# Patient Record
Sex: FEMALE | Race: WHITE | Employment: OTHER | URBAN - METROPOLITAN AREA
[De-identification: names, ages, dates, MRNs, and addresses within clinical notes are randomized per-mention and may not be internally consistent; named-entity substitution may affect disease eponyms.]

---

## 2017-05-08 ENCOUNTER — HOSPITAL ENCOUNTER (OUTPATIENT)
Dept: CT IMAGING | Age: 77
Discharge: HOME | End: 2017-05-08
Attending: INTERNAL MEDICINE
Payer: MEDICARE

## 2017-05-08 DIAGNOSIS — C18.2 MALIGNANT NEOPLASM OF ASCENDING COLON (HCC): ICD-10-CM

## 2017-05-08 LAB — CREAT UR-MCNC: 1.1 MG/DL (ref 0.6–1.3)

## 2017-05-08 PROCEDURE — 74177 CT ABD & PELVIS W/CONTRAST: CPT

## 2017-05-08 PROCEDURE — 82565 ASSAY OF CREATININE: CPT

## 2017-05-08 PROCEDURE — 74011636320 HC RX REV CODE- 636/320: Performed by: INTERNAL MEDICINE

## 2017-05-08 RX ADMIN — IOPAMIDOL 70 ML: 612 INJECTION, SOLUTION INTRAVENOUS at 14:49

## 2017-05-12 RX ORDER — AMLODIPINE BESYLATE 5 MG/1
TABLET ORAL
Qty: 30 TAB | Refills: 0 | Status: SHIPPED | OUTPATIENT
Start: 2017-05-12 | End: 2019-03-24

## 2017-06-21 ENCOUNTER — APPOINTMENT (OUTPATIENT)
Dept: GENERAL RADIOLOGY | Age: 77
DRG: 291 | End: 2017-06-21
Attending: EMERGENCY MEDICINE
Payer: MEDICARE

## 2017-06-21 ENCOUNTER — HOSPITAL ENCOUNTER (INPATIENT)
Age: 77
LOS: 8 days | Discharge: NURSING, REHAB OR LONG-TERM CARE | DRG: 291 | End: 2017-06-29
Attending: EMERGENCY MEDICINE | Admitting: INTERNAL MEDICINE
Payer: MEDICARE

## 2017-06-21 DIAGNOSIS — J18.9 PNEUMONIA OF LEFT LOWER LOBE DUE TO INFECTIOUS ORGANISM: ICD-10-CM

## 2017-06-21 DIAGNOSIS — R09.02 HYPOXIA: ICD-10-CM

## 2017-06-21 DIAGNOSIS — A41.9 SEPSIS, DUE TO UNSPECIFIED ORGANISM: Primary | ICD-10-CM

## 2017-06-21 DIAGNOSIS — N39.0 URINARY TRACT INFECTION WITHOUT HEMATURIA, SITE UNSPECIFIED: ICD-10-CM

## 2017-06-21 LAB
ALBUMIN SERPL BCP-MCNC: 3.6 G/DL (ref 3.4–5)
ALBUMIN/GLOB SERPL: 0.9 {RATIO} (ref 0.8–1.7)
ALP SERPL-CCNC: 112 U/L (ref 45–117)
ALT SERPL-CCNC: 23 U/L (ref 13–56)
ANION GAP BLD CALC-SCNC: 11 MMOL/L (ref 3–18)
APPEARANCE UR: CLEAR
AST SERPL W P-5'-P-CCNC: 22 U/L (ref 15–37)
BACTERIA URNS QL MICRO: ABNORMAL /HPF
BASOPHILS # BLD AUTO: 0 K/UL (ref 0–0.1)
BASOPHILS # BLD: 0 % (ref 0–2)
BILIRUB DIRECT SERPL-MCNC: 0.7 MG/DL (ref 0–0.2)
BILIRUB SERPL-MCNC: 2.4 MG/DL (ref 0.2–1)
BILIRUB UR QL: NEGATIVE
BNP SERPL-MCNC: 1939 PG/ML (ref 0–1800)
BUN SERPL-MCNC: 29 MG/DL (ref 7–18)
BUN/CREAT SERPL: 27 (ref 12–20)
CALCIUM SERPL-MCNC: 9.4 MG/DL (ref 8.5–10.1)
CHLORIDE SERPL-SCNC: 108 MMOL/L (ref 100–108)
CK MB CFR SERPL CALC: ABNORMAL % (ref 0–4)
CK MB SERPL-MCNC: <1 NG/ML (ref 5–25)
CK SERPL-CCNC: 38 U/L (ref 26–192)
CO2 SERPL-SCNC: 22 MMOL/L (ref 21–32)
COLOR UR: YELLOW
CREAT SERPL-MCNC: 1.07 MG/DL (ref 0.6–1.3)
DIFFERENTIAL METHOD BLD: ABNORMAL
EOSINOPHIL # BLD: 0 K/UL (ref 0–0.4)
EOSINOPHIL NFR BLD: 0 % (ref 0–5)
EPITH CASTS URNS QL MICRO: ABNORMAL /LPF (ref 0–5)
ERYTHROCYTE [DISTWIDTH] IN BLOOD BY AUTOMATED COUNT: 14.6 % (ref 11.6–14.5)
FLUAV AG NPH QL IA: NEGATIVE
FLUBV AG NOSE QL IA: NEGATIVE
GLOBULIN SER CALC-MCNC: 3.8 G/DL (ref 2–4)
GLUCOSE BLD STRIP.AUTO-MCNC: 181 MG/DL (ref 70–110)
GLUCOSE SERPL-MCNC: 238 MG/DL (ref 74–99)
GLUCOSE UR STRIP.AUTO-MCNC: NEGATIVE MG/DL
HCT VFR BLD AUTO: 29.6 % (ref 35–45)
HGB BLD-MCNC: 9.9 G/DL (ref 12–16)
HGB UR QL STRIP: NEGATIVE
KETONES UR QL STRIP.AUTO: NEGATIVE MG/DL
LACTATE BLD-SCNC: 1.3 MMOL/L (ref 0.4–2)
LEUKOCYTE ESTERASE UR QL STRIP.AUTO: ABNORMAL
LIPASE SERPL-CCNC: 144 U/L (ref 73–393)
LYMPHOCYTES # BLD AUTO: 5 % (ref 21–52)
LYMPHOCYTES # BLD: 0.4 K/UL (ref 0.9–3.6)
MCH RBC QN AUTO: 28.3 PG (ref 24–34)
MCHC RBC AUTO-ENTMCNC: 33.4 G/DL (ref 31–37)
MCV RBC AUTO: 84.6 FL (ref 74–97)
MONOCYTES # BLD: 0.4 K/UL (ref 0.05–1.2)
MONOCYTES NFR BLD AUTO: 5 % (ref 3–10)
NEUTS SEG # BLD: 8.4 K/UL (ref 1.8–8)
NEUTS SEG NFR BLD AUTO: 90 % (ref 40–73)
NITRITE UR QL STRIP.AUTO: NEGATIVE
PH UR STRIP: 5 [PH] (ref 5–8)
PLATELET # BLD AUTO: 95 K/UL (ref 135–420)
PMV BLD AUTO: 10.2 FL (ref 9.2–11.8)
POTASSIUM SERPL-SCNC: 4.7 MMOL/L (ref 3.5–5.5)
PROT SERPL-MCNC: 7.4 G/DL (ref 6.4–8.2)
PROT UR STRIP-MCNC: 30 MG/DL
RBC # BLD AUTO: 3.5 M/UL (ref 4.2–5.3)
RBC #/AREA URNS HPF: NEGATIVE /HPF (ref 0–5)
SODIUM SERPL-SCNC: 141 MMOL/L (ref 136–145)
SP GR UR REFRACTOMETRY: 1.01 (ref 1–1.03)
TROPONIN I SERPL-MCNC: 0.05 NG/ML (ref 0–0.04)
UROBILINOGEN UR QL STRIP.AUTO: 0.2 EU/DL (ref 0.2–1)
WBC # BLD AUTO: 9.3 K/UL (ref 4.6–13.2)
WBC URNS QL MICRO: ABNORMAL /HPF (ref 0–4)

## 2017-06-21 PROCEDURE — 82962 GLUCOSE BLOOD TEST: CPT

## 2017-06-21 PROCEDURE — 85025 COMPLETE CBC W/AUTO DIFF WBC: CPT | Performed by: EMERGENCY MEDICINE

## 2017-06-21 PROCEDURE — 82550 ASSAY OF CK (CPK): CPT | Performed by: EMERGENCY MEDICINE

## 2017-06-21 PROCEDURE — 81001 URINALYSIS AUTO W/SCOPE: CPT | Performed by: EMERGENCY MEDICINE

## 2017-06-21 PROCEDURE — 87077 CULTURE AEROBIC IDENTIFY: CPT | Performed by: INTERNAL MEDICINE

## 2017-06-21 PROCEDURE — 96374 THER/PROPH/DIAG INJ IV PUSH: CPT

## 2017-06-21 PROCEDURE — 80048 BASIC METABOLIC PNL TOTAL CA: CPT | Performed by: EMERGENCY MEDICINE

## 2017-06-21 PROCEDURE — 80076 HEPATIC FUNCTION PANEL: CPT | Performed by: EMERGENCY MEDICINE

## 2017-06-21 PROCEDURE — 71010 XR CHEST PORT: CPT

## 2017-06-21 PROCEDURE — 83605 ASSAY OF LACTIC ACID: CPT

## 2017-06-21 PROCEDURE — 99285 EMERGENCY DEPT VISIT HI MDM: CPT

## 2017-06-21 PROCEDURE — 74011250636 HC RX REV CODE- 250/636: Performed by: EMERGENCY MEDICINE

## 2017-06-21 PROCEDURE — 83880 ASSAY OF NATRIURETIC PEPTIDE: CPT | Performed by: EMERGENCY MEDICINE

## 2017-06-21 PROCEDURE — 74011250636 HC RX REV CODE- 250/636: Performed by: INTERNAL MEDICINE

## 2017-06-21 PROCEDURE — 65270000029 HC RM PRIVATE

## 2017-06-21 PROCEDURE — 74011250637 HC RX REV CODE- 250/637: Performed by: EMERGENCY MEDICINE

## 2017-06-21 PROCEDURE — 87804 INFLUENZA ASSAY W/OPTIC: CPT | Performed by: EMERGENCY MEDICINE

## 2017-06-21 PROCEDURE — 83690 ASSAY OF LIPASE: CPT | Performed by: EMERGENCY MEDICINE

## 2017-06-21 PROCEDURE — 87086 URINE CULTURE/COLONY COUNT: CPT | Performed by: INTERNAL MEDICINE

## 2017-06-21 PROCEDURE — 74011636637 HC RX REV CODE- 636/637: Performed by: INTERNAL MEDICINE

## 2017-06-21 PROCEDURE — 87186 SC STD MICRODIL/AGAR DIL: CPT | Performed by: INTERNAL MEDICINE

## 2017-06-21 PROCEDURE — 93005 ELECTROCARDIOGRAM TRACING: CPT

## 2017-06-21 PROCEDURE — 87040 BLOOD CULTURE FOR BACTERIA: CPT | Performed by: EMERGENCY MEDICINE

## 2017-06-21 RX ORDER — LEVOFLOXACIN 5 MG/ML
750 INJECTION, SOLUTION INTRAVENOUS
Status: COMPLETED | OUTPATIENT
Start: 2017-06-21 | End: 2017-06-21

## 2017-06-21 RX ORDER — INSULIN LISPRO 100 [IU]/ML
INJECTION, SOLUTION INTRAVENOUS; SUBCUTANEOUS
Status: DISCONTINUED | OUTPATIENT
Start: 2017-06-21 | End: 2017-06-29 | Stop reason: HOSPADM

## 2017-06-21 RX ORDER — DEXTROSE 50 % IN WATER (D50W) INTRAVENOUS SYRINGE
25-50 AS NEEDED
Status: DISCONTINUED | OUTPATIENT
Start: 2017-06-21 | End: 2017-06-29 | Stop reason: HOSPADM

## 2017-06-21 RX ORDER — ACETAMINOPHEN 500 MG
1000 TABLET ORAL ONCE
Status: COMPLETED | OUTPATIENT
Start: 2017-06-21 | End: 2017-06-21

## 2017-06-21 RX ORDER — ONDANSETRON 2 MG/ML
4 INJECTION INTRAMUSCULAR; INTRAVENOUS
Status: COMPLETED | OUTPATIENT
Start: 2017-06-21 | End: 2017-06-21

## 2017-06-21 RX ORDER — SODIUM CHLORIDE 9 MG/ML
75 INJECTION, SOLUTION INTRAVENOUS CONTINUOUS
Status: DISPENSED | OUTPATIENT
Start: 2017-06-22 | End: 2017-06-22

## 2017-06-21 RX ORDER — LEVOFLOXACIN 5 MG/ML
750 INJECTION, SOLUTION INTRAVENOUS EVERY 24 HOURS
Status: DISCONTINUED | OUTPATIENT
Start: 2017-06-23 | End: 2017-06-22

## 2017-06-21 RX ORDER — MAGNESIUM SULFATE 100 %
16 CRYSTALS MISCELLANEOUS AS NEEDED
Status: DISCONTINUED | OUTPATIENT
Start: 2017-06-21 | End: 2017-06-29 | Stop reason: HOSPADM

## 2017-06-21 RX ADMIN — SODIUM CHLORIDE 1000 ML: 900 INJECTION, SOLUTION INTRAVENOUS at 16:59

## 2017-06-21 RX ADMIN — ONDANSETRON 4 MG: 2 INJECTION INTRAMUSCULAR; INTRAVENOUS at 13:13

## 2017-06-21 RX ADMIN — SODIUM CHLORIDE 75 ML/HR: 900 INJECTION, SOLUTION INTRAVENOUS at 23:46

## 2017-06-21 RX ADMIN — INSULIN LISPRO 2 UNITS: 100 INJECTION, SOLUTION INTRAVENOUS; SUBCUTANEOUS at 22:58

## 2017-06-21 RX ADMIN — LEVOFLOXACIN 750 MG: 5 INJECTION, SOLUTION INTRAVENOUS at 19:31

## 2017-06-21 RX ADMIN — ACETAMINOPHEN 1000 MG: 500 TABLET ORAL at 13:08

## 2017-06-21 RX ADMIN — SODIUM CHLORIDE 500 MG: 900 INJECTION, SOLUTION INTRAVENOUS at 16:58

## 2017-06-21 NOTE — ED TRIAGE NOTES
Patient report this morning she awaken not feeling well, C/O nausea, vomiting, fatigue, and diarrhea symptoms and feeling cold. Patient oral temp 102.6, denies abdominal pain.

## 2017-06-21 NOTE — ED TRIAGE NOTES
Provider in triage: 67 y o female has nausea vomiting and diarrhea and fever for 5 days.  with similar symptoms.    Ordered: labs, fluid, zofran, tylenol   DDx: gastroenteritis, dehydration  Sent to (MAIN treatment area, FAST track): fast

## 2017-06-21 NOTE — ED PROVIDER NOTES
HPI Comments: Pt with history of HTN, DM, CAD and thrombocytopenia, presents to ED with complaint of cough, ear pain and drainage and \"ear popping\". She notes that she started feeling badly yesterday afternoon. Her  is currently living in a NH/rehab facility and \"you know how many infections are over there\". She states she can \"feel the fluid rolling around in my head when I turn over in bed\" and then she developed nausea and vomiting today. Cough is productive of \"phlegm\". No diarrhea, no chest pain, no abdominal pain, no dysuria, no hematuria, no melena or rectal bleeding, no headache, no neck or back pain. She called her PMD but Loan Sadler was out of town and couldn't see me today\". She denies any recent abx use. No other acute symptoms or complaints were noted. Past Medical History:   Diagnosis Date    Anemia     CAD (coronary artery disease)     Coronary Stenting needed, hematology workup needed prior to stent placement, treating medically at this time    Cancer [facility])     colon    Cancer ([facility]     breast - left    Cardiac catheterization 08/17/2016    LM patent. mLAD 99.9% (2.25 x 28-mm Xience BETHEL, resid 0%). oD1 60%. oD2 95%. pCX 90% (2.5 x 13-mm Xience BETHEL, resid 0%). OMB patent.  Cardiac echocardiogram 07/01/2016    EF 50%. Basal inferior, basal-mid inferolateral hypk. Gr 1 DDfx. RVSP 50-60 mmHg. Mild LAE.   Mod, eccentric MR.      Diabetes ([facility] [address]     Hypercholesteremia     Hypertension     Thrombocytopenia ([facility] [address]        Past Surgical History:   Procedure Laterality Date    CARDIAC CATHETERIZATION  7/16/2015     no stents at this time r/t anemia & thrombocytopenia (hematology work-up needed prior to stent placement)    CARDIAC CATHETERIZATION  7/11/2016         CARDIAC CATHETERIZATION  8/17/2016         CORONARY ARTERY ANGIOGRAM  7/16/2015         CORONARY ARTERY ANGIOGRAM  7/11/2016         CORONARY ARTERY ANGIOGRAM  8/17/2016         CORONARY STENT EA ADDL VESSEL 8/17/2016         CORONARY STENT SINGLE W/PTCA  8/17/2016         HX GI      colon surg secondary to cancer - removed lesion    HX HEENT  2/2013    cataract bilaterally    HX MASTECTOMY  march 2011    left    HX VASCULAR ACCESS      mediport - now removed    LV ANGIOGRAPHY  7/16/2015         LV ANGIOGRAPHY  7/11/2016              No family history on file. Social History     Social History    Marital status:      Spouse name: N/A    Number of children: N/A    Years of education: N/A     Occupational History    Not on file. Social History Main Topics    Smoking status: Never Smoker    Smokeless tobacco: Never Used    Alcohol use No    Drug use: No    Sexual activity: Not on file     Other Topics Concern    Not on file     Social History Narrative         ALLERGIES: Latex    Review of Systems   Constitutional: Positive for activity change and fatigue. Negative for chills, diaphoresis and fever. HENT: Positive for congestion, ear discharge, ear pain and sore throat. Negative for facial swelling, hearing loss, trouble swallowing and voice change. Eyes: Negative for visual disturbance. Respiratory: Positive for cough. Negative for shortness of breath and wheezing. Cardiovascular: Negative for chest pain, palpitations and leg swelling. Gastrointestinal: Positive for nausea and vomiting. Negative for abdominal pain, constipation and diarrhea. Endocrine: Negative. Genitourinary: Negative for dysuria and hematuria. Musculoskeletal: Negative. Negative for back pain, neck pain and neck stiffness. Skin: Negative for pallor and rash. Allergic/Immunologic: Negative. Neurological: Negative for syncope, weakness, numbness and headaches. Hematological: Does not bruise/bleed easily.        Vitals:    06/21/17 1240   BP: 139/74   Pulse: (!) 109   Resp: 16   Temp: (!) 102.6 °F (39.2 °C)   SpO2: (!) 86%   Weight: 61.2 kg (135 lb)   Height: 5' (1.524 m)            Physical Exam Constitutional: She is oriented to person, place, and time. She appears well-developed and well-nourished. No distress. Resting comfortably on stretcher   HENT:   Head: Normocephalic and atraumatic. Mouth/Throat: Oropharynx is clear and moist.   MM moist. Erythema noted to L TM, normal appearing R TM   Eyes: Conjunctivae and EOM are normal. No scleral icterus. Sclera clear bilaterally   Neck: Normal range of motion. Neck supple. No JVD present. Non-tender to palpation   Cardiovascular: Normal rate, regular rhythm and normal heart sounds. Exam reveals no gallop and no friction rub. No murmur heard. Pulmonary/Chest: Effort normal and breath sounds normal. No stridor. No respiratory distress. She has no wheezes. She has no rales. She exhibits no tenderness. No crepitance with palpation   Abdominal: Soft. Bowel sounds are normal. She exhibits no distension and no mass. There is no tenderness. There is no rebound and no guarding. Genitourinary:   Genitourinary Comments: No CVA tenderness   Musculoskeletal: She exhibits no edema or tenderness. Normal inspection of upper extremities. No edema noted to bilateral lower extremities   Lymphadenopathy:     She has no cervical adenopathy. Neurological: She is alert and oriented to person, place, and time. No cranial nerve deficit. She exhibits normal muscle tone. Normal motor and sensation bilaterally to upper and lower extremities   Skin: Skin is warm and dry. No rash noted. She is not diaphoretic. Psychiatric:   Normal mood and affect. Vitals reviewed. MDM  Number of Diagnoses or Management Options  Hypoxia:   Pneumonia of left lower lobe due to infectious organism:   Sepsis, due to unspecified organism [facility]):   Urinary tract infection without hematuria, site unspecified:   Diagnosis management comments: Pt with ear pain, cough, sore throat and nausea/vomiting this morning with fever and hypoxia noted on VS.  No acute distress noted. Benign abdominal exam.  Will check labs, UA, XR and start on NC O2.  Reassess but anticipate admission. Discussed with Dr. Annalee Lehman, will admit. Amount and/or Complexity of Data Reviewed  Clinical lab tests: ordered and reviewed  Tests in the radiology section of CPT®: ordered and reviewed  Tests in the medicine section of CPT®: ordered and reviewed  Decide to obtain previous medical records or to obtain history from someone other than the patient: yes  Obtain history from someone other than the patient: yes  Independent visualization of images, tracings, or specimens: yes    Risk of Complications, Morbidity, and/or Mortality  Presenting problems: high  Management options: high    Patient Progress  Patient progress: stable    ED Course       Procedures    EKG:  NSR, rate 108, normal axis, inferior and septal Q waves, no significant ST-T abnormalities.     CXR:  LLL infiltrate

## 2017-06-21 NOTE — H&P
Hospitalist Admission History and Physical    NAME:  Db Hinojosa   :   1940   MRN:   [id number]     PCP:  Andrew Lorenzo MD  Date/Time:  2017 5:29 PM  Subjective:   CHIEF COMPLAINT:  Cough     HISTORY OF PRESENT ILLNESS:     Prashanth Tobias is a 67 y.o. With medical hx as listed comes to the hospital with complaints of cough. Patient states she has been doing well but last night she thought she felt little faitgued but ended up going to bed. This morning when she woke up she felt her ears were \"full\" and had persistent non productive coughing. She also reports of feeling chills and possible hot at home as well today. Her  was recently diagnosed of pna and think she may have caught it from him. She is otherwise healthy, no recent hospitalizations. She takes her meds as given. She is always active. No other complaints inlcuding dysuria. In the ED she was noted to be febrile with signs of infiltrate on CXR, dirty UA and hypoxic She was placed on NC with improvement in her O2 saturation. When  I saw the patient she was resting comfortably without any complaints. When taken of supplemental O2, her O2 saturation dropped to mid-high 80s. Normally she doesn't use one at home. FULL CODE      Past Medical History:   Diagnosis Date    Anemia     CAD (coronary artery disease)     Coronary Stenting needed, hematology workup needed prior to stent placement, treating medically at this time    Cancer [facility])     colon    Cancer ([facility]     breast - left    Cardiac catheterization 2016    LM patent. mLAD 99.9% (2.25 x 28-mm Xience BETHEL, resid 0%). oD1 60%. oD2 95%. pCX 90% (2.5 x 13-mm Xience BETHEL, resid 0%). OMB patent.  Cardiac echocardiogram 2016    EF 50%. Basal inferior, basal-mid inferolateral hypk. Gr 1 DDfx. RVSP 50-60 mmHg. Mild LAE.   Mod, eccentric MR.      Diabetes ([facility] [address]     Hypercholesteremia     Hypertension     [facility]         Past Surgical History: Procedure Laterality Date    CARDIAC CATHETERIZATION  7/16/2015     no stents at this time r/t anemia & thrombocytopenia (hematology work-up needed prior to stent placement)    CARDIAC CATHETERIZATION  7/11/2016         CARDIAC CATHETERIZATION  8/17/2016         CORONARY ARTERY ANGIOGRAM  7/16/2015         CORONARY ARTERY ANGIOGRAM  7/11/2016         CORONARY ARTERY ANGIOGRAM  8/17/2016         CORONARY STENT EA ADDL VESSEL  8/17/2016         CORONARY STENT SINGLE W/PTCA  8/17/2016         HX GI      colon surg secondary to cancer - removed lesion    HX HEENT  2/2013    cataract bilaterally    HX MASTECTOMY  march 2011    left    HX VASCULAR ACCESS      mediport - now removed    LV ANGIOGRAPHY  7/16/2015         LV ANGIOGRAPHY  7/11/2016            Social History   Substance Use Topics    Smoking status: Never Smoker    Smokeless tobacco: Never Used    Alcohol use No        No family history on file. Allergies   Allergen Reactions    Latex Itching        Prior to Admission Medications   Prescriptions Last Dose Informant Patient Reported? Taking? amLODIPine (NORVASC) 5 mg tablet   No No   Sig: TAKE ONE TABLET BY MOUTH ONCE DAILY   ascorbic acid, vitamin C, (VITAMIN C) 250 mg tablet   No No   Sig: Take 1 Tab by mouth daily. aspirin 81 mg tablet   Yes No   Sig: Take 81 mg by mouth daily. benazepril (LOTENSIN) 20 mg tablet   No No   Sig: Take 1 Tab by mouth daily. In the morning   benazepril (LOTENSIN) 5 mg tablet   Yes No   Sig: Take 5 mg by mouth every evening. calcium polycarbophil (FIBER LAXATIVE) 625 mg tablet   Yes No   Sig: Take 1,250 mg by mouth daily. cholecalciferol (VITAMIN D3) 1,000 unit tablet   No No   Sig: Take 2 Tabs by mouth daily. cyanocobalamin (VITAMIN B12) 100 mcg tablet   Yes No   Sig: Take 100 mcg by mouth daily. ferrous sulfate 325 mg (65 mg iron) tablet   No No   Sig: Take 1 Tab by mouth two (2) times daily (with meals).    furosemide (LASIX) 20 mg tablet No No   Sig: Take 0.5 Tabs by mouth daily. hydrALAZINE (APRESOLINE) 10 mg tablet   No No   Sig: Take 1 Tab by mouth three (3) times daily. metFORMIN (GLUCOPHAGE) 500 mg tablet   Yes No   Sig: Take 500 mg by mouth two (2) times daily (with meals). metoprolol tartrate (LOPRESSOR) 50 mg tablet   No No   Sig: Take 1 Tab by mouth two (2) times a day. multivitamin (ONE A DAY) tablet   Yes No   Sig: Take 1 Tab by mouth daily. nitroglycerin (NITROLINGUAL) 400 mcg/spray spray   No No   Si Spray by SubLINGual route every five (5) minutes as needed. omega 3-dha-epa-fish oil (FISH OIL) 100-160-1,000 mg cap   Yes No   Sig: Take 1,000 mg by mouth two (2) times a day. simvastatin (ZOCOR) 20 mg tablet   Yes No   Sig: Take 20 mg by mouth daily. ticagrelor (BRILINTA) 90 mg tablet   No No   Sig: Take 1 Tab by mouth every twelve (12) hours every twelve (12) hours.       Facility-Administered Medications: None       REVIEW OF SYSTEMS:     [] Unable to obtain  ROS due to  []mental status change  []sedated   []intubated   [x]Total of 12 systems reviewed as follows:  Constitutional:  negative fever, negative chills, negative weight loss  Eyes:   negative visual changes  ENT:   negative sore throat, tongue or lip swelling  Respiratory:   negative dyspnea  Cards:   negative for chest pain, palpitations, lower extremity edema  GI:   negative for nausea, vomiting, diarrhea, and abdominal pain  Genitourinary: negative for frequency, dysuria  Integument:  negative for rash and pruritus  Hematologic:  negative for easy bruising and gum/nose bleeding  Musculoskel: negative for myalgias,  back pain and muscle weakness  Neurological:  negative for headaches, dizziness, vertigo  Behavl/Psych:  negative for feelings of anxiety, depression     Pertinent Positives include :per hpi     Objective:   VITALS:    Visit Vitals    /47    Pulse 89    Temp 99.7 °F (37.6 °C)    Resp 27    Ht 5' (1.524 m)    Wt 61.2 kg (135 lb)  SpO2 95%    BMI 26.37 kg/m2     Temp (24hrs), Av.2 °F (38.4 °C), Min:99.7 °F (37.6 °C), Max:102.6 °F (39.2 °C)      PHYSICAL EXAM:   General:    Alert, cooperative, no distress, appears stated age. Head:   Normocephalic, without obvious abnormality, atraumatic. Eyes:   Conjunctivae clear, anicteric sclerae. Pupils are equal  Nose:  Nares normal. No drainage or sinus tenderness. Throat:    Lips, mucosa, and tongue normal.  No Thrush  Neck:  Supple, symmetrical,  no adenopathy, thyroid: non tender    no carotid bruit and no JVD. Back:    Symmetric,  No CVA tenderness. Lungs:   Bibasilar crackles   Chest wall:  No tenderness or deformity. No Accessory muscle use. Heart:   Regular rate and rhythm,  no murmur, rub or gallop. Abdomen:   Soft, non-tender. Not distended. Bowel sounds normal. No masses  Extremities: Extremities normal, atraumatic, No cyanosis. No edema. No clubbing  Skin:     Texture, turgor normal. No rashes or lesions. Not Jaundiced  Lymph nodes: Cervical, supraclavicular normal.  Psych:  Good insight. Not depressed. Not anxious or agitated. Neurologic: EOMs intact. No facial asymmetry. No aphasia or slurred speech. Normal   strength, Alert and oriented X 3.        LAB DATA REVIEWED:    Lab Results   Component Value Date/Time    Glucose (POC) 337 2016 11:44 AM    Glucose (POC) 223 2016 07:22 AM    Glucose (POC) 257 2016 09:44 PM    Glucose (POC) 264 2016 09:42 PM    Glucose (POC) 321 2016 01:54 PM    Glucose (POC) 140 2015 09:33 AM     Recent Labs      17   1301   ALB  3.6   WBC  9.3   HGB  9.9*   HCT  29.6*   PLT  95*         IMAGING RESULTS:   []  I have personally reviewed the actual   []CXR  []CT scan    CXR: possible LLL infiltrate   CT :    Assessment/Plan:      Principal Problem:    PNA (pneumonia) (2017)    Active Problems:    CAD (coronary artery disease) (2015)      Thrombocytopenia ([address] (2015)      UTI (urinary tract infection) (6/21/2017)      Sepsis ([address] (6/21/2017)      Hypoxia (6/21/2017)       ___________________________________________________  PLAN:    Risk of deterioration:  []Low    []Moderate  [x]High    1. Community Acquired Pneumonia vs Bronchitis   2. Hypoxia   3. UTI  4. HTN  5. CAD  6. DM2  FULL CODE     -admit for further care   -supplemental o2  -sent urine culture and sputum culture  -cont levaquin and azithromycin for now, broaden coverage if needed   -wean off O2 as needed   -holding metformin, accuchecks ISS  -cont other home medications at this time   -Do no think she needs CTA at this time as unlikely this is PE, obtain one if necessary.            Prophylaxis:  []Lovenox  []Coumadin  [x]Hep SQ  []SCDs  []H2B/PPI    Disposition:  [x]Home w/ Family   []HH PT,OT,RN   []SNF/LTC   []SAH/Rehab    Discussed Code Status:    [x]Full Code      []DNR     ___________________________________________________    Care Plan discussed with:    [x]Patient   []Family    []ED Care Manager  []ED Doc   []Specialist :    Total Time Coordinating Admission:   55   minutes    []Total Critical Care Time:     ___________________________________________________  Admitting Physician: Dominick Figueroa MD

## 2017-06-21 NOTE — IP AVS SNAPSHOT
Current Discharge Medication List  
  
START taking these medications Dose & Instructions Dispensing Information Comments Morning Noon Evening Bedtime  
 albuterol-ipratropium 2.5 mg-0.5 mg/3 ml Nebu Commonly known as:  Arjun Yañez Your last dose was: Your next dose is:    
   
   
 Dose:  3 mL  
3 mL by Nebulization route three (3) times daily for 5 days. Quantity:  45 mL Refills:  0  
     
   
   
   
  
 docusate sodium 100 mg capsule Commonly known as:  Nahum Adams Your last dose was: Your next dose is:    
   
   
 Dose:  100 mg Take 1 Cap by mouth two (2) times a day for 15 days. Quantity:  30 Cap Refills:  0  
     
   
   
   
  
 insulin glargine 100 unit/mL injection Commonly known as:  LANTUS Your last dose was: Your next dose is:    
   
   
 10 units subcutaneously daily Quantity:  1 Vial  
Refills:  0  
     
   
   
   
  
 insulin lispro 100 unit/mL injection Commonly known as:  HUMALOG Your last dose was: Your next dose is: For Blood Sugar (mg/dL) of:             Less than 150 =   0 units 150 -199 =   2 units 200 -249 =   4 units 250 -299 =   6 units 300 -349 =   8 units 350 and above =   10 units Quantity:  1 Vial  
Refills:  0  
     
   
   
   
  
 levoFLOXacin 250 mg tablet Commonly known as:  Tonia Oswald Your last dose was: Your next dose is:    
   
   
 Dose:  250 mg Take 1 Tab by mouth every twenty-four (24) hours for 5 days. Quantity:  5 Tab Refills:  0  
     
   
   
   
  
 neomycin-polymyxin-hydrocortisone otic solution Commonly known as:  CORTISPORIN Your last dose was: Your next dose is:    
   
   
 Dose:  4 Drop Administer 4 Drops into each ear four (4) times daily for 10 days. Quantity:  10 mL Refills:  0  
     
   
   
   
  
 pantoprazole 40 mg tablet Commonly known as:  PROTONIX Your last dose was: Your next dose is:    
   
   
 Dose:  40 mg Take 1 Tab by mouth two (2) times a day. Quantity:  60 Tab Refills:  0 CONTINUE these medications which have CHANGED Dose & Instructions Dispensing Information Comments Morning Noon Evening Bedtime * ferrous sulfate 325 mg (65 mg iron) tablet What changed:  Another medication with the same name was added. Make sure you understand how and when to take each. Your last dose was: Your next dose is:    
   
   
 Dose:  325 mg Take 1 Tab by mouth two (2) times daily (with meals). Quantity:  60 Tab Refills:  1  
     
   
   
   
  
 * ferrous sulfate 325 mg (65 mg iron) EC tablet Commonly known as:  IRON What changed: You were already taking a medication with the same name, and this prescription was added. Make sure you understand how and when to take each. Your last dose was: Your next dose is:    
   
   
 Dose:  325 mg Take 1 Tab by mouth two (2) times a day for 15 days. Quantity:  30 Tab Refills:  0  
     
   
   
   
  
 furosemide 20 mg tablet Commonly known as:  LASIX What changed:   
- how much to take 
- how to take this - when to take this 
- additional instructions Your last dose was: Your next dose is:    
   
   
 20 mg po daily Quantity:  30 Tab Refills:  0  
     
   
   
   
  
 * Notice: This list has 2 medication(s) that are the same as other medications prescribed for you. Read the directions carefully, and ask your doctor or other care provider to review them with you. CONTINUE these medications which have NOT CHANGED Dose & Instructions Dispensing Information Comments Morning Noon Evening Bedtime  
 amLODIPine 5 mg tablet Commonly known as:  Trang Fast Your last dose was: Your next dose is: TAKE ONE TABLET BY MOUTH ONCE DAILY Quantity:  30 Tab Refills:  0 ascorbic acid (vitamin C) 250 mg tablet Commonly known as:  VITAMIN C Your last dose was: Your next dose is:    
   
   
 Dose:  250 mg Take 1 Tab by mouth daily. Quantity:  60 Tab Refills:  1  
     
   
   
   
  
 aspirin 81 mg tablet Your last dose was: Your next dose is:    
   
   
 Dose:  81 mg Take 81 mg by mouth daily. Refills:  0  
     
   
   
   
  
 cholecalciferol 1,000 unit tablet Commonly known as:  VITAMIN D3 Your last dose was: Your next dose is:    
   
   
 Dose:  2000 Units Take 2 Tabs by mouth daily. Quantity:  60 Tab Refills:  1  
     
   
   
   
  
 cyanocobalamin 100 mcg tablet Commonly known as:  VITAMIN B12 Your last dose was: Your next dose is:    
   
   
 Dose:  100 mcg Take 100 mcg by mouth daily. Refills:  0 FIBER LAXATIVE (CA POLYCARBO) 625 mg tablet Generic drug:  calcium polycarbophil Your last dose was: Your next dose is:    
   
   
 Dose:  1250 mg Take 1,250 mg by mouth daily. Refills:  0  
     
   
   
   
  
 FISH -160-1,000 mg Cap Generic drug:  omega 3-dha-epa-fish oil Your last dose was: Your next dose is:    
   
   
 Dose:  1000 mg Take 1,000 mg by mouth two (2) times a day. Refills:  0  
     
   
   
   
  
 hydrALAZINE 10 mg tablet Commonly known as:  APRESOLINE Your last dose was: Your next dose is:    
   
   
 Dose:  10 mg Take 1 Tab by mouth three (3) times daily. Quantity:  90 Tab Refills:  6  
     
   
   
   
  
 metFORMIN 500 mg tablet Commonly known as:  GLUCOPHAGE Your last dose was: Your next dose is:    
   
   
 Dose:  500 mg Take 500 mg by mouth two (2) times daily (with meals). Refills:  0  
     
   
   
   
  
 metoprolol tartrate 50 mg tablet Commonly known as:  LOPRESSOR Your last dose was: Your next dose is: Dose:  50 mg Take 1 Tab by mouth two (2) times a day. Quantity:  60 Tab Refills:  6  
     
   
   
   
  
 multivitamin tablet Commonly known as:  ONE A DAY Your last dose was: Your next dose is:    
   
   
 Dose:  1 Tab Take 1 Tab by mouth daily. Refills:  0  
     
   
   
   
  
 nitroglycerin 400 mcg/spray spray Commonly known as:  Comfort Luna Your last dose was: Your next dose is:    
   
   
 Dose:  1 Spray 1 Spray by SubLINGual route every five (5) minutes as needed. Quantity:  1 Bottle Refills:  2  
     
   
   
   
  
 simvastatin 20 mg tablet Commonly known as:  ZOCOR Your last dose was: Your next dose is:    
   
   
 Dose:  20 mg Take 20 mg by mouth daily. Refills:  0 STOP taking these medications   
 benazepril 20 mg tablet Commonly known as:  LOTENSIN  
   
  
 benazepril 5 mg tablet Commonly known as:  LOTENSIN  
   
  
 ticagrelor 90 mg tablet Commonly known as:  [employer] Where to Get Your Medications Information on where to get these meds will be given to you by the nurse or doctor. ! Ask your nurse or doctor about these medications  
  albuterol-ipratropium 2.5 mg-0.5 mg/3 ml Nebu  
 docusate sodium 100 mg capsule  
 ferrous sulfate 325 mg (65 mg iron) EC tablet  
 furosemide 20 mg tablet  
 insulin glargine 100 unit/mL injection  
 insulin lispro 100 unit/mL injection  
 levoFLOXacin 250 mg tablet  
 neomycin-polymyxin-hydrocortisone otic solution  
 pantoprazole 40 mg tablet

## 2017-06-21 NOTE — IP AVS SNAPSHOT
[address] [address] [facility] 110  [facility] Patient: Barbie Butler MRN: [id number] :1940 You are allergic to the following Allergen Reactions Latex Itching Recent Documentation Height Weight BMI OB Status Smoking Status 1.524 m 63 kg 27.13 kg/m2 Postmenopausal Never Smoker Emergency Contacts Name Discharge Info Relation Home Work Mobile GaetanoSher DISCHARGE CAREGIVER [3] Spouse [3]   [phone] [address] [facility] CAREGIVER [3] Sister [23] [phone] Sher Salter  Other Relative [6] [phone] About your hospitalization You were admitted on:  2017 You last received care in the:  [facility] [address] You were discharged on:  2017 Unit phone number:  [phone] Why you were hospitalized Your primary diagnosis was:  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) Your diagnoses also included:  Uti (Urinary Tract Infection), Sepsis (Hcc), Pna (Pneumonia), Cad (Coronary Artery Disease), Thrombocytopenia (Hcc), Hypoxia, Esophageal Varices (Hcc) Providers Seen During Your Hospitalizations Provider Role Specialty Primary office phone Giuseppe Lee MD Attending Provider Emergency Medicine [phone] Alexei Johns MD Attending Provider Emergency Medicine [phone] Madiha Delcid MD Attending Provider Internal Medicine [phone] Your Primary Care Physician (PCP) Primary Care Physician Office Phone Office Fax Ramesh Yañez [phone] Follow-up Information Follow up With Details Comments Contact Info [facility]  Patient will be discharging to [address] [facility] [address] [facility] [address] [phone] Misty Blood MD In 2 weeks  [address] [facility] [address] [facility] [address] [phone] Marlys Elder MD In 2 weeks  [address] [facility] [address] 
[phone] Lencho Eckert MD In 10 days  [address] [facility] 200 [facility] [address] 
[phone] Current Discharge Medication List  
  
START taking these medications Dose & Instructions Dispensing Information Comments Morning Noon Evening Bedtime  
 albuterol-ipratropium 2.5 mg-0.5 mg/3 ml Nebu Commonly known as:  Trisha Florez Your last dose was: Your next dose is:    
   
   
 Dose:  3 mL  
3 mL by Nebulization route three (3) times daily for 5 days. Quantity:  45 mL Refills:  0  
     
   
   
   
  
 docusate sodium 100 mg capsule Commonly known as:  Stevie Ambrocio Your last dose was: Your next dose is:    
   
   
 Dose:  100 mg Take 1 Cap by mouth two (2) times a day for 15 days. Quantity:  30 Cap Refills:  0  
     
   
   
   
  
 insulin glargine 100 unit/mL injection Commonly known as:  LANTUS Your last dose was: Your next dose is:    
   
   
 10 units subcutaneously daily Quantity:  1 Vial  
Refills:  0  
     
   
   
   
  
 insulin lispro 100 unit/mL injection Commonly known as:  HUMALOG Your last dose was: Your next dose is: For Blood Sugar (mg/dL) of:             Less than 150 =   0 units 150 -199 =   2 units 200 -249 =   4 units 250 -299 =   6 units 300 -349 =   8 units 350 and above =   10 units Quantity:  1 Vial  
Refills:  0  
     
   
   
   
  
 levoFLOXacin 250 mg tablet Commonly known as:  Thomasena Fus Your last dose was: Your next dose is:    
   
   
 Dose:  250 mg Take 1 Tab by mouth every twenty-four (24) hours for 5 days. Quantity:  5 Tab Refills:  0  
     
   
   
   
  
 neomycin-polymyxin-hydrocortisone otic solution Commonly known as:  CORTISPORIN Your last dose was: Your next dose is: Dose:  4 Drop Administer 4 Drops into each ear four (4) times daily for 10 days. Quantity:  10 mL Refills:  0  
     
   
   
   
  
 pantoprazole 40 mg tablet Commonly known as:  PROTONIX Your last dose was: Your next dose is:    
   
   
 Dose:  40 mg Take 1 Tab by mouth two (2) times a day. Quantity:  60 Tab Refills:  0 CONTINUE these medications which have CHANGED Dose & Instructions Dispensing Information Comments Morning Noon Evening Bedtime * ferrous sulfate 325 mg (65 mg iron) tablet What changed:  Another medication with the same name was added. Make sure you understand how and when to take each. Your last dose was: Your next dose is:    
   
   
 Dose:  325 mg Take 1 Tab by mouth two (2) times daily (with meals). Quantity:  60 Tab Refills:  1  
     
   
   
   
  
 * ferrous sulfate 325 mg (65 mg iron) EC tablet Commonly known as:  IRON What changed: You were already taking a medication with the same name, and this prescription was added. Make sure you understand how and when to take each. Your last dose was: Your next dose is:    
   
   
 Dose:  325 mg Take 1 Tab by mouth two (2) times a day for 15 days. Quantity:  30 Tab Refills:  0  
     
   
   
   
  
 furosemide 20 mg tablet Commonly known as:  LASIX What changed:   
- how much to take 
- how to take this - when to take this 
- additional instructions Your last dose was: Your next dose is:    
   
   
 20 mg po daily Quantity:  30 Tab Refills:  0  
     
   
   
   
  
 * Notice: This list has 2 medication(s) that are the same as other medications prescribed for you. Read the directions carefully, and ask your doctor or other care provider to review them with you. CONTINUE these medications which have NOT CHANGED Dose & Instructions Dispensing Information Comments Morning Noon Evening Bedtime  
 amLODIPine 5 mg tablet Commonly known as:  Delphinicole Peat Your last dose was: Your next dose is: TAKE ONE TABLET BY MOUTH ONCE DAILY Quantity:  30 Tab Refills:  0  
     
   
   
   
  
 ascorbic acid (vitamin C) 250 mg tablet Commonly known as:  VITAMIN C Your last dose was: Your next dose is:    
   
   
 Dose:  250 mg Take 1 Tab by mouth daily. Quantity:  60 Tab Refills:  1  
     
   
   
   
  
 aspirin 81 mg tablet Your last dose was: Your next dose is:    
   
   
 Dose:  81 mg Take 81 mg by mouth daily. Refills:  0  
     
   
   
   
  
 cholecalciferol 1,000 unit tablet Commonly known as:  VITAMIN D3 Your last dose was: Your next dose is:    
   
   
 Dose:  2000 Units Take 2 Tabs by mouth daily. Quantity:  60 Tab Refills:  1  
     
   
   
   
  
 cyanocobalamin 100 mcg tablet Commonly known as:  VITAMIN B12 Your last dose was: Your next dose is:    
   
   
 Dose:  100 mcg Take 100 mcg by mouth daily. Refills:  0 FIBER LAXATIVE (CA POLYCARBO) 625 mg tablet Generic drug:  calcium polycarbophil Your last dose was: Your next dose is:    
   
   
 Dose:  1250 mg Take 1,250 mg by mouth daily. Refills:  0  
     
   
   
   
  
 FISH -160-1,000 mg Cap Generic drug:  omega 3-dha-epa-fish oil Your last dose was: Your next dose is:    
   
   
 Dose:  1000 mg Take 1,000 mg by mouth two (2) times a day. Refills:  0  
     
   
   
   
  
 hydrALAZINE 10 mg tablet Commonly known as:  APRESOLINE Your last dose was: Your next dose is:    
   
   
 Dose:  10 mg Take 1 Tab by mouth three (3) times daily. Quantity:  90 Tab Refills:  6  
     
   
   
   
  
 metFORMIN 500 mg tablet Commonly known as:  GLUCOPHAGE Your last dose was: Your next dose is: Dose:  500 mg Take 500 mg by mouth two (2) times daily (with meals). Refills:  0  
     
   
   
   
  
 metoprolol tartrate 50 mg tablet Commonly known as:  LOPRESSOR Your last dose was: Your next dose is:    
   
   
 Dose:  50 mg Take 1 Tab by mouth two (2) times a day. Quantity:  60 Tab Refills:  6  
     
   
   
   
  
 multivitamin tablet Commonly known as:  ONE A DAY Your last dose was: Your next dose is:    
   
   
 Dose:  1 Tab Take 1 Tab by mouth daily. Refills:  0  
     
   
   
   
  
 nitroglycerin 400 mcg/spray spray Commonly known as:  Tez Flores Your last dose was: Your next dose is:    
   
   
 Dose:  1 Spray 1 Spray by SubLINGual route every five (5) minutes as needed. Quantity:  1 Bottle Refills:  2  
     
   
   
   
  
 simvastatin 20 mg tablet Commonly known as:  ZOCOR Your last dose was: Your next dose is:    
   
   
 Dose:  20 mg Take 20 mg by mouth daily. Refills:  0 STOP taking these medications   
 benazepril 20 mg tablet Commonly known as:  LOTENSIN  
   
  
 benazepril 5 mg tablet Commonly known as:  LOTENSIN  
   
  
 ticagrelor 90 mg tablet Commonly known as:  [employer] Where to Get Your Medications Information on where to get these meds will be given to you by the nurse or doctor. ! Ask your nurse or doctor about these medications  
  albuterol-ipratropium 2.5 mg-0.5 mg/3 ml Nebu  
 docusate sodium 100 mg capsule  
 ferrous sulfate 325 mg (65 mg iron) EC tablet  
 furosemide 20 mg tablet  
 insulin glargine 100 unit/mL injection  
 insulin lispro 100 unit/mL injection  
 levoFLOXacin 250 mg tablet  
 neomycin-polymyxin-hydrocortisone otic solution  
 pantoprazole 40 mg tablet Discharge Instructions None Discharge Orders Procedure Order Date Status Priority Quantity Spec Type Associated Dx DIET DIABETIC CONSISTENT CARB Mechanical Soft; FR 1200ML; AHA-LOW-CHOL FAT 06/29/17 1434 Normal Routine 1 Questions: Texture:  Mechanical Soft Fluid restriction:  FR 1200ML Cardiac:  AHA-LOW-CHOL FAT  
        
 CBC WITH AUTOMATED DIFF 06/29/17 1434 Future Routine 1 Blood Comments:  On 7/5/17. Call report to nursing home Reason: anemia [employer] Announcement We are excited to announce that we are making your provider's discharge notes available to you in My[employer]. You will see these notes when they are completed and signed by the physician that discharged you from your recent hospital stay. If you have any questions or concerns about any information you see in MyApp[employer], please call the Health Information Department where you were seen or reach out to your Primary Care Provider for more information about your plan of care. Introducing [facility]! Lamont Muhammad introduces MyAppConverter patient portal. Now you can access parts of your medical record, email your doctor's office, and request medication refills online. 1. In your internet browser, go to [email or web address] 2. Click on the First Time User? Click Here link in the Sign In box. You will see the New Member Sign Up page. 3. Enter your [employer] Access Code exactly as it appears below. You will not need to use this code after youve completed the sign-up process. If you do not sign up before the expiration date, you must request a new code. · My[employer] Access Code: [email or web address] Expires: 7/24/2017  2:12 PM 
 
4. Enter the last four digits of your Social Security Number (xxxx) and Date of Birth (mm/dd/yyyy) as indicated and click Submit. You will be taken to the next sign-up page. 5. Create a MyApp[employer] ID. This will be your My[employer] login ID and cannot be changed, so think of one that is secure and easy to remember. 6. Create a [employer] password. You can change your password at any time. 7. Enter your Password Reset Question and Answer. This can be used at a later time if you forget your password. 8. Enter your e-mail address. You will receive e-mail notification when new information is available in [address] 9. Click Sign Up. You can now view and download portions of your medical record. 10. Click the Download Summary menu link to download a portable copy of your medical information. If you have questions, please visit the Frequently Asked Questions section of the [employer] website. Remember, [employer] is NOT to be used for urgent needs. For medical emergencies, dial 911. Now available from your iPhone and Android! General Information Please provide this summary of care documentation to your next provider. Patient Signature:  ____________________________________________________________ Date:  ____________________________________________________________  
  
Yo Dain Provider Signature:  ____________________________________________________________ Date:  ____________________________________________________________

## 2017-06-21 NOTE — Clinical Note
Status[de-identified] Inpatient [101] Type of Bed: Telemetry [19] Inpatient Hospitalization Certified Necessary for the Following Reasons: 3. Patient receiving treatment that can only be provided in an inpatient setting (further clarification in H&P documentation) Admitting Diagnosis: Sepsis ([address] [0965726] Admitting Diagnosis: PNA (pneumonia) [1570273] Admitting Diagnosis: UTI (urinary tract infection) [106695] Admitting Physician: Issa Dickerson [[id number]] Attending Physician: Issa Dickerson [[id number]] Estimated Length of Stay: > or = to 2 Midnights Discharge Plan[de-identified] Home with Office Follow-up

## 2017-06-21 NOTE — IP AVS SNAPSHOT
Summary of Care Report The Summary of Care report has been created to help improve care coordination. Users with access to [employer] or [employer] [address] (Web-based application) may access additional patient information including the Discharge Summary. If you are not currently a [address] user and need more information, please call the number listed below in the [place] [address] section and ask to be connected with Medical Records. Facility Information Name Address Phone [address] [place] [address]  [address] [facility] [address] [phone] Patient Information Patient Name Sex  Clifford Arreguin ([id number]) Female 1940 Discharge Information Admitting Provider Service Area Unit Neymar Rmaos MD /  & [facility] 71 / [phone] Discharge Provider Discharge Date/Time Discharge Disposition Destination (none) 2017 (Pending) SNF (none) Patient Language Language ENGLISH [13] Hospital Problems as of 2017  Reviewed: 2017  1:29 PM by Afsaneh Lai MD  
  
  
  
 Class Noted - Resolved Last Modified POA Active Problems CAD (coronary artery disease)  2015 - Present 2017 by Neymar Ramos MD Yes Entered by [address] Wade MD  
  [facility]ocyt[facility]  2015 - Present 2017 by Neymar Ramos MD Yes Entered by [address], MD  
  UTI (urinary tract infection)  2017 - Present 2017 by Alma Delia Curry MD Unknown Entered by Alma Delia Curry MD  
  Sepsis ([facility]  2017 - Present 2017 by Alma Delia Curry MD Unknown Entered by Alma Delia Curry MD  
  PNA (pneumonia)  2017 - Present 2017 by Afsaneh Lai MD Unknown Entered by Alma Delia Curry MD  
  Hypoxia  2017 - Present 2017 by Neymar Ramos MD Unknown   Entered by Neymar Ramos MD  
 * (Principal)Cirrhosis of liver not due to alcohol ([facility]  6/29/2017 - Present 6/29/2017 by Ladonna Graves MD Yes Entered by Ladonna Graves MD  
  Esophageal varices ([facility]  6/29/2017 - Present 6/29/2017 by aLdonna Graves MD Yes Entered by Ladonna Graves MD  
  
Non-Hospital Problems as of 6/29/2017  Reviewed: 6/29/2017  1:29 PM by Ladonna Graves MD  
  
  
  
 Class Noted - Resolved Last Modified Active Problems Anemia  7/7/2015 - Present 7/7/2015 by Sherly Portillo MD  
  Entered by Sherly Portillo MD  
  NSTEMI (non-ST elevated myocardial infarction) [facility])  6/30/2016 - Present 6/30/2016 by Nisha Shah MD  
  Entered by Nisha Shah MD  
  
You are allergic to the following Allergen Reactions Latex Itching Current Discharge Medication List  
  
START taking these medications Dose & Instructions Dispensing Information Comments  
 albuterol-ipratropium 2.5 mg-0.5 mg/3 ml Nebu Commonly known as:  Mayra Yanez Dose:  3 mL  
3 mL by Nebulization route three (3) times daily for 5 days. Quantity:  45 mL Refills:  0  
   
 docusate sodium 100 mg capsule Commonly known as:  Lydia Quinonez Dose:  100 mg Take 1 Cap by mouth two (2) times a day for 15 days. Quantity:  30 Cap Refills:  0  
   
 insulin glargine 100 unit/mL injection Commonly known as:  LANTUS  
 10 units subcutaneously daily Quantity:  1 Vial  
Refills:  0  
   
 insulin lispro 100 unit/mL injection Commonly known as:  HUMALOG For Blood Sugar (mg/dL) of:             Less than 150 =   0 units 150 -199 =   2 units 200 -249 =   4 units 250 -299 =   6 units 300 -349 =   8 units 350 and above =   10 units Quantity:  1 Vial  
Refills:  0  
   
 levoFLOXacin 250 mg tablet Commonly known as:  Jojo Staples Dose:  250 mg Take 1 Tab by mouth every twenty-four (24) hours for 5 days. Quantity:  5 Tab Refills:  0  
   
 neomycin-polymyxin-hydrocortisone otic solution Commonly known as:  CORTISPORIN  
 Dose:  4 Drop Administer 4 Drops into each ear four (4) times daily for 10 days. Quantity:  10 mL Refills:  0  
   
 pantoprazole 40 mg tablet Commonly known as:  PROTONIX Dose:  40 mg Take 1 Tab by mouth two (2) times a day. Quantity:  60 Tab Refills:  0 CONTINUE these medications which have CHANGED Dose & Instructions Dispensing Information Comments * ferrous sulfate 325 mg (65 mg iron) tablet What changed:  Another medication with the same name was added. Make sure you understand how and when to take each. Dose:  325 mg Take 1 Tab by mouth two (2) times daily (with meals). Quantity:  60 Tab Refills:  1  
   
 * ferrous sulfate 325 mg (65 mg iron) EC tablet Commonly known as:  IRON What changed: You were already taking a medication with the same name, and this prescription was added. Make sure you understand how and when to take each. Dose:  325 mg Take 1 Tab by mouth two (2) times a day for 15 days. Quantity:  30 Tab Refills:  0  
   
 furosemide 20 mg tablet Commonly known as:  LASIX What changed:   
- how much to take 
- how to take this - when to take this 
- additional instructions 20 mg po daily Quantity:  30 Tab Refills:  0  
   
 * Notice: This list has 2 medication(s) that are the same as other medications prescribed for you. Read the directions carefully, and ask your doctor or other care provider to review them with you. CONTINUE these medications which have NOT CHANGED Dose & Instructions Dispensing Information Comments  
 amLODIPine 5 mg tablet Commonly known as:  Bam Shelton TAKE ONE TABLET BY MOUTH ONCE DAILY Quantity:  30 Tab Refills:  0  
   
 ascorbic acid (vitamin C) 250 mg tablet Commonly known as:  VITAMIN C Dose:  250 mg Take 1 Tab by mouth daily. Quantity:  60 Tab Refills:  1  
   
 aspirin 81 mg tablet Dose:  81 mg Take 81 mg by mouth daily. Refills:  0 cholecalciferol 1,000 unit tablet Commonly known as:  VITAMIN D3 Dose:  2000 Units Take 2 Tabs by mouth daily. Quantity:  60 Tab Refills:  1  
   
 cyanocobalamin 100 mcg tablet Commonly known as:  VITAMIN B12 Dose:  100 mcg Take 100 mcg by mouth daily. Refills:  0 FIBER LAXATIVE (CA POLYCARBO) 625 mg tablet Generic drug:  calcium polycarbophil Dose:  1250 mg Take 1,250 mg by mouth daily. Refills:  0  
   
 FISH -160-1,000 mg Cap Generic drug:  omega 3-dha-epa-fish oil Dose:  1000 mg Take 1,000 mg by mouth two (2) times a day. Refills:  0  
   
 hydrALAZINE 10 mg tablet Commonly known as:  APRESOLINE Dose:  10 mg Take 1 Tab by mouth three (3) times daily. Quantity:  90 Tab Refills:  6  
   
 metFORMIN 500 mg tablet Commonly known as:  GLUCOPHAGE Dose:  500 mg Take 500 mg by mouth two (2) times daily (with meals). Refills:  0  
   
 metoprolol tartrate 50 mg tablet Commonly known as:  LOPRESSOR Dose:  50 mg Take 1 Tab by mouth two (2) times a day. Quantity:  60 Tab Refills:  6  
   
 multivitamin tablet Commonly known as:  ONE A DAY Dose:  1 Tab Take 1 Tab by mouth daily. Refills:  0  
   
 nitroglycerin 400 mcg/spray spray Commonly known as:  Patience Fabry Dose:  1 Spray 1 Spray by SubLINGual route every five (5) minutes as needed. Quantity:  1 Bottle Refills:  2  
   
 simvastatin 20 mg tablet Commonly known as:  ZOCOR Dose:  20 mg Take 20 mg by mouth daily. Refills:  0 STOP taking these medications Comments  
 benazepril 20 mg tablet Commonly known as:  LOTENSIN  
   
   
 benazepril 5 mg tablet Commonly known as:  LOTENSIN  
   
   
 ticagrelor 90 mg tablet Commonly known as:  [employer] [facility] Information ID Date/Time Status Primary Surgeon All Procedures Location  [id number] 6/29/2017 1243 Unposted Christian Valenzuela MD ENDOSCOPY WITH BANDING [facility] ENDOSCOPY Follow-up Information Follow up With Details Comments Contact Info [facility])  Patient will be discharging to [address] [facility] [address] [facility] [address] [phone] Rene Moffett MD In 2 weeks  [address] [facility] [address] 
[phone] Silvia Boudreaux MD In 2 weeks  [address] [facility] [address] 
[phone] Fito Mayes MD In 10 days  [address] [facility] [address] 200 [facility] [address] 
[phone] Discharge Instructions None Chart Review Routing History Recipient Method Report Sent By Sadie Monson MD  
Phone: [phone] In [employer] Routed Belinda Cordon [[id number]] 7/8/2015 10:53 PM 07/08/2015 Harmony Callahan MD  
Fax: [phone] Phone: [phone] Fax IP Auto Routed Bony Meyers MD [[id number]] 6/30/2016  8:40 PM 06/30/2016 Iris Callejas MD  
Phone: [phone] In [employer] IP Auto Routed Bony Meyers MD [[id number]] 6/30/2016  8:40 PM 06/30/2016 Patricio Naqvi MD  
Phone: [phone] In [employer] IP Auto Routed Yola Flores MD [[id number]] 7/4/2016  5:02 PM 07/04/2016 Harmony Callahan MD  
Fax: [phone] Phone: [phone] Fax IP Auto Routed Yola Flores MD [[id number]] 7/4/2016  5:02 PM 07/04/2016 Juancarlos Kowalski MD  
Phone: [phone] In [employer] IP Auto Routed Yola Flores MD [[id number]] 7/4/2016  5:02 PM 07/04/2016 Iris Callejas MD  
Phone: [phone] In [employer] IP Auto Routed Yola Florse MD [[id number]] 7/4/2016  5:02 PM 07/04/2016 Juancarlos Kowalski MD  
Phone: [phone] In [employer] IP Auto Routed Yola Flores MD [[id number]] 7/6/2016  5:11 PM 07/06/2016 Iris Callejas MD  
Phone: [phone] In [employer] IP Auto Routed Yola Flores MD [[id number]] 7/6/2016  5:11 PM 07/06/2016

## 2017-06-22 LAB
ALBUMIN SERPL BCP-MCNC: 2.6 G/DL (ref 3.4–5)
ALBUMIN/GLOB SERPL: 0.8 {RATIO} (ref 0.8–1.7)
ALP SERPL-CCNC: 77 U/L (ref 45–117)
ALT SERPL-CCNC: 18 U/L (ref 13–56)
ANION GAP BLD CALC-SCNC: 10 MMOL/L (ref 3–18)
APTT PPP: 43.5 SEC (ref 23–36.4)
AST SERPL W P-5'-P-CCNC: 17 U/L (ref 15–37)
ATRIAL RATE: 108 BPM
BASOPHILS # BLD AUTO: 0 K/UL (ref 0–0.1)
BASOPHILS # BLD: 0 % (ref 0–2)
BILIRUB SERPL-MCNC: 2.6 MG/DL (ref 0.2–1)
BUN SERPL-MCNC: 32 MG/DL (ref 7–18)
BUN/CREAT SERPL: 27 (ref 12–20)
CALCIUM SERPL-MCNC: 8.1 MG/DL (ref 8.5–10.1)
CALCULATED P AXIS, ECG09: 36 DEGREES
CALCULATED R AXIS, ECG10: -24 DEGREES
CALCULATED T AXIS, ECG11: 72 DEGREES
CHLORIDE SERPL-SCNC: 109 MMOL/L (ref 100–108)
CK MB CFR SERPL CALC: ABNORMAL % (ref 0–4)
CK MB SERPL-MCNC: <1 NG/ML (ref 5–25)
CK SERPL-CCNC: 35 U/L (ref 26–192)
CO2 SERPL-SCNC: 20 MMOL/L (ref 21–32)
CREAT SERPL-MCNC: 1.19 MG/DL (ref 0.6–1.3)
DIAGNOSIS, 93000: NORMAL
DIFFERENTIAL METHOD BLD: ABNORMAL
EOSINOPHIL # BLD: 0 K/UL (ref 0–0.4)
EOSINOPHIL NFR BLD: 0 % (ref 0–5)
ERYTHROCYTE [DISTWIDTH] IN BLOOD BY AUTOMATED COUNT: 14.5 % (ref 11.6–14.5)
EST. AVERAGE GLUCOSE BLD GHB EST-MCNC: 103 MG/DL
GLOBULIN SER CALC-MCNC: 3.1 G/DL (ref 2–4)
GLUCOSE BLD STRIP.AUTO-MCNC: 145 MG/DL (ref 70–110)
GLUCOSE BLD STRIP.AUTO-MCNC: 152 MG/DL (ref 70–110)
GLUCOSE BLD STRIP.AUTO-MCNC: 155 MG/DL (ref 70–110)
GLUCOSE BLD STRIP.AUTO-MCNC: 252 MG/DL (ref 70–110)
GLUCOSE SERPL-MCNC: 190 MG/DL (ref 74–99)
HBA1C MFR BLD: 5.2 % (ref 4.2–5.6)
HCT VFR BLD AUTO: 22.2 % (ref 35–45)
HGB BLD-MCNC: 7.4 G/DL (ref 12–16)
INR PPP: 1.5 (ref 0.8–1.2)
LYMPHOCYTES # BLD AUTO: 9 % (ref 21–52)
LYMPHOCYTES # BLD: 0.6 K/UL (ref 0.9–3.6)
MCH RBC QN AUTO: 28.2 PG (ref 24–34)
MCHC RBC AUTO-ENTMCNC: 33.3 G/DL (ref 31–37)
MCV RBC AUTO: 84.7 FL (ref 74–97)
MONOCYTES # BLD: 0.4 K/UL (ref 0.05–1.2)
MONOCYTES NFR BLD AUTO: 5 % (ref 3–10)
NEUTS SEG # BLD: 5.9 K/UL (ref 1.8–8)
NEUTS SEG NFR BLD AUTO: 86 % (ref 40–73)
P-R INTERVAL, ECG05: 138 MS
PLATELET # BLD AUTO: 69 K/UL (ref 135–420)
PMV BLD AUTO: 10.4 FL (ref 9.2–11.8)
POTASSIUM SERPL-SCNC: 4.3 MMOL/L (ref 3.5–5.5)
PROT SERPL-MCNC: 5.7 G/DL (ref 6.4–8.2)
PROTHROMBIN TIME: 17.3 SEC (ref 11.5–15.2)
Q-T INTERVAL, ECG07: 336 MS
QRS DURATION, ECG06: 106 MS
QTC CALCULATION (BEZET), ECG08: 450 MS
RBC # BLD AUTO: 2.62 M/UL (ref 4.2–5.3)
SODIUM SERPL-SCNC: 139 MMOL/L (ref 136–145)
TROPONIN I SERPL-MCNC: 0.27 NG/ML (ref 0–0.04)
VENTRICULAR RATE, ECG03: 108 BPM
WBC # BLD AUTO: 6.9 K/UL (ref 4.6–13.2)

## 2017-06-22 PROCEDURE — 74011250637 HC RX REV CODE- 250/637: Performed by: INTERNAL MEDICINE

## 2017-06-22 PROCEDURE — 83036 HEMOGLOBIN GLYCOSYLATED A1C: CPT | Performed by: FAMILY MEDICINE

## 2017-06-22 PROCEDURE — 85730 THROMBOPLASTIN TIME PARTIAL: CPT | Performed by: INTERNAL MEDICINE

## 2017-06-22 PROCEDURE — 65270000029 HC RM PRIVATE

## 2017-06-22 PROCEDURE — 74011250636 HC RX REV CODE- 250/636: Performed by: INTERNAL MEDICINE

## 2017-06-22 PROCEDURE — 82962 GLUCOSE BLOOD TEST: CPT

## 2017-06-22 PROCEDURE — 36415 COLL VENOUS BLD VENIPUNCTURE: CPT | Performed by: INTERNAL MEDICINE

## 2017-06-22 PROCEDURE — 93306 TTE W/DOPPLER COMPLETE: CPT

## 2017-06-22 PROCEDURE — 74011636637 HC RX REV CODE- 636/637: Performed by: INTERNAL MEDICINE

## 2017-06-22 PROCEDURE — 85025 COMPLETE CBC W/AUTO DIFF WBC: CPT | Performed by: INTERNAL MEDICINE

## 2017-06-22 PROCEDURE — 74011250636 HC RX REV CODE- 250/636: Performed by: FAMILY MEDICINE

## 2017-06-22 PROCEDURE — 82550 ASSAY OF CK (CPK): CPT | Performed by: INTERNAL MEDICINE

## 2017-06-22 PROCEDURE — 85610 PROTHROMBIN TIME: CPT | Performed by: INTERNAL MEDICINE

## 2017-06-22 PROCEDURE — 77010033678 HC OXYGEN DAILY

## 2017-06-22 PROCEDURE — 80053 COMPREHEN METABOLIC PANEL: CPT | Performed by: INTERNAL MEDICINE

## 2017-06-22 RX ORDER — THERA TABS 400 MCG
1 TAB ORAL DAILY
Status: DISCONTINUED | OUTPATIENT
Start: 2017-06-22 | End: 2017-06-29 | Stop reason: HOSPADM

## 2017-06-22 RX ORDER — UREA 10 %
100 LOTION (ML) TOPICAL DAILY
Status: DISCONTINUED | OUTPATIENT
Start: 2017-06-22 | End: 2017-06-29 | Stop reason: HOSPADM

## 2017-06-22 RX ORDER — AMLODIPINE BESYLATE 5 MG/1
5 TABLET ORAL DAILY
Status: DISCONTINUED | OUTPATIENT
Start: 2017-06-22 | End: 2017-06-29 | Stop reason: HOSPADM

## 2017-06-22 RX ORDER — METOPROLOL TARTRATE 50 MG/1
50 TABLET ORAL 2 TIMES DAILY
Status: DISCONTINUED | OUTPATIENT
Start: 2017-06-22 | End: 2017-06-29 | Stop reason: HOSPADM

## 2017-06-22 RX ORDER — ASCORBIC ACID 250 MG
250 TABLET ORAL DAILY
Status: DISCONTINUED | OUTPATIENT
Start: 2017-06-22 | End: 2017-06-29 | Stop reason: HOSPADM

## 2017-06-22 RX ORDER — LEVOFLOXACIN 5 MG/ML
750 INJECTION, SOLUTION INTRAVENOUS
Status: DISCONTINUED | OUTPATIENT
Start: 2017-06-23 | End: 2017-06-23

## 2017-06-22 RX ORDER — ASPIRIN 81 MG/1
81 TABLET ORAL DAILY
Status: DISCONTINUED | OUTPATIENT
Start: 2017-06-22 | End: 2017-06-29 | Stop reason: HOSPADM

## 2017-06-22 RX ORDER — CALCIUM POLYCARBOPHIL 625 MG
1 TABLET ORAL DAILY
Status: DISCONTINUED | OUTPATIENT
Start: 2017-06-22 | End: 2017-06-29 | Stop reason: HOSPADM

## 2017-06-22 RX ORDER — BENAZEPRIL HYDROCHLORIDE 10 MG/1
5 TABLET ORAL EVERY EVENING
Status: DISCONTINUED | OUTPATIENT
Start: 2017-06-22 | End: 2017-06-27

## 2017-06-22 RX ORDER — SIMVASTATIN 20 MG/1
20 TABLET, FILM COATED ORAL DAILY
Status: DISCONTINUED | OUTPATIENT
Start: 2017-06-22 | End: 2017-06-29 | Stop reason: HOSPADM

## 2017-06-22 RX ORDER — MELATONIN
2000 DAILY
Status: DISCONTINUED | OUTPATIENT
Start: 2017-06-22 | End: 2017-06-29 | Stop reason: HOSPADM

## 2017-06-22 RX ORDER — ACETAMINOPHEN 325 MG/1
650 TABLET ORAL
Status: DISCONTINUED | OUTPATIENT
Start: 2017-06-22 | End: 2017-06-29 | Stop reason: HOSPADM

## 2017-06-22 RX ORDER — ONDANSETRON 2 MG/ML
4 INJECTION INTRAMUSCULAR; INTRAVENOUS
Status: DISCONTINUED | OUTPATIENT
Start: 2017-06-22 | End: 2017-06-29 | Stop reason: HOSPADM

## 2017-06-22 RX ORDER — LANOLIN ALCOHOL/MO/W.PET/CERES
325 CREAM (GRAM) TOPICAL 2 TIMES DAILY WITH MEALS
Status: DISCONTINUED | OUTPATIENT
Start: 2017-06-22 | End: 2017-06-29 | Stop reason: HOSPADM

## 2017-06-22 RX ORDER — HYDRALAZINE HYDROCHLORIDE 10 MG/1
10 TABLET, FILM COATED ORAL 3 TIMES DAILY
Status: DISCONTINUED | OUTPATIENT
Start: 2017-06-22 | End: 2017-06-27

## 2017-06-22 RX ADMIN — INSULIN LISPRO 2 UNITS: 100 INJECTION, SOLUTION INTRAVENOUS; SUBCUTANEOUS at 17:01

## 2017-06-22 RX ADMIN — HYDRALAZINE HYDROCHLORIDE 10 MG: 10 TABLET, FILM COATED ORAL at 22:54

## 2017-06-22 RX ADMIN — SODIUM CHLORIDE 75 ML/HR: 900 INJECTION, SOLUTION INTRAVENOUS at 15:41

## 2017-06-22 RX ADMIN — AMLODIPINE BESYLATE 5 MG: 5 TABLET ORAL at 08:51

## 2017-06-22 RX ADMIN — FERROUS SULFATE TAB 325 MG (65 MG ELEMENTAL FE) 325 MG: 325 (65 FE) TAB at 08:51

## 2017-06-22 RX ADMIN — VITAM B12 100 MCG: 100 TAB at 10:45

## 2017-06-22 RX ADMIN — BENAZEPRIL HYDROCHLORIDE 5 MG: 10 TABLET, FILM COATED ORAL at 17:08

## 2017-06-22 RX ADMIN — THERA TABS 1 TABLET: TAB at 08:52

## 2017-06-22 RX ADMIN — METOPROLOL TARTRATE 50 MG: 50 TABLET ORAL at 17:08

## 2017-06-22 RX ADMIN — HYDRALAZINE HYDROCHLORIDE 10 MG: 10 TABLET, FILM COATED ORAL at 17:01

## 2017-06-22 RX ADMIN — HYDRALAZINE HYDROCHLORIDE 10 MG: 10 TABLET, FILM COATED ORAL at 08:51

## 2017-06-22 RX ADMIN — ONDANSETRON 4 MG: 2 INJECTION INTRAMUSCULAR; INTRAVENOUS at 18:38

## 2017-06-22 RX ADMIN — METOPROLOL TARTRATE 50 MG: 50 TABLET ORAL at 08:51

## 2017-06-22 RX ADMIN — TICAGRELOR 90 MG: 90 TABLET ORAL at 18:42

## 2017-06-22 RX ADMIN — SIMVASTATIN 20 MG: 20 TABLET, FILM COATED ORAL at 08:51

## 2017-06-22 RX ADMIN — INSULIN LISPRO 2 UNITS: 100 INJECTION, SOLUTION INTRAVENOUS; SUBCUTANEOUS at 08:54

## 2017-06-22 RX ADMIN — ACETAMINOPHEN 650 MG: 325 TABLET ORAL at 04:59

## 2017-06-22 RX ADMIN — INSULIN LISPRO 6 UNITS: 100 INJECTION, SOLUTION INTRAVENOUS; SUBCUTANEOUS at 12:26

## 2017-06-22 RX ADMIN — ASPIRIN 81 MG: 81 TABLET, COATED ORAL at 08:51

## 2017-06-22 RX ADMIN — TICAGRELOR 90 MG: 90 TABLET ORAL at 08:51

## 2017-06-22 RX ADMIN — Medication 250 MG: at 08:51

## 2017-06-22 RX ADMIN — CHOLECALCIFEROL (VITAMIN D3) 10 MCG (400 UNIT) TABLET 2000 UNITS: at 08:51

## 2017-06-22 RX ADMIN — FERROUS SULFATE TAB 325 MG (65 MG ELEMENTAL FE) 325 MG: 325 (65 FE) TAB at 17:07

## 2017-06-22 NOTE — CDMP QUERY
Please clarify if this patient is being treated/managed for:    => Sepsis associated with PNA  => Possible Gram-Negative PNA   => Sepsis associated with Gram-Neg PNA    =>Other Explanation of clinical findings  =>Unable to Determine (no explanation of clinical findings)    The medical record reflects the following:    Risk:76yof in with pna vs bronchitis     Clinical Indicators:tachycardia ,fever,recent  pna dx    Treatment: IV abx therapy     Please clarify and document your clinical opinion in the progress notes and discharge summary including the definitive and/or presumptive diagnosis, (suspected or probable), related to the above clinical findings. Please include clinical findings supporting your diagnosis. If you DECLINE this query or would like to communicate with [facility], please utilize the \"[facility] [place] [facility] message box\" at the TOP of the Progress Note on the right.       Thank you, [employer]

## 2017-06-22 NOTE — ED NOTES
TRANSFER - OUT REPORT:    Verbal report given to Diane Yañez RN(name) on Cherelle Hazard  being transferred to 4N(unit) for routine progression of care       Report consisted of patients Situation, Background, Assessment and   Recommendations(SBAR). Information from the following report(s) SBAR, ED Summary and Recent Results was reviewed with the receiving nurse. Lines:   Peripheral IV 08/17/16 Right Antecubital (Active)       Peripheral IV 08/17/16 Right Forearm (Active)       Peripheral IV 06/21/17 Right Antecubital (Active)   Site Assessment Clean, dry, & intact 6/21/2017  1:10 PM   Phlebitis Assessment 0 6/21/2017  1:10 PM   Infiltration Assessment 0 6/21/2017  1:10 PM   Dressing Status Clean, dry, & intact 6/21/2017  1:10 PM   Dressing Type Tape;Transparent 6/21/2017  1:10 PM   Hub Color/Line Status Pink;Flushed;Patent 6/21/2017  1:10 PM   Action Taken Blood drawn 6/21/2017  1:10 PM        Opportunity for questions and clarification was provided.       Patient transported with:   Mikro Odeme | [employer]

## 2017-06-22 NOTE — DIABETES MGMT
Glycemic Control Plan of Care    Recommendation(s):  1.) Continue monitoring and correctional lispro insulin order. 2.) Modif diet by adding consistent carb. Assessment:  Patient is 67year old with past medical history including type 2 diabetes mellitus, CAD, colon and left breast cancer, hypercholesterolemia, hypertension, and thrombocytopenia - was admitted on 06/21/2017 with c/o nausea, vomiting, diarrhea, fatigue, and fever x5 days. Noted:  Community acquired pneumonia vs bronchitis. UTI. Type 2 diabetes mellitus. Last A1C report on 9.4% (08/17/2016). Requested A1C on 06/22/2017 and pending result. Discussed inpatient glycemic monitoring and use of correctional insulin and patient verbalized understanding. Most recent blood glucose values:    Results for Carlota Sandoval (MRN [id number]) as of 6/22/2017 11:13   Ref. Range 6/21/2017 22:32   GLUCOSE,FAST - POC Latest Ref Range: 70 - 110 mg/dL 181 (H)     Results for Carlota Sandoval (MRN [id number]) as of 6/22/2017 11:13   Ref. Range 6/22/2017 08:29   GLUCOSE,FAST - POC Latest Ref Range: 70 - 110 mg/dL 152 (H)     Current A1C: Last A1C report on 9.4% (08/17/2016). Requested A1C on 06/22/2017 and pending result. Current hospital diabetes medications:  Correctional lispro insulin ACHS. Normal sensitivity dose. Total daily dose insulin requirement previous day: 06/21/2017  Lispro: 2 units    Home diabetes medications: As stated by patient on 06/22/2017:  Metformin 500 mg twice daily with meals. Diet: Cardiac regular; consistent carb 1500-1600kcal    Goals:  Blood glucose will be within target range of  mg/dL by 06/25/2017.      Education:  _X__  Refer to Diabetes Education Record: 06/22/2017             ___  Education not indicated at this time    Edgard Stout RN

## 2017-06-22 NOTE — ED NOTES
BMP ordered for patient, results received and called to Dr. Randi Barrientos. Orders received for patient to remain in her previously ordered admission bed. Pt ready to go to floor. Transport request ordered.

## 2017-06-22 NOTE — PROGRESS NOTES
Completed Echocardiogram. Report to follow. Patient to be transported back to room.     Chantel Lynch, KAYCEE, RDCS

## 2017-06-22 NOTE — ROUTINE PROCESS
Bedside and Verbal shift change report given to LATRICIA Miranda (oncoming nurse) by Екатерина Garcia RN (offgoing nurse). Report included the following information SBAR, Kardex, MAR and Recent Results. SITUATION:  Code Status: Full Code  Reason for Admission: Sepsis ([facility]  PNA (pneumonia)  UTI (urinary tract infection)  PNA (pneumonia)  UTI (urinary tract infection)  Hypoxia  Hospital day: 1  Problem List:       Hospital Problems  Date Reviewed: 6/21/2017          Codes Class Noted POA    UTI (urinary tract infection) ICD-10-CM: N39.0  ICD-9-CM: 599.0  6/21/2017 Unknown        Sepsis ([facility] ICD-10-CM: A41.9  ICD-9-CM: 038.9, 995.91  6/21/2017 Unknown        * (Principal)PNA (pneumonia) ICD-10-CM: J18.9  ICD-9-CM: 486  6/21/2017 Unknown        Hypoxia ICD-10-CM: R09.02  ICD-9-CM: 799.02  6/21/2017 Unknown        CAD (coronary artery disease) ICD-10-CM: I25.10  ICD-9-CM: 414.00  7/7/2015 Yes        Thrombocytopenia ([facility] ICD-10-CM: D69.6  ICD-9-CM: 287.5  7/7/2015 Yes              BACKGROUND:   Past Medical History:   Past Medical History:   Diagnosis Date    Anemia     CAD (coronary artery disease)     Coronary Stenting needed, hematology workup needed prior to stent placement, treating medically at this time    Cancer [facility])     colon    Cancer ([facility]     breast - left    Cardiac catheterization 08/17/2016    LM patent. mLAD 99.9% (2.25 x 28-mm Xience BETHEL, resid 0%). oD1 60%. oD2 95%. pCX 90% (2.5 x 13-mm Xience BETHEL, resid 0%). OMB patent.  Cardiac echocardiogram 07/01/2016    EF 50%. Basal inferior, basal-mid inferolateral hypk. Gr 1 DDfx. RVSP 50-60 mmHg. Mild LAE.   Mod, eccentric MR.      Diabetes ([facility]     Hypercholesteremia     Hypertension     Thrombocytopenia ([facility]       Patient taking anticoagulants yes    Patient has a defibrillator: no    History of shots YES for example, flu, pneumonia, tetanus   Isolation History NO for example, MRSA, CDiff    ASSESSMENT:  Changes in Assessment Throughout Shift: None  Significant Changes in 24 hours (for example, RR/code, fall)  Patient has Central Line: no Reasons if yes: N/A  Patient has Jean Cath: no Reasons if yes: N/A   Mobility Issues  PT  IV Patency  OR Checklist  Pending Tests    Last Vitals:  Vitals w/ MEWS Score (last day)     Date/Time MEWS Score Pulse Resp Temp BP Level of Consciousness SpO2    06/22/17 0757 2 100 22 97.6 °F (36.4 °C) 123/59 Alert 94 %    06/22/17 0429 2 93 25 100.1 °F (37.8 °C) 114/56 Alert 95 %    06/21/17 2231 3 (!)  104 28 100.2 °F (37.9 °C) 116/59 Alert 93 %    06/21/17 2045 -- (!)  105 26 -- 133/55 -- 91 %    06/21/17 2015 -- (!)  103 (!)  39 -- 129/55 -- 92 %    06/21/17 2000 -- (!)  105 (!)  38 -- 106/77 -- 93 %    06/21/17 1930 -- (!)  102 (!)  34 -- 130/53 -- 92 %    06/21/17 1900 -- (!)  101 (!)  35 -- 123/44 -- 90 %    06/21/17 1830 -- (!)  101 (!)  35 -- 127/55 -- 90 %    06/21/17 1800 -- 94 (!)  34 -- 108/55 -- 95 %    06/21/17 1730 -- 92 (!)  31 -- 115/47 -- 94 %    06/21/17 1717 -- -- -- 99.7 °F (37.6 °C) -- Alert --    06/21/17 1600 -- 89 27 -- 106/47 -- 95 %    06/21/17 1515 -- 85 28 -- 106/48 -- 90 %    06/21/17 1445 -- 88 28 -- 99/40 -- 91 %    06/21/17 1430 -- 89 23 -- 107/55 -- 92 %    06/21/17 1415 -- 90 26 -- 107/46 -- 92 %    06/21/17 1411 -- 93 22 -- -- -- 96 %    06/21/17 1409 -- 93 21 -- 112/44 -- --    06/21/17 1240 4 (!)  109 16 (!)  102.6 °F (39.2 °C) 139/74 Alert (!)  86 %            PAIN    Pain Assessment    Pain Intensity 1: 4 (06/21/17 2231)    Pain Location 1: Leg    Pain Intervention(s) 1: Ice    Patient Stated Pain Goal: 0  Intervention effective: yes  Time of last intervention: 0459 Reassessment Completed: yes   Other actions taken for pain: N/A    Last 3 Weights:  Last 3 Recorded Weights in this Encounter    06/21/17 1240   Weight: 61.2 kg (135 lb)   Weight change:     INTAKE/OUPUT    Current Shift: 06/22 0701 - 06/22 1900  In: 120 [P.O.:120]  Out: 300 [Urine:300]    Last three shifts: RECOMMENDATIONS AND DISCHARGE PLANNING  Patient needs and requests: None    Pending tests/procedures: None    Discharge plan for patient: Home    Discharge planning Needs or Barriers:  in SNF for rehab currently    Estimated Discharge Date: TBD Posted on Whiteboard in Patients Room: no       \"HEALS\" SAFETY CHECK  A safety check occurred in the patient's room between off going nurse and oncoming nurse listed above. The safety check included the below items:    H  High Alert Medications Verify all high alert medication drips (heparin, PCA, etc.)  E  Equipment Suction is set up for ALL patients (with eden)  Red plugs utilized for all equipment (IV pumps, etc.)  WOWs wiped down at end of shift. Room stocked with oxygen, suction, and other unit-specific supplies  A  Alarms Bed alarm is set for fall risk patients  Ensure chair alarm is in place and activated if patient is up in a chair  L  Lines Check IV for any infiltration  Jean bag is empty if patient has a Jean   Tubing and IV bags are labeled  S  Safety  Room is clean, patient is clean, and equipment is clean. Hallways are clear from equipment besides carts. Fall bracelet on for fall risk patients  Ensure room is clear and free of clutter  Suction is set up for ALL patients (with eden)  Hallways are clear from equipment besides carts.    Isolation precautions followed, supplies available outside room, sign posted    Mikala Dougherty RN

## 2017-06-22 NOTE — PROGRESS NOTES
Pt with temp that are slightly elevated but no order for any meds for fever. Paged Dr. Ivan Citrin and Tylenol ordered and given.

## 2017-06-22 NOTE — DIABETES MGMT
Diabetes Patient/Family Education Record    Factors That  May Influence Patients Ability  to Learn or  Comply With  Recommendations:    []   Language barrier    []   Cultural needs   []   Motivation    []   Cognitive limitation    []   Physical   [x]   Education    []   Physiological factors   []   Hearing/vision/speaking impairment   []   Congregational beliefs    []   Financial factors   []  Other:   []  No factors identified at this time. Person Instructed:   [x]   Patient   []   Family   []  Other     Preference for Learning:   [x]   Verbal   []   Written   []  Demonstration     Level of Comprehension & Competence:    [x]  Good                                      [] Fair                                     []  Poor                             []  Needs Reinforcement   [x]  Teachback completed    Education Component:   [x]  Medication management, including how to administer insulin (if appropriate) and potential medication interactions: Patient stated that she is on Metformin 500 mg twice daily at home prior to admission. [x]  Nutritional management including the role of carbohydrate intake: Patient verbalized knowledge about importance of serving size/portion control of carbs (starches, fruits, dairy), and limiting intake of concentrated sweets. []  Exercise   [x]  Signs, symptoms, and treatment of hyperglycemia and hypoglycemia   [x] Treatment of hyperglycemia and hypoglycemia   [x]  Importance of blood glucose monitoring and how to obtain a blood glucose meter: Patient stated that she has BG meter and testing supplies at home. She checks her blood sugar at least once daily. []  Instruction on use of blood glucose meter   [x]  Discuss the importance of HbA1C monitoring and provide patient with results: Last available lab report of 9.4% (08/17/2017). Patient stated that it was rechecked but cannot remember when and result. Requested A1C on 06/22/2017 and pending result.   Patient knows risks/coomplications of elevated A1C.    []  Sick day guidelines   [x]  Proper use and disposal of lancets, needles, syringes or insulin pens (if appropriate)   [x]  Potential long-term complications (retinopathy, kidney disease, neuropathy, heart disease, stroke, vascular disease, foot care)   [x] Provide emergency contact number and contact number for more information    [x]  Goal:  Patient/family will demonstrate understanding of Diabetes Self Management Skills by: 06/29/2017  Plan for post-discharge education or self-management support:    [x] Outpatient class schedule provided            [] Patient Declined    [] Scheduled for outpatient classes (date) _______         Sandi Almeida RN

## 2017-06-22 NOTE — ROUTINE PROCESS
Bedside and Verbal shift change report given to LATRICIA Spivey  (oncoming nurse) by Colin Horan RN (offgoing nurse). Report included the following information SBAR, Kardex, MAR and Recent Results. SITUATION:  Code Status: Full Code  Reason for Admission: Sepsis ([facility]  PNA (pneumonia)  UTI (urinary tract infection)  PNA (pneumonia)  UTI (urinary tract infection)  Hypoxia  Hospital day: 1  Problem List:       Hospital Problems  Date Reviewed: 6/21/2017          Codes Class Noted POA    UTI (urinary tract infection) ICD-10-CM: N39.0  ICD-9-CM: 599.0  6/21/2017 Unknown        Sepsis ([facility] ICD-10-CM: A41.9  ICD-9-CM: 038.9, 995.91  6/21/2017 Unknown        * (Principal)PNA (pneumonia) ICD-10-CM: J18.9  ICD-9-CM: 486  6/21/2017 Unknown        Hypoxia ICD-10-CM: R09.02  ICD-9-CM: 799.02  6/21/2017 Unknown        CAD (coronary artery disease) ICD-10-CM: I25.10  ICD-9-CM: 414.00  7/7/2015 Yes        Thrombocytopenia ([facility] ICD-10-CM: D69.6  ICD-9-CM: 287.5  7/7/2015 Yes              BACKGROUND:   Past Medical History:   Past Medical History:   Diagnosis Date    Anemia     CAD (coronary artery disease)     Coronary Stenting needed, hematology workup needed prior to stent placement, treating medically at this time    Cancer [facility])     colon    Cancer ([facility]     breast - left    Cardiac catheterization 08/17/2016    LM patent. mLAD 99.9% (2.25 x 28-mm Xience BETHEL, resid 0%). oD1 60%. oD2 95%. pCX 90% (2.5 x 13-mm Xience BETHEL, resid 0%). OMB patent.  Cardiac echocardiogram 07/01/2016    EF 50%. Basal inferior, basal-mid inferolateral hypk. Gr 1 DDfx. RVSP 50-60 mmHg. Mild LAE.   Mod, eccentric MR.      Diabetes ([facility]     Hypercholesteremia     Hypertension     Thrombocytopenia ([facility]       Patient taking anticoagulants yes    Patient has a defibrillator: no    History of shots YES for example, flu, pneumonia, tetanus   Isolation History NO for example, MRSA, CDiff    ASSESSMENT:  Changes in Assessment Throughout Shift: None  Significant Changes in 24 hours (for example, RR/code, fall)  Patient has Central Line: no Reasons if yes: N/A  Patient has Jean Cath: no Reasons if yes: N/A   Mobility Issues  PT  IV Patency  OR Checklist  Pending Tests    Last Vitals:  Vitals w/ MEWS Score (last day)     Date/Time MEWS Score Pulse Resp Temp BP Level of Consciousness SpO2    06/22/17 1124 2 74 22 97.8 °F (36.6 °C) 113/67 Alert 96 %    06/22/17 0757 2 100 22 97.6 °F (36.4 °C) 123/59 Alert 94 %    06/22/17 0429 2 93 25 100.1 °F (37.8 °C) 114/56 Alert 95 %    06/21/17 2231 3 (!)  104 28 100.2 °F (37.9 °C) 116/59 Alert 93 %    06/21/17 2045 -- (!)  105 26 -- 133/55 -- 91 %    06/21/17 2015 -- (!)  103 (!)  39 -- 129/55 -- 92 %    06/21/17 2000 -- (!)  105 (!)  38 -- 106/77 -- 93 %    06/21/17 1930 -- (!)  102 (!)  34 -- 130/53 -- 92 %    06/21/17 1900 -- (!)  101 (!)  35 -- 123/44 -- 90 %    06/21/17 1830 -- (!)  101 (!)  35 -- 127/55 -- 90 %    06/21/17 1800 -- 94 (!)  34 -- 108/55 -- 95 %    06/21/17 1730 -- 92 (!)  31 -- 115/47 -- 94 %    06/21/17 1717 -- -- -- 99.7 °F (37.6 °C) -- Alert --    06/21/17 1600 -- 89 27 -- 106/47 -- 95 %    06/21/17 1515 -- 85 28 -- 106/48 -- 90 %    06/21/17 1445 -- 88 28 -- 99/40 -- 91 %    06/21/17 1430 -- 89 23 -- 107/55 -- 92 %    06/21/17 1415 -- 90 26 -- 107/46 -- 92 %    06/21/17 1411 -- 93 22 -- -- -- 96 %    06/21/17 1409 -- 93 21 -- 112/44 -- --    06/21/17 1240 4 (!)  109 16 (!)  102.6 °F (39.2 °C) 139/74 Alert (!)  86 %            PAIN    Pain Assessment    Pain Intensity 1: 0 (06/22/17 0800)    Pain Location 1: Leg    Pain Intervention(s) 1: Ice    Patient Stated Pain Goal: 0  Intervention effective: yes  Time of last intervention: 0459 Reassessment Completed: yes   Other actions taken for pain: N/A    Last 3 Weights:  Last 3 Recorded Weights in this Encounter    06/21/17 1240   Weight: 61.2 kg (135 lb)   Weight change:     INTAKE/OUPUT    Current Shift:      Last three shifts: 06/21 0701 - 06/22 1900  In: 840 [P.O.:840]  Out: 850 [Urine:850]    RECOMMENDATIONS AND DISCHARGE PLANNING  Patient needs and requests: None    Pending tests/procedures: None    Discharge plan for patient: Home    Discharge planning Needs or Barriers:  in SNF for rehab currently    Estimated Discharge Date: TBD Posted on Whiteboard in Patients Room: no       \"HEALS\" SAFETY CHECK  A safety check occurred in the patient's room between off going nurse and oncoming nurse listed above. The safety check included the below items:    H  High Alert Medications Verify all high alert medication drips (heparin, PCA, etc.)  E  Equipment Suction is set up for ALL patients (with eden)  Red plugs utilized for all equipment (IV pumps, etc.)  WOWs wiped down at end of shift. Room stocked with oxygen, suction, and other unit-specific supplies  A  Alarms Bed alarm is set for fall risk patients  Ensure chair alarm is in place and activated if patient is up in a chair  L  Lines Check IV for any infiltration  Jean bag is empty if patient has a Jean   Tubing and IV bags are labeled  S  Safety  Room is clean, patient is clean, and equipment is clean. Hallways are clear from equipment besides carts. Fall bracelet on for fall risk patients  Ensure room is clear and free of clutter  Suction is set up for ALL patients (with eden)  Hallways are clear from equipment besides carts.    Isolation precautions followed, supplies available outside room, sign posted    Genesis Neal RN

## 2017-06-22 NOTE — PROGRESS NOTES
NUTRITION     BPA/MST Referral       RECOMMENDATIONS / PLAN:     - No nutrition intervention indicated at this time. Will re-screen as appropriate. NUTRITION INTERVENTIONS & DIAGNOSIS:     Nutrition Diagnosis: No nutrition diagnosis at this time    ASSESSMENT:     Patient reported good appetite and meal intake PTA and since admission. Noted fair po intake per chart. Elevated blood glucose levels; diabetic diet restrictions added. Will continue with current calorie restriction given pt's current po intake of meals. Average po intake adequate to meet patients estimated nutritional needs:   [] Yes     [] No   [x] Unable to determine at this time    Diet: DIET CARDIAC Regular; Consistent Carb 1500-1600kcal      Food Allergies:  None known (latex)  Current Appetite:   [x] Good     [] Fair     [] Poor     [] Other:  Appetite/meal intake prior to admission:   [x] Good     [] Fair     [] Poor     [] Other:  Feeding Limitations:  [] Swallowing difficulty    [] Chewing difficulty    [] Other:  Current Meal Intake: Patient Vitals for the past 100 hrs:   % Diet Eaten   06/22/17 1342 50 %   06/22/17 0946 20 %       BM:  6/22  Skin Integrity: no pressure ulcer or wound  Edema:  None   Pertinent Medications: Reviewed    Recent Labs      06/22/17   0218  06/21/17   1301   NA  139  141   K  4.3  4.7   CL  109*  108   CO2  20*  22   GLU  190*  238*   BUN  32*  29*   CREA  1.19  1.07   CA  8.1*  9.4   ALB  2.6*  3.6   SGOT  17  22   ALT  18  23       Intake/Output Summary (Last 24 hours) at 06/22/17 1522  Last data filed at 06/22/17 1342   Gross per 24 hour   Intake              840 ml   Output              850 ml   Net              -10 ml       Anthropometrics:  Ht Readings from Last 1 Encounters:   06/21/17 5' (1.524 m)     Last 3 Recorded Weights in this Encounter    06/21/17 1240   Weight: 61.2 kg (135 lb)     Body mass index is 26.37 kg/(m^2).           Weight History:  Patient reported experiencing weight loss after diet modifications (following diabetic diet)    Weight Metrics 6/21/2017 9/19/2016 8/18/2016 7/11/2016 7/2/2016 3/4/2016 12/3/2015   Weight 135 lb 162 lb 6.4 oz 163 lb 168 lb 169 lb 9.6 oz - 173 lb   BMI 26.37 kg/m2 31.72 kg/m2 31.83 kg/m2 32.81 kg/m2 32.06 kg/m2 - 33.79 kg/m2        Admitting Diagnosis: Sepsis (HCC)  PNA (pneumonia)  UTI (urinary tract infection)  PNA (pneumonia)  UTI (urinary tract infection)  Hypoxia  Past Medical History:   Diagnosis Date    Anemia     CAD (coronary artery disease)     Coronary Stenting needed, hematology workup needed prior to stent placement, treating medically at this time    Cancer [facility])     colon    Cancer ([facility]     breast - left    Cardiac catheterization 08/17/2016    LM patent. mLAD 99.9% (2.25 x 28-mm Xience BETHEL, resid 0%). oD1 60%. oD2 95%. pCX 90% (2.5 x 13-mm Xience BETHEL, resid 0%). OMB patent.  Cardiac echocardiogram 07/01/2016    EF 50%. Basal inferior, basal-mid inferolateral hypk. Gr 1 DDfx. RVSP 50-60 mmHg. Mild LAE. Mod, eccentric MR.      Diabetes ([facility]     Hypercholesteremia     Hypertension     Thrombocytopenia ([facility]        Education Needs:        [x] None identified  [] Identified - Not appropriate at this time  []  Identified and addressed - refer to education log  Learning Limitations:   [x] None identified  [] Identified    Cultural, Pentecostal & ethnic food preferences:  [x] None identified    [] Identified and addressed     ESTIMATED NUTRITION NEEDS:     Calories: 3562-3644 kcal (MSJx1.2-1.4) based on  [x] Actual BW: 61 kg      [] IBW   Protein: 49-61 gm (0.8-1 gm/kg) based on  [x] Actual BW      [] IBW   Fluid: 1 mL/kcal     MONITORING & EVALUATION:     Nutrition Goal(s):   1. Po intake of meals will meet >75% of patient estimated nutritional needs within the next 7 days.   Outcome:  [] Met/Ongoing    []  Not Met    [x] New/Initial Goal     Monitoring:   [x] Diet tolerance   [x] Meal intake   [] Supplement intake   [] GI symptoms/ability to tolerate po diet   [] Respiratory status   [] Plan of care      Previous Recommendations (for follow-up assessments only):     []   Implemented       []   Not Implemented (RD to address)     [] No Recommendation Made     Discharge Planning:  Diabetic, cardiac diet   [x] Participated in care planning, discharge planning, & interdisciplinary rounds as appropriate      Farzad Pittman, [address] [place] [address]   Pager: [phone]

## 2017-06-22 NOTE — PROGRESS NOTES
conducted an initial consultation and Spiritual Assessment for Manav Vital, who is a 67 y.o.,female. Patients Primary Language is: [place]. According to the patients EMR [employer] Affiliation is: [facility].     The reason the Patient came to the hospital is:   Patient Active Problem List    Diagnosis Date Noted    UTI (urinary tract infection) 06/21/2017    Sepsis ([facility] 06/21/2017    PNA (pneumonia) 06/21/2017    Hypoxia 06/21/2017    NSTEMI (non-ST elevated myocardial infarction) ([facility] 06/30/2016    Anemia 07/07/2015    CAD (coronary artery disease) 07/07/2015    Thrombocytopenia ([facility] 07/07/2015        The  provided the following Interventions:  Initiated a relationship of care and support. Explored issues of devante, belief, spirituality and Christianity/ritual needs while hospitalized. Listened empathically. Provided chaplaincy education. Provided information about Spiritual Care Services. Offered prayer and assurance of continued prayers on patient's behalf. Chart reviewed. The following outcomes where achieved:  Patient shared limited information about both their medical narrative and spiritual journey/beliefs.  provided daily devotional for patient. Patient processed feeling about current hospitalization. Patient expressed gratitude for 's visit. Assessment:  Patient does not have any Christianity/cultural needs that will affect patients preferences in health care. There are no spiritual or Christianity issues which require intervention at this time. Plan:  Chaplains will continue to follow and will provide pastoral care on an as needed/requested basis.  recommends bedside caregivers page  on duty if patient shows signs of acute spiritual or emotional distress.     [address] [place] [address]   [phone]

## 2017-06-22 NOTE — PROGRESS NOTES
[facility] Hospitalist Group  Progress Note    Patient: Alpa Bhatti Age: 67 y.o. : 1940 MR#: [id number] SSN: [id number]  Date/Time: 2017 10:15 AM    Subjective/24-hour events:     Feeling a little better, but still has some SOB. Assessment:   Hypoxia - concern for possible CHF  Suspected UTI  Fever  HTN  DM 2  CAD    Plan:  2D echo given presenting symptoms, BNP elevation and evidence for venous congestion on CXR. Review of study from 1 year ago shows minimally depressed EF and diastolic dysfunction. Discontinue azithromycin, and continue levaquin for now. Follow sputum and urine cultures. Will ask cardiology to see if any changes in echo are noted. Monitor sugars. SSI as ordered. Mobilize - PT/OT evals. Case discussed with:  [x]Patient  []Family  []Nursing  []Case Management  DVT Prophylaxis:  []Lovenox  []Hep SQ  []SCDs  []Coumadin   []On Brilinta    Objective:   VS:   Visit Vitals    /59 (BP 1 Location: Left arm, BP Patient Position: At rest)    Pulse 100    Temp 97.6 °F (36.4 °C)    Resp 22    Ht 5' (1.524 m)    Wt 61.2 kg (135 lb)    SpO2 94%    BMI 26.37 kg/m2      Tmax/24hrs: Temp (24hrs), Av °F (37.8 °C), Min:97.6 °F (36.4 °C), Max:102.6 °F (39.2 °C)    Intake/Output Summary (Last 24 hours) at 17 1015  Last data filed at 17 0946   Gross per 24 hour   Intake              440 ml   Output              550 ml   Net             -110 ml       General:  In NAD. Nontoxic-appearing. Cardiovascular:  RRR. Pulmonary:  No wheezes, effort nonlabored. GI:  Abdomen soft, NTTP. Extremities:  Warm, no ischemia. Additional:  Awake and alert.   Motor grossly nonfocal.    Labs:    Recent Results (from the past 24 hour(s))   INFLUENZA A & B AG (RAPID TEST)    Collection Time: 17 12:45 PM   Result Value Ref Range    Influenza A Antigen NEGATIVE  NEG      Influenza B Antigen NEGATIVE  NEG     EKG, 12 LEAD, INITIAL    Collection Time: 06/21/17 12:52 PM   Result Value Ref Range    Ventricular Rate 108 BPM    Atrial Rate 108 BPM    P-R Interval 138 ms    QRS Duration 106 ms    Q-T Interval 336 ms    QTC Calculation (Bezet) 450 ms    Calculated P Axis 36 degrees    Calculated R Axis -24 degrees    Calculated T Axis 72 degrees    Diagnosis       Sinus tachycardia  Otherwise normal ECG  When compared with ECG of 18-AUG-2016 05:34,  Lead reversal is no longer present  Confirmed by Kate Michelle MD, ursula [facility] ([id number]) on 6/22/2017 10:11:37 AM     CBC WITH AUTOMATED DIFF    Collection Time: 06/21/17  1:01 PM   Result Value Ref Range    WBC 9.3 4.6 - 13.2 K/uL    RBC 3.50 (L) 4.20 - 5.30 M/uL    HGB 9.9 (L) 12.0 - 16.0 g/dL    HCT 29.6 (L) 35.0 - 45.0 %    MCV 84.6 74.0 - 97.0 FL    MCH 28.3 24.0 - 34.0 PG    MCHC 33.4 31.0 - 37.0 g/dL    RDW 14.6 (H) 11.6 - 14.5 %    PLATELET 95 (L) 380 - 420 K/uL    MPV 10.2 9.2 - 11.8 FL    NEUTROPHILS 90 (H) 40 - 73 %    LYMPHOCYTES 5 (L) 21 - 52 %    MONOCYTES 5 3 - 10 %    EOSINOPHILS 0 0 - 5 %    BASOPHILS 0 0 - 2 %    ABS. NEUTROPHILS 8.4 (H) 1.8 - 8.0 K/UL    ABS. LYMPHOCYTES 0.4 (L) 0.9 - 3.6 K/UL    ABS. MONOCYTES 0.4 0.05 - 1.2 K/UL    ABS. EOSINOPHILS 0.0 0.0 - 0.4 K/UL    ABS. BASOPHILS 0.0 0.0 - 0.1 K/UL    DF AUTOMATED     HEPATIC FUNCTION PANEL    Collection Time: 06/21/17  1:01 PM   Result Value Ref Range    Protein, total 7.4 6.4 - 8.2 g/dL    Albumin 3.6 3.4 - 5.0 g/dL    Globulin 3.8 2.0 - 4.0 g/dL    A-G Ratio 0.9 0.8 - 1.7      Bilirubin, total 2.4 (H) 0.2 - 1.0 MG/DL    Bilirubin, direct 0.7 (H) 0.0 - 0.2 MG/DL    Alk.  phosphatase 112 45 - 117 U/L    AST (SGOT) 22 15 - 37 U/L    ALT (SGPT) 23 13 - 56 U/L   LIPASE    Collection Time: 06/21/17  1:01 PM   Result Value Ref Range    Lipase 144 73 - 393 U/L   CULTURE, BLOOD    Collection Time: 06/21/17  1:01 PM   Result Value Ref Range    Special Requests: NO SPECIAL REQUESTS      Culture result: NO GROWTH AFTER 18 HOURS     CARDIAC PANEL,(CK, CKMB & TROPONIN) Collection Time: 06/21/17  1:01 PM   Result Value Ref Range    CK 38 26 - 192 U/L    CK - MB <1.0 <3.6 ng/ml    CK-MB Index Cannot be calulated 0.0 - 4.0 %    Troponin-I, Qt. 0.05 (H) 0.0 - 0.045 NG/ML   PRO-BNP    Collection Time: 06/21/17  1:01 PM   Result Value Ref Range    NT pro-BNP 1940 (H) 0 - 8897 PG/ML   METABOLIC PANEL, BASIC    Collection Time: 06/21/17  1:01 PM   Result Value Ref Range    Sodium 141 136 - 145 mmol/L    Potassium 4.7 3.5 - 5.5 mmol/L    Chloride 108 100 - 108 mmol/L    CO2 22 21 - 32 mmol/L    Anion gap 11 3.0 - 18 mmol/L    Glucose 238 (H) 74 - 99 mg/dL    BUN 29 (H) 7.0 - 18 MG/DL    Creatinine 1.07 0.6 - 1.3 MG/DL    BUN/Creatinine ratio 27 (H) 12 - 20      GFR est AA >60 >60 ml/min/1.73m2    GFR est non-AA 50 (L) >60 ml/min/1.73m2    Calcium 9.4 8.5 - 10.1 MG/DL   POC LACTIC ACID    Collection Time: 06/21/17  1:09 PM   Result Value Ref Range    Lactic Acid (POC) 1.3 0.4 - 2.0 mmol/L   URINALYSIS W/ RFLX MICROSCOPIC    Collection Time: 06/21/17  2:57 PM   Result Value Ref Range    Color YELLOW      Appearance CLEAR      Specific gravity 1.015 1.005 - 1.030      pH (UA) 5.0 5.0 - 8.0      Protein 30 (A) NEG mg/dL    Glucose NEGATIVE  NEG mg/dL    Ketone NEGATIVE  NEG mg/dL    Bilirubin NEGATIVE  NEG      Blood NEGATIVE  NEG      Urobilinogen 0.2 0.2 - 1.0 EU/dL    Nitrites NEGATIVE  NEG      Leukocyte Esterase SMALL (A) NEG     URINE MICROSCOPIC ONLY    Collection Time: 06/21/17  2:57 PM   Result Value Ref Range    WBC 20 to 40 0 - 4 /hpf    RBC NEGATIVE  0 - 5 /hpf    Epithelial cells FEW 0 - 5 /lpf    Bacteria 2+ (A) NEG /hpf   CULTURE, BLOOD    Collection Time: 06/21/17  3:12 PM   Result Value Ref Range    Special Requests: NO SPECIAL REQUESTS      Culture result: NO GROWTH AFTER 16 HOURS     GLUCOSE, POC    Collection Time: 06/21/17 10:32 PM   Result Value Ref Range    Glucose (POC) 181 (H) 70 - 974 mg/dL   METABOLIC PANEL, COMPREHENSIVE    Collection Time: 06/22/17  2:18 AM Result Value Ref Range    Sodium 139 136 - 145 mmol/L    Potassium 4.3 3.5 - 5.5 mmol/L    Chloride 109 (H) 100 - 108 mmol/L    CO2 20 (L) 21 - 32 mmol/L    Anion gap 10 3.0 - 18 mmol/L    Glucose 190 (H) 74 - 99 mg/dL    BUN 32 (H) 7.0 - 18 MG/DL    Creatinine 1.19 0.6 - 1.3 MG/DL    BUN/Creatinine ratio 27 (H) 12 - 20      GFR est AA 53 (L) >60 ml/min/1.73m2    GFR est non-AA 44 (L) >60 ml/min/1.73m2    Calcium 8.1 (L) 8.5 - 10.1 MG/DL    Bilirubin, total 2.6 (H) 0.2 - 1.0 MG/DL    ALT (SGPT) 18 13 - 56 U/L    AST (SGOT) 17 15 - 37 U/L    Alk. phosphatase 77 45 - 117 U/L    Protein, total 5.7 (L) 6.4 - 8.2 g/dL    Albumin 2.6 (L) 3.4 - 5.0 g/dL    Globulin 3.1 2.0 - 4.0 g/dL    A-G Ratio 0.8 0.8 - 1.7     CBC WITH AUTOMATED DIFF    Collection Time: 06/22/17  2:18 AM   Result Value Ref Range    WBC 6.9 4.6 - 13.2 K/uL    RBC 2.62 (L) 4.20 - 5.30 M/uL    HGB 7.4 (L) 12.0 - 16.0 g/dL    HCT 22.2 (L) 35.0 - 45.0 %    MCV 84.7 74.0 - 97.0 FL    MCH 28.2 24.0 - 34.0 PG    MCHC 33.3 31.0 - 37.0 g/dL    RDW 14.5 11.6 - 14.5 %    PLATELET 69 (L) 892 - 420 K/uL    MPV 10.4 9.2 - 11.8 FL    NEUTROPHILS 86 (H) 40 - 73 %    LYMPHOCYTES 9 (L) 21 - 52 %    MONOCYTES 5 3 - 10 %    EOSINOPHILS 0 0 - 5 %    BASOPHILS 0 0 - 2 %    ABS. NEUTROPHILS 5.9 1.8 - 8.0 K/UL    ABS. LYMPHOCYTES 0.6 (L) 0.9 - 3.6 K/UL    ABS. MONOCYTES 0.4 0.05 - 1.2 K/UL    ABS. EOSINOPHILS 0.0 0.0 - 0.4 K/UL    ABS.  BASOPHILS 0.0 0.0 - 0.1 K/UL    DF AUTOMATED     PROTHROMBIN TIME + INR    Collection Time: 06/22/17  2:18 AM   Result Value Ref Range    Prothrombin time 17.3 (H) 11.5 - 15.2 sec    INR 1.5 (H) 0.8 - 1.2     PTT    Collection Time: 06/22/17  2:18 AM   Result Value Ref Range    aPTT 43.5 (H) 23.0 - 36.4 SEC   CARDIAC PANEL,(CK, CKMB & TROPONIN)    Collection Time: 06/22/17  2:18 AM   Result Value Ref Range    CK 35 26 - 192 U/L    CK - MB <1.0 <3.6 ng/ml    CK-MB Index Cannot be calulated 0.0 - 4.0 %    Troponin-I, Qt. 0.27 (H) 0.0 - 0.045 NG/ML   GLUCOSE, POC    Collection Time: 06/22/17  8:29 AM   Result Value Ref Range    Glucose (POC) 152 (H) 70 - 110 mg/dL       Signed By: Anthony Schofield MD     June 22, 2017 10:15 AM

## 2017-06-23 LAB
ANION GAP BLD CALC-SCNC: 11 MMOL/L (ref 3–18)
BUN SERPL-MCNC: 34 MG/DL (ref 7–18)
BUN/CREAT SERPL: 27 (ref 12–20)
CALCIUM SERPL-MCNC: 8.5 MG/DL (ref 8.5–10.1)
CHLORIDE SERPL-SCNC: 107 MMOL/L (ref 100–108)
CO2 SERPL-SCNC: 20 MMOL/L (ref 21–32)
CREAT SERPL-MCNC: 1.26 MG/DL (ref 0.6–1.3)
GLUCOSE BLD STRIP.AUTO-MCNC: 137 MG/DL (ref 70–110)
GLUCOSE BLD STRIP.AUTO-MCNC: 197 MG/DL (ref 70–110)
GLUCOSE BLD STRIP.AUTO-MCNC: 256 MG/DL (ref 70–110)
GLUCOSE BLD STRIP.AUTO-MCNC: 277 MG/DL (ref 70–110)
GLUCOSE SERPL-MCNC: 245 MG/DL (ref 74–99)
IRON SATN MFR SERPL: 9 %
IRON SERPL-MCNC: 14 UG/DL (ref 50–175)
POTASSIUM SERPL-SCNC: 4.1 MMOL/L (ref 3.5–5.5)
SODIUM SERPL-SCNC: 138 MMOL/L (ref 136–145)
TIBC SERPL-MCNC: 159 UG/DL (ref 250–450)

## 2017-06-23 PROCEDURE — 74011250637 HC RX REV CODE- 250/637: Performed by: INTERNAL MEDICINE

## 2017-06-23 PROCEDURE — 74011250636 HC RX REV CODE- 250/636: Performed by: FAMILY MEDICINE

## 2017-06-23 PROCEDURE — 83540 ASSAY OF IRON: CPT | Performed by: FAMILY MEDICINE

## 2017-06-23 PROCEDURE — 30233N1 TRANSFUSION OF NONAUTOLOGOUS RED BLOOD CELLS INTO PERIPHERAL VEIN, PERCUTANEOUS APPROACH: ICD-10-PCS | Performed by: INTERNAL MEDICINE

## 2017-06-23 PROCEDURE — 74011636637 HC RX REV CODE- 636/637: Performed by: INTERNAL MEDICINE

## 2017-06-23 PROCEDURE — 80048 BASIC METABOLIC PNL TOTAL CA: CPT | Performed by: FAMILY MEDICINE

## 2017-06-23 PROCEDURE — 36415 COLL VENOUS BLD VENIPUNCTURE: CPT | Performed by: FAMILY MEDICINE

## 2017-06-23 PROCEDURE — 74011636637 HC RX REV CODE- 636/637: Performed by: FAMILY MEDICINE

## 2017-06-23 PROCEDURE — 74011250636 HC RX REV CODE- 250/636: Performed by: PHYSICIAN ASSISTANT

## 2017-06-23 PROCEDURE — 82962 GLUCOSE BLOOD TEST: CPT

## 2017-06-23 PROCEDURE — 65270000029 HC RM PRIVATE

## 2017-06-23 RX ORDER — FUROSEMIDE 20 MG/1
20 TABLET ORAL DAILY
Status: DISCONTINUED | OUTPATIENT
Start: 2017-06-24 | End: 2017-06-24

## 2017-06-23 RX ORDER — LORAZEPAM 2 MG/ML
0.5 INJECTION INTRAMUSCULAR ONCE
Status: COMPLETED | OUTPATIENT
Start: 2017-06-23 | End: 2017-06-23

## 2017-06-23 RX ORDER — FUROSEMIDE 10 MG/ML
40 INJECTION INTRAMUSCULAR; INTRAVENOUS ONCE
Status: COMPLETED | OUTPATIENT
Start: 2017-06-23 | End: 2017-06-23

## 2017-06-23 RX ADMIN — ACETAMINOPHEN 650 MG: 325 TABLET ORAL at 04:17

## 2017-06-23 RX ADMIN — CHOLECALCIFEROL (VITAMIN D3) 10 MCG (400 UNIT) TABLET 2000 UNITS: at 08:33

## 2017-06-23 RX ADMIN — INSULIN LISPRO 3 UNITS: 100 INJECTION, SOLUTION INTRAVENOUS; SUBCUTANEOUS at 16:59

## 2017-06-23 RX ADMIN — AMLODIPINE BESYLATE 5 MG: 5 TABLET ORAL at 08:33

## 2017-06-23 RX ADMIN — HYDRALAZINE HYDROCHLORIDE 10 MG: 10 TABLET, FILM COATED ORAL at 21:49

## 2017-06-23 RX ADMIN — CALCIUM POLYCARBOPHIL 625 MG TABLET 625 MG: at 08:36

## 2017-06-23 RX ADMIN — HYDRALAZINE HYDROCHLORIDE 10 MG: 10 TABLET, FILM COATED ORAL at 16:55

## 2017-06-23 RX ADMIN — THERA TABS 1 TABLET: TAB at 08:34

## 2017-06-23 RX ADMIN — VITAM B12 100 MCG: 100 TAB at 08:35

## 2017-06-23 RX ADMIN — Medication 250 MG: at 08:35

## 2017-06-23 RX ADMIN — METOPROLOL TARTRATE 50 MG: 50 TABLET ORAL at 16:56

## 2017-06-23 RX ADMIN — HYDRALAZINE HYDROCHLORIDE 10 MG: 10 TABLET, FILM COATED ORAL at 08:35

## 2017-06-23 RX ADMIN — FERROUS SULFATE TAB 325 MG (65 MG ELEMENTAL FE) 325 MG: 325 (65 FE) TAB at 08:35

## 2017-06-23 RX ADMIN — METOPROLOL TARTRATE 50 MG: 50 TABLET ORAL at 08:35

## 2017-06-23 RX ADMIN — TICAGRELOR 90 MG: 90 TABLET ORAL at 08:34

## 2017-06-23 RX ADMIN — TICAGRELOR 90 MG: 90 TABLET ORAL at 20:19

## 2017-06-23 RX ADMIN — BENAZEPRIL HYDROCHLORIDE 5 MG: 10 TABLET, FILM COATED ORAL at 16:57

## 2017-06-23 RX ADMIN — LORAZEPAM 0.5 MG: 2 INJECTION INTRAMUSCULAR; INTRAVENOUS at 19:44

## 2017-06-23 RX ADMIN — FERROUS SULFATE TAB 325 MG (65 MG ELEMENTAL FE) 325 MG: 325 (65 FE) TAB at 16:55

## 2017-06-23 RX ADMIN — SIMVASTATIN 20 MG: 20 TABLET, FILM COATED ORAL at 08:35

## 2017-06-23 RX ADMIN — INSULIN LISPRO 6 UNITS: 100 INJECTION, SOLUTION INTRAVENOUS; SUBCUTANEOUS at 11:42

## 2017-06-23 RX ADMIN — INSULIN LISPRO 9 UNITS: 100 INJECTION, SOLUTION INTRAVENOUS; SUBCUTANEOUS at 22:56

## 2017-06-23 RX ADMIN — ONDANSETRON 4 MG: 2 INJECTION INTRAMUSCULAR; INTRAVENOUS at 18:15

## 2017-06-23 RX ADMIN — ASPIRIN 81 MG: 81 TABLET, COATED ORAL at 08:35

## 2017-06-23 RX ADMIN — FUROSEMIDE 40 MG: 10 INJECTION, SOLUTION INTRAVENOUS at 12:00

## 2017-06-23 NOTE — CONSULTS
[facility] - Consult Note    Consultation request by Jo Mo MD for advice/opinion related to evaluating CHF and aortic stenosis. Date of  Admission: 6/21/2017  1:36 PM   Primary Care Physician:  Vanessa Schreiber MD     Assessment:     Patient Active Problem List   Diagnosis Code    Anemia D64.9    CAD (coronary artery disease) I25.10    Thrombocytopenia (HCC) D69.6    NSTEMI (non-ST elevated myocardial infarction) ([facility] I21.4    UTI (urinary tract infection) N39.0    Sepsis ([facility] A41.9    PNA (pneumonia) J18.9    Hypoxia R09.02     -CHF with diastolic dysfunction with some volume overload 2/2 IVFs. -Mild aortic stenosis by echo this admission, valve mean gradient 16mmHg with aortic valve area of 1.32cm2. -CAD hx, s/p PCI to Cx and mLAD with BETHEL in Aug 2016, on DAPT with Brilinta and ASA. -ECHO 7/2016 with normal EF and G1DD, repeat echo yesterday with normal EF and G2DD, mild aortic stenosis. -HTN  -HLD  -DM type II  -Suspected UTI      -Primary cardiologist Dr. Arenas Setting:     -Will have attending review and compare echocardiograms to evaluate aortic stenosis. Although, as noted in this admission's echo, stenosis is mild. Watchful waiting recommended.   -Continue good BP control. Continue BB, norvasc, acei, and hydral.   -Cont asa and statin.  -Will give one dose of IV lasix and monitor response.   -Will resume home PO lasix (20mg daily). History of Present Illness: This is a 67 y.o. female admitted for CHF and aortic stenosis. Patient is a 67yo female admitted for PNA on 6/21. Pt had repeat echocardiogram completed yesterday and was found to have mild aortic stenosis and grade 2 diastolic dysfunction worse from prior echo one year ago with grade 1 diastolic dysfunction. BP has been well controlled on home regimen. Pt does have a hx of CAD with PCI x 2 in 2016 (Cx and mLAD). Denies any recent anginal complaints.  Pt is due for follow up appt with primary cardiologist. Pt is complaining of shortness of breath, orthopnea, and LE swelling. No chest pains. Cardiac risk factors:   Known CAD  HTN  HLD  CHF      Review of Symptoms:  Except as stated above include:  Constitutional:  Negative for fevers/chills  Respiratory:  Positive for sob and mild cough  Cardiovascular:  Negative for chest pain, positive for LE swelling  Gastrointestinal: negative for abd pain, nausea and vomiting  Genitourinary:  Negative for hematuria or dysuria  Musculoskeletal:  Negative  Neurological:  Negative for dizziness or syncope  Dermatological:  Negative  Endocrinological: Negative  Psychological:  Negative for anxiety     Past Medical History:     Past Medical History:   Diagnosis Date    Anemia     CAD (coronary artery disease)     Coronary Stenting needed, hematology workup needed prior to stent placement, treating medically at this time    Cancer [facility])     colon    Cancer ([facility] [address]     breast - left    Cardiac catheterization 08/17/2016    LM patent. mLAD 99.9% (2.25 x 28-mm Xience BETHEL, resid 0%). oD1 60%. oD2 95%. pCX 90% (2.5 x 13-mm Xience BETHEL, resid 0%). OMB patent.  Cardiac echocardiogram 07/01/2016    EF 50%. Basal inferior, basal-mid inferolateral hypk. Gr 1 DDfx. RVSP 50-60 mmHg. Mild LAE. Mod, eccentric MR.      Diabetes ([facility] [address] [facility]     Hypercholesteremia     Hypertension     [facility] ([facility]          Social History:     Social History     Social History    Marital status:      Spouse name: N/A    Number of children: N/A    Years of education: N/A     Social History Main Topics    Smoking status: Never Smoker    Smokeless tobacco: Never Used    Alcohol use No    Drug use: No    Sexual activity: Not on file     Other Topics Concern    Not on file     Social History Narrative        Family History:   No family history on file. Medications:      Allergies   Allergen Reactions    Latex Itching        Current Facility-Administered Medications Medication Dose Route Frequency    simvastatin (ZOCOR) tablet 20 mg  20 mg Oral DAILY    cyanocobalamin (VITAMIN B12) tablet 100 mcg  100 mcg Oral DAILY    aspirin delayed-release tablet 81 mg  81 mg Oral DAILY    calcium polycarbophil (FIBERCON) tablet 625 mg  1 Tab Oral DAILY    cholecalciferol (VITAMIN D3) tablet 2,000 Units  2,000 Units Oral DAILY    therapeutic multivitamin (THERAGRAN) tablet 1 Tab  1 Tab Oral DAILY    ascorbic acid (vitamin C) (VITAMIN C) tablet 250 mg  250 mg Oral DAILY    ferrous sulfate tablet 325 mg  325 mg Oral BID WITH MEALS    hydrALAZINE (APRESOLINE) tablet 10 mg  10 mg Oral TID    metoprolol tartrate (LOPRESSOR) tablet 50 mg  50 mg Oral BID    ticagrelor (BRILINTA) tablet 90 mg  90 mg Oral Q12H    benazepril (LOTENSIN) tablet 5 mg  5 mg Oral QPM    amLODIPine (NORVASC) tablet 5 mg  5 mg Oral DAILY    acetaminophen (TYLENOL) tablet 650 mg  650 mg Oral Q6H PRN    ondansetron (ZOFRAN) injection 4 mg  4 mg IntraVENous Q4H PRN    insulin lispro (HUMALOG) injection   SubCUTAneous AC&HS    glucose chewable tablet 16 g  16 g Oral PRN    glucagon (GLUCAGEN) injection 1 mg  1 mg IntraMUSCular PRN    dextrose (D50W) injection syrg 12.5-25 g  25-50 mL IntraVENous PRN         Physical Exam:     Visit Vitals    /67 (BP 1 Location: Left arm, BP Patient Position: At rest)    Pulse 85    Temp 97 °F (36.1 °C)    Resp 12    Ht 5' (1.524 m)    Wt 61.2 kg (135 lb)    SpO2 95%    BMI 26.37 kg/m2     BP Readings from Last 3 Encounters:   06/23/17 124/67   09/19/16 132/80   08/26/16 140/69     Pulse Readings from Last 3 Encounters:   06/23/17 85   09/19/16 91   08/26/16 76     Wt Readings from Last 3 Encounters:   06/21/17 61.2 kg (135 lb)   09/19/16 73.7 kg (162 lb 6.4 oz)   08/18/16 73.9 kg (163 lb)       General:  Awake, alert, oriented x3  Neck:  Supple, no jvd  Lungs:  Scattered crackles at bilat bases  Heart: reg rate and rhythm  Abdomen: soft, non-tender  Extremities: dmild ankle edema  Skin: no rashes or lesions  Neuro: no focal deficits  Psych: normal mood and affect     Data Review:     Recent Labs      06/22/17   0218  06/21/17   1301   WBC  6.9  9.3   HGB  7.4*  9.9*   HCT  22.2*  29.6*   PLT  69*  95*     Recent Labs      06/22/17   0218  06/21/17   1301   NA  139  141   K  4.3  4.7   CL  109*  108   CO2  20*  22   GLU  190*  238*   BUN  32*  29*   CREA  1.19  1.07   CA  8.1*  9.4   ALB  2.6*  3.6   SGOT  17  22   ALT  18  23   INR  1.5*   --        Results for orders placed or performed during the hospital encounter of 06/21/17   EKG, 12 LEAD, INITIAL   Result Value Ref Range    Ventricular Rate 108 BPM    Atrial Rate 108 BPM    P-R Interval 138 ms    QRS Duration 106 ms    Q-T Interval 336 ms    QTC Calculation (Bezet) 450 ms    Calculated P Axis 36 degrees    Calculated R Axis -24 degrees    Calculated T Axis 72 degrees    Diagnosis       Sinus tachycardia  Otherwise normal ECG  When compared with ECG of 18-AUG-2016 05:34,  Lead reversal is no longer present  Confirmed by Clyde Quinonez MD, Alvina Kraft ([id number]) on 6/22/2017 10:11:37 AM     Results for orders placed or performed in visit on 09/19/16   AMB POC EKG ROUTINE W/ 12 LEADS, INTER & REP    Impression    Normal sinus rhythm, rate 91. Old inferior wall MI suggested w/Q waves in III and aVF.        All Cardiac Markers in the last 24 hours:  No results found for: CPK, CKMMB, CKMB, RCK3, CKMBT, CKNDX, CKND1, CLAUDIO, TROPT, TROIQ, ANNAMARIA, TROPT, TNIPOC, BNP, BNPP    Last Lipid:    Lab Results   Component Value Date/Time    Cholesterol, total 188 07/01/2016 11:53 PM    HDL Cholesterol 46 07/01/2016 11:53 PM    LDL, calculated 94.2 07/01/2016 11:53 PM    Triglyceride 239 07/01/2016 11:53 PM    CHOL/HDL Ratio 4.1 07/01/2016 11:53 PM       Signed By: ELOISA Campos     June 23, 2017

## 2017-06-23 NOTE — DIABETES MGMT
Glycemic Control Plan of Care    BG above target range. POC BG range on 06/22/2017: 145-252 mg/dL. POC BG report on 06/23/2017 at time of review: 137, 256 mg/dL    Recommendation(s):  1.) Modified correctional lispro insulin to very resistant dose. 2.) Add 5 units of basal lantus insulin daily if patient cont to req > 10 units of correctional insulin within 24 hour period. Assessment:  Patient is 67year old with past medical history including type 2 diabetes mellitus, CAD, colon and left breast cancer, hypercholesterolemia, hypertension, and thrombocytopenia - was admitted on 06/21/2017 with c/o nausea, vomiting, diarrhea, fatigue, and fever x5 days. Noted:  Community acquired pneumonia vs bronchitis. UTI. Type 2 diabetes mellitus. 5.2% (06/22/2017)    Discussed continued monitoring and use of correctional insulin with patient. Most recent blood glucose values:    Results for Mary Rinaldi (MRN [id number]) as of 6/23/2017 12:47   Ref. Range 6/22/2017 08:29 6/22/2017 11:42 6/22/2017 16:31 6/22/2017 21:45   GLUCOSE,FAST - POC Latest Ref Range: 70 - 110 mg/dL 152 (H) 252 (H) 155 (H) 145 (H)     Results for Mary Rinaldi (MRN [id number]) as of 6/23/2017 12:47   Ref. Range 6/23/2017 07:47 6/23/2017 11:35   GLUCOSE,FAST - POC Latest Ref Range: 70 - 110 mg/dL 137 (H) 256 (H)     Current A1C: 5.2% (06/22/2017) is equivalent to average blood glucose of 103 mg/dL during the past 2-3 months. Current hospital diabetes medications:  Correctional lispro insulin ACHS. Very resistant dose. Total daily dose insulin requirement previous day: 06/22/2017  Lispro: 10 units    Home diabetes medications: As stated by patient on 06/22/2017:  Metformin 500 mg twice daily with meals. Diet: Cardiac regular; consistent carb 1500-1600kcal    Goals:  Blood glucose will be within target range of  mg/dL by 06/26/2017.      Education:  _X__  Refer to Diabetes Education Record: 06/22/2017             ___  Education not indicated at this time    Mauri Serra RN

## 2017-06-23 NOTE — ROUTINE PROCESS
Pt c/o nausea and sob. Pt family at bedside. MD notified and aware. Gave nausea medicine and called RT to check pt. Pt lungs sound was clear and 91-95% on 3L o2.

## 2017-06-23 NOTE — ROUTINE PROCESS
Bedside and Verbal shift change report given to Ching Morales, LATRICIA (oncoming nurse) by Atilio Leal RN (offgoing nurse). Report included the following information SBAR, Kardex, MAR and Recent Results. SITUATION:    Code Status: Full Code   [facility] for [facility]: [facility]   PNA (pneumonia)   UTI (urinary tract infection)   PNA (pneumonia)   UTI (urinary tract infection)  [address] day: 2   Problem List:       Hospital Problems  Date Reviewed: 6/21/2017          Codes Class Noted POA    UTI (urinary tract infection) ICD-10-CM: N39.0  ICD-9-CM: 599.0  6/21/2017 Unknown        Sepsis ([facility] ICD-10-CM: A41.9  ICD-9-CM: 038.9, 995.91  6/21/2017 Unknown        * (Principal)PNA (pneumonia) ICD-10-CM: J18.9  ICD-9-CM: 486  6/21/2017 Unknown        Hypoxia ICD-10-CM: R09.02  ICD-9-CM: 799.02  6/21/2017 Unknown        CAD (coronary artery disease) ICD-10-CM: I25.10  ICD-9-CM: 414.00  7/7/2015 Yes        Thrombocytopenia ([facility] ICD-10-CM: D69.6  ICD-9-CM: 287.5  7/7/2015 Yes              BACKGROUND:    Past Medical History:   Past Medical History:   Diagnosis Date    Anemia     CAD (coronary artery disease)     Coronary Stenting needed, hematology workup needed prior to stent placement, treating medically at this time    Cancer [facility])     colon    Cancer [facility]     breast - left    Cardiac catheterization 08/17/2016    LM patent. mLAD 99.9% (2.25 x 28-mm Xience BETHEL, resid 0%). oD1 60%. oD2 95%. pCX 90% (2.5 x 13-mm Xience BETHEL, resid 0%). OMB patent.  Cardiac echocardiogram 07/01/2016    EF 50%. Basal inferior, basal-mid inferolateral hypk. Gr 1 DDfx. RVSP 50-60 mmHg. Mild LAE.   Mod, eccentric MR.      Diabetes [facility]     Hypercholesteremia     Hypertension     Thrombocytopenia ([address]          Patient taking anticoagulants no     ASSESSMENT:    Changes in Assessment Throughout Shift: low grade temp     Patient has Central Line: no Reasons if yes: n/a   Patient has Jean Cath: no Reasons if yes: n/a      Last Vitals:     Vitals:    06/22/17 1937 06/22/17 2022 06/22/17 2324 06/23/17 0345   BP: 134/62  134/68 124/67   Pulse: 90  97 95   Resp: 24  25 23   Temp: 99.5 °F (37.5 °C)  100 °F (37.8 °C) 100.2 °F (37.9 °C)   SpO2: (!) 87% 93% 90% 90%   Weight:       Height:            IV and DRAINS (will only show if present)   Peripheral IV 06/21/17 Right Antecubital-Site Assessment: Clean, dry, & intact     WOUND (if present)   Wound Type:  none   Dressing present Dressing Present : No   Wound Concerns/Notes:  none     PAIN    Pain Assessment    Pain Intensity 1: 0 (06/23/17 0434)    Pain Location 1: Leg    Pain Intervention(s) 1: Ice    Patient Stated Pain Goal: 0  o Interventions for Pain:  None given  o Intervention effective: no c/o pain  o Time of last intervention: n/a   o Reassessment Completed: yes      Last 3 Weights:  Last 3 Recorded Weights in this Encounter    06/21/17 1240   Weight: 61.2 kg (135 lb)     Weight change:      INTAKE/OUPUT    Current Shift:      Last three shifts: 06/21 0701 - 06/22 1900  In: 840 [P.O.:840]  Out: 850 [Urine:850]     LAB RESULTS     Recent Labs      06/22/17   0218  06/21/17   1301   WBC  6.9  9.3   HGB  7.4*  9.9*   HCT  22.2*  29.6*   PLT  69*  95*        Recent Labs      06/22/17 0218  06/21/17   1301   NA  139  141   K  4.3  4.7   GLU  190*  238*   BUN  32*  29*   CREA  1.19  1.07   CA  8.1*  9.4   INR  1.5*   --        RECOMMENDATIONS AND DISCHARGE PLANNING     1. Pending tests/procedures/ Plan of Care or Other Needs: PT/OT    2. Discharge plan for patient and Needs/Barriers: home    3. Estimated Discharge Date: tbd Posted on Whiteboard in [facility]: yes      4. The patient's care plan was reviewed with the oncoming nurse.        \"HEALS\" SAFETY CHECK      Fall Risk    Total Score: 2    Safety Measures: Safety Measures: Bed/Chair-Wheels locked, Bed in low position, Call light within reach    A safety check occurred in the patient's room between off going nurse and oncoming nurse listed above. The safety check included the below items  Area Items   H  High Alert Medications - Verify all high alert medication drips (heparin, PCA, etc.)   E  Equipment - Suction is set up for ALL patients (with eden)  - Red plugs utilized for all equipment (IV pumps, etc.)  - WOWs wiped down at end of shift.  - Room stocked with oxygen, suction, and other unit-specific supplies   A  Alarms - Bed alarm is set for fall risk patients  - Ensure chair alarm is in place and activated if patient is up in a chair   L  Lines - Check IV for any infiltration  - Jean bag is empty if patient has a Jean   - Tubing and IV bags are labeled   S  Safety   - Room is clean, patient is clean, and equipment is clean. - Hallways are clear from equipment besides carts. - Fall bracelet on for fall risk patients  - Ensure room is clear and free of clutter  - Suction is set up for ALL patients (with eden)  - Hallways are clear from equipment besides carts.    - Isolation precautions followed, supplies available outside room, sign posted     Maci Fabian RN

## 2017-06-24 LAB
ANION GAP BLD CALC-SCNC: 11 MMOL/L (ref 3–18)
BASOPHILS # BLD AUTO: 0 K/UL (ref 0–0.06)
BASOPHILS # BLD: 0 % (ref 0–2)
BUN SERPL-MCNC: 41 MG/DL (ref 7–18)
BUN/CREAT SERPL: 34 (ref 12–20)
CALCIUM SERPL-MCNC: 8.5 MG/DL (ref 8.5–10.1)
CHLORIDE SERPL-SCNC: 106 MMOL/L (ref 100–108)
CO2 SERPL-SCNC: 19 MMOL/L (ref 21–32)
CREAT SERPL-MCNC: 1.22 MG/DL (ref 0.6–1.3)
DIFFERENTIAL METHOD BLD: ABNORMAL
EOSINOPHIL # BLD: 0 K/UL (ref 0–0.4)
EOSINOPHIL NFR BLD: 0 % (ref 0–5)
ERYTHROCYTE [DISTWIDTH] IN BLOOD BY AUTOMATED COUNT: 14.1 % (ref 11.6–14.5)
GLUCOSE BLD STRIP.AUTO-MCNC: 192 MG/DL (ref 70–110)
GLUCOSE BLD STRIP.AUTO-MCNC: 196 MG/DL (ref 70–110)
GLUCOSE BLD STRIP.AUTO-MCNC: 290 MG/DL (ref 70–110)
GLUCOSE SERPL-MCNC: 139 MG/DL (ref 74–99)
HCT VFR BLD AUTO: 20.8 % (ref 35–45)
HGB BLD-MCNC: 7.2 G/DL (ref 12–16)
LYMPHOCYTES # BLD AUTO: 7 % (ref 21–52)
LYMPHOCYTES # BLD: 0.5 K/UL (ref 0.9–3.6)
MCH RBC QN AUTO: 28.6 PG (ref 24–34)
MCHC RBC AUTO-ENTMCNC: 34.6 G/DL (ref 31–37)
MCV RBC AUTO: 82.5 FL (ref 74–97)
MONOCYTES # BLD: 0.5 K/UL (ref 0.05–1.2)
MONOCYTES NFR BLD AUTO: 6 % (ref 3–10)
NEUTS SEG # BLD: 6.3 K/UL (ref 1.8–8)
NEUTS SEG NFR BLD AUTO: 87 % (ref 40–73)
PLATELET # BLD AUTO: 83 K/UL (ref 135–420)
PMV BLD AUTO: 10 FL (ref 9.2–11.8)
POTASSIUM SERPL-SCNC: 3.7 MMOL/L (ref 3.5–5.5)
RBC # BLD AUTO: 2.52 M/UL (ref 4.2–5.3)
SODIUM SERPL-SCNC: 136 MMOL/L (ref 136–145)
WBC # BLD AUTO: 7.4 K/UL (ref 4.6–13.2)

## 2017-06-24 PROCEDURE — 74011250637 HC RX REV CODE- 250/637: Performed by: INTERNAL MEDICINE

## 2017-06-24 PROCEDURE — 36415 COLL VENOUS BLD VENIPUNCTURE: CPT | Performed by: FAMILY MEDICINE

## 2017-06-24 PROCEDURE — 65270000029 HC RM PRIVATE

## 2017-06-24 PROCEDURE — 36430 TRANSFUSION BLD/BLD COMPNT: CPT

## 2017-06-24 PROCEDURE — 74011636637 HC RX REV CODE- 636/637: Performed by: FAMILY MEDICINE

## 2017-06-24 PROCEDURE — 85025 COMPLETE CBC W/AUTO DIFF WBC: CPT | Performed by: FAMILY MEDICINE

## 2017-06-24 PROCEDURE — 86920 COMPATIBILITY TEST SPIN: CPT | Performed by: INTERNAL MEDICINE

## 2017-06-24 PROCEDURE — 74011250636 HC RX REV CODE- 250/636: Performed by: INTERNAL MEDICINE

## 2017-06-24 PROCEDURE — 82962 GLUCOSE BLOOD TEST: CPT

## 2017-06-24 PROCEDURE — 86900 BLOOD TYPING SEROLOGIC ABO: CPT | Performed by: INTERNAL MEDICINE

## 2017-06-24 PROCEDURE — 80048 BASIC METABOLIC PNL TOTAL CA: CPT | Performed by: FAMILY MEDICINE

## 2017-06-24 PROCEDURE — P9016 RBC LEUKOCYTES REDUCED: HCPCS | Performed by: INTERNAL MEDICINE

## 2017-06-24 PROCEDURE — 74011250637 HC RX REV CODE- 250/637: Performed by: PHYSICIAN ASSISTANT

## 2017-06-24 RX ORDER — LEVOFLOXACIN 500 MG/1
500 TABLET, FILM COATED ORAL EVERY 24 HOURS
Status: DISCONTINUED | OUTPATIENT
Start: 2017-06-24 | End: 2017-06-24 | Stop reason: DRUGHIGH

## 2017-06-24 RX ORDER — FUROSEMIDE 10 MG/ML
40 INJECTION INTRAMUSCULAR; INTRAVENOUS EVERY 12 HOURS
Status: DISCONTINUED | OUTPATIENT
Start: 2017-06-24 | End: 2017-06-27

## 2017-06-24 RX ORDER — LEVOFLOXACIN 500 MG/1
500 TABLET, FILM COATED ORAL ONCE
Status: COMPLETED | OUTPATIENT
Start: 2017-06-24 | End: 2017-06-24

## 2017-06-24 RX ORDER — SODIUM CHLORIDE 9 MG/ML
250 INJECTION, SOLUTION INTRAVENOUS AS NEEDED
Status: DISCONTINUED | OUTPATIENT
Start: 2017-06-24 | End: 2017-06-29 | Stop reason: HOSPADM

## 2017-06-24 RX ORDER — LEVOFLOXACIN 250 MG/1
250 TABLET ORAL EVERY 24 HOURS
Status: DISCONTINUED | OUTPATIENT
Start: 2017-06-25 | End: 2017-06-29 | Stop reason: HOSPADM

## 2017-06-24 RX ADMIN — CHOLECALCIFEROL (VITAMIN D3) 10 MCG (400 UNIT) TABLET 2000 UNITS: at 08:30

## 2017-06-24 RX ADMIN — ASPIRIN 81 MG: 81 TABLET, COATED ORAL at 08:28

## 2017-06-24 RX ADMIN — ACETAMINOPHEN 650 MG: 325 TABLET ORAL at 21:20

## 2017-06-24 RX ADMIN — FERROUS SULFATE TAB 325 MG (65 MG ELEMENTAL FE) 325 MG: 325 (65 FE) TAB at 17:09

## 2017-06-24 RX ADMIN — FERROUS SULFATE TAB 325 MG (65 MG ELEMENTAL FE) 325 MG: 325 (65 FE) TAB at 08:29

## 2017-06-24 RX ADMIN — METOPROLOL TARTRATE 50 MG: 50 TABLET ORAL at 08:29

## 2017-06-24 RX ADMIN — BENAZEPRIL HYDROCHLORIDE 5 MG: 10 TABLET, FILM COATED ORAL at 17:10

## 2017-06-24 RX ADMIN — THERA TABS 1 TABLET: TAB at 08:28

## 2017-06-24 RX ADMIN — INSULIN LISPRO 9 UNITS: 100 INJECTION, SOLUTION INTRAVENOUS; SUBCUTANEOUS at 12:45

## 2017-06-24 RX ADMIN — HYDRALAZINE HYDROCHLORIDE 10 MG: 10 TABLET, FILM COATED ORAL at 21:20

## 2017-06-24 RX ADMIN — Medication 250 MG: at 08:28

## 2017-06-24 RX ADMIN — METOPROLOL TARTRATE 50 MG: 50 TABLET ORAL at 17:09

## 2017-06-24 RX ADMIN — FUROSEMIDE 40 MG: 10 INJECTION, SOLUTION INTRAVENOUS at 12:17

## 2017-06-24 RX ADMIN — LEVOFLOXACIN 500 MG: 500 TABLET, FILM COATED ORAL at 12:16

## 2017-06-24 RX ADMIN — CALCIUM POLYCARBOPHIL 625 MG TABLET 625 MG: at 08:28

## 2017-06-24 RX ADMIN — HYDRALAZINE HYDROCHLORIDE 10 MG: 10 TABLET, FILM COATED ORAL at 08:28

## 2017-06-24 RX ADMIN — TICAGRELOR 90 MG: 90 TABLET ORAL at 08:27

## 2017-06-24 RX ADMIN — INSULIN LISPRO 3 UNITS: 100 INJECTION, SOLUTION INTRAVENOUS; SUBCUTANEOUS at 08:30

## 2017-06-24 RX ADMIN — AMLODIPINE BESYLATE 5 MG: 5 TABLET ORAL at 08:29

## 2017-06-24 RX ADMIN — HYDRALAZINE HYDROCHLORIDE 10 MG: 10 TABLET, FILM COATED ORAL at 17:06

## 2017-06-24 RX ADMIN — TICAGRELOR 90 MG: 90 TABLET ORAL at 21:20

## 2017-06-24 RX ADMIN — FUROSEMIDE 40 MG: 10 INJECTION, SOLUTION INTRAVENOUS at 21:20

## 2017-06-24 RX ADMIN — INSULIN LISPRO 3 UNITS: 100 INJECTION, SOLUTION INTRAVENOUS; SUBCUTANEOUS at 17:02

## 2017-06-24 RX ADMIN — FUROSEMIDE 20 MG: 20 TABLET ORAL at 09:23

## 2017-06-24 RX ADMIN — VITAM B12 100 MCG: 100 TAB at 08:28

## 2017-06-24 RX ADMIN — SIMVASTATIN 20 MG: 20 TABLET, FILM COATED ORAL at 08:28

## 2017-06-24 NOTE — ROUTINE PROCESS
Pt still c/o sob and RR=36 at rest. MD notified and aware. New orders will be place. Will continue to monitor pt.

## 2017-06-24 NOTE — PROGRESS NOTES
Bedside and Verbal shift change report given to LATRICIA Miranda  (oncoming nurse) by Rachel Combs LPN (offgoing nurse). Report included the following information SBAR, Kardex, Intake/Output and MAR.

## 2017-06-24 NOTE — PROGRESS NOTES
[facility] Hospitalist Group  Progress Note    Patient: Brenda Yeboah Age: 67 y.o. : 1940 MR#: [id number] SSN: [id number]  Date/Time: 2017 8:51 AM    Subjective/24-hour events:   I am ok   But nursing reported patient having increased respi rate   Patient appears comfortable   No CP . No Palpitation     Assessment:   Acute on chronic diastolic CHF - Change to IV Lasix - still fluid overload , clinically in SOB and will get blood transfusion   Aortic stenosis - Per cardiology   CAD - No CP   HTN - Continue current meds   PNA - CAP - Suspect - but rpt chest xray more like CHF   Anemia - will transfuse one unit of PRBC - CAD + SOB - try to keep H/H above > 8   UTI - POA - Culture growing - KLEBSIELLA PNEUMONIAE - on Levaquin     Case discussed with:  [x]Patient  []Family  [x]Nursing  []Case Management  DVT Prophylaxis:  []Lovenox  []Hep SQ  [x]SCDs  []Coumadin   []On Brilinta    Objective:   VS:   Visit Vitals    /67 (BP 1 Location: Right arm, BP Patient Position: At rest)    Pulse 93    Temp 100 °F (37.8 °C)    Resp 24    Ht 5' (1.524 m)    Wt 61.2 kg (135 lb)    SpO2 98%    BMI 26.37 kg/m2      Tmax/24hrs: Temp (24hrs), Av °F (37.2 °C), Min:98 °F (36.7 °C), Max:100.2 °F (37.9 °C)      Intake/Output Summary (Last 24 hours) at 17 1126  Last data filed at 17 0915   Gross per 24 hour   Intake              606 ml   Output              750 ml   Net             -144 ml       General:  In NAD. Nontoxic-appearing. Cardiovascular:  RRR. Pulmonary:  No wheezes, Mild SOB   GI:  Abdomen soft, NTTP. Extremities:  Warm, no ischemia. Additional:  Awake and alert. Motor grossly nonfocal.    Labs/Ancillary:  2D echo:  SUMMARY:  Left ventricle: Systolic function was normal by visual assessment. Ejection fraction was estimated to be 55 %.  There was possible mild  hypokinesis of the mid inferoseptal, basal inferior, mid inferolateral,  and apical lateral wall(s). Wall thickness was mildly increased. Features  were consistent with a pseudonormal left ventricular filling pattern, with  concomitant abnormal relaxation and increased filling pressure (grade 2  diastolic dysfunction). Right ventricle: Systolic pressure was mildly increased. Estimated peak  pressure was 43 mmHg. Aortic valve: The valve was probably trileaflet. Leaflets exhibited mildly  increased thickness, calcification, reduced mobility, and sclerosis. There  was mild stenosis. Valve peak gradient was 35 mmHg. Valve mean gradient  was 16 mmHg. Estimated aortic valve area was 1.32 cm-sq. COMPARISONS:  Comparison was made with the previous study of 01-Jul-2016. Aortic  stenosis has worsened from mean gradient of 8 mmHg to 16 mmHg. Pulmonary  artery pressure has decreased.         Recent Results (from the past 24 hour(s))   GLUCOSE, POC    Collection Time: 06/23/17 11:35 AM   Result Value Ref Range    Glucose (POC) 256 (H) 70 - 110 mg/dL   GLUCOSE, POC    Collection Time: 06/23/17  3:57 PM   Result Value Ref Range    Glucose (POC) 197 (H) 70 - 110 mg/dL   GLUCOSE, POC    Collection Time: 06/23/17 10:25 PM   Result Value Ref Range    Glucose (POC) 277 (H) 70 - 671 mg/dL   METABOLIC PANEL, BASIC    Collection Time: 06/24/17  2:08 AM   Result Value Ref Range    Sodium 136 136 - 145 mmol/L    Potassium 3.7 3.5 - 5.5 mmol/L    Chloride 106 100 - 108 mmol/L    CO2 19 (L) 21 - 32 mmol/L    Anion gap 11 3.0 - 18 mmol/L    Glucose 139 (H) 74 - 99 mg/dL    BUN 41 (H) 7.0 - 18 MG/DL    Creatinine 1.22 0.6 - 1.3 MG/DL    BUN/Creatinine ratio 34 (H) 12 - 20      GFR est AA 52 (L) >60 ml/min/1.73m2    GFR est non-AA 43 (L) >60 ml/min/1.73m2    Calcium 8.5 8.5 - 10.1 MG/DL   CBC WITH AUTOMATED DIFF    Collection Time: 06/24/17  2:08 AM   Result Value Ref Range    WBC 7.4 4.6 - 13.2 K/uL    RBC 2.52 (L) 4.20 - 5.30 M/uL    HGB 7.2 (L) 12.0 - 16.0 g/dL    HCT 20.8 (L) 35.0 - 45.0 %    MCV 82.5 74.0 - 97.0 FL    MCH 28.6 24.0 - 34.0 PG    MCHC 34.6 31.0 - 37.0 g/dL    RDW 14.1 11.6 - 14.5 %    PLATELET 83 (L) 718 - 420 K/uL    MPV 10.0 9.2 - 11.8 FL    NEUTROPHILS 87 (H) 40 - 73 %    LYMPHOCYTES 7 (L) 21 - 52 %    MONOCYTES 6 3 - 10 %    EOSINOPHILS 0 0 - 5 %    BASOPHILS 0 0 - 2 %    ABS. NEUTROPHILS 6.3 1.8 - 8.0 K/UL    ABS. LYMPHOCYTES 0.5 (L) 0.9 - 3.6 K/UL    ABS. MONOCYTES 0.5 0.05 - 1.2 K/UL    ABS. EOSINOPHILS 0.0 0.0 - 0.4 K/UL    ABS.  BASOPHILS 0.0 0.0 - 0.06 K/UL    DF AUTOMATED     GLUCOSE, POC    Collection Time: 06/24/17  7:42 AM   Result Value Ref Range    Glucose (POC) 196 (H) 70 - 110 mg/dL       Signed By: Soraida Robins MD     June 24, 2017 8:51 AM

## 2017-06-24 NOTE — PROGRESS NOTES
06/24/17 1136   Vital Signs   Temp 98.1 °F (36.7 °C)   Temp Source Oral   Pulse (Heart Rate) 82   Heart Rate Source Brachial   Resp Rate (!) 36   O2 Sat (%) 91 %   Level of Consciousness Alert   /63   MAP (Calculated) 81   BP 1 Method Automatic   BP 1 Location Right arm   BP Patient Position At rest   MEWS Score 3   Box Number 65   Electrodes Replaced No   Pain 1   Pain Scale 1 Numeric (0 - 10)   Pain Intensity 1 0   Patient Stated Pain Goal 0   Patient Observation   Special Appliances/Equipment Bedside Commode   Hourly Rounds Yes   Activity In bed   Notified RN Wing Leyden.   RN stated, she will notified MD.

## 2017-06-24 NOTE — PROGRESS NOTES
[facility] - Progress Note  Admit Date: 6/21/2017    Assessment:     Hospital Problems  Date Reviewed: 6/21/2017          Codes Class Noted POA    UTI (urinary tract infection) ICD-10-CM: N39.0  ICD-9-CM: 599.0  6/21/2017 Unknown        Sepsis ([facility] [address] ICD-10-CM: A41.9  ICD-9-CM: 038.9, 995.91  6/21/2017 Unknown        * (Principal)PNA (pneumonia) ICD-10-CM: J18.9  ICD-9-CM: 486  6/21/2017 Unknown        Hypoxia ICD-10-CM: R09.02  ICD-9-CM: 799.02  6/21/2017 Unknown        CAD (coronary artery disease) ICD-10-CM: I25.10  ICD-9-CM: 414.00  7/7/2015 Yes        Thrombocytopenia ([facility] [address] ICD-10-CM: D69.6  ICD-9-CM: 287.5  7/7/2015 Yes              -CHF with diastolic dysfunction with some volume overload 2/2 IVFs. -Mild aortic stenosis by echo this admission, valve mean gradient 16mmHg with aortic valve area of 1.32cm2. -CAD hx, s/p PCI to Cx and mLAD with BETHEL in Aug 2016, on DAPT with Brilinta and ASA. -Chronic Brilinta  -ECHO 7/2016 with normal EF and G1DD, repeat echo 6/22/17 with normal EF and G2DD, mild aortic stenosis, wall motion abnormalities noted. -UTI on antx, Klebsiella bacteremia  -HTN  -HLD  -DM type II      Primary cardiologist Dr. Sin Prophet:     Given declining Hgb to 7.2, consider transfusion. Continue antbx. Subjective:     No new complaints. Still with dyspnea at times.     Objective:      Patient Vitals for the past 8 hrs:   Temp Pulse Resp BP SpO2   06/24/17 1136 98.1 °F (36.7 °C) 82 (!) 36 117/63 91 %   06/24/17 0826 100 °F (37.8 °C) 93 24 130/67 98 %   06/24/17 0444 98.1 °F (36.7 °C) 92 18 139/71 92 %         Patient Vitals for the past 96 hrs:   Weight   06/21/17 1240 61.2 kg (135 lb)                    Intake/Output Summary (Last 24 hours) at 06/24/17 1216  Last data filed at 06/24/17 1150   Gross per 24 hour   Intake              846 ml   Output             1100 ml   Net             -254 ml       Physical Exam:  General:  alert, cooperative, no distress, appears stated age  Neck:  nontender  Lungs:  clear to auscultation bilaterally  Heart:  regular rate and rhythm, S1, S2 normal, no murmur, click, rub or gallop  Abdomen:  abdomen is soft without significant tenderness, masses, organomegaly or guarding  Extremities:  extremities normal, atraumatic, no cyanosis or edema    Data Review:     Labs: Results:       Chemistry Recent Labs      06/24/17   0208  06/23/17   0950  06/22/17   0218  06/21/17   1301   GLU  139*  245*  190*  238*   NA  136  138  139  141   K  3.7  4.1  4.3  4.7   CL  106  107  109*  108   CO2  19*  20*  20*  22   BUN  41*  34*  32*  29*   CREA  1.22  1.26  1.19  1.07   CA  8.5  8.5  8.1*  9.4   AGAP  11  11  10  11   BUCR  34*  27*  27*  27*   AP   --    --   77  112   TP   --    --   5.7*  7.4   ALB   --    --   2.6*  3.6   GLOB   --    --   3.1  3.8   AGRAT   --    --   0.8  0.9      CBC w/Diff Recent Labs      06/24/17   0208  06/22/17   0218  06/21/17   1301   WBC  7.4  6.9  9.3   RBC  2.52*  2.62*  3.50*   HGB  7.2*  7.4*  9.9*   HCT  20.8*  22.2*  29.6*   PLT  83*  69*  95*   GRANS  87*  86*  90*   LYMPH  7*  9*  5*   EOS  0  0  0      Cardiac Enzymes No results found for: CPK, CKMMB, CKMB, RCK3, CKMBT, CKNDX, CKND1, CLAUDIO, TROPT, TROIQ, ANNAMARIA, TROPT, TNIPOC, BNP, BNPP   Coagulation Recent Labs      06/22/17 0218   PTP  17.3*   INR  1.5*   APTT  43.5*       Lipid Panel Lab Results   Component Value Date/Time    Cholesterol, total 188 07/01/2016 11:53 PM    HDL Cholesterol 46 07/01/2016 11:53 PM    LDL, calculated 94.2 07/01/2016 11:53 PM    VLDL, calculated 47.8 07/01/2016 11:53 PM    Triglyceride 239 07/01/2016 11:53 PM    CHOL/HDL Ratio 4.1 07/01/2016 11:53 PM      BNP No results found for: BNP, BNPP, XBNPT   Liver Enzymes Recent Labs      06/22/17   0218   TP  5.7*   ALB  2.6*   AP  77   SGOT  17      Digoxin    Thyroid Studies Lab Results   Component Value Date/Time    TSH 0.56 06/15/2015 04:19 AM          Signed By: Jeet Flores MD     June 24, 2017

## 2017-06-24 NOTE — ROUTINE PROCESS
Bedside and Verbal shift change report given to [address] (oncoming nurse) by Milka Akhtar RN (offgoing nurse). Report included the following information SBAR, Kardex, MAR and Recent Results. SITUATION:    Code Status: Full Code  Reason for Admission: Sepsis ([facility] [address]  PNA (pneumonia)  UTI (urinary tract infection)  PNA (pneumonia)  UTI (urinary tract infection)  [address] day: 2   Problem List:       Hospital Problems  Date Reviewed: 6/21/2017          Codes Class Noted POA    UTI (urinary tract infection) ICD-10-CM: N39.0  ICD-9-CM: 599.0  6/21/2017 Unknown        [facility] ICD-10-CM: A41.9  ICD-9-CM: 038.9, 995.91  6/21/2017 Unknown        * (Principal)PNA (pneumonia) ICD-10-CM: J18.9  ICD-9-CM: 486  6/21/2017 Unknown        Hypoxia ICD-10-CM: R09.02  ICD-9-CM: 799.02  6/21/2017 Unknown        CAD (coronary artery disease) ICD-10-CM: I25.10  ICD-9-CM: 414.00  7/7/2015 Yes        Thrombocytopenia ([facility] ICD-10-CM: D69.6  ICD-9-CM: 287.5  7/7/2015 Yes              BACKGROUND:    Past Medical History:   Past Medical History:   Diagnosis Date    Anemia     CAD (coronary artery disease)     Coronary Stenting needed, hematology workup needed prior to stent placement, treating medically at this time    Cancer [facility])     colon    Cancer ([facility]     breast - left    Cardiac catheterization 08/17/2016    LM patent. mLAD 99.9% (2.25 x 28-mm Xience BETHEL, resid 0%). oD1 60%. oD2 95%. pCX 90% (2.5 x 13-mm Xience BETHEL, resid 0%). OMB patent.  Cardiac echocardiogram 07/01/2016    EF 50%. Basal inferior, basal-mid inferolateral hypk. Gr 1 DDfx. RVSP 50-60 mmHg. Mild LAE.   Mod, eccentric MR.      Diabetes ([facility]     Hypercholesteremia     Hypertension     Thrombocytopenia ([facility]          Patient taking anticoagulants no     ASSESSMENT:    Changes in Assessment Throughout Shift: low grade temp     Patient has Central Line: no Reasons if yes: n/a   Patient has Jean Cath: no Reasons if yes: n/a      Last Vitals:     Vitals:    06/23/17 0820 06/23/17 1230 06/23/17 1651 06/23/17 1837   BP: 124/67 116/66 126/71 154/89   Pulse: 85 85 98 100   Resp: 12 17 18 22   Temp: 97 °F (36.1 °C) 98 °F (36.7 °C) 98.5 °F (36.9 °C)    SpO2: 95% 95% 91% 96%   Weight:       Height:            IV and DRAINS (will only show if present)   Peripheral IV 06/21/17 Right Antecubital-Site Assessment: Clean, dry, & intact     WOUND (if present)   Wound Type:  none   Dressing present Dressing Present : No   Wound Concerns/Notes:  none     PAIN    Pain Assessment    Pain Intensity 1: 0 (06/23/17 0800)    Pain Location 1: Leg    Pain Intervention(s) 1: Ice    Patient Stated Pain Goal: 0  o Interventions for Pain:  None given  o Intervention effective: no c/o pain  o Time of last intervention: n/a   o Reassessment Completed: yes      Last 3 Weights:  Last 3 Recorded Weights in this Encounter    06/21/17 1240   Weight: 61.2 kg (135 lb)     Weight change:      INTAKE/OUPUT    Current Shift:      Last three shifts: 06/22 0701 - 06/23 1900  In: 840 [P.O.:840]  Out: 850 [Urine:850]     LAB RESULTS     Recent Labs      06/22/17   0218  06/21/17   1301   WBC  6.9  9.3   HGB  7.4*  9.9*   HCT  22.2*  29.6*   PLT  69*  95*        Recent Labs      06/23/17   0950  06/22/17   0218  06/21/17   1301   NA  138  139  141   K  4.1  4.3  4.7   GLU  245*  190*  238*   BUN  34*  32*  29*   CREA  1.26  1.19  1.07   CA  8.5  8.1*  9.4   INR   --   1.5*   --        RECOMMENDATIONS AND DISCHARGE PLANNING     1. Pending tests/procedures/ Plan of Care or Other Needs: PT/OT    2. Discharge plan for patient and Needs/Barriers: home    3. Estimated Discharge Date: tbd Posted on Whiteboard in [facility]: yes      4. The patient's care plan was reviewed with the oncoming nurse.        \"HEALS\" SAFETY CHECK      Fall Risk    Total Score: 2    Safety Measures: Safety Measures: Bed/Chair-Wheels locked, Bed in low position, Call light within reach    A safety check occurred in the patient's room between off going nurse and oncoming nurse listed above. The safety check included the below items  Area Items   H  High Alert Medications - Verify all high alert medication drips (heparin, PCA, etc.)   E  Equipment - Suction is set up for ALL patients (with eden)  - Red plugs utilized for all equipment (IV pumps, etc.)  - WOWs wiped down at end of shift.  - Room stocked with oxygen, suction, and other unit-specific supplies   A  Alarms - Bed alarm is set for fall risk patients  - Ensure chair alarm is in place and activated if patient is up in a chair   L  Lines - Check IV for any infiltration  - Jean bag is empty if patient has a Jean   - Tubing and IV bags are labeled   S  Safety   - Room is clean, patient is clean, and equipment is clean. - Hallways are clear from equipment besides carts. - Fall bracelet on for fall risk patients  - Ensure room is clear and free of clutter  - Suction is set up for ALL patients (with eden)  - Hallways are clear from equipment besides carts.    - Isolation precautions followed, supplies available outside room, sign posted     Davidson Bacon RN

## 2017-06-25 LAB
ANION GAP BLD CALC-SCNC: 12 MMOL/L (ref 3–18)
BACTERIA SPEC CULT: ABNORMAL
BACTERIA SPEC CULT: ABNORMAL
BASOPHILS # BLD AUTO: 0 K/UL (ref 0–0.1)
BASOPHILS # BLD: 0 % (ref 0–2)
BUN SERPL-MCNC: 49 MG/DL (ref 7–18)
BUN/CREAT SERPL: 37 (ref 12–20)
CALCIUM SERPL-MCNC: 8.6 MG/DL (ref 8.5–10.1)
CHLORIDE SERPL-SCNC: 106 MMOL/L (ref 100–108)
CO2 SERPL-SCNC: 19 MMOL/L (ref 21–32)
CREAT SERPL-MCNC: 1.34 MG/DL (ref 0.6–1.3)
DIFFERENTIAL METHOD BLD: ABNORMAL
EOSINOPHIL # BLD: 0.1 K/UL (ref 0–0.4)
EOSINOPHIL NFR BLD: 1 % (ref 0–5)
ERYTHROCYTE [DISTWIDTH] IN BLOOD BY AUTOMATED COUNT: 15.8 % (ref 11.6–14.5)
GLUCOSE BLD STRIP.AUTO-MCNC: 197 MG/DL (ref 70–110)
GLUCOSE BLD STRIP.AUTO-MCNC: 201 MG/DL (ref 70–110)
GLUCOSE BLD STRIP.AUTO-MCNC: 204 MG/DL (ref 70–110)
GLUCOSE BLD STRIP.AUTO-MCNC: 209 MG/DL (ref 70–110)
GLUCOSE BLD STRIP.AUTO-MCNC: 302 MG/DL (ref 70–110)
GLUCOSE SERPL-MCNC: 120 MG/DL (ref 74–99)
HCT VFR BLD AUTO: 21.9 % (ref 35–45)
HGB BLD-MCNC: 7.7 G/DL (ref 12–16)
LYMPHOCYTES # BLD AUTO: 9 % (ref 21–52)
LYMPHOCYTES # BLD: 0.6 K/UL (ref 0.9–3.6)
MAGNESIUM SERPL-MCNC: 1.7 MG/DL (ref 1.6–2.6)
MCH RBC QN AUTO: 27.6 PG (ref 24–34)
MCHC RBC AUTO-ENTMCNC: 35.2 G/DL (ref 31–37)
MCV RBC AUTO: 78.5 FL (ref 74–97)
MONOCYTES # BLD: 0.6 K/UL (ref 0.05–1.2)
MONOCYTES NFR BLD AUTO: 10 % (ref 3–10)
NEUTS SEG # BLD: 5.3 K/UL (ref 1.8–8)
NEUTS SEG NFR BLD AUTO: 80 % (ref 40–73)
PLATELET # BLD AUTO: 98 K/UL (ref 135–420)
PMV BLD AUTO: 10.4 FL (ref 9.2–11.8)
POTASSIUM SERPL-SCNC: 3.5 MMOL/L (ref 3.5–5.5)
RBC # BLD AUTO: 2.79 M/UL (ref 4.2–5.3)
SERVICE CMNT-IMP: ABNORMAL
SODIUM SERPL-SCNC: 137 MMOL/L (ref 136–145)
WBC # BLD AUTO: 6.7 K/UL (ref 4.6–13.2)

## 2017-06-25 PROCEDURE — 74011250637 HC RX REV CODE- 250/637: Performed by: INTERNAL MEDICINE

## 2017-06-25 PROCEDURE — 85025 COMPLETE CBC W/AUTO DIFF WBC: CPT | Performed by: FAMILY MEDICINE

## 2017-06-25 PROCEDURE — 65270000029 HC RM PRIVATE

## 2017-06-25 PROCEDURE — 74011636637 HC RX REV CODE- 636/637: Performed by: FAMILY MEDICINE

## 2017-06-25 PROCEDURE — 83735 ASSAY OF MAGNESIUM: CPT | Performed by: FAMILY MEDICINE

## 2017-06-25 PROCEDURE — 80048 BASIC METABOLIC PNL TOTAL CA: CPT | Performed by: FAMILY MEDICINE

## 2017-06-25 PROCEDURE — 74011250636 HC RX REV CODE- 250/636: Performed by: INTERNAL MEDICINE

## 2017-06-25 PROCEDURE — 77010033711 HC HIGH FLOW OXYGEN

## 2017-06-25 PROCEDURE — 36415 COLL VENOUS BLD VENIPUNCTURE: CPT | Performed by: FAMILY MEDICINE

## 2017-06-25 PROCEDURE — 82962 GLUCOSE BLOOD TEST: CPT

## 2017-06-25 RX ORDER — FAMOTIDINE 20 MG/1
20 TABLET, FILM COATED ORAL 2 TIMES DAILY
Status: DISCONTINUED | OUTPATIENT
Start: 2017-06-25 | End: 2017-06-25 | Stop reason: DRUGHIGH

## 2017-06-25 RX ORDER — FAMOTIDINE 20 MG/1
20 TABLET, FILM COATED ORAL DAILY
Status: DISCONTINUED | OUTPATIENT
Start: 2017-06-25 | End: 2017-06-29

## 2017-06-25 RX ADMIN — LEVOFLOXACIN 250 MG: 250 TABLET, FILM COATED ORAL at 12:47

## 2017-06-25 RX ADMIN — Medication 250 MG: at 09:39

## 2017-06-25 RX ADMIN — VITAM B12 100 MCG: 100 TAB at 09:39

## 2017-06-25 RX ADMIN — TICAGRELOR 90 MG: 90 TABLET ORAL at 22:54

## 2017-06-25 RX ADMIN — FERROUS SULFATE TAB 325 MG (65 MG ELEMENTAL FE) 325 MG: 325 (65 FE) TAB at 16:32

## 2017-06-25 RX ADMIN — METOPROLOL TARTRATE 50 MG: 50 TABLET ORAL at 09:39

## 2017-06-25 RX ADMIN — INSULIN LISPRO 6 UNITS: 100 INJECTION, SOLUTION INTRAVENOUS; SUBCUTANEOUS at 16:28

## 2017-06-25 RX ADMIN — TICAGRELOR 90 MG: 90 TABLET ORAL at 06:20

## 2017-06-25 RX ADMIN — THERA TABS 1 TABLET: TAB at 09:39

## 2017-06-25 RX ADMIN — AMLODIPINE BESYLATE 5 MG: 5 TABLET ORAL at 09:39

## 2017-06-25 RX ADMIN — FERROUS SULFATE TAB 325 MG (65 MG ELEMENTAL FE) 325 MG: 325 (65 FE) TAB at 09:39

## 2017-06-25 RX ADMIN — FUROSEMIDE 40 MG: 10 INJECTION, SOLUTION INTRAVENOUS at 09:40

## 2017-06-25 RX ADMIN — INSULIN LISPRO 6 UNITS: 100 INJECTION, SOLUTION INTRAVENOUS; SUBCUTANEOUS at 00:15

## 2017-06-25 RX ADMIN — METOPROLOL TARTRATE 50 MG: 50 TABLET ORAL at 17:21

## 2017-06-25 RX ADMIN — INSULIN LISPRO 12 UNITS: 100 INJECTION, SOLUTION INTRAVENOUS; SUBCUTANEOUS at 12:47

## 2017-06-25 RX ADMIN — FAMOTIDINE 20 MG: 20 TABLET ORAL at 16:31

## 2017-06-25 RX ADMIN — SIMVASTATIN 20 MG: 20 TABLET, FILM COATED ORAL at 09:40

## 2017-06-25 RX ADMIN — ASPIRIN 81 MG: 81 TABLET, COATED ORAL at 09:39

## 2017-06-25 RX ADMIN — ACETAMINOPHEN 650 MG: 325 TABLET ORAL at 09:58

## 2017-06-25 RX ADMIN — FUROSEMIDE 40 MG: 10 INJECTION, SOLUTION INTRAVENOUS at 22:54

## 2017-06-25 RX ADMIN — BENAZEPRIL HYDROCHLORIDE 5 MG: 10 TABLET, FILM COATED ORAL at 17:21

## 2017-06-25 RX ADMIN — INSULIN LISPRO 6 UNITS: 100 INJECTION, SOLUTION INTRAVENOUS; SUBCUTANEOUS at 22:57

## 2017-06-25 RX ADMIN — HYDRALAZINE HYDROCHLORIDE 10 MG: 10 TABLET, FILM COATED ORAL at 22:54

## 2017-06-25 RX ADMIN — HYDRALAZINE HYDROCHLORIDE 10 MG: 10 TABLET, FILM COATED ORAL at 09:39

## 2017-06-25 RX ADMIN — CHOLECALCIFEROL (VITAMIN D3) 10 MCG (400 UNIT) TABLET 2000 UNITS: at 09:39

## 2017-06-25 RX ADMIN — INSULIN LISPRO 3 UNITS: 100 INJECTION, SOLUTION INTRAVENOUS; SUBCUTANEOUS at 08:54

## 2017-06-25 RX ADMIN — CALCIUM POLYCARBOPHIL 625 MG TABLET 625 MG: at 09:39

## 2017-06-25 NOTE — ROUTINE PROCESS
Bedside and Verbal shift change report given to [address] [place] [address] (oncoming nurse) by Radha Ellsworth RN (offgoing nurse). Report included the following information SBAR, Kardex, MAR and Recent Results. SITUATION:    Code Status: Full Code  Reason for Admission: Sepsis ([facility] [address]  PNA (pneumonia)  UTI (urinary tract infection)  PNA (pneumonia)  UTI (urinary tract infection)  [address] day: 3   Problem List:       Hospital Problems  Date Reviewed: 6/21/2017          Codes Class Noted POA    UTI (urinary tract infection) ICD-10-CM: N39.0  ICD-9-CM: 599.0  6/21/2017 Unknown        [facility] ICD-10-CM: A41.9  ICD-9-CM: 038.9, 995.91  6/21/2017 Unknown        * (Principal)PNA (pneumonia) ICD-10-CM: J18.9  ICD-9-CM: 486  6/21/2017 Unknown        Hypoxia ICD-10-CM: R09.02  ICD-9-CM: 799.02  6/21/2017 Unknown        CAD (coronary artery disease) ICD-10-CM: I25.10  ICD-9-CM: 414.00  7/7/2015 Yes        Thrombocytopenia ([facility] ICD-10-CM: D69.6  ICD-9-CM: 287.5  7/7/2015 Yes              BACKGROUND:    Past Medical History:   Past Medical History:   Diagnosis Date    Anemia     CAD (coronary artery disease)     Coronary Stenting needed, hematology workup needed prior to stent placement, treating medically at this time    Cancer [facility])     colon    Cancer ([facility] [address]     breast - left    Cardiac catheterization 08/17/2016    LM patent. mLAD 99.9% (2.25 x 28-mm Xience BETHEL, resid 0%). oD1 60%. oD2 95%. pCX 90% (2.5 x 13-mm Xience BETHEL, resid 0%). OMB patent.  Cardiac echocardiogram 07/01/2016    EF 50%. Basal inferior, basal-mid inferolateral hypk. Gr 1 DDfx. RVSP 50-60 mmHg. Mild LAE.   Mod, eccentric MR.      Diabetes ([facility] [address]     Hypercholesteremia     Hypertension     Thrombocytopenia ([facility] [address]          Patient taking anticoagulants no     ASSESSMENT:    Changes in Assessment Throughout Shift: low grade temp     Patient has Central Line: no Reasons if yes: n/a   Patient has Jean Cath: no Reasons if yes: n/a      Last Vitals:     Vitals:    06/24/17 1645 06/24/17 1715 06/24/17 1746 06/24/17 1845   BP: 127/61 128/67 139/71 130/69   Pulse: 88 89 78 82   Resp: 24 24 26 25   Temp: 98.5 °F (36.9 °C) 98.1 °F (36.7 °C) 98.3 °F (36.8 °C) 98.3 °F (36.8 °C)   SpO2: 97% 91% 92% 91%   Weight:       Height:            IV and DRAINS (will only show if present)   [REMOVED] Peripheral IV 06/21/17 Right Antecubital-Site Assessment: Clean, dry, & intact  Peripheral IV 06/24/17 Left Forearm-Site Assessment: Clean, dry, & intact     WOUND (if present)   Wound Type:  none   Dressing present Dressing Present : No   Wound Concerns/Notes:  none     PAIN    Pain Assessment    Pain Intensity 1: 0 (06/24/17 1503)    Pain Location 1: Leg    Pain Intervention(s) 1: Ice    Patient Stated Pain Goal: 0  o Interventions for Pain:  None given  o Intervention effective: no c/o pain  o Time of last intervention: n/a   o Reassessment Completed: yes      Last 3 Weights:  Last 3 Recorded Weights in this Encounter    06/21/17 1240   Weight: 61.2 kg (135 lb)     Weight change:      INTAKE/OUPUT    Current Shift: 06/24 1901 - 06/25 0700  In: 520 [P.O.:520]  Out: 450 [Urine:450]    Last three shifts: 06/23 0701 - 06/24 1900  In: 1506 [P.O.:1506]  Out: 1100 [Urine:1100]     LAB RESULTS     Recent Labs      06/24/17   0208  06/22/17   0218   WBC  7.4  6.9   HGB  7.2*  7.4*   HCT  20.8*  22.2*   PLT  83*  69*        Recent Labs      06/24/17   0208  06/23/17   0950  06/22/17   0218   NA  136  138  139   K  3.7  4.1  4.3   GLU  139*  245*  190*   BUN  41*  34*  32*   CREA  1.22  1.26  1.19   CA  8.5  8.5  8.1*   INR   --    --   1.5*       RECOMMENDATIONS AND DISCHARGE PLANNING     1. Pending tests/procedures/ Plan of Care or Other Needs: PT/OT    2. Discharge plan for patient and Needs/Barriers: home    3. Estimated Discharge Date: tbd Posted on Whiteboard in [facility]: yes      4. The patient's care plan was reviewed with the oncoming nurse. \"HEALS\" SAFETY CHECK      Fall Risk    Total Score: 2    Safety Measures: Safety Measures: Bed/Chair-Wheels locked, Bed in low position, Call light within reach    A safety check occurred in the patient's room between off going nurse and oncoming nurse listed above. The safety check included the below items  Area Items   H  High Alert Medications - Verify all high alert medication drips (heparin, PCA, etc.)   E  Equipment - Suction is set up for ALL patients (with eden)  - Red plugs utilized for all equipment (IV pumps, etc.)  - WOWs wiped down at end of shift.  - Room stocked with oxygen, suction, and other unit-specific supplies   A  Alarms - Bed alarm is set for fall risk patients  - Ensure chair alarm is in place and activated if patient is up in a chair   L  Lines - Check IV for any infiltration  - Jean bag is empty if patient has a Jean   - Tubing and IV bags are labeled   S  Safety   - Room is clean, patient is clean, and equipment is clean. - Hallways are clear from equipment besides carts. - Fall bracelet on for fall risk patients  - Ensure room is clear and free of clutter  - Suction is set up for ALL patients (with eden)  - Hallways are clear from equipment besides carts.    - Isolation precautions followed, supplies available outside room, sign posted     Keyonna Phillips RN

## 2017-06-25 NOTE — ROUTINE PROCESS
Bedside and Verbal shift change report given to LATRICIA Miranda (oncoming nurse) by Darin Deleon RN (offgoing nurse). Report included the following information SBAR, Kardex, MAR and Recent Results. SITUATION:  Code Status: Full Code  Reason for Admission: Sepsis ([facility]  PNA (pneumonia)  UTI (urinary tract infection)  PNA (pneumonia)  UTI (urinary tract infection)  Hypoxia  Hospital day: 4  Problem List:       Hospital Problems  Date Reviewed: 6/21/2017          Codes Class Noted POA    UTI (urinary tract infection) ICD-10-CM: N39.0  ICD-9-CM: 599.0  6/21/2017 Unknown        Sepsis ([facility] ICD-10-CM: A41.9  ICD-9-CM: 038.9, 995.91  6/21/2017 Unknown        * (Principal)PNA (pneumonia) ICD-10-CM: J18.9  ICD-9-CM: 486  6/21/2017 Unknown        Hypoxia ICD-10-CM: R09.02  ICD-9-CM: 799.02  6/21/2017 Unknown        CAD (coronary artery disease) ICD-10-CM: I25.10  ICD-9-CM: 414.00  7/7/2015 Yes        Thrombocytopenia ([facility] ICD-10-CM: D69.6  ICD-9-CM: 287.5  7/7/2015 Yes              BACKGROUND:   Past Medical History:   Past Medical History:   Diagnosis Date    Anemia     CAD (coronary artery disease)     Coronary Stenting needed, hematology workup needed prior to stent placement, treating medically at this time    Cancer [facility])     colon    Cancer ([facility]     breast - left    Cardiac catheterization 08/17/2016    LM patent. mLAD 99.9% (2.25 x 28-mm Xience BETHEL, resid 0%). oD1 60%. oD2 95%. pCX 90% (2.5 x 13-mm Xience BETHEL, resid 0%). OMB patent.  Cardiac echocardiogram 07/01/2016    EF 50%. Basal inferior, basal-mid inferolateral hypk. Gr 1 DDfx. RVSP 50-60 mmHg. Mild LAE.   Mod, eccentric MR.      Diabetes ([facility]     Hypercholesteremia     Hypertension     Thrombocytopenia ([facility]       Patient taking anticoagulants yes    Patient has a defibrillator: no    History of shots YES for example, flu, pneumonia, tetanus   Isolation History NO for example, MRSA, CDiff    ASSESSMENT:  Changes in Assessment Throughout Shift: None  Significant Changes in 24 hours (for example, RR/code, fall)  Patient has Central Line: no Reasons if yes: N/A  Patient has Jean Cath: no Reasons if yes: N/A   Mobility Issues  PT  IV Patency  OR Checklist  Pending Tests    Last Vitals:  Vitals w/ MEWS Score (last day)     Date/Time MEWS Score Pulse Resp Temp BP Level of Consciousness SpO2    06/25/17 0808 1 98 18 99 °F (37.2 °C) 149/67 Alert 94 %    06/25/17 0507 1 83 20 98.1 °F (36.7 °C) 120/65 Alert 92 %    06/25/17 0020 2 85 22 97.7 °F (36.5 °C) 124/63 Alert 94 %    06/24/17 2058 4 85 24 (!)  101.1 °F (38.4 °C) 131/61 Alert 95 %    06/24/17 1845 2 82 25 98.3 °F (36.8 °C) 130/69 Alert 91 %    06/24/17 1746 2 78 26 98.3 °F (36.8 °C) 139/71 Alert 92 %    06/24/17 1715 2 89 24 98.1 °F (36.7 °C) 128/67 Alert 91 %    06/24/17 1645 2 88 24 98.5 °F (36.9 °C) 127/61 Alert 97 %    06/24/17 1628 2 86 24 99.2 °F (37.3 °C) 139/74 Alert 98 %    06/24/17 1503 3 85 30 98.8 °F (37.1 °C) 121/52 Alert 99 %    06/24/17 1136 3 82 (!)  36 98.1 °F (36.7 °C) 117/63 Alert 91 %    06/24/17 0826 2 93 24 100 °F (37.8 °C) 130/67 Alert 98 %    06/24/17 0444 1 92 18 98.1 °F (36.7 °C) 139/71 Alert 92 %    06/24/17 0120 -- -- -- 99.1 °F (37.3 °C) -- -- --    06/24/17 0053 1 87 18 100.2 °F (37.9 °C) 122/63 Alert 92 %            PAIN    Pain Assessment    Pain Intensity 1: 0 (06/24/17 1503)    Pain Location 1: Leg    Pain Intervention(s) 1: Ice    Patient Stated Pain Goal: 0  Intervention effective: N/A  Time of last intervention: N/A Reassessment Completed: N/A  Other actions taken for pain: N/A    Last 3 Weights:  Last 3 Recorded Weights in this Encounter    06/21/17 1240   Weight: 61.2 kg (135 lb)   Weight change:     INTAKE/OUPUT    Current Shift: 06/25 0701 - 06/25 1900  In: 120 [P.O.:120]  Out: 500 [Urine:500]    Last three shifts: 06/23 1901 - 06/25 0700  In: 2026 [P.O.:2026]  Out: 1550 [NRCTS:3906]    RECOMMENDATIONS AND DISCHARGE PLANNING  Patient needs and requests: TBD    Pending tests/procedures: TBD     Discharge plan for patient: SNF    Discharge planning Needs or Barriers: TBD    Estimated Discharge Date: TBD Posted on Whiteboard in Patients Room: no       \"HEALS\" SAFETY CHECK  A safety check occurred in the patient's room between off going nurse and oncoming nurse listed above. The safety check included the below items:    H  High Alert Medications Verify all high alert medication drips (heparin, PCA, etc.)  E  Equipment Suction is set up for ALL patients (with eden)  Red plugs utilized for all equipment (IV pumps, etc.)  WOWs wiped down at end of shift. Room stocked with oxygen, suction, and other unit-specific supplies  A  Alarms Bed alarm is set for fall risk patients  Ensure chair alarm is in place and activated if patient is up in a chair  L  Lines Check IV for any infiltration  Jean bag is empty if patient has a Jean   Tubing and IV bags are labeled  S  Safety  Room is clean, patient is clean, and equipment is clean. Hallways are clear from equipment besides carts. Fall bracelet on for fall risk patients  Ensure room is clear and free of clutter  Suction is set up for ALL patients (with eden)  Hallways are clear from equipment besides carts.    Isolation precautions followed, supplies available outside room, sign posted    Luis Alfredo Patel RN

## 2017-06-25 NOTE — PROGRESS NOTES
[facility] - Progress Note  Admit Date: 6/21/2017    Assessment:     -CHF with diastolic dysfunction with some volume overload 2/2 IVFs. -Mild aortic stenosis by echo this admission, valve mean gradient 16mmHg with aortic valve area of 1.32cm2. -CAD hx, s/p PCI to Cx and mLAD with BETHEL in Aug 2016, on DAPT with Brilinta and ASA. -Chronic Brilinta  -ECHO 7/2016 with normal EF and G1DD, repeat echo 6/22/17 with normal EF and G2DD, mild aortic stenosis, wall motion abnormalities noted. -UTI on antx, Klebsiella bacteremia  -HTN  -HLD  -DM type II       Primary cardiologist Dr. Neo Campos    Plan:     Continue lasix IV today, likely switch to oral tomorrow. Ongoing treatment for fevers/infection. Subjective:     Fever last night, breathing improving.     Objective:      Patient Vitals for the past 8 hrs:   Temp Pulse Resp BP SpO2   06/25/17 1118 98.1 °F (36.7 °C) 70 18 107/64 95 %   06/25/17 0808 99 °F (37.2 °C) 98 18 149/67 94 %   06/25/17 0507 98.1 °F (36.7 °C) 83 20 120/65 92 %         Patient Vitals for the past 96 hrs:   Weight   06/21/17 1240 61.2 kg (135 lb)                    Intake/Output Summary (Last 24 hours) at 06/25/17 1213  Last data filed at 06/25/17 1011   Gross per 24 hour   Intake             1540 ml   Output             1250 ml   Net              290 ml       Physical Exam:  General:  alert, cooperative, no distress, appears stated age  Neck:  nontender  Lungs:  clear to auscultation bilaterally  Heart:  regular rate and rhythm, S1, S2 normal, no murmur, click, rub or gallop  Abdomen:  abdomen is soft without significant tenderness, masses, organomegaly or guarding  Extremities:  extremities normal, atraumatic, no cyanosis or edema    Data Review:     Labs: Results:       Chemistry Recent Labs      06/25/17   0322  06/24/17   0208  06/23/17   0950   GLU  120*  139*  245*   NA  137  136  138   K  3.5  3.7  4.1   CL  106  106  107   CO2  19*  19*  20*   BUN  49*  41*  34*   CREA  1.34* 1. 22  1.26   CA  8.6  8.5  8.5   MG  1.7   --    --    AGAP  12  11  11   BUCR  37*  34*  27*      CBC w/Diff Recent Labs      06/25/17   0322  06/24/17   0208   WBC  6.7  7.4   RBC  2.79*  2.52*   HGB  7.7*  7.2*   HCT  21.9*  20.8*   PLT  98*  83*   GRANS  80*  87*   LYMPH  9*  7*   EOS  1  0      Cardiac Enzymes No results found for: CPK, CKMMB, CKMB, RCK3, CKMBT, CKNDX, CKND1, CLAUDIO, TROPT, TROIQ, ANNAMARIA, TROPT, TNIPOC, BNP, BNPP   Coagulation No results for input(s): PTP, INR, APTT in the last 72 hours. No lab exists for component: INREXT    Lipid Panel Lab Results   Component Value Date/Time    Cholesterol, total 188 07/01/2016 11:53 PM    HDL Cholesterol 46 07/01/2016 11:53 PM    LDL, calculated 94.2 07/01/2016 11:53 PM    VLDL, calculated 47.8 07/01/2016 11:53 PM    Triglyceride 239 07/01/2016 11:53 PM    CHOL/HDL Ratio 4.1 07/01/2016 11:53 PM      BNP No results found for: BNP, BNPP, XBNPT   Liver Enzymes No results for input(s): TP, ALB, TBIL, AP, SGOT, GPT in the last 72 hours.     No lab exists for component: DBIL   Digoxin    Thyroid Studies Lab Results   Component Value Date/Time    TSH 0.56 06/15/2015 04:19 AM          Signed By: Patricio Naqvi MD     June 25, 2017

## 2017-06-25 NOTE — PROGRESS NOTES
[facility] Hospitalist Group  Progress Note    Patient: Alpa Bhatti Age: 67 y.o. : 1940 MR#: [id number] SSN: [id number]  Date/Time: 2017 8:51 AM    Subjective/24-hour events:   No CP   No NVD  No Cough   No Respiratory distress     Assessment:   Acute on chronic diastolic CHF - Change to IV Lasix - Clinical improvement   Aortic stenosis - Per cardiology   CAD - No CP   HTN - Continue current treatments   PNA - CAP - Suspect - but rpt chest xray more like CHF   Anemia - will transfuse one unit of PRBC - CAD + SOB - try to keep H/H above > 8 , H/H just below * , continue to monitor   Stool for occult blood ordered   Mild Thrombocytopenia - monitor PLT count , now some improvement ,   UTI - POA - Culture growing - KLEBSIELLA PNEUMONIAE - on Levaquin     Case discussed with:  [x]Patient  []Family  [x]Nursing  []Case Management  DVT Prophylaxis:  []Lovenox  []Hep SQ  [x]SCDs  []Coumadin   []On Brilinta    Objective:   VS:   Visit Vitals    /64 (BP 1 Location: Right arm, BP Patient Position: At rest)    Pulse 70    Temp 98.1 °F (36.7 °C)    Resp 18    Ht 5' (1.524 m)    Wt 61.2 kg (135 lb)    SpO2 95%    BMI 26.37 kg/m2      Tmax/24hrs: Temp (24hrs), Av.6 °F (37 °C), Min:97.7 °F (36.5 °C), Max:101.1 °F (38.4 °C)      Intake/Output Summary (Last 24 hours) at 17 1505  Last data filed at 17 1435   Gross per 24 hour   Intake             1200 ml   Output             1700 ml   Net             -500 ml       General:  In NAD. Nontoxic-appearing. Cardiovascular:  RRR. Pulmonary:  No wheezes, Mild SOB   GI:  Abdomen soft, NTTP. Extremities:  Warm, no ischemia. Additional:  Awake and alert. Motor grossly nonfocal.    Labs/Ancillary:  2D echo:  SUMMARY:  Left ventricle: Systolic function was normal by visual assessment. Ejection fraction was estimated to be 55 %.  There was possible mild  hypokinesis of the mid inferoseptal, basal inferior, mid inferolateral,  and apical lateral wall(s). Wall thickness was mildly increased. Features  were consistent with a pseudonormal left ventricular filling pattern, with  concomitant abnormal relaxation and increased filling pressure (grade 2  diastolic dysfunction). Right ventricle: Systolic pressure was mildly increased. Estimated peak  pressure was 43 mmHg. Aortic valve: The valve was probably trileaflet. Leaflets exhibited mildly  increased thickness, calcification, reduced mobility, and sclerosis. There  was mild stenosis. Valve peak gradient was 35 mmHg. Valve mean gradient  was 16 mmHg. Estimated aortic valve area was 1.32 cm-sq. COMPARISONS:  Comparison was made with the previous study of 01-Jul-2016. Aortic  stenosis has worsened from mean gradient of 8 mmHg to 16 mmHg. Pulmonary  artery pressure has decreased.         Recent Results (from the past 24 hour(s))   GLUCOSE, POC    Collection Time: 06/24/17  4:42 PM   Result Value Ref Range    Glucose (POC) 192 (H) 70 - 110 mg/dL   GLUCOSE, POC    Collection Time: 06/25/17 12:14 AM   Result Value Ref Range    Glucose (POC) 204 (H) 70 - 236 mg/dL   METABOLIC PANEL, BASIC    Collection Time: 06/25/17  3:22 AM   Result Value Ref Range    Sodium 137 136 - 145 mmol/L    Potassium 3.5 3.5 - 5.5 mmol/L    Chloride 106 100 - 108 mmol/L    CO2 19 (L) 21 - 32 mmol/L    Anion gap 12 3.0 - 18 mmol/L    Glucose 120 (H) 74 - 99 mg/dL    BUN 49 (H) 7.0 - 18 MG/DL    Creatinine 1.34 (H) 0.6 - 1.3 MG/DL    BUN/Creatinine ratio 37 (H) 12 - 20      GFR est AA 46 (L) >60 ml/min/1.73m2    GFR est non-AA 38 (L) >60 ml/min/1.73m2    Calcium 8.6 8.5 - 10.1 MG/DL   MAGNESIUM    Collection Time: 06/25/17  3:22 AM   Result Value Ref Range    Magnesium 1.7 1.6 - 2.6 mg/dL   CBC WITH AUTOMATED DIFF    Collection Time: 06/25/17  3:22 AM   Result Value Ref Range    WBC 6.7 4.6 - 13.2 K/uL    RBC 2.79 (L) 4.20 - 5.30 M/uL    HGB 7.7 (L) 12.0 - 16.0 g/dL    HCT 21.9 (L) 35.0 - 45.0 % MCV 78.5 74.0 - 97.0 FL    MCH 27.6 24.0 - 34.0 PG    MCHC 35.2 31.0 - 37.0 g/dL    RDW 15.8 (H) 11.6 - 14.5 %    PLATELET 98 (L) 325 - 420 K/uL    MPV 10.4 9.2 - 11.8 FL    NEUTROPHILS 80 (H) 40 - 73 %    LYMPHOCYTES 9 (L) 21 - 52 %    MONOCYTES 10 3 - 10 %    EOSINOPHILS 1 0 - 5 %    BASOPHILS 0 0 - 2 %    ABS. NEUTROPHILS 5.3 1.8 - 8.0 K/UL    ABS. LYMPHOCYTES 0.6 (L) 0.9 - 3.6 K/UL    ABS. MONOCYTES 0.6 0.05 - 1.2 K/UL    ABS. EOSINOPHILS 0.1 0.0 - 0.4 K/UL    ABS.  BASOPHILS 0.0 0.0 - 0.1 K/UL    DF AUTOMATED     GLUCOSE, POC    Collection Time: 06/25/17  8:13 AM   Result Value Ref Range    Glucose (POC) 197 (H) 70 - 110 mg/dL   GLUCOSE, POC    Collection Time: 06/25/17 11:48 AM   Result Value Ref Range    Glucose (POC) 302 (H) 70 - 110 mg/dL       Signed By: Taina Barnett MD     June 25, 2017 8:51 AM

## 2017-06-25 NOTE — ROUTINE PROCESS
Bedside and Verbal shift change report given to [address] [place] [address] (oncoming nurse) by Daniel Khalil RN (offgoing nurse). Report included the following information SBAR, Kardex, MAR and Recent Results. SITUATION:    Code Status: Full Code  Reason for Admission: Sepsis ([facility]  PNA (pneumonia)  UTI (urinary tract infection)  PNA (pneumonia)  UTI (urinary tract infection)  [address] day: 4   Problem List:       Hospital Problems  Date Reviewed: 6/21/2017          Codes Class Noted POA    UTI (urinary tract infection) ICD-10-CM: N39.0  ICD-9-CM: 599.0  6/21/2017 Unknown        Sepsis ([facility] ICD-10-CM: A41.9  ICD-9-CM: 038.9, 995.91  6/21/2017 Unknown        * (Principal)PNA (pneumonia) ICD-10-CM: J18.9  ICD-9-CM: 486  6/21/2017 Unknown        Hypoxia ICD-10-CM: R09.02  ICD-9-CM: 799.02  6/21/2017 Unknown        CAD (coronary artery disease) ICD-10-CM: I25.10  ICD-9-CM: 414.00  7/7/2015 Yes        Thrombocytopenia ([facility] ICD-10-CM: D69.6  ICD-9-CM: 287.5  7/7/2015 Yes              BACKGROUND:    Past Medical History:   Past Medical History:   Diagnosis Date    Anemia     CAD (coronary artery disease)     Coronary Stenting needed, hematology workup needed prior to stent placement, treating medically at this time    Cancer [facility])     colon    Cancer ([facility]     breast - left    Cardiac catheterization 08/17/2016    LM patent. mLAD 99.9% (2.25 x 28-mm Xience BETHEL, resid 0%). oD1 60%. oD2 95%. pCX 90% (2.5 x 13-mm Xience BETHEL, resid 0%). OMB patent.  Cardiac echocardiogram 07/01/2016    EF 50%. Basal inferior, basal-mid inferolateral hypk. Gr 1 DDfx. RVSP 50-60 mmHg. Mild LAE.   Mod, eccentric MR.      Diabetes ([facility]     Hypercholesteremia     Hypertension     Thrombocytopenia ([facility]          Patient taking anticoagulants no     ASSESSMENT:    Changes in Assessment Throughout Shift: low grade temp     Patient has Central Line: no Reasons if yes: n/a   Patient has Jean Cath: no Reasons if yes: n/a      Last Vitals:     Vitals:    06/25/17 0507 06/25/17 0808 06/25/17 1118 06/25/17 1521   BP: 120/65 149/67 107/64 108/64   Pulse: 83 98 70 75   Resp: 20 18 18 20   Temp: 98.1 °F (36.7 °C) 99 °F (37.2 °C) 98.1 °F (36.7 °C) 97.8 °F (36.6 °C)   SpO2: 92% 94% 95% 100%   Weight:       Height:            IV and DRAINS (will only show if present)   [REMOVED] Peripheral IV 06/21/17 Right Antecubital-Site Assessment: Clean, dry, & intact  Peripheral IV 06/24/17 Left Forearm-Site Assessment: Clean, dry, & intact     WOUND (if present)   Wound Type:  none   Dressing present Dressing Present : No   Wound Concerns/Notes:  none     PAIN    Pain Assessment    Pain Intensity 1: 0 (06/25/17 0800)    Pain Location 1: Leg    Pain Intervention(s) 1: Ice    Patient Stated Pain Goal: 0  o Interventions for Pain:  None given  o Intervention effective: no c/o pain  o Time of last intervention: n/a   o Reassessment Completed: yes      Last 3 Weights:  Last 3 Recorded Weights in this Encounter    06/21/17 1240   Weight: 61.2 kg (135 lb)     Weight change:      INTAKE/OUPUT    Current Shift:      Last three shifts: 06/24 0701 - 06/25 1900  In: 6895 [P.O.:2706]  Out: 2350 [Urine:2350]     LAB RESULTS     Recent Labs      06/25/17   0322  06/24/17   0208   WBC  6.7  7.4   HGB  7.7*  7.2*   HCT  21.9*  20.8*   PLT  98*  83*        Recent Labs      06/25/17   0322  06/24/17   0208  06/23/17   0950   NA  137  136  138   K  3.5  3.7  4.1   GLU  120*  139*  245*   BUN  49*  41*  34*   CREA  1.34*  1.22  1.26   CA  8.6  8.5  8.5   MG  1.7   --    --        RECOMMENDATIONS AND DISCHARGE PLANNING     1. Pending tests/procedures/ Plan of Care or Other Needs: PT/OT    2. Discharge plan for patient and Needs/Barriers: home    3. Estimated Discharge Date: tbd Posted on Whiteboard in [facility]: yes      4. The patient's care plan was reviewed with the oncoming nurse.        \"HEALS\" SAFETY CHECK      Fall Risk    Total Score: 2    Safety Measures: Safety Measures: Bed/Chair-Wheels locked, Bed in low position, Call light within reach    A safety check occurred in the patient's room between off going nurse and oncoming nurse listed above. The safety check included the below items  Area Items   H  High Alert Medications - Verify all high alert medication drips (heparin, PCA, etc.)   E  Equipment - Suction is set up for ALL patients (with eden)  - Red plugs utilized for all equipment (IV pumps, etc.)  - WOWs wiped down at end of shift.  - Room stocked with oxygen, suction, and other unit-specific supplies   A  Alarms - Bed alarm is set for fall risk patients  - Ensure chair alarm is in place and activated if patient is up in a chair   L  Lines - Check IV for any infiltration  - Jean bag is empty if patient has a Jean   - Tubing and IV bags are labeled   S  Safety   - Room is clean, patient is clean, and equipment is clean. - Hallways are clear from equipment besides carts. - Fall bracelet on for fall risk patients  - Ensure room is clear and free of clutter  - Suction is set up for ALL patients (with eden)  - Hallways are clear from equipment besides carts.    - Isolation precautions followed, supplies available outside room, sign posted     Servando Vasquez RN

## 2017-06-26 LAB
ANION GAP BLD CALC-SCNC: 10 MMOL/L (ref 3–18)
BASOPHILS # BLD AUTO: 0 K/UL (ref 0–0.06)
BASOPHILS # BLD: 0 % (ref 0–2)
BUN SERPL-MCNC: 54 MG/DL (ref 7–18)
BUN/CREAT SERPL: 39 (ref 12–20)
CALCIUM SERPL-MCNC: 8.6 MG/DL (ref 8.5–10.1)
CHLORIDE SERPL-SCNC: 105 MMOL/L (ref 100–108)
CO2 SERPL-SCNC: 20 MMOL/L (ref 21–32)
CREAT SERPL-MCNC: 1.37 MG/DL (ref 0.6–1.3)
DIFFERENTIAL METHOD BLD: ABNORMAL
EOSINOPHIL # BLD: 0.1 K/UL (ref 0–0.4)
EOSINOPHIL NFR BLD: 2 % (ref 0–5)
ERYTHROCYTE [DISTWIDTH] IN BLOOD BY AUTOMATED COUNT: 15.6 % (ref 11.6–14.5)
GLUCOSE BLD STRIP.AUTO-MCNC: 114 MG/DL (ref 70–110)
GLUCOSE BLD STRIP.AUTO-MCNC: 183 MG/DL (ref 70–110)
GLUCOSE BLD STRIP.AUTO-MCNC: 196 MG/DL (ref 70–110)
GLUCOSE BLD STRIP.AUTO-MCNC: 353 MG/DL (ref 70–110)
GLUCOSE SERPL-MCNC: 136 MG/DL (ref 74–99)
HCT VFR BLD AUTO: 21.7 % (ref 35–45)
HEMOCCULT STL QL: POSITIVE
HGB BLD-MCNC: 7.7 G/DL (ref 12–16)
LYMPHOCYTES # BLD AUTO: 8 % (ref 21–52)
LYMPHOCYTES # BLD: 0.5 K/UL (ref 0.9–3.6)
MCH RBC QN AUTO: 27.8 PG (ref 24–34)
MCHC RBC AUTO-ENTMCNC: 35.5 G/DL (ref 31–37)
MCV RBC AUTO: 78.3 FL (ref 74–97)
MONOCYTES # BLD: 0.7 K/UL (ref 0.05–1.2)
MONOCYTES NFR BLD AUTO: 10 % (ref 3–10)
NEUTS SEG # BLD: 5.4 K/UL (ref 1.8–8)
NEUTS SEG NFR BLD AUTO: 80 % (ref 40–73)
PLATELET # BLD AUTO: 95 K/UL (ref 135–420)
PMV BLD AUTO: 10.1 FL (ref 9.2–11.8)
POTASSIUM SERPL-SCNC: 3.6 MMOL/L (ref 3.5–5.5)
RBC # BLD AUTO: 2.77 M/UL (ref 4.2–5.3)
SODIUM SERPL-SCNC: 135 MMOL/L (ref 136–145)
WBC # BLD AUTO: 6.8 K/UL (ref 4.6–13.2)

## 2017-06-26 PROCEDURE — 74011636637 HC RX REV CODE- 636/637: Performed by: FAMILY MEDICINE

## 2017-06-26 PROCEDURE — 65270000029 HC RM PRIVATE

## 2017-06-26 PROCEDURE — 74011250637 HC RX REV CODE- 250/637: Performed by: INTERNAL MEDICINE

## 2017-06-26 PROCEDURE — 36415 COLL VENOUS BLD VENIPUNCTURE: CPT | Performed by: INTERNAL MEDICINE

## 2017-06-26 PROCEDURE — 74011250636 HC RX REV CODE- 250/636: Performed by: INTERNAL MEDICINE

## 2017-06-26 PROCEDURE — 80048 BASIC METABOLIC PNL TOTAL CA: CPT | Performed by: INTERNAL MEDICINE

## 2017-06-26 PROCEDURE — 82272 OCCULT BLD FECES 1-3 TESTS: CPT | Performed by: INTERNAL MEDICINE

## 2017-06-26 PROCEDURE — 82962 GLUCOSE BLOOD TEST: CPT

## 2017-06-26 PROCEDURE — 85025 COMPLETE CBC W/AUTO DIFF WBC: CPT | Performed by: INTERNAL MEDICINE

## 2017-06-26 RX ORDER — INSULIN GLARGINE 100 [IU]/ML
10 INJECTION, SOLUTION SUBCUTANEOUS DAILY
Status: DISCONTINUED | OUTPATIENT
Start: 2017-06-26 | End: 2017-06-27

## 2017-06-26 RX ADMIN — INSULIN GLARGINE 10 UNITS: 100 INJECTION, SOLUTION SUBCUTANEOUS at 13:24

## 2017-06-26 RX ADMIN — FERROUS SULFATE TAB 325 MG (65 MG ELEMENTAL FE) 325 MG: 325 (65 FE) TAB at 17:21

## 2017-06-26 RX ADMIN — FUROSEMIDE 40 MG: 10 INJECTION, SOLUTION INTRAVENOUS at 09:34

## 2017-06-26 RX ADMIN — ASPIRIN 81 MG: 81 TABLET, COATED ORAL at 09:32

## 2017-06-26 RX ADMIN — VITAM B12 100 MCG: 100 TAB at 09:33

## 2017-06-26 RX ADMIN — HYDRALAZINE HYDROCHLORIDE 10 MG: 10 TABLET, FILM COATED ORAL at 09:32

## 2017-06-26 RX ADMIN — INSULIN LISPRO 3 UNITS: 100 INJECTION, SOLUTION INTRAVENOUS; SUBCUTANEOUS at 09:33

## 2017-06-26 RX ADMIN — THERA TABS 1 TABLET: TAB at 09:53

## 2017-06-26 RX ADMIN — FAMOTIDINE 20 MG: 20 TABLET ORAL at 09:32

## 2017-06-26 RX ADMIN — INSULIN LISPRO 15 UNITS: 100 INJECTION, SOLUTION INTRAVENOUS; SUBCUTANEOUS at 12:45

## 2017-06-26 RX ADMIN — SIMVASTATIN 20 MG: 20 TABLET, FILM COATED ORAL at 09:32

## 2017-06-26 RX ADMIN — CHOLECALCIFEROL (VITAMIN D3) 10 MCG (400 UNIT) TABLET 2000 UNITS: at 09:32

## 2017-06-26 RX ADMIN — FERROUS SULFATE TAB 325 MG (65 MG ELEMENTAL FE) 325 MG: 325 (65 FE) TAB at 09:32

## 2017-06-26 RX ADMIN — METOPROLOL TARTRATE 50 MG: 50 TABLET ORAL at 09:32

## 2017-06-26 RX ADMIN — TICAGRELOR 90 MG: 90 TABLET ORAL at 09:32

## 2017-06-26 RX ADMIN — INSULIN LISPRO 3 UNITS: 100 INJECTION, SOLUTION INTRAVENOUS; SUBCUTANEOUS at 22:00

## 2017-06-26 RX ADMIN — METOPROLOL TARTRATE 50 MG: 50 TABLET ORAL at 17:27

## 2017-06-26 RX ADMIN — CALCIUM POLYCARBOPHIL 625 MG TABLET 625 MG: at 09:32

## 2017-06-26 RX ADMIN — LEVOFLOXACIN 250 MG: 250 TABLET, FILM COATED ORAL at 12:46

## 2017-06-26 RX ADMIN — TICAGRELOR 90 MG: 90 TABLET ORAL at 19:28

## 2017-06-26 RX ADMIN — HYDRALAZINE HYDROCHLORIDE 10 MG: 10 TABLET, FILM COATED ORAL at 21:23

## 2017-06-26 RX ADMIN — AMLODIPINE BESYLATE 5 MG: 5 TABLET ORAL at 09:33

## 2017-06-26 RX ADMIN — HYDRALAZINE HYDROCHLORIDE 10 MG: 10 TABLET, FILM COATED ORAL at 17:21

## 2017-06-26 RX ADMIN — Medication 250 MG: at 09:32

## 2017-06-26 RX ADMIN — FUROSEMIDE 40 MG: 10 INJECTION, SOLUTION INTRAVENOUS at 21:25

## 2017-06-26 RX ADMIN — BENAZEPRIL HYDROCHLORIDE 5 MG: 10 TABLET, FILM COATED ORAL at 17:27

## 2017-06-26 NOTE — PROGRESS NOTES
[facility]  -  Progress Note      Patient: Walt Puri MRN: [id number]  SSN: [id number]    YOB: 1940  Age: 67 y.o. Sex: female      Admit Date: 6/21/2017    Hospital Problem List:     Hospital Problems  Date Reviewed: 6/21/2017          Codes Class Noted POA    UTI (urinary tract infection) ICD-10-CM: N39.0  ICD-9-CM: 599.0  6/21/2017 Unknown        Sepsis ([facility] ICD-10-CM: A41.9  ICD-9-CM: 038.9, 995.91  6/21/2017 Unknown        * (Principal)PNA (pneumonia) ICD-10-CM: J18.9  ICD-9-CM: 486  6/21/2017 Unknown        Hypoxia ICD-10-CM: R09.02  ICD-9-CM: 799.02  6/21/2017 Unknown        CAD (coronary artery disease) ICD-10-CM: I25.10  ICD-9-CM: 414.00  7/7/2015 Yes        Thrombocytopenia ([facility] ICD-10-CM: D69.6  ICD-9-CM: 287.5  7/7/2015 Yes            -CHF with diastolic dysfunction with some volume overload 2/2 IVFs. -Mild aortic stenosis by echo this admission, valve mean gradient 16mmHg with aortic valve area of 1.32cm2. -CAD hx, s/p PCI to Cx and mLAD with BETHEL in Aug 2016, on DAPT with Brilinta and ASA. -Chronic Brilinta  -ECHO 7/2016 with normal EF and G1DD, repeat echo 6/22/17 with normal EF and G2DD, mild aortic stenosis, wall motion abnormalities noted on both studies. -UTI on antx, Klebsiella bacteremia  -HTN  -HLD  -DM type II       Primary cardiologist Dr. Kirk Hair:     -H/H dropping. Hemoccult sent by nurse on staff as black tarry stools. Continue Brilinta until results available. -Hemodynamics stable. Continue on lopressor and norvasc.   -New onset [place]. Pt states started when she woke up Wednesday she could not hear well and had some clear drainage. Discussed with nurse on staff who is going to inform Hospitalist.     Subjective:     No new complaints.      Objective:      Patient Vitals for the past 8 hrs:   Temp Pulse Resp BP SpO2   06/26/17 0905 98.8 °F (37.1 °C) 91 18 120/62 100 %   06/26/17 0415 98.4 °F (36.9 °C) 85 18 108/63 100 % No data found. Intake/Output Summary (Last 24 hours) at 06/26/17 1122  Last data filed at 06/26/17 0421   Gross per 24 hour   Intake              320 ml   Output             1100 ml   Net             -780 ml       Physical Exam:  General:  Awake, alert, very [place]  Neck:  Supple, no jvd  Lungs:  Clear to auscultation bilaterally, on room air, normal effort  Heart:  Reg rate and rhythm, 1/6 systolic murmur at base  Abdomen: soft, non-tender  Extremities:  No LE edema, atraumatic, no cyanosis    Data Review:     Labs: Results:       Chemistry Recent Labs      06/26/17   0300 06/25/17 0322  06/24/17   0208   GLU  136*  120*  139*   NA  135*  137  136   K  3.6  3.5  3.7   CL  105  106  106   CO2  20*  19*  19*   BUN  54*  49*  41*   CREA  1.37*  1.34*  1.22   CA  8.6  8.6  8.5   MG   --   1.7   --    AGAP  10  12  11   BUCR  39*  37*  34*      CBC w/Diff Recent Labs      06/26/17   0300 06/25/17 0322 06/24/17   0208   WBC  6.8  6.7  7.4   RBC  2.77*  2.79*  2.52*   HGB  7.7*  7.7*  7.2*   HCT  21.7*  21.9*  20.8*   PLT  95*  98*  83*   GRANS  80*  80*  87*   LYMPH  8*  9*  7*   EOS  2  1  0      Cardiac Enzymes No results found for: CPK, CKMMB, CKMB, RCK3, CKMBT, CKNDX, CKND1, CLAUDIO, TROPT, TROIQ, ANNAMARIA, TROPT, TNIPOC, BNP, BNPP   Coagulation No results for input(s): PTP, INR, APTT in the last 72 hours. No lab exists for component: INREXT    Lipid Panel Lab Results   Component Value Date/Time    Cholesterol, total 188 07/01/2016 11:53 PM    HDL Cholesterol 46 07/01/2016 11:53 PM    LDL, calculated 94.2 07/01/2016 11:53 PM    VLDL, calculated 47.8 07/01/2016 11:53 PM    Triglyceride 239 07/01/2016 11:53 PM    CHOL/HDL Ratio 4.1 07/01/2016 11:53 PM      BNP No results found for: BNP, BNPP, XBNPT   Liver Enzymes No results for input(s): TP, ALB, TBIL, AP, SGOT, GPT in the last 72 hours.     No lab exists for component: DBIL   Digoxin    Thyroid Studies Lab Results   Component Value Date/Time    TSH 0.56 06/15/2015 04:19 AM            Signed By: ELOISA Barnes     June 26, 2017

## 2017-06-26 NOTE — PROGRESS NOTES
[facility] Hospitalist Group  Progress Note    Patient: Jayson Dillard Age: 67 y.o. : 1940 MR#: [id number] SSN: [id number]  Date/Time: 2017 4:57 PM    Subjective/24-hour events:     Seen earlier this afternoon. Breathing has continued to improve, but patient reports acute worsening in auditory acuity since admission. Assessment:   Hypoxia - concern for possible CHF  Aortic stenosis  Suspected UTI  Fever  HTN  DM 2  CAD with hx of NSTEMI and stent placement  Anemia - suspect chronic disease, Fe deficiency  CKD 3  Decreased auditory acuity    Plan:  Continue cardiac management as ordered - remains on IV lasix. Remains on levaquin - d/c and monitor off. ENT eval - call out to Dr. Con Lombard now. SSI, monitor sugars. Mobilize as tolerated. Case discussed with:  [x]Patient  []Family  [x]Nursing  []Case Management  DVT Prophylaxis:  []Lovenox  []Hep SQ  []SCDs  []Coumadin   []On Brilinta    Objective:   VS:   Visit Vitals    /65 (BP 1 Location: Left arm, BP Patient Position: At rest)    Pulse 82    Temp 98.4 °F (36.9 °C)    Resp 18    Ht 5' (1.524 m)    Wt 61.2 kg (135 lb)    SpO2 100%    BMI 26.37 kg/m2      Tmax/24hrs: Temp (24hrs), Av.3 °F (36.8 °C), Min:97 °F (36.1 °C), Max:98.8 °F (37.1 °C)      Intake/Output Summary (Last 24 hours) at 17 1657  Last data filed at 17 1244   Gross per 24 hour   Intake              240 ml   Output             1250 ml   Net            -1010 ml       General:  In NAD. Nontoxic-appearing. Cardiovascular:  RRR. Pulmonary:  No wheezes, effort nonlabored. GI:  Abdomen soft, NTTP. Extremities:  Warm, no ischemia. Additional:  Awake and alert.   Motor grossly nonfocal.    Labs:  Recent Results (from the past 24 hour(s))   GLUCOSE, POC    Collection Time: 17 10:53 PM   Result Value Ref Range    Glucose (POC) 209 (H) 70 - 284 mg/dL   METABOLIC PANEL, BASIC    Collection Time: 17  3:00 AM   Result Value Ref Range    Sodium 135 (L) 136 - 145 mmol/L    Potassium 3.6 3.5 - 5.5 mmol/L    Chloride 105 100 - 108 mmol/L    CO2 20 (L) 21 - 32 mmol/L    Anion gap 10 3.0 - 18 mmol/L    Glucose 136 (H) 74 - 99 mg/dL    BUN 54 (H) 7.0 - 18 MG/DL    Creatinine 1.37 (H) 0.6 - 1.3 MG/DL    BUN/Creatinine ratio 39 (H) 12 - 20      GFR est AA 45 (L) >60 ml/min/1.73m2    GFR est non-AA 37 (L) >60 ml/min/1.73m2    Calcium 8.6 8.5 - 10.1 MG/DL   CBC WITH AUTOMATED DIFF    Collection Time: 06/26/17  3:00 AM   Result Value Ref Range    WBC 6.8 4.6 - 13.2 K/uL    RBC 2.77 (L) 4.20 - 5.30 M/uL    HGB 7.7 (L) 12.0 - 16.0 g/dL    HCT 21.7 (L) 35.0 - 45.0 %    MCV 78.3 74.0 - 97.0 FL    MCH 27.8 24.0 - 34.0 PG    MCHC 35.5 31.0 - 37.0 g/dL    RDW 15.6 (H) 11.6 - 14.5 %    PLATELET 95 (L) 373 - 420 K/uL    MPV 10.1 9.2 - 11.8 FL    NEUTROPHILS 80 (H) 40 - 73 %    LYMPHOCYTES 8 (L) 21 - 52 %    MONOCYTES 10 3 - 10 %    EOSINOPHILS 2 0 - 5 %    BASOPHILS 0 0 - 2 %    ABS. NEUTROPHILS 5.4 1.8 - 8.0 K/UL    ABS. LYMPHOCYTES 0.5 (L) 0.9 - 3.6 K/UL    ABS. MONOCYTES 0.7 0.05 - 1.2 K/UL    ABS. EOSINOPHILS 0.1 0.0 - 0.4 K/UL    ABS.  BASOPHILS 0.0 0.0 - 0.06 K/UL    DF AUTOMATED     GLUCOSE, POC    Collection Time: 06/26/17  8:19 AM   Result Value Ref Range    Glucose (POC) 196 (H) 70 - 110 mg/dL   OCCULT BLOOD, STOOL    Collection Time: 06/26/17  9:45 AM   Result Value Ref Range    Occult blood, stool POSITIVE (A) NEG     GLUCOSE, POC    Collection Time: 06/26/17 11:05 AM   Result Value Ref Range    Glucose (POC) 353 (H) 70 - 110 mg/dL   GLUCOSE, POC    Collection Time: 06/26/17  3:50 PM   Result Value Ref Range    Glucose (POC) 114 (H) 70 - 110 mg/dL       Signed By: Carlo Dave MD     June 26, 2017 4:57 PM

## 2017-06-26 NOTE — PROGRESS NOTES
Pt with Hemoglobin of 7.7 down from admission levels of 9.6. Pt is on birlinta. Pt asymptomatic. New Onset E[place]ia[place]ayp both ears per patient and family. Dorena Bending \"Give Birlinta per SRIDHAR Harrington And Dr. Gabriel Downs". Stool sample sent. Will follow closely.

## 2017-06-26 NOTE — PROGRESS NOTES
Pt still is having trouble hearing. Sister at bedside reports that it is new just prior to this admission. Pt stated at admission that right ear had a lot of drainage before admission and still continues with hearing loss especially on the right. Advised pt to ask doctor about an ENT consult tomorrow.

## 2017-06-26 NOTE — ROUTINE PROCESS
Bedside and Verbal shift change report given to Francia Dominguez RN (oncoming nurse) by Gwendolyn Noriega RN  (offgoing nurse). Report included the following information SBAR, Kardex, ED Summary, Intake/Output, MAR and Recent Results.

## 2017-06-26 NOTE — PROGRESS NOTES
Visited with Mrs Leonardo who was sitting on side of bed. Introduced self and navigator role, HF folder provider. Reviewed folder, education started, opportunity given for questions. Mrs Leonardo has NOS hearing loss R ear but able to repeat teaching and answer appropriately when discussion done to left side. She stated MD aware and treating. Will follow.

## 2017-06-26 NOTE — DIABETES MGMT
Glycemic Control Plan of Care    BG above target range. POC BG range on 06/25/2017: 197-302 mg/dL. POC BG report on 06/26/2017 at time of review: 196, 353 mg/dL  Patient stated that she is only eating hospital food. Recommendation(s):  1.) Called Dr. Lyndsey Casillas and obtained order to add 10 units of basal lantus insulin daily with first dose 06/26/2017.  2.) Continue monitoring and increase basal insulin dose if BG still above target range. Assessment:  Patient is 67year old with past medical history including type 2 diabetes mellitus, CAD, colon and left breast cancer, hypercholesterolemia, hypertension, and thrombocytopenia - was admitted on 06/21/2017 with c/o nausea, vomiting, diarrhea, fatigue, and fever x5 days. Noted:  Community acquired pneumonia vs bronchitis. UTI. Type 2 diabetes mellitus. 5.2% (06/22/2017)    Most recent blood glucose values:    Results for Christopher Talbert (MRN [id number]) as of 6/26/2017 12:46   Ref. Range 6/25/2017 00:14 6/25/2017 08:13 6/25/2017 11:48 6/25/2017 15:59 6/25/2017 22:53   GLUCOSE,FAST - POC Latest Ref Range: 70 - 110 mg/dL 204 (H) 197 (H) 302 (H) 201 (H) 209 (H)     Results for Christopher Talbert (MRN [id number]) as of 6/26/2017 12:46   Ref. Range 6/26/2017 08:19 6/26/2017 11:05   GLUCOSE,FAST - POC Latest Ref Range: 70 - 110 mg/dL 196 (H) 353 (H)     Current A1C: 5.2% (06/22/2017) is equivalent to average blood glucose of 103 mg/dL during the past 2-3 months. Current hospital diabetes medications:  Basal lantus insulin 10 units daily, first dose ordered 06/26/2017. Correctional lispro insulin ACHS. Very resistant dose. Total daily dose insulin requirement previous day: 06/25/2017  Lispro: 33 units    Home diabetes medications: As stated by patient on 06/22/2017:  Metformin 500 mg twice daily with meals. Diet: Cardiac regular; consistent carb 1500-1600kcal    Goals:  Blood glucose will be within target range of  mg/dL by 06/29/2017.      Education: _X__  Refer to Diabetes Education Record: 06/22/2017             ___  Education not indicated at this time    Caitlin Payne RN

## 2017-06-26 NOTE — PROGRESS NOTES
Care Management Interventions  PCP Verified by CM: Yes (DR. Lalo Edgar)  Palliative Care Consult (Criteria: CHF and RRAT>21): No  Reason for No Palliative Care Consult: Other (see comment) (NO ORDER)  Mode of Transport at Discharge: Other (see comment) (FAMILY WILL TRANSPORT)  Transition of Care Consult (CM Consult): Discharge Planning  Current Support Network: Lives with Spouse, Own Home, Family Lives Nearby  Confirm Follow Up Transport: Family  Plan discussed with Pt/Family/Caregiver: Yes (PLAN IS  [address])  Discharge Location  Discharge Placement: Skilled nursing facility  Pt was accepted by [facility] when ready for discharge.

## 2017-06-26 NOTE — ROUTINE PROCESS
Bedside and Verbal shift change report given to Delaney Hitchcock, LATRICIA (oncoming nurse) by Joe Mondragon RN (offgoing nurse). Report included the following information SBAR, Kardex, MAR and Recent Results. SITUATION:  Code Status: Full Code  Reason for Admission: Sepsis ([facility]  PNA (pneumonia)  UTI (urinary tract infection)  PNA (pneumonia)  UTI (urinary tract infection)  Hypoxia  Hospital day: 5  Problem List:       Hospital Problems  Date Reviewed: 6/21/2017          Codes Class Noted POA    UTI (urinary tract infection) ICD-10-CM: N39.0  ICD-9-CM: 599.0  6/21/2017 Unknown        Sepsis ([facility] ICD-10-CM: A41.9  ICD-9-CM: 038.9, 995.91  6/21/2017 Unknown        * (Principal)PNA (pneumonia) ICD-10-CM: J18.9  ICD-9-CM: 486  6/21/2017 Unknown        Hypoxia ICD-10-CM: R09.02  ICD-9-CM: 799.02  6/21/2017 Unknown        CAD (coronary artery disease) ICD-10-CM: I25.10  ICD-9-CM: 414.00  7/7/2015 Yes        Thrombocytopenia ([facility] ICD-10-CM: D69.6  ICD-9-CM: 287.5  7/7/2015 Yes              BACKGROUND:   Past Medical History:   Past Medical History:   Diagnosis Date    Anemia     CAD (coronary artery disease)     Coronary Stenting needed, hematology workup needed prior to stent placement, treating medically at this time    Cancer [facility])     colon    Cancer ([facility]     breast - left    Cardiac catheterization 08/17/2016    LM patent. mLAD 99.9% (2.25 x 28-mm Xience BETHEL, resid 0%). oD1 60%. oD2 95%. pCX 90% (2.5 x 13-mm Xience BETHEL, resid 0%). OMB patent.  Cardiac echocardiogram 07/01/2016    EF 50%. Basal inferior, basal-mid inferolateral hypk. Gr 1 DDfx. RVSP 50-60 mmHg. Mild LAE.   Mod, eccentric MR.      Diabetes ([facility]     Hypercholesteremia     Hypertension     Thrombocytopenia ([facility]       Patient taking anticoagulants yes    Patient has a defibrillator: no    History of shots YES for example, flu, pneumonia, tetanus   Isolation History NO for example, MRSA, CDiff    ASSESSMENT:  Changes in Assessment Throughout Shift: None  Significant Changes in 24 hours (for example, RR/code, fall)  Patient has Central Line: no Reasons if yes: N/A  Patient has Jean Cath: no Reasons if yes: N/A   Mobility Issues  PT  IV Patency  OR Checklist  Pending Tests    Last Vitals:  Vitals w/ MEWS Score (last day)     Date/Time MEWS Score Pulse Resp Temp BP Level of Consciousness SpO2    06/26/17 0415 1 85 18 98.4 °F (36.9 °C) 108/63 Alert 100 %    06/25/17 2234 1 79 19 98.8 °F (37.1 °C) 123/71 Alert 99 %    06/25/17 1521 1 75 20 97.8 °F (36.6 °C) 108/64 Alert 100 %    06/25/17 1118 1 70 18 98.1 °F (36.7 °C) 107/64 Alert 95 %    06/25/17 0808 1 98 18 99 °F (37.2 °C) 149/67 Alert 94 %    06/25/17 0507 1 83 20 98.1 °F (36.7 °C) 120/65 Alert 92 %    06/25/17 0020 2 85 22 97.7 °F (36.5 °C) 124/63 Alert 94 %            PAIN    Pain Assessment    Pain Intensity 1: 0 (06/26/17 0415)    Pain Location 1: Leg    Pain Intervention(s) 1: Ice    Patient Stated Pain Goal: 0  Intervention effective: N/A  Time of last intervention: N/A Reassessment Completed: N/A  Other actions taken for pain: N/A    Last 3 Weights:  Last 3 Recorded Weights in this Encounter    06/21/17 1240   Weight: 61.2 kg (135 lb)   Weight change:     INTAKE/OUPUT    Current Shift:      Last three shifts: 06/24 1901 - 06/26 0700  In: 1200 [P.O.:1200]  Out: 2350 [Urine:2350]    RECOMMENDATIONS AND DISCHARGE PLANNING  Patient needs and requests: ENT consult    Pending tests/procedures: Stool occult blood     Discharge plan for patient: TBD    Discharge planning Needs or Barriers: TBD    Estimated Discharge Date: TBD Posted on Whiteboard in Patients Room: no       \"HEALS\" SAFETY CHECK  A safety check occurred in the patient's room between off going nurse and oncoming nurse listed above.     The safety check included the below items:    H  High Alert Medications Verify all high alert medication drips (heparin, PCA, etc.)  E  Equipment Suction is set up for ALL patients (with eden)  Red plugs utilized for all equipment (IV pumps, etc.)  WOWs wiped down at end of shift. Room stocked with oxygen, suction, and other unit-specific supplies  A  Alarms Bed alarm is set for fall risk patients  Ensure chair alarm is in place and activated if patient is up in a chair  L  Lines Check IV for any infiltration  Jean bag is empty if patient has a Jean   Tubing and IV bags are labeled  S  Safety  Room is clean, patient is clean, and equipment is clean. Hallways are clear from equipment besides carts. Fall bracelet on for fall risk patients  Ensure room is clear and free of clutter  Suction is set up for ALL patients (with eden)  Hallways are clear from equipment besides carts.    Isolation precautions followed, supplies available outside room, sign posted    Joe Mondragon RN

## 2017-06-27 LAB
ANION GAP BLD CALC-SCNC: 11 MMOL/L (ref 3–18)
BACTERIA SPEC CULT: NORMAL
BACTERIA SPEC CULT: NORMAL
BASOPHILS # BLD AUTO: 0 K/UL (ref 0–0.06)
BASOPHILS # BLD: 1 % (ref 0–2)
BUN SERPL-MCNC: 61 MG/DL (ref 7–18)
BUN/CREAT SERPL: 39 (ref 12–20)
CALCIUM SERPL-MCNC: 8.4 MG/DL (ref 8.5–10.1)
CHLORIDE SERPL-SCNC: 104 MMOL/L (ref 100–108)
CO2 SERPL-SCNC: 21 MMOL/L (ref 21–32)
CREAT SERPL-MCNC: 1.55 MG/DL (ref 0.6–1.3)
DIFFERENTIAL METHOD BLD: ABNORMAL
EOSINOPHIL # BLD: 0.1 K/UL (ref 0–0.4)
EOSINOPHIL NFR BLD: 3 % (ref 0–5)
ERYTHROCYTE [DISTWIDTH] IN BLOOD BY AUTOMATED COUNT: 15.4 % (ref 11.6–14.5)
EST. AVERAGE GLUCOSE BLD GHB EST-MCNC: 126 MG/DL
GLUCOSE BLD STRIP.AUTO-MCNC: 160 MG/DL (ref 70–110)
GLUCOSE BLD STRIP.AUTO-MCNC: 170 MG/DL (ref 70–110)
GLUCOSE BLD STRIP.AUTO-MCNC: 195 MG/DL (ref 70–110)
GLUCOSE BLD STRIP.AUTO-MCNC: 224 MG/DL (ref 70–110)
GLUCOSE SERPL-MCNC: 153 MG/DL (ref 74–99)
HBA1C MFR BLD: 6 % (ref 4.2–5.6)
HCT VFR BLD AUTO: 20.1 % (ref 35–45)
HCT VFR BLD AUTO: 20.2 % (ref 35–45)
HCT VFR BLD AUTO: 28 % (ref 35–45)
HGB BLD-MCNC: 7 G/DL (ref 12–16)
HGB BLD-MCNC: 7.2 G/DL (ref 12–16)
HGB BLD-MCNC: 9.8 G/DL (ref 12–16)
LYMPHOCYTES # BLD AUTO: 9 % (ref 21–52)
LYMPHOCYTES # BLD: 0.5 K/UL (ref 0.9–3.6)
MCH RBC QN AUTO: 27.6 PG (ref 24–34)
MCHC RBC AUTO-ENTMCNC: 34.8 G/DL (ref 31–37)
MCV RBC AUTO: 79.1 FL (ref 74–97)
MONOCYTES # BLD: 0.6 K/UL (ref 0.05–1.2)
MONOCYTES NFR BLD AUTO: 12 % (ref 3–10)
NEUTS SEG # BLD: 3.8 K/UL (ref 1.8–8)
NEUTS SEG NFR BLD AUTO: 75 % (ref 40–73)
PLATELET # BLD AUTO: 98 K/UL (ref 135–420)
PMV BLD AUTO: 9.9 FL (ref 9.2–11.8)
POTASSIUM SERPL-SCNC: 3.4 MMOL/L (ref 3.5–5.5)
RBC # BLD AUTO: 2.54 M/UL (ref 4.2–5.3)
SERVICE CMNT-IMP: NORMAL
SERVICE CMNT-IMP: NORMAL
SODIUM SERPL-SCNC: 136 MMOL/L (ref 136–145)
WBC # BLD AUTO: 5.1 K/UL (ref 4.6–13.2)

## 2017-06-27 PROCEDURE — 74011250637 HC RX REV CODE- 250/637: Performed by: INTERNAL MEDICINE

## 2017-06-27 PROCEDURE — 80048 BASIC METABOLIC PNL TOTAL CA: CPT | Performed by: INTERNAL MEDICINE

## 2017-06-27 PROCEDURE — 74011636637 HC RX REV CODE- 636/637: Performed by: FAMILY MEDICINE

## 2017-06-27 PROCEDURE — 74011250636 HC RX REV CODE- 250/636: Performed by: INTERNAL MEDICINE

## 2017-06-27 PROCEDURE — 85018 HEMOGLOBIN: CPT | Performed by: INTERNAL MEDICINE

## 2017-06-27 PROCEDURE — 74011000250 HC RX REV CODE- 250: Performed by: FAMILY MEDICINE

## 2017-06-27 PROCEDURE — P9016 RBC LEUKOCYTES REDUCED: HCPCS | Performed by: INTERNAL MEDICINE

## 2017-06-27 PROCEDURE — 36415 COLL VENOUS BLD VENIPUNCTURE: CPT | Performed by: INTERNAL MEDICINE

## 2017-06-27 PROCEDURE — 74011250637 HC RX REV CODE- 250/637: Performed by: FAMILY MEDICINE

## 2017-06-27 PROCEDURE — 85025 COMPLETE CBC W/AUTO DIFF WBC: CPT | Performed by: INTERNAL MEDICINE

## 2017-06-27 PROCEDURE — 65270000029 HC RM PRIVATE

## 2017-06-27 PROCEDURE — 82962 GLUCOSE BLOOD TEST: CPT

## 2017-06-27 PROCEDURE — 83036 HEMOGLOBIN GLYCOSYLATED A1C: CPT | Performed by: FAMILY MEDICINE

## 2017-06-27 PROCEDURE — 36430 TRANSFUSION BLD/BLD COMPNT: CPT

## 2017-06-27 RX ORDER — NEOMYCIN/POLYMYXIN B/HYDROCORT 3.5-10K-1
1 SUSPENSION, DROPS(FINAL DOSAGE FORM)(ML) OPHTHALMIC (EYE) 4 TIMES DAILY
Status: DISCONTINUED | OUTPATIENT
Start: 2017-06-27 | End: 2017-06-27

## 2017-06-27 RX ORDER — INSULIN GLARGINE 100 [IU]/ML
4 INJECTION, SOLUTION SUBCUTANEOUS ONCE
Status: COMPLETED | OUTPATIENT
Start: 2017-06-27 | End: 2017-06-27

## 2017-06-27 RX ORDER — NEOMYCIN SULFATE, POLYMYXIN B SULFATE, HYDROCORTISONE 3.5; 10000; 1 MG/ML; [USP'U]/ML; MG/ML
4 SOLUTION/ DROPS AURICULAR (OTIC) 4 TIMES DAILY
Status: DISCONTINUED | OUTPATIENT
Start: 2017-06-27 | End: 2017-06-29 | Stop reason: HOSPADM

## 2017-06-27 RX ORDER — SODIUM CHLORIDE 9 MG/ML
250 INJECTION, SOLUTION INTRAVENOUS AS NEEDED
Status: DISCONTINUED | OUTPATIENT
Start: 2017-06-27 | End: 2017-06-29 | Stop reason: HOSPADM

## 2017-06-27 RX ORDER — ACETAMINOPHEN 325 MG/1
650 TABLET ORAL ONCE
Status: COMPLETED | OUTPATIENT
Start: 2017-06-27 | End: 2017-06-27

## 2017-06-27 RX ORDER — DIPHENHYDRAMINE HCL 25 MG
25 CAPSULE ORAL ONCE
Status: COMPLETED | OUTPATIENT
Start: 2017-06-27 | End: 2017-06-27

## 2017-06-27 RX ORDER — FUROSEMIDE 20 MG/1
20 TABLET ORAL DAILY
Status: DISCONTINUED | OUTPATIENT
Start: 2017-06-28 | End: 2017-06-29 | Stop reason: HOSPADM

## 2017-06-27 RX ORDER — INSULIN GLARGINE 100 [IU]/ML
14 INJECTION, SOLUTION SUBCUTANEOUS DAILY
Status: DISCONTINUED | OUTPATIENT
Start: 2017-06-28 | End: 2017-06-28

## 2017-06-27 RX ADMIN — Medication 250 MG: at 08:39

## 2017-06-27 RX ADMIN — INSULIN LISPRO 3 UNITS: 100 INJECTION, SOLUTION INTRAVENOUS; SUBCUTANEOUS at 08:41

## 2017-06-27 RX ADMIN — CHOLECALCIFEROL (VITAMIN D3) 10 MCG (400 UNIT) TABLET 2000 UNITS: at 08:39

## 2017-06-27 RX ADMIN — DIPHENHYDRAMINE HYDROCHLORIDE 25 MG: 25 CAPSULE ORAL at 08:39

## 2017-06-27 RX ADMIN — AMLODIPINE BESYLATE 5 MG: 5 TABLET ORAL at 08:39

## 2017-06-27 RX ADMIN — INSULIN LISPRO 3 UNITS: 100 INJECTION, SOLUTION INTRAVENOUS; SUBCUTANEOUS at 23:06

## 2017-06-27 RX ADMIN — HYDRALAZINE HYDROCHLORIDE 10 MG: 10 TABLET, FILM COATED ORAL at 08:39

## 2017-06-27 RX ADMIN — TICAGRELOR 90 MG: 90 TABLET ORAL at 07:00

## 2017-06-27 RX ADMIN — INSULIN GLARGINE 10 UNITS: 100 INJECTION, SOLUTION SUBCUTANEOUS at 08:40

## 2017-06-27 RX ADMIN — ACETAMINOPHEN 650 MG: 325 TABLET ORAL at 08:39

## 2017-06-27 RX ADMIN — SIMVASTATIN 20 MG: 20 TABLET, FILM COATED ORAL at 08:39

## 2017-06-27 RX ADMIN — CALCIUM POLYCARBOPHIL 625 MG TABLET 625 MG: at 08:39

## 2017-06-27 RX ADMIN — VITAM B12 100 MCG: 100 TAB at 08:39

## 2017-06-27 RX ADMIN — LEVOFLOXACIN 250 MG: 250 TABLET, FILM COATED ORAL at 12:55

## 2017-06-27 RX ADMIN — METOPROLOL TARTRATE 50 MG: 50 TABLET ORAL at 17:30

## 2017-06-27 RX ADMIN — INSULIN GLARGINE 4 UNITS: 100 INJECTION, SOLUTION SUBCUTANEOUS at 17:30

## 2017-06-27 RX ADMIN — NEOMYCIN SULFATE, POLYMYXIN B SULFATE, HYDROCORTISONE 4 DROP: 3.5; 10000; 1 SOLUTION/ DROPS AURICULAR (OTIC) at 23:03

## 2017-06-27 RX ADMIN — FERROUS SULFATE TAB 325 MG (65 MG ELEMENTAL FE) 325 MG: 325 (65 FE) TAB at 17:30

## 2017-06-27 RX ADMIN — FAMOTIDINE 20 MG: 20 TABLET ORAL at 08:39

## 2017-06-27 RX ADMIN — FUROSEMIDE 40 MG: 10 INJECTION, SOLUTION INTRAVENOUS at 08:40

## 2017-06-27 RX ADMIN — ASPIRIN 81 MG: 81 TABLET, COATED ORAL at 08:39

## 2017-06-27 RX ADMIN — INSULIN LISPRO 3 UNITS: 100 INJECTION, SOLUTION INTRAVENOUS; SUBCUTANEOUS at 17:31

## 2017-06-27 RX ADMIN — FERROUS SULFATE TAB 325 MG (65 MG ELEMENTAL FE) 325 MG: 325 (65 FE) TAB at 08:39

## 2017-06-27 RX ADMIN — METOPROLOL TARTRATE 50 MG: 50 TABLET ORAL at 09:00

## 2017-06-27 RX ADMIN — NEOMYCIN SULFATE, POLYMYXIN B SULFATE, HYDROCORTISONE 4 DROP: 3.5; 10000; 1 SOLUTION/ DROPS AURICULAR (OTIC) at 18:11

## 2017-06-27 RX ADMIN — INSULIN LISPRO 6 UNITS: 100 INJECTION, SOLUTION INTRAVENOUS; SUBCUTANEOUS at 12:55

## 2017-06-27 RX ADMIN — THERA TABS 1 TABLET: TAB at 08:39

## 2017-06-27 NOTE — PROGRESS NOTES
[facility]  -  Progress Note      Patient: Liang Siegel MRN: [id number]  SSN: [id number]    YOB: 1940  Age: 67 y.o. Sex: female      Admit Date: 6/21/2017    Hospital Problem List:     Hospital Problems  Date Reviewed: 6/21/2017          Codes Class Noted POA    UTI (urinary tract infection) ICD-10-CM: N39.0  ICD-9-CM: 599.0  6/21/2017 Unknown        Sepsis ([facility] ICD-10-CM: A41.9  ICD-9-CM: 038.9, 995.91  6/21/2017 Unknown        * (Principal)PNA (pneumonia) ICD-10-CM: J18.9  ICD-9-CM: 486  6/21/2017 Unknown        Hypoxia ICD-10-CM: R09.02  ICD-9-CM: 799.02  6/21/2017 Unknown        CAD (coronary artery disease) ICD-10-CM: I25.10  ICD-9-CM: 414.00  7/7/2015 Yes        Thrombocytopenia ([facility] ICD-10-CM: D69.6  ICD-9-CM: 287.5  7/7/2015 Yes            -CHF with diastolic dysfunction with some volume overload 2/2 IVFs. Diuresed with IV lasix. -Mild aortic stenosis by echo this admission, valve mean gradient 16mmHg with aortic valve area of 1.32cm2. -CAD hx, s/p PCI to Cx and mLAD with BETHEL in Aug 2016, on DAPT with Brilinta and ASA. -Chronic Brilinta  -ECHO 7/2016 with normal EF and G1DD, repeat echo 6/22/17 with normal EF and G2DD, mild aortic stenosis, wall motion abnormalities noted on both studies. -UTI on antx, Klebsiella bacteremia  -HTN  -HLD  -DM type II  [facility] cardiologist Dr. Felisa Angulo:     -Hemoccult positive. Repeat H/H this morning with Hgb remaining in low 7 range. Would recommend transfusion.   -In regards to DAPT, would continue aspirin only in light of GI bleed.    -Hemodynamics stable. Would continue current regimen.  -Diuresed well. Symptomatically improved. Switched IV lasix to PO. Subjective:     No new complaints.      Objective:      Patient Vitals for the past 8 hrs:   Temp Pulse Resp BP SpO2   06/27/17 1017 97.6 °F (36.4 °C) (!) 129 18 115/68 100 %   06/27/17 0948 97.8 °F (36.6 °C) 69 18 122/71 100 %   06/27/17 0915 98.4 °F (36.9 °C) 73 18 132/64 100 %   06/27/17 0901 98.4 °F (36.9 °C) 81 19 129/72 100 %   06/27/17 0805 98.2 °F (36.8 °C) 89 18 122/69 100 %   06/27/17 0356 97.1 °F (36.2 °C) 82 18 125/67 -         No data found. Intake/Output Summary (Last 24 hours) at 06/27/17 1033  Last data filed at 06/26/17 1730   Gross per 24 hour   Intake              480 ml   Output              400 ml   Net               80 ml       Physical Exam:  General: awake, alert, oriented x 3, [place]  Neck: supple, no jvd  Lungs:  Clear to auscultation bilaterally   Heart:  Reg rate and rhythm, 1/6 systolic murmur at base  Abdomen:  Soft, non-tender  Extremities: no edema, atraumatic     Data Review:     Labs: Results:       Chemistry Recent Labs      06/27/17   0311  06/26/17   0300  06/25/17   0322   GLU  153*  136*  120*   NA  136  135*  137   K  3.4*  3.6  3.5   CL  104  105  106   CO2  21  20*  19*   BUN  61*  54*  49*   CREA  1.55*  1.37*  1.34*   CA  8.4*  8.6  8.6   MG   --    --   1.7   AGAP  11  10  12   BUCR  39*  39*  37*      CBC w/Diff Recent Labs      06/27/17   0740  06/27/17   0311  06/26/17   0300  06/25/17   0322   WBC   --   5.1  6.8  6.7   RBC   --   2.54*  2.77*  2.79*   HGB  7.2*  7.0*  7.7*  7.7*   HCT  20.2*  20.1*  21.7*  21.9*   PLT   --   98*  95*  98*   GRANS   --   75*  80*  80*   LYMPH   --   9*  8*  9*   EOS   --   3  2  1      Cardiac Enzymes No results found for: CPK, CKMMB, CKMB, RCK3, CKMBT, CKNDX, CKND1, CLAUDIO, TROPT, TROIQ, ANNAMARIA, TROPT, TNIPOC, BNP, BNPP   Coagulation No results for input(s): PTP, INR, APTT in the last 72 hours.     No lab exists for component: INREXT    Lipid Panel Lab Results   Component Value Date/Time    Cholesterol, total 188 07/01/2016 11:53 PM    HDL Cholesterol 46 07/01/2016 11:53 PM    LDL, calculated 94.2 07/01/2016 11:53 PM    VLDL, calculated 47.8 07/01/2016 11:53 PM    Triglyceride 239 07/01/2016 11:53 PM    CHOL/HDL Ratio 4.1 07/01/2016 11:53 PM      BNP No results found for: BNP, BNPP, XBNPT Liver Enzymes No results for input(s): TP, ALB, TBIL, AP, SGOT, GPT in the last 72 hours.     No lab exists for component: DBIL   Digoxin    Thyroid Studies Lab Results   Component Value Date/Time    TSH 0.56 06/15/2015 04:19 AM            Signed By: ELOISA Velez     June 27, 2017

## 2017-06-27 NOTE — DIABETES MGMT
Glycemic Control Plan of Care    BG still above target range after adding basal lantus insulin 10 units daily. POC BG range on 06/26/2017: 114-353 mg/dL. POC BG report on 06/27/2017 at time of review: 195, 224 mg/dL  Patient stated that she is only eating hospital food (diabetic). No evidence of food from brought from outside. Recommendation(s):  1.) Called Dr. Nestor Freeman and obtained order to increase basal lantus insulin dose to 14 units daily. Assessment:  Patient is 67year old with past medical history including type 2 diabetes mellitus, CAD, colon and left breast cancer, hypercholesterolemia, hypertension, and thrombocytopenia - was admitted on 06/21/2017 with c/o nausea, vomiting, diarrhea, fatigue, and fever x5 days. Noted:  Community acquired pneumonia vs bronchitis. UTI. Type 2 diabetes mellitus. 5.2% (06/22/2017)    Most recent blood glucose values:    Results for Fernando Song (MRN [id number]) as of 6/27/2017 14:23   Ref. Range 6/26/2017 08:19 6/26/2017 11:05 6/26/2017 15:50 6/26/2017 21:21   GLUCOSE,FAST - POC Latest Ref Range: 70 - 110 mg/dL 196 (H) 353 (H) 114 (H) 183 (H)     Results for Fernando Song (MRN [id number]) as of 6/27/2017 14:23   Ref. Range 6/27/2017 07:34 6/27/2017 11:53   GLUCOSE,FAST - POC Latest Ref Range: 70 - 110 mg/dL 195 (H) 224 (H)     Current A1C: 5.2% (06/22/2017) is equivalent to average blood glucose of 103 mg/dL during the past 2-3 months. Current hospital diabetes medications:  Basal lantus insulin 14 units daily. Correctional lispro insulin ACHS. Very resistant dose. Total daily dose insulin requirement previous day: 06/26/2017  Lantus: 10 units  Lispro: 21 units  TDD: 31 units of insulin    Home diabetes medications: As stated by patient on 06/22/2017:  Metformin 500 mg twice daily with meals. Diet: Cardiac regular; consistent carb 1500-1600kcal    Goals:  Blood glucose will be within target range of  mg/dL by 06/30/2017.      Education:  _X__ Refer to Diabetes Education Record: 06/22/2017             ___  Education not indicated at this time    Brandie Bruce RN

## 2017-06-27 NOTE — CDMP QUERY
Please clarify if this patient is being treated/managed for:    => Acute Diastolic CHF  =>Other Explanation of clinical findings  =>Unable to Determine (no explanation of clinical findings)    The medical record reflects the following:    Risk:76 yof dx pna with IV abx and IVF's     Clinical Indicators:hypoxia,IVF's overload per cardiologist    Treatment: IV Lasix    Please clarify and document your clinical opinion in the progress notes and discharge summary including the definitive and/or presumptive diagnosis, (suspected or probable), related to the above clinical findings. Please include clinical findings supporting your diagnosis. If you DECLINE this query or would like to communicate with [facility], please utilize the \"[facility] message box\" at the TOP of the Progress Note on the right.       Thank you,[facility]

## 2017-06-27 NOTE — PROGRESS NOTES
[facility] Hospitalist Group  Progress Note    Patient: Mae Liu Age: 67 y.o. : 1940 MR#: [id number] SSN: [id number]  Date/Time: 2017 7:37 AM    Subjective/24-hour events:     Still complains of decreased hearing on R - L seems to be improved. PRBCs ordered yesterday yet to be given. Assessment:   Hypoxia - concern for possible CHF  Aortic stenosis  Suspected UTI, treated - pneumonia ruled out  Fever  HTN  DM 2  CAD with hx of NSTEMI and stent placement  Anemia - suspect chronic disease, Fe deficiency  CKD 3  Decreased auditory acuity    Plan:  Transfuse, monitor H/H.  IV lasix continued. D/W ENT. Cortisporin drops to both ears. Will need f/u with Dr. Siddharth Guthrie at discharge for full evaluation and hearing testing. SSI, monitor sugars. Mobilize as tolerated. Disposition TBD. Case discussed with:  [x]Patient  []Family  [x]Nursing  []Case Management  DVT Prophylaxis:  []Lovenox  []Hep SQ  []SCDs  []Coumadin   []On Brilinta    Objective:   VS:   Visit Vitals    /67    Pulse 82    Temp 97.1 °F (36.2 °C)    Resp 18    Ht 5' (1.524 m)    Wt 61.2 kg (135 lb)    SpO2 100%    BMI 26.37 kg/m2      Tmax/24hrs: Temp (24hrs), Av.9 °F (36.6 °C), Min:97 °F (36.1 °C), Max:98.8 °F (37.1 °C)      Intake/Output Summary (Last 24 hours) at 17 0737  Last data filed at 17 1730   Gross per 24 hour   Intake              720 ml   Output              800 ml   Net              -80 ml       General:  In NAD. Nontoxic-appearing. Cardiovascular:  RRR. Pulmonary:  No wheezes, effort nonlabored. GI:  Abdomen soft, NTTP. Extremities:  Warm, no ischemia. Additional:  Awake and alert.   Motor grossly nonfocal.    Labs:  Recent Results (from the past 24 hour(s))   GLUCOSE, POC    Collection Time: 17  8:19 AM   Result Value Ref Range    Glucose (POC) 196 (H) 70 - 110 mg/dL   OCCULT BLOOD, STOOL    Collection Time: 17  9:45 AM   Result Value Ref Range    Occult blood, stool POSITIVE (A) NEG     GLUCOSE, POC    Collection Time: 06/26/17 11:05 AM   Result Value Ref Range    Glucose (POC) 353 (H) 70 - 110 mg/dL   GLUCOSE, POC    Collection Time: 06/26/17  3:50 PM   Result Value Ref Range    Glucose (POC) 114 (H) 70 - 110 mg/dL   GLUCOSE, POC    Collection Time: 06/26/17  9:21 PM   Result Value Ref Range    Glucose (POC) 183 (H) 70 - 262 mg/dL   METABOLIC PANEL, BASIC    Collection Time: 06/27/17  3:11 AM   Result Value Ref Range    Sodium 136 136 - 145 mmol/L    Potassium 3.4 (L) 3.5 - 5.5 mmol/L    Chloride 104 100 - 108 mmol/L    CO2 21 21 - 32 mmol/L    Anion gap 11 3.0 - 18 mmol/L    Glucose 153 (H) 74 - 99 mg/dL    BUN 61 (H) 7.0 - 18 MG/DL    Creatinine 1.55 (H) 0.6 - 1.3 MG/DL    BUN/Creatinine ratio 39 (H) 12 - 20      GFR est AA 39 (L) >60 ml/min/1.73m2    GFR est non-AA 32 (L) >60 ml/min/1.73m2    Calcium 8.4 (L) 8.5 - 10.1 MG/DL   CBC WITH AUTOMATED DIFF    Collection Time: 06/27/17  3:11 AM   Result Value Ref Range    WBC 5.1 4.6 - 13.2 K/uL    RBC 2.54 (L) 4.20 - 5.30 M/uL    HGB 7.0 (L) 12.0 - 16.0 g/dL    HCT 20.1 (L) 35.0 - 45.0 %    MCV 79.1 74.0 - 97.0 FL    MCH 27.6 24.0 - 34.0 PG    MCHC 34.8 31.0 - 37.0 g/dL    RDW 15.4 (H) 11.6 - 14.5 %    PLATELET 98 (L) 340 - 420 K/uL    MPV 9.9 9.2 - 11.8 FL    NEUTROPHILS 75 (H) 40 - 73 %    LYMPHOCYTES 9 (L) 21 - 52 %    MONOCYTES 12 (H) 3 - 10 %    EOSINOPHILS 3 0 - 5 %    BASOPHILS 1 0 - 2 %    ABS. NEUTROPHILS 3.8 1.8 - 8.0 K/UL    ABS. LYMPHOCYTES 0.5 (L) 0.9 - 3.6 K/UL    ABS. MONOCYTES 0.6 0.05 - 1.2 K/UL    ABS. EOSINOPHILS 0.1 0.0 - 0.4 K/UL    ABS.  BASOPHILS 0.0 0.0 - 0.06 K/UL    DF AUTOMATED     GLUCOSE, POC    Collection Time: 06/27/17  7:34 AM   Result Value Ref Range    Glucose (POC) 195 (H) 70 - 110 mg/dL       Signed By: Kyle Madden MD     June 27, 2017 7:37 AM

## 2017-06-27 NOTE — PROGRESS NOTES
Called MD Lynn to report H/H this am of 7.0&20.1 orders to repeat the test taken. Patient assymtomatic at this time. Will continue to monitor.

## 2017-06-27 NOTE — PROGRESS NOTES
06/27/17 0901   Vital Signs   Temp 98.4 °F (36.9 °C)   Temp Source Oral   Pulse (Heart Rate) 81   Cardiac Rhythm NSR   Resp Rate 19   O2 Sat (%) 100 %   Level of Consciousness Alert   /72   MAP (Calculated) 91   BP 1 Method Automatic   BP 1 Location Right arm   BP Patient Position Sitting   Blood transfusion started. Premedication administered with tylenol 650 mg and benadryl 25 mg. Vital signs stable, patient alert and oriented X 4. Will continue to monitor for any signs and symptoms of reaction.

## 2017-06-28 ENCOUNTER — APPOINTMENT (OUTPATIENT)
Dept: CT IMAGING | Age: 77
DRG: 291 | End: 2017-06-28
Attending: INTERNAL MEDICINE
Payer: MEDICARE

## 2017-06-28 ENCOUNTER — ANESTHESIA EVENT (OUTPATIENT)
Dept: ENDOSCOPY | Age: 77
DRG: 291 | End: 2017-06-28
Payer: MEDICARE

## 2017-06-28 LAB
ABO + RH BLD: NORMAL
BLD PROD TYP BPU: NORMAL
BLOOD GROUP ANTIBODIES SERPL: NORMAL
BPU ID: NORMAL
CALLED TO:,BCALL1: NORMAL
CALLED TO:,BCALL2: NORMAL
CROSSMATCH RESULT,%XM: NORMAL
GLUCOSE BLD STRIP.AUTO-MCNC: 162 MG/DL (ref 70–110)
GLUCOSE BLD STRIP.AUTO-MCNC: 177 MG/DL (ref 70–110)
GLUCOSE BLD STRIP.AUTO-MCNC: 208 MG/DL (ref 70–110)
GLUCOSE BLD STRIP.AUTO-MCNC: 272 MG/DL (ref 70–110)
HCT VFR BLD AUTO: 29.5 % (ref 35–45)
HGB BLD-MCNC: 10.1 G/DL (ref 12–16)
SPECIMEN EXP DATE BLD: NORMAL
STATUS OF UNIT,%ST: NORMAL
UNIT DIVISION, %UDIV: 0

## 2017-06-28 PROCEDURE — 74011250637 HC RX REV CODE- 250/637: Performed by: INTERNAL MEDICINE

## 2017-06-28 PROCEDURE — 70450 CT HEAD/BRAIN W/O DYE: CPT

## 2017-06-28 PROCEDURE — 82962 GLUCOSE BLOOD TEST: CPT

## 2017-06-28 PROCEDURE — 85018 HEMOGLOBIN: CPT | Performed by: FAMILY MEDICINE

## 2017-06-28 PROCEDURE — 74011000250 HC RX REV CODE- 250: Performed by: INTERNAL MEDICINE

## 2017-06-28 PROCEDURE — 71250 CT THORAX DX C-: CPT

## 2017-06-28 PROCEDURE — 36415 COLL VENOUS BLD VENIPUNCTURE: CPT | Performed by: FAMILY MEDICINE

## 2017-06-28 PROCEDURE — 65270000029 HC RM PRIVATE

## 2017-06-28 PROCEDURE — 94640 AIRWAY INHALATION TREATMENT: CPT

## 2017-06-28 PROCEDURE — 74011636637 HC RX REV CODE- 636/637: Performed by: FAMILY MEDICINE

## 2017-06-28 PROCEDURE — 74011250637 HC RX REV CODE- 250/637: Performed by: PHYSICIAN ASSISTANT

## 2017-06-28 RX ORDER — IPRATROPIUM BROMIDE AND ALBUTEROL SULFATE 2.5; .5 MG/3ML; MG/3ML
3 SOLUTION RESPIRATORY (INHALATION)
Status: DISCONTINUED | OUTPATIENT
Start: 2017-06-28 | End: 2017-06-29

## 2017-06-28 RX ORDER — INSULIN GLARGINE 100 [IU]/ML
17 INJECTION, SOLUTION SUBCUTANEOUS DAILY
Status: DISCONTINUED | OUTPATIENT
Start: 2017-06-29 | End: 2017-06-29 | Stop reason: HOSPADM

## 2017-06-28 RX ADMIN — Medication 250 MG: at 10:58

## 2017-06-28 RX ADMIN — METOPROLOL TARTRATE 50 MG: 50 TABLET ORAL at 10:59

## 2017-06-28 RX ADMIN — LEVOFLOXACIN 250 MG: 250 TABLET, FILM COATED ORAL at 12:53

## 2017-06-28 RX ADMIN — FUROSEMIDE 20 MG: 20 TABLET ORAL at 10:58

## 2017-06-28 RX ADMIN — SIMVASTATIN 20 MG: 20 TABLET, FILM COATED ORAL at 10:59

## 2017-06-28 RX ADMIN — FERROUS SULFATE TAB 325 MG (65 MG ELEMENTAL FE) 325 MG: 325 (65 FE) TAB at 10:58

## 2017-06-28 RX ADMIN — NEOMYCIN SULFATE, POLYMYXIN B SULFATE, HYDROCORTISONE 4 DROP: 3.5; 10000; 1 SOLUTION/ DROPS AURICULAR (OTIC) at 22:12

## 2017-06-28 RX ADMIN — NEOMYCIN SULFATE, POLYMYXIN B SULFATE, HYDROCORTISONE 4 DROP: 3.5; 10000; 1 SOLUTION/ DROPS AURICULAR (OTIC) at 09:00

## 2017-06-28 RX ADMIN — ACETAMINOPHEN 650 MG: 325 TABLET ORAL at 01:03

## 2017-06-28 RX ADMIN — AMLODIPINE BESYLATE 5 MG: 5 TABLET ORAL at 10:57

## 2017-06-28 RX ADMIN — NEOMYCIN SULFATE, POLYMYXIN B SULFATE, HYDROCORTISONE 4 DROP: 3.5; 10000; 1 SOLUTION/ DROPS AURICULAR (OTIC) at 12:50

## 2017-06-28 RX ADMIN — IPRATROPIUM BROMIDE AND ALBUTEROL SULFATE 3 ML: .5; 3 SOLUTION RESPIRATORY (INHALATION) at 16:33

## 2017-06-28 RX ADMIN — INSULIN GLARGINE 14 UNITS: 100 INJECTION, SOLUTION SUBCUTANEOUS at 11:01

## 2017-06-28 RX ADMIN — INSULIN LISPRO 9 UNITS: 100 INJECTION, SOLUTION INTRAVENOUS; SUBCUTANEOUS at 11:30

## 2017-06-28 RX ADMIN — INSULIN LISPRO 6 UNITS: 100 INJECTION, SOLUTION INTRAVENOUS; SUBCUTANEOUS at 16:30

## 2017-06-28 RX ADMIN — INSULIN LISPRO 3 UNITS: 100 INJECTION, SOLUTION INTRAVENOUS; SUBCUTANEOUS at 22:11

## 2017-06-28 RX ADMIN — CALCIUM POLYCARBOPHIL 625 MG TABLET 625 MG: at 10:58

## 2017-06-28 RX ADMIN — FERROUS SULFATE TAB 325 MG (65 MG ELEMENTAL FE) 325 MG: 325 (65 FE) TAB at 18:05

## 2017-06-28 RX ADMIN — FAMOTIDINE 20 MG: 20 TABLET ORAL at 10:58

## 2017-06-28 RX ADMIN — NEOMYCIN SULFATE, POLYMYXIN B SULFATE, HYDROCORTISONE 4 DROP: 3.5; 10000; 1 SOLUTION/ DROPS AURICULAR (OTIC) at 18:13

## 2017-06-28 RX ADMIN — CHOLECALCIFEROL (VITAMIN D3) 10 MCG (400 UNIT) TABLET 2000 UNITS: at 10:58

## 2017-06-28 RX ADMIN — THERA TABS 1 TABLET: TAB at 10:58

## 2017-06-28 RX ADMIN — IPRATROPIUM BROMIDE AND ALBUTEROL SULFATE 3 ML: .5; 3 SOLUTION RESPIRATORY (INHALATION) at 21:46

## 2017-06-28 RX ADMIN — INSULIN LISPRO 3 UNITS: 100 INJECTION, SOLUTION INTRAVENOUS; SUBCUTANEOUS at 07:30

## 2017-06-28 RX ADMIN — METOPROLOL TARTRATE 50 MG: 50 TABLET ORAL at 18:05

## 2017-06-28 RX ADMIN — VITAM B12 100 MCG: 100 TAB at 10:58

## 2017-06-28 RX ADMIN — ASPIRIN 81 MG: 81 TABLET, COATED ORAL at 10:58

## 2017-06-28 NOTE — PROGRESS NOTES
[facility]  -  Progress Note      Patient: Chas Zambrano MRN: [id number]  SSN: [id number]    YOB: 1940  Age: 67 y.o. Sex: female      Admit Date: 6/21/2017    Hospital Problem List:     Hospital Problems  Date Reviewed: 6/21/2017          Codes Class Noted POA    UTI (urinary tract infection) ICD-10-CM: N39.0  ICD-9-CM: 599.0  6/21/2017 Unknown        Sepsis ([facility] ICD-10-CM: A41.9  ICD-9-CM: 038.9, 995.91  6/21/2017 Unknown        * (Principal)PNA (pneumonia) ICD-10-CM: J18.9  ICD-9-CM: 486  6/21/2017 Unknown        Hypoxia ICD-10-CM: R09.02  ICD-9-CM: 799.02  6/21/2017 Unknown        CAD (coronary artery disease) ICD-10-CM: I25.10  ICD-9-CM: 414.00  7/7/2015 Yes        Thrombocytopenia ([facility] ICD-10-CM: D69.6  ICD-9-CM: 287.5  7/7/2015 Yes               -CHF with diastolic dysfunction with some volume overload 2/2 IVFs. Diuresed. Now compensated and back on PTA PO lasix.   -Lower GI bleed, s/p 2 untis PRBCs 6/27/17  -Mild aortic stenosis by echo this admission, valve mean gradient 16mmHg with aortic valve area of 1.32cm2. -CAD hx, s/p PCI to Cx and mLAD with BETHEL in Aug 2016, on DAPT with Brilinta and ASA. -Chronic Brilinta  -ECHO 7/2016 with normal EF and G1DD, repeat echo 6/22/17 with normal EF and G2DD, mild aortic stenosis, wall motion abnormalities noted on both studies. -UTI on antx, Klebsiella bacteremia  -HTN  -HLD  -DM type II  -New onset difficulty hearing       Primary cardiologist Dr. Mckeon Severe:     -s/p PRBC transfusion. Hgb improved to 9.8. Monitor stool for evidence of continued bleeding. GI consult ?   -Continue to hold Brilinta. Continue asa only. -BP stable. Continue on BB and norvasc. Off ACEi and hydral d/t low-normal BP with anemia. May be able to resume prior to discharge. Subjective:     No new complaints.      Objective:      Patient Vitals for the past 8 hrs:   Temp Pulse Resp BP SpO2   06/28/17 0800 97.2 °F (36.2 °C) 82 18 132/62 100 % 06/28/17 0354 98.7 °F (37.1 °C) 68 17 136/69 100 %         Patient Vitals for the past 96 hrs:   Weight   06/28/17 0354 60.8 kg (134 lb)         Intake/Output Summary (Last 24 hours) at 06/28/17 1032  Last data filed at 06/28/17 0958   Gross per 24 hour   Intake           1073.4 ml   Output             1100 ml   Net            -26.6 ml       Physical Exam:  General: awake, alert, oriented x 3, [place]  Neck:  Supple, no jvd  Lungs: clear to auscultation bilaterally   Heart:  Reg rate and rhythm  Abdomen:  Soft, without tenderness, no organomegaly  Extremities:  No LE edema    Data Review:     Labs: Results:       Chemistry Recent Labs      06/27/17 0311 06/26/17   0300   GLU  153*  136*   NA  136  135*   K  3.4*  3.6   CL  104  105   CO2  21  20*   BUN  61*  54*   CREA  1.55*  1.37*   CA  8.4*  8.6   AGAP  11  10   BUCR  39*  39*      CBC w/Diff Recent Labs      06/27/17 2030 06/27/17   0740  06/27/17 0311 06/26/17   0300   WBC   --    --   5.1  6.8   RBC   --    --   2.54*  2.77*   HGB  9.8*  7.2*  7.0*  7.7*   HCT  28.0*  20.2*  20.1*  21.7*   PLT   --    --   98*  95*   GRANS   --    --   75*  80*   LYMPH   --    --   9*  8*   EOS   --    --   3  2      Cardiac Enzymes No results found for: CPK, CKMMB, CKMB, RCK3, CKMBT, CKNDX, CKND1, CLAUDIO, TROPT, TROIQ, ANNAMARIA, TROPT, TNIPOC, BNP, BNPP   Coagulation No results for input(s): PTP, INR, APTT in the last 72 hours. No lab exists for component: INREXT    Lipid Panel Lab Results   Component Value Date/Time    Cholesterol, total 188 07/01/2016 11:53 PM    HDL Cholesterol 46 07/01/2016 11:53 PM    LDL, calculated 94.2 07/01/2016 11:53 PM    VLDL, calculated 47.8 07/01/2016 11:53 PM    Triglyceride 239 07/01/2016 11:53 PM    CHOL/HDL Ratio 4.1 07/01/2016 11:53 PM      BNP No results found for: BNP, BNPP, XBNPT   Liver Enzymes No results for input(s): TP, ALB, TBIL, AP, SGOT, GPT in the last 72 hours.     No lab exists for component: DBIL   Digoxin    Thyroid Studies Lab Results   Component Value Date/Time    TSH 0.56 06/15/2015 04:19 AM            Signed By: ELOISA Whitlock     June 28, 2017

## 2017-06-28 NOTE — PROGRESS NOTES
SUBJECTIVE:    Patient was sleeping but woke up. Hearing is still impaired but better. States her sister helps her to make medical decision. No chest pain or abdominal pain. Cough present. SOB better. No N/V/D. OBJECTIVE:    Visit Vitals    /66 (BP 1 Location: Left arm, BP Patient Position: At rest)    Pulse 74    Temp 97 °F (36.1 °C)    Resp 18    Ht 5' (1.524 m)    Wt 60.8 kg (134 lb)    SpO2 100%    BMI 26.17 kg/m2     NECK: NO JVD  CVS: RRR  RS: Diminished in bases, no wheezes  GI: NT, BS +  Extremities: trace pedal edema  General: [place], answers questions appropriately    ASSESSMENT:    1. Acute on chronic diastolic CHF, Now compensated. 2. Acute on chronic anemia stable after 2 untis PRBCs 6/27/17  3. Mild aortic stenosis   4. CAD hx, s/p PCI to Cx and mLAD with BETHEL. 5. UTI  6. HTN  7. Dyslipidemia  8. DM type II  9. Hearing loss and discharge during this admission, unclear day of onset  10. Acute hypoxic respiratory failure could be atelectasis and sec # 2    PLAN:    CT head/chest/abd ordered as patient unable to provide detailed history  Cardiology to follow  Waiting for GI and ENT opinion [talked to dr. Susanne Jiménez and left a message for dr. Cyndy Johnson antibiotic  PT/OT ordered  Taper patient off of oxygen. Nebs added.      CMP: No results found for: NA, K, CL, CO2, AGAP, GLU, BUN, CREA, GFRAA, GFRNA, CA, MG, PHOS, ALB, TBIL, TP, ALB, GLOB, AGRAT, SGOT, ALT, GPT     CBC:   Lab Results   Component Value Date/Time    HGB 10.1 (L) 06/28/2017 09:59 AM    HCT 29.5 (L) 06/28/2017 09:59 AM

## 2017-06-28 NOTE — ROUTINE PROCESS
Bedside and Verbal shift change report given to LATRICIA Martines (oncoming nurse) by Kory Drew RN (offgoing nurse). Report included the following information SBAR, Kardex, MAR and Recent Results. SITUATION:  Code Status: Full Code  Reason for Admission: Sepsis ([facility]  PNA (pneumonia)  UTI (urinary tract infection)  PNA (pneumonia)  UTI (urinary tract infection)  Hypoxia  Hospital day: 7  Problem List:       Hospital Problems  Date Reviewed: 6/21/2017          Codes Class Noted POA    UTI (urinary tract infection) ICD-10-CM: N39.0  ICD-9-CM: 599.0  6/21/2017 Unknown        Sepsis ([facility] ICD-10-CM: A41.9  ICD-9-CM: 038.9, 995.91  6/21/2017 Unknown        * (Principal)PNA (pneumonia) ICD-10-CM: J18.9  ICD-9-CM: 486  6/21/2017 Unknown        Hypoxia ICD-10-CM: R09.02  ICD-9-CM: 799.02  6/21/2017 Unknown        CAD (coronary artery disease) ICD-10-CM: I25.10  ICD-9-CM: 414.00  7/7/2015 Yes        Thrombocytopenia ([facility] ICD-10-CM: D69.6  ICD-9-CM: 287.5  7/7/2015 Yes              BACKGROUND:   Past Medical History:   Past Medical History:   Diagnosis Date    Anemia     CAD (coronary artery disease)     Coronary Stenting needed, hematology workup needed prior to stent placement, treating medically at this time    Cancer [facility])     colon    Cancer ([facility]     breast - left    Cardiac catheterization 08/17/2016    LM patent. mLAD 99.9% (2.25 x 28-mm Xience BETHEL, resid 0%). oD1 60%. oD2 95%. pCX 90% (2.5 x 13-mm Xience BETHEL, resid 0%). OMB patent.  Cardiac echocardiogram 07/01/2016    EF 50%. Basal inferior, basal-mid inferolateral hypk. Gr 1 DDfx. RVSP 50-60 mmHg. Mild LAE.   Mod, eccentric MR.      Diabetes ([facility]     Hypercholesteremia     Hypertension     Thrombocytopenia ([facility]       Patient taking anticoagulants yes    Patient has a defibrillator: no    History of shots YES for example, flu, pneumonia, tetanus   Isolation History NO for example, MRSA, CDiff    ASSESSMENT:  Changes in Assessment Throughout Shift: None  Significant Changes in 24 hours (for example, RR/code, fall)  Patient has Central Line: no Reasons if yes: N/A  Patient has Jean Cath: no Reasons if yes: N/A   Mobility Issues  PT  IV Patency  OR Checklist  Pending Tests    Last Vitals:  Vitals w/ MEWS Score (last day)     Date/Time MEWS Score Pulse Resp Temp BP Level of Consciousness SpO2    06/28/17 0354 1 68 17 98.7 °F (37.1 °C) 136/69 Alert 100 %    06/28/17 0000 1 70 17 98.4 °F (36.9 °C) 124/77 Alert 100 %    06/27/17 2000 1 68 16 98.2 °F (36.8 °C) 124/88 Alert 100 %    06/27/17 1630 1 70 18 97.1 °F (36.2 °C) 128/63 Alert 100 %    06/27/17 1335 1 76 18 98.4 °F (36.9 °C) 122/61 Alert 100 %    06/27/17 1300 1 69 18 97.1 °F (36.2 °C) 110/63 Alert 100 %    06/27/17 1245 -- -- -- 97.6 °F (36.4 °C) -- Alert 100 %    06/27/17 1224 -- 67 18 98.2 °F (36.8 °C) 126/61 -- 100 %    06/27/17 1210 1 68 19 97.8 °F (36.6 °C) 108/67 Alert 100 %    06/27/17 1123 1 70 18 98.1 °F (36.7 °C) 109/55 Alert 100 %    06/27/17 1017 3 (!)  129 18 97.6 °F (36.4 °C) 115/68 Alert 100 %    06/27/17 0948 1 69 18 97.8 °F (36.6 °C) 122/71 Alert 100 %    06/27/17 0915 1 73 18 98.4 °F (36.9 °C) 132/64 Alert 100 %    06/27/17 0901 1 81 19 98.4 °F (36.9 °C) 129/72 Alert 100 %    06/27/17 0805 1 89 18 98.2 °F (36.8 °C) 122/69 Alert 100 %    06/27/17 0356 -- 82 18 97.1 °F (36.2 °C) 125/67 -- --            PAIN    Pain Assessment    Pain Intensity 1: 3 (06/28/17 0000)    Pain Location 1: Leg    Pain Intervention(s) 1: Ice    Patient Stated Pain Goal: 0  Intervention effective: N/A  Time of last intervention: N/A Reassessment Completed: N/A  Other actions taken for pain: N/A    Last 3 Weights:  Last 3 Recorded Weights in this Encounter    06/21/17 1240 06/28/17 0354   Weight: 61.2 kg (135 lb) 60.8 kg (134 lb)   Weight change:     INTAKE/OUPUT    Current Shift:      Last three shifts: 06/26 0701 - 06/27 1900  In: 1313.4 [P.O.:720]  Out: 1900 [Urine:1900]    RECOMMENDATIONS AND DISCHARGE PLANNING  Patient needs and requests: ENT consult    Pending tests/procedures: Stool occult blood     Discharge plan for patient: SNF for rehab    Discharge planning Needs or Barriers: none    Estimated Discharge Date: TBD Posted on Whiteboard in Patients Room: yes       \"HEALS\" SAFETY CHECK  A safety check occurred in the patient's room between off going nurse and oncoming nurse listed above. The safety check included the below items:    H  High Alert Medications Verify all high alert medication drips (heparin, PCA, etc.)  E  Equipment Suction is set up for ALL patients (with eden)  Red plugs utilized for all equipment (IV pumps, etc.)  WOWs wiped down at end of shift. Room stocked with oxygen, suction, and other unit-specific supplies  A  Alarms Bed alarm is set for fall risk patients  Ensure chair alarm is in place and activated if patient is up in a chair  L  Lines Check IV for any infiltration  Jean bag is empty if patient has a Jean   Tubing and IV bags are labeled  S  Safety  Room is clean, patient is clean, and equipment is clean. Hallways are clear from equipment besides carts. Fall bracelet on for fall risk patients  Ensure room is clear and free of clutter  Suction is set up for ALL patients (with eden)  Hallways are clear from equipment besides carts.    Isolation precautions followed, supplies available outside room, sign posted    Leni Oppenheim, RN

## 2017-06-28 NOTE — DIABETES MGMT
Glycemic Control Plan of Care      POC BG range on 06/27/2017: 114-353 mg/dL. Increased basal lantus dose to 14 units daily. POC BG report on 06/28/2017 at time of review: 162, 272 mg/dL. Increased basal lantus insulin dose to 17 units daily. Discussed with patient continued glycemic intervention. Recommendation(s):  1.) Basal lantus insulin 17 units as ordered. 2.) Continue correctional lispro insulin ACHS. Very resistant dose. 3.) Evaluate need for prandial lispro insulin if BG elevated. Assessment:  Patient is 67year old with past medical history including type 2 diabetes mellitus, CAD, colon and left breast cancer, hypercholesterolemia, hypertension, and thrombocytopenia - was admitted on 06/21/2017 with c/o nausea, vomiting, diarrhea, fatigue, and fever x5 days. Noted:  Community acquired pneumonia vs bronchitis. UTI. Type 2 diabetes mellitus. 5.2% (06/22/2017)    Most recent blood glucose values:    Results for Thierry Sevilla (MRN [id number]) as of 6/28/2017 15:17   Ref. Range 6/27/2017 07:34 6/27/2017 11:53 6/27/2017 15:32 6/27/2017 23:02   GLUCOSE,FAST - POC Latest Ref Range: 70 - 110 mg/dL 195 (H) 224 (H) 160 (H) 170 (H)     Results for Thierry Sevilla (MRN [id number]) as of 6/28/2017 15:17   Ref. Range 6/28/2017 08:12 6/28/2017 11:58   GLUCOSE,FAST - POC Latest Ref Range: 70 - 110 mg/dL 162 (H) 272 (H)     Current A1C: 5.2% (06/22/2017) is equivalent to average blood glucose of 103 mg/dL during the past 2-3 months. Current hospital diabetes medications:  Basal lantus insulin 17 units daily. Correctional lispro insulin ACHS. Very resistant dose. Total daily dose insulin requirement previous day: 06/27/2017  Lantus: 14 units  Lispro: 15 units  TDD: 29 units of insulin    Home diabetes medications: As stated by patient on 06/22/2017:  Metformin 500 mg twice daily with meals.     Diet: Cardiac regular; consistent carb 1500-1600kcal    Goals:  Blood glucose will be within target range of  mg/dL by 07/01/2017.      Education:  _X__  Refer to Diabetes Education Record: 06/22/2017             ___  Education not indicated at this time    Edgard Stout RN

## 2017-06-29 ENCOUNTER — ANESTHESIA (OUTPATIENT)
Dept: ENDOSCOPY | Age: 77
DRG: 291 | End: 2017-06-29
Payer: MEDICARE

## 2017-06-29 VITALS
RESPIRATION RATE: 20 BRPM | DIASTOLIC BLOOD PRESSURE: 64 MMHG | TEMPERATURE: 97.1 F | HEART RATE: 86 BPM | OXYGEN SATURATION: 97 % | BODY MASS INDEX: 27.27 KG/M2 | WEIGHT: 138.9 LBS | SYSTOLIC BLOOD PRESSURE: 129 MMHG | HEIGHT: 60 IN

## 2017-06-29 PROBLEM — K74.60 CIRRHOSIS OF LIVER NOT DUE TO ALCOHOL (HCC): Status: ACTIVE | Noted: 2017-06-29

## 2017-06-29 PROBLEM — I85.00 ESOPHAGEAL VARICES (HCC): Status: ACTIVE | Noted: 2017-06-29

## 2017-06-29 LAB
ALBUMIN SERPL BCP-MCNC: 2.7 G/DL (ref 3.4–5)
ALBUMIN/GLOB SERPL: 0.8 {RATIO} (ref 0.8–1.7)
ALP SERPL-CCNC: 153 U/L (ref 45–117)
ALT SERPL-CCNC: 16 U/L (ref 13–56)
ANION GAP BLD CALC-SCNC: 10 MMOL/L (ref 3–18)
AST SERPL W P-5'-P-CCNC: 19 U/L (ref 15–37)
BASOPHILS # BLD AUTO: 0 K/UL (ref 0–0.06)
BASOPHILS # BLD: 0 % (ref 0–3)
BILIRUB DIRECT SERPL-MCNC: 0.2 MG/DL (ref 0–0.2)
BILIRUB SERPL-MCNC: 0.5 MG/DL (ref 0.2–1)
BUN SERPL-MCNC: 53 MG/DL (ref 7–18)
BUN/CREAT SERPL: 54 (ref 12–20)
CALCIUM SERPL-MCNC: 8.6 MG/DL (ref 8.5–10.1)
CHLORIDE SERPL-SCNC: 108 MMOL/L (ref 100–108)
CO2 SERPL-SCNC: 22 MMOL/L (ref 21–32)
CREAT SERPL-MCNC: 0.99 MG/DL (ref 0.6–1.3)
DIFFERENTIAL METHOD BLD: ABNORMAL
EOSINOPHIL # BLD: 0.2 K/UL (ref 0–0.4)
EOSINOPHIL NFR BLD: 3 % (ref 0–5)
ERYTHROCYTE [DISTWIDTH] IN BLOOD BY AUTOMATED COUNT: 16.1 % (ref 11.6–14.5)
GLOBULIN SER CALC-MCNC: 3.2 G/DL (ref 2–4)
GLUCOSE BLD STRIP.AUTO-MCNC: 144 MG/DL (ref 70–110)
GLUCOSE BLD STRIP.AUTO-MCNC: 156 MG/DL (ref 70–110)
GLUCOSE BLD STRIP.AUTO-MCNC: 158 MG/DL (ref 70–110)
GLUCOSE BLD STRIP.AUTO-MCNC: 163 MG/DL (ref 70–110)
GLUCOSE SERPL-MCNC: 146 MG/DL (ref 74–99)
HCT VFR BLD AUTO: 25.2 % (ref 35–45)
HCT VFR BLD AUTO: 25.6 % (ref 35–45)
HGB BLD-MCNC: 8.7 G/DL (ref 12–16)
HGB BLD-MCNC: 8.8 G/DL (ref 12–16)
LYMPHOCYTES # BLD AUTO: 11 % (ref 20–51)
LYMPHOCYTES # BLD: 0.7 K/UL (ref 0.8–3.5)
MAGNESIUM SERPL-MCNC: 2.2 MG/DL (ref 1.6–2.6)
MCH RBC QN AUTO: 28.2 PG (ref 24–34)
MCHC RBC AUTO-ENTMCNC: 34.5 G/DL (ref 31–37)
MCV RBC AUTO: 81.6 FL (ref 74–97)
METAMYELOCYTES NFR BLD MANUAL: 2 %
MONOCYTES # BLD: 0.4 K/UL (ref 0–1)
MONOCYTES NFR BLD AUTO: 7 % (ref 2–9)
NEUTS BAND NFR BLD MANUAL: 1 % (ref 0–5)
NEUTS SEG # BLD: 4.9 K/UL (ref 1.8–8)
NEUTS SEG NFR BLD AUTO: 76 % (ref 42–75)
PLATELET # BLD AUTO: 135 K/UL (ref 135–420)
PLATELET COMMENTS,PCOM: ABNORMAL
PMV BLD AUTO: 10.2 FL (ref 9.2–11.8)
POTASSIUM SERPL-SCNC: 3.9 MMOL/L (ref 3.5–5.5)
PROT SERPL-MCNC: 5.9 G/DL (ref 6.4–8.2)
RBC # BLD AUTO: 3.09 M/UL (ref 4.2–5.3)
RBC MORPH BLD: ABNORMAL
SODIUM SERPL-SCNC: 140 MMOL/L (ref 136–145)
T4 FREE SERPL-MCNC: 1.3 NG/DL (ref 0.7–1.5)
TSH SERPL DL<=0.05 MIU/L-ACNC: 0.77 UIU/ML (ref 0.36–3.74)
WBC # BLD AUTO: 6.3 K/UL (ref 4.6–13.2)

## 2017-06-29 PROCEDURE — 74011250637 HC RX REV CODE- 250/637: Performed by: INTERNAL MEDICINE

## 2017-06-29 PROCEDURE — 80076 HEPATIC FUNCTION PANEL: CPT | Performed by: INTERNAL MEDICINE

## 2017-06-29 PROCEDURE — 77030018846 HC SOL IRR STRL H20 ICUM -A: Performed by: INTERNAL MEDICINE

## 2017-06-29 PROCEDURE — 94640 AIRWAY INHALATION TREATMENT: CPT

## 2017-06-29 PROCEDURE — 77030019988 HC FCPS ENDOSC DISP BSC -B: Performed by: INTERNAL MEDICINE

## 2017-06-29 PROCEDURE — 74011000250 HC RX REV CODE- 250: Performed by: INTERNAL MEDICINE

## 2017-06-29 PROCEDURE — 0DJ08ZZ INSPECTION OF UPPER INTESTINAL TRACT, VIA NATURAL OR ARTIFICIAL OPENING ENDOSCOPIC: ICD-10-PCS | Performed by: INTERNAL MEDICINE

## 2017-06-29 PROCEDURE — 83735 ASSAY OF MAGNESIUM: CPT | Performed by: INTERNAL MEDICINE

## 2017-06-29 PROCEDURE — 36415 COLL VENOUS BLD VENIPUNCTURE: CPT | Performed by: INTERNAL MEDICINE

## 2017-06-29 PROCEDURE — 74011250637 HC RX REV CODE- 250/637: Performed by: PHYSICIAN ASSISTANT

## 2017-06-29 PROCEDURE — 74011000250 HC RX REV CODE- 250: Performed by: NURSE ANESTHETIST, CERTIFIED REGISTERED

## 2017-06-29 PROCEDURE — 85018 HEMOGLOBIN: CPT | Performed by: INTERNAL MEDICINE

## 2017-06-29 PROCEDURE — 74011250636 HC RX REV CODE- 250/636

## 2017-06-29 PROCEDURE — 77030008565 HC TBNG SUC IRR ERBE -B: Performed by: INTERNAL MEDICINE

## 2017-06-29 PROCEDURE — 84443 ASSAY THYROID STIM HORMONE: CPT | Performed by: INTERNAL MEDICINE

## 2017-06-29 PROCEDURE — 76040000019: Performed by: INTERNAL MEDICINE

## 2017-06-29 PROCEDURE — 74011000250 HC RX REV CODE- 250

## 2017-06-29 PROCEDURE — 97161 PT EVAL LOW COMPLEX 20 MIN: CPT

## 2017-06-29 PROCEDURE — 74011636637 HC RX REV CODE- 636/637: Performed by: FAMILY MEDICINE

## 2017-06-29 PROCEDURE — 74011250636 HC RX REV CODE- 250/636: Performed by: NURSE ANESTHETIST, CERTIFIED REGISTERED

## 2017-06-29 PROCEDURE — 85025 COMPLETE CBC W/AUTO DIFF WBC: CPT | Performed by: INTERNAL MEDICINE

## 2017-06-29 PROCEDURE — 84439 ASSAY OF FREE THYROXINE: CPT | Performed by: INTERNAL MEDICINE

## 2017-06-29 PROCEDURE — 74011636637 HC RX REV CODE- 636/637: Performed by: INTERNAL MEDICINE

## 2017-06-29 PROCEDURE — 80048 BASIC METABOLIC PNL TOTAL CA: CPT | Performed by: INTERNAL MEDICINE

## 2017-06-29 PROCEDURE — 82962 GLUCOSE BLOOD TEST: CPT

## 2017-06-29 PROCEDURE — 76060000031 HC ANESTHESIA FIRST 0.5 HR: Performed by: INTERNAL MEDICINE

## 2017-06-29 RX ORDER — FERROUS SULFATE 325(65) MG
325 TABLET, DELAYED RELEASE (ENTERIC COATED) ORAL 2 TIMES DAILY
Qty: 30 TAB | Refills: 0 | Status: SHIPPED
Start: 2017-06-29 | End: 2017-07-14

## 2017-06-29 RX ORDER — LIDOCAINE HYDROCHLORIDE 20 MG/ML
INJECTION, SOLUTION EPIDURAL; INFILTRATION; INTRACAUDAL; PERINEURAL AS NEEDED
Status: DISCONTINUED | OUTPATIENT
Start: 2017-06-29 | End: 2017-06-29 | Stop reason: HOSPADM

## 2017-06-29 RX ORDER — DEXTROSE 50 % IN WATER (D50W) INTRAVENOUS SYRINGE
25-50 AS NEEDED
Status: DISCONTINUED | OUTPATIENT
Start: 2017-06-29 | End: 2017-06-29 | Stop reason: HOSPADM

## 2017-06-29 RX ORDER — LIDOCAINE HYDROCHLORIDE 10 MG/ML
0.1 INJECTION, SOLUTION EPIDURAL; INFILTRATION; INTRACAUDAL; PERINEURAL AS NEEDED
Status: DISCONTINUED | OUTPATIENT
Start: 2017-06-29 | End: 2017-06-29 | Stop reason: HOSPADM

## 2017-06-29 RX ORDER — INSULIN LISPRO 100 [IU]/ML
INJECTION, SOLUTION INTRAVENOUS; SUBCUTANEOUS ONCE
Status: DISCONTINUED | OUTPATIENT
Start: 2017-06-29 | End: 2017-06-29 | Stop reason: HOSPADM

## 2017-06-29 RX ORDER — LEVOFLOXACIN 250 MG/1
250 TABLET ORAL EVERY 24 HOURS
Qty: 5 TAB | Refills: 0 | Status: SHIPPED
Start: 2017-06-29 | End: 2017-07-04

## 2017-06-29 RX ORDER — IPRATROPIUM BROMIDE AND ALBUTEROL SULFATE 2.5; .5 MG/3ML; MG/3ML
3 SOLUTION RESPIRATORY (INHALATION)
Status: DISCONTINUED | OUTPATIENT
Start: 2017-06-29 | End: 2017-06-29 | Stop reason: HOSPADM

## 2017-06-29 RX ORDER — INSULIN GLARGINE 100 [IU]/ML
INJECTION, SOLUTION SUBCUTANEOUS
Qty: 1 VIAL | Refills: 0 | Status: SHIPPED
Start: 2017-06-29 | End: 2017-10-23

## 2017-06-29 RX ORDER — NEOMYCIN SULFATE, POLYMYXIN B SULFATE, HYDROCORTISONE 3.5; 10000; 1 MG/ML; [USP'U]/ML; MG/ML
4 SOLUTION/ DROPS AURICULAR (OTIC) 4 TIMES DAILY
Qty: 10 ML | Refills: 0 | Status: SHIPPED
Start: 2017-06-29 | End: 2017-07-09

## 2017-06-29 RX ORDER — FUROSEMIDE 20 MG/1
TABLET ORAL
Qty: 30 TAB | Refills: 0 | Status: ON HOLD
Start: 2017-06-29 | End: 2019-03-26 | Stop reason: CLARIF

## 2017-06-29 RX ORDER — IPRATROPIUM BROMIDE AND ALBUTEROL SULFATE 2.5; .5 MG/3ML; MG/3ML
3 SOLUTION RESPIRATORY (INHALATION) 3 TIMES DAILY
Qty: 45 ML | Refills: 0 | Status: SHIPPED
Start: 2017-06-29 | End: 2017-07-04

## 2017-06-29 RX ORDER — ONDANSETRON 2 MG/ML
4 INJECTION INTRAMUSCULAR; INTRAVENOUS ONCE
Status: DISCONTINUED | OUTPATIENT
Start: 2017-06-29 | End: 2017-06-29 | Stop reason: HOSPADM

## 2017-06-29 RX ORDER — INSULIN LISPRO 100 [IU]/ML
INJECTION, SOLUTION INTRAVENOUS; SUBCUTANEOUS
Qty: 1 VIAL | Refills: 0 | Status: SHIPPED
Start: 2017-06-29 | End: 2017-10-23

## 2017-06-29 RX ORDER — PANTOPRAZOLE SODIUM 40 MG/1
40 TABLET, DELAYED RELEASE ORAL 2 TIMES DAILY
Qty: 60 TAB | Refills: 0 | Status: ON HOLD
Start: 2017-06-29 | End: 2019-03-26

## 2017-06-29 RX ORDER — PANTOPRAZOLE SODIUM 40 MG/1
40 TABLET, DELAYED RELEASE ORAL
Status: DISCONTINUED | OUTPATIENT
Start: 2017-06-29 | End: 2017-06-29 | Stop reason: HOSPADM

## 2017-06-29 RX ORDER — PROPOFOL 10 MG/ML
INJECTION, EMULSION INTRAVENOUS AS NEEDED
Status: DISCONTINUED | OUTPATIENT
Start: 2017-06-29 | End: 2017-06-29 | Stop reason: HOSPADM

## 2017-06-29 RX ORDER — SODIUM CHLORIDE, SODIUM LACTATE, POTASSIUM CHLORIDE, CALCIUM CHLORIDE 600; 310; 30; 20 MG/100ML; MG/100ML; MG/100ML; MG/100ML
50 INJECTION, SOLUTION INTRAVENOUS CONTINUOUS
Status: DISCONTINUED | OUTPATIENT
Start: 2017-06-29 | End: 2017-06-29 | Stop reason: HOSPADM

## 2017-06-29 RX ORDER — MAGNESIUM SULFATE 100 %
4 CRYSTALS MISCELLANEOUS AS NEEDED
Status: DISCONTINUED | OUTPATIENT
Start: 2017-06-29 | End: 2017-06-29 | Stop reason: HOSPADM

## 2017-06-29 RX ORDER — DOCUSATE SODIUM 100 MG/1
100 CAPSULE, LIQUID FILLED ORAL 2 TIMES DAILY
Qty: 30 CAP | Refills: 0 | Status: SHIPPED
Start: 2017-06-29 | End: 2017-07-14

## 2017-06-29 RX ORDER — SODIUM CHLORIDE 0.9 % (FLUSH) 0.9 %
5-10 SYRINGE (ML) INJECTION AS NEEDED
Status: DISCONTINUED | OUTPATIENT
Start: 2017-06-29 | End: 2017-06-29 | Stop reason: HOSPADM

## 2017-06-29 RX ADMIN — FUROSEMIDE 20 MG: 20 TABLET ORAL at 15:05

## 2017-06-29 RX ADMIN — IPRATROPIUM BROMIDE AND ALBUTEROL SULFATE 3 ML: .5; 3 SOLUTION RESPIRATORY (INHALATION) at 00:17

## 2017-06-29 RX ADMIN — CALCIUM POLYCARBOPHIL 625 MG TABLET 625 MG: at 15:06

## 2017-06-29 RX ADMIN — ASPIRIN 81 MG: 81 TABLET, COATED ORAL at 15:05

## 2017-06-29 RX ADMIN — FAMOTIDINE 20 MG: 10 INJECTION INTRAVENOUS at 09:08

## 2017-06-29 RX ADMIN — VITAM B12 100 MCG: 100 TAB at 15:05

## 2017-06-29 RX ADMIN — AMLODIPINE BESYLATE 5 MG: 5 TABLET ORAL at 15:04

## 2017-06-29 RX ADMIN — PANTOPRAZOLE SODIUM 40 MG: 40 TABLET, DELAYED RELEASE ORAL at 15:04

## 2017-06-29 RX ADMIN — SIMVASTATIN 20 MG: 20 TABLET, FILM COATED ORAL at 15:05

## 2017-06-29 RX ADMIN — SODIUM CHLORIDE, SODIUM LACTATE, POTASSIUM CHLORIDE, AND CALCIUM CHLORIDE 50 ML/HR: 600; 310; 30; 20 INJECTION, SOLUTION INTRAVENOUS at 09:11

## 2017-06-29 RX ADMIN — PROPOFOL 20 MG: 10 INJECTION, EMULSION INTRAVENOUS at 09:35

## 2017-06-29 RX ADMIN — LEVOFLOXACIN 250 MG: 250 TABLET, FILM COATED ORAL at 12:00

## 2017-06-29 RX ADMIN — INSULIN LISPRO 3 UNITS: 100 INJECTION, SOLUTION INTRAVENOUS; SUBCUTANEOUS at 09:04

## 2017-06-29 RX ADMIN — PROPOFOL 20 MG: 10 INJECTION, EMULSION INTRAVENOUS at 09:39

## 2017-06-29 RX ADMIN — THERA TABS 1 TABLET: TAB at 15:05

## 2017-06-29 RX ADMIN — Medication 250 MG: at 15:06

## 2017-06-29 RX ADMIN — NEOMYCIN SULFATE, POLYMYXIN B SULFATE, HYDROCORTISONE 4 DROP: 3.5; 10000; 1 SOLUTION/ DROPS AURICULAR (OTIC) at 13:00

## 2017-06-29 RX ADMIN — IPRATROPIUM BROMIDE AND ALBUTEROL SULFATE 3 ML: .5; 3 SOLUTION RESPIRATORY (INHALATION) at 14:32

## 2017-06-29 RX ADMIN — PROPOFOL 50 MG: 10 INJECTION, EMULSION INTRAVENOUS at 09:30

## 2017-06-29 RX ADMIN — LIDOCAINE HYDROCHLORIDE 40 MG: 20 INJECTION, SOLUTION EPIDURAL; INFILTRATION; INTRACAUDAL; PERINEURAL at 09:30

## 2017-06-29 RX ADMIN — CHOLECALCIFEROL (VITAMIN D3) 10 MCG (400 UNIT) TABLET 2000 UNITS: at 15:06

## 2017-06-29 NOTE — DISCHARGE SUMMARY
PATIENT DISCHARGE INSTRUCTIONS      PATIENT DISCHARGE INSTRUCTIONS    Queen Marcos / [id number] : 1940    Admitted 2017 Discharged: 2017     Dictated # [id number]    · It is important that you take the medication exactly as they are prescribed. · Keep your medication in the bottles provided by the pharmacist and keep a list of the medication names, dosages, and times to be taken in your wallet. · Do not take other medications without consulting your doctor. What to do at Home    Recommended Diet: Cardiac Diet, Diabetic Diet and Renal Diet    Recommended Activity: Activity as tolerated and PT/OT Eval and Treat    Current Discharge Medication List      START taking these medications    Details   albuterol-ipratropium (DUO-NEB) 2.5 mg-0.5 mg/3 ml nebu 3 mL by Nebulization route three (3) times daily for 5 days. Qty: 45 mL, Refills: 0      pantoprazole (PROTONIX) 40 mg tablet Take 1 Tab by mouth two (2) times a day. Qty: 60 Tab, Refills: 0      neomycin-polymyxin-hydrocortisone (CORTISPORIN) otic solution Administer 4 Drops into each ear four (4) times daily for 10 days. Qty: 10 mL, Refills: 0      levoFLOXacin (LEVAQUIN) 250 mg tablet Take 1 Tab by mouth every twenty-four (24) hours for 5 days. Qty: 5 Tab, Refills: 0      insulin lispro (HUMALOG) 100 unit/mL injection For Blood Sugar (mg/dL) of:              Less than 150 =   0 units  150 -199 =   2 units  200 -249 =   4 units  250 -299 =   6 units  300 -349 =   8 units  350 and above =   10 units  Qty: 1 Vial, Refills: 0      insulin glargine (LANTUS) 100 unit/mL injection 10 units subcutaneously daily  Qty: 1 Vial, Refills: 0      ferrous sulfate (IRON) 325 mg (65 mg iron) EC tablet Take 1 Tab by mouth two (2) times a day for 15 days. Qty: 30 Tab, Refills: 0      docusate sodium (COLACE) 100 mg capsule Take 1 Cap by mouth two (2) times a day for 15 days.   Qty: 30 Cap, Refills: 0         CONTINUE these medications which have CHANGED Details   furosemide (LASIX) 20 mg tablet 20 mg po daily  Qty: 30 Tab, Refills: 0         CONTINUE these medications which have NOT CHANGED    Details   amLODIPine (NORVASC) 5 mg tablet TAKE ONE TABLET BY MOUTH ONCE DAILY  Qty: 30 Tab, Refills: 0      nitroglycerin (NITROLINGUAL) 400 mcg/spray spray 1 Spray by SubLINGual route every five (5) minutes as needed. Qty: 1 Bottle, Refills: 2      hydrALAZINE (APRESOLINE) 10 mg tablet Take 1 Tab by mouth three (3) times daily. Qty: 90 Tab, Refills: 6      metoprolol tartrate (LOPRESSOR) 50 mg tablet Take 1 Tab by mouth two (2) times a day. Qty: 60 Tab, Refills: 6      ascorbic acid, vitamin C, (VITAMIN C) 250 mg tablet Take 1 Tab by mouth daily. Qty: 60 Tab, Refills: 1      ferrous sulfate 325 mg (65 mg iron) tablet Take 1 Tab by mouth two (2) times daily (with meals). Qty: 60 Tab, Refills: 1      metFORMIN (GLUCOPHAGE) 500 mg tablet Take 500 mg by mouth two (2) times daily (with meals). multivitamin (ONE A DAY) tablet Take 1 Tab by mouth daily. cholecalciferol (VITAMIN D3) 1,000 unit tablet Take 2 Tabs by mouth daily. Qty: 60 Tab, Refills: 1      simvastatin (ZOCOR) 20 mg tablet Take 20 mg by mouth daily. omega 3-dha-epa-fish oil (FISH OIL) 100-160-1,000 mg cap Take 1,000 mg by mouth two (2) times a day. cyanocobalamin (VITAMIN B12) 100 mcg tablet Take 100 mcg by mouth daily. aspirin 81 mg tablet Take 81 mg by mouth daily. calcium polycarbophil (FIBER LAXATIVE) 625 mg tablet Take 1,250 mg by mouth daily.          STOP taking these medications       benazepril (LOTENSIN) 20 mg tablet Comments:   Reason for Stopping:         benazepril (LOTENSIN) 5 mg tablet Comments:   Reason for Stopping:         ticagrelor (BRILINTA) 90 mg tablet Comments:   Reason for Stopping:                 Signed By: Geoff Ayers MD     June 29, 2017

## 2017-06-29 NOTE — CONSULTS
[address]    Name:  Tono Grier  MR#:  [id number]  :  1940  Account #:  [de-identified]  Date of Adm:  2017  Date of Consultation:  2017      REASON FOR CONSULTATION: Please advise opinion regarding  melena. HISTORY OF PRESENT ILLNESS: The patient is a 70-year-old female  who has presented with issue of shortness of breath, weakness. She  had dark colored stool, was found to be heme positive. She does take  iron, and says that turned her stool dark generally. Known to have  cirrhosis of liver, nonalcoholic. Has had EGD and banding x2 in the  past. The last one was done in . She has had colon cancer in the  past, resected a few years ago. She had a negative surveillance  colonoscopy in . Next colonoscopy will be due in . Denies  pain. Was on aspirin and Brilinta. She denies any hematemesis, no  weight loss, appetite is good. She is hard of hearing. Has been  adequately transfused. PAST MEDICAL HISTORY: Remarkable for hypertension, dyslipidemia,  diabetes. Portal hypertension with pancytopenia due to splenomegaly. Cirrhosis of liver. ALLERGIES: LATEX. FAMILY HISTORY/SOCIAL HISTORY: No GI malignancy in the  family. Does not smoke or drink. No illicit drug use. MEDICATIONS: Have been listed in the chart, reviewed by me. REVIEW OF SYSTEMS: No visual disturbances. Does have difficulty  in hearing. No neck pain, chest pain, PND, orthopnea, polydipsia,  polyuria, odynophagia, tingling, numbness, abdominal pain, diarrhea. PHYSICAL EXAMINATION  GENERAL: The patient is a fairly built female. She is in no distress. VITAL SIGNS: Afebrile, stable vital signs. HEENT: Head normocephalic, atraumatic. Eyes: Pallor is noted. NECK: Supple. LUNGS: Clear to auscultation. HEART: Both sounds normal. Rhythm regular. ABDOMEN: Soft, no guarding, rigidity or rebound. EXTREMITIES: Unremarkable. NEUROLOGICAL: Communicates well.     LABORATORY DATA: Hemoglobin and hematocrit is 10 and 29,  normal WBC and platelet count of 03,895. Coagulation profile shows  prothrombin time of 17. AST/ALT are 17 and 77. Bilirubin is 2.6. ASSESSMENT AND PLAN  1. Heme positive stool in a patient who has portal hypertension,  esophageal varices due to cirrhosis of liver, and nonalcoholic  steatohepatitis. More than likely bleeding from the stomach lining with  aspirin and Brilinta on board. Dark stool related to iron. Will require  upper endoscopy with banding of varices, which I will perform  tomorrow. Will look for peptic ulcer disease. 2. She does not need colonoscopy at this time. Next colonoscopy in  2020. 3. Coronary artery disease. 4. Dyslipidemia. 5. Hypertension. 6. Anemia, multifactorial, factors such as splenomegaly playing a role  here. 7. Thrombocytopenia related to splenomegaly.         MD Vinny Velez / Camilo Pillai  D:  06/28/2017   15:28  T:  06/28/2017   20:44  Job #:  [id number]

## 2017-06-29 NOTE — DIABETES MGMT
Glycemic Control Plan of Care      POC BG range on 06/28/2017: 162-272 mg/dL. POC BG report on 06/29/2017 at time of review: 156, 163, 158, 144 mg/dL. Patient was happy to hear improved blood sugar. Recommendation(s):  1.) Basal lantus insulin 17 units as ordered. 2.) Continue correctional lispro insulin ACHS. Very resistant dose. Assessment:  Patient is 67year old with past medical history including type 2 diabetes mellitus, CAD, colon and left breast cancer, hypercholesterolemia, hypertension, and thrombocytopenia - was admitted on 06/21/2017 with c/o nausea, vomiting, diarrhea, fatigue, and fever x5 days. Noted:  Community acquired pneumonia vs bronchitis. UTI. Status post upper endoscopy 06/29/2017: esophageal varices without bleed and hiatal hernia. Type 2 diabetes mellitus. 5.2% (06/22/2017)    Most recent blood glucose values:    Results for Desmond Arias (MRN [id number]) as of 6/29/2017 13:37   Ref. Range 6/28/2017 08:12 6/28/2017 11:58 6/28/2017 16:49 6/28/2017 22:11   GLUCOSE,FAST - POC Latest Ref Range: 70 - 110 mg/dL 162 (H) 272 (H) 208 (H) 177 (H)     Results for Desmond Arias (MRN [id number]) as of 6/29/2017 13:37   Ref. Range 6/29/2017 07:52 6/29/2017 08:22 6/29/2017 09:53 6/29/2017 11:21   GLUCOSE,FAST - POC Latest Ref Range: 70 - 110 mg/dL 156 (H) 163 (H) 158 (H) 144 (H)     Current A1C: 5.2% (06/22/2017) is equivalent to average blood glucose of 103 mg/dL during the past 2-3 months. Current hospital diabetes medications:  Basal lantus insulin 17 units daily. Correctional lispro insulin ACHS. Very resistant dose. Total daily dose insulin requirement previous day: 06/28/2017  Lantus: 14 units  Lispro: 21 units  TDD: 35 units of insulin    Home diabetes medications: As stated by patient on 06/22/2017:  Metformin 500 mg twice daily with meals.     Diet: Cardiac regular; consistent carb 1500-1600kcal    Goals:  Blood glucose will be within target range of  mg/dL by 07/02/2017.      Education:  _X__  Refer to Diabetes Education Record: 06/22/2017             ___  Education not indicated at this time    Viola Fairchild RN

## 2017-06-29 NOTE — DISCHARGE SUMMARY
[facility]  TRANSFER SUMMARY    Name:  Werner Hidalgo  MR#:  [id number]  :  1940  Account #:  [de-identified]  Date of Adm:  2017  Date of Transfer:      DISPOSITION: Discharged to skilled nursing facility. DISCHARGE CONDITION: Stable. DISCHARGE DIAGNOSES  1. Acute on chronic diastolic heart failure, now compensated. 2. Acute on chronic anemia due to #3. 3. Hypertensive portal gastropathy with very friable fundal mucosa. 4. Mild aortic stenosis. 5. Acute hypoxic respiratory failure due to atelectasis and congestive  heart failure, now resolved. 6. Bibasilar atelectasis with bronchitis. 7. Hearing loss due to otitis media and effusion bilaterally. 8. Type 2 diabetes mellitus with A1c of 6.  9. Dyslipidemia. 10. Hypertension. 11. Urinary tract infection status post treatment. 12. Coronary artery disease status post percutaneous coronary  intervention in the past.    DISCHARGE MEDICATIONS  1. Duonebs 3 times a day for 5 days, then every 4 hours as needed for  shortness of breath. 2. Protonix 40 mg twice a day for 1 months, then 40 mg daily. 3. Cortisporin 4 drops each ear 4 times a day at least for 10 days. 4. Levaquin 250 mg daily for 5 days. 5. Insulin Humalog sliding scale. 6. Insulin Lantus 10 units daily. 7. Metformin 500 mg b.i.d.  8. Colace 100 mg b.i.d. for 15 days, hold it for diarrhea. 9. Ferrous sulfate 325 mg b.i.d. for 15 days. 10. Lasix 20 mg daily. 11. Norvasc 5 mg daily. 12. Nitroglycerin sublingual q.5 minutes p.r.n. for pain. 13. Hydralazine 10 mg 3 times a day. 14. Metoprolol 50 mg twice a day. 15. Vitamin C 250 mg daily. 16. Multivitamin 1 tablet daily. 17. Vitamin D 3000 units daily. 18. Zocor 20 mg at bedtime. 19. Fish oil 1 capsule 2 times a day. 20. Vitamin B12 100 mcg daily. 21. Aspirin 81 mg daily. 22. Fiber laxative 1250 mg p.o. daily. IMAGING AND PROCEDURES  1. Chest x-ray was done at the time of admission.   2. CT scan of the head was done, negative for any acute finding, other  than old lacunar infarct. 3. CT scan of the chest, abdomen and pelvis without contrast done  reported the patient having bilateral infiltrate, extensive area of  atelectasis, small bilateral pleural effusion, cardiomegaly,  hepatosplenomegaly, cholelithiasis. 4. Endoscopy was done on the day of discharge, showed hiatal hernia  as well as hypertensive portal gastropathy. 5. Urine culture x1 on June 21st showed Klebsiella sensitive to  Levaquin. 6. Blood culture x2 done, negative. 7. Echocardiogram was done, showed ejection fraction 55% with grade  2 diastolic dysfunction. CONSULTANTS  1. Gastroenterology, Dr. Melly Contreras. 2. ENT with Dr. Goran Mcleod. 3. Cardiology with Dr. Maurice Sears and . HOSPITAL COURSE: The patient was initially admitted to the hospital  with a complaint of cough on June 21, 2017. Please refer to hospital  admission H and P done by Dr. Verena Jackson for further details. She was  admitted here with a diagnosis of hypoxia and atelectasis. She was  started on antibiotic. Blood culture remains negative. Urine culture was  positive. She will be continued on Levaquin for 5 more days. She did  not have any more fever or leukocytosis. During hospital course, it was  also found out that the patient had anemia and required a blood  transfusion, 2 units. She was seen by GI and endoscopy was done,  showed hiatal hernia and portal gastropathy with possibility of bleed;  however, she was not bleeding at the time of endoscopy. I talked to Dr. Melly Contreras on the day of discharge. He told me to continue PPI b.i.d. for 30  days and then daily as well as no Brilinta. Brilinta was stopped by  cardiologist. Dr. Maurice Sears and  were following this patient. The  patient initially required IV Lasix, then changed to p.o. Lasix.  She was  continued on Norvasc and beta blocker and we will continue  hydralazine for hypertension and coronary artery  disease management. She did not have any chest pain. On the day of  discharge, she walked without oxygen and saturating 95% on room  air. She will be continued on nebs for bilateral atelectasis for 5 days  and then every 4 hours as needed nebulizer. The patient's hemoglobin  remained stable at 8.8 on the day of discharge. She was tolerating a  diet without any problem. She initially had some renal insufficiency due  to diuresis which got back to normal. She had an A1c of 6. We will  continue metformin, sliding scale and Lantus. Lantus can be stopped if  she has some hypoglycemia over there; however, she had  hyperglycemia throughout the hospital stay. The patient was seen by  PT/OT and recommended skilled care placement. During the hospital course, the patient had some worsening hearing  loss. She was seen by Dr. Joselyn Gomez from ENT department and was  diagnosed with otitis media and effusion. He will follow her as an  outpatient. She was started on ear drops. These will be continued for  the next 10 days. DISCHARGE INSTRUCTION  1. Diet: ADA cardiac diet. Fluid restrictions up to 1200 mL a day. 2. Activity as tolerated. 3. Follow with PCP in 5 to 7 days, otherwise nursing home doctor to  follow this patient. 4. Follow with Dr. Moises Lai in 10 days. 5. Follow up with Dr. Keturah Cannon in 2-3 weeks. 6. Follow with Dr. Deny Tony in 3-4 weeks. 7. CBC after 5 days.         MD Gloria Cameron / [employer] [facility]  D:  06/29/2017   14:54  T:  06/29/2017   15:40  Job #:  [id number]

## 2017-06-29 NOTE — PROCEDURES
[facility]  [place], [address]     Endoscopic Gastroduodenoscopy Procedure Note    Sonia Mcconnell  1940  [id number]    Indication:  Gastrointestinal hemorrhage, unspecified     : Davidson Hernandez MD    Referring Provider:  Nohelia Jenkins MD    Anesthesia/Sedation:  MAC anesthesia      Procedure Details     After infomed consent was obtained for the procedure, with all risks and benefits of procedure explained the patient was taken to the endoscopy suite and placed in the left lateral decubitus position. Following sequential administration of sedation as per above, the endoscope was inserted into the mouth and advanced under direct vision to fourth portion of the duodenum. A careful inspection was made as the gastroscope was withdrawn, including a retroflexed view of the proximal stomach; findings and interventions are described below. Findings:   Esophagus:hiatal hernia small in size, very small varices without bleeding. Stomach: hypertensive portal gastropathy with very friable fundal mucosa with occasional blood oozing. Duodenum/jejunum: normal    Therapies:  none    Specimens: None           Complications:   None; patient tolerated the procedure well. EBL:  None. Impression:   Hypertensive portal gastropathy with friable gastric mucosa, small esophageal varacies, gastritis. Small hiatal hernia    Recommendations:  -Continue acid suppression. , -Acid suppression with a proton pump inhibitor. , -Regular diet. , Continue PPI if aspirin is to be continued. Anemia is multifactorial. Blood draws to be limited to as needed basis.     Davidson Hernandez MD  6/29/2017  9:45 AM

## 2017-06-29 NOTE — ANESTHESIA POSTPROCEDURE EVALUATION
Post-Anesthesia Evaluation and Assessment    Patient: Chas Zambrano MRN: [id number]  SSN: [id number]    YOB: 1940  Age: 67 y.o. Sex: female       Cardiovascular Function/Vital Signs  Visit Vitals    /50    Pulse 85    Temp 36.9 °C (98.4 °F)    Resp 20    Ht 5' (1.524 m)    Wt 60.8 kg (134 lb)    SpO2 99%    BMI 26.17 kg/m2       Patient is status post MAC anesthesia for Procedure(s):  ENDOSCOPY WITH BANDING. Nausea/Vomiting: None    Postoperative hydration reviewed and adequate. Pain:  Pain Scale 1: Numeric (0 - 10) (06/29/17 1002)  Pain Intensity 1: 0 (06/29/17 1002)   Managed    Neurological Status: At baseline    Mental Status and Level of Consciousness: Arousable    Pulmonary Status:   O2 Device: Nasal cannula (06/29/17 0959)   Adequate oxygenation and airway patent    Complications related to anesthesia: None    Post-anesthesia assessment completed.  No concerns    Signed By: Raimundo Pérez MD     June 29, 2017

## 2017-06-29 NOTE — PROGRESS NOTES
The patient is being discharged ***. The patient has been given discharge instructions. The patient education was given. The patient medication list was reviewed and education was given. The patient **** was removed. The patient will be transported by ***. Pt allowed time to ask questions or voice concerns.

## 2017-06-29 NOTE — PROGRESS NOTES
Feels well. No nausea or vomiting. Dark stool. PE:   Visit Vitals    /77    Pulse 80    Temp 98.1 °F (36.7 °C)    Resp 16    Ht 5' (1.524 m)    Wt 60.8 kg (134 lb)    SpO2 100%    BMI 26.17 kg/m2     Pallor. Heart normal sounds  Lungs CTA  Abdomen benign  Recent Results (from the past 12 hour(s))   GLUCOSE, POC    Collection Time: 06/28/17 10:11 PM   Result Value Ref Range    Glucose (POC) 177 (H) 70 - 110 mg/dL   CBC WITH AUTOMATED DIFF    Collection Time: 06/29/17  4:26 AM   Result Value Ref Range    WBC 6.3 4.6 - 13.2 K/uL    RBC 3.09 (L) 4.20 - 5.30 M/uL    HGB 8.7 (L) 12.0 - 16.0 g/dL    HCT 25.2 (L) 35.0 - 45.0 %    MCV 81.6 74.0 - 97.0 FL    MCH 28.2 24.0 - 34.0 PG    MCHC 34.5 31.0 - 37.0 g/dL    RDW 16.1 (H) 11.6 - 14.5 %    PLATELET 639 310 - 039 K/uL    MPV 10.2 9.2 - 11.8 FL    NEUTROPHILS 76 (H) 42 - 75 %    BAND NEUTROPHILS 1 0 - 5 %    LYMPHOCYTES 11 (L) 20 - 51 %    MONOCYTES 7 2 - 9 %    EOSINOPHILS 3 0 - 5 %    BASOPHILS 0 0 - 3 %    METAMYELOCYTES 2 (H) 0 %    ABS. NEUTROPHILS 4.9 1.8 - 8.0 K/UL    ABS. LYMPHOCYTES 0.7 (L) 0.8 - 3.5 K/UL    ABS. MONOCYTES 0.4 0 - 1.0 K/UL    ABS. EOSINOPHILS 0.2 0.0 - 0.4 K/UL    ABS.  BASOPHILS 0.0 0.0 - 0.06 K/UL    DF MANUAL      PLATELET COMMENTS DECREASED PLATELETS      RBC COMMENTS ANISOCYTOSIS  1+       METABOLIC PANEL, BASIC    Collection Time: 06/29/17  4:26 AM   Result Value Ref Range    Sodium 140 136 - 145 mmol/L    Potassium 3.9 3.5 - 5.5 mmol/L    Chloride 108 100 - 108 mmol/L    CO2 22 21 - 32 mmol/L    Anion gap 10 3.0 - 18 mmol/L    Glucose 146 (H) 74 - 99 mg/dL    BUN 53 (H) 7.0 - 18 MG/DL    Creatinine 0.99 0.6 - 1.3 MG/DL    BUN/Creatinine ratio 54 (H) 12 - 20      GFR est AA >60 >60 ml/min/1.73m2    GFR est non-AA 54 (L) >60 ml/min/1.73m2    Calcium 8.6 8.5 - 10.1 MG/DL   MAGNESIUM    Collection Time: 06/29/17  4:26 AM   Result Value Ref Range    Magnesium 2.2 1.6 - 2.6 mg/dL   HEPATIC FUNCTION PANEL    Collection Time: 06/29/17 4:26 AM   Result Value Ref Range    Protein, total 5.9 (L) 6.4 - 8.2 g/dL    Albumin 2.7 (L) 3.4 - 5.0 g/dL    Globulin 3.2 2.0 - 4.0 g/dL    A-G Ratio 0.8 0.8 - 1.7      Bilirubin, total 0.5 0.2 - 1.0 MG/DL    Bilirubin, direct 0.2 0.0 - 0.2 MG/DL    Alk. 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 GI bleed related to portal gastropathy? PUD? Related to aspirin. For EGD today to further eval that.  Anemia multifactorial.    ASMITA Gage MD

## 2017-06-29 NOTE — ROUTINE PROCESS
Bedside and Verbal shift change report given to Bobbi RN (oncoming nurse) by Ching Gonzalez RN (offgoing nurse). Report included the following information SBAR, Kardex, MAR and Recent Results. SITUATION:    Code Status: Full Code   Reason for Admission: Sepsis ([facility]   PNA (pneumonia)   UTI (urinary tract infection)   PNA (pneumonia)   UTI (urinary tract infection)   Hypoxia   dx    [facility] day: 8   Problem List:       Hospital Problems  Date Reviewed: 6/21/2017          Codes Class Noted POA    UTI (urinary tract infection) ICD-10-CM: N39.0  ICD-9-CM: 599.0  6/21/2017 Unknown        [facility] ICD-10-CM: A41.9  ICD-9-CM: 038.9, 995.91  6/21/2017 Unknown        * (Principal)PNA (pneumonia) ICD-10-CM: J18.9  ICD-9-CM: 486  6/21/2017 Unknown        Hypoxia ICD-10-CM: R09.02  ICD-9-CM: 799.02  6/21/2017 Unknown        CAD (coronary artery disease) ICD-10-CM: I25.10  ICD-9-CM: 414.00  7/7/2015 Yes        Thrombocytopenia ([facility] ICD-10-CM: D69.6  ICD-9-CM: 287.5  7/7/2015 Yes              BACKGROUND:    Past Medical History:   Past Medical History:   Diagnosis Date    Anemia     CAD (coronary artery disease)     Coronary Stenting needed, hematology workup needed prior to stent placement, treating medically at this time    Cancer [facility])     colon    Cancer ([facility] [facility]     breast - left    Cardiac catheterization 08/17/2016    LM patent. mLAD 99.9% (2.25 x 28-mm Xience BETHEL, resid 0%). oD1 60%. oD2 95%. pCX 90% (2.5 x 13-mm Xience BETHEL, resid 0%). OMB patent.  Cardiac echocardiogram 07/01/2016    EF 50%. Basal inferior, basal-mid inferolateral hypk. Gr 1 DDfx. RVSP 50-60 mmHg. Mild LAE.   Mod, eccentric MR.      Diabetes ([facility]     Hypercholesteremia     Hypertension     Thrombocytopenia ([facility]          Patient taking anticoagulants no     ASSESSMENT:    Changes in Assessment Throughout Shift: none     Patient has Central Line: no Reasons if yes:    Patient has Jean Cath: no Reasons if yes:  Last Vitals:     Vitals:    06/28/17 2146 06/29/17 0017 06/29/17 0424 06/29/17 0753   BP:  130/80 130/84 145/81   Pulse:  79 79 92   Resp:  18 18 18   Temp:  98 °F (36.7 °C) 97.5 °F (36.4 °C) 97.1 °F (36.2 °C)   SpO2: 96% 97% 100% 100%   Weight:       Height:            IV and DRAINS (will only show if present)   [REMOVED] Peripheral IV 06/21/17 Right Antecubital-Site Assessment: Clean, dry, & intact  [REMOVED] Peripheral IV 06/24/17 Left Forearm-Site Assessment: Clean, dry, & intact  [REMOVED] Peripheral IV 06/24/17 Right Forearm-Site Assessment: Clean, dry, & intact  Peripheral IV 06/28/17 Right Wrist-Site Assessment: Clean, dry, & intact     WOUND (if present)   Wound Type:  none   Dressing present Dressing Present : No   Wound Concerns/Notes:  none     PAIN    Pain Assessment    Pain Intensity 1: 0 (06/28/17 2333)    Pain Location 1: Leg    Pain Intervention(s) 1: Ice    Patient Stated Pain Goal: 0  o Interventions for Pain:  none,   o Intervention effective: none givne  o Time of last intervention: none   o Reassessment Completed: no      Last 3 Weights:  Last 3 Recorded Weights in this Encounter    06/21/17 1240 06/28/17 0354   Weight: 61.2 kg (135 lb) 60.8 kg (134 lb)     Weight change:      INTAKE/OUPUT    Current Shift:      Last three shifts: 06/27 1901 - 06/29 0700  In: 720 [P.O.:720]  Out: -      LAB RESULTS     Recent Labs      06/29/17   0426  06/28/17   0959  06/27/17   2030   06/27/17 0311   WBC  6.3   --    --    --   5.1   HGB  8.7*  10.1*  9.8*   < >  7.0*   HCT  25.2*  29.5*  28.0*   < >  20.1*   PLT  135   --    --    --   98*    < > = values in this interval not displayed. Recent Labs      06/29/17   0426  06/27/17 0311   NA  140  136   K  3.9  3.4*   GLU  146*  153*   BUN  53*  61*   CREA  0.99  1.55*   CA  8.6  8.4*   MG  2.2   --        RECOMMENDATIONS AND DISCHARGE PLANNING     1.  Pending tests/procedures/ Plan of Care or Other Needs: upper endoscopy this am 2. Discharge plan for patient and Needs/Barriers: home, possible after endo    3. Estimated Discharge Date: 06/28/17 Posted on Whiteboard in [facility] Room: yes      4. The patient's care plan was reviewed with the oncoming nurse. \"HEALS\" SAFETY CHECK      Fall Risk    Total Score: 2    Safety Measures: Safety Measures: Bed/Chair-Wheels locked, Bed in low position, Call light within reach    A safety check occurred in the patient's room between off going nurse and oncoming nurse listed above. The safety check included the below items  Area Items   H  High Alert Medications - Verify all high alert medication drips (heparin, PCA, etc.)   E  Equipment - Suction is set up for ALL patients (with eden)  - Red plugs utilized for all equipment (IV pumps, etc.)  - WOWs wiped down at end of shift.  - Room stocked with oxygen, suction, and other unit-specific supplies   A  Alarms - Bed alarm is set for fall risk patients  - Ensure chair alarm is in place and activated if patient is up in a chair   L  Lines - Check IV for any infiltration  - Jean bag is empty if patient has a Jean   - Tubing and IV bags are labeled   S  Safety   - Room is clean, patient is clean, and equipment is clean. - Hallways are clear from equipment besides carts. - Fall bracelet on for fall risk patients  - Ensure room is clear and free of clutter  - Suction is set up for ALL patients (with eden)  - Hallways are clear from equipment besides carts.    - Isolation precautions followed, supplies available outside room, sign posted     Gwen Degroot RN

## 2017-06-29 NOTE — PROGRESS NOTES
[facility]  -  Progress Note      Patient: Mavis Betancourt MRN: [id number]  SSN: [id number]    YOB: 1940  Age: 67 y.o. Sex: female      Admit Date: 6/21/2017    Hospital Problem List:     Hospital Problems  Date Reviewed: 6/21/2017          Codes Class Noted POA    UTI (urinary tract infection) ICD-10-CM: N39.0  ICD-9-CM: 599.0  6/21/2017 Unknown        Sepsis ([facility] [facility] ICD-10-CM: A41.9  ICD-9-CM: 038.9, 995.91  6/21/2017 Unknown        * (Principal)PNA (pneumonia) ICD-10-CM: J18.9  ICD-9-CM: 486  6/21/2017 Unknown        Hypoxia ICD-10-CM: R09.02  ICD-9-CM: 799.02  6/21/2017 Unknown        CAD (coronary artery disease) ICD-10-CM: I25.10  ICD-9-CM: 414.00  7/7/2015 Yes        Thrombocytopenia ([facility] ICD-10-CM: D69.6  ICD-9-CM: 287.5  7/7/2015 Yes            -CHF with diastolic dysfunction with some volume overload 2/2 IVFs. Diuresed. Now compensated and back on PTA PO lasix. -GI bleed, s/p 2 units PRBCs 6/27/17, s/p upper endoscopy noted to have esophageal varices without bleed. Medical management with PPI recommended. -Mild aortic stenosis by echo this admission, valve mean gradient 16mmHg with aortic valve area of 1.32cm2. -CAD hx, s/p PCI to Cx and mLAD with BETHEL in Aug 2016, on DAPT with Brilinta and ASA. -Chronic Brilinta  -ECHO 7/2016 with normal EF and G1DD, repeat echo 6/22/17 with normal EF and G2DD, mild aortic stenosis, wall motion abnormalities noted on both studies. -UTI on antx, Klebsiella bacteremia  -HTN  -HLD  -DM type II  -bilat middle ear effusions with retractive otitis secondary to recent PNA, effecting hearing ability, evaluated by ENT, watchful waiting   [facility] cardiologist Dr. Liss Keith:     -Jesse Boast not resume Brilinta at this time.   -Continue on low dose asa as tolerated. -Monitor for dark stools/melena.   -Continue on BB and norvasc. BP has still been labile.  Would hold ACEi and hydral. We can resume in outpatient setting when appropriate. Subjective:     No new complaints. Objective:      Patient Vitals for the past 8 hrs:   Temp Pulse Resp BP SpO2   06/29/17 1052 97.1 °F (36.2 °C) 86 20 129/64 97 %   06/29/17 1001 - 85 20 111/50 99 %   06/29/17 0942 98.4 °F (36.9 °C) 78 20 96/45 97 %   06/29/17 0914 98.1 °F (36.7 °C) 80 16 138/77 100 %   06/29/17 0753 97.1 °F (36.2 °C) 92 18 145/81 100 %   06/29/17 0424 97.5 °F (36.4 °C) 79 18 130/84 100 %         Patient Vitals for the past 96 hrs:   Weight   06/28/17 0354 60.8 kg (134 lb)         Intake/Output Summary (Last 24 hours) at 06/29/17 1136  Last data filed at 06/29/17 0941   Gross per 24 hour   Intake              490 ml   Output                0 ml   Net              490 ml       Physical Exam:  General:  Awake, alert, oriented, sister at bedside, [employer]  Neck:  Supple, no jvd  Lungs:  Clear to auscultation bilat  Heart: reg rate and rhythm  Abdomen:  Soft, non-tender, bs +  Extremities:  No edema, atraumatic    Data Review:     Labs: Results:       Chemistry Recent Labs      06/29/17 0426 06/27/17 0311   GLU  146*  153*   NA  140  136   K  3.9  3.4*   CL  108  104   CO2  22  21   BUN  53*  61*   CREA  0.99  1.55*   CA  8.6  8.4*   MG  2.2   --    AGAP  10  11   BUCR  54*  39*   AP  153*   --    TP  5.9*   --    ALB  2.7*   --    GLOB  3.2   --    AGRAT  0.8   --       CBC w/Diff Recent Labs      06/29/17 0426 06/28/17   0959  06/27/17 2030 06/27/17 0311   WBC  6.3   --    --    --   5.1   RBC  3.09*   --    --    --   2.54*   HGB  8.7*  10.1*  9.8*   < >  7.0*   HCT  25.2*  29.5*  28.0*   < >  20.1*   PLT  135   --    --    --   98*   GRANS  76*   --    --    --   75*   LYMPH  11*   --    --    --   9*   EOS  3   --    --    --   3    < > = values in this interval not displayed.       Cardiac Enzymes No results found for: CPK, CKMMB, CKMB, RCK3, CKMBT, CKNDX, CKND1, CLAUDIO, TROPT, TROIQ, ANNAMARIA, TROPT, TNIPOC, BNP, BNPP   Coagulation No results for input(s): PTP, INR, APTT in the last 72 hours.     No lab exists for component: INREXT    Lipid Panel Lab Results   Component Value Date/Time    Cholesterol, total 188 07/01/2016 11:53 PM    HDL Cholesterol 46 07/01/2016 11:53 PM    LDL, calculated 94.2 07/01/2016 11:53 PM    VLDL, calculated 47.8 07/01/2016 11:53 PM    Triglyceride 239 07/01/2016 11:53 PM    CHOL/HDL Ratio 4.1 07/01/2016 11:53 PM      BNP No results found for: BNP, BNPP, XBNPT   Liver Enzymes Recent Labs      06/29/17   0426   TP  5.9*   ALB  2.7*   AP  153*   SGOT  19      Digoxin    Thyroid Studies Lab Results   Component Value Date/Time    TSH 0.77 06/29/2017 04:26 AM            Signed By: ELOISA Bond     June 29, 2017

## 2017-06-29 NOTE — PROGRESS NOTES
SUBJECTIVE:    Patient is [place]. Walked in hallway and spo2 is 95% on RA. No chest or abdominal pain. SOB and cough present. No headaches and dizziness. OBJECTIVE:    Visit Vitals    /64 (BP 1 Location: Right arm, BP Patient Position: At rest)    Pulse 86    Temp 97.1 °F (36.2 °C)    Resp 20    Ht 5' (1.524 m)    Wt 63 kg (138 lb 14.4 oz)    SpO2 97%    BMI 27.13 kg/m2     NECK: NO JVD  CVS: RRR  RS: Diminished in bases, no wheezes  GI: NT, BS +  Extremities: trace pedal edema  General: [place], answers questions appropriately    ASSESSMENT:    1. Acute on chronic diastolic CHF, Now compensated. 2. Acute on chronic anemia stable after 2 untis PRBCs 6/27/17  3. Mild aortic stenosis   4. CAD hx, s/p PCI to Cx and mLAD with BETHEL. 5. UTI  6. HTN  7. Dyslipidemia  8. DM type II  9. Hearing loss due to Otitis media with effusion bilaterally. 10. Acute hypoxic respiratory failure could be atelectasis and sec # 2, resolved   11. hiatal hernia small in size, very small varices without bleeding. Hypertensive portal gastropathy with very friable fundal mucosa with occasional blood oozing.      PLAN:    CT head/chest/abd ordered report reviewed  Talked to Dr. Lindsey Eldridge - PPI BID and no brilinta  Discharge patient to SNF today  Cont current management    CMP:   Lab Results   Component Value Date/Time     06/29/2017 04:26 AM    K 3.9 06/29/2017 04:26 AM     06/29/2017 04:26 AM    CO2 22 06/29/2017 04:26 AM    AGAP 10 06/29/2017 04:26 AM     (H) 06/29/2017 04:26 AM    BUN 53 (H) 06/29/2017 04:26 AM    CREA 0.99 06/29/2017 04:26 AM    GFRAA >60 06/29/2017 04:26 AM    GFRNA 54 (L) 06/29/2017 04:26 AM    CA 8.6 06/29/2017 04:26 AM    MG 2.2 06/29/2017 04:26 AM    ALB 2.7 (L) 06/29/2017 04:26 AM    TP 5.9 (L) 06/29/2017 04:26 AM    GLOB 3.2 06/29/2017 04:26 AM    AGRAT 0.8 06/29/2017 04:26 AM    SGOT 19 06/29/2017 04:26 AM    ALT 16 06/29/2017 04:26 AM        CBC:   Lab Results   Component Value Date/Time    WBC 6.3 06/29/2017 04:26 AM    HGB 8.8 (L) 06/29/2017 11:48 AM    HCT 25.6 (L) 06/29/2017 11:48 AM     06/29/2017 04:26 AM       Current Discharge Medication List      START taking these medications    Details   albuterol-ipratropium (DUO-NEB) 2.5 mg-0.5 mg/3 ml nebu 3 mL by Nebulization route three (3) times daily for 5 days. Qty: 45 mL, Refills: 0      pantoprazole (PROTONIX) 40 mg tablet Take 1 Tab by mouth two (2) times a day. Qty: 60 Tab, Refills: 0      neomycin-polymyxin-hydrocortisone (CORTISPORIN) otic solution Administer 4 Drops into each ear four (4) times daily for 10 days. Qty: 10 mL, Refills: 0      levoFLOXacin (LEVAQUIN) 250 mg tablet Take 1 Tab by mouth every twenty-four (24) hours for 5 days. Qty: 5 Tab, Refills: 0      insulin lispro (HUMALOG) 100 unit/mL injection For Blood Sugar (mg/dL) of:              Less than 150 =   0 units  150 -199 =   2 units  200 -249 =   4 units  250 -299 =   6 units  300 -349 =   8 units  350 and above =   10 units  Qty: 1 Vial, Refills: 0      insulin glargine (LANTUS) 100 unit/mL injection 10 units subcutaneously daily  Qty: 1 Vial, Refills: 0      ferrous sulfate (IRON) 325 mg (65 mg iron) EC tablet Take 1 Tab by mouth two (2) times a day for 15 days. Qty: 30 Tab, Refills: 0      docusate sodium (COLACE) 100 mg capsule Take 1 Cap by mouth two (2) times a day for 15 days. Qty: 30 Cap, Refills: 0         CONTINUE these medications which have CHANGED    Details   furosemide (LASIX) 20 mg tablet 20 mg po daily  Qty: 30 Tab, Refills: 0         CONTINUE these medications which have NOT CHANGED    Details   amLODIPine (NORVASC) 5 mg tablet TAKE ONE TABLET BY MOUTH ONCE DAILY  Qty: 30 Tab, Refills: 0      nitroglycerin (NITROLINGUAL) 400 mcg/spray spray 1 Spray by SubLINGual route every five (5) minutes as needed. Qty: 1 Bottle, Refills: 2      hydrALAZINE (APRESOLINE) 10 mg tablet Take 1 Tab by mouth three (3) times daily.   Qty: 90 Tab, Refills: 6      metoprolol tartrate (LOPRESSOR) 50 mg tablet Take 1 Tab by mouth two (2) times a day. Qty: 60 Tab, Refills: 6      ascorbic acid, vitamin C, (VITAMIN C) 250 mg tablet Take 1 Tab by mouth daily. Qty: 60 Tab, Refills: 1      ferrous sulfate 325 mg (65 mg iron) tablet Take 1 Tab by mouth two (2) times daily (with meals). Qty: 60 Tab, Refills: 1      metFORMIN (GLUCOPHAGE) 500 mg tablet Take 500 mg by mouth two (2) times daily (with meals). multivitamin (ONE A DAY) tablet Take 1 Tab by mouth daily. cholecalciferol (VITAMIN D3) 1,000 unit tablet Take 2 Tabs by mouth daily. Qty: 60 Tab, Refills: 1      simvastatin (ZOCOR) 20 mg tablet Take 20 mg by mouth daily. omega 3-dha-epa-fish oil (FISH OIL) 100-160-1,000 mg cap Take 1,000 mg by mouth two (2) times a day. cyanocobalamin (VITAMIN B12) 100 mcg tablet Take 100 mcg by mouth daily. aspirin 81 mg tablet Take 81 mg by mouth daily. calcium polycarbophil (FIBER LAXATIVE) 625 mg tablet Take 1,250 mg by mouth daily.          STOP taking these medications       benazepril (LOTENSIN) 20 mg tablet Comments:   Reason for Stopping:         benazepril (LOTENSIN) 5 mg tablet Comments:   Reason for Stopping:         ticagrelor (BRILINTA) 90 mg tablet Comments:   Reason for Stopping:

## 2017-06-29 NOTE — PERIOP NOTES
TRANSFER - OUT REPORT:    Verbal report given to Bobbi RN(name) on Renato Thomas  being transferred to [address] [place] [address](unit) for routine post - op       Report consisted of patients Situation, Background, Assessment and   Recommendations(SBAR). Information from the following report(s) SBAR, Kardex, OR Summary, Procedure Summary, Intake/Output, MAR, Recent Results and Med Rec Status was reviewed with the receiving nurse. Lines:   Peripheral IV 08/17/16 Right Antecubital (Active)       Peripheral IV 08/17/16 Right Forearm (Active)       Peripheral IV 06/28/17 Right Wrist (Active)   Site Assessment Clean, dry, & intact 6/29/2017 12:26 AM   Phlebitis Assessment 0 6/29/2017 12:26 AM   Infiltration Assessment 0 6/29/2017 12:26 AM   Dressing Status Clean, dry, & intact 6/29/2017 12:26 AM   Dressing Type Transparent 6/29/2017 12:26 AM   Hub Color/Line Status Pink;Flushed 6/29/2017 12:26 AM        Opportunity for questions and clarification was provided.       Patient transported with:   O2 @ 3 liters  Tech

## 2017-06-29 NOTE — DISCHARGE INSTRUCTIONS
Cirrhosis: Care Instructions  Your Care Instructions    Cirrhosis occurs when healthy tissue in your liver gets scarred. This keeps the liver from working well. It usually happens after a liver has been inflamed for years. Cirrhosis is most often caused by alcohol abuse or hepatitis infection. But there are other causes too. These include medicines and too much fat in the liver. Conditions passed down in families and other disorders can also cause it. In some cases, no cause can be found. Treatment can't completely fix liver damage. But you may be able to slow or prevent more damage if you don't drink alcohol or use drugs that harm your liver. Follow-up care is a key part of your treatment and safety. Be sure to make and go to all appointments, and call your doctor if you are having problems. It's also a good idea to know your test results and keep a list of the medicines you take. How can you care for yourself at home? · Do not drink any alcohol. It can harm your liver. Talk to your doctor if you need help to stop drinking. · Be safe with medicines. Take your medicines exactly as prescribed. Call your doctor if you think you are having a problem with your medicine. · Talk to your doctor before you take any other medicines. These include over-the-counter medicines and herbal products. · Be careful taking acetaminophen (Tylenol), ibuprofen (Advil, Motrin), or naproxen (Aleve). These can sometimes cause more liver damage. Talk with your doctor if you're not sure which medicines are safe. · If your cirrhosis causes extra fluid to build up in your body, try not to eat a lot of salt. Use less salt when you cook and at the table. Don't eat fast foods or snack foods with a lot of salt. Extra fluid in your belly, legs, and chest can cause serious problems. · Work with your doctor or a dietitian to be sure you eat the right amount of carbohydrate, protein, fat, and sodium (salt).  It's very important to choose the best foods for the health of your liver. · If your doctor recommends it, limit how much fluid you drink. · If your doctor recommends it, get more exercise. Walking is a good choice. Bit by bit, increase the amount you walk every day. Try for at least 30 minutes on most days of the week. You also may want to swim, bike, or do other activities. When should you call for help? Call 911 anytime you think you may need emergency care. For example, call if:  · You passed out (lost consciousness). Call your doctor now or seek immediate medical care if:  · You feel very sleepy or confused. · You have new belly pain, or your pain gets worse. · You have a fever. · There is a new or increasing yellow tint to your skin or the whites of your eyes. · You have any abnormal bleeding, such as:  ¨ Nosebleeds. ¨ Vaginal bleeding that is different (heavier, more frequent, at a different time of the month) than what you are used to. ¨ Bloody or black stools, or rectal bleeding. ¨ Bloody or pink urine. Watch closely for changes in your health, and be sure to contact your doctor if you have any problems. Where can you learn more? Go to http://perlita-linda.info/. Enter M412 in the search box to learn more about \"Cirrhosis: Care Instructions. \"  Current as of: August 9, 2016  Content Version: 11.3  © 6861-3648 [employer]. Care instructions adapted under license by [employer] (which disclaims liability or warranty for this information). If you have questions about a medical condition or this instruction, always ask your healthcare professional. [address] any warranty or liability for your use of this information. Patient armband removed and shredded  [employer] Activation    Thank you for requesting access to [employer]. Please follow the instructions below to securely access and download your online medical record.  [employer] allows you to send messages to your doctor, view your test results, renew your prescriptions, schedule appointments, and more. How Do I Sign Up? 1. In your internet browser, go to [email or web address]  2. Click on the First Time User? Click Here link in the Sign In box. You will be redirect to the New Member Sign Up page. 3. Enter your [employer] Access Code exactly as it appears below. You will not need to use this code after youve completed the sign-up process. If you do not sign up before the expiration date, you must request a new code. [employer] Access Code: [email or web address]  Expires: 2017  2:12 PM (This is the date your [employer] access code will )    4. Enter the last four digits of your Social Security Number (xxxx) and Date of Birth (mm/dd/yyyy) as indicated and click Submit. You will be taken to the next sign-up page. 5. Create a [employer] ID. This will be your [employer] login ID and cannot be changed, so think of one that is secure and easy to remember. 6. Create a [employer] password. You can change your password at any time. 7. Enter your Password Reset Question and Answer. This can be used at a later time if you forget your password. 8. Enter your e-mail address. You will receive e-mail notification when new information is available in [address] 9. Click Sign Up. You can now view and download portions of your medical record. 10. Click the Download Summary menu link to download a portable copy of your medical information. Additional Information    If you have questions, please visit the Frequently Asked Questions section of the [employer] website at [email or web address] Remember, [employer] is NOT to be used for urgent needs. For medical emergencies, dial 911. DISCHARGE SUMMARY from Nurse    The following personal items are in your possession at time of discharge:    Dental Appliances: None  Visual Aid: None     Home Medications: None  Jewelry: Bracelet, Ring, Sent home  Clothing:  At bedside, With patient, Undergarments, Footwear, Pants, Shirt  Other Valuables: Purse, Cell Phone             PATIENT INSTRUCTIONS:    After general anesthesia or intravenous sedation, for 24 hours or while taking prescription Narcotics:  · Limit your activities  · Do not drive and operate hazardous machinery  · Do not make important personal or business decisions  · Do  not drink alcoholic beverages  · If you have not urinated within 8 hours after discharge, please contact your surgeon on call. Report the following to your surgeon:  · Excessive pain, swelling, redness or odor of or around the surgical area  · Temperature over 100.5  · Nausea and vomiting lasting longer than 4 hours or if unable to take medications  · Any signs of decreased circulation or nerve impairment to extremity: change in color, persistent  numbness, tingling, coldness or increase pain  · Any questions        What to do at Home:  Recommended activity: Activity as tolerated    If you experience any of the following symptoms chest pains, abdominal pains, coughing up blood, short of breath, fever greater than 100.5, nausea, vomiting, diarrhea please follow up with PCP. *  Please give a list of your current medications to your Primary Care Provider. *  Please update this list whenever your medications are discontinued, doses are      changed, or new medications (including over-the-counter products) are added. *  Please carry medication information at all times in case of emergency situations. These are general instructions for a healthy lifestyle:    No smoking/ No tobacco products/ Avoid exposure to second hand smoke    Surgeon General's Warning:  Quitting smoking now greatly reduces serious risk to your health.     Obesity, smoking, and sedentary lifestyle greatly increases your risk for illness    A healthy diet, regular physical exercise & weight monitoring are important for maintaining a healthy lifestyle    You may be retaining fluid if you have a history of heart failure or if you experience any of the following symptoms:  Weight gain of 3 pounds or more overnight or 5 pounds in a week, increased swelling in our hands or feet or shortness of breath while lying flat in bed. Please call your doctor as soon as you notice any of these symptoms; do not wait until your next office visit. Recognize signs and symptoms of STROKE:    F-face looks uneven    A-arms unable to move or move unevenly    S-speech slurred or non-existent    T-time-call 911 as soon as signs and symptoms begin-DO NOT go       Back to bed or wait to see if you get better-TIME IS BRAIN. Warning Signs of HEART ATTACK     Call 911 if you have these symptoms:   Chest discomfort. Most heart attacks involve discomfort in the center of the chest that lasts more than a few minutes, or that goes away and comes back. It can feel like uncomfortable pressure, squeezing, fullness, or pain.  Discomfort in other areas of the upper body. Symptoms can include pain or discomfort in one or both arms, the back, neck, jaw, or stomach.  Shortness of breath with or without chest discomfort.  Other signs may include breaking out in a cold sweat, nausea, or lightheadedness. Don't wait more than five minutes to call 911 - MINUTES MATTER! Fast action can save your life. Calling 911 is almost always the fastest way to get lifesaving treatment. Emergency Medical Services staff can begin treatment when they arrive -- up to an hour sooner than if someone gets to the hospital by car. The discharge information has been reviewed with the patient. The patient verbalized understanding. Discharge medications reviewed with the patient and appropriate educational materials and side effects teaching were provided.

## 2017-06-29 NOTE — CONSULTS
[address]    Name:  Tanesha Warren  MR#:  [id number]  :  1940  Account #:  [de-identified]  Date of Adm:  2017  Date of Consultation:  2017      REASON FOR CONSULTATION: Hearing loss. HISTORY OF PRESENT ILLNESS: The patient is a 65-year-old female  who was admitted for difficulties with chronic diastolic congestive heart  failure, anemia, as well as a history of pneumonia. The patient has done very well with this. She has been placed  on Cortisporin otic suspension after she was noted to have some  difficulties with otorrhea which lasted several days. The patient,  however, has been noted to have some diminishment of hearing acuity  over the last week or so. This is becoming quite irritating to the patient,  caregivers and family. ENT consultation now requested for evaluation  regarding above. PAST MEDICAL HISTORY: The patient has had a past medical history  significant for numerous difficulties with otitis media when younger. She  denies any recent hearing loss, denies any other complaints. No other  issues are noted. Past medical history is significant for anemia,  coronary artery disease, history of colon cancer and breast cancer. The  patient has also had a history of cardiac catheterization performed  approximately . The patient, furthermore, has had a history of  non-insulin-dependent diabetes mellitus, hypertension, and  thrombocytopenia. SOCIAL HISTORY: The patient denies any tobacco or alcohol use. ALLERGIES: INCLUDE LASIX. MEDICATIONS: At the time of admission includes  1. Norvasc. 2. Vitamin C.  3. Aspirin. 4. Lotensin. 5. Vitamin B3 and B12.  6. Ferrous sulfate. 7. Hydralazine. 8. Lasix. 9. Glucophage. 10. Lopressor. 11. Sublingual nitroglycerin. 12. Fish oil. 13. Zocor. 14. Brilinta. The patient's current medications include these medications as well as  1. Cortisporin otic suspension.   2. The patient is also utilizing aspirin. 3. Pepcid. 4. Levofloxacin. PHYSICAL EXAMINATION  GENERAL: Reveals a well-developed, well-nourished, very pleasant  58-year-old female accompanied by her sister. HEENT: Ear exam reveals a significant retraction noted in the patient's  left ear. This does appear to be secondary to a middle ear effusion. The  tympanic membrane does appear to be monomeric in nature, thus only  increasing the retraction. The external auditory canal is otherwise  unremarkable. The right ear reveals a mucoid middle ear effusion  present. I see no evidence of any clear cut infection at this point. The  intranasal exam reveals no evidence of any mucopurulent discharge. The neck is supple, nontender, no masses felt. CHEST: Bilaterally clear. HEART: S1, S2, no murmur audible. IMPRESSION: Otitis media with effusion bilaterally. PLAN: I have discussed the situation with the patient and her sister. Consideration may be given for either watchful waiting or PE tube  placement. Clinically, I would favor the former since PE tube insertion  may potentially result in permanent tympanic membrane perforation,  especially in light of her significant retractive otitis on the left side. My  suspicion is that if the patient waits several months, the effusion will  resolve. This is usually the case in most instances. I suspect the  effusion is probably secondary to recent pneumonia. This was discussed with the patient and sister. Both agree and  understand and are agreeable with the above. They will follow up with  me as an outpatient. The patient apparently will be discharged to a  rehab facility. However, I would be happy to see her in 1 month's time. We will re-evaluate at that time. If the patient's situation worsens or if  she changes her mind, she will contact me and we will make  arrangements for PE tube placement at that point.         Sheldon Padron MD    PM / Matt Kapoor  D:  06/28/2017   04:74  T:  06/29/2017 09:39  Job #:  [id number]

## 2017-06-29 NOTE — PROGRESS NOTES
Problem: Mobility Impaired (Adult and Pediatric)  Goal: *Acute Goals and Plan of Care (Insert Text)  Physical Therapy Goals  Initiated 6/29/2017 and to be accomplished within 7 day(s)  1. Patient will ambulate with independence for 300 feet with the least restrictive device. 2. Patient will ascend/descend 10 stairs with 1 handrail(s) with supervision/set-up. PHYSICAL THERAPY EVALUATION     Patient: Brenda Yeboah (46 y.o. female)  Date: 6/29/2017  Primary Diagnosis: Sepsis ([address]  PNA (pneumonia)  UTI (urinary tract infection)  PNA (pneumonia)  UTI (urinary tract infection)  Hypoxia  dx  Procedure(s) (LRB):  ENDOSCOPY WITH BANDING (N/A) Day of Surgery   Precautions: fall         ASSESSMENT :  Based on the objective data described below, the patient presents at independent level with transfers and supervision level with ambulation without an assistive device. Patient had one minor loss of balance during ambulation when she was distracted talking to people at the nurses station otherwise no loss of balance noted. O2 sat monitored throughout evaluation and patients O2 sat remained above 93% on room air. Patient will benefit from skilled intervention to address the above impairments.   Patients rehabilitation potential is considered to be Good  Factors which may influence rehabilitation potential include:   [ ]         None noted  [ ]         Mental ability/status  [X]         Medical condition  [ ]         Home/family situation and support systems  [ ]         Safety awareness  [ ]         Pain tolerance/management  [ ]         Other:        PLAN :  Recommendations and Planned Interventions:  [X]           Bed Mobility Training             [ ]    Neuromuscular Re-Education  [X]           Transfer Training                   [ ]    Orthotic/Prosthetic Training  [X]           Gait Training                          [ ]    Modalities  [X]           Therapeutic Exercises          [ ]    Edema Management/Control  [X] Therapeutic Activities            [X]    Patient and Family Training/Education  [ ]           Other (comment):     Frequency/Duration: Patient will be followed by physical therapy 3-5 times a week to address goals. Discharge Recommendations: Home Health  Further Equipment Recommendations for Discharge: N/A- patient reports having a walker at home       G-CODES       Mobility  Current  CI= 1-19%   Goal  CH= 0%. The severity rating is based on the Level of Assistance required for Functional Mobility and ADLs. Eval Complexity: History: MEDIUM  Complexity : 1-2 comorbidities / personal factors will impact the outcome/ POC Exam:LOW Complexity : 1-2 Standardized tests and measures addressing body structure, function, activity limitation and / or participation in recreation  Presentation: LOW Complexity : Stable, uncomplicated  Clinical Decision Making:Low Complexity , Overall Complexity:LOW       SUBJECTIVE:   Patient stated I just feel a little weak.       OBJECTIVE DATA SUMMARY:       Past Medical History:   Diagnosis Date    Anemia      CAD (coronary artery disease)       Coronary Stenting needed, hematology workup needed prior to stent placement, treating medically at this time    Cancer [facility])       colon    Cancer ([facility]       breast - left    Cardiac catheterization 08/17/2016     LM patent. mLAD 99.9% (2.25 x 28-mm Xience BETHEL, resid 0%). oD1 60%. oD2 95%. pCX 90% (2.5 x 13-mm Xience BETHEL, resid 0%). OMB patent.  Cardiac echocardiogram 07/01/2016     EF 50%. Basal inferior, basal-mid inferolateral hypk. Gr 1 DDfx. RVSP 50-60 mmHg. Mild LAE.   Mod, eccentric MR.      Diabetes ([facility]      Hypercholesteremia      Hypertension      Thrombocytopenia ([facility]       Past Surgical History:   Procedure Laterality Date    CARDIAC CATHETERIZATION   7/16/2015      no stents at this time r/t anemia & thrombocytopenia (hematology work-up needed prior to stent placement)   [facility] CARDIAC CATHETERIZATION   7/11/2016          CARDIAC CATHETERIZATION   8/17/2016          CORONARY ARTERY ANGIOGRAM   7/16/2015          CORONARY ARTERY ANGIOGRAM   7/11/2016          CORONARY ARTERY ANGIOGRAM   8/17/2016          CORONARY STENT EA ADDL VESSEL   8/17/2016          CORONARY STENT SINGLE W/PTCA   8/17/2016          HX GI         colon surg secondary to cancer - removed lesion    HX HEENT   2/2013     cataract bilaterally    HX MASTECTOMY   march 2011     left    HX VASCULAR ACCESS         mediport - now removed    LV ANGIOGRAPHY   7/16/2015          LV ANGIOGRAPHY   7/11/2016           Prior Level of Function/Home Situation: pt reports being independence without assistive device prior to admission  Home Situation  Home Environment: Private residence  # Steps to Enter: 13  One/Two Story Residence: Two story  Living Alone: No  Support Systems: Family member(s)  Patient Expects to be Discharged to[de-identified] Private residence  Current DME Used/Available at Home: [address] [facility], [address], straight, Krishan Fennel, rollator, Com[employer], bedside  Critical Behavior:   calm and cooperative      Strength:    Strength: Generally decreased, functional (B LEs)   Tone & Sensation:   Tone: Normal (B LEs)   Range Of Motion:  AROM: Within functional limits (B LEs)      Functional Mobility:  Bed Mobility:  Supine to Sit: Independent  Sit to Supine: Independent     Transfers:  Sit to Stand: Independent  Stand to Sit: Independent  Bed to Chair: Independent     Balance:   Sitting: Intact  Standing: Without support  Standing - Static: Good  Standing - Dynamic : Fair  Ambulation/Gait Training:  Distance (ft): 300 Feet (ft)  Assistive Device:  (none)  Ambulation - Level of Assistance: Supervision  Base of Support: Widened  Pain:  Pt reports 0/10 pain or discomfort prior to treatment. Pt reports 0/10 pain or discomfort post treatment. Activity Tolerance:   fair  Please refer to the flowsheet for vital signs taken during this treatment.   After treatment:   [ ]         Patient left in no apparent distress sitting up in chair  [X]         Patient left in no apparent distress in bed  [X]         Call bell left within reach  [X]         Nursing notified  [ ]         Caregiver present  [ ]         Bed alarm activated      COMMUNICATION/EDUCATION:   [X]         Fall prevention education was provided and the patient/caregiver indicated understanding. [X]         Patient/family have participated as able in goal setting and plan of care. [X]         Patient/family agree to work toward stated goals and plan of care. [ ]         Patient understands intent and goals of therapy, but is neutral about his/her participation. [ ]         Patient is unable to participate in goal setting and plan of care.   Educated patient on activity pacing      Thank you for this referral.  Francis Mackey, PT   Time Calculation: 18 mins

## 2017-06-29 NOTE — ANESTHESIA PREPROCEDURE EVALUATION
Anesthetic History   No history of anesthetic complications            Review of Systems / Medical History  Patient summary reviewed and pertinent labs reviewed    Pulmonary  Within defined limits                 Neuro/Psych   Within defined limits           Cardiovascular    Hypertension      CHF    CAD    Exercise tolerance: <4 METS  Comments: Extremely [place]. Most history obtained from chart. GI/Hepatic/Renal           Liver disease (BLOOM,Liver Cirhossis and Thrombocytopenia)     Endo/Other    Diabetes: type 2    Anemia (Recieved 2 units PRBC)     Other Findings   Comments:   Risk Factors for Postoperative nausea/vomiting:       History of postoperative nausea/vomiting? NO       Female? YES       Motion sickness? NO       Intended opioid administration for postoperative analgesia? NO      Smoking Abstinence  Current Smoker? NO  Elective Surgery? YES  Seen preoperatively by anesthesiologist or proxy prior to day of surgery? YES  Pt abstained from smoking 24 hours prior to anesthesia?  N/A           Physical Exam    Airway  Mallampati: II  TM Distance: 4 - 6 cm  Neck ROM: normal range of motion   Mouth opening: Normal     Cardiovascular  Regular rate and rhythm,  S1 and S2 normal,  no murmur, click, rub, or gallop             Dental    Dentition: Edentulous     Pulmonary  Breath sounds clear to auscultation               Abdominal  GI exam deferred       Other Findings            Anesthetic Plan    ASA: 3  Anesthesia type: MAC          Induction: Intravenous  Anesthetic plan and risks discussed with: Patient

## 2017-06-29 NOTE — ROUTINE PROCESS
ADULT PROTOCOL: JET AEROSOL ASSESSMENT    Patient  Nora Sandhu     67 y.o.   female     6/29/2017  12:25 AM    Breath Sounds Pre Procedure:  Breath Sounds Bilateral: Clear                                            Breath Sounds Post Procedure: Breath Sounds Bilateral: Clear                                               Breathing pattern: Pre procedure  Breathing Pattern: Regular          Post procedure  Breathing Pattern: Regular    Cough: Pre procedure  Cough: Non-productive               Post procedure Cough: Non-productive    Heart Rate: Pre procedure Pulse: 75           Post procedure Pulse: 76    Resp Rate: Pre procedure  Respirations: 16           Post procedure          Nebulizer Therapy: Current medications Aerosolized Medications: DuoNeb       Problem List:   Patient Active Problem List   Diagnosis Code    Anemia D64.9    CAD (coronary artery disease) I25.10    Thrombocytopenia (HCC) D69.6    NSTEMI (non-ST elevated myocardial infarction) ([facility] I21.4    UTI (urinary tract infection) N39.0    Sepsis ([facility] A41.9    PNA (pneumonia) J18.9    Hypoxia R09.02       Patient alert and cooperative to use MDI: Yes    Home Respiratory Therapy Regimen/Frequency:  NO  Medication   Device  Frequency     SEVERITY INDEX:    ITEM 0 1 2 3 4 Score   Respiratory Pattern and or Rate Regular  10-19 Regular  20-24   24-30    30-34 Severe SOB or   Greater than 35 0   Breath Sounds Clear Occasional Wheeze Mild Wheezing Moderate Wheezing  wheezing/Absent breath sounds 0   Shortness of Breath None Dyspnea on Exertion Dyspnea at Rest Moderate Shortness of Breath at Rest Severe Shortness of Breath - Limited Speech 1       Total Score:  1    * Scoring Guidelines  0-4 pts:  PRN-BID   5-7 pts:  BID, TID, QID  8-9 pts:  TID, QID, Q6  10-12 pts:  Q4-Q6  * - Guidelines used with clinical judgement. PRN Treatments can be ordered to supplement scheduled treatments.   Regardless of score, frequency should not be less than normal home regimen.     Recommended Order/Frequency:  [facility] TID    Comments:          Respiratory Therapist: Rosanna Jackson RT

## 2017-06-29 NOTE — PERIOP NOTES
Dr. Karen Stone notified of patient's blood sugar of 158. MD advised that patient is able to be transferred to floor unit without any further coverage.

## 2017-07-05 ENCOUNTER — PATIENT OUTREACH (OUTPATIENT)
Dept: CASE MANAGEMENT | Age: 77
End: 2017-07-05

## 2017-07-05 NOTE — PROGRESS NOTES
Community Care Team Documentation for Patient in [facility]     [facility]2017 to [facility], [facility], on 6/29/2017. Hospital Discharge diagnosis:  CHF, anemia. SNF Attending Provider: Sallie Bella    Anticipated discharge date from SNF:  7/19/2017      PCP : Jenna Mccullough MD    Nurse Navigator: kalia Alegria. Participating in pt/ot.  at facility also.

## 2017-07-19 ENCOUNTER — PATIENT OUTREACH (OUTPATIENT)
Dept: CASE MANAGEMENT | Age: 77
End: 2017-07-19

## 2017-07-19 ENCOUNTER — HOME HEALTH ADMISSION (OUTPATIENT)
Dept: HOME HEALTH SERVICES | Facility: HOME HEALTH | Age: 77
End: 2017-07-19

## 2017-07-19 NOTE — PROGRESS NOTES
Community Care Team Documentation for Patient in [facility]     [facility], [facility], on 6/29/2017. Hospital Discharge diagnosis:  CHF, anemia. SNF Attending Provider: Marck Garcia    Anticipated discharge date from SNF:  7/19/2017      PCP : Becky Villatoro MD    Nurse Navigator: HOLLY    CCT rounds conducted, updates provided by facility. Patient and spouse to be discharged today to home with [facility]. Request for community SW made to assist spouse with resources. Weight stable.

## 2017-07-22 ENCOUNTER — HOME CARE VISIT (OUTPATIENT)
Dept: SCHEDULING | Facility: HOME HEALTH | Age: 77
End: 2017-07-22

## 2017-10-18 ENCOUNTER — APPOINTMENT (OUTPATIENT)
Dept: GENERAL RADIOLOGY | Age: 77
DRG: 291 | End: 2017-10-18
Attending: EMERGENCY MEDICINE
Payer: MEDICARE

## 2017-10-18 ENCOUNTER — HOSPITAL ENCOUNTER (INPATIENT)
Age: 77
LOS: 5 days | Discharge: NURSING, REHAB OR LONG-TERM CARE | DRG: 291 | End: 2017-10-23
Attending: EMERGENCY MEDICINE | Admitting: INTERNAL MEDICINE
Payer: MEDICARE

## 2017-10-18 DIAGNOSIS — R06.00 DYSPNEA, UNSPECIFIED TYPE: ICD-10-CM

## 2017-10-18 DIAGNOSIS — I50.21 ACUTE SYSTOLIC CONGESTIVE HEART FAILURE (HCC): Primary | ICD-10-CM

## 2017-10-18 PROBLEM — I50.9 CHF (CONGESTIVE HEART FAILURE) (HCC): Status: ACTIVE | Noted: 2017-10-18

## 2017-10-18 LAB
ALBUMIN SERPL-MCNC: 3.4 G/DL (ref 3.4–5)
ALBUMIN/GLOB SERPL: 1 {RATIO} (ref 0.8–1.7)
ALP SERPL-CCNC: 100 U/L (ref 45–117)
ALT SERPL-CCNC: 27 U/L (ref 13–56)
ANION GAP SERPL CALC-SCNC: 7 MMOL/L (ref 3–18)
APPEARANCE UR: CLEAR
APTT PPP: 32.9 SEC (ref 23–36.4)
ARTERIAL PATENCY WRIST A: YES
ARTERIAL PATENCY WRIST A: YES
AST SERPL-CCNC: 33 U/L (ref 15–37)
ATRIAL RATE: 107 BPM
BACTERIA URNS QL MICRO: ABNORMAL /HPF
BASE DEFICIT BLD-SCNC: 5 MMOL/L
BASE DEFICIT BLDV-SCNC: 4 MMOL/L
BASOPHILS # BLD: 0 K/UL (ref 0–0.06)
BASOPHILS NFR BLD: 0 % (ref 0–2)
BDY SITE: ABNORMAL
BDY SITE: ABNORMAL
BILIRUB SERPL-MCNC: 1.3 MG/DL (ref 0.2–1)
BILIRUB UR QL: NEGATIVE
BNP SERPL-MCNC: 1609 PG/ML (ref 0–1800)
BODY TEMPERATURE: 98
BODY TEMPERATURE: 98
BUN SERPL-MCNC: 28 MG/DL (ref 7–18)
BUN/CREAT SERPL: 25 (ref 12–20)
CALCIUM SERPL-MCNC: 8.7 MG/DL (ref 8.5–10.1)
CALCULATED P AXIS, ECG09: 44 DEGREES
CALCULATED R AXIS, ECG10: -14 DEGREES
CALCULATED T AXIS, ECG11: 89 DEGREES
CHLORIDE SERPL-SCNC: 110 MMOL/L (ref 100–108)
CK MB CFR SERPL CALC: 2.7 % (ref 0–4)
CK MB SERPL-MCNC: 2 NG/ML (ref 5–25)
CK SERPL-CCNC: 74 U/L (ref 26–192)
CO2 SERPL-SCNC: 23 MMOL/L (ref 21–32)
COLOR UR: YELLOW
CREAT SERPL-MCNC: 1.1 MG/DL (ref 0.6–1.3)
DIAGNOSIS, 93000: NORMAL
DIFFERENTIAL METHOD BLD: ABNORMAL
EOSINOPHIL # BLD: 0 K/UL (ref 0–0.4)
EOSINOPHIL NFR BLD: 1 % (ref 0–5)
EPITH CASTS URNS QL MICRO: ABNORMAL /LPF (ref 0–5)
ERYTHROCYTE [DISTWIDTH] IN BLOOD BY AUTOMATED COUNT: 16.4 % (ref 11.6–14.5)
GAS FLOW.O2 O2 DELIVERY SYS: ABNORMAL L/MIN
GAS FLOW.O2 O2 DELIVERY SYS: ABNORMAL L/MIN
GLOBULIN SER CALC-MCNC: 3.5 G/DL (ref 2–4)
GLUCOSE BLD STRIP.AUTO-MCNC: 138 MG/DL (ref 70–110)
GLUCOSE SERPL-MCNC: 214 MG/DL (ref 74–99)
GLUCOSE UR STRIP.AUTO-MCNC: NEGATIVE MG/DL
HCO3 BLD-SCNC: 19.4 MMOL/L (ref 22–26)
HCO3 BLDV-SCNC: 19.8 MMOL/L (ref 23–28)
HCT VFR BLD AUTO: 26.6 % (ref 35–45)
HGB BLD-MCNC: 9.2 G/DL (ref 12–16)
HGB UR QL STRIP: NEGATIVE
KETONES UR QL STRIP.AUTO: NEGATIVE MG/DL
LEUKOCYTE ESTERASE UR QL STRIP.AUTO: NEGATIVE
LYMPHOCYTES # BLD: 0.6 K/UL (ref 0.9–3.6)
LYMPHOCYTES NFR BLD: 13 % (ref 21–52)
MCH RBC QN AUTO: 30.3 PG (ref 24–34)
MCHC RBC AUTO-ENTMCNC: 34.6 G/DL (ref 31–37)
MCV RBC AUTO: 87.5 FL (ref 74–97)
MONOCYTES # BLD: 0.2 K/UL (ref 0.05–1.2)
MONOCYTES NFR BLD: 4 % (ref 3–10)
NEUTS SEG # BLD: 3.6 K/UL (ref 1.8–8)
NEUTS SEG NFR BLD: 82 % (ref 40–73)
NITRITE UR QL STRIP.AUTO: NEGATIVE
O2/TOTAL GAS SETTING VFR VENT: 50 %
O2/TOTAL GAS SETTING VFR VENT: 50 %
P-R INTERVAL, ECG05: 154 MS
PCO2 BLD: 31.2 MMHG (ref 35–45)
PCO2 BLDV: 30.6 MMHG (ref 41–51)
PEEP RESPIRATORY: 5 CMH2O
PEEP RESPIRATORY: 5 CMH2O
PH BLD: 7.4 [PH] (ref 7.35–7.45)
PH BLDV: 7.42 [PH] (ref 7.32–7.42)
PH UR STRIP: 6 [PH] (ref 5–8)
PIP ISTAT,IPIP: 15
PIP ISTAT,IPIP: 15
PLATELET # BLD AUTO: 72 K/UL (ref 135–420)
PLATELET COMMENTS,PCOM: ABNORMAL
PMV BLD AUTO: 10 FL (ref 9.2–11.8)
PO2 BLD: 99 MMHG (ref 80–100)
PO2 BLDV: 30 MMHG (ref 25–40)
POTASSIUM SERPL-SCNC: 4.9 MMOL/L (ref 3.5–5.5)
PRESSURE SUPPORT SETTING VENT: 10 CMH2O
PRESSURE SUPPORT SETTING VENT: 10 CMH2O
PROT SERPL-MCNC: 6.9 G/DL (ref 6.4–8.2)
PROT UR STRIP-MCNC: ABNORMAL MG/DL
Q-T INTERVAL, ECG07: 360 MS
QRS DURATION, ECG06: 108 MS
QTC CALCULATION (BEZET), ECG08: 480 MS
RBC # BLD AUTO: 3.04 M/UL (ref 4.2–5.3)
RBC #/AREA URNS HPF: NEGATIVE /HPF (ref 0–5)
RBC MORPH BLD: ABNORMAL
RBC MORPH BLD: ABNORMAL
SAO2 % BLD: 98 % (ref 92–97)
SAO2 % BLDV: 61 % (ref 65–88)
SERVICE CMNT-IMP: ABNORMAL
SERVICE CMNT-IMP: ABNORMAL
SODIUM SERPL-SCNC: 140 MMOL/L (ref 136–145)
SP GR UR REFRACTOMETRY: 1.01 (ref 1–1.03)
SPECIMEN TYPE: ABNORMAL
SPECIMEN TYPE: ABNORMAL
SPONTANEOUS TIMED, IST: YES
SPONTANEOUS TIMED, IST: YES
TOTAL RESP. RATE, ITRR: 31
TOTAL RESP. RATE, ITRR: 32
TROPONIN I BLD-MCNC: 0.39 NG/ML (ref 0–0.08)
TROPONIN I SERPL-MCNC: 0.11 NG/ML (ref 0–0.04)
TROPONIN I SERPL-MCNC: 1.85 NG/ML (ref 0–0.04)
UROBILINOGEN UR QL STRIP.AUTO: 0.2 EU/DL (ref 0.2–1)
VENTRICULAR RATE, ECG03: 107 BPM
WBC # BLD AUTO: 4.4 K/UL (ref 4.6–13.2)
WBC URNS QL MICRO: NEGATIVE /HPF (ref 0–4)

## 2017-10-18 PROCEDURE — 99285 EMERGENCY DEPT VISIT HI MDM: CPT

## 2017-10-18 PROCEDURE — 82550 ASSAY OF CK (CPK): CPT | Performed by: EMERGENCY MEDICINE

## 2017-10-18 PROCEDURE — 82962 GLUCOSE BLOOD TEST: CPT

## 2017-10-18 PROCEDURE — 74011250636 HC RX REV CODE- 250/636: Performed by: EMERGENCY MEDICINE

## 2017-10-18 PROCEDURE — 74011250636 HC RX REV CODE- 250/636: Performed by: PHYSICIAN ASSISTANT

## 2017-10-18 PROCEDURE — 81001 URINALYSIS AUTO W/SCOPE: CPT | Performed by: EMERGENCY MEDICINE

## 2017-10-18 PROCEDURE — 85025 COMPLETE CBC W/AUTO DIFF WBC: CPT | Performed by: EMERGENCY MEDICINE

## 2017-10-18 PROCEDURE — 80053 COMPREHEN METABOLIC PANEL: CPT | Performed by: EMERGENCY MEDICINE

## 2017-10-18 PROCEDURE — 85730 THROMBOPLASTIN TIME PARTIAL: CPT | Performed by: PHYSICIAN ASSISTANT

## 2017-10-18 PROCEDURE — 82803 BLOOD GASES ANY COMBINATION: CPT

## 2017-10-18 PROCEDURE — 84484 ASSAY OF TROPONIN QUANT: CPT | Performed by: EMERGENCY MEDICINE

## 2017-10-18 PROCEDURE — 77030013033 HC MSK BPAP/CPAP MMKA -B

## 2017-10-18 PROCEDURE — 71010 XR CHEST PORT: CPT

## 2017-10-18 PROCEDURE — 36600 WITHDRAWAL OF ARTERIAL BLOOD: CPT

## 2017-10-18 PROCEDURE — 94660 CPAP INITIATION&MGMT: CPT

## 2017-10-18 PROCEDURE — 83880 ASSAY OF NATRIURETIC PEPTIDE: CPT | Performed by: EMERGENCY MEDICINE

## 2017-10-18 PROCEDURE — 94762 N-INVAS EAR/PLS OXIMTRY CONT: CPT

## 2017-10-18 PROCEDURE — 96374 THER/PROPH/DIAG INJ IV PUSH: CPT

## 2017-10-18 PROCEDURE — 93005 ELECTROCARDIOGRAM TRACING: CPT

## 2017-10-18 PROCEDURE — 74011250637 HC RX REV CODE- 250/637: Performed by: INTERNAL MEDICINE

## 2017-10-18 PROCEDURE — 65660000000 HC RM CCU STEPDOWN

## 2017-10-18 RX ORDER — UREA 10 %
100 LOTION (ML) TOPICAL DAILY
Status: DISCONTINUED | OUTPATIENT
Start: 2017-10-19 | End: 2017-10-23 | Stop reason: HOSPADM

## 2017-10-18 RX ORDER — INSULIN GLARGINE 100 [IU]/ML
10 INJECTION, SOLUTION SUBCUTANEOUS
Status: DISCONTINUED | OUTPATIENT
Start: 2017-10-18 | End: 2017-10-23 | Stop reason: HOSPADM

## 2017-10-18 RX ORDER — HYDRALAZINE HYDROCHLORIDE 10 MG/1
10 TABLET, FILM COATED ORAL 3 TIMES DAILY
Status: DISCONTINUED | OUTPATIENT
Start: 2017-10-18 | End: 2017-10-23 | Stop reason: HOSPADM

## 2017-10-18 RX ORDER — MAGNESIUM SULFATE 100 %
16 CRYSTALS MISCELLANEOUS AS NEEDED
Status: DISCONTINUED | OUTPATIENT
Start: 2017-10-18 | End: 2017-10-23 | Stop reason: HOSPADM

## 2017-10-18 RX ORDER — GUAIFENESIN 100 MG/5ML
81 LIQUID (ML) ORAL DAILY
Status: DISCONTINUED | OUTPATIENT
Start: 2017-10-19 | End: 2017-10-23 | Stop reason: HOSPADM

## 2017-10-18 RX ORDER — CALCIUM POLYCARBOPHIL 625 MG
1 TABLET ORAL DAILY
Status: DISCONTINUED | OUTPATIENT
Start: 2017-10-19 | End: 2017-10-23 | Stop reason: HOSPADM

## 2017-10-18 RX ORDER — DEXTROSE 50 % IN WATER (D50W) INTRAVENOUS SYRINGE
25-50 AS NEEDED
Status: DISCONTINUED | OUTPATIENT
Start: 2017-10-18 | End: 2017-10-23 | Stop reason: HOSPADM

## 2017-10-18 RX ORDER — FUROSEMIDE 20 MG/1
20 TABLET ORAL DAILY
Status: DISCONTINUED | OUTPATIENT
Start: 2017-10-19 | End: 2017-10-23 | Stop reason: HOSPADM

## 2017-10-18 RX ORDER — LANOLIN ALCOHOL/MO/W.PET/CERES
325 CREAM (GRAM) TOPICAL 2 TIMES DAILY WITH MEALS
Status: DISCONTINUED | OUTPATIENT
Start: 2017-10-19 | End: 2017-10-23 | Stop reason: HOSPADM

## 2017-10-18 RX ORDER — PANTOPRAZOLE SODIUM 40 MG/1
40 TABLET, DELAYED RELEASE ORAL 2 TIMES DAILY
Status: DISCONTINUED | OUTPATIENT
Start: 2017-10-18 | End: 2017-10-23 | Stop reason: HOSPADM

## 2017-10-18 RX ORDER — AMLODIPINE BESYLATE 5 MG/1
5 TABLET ORAL DAILY
Status: DISCONTINUED | OUTPATIENT
Start: 2017-10-19 | End: 2017-10-19

## 2017-10-18 RX ORDER — HEPARIN SODIUM 1000 [USP'U]/ML
60 INJECTION, SOLUTION INTRAVENOUS; SUBCUTANEOUS ONCE
Status: DISCONTINUED | OUTPATIENT
Start: 2017-10-18 | End: 2017-10-18

## 2017-10-18 RX ORDER — THERA TABS 400 MCG
1 TAB ORAL DAILY
Status: DISCONTINUED | OUTPATIENT
Start: 2017-10-19 | End: 2017-10-23 | Stop reason: HOSPADM

## 2017-10-18 RX ORDER — HEPARIN SODIUM 1000 [USP'U]/ML
4000 INJECTION, SOLUTION INTRAVENOUS; SUBCUTANEOUS ONCE
Status: COMPLETED | OUTPATIENT
Start: 2017-10-18 | End: 2017-10-18

## 2017-10-18 RX ORDER — MELATONIN
2000 DAILY
Status: DISCONTINUED | OUTPATIENT
Start: 2017-10-19 | End: 2017-10-23 | Stop reason: HOSPADM

## 2017-10-18 RX ORDER — ASCORBIC ACID 250 MG
250 TABLET ORAL DAILY
Status: DISCONTINUED | OUTPATIENT
Start: 2017-10-19 | End: 2017-10-23 | Stop reason: HOSPADM

## 2017-10-18 RX ORDER — INSULIN LISPRO 100 [IU]/ML
INJECTION, SOLUTION INTRAVENOUS; SUBCUTANEOUS
Status: DISCONTINUED | OUTPATIENT
Start: 2017-10-19 | End: 2017-10-19

## 2017-10-18 RX ORDER — FUROSEMIDE 10 MG/ML
60 INJECTION INTRAMUSCULAR; INTRAVENOUS
Status: COMPLETED | OUTPATIENT
Start: 2017-10-18 | End: 2017-10-18

## 2017-10-18 RX ORDER — HEPARIN SODIUM 10000 [USP'U]/100ML
12-25 INJECTION, SOLUTION INTRAVENOUS
Status: DISCONTINUED | OUTPATIENT
Start: 2017-10-18 | End: 2017-10-20

## 2017-10-18 RX ORDER — NITROGLYCERIN 400 UG/1
1 SPRAY ORAL AS NEEDED
Status: DISCONTINUED | OUTPATIENT
Start: 2017-10-18 | End: 2017-10-23 | Stop reason: HOSPADM

## 2017-10-18 RX ORDER — SIMVASTATIN 20 MG/1
20 TABLET, FILM COATED ORAL
Status: DISCONTINUED | OUTPATIENT
Start: 2017-10-18 | End: 2017-10-23 | Stop reason: HOSPADM

## 2017-10-18 RX ORDER — METOPROLOL TARTRATE 50 MG/1
50 TABLET ORAL 2 TIMES DAILY
Status: DISCONTINUED | OUTPATIENT
Start: 2017-10-18 | End: 2017-10-23 | Stop reason: HOSPADM

## 2017-10-18 RX ADMIN — PANTOPRAZOLE SODIUM 40 MG: 40 TABLET, DELAYED RELEASE ORAL at 21:55

## 2017-10-18 RX ADMIN — METOPROLOL TARTRATE 50 MG: 50 TABLET ORAL at 21:54

## 2017-10-18 RX ADMIN — HEPARIN SODIUM 4000 UNITS: 1000 INJECTION, SOLUTION INTRAVENOUS; SUBCUTANEOUS at 21:56

## 2017-10-18 RX ADMIN — FUROSEMIDE 60 MG: 10 INJECTION, SOLUTION INTRAMUSCULAR; INTRAVENOUS at 09:36

## 2017-10-18 RX ADMIN — HEPARIN SODIUM AND DEXTROSE 12 UNITS/KG/HR: 10000; 5 INJECTION INTRAVENOUS at 22:16

## 2017-10-18 RX ADMIN — SIMVASTATIN 20 MG: 20 TABLET, FILM COATED ORAL at 21:55

## 2017-10-18 RX ADMIN — HYDRALAZINE HYDROCHLORIDE 10 MG: 10 TABLET, FILM COATED ORAL at 21:55

## 2017-10-18 NOTE — ED PROVIDER NOTES
HPI Comments: Priya Bruce is a 67 y.o. Female with PMHx of HTN, DM, hypercholesteremia and CAD who presents to the ED with c/o respiratory distress that came on suddenly this morning. Per EMS, patient walked to her driveway with difficulty breathing. Reports clear lung sounds on auscultation. Admits he had difficulty controlling her breathing, \"she was breathing 60 times per minute,\" for which he administered 2 Ativan, 1 SL NTG and CPAP. Notes initial BP of 260/180 which went down to 160/90 after medication. Patient reports she has \"calmed down. \" Reports pain in posterior L shoulder. Denies CP. Admits she was experiencing nausea earlier, denies currently. Denies hx of CHF, admits she takes Lasix as needed. Denies hx of COPD or any lung problems. Denies smoking or ETOH use. She is followed by Dr. William Wolfe, Cardiologist. No other symptoms or concerns were expressed. The history is provided by the patient. Past Medical History:   Diagnosis Date    Anemia     CAD (coronary artery disease)     Coronary Stenting needed, hematology workup needed prior to stent placement, treating medically at this time    Cancer [facility])     colon    Cancer ([facility] [address]     breast - left    Cardiac catheterization 08/17/2016    LM patent. mLAD 99.9% (2.25 x 28-mm Xience BETHEL, resid 0%). oD1 60%. oD2 95%. pCX 90% (2.5 x 13-mm Xience BETHEL, resid 0%). OMB patent.  Cardiac echocardiogram 07/01/2016    EF 50%. Basal inferior, basal-mid inferolateral hypk. Gr 1 DDfx. RVSP 50-60 mmHg. Mild LAE.   Mod, eccentric MR.      Diabetes ([facility]     Hypercholesteremia     Hypertension     Thrombocytopenia ([facility]        Past Surgical History:   Procedure Laterality Date    CARDIAC CATHETERIZATION  7/16/2015     no stents at this time r/t anemia & thrombocytopenia (hematology work-up needed prior to stent placement)    CARDIAC CATHETERIZATION  7/11/2016         CARDIAC CATHETERIZATION  8/17/2016         CORONARY ARTERY ANGIOGRAM 7/16/2015         CORONARY ARTERY ANGIOGRAM  7/11/2016         CORONARY ARTERY ANGIOGRAM  8/17/2016         CORONARY STENT EA ADDL VESSEL  8/17/2016         CORONARY STENT SINGLE W/PTCA  8/17/2016         HX GI      colon surg secondary to cancer - removed lesion    HX HEENT  2/2013    cataract bilaterally    HX MASTECTOMY  march 2011    left    HX VASCULAR ACCESS      mediport - now removed    LV ANGIOGRAPHY  7/16/2015         LV ANGIOGRAPHY  7/11/2016              No family history on file. Social History     Social History    Marital status:      Spouse name: N/A    Number of children: N/A    Years of education: N/A     Occupational History    Not on file. Social History Main Topics    Smoking status: Never Smoker    Smokeless tobacco: Never Used    Alcohol use No    Drug use: No    Sexual activity: Not on file     Other Topics Concern    Not on file     Social History Narrative         ALLERGIES: Latex    Review of Systems   Constitutional: Negative. Negative for chills, diaphoresis and fever. HENT: Negative. Negative for congestion, rhinorrhea and sore throat. Eyes: Negative. Negative for pain, discharge and redness. Respiratory: Positive for shortness of breath. Negative for cough, chest tightness and wheezing. Cardiovascular: Negative. Negative for chest pain. Gastrointestinal: Negative. Negative for abdominal pain, constipation, diarrhea, nausea and vomiting. Genitourinary: Negative. Negative for dysuria, flank pain, frequency, hematuria and urgency. Musculoskeletal: Positive for arthralgias and myalgias. Negative for back pain and neck pain. L shoulder pain   Skin: Negative. Negative for rash. Neurological: Negative. Negative for syncope, weakness, numbness and headaches. Psychiatric/Behavioral: Negative. All other systems reviewed and are negative.       Vitals:    10/18/17 0845   BP: 146/69   Pulse: (!) 110   Resp: 24   SpO2: 100% Physical Exam   Constitutional: She is oriented to person, place, and time. She appears well-developed and well-nourished. Non-toxic appearance. She does not have a sickly appearance. She does not appear ill. She appears distressed (mild ). Face mask in place. Patient is on Cpap   HENT:   Head: Normocephalic and atraumatic. Mouth/Throat: Oropharynx is clear and moist. No oropharyngeal exudate. Eyes: Conjunctivae and EOM are normal. Pupils are equal, round, and reactive to light. No scleral icterus. Neck: Trachea normal and normal range of motion. Neck supple. No hepatojugular reflux and no JVD present. No tracheal deviation present. No thyromegaly present. Cardiovascular: Regular rhythm, S1 normal, S2 normal, normal heart sounds, intact distal pulses and normal pulses. Tachycardia present. Exam reveals no gallop, no S3 and no S4. No murmur heard. Pulses:       Radial pulses are 2+ on the right side, and 2+ on the left side. Dorsalis pedis pulses are 2+ on the right side, and 2+ on the left side. Pulmonary/Chest: Accessory muscle usage present. Tachypnea noted. She is in respiratory distress. She has no decreased breath sounds. She has no wheezes. She has rhonchi in the right upper field, the right middle field, the left upper field and the left middle field. She has rales. Abdominal: Soft. Normal appearance and bowel sounds are normal. She exhibits no distension, no fluid wave, no ascites and no mass. There is no hepatosplenomegaly. There is no tenderness. There is no rigidity, no rebound, no guarding, no CVA tenderness, no tenderness at McBurney's point and negative Cee's sign. Musculoskeletal: Normal range of motion. She exhibits edema (2+ bilateral legs ) and tenderness. Strength 3/5 throughout    Lymphadenopathy:        Head (right side): No submental, no submandibular, no preauricular and no occipital adenopathy present.         Head (left side): No submental, no submandibular, no preauricular and no occipital adenopathy present. She has no cervical adenopathy. Right: No supraclavicular adenopathy present. Left: No supraclavicular adenopathy present. Neurological: She is alert and oriented to person, place, and time. She has normal strength and normal reflexes. She is not disoriented. No cranial nerve deficit or sensory deficit. Coordination and gait normal. GCS eye subscore is 4. GCS verbal subscore is 5. GCS motor subscore is 6. Grossly intact    Skin: Skin is warm, dry and intact. No rash noted. She is not diaphoretic. Psychiatric: She has a normal mood and affect. Her speech is normal and behavior is normal. Judgment and thought content normal. Cognition and memory are normal.   Nursing note and vitals reviewed. MDM  Number of Diagnoses or Management Options  Acute systolic congestive heart failure ([facility] [address]):   Dyspnea, unspecified type:   Diagnosis management comments: Shortness of breath etiologies include chronic obstructive pulmonary disease (COPD), acute asthma exacerbation, congestive heart failure, pneumonia, acute bronchitis, pulmonary embolism, upper respiratory infection, cardiac event to include acute coronary syndrome, acute myocardial infarction or a combination of the above (ex URI on top of COPD thus causing respiratory distress). ED Course       Procedures    Labs essentially normal with the exception of Hemoglobin 9.2 this is chronic, Troponin 0.11 and BNP 1609. UA negative. Chest X-Ray showed worsening vascular congestion. EKG showed ST with rate of 107 bpm. With no ST elevations or depression and non specific T wave changes. 9:56 AM 10/18/2017    10:39 AM: Reassessed patient. She states she is feeling much better, SOB has decreased.  Will discuss with hospitalist.    Consult:  Discussed care with Dr. Dontrell Joseph, hospitalist. Standard discussion; including history of patients chief complaint, available diagnostic results, and treatment course. Agrees to admit to Telemetry. 11:20 AM, 10/18/2017       Scribe Attestation      Shanda acting as a scribe for and in the presence of Sally Garcia DO      October 18, 2017 at 9:07 AM       Provider Attestation:      I personally performed the services described in the documentation, reviewed the documentation, as recorded by the scribe in my presence, and it accurately and completely records my words and actions.  October 18, 2017 at 9:07 AM - [address], DO

## 2017-10-18 NOTE — IP AVS SNAPSHOT
[address] Patient: Derrell Villalobos MRN: [id number] :1940 Current Discharge Medication List  
  
CONTINUE these medications which have CHANGED Dose & Instructions Dispensing Information Comments Morning Noon Evening Bedtime  
 metFORMIN 500 mg tablet Commonly known as:  GLUCOPHAGE What changed:   
- when to take this 
- additional instructions Your last dose was: Your next dose is:    
   
   
 Dose:  500 mg Take 1 Tab by mouth daily (with breakfast). One half tab twice daily Quantity:  30 Tab Refills:  0 CONTINUE these medications which have NOT CHANGED Dose & Instructions Dispensing Information Comments Morning Noon Evening Bedtime  
 amLODIPine 5 mg tablet Commonly known as:  Hayde Castro Your last dose was: Your next dose is: TAKE ONE TABLET BY MOUTH ONCE DAILY Quantity:  30 Tab Refills:  0  
     
   
   
   
  
 ascorbic acid (vitamin C) 250 mg tablet Commonly known as:  VITAMIN C Your last dose was: Your next dose is:    
   
   
 Dose:  250 mg Take 1 Tab by mouth daily. Quantity:  60 Tab Refills:  1  
     
   
   
   
  
 aspirin 81 mg tablet Your last dose was: Your next dose is:    
   
   
 Dose:  81 mg Take 81 mg by mouth daily. Refills:  0  
     
   
   
   
  
 cholecalciferol 1,000 unit tablet Commonly known as:  VITAMIN D3 Your last dose was: Your next dose is:    
   
   
 Dose:  2000 Units Take 2 Tabs by mouth daily. Quantity:  60 Tab Refills:  1  
     
   
   
   
  
 cyanocobalamin 100 mcg tablet Commonly known as:  VITAMIN B12 Your last dose was: Your next dose is:    
   
   
 Dose:  100 mcg Take 100 mcg by mouth daily. Refills:  0  
     
   
   
   
  
 ferrous sulfate 325 mg (65 mg iron) tablet Your last dose was: Your next dose is:    
   
   
 Dose:  325 mg Take 1 Tab by mouth two (2) times daily (with meals). Quantity:  60 Tab Refills:  1 FIBER LAXATIVE (CA POLYCARBO) 625 mg tablet Generic drug:  calcium polycarbophil Your last dose was: Your next dose is:    
   
   
 Dose:  1250 mg Take 1,250 mg by mouth daily. Refills:  0  
     
   
   
   
  
 FISH -160-1,000 mg Cap Generic drug:  omega 3-dha-epa-fish oil Your last dose was: Your next dose is:    
   
   
 Dose:  1000 mg Take 1,000 mg by mouth two (2) times a day. Refills:  0  
     
   
   
   
  
 furosemide 20 mg tablet Commonly known as:  LASIX Your last dose was: Your next dose is:    
   
   
 20 mg po daily Quantity:  30 Tab Refills:  0  
     
   
   
   
  
 hydrALAZINE 10 mg tablet Commonly known as:  APRESOLINE Your last dose was: Your next dose is:    
   
   
 Dose:  10 mg Take 1 Tab by mouth three (3) times daily. Quantity:  90 Tab Refills:  6  
     
   
   
   
  
 metoprolol tartrate 50 mg tablet Commonly known as:  LOPRESSOR Your last dose was: Your next dose is:    
   
   
 Dose:  50 mg Take 1 Tab by mouth two (2) times a day. Quantity:  60 Tab Refills:  6  
     
   
   
   
  
 multivitamin tablet Commonly known as:  ONE A DAY Your last dose was: Your next dose is:    
   
   
 Dose:  1 Tab Take 1 Tab by mouth daily. Refills:  0  
     
   
   
   
  
 nitroglycerin 400 mcg/spray spray Commonly known as:  [place] Bach Your last dose was: Your next dose is:    
   
   
 Dose:  1 Spray 1 Spray by SubLINGual route every five (5) minutes as needed. Quantity:  1 Bottle Refills:  2  
     
   
   
   
  
 pantoprazole 40 mg tablet Commonly known as:  PROTONIX Your last dose was: Your next dose is: Dose:  40 mg Take 1 Tab by mouth two (2) times a day. Quantity:  60 Tab Refills:  0  
     
   
   
   
  
 simvastatin 20 mg tablet Commonly known as:  ZOCOR Your last dose was: Your next dose is:    
   
   
 Dose:  20 mg Take 20 mg by mouth daily. Refills:  0 STOP taking these medications   
 insulin glargine 100 unit/mL injection Commonly known as:  LANTUS  
   
  
 insulin lispro 100 unit/mL injection Commonly known as:  HUMALOG Where to Get Your Medications These medications were sent to [id number], [address]  [address] [place] [id number] Phone:  [phone]  
  metFORMIN 500 mg tablet

## 2017-10-18 NOTE — ED TRIAGE NOTES
Pt brought in by EMS for SOB, pt was placed on cpap by EMS, RT robyn in room with pt, pt switched to bipap pt tolerating well all lungs wet, denies CP, pt was given 1 SL nitro in route and 2 mg ativan IV for anxiety,

## 2017-10-18 NOTE — IP AVS SNAPSHOT
[id number] [place] [address] [facility] Patient: Alisha Avina MRN: [id number] :1940 You are allergic to the following Allergen Reactions Latex Itching Recent Documentation Height Weight Breastfeeding? BMI OB Status Smoking Status 1.524 m 68.5 kg No 29.49 kg/m2 Postmenopausal Never Smoker Unresulted Labs Order Current Status CEA In process Emergency Contacts Name Discharge Info Relation Home Work Mobile Sher Salter DISCHARGE CAREGIVER [3] Spouse [3] [phone] [facility] CAREGIVER [3] Sister [23] [phone] Sher Salter  Other Relative [6] [phone] About your hospitalization You were admitted on:  2017 You last received care in the:  [facility] [address] You were discharged on:  2017 Unit phone number:  [phone] Why you were hospitalized Your primary diagnosis was:  Not on File Your diagnoses also included:  Chf (Congestive Heart Failure) (Hcc), Dyspnea Providers Seen During Your Hospitalizations Provider Role Specialty Primary office phone Tavares Kumar DO Attending Provider Emergency Medicine [phone] Sushil Taylor MD Attending Provider Internal Medicine [phone] Your Primary Care Physician (PCP) Primary Care Physician Office Phone Office Fax Adelia Brisenohuong [phone] Follow-up Information Follow up With Details Comments Contact Info Clearance MD Gerardo   [address] [address] [phone] [address]   3200 [facility] [address] [phone] Hollie Murphy MD In 2 weeks  [address] [facility] 
[phone] Gwendolynn Phalen, MD In 2 weeks  [address] [facility] [address] [phone] Current Discharge Medication List  
  
CONTINUE these medications which have CHANGED Dose & Instructions Dispensing Information Comments Morning Noon Evening Bedtime  
 metFORMIN 500 mg tablet Commonly known as:  GLUCOPHAGE What changed:   
- when to take this 
- additional instructions Your last dose was: Your next dose is:    
   
   
 Dose:  500 mg Take 1 Tab by mouth daily (with breakfast). One half tab twice daily Quantity:  30 Tab Refills:  0 CONTINUE these medications which have NOT CHANGED Dose & Instructions Dispensing Information Comments Morning Noon Evening Bedtime  
 amLODIPine 5 mg tablet Commonly known as:  Daisy Pace Your last dose was: Your next dose is: TAKE ONE TABLET BY MOUTH ONCE DAILY Quantity:  30 Tab Refills:  0  
     
   
   
   
  
 ascorbic acid (vitamin C) 250 mg tablet Commonly known as:  VITAMIN C Your last dose was: Your next dose is:    
   
   
 Dose:  250 mg Take 1 Tab by mouth daily. Quantity:  60 Tab Refills:  1  
     
   
   
   
  
 aspirin 81 mg tablet Your last dose was: Your next dose is:    
   
   
 Dose:  81 mg Take 81 mg by mouth daily. Refills:  0  
     
   
   
   
  
 cholecalciferol 1,000 unit tablet Commonly known as:  VITAMIN D3 Your last dose was: Your next dose is:    
   
   
 Dose:  2000 Units Take 2 Tabs by mouth daily. Quantity:  60 Tab Refills:  1  
     
   
   
   
  
 cyanocobalamin 100 mcg tablet Commonly known as:  VITAMIN B12 Your last dose was: Your next dose is:    
   
   
 Dose:  100 mcg Take 100 mcg by mouth daily. Refills:  0  
     
   
   
   
  
 ferrous sulfate 325 mg (65 mg iron) tablet Your last dose was: Your next dose is:    
   
   
 Dose:  325 mg Take 1 Tab by mouth two (2) times daily (with meals). Quantity:  60 Tab Refills:  1 FIBER LAXATIVE (CA POLYCARBO) 625 mg tablet Generic drug:  calcium polycarbophil Your last dose was: Your next dose is:    
   
   
 Dose:  1250 mg Take 1,250 mg by mouth daily. Refills:  0  
     
   
   
   
  
 FISH -160-1,000 mg Cap Generic drug:  omega 3-dha-epa-fish oil Your last dose was: Your next dose is:    
   
   
 Dose:  1000 mg Take 1,000 mg by mouth two (2) times a day. Refills:  0  
     
   
   
   
  
 furosemide 20 mg tablet Commonly known as:  LASIX Your last dose was: Your next dose is:    
   
   
 20 mg po daily Quantity:  30 Tab Refills:  0  
     
   
   
   
  
 hydrALAZINE 10 mg tablet Commonly known as:  APRESOLINE Your last dose was: Your next dose is:    
   
   
 Dose:  10 mg Take 1 Tab by mouth three (3) times daily. Quantity:  90 Tab Refills:  6  
     
   
   
   
  
 metoprolol tartrate 50 mg tablet Commonly known as:  LOPRESSOR Your last dose was: Your next dose is:    
   
   
 Dose:  50 mg Take 1 Tab by mouth two (2) times a day. Quantity:  60 Tab Refills:  6  
     
   
   
   
  
 multivitamin tablet Commonly known as:  ONE A DAY Your last dose was: Your next dose is:    
   
   
 Dose:  1 Tab Take 1 Tab by mouth daily. Refills:  0  
     
   
   
   
  
 nitroglycerin 400 mcg/spray spray Commonly known as:  Forestine Child Your last dose was: Your next dose is:    
   
   
 Dose:  1 Spray 1 Spray by SubLINGual route every five (5) minutes as needed. Quantity:  1 Bottle Refills:  2  
     
   
   
   
  
 pantoprazole 40 mg tablet Commonly known as:  PROTONIX Your last dose was: Your next dose is:    
   
   
 Dose:  40 mg Take 1 Tab by mouth two (2) times a day. Quantity:  60 Tab Refills:  0  
     
   
   
   
  
 simvastatin 20 mg tablet Commonly known as:  ZOCOR Your last dose was: Your next dose is:    
   
   
 Dose:  20 mg Take 20 mg by mouth daily. Refills:  0 STOP taking these medications   
 insulin glargine 100 unit/mL injection Commonly known as:  LANTUS  
   
  
 insulin lispro 100 unit/mL injection Commonly known as:  HUMALOG Where to Get Your Medications These medications were sent to [facility] [address] Phone:  [phone]  
  metFORMIN 500 mg tablet Discharge Instructions Heart Failure: Care Instructions Your Care Instructions Heart failure occurs when your heart does not pump as much blood as the body needs. Failure does not mean that the heart has stopped pumping but rather that it is not pumping as well as it should. Over time, this causes fluid buildup in your lungs and other parts of your body. Fluid buildup can cause shortness of breath, fatigue, swollen ankles, and other problems. By taking medicines regularly, reducing sodium (salt) in your diet, checking your weight every day, and making lifestyle changes, you can feel better and live longer. Follow-up care is a key part of your treatment and safety. Be sure to make and go to all appointments, and call your doctor if you are having problems. It's also a good idea to know your test results and keep a list of the medicines you take. How can you care for yourself at home? Medicines · Be safe with medicines. Take your medicines exactly as prescribed. Call your doctor if you think you are having a problem with your medicine. · Do not take any vitamins, over-the-counter medicine, or herbal products without talking to your doctor first. Rai Rivas not take ibuprofen (Advil or Motrin) and naproxen (Aleve) without talking to your doctor first. They could make your heart failure worse. · You may be taking some of the following medicine. ¨ Beta-blockers can slow heart rate, decrease blood pressure, and improve your condition. Taking a beta-blocker may lower your chance of needing to be hospitalized. ¨ Angiotensin-converting enzyme inhibitors (ACEIs) reduce the heart's workload, lower blood pressure, and reduce swelling. Taking an ACEI may lower your chance of needing to be hospitalized again. ¨ Angiotensin II receptor blockers (ARBs) work like ACEIs. Your doctor may prescribe them instead of ACEIs. ¨ Diuretics, also called water pills, reduce swelling. ¨ Potassium supplements replace this important mineral, which is sometimes lost with diuretics. ¨ Aspirin and other blood thinners prevent blood clots, which can cause a stroke or heart attack. You will get more details on the specific medicines your doctor prescribes. Diet · Your doctor may suggest that you limit sodium to 2,000 milligrams (mg) a day or less. That is less than 1 teaspoon of salt a day, including all the salt you eat in cooking or in packaged foods. People get most of their sodium from processed foods. Fast food and restaurant meals also tend to be very high in sodium. · Ask your doctor how much liquid you can drink each day. You may have to limit liquids. Weight · Weigh yourself without clothing at the same time each day. Record your weight. Call your doctor if you have a sudden weight gain, such as more than 2 to 3 pounds in a day or 5 pounds in a week. (Your doctor may suggest a different range of weight gain.) A sudden weight gain may mean that your heart failure is getting worse. Activity level · Start light exercise (if your doctor says it is okay). Even if you can only do a small amount, exercise will help you get stronger, have more energy, and manage your weight and your stress. Walking is an easy way to get exercise. Start out by walking a little more than you did before.  Bit by bit, increase the amount you walk. · When you exercise, watch for signs that your heart is working too hard. You are pushing yourself too hard if you cannot talk while you are exercising. If you become short of breath or dizzy or have chest pain, stop, sit down, and rest. 
· If you feel \"wiped out\" the day after you exercise, walk slower or for a shorter distance until you can work up to a better pace. · Get enough rest at night. Sleeping with 1 or 2 pillows under your upper body and head may help you breathe easier. Lifestyle changes · Do not smoke. Smoking can make a heart condition worse. If you need help quitting, talk to your doctor about stop-smoking programs and medicines. These can increase your chances of quitting for good. Quitting smoking may be the most important step you can take to protect your heart. · Limit alcohol to 2 drinks a day for men and 1 drink a day for women. Too much alcohol can cause health problems. · Avoid getting sick from colds and the flu. Get a pneumococcal vaccine shot. If you have had one before, ask your doctor whether you need another dose. Get a flu shot each year. If you must be around people with colds or the flu, wash your hands often. When should you call for help? Call 911 if you have symptoms of sudden heart failure such as: 
· You have severe trouble breathing. · You cough up pink, foamy mucus. · You have a new irregular or rapid heartbeat. Call your doctor now or seek immediate medical care if: 
· You have new or increased shortness of breath. · You are dizzy or lightheaded, or you feel like you may faint. · You have sudden weight gain, such as more than 2 to 3 pounds in a day or 5 pounds in a week. (Your doctor may suggest a different range of weight gain.) · You have increased swelling in your legs, ankles, or feet. · You are suddenly so tired or weak that you cannot do your usual activities. Watch closely for changes in your health, and be sure to contact your doctor if: 
· You develop new symptoms. Where can you learn more? Go to http://perlita-lidna.info/. Enter L757 in the search box to learn more about \"Heart Failure: Care Instructions. \" Current as of: April 3, 2017 Content Version: 11.3 © 0063-3263 [employer]. Care instructions adapted under license by [employer] (which disclaims liability or warranty for this information). If you have questions about a medical condition or this instruction, always ask your healthcare professional. [place] [address] any warranty or liability for your use of this information. Shortness of Breath: Care Instructions Your Care Instructions Shortness of breath has many causes. Sometimes conditions such as anxiety can lead to shortness of breath. Some people get mild shortness of breath when they exercise. Trouble breathing also can be a symptom of a serious problem, such as asthma, lung disease, emphysema, heart problems, and pneumonia. If your shortness of breath continues, you may need tests and treatment. Watch for any changes in your breathing and other symptoms. Follow-up care is a key part of your treatment and safety. Be sure to make and go to all appointments, and call your doctor if you are having problems. Its also a good idea to know your test results and keep a list of the medicines you take. How can you care for yourself at home? · Do not smoke or allow others to smoke around you. If you need help quitting, talk to your doctor about stop-smoking programs and medicines. These can increase your chances of quitting for good. · Get plenty of rest and sleep. · Take your medicines exactly as prescribed. Call your doctor if you think you are having a problem with your medicine. · Find healthy ways to deal with stress. ¨ Exercise daily. ¨ Get plenty of sleep. ¨ Eat regularly and well. When should you call for help? Call 911 anytime you think you may need emergency care. For example, call if: 
· You have severe shortness of breath. · You have symptoms of a heart attack. These may include: ¨ Chest pain or pressure, or a strange feeling in the chest. 
¨ Sweating. ¨ Shortness of breath. ¨ Nausea or vomiting. ¨ Pain, pressure, or a strange feeling in the back, neck, jaw, or upper belly or in one or both shoulders or arms. ¨ Lightheadedness or sudden weakness. ¨ A fast or irregular heartbeat. After you call 911, the  may tell you to chew 1 adult-strength or 2 to 4 low-dose aspirin. Wait for an ambulance. Do not try to drive yourself. Call your doctor now or seek immediate medical care if: 
· Your shortness of breath gets worse or you start to wheeze. Wheezing is a high-pitched sound when you breathe. · You wake up at night out of breath or have to prop your head up on several pillows to breathe. · You are short of breath after only light activity or while at rest. 
Watch closely for changes in your health, and be sure to contact your doctor if: 
· You do not get better over the next 1 to 2 days. Where can you learn more? Go to http://perlita-linda.info/. Enter S780 in the search box to learn more about \"Shortness of Breath: Care Instructions. \" Current as of: March 25, 2017 Content Version: 11.3 © 0057-5422 [employer]. [employer]. Care instructions adapted under license by [employer] (which disclaims liability or warranty for this information). If you have questions about a medical condition or this instruction, always ask your healthcare professional. [address] any warranty or liability for your use of this information. Patient armband removed and shredded MyChart Activation Thank you for requesting access to [employer].  Please follow the instructions below to securely access and download your online medical record. misterbnb allows you to send messages to your doctor, view your test results, renew your prescriptions, schedule appointments, and more. How Do I Sign Up? 1. In your internet browser, go to [email or web address] 
2. Click on the First Time User? Click Here link in the Sign In box. You will be redirect to the New Member Sign Up page. 3. Enter your misterbnb Access Code exactly as it appears below. You will not need to use this code after youve completed the sign-up process. If you do not sign up before the expiration date, you must request a new code. misterbnb Access Code: [email or web address] Expires: 2018 11:00 AM (This is the date your misterbnb access code will ) 4. Enter the last four digits of your Social Security Number (xxxx) and Date of Birth (mm/dd/yyyy) as indicated and click Submit. You will be taken to the next sign-up page. 5. Create a misterbnb ID. This will be your misterbnb login ID and cannot be changed, so think of one that is secure and easy to remember. 6. Create a misterbnb password. You can change your password at any time. 7. Enter your Password Reset Question and Answer. This can be used at a later time if you forget your password. 8. Enter your e-mail address. You will receive e-mail notification when new information is available in [address] 9. Click Sign Up. You can now view and download portions of your medical record. 10. Click the Download Summary menu link to download a portable copy of your medical information. Additional Information If you have questions, please visit the Frequently Asked Questions section of the misterbnb website at [email or web address] Remember, misterbnb is NOT to be used for urgent needs. For medical emergencies, dial 911. DISCHARGE SUMMARY from Nurse The following personal items are in your possession at time of discharge: 
 
Dental Appliances: None Visual Aid: At bedside, Glasses, Magnifying glass Home Medications: None Jewelry: Ring Clothing: None Other Valuables: Cell Phone Personal Items Sent to Safe: n/a PATIENT INSTRUCTIONS: 
 
 
F-face looks uneven A-arms unable to move or move unevenly S-speech slurred or non-existent T-time-call 911 as soon as signs and symptoms begin-DO NOT go Back to bed or wait to see if you get better-TIME IS BRAIN. Warning Signs of HEART ATTACK Call 911 if you have these symptoms: 
? Chest discomfort. Most heart attacks involve discomfort in the center of the chest that lasts more than a few minutes, or that goes away and comes back. It can feel like uncomfortable pressure, squeezing, fullness, or pain. ? Discomfort in other areas of the upper body. Symptoms can include pain or discomfort in one or both arms, the back, neck, jaw, or stomach. ? Shortness of breath with or without chest discomfort. ? Other signs may include breaking out in a cold sweat, nausea, or lightheadedness. Don't wait more than five minutes to call [address]! Fast action can save your life. Calling 911 is almost always the fastest way to get lifesaving treatment. Emergency Medical Services staff can begin treatment when they arrive  up to an hour sooner than if someone gets to the hospital by car. The discharge information has been reviewed with the patient and caregiver. The patient and caregiver verbalized understanding. Discharge medications reviewed with the patient and caregiver and appropriate educational materials and side effects teaching were provided. Discharge Orders None [employer] Announcement We are excited to announce that we are making your provider's discharge notes available to you in [employer]. You will see these notes when they are completed and signed by the physician that discharged you from your recent hospital stay. If you have any questions or concerns about any information you see in [employer], please call the Health Information Department where you were seen or reach out to your Primary Care Provider for more information about your plan of care. Introducing [facility] [place] [facility]! Bella Mchugh introduces [employer] patient portal. Now you can access parts of your medical record, email your doctor's office, and request medication refills online. 1. In your internet browser, go to [email or web address] 2. Click on the First Time User? Click Here link in the Sign In box. You will see the New Member Sign Up page. 3. Enter your [employer] Access Code exactly as it appears below. You will not need to use this code after youve completed the sign-up process. If you do not sign up before the expiration date, you must request a new code. · [employer] Access Code: [email or web address] Expires: 1/21/2018 11:00 AM 
 
4. Enter the last four digits of your Social Security Number (xxxx) and Date of Birth (mm/dd/yyyy) as indicated and click Submit. You will be taken to the next sign-up page. 5. Create a [employer] ID. This will be your [employer] login ID and cannot be changed, so think of one that is secure and easy to remember. 6. Create a [employer] password. You can change your password at any time. 7. Enter your Password Reset Question and Answer. This can be used at a later time if you forget your password. 8. Enter your e-mail address. You will receive e-mail notification when new information is available in [address] 9. Click Sign Up. You can now view and download portions of your medical record. 10. Click the Download Summary menu link to download a portable copy of your medical information. If you have questions, please visit the Frequently Asked Questions section of the CareLin[employer] website. Remember, The [employer]ou[employer] is NOT to be used for urgent needs. For medical emergencies, dial 911. Now available from your iPhone and Android! General Information Please provide this summary of care documentation to your next provider. Patient Signature:  ____________________________________________________________ Date:  ____________________________________________________________  
  
[facility] Provider Signature:  ____________________________________________________________ Date:  ____________________________________________________________

## 2017-10-18 NOTE — CONSULTS
[facility] - Consult Note    Consultation request by Lori Rosas MD for advice/opinion related to evaluating CHF (congestive heart failure) ([facility] [address]  Dyspnea    Date of  Admission: 10/18/2017  8:38 AM   Primary Care Physician:  Ryder Cano MD     Assessment:     Patient Active Problem List   Diagnosis Code    Anemia D64.9    CAD (coronary artery disease) I25.10    Thrombocytopenia ([facility] [address] D69.6    [facility]STEMI (non-ST elevated myocardial infarction) [facility] I21.4    UTI (urinary tract infection) N39.0    Sepsis ([facility] A41.9    PNA (pneumonia) J18.9    Hypoxia R09.02    Cirrhosis of liver not due to alcohol ([facility] K74.60    Esophageal varices ([facility] I85.00    CHF (congestive heart failure) (HCC) I50.9    Dyspnea R06.00     -Acute on chronic diastolic CHF  -Mild aortic stenosis with mean gradient 16mmHg and valve area 1.32cm2 on echo June 2017  -CAD hx, 3 vessel disease, s/p PCI to pCx and mLAD with BETHEL 8/2016  -HTN  -Dyslipidemia  -DM type II  -Chronic anemia  -H/o hiatal hernia small in size, very small varices without bleeding.   -Hypertensive portal gastropathy with very friable fundal mucosa with occasional blood oozing  -H/o colon cancer  --History of breast CA and colon CA 2011, remission, Hx of mastectomy    Primary cardiologist is Dr Quynh Garcia:     -Will check a limited echo to assess aortic valve and LV function as well as wall motion.   -Suspect mild troponin elevation is from physiologic stress with high BP initially. Would trend enzymes and recheck EKG this evening and in the morning.   -Would start IV lasix 40mg BID.   -OK to eat. Cardiac diet.   -Ok to resume BP meds including lopressor, hydralazine, and norvasc.  -Resume aspirin and statin. History of Present Illness: This is a 67 y.o. female admitted for CHF (congestive heart failure) (HCC)  Dyspnea. Patient complains of:  Acute onset of sob    Pt has a PMHx as listed above.  Reports becoming acutely short of breath this morning while ambulating in her driveway. Pt states she could not get air in. EMS was called and pt was found to be profoundly hypertensive. She was placed on CPAP and given nitroglycerin. Her symptoms improved upon arrival and she was placed on BiPAP and give 60mg IV lasix. She has started to diurese well already. She is off Bipap and on NC O2. She denies any recent chest pains or CHAVEZ. Denies any cough, congestion, or flu like symptoms. States she has been feeling fatigued and chilled over past couple of days. Her  is in the nursing home and she visits him daily. She is compliant with meds. No tobacco or etoh use. Cardiac risk factors:  Known CAD  HTN  HLD  CHF    Echocardiogram 6/22/2017:  SUMMARY:  Left ventricle: Systolic function was normal by visual assessment. Ejection fraction was estimated to be 55 %. There was possible mild hypokinesis of the mid inferoseptal, basal inferior, mid inferolateral,  and apical lateral wall(s). Wall thickness was mildly increased. Features were consistent with a pseudonormal left ventricular filling pattern, with  concomitant abnormal relaxation and increased filling pressure (grade 2 diastolic dysfunction). Right ventricle: Systolic pressure was mildly increased. Estimated peak pressure was 43 mmHg. Aortic valve: The valve was probably trileaflet. Leaflets exhibited mildly increased thickness, calcification, reduced mobility, and sclerosis. There was mild stenosis. Valve peak gradient was 35 mmHg. Valve mean gradient was 16 mmHg. Estimated aortic valve area was 1.32 cm-sq. COMPARISONS:  Comparison was made with the previous study of 01-Jul-2016. Aortic stenosis has worsened from mean gradient of 8 mmHg to 16 mmHg. Pulmonary artery pressure has decreased.     Review of Symptoms:  Except as stated above include:  Constitutional:  Positive for chills and fatigue  Respiratory:  Positive for sob, negative for cough or congestion or sputum production  Cardiovascular:  Negative for chest pain, positive for LE swelling  Gastrointestinal: positive for nausea, negative for vomiting  Genitourinary:  Negative for hematuria or dysuria or blood in stool  Musculoskeletal:  Negative for joint aches or weakness  Neurological:  Negative for dizziness or syncope  Dermatological:  Negative for rashes or lesions  Endocrinological: Negative for heat or cold intolerance  Psychological:  Negative for anxiety or depression     Past Medical History:     Past Medical History:   Diagnosis Date    Anemia     CAD (coronary artery disease)     Coronary Stenting needed, hematology workup needed prior to stent placement, treating medically at this time    Cancer [facility])     colon    Cancer ([facility] [address]     breast - left    Cardiac catheterization 08/17/2016    LM patent. mLAD 99.9% (2.25 x 28-mm Xience BETHEL, resid 0%). oD1 60%. oD2 95%. pCX 90% (2.5 x 13-mm Xience BETHEL, resid 0%). OMB patent.  Cardiac echocardiogram 07/01/2016    EF 50%. Basal inferior, basal-mid inferolateral hypk. Gr 1 DDfx. RVSP 50-60 mmHg. Mild LAE. Mod, eccentric MR.      Diabetes ([facility] [facility]     Hypercholesteremia     Hypertension     [facility]ocyt[facility] ([facility]          Social History:     Social History     Social History    Marital status:      Spouse name: N/A    Number of children: N/A    Years of education: N/A     Social History Main Topics    Smoking status: Never Smoker    Smokeless tobacco: Never Used    Alcohol use No    Drug use: No    Sexual activity: Not Asked     Other Topics Concern    None     Social History Narrative        Family History:   History reviewed. No pertinent family history. Medications:      Allergies   Allergen Reactions    Latex Itching        Current Facility-Administered Medications   Medication Dose Route Frequency    glucose chewable tablet 16 g  16 g Oral PRN    glucagon (GLUCAGEN) injection 1 mg  1 mg IntraMUSCular PRN    dextrose (D50W) injection syrg 12.5-25 g  25-50 mL IntraVENous PRN     Current Outpatient Prescriptions   Medication Sig    furosemide (LASIX) 20 mg tablet 20 mg po daily    pantoprazole (PROTONIX) 40 mg tablet Take 1 Tab by mouth two (2) times a day.  insulin lispro (HUMALOG) 100 unit/mL injection For Blood Sugar (mg/dL) of:              Less than 150 =   0 units  150 -199 =   2 units  200 -249 =   4 units  250 -299 =   6 units  300 -349 =   8 units  350 and above =   10 units    insulin glargine (LANTUS) 100 unit/mL injection 10 units subcutaneously daily    amLODIPine (NORVASC) 5 mg tablet TAKE ONE TABLET BY MOUTH ONCE DAILY    nitroglycerin (NITROLINGUAL) 400 mcg/spray spray 1 Spray by SubLINGual route every five (5) minutes as needed.  hydrALAZINE (APRESOLINE) 10 mg tablet Take 1 Tab by mouth three (3) times daily.  metoprolol tartrate (LOPRESSOR) 50 mg tablet Take 1 Tab by mouth two (2) times a day.  ascorbic acid, vitamin C, (VITAMIN C) 250 mg tablet Take 1 Tab by mouth daily.  ferrous sulfate 325 mg (65 mg iron) tablet Take 1 Tab by mouth two (2) times daily (with meals).  metFORMIN (GLUCOPHAGE) 500 mg tablet Take 500 mg by mouth two (2) times daily (with meals).  multivitamin (ONE A DAY) tablet Take 1 Tab by mouth daily.  cholecalciferol (VITAMIN D3) 1,000 unit tablet Take 2 Tabs by mouth daily.  simvastatin (ZOCOR) 20 mg tablet Take 20 mg by mouth daily.  omega 3-dha-epa-fish oil (FISH OIL) 100-160-1,000 mg cap Take 1,000 mg by mouth two (2) times a day.  cyanocobalamin (VITAMIN B12) 100 mcg tablet Take 100 mcg by mouth daily.  aspirin 81 mg tablet Take 81 mg by mouth daily.  calcium polycarbophil (FIBER LAXATIVE) 625 mg tablet Take 1,250 mg by mouth daily.          Physical Exam:     Visit Vitals    /69    Pulse (!) 101    Temp 98 °F (36.7 °C)    Resp 25    Ht 5' (1.524 m)    Wt 69.5 kg (153 lb 4.8 oz)    SpO2 95%    BMI 29.94 kg/m2     BP Readings from Last 3 Encounters:   10/18/17 138/69   06/29/17 129/64   09/19/16 132/80     Pulse Readings from Last 3 Encounters:   10/18/17 (!) 101   06/29/17 86   09/19/16 91     Wt Readings from Last 3 Encounters:   10/18/17 69.5 kg (153 lb 4.8 oz)   06/29/17 63 kg (138 lb 14.4 oz)   09/19/16 73.7 kg (162 lb 6.4 oz)       General: awake, alert, oriented x 3, sister at bedside, pleasant and talkative  Neck:  Supple, no appreciation of jvd  Lungs:  Bibasilar crackles, on NC O2, no conversational dyspnea, apices clear  Heart:  Reg rate and rhythm, SEJ at RUSB 2/6  Abdomen:  Soft, non-tender  Extremities:  1+ bilat ankle and pretibial edema, no cyanosis, atraumatic  Skin: warm and dry  Neuro: no focal deficits, moves all extremities without difficulty, PERRL, EOMs intact, speech is clear  Psych: normal mood and affect     Data Review:     Recent Labs      10/18/17   0910   WBC  4.4*   HGB  9.2*   HCT  26.6*   PLT  72*     Recent Labs      10/18/17   0910   NA  140   K  4.9   CL  110*   CO2  23   GLU  214*   BUN  28*   CREA  1.10   CA  8.7   ALB  3.4   SGOT  33   ALT  27       Results for orders placed or performed during the hospital encounter of 10/18/17   EKG, 12 LEAD, INITIAL   Result Value Ref Range    Ventricular Rate 107 BPM    Atrial Rate 107 BPM    P-R Interval 154 ms    QRS Duration 108 ms    Q-T Interval 360 ms    QTC Calculation (Bezet) 480 ms    Calculated P Axis 44 degrees    Calculated R Axis -14 degrees    Calculated T Axis 89 degrees    Diagnosis       Sinus tachycardia  Possible Left atrial enlargement  Septal infarct , age undetermined  Abnormal ECG  When compared with ECG of 21-JUN-2017 12:52,  Septal infarct is now present  Confirmed by Marge Campbell ([id number]) on 10/18/2017 1:27:32 PM     Results for orders placed or performed in visit on 09/19/16   AMB POC EKG ROUTINE W/ 12 LEADS, INTER & REP    Impression    Normal sinus rhythm, rate 91. Old inferior wall MI suggested w/Q waves in III and aVF.        All Cardiac Markers in the last 24 hours:    Lab Results   Component Value Date/Time    CPK 74 10/18/2017 09:10 AM    CKMB 2.0 10/18/2017 09:10 AM    CKND1 2.7 10/18/2017 09:10 AM    TROIQ 0.11 (H) 10/18/2017 09:10 AM    TNIPOC 0.39 (H) 10/18/2017 01:58 PM       Last Lipid:    Lab Results   Component Value Date/Time    Cholesterol, total 188 07/01/2016 11:53 PM    HDL Cholesterol 46 07/01/2016 11:53 PM    LDL, calculated 94.2 07/01/2016 11:53 PM    Triglyceride 239 07/01/2016 11:53 PM    CHOL/HDL Ratio 4.1 07/01/2016 11:53 PM       Signed By: ELOISA Wood     October 18, 2017

## 2017-10-18 NOTE — PROGRESS NOTES
Patient put on BiPAP due to respiratory distress due to fluid compromise. Patient tolerating well, and ABG done=no changes made. Per ER physician, \"patient will not require BiPAP use too long. \"     10/18/17 0845   Oxygen Therapy   O2 Sat (%) 100 %   O2 Device BIPAP   Respiratory   Respiratory (WDL) X   CPAP/BIPAP   CPAP/BIPAP Start/Stop On   Device Mode S/T   $$ Bipap Daily Yes   Mask Type and Size Medium   Skin Condition good   PIP Observed 15 cm H20   IPAP (cm H2O) 15 cm H2O   EPAP (cm H2O) 5 cm H2O   Inspiratory Time (sec) 0.9 seconds   Vt Spont (ml) 483 ml   Ve Observed (l/min) 9 l/min   Backup Rate 8   Total RR (Spontaneous) 32 breaths per minute   Insp Rise Time (sec) 3   Leak (Estimated) 45 L/min   Pt's Home Machine No   Biomedical Check Performed Yes   Settings Verified Yes   Alarm Settings   High Pressure 20   Low Pressure 10   Apnea 20   Low Ve 2   High Rate 45   Low Rate 6   Pulmonary Toilet   Pulmonary Toilet H. O.B elevated

## 2017-10-18 NOTE — H&P
Hospitalist Admission History and Physical    NAME:  Delfina Davis   :   1940   MRN:   [id number]     PCP:  Tom Walker MD  Date/Time:  10/18/2017 11:57 AM  Subjective:   CHIEF COMPLAINT:      HISTORY OF PRESENT ILLNESS:     Patient is a 67yo female with PMH CAD (2 stents in the past Cx and mLAD in 8670), Diastolic heart failure, Aortic stenosis, HTN, HLD, chronic anemia. Pt has been c/o SOB and fatigue, the fatigue has been there for the past month, SOB worsened over the last 24 hrs. She c/o occassional chest tightness that has been off/on for the past month, worse on exertion. She denies any recent illness, productive cough, fever/chills. She has been in and out of hospitals and nursing homes lately, taking care of her . She denies any urinary symptoms. Is c/o mild diahrrea, no nausea/vomiting. Pts most recent echo showed mild aortic stenosis and grade 2 diastolic dysfunction worse from prior echo one year ago with grade 1 diastolic dysfunction. BP has been well controlled on home regimen. She has not missed any of her home meds. Was advised to take lasix as needed.     Cardiac risk factors:   Known CAD  HTN  HLD  CHF     Past Medical History:   Diagnosis Date    Anemia     CAD (coronary artery disease)     Coronary Stenting needed, hematology workup needed prior to stent placement, treating medically at this time    Cancer [facility])     colon    Cancer ([facility]     breast - left    Cardiac catheterization 2016    LM patent. mLAD 99.9% (2.25 x 28-mm Xience BETHEL, resid 0%). oD1 60%. oD2 95%. pCX 90% (2.5 x 13-mm Xience BETHEL, resid 0%). OMB patent.  Cardiac echocardiogram 2016    EF 50%. Basal inferior, basal-mid inferolateral hypk. Gr 1 DDfx. RVSP 50-60 mmHg. Mild LAE.   Mod, eccentric MR.      [facility]     Hypercholesteremia     Hypertension     [facility]rombocyt[facility]         Past Surgical History:   Procedure Laterality Date    CARDIAC CATHETERIZATION  2015 no stents at this time r/t anemia & thrombocytopenia (hematology work-up needed prior to stent placement)    CARDIAC CATHETERIZATION  7/11/2016         CARDIAC CATHETERIZATION  8/17/2016         CORONARY ARTERY ANGIOGRAM  7/16/2015         CORONARY ARTERY ANGIOGRAM  7/11/2016         CORONARY ARTERY ANGIOGRAM  8/17/2016         CORONARY STENT EA ADDL VESSEL  8/17/2016         CORONARY STENT SINGLE W/PTCA  8/17/2016         HX GI      colon surg secondary to cancer - removed lesion    HX HEENT  2/2013    cataract bilaterally    HX MASTECTOMY  march 2011    left    HX VASCULAR ACCESS      mediport - now removed    LV ANGIOGRAPHY  7/16/2015         LV ANGIOGRAPHY  7/11/2016            Social History   Substance Use Topics    Smoking status: Never Smoker    Smokeless tobacco: Never Used    Alcohol use No        History reviewed. No pertinent family history. Allergies   Allergen Reactions    Latex Itching        Prior to Admission Medications   Prescriptions Last Dose Informant Patient Reported? Taking? amLODIPine (NORVASC) 5 mg tablet 10/17/2017 at Unknown time  No Yes   Sig: TAKE ONE TABLET BY MOUTH ONCE DAILY   ascorbic acid, vitamin C, (VITAMIN C) 250 mg tablet 10/17/2017 at Unknown time  No Yes   Sig: Take 1 Tab by mouth daily. aspirin 81 mg tablet 10/17/2017 at Unknown time  Yes Yes   Sig: Take 81 mg by mouth daily. calcium polycarbophil (FIBER LAXATIVE) 625 mg tablet 10/17/2017 at Unknown time  Yes Yes   Sig: Take 1,250 mg by mouth daily. cholecalciferol (VITAMIN D3) 1,000 unit tablet 10/17/2017 at Unknown time  No Yes   Sig: Take 2 Tabs by mouth daily. cyanocobalamin (VITAMIN B12) 100 mcg tablet 10/17/2017 at Unknown time  Yes Yes   Sig: Take 100 mcg by mouth daily. ferrous sulfate 325 mg (65 mg iron) tablet 10/17/2017 at Unknown time  No Yes   Sig: Take 1 Tab by mouth two (2) times daily (with meals).    furosemide (LASIX) 20 mg tablet 10/17/2017 at Unknown time  No Yes Si mg po daily   hydrALAZINE (APRESOLINE) 10 mg tablet 10/17/2017 at Unknown time  No Yes   Sig: Take 1 Tab by mouth three (3) times daily. insulin glargine (LANTUS) 100 unit/mL injection 10/17/2017 at Unknown time  No Yes   Sig: 10 units subcutaneously daily   insulin lispro (HUMALOG) 100 unit/mL injection 10/17/2017 at Unknown time  No Yes   Sig: For Blood Sugar (mg/dL) of:              Less than 150 =   0 units  150 -199 =   2 units  200 -249 =   4 units  250 -299 =   6 units  300 -349 =   8 units  350 and above =   10 units   metFORMIN (GLUCOPHAGE) 500 mg tablet 10/17/2017 at Unknown time  Yes Yes   Sig: Take 500 mg by mouth two (2) times daily (with meals). metoprolol tartrate (LOPRESSOR) 50 mg tablet 10/17/2017 at Unknown time  No Yes   Sig: Take 1 Tab by mouth two (2) times a day. multivitamin (ONE A DAY) tablet 10/17/2017 at Unknown time  Yes Yes   Sig: Take 1 Tab by mouth daily. nitroglycerin (NITROLINGUAL) 400 mcg/spray spray 10/17/2017 at Unknown time  No Yes   Si Spray by SubLINGual route every five (5) minutes as needed. omega 3-dha-epa-fish oil (FISH OIL) 100-160-1,000 mg cap 10/17/2017 at Unknown time  Yes Yes   Sig: Take 1,000 mg by mouth two (2) times a day. pantoprazole (PROTONIX) 40 mg tablet 10/17/2017 at Unknown time  No Yes   Sig: Take 1 Tab by mouth two (2) times a day. simvastatin (ZOCOR) 20 mg tablet 10/17/2017 at Unknown time  Yes Yes   Sig: Take 20 mg by mouth daily.         Facility-Administered Medications: None       REVIEW OF SYSTEMS:     [] Unable to obtain  ROS due to  []mental status change  []sedated   []intubated   [x]Total of 12 systems reviewed as follows:  Constitutional:  negative fever, negative chills, negative weight loss  Eyes:   negative visual changes  ENT:   negative sore throat, tongue or lip swelling  Respiratory:  negative cough, negative dyspnea  Cards:   negative for palpitations   GI:   negative for nausea, vomiting, diarrhea, and abdominal pain  Genitourinary: negative for frequency, dysuria  Integument:  negative for rash and pruritus  Hematologic:  negative for easy bruising and gum/nose bleeding  Musculoskel: negative for myalgias,  back pain and muscle weakness  Neurological:  negative for headaches, dizziness, vertigo  Behavl/Psych:  negative for feelings of anxiety, depression     Pertinent Positives include :sob, chest tightness, mild LE edema, fatigue      Objective:   VITALS:    Visit Vitals    /69    Pulse (!) 101    Temp 98 °F (36.7 °C)    Resp 25    Ht 5' (1.524 m)    Wt 69.5 kg (153 lb 4.8 oz)    SpO2 95%    BMI 29.94 kg/m2     Temp (24hrs), Av °F (36.7 °C), Min:98 °F (36.7 °C), Max:98 °F (36.7 °C)      PHYSICAL EXAM:   General:    Alert, cooperative, no distress, appears stated age. Head:   Normocephalic, without obvious abnormality, atraumatic. Eyes:   Conjunctivae clear, anicteric sclerae. Pupils are equal  Nose:  Nares normal. No drainage or sinus tenderness. Throat:    Lips, mucosa, and tongue normal.  No Thrush  Neck:  Supple, symmetrical,  no adenopathy, thyroid: non tender    no carotid bruit and no JVD. Back:    Symmetric,  No CVA tenderness. Lungs:   Clear to auscultation bilaterally. No Wheezing or Rhonchi. No rales. Chest wall:  No tenderness or deformity. No Accessory muscle use. Heart:   Tachycardic, grade III systolic ejection murmur loudest at apex and right 2nd intercostal space with radiation to carotids   Abdomen:   Soft, non-tender. Not distended. Bowel sounds normal. No masses  Extremities: Extremities normal, atraumatic, No cyanosis. No edema. No clubbing  Skin:     Texture, turgor normal. No rashes or lesions. Not Jaundiced  Lymph nodes: Cervical, supraclavicular normal.  Psych:  Good insight. Not depressed. Not anxious or agitated. Neurologic: EOMs intact. No facial asymmetry. No aphasia or slurred speech. Normal  strength, Alert and oriented X 3.        LAB DATA REVIEWED:    No components found for: Paz Hawkins      10/18/17   0910   NA  140   K  4.9   CL  110*   CO2  23   BUN  28*   CREA  1.10   GLU  214*   CA  8.7   ALB  3.4   WBC  4.4*   HGB  9.2*   HCT  26.6*   PLT  72*         IMAGING RESULTS:    Xr Chest Port    Result Date: 10/18/2017  Portable chest x-ray, upright AP view, time 0901 hours on 10/18/2017: INDICATION: Shortness of breath. History of diabetes, hypertension, CAD and breast cancer. COMPARISON STUDY: Chest x-ray on 6/21/2017, 6/30/2016. FINDINGS: Lungs are moderately hypoventilated. There is moderate cardiomegaly. Pulmonary vascularity is minimally to mildly engorged. There is no overt pulmonary edema. At bases of both lungs there are opacities, indicating mild to moderate atelectatic changes, and central infiltrates. Bibasilar pleural effusions are not excluded. IMPRESSIONS: Mild to moderate atelectatic changes, and/or infiltrates at the bases of both lungs with possible bibasilar pleural effusion. Minimal to mild pulmonary vascular congestion, without overt pulmonary edema. ECHO 6/22/17  Left ventricle: Systolic function was normal by visual assessment. Ejection fraction was estimated to be 55 %. -possible mild hypokinesis of the mid inferoseptal, basal inferior, mid inferolateral,  and apical lateral wall(s). -grade 2 diastolic dysfunction    Right ventricle: Systolic pressure was mildly increased. Estimated peak  pressure was 43 mmHg. Aortic valve: The valve was probably trileaflet. Leaflets exhibited mildly  increased thickness, calcification, reduced mobility, and sclerosis. There  was mild stenosis. Valve peak gradient was 35 mmHg. Valve mean gradient  was 16 mmHg. Estimated aortic valve area was 1.32 cm-sq. COMPARISONS:  Comparison was made with the previous study of 01-Jul-2016. Aortic  stenosis has worsened from mean gradient of 8 mmHg to 16 mmHg. Pulmonary  artery pressure has decreased.     Assessment/Plan:      Active Problems:    CHF (congestive heart failure) ([facility] (10/18/2017)      Dyspnea (10/18/2017)    -CAD:   She has known diagonal ostial 85% stenosis as well as mid circumflex 80-85% stenosis. -CHF with diastolic dysfunction with some volume overload 2/2 IVFs. -Mild aortic stenosis by echo this admission, valve mean gradient 16mmHg with aortic valve area of 1.32cm2. -CAD hx, s/p PCI to Cx and mLAD with BETHEL in Aug 2016, on DAPT with Brilinta and ASA. -ECHO 7/2016 with normal EF and G1DD, repeat echo yesterday with normal EF and G2DD, mild aortic stenosis. -HTN  -HLD  -DM type II  -Anemia   -thromobcytopenia, no signs of bleeding   ___________________________________________________  PLAN:    Risk of deterioration:  [x]Low    [x]Moderate  []High    -Will consult cardiology and have them review echocardiogram to evaluate aortic stenosis. Appreciate recs. -Continue good BP control.  Continue BB, norvasc, acei, and hydral.   -Cont asa and statin.  -Was given one dose of IV lasix in ER, will monitor response.   -Will resume home PO lasix 20mg daily as needed  -hold metformin during hospitalization  -cont POC glucose checks, lantus and ISS  -Bipap as needed, repeat ABGs   -trend trops   -follow Hb/Hct and platelets, transfuse if Hb<7 and Plat <50 with signs of bleeding            Prophylaxis:  []Lovenox  []Coumadin  [x]Hep   []SCDs  []H2B/PPI    Disposition:  [x]Home w/ Family   []HH PT,OT,RN   []SNF/LTC   []SAH/Rehab    Discussed Code Status:    [x]Full Code      []DNR     ___________________________________________________    Care Plan discussed with:    [x]Patient   [x]Family    []ED Care Manager  []ED Doc   []Specialist :    Total Time Coordinating Admission:   55   minutes    []Total Critical Care Time:     ___________________________________________________  Admitting Physician: Pauline Jackson MD

## 2017-10-19 LAB
ANION GAP SERPL CALC-SCNC: 9 MMOL/L (ref 3–18)
APTT PPP: 49.5 SEC (ref 23–36.4)
APTT PPP: 66.3 SEC (ref 23–36.4)
APTT PPP: 71.2 SEC (ref 23–36.4)
BASOPHILS # BLD: 0 K/UL (ref 0–0.1)
BASOPHILS NFR BLD: 0 % (ref 0–2)
BUN SERPL-MCNC: 30 MG/DL (ref 7–18)
BUN/CREAT SERPL: 27 (ref 12–20)
CALCIUM SERPL-MCNC: 9.1 MG/DL (ref 8.5–10.1)
CHLORIDE SERPL-SCNC: 108 MMOL/L (ref 100–108)
CO2 SERPL-SCNC: 24 MMOL/L (ref 21–32)
CREAT SERPL-MCNC: 1.1 MG/DL (ref 0.6–1.3)
DIFFERENTIAL METHOD BLD: ABNORMAL
EOSINOPHIL # BLD: 0 K/UL (ref 0–0.4)
EOSINOPHIL NFR BLD: 1 % (ref 0–5)
ERYTHROCYTE [DISTWIDTH] IN BLOOD BY AUTOMATED COUNT: 16 % (ref 11.6–14.5)
EST. AVERAGE GLUCOSE BLD GHB EST-MCNC: 128 MG/DL
GLUCOSE BLD STRIP.AUTO-MCNC: 172 MG/DL (ref 70–110)
GLUCOSE BLD STRIP.AUTO-MCNC: 174 MG/DL (ref 70–110)
GLUCOSE BLD STRIP.AUTO-MCNC: 200 MG/DL (ref 70–110)
GLUCOSE SERPL-MCNC: 119 MG/DL (ref 74–99)
HBA1C MFR BLD: 6.1 % (ref 4.2–5.6)
HCT VFR BLD AUTO: 24.4 % (ref 35–45)
HGB BLD-MCNC: 8.4 G/DL (ref 12–16)
INR PPP: 1.1 (ref 0.8–1.2)
LYMPHOCYTES # BLD: 0.7 K/UL (ref 0.9–3.6)
LYMPHOCYTES NFR BLD: 18 % (ref 21–52)
MCH RBC QN AUTO: 29.9 PG (ref 24–34)
MCHC RBC AUTO-ENTMCNC: 34.4 G/DL (ref 31–37)
MCV RBC AUTO: 86.8 FL (ref 74–97)
MONOCYTES # BLD: 0.2 K/UL (ref 0.05–1.2)
MONOCYTES NFR BLD: 5 % (ref 3–10)
NEUTS SEG # BLD: 3 K/UL (ref 1.8–8)
NEUTS SEG NFR BLD: 76 % (ref 40–73)
PLATELET # BLD AUTO: 84 K/UL (ref 135–420)
PMV BLD AUTO: 11.1 FL (ref 9.2–11.8)
POTASSIUM SERPL-SCNC: 4 MMOL/L (ref 3.5–5.5)
PROTHROMBIN TIME: 14.1 SEC (ref 11.5–15.2)
RBC # BLD AUTO: 2.81 M/UL (ref 4.2–5.3)
SODIUM SERPL-SCNC: 141 MMOL/L (ref 136–145)
TROPONIN I SERPL-MCNC: 0.88 NG/ML (ref 0–0.04)
TROPONIN I SERPL-MCNC: 1.35 NG/ML (ref 0–0.04)
WBC # BLD AUTO: 3.9 K/UL (ref 4.6–13.2)

## 2017-10-19 PROCEDURE — 36415 COLL VENOUS BLD VENIPUNCTURE: CPT

## 2017-10-19 PROCEDURE — 83036 HEMOGLOBIN GLYCOSYLATED A1C: CPT | Performed by: HOSPITALIST

## 2017-10-19 PROCEDURE — 85610 PROTHROMBIN TIME: CPT

## 2017-10-19 PROCEDURE — 74011636637 HC RX REV CODE- 636/637: Performed by: INTERNAL MEDICINE

## 2017-10-19 PROCEDURE — 84484 ASSAY OF TROPONIN QUANT: CPT

## 2017-10-19 PROCEDURE — 74011250637 HC RX REV CODE- 250/637: Performed by: INTERNAL MEDICINE

## 2017-10-19 PROCEDURE — 74011250636 HC RX REV CODE- 250/636: Performed by: INTERNAL MEDICINE

## 2017-10-19 PROCEDURE — 65660000000 HC RM CCU STEPDOWN

## 2017-10-19 PROCEDURE — 74011250636 HC RX REV CODE- 250/636: Performed by: PHYSICIAN ASSISTANT

## 2017-10-19 PROCEDURE — 93308 TTE F-UP OR LMTD: CPT

## 2017-10-19 PROCEDURE — 80048 BASIC METABOLIC PNL TOTAL CA: CPT

## 2017-10-19 PROCEDURE — 85025 COMPLETE CBC W/AUTO DIFF WBC: CPT

## 2017-10-19 PROCEDURE — 74011636637 HC RX REV CODE- 636/637: Performed by: HOSPITALIST

## 2017-10-19 PROCEDURE — 82962 GLUCOSE BLOOD TEST: CPT

## 2017-10-19 PROCEDURE — 85730 THROMBOPLASTIN TIME PARTIAL: CPT

## 2017-10-19 RX ORDER — HEPARIN SODIUM 1000 [USP'U]/ML
3000 INJECTION, SOLUTION INTRAVENOUS; SUBCUTANEOUS ONCE
Status: COMPLETED | OUTPATIENT
Start: 2017-10-19 | End: 2017-10-19

## 2017-10-19 RX ORDER — AMLODIPINE BESYLATE 10 MG/1
10 TABLET ORAL DAILY
Status: DISCONTINUED | OUTPATIENT
Start: 2017-10-20 | End: 2017-10-23 | Stop reason: HOSPADM

## 2017-10-19 RX ORDER — HEPARIN SODIUM 1000 [USP'U]/ML
3000 INJECTION, SOLUTION INTRAVENOUS; SUBCUTANEOUS ONCE
Status: DISCONTINUED | OUTPATIENT
Start: 2017-10-19 | End: 2017-10-19 | Stop reason: CLARIF

## 2017-10-19 RX ORDER — INSULIN LISPRO 100 [IU]/ML
INJECTION, SOLUTION INTRAVENOUS; SUBCUTANEOUS
Status: DISCONTINUED | OUTPATIENT
Start: 2017-10-19 | End: 2017-10-19

## 2017-10-19 RX ORDER — INSULIN LISPRO 100 [IU]/ML
INJECTION, SOLUTION INTRAVENOUS; SUBCUTANEOUS
Status: DISCONTINUED | OUTPATIENT
Start: 2017-10-19 | End: 2017-10-23 | Stop reason: HOSPADM

## 2017-10-19 RX ADMIN — PANTOPRAZOLE SODIUM 40 MG: 40 TABLET, DELAYED RELEASE ORAL at 08:57

## 2017-10-19 RX ADMIN — CALCIUM POLYCARBOPHIL 625 MG TABLET 625 MG: at 08:56

## 2017-10-19 RX ADMIN — FERROUS SULFATE TAB 325 MG (65 MG ELEMENTAL FE) 325 MG: 325 (65 FE) TAB at 16:28

## 2017-10-19 RX ADMIN — VITAM B12 100 MCG: 100 TAB at 08:57

## 2017-10-19 RX ADMIN — ASPIRIN 81 MG CHEWABLE TABLET 81 MG: 81 TABLET CHEWABLE at 08:57

## 2017-10-19 RX ADMIN — METOPROLOL TARTRATE 50 MG: 50 TABLET ORAL at 17:26

## 2017-10-19 RX ADMIN — HEPARIN SODIUM 3000 UNITS: 1000 INJECTION, SOLUTION INTRAVENOUS; SUBCUTANEOUS at 07:16

## 2017-10-19 RX ADMIN — FERROUS SULFATE TAB 325 MG (65 MG ELEMENTAL FE) 325 MG: 325 (65 FE) TAB at 08:57

## 2017-10-19 RX ADMIN — HYDRALAZINE HYDROCHLORIDE 10 MG: 10 TABLET, FILM COATED ORAL at 16:28

## 2017-10-19 RX ADMIN — HYDRALAZINE HYDROCHLORIDE 10 MG: 10 TABLET, FILM COATED ORAL at 08:57

## 2017-10-19 RX ADMIN — METOPROLOL TARTRATE 50 MG: 50 TABLET ORAL at 08:57

## 2017-10-19 RX ADMIN — INSULIN LISPRO 2 UNITS: 100 INJECTION, SOLUTION INTRAVENOUS; SUBCUTANEOUS at 16:27

## 2017-10-19 RX ADMIN — Medication 250 MG: at 08:57

## 2017-10-19 RX ADMIN — VITAMIN D, TAB 1000IU (100/BT) 2000 UNITS: 25 TAB at 08:56

## 2017-10-19 RX ADMIN — INSULIN GLARGINE 10 UNITS: 100 INJECTION, SOLUTION SUBCUTANEOUS at 21:35

## 2017-10-19 RX ADMIN — Medication 1 CAPSULE: at 08:57

## 2017-10-19 RX ADMIN — FUROSEMIDE 20 MG: 20 TABLET ORAL at 08:57

## 2017-10-19 RX ADMIN — HYDRALAZINE HYDROCHLORIDE 10 MG: 10 TABLET, FILM COATED ORAL at 21:34

## 2017-10-19 RX ADMIN — THERA TABS 1 TABLET: TAB at 08:57

## 2017-10-19 RX ADMIN — INSULIN GLARGINE 10 UNITS: 100 INJECTION, SOLUTION SUBCUTANEOUS at 01:01

## 2017-10-19 RX ADMIN — INSULIN LISPRO 2 UNITS: 100 INJECTION, SOLUTION INTRAVENOUS; SUBCUTANEOUS at 21:41

## 2017-10-19 RX ADMIN — PANTOPRAZOLE SODIUM 40 MG: 40 TABLET, DELAYED RELEASE ORAL at 17:26

## 2017-10-19 RX ADMIN — SIMVASTATIN 20 MG: 20 TABLET, FILM COATED ORAL at 21:34

## 2017-10-19 RX ADMIN — AMLODIPINE BESYLATE 5 MG: 5 TABLET ORAL at 08:56

## 2017-10-19 NOTE — PROGRESS NOTES
Shift Progress Note:  Assumed care of patient from emergency placed on telemetry, oriented to unit. No c/o pain or discomfort. PTT and troponin still pending. VSS no change in status. Remains on heparin drip.   Patient Vitals for the past 12 hrs:   Temp Pulse Resp BP SpO2   10/19/17 0500 97 °F (36.1 °C) 81 22 140/72 96 %   10/19/17 0139 98.2 °F (36.8 °C) 81 - 147/75 96 %   10/19/17 0055 - 84 - 150/64 98 %   10/18/17 2155 - 96 - - -   10/18/17 2154 - 95 - 142/65 -   10/18/17 2145 - 94 - 142/65 96 %   10/18/17 2130 - 94 - 144/59 97 %   10/18/17 2045 - 95 - 152/62 95 %   10/18/17 2030 - 88 - 144/62 92 %   10/18/17 2015 - 89 - 156/59 93 %   10/18/17 2000 - 91 - 164/83 96 %   10/18/17 1945 - 91 - 163/73 94 %   10/18/17 1930 - (!) 102 - (!) 162/92 97 %   10/18/17 1915 - 93 - 161/60 98 %

## 2017-10-19 NOTE — ED NOTES
Bedside shift change report given to marielena bernabe  (oncoming nurse) by Candida Alonzo  (offgoing nurse). Report included the following information SBAR, ED Summary, Intake/Output, MAR, Recent Results and Cardiac Rhythm sr.

## 2017-10-19 NOTE — PROGRESS NOTES
[facility]  Progress Note    Patient: Katheren Kocher Age: 67 y.o. : 1940 MR#: [id number] SSN: [id number]  Date: 10/19/2017     Subjective:   Pt states that she feels better. She denies any SOB, chest pain, abd pain. Assessment/Plan:   1. Acute on chronic diastolic CHF: cardiology following. Was given one dose of IV lasix in ER, continue PO lasix 20mg daily. Plan to watch one more day per cards. 2. Mild aortic stenosis with mean gradient 16mmHg and valve area 1.32cm2 on echo 2017: new echo pending. ?contributing to dyspnea. 3. CAD hx, 3 vessel disease, s/p PCI to pCx and mLAD with BETEHL 2016: Cont asa and statin. 4. Mild aortic stenosis with mean gradient 16mmHg and valve area 1.32cm2 on echo 2017  5. HTN - Continue BB, norvasc, and hydralazine  6. HLD - continue Statin  7. DM type II : -cont POC glucose checks, lantus and ISS   8. Elevated troponin: Cardiology following. Started on heparin gtt last night as Troponin was up to 1.85. Down to 1.35 this am. No complaints of chest pain. Will trend troponin. Continue heparin gtt for now per cards. Echo pending. 9. H/o hiatal hernia small in size, very small varices without bleeding.   10. Hypertensive portal gastropathy with very friable fundal mucosa with occasional blood oozing  11. H/o colon cancer s/p tumor resection  12. History of L breast CA , remission, s/p mastectomy  13. Thrombocytopenia: -follow Hb/Hct and platelets, may transfuse if Hb<7 and Plat <50 with signs of bleeding.      Case discussed with:  [x]Patient  []Family  []Nursing  []Case Management  DVT Prophylaxis:  []Lovenox  []Hep SQ  []SCDs  []Coumadin   [x]On Heparin gtt    Objective:   VS:   Visit Vitals    /60 (BP 1 Location: Right arm, BP Patient Position: At rest)    Pulse 72    Temp 98.6 °F (37 °C)    Resp 18    Ht 5' (1.524 m)    Wt 65.4 kg (144 lb 1.6 oz)    SpO2 96%    Breastfeeding No    BMI 28.14 kg/m2 Tmax/24hrs: Temp (24hrs), Av.5 °F (36.9 °C), Min:97 °F (36.1 °C), Max:100 °F (37.8 °C)    Intake/Output Summary (Last 24 hours) at 10/19/17 1318  Last data filed at 10/19/17 0820   Gross per 24 hour   Intake              100 ml   Output              400 ml   Net             -300 ml       General:  NAD  Cardiovascular:  RRR  Pulmonary:  CTA  GI:  Normal bowel sounds, non-TTP  Extremities:  Trace edema in bilat legs. Additional:      Labs:    Recent Results (from the past 24 hour(s))   POC TROPONIN-I    Collection Time: 10/18/17  1:58 PM   Result Value Ref Range    Troponin-I (POC) 0.39 (H) 0.00 - 0.08 ng/mL   TROPONIN I    Collection Time: 10/18/17  6:50 PM   Result Value Ref Range    Troponin-I, Qt. 1.85 (HH) 0.0 - 0.045 NG/ML   PTT    Collection Time: 10/18/17  8:49 PM   Result Value Ref Range    aPTT 32.9 23.0 - 36.4 SEC   GLUCOSE, POC    Collection Time: 10/18/17 10:00 PM   Result Value Ref Range    Glucose (POC) 138 (H) 70 - 110 mg/dL   CBC WITH AUTOMATED DIFF    Collection Time: 10/19/17  5:08 AM   Result Value Ref Range    WBC 3.9 (L) 4.6 - 13.2 K/uL    RBC 2.81 (L) 4.20 - 5.30 M/uL    HGB 8.4 (L) 12.0 - 16.0 g/dL    HCT 24.4 (L) 35.0 - 45.0 %    MCV 86.8 74.0 - 97.0 FL    MCH 29.9 24.0 - 34.0 PG    MCHC 34.4 31.0 - 37.0 g/dL    RDW 16.0 (H) 11.6 - 14.5 %    PLATELET 84 (L) 426 - 420 K/uL    MPV 11.1 9.2 - 11.8 FL    NEUTROPHILS 76 (H) 40 - 73 %    LYMPHOCYTES 18 (L) 21 - 52 %    MONOCYTES 5 3 - 10 %    EOSINOPHILS 1 0 - 5 %    BASOPHILS 0 0 - 2 %    ABS. NEUTROPHILS 3.0 1.8 - 8.0 K/UL    ABS. LYMPHOCYTES 0.7 (L) 0.9 - 3.6 K/UL    ABS. MONOCYTES 0.2 0.05 - 1.2 K/UL    ABS. EOSINOPHILS 0.0 0.0 - 0.4 K/UL    ABS.  BASOPHILS 0.0 0.0 - 0.1 K/UL    DF AUTOMATED     PTT    Collection Time: 10/19/17  5:08 AM   Result Value Ref Range    aPTT 49.5 (H) 23.0 - 36.4 SEC   TROPONIN I    Collection Time: 10/19/17  5:08 AM   Result Value Ref Range    Troponin-I, Qt. 1.35 (H) 0.0 - 5.222 NG/ML   METABOLIC PANEL, BASIC Collection Time: 10/19/17  5:08 AM   Result Value Ref Range    Sodium 141 136 - 145 mmol/L    Potassium 4.0 3.5 - 5.5 mmol/L    Chloride 108 100 - 108 mmol/L    CO2 24 21 - 32 mmol/L    Anion gap 9 3.0 - 18 mmol/L    Glucose 119 (H) 74 - 99 mg/dL    BUN 30 (H) 7.0 - 18 MG/DL    Creatinine 1.10 0.6 - 1.3 MG/DL    BUN/Creatinine ratio 27 (H) 12 - 20      GFR est AA 58 (L) >60 ml/min/1.73m2    GFR est non-AA 48 (L) >60 ml/min/1.73m2    Calcium 9.1 8.5 - 10.1 MG/DL   GLUCOSE, POC    Collection Time: 10/19/17 11:54 AM   Result Value Ref Range    Glucose (POC) 200 (H) 70 - 110 mg/dL       Signed By: Alok Benites PA-C     October 19, 2017 1:18 PM

## 2017-10-19 NOTE — ROUTINE PROCESS
Bedside and Verbal shift change report given to [facility] ([facility] nurse) by Delmy Vick RN   (offgoing nurse). Report included the following information SBAR, Kardex, MAR and Recent Results.

## 2017-10-19 NOTE — PROGRESS NOTES
Care Management Interventions  PCP Verified by CM: Yes (DR. Theodora Rosa)  Palliative Care Criteria Met (RRAT>21 & CHF Dx)?: No  Mode of Transport at Discharge:  Other (see comment) (FAMILY WILL TRANSPORT)  Transition of Care Consult (CM Consult): Discharge Planning  Physical Therapy Consult: Yes  Occupational Therapy Consult: Yes  Current Support Network: Lives with Spouse, Own Home, Family Lives Nearby  Confirm Follow Up Transport: Family  Plan discussed with Pt/Family/Caregiver: Yes [facility]  Discharge Location  Discharge Placement: Home with home health

## 2017-10-19 NOTE — CDMP QUERY
Please clarify if this patient is being treated/managed for:    =>  acute  hypoxic  resp failure  in setting of CHF requiring BIPAP    =>Other Explanation of clinical findings  =>Unable to Determine (no explanation of clinical findings)    The medical record reflects the following:    Risk:  CHF    Clinical Indicators:  RR  32 on admit    sats  in 80's  on RA  92, 94%  RA documented in ER    Accessory muscle usage present. Tachypnea noted.  She is in respiratory distress    Treatment:   BIPAP ;  IV lasix          Clinical indicators for Acute Respiratory Failure include:      Respirations >28, air hunger, use of accessory muscles of respiration, inability to speak in full sentences, cyanosis, pulse ox <90% RA or <95% on O2, - pH <7.35 or >7.45 ,  pO2 < 60 mm Hg (or 10mm below COPD patient's baseline) and a pCO2 >50mm Hg (or 10mm above COPD patient's baseline)     Thank you,    Alex Salgado RN   CCDS   x [phone]

## 2017-10-19 NOTE — PROGRESS NOTES
[facility]  -  Progress Note      Patient: Mehran Hoang MRN: [id number]  SSN: [id number]    YOB: 1940  Age: 67 y.o. Sex: female      Admit Date: 10/18/2017    Hospital Problem List:     Hospital Problems  Date Reviewed: 6/29/2017          Codes Class Noted POA    CHF (congestive heart failure) ([facility] [address] ICD-10-CM: I50.9  ICD-9-CM: 428.0  10/18/2017 Unknown        Dyspnea ICD-10-CM: R06.00  ICD-9-CM: 786.09  10/18/2017 Unknown            -Acute on chronic diastolic CHF  -Mild aortic stenosis with mean gradient 16mmHg and valve area 1.32cm2 on echo June 2017  -CAD hx, 3 vessel disease, s/p PCI to pCx and mLAD with BETHEL 8/2016  -HTN  -Dyslipidemia  -DM type II  -Chronic anemia  -H/o hiatal hernia small in size, very small varices without bleeding.   -Hypertensive portal gastropathy with very friable fundal mucosa with occasional blood oozing  -H/o colon cancer  --History of breast CA and colon CA 2011, remission, Hx of mastectomy     Primary cardiologist is Dr Patrick Feliz:     -Started on heparin gtt last night as Troponin was up to 1.85. Down to 1.35 this morning. No complaints of chest pain. Will recheck a troponin now to ensure continued downtrend.   -Will also review echocardiogram once completed today.   -Continue heparin gtt for now. -Symptomatically feeling better and has diuresed well. Swelling in lower extremities is gone. Continue lasix at 20mg daily. -BP suboptimal. Will increase norvasc to 10mg daily. Subjective:     Feeling better. Shortness of breath has resolved. No chest pain.     Objective:      Patient Vitals for the past 8 hrs:   Temp Pulse Resp BP SpO2   10/19/17 0820 100 °F (37.8 °C) 86 19 145/72 95 %   10/19/17 0500 97 °F (36.1 °C) 81 22 140/72 96 %         Patient Vitals for the past 96 hrs:   Weight   10/19/17 0139 65.4 kg (144 lb 1.6 oz)   10/18/17 0845 69.5 kg (153 lb 4.8 oz)         Intake/Output Summary (Last 24 hours) at 10/19/17 1131  Last data filed at 10/19/17 0820   Gross per 24 hour   Intake              100 ml   Output              400 ml   Net             -300 ml       Physical Exam:  General:  Awake, alert, oriented x 3  Neck:  Supple, no jvd  Lungs: clear to auscultation bilat  Heart: SEJ RUSB 2/6, reg rate and rhythm  Abdomen:  Soft, non-tender  Extremities: no LE edema, no cyanosis    Data Review:     Labs: Results:       Chemistry Recent Labs      10/19/17   0508  10/18/17   0910   GLU  119*  214*   NA  141  140   K  4.0  4.9   CL  108  110*   CO2  24  23   BUN  30*  28*   CREA  1.10  1.10   CA  9.1  8.7   AGAP  9  7   BUCR  27*  25*   AP   --   100   TP   --   6.9   ALB   --   3.4   GLOB   --   3.5   AGRAT   --   1.0      CBC w/Diff Recent Labs      10/19/17   0508  10/18/17   0910   WBC  3.9*  4.4*   RBC  2.81*  3.04*   HGB  8.4*  9.2*   HCT  24.4*  26.6*   PLT  84*  72*   GRANS  76*  82*   LYMPH  18*  13*   EOS  1  1      Cardiac Enzymes Lab Results   Component Value Date/Time    TROIQ 1.35 (H) 10/19/2017 05:08 AM    TROIQ 1.85 (HH) 10/18/2017 06:50 PM    TNIPOC 0.39 (H) 10/18/2017 01:58 PM      Coagulation Recent Labs      10/19/17   0508  10/18/17   2049   APTT  49.5*  32.9       Lipid Panel Lab Results   Component Value Date/Time    Cholesterol, total 188 07/01/2016 11:53 PM    HDL Cholesterol 46 07/01/2016 11:53 PM    LDL, calculated 94.2 07/01/2016 11:53 PM    VLDL, calculated 47.8 07/01/2016 11:53 PM    Triglyceride 239 07/01/2016 11:53 PM    CHOL/HDL Ratio 4.1 07/01/2016 11:53 PM      BNP No results found for: BNP, BNPP, XBNPT   Liver Enzymes Recent Labs      10/18/17   0910   TP  6.9   ALB  3.4   AP  100   SGOT  33      Digoxin    Thyroid Studies Lab Results   Component Value Date/Time    TSH 0.77 06/29/2017 04:26 AM            Signed By: ELOSIA Barron     October 19, 2017

## 2017-10-19 NOTE — PROGRESS NOTES
Met with patient at bedside  Demographics and PCP verified  Lives in a two story [facility]. No steps to enter home. No bathroom on first floor but has a BSC on the first floor. Has walker and shower chair at home.  is currently in [facility] for rehab. Per wife plan is for  to return home. Explored with patient possible need for assistance for both herself and  when both are discharged. Patient verbalizes understanding. Patient is open to both [facility] and SNF depending on recommendations. FOC obtained for [facility] in event that [facility] will be needed. Could benefit from PT/OT evals to determine post acute placement needs. Case management will continue to follow for needs.

## 2017-10-19 NOTE — PROGRESS NOTES
Limited 2D echocardiogram study was performed on patient. Report to follow. Patient went back to room with armband still in place.

## 2017-10-19 NOTE — ED NOTES
Dr Irene Adams notified of increased troponin, pt asymptomatic stable on monitor.  TORB by kathy AMIN

## 2017-10-19 NOTE — DIABETES MGMT
Glycemic Control Plan of Care    POC BG report on 10/18/2017: 138 mg/dL. No POC BG report at time of review on 10/19/2017. Patient stated that she ate her breakfast but blood sugar was not checked before that. Discussed BG monitoring and she verbalized understanding to wait/call nurse to have blood sugar checked before eating meals. Recommendation(s):  1.) Enter POC BG checks ACHS. Done. 2.) Change correctional lispro insulin order to HR order set. Done. Assessment:  Patient is 67year old with past medical history including type 2 diabetes mellitus, hypertension, hyperlipidemia, CAD with PCI/BETHEL, and CHF - was admitted on 10/18/2017 with c/o shortness of breath and fatigue. Noted:  CHF. Type 2 diabetes mellitus. Most recent blood glucose values:    Results for Aleyda Sorto (MRN [id number]) as of 10/19/2017 10:51   Ref. Range 10/18/2017 22:00   GLUCOSE,FAST - POC Latest Ref Range: 70 - 110 mg/dL 138 (H)     10/19/2017: No POC BG report at time of review. Entered order for POC BG cheks ACHS. Current A1C: 6.0% (06/27/2017). Noted pending A1c result ordered on 10/19/2017. Current hospital diabetes medications:  Lantus insulin 10 units daily at bedtime. Correctional lispro insulin ACHS. Normal sensitivity dose. Total daily dose insulin requirement previous day: 10/18/2017  Lantus: 10 units (HS dose for 10/18 was given late after midnight on 10/19)    Home diabetes medications: As stated by patient on 10/19/2017:  Metformin 500 mg twice daily with food. Humalog sliding scale insulin 3x daily before meals. Lantus insulin 10 units daily. Diet: Diabetic consistent carb regular. Goals:  Blood glucose will be within target range of  mg/dL by 10/22/2017.      Education:  ___  Refer to Diabetes Education Record             _X__  Education not indicated at this time    Varinder Moore RN

## 2017-10-19 NOTE — ROUTINE PROCESS
TRANSFER - IN REPORT:    Verbal report received from Keeley rn(name) on Arlys Call  being received from emergency room(unit) for routine progression of care      Report consisted of patients Situation, Background, Assessment and   Recommendations(SBAR). Information from the following report(s) SBAR, ED Summary, [facility] and Recent Results was reviewed with the receiving nurse. Opportunity for questions and clarification was provided. Assessment will be completed upon patients arrival to unit and care assumed.

## 2017-10-19 NOTE — PROGRESS NOTES
Problem: Falls - Risk of  Goal: *Absence of Falls  Document Candy Fall Risk and appropriate interventions in the flowsheet.    Outcome: Progressing Towards Goal  Fall Risk Interventions:  Mobility Interventions: Patient to call before getting OOB         Medication Interventions: Evaluate medications/consider consulting pharmacy    Elimination Interventions: Call light in reach, Bed/chair exit alarm

## 2017-10-20 LAB
ANION GAP SERPL CALC-SCNC: 8 MMOL/L (ref 3–18)
APTT PPP: 90.2 SEC (ref 23–36.4)
BASOPHILS # BLD: 0 K/UL (ref 0–0.1)
BASOPHILS NFR BLD: 0 % (ref 0–2)
BUN SERPL-MCNC: 32 MG/DL (ref 7–18)
BUN/CREAT SERPL: 25 (ref 12–20)
CALCIUM SERPL-MCNC: 8.5 MG/DL (ref 8.5–10.1)
CHLORIDE SERPL-SCNC: 109 MMOL/L (ref 100–108)
CO2 SERPL-SCNC: 23 MMOL/L (ref 21–32)
CREAT SERPL-MCNC: 1.29 MG/DL (ref 0.6–1.3)
DIFFERENTIAL METHOD BLD: ABNORMAL
EOSINOPHIL # BLD: 0.1 K/UL (ref 0–0.4)
EOSINOPHIL NFR BLD: 2 % (ref 0–5)
ERYTHROCYTE [DISTWIDTH] IN BLOOD BY AUTOMATED COUNT: 15.8 % (ref 11.6–14.5)
FERRITIN SERPL-MCNC: 225 NG/ML (ref 8–388)
FOLATE SERPL-MCNC: >20 NG/ML (ref 3.1–17.5)
GLUCOSE BLD STRIP.AUTO-MCNC: 111 MG/DL (ref 70–110)
GLUCOSE BLD STRIP.AUTO-MCNC: 148 MG/DL (ref 70–110)
GLUCOSE BLD STRIP.AUTO-MCNC: 256 MG/DL (ref 70–110)
GLUCOSE BLD STRIP.AUTO-MCNC: 320 MG/DL (ref 70–110)
GLUCOSE SERPL-MCNC: 131 MG/DL (ref 74–99)
HCT VFR BLD AUTO: 22.8 % (ref 35–45)
HCT VFR BLD AUTO: 23.6 % (ref 35–45)
HGB BLD-MCNC: 7.8 G/DL (ref 12–16)
HGB BLD-MCNC: 8.1 G/DL (ref 12–16)
IRON SATN MFR SERPL: 10 %
IRON SERPL-MCNC: 21 UG/DL (ref 50–175)
LYMPHOCYTES # BLD: 0.4 K/UL (ref 0.9–3.6)
LYMPHOCYTES NFR BLD: 15 % (ref 21–52)
MCH RBC QN AUTO: 29.8 PG (ref 24–34)
MCHC RBC AUTO-ENTMCNC: 34.2 G/DL (ref 31–37)
MCV RBC AUTO: 87 FL (ref 74–97)
MONOCYTES # BLD: 0.2 K/UL (ref 0.05–1.2)
MONOCYTES NFR BLD: 8 % (ref 3–10)
NEUTS SEG # BLD: 2.2 K/UL (ref 1.8–8)
NEUTS SEG NFR BLD: 75 % (ref 40–73)
PLATELET # BLD AUTO: 70 K/UL (ref 135–420)
PMV BLD AUTO: 10 FL (ref 9.2–11.8)
POTASSIUM SERPL-SCNC: 3.6 MMOL/L (ref 3.5–5.5)
RBC # BLD AUTO: 2.62 M/UL (ref 4.2–5.3)
SODIUM SERPL-SCNC: 140 MMOL/L (ref 136–145)
TIBC SERPL-MCNC: 221 UG/DL (ref 250–450)
TROPONIN I SERPL-MCNC: 0.36 NG/ML (ref 0–0.04)
VIT B12 SERPL-MCNC: 641 PG/ML (ref 211–911)
WBC # BLD AUTO: 3 K/UL (ref 4.6–13.2)

## 2017-10-20 PROCEDURE — 74011636637 HC RX REV CODE- 636/637: Performed by: HOSPITALIST

## 2017-10-20 PROCEDURE — 85025 COMPLETE CBC W/AUTO DIFF WBC: CPT | Performed by: PHYSICIAN ASSISTANT

## 2017-10-20 PROCEDURE — 97161 PT EVAL LOW COMPLEX 20 MIN: CPT

## 2017-10-20 PROCEDURE — 84484 ASSAY OF TROPONIN QUANT: CPT | Performed by: PHYSICIAN ASSISTANT

## 2017-10-20 PROCEDURE — 85018 HEMOGLOBIN: CPT | Performed by: HOSPITALIST

## 2017-10-20 PROCEDURE — 82728 ASSAY OF FERRITIN: CPT | Performed by: HOSPITALIST

## 2017-10-20 PROCEDURE — 85730 THROMBOPLASTIN TIME PARTIAL: CPT | Performed by: PHYSICIAN ASSISTANT

## 2017-10-20 PROCEDURE — 82607 VITAMIN B-12: CPT | Performed by: HOSPITALIST

## 2017-10-20 PROCEDURE — 74011250637 HC RX REV CODE- 250/637: Performed by: INTERNAL MEDICINE

## 2017-10-20 PROCEDURE — 36415 COLL VENOUS BLD VENIPUNCTURE: CPT | Performed by: PHYSICIAN ASSISTANT

## 2017-10-20 PROCEDURE — 65660000000 HC RM CCU STEPDOWN

## 2017-10-20 PROCEDURE — 80048 BASIC METABOLIC PNL TOTAL CA: CPT | Performed by: PHYSICIAN ASSISTANT

## 2017-10-20 PROCEDURE — 82962 GLUCOSE BLOOD TEST: CPT

## 2017-10-20 PROCEDURE — 74011636637 HC RX REV CODE- 636/637: Performed by: INTERNAL MEDICINE

## 2017-10-20 PROCEDURE — 74011250637 HC RX REV CODE- 250/637: Performed by: PHYSICIAN ASSISTANT

## 2017-10-20 PROCEDURE — 83540 ASSAY OF IRON: CPT | Performed by: HOSPITALIST

## 2017-10-20 RX ADMIN — PANTOPRAZOLE SODIUM 40 MG: 40 TABLET, DELAYED RELEASE ORAL at 08:13

## 2017-10-20 RX ADMIN — INSULIN LISPRO 6 UNITS: 100 INJECTION, SOLUTION INTRAVENOUS; SUBCUTANEOUS at 12:54

## 2017-10-20 RX ADMIN — Medication 1 CAPSULE: at 08:12

## 2017-10-20 RX ADMIN — FERROUS SULFATE TAB 325 MG (65 MG ELEMENTAL FE) 325 MG: 325 (65 FE) TAB at 08:11

## 2017-10-20 RX ADMIN — AMLODIPINE BESYLATE 10 MG: 10 TABLET ORAL at 08:12

## 2017-10-20 RX ADMIN — CALCIUM POLYCARBOPHIL 625 MG TABLET 625 MG: at 08:11

## 2017-10-20 RX ADMIN — THERA TABS 1 TABLET: TAB at 08:12

## 2017-10-20 RX ADMIN — METOPROLOL TARTRATE 50 MG: 50 TABLET ORAL at 08:11

## 2017-10-20 RX ADMIN — FERROUS SULFATE TAB 325 MG (65 MG ELEMENTAL FE) 325 MG: 325 (65 FE) TAB at 18:38

## 2017-10-20 RX ADMIN — HYDRALAZINE HYDROCHLORIDE 10 MG: 10 TABLET, FILM COATED ORAL at 21:14

## 2017-10-20 RX ADMIN — HYDRALAZINE HYDROCHLORIDE 10 MG: 10 TABLET, FILM COATED ORAL at 08:10

## 2017-10-20 RX ADMIN — FUROSEMIDE 20 MG: 20 TABLET ORAL at 08:13

## 2017-10-20 RX ADMIN — Medication 250 MG: at 08:13

## 2017-10-20 RX ADMIN — PANTOPRAZOLE SODIUM 40 MG: 40 TABLET, DELAYED RELEASE ORAL at 18:38

## 2017-10-20 RX ADMIN — METOPROLOL TARTRATE 50 MG: 50 TABLET ORAL at 18:38

## 2017-10-20 RX ADMIN — INSULIN GLARGINE 10 UNITS: 100 INJECTION, SOLUTION SUBCUTANEOUS at 21:15

## 2017-10-20 RX ADMIN — INSULIN LISPRO 12 UNITS: 100 INJECTION, SOLUTION INTRAVENOUS; SUBCUTANEOUS at 21:15

## 2017-10-20 RX ADMIN — SIMVASTATIN 20 MG: 20 TABLET, FILM COATED ORAL at 21:16

## 2017-10-20 RX ADMIN — HYDRALAZINE HYDROCHLORIDE 10 MG: 10 TABLET, FILM COATED ORAL at 16:17

## 2017-10-20 RX ADMIN — VITAM B12 100 MCG: 100 TAB at 08:11

## 2017-10-20 RX ADMIN — ASPIRIN 81 MG CHEWABLE TABLET 81 MG: 81 TABLET CHEWABLE at 08:11

## 2017-10-20 RX ADMIN — VITAMIN D, TAB 1000IU (100/BT) 2000 UNITS: 25 TAB at 08:12

## 2017-10-20 NOTE — PROGRESS NOTES
Problem: Mobility Impaired (Adult and Pediatric)  Goal: *Acute Goals and Plan of Care (Insert Text)  Physical Therapy Goals  Initiated 10/20/2017 and to be accomplished within 7 day(s)  1. Patient will move from supine to sit and sit to supine , scoot up and down and roll side to side in bed with Candy. 2.  Patient will transfer from bed to chair and chair to bed with Candy using the least restrictive device. 3.  Patient will perform sit to stand with Candy. 4.  Patient will ambulate with supervision/set-up for >200 feet with the least restrictive device. 5.  Patient will ascend/descend 3 stairs with R handrail(s) with supervision/set-up. Outcome: Progressing Towards Goal  physical Therapy EVALUATION    Patient: Ag Genao (65 y.o. female)  Date: 10/20/2017  Primary Diagnosis: CHF (congestive heart failure) ([facility])  Dyspnea  Precautions: Fall    ASSESSMENT :  Based on the objective data described below, the patient presents with impaired functional mobility including bed mobility, transfers, ambulation, and general activity tolerance following admission for CHF and shortness of breath. Patient presents today sitting up in bed, alert and agreeable to PT evaluation. Patient able to transfer to sitting EOB with SBA, performed sit to stand using RW and SBA. She ambulated ~50 ft in hallway with SBA, reported shakiness/unsteadiness and requested to return to room. Patient left seated in recliner with LE elevated, call bell in reach, and nurse notified. Patient will benefit from skilled intervention to address the above impairments.   Patients rehabilitation potential is considered to be Good  Factors which may influence rehabilitation potential include:   []         None noted  []         Mental ability/status  [x]         Medical condition  [x]         Home/family situation and support systems  []         Safety awareness  []         Pain tolerance/management  []         Other:      PLAN :  Recommendations and Planned Interventions:  [x]           Bed Mobility Training             [x]    Neuromuscular Re-Education  [x]           Transfer Training                   []    Orthotic/Prosthetic Training  [x]           Gait Training                          []    Modalities  [x]           Therapeutic Exercises          []    Edema Management/Control  [x]           Therapeutic Activities            [x]    Patient and Family Training/Education  []           Other (comment):    Frequency/Duration: Patient will be followed by physical therapy 1-2 times per day to address goals. Discharge Recommendations: Home Health  Further Equipment Recommendations for Discharge: rolling walker     SUBJECTIVE:   Patient stated Well thank you so much, dear, at completion of PT evaluation. OBJECTIVE DATA SUMMARY:     Past Medical History:   Diagnosis Date    Anemia     CAD (coronary artery disease)     Coronary Stenting needed, hematology workup needed prior to stent placement, treating medically at this time    Cancer [facility])     colon    Cancer ([facility] [address]     breast - left    Cardiac catheterization 08/17/2016    LM patent. mLAD 99.9% (2.25 x 28-mm Xience BETHEL, resid 0%). oD1 60%. oD2 95%. pCX 90% (2.5 x 13-mm Xience BETHEL, resid 0%). OMB patent.  Cardiac echocardiogram 07/01/2016    EF 50%. Basal inferior, basal-mid inferolateral hypk. Gr 1 DDfx. RVSP 50-60 mmHg. Mild LAE.   Mod, eccentric MR.      Diabetes ([facility] [address]     Hypercholesteremia     Hypertension     Thrombocytopenia ([facility] [address]      Past Surgical History:   Procedure Laterality Date    CARDIAC CATHETERIZATION  7/16/2015     no stents at this time r/t anemia & thrombocytopenia (hematology work-up needed prior to stent placement)    CARDIAC CATHETERIZATION  7/11/2016         CARDIAC CATHETERIZATION  8/17/2016         CORONARY ARTERY ANGIOGRAM  7/16/2015         CORONARY ARTERY ANGIOGRAM  7/11/2016         CORONARY ARTERY ANGIOGRAM  8/17/2016         CORONARY STENT EA ADDL VESSEL  8/17/2016         CORONARY STENT SINGLE W/PTCA  8/17/2016         HX GI      colon surg secondary to cancer - removed lesion    HX HEENT  2/2013    cataract bilaterally    HX MASTECTOMY  march 2011    left    HX VASCULAR ACCESS      mediport - now removed    LV ANGIOGRAPHY  7/16/2015         LV ANGIOGRAPHY  7/11/2016          Barriers to Learning/Limitations: None  Compensate with: N/A  Prior Level of Function/Home Situation: Patient reports being independent with all functional mobility PTA. She was staying with her sister PTA, however she is now having health issues herself and patient is unsure if she will be returning there at D/C. Home Situation  Home Environment: Private residence  # Steps to Enter: 1  One/Two Story Residence: Two story  # of Interior Steps: 13  Height of Each Step (in): 6 inches  Interior Rails: Right  Lift Chair Available: No  Living Alone: No  Support Systems: Spouse/Significant Other/Partner, Family member(s)  Patient Expects to be Discharged to[de-identified] Private residence  Current DME Used/Available at Home: Commode, bedside, Walker, rolling, Shower chair  Critical Behavior:  Neurologic State: Alert  Psychosocial  Patient Behaviors: Calm; Cooperative  Purposeful Interaction: Yes  Pt Identified Daily Priority: Clinical issues (comment)  Caritas Process: Nurture loving kindness;Establish trust;Teaching/learning; Attend basic human needs;Create healing environment  Caring Interventions: Reassure; Therapeutic modalities  Reassure: Therapeutic listening; Informing; Acceptance;Caring rounds  Therapeutic Modalities: Humor; Intentional therapeutic touch  Strength:    Strength: Generally decreased, functional (B LE)  Tone & Sensation:   Tone: Normal  Sensation: Intact (B LE)   Range Of Motion:  AROM: Within functional limits (B LE)  Functional Mobility:  Bed Mobility:  Supine to Sit: Stand-by asssistance  Scooting: Stand-by asssistance  Transfers:  Sit to Stand: Stand-by asssistance  Stand to Sit: Stand-by asssistance  Balance:   Sitting: Intact; Without support  Standing: Impaired; With support (RW)  Standing - Static: Good  Standing - Dynamic : Fair  Ambulation/Gait Training:  Distance (ft): 50 Feet (ft)  Assistive Device: Walker, rolling  Ambulation - Level of Assistance: Stand-by asssistance  Base of Support: Widened  Speed/Adrianna: Pace decreased (<100 feet/min)  Pain:  Pain Scale 1: Numeric (0 - 10)  Pain Intensity 1: 0  Activity Tolerance:   Fair/good  Please refer to the flowsheet for vital signs taken during this treatment. After treatment:   [x] Patient left in no apparent distress sitting up in chair  [] Patient left sitting on EOB  [] Patient left in no apparent distress in bed  [] Patient declined to be OOB at this time due to  [x] Call bell left within reach  [x] Nursing notified(Yolanda)  [] Caregiver present  [] Bed alarm activated    COMMUNICATION/EDUCATION:   [x]         Fall prevention education was provided and the patient/caregiver indicated understanding. [x]         Patient/family have participated as able in goal setting and plan of care. [x]         Patient/family agree to work toward stated goals and plan of care. []         Patient understands intent and goals of therapy, but is neutral about his/her participation. []         Patient is unable to participate in goal setting and plan of care. Thank you for this referral.  Chaz Brewer   Time Calculation: 18 mins      Mobility  Current  CI= 1-19%   Goal  CI= 1-19%. The severity rating is based on the Level of Assistance required for Functional Mobility and ADLs.     Eval Complexity: History: MEDIUM  Complexity : 1-2 comorbidities / personal factors will impact the outcome/ POC Exam:MEDIUM Complexity : 3 Standardized tests and measures addressing body structure, function, activity limitation and / or participation in recreation  Presentation: MEDIUM Complexity : Evolving with changing characteristics Overall Complexity:MEDIUM

## 2017-10-20 NOTE — INTERDISCIPLINARY ROUNDS
IDR/SLIDR Summary          Patient: Alejandro Rao MRN: [id number]    Age: 67 y.o. YOB: 1940 Room[facility]Bed: [facility]   Admit Diagnosis: CHF (congestive heart failure) (HCC)  Dyspnea  Principal Diagnosis: <principal problem not specified>   Goals: Safety  Readmission: NO  Quality Measure: CHF  VTE Prophylaxis: Chemical  Influenza Vaccine screening completed? YES  Pneumococcal Vaccine screening completed? YES  Mobility needs: No   Nutrition plan:Yes  Consults: P. T and Case Management    Financial concerns:No  Escalated to CM? NO  RRAT Score:    Interventions:Home Health  Testing due for pt today?  NO  LOS: 2 days Expected length of stay 3 days  Discharge plan: Home   PCP: Skip Smith MD  Transportation needs: Yes    Days before discharge:two or more days before discharge   Discharge disposition: Home    Signed:     Elizabeth Chung RN  10/20/2017  6:33 AM

## 2017-10-20 NOTE — PROGRESS NOTES
[facility]  [facility]    Patient: Derrell Villalobos Age: 67 y.o. : 1940 MR#: [id number] SSN: [id number]  Date: 10/20/2017     Subjective:   Pt denies any fever, chills, rigors, bleeding. Assessment/Plan:   1. Acute on chronic diastolic CHF: cardiology following. Was given one dose of IV lasix in ER, continue PO lasix 20mg daily. 2. Mild aortic stenosis with mean gradient 16mmHg and valve area 1.32cm2 on echo  2017: Echo 10/18/17: shows: moderate aortic stenosis, EF 55-60% wnwa. ?contributing to dyspnea. 3. CAD hx, 3 vessel disease, s/p PCI to pCx and mLAD with BETHEL 2016: Cont asa and statin. 4. HTN - Continue BB, norvasc, and hydralazine  5. HLD - continue Statin  6. DM type II : -cont POC glucose checks, lantus and ISS   7. Elevated troponin: Cardiology followed. Started on heparin gtt 2 nights ago. Heparin gtt is discontinued today per cards. No complaints of chest pain. 8. H/o hiatal hernia small in size, very small varices without bleeding.   9. Hypertensive portal gastropathy with very friable fundal mucosa with occasional blood oozing  10. H/o colon cancer in : adenocarcinoma s/p tumor resection  11. History of L breast CA in , remission, s/p mastectomy: followed by Oncologist: Dr. Adelita Ladd, last visit in May, could not make August appt. 12. Pancytopenia: Will discuss with Dr. Adelita Ladd. 13. Chronic anemia/ Iron Deficiency: continue Ferrous sulfate, monitor H/H.    Additional Notes:      Case discussed with:  [x]Patient  []Family  [x]Nursing  []Case Management  DVT Prophylaxis:  []Lovenox  []Hep SQ  [x]SCDs  []Coumadin   []On Heparin gtt    Objective:   VS:   Visit Vitals    /67 (BP 1 Location: Left arm, BP Patient Position: At rest)    Pulse 72    Temp 98.3 °F (36.8 °C)    Resp 18    Ht 5' (1.524 m)    Wt 63.9 kg (140 lb 12.8 oz)    SpO2 97%    Breastfeeding No    BMI 27.5 kg/m2      Tmax/24hrs: Temp (24hrs), Av.4 °F (36.9 °C), Min:97.7 °F (36.5 °C), Max:99.9 °F (37.7 °C)    Intake/Output Summary (Last 24 hours) at 10/20/17 1714  Last data filed at 10/20/17 0930   Gross per 24 hour   Intake              485 ml   Output                0 ml   Net              485 ml       General:  NAD  Cardiovascular:  RRR  Pulmonary:  CTA  GI:  Normal bowel sounds, non-TTP  Extremities:  No edema or cyanosis  Additional:      Labs:    Recent Results (from the past 24 hour(s))   PROTHROMBIN TIME + INR    Collection Time: 10/19/17  6:46 PM   Result Value Ref Range    Prothrombin time 14.1 11.5 - 15.2 sec    INR 1.1 0.8 - 1.2     PTT    Collection Time: 10/19/17  6:46 PM   Result Value Ref Range    aPTT 71.2 (H) 23.0 - 36.4 SEC   GLUCOSE, POC    Collection Time: 10/19/17  9:31 PM   Result Value Ref Range    Glucose (POC) 174 (H) 70 - 110 mg/dL   CBC WITH AUTOMATED DIFF    Collection Time: 10/20/17  5:14 AM   Result Value Ref Range    WBC 3.0 (L) 4.6 - 13.2 K/uL    RBC 2.62 (L) 4.20 - 5.30 M/uL    HGB 7.8 (L) 12.0 - 16.0 g/dL    HCT 22.8 (L) 35.0 - 45.0 %    MCV 87.0 74.0 - 97.0 FL    MCH 29.8 24.0 - 34.0 PG    MCHC 34.2 31.0 - 37.0 g/dL    RDW 15.8 (H) 11.6 - 14.5 %    PLATELET 70 (L) 657 - 420 K/uL    MPV 10.0 9.2 - 11.8 FL    NEUTROPHILS 75 (H) 40 - 73 %    LYMPHOCYTES 15 (L) 21 - 52 %    MONOCYTES 8 3 - 10 %    EOSINOPHILS 2 0 - 5 %    BASOPHILS 0 0 - 2 %    ABS. NEUTROPHILS 2.2 1.8 - 8.0 K/UL    ABS. LYMPHOCYTES 0.4 (L) 0.9 - 3.6 K/UL    ABS. MONOCYTES 0.2 0.05 - 1.2 K/UL    ABS. EOSINOPHILS 0.1 0.0 - 0.4 K/UL    ABS.  BASOPHILS 0.0 0.0 - 0.1 K/UL    DF AUTOMATED     METABOLIC PANEL, BASIC    Collection Time: 10/20/17  5:14 AM   Result Value Ref Range    Sodium 140 136 - 145 mmol/L    Potassium 3.6 3.5 - 5.5 mmol/L    Chloride 109 (H) 100 - 108 mmol/L    CO2 23 21 - 32 mmol/L    Anion gap 8 3.0 - 18 mmol/L    Glucose 131 (H) 74 - 99 mg/dL    BUN 32 (H) 7.0 - 18 MG/DL    Creatinine 1.29 0.6 - 1.3 MG/DL    BUN/Creatinine ratio 25 (H) 12 - 20 GFR est AA 49 (L) >60 ml/min/1.73m2    GFR est non-AA 40 (L) >60 ml/min/1.73m2    Calcium 8.5 8.5 - 10.1 MG/DL   PTT    Collection Time: 10/20/17  5:14 AM   Result Value Ref Range    aPTT 90.2 (H) 23.0 - 36.4 SEC   GLUCOSE, POC    Collection Time: 10/20/17  7:51 AM   Result Value Ref Range    Glucose (POC) 148 (H) 70 - 110 mg/dL   GLUCOSE, POC    Collection Time: 10/20/17 11:30 AM   Result Value Ref Range    Glucose (POC) 258 (H) 70 - 110 mg/dL   GLUCOSE, POC    Collection Time: 10/20/17 11:31 AM   Result Value Ref Range    Glucose (POC) 256 (H) 70 - 110 mg/dL   TROPONIN I    Collection Time: 10/20/17 11:40 AM   Result Value Ref Range    Troponin-I, Qt. 0.36 (H) 0.0 - 0.045 NG/ML   HGB & HCT    Collection Time: 10/20/17  1:48 PM   Result Value Ref Range    HGB 8.1 (L) 12.0 - 16.0 g/dL    HCT 23.6 (L) 35.0 - 45.0 %   IRON PROFILE    Collection Time: 10/20/17  1:48 PM   Result Value Ref Range    Iron 21 (L) 50 - 175 ug/dL    TIBC 221 (L) 250 - 450 ug/dL    Iron % saturation 10 %   FERRITIN    Collection Time: 10/20/17  1:48 PM   Result Value Ref Range    Ferritin 225 8 - 388 NG/ML   VITAMIN B12 & FOLATE    Collection Time: 10/20/17  1:48 PM   Result Value Ref Range    Vitamin B12 641 211 - 911 pg/mL    Folate >20.0 (H) 3.10 - 17.50 ng/mL   GLUCOSE, POC    Collection Time: 10/20/17  3:47 PM   Result Value Ref Range    Glucose (POC) 111 (H) 70 - 110 mg/dL       Signed By: Tiffanie Pfeiffer PA-C     October 20, 2017 5:14 PM

## 2017-10-20 NOTE — ROUTINE PROCESS
Bedside shift change report given to Noelle Victor RN (oncoming nurse) by Beatriz Hall RN (offgoing nurse). Report included the following information SBAR, ED Summary, MAR and Recent Results.

## 2017-10-20 NOTE — DIABETES MGMT
Glycemic Control Plan of Care    POC BG range on 10/19/2017: 148-200 mg/dL. POC BG report on 10/20/2017 at time of review: 258, 256 mg/dL. Pattient reported eating at least half of meals. Current Meal Intake:  Patient Vitals for the past 100 hrs:   % Diet Eaten   10/20/17 0930 50 %   10/19/17 1759 75 %   10/19/17 1320 50 %     Recommendation(s):  1.) Modify correctional lispro insulin to very resistant dose. Done. 2.) Continue POC BG monitoring and evaluate need to increase basal lantus insulin dose to 12 units daily. Assessment:  Patient is 67year old with past medical history including type 2 diabetes mellitus, hypertension, hyperlipidemia, CAD with PCI/BETHEL, and CHF - was admitted on 10/18/2017 with c/o shortness of breath and fatigue. Noted:  CHF. Type 2 diabetes mellitus with current A1c of 6.1% (10/19/2017). Most recent blood glucose values:    Results for Amari Odom (MRN [id number]) as of 10/20/2017 14:06   Ref. Range 10/19/2017 11:54 10/19/2017 15:55 10/19/2017 21:31   GLUCOSE,FAST - POC Latest Ref Range: 70 - 110 mg/dL 200 (H) 172 (H) 174 (H)     Results for Amari Odom (MRN [id number]) as of 10/20/2017 14:06   Ref. Range 10/20/2017 07:51 10/20/2017 11:30 10/20/2017 11:31   GLUCOSE,FAST - POC Latest Ref Range: 70 - 110 mg/dL 148 (H) 258 (H) 256 (H)     Current A1C: 6.1% (10/19/2017) is equivalent to average blood glucose of 128 mg/dL during the past 2-3 months. Current hospital diabetes medications:  Lantus insulin 10 units daily at bedtime. Correctional lispro insulin ACHS. Very resistant dose. Total daily dose insulin requirement previous day: 10/19/2017  Lantus: 20 units (including HS dose for 10/18 given late after midnight on 10/19)  Lispro: 4 units    Home diabetes medications: As stated by patient on 10/19/2017:  Metformin 500 mg twice daily with food. Humalog sliding scale insulin 3x daily before meals. Lantus insulin 10 units daily.     Diet: Diabetic consistent carb 1800kcal; regular; no concentrated sweets. Goals:  Blood glucose will be within target range of  mg/dL by 10/23/2017.      Education:  ___  Refer to Diabetes Education Record             _X__  Education not indicated at this time    Varinder Moore RN

## 2017-10-20 NOTE — PROGRESS NOTES
Received on rounds sitting up in recliner. A/OX4. Denies pain. Appears to be in no resp. distress. Room air. Fall precautions. Call bell phone within reach.

## 2017-10-21 LAB
ANION GAP SERPL CALC-SCNC: 11 MMOL/L (ref 3–18)
BASOPHILS # BLD: 0 K/UL (ref 0–0.06)
BASOPHILS NFR BLD: 0 % (ref 0–2)
BUN SERPL-MCNC: 35 MG/DL (ref 7–18)
BUN/CREAT SERPL: 24 (ref 12–20)
CALCIUM SERPL-MCNC: 8.4 MG/DL (ref 8.5–10.1)
CHLORIDE SERPL-SCNC: 106 MMOL/L (ref 100–108)
CO2 SERPL-SCNC: 22 MMOL/L (ref 21–32)
CREAT SERPL-MCNC: 1.44 MG/DL (ref 0.6–1.3)
DIFFERENTIAL METHOD BLD: ABNORMAL
EOSINOPHIL # BLD: 0.1 K/UL (ref 0–0.4)
EOSINOPHIL NFR BLD: 2 % (ref 0–5)
ERYTHROCYTE [DISTWIDTH] IN BLOOD BY AUTOMATED COUNT: 15.9 % (ref 11.6–14.5)
GLUCOSE BLD STRIP.AUTO-MCNC: 107 MG/DL (ref 70–110)
GLUCOSE BLD STRIP.AUTO-MCNC: 137 MG/DL (ref 70–110)
GLUCOSE BLD STRIP.AUTO-MCNC: 227 MG/DL (ref 70–110)
GLUCOSE BLD STRIP.AUTO-MCNC: 234 MG/DL (ref 70–110)
GLUCOSE SERPL-MCNC: 247 MG/DL (ref 74–99)
HCT VFR BLD AUTO: 22.5 % (ref 35–45)
HGB BLD-MCNC: 7.8 G/DL (ref 12–16)
LYMPHOCYTES # BLD: 0.4 K/UL (ref 0.9–3.6)
LYMPHOCYTES NFR BLD: 14 % (ref 21–52)
MCH RBC QN AUTO: 30.5 PG (ref 24–34)
MCHC RBC AUTO-ENTMCNC: 34.7 G/DL (ref 31–37)
MCV RBC AUTO: 87.9 FL (ref 74–97)
MONOCYTES # BLD: 0.2 K/UL (ref 0.05–1.2)
MONOCYTES NFR BLD: 6 % (ref 3–10)
NEUTS SEG # BLD: 2.4 K/UL (ref 1.8–8)
NEUTS SEG NFR BLD: 78 % (ref 40–73)
PLATELET # BLD AUTO: 72 K/UL (ref 135–420)
PMV BLD AUTO: 9.5 FL (ref 9.2–11.8)
POTASSIUM SERPL-SCNC: 4.1 MMOL/L (ref 3.5–5.5)
RBC # BLD AUTO: 2.56 M/UL (ref 4.2–5.3)
SODIUM SERPL-SCNC: 139 MMOL/L (ref 136–145)
WBC # BLD AUTO: 3.1 K/UL (ref 4.6–13.2)

## 2017-10-21 PROCEDURE — 82962 GLUCOSE BLOOD TEST: CPT

## 2017-10-21 PROCEDURE — 74011250637 HC RX REV CODE- 250/637: Performed by: INTERNAL MEDICINE

## 2017-10-21 PROCEDURE — 74011636637 HC RX REV CODE- 636/637: Performed by: HOSPITALIST

## 2017-10-21 PROCEDURE — 85025 COMPLETE CBC W/AUTO DIFF WBC: CPT | Performed by: PHYSICIAN ASSISTANT

## 2017-10-21 PROCEDURE — 74011250636 HC RX REV CODE- 250/636: Performed by: INTERNAL MEDICINE

## 2017-10-21 PROCEDURE — 74011000258 HC RX REV CODE- 258: Performed by: INTERNAL MEDICINE

## 2017-10-21 PROCEDURE — 80048 BASIC METABOLIC PNL TOTAL CA: CPT | Performed by: PHYSICIAN ASSISTANT

## 2017-10-21 PROCEDURE — 74011636637 HC RX REV CODE- 636/637: Performed by: INTERNAL MEDICINE

## 2017-10-21 PROCEDURE — 36415 COLL VENOUS BLD VENIPUNCTURE: CPT | Performed by: PHYSICIAN ASSISTANT

## 2017-10-21 PROCEDURE — 65660000000 HC RM CCU STEPDOWN

## 2017-10-21 PROCEDURE — 74011250637 HC RX REV CODE- 250/637: Performed by: PHYSICIAN ASSISTANT

## 2017-10-21 RX ADMIN — THERA TABS 1 TABLET: TAB at 08:29

## 2017-10-21 RX ADMIN — FERROUS SULFATE TAB 325 MG (65 MG ELEMENTAL FE) 325 MG: 325 (65 FE) TAB at 17:17

## 2017-10-21 RX ADMIN — VITAMIN D, TAB 1000IU (100/BT) 2000 UNITS: 25 TAB at 08:29

## 2017-10-21 RX ADMIN — INSULIN GLARGINE 10 UNITS: 100 INJECTION, SOLUTION SUBCUTANEOUS at 22:13

## 2017-10-21 RX ADMIN — HYDRALAZINE HYDROCHLORIDE 10 MG: 10 TABLET, FILM COATED ORAL at 08:29

## 2017-10-21 RX ADMIN — AMLODIPINE BESYLATE 10 MG: 10 TABLET ORAL at 08:29

## 2017-10-21 RX ADMIN — CALCIUM POLYCARBOPHIL 625 MG TABLET 625 MG: at 08:29

## 2017-10-21 RX ADMIN — METOPROLOL TARTRATE 50 MG: 50 TABLET ORAL at 17:17

## 2017-10-21 RX ADMIN — INSULIN LISPRO 6 UNITS: 100 INJECTION, SOLUTION INTRAVENOUS; SUBCUTANEOUS at 22:13

## 2017-10-21 RX ADMIN — HYDRALAZINE HYDROCHLORIDE 10 MG: 10 TABLET, FILM COATED ORAL at 17:17

## 2017-10-21 RX ADMIN — INSULIN LISPRO 6 UNITS: 100 INJECTION, SOLUTION INTRAVENOUS; SUBCUTANEOUS at 12:12

## 2017-10-21 RX ADMIN — VITAM B12 100 MCG: 100 TAB at 08:29

## 2017-10-21 RX ADMIN — METOPROLOL TARTRATE 50 MG: 50 TABLET ORAL at 09:00

## 2017-10-21 RX ADMIN — SIMVASTATIN 20 MG: 20 TABLET, FILM COATED ORAL at 22:13

## 2017-10-21 RX ADMIN — PANTOPRAZOLE SODIUM 40 MG: 40 TABLET, DELAYED RELEASE ORAL at 08:29

## 2017-10-21 RX ADMIN — PANTOPRAZOLE SODIUM 40 MG: 40 TABLET, DELAYED RELEASE ORAL at 17:36

## 2017-10-21 RX ADMIN — FUROSEMIDE 20 MG: 20 TABLET ORAL at 08:29

## 2017-10-21 RX ADMIN — ASPIRIN 81 MG CHEWABLE TABLET 81 MG: 81 TABLET CHEWABLE at 08:29

## 2017-10-21 RX ADMIN — Medication 1 CAPSULE: at 08:29

## 2017-10-21 RX ADMIN — FERROUS SULFATE TAB 325 MG (65 MG ELEMENTAL FE) 325 MG: 325 (65 FE) TAB at 08:29

## 2017-10-21 RX ADMIN — IRON SUCROSE 200 MG: 20 INJECTION, SOLUTION INTRAVENOUS at 12:12

## 2017-10-21 RX ADMIN — Medication 250 MG: at 08:29

## 2017-10-21 RX ADMIN — HYDRALAZINE HYDROCHLORIDE 10 MG: 10 TABLET, FILM COATED ORAL at 22:13

## 2017-10-21 NOTE — PROGRESS NOTES
[facility]  Progress Note    Patient: Miguelina Adam Age: 67 y.o. : 1940 MR#: [id number] SSN: [id number]  Date: 10/21/2017     Subjective:   Pt denies any fever, chills, rigors, bleeding, SOB, or chest pain. Assessment/Plan:   1. Acute on chronic diastolic CHF: cardiology following. Was given one dose of IV lasix in ER, continue PO lasix 20mg daily. 2. Mild aortic stenosis with mean gradient 16mmHg and valve area 1.32cm2 on echo  2017: Echo 10/18/17: shows: moderate aortic stenosis, EF 55-60% wnwa. ?contributing to dyspnea. 3. CAD hx, 3 vessel disease, s/p PCI to pCx and mLAD with BETHEL 2016: Cont asa and statin. 4. HTN - Continue BB, norvasc, and hydralazine  5. HLD - continue Statin  6. DM type II : -cont POC glucose checks, lantus and ISS   7. Elevated troponin: Cardiology followed. Off heparin gtt per cards. No complaints of chest pain. 8. H/o hiatal hernia small in size, very small varices without bleeding.   9. Hypertensive portal gastropathy with very friable fundal mucosa with occasional blood oozing  10. H/o colon cancer in : adenocarcinoma s/p tumor resection, CEA ordered  11. History of L breast CA in , remission, s/p mastectomy: followed by Oncologist: Dr. Dale Castro, last visit in May, could not make August appt. 12. Pancytopenia: Followed by Hem/Onc, asymptomatic.    13. Chronic anemia/ Iron Deficiency: Supplement Venofer by Dr. Dale Castro     Additional Notes:      Case discussed with:  [x]Patient  []Family  [x]Nursing  []Case Management  DVT Prophylaxis:  []Lovenox  []Hep SQ  [x]SCDs  []Coumadin   []On Heparin gtt    Objective:   VS:   Visit Vitals    /70 (BP 1 Location: Right leg, BP Patient Position: At rest)    Pulse 72    Temp 98.2 °F (36.8 °C)    Resp 18    Ht 5' (1.524 m)    Wt 63.9 kg (140 lb 12.8 oz)    SpO2 99%    Breastfeeding No    BMI 27.5 kg/m2      Tmax/24hrs: Temp (24hrs), Av.5 °F (36.9 °C), Min:97.6 °F (36.4 °C), Max:99.1 °F (37.3 °C)    Intake/Output Summary (Last 24 hours) at 10/21/17 1741  Last data filed at 10/21/17 1428   Gross per 24 hour   Intake                0 ml   Output             1725 ml   Net            -1725 ml       General:  NAD  Cardiovascular:  RRR  Pulmonary:  CTA  GI:  Normal bowel sounds, non-TTP  Extremities:  No edema or cyanosis    Additional:      Labs:    Recent Results (from the past 24 hour(s))   GLUCOSE, POC    Collection Time: 10/20/17  8:34 PM   Result Value Ref Range    Glucose (POC) 320 (H) 70 - 110 mg/dL   GLUCOSE, POC    Collection Time: 10/21/17  8:23 AM   Result Value Ref Range    Glucose (POC) 107 70 - 110 mg/dL   CBC WITH AUTOMATED DIFF    Collection Time: 10/21/17 11:10 AM   Result Value Ref Range    WBC 3.1 (L) 4.6 - 13.2 K/uL    RBC 2.56 (L) 4.20 - 5.30 M/uL    HGB 7.8 (L) 12.0 - 16.0 g/dL    HCT 22.5 (L) 35.0 - 45.0 %    MCV 87.9 74.0 - 97.0 FL    MCH 30.5 24.0 - 34.0 PG    MCHC 34.7 31.0 - 37.0 g/dL    RDW 15.9 (H) 11.6 - 14.5 %    PLATELET 72 (L) 375 - 420 K/uL    MPV 9.5 9.2 - 11.8 FL    NEUTROPHILS 78 (H) 40 - 73 %    LYMPHOCYTES 14 (L) 21 - 52 %    MONOCYTES 6 3 - 10 %    EOSINOPHILS 2 0 - 5 %    BASOPHILS 0 0 - 2 %    ABS. NEUTROPHILS 2.4 1.8 - 8.0 K/UL    ABS. LYMPHOCYTES 0.4 (L) 0.9 - 3.6 K/UL    ABS. MONOCYTES 0.2 0.05 - 1.2 K/UL    ABS. EOSINOPHILS 0.1 0.0 - 0.4 K/UL    ABS.  BASOPHILS 0.0 0.0 - 0.06 K/UL    DF AUTOMATED     METABOLIC PANEL, BASIC    Collection Time: 10/21/17 11:10 AM   Result Value Ref Range    Sodium 139 136 - 145 mmol/L    Potassium 4.1 3.5 - 5.5 mmol/L    Chloride 106 100 - 108 mmol/L    CO2 22 21 - 32 mmol/L    Anion gap 11 3.0 - 18 mmol/L    Glucose 247 (H) 74 - 99 mg/dL    BUN 35 (H) 7.0 - 18 MG/DL    Creatinine 1.44 (H) 0.6 - 1.3 MG/DL    BUN/Creatinine ratio 24 (H) 12 - 20      GFR est AA 43 (L) >60 ml/min/1.73m2    GFR est non-AA 35 (L) >60 ml/min/1.73m2    Calcium 8.4 (L) 8.5 - 10.1 MG/DL   GLUCOSE, POC    Collection Time: 10/21/17 12:02 PM   Result Value Ref Range    Glucose (POC) 227 (H) 70 - 110 mg/dL   GLUCOSE, POC    Collection Time: 10/21/17  4:16 PM   Result Value Ref Range    Glucose (POC) 137 (H) 70 - 110 mg/dL       Signed By: Marilu Alonso PA-C     October 21, 2017 5:41 PM

## 2017-10-21 NOTE — PROGRESS NOTES
Assumed care of patient. Received patient resting in bed with eyes open on phone. Patient has no apparent distress. Patient voices no complaints.

## 2017-10-21 NOTE — PROGRESS NOTES
Phone: [phone]     Hematology / Oncology Progress Note  [facility]      Patient: Britta Marie   MRN: [id number]         CSN: [id number]    YOB: 1940      Admit Date: 10/18/2017    Assessment:     Active Problems:    CHF (congestive heart failure) ([facility] [address] (10/18/2017)      Dyspnea (10/18/2017)    left breast ca, T2N0, triple negative,   Stage III   Rt Colon cancer, , post adjuvant chemo  Iron def anemia  egd and colonoscopy   Portal gastropathy    Plan:     Missed office appt 2017  Supplement venofer, less volume stress.   Check CEA  Cardiac meds being adjusted    Rex Cranker MD  [facility] [phone]      Subjective:     Tired, SOB    Objective:     Visit Vitals    /62 (BP 1 Location: Right leg, BP Patient Position: At rest)    Pulse 71    Temp 98.6 °F (37 °C)    Resp 18    Ht 5' (1.524 m)    Wt 63.9 kg (140 lb 12.8 oz)    SpO2 97%    Breastfeeding No    BMI 27.5 kg/m2             Temp (24hrs), Av.4 °F (36.9 °C), Min:97.6 °F (36.4 °C), Max:99.1 °F (37.3 °C)        Intake/Output Summary (Last 24 hours) at 10/21/17 1003  Last data filed at 10/21/17 2486   Gross per 24 hour   Intake                0 ml   Output             1175 ml   Net            -1175 ml     Review of Systems:   Constitutional: negative for fevers, chills, sweats and positive for fatigue  Eyes: negative for visual disturbance, redness and icterus  Ears, Nose, Mouth, Throat, and Face: negative for tinnitus, epistaxis, sore mouth and hoarseness  Respiratory: negative for cough, sputum, hemoptysis, pleurisy/chest pain or wheezing  Cardiovascular: negative for chest pain, chest pressure/discomfort, palpitations, irregular heart beats, syncope, positive for paroxysmal nocturnal dyspnea  Gastrointestinal: negative for reflux symptoms, nausea, vomiting, change in bowel habits, melena, diarrhea, constipation and abdominal pain  Genitourinary:negative for dysuria, nocturia, urinary incontinence, hesitancy and hematuria  Integument: negative for rash, skin lesion(s) and pruritus  Hematologic/Lymphatic: negative for easy bruising, bleeding and lymphadenopathy  Musculoskeletal:negative for myalgias, arthralgias and bone pain  Neurological: negative for headaches, dizziness, seizures, paresthesia and weakness    Physical Exam:  Constitutional: Alert, oriented, not in distress  Eyes: PERRLA, anicteric,pallor  Ears, nose, mouth, throat, and face: no palpable Lymph nodes, no mucositis, no thrush. Respiratory: symmetrical expansion, no rales, no rhonchi, no wheezing. Cardiovascular: D0Q3, systolic murmur  Left breast mastectomy  Gastrointestinal: soft, benign, non tender, no HSM, normal bowel sounds, no mass. Integument: no rash, no petechiae, no ecchymosis. Musculoskeletal: no edema, no cyanosis, no clubbing. Neurological: intact, cranial nerves, no focal motor or sensory deficits.       Labs:  Recent Results (from the past 24 hour(s))   GLUCOSE, POC    Collection Time: 10/20/17 11:30 AM   Result Value Ref Range    Glucose (POC) 258 (H) 70 - 110 mg/dL   GLUCOSE, POC    Collection Time: 10/20/17 11:31 AM   Result Value Ref Range    Glucose (POC) 256 (H) 70 - 110 mg/dL   TROPONIN I    Collection Time: 10/20/17 11:40 AM   Result Value Ref Range    Troponin-I, Qt. 0.36 (H) 0.0 - 0.045 NG/ML   HGB & HCT    Collection Time: 10/20/17  1:48 PM   Result Value Ref Range    HGB 8.1 (L) 12.0 - 16.0 g/dL    HCT 23.6 (L) 35.0 - 45.0 %   IRON PROFILE    Collection Time: 10/20/17  1:48 PM   Result Value Ref Range    Iron 21 (L) 50 - 175 ug/dL    TIBC 221 (L) 250 - 450 ug/dL    Iron % saturation 10 %   FERRITIN    Collection Time: 10/20/17  1:48 PM   Result Value Ref Range    Ferritin 225 8 - 388 NG/ML   VITAMIN B12 & FOLATE    Collection Time: 10/20/17  1:48 PM   Result Value Ref Range    Vitamin B12 641 211 - 911 pg/mL    Folate >20.0 (H) 3.10 - 17.50 ng/mL   GLUCOSE, POC Collection Time: 10/20/17  3:47 PM   Result Value Ref Range    Glucose (POC) 111 (H) 70 - 110 mg/dL   GLUCOSE, POC    Collection Time: 10/20/17  8:34 PM   Result Value Ref Range    Glucose (POC) 320 (H) 70 - 110 mg/dL   GLUCOSE, POC    Collection Time: 10/21/17  8:23 AM   Result Value Ref Range    Glucose (POC) 107 70 - 110 mg/dL

## 2017-10-22 LAB
ANION GAP SERPL CALC-SCNC: 11 MMOL/L (ref 3–18)
BASOPHILS # BLD: 0 K/UL (ref 0–0.1)
BASOPHILS NFR BLD: 0 % (ref 0–2)
BUN SERPL-MCNC: 37 MG/DL (ref 7–18)
BUN/CREAT SERPL: 28 (ref 12–20)
CALCIUM SERPL-MCNC: 8.1 MG/DL (ref 8.5–10.1)
CHLORIDE SERPL-SCNC: 112 MMOL/L (ref 100–108)
CO2 SERPL-SCNC: 22 MMOL/L (ref 21–32)
CREAT SERPL-MCNC: 1.34 MG/DL (ref 0.6–1.3)
DIFFERENTIAL METHOD BLD: ABNORMAL
EOSINOPHIL # BLD: 0.1 K/UL (ref 0–0.4)
EOSINOPHIL NFR BLD: 2 % (ref 0–5)
ERYTHROCYTE [DISTWIDTH] IN BLOOD BY AUTOMATED COUNT: 16.1 % (ref 11.6–14.5)
GLUCOSE BLD STRIP.AUTO-MCNC: 115 MG/DL (ref 70–110)
GLUCOSE BLD STRIP.AUTO-MCNC: 120 MG/DL (ref 70–110)
GLUCOSE BLD STRIP.AUTO-MCNC: 185 MG/DL (ref 70–110)
GLUCOSE BLD STRIP.AUTO-MCNC: 228 MG/DL (ref 70–110)
GLUCOSE BLD STRIP.AUTO-MCNC: 258 MG/DL (ref 70–110)
GLUCOSE SERPL-MCNC: 105 MG/DL (ref 74–99)
HCT VFR BLD AUTO: 21.8 % (ref 35–45)
HEMOCCULT STL QL: NEGATIVE
HGB BLD-MCNC: 7.4 G/DL (ref 12–16)
LYMPHOCYTES # BLD: 0.6 K/UL (ref 0.9–3.6)
LYMPHOCYTES NFR BLD: 19 % (ref 21–52)
MCH RBC QN AUTO: 29.6 PG (ref 24–34)
MCHC RBC AUTO-ENTMCNC: 33.9 G/DL (ref 31–37)
MCV RBC AUTO: 87.2 FL (ref 74–97)
MONOCYTES # BLD: 0.4 K/UL (ref 0.05–1.2)
MONOCYTES NFR BLD: 14 % (ref 3–10)
NEUTS SEG # BLD: 1.9 K/UL (ref 1.8–8)
NEUTS SEG NFR BLD: 65 % (ref 40–73)
PLATELET # BLD AUTO: 85 K/UL (ref 135–420)
PMV BLD AUTO: 11.4 FL (ref 9.2–11.8)
POTASSIUM SERPL-SCNC: 3.9 MMOL/L (ref 3.5–5.5)
RBC # BLD AUTO: 2.5 M/UL (ref 4.2–5.3)
SODIUM SERPL-SCNC: 145 MMOL/L (ref 136–145)
WBC # BLD AUTO: 2.9 K/UL (ref 4.6–13.2)

## 2017-10-22 PROCEDURE — 82378 CARCINOEMBRYONIC ANTIGEN: CPT | Performed by: HOSPITALIST

## 2017-10-22 PROCEDURE — 82962 GLUCOSE BLOOD TEST: CPT

## 2017-10-22 PROCEDURE — 65660000000 HC RM CCU STEPDOWN

## 2017-10-22 PROCEDURE — 74011250637 HC RX REV CODE- 250/637: Performed by: PHYSICIAN ASSISTANT

## 2017-10-22 PROCEDURE — 36415 COLL VENOUS BLD VENIPUNCTURE: CPT | Performed by: HOSPITALIST

## 2017-10-22 PROCEDURE — 85025 COMPLETE CBC W/AUTO DIFF WBC: CPT | Performed by: HOSPITALIST

## 2017-10-22 PROCEDURE — 74011636637 HC RX REV CODE- 636/637: Performed by: HOSPITALIST

## 2017-10-22 PROCEDURE — 74011636637 HC RX REV CODE- 636/637: Performed by: INTERNAL MEDICINE

## 2017-10-22 PROCEDURE — 82272 OCCULT BLD FECES 1-3 TESTS: CPT | Performed by: HOSPITALIST

## 2017-10-22 PROCEDURE — 74011250637 HC RX REV CODE- 250/637: Performed by: INTERNAL MEDICINE

## 2017-10-22 PROCEDURE — 74011250636 HC RX REV CODE- 250/636: Performed by: INTERNAL MEDICINE

## 2017-10-22 PROCEDURE — 80048 BASIC METABOLIC PNL TOTAL CA: CPT | Performed by: HOSPITALIST

## 2017-10-22 PROCEDURE — 74011000258 HC RX REV CODE- 258: Performed by: INTERNAL MEDICINE

## 2017-10-22 RX ADMIN — METOPROLOL TARTRATE 50 MG: 50 TABLET ORAL at 09:04

## 2017-10-22 RX ADMIN — SIMVASTATIN 20 MG: 20 TABLET, FILM COATED ORAL at 22:07

## 2017-10-22 RX ADMIN — PANTOPRAZOLE SODIUM 40 MG: 40 TABLET, DELAYED RELEASE ORAL at 09:05

## 2017-10-22 RX ADMIN — FERROUS SULFATE TAB 325 MG (65 MG ELEMENTAL FE) 325 MG: 325 (65 FE) TAB at 09:05

## 2017-10-22 RX ADMIN — INSULIN LISPRO 3 UNITS: 100 INJECTION, SOLUTION INTRAVENOUS; SUBCUTANEOUS at 17:48

## 2017-10-22 RX ADMIN — VITAMIN D, TAB 1000IU (100/BT) 2000 UNITS: 25 TAB at 09:04

## 2017-10-22 RX ADMIN — IRON SUCROSE 200 MG: 20 INJECTION, SOLUTION INTRAVENOUS at 13:42

## 2017-10-22 RX ADMIN — HYDRALAZINE HYDROCHLORIDE 10 MG: 10 TABLET, FILM COATED ORAL at 22:07

## 2017-10-22 RX ADMIN — FERROUS SULFATE TAB 325 MG (65 MG ELEMENTAL FE) 325 MG: 325 (65 FE) TAB at 17:49

## 2017-10-22 RX ADMIN — THERA TABS 1 TABLET: TAB at 09:04

## 2017-10-22 RX ADMIN — Medication 250 MG: at 09:05

## 2017-10-22 RX ADMIN — METOPROLOL TARTRATE 50 MG: 50 TABLET ORAL at 17:50

## 2017-10-22 RX ADMIN — Medication 1 CAPSULE: at 09:04

## 2017-10-22 RX ADMIN — HYDRALAZINE HYDROCHLORIDE 10 MG: 10 TABLET, FILM COATED ORAL at 09:04

## 2017-10-22 RX ADMIN — INSULIN LISPRO 6 UNITS: 100 INJECTION, SOLUTION INTRAVENOUS; SUBCUTANEOUS at 13:30

## 2017-10-22 RX ADMIN — HYDRALAZINE HYDROCHLORIDE 10 MG: 10 TABLET, FILM COATED ORAL at 17:50

## 2017-10-22 RX ADMIN — FUROSEMIDE 20 MG: 20 TABLET ORAL at 09:05

## 2017-10-22 RX ADMIN — CALCIUM POLYCARBOPHIL 625 MG TABLET 625 MG: at 09:04

## 2017-10-22 RX ADMIN — PANTOPRAZOLE SODIUM 40 MG: 40 TABLET, DELAYED RELEASE ORAL at 17:49

## 2017-10-22 RX ADMIN — VITAM B12 100 MCG: 100 TAB at 09:04

## 2017-10-22 RX ADMIN — ASPIRIN 81 MG CHEWABLE TABLET 81 MG: 81 TABLET CHEWABLE at 09:05

## 2017-10-22 RX ADMIN — AMLODIPINE BESYLATE 10 MG: 10 TABLET ORAL at 09:05

## 2017-10-22 RX ADMIN — INSULIN GLARGINE 10 UNITS: 100 INJECTION, SOLUTION SUBCUTANEOUS at 22:00

## 2017-10-22 NOTE — PROGRESS NOTES
Pt requesting to transfer to SNF when D/C'd. Signed FOC on chart for [facility]. Referral sent to [facility] via cc- link.

## 2017-10-22 NOTE — PROGRESS NOTES
[facility] Hospitalist Group  Progress Note    Patient: Isabel Sky Age: 67 y.o. : 1940 MR#: [id number] SSN: [id number]  Date: 10/22/2017     Subjective:   Pt denies any fever, chills, rigors, bleeding, SOB, hematochezia, melena, N/V/D/C or chest pain. She reports chronic dark stools with iron pills. Pt states she has provided stool for occult blood sample. Pt states she does not feel strong enough to go home with home health. Would like SNF. Case management will see. Assessment/Plan:   1. Acute on chronic diastolic CHF: cardiology followed and signed off. Was given one dose of IV lasix in ER, continue PO lasix 20mg daily. No evidence of fluid overload. 2. Mild aortic stenosis with mean gradient 16mmHg and valve area 1.32cm2 on echo  2017: Echo 10/18/17: shows: moderate aortic stenosis, EF 55-60% wnwa. ?contributing to dyspnea. 3. CAD hx, 3 vessel disease, s/p PCI to pCx and mLAD with BETHEL 2016: Cont asa and statin. 4. HTN - Continue BB, norvasc, and hydralazine  5. HLD - continue Statin  6. DM type II : -cont POC glucose checks, lantus and ISS   7. Elevated troponin: Cardiology followed. Off heparin gtt per cards. No complaints of chest pain. 8. H/o hiatal hernia small in size, very small varices without bleeding.   9. Hypertensive portal gastropathy with very friable fundal mucosa with occasional blood oozing  10. H/o colon cancer in : adenocarcinoma s/p tumor resection, CEA results pending. Stool for occult blood pending. 11. History of L breast CA in , remission, s/p mastectomy: followed by Oncologist: Dr. Reyes Levans, last visit in May, could not make August appt. 12. Pancytopenia: Followed by Hem/Onc, asymptomatic.    13. Chronic anemia/ Iron Deficiency: Supplement Venofer by Dr. Reyes Levans     Additional Notes:      Case discussed with:  [x]Patient  []Family  [x]Nursing  [x]Case Management  DVT Prophylaxis:  []Lovenox  []Hep SQ  [x]SCDs  []Coumadin []On Heparin gtt    Objective:   VS:   Visit Vitals    /59 (BP 1 Location: Right arm, BP Patient Position: At rest)    Pulse 82    Temp 98 °F (36.7 °C)    Resp 18    Ht 5' (1.524 m)    Wt 68.5 kg (151 lb)    SpO2 98%    Breastfeeding No    BMI 29.49 kg/m2      Tmax/24hrs: Temp (24hrs), Av.4 °F (36.9 °C), Min:98 °F (36.7 °C), Max:99.1 °F (37.3 °C)    Intake/Output Summary (Last 24 hours) at 10/22/17 1208  Last data filed at 10/22/17 0442   Gross per 24 hour   Intake                0 ml   Output             1300 ml   Net            -1300 ml       General:  NAD  Cardiovascular:  RRR  Pulmonary:  CTA  GI:  Normal bowel sounds, non-TTP  Extremities:  No edema or cyanosis   Additional:      Labs:    Recent Results (from the past 24 hour(s))   GLUCOSE, POC    Collection Time: 10/21/17  4:16 PM   Result Value Ref Range    Glucose (POC) 137 (H) 70 - 110 mg/dL   GLUCOSE, POC    Collection Time: 10/21/17 10:12 PM   Result Value Ref Range    Glucose (POC) 234 (H) 70 - 110 mg/dL   CBC WITH AUTOMATED DIFF    Collection Time: 10/22/17  3:44 AM   Result Value Ref Range    WBC 2.9 (L) 4.6 - 13.2 K/uL    RBC 2.50 (L) 4.20 - 5.30 M/uL    HGB 7.4 (L) 12.0 - 16.0 g/dL    HCT 21.8 (L) 35.0 - 45.0 %    MCV 87.2 74.0 - 97.0 FL    MCH 29.6 24.0 - 34.0 PG    MCHC 33.9 31.0 - 37.0 g/dL    RDW 16.1 (H) 11.6 - 14.5 %    PLATELET 85 (L) 132 - 420 K/uL    MPV 11.4 9.2 - 11.8 FL    NEUTROPHILS 65 40 - 73 %    LYMPHOCYTES 19 (L) 21 - 52 %    MONOCYTES 14 (H) 3 - 10 %    EOSINOPHILS 2 0 - 5 %    BASOPHILS 0 0 - 2 %    ABS. NEUTROPHILS 1.9 1.8 - 8.0 K/UL    ABS. LYMPHOCYTES 0.6 (L) 0.9 - 3.6 K/UL    ABS. MONOCYTES 0.4 0.05 - 1.2 K/UL    ABS. EOSINOPHILS 0.1 0.0 - 0.4 K/UL    ABS.  BASOPHILS 0.0 0.0 - 0.1 K/UL    DF AUTOMATED     METABOLIC PANEL, BASIC    Collection Time: 10/22/17  3:44 AM   Result Value Ref Range    Sodium 145 136 - 145 mmol/L    Potassium 3.9 3.5 - 5.5 mmol/L    Chloride 112 (H) 100 - 108 mmol/L    CO2 22 21 - 32 mmol/L    Anion gap 11 3.0 - 18 mmol/L    Glucose 105 (H) 74 - 99 mg/dL    BUN 37 (H) 7.0 - 18 MG/DL    Creatinine 1.34 (H) 0.6 - 1.3 MG/DL    BUN/Creatinine ratio 28 (H) 12 - 20      GFR est AA 46 (L) >60 ml/min/1.73m2    GFR est non-AA 38 (L) >60 ml/min/1.73m2    Calcium 8.1 (L) 8.5 - 10.1 MG/DL   GLUCOSE, POC    Collection Time: 10/22/17  7:27 AM   Result Value Ref Range    Glucose (POC) 120 (H) 70 - 110 mg/dL       Signed By: Sherine Arce PA-C     October 22, 2017 12:08 PM

## 2017-10-23 ENCOUNTER — TELEPHONE (OUTPATIENT)
Dept: CARDIOLOGY CLINIC | Age: 77
End: 2017-10-23

## 2017-10-23 VITALS
BODY MASS INDEX: 29.64 KG/M2 | DIASTOLIC BLOOD PRESSURE: 63 MMHG | SYSTOLIC BLOOD PRESSURE: 127 MMHG | WEIGHT: 151 LBS | OXYGEN SATURATION: 100 % | HEART RATE: 79 BPM | HEIGHT: 60 IN | TEMPERATURE: 97.2 F | RESPIRATION RATE: 17 BRPM

## 2017-10-23 LAB
ANION GAP SERPL CALC-SCNC: 6 MMOL/L (ref 3–18)
BASOPHILS # BLD: 0 K/UL (ref 0–0.1)
BASOPHILS NFR BLD: 0 % (ref 0–2)
BUN SERPL-MCNC: 36 MG/DL (ref 7–18)
BUN/CREAT SERPL: 27 (ref 12–20)
CALCIUM SERPL-MCNC: 8.6 MG/DL (ref 8.5–10.1)
CEA SERPL-MCNC: 2.3 NG/ML
CHLORIDE SERPL-SCNC: 111 MMOL/L (ref 100–108)
CO2 SERPL-SCNC: 23 MMOL/L (ref 21–32)
CREAT SERPL-MCNC: 1.31 MG/DL (ref 0.6–1.3)
DIFFERENTIAL METHOD BLD: ABNORMAL
EOSINOPHIL # BLD: 0.1 K/UL (ref 0–0.4)
EOSINOPHIL NFR BLD: 3 % (ref 0–5)
ERYTHROCYTE [DISTWIDTH] IN BLOOD BY AUTOMATED COUNT: 16.5 % (ref 11.6–14.5)
GLUCOSE BLD STRIP.AUTO-MCNC: 118 MG/DL (ref 70–110)
GLUCOSE BLD STRIP.AUTO-MCNC: 239 MG/DL (ref 70–110)
GLUCOSE SERPL-MCNC: 111 MG/DL (ref 74–99)
HCT VFR BLD AUTO: 23.2 % (ref 35–45)
HGB BLD-MCNC: 8 G/DL (ref 12–16)
LYMPHOCYTES # BLD: 0.7 K/UL (ref 0.9–3.6)
LYMPHOCYTES NFR BLD: 24 % (ref 21–52)
MCH RBC QN AUTO: 30.4 PG (ref 24–34)
MCHC RBC AUTO-ENTMCNC: 34.5 G/DL (ref 31–37)
MCV RBC AUTO: 88.2 FL (ref 74–97)
MONOCYTES # BLD: 0.2 K/UL (ref 0.05–1.2)
MONOCYTES NFR BLD: 5 % (ref 3–10)
NEUTS SEG # BLD: 2 K/UL (ref 1.8–8)
NEUTS SEG NFR BLD: 68 % (ref 40–73)
PLATELET # BLD AUTO: 89 K/UL (ref 135–420)
PMV BLD AUTO: 10 FL (ref 9.2–11.8)
POTASSIUM SERPL-SCNC: 4 MMOL/L (ref 3.5–5.5)
RBC # BLD AUTO: 2.63 M/UL (ref 4.2–5.3)
SODIUM SERPL-SCNC: 140 MMOL/L (ref 136–145)
WBC # BLD AUTO: 3 K/UL (ref 4.6–13.2)

## 2017-10-23 PROCEDURE — 82962 GLUCOSE BLOOD TEST: CPT

## 2017-10-23 PROCEDURE — 97116 GAIT TRAINING THERAPY: CPT

## 2017-10-23 PROCEDURE — 80048 BASIC METABOLIC PNL TOTAL CA: CPT | Performed by: PHYSICIAN ASSISTANT

## 2017-10-23 PROCEDURE — 74011250637 HC RX REV CODE- 250/637: Performed by: INTERNAL MEDICINE

## 2017-10-23 PROCEDURE — 85025 COMPLETE CBC W/AUTO DIFF WBC: CPT | Performed by: PHYSICIAN ASSISTANT

## 2017-10-23 PROCEDURE — 74011250637 HC RX REV CODE- 250/637: Performed by: PHYSICIAN ASSISTANT

## 2017-10-23 PROCEDURE — 36415 COLL VENOUS BLD VENIPUNCTURE: CPT | Performed by: PHYSICIAN ASSISTANT

## 2017-10-23 RX ORDER — METFORMIN HYDROCHLORIDE 500 MG/1
500 TABLET ORAL
Qty: 30 TAB | Refills: 0 | Status: SHIPPED | OUTPATIENT
Start: 2017-10-23 | End: 2018-01-29

## 2017-10-23 RX ADMIN — Medication 250 MG: at 09:16

## 2017-10-23 RX ADMIN — ASPIRIN 81 MG CHEWABLE TABLET 81 MG: 81 TABLET CHEWABLE at 09:10

## 2017-10-23 RX ADMIN — FERROUS SULFATE TAB 325 MG (65 MG ELEMENTAL FE) 325 MG: 325 (65 FE) TAB at 09:16

## 2017-10-23 RX ADMIN — HYDRALAZINE HYDROCHLORIDE 10 MG: 10 TABLET, FILM COATED ORAL at 09:11

## 2017-10-23 RX ADMIN — Medication 1 CAPSULE: at 09:10

## 2017-10-23 RX ADMIN — PANTOPRAZOLE SODIUM 40 MG: 40 TABLET, DELAYED RELEASE ORAL at 09:16

## 2017-10-23 RX ADMIN — FUROSEMIDE 20 MG: 20 TABLET ORAL at 09:11

## 2017-10-23 RX ADMIN — THERA TABS 1 TABLET: TAB at 09:10

## 2017-10-23 RX ADMIN — CALCIUM POLYCARBOPHIL 625 MG TABLET 625 MG: at 09:10

## 2017-10-23 RX ADMIN — VITAMIN D, TAB 1000IU (100/BT) 2000 UNITS: 25 TAB at 09:09

## 2017-10-23 RX ADMIN — VITAM B12 100 MCG: 100 TAB at 09:11

## 2017-10-23 RX ADMIN — AMLODIPINE BESYLATE 10 MG: 10 TABLET ORAL at 09:16

## 2017-10-23 NOTE — DISCHARGE INSTRUCTIONS
Heart Failure: Care Instructions  Your Care Instructions    Heart failure occurs when your heart does not pump as much blood as the body needs. Failure does not mean that the heart has stopped pumping but rather that it is not pumping as well as it should. Over time, this causes fluid buildup in your lungs and other parts of your body. Fluid buildup can cause shortness of breath, fatigue, swollen ankles, and other problems. By taking medicines regularly, reducing sodium (salt) in your diet, checking your weight every day, and making lifestyle changes, you can feel better and live longer. Follow-up care is a key part of your treatment and safety. Be sure to make and go to all appointments, and call your doctor if you are having problems. It's also a good idea to know your test results and keep a list of the medicines you take. How can you care for yourself at home? Medicines  · Be safe with medicines. Take your medicines exactly as prescribed. Call your doctor if you think you are having a problem with your medicine. · Do not take any vitamins, over-the-counter medicine, or herbal products without talking to your doctor first. Renae Diazer not take ibuprofen (Advil or Motrin) and naproxen (Aleve) without talking to your doctor first. They could make your heart failure worse. · You may be taking some of the following medicine. ¨ Beta-blockers can slow heart rate, decrease blood pressure, and improve your condition. Taking a beta-blocker may lower your chance of needing to be hospitalized. ¨ Angiotensin-converting enzyme inhibitors (ACEIs) reduce the heart's workload, lower blood pressure, and reduce swelling. Taking an ACEI may lower your chance of needing to be hospitalized again. ¨ Angiotensin II receptor blockers (ARBs) work like ACEIs. Your doctor may prescribe them instead of ACEIs. ¨ Diuretics, also called water pills, reduce swelling.   ¨ Potassium supplements replace this important mineral, which is sometimes lost with diuretics. ¨ Aspirin and other blood thinners prevent blood clots, which can cause a stroke or heart attack. You will get more details on the specific medicines your doctor prescribes. Diet  · Your doctor may suggest that you limit sodium to 2,000 milligrams (mg) a day or less. That is less than 1 teaspoon of salt a day, including all the salt you eat in cooking or in packaged foods. People get most of their sodium from processed foods. Fast food and restaurant meals also tend to be very high in sodium. · Ask your doctor how much liquid you can drink each day. You may have to limit liquids. Weight  · Weigh yourself without clothing at the same time each day. Record your weight. Call your doctor if you have a sudden weight gain, such as more than 2 to 3 pounds in a day or 5 pounds in a week. (Your doctor may suggest a different range of weight gain.) A sudden weight gain may mean that your heart failure is getting worse. Activity level  · Start light exercise (if your doctor says it is okay). Even if you can only do a small amount, exercise will help you get stronger, have more energy, and manage your weight and your stress. Walking is an easy way to get exercise. Start out by walking a little more than you did before. Bit by bit, increase the amount you walk. · When you exercise, watch for signs that your heart is working too hard. You are pushing yourself too hard if you cannot talk while you are exercising. If you become short of breath or dizzy or have chest pain, stop, sit down, and rest.  · If you feel \"wiped out\" the day after you exercise, walk slower or for a shorter distance until you can work up to a better pace. · Get enough rest at night. Sleeping with 1 or 2 pillows under your upper body and head may help you breathe easier. Lifestyle changes  · Do not smoke. Smoking can make a heart condition worse.  If you need help quitting, talk to your doctor about stop-smoking programs and medicines. These can increase your chances of quitting for good. Quitting smoking may be the most important step you can take to protect your heart. · Limit alcohol to 2 drinks a day for men and 1 drink a day for women. Too much alcohol can cause health problems. · Avoid getting sick from colds and the flu. Get a pneumococcal vaccine shot. If you have had one before, ask your doctor whether you need another dose. Get a flu shot each year. If you must be around people with colds or the flu, wash your hands often. When should you call for help? Call 911 if you have symptoms of sudden heart failure such as:  · You have severe trouble breathing. · You cough up pink, foamy mucus. · You have a new irregular or rapid heartbeat. Call your doctor now or seek immediate medical care if:  · You have new or increased shortness of breath. · You are dizzy or lightheaded, or you feel like you may faint. · You have sudden weight gain, such as more than 2 to 3 pounds in a day or 5 pounds in a week. (Your doctor may suggest a different range of weight gain.)  · You have increased swelling in your legs, ankles, or feet. · You are suddenly so tired or weak that you cannot do your usual activities. Watch closely for changes in your health, and be sure to contact your doctor if:  · You develop new symptoms. Where can you learn more? Go to http://perlita-linda.info/. Enter Y989 in the search box to learn more about \"Heart Failure: Care Instructions. \"  Current as of: April 3, 2017  Content Version: 11.3  © 5374-8084 [employer]. Care instructions adapted under license by [employer] (which disclaims liability or warranty for this information). If you have questions about a medical condition or this instruction, always ask your healthcare professional. [address] any warranty or liability for your use of this information.          Shortness of Breath: Care Instructions  Your Care Instructions  Shortness of breath has many causes. Sometimes conditions such as anxiety can lead to shortness of breath. Some people get mild shortness of breath when they exercise. Trouble breathing also can be a symptom of a serious problem, such as asthma, lung disease, emphysema, heart problems, and pneumonia. If your shortness of breath continues, you may need tests and treatment. Watch for any changes in your breathing and other symptoms. Follow-up care is a key part of your treatment and safety. Be sure to make and go to all appointments, and call your doctor if you are having problems. Its also a good idea to know your test results and keep a list of the medicines you take. How can you care for yourself at home? · Do not smoke or allow others to smoke around you. If you need help quitting, talk to your doctor about stop-smoking programs and medicines. These can increase your chances of quitting for good. · Get plenty of rest and sleep. · Take your medicines exactly as prescribed. Call your doctor if you think you are having a problem with your medicine. · Find healthy ways to deal with stress. ¨ Exercise daily. ¨ Get plenty of sleep. ¨ Eat regularly and well. When should you call for help? Call 911 anytime you think you may need emergency care. For example, call if:  · You have severe shortness of breath. · You have symptoms of a heart attack. These may include:  ¨ Chest pain or pressure, or a strange feeling in the chest.  ¨ Sweating. ¨ Shortness of breath. ¨ Nausea or vomiting. ¨ Pain, pressure, or a strange feeling in the back, neck, jaw, or upper belly or in one or both shoulders or arms. ¨ Lightheadedness or sudden weakness. ¨ A fast or irregular heartbeat. After you call 911, the  may tell you to chew 1 adult-strength or 2 to 4 low-dose aspirin. Wait for an ambulance. Do not try to drive yourself.   Call your doctor now or seek immediate medical care if:  · Your shortness of breath gets worse or you start to wheeze. Wheezing is a high-pitched sound when you breathe. · You wake up at night out of breath or have to prop your head up on several pillows to breathe. · You are short of breath after only light activity or while at rest.  Watch closely for changes in your health, and be sure to contact your doctor if:  · You do not get better over the next 1 to 2 days. Where can you learn more? Go to http://perlita-linda.info/. Enter S780 in the search box to learn more about \"Shortness of Breath: Care Instructions. \"  Current as of: 2017  Content Version: 11.3  © 3976-9586 [employer]. Care instructions adapted under license by [employer] (which disclaims liability or warranty for this information). If you have questions about a medical condition or this instruction, always ask your healthcare professional. [place] [address] any warranty or liability for your use of this information. Patient armband removed and shredded    MyChart Activation    Thank you for requesting access to [facility]. Please follow the instructions below to securely access and download your online medical record. [facility] allows you to send messages to your doctor, view your test results, renew your prescriptions, schedule appointments, and more. How Do I Sign Up? 1. In your internet browser, go to [email or web address]  2. Click on the First Time User? Click Here link in the Sign In box. You will be redirect to the New Member Sign Up page. 3. Enter your Are You [facility] Human Access Code exactly as it appears below. You will not need to use this code after youve completed the sign-up process. If you do not sign up before the expiration date, you must request a new code. [facility] You [facility] Access Code: [email or web address]  Expires: 2018 11:00 AM (This is the date your Are You [facility] Human access code will )    4.  Enter the last four digits of your Social Security Number (xxxx) and Date of Birth (mm/dd/yyyy) as indicated and click Submit. You will be taken to the next sign-up page. 5. Create a Altavian ID. This will be your Altavian login ID and cannot be changed, so think of one that is secure and easy to remember. 6. Create a Altavian password. You can change your password at any time. 7. Enter your Password Reset Question and Answer. This can be used at a later time if you forget your password. 8. Enter your e-mail address. You will receive e-mail notification when new information is available in [address] 9. Click Sign Up. You can now view and download portions of your medical record. 10. Click the Download Summary menu link to download a portable copy of your medical information. Additional Information    If you have questions, please visit the Frequently Asked Questions section of the Altavian website at [email or web address] Remember, Altavian is NOT to be used for urgent needs. For medical emergencies, dial 911. DISCHARGE SUMMARY from Nurse    The following personal items are in your possession at time of discharge:    Dental Appliances: None  Visual Aid: At bedside, Glasses, Magnifying glass     Home Medications: None  Jewelry: Ring  Clothing: None  Other Valuables: Cell Phone  Personal Items Sent to Safe: n/a          PATIENT INSTRUCTIONS:    After general anesthesia or intravenous sedation, for 24 hours or while taking prescription Narcotics:  · Limit your activities  · Do not drive and operate hazardous machinery  · Do not make important personal or business decisions  · Do  not drink alcoholic beverages  · If you have not urinated within 8 hours after discharge, please contact your surgeon on call.     Report the following to your surgeon:  · Excessive pain, swelling, redness or odor of or around the surgical area  · Temperature over 100.5  · Nausea and vomiting lasting longer than 4 hours or if unable to take medications  · Any signs of decreased circulation or nerve impairment to extremity: change in color, persistent  numbness, tingling, coldness or increase pain  · Any questions        What to do at Home:  Recommended activity: Activity as tolerated    If you experience any of the following symptoms Nausea, vomiting, diarrhea, fever greater than 100.5, dizziness, severe headache, shortness of breath, chest pain, increased pain, please follow up with PCP. *  Please give a list of your current medications to your Primary Care Provider. *  Please update this list whenever your medications are discontinued, doses are      changed, or new medications (including over-the-counter products) are added. *  Please carry medication information at all times in case of emergency situations. These are general instructions for a healthy lifestyle:    No smoking/ No tobacco products/ Avoid exposure to second hand smoke    Surgeon General's Warning:  Quitting smoking now greatly reduces serious risk to your health. Obesity, smoking, and sedentary lifestyle greatly increases your risk for illness    A healthy diet, regular physical exercise & weight monitoring are important for maintaining a healthy lifestyle    You may be retaining fluid if you have a history of heart failure or if you experience any of the following symptoms:  Weight gain of 3 pounds or more overnight or 5 pounds in a week, increased swelling in our hands or feet or shortness of breath while lying flat in bed. Please call your doctor as soon as you notice any of these symptoms; do not wait until your next office visit. Recognize signs and symptoms of STROKE:    F-face looks uneven    A-arms unable to move or move unevenly    S-speech slurred or non-existent    T-time-call 911 as soon as signs and symptoms begin-DO NOT go       Back to bed or wait to see if you get better-TIME IS BRAIN.     Warning Signs of HEART ATTACK     Call 911 if you have these symptoms:   Chest discomfort. Most heart attacks involve discomfort in the center of the chest that lasts more than a few minutes, or that goes away and comes back. It can feel like uncomfortable pressure, squeezing, fullness, or pain.  Discomfort in other areas of the upper body. Symptoms can include pain or discomfort in one or both arms, the back, neck, jaw, or stomach.  Shortness of breath with or without chest discomfort.  Other signs may include breaking out in a cold sweat, nausea, or lightheadedness. Don't wait more than five minutes to call 911 - MINUTES MATTER! Fast action can save your life. Calling 911 is almost always the fastest way to get lifesaving treatment. Emergency Medical Services staff can begin treatment when they arrive -- up to an hour sooner than if someone gets to the hospital by car. The discharge information has been reviewed with the patient and caregiver. The patient and caregiver verbalized understanding. Discharge medications reviewed with the patient and caregiver and appropriate educational materials and side effects teaching were provided.

## 2017-10-23 NOTE — TELEPHONE ENCOUNTER
LMOM for patient to contact our office . .. Needs a 2 week PH with Betina Do. [address], [place]  Melisa Manzo                     Pt will be discharged today. Admitted for CHF exacerbation. Please schedule for post hospital follow up by calling patient at home. Thank you.      [address]

## 2017-10-23 NOTE — DISCHARGE SUMMARY
[facility]  Discharge Summary       Patient: Priya Bruce Age: 67 y.o. : 1940 MR#: [id number] SSN: [id number]  PCP on record: Manfred Holley MD  Admit date: 10/18/2017  Discharge date: 10/23/2017    Disposition:    []Home   []Home with Home Health   [x]SNF/NH   []Rehab   []Home with family   []Alternate Facility:____________________    Discharge Diagnoses:                             1. Acute on chronic diastolic CHF  2. Moderate aortic stenosis   3. CAD hx, 3 vessel disease, s/p PCI to pCx and mLAD with BETHEL 2016:   4. HTN  5. HLD  6. DM type II   7. Elevated troponin  8. H/o hiatal hernia   9. Hypertensive portal gastropathy with very friable fundal mucosa with occasional blood oozing  10. H/o colon cancer  11. History of L breast CA in , remission, s/p mastectomy  12. Pancytopenia  13. Chronic anemia/ Iron Deficiency     Discharge Medications:     Current Discharge Medication List      CONTINUE these medications which have NOT CHANGED    Details   furosemide (LASIX) 20 mg tablet 20 mg po daily  Qty: 30 Tab, Refills: 0      pantoprazole (PROTONIX) 40 mg tablet Take 1 Tab by mouth two (2) times a day. Qty: 60 Tab, Refills: 0                    amLODIPine (NORVASC) 5 mg tablet TAKE ONE TABLET BY MOUTH ONCE DAILY  Qty: 30 Tab, Refills: 0      nitroglycerin (NITROLINGUAL) 400 mcg/spray spray 1 Spray by SubLINGual route every five (5) minutes as needed. Qty: 1 Bottle, Refills: 2      hydrALAZINE (APRESOLINE) 10 mg tablet Take 1 Tab by mouth three (3) times daily. Qty: 90 Tab, Refills: 6      metoprolol tartrate (LOPRESSOR) 50 mg tablet Take 1 Tab by mouth two (2) times a day. Qty: 60 Tab, Refills: 6      ascorbic acid, vitamin C, (VITAMIN C) 250 mg tablet Take 1 Tab by mouth daily. Qty: 60 Tab, Refills: 1      ferrous sulfate 325 mg (65 mg iron) tablet Take 1 Tab by mouth two (2) times daily (with meals).   Qty: 60 Tab, Refills: 1      metFORMIN (GLUCOPHAGE) 500 mg tablet Take 500 mg by mouth 1/2 tab twice daily (with meals). multivitamin (ONE A DAY) tablet Take 1 Tab by mouth daily. cholecalciferol (VITAMIN D3) 1,000 unit tablet Take 2 Tabs by mouth daily. Qty: 60 Tab, Refills: 1      simvastatin (ZOCOR) 20 mg tablet Take 20 mg by mouth daily. omega 3-dha-epa-fish oil (FISH OIL) 100-160-1,000 mg cap Take 1,000 mg by mouth two (2) times a day. cyanocobalamin (VITAMIN B12) 100 mcg tablet Take 100 mcg by mouth daily. aspirin 81 mg tablet Take 81 mg by mouth daily. calcium polycarbophil (FIBER LAXATIVE) 625 mg tablet Take 1,250 mg by mouth daily. Consults:  Hem/Oncologist, Cardiology  -   Procedures: Iron infusions x2  -     Significant Diagnostic Studies: stool for occult blood. [facility] Course by Problem   1. Acute on chronic diastolic CHF: Admitted with SOB. Pt was managed with one dose of IV lasix in ER, and continued PO lasix 20mg daily. cardiology followed and signed off. No evidence of fluid overload. 2. Moderate aortic stenosis: Echo 10/18/17: shows: moderate aortic stenosis, EF 55-60% wnwa. ?contributing to dyspnea. Prior echo in 6/2017 showed mild aorteic stenosis with mean gradient 16mmHg and valve area 1.32cm2. Cardiology followed while inpatient. 3. CAD hx, 3 vessel disease, s/p PCI to pCx and mLAD with [facility]HEL 8/2016: Continued on asa and statin while inpatient. 4. HTN - Continued on BB, norvasc, and hydralazine while inpatient  5. HLD - continued on Statin while inpatient  6. DM type II : Improved Hgb A1C at 6.1 from last year at 9.2. Managed with POC glucose checks, lantus and insulin sliding scale. Diabetic management was consulted, rec home Metformin medication 500 one half twice daily. 7. Elevated troponin: Cardiology followed. Was placed on heparin gtt for 2 days then off per cards. No complaints of chest pain while inpatient. Suspicion quite low for major ACS.   8. H/o hiatal hernia small in size, very small varices without bleeding. No acute issues while inpatient. 9. Hypertensive portal gastropathy with very friable fundal mucosa with occasional blood oozing: No acute issues while inpatient. 10. H/o colon cancer in 2011: adenocarcinoma s/p tumor resection, CEA results pending. Stool for occult blood was negative  11. History of L breast CA in , remission, s/p mastectomy: followed by Oncologist: Dr. Heidi Caballero, last visit in May, could not make August appt. Consult with Dr. Heidi Caballero while inpatient, will see outpatient. 12. Pancytopenia: Followed by Hem/Onc, asymptomatic. 13. Chronic anemia/ Iron Deficiency: Supplement Venofer x2 doses/ days- iron infusion by Dr. Corrina Finley  14. acute  hypoxic respiratory failure at [facility] 85 setting of CHF requiring BIPAP - resolved. Risk: CHF. Clinical Indicators: RR 32 on admit, sats 80's on RA documented in ER. Accessory muscle use present. Tachypnea noted. She was in respiratory distress  Treatment:   BIPAP ;  IV lasix       Today's examination of the patient revealed:     Subjective:   Pt states she feels well, she denies any chest pain, SOB, bleeding, fever, chills, N/V/D/C, melena, or hematochezia. Objective:   VS:   Visit Vitals    /67    Pulse 85    Temp 97.6 °F (36.4 °C)    Resp 20    Ht 5' (1.524 m)    Wt 68.5 kg (151 lb)    SpO2 97%    Breastfeeding No    BMI 29.49 kg/m2      Tmax/24hrs: Temp (24hrs), Av °F (36.7 °C), Min:97.5 °F (36.4 °C), Max:98.7 °F (37.1 °C)     Input/Output:   Intake/Output Summary (Last 24 hours) at 10/23/17 1009  Last data filed at 10/23/17 0932   Gross per 24 hour   Intake              120 ml   Output              600 ml   Net             -480 ml       General:  Alert, NAD  Cardiovascular:  RRR  Pulmonary:  LSC throughout; respiratory effort WNL  GI:  +BS in all four quadrants, soft, non-tender  Extremities:  No edema; or cyanosis.    Additional: no rash    Labs:    Recent Results (from the past 24 hour(s))   GLUCOSE, POC    Collection Time: 10/22/17 12:20 PM   Result Value Ref Range    Glucose (POC) 228 (H) 70 - 110 mg/dL   OCCULT BLOOD, STOOL    Collection Time: 10/22/17  1:57 PM   Result Value Ref Range    Occult blood, stool NEGATIVE  NEG     GLUCOSE, POC    Collection Time: 10/22/17  3:34 PM   Result Value Ref Range    Glucose (POC) 185 (H) 70 - 110 mg/dL   GLUCOSE, POC    Collection Time: 10/22/17 10:11 PM   Result Value Ref Range    Glucose (POC) 115 (H) 70 - 110 mg/dL   CBC WITH AUTOMATED DIFF    Collection Time: 10/23/17  5:24 AM   Result Value Ref Range    WBC 3.0 (L) 4.6 - 13.2 K/uL    RBC 2.63 (L) 4.20 - 5.30 M/uL    HGB 8.0 (L) 12.0 - 16.0 g/dL    HCT 23.2 (L) 35.0 - 45.0 %    MCV 88.2 74.0 - 97.0 FL    MCH 30.4 24.0 - 34.0 PG    MCHC 34.5 31.0 - 37.0 g/dL    RDW 16.5 (H) 11.6 - 14.5 %    PLATELET 89 (L) 236 - 420 K/uL    MPV 10.0 9.2 - 11.8 FL    NEUTROPHILS 68 40 - 73 %    LYMPHOCYTES 24 21 - 52 %    MONOCYTES 5 3 - 10 %    EOSINOPHILS 3 0 - 5 %    BASOPHILS 0 0 - 2 %    ABS. NEUTROPHILS 2.0 1.8 - 8.0 K/UL    ABS. LYMPHOCYTES 0.7 (L) 0.9 - 3.6 K/UL    ABS. MONOCYTES 0.2 0.05 - 1.2 K/UL    ABS. EOSINOPHILS 0.1 0.0 - 0.4 K/UL    ABS. BASOPHILS 0.0 0.0 - 0.1 K/UL    DF AUTOMATED     METABOLIC PANEL, BASIC    Collection Time: 10/23/17  5:24 AM   Result Value Ref Range    Sodium 140 136 - 145 mmol/L    Potassium 4.0 3.5 - 5.5 mmol/L    Chloride 111 (H) 100 - 108 mmol/L    CO2 23 21 - 32 mmol/L    Anion gap 6 3.0 - 18 mmol/L    Glucose 111 (H) 74 - 99 mg/dL    BUN 36 (H) 7.0 - 18 MG/DL    Creatinine 1.31 (H) 0.6 - 1.3 MG/DL    BUN/Creatinine ratio 27 (H) 12 - 20      GFR est AA 48 (L) >60 ml/min/1.73m2    GFR est non-AA 39 (L) >60 ml/min/1.73m2    Calcium 8.6 8.5 - 10.1 MG/DL   GLUCOSE, POC    Collection Time: 10/23/17  8:09 AM   Result Value Ref Range    Glucose (POC) 118 (H) 70 - 110 mg/dL     Additional Data Reviewed:     Condition:   Follow-up Appointments:   1.  Your PCP: Alan Mei MD, within 7-10days  2. Your Cardiologist: Dr. Sandra Moore in 2 weeks  3. Your Heme/Oncologist Dr. Nina Norwood in 2    Please follow-up on tests/labs that are still pendin. CEA: will follow-up with Dr. Nina Norwood as outpatient.      >30 minutes spent coordinating this discharge (review instructions/follow-up, prescriptions, preparing report for sign off)    Signed:  Ofelia Austin PA-C  10/23/2017  10:09 AM

## 2017-10-23 NOTE — DIABETES MGMT
Diabetes Patient/Family Education Record    Factors That  May Influence Patients Ability  to Learn or  Comply with Recommendations   []   Language barrier    []   Cultural needs   []   Motivation    []   Cognitive limitation    []   Physical   [x]   Education    []   Physiological factors   []   Hearing/vision/speaking impairment   []   Yarsanism beliefs    []   Financial factors   []  Other:   []  No factors identified at this time. Person Instructed:   [x]   Patient   []   Family   []  Other     Preference for Learning:   [x]   Verbal   []   Written   []  Demonstration     Level of Comprehension & Competence:    [x]  Good                                      [] Fair                                     []  Poor                             []  Needs Reinforcement   [x]  Teachback completed    Education Component:   [x]  Medication management, including how to administer insulin (if appropriate) and potential medication interactions: Patient stated that she is not taking insulin. She is only on Metformin 500 mg 1/2 tablet twice daily as clarified by the patient. [x]  Nutritional management: Encouraged patient to eat 3 meals daily and bedtime snack. Discussed portion control of carbs (starches, fruits, dairy). Discussed in particular serving size of dairy such as milk and yogurt, and serving size of fruits. [x]  Exercise: Patient stated that she's able to tolerate household distance walking exercise when not feeling sick. [x]  Signs, symptoms, and treatment of hyperglycemia and hypoglycemia   [x] Prevention, recognition and treatment of hyperglycemia and hypoglycemia   [x]  Importance of blood glucose monitoring and how to obtain a blood glucose meter: Patient stated that she has BG meter and testing supplies at home, and checks her blood sugar 2x daily. Educated patient about the following:  Fasting blood sugar range:  mg/dL.   Two hours post meal blood sugar (random): < 180 mg/dL. Encouraged patient to continue to keep log her BG and share it with her medical provider each office visit. And include food diary. Explained to patient how this can help her doctor evaluate her diabetes management and treatment modification as needed. She verbalized understanding.   []  Instruction on use of the blood glucose meter   [x]  Discuss the importance of HbA1C monitoring: Discussed her current A1C of 6.1% (10/19/2017) which is equivalent to average blood glucose of  128 mg/dL during the past 2-3 months. Encouraged patient to continue to follow her diabetes treatment plan to help maintain A1C <7%. Patient verbalized understanding.    []  Sick day guidelines   []  Proper use and disposal of lancets, needles, syringes or insulin pens (if appropriate)   []  Potential long-term complications (retinopathy, kidney disease, neuropathy, foot care)   [x] Information about whom to contact in case of emergency or for more information    [x]  Goal:  Patient/family will demonstrate understanding of Diabetes Self Management Skills by: 10/30/2017  Plan for post-discharge education or self-management support:    [x] Outpatient class schedule provided            [] Patient Declined    [] Scheduled for outpatient classes (date) _______         Jose J Torres RN

## 2017-10-23 NOTE — PROGRESS NOTES
Problem: Mobility Impaired (Adult and Pediatric)  Goal: *Acute Goals and Plan of Care (Insert Text)  Physical Therapy Goals  Initiated 10/20/2017 and to be accomplished within 7 day(s)  1. Patient will move from supine to sit and sit to supine , scoot up and down and roll side to side in bed with Candy. 2.  Patient will transfer from bed to chair and chair to bed with Candy using the least restrictive device. 3.  Patient will perform sit to stand with Candy. 4.  Patient will ambulate with supervision/set-up for >200 feet with the least restrictive device. 5.  Patient will ascend/descend 3 stairs with R handrail(s) with supervision/set-up. Outcome: Progressing Towards Goal  physical Therapy TREATMENT    Patient: Samina Luke (52 y.o. female)  Date: 10/23/2017  Diagnosis: CHF (congestive heart failure) ([facility])  Dyspnea <principal problem not specified>       Precautions: Fall  Chart, physical therapy assessment, plan of care and goals were reviewed. ASSESSMENT:  Pt demonstrated increased independence with transfers and ambulation and was able to increase ambulation balance. Pt required supervision for standing transfers and ambulation with RW. Progression toward goals:  [x]      Improving appropriately and progressing toward goals  []      Improving slowly and progressing toward goals  []      Not making progress toward goals and plan of care will be adjusted     PLAN:  Patient continues to benefit from skilled intervention to address the above impairments. Continue treatment per established plan of care. Discharge Recommendations:  Home Health  Further Equipment Recommendations for Discharge:  rolling walker     G-CODES:     Mobility  Current  CJ= 20-39%   Goal  CI= 1-19%. The severity rating is based on the Level of Assistance required for Functional Mobility and ADLs. SUBJECTIVE:   Patient stated My legs just feel tight.     OBJECTIVE DATA SUMMARY:   Critical Behavior:  Neurologic State: Alert  Orientation Level: Oriented X4  Cognition: Appropriate for age attention/concentration  Safety/Judgement: Awareness of environment, Fall prevention, Insight into deficits  Functional Mobility Training:  Bed Mobility:  Rolling: Independent  Supine to Sit: Independent      Transfers:  Sit to Stand: Supervision  Stand to Sit: Supervision  Bed to Chair: Supervision  Balance:  Sitting: Intact  Standing: With support  Standing - Static: Good  Standing - Dynamic : Fair  Ambulation/Gait Training:  Distance (ft): 100 Feet (ft)  Assistive Device: Walker, rolling  Ambulation - Level of Assistance: Supervision    Therapeutic Exercises: Ankle pumps, SLR  Pain:  Pt reports 0/10 pain or discomfort prior to treatment.    Pt reports 0/10 pain or discomfort post treatment. Activity Tolerance:   Fair-  Please refer to the flowsheet for vital signs taken during this treatment.   After treatment:   [x] Patient left in no apparent distress sitting up in chair  [] Patient left in no apparent distress in bed  [x] Call bell left within reach  [x] Nursing notified  [] Caregiver present  [] Bed alarm activated    Educated patient on increasing activity throughout the day  Diana Broussard PT   Time Calculation: 10 mins

## 2017-10-23 NOTE — PROGRESS NOTES
Assumed care of patient. Received report from [facility]. Patient. Sitting up on side of bed talking on the telephone. Patient has new 24G IV in right A/C. Patient has no apparent distress, discomfort, or pain.

## 2017-10-23 NOTE — PROGRESS NOTES
Attempted to see patient for PT treatment however diabetic educator is preparing to go into patients room and per MD discharge is pending his consult. Will re-attempt PT treatment later in the day.   Angélica Handy, PT

## 2017-10-23 NOTE — PROGRESS NOTES
I have assumed care of Ms. Leonardo. The patient was assessed during bedside report. She is currently resting comfortably.

## 2017-10-23 NOTE — DIABETES MGMT
Glycemic Control Plan of Care    POC BG range on 10/22/2017: 115-228 mg/dL. POC BG report on 10/23/2017 at time of review: 118 mg/dL. Received diab consult today. Completed assessment of home diabetes management and education with patient. See separate notes, 10/23/2017. Noted plan for transition of care to [facility] today, 10/23/2017. Recommendation(s):  1.) A1c of 6.1% (10/19/2017). Continue on pre admission dose (home) of Metformin 1/2 tab of 500 mg twice daily. Assessment:  Patient is 67year old with past medical history including type 2 diabetes mellitus, hypertension, hyperlipidemia, CAD with PCI/BETHEL, and CHF - was admitted on 10/18/2017 with c/o shortness of breath and fatigue. Noted:  CHF. Type 2 diabetes mellitus with current A1c of 6.1% (10/19/2017). Most recent blood glucose values:    Results for Jeffery Chi (MRN [id number]) as of 10/23/2017 10:40   Ref. Range 10/22/2017 07:27 10/22/2017 12:20 10/22/2017 15:34 10/22/2017 22:11   GLUCOSE,FAST - POC Latest Ref Range: 70 - 110 mg/dL 120 (H) 228 (H) 185 (H) 115 (H)     Results for Jeffery Chi (MRN [id number]) as of 10/23/2017 10:40   Ref. Range 10/23/2017 08:09   GLUCOSE,FAST - POC Latest Ref Range: 70 - 110 mg/dL 118 (H)     Current A1C: 6.1% (10/19/2017) is equivalent to average blood glucose of 128 mg/dL during the past 2-3 months. Current hospital diabetes medications:  Lantus insulin 10 units daily at bedtime. Correctional lispro insulin ACHS. Very resistant dose. Total daily dose insulin requirement previous day: 10/22/2017  Lantus: 10 units   Lispro: 9 units  TDD: 19 units of insulin    Home diabetes medications: As stated by patient on 10/23/2017 for clarification. Patient stated that she was only taking the following diabetes med prior to his hospital admission:  1/2 tablet Metformin 500 mg twice daily. Diet: Diabetic consistent carb 1800kcal; regular; no concentrated sweets.     Goals:  Blood glucose will be within target range of  mg/dL by 10/23/2017.      Education:  _X__  Refer to Diabetes Education Record             ___  Education not indicated at this time    Khoa Eugene RN

## 2017-10-23 NOTE — PROGRESS NOTES
Patient has intermittent confusion. Patient stated that she didn't have her IV iron. Patient informed it was given. Patient was upset and stated that she was frustrated. Patient reassured.

## 2017-10-25 ENCOUNTER — PATIENT OUTREACH (OUTPATIENT)
Dept: CASE MANAGEMENT | Age: 77
End: 2017-10-25

## 2017-11-01 ENCOUNTER — PATIENT OUTREACH (OUTPATIENT)
Dept: CASE MANAGEMENT | Age: 77
End: 2017-11-01

## 2017-11-01 NOTE — PROGRESS NOTES
Community Care Team Documentation for Patient in [facility]     Patient discharged from [facility] 10/18/2017 - 10/23/2017 to [facility], [facility], on 10/23/2017. Hospital Discharge diagnosis:  CHF. SNF Attending Provider:  Alex Sinha    Anticipated discharge date from SNF:  TBD      PCP : Lorren Galeazzi, MD    Nurse Navigator: HOLLY      FC, PT/OT. Participating. Plan to dc to home. 146lbs. High Risk            27       Total Score        3 Has Seen PCP in Last 6 Months (Yes=3, No=0)    2 . Living with Significant Other. Assisted Living. LTAC. SNF. or   Rehab    4 IP Visits Last 12 Months (1-3=4, 4=9, >4=11)    18 Charlson Comorbidity Score (Age + Comorbid Conditions)        Criteria that do not apply:    Patient Length of Stay (>5 days = 3)    Pt.  Coverage (Medicare=5 , Medicaid, or Self-Pay=4)

## 2017-11-08 ENCOUNTER — PATIENT OUTREACH (OUTPATIENT)
Dept: CASE MANAGEMENT | Age: 77
End: 2017-11-08

## 2017-11-15 ENCOUNTER — PATIENT OUTREACH (OUTPATIENT)
Dept: CASE MANAGEMENT | Age: 77
End: 2017-11-15

## 2017-11-16 ENCOUNTER — HOME HEALTH ADMISSION (OUTPATIENT)
Dept: HOME HEALTH SERVICES | Facility: HOME HEALTH | Age: 77
End: 2017-11-16

## 2017-11-18 ENCOUNTER — HOME CARE VISIT (OUTPATIENT)
Dept: HOME HEALTH SERVICES | Facility: HOME HEALTH | Age: 77
End: 2017-11-18

## 2017-11-21 ENCOUNTER — HOME CARE VISIT (OUTPATIENT)
Dept: HOME HEALTH SERVICES | Facility: HOME HEALTH | Age: 77
End: 2017-11-21

## 2017-11-22 NOTE — PROGRESS NOTES
Community Care Team Documentation for Patient in [facility]     Patient [facility] Hospital Discharge diagnosis:  CHF. SNF Attending Provider:  Malou Odom    Anticipated discharge date from SNF:  TBD      PCP : Julianne Morel MD    Nurse Navigator: [email or web address]      Full Code   146lbs, stable. Dc to home on Friday 11/17 with [facility]. High Risk            27       Total Score        3 Has Seen PCP in Last 6 Months (Yes=3, No=0)    2 . Living with Significant Other. Assisted Living. LTAC. SNF. or   Rehab    4 IP Visits Last 12 Months (1-3=4, 4=9, >4=11)    18 Charlson Comorbidity Score (Age + Comorbid Conditions)        Criteria that do not apply:    Patient Length of Stay (>5 days = 3)    Pt.  Coverage (Medicare=5 , Medicaid, or Self-Pay=4)

## 2017-11-22 NOTE — PROGRESS NOTES
Community Care Team Documentation for Patient in [facility]     Patient discharged from [facility] 10/18/2017 - 10/23/2017 to [facility], on 10/23/2017. Hospital Discharge diagnosis:  CHF. SNF Attending Provider:  Lex Santillan    Anticipated discharge date from SNF:  TBD      PCP : Tien Tripathi MD    Nurse Navigator: TBD      Full Code   Progressing well. Stable    High Risk            27       Total Score        3 Has Seen PCP in Last 6 Months (Yes=3, No=0)    2 . Living with Significant Other. Assisted Living. LTAC. SNF. or   Rehab    4 IP Visits Last 12 Months (1-3=4, 4=9, >4=11)    18 Charlson Comorbidity Score (Age + Comorbid Conditions)        Criteria that do not apply:    Patient Length of Stay (>5 days = 3)    Pt.  Coverage (Medicare=5 , Medicaid, or Self-Pay=4)

## 2017-11-22 NOTE — PROGRESS NOTES
Community Care Team Documentation for Patient in [facility]     Patient discharged from [facility] 10/18/2017 - 10/23/2017 to [facility], on 10/23/2017. Hospital Discharge diagnosis:  CHF. SNF Attending Provider:  Jean Claude Iniguez    Anticipated discharge date from SNF:  TBD      PCP : Tom Walker MD    Nurse Navigator: HOLLY      Full Code   PT/OT. weight steady 146lbs, no changes    High Risk            27       Total Score        3 Has Seen PCP in Last 6 Months (Yes=3, No=0)    2 . Living with Significant Other. Assisted Living. LTAC. SNF. or   Rehab    4 IP Visits Last 12 Months (1-3=4, 4=9, >4=11)    18 Charlson Comorbidity Score (Age + Comorbid Conditions)        Criteria that do not apply:    Patient Length of Stay (>5 days = 3)    Pt.  Coverage (Medicare=5 , Medicaid, or Self-Pay=4)

## 2017-11-30 ENCOUNTER — HOSPITAL ENCOUNTER (OUTPATIENT)
Dept: MAMMOGRAPHY | Age: 77
Discharge: HOME | End: 2017-11-30
Attending: INTERNAL MEDICINE
Payer: MEDICARE

## 2017-11-30 DIAGNOSIS — Z12.31 VISIT FOR SCREENING MAMMOGRAM: ICD-10-CM

## 2017-11-30 PROCEDURE — 77063 BREAST TOMOSYNTHESIS BI: CPT

## 2018-01-25 ENCOUNTER — APPOINTMENT (OUTPATIENT)
Dept: GENERAL RADIOLOGY | Age: 78
DRG: 194 | End: 2018-01-25
Attending: EMERGENCY MEDICINE
Payer: MEDICARE

## 2018-01-25 ENCOUNTER — HOSPITAL ENCOUNTER (INPATIENT)
Age: 78
LOS: 4 days | Discharge: NURSING, REHAB OR LONG-TERM CARE | DRG: 194 | End: 2018-01-29
Attending: EMERGENCY MEDICINE | Admitting: INTERNAL MEDICINE
Payer: MEDICARE

## 2018-01-25 ENCOUNTER — APPOINTMENT (OUTPATIENT)
Dept: GENERAL RADIOLOGY | Age: 78
DRG: 194 | End: 2018-01-25
Attending: INTERNAL MEDICINE
Payer: MEDICARE

## 2018-01-25 DIAGNOSIS — R73.9 HYPERGLYCEMIA: ICD-10-CM

## 2018-01-25 DIAGNOSIS — J18.9 PNEUMONIA OF LEFT LOWER LOBE DUE TO INFECTIOUS ORGANISM: Primary | ICD-10-CM

## 2018-01-25 LAB
ADMINISTERED INITIALS, ADMINIT: NORMAL
ALBUMIN SERPL-MCNC: 2.6 G/DL (ref 3.4–5)
ALBUMIN/GLOB SERPL: 0.6 {RATIO} (ref 0.8–1.7)
ALP SERPL-CCNC: 91 U/L (ref 45–117)
ALT SERPL-CCNC: 17 U/L (ref 13–56)
AMORPH CRY URNS QL MICRO: ABNORMAL
ANION GAP SERPL CALC-SCNC: 16 MMOL/L (ref 3–18)
APPEARANCE UR: ABNORMAL
AST SERPL-CCNC: 16 U/L (ref 15–37)
ATRIAL RATE: 96 BPM
BACTERIA URNS QL MICRO: ABNORMAL /HPF
BASOPHILS # BLD: 0 K/UL (ref 0–0.06)
BASOPHILS NFR BLD: 0 % (ref 0–3)
BILIRUB SERPL-MCNC: 1.2 MG/DL (ref 0.2–1)
BILIRUB UR QL: NEGATIVE
BUN SERPL-MCNC: 50 MG/DL (ref 7–18)
BUN/CREAT SERPL: 24 (ref 12–20)
CALCIUM SERPL-MCNC: 8.4 MG/DL (ref 8.5–10.1)
CALCULATED P AXIS, ECG09: 35 DEGREES
CALCULATED R AXIS, ECG10: -35 DEGREES
CALCULATED T AXIS, ECG11: 85 DEGREES
CHLORIDE SERPL-SCNC: 88 MMOL/L (ref 100–108)
CK MB CFR SERPL CALC: 2.4 % (ref 0–4)
CK MB SERPL-MCNC: 1.1 NG/ML (ref 5–25)
CK SERPL-CCNC: 45 U/L (ref 26–192)
CO2 SERPL-SCNC: 18 MMOL/L (ref 21–32)
COLOR UR: YELLOW
CREAT SERPL-MCNC: 2.07 MG/DL (ref 0.6–1.3)
D50 ADMINISTERED, D50ADM: 0 ML
D50 ORDER, D50ORD: 0 ML
DIAGNOSIS, 93000: NORMAL
DIFFERENTIAL METHOD BLD: ABNORMAL
EOSINOPHIL # BLD: 0 K/UL (ref 0–0.4)
EOSINOPHIL NFR BLD: 0 % (ref 0–5)
EPITH CASTS URNS QL MICRO: ABNORMAL /LPF (ref 0–5)
ERYTHROCYTE [DISTWIDTH] IN BLOOD BY AUTOMATED COUNT: 13.3 % (ref 11.6–14.5)
FLUAV AG NPH QL IA: NEGATIVE
FLUBV AG NOSE QL IA: NEGATIVE
GLOBULIN SER CALC-MCNC: 4.2 G/DL (ref 2–4)
GLUCOSE BLD STRIP.AUTO-MCNC: 108 MG/DL (ref 70–110)
GLUCOSE BLD STRIP.AUTO-MCNC: 139 MG/DL (ref 70–110)
GLUCOSE BLD STRIP.AUTO-MCNC: 193 MG/DL (ref 70–110)
GLUCOSE BLD STRIP.AUTO-MCNC: 283 MG/DL (ref 70–110)
GLUCOSE BLD STRIP.AUTO-MCNC: 341 MG/DL (ref 70–110)
GLUCOSE BLD STRIP.AUTO-MCNC: 416 MG/DL (ref 70–110)
GLUCOSE BLD STRIP.AUTO-MCNC: 452 MG/DL (ref 70–110)
GLUCOSE BLD STRIP.AUTO-MCNC: 556 MG/DL (ref 70–110)
GLUCOSE BLD STRIP.AUTO-MCNC: >600 MG/DL (ref 70–110)
GLUCOSE BLD STRIP.AUTO-MCNC: >600 MG/DL (ref 70–110)
GLUCOSE SERPL-MCNC: 767 MG/DL (ref 74–99)
GLUCOSE UR STRIP.AUTO-MCNC: >1000 MG/DL
GLUCOSE, GLC: 108 MG/DL
GLUCOSE, GLC: 139 MG/DL
GLUCOSE, GLC: 193 MG/DL
GLUCOSE, GLC: 283 MG/DL
GLUCOSE, GLC: 341 MG/DL
GLUCOSE, GLC: 416 MG/DL
GLUCOSE, GLC: 452 MG/DL
GLUCOSE, GLC: 556 MG/DL
GLUCOSE, GLC: 584 MG/DL
GLUCOSE, GLC: 600 MG/DL
HCT VFR BLD AUTO: 25.8 % (ref 35–45)
HGB BLD-MCNC: 9 G/DL (ref 12–16)
HGB UR QL STRIP: NEGATIVE
HIGH TARGET, HITG: 180 MG/DL
INSULIN ADMINSTERED, INSADM: 10.6 UNITS/HOUR
INSULIN ADMINSTERED, INSADM: 10.8 UNITS/HOUR
INSULIN ADMINSTERED, INSADM: 15.6 UNITS/HOUR
INSULIN ADMINSTERED, INSADM: 15.7 UNITS/HOUR
INSULIN ADMINSTERED, INSADM: 15.7 UNITS/HOUR
INSULIN ADMINSTERED, INSADM: 16.9 UNITS/HOUR
INSULIN ADMINSTERED, INSADM: 17.8 UNITS/HOUR
INSULIN ADMINSTERED, INSADM: 19.8 UNITS/HOUR
INSULIN ADMINSTERED, INSADM: 2.4 UNITS/HOUR
INSULIN ADMINSTERED, INSADM: 5.1 UNITS/HOUR
INSULIN ORDER, INSORD: 10.6 UNITS/HOUR
INSULIN ORDER, INSORD: 10.8 UNITS/HOUR
INSULIN ORDER, INSORD: 15.6 UNITS/HOUR
INSULIN ORDER, INSORD: 15.7 UNITS/HOUR
INSULIN ORDER, INSORD: 15.7 UNITS/HOUR
INSULIN ORDER, INSORD: 16.9 UNITS/HOUR
INSULIN ORDER, INSORD: 17.8 UNITS/HOUR
INSULIN ORDER, INSORD: 19.8 UNITS/HOUR
INSULIN ORDER, INSORD: 2.4 UNITS/HOUR
INSULIN ORDER, INSORD: 5.1 UNITS/HOUR
KETONES UR QL STRIP.AUTO: 15 MG/DL
LACTATE BLD-SCNC: 1.8 MMOL/L (ref 0.4–2)
LEUKOCYTE ESTERASE UR QL STRIP.AUTO: NEGATIVE
LIPASE SERPL-CCNC: 92 U/L (ref 73–393)
LOW TARGET, LOT: 140 MG/DL
LYMPHOCYTES # BLD: 0.4 K/UL (ref 0.8–3.5)
LYMPHOCYTES NFR BLD: 4 % (ref 20–51)
MCH RBC QN AUTO: 28.5 PG (ref 24–34)
MCHC RBC AUTO-ENTMCNC: 34.9 G/DL (ref 31–37)
MCV RBC AUTO: 81.6 FL (ref 74–97)
MINUTES UNTIL NEXT BG, NBG: 60 MIN
MONOCYTES # BLD: 0.5 K/UL (ref 0–1)
MONOCYTES NFR BLD: 6 % (ref 2–9)
MULTIPLIER, MUL: 0.02
MULTIPLIER, MUL: 0.03
MULTIPLIER, MUL: 0.04
MULTIPLIER, MUL: 0.04
MULTIPLIER, MUL: 0.05
MULTIPLIER, MUL: 0.05
MULTIPLIER, MUL: 0.06
MULTIPLIER, MUL: 0.06
MULTIPLIER, MUL: 0.07
MULTIPLIER, MUL: 0.08
NEUTS BAND NFR BLD MANUAL: 17 % (ref 0–5)
NEUTS SEG # BLD: 8.1 K/UL (ref 1.8–8)
NEUTS SEG NFR BLD: 73 % (ref 42–75)
NITRITE UR QL STRIP.AUTO: NEGATIVE
ORDER INITIALS, ORDINIT: NORMAL
P-R INTERVAL, ECG05: 144 MS
PH UR STRIP: 5 [PH] (ref 5–8)
PLATELET # BLD AUTO: 73 K/UL (ref 135–420)
PLATELET COMMENTS,PCOM: ABNORMAL
PMV BLD AUTO: 11 FL (ref 9.2–11.8)
POTASSIUM SERPL-SCNC: 5.5 MMOL/L (ref 3.5–5.5)
PROT SERPL-MCNC: 6.8 G/DL (ref 6.4–8.2)
PROT UR STRIP-MCNC: 30 MG/DL
Q-T INTERVAL, ECG07: 366 MS
QRS DURATION, ECG06: 120 MS
QTC CALCULATION (BEZET), ECG08: 462 MS
RBC # BLD AUTO: 3.16 M/UL (ref 4.2–5.3)
RBC #/AREA URNS HPF: ABNORMAL /HPF (ref 0–5)
RBC MORPH BLD: ABNORMAL
RBC MORPH BLD: ABNORMAL
SODIUM SERPL-SCNC: 122 MMOL/L (ref 136–145)
SP GR UR REFRACTOMETRY: 1.03 (ref 1–1.03)
TROPONIN I SERPL-MCNC: 0.16 NG/ML (ref 0–0.04)
UROBILINOGEN UR QL STRIP.AUTO: 0.2 EU/DL (ref 0.2–1)
VENTRICULAR RATE, ECG03: 96 BPM
WBC # BLD AUTO: 9 K/UL (ref 4.6–13.2)
WBC URNS QL MICRO: ABNORMAL /HPF (ref 0–4)

## 2018-01-25 PROCEDURE — 93005 ELECTROCARDIOGRAM TRACING: CPT

## 2018-01-25 PROCEDURE — 65660000004 HC RM CVT STEPDOWN

## 2018-01-25 PROCEDURE — 74011000258 HC RX REV CODE- 258: Performed by: EMERGENCY MEDICINE

## 2018-01-25 PROCEDURE — 94640 AIRWAY INHALATION TREATMENT: CPT

## 2018-01-25 PROCEDURE — 87040 BLOOD CULTURE FOR BACTERIA: CPT

## 2018-01-25 PROCEDURE — 85025 COMPLETE CBC W/AUTO DIFF WBC: CPT | Performed by: EMERGENCY MEDICINE

## 2018-01-25 PROCEDURE — 82962 GLUCOSE BLOOD TEST: CPT

## 2018-01-25 PROCEDURE — 71046 X-RAY EXAM CHEST 2 VIEWS: CPT

## 2018-01-25 PROCEDURE — 74011250636 HC RX REV CODE- 250/636: Performed by: INTERNAL MEDICINE

## 2018-01-25 PROCEDURE — 99285 EMERGENCY DEPT VISIT HI MDM: CPT

## 2018-01-25 PROCEDURE — 71045 X-RAY EXAM CHEST 1 VIEW: CPT

## 2018-01-25 PROCEDURE — 82550 ASSAY OF CK (CPK): CPT | Performed by: EMERGENCY MEDICINE

## 2018-01-25 PROCEDURE — 36415 COLL VENOUS BLD VENIPUNCTURE: CPT | Performed by: INTERNAL MEDICINE

## 2018-01-25 PROCEDURE — 87450 LEGIONELLA PNEUMOPHILA AG, URINE: CPT | Performed by: INTERNAL MEDICINE

## 2018-01-25 PROCEDURE — 81001 URINALYSIS AUTO W/SCOPE: CPT | Performed by: EMERGENCY MEDICINE

## 2018-01-25 PROCEDURE — 87076 CULTURE ANAEROBE IDENT EACH: CPT

## 2018-01-25 PROCEDURE — 86738 MYCOPLASMA ANTIBODY: CPT | Performed by: INTERNAL MEDICINE

## 2018-01-25 PROCEDURE — 87449 NOS EACH ORGANISM AG IA: CPT | Performed by: INTERNAL MEDICINE

## 2018-01-25 PROCEDURE — 80053 COMPREHEN METABOLIC PANEL: CPT | Performed by: EMERGENCY MEDICINE

## 2018-01-25 PROCEDURE — 74011000250 HC RX REV CODE- 250: Performed by: EMERGENCY MEDICINE

## 2018-01-25 PROCEDURE — 87804 INFLUENZA ASSAY W/OPTIC: CPT | Performed by: EMERGENCY MEDICINE

## 2018-01-25 PROCEDURE — 74011250637 HC RX REV CODE- 250/637: Performed by: INTERNAL MEDICINE

## 2018-01-25 PROCEDURE — 74011636637 HC RX REV CODE- 636/637: Performed by: EMERGENCY MEDICINE

## 2018-01-25 PROCEDURE — 73502 X-RAY EXAM HIP UNI 2-3 VIEWS: CPT

## 2018-01-25 PROCEDURE — 96360 HYDRATION IV INFUSION INIT: CPT

## 2018-01-25 PROCEDURE — 83690 ASSAY OF LIPASE: CPT | Performed by: EMERGENCY MEDICINE

## 2018-01-25 PROCEDURE — 74011250636 HC RX REV CODE- 250/636: Performed by: EMERGENCY MEDICINE

## 2018-01-25 PROCEDURE — 83605 ASSAY OF LACTIC ACID: CPT

## 2018-01-25 RX ORDER — ASPIRIN 325 MG
325 TABLET ORAL
Status: ACTIVE | OUTPATIENT
Start: 2018-01-25 | End: 2018-01-26

## 2018-01-25 RX ORDER — LANOLIN ALCOHOL/MO/W.PET/CERES
325 CREAM (GRAM) TOPICAL 2 TIMES DAILY WITH MEALS
Status: DISCONTINUED | OUTPATIENT
Start: 2018-01-26 | End: 2018-01-29 | Stop reason: HOSPADM

## 2018-01-25 RX ORDER — OXYCODONE AND ACETAMINOPHEN 5; 325 MG/1; MG/1
1 TABLET ORAL
Status: DISCONTINUED | OUTPATIENT
Start: 2018-01-25 | End: 2018-01-27

## 2018-01-25 RX ORDER — IPRATROPIUM BROMIDE AND ALBUTEROL SULFATE 2.5; .5 MG/3ML; MG/3ML
3 SOLUTION RESPIRATORY (INHALATION)
Status: DISCONTINUED | OUTPATIENT
Start: 2018-01-25 | End: 2018-01-29 | Stop reason: HOSPADM

## 2018-01-25 RX ORDER — MAGNESIUM SULFATE 100 %
4 CRYSTALS MISCELLANEOUS AS NEEDED
Status: DISCONTINUED | OUTPATIENT
Start: 2018-01-25 | End: 2018-01-29 | Stop reason: HOSPADM

## 2018-01-25 RX ORDER — PANTOPRAZOLE SODIUM 40 MG/1
40 TABLET, DELAYED RELEASE ORAL 2 TIMES DAILY
Status: DISCONTINUED | OUTPATIENT
Start: 2018-01-25 | End: 2018-01-29 | Stop reason: HOSPADM

## 2018-01-25 RX ORDER — HEPARIN SODIUM 5000 [USP'U]/ML
5000 INJECTION, SOLUTION INTRAVENOUS; SUBCUTANEOUS EVERY 8 HOURS
Status: DISCONTINUED | OUTPATIENT
Start: 2018-01-25 | End: 2018-01-29 | Stop reason: HOSPADM

## 2018-01-25 RX ORDER — UREA 10 %
100 LOTION (ML) TOPICAL DAILY
Status: DISCONTINUED | OUTPATIENT
Start: 2018-01-26 | End: 2018-01-29 | Stop reason: HOSPADM

## 2018-01-25 RX ORDER — SODIUM CHLORIDE 9 MG/ML
125 INJECTION, SOLUTION INTRAVENOUS CONTINUOUS
Status: DISCONTINUED | OUTPATIENT
Start: 2018-01-25 | End: 2018-01-26

## 2018-01-25 RX ORDER — METOPROLOL TARTRATE 50 MG/1
50 TABLET ORAL 2 TIMES DAILY
Status: DISCONTINUED | OUTPATIENT
Start: 2018-01-25 | End: 2018-01-29 | Stop reason: HOSPADM

## 2018-01-25 RX ORDER — ALBUTEROL SULFATE 0.83 MG/ML
2.5 SOLUTION RESPIRATORY (INHALATION)
Status: COMPLETED | OUTPATIENT
Start: 2018-01-25 | End: 2018-01-25

## 2018-01-25 RX ORDER — ONDANSETRON 2 MG/ML
4 INJECTION INTRAMUSCULAR; INTRAVENOUS
Status: COMPLETED | OUTPATIENT
Start: 2018-01-25 | End: 2018-01-25

## 2018-01-25 RX ORDER — ASPIRIN 81 MG/1
81 TABLET ORAL DAILY
Status: DISCONTINUED | OUTPATIENT
Start: 2018-01-26 | End: 2018-01-29 | Stop reason: HOSPADM

## 2018-01-25 RX ORDER — SODIUM CHLORIDE 0.9 % (FLUSH) 0.9 %
5-10 SYRINGE (ML) INJECTION EVERY 8 HOURS
Status: DISCONTINUED | OUTPATIENT
Start: 2018-01-25 | End: 2018-01-29 | Stop reason: HOSPADM

## 2018-01-25 RX ORDER — DEXTROSE 50 % IN WATER (D50W) INTRAVENOUS SYRINGE
25-50 AS NEEDED
Status: DISCONTINUED | OUTPATIENT
Start: 2018-01-25 | End: 2018-01-29 | Stop reason: HOSPADM

## 2018-01-25 RX ORDER — SIMVASTATIN 20 MG/1
20 TABLET, FILM COATED ORAL DAILY
Status: DISCONTINUED | OUTPATIENT
Start: 2018-01-26 | End: 2018-01-29 | Stop reason: HOSPADM

## 2018-01-25 RX ORDER — ACETAMINOPHEN 325 MG/1
650 TABLET ORAL
Status: DISCONTINUED | OUTPATIENT
Start: 2018-01-25 | End: 2018-01-29 | Stop reason: HOSPADM

## 2018-01-25 RX ORDER — BISACODYL 5 MG
5 TABLET, DELAYED RELEASE (ENTERIC COATED) ORAL DAILY PRN
Status: DISCONTINUED | OUTPATIENT
Start: 2018-01-25 | End: 2018-01-29 | Stop reason: HOSPADM

## 2018-01-25 RX ORDER — SODIUM CHLORIDE 0.9 % (FLUSH) 0.9 %
5-10 SYRINGE (ML) INJECTION AS NEEDED
Status: DISCONTINUED | OUTPATIENT
Start: 2018-01-25 | End: 2018-01-29 | Stop reason: HOSPADM

## 2018-01-25 RX ORDER — MELATONIN
2000 DAILY
Status: DISCONTINUED | OUTPATIENT
Start: 2018-01-26 | End: 2018-01-29 | Stop reason: HOSPADM

## 2018-01-25 RX ORDER — HYDRALAZINE HYDROCHLORIDE 10 MG/1
10 TABLET, FILM COATED ORAL 3 TIMES DAILY
Status: DISCONTINUED | OUTPATIENT
Start: 2018-01-25 | End: 2018-01-29 | Stop reason: HOSPADM

## 2018-01-25 RX ORDER — AMLODIPINE BESYLATE 5 MG/1
5 TABLET ORAL DAILY
Status: DISCONTINUED | OUTPATIENT
Start: 2018-01-26 | End: 2018-01-29 | Stop reason: HOSPADM

## 2018-01-25 RX ADMIN — SODIUM CHLORIDE 125 ML/HR: 900 INJECTION, SOLUTION INTRAVENOUS at 20:40

## 2018-01-25 RX ADMIN — SODIUM CHLORIDE 16.9 UNITS/HR: 900 INJECTION, SOLUTION INTRAVENOUS at 19:21

## 2018-01-25 RX ADMIN — SODIUM CHLORIDE 10.8 UNITS/HR: 900 INJECTION, SOLUTION INTRAVENOUS at 12:29

## 2018-01-25 RX ADMIN — SODIUM CHLORIDE 500 MG: 900 INJECTION, SOLUTION INTRAVENOUS at 12:14

## 2018-01-25 RX ADMIN — ALBUTEROL SULFATE 2.5 MG: 2.5 SOLUTION RESPIRATORY (INHALATION) at 10:25

## 2018-01-25 RX ADMIN — OXYCODONE HYDROCHLORIDE AND ACETAMINOPHEN 1 TABLET: 5; 325 TABLET ORAL at 20:31

## 2018-01-25 RX ADMIN — WATER 2 G: 1 INJECTION INTRAMUSCULAR; INTRAVENOUS; SUBCUTANEOUS at 11:50

## 2018-01-25 RX ADMIN — SODIUM CHLORIDE 125 ML/HR: 900 INJECTION, SOLUTION INTRAVENOUS at 10:57

## 2018-01-25 RX ADMIN — HEPARIN SODIUM 5000 UNITS: 5000 INJECTION, SOLUTION INTRAVENOUS; SUBCUTANEOUS at 20:31

## 2018-01-25 RX ADMIN — PANTOPRAZOLE SODIUM 40 MG: 40 TABLET, DELAYED RELEASE ORAL at 20:31

## 2018-01-25 RX ADMIN — SODIUM CHLORIDE 2.4 UNITS/HR: 900 INJECTION, SOLUTION INTRAVENOUS at 23:44

## 2018-01-25 RX ADMIN — ONDANSETRON 4 MG: 2 SOLUTION INTRAMUSCULAR; INTRAVENOUS at 12:50

## 2018-01-25 RX ADMIN — SODIUM CHLORIDE 1000 ML: 900 INJECTION, SOLUTION INTRAVENOUS at 11:05

## 2018-01-25 NOTE — H&P
Hospitalist Admission History and Physical    NAME:  Hortensia Alva   :   1940   MRN:   [id number]     PCP:  Antonio Ribera MD  Date/Time:  2018 6:42 PM  Subjective:   CHIEF COMPLAINT:      HISTORY OF PRESENT ILLNESS:     Ms. Leonardo is a pleasant 66 yo WF with Hx of CAD s/p stenting, DM type 2, thrombocytopenia, breast and colon cancers, and HTN who is presenting with worsening cough and SOB. She notes that for the past week she has had an ongoing wet cough with scant sputum production. About 2 days ago, she started to lose her appetite and then developed some low grade temps and chills. She notes that she also became somewhat more weak and was sleeping. She was seen by her PCP on Tuesday and had been given a script for right hip pain, but then started vomiting after taking the pain medication. She was brought into the ED by her friend after she became increasingly weak and short of breath. Past Medical History:   Diagnosis Date    Anemia     CAD (coronary artery disease)     Coronary Stenting needed, hematology workup needed prior to stent placement, treating medically at this time    Cancer [facility])     colon    Cancer ([facility] [address]     breast - left    Cardiac catheterization 2016    LM patent. mLAD 99.9% (2.25 x 28-mm Xience BETHEL, resid 0%). oD1 60%. oD2 95%. pCX 90% (2.5 x 13-mm Xience BETHEL, resid 0%). OMB patent.  Cardiac echocardiogram 2016    EF 50%. Basal inferior, basal-mid inferolateral hypk. Gr 1 DDfx. RVSP 50-60 mmHg. Mild LAE.   Mod, eccentric MR.      Diabetes ([facility] [address]     Hypercholesteremia     Hypertension     Thrombocytopenia ([facility] [facility]         Past Surgical History:   Procedure Laterality Date    CARDIAC CATHETERIZATION  2015     no stents at this time r/t anemia & thrombocytopenia (hematology work-up needed prior to stent placement)    CARDIAC CATHETERIZATION  2016         CARDIAC CATHETERIZATION  2016         CORONARY ARTERY ANGIOGRAM 2015         CORONARY ARTERY ANGIOGRAM  2016         CORONARY ARTERY ANGIOGRAM  2016         CORONARY STENT EA ADDL VESSEL  2016         CORONARY STENT SINGLE W/PTCA  2016         HX GI      colon surg secondary to cancer - removed lesion    HX HEENT  2013    cataract bilaterally    HX MASTECTOMY  2011    left    HX VASCULAR ACCESS      mediport - now removed    LV ANGIOGRAPHY  2015         LV ANGIOGRAPHY  2016            Social History   Substance Use Topics    Smoking status: Never Smoker    Smokeless tobacco: Never Used    Alcohol use No        No family history on file. Allergies   Allergen Reactions    Latex Itching        Prior to Admission Medications   Prescriptions Last Dose Informant Patient Reported? Taking? amLODIPine (NORVASC) 5 mg tablet 2018 at 0900  No No   Sig: TAKE ONE TABLET BY MOUTH ONCE DAILY   ascorbic acid, vitamin C, (VITAMIN C) 250 mg tablet 2018 at 0900  No No   Sig: Take 1 Tab by mouth daily. aspirin 81 mg tablet 2018 at 0900  Yes No   Sig: Take 81 mg by mouth daily. calcium polycarbophil (FIBER LAXATIVE) 625 mg tablet 2018 at 0900  Yes No   Sig: Take 1,250 mg by mouth daily. cholecalciferol (VITAMIN D3) 1,000 unit tablet 2018 at 0900  No No   Sig: Take 2 Tabs by mouth daily. cyanocobalamin (VITAMIN B12) 100 mcg tablet 2018 at 0900  Yes No   Sig: Take 100 mcg by mouth daily. ferrous sulfate 325 mg (65 mg iron) tablet 2018 at 0900  No No   Sig: Take 1 Tab by mouth two (2) times daily (with meals). furosemide (LASIX) 20 mg tablet 2018 at 0900  No No   Si mg po daily   hydrALAZINE (APRESOLINE) 10 mg tablet 2018 at 1700  No No   Sig: Take 1 Tab by mouth three (3) times daily. metFORMIN (GLUCOPHAGE) 500 mg tablet 2018 at 1700  No No   Sig: Take 1 Tab by mouth daily (with breakfast).  One half tab twice daily   metoprolol tartrate (LOPRESSOR) 50 mg tablet 2018 at 1700  No No   Sig: Take 1 Tab by mouth two (2) times a day. multivitamin (ONE A DAY) tablet 2018 at 0900  Yes No   Sig: Take 1 Tab by mouth daily. nitroglycerin (NITROLINGUAL) 400 mcg/spray spray Not Taking at Unknown time  No No   Si Spray by SubLINGual route every five (5) minutes as needed. omega 3-dha-epa-fish oil (FISH OIL) 100-160-1,000 mg cap 2018 at 0900  Yes No   Sig: Take 1,000 mg by mouth two (2) times a day. pantoprazole (PROTONIX) 40 mg tablet Not Taking at Unknown time  No No   Sig: Take 1 Tab by mouth two (2) times a day. simvastatin (ZOCOR) 20 mg tablet 2018 at 0900  Yes No   Sig: Take 20 mg by mouth daily.         Facility-Administered Medications: None       REVIEW OF SYSTEMS:     [] Unable to obtain  ROS due to  []mental status change  []sedated   []intubated   [x]Total of 12 systems reviewed as follows:  Constitutional:  + fever, negative chills, malaise;  negative weight loss  Eyes:   negative visual changes  ENT:   negative sore throat, tongue or lip swelling  Respiratory:  + cough, dyspnea  Cards:   negative for chest pain, palpitations, lower extremity edema  GI:   negative for nausea, + vomiting x 1, no diarrhea, and abdominal pain  Genitourinary: negative for frequency, dysuria  Integument:  negative for rash and pruritus  Hematologic:  negative for easy bruising and gum/nose bleeding  Musculoskel: negative for myalgias,  + right hip and back pain; no muscle weakness  Neurological:  negative for headaches, dizziness, vertigo  Behavl/Psych:  negative for feelings of anxiety, depression       Objective:   VITALS:    Visit Vitals    BP 98/60 (BP 1 Location: Right arm, BP Patient Position: At rest)    Pulse (!) 101    Temp 100.1 °F (37.8 °C)    Resp 24    Wt 61.2 kg (135 lb)    SpO2 94%    BMI 26.37 kg/m2     Temp (24hrs), Av.7 °F (37.6 °C), Min:98.7 °F (37.1 °C), Max:100.2 °F (37.9 °C)      PHYSICAL EXAM:   General:    Alert, cooperative, no distress, appears stated age. Head:   Normocephalic, without obvious abnormality, atraumatic. Eyes:   Conjunctivae clear, anicteric sclerae. Pupils are equal  Nose:  Nares normal. No drainage or sinus tenderness. Throat:    Lips, mucosa, and tongue normal.  No Thrush  Neck:  Supple, symmetrical,  no adenopathy, thyroid: non tender    no carotid bruit and no JVD. Back:    Symmetric,  No CVA tenderness. Lungs:   Coarse breath sounds b/l with ronchi and occasional wheeze  Chest wall:  No tenderness or deformity. No Accessory muscle use. Heart:   Tachycardic, occasional ectopy,  no murmur, rub or gallop. Abdomen:   Soft, non-tender. Not distended. Bowel sounds normal. No masses  Extremities: Extremities normal, atraumatic, No cyanosis. No edema. No clubbing; reproducible pain with palpation of medal buttock at piriformis; strength equal b/l  Skin:     Texture, turgor normal. No rashes or lesions. Not Jaundiced  Lymph nodes: Cervical, supraclavicular normal.  Psych:  Good insight. Not depressed. Not anxious or agitated. Neurologic: EOMs intact. No facial asymmetry. No aphasia or slurred speech. Normal   strength, Alert and oriented X 3. LAB DATA REVIEWED:    No components found for: [place]  Recent Labs      01/25/18   0946   NA  122*   K  5.5   CL  88*   CO2  18*   BUN  50*   CREA  2.07*   GLU  767*   CA  8.4*   ALB  2.6*   WBC  9.0   HGB  9.0*   HCT  25.8*   PLT  73*     Results for Batsheva Vasquez (MRN [id number]) as of 1/25/2018 18:26   Ref.  Range 1/25/2018 10:20   Influenza A Antigen Latest Ref Range: NEG   NEGATIVE   Influenza B Antigen Latest Ref Range: NEG   NEGATIVE     All Micro Results     Procedure Component Value Units Date/Time    CULTURE, RESPIRATORY/SPUTUM/BRONCH Melbourne Peto [[id number]]     Order Status:  Sent Specimen:  Sputum from Sputum     STREP Constantine Asp, URINE [[id number]]     Order Status:  Sent Specimen:  Urine     LEGIONELLA PNEUMOPHILA AG, URINE [[id number]]     Order Status: Sent Specimen:  Urine from Urine     MYCOPLASMA AB, IGG/IGM [[id number]]     Order Status:  Sent Specimen:  Serum     CULTURE, BLOOD [[id number]] Collected:  01/25/18 1135    Order Status:  Completed Updated:  01/25/18 1513    CULTURE, BLOOD [[id number]] Collected:  01/25/18 1105    Order Status:  Completed Updated:  01/25/18 1513    INFLUENZA A & B AG (RAPID TEST) [[id number]] Collected:  01/25/18 1020    Order Status:  Completed Specimen:  Nasopharyngeal from Nasal washing Updated:  01/25/18 1102     Influenza A Antigen NEGATIVE          A negative result does not exclude influenza virus infection, seasonal or H1N1 due to suboptimal sensitivity. If influenza is circulating in your community, a diagnosis of influenza should be considered based on a patients clinical presentation and empiric antiviral treatment should be considered, if indicated. Influenza B Antigen NEGATIVE            Results for Chapito Nicole (MRN [id number]) as of 1/25/2018 18:26   Ref. Range 1/25/2018 09:46   Lipase Latest Ref Range: 73 - 393 U/L 92   CK Latest Ref Range: 26 - 192 U/L 45   CK-MB Index Latest Ref Range: 0.0 - 4.0 % 2.4   CK - MB Latest Ref Range: <3.6 ng/ml 1.1   Troponin-I, Qt. Latest Ref Range: 0.0 - 0.045 NG/ML 0.16 (H)   Results for Chapito Nicole (MRN [id number]) as of 1/25/2018 18:26   Ref.  Range 1/25/2018 11:25   Color Latest Units:   YELLOW   Appearance Latest Units:   CLOUDY   Specific gravity Latest Ref Range: 1.005 - 1.030   1.028   pH (UA) Latest Ref Range: 5.0 - 8.0   5.0   Protein Latest Ref Range: NEG mg/dL 30 (A)   Glucose Latest Ref Range: NEG mg/dL >1000 (A)   Ketone Latest Ref Range: NEG mg/dL 15 (A)   Blood Latest Ref Range: NEG   NEGATIVE   Bilirubin Latest Ref Range: NEG   NEGATIVE   Urobilinogen Latest Ref Range: 0.2 - 1.0 EU/dL 0.2   Nitrites Latest Ref Range: NEG   NEGATIVE   Leukocyte Esterase Latest Ref Range: NEG   NEGATIVE   Epithelial cells Latest Ref Range: 0 - 5 /lpf FEW   WBC Latest Ref Range: 0 - 4 /hpf 4 to 6   RBC Latest Ref Range: 0 - 5 /hpf NONE   Bacteria Latest Ref Range: NEG /hpf 3+ (A)   Amorphous Crystals Latest Ref Range: NEG   FEW (A)     IMAGING RESULTS:   1/25 CXR: Left lower lobe pneumonia.       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  Acute renal failure: m/l due to CAP, dehydration    CAD s/p stenting and cardiac cath  HTN: controlled at home  Hxof Thrombocytoenia: currently at baseline ___________________________________________________  PLAN:    Risk of deterioration:  []Low    [x]Moderate  []High    Continue Ceftriaxone and Azithromycin for CAP  Continue supplemental O2  CBC, BMP, CXR in am  Continue insulin drip, hourly glucose checks  NPO until off insulin drip  X-ray right hip  Will try percocet for the right hip pain, concerned about nausea  Zofran for nausea  continue home BP/cardiac medications  Sputum cultures, Urine strep pneumo Ag, legionella  F/u blood cx from 1/25    Prophylaxis:  []Lovenox  []Coumadin  [x]Hep SQ  []SCDs  []H2B/PPI    Disposition:  []Home w/ Family   [x] PT,OT,RN   []SNF/LTC   []SAH/Rehab    Discussed Code Status:    [x]Full Code      []DNR     ___________________________________________________    Care Plan discussed with:    [x]Patient   [x]Family    []ED Care Manager  [x]ED Doc   []Specialist :    Total Time Coordinating Admission: 90     minutes    []Total Critical Care Time:     ___________________________________________________  Admitting Physician: Salvatore Callahan MD

## 2018-01-25 NOTE — IP AVS SNAPSHOT
[facility] 
 
 
 [address] [facility] [address] [place] [address] Patient: Naty Jerez MRN: [id number] :1940 About your hospitalization You were admitted on:  2018 [facility] [address] [facility] You were discharged on:  2018 Why you were hospitalized Your primary diagnosis was:  Not on File Your diagnoses also included:  Pneumonia, Htn (Hypertension), Benign Follow-up Information Follow up With Details Comments Contact Info [facility] CARE OF Batsheva MORA [facility]   [address] [facility] [address] [phone] Starr Ricks MD   [address] [id number] [phone] Discharge Orders None A check penny indicates which time of day the medication should be taken. My Medications START taking these medications Instructions Each Dose to Equal  
 Morning Noon Evening Bedtime  
 insulin glargine 100 unit/mL (3 mL) Inpn Commonly known as:  Carmella Goldberg Your last dose was: Your next dose is:    
   
   
 25 units subcutaneously every evening at 9 pm  
     
   
   
   
  
 insulin lispro 100 unit/mL injection Commonly known as:  HUMALOG Your last dose was: Your next dose is: As directed  Indications: type 2 diabetes mellitus CONTINUE taking these medications Instructions Each Dose to Equal  
 Morning Noon Evening Bedtime  
 amLODIPine 5 mg tablet Commonly known as:  Selena Curtis Your last dose was: Your next dose is: TAKE ONE TABLET BY MOUTH ONCE DAILY  
     
   
   
   
  
 ascorbic acid (vitamin C) 250 mg tablet Commonly known as:  VITAMIN C Your last dose was: Your next dose is: Take 1 Tab by mouth daily. 250 mg  
    
   
   
   
  
 aspirin 81 mg tablet Your last dose was: Your next dose is: Take 81 mg by mouth daily. 81 mg  
    
   
   
   
  
 cholecalciferol 1,000 unit tablet Commonly known as:  VITAMIN D3 Your last dose was: Your next dose is: Take 2 Tabs by mouth daily. 2000 Units  
    
   
   
   
  
 cyanocobalamin 100 mcg tablet Commonly known as:  VITAMIN B12 Your last dose was: Your next dose is: Take 100 mcg by mouth daily. 100 mcg  
    
   
   
   
  
 ferrous sulfate 325 mg (65 mg iron) tablet Your last dose was: Your next dose is: Take 1 Tab by mouth two (2) times daily (with meals). 325 mg  
    
   
   
   
  
 FIBER LAXATIVE (CA POLYCARBO) 625 mg tablet Generic drug:  calcium polycarbophil Your last dose was: Your next dose is: Take 1,250 mg by mouth daily. 1250 mg FISH -160-1,000 mg Cap Generic drug:  omega 3-dha-epa-fish oil Your last dose was: Your next dose is: Take 1,000 mg by mouth two (2) times a day. 1000 mg  
    
   
   
   
  
 furosemide 20 mg tablet Commonly known as:  LASIX Your last dose was: Your next dose is:    
   
   
 20 mg po daily  
     
   
   
   
  
 hydrALAZINE 10 mg tablet Commonly known as:  APRESOLINE Your last dose was: Your next dose is: Take 1 Tab by mouth three (3) times daily. 10 mg  
    
   
   
   
  
 metoprolol tartrate 50 mg tablet Commonly known as:  LOPRESSOR Your last dose was: Your next dose is: Take 1 Tab by mouth two (2) times a day. 50 mg  
    
   
   
   
  
 multivitamin tablet Commonly known as:  ONE A DAY Your last dose was: Your next dose is: Take 1 Tab by mouth daily. 1 Tab  
    
   
   
   
  
 nitroglycerin 400 mcg/spray spray Commonly known as:  Lavona Presume Your last dose was: Your next dose is:    
   
   
 1 Spray by SubLINGual route every five (5) minutes as needed. 1 Spray  
    
   
   
   
  
 pantoprazole 40 mg tablet Commonly known as:  PROTONIX Your last dose was: Your next dose is: Take 1 Tab by mouth two (2) times a day. 40 mg  
    
   
   
   
  
 simvastatin 20 mg tablet Commonly known as:  ZOCOR Your last dose was: Your next dose is: Take 20 mg by mouth daily. 20 mg  
    
   
   
   
  
  
STOP taking these medications   
 metFORMIN 500 mg tablet Commonly known as:  GLUCOPHAGE Where to Get Your Medications These medications were sent to [facility], [address] [place] [address] [facility] [address] Phone:  [phone]  
  insulin glargine 100 unit/mL (3 mL) Inpn Information on where to get these meds will be given to you by the nurse or doctor. ! Ask your nurse or doctor about these medications  
  insulin lispro 100 unit/mL injection Discharge Instructions None [facility] Announcement We are excited to announce that we are making your provider's discharge notes available to you in Cardiocore. You will see these notes when they are completed and signed by the physician that discharged you from your recent hospital stay. If you have any questions or concerns about any information you see in Cardiocore, please call the Health Information Department where you were seen or reach out to your Primary Care Provider for more information about your plan of care. Introducing [facility]! Dony Hinojosa introduces Cardiocore patient portal. Now you can access parts of your medical record, email your doctor's office, and request medication refills online. 1. In your internet browser, go to [email or web address] 2. Click on the First Time User? Click Here link in the Sign In box.  You will see the New Member Sign Up page. 3. Enter your Sinnet Access Code exactly as it appears below. You will not need to use this code after youve completed the sign-up process. If you do not sign up before the expiration date, you must request a new code. · Sinnet Access Code: [email or web address] Expires: 4/29/2018  4:35 PM 
 
4. Enter the last four digits of your Social Security Number (xxxx) and Date of Birth (mm/dd/yyyy) as indicated and click Submit. You will be taken to the next sign-up page. 5. Create a Sinnet ID. This will be your Sinnet login ID and cannot be changed, so think of one that is secure and easy to remember. 6. Create a Sinnet password. You can change your password at any time. 7. Enter your Password Reset Question and Answer. This can be used at a later time if you forget your password. 8. Enter your e-mail address. You will receive e-mail notification when new information is available in [address] 9. Click Sign Up. You can now view and download portions of your medical record. 10. Click the Download Summary menu link to download a portable copy of your medical information. If you have questions, please visit the Frequently Asked Questions section of the Sinnet website. Remember, Sinnet is NOT to be used for urgent needs. For medical emergencies, dial 911. Now available from your iPhone and Android! Unresulted Labs-Please follow up with your PCP about these lab tests Order Current Status MYCOPLASMA AB, IGG/IGM In process CULTURE, BLOOD Preliminary result Providers Seen During Your Hospitalization Provider Specialty Primary office phone Matt Rabago MD Emergency Medicine [phone] Aniyah Diaz MD Infectious Diseases [phone] Keven Ballesteros MD Family Practice [phone] Your Primary Care Physician (PCP) Primary Care Physician Office Phone Office Fax Srinivasa Meyers [phone] You are allergic to the following Allergen Reactions Latex Itching Recent Documentation Height Weight BMI OB Status Smoking Status 1.52 m 65.5 kg 28.35 kg/m2 Postmenopausal Never Smoker Emergency Contacts Name Discharge Info Relation Home Work Mobile Sher Salter DISCHARGE CAREGIVER [3] Spouse [3] [phone] CAREGIVER [3] Sister [23] [phone] Sher Salter  Other Relative [6] [phone] Patient Belongings The following personal items are in your possession at time of discharge: 
  Dental Appliances: None         Home Medications: None   Jewelry: Ring, Necklace, Other (comment) (2 rings)  Clothing: At bedside    Other Valuables: None Please provide this summary of care documentation to your next provider. Signatures-by signing, you are acknowledging that this After Visit Summary has been reviewed with you and you have received a copy. Patient Signature:  ____________________________________________________________ Date:  ____________________________________________________________  
  
Serge Officer Provider Signature:  ____________________________________________________________ Date:  ____________________________________________________________

## 2018-01-25 NOTE — IP AVS SNAPSHOT
[address] Patient: Kim Freitas MRN: [id number] :1940 A check penny indicates which time of day the medication should be taken. My Medications START taking these medications Instructions Each Dose to Equal  
 Morning Noon Evening Bedtime  
 insulin glargine 100 unit/mL (3 mL) Inpn Commonly known as:  Shannon Wade Your last dose was: Your next dose is:    
   
   
 25 units subcutaneously every evening at 9 pm  
     
   
   
   
  
 insulin lispro 100 unit/mL injection Commonly known as:  HUMALOG Your last dose was: Your next dose is: As directed  Indications: type 2 diabetes mellitus CONTINUE taking these medications Instructions Each Dose to Equal  
 Morning Noon Evening Bedtime  
 amLODIPine 5 mg tablet Commonly known as:  Alberto Rhodes Your last dose was: Your next dose is: TAKE ONE TABLET BY MOUTH ONCE DAILY  
     
   
   
   
  
 ascorbic acid (vitamin C) 250 mg tablet Commonly known as:  VITAMIN C Your last dose was: Your next dose is: Take 1 Tab by mouth daily. 250 mg  
    
   
   
   
  
 aspirin 81 mg tablet Your last dose was: Your next dose is: Take 81 mg by mouth daily. 81 mg  
    
   
   
   
  
 cholecalciferol 1,000 unit tablet Commonly known as:  VITAMIN D3 Your last dose was: Your next dose is: Take 2 Tabs by mouth daily. 2000 Units  
    
   
   
   
  
 cyanocobalamin 100 mcg tablet Commonly known as:  VITAMIN B12 Your last dose was: Your next dose is: Take 100 mcg by mouth daily. 100 mcg  
    
   
   
   
  
 ferrous sulfate 325 mg (65 mg iron) tablet Your last dose was: Your next dose is: Take 1 Tab by mouth two (2) times daily (with meals). 325 mg  
    
   
   
   
  
 FIBER LAXATIVE (CA POLYCARBO) 625 mg tablet Generic drug:  calcium polycarbophil Your last dose was: Your next dose is: Take 1,250 mg by mouth daily. 1250 mg FISH -160-1,000 mg Cap Generic drug:  omega 3-dha-epa-fish oil Your last dose was: Your next dose is: Take 1,000 mg by mouth two (2) times a day. 1000 mg  
    
   
   
   
  
 furosemide 20 mg tablet Commonly known as:  LASIX Your last dose was: Your next dose is:    
   
   
 20 mg po daily  
     
   
   
   
  
 hydrALAZINE 10 mg tablet Commonly known as:  APRESOLINE Your last dose was: Your next dose is: Take 1 Tab by mouth three (3) times daily. 10 mg  
    
   
   
   
  
 metoprolol tartrate 50 mg tablet Commonly known as:  LOPRESSOR Your last dose was: Your next dose is: Take 1 Tab by mouth two (2) times a day. 50 mg  
    
   
   
   
  
 multivitamin tablet Commonly known as:  ONE A DAY Your last dose was: Your next dose is: Take 1 Tab by mouth daily. 1 Tab  
    
   
   
   
  
 nitroglycerin 400 mcg/spray spray Commonly known as:  Lolly Pollack Your last dose was: Your next dose is:    
   
   
 1 Spray by SubLINGual route every five (5) minutes as needed. 1 Spray  
    
   
   
   
  
 pantoprazole 40 mg tablet Commonly known as:  PROTONIX Your last dose was: Your next dose is: Take 1 Tab by mouth two (2) times a day. 40 mg  
    
   
   
   
  
 simvastatin 20 mg tablet Commonly known as:  ZOCOR Your last dose was: Your next dose is: Take 20 mg by mouth daily. 20 mg  
    
   
   
   
  
  
STOP taking these medications   
 metFORMIN 500 mg tablet Commonly known as:  GLUCOPHAGE Where to Get Your Medications These medications were sent to [address], [address]  [address], [address] [place] [address] Phone:  [phone]  
  insulin glargine 100 unit/mL (3 mL) Inpn Information on where to get these meds will be given to you by the nurse or doctor. ! Ask your nurse or doctor about these medications  
  insulin lispro 100 unit/mL injection

## 2018-01-25 NOTE — ED NOTES
Short ofbretah. Iv fluids stopped at this time./ oxygen placed 4L. Dr Jenkins Medicine aware withpt.

## 2018-01-25 NOTE — ED NOTES
TRANSFER - OUT REPORT:    Verbal report given to antwan bernabe on Duncan Stage  being transferred to stepdown for routine progression of care       Report consisted of patients Situation, Background, Assessment and   Recommendations(SBAR). Information from the following report(s) SBAR, ED Summary, [facility] and Recent Results was reviewed with the receiving nurse. Lines:   Peripheral IV 01/25/18 Right Antecubital (Active)   Site Assessment Clean, dry, & intact 1/25/2018  9:50 AM   Phlebitis Assessment 0 1/25/2018  9:50 AM   Infiltration Assessment 0 1/25/2018  9:50 AM   Dressing Status Clean, dry, & intact 1/25/2018  9:50 AM   Dressing Type Transparent 1/25/2018  9:50 AM   Hub Color/Line Status Patent;Pink 1/25/2018  9:50 AM        Opportunity for questions and clarification was provided.       Patient transported with:

## 2018-01-25 NOTE — ED TRIAGE NOTES
By EMS from home . Left lower back and flank pain. Seen couple days ago for right  hip pain/bone spur on hip/  . Was given oxycodone, now having vomiting while taking painmeds.

## 2018-01-25 NOTE — ED PROVIDER NOTES
EMERGENCY DEPARTMENT HISTORY AND PHYSICAL EXAM    9:59 AM      Date: 1/25/2018  Patient Name: Nga Gunn    History of Presenting Illness     Chief Complaint   Patient presents with    Vomiting    Back Pain         History Provided By: Patient    Chief Complaint: Cough  Duration: 3 Days  Timing:  Intermittent and Worsening  Location: N/a  Quality: N/a  Severity: N/A  Modifying Factors: vomiting started after taking oxycodone for hip pain  Associated Symptoms: nausea, vomiting, and right hip pain      Additional History (Context): Nga Gunn is a 65 y.o. female with HTN, diabetes, anemic, and cardiac catheterization who presents with intermittent worsening cough that started 3 days ago. Cough is dry and nonproductive. Pt went to her doctor 2 days ago for her bursitis of her right hip and was given oxycodone. Pt states that she has been nauseous/vomiting since starting the medication and her cough has been worsening. She denies SOB and fever. No other concerns, modifying factors, or symptoms were expressed by the pt at this time.       PCP: Carlota Navarro MD    Current Facility-Administered Medications   Medication Dose Route Frequency Provider Last Rate Last Dose    sodium chloride 0.9 % bolus infusion 1,000 mL  1,000 mL IntraVENous ONCE Kayla Dasilva MD        insulin regular (NOVOLIN R, HUMULIN R) 100 Units in 0.9% sodium chloride 100 mL infusion  0-50 Units/hr IntraVENous TITRATE Kayla Dasilva MD        glucose chewable tablet 16 g  4 Tab Oral PRN Kayla Dasilva MD        glucagon [facility]) injection 1 mg  1 mg IntraMUSCular PRN Kayla Dasilva MD        dextrose (D50W) injection syrg 12.5-25 g  25-50 mL IntraVENous PRN Kayla Dasilva MD        0.9% sodium chloride infusion  125 mL/hr IntraVENous CONTINUOUS Kayla Dasilva  mL/hr at 01/25/18 1057 125 mL/hr at 01/25/18 1057    cefTRIAXone (ROCEPHIN) 2 g in sterile water (preservative free) 20 mL IV syringe  2 g IntraVENous Q24H Cassie Halsted, MD   2 g at 01/25/18 1150    azithromycin (ZITHROMAX) 500 mg in 0.9% sodium chloride (MBP/ADV) 250 mL adv  500 mg IntraVENous Q24H Cassie Halsted, MD         Current Outpatient Prescriptions   Medication Sig Dispense Refill    metFORMIN (GLUCOPHAGE) 500 mg tablet Take 1 Tab by mouth daily (with breakfast). One half tab twice daily 30 Tab 0    furosemide (LASIX) 20 mg tablet 20 mg po daily 30 Tab 0    pantoprazole (PROTONIX) 40 mg tablet Take 1 Tab by mouth two (2) times a day. 60 Tab 0    amLODIPine (NORVASC) 5 mg tablet TAKE ONE TABLET BY MOUTH ONCE DAILY 30 Tab 0    nitroglycerin (NITROLINGUAL) 400 mcg/spray spray 1 Spray by SubLINGual route every five (5) minutes as needed. 1 Bottle 2    hydrALAZINE (APRESOLINE) 10 mg tablet Take 1 Tab by mouth three (3) times daily. 90 Tab 6    metoprolol tartrate (LOPRESSOR) 50 mg tablet Take 1 Tab by mouth two (2) times a day. 60 Tab 6    ascorbic acid, vitamin C, (VITAMIN C) 250 mg tablet Take 1 Tab by mouth daily. 60 Tab 1    ferrous sulfate 325 mg (65 mg iron) tablet Take 1 Tab by mouth two (2) times daily (with meals). 60 Tab 1    multivitamin (ONE A DAY) tablet Take 1 Tab by mouth daily.  cholecalciferol (VITAMIN D3) 1,000 unit tablet Take 2 Tabs by mouth daily. 60 Tab 1    simvastatin (ZOCOR) 20 mg tablet Take 20 mg by mouth daily.  omega 3-dha-epa-fish oil (FISH OIL) 100-160-1,000 mg cap Take 1,000 mg by mouth two (2) times a day.  cyanocobalamin (VITAMIN B12) 100 mcg tablet Take 100 mcg by mouth daily.  aspirin 81 mg tablet Take 81 mg by mouth daily.  calcium polycarbophil (FIBER LAXATIVE) 625 mg tablet Take 1,250 mg by mouth daily.          Past History     Past Medical History:  Past Medical History:   Diagnosis Date    Anemia     CAD (coronary artery disease)     Coronary Stenting needed, hematology workup needed prior to stent placement, treating medically at this time    Cancer [facility])     colon    Cancer ([facility] [address]     breast - left    Cardiac catheterization 08/17/2016    LM patent. mLAD 99.9% (2.25 x 28-mm Xience BETHEL, resid 0%). oD1 60%. oD2 95%. pCX 90% (2.5 x 13-mm Xience BTEHEL, resid 0%). OMB patent.  Cardiac echocardiogram 07/01/2016    EF 50%. Basal inferior, basal-mid inferolateral hypk. Gr 1 DDfx. RVSP 50-60 mmHg. Mild LAE. Mod, eccentric MR.      Diabetes ([facility] [address]     Hypercholesteremia     Hypertension     Thrombocytopenia ([facility] [address]        Past Surgical History:  Past Surgical History:   Procedure Laterality Date    CARDIAC CATHETERIZATION  7/16/2015     no stents at this time r/t anemia & thrombocytopenia (hematology work-up needed prior to stent placement)    CARDIAC CATHETERIZATION  7/11/2016         CARDIAC CATHETERIZATION  8/17/2016         CORONARY ARTERY ANGIOGRAM  7/16/2015         CORONARY ARTERY ANGIOGRAM  7/11/2016         CORONARY ARTERY ANGIOGRAM  8/17/2016         CORONARY STENT EA ADDL VESSEL  8/17/2016         CORONARY STENT SINGLE W/PTCA  8/17/2016         HX GI      colon surg secondary to cancer - removed lesion    HX HEENT  2/2013    cataract bilaterally    HX MASTECTOMY  march 2011    left    HX VASCULAR ACCESS      mediport - now removed    LV ANGIOGRAPHY  7/16/2015         LV ANGIOGRAPHY  7/11/2016            Family History:  No family history on file. Social History:  Social History   Substance Use Topics    Smoking status: Never Smoker    Smokeless tobacco: Never Used    Alcohol use No       Allergies: Allergies   Allergen Reactions    Latex Itching         Review of Systems     Review of Systems   Constitutional: Negative for chills and fever. Respiratory: Positive for cough. Negative for shortness of breath. Cardiovascular: Negative for chest pain. Gastrointestinal: Positive for vomiting. Negative for diarrhea and nausea. All other systems reviewed and are negative.         Physical Exam     Visit Vitals    BP 127/56    Pulse (!) 105    Temp 98.7 °F (37.1 °C)    Resp (!) 32    Wt 61.2 kg (135 lb)    SpO2 96%    BMI 26.37 kg/m2     Physical Exam   Constitutional: She is oriented to person, place, and time. She appears well-developed and well-nourished. No distress. HENT:   Head: Normocephalic and atraumatic. Eyes: Conjunctivae and EOM are normal. Right eye exhibits no discharge. Left eye exhibits no discharge. No scleral icterus. Neck: Normal range of motion. Neck supple. No tracheal deviation present. Cardiovascular: Normal rate, regular rhythm and normal heart sounds. No murmur heard. Pulmonary/Chest: Effort normal. No respiratory distress. She has no wheezes. She has rhonchi (scattered throughout lung fields). She has no rales. Abdominal: Soft. She exhibits no distension. There is no tenderness. There is no rebound and no guarding. Musculoskeletal: Normal range of motion. She exhibits no edema or deformity. Neurological: She is alert and oriented to person, place, and time. No cranial nerve deficit. Skin: Skin is warm and dry. She is not diaphoretic. Psychiatric: She has a normal mood and affect.  Her behavior is normal. Judgment and thought content normal.         Diagnostic Study Results     Labs -  Recent Results (from the past 12 hour(s))   EKG, 12 LEAD, INITIAL    Collection Time: 01/25/18  9:38 AM   Result Value Ref Range    Ventricular Rate 96 BPM    Atrial Rate 96 BPM    P-R Interval 144 ms    QRS Duration 120 ms    Q-T Interval 366 ms    QTC Calculation (Bezet) 462 ms    Calculated P Axis 35 degrees    Calculated R Axis -35 degrees    Calculated T Axis 85 degrees    Diagnosis       Normal sinus rhythm  Left axis deviation  Nonspecific intraventricular conduction delay  Nonspecific ST abnormality  Abnormal ECG  When compared with ECG of 18-OCT-2017 08:47,  Criteria for Septal infarct are no longer present     CBC WITH AUTOMATED DIFF    Collection Time: 01/25/18  9:46 AM   Result Value Ref Range    WBC 9.0 4.6 - 13.2 K/uL    RBC 3.16 (L) 4.20 - 5.30 M/uL    HGB 9.0 (L) 12.0 - 16.0 g/dL    HCT 25.8 (L) 35.0 - 45.0 %    MCV 81.6 74.0 - 97.0 FL    MCH 28.5 24.0 - 34.0 PG    MCHC 34.9 31.0 - 37.0 g/dL    RDW 13.3 11.6 - 14.5 %    PLATELET 73 (L) 038 - 420 K/uL    MPV 11.0 9.2 - 11.8 FL    NEUTROPHILS 73 42 - 75 %    BAND NEUTROPHILS 17 (H) 0 - 5 %    LYMPHOCYTES 4 (L) 20 - 51 %    MONOCYTES 6 2 - 9 %    EOSINOPHILS 0 0 - 5 %    BASOPHILS 0 0 - 3 %    ABS. NEUTROPHILS 8.1 (H) 1.8 - 8.0 K/UL    ABS. LYMPHOCYTES 0.4 (L) 0.8 - 3.5 K/UL    ABS. MONOCYTES 0.5 0 - 1.0 K/UL    ABS. EOSINOPHILS 0.0 0.0 - 0.4 K/UL    ABS. BASOPHILS 0.0 0.0 - 0.06 K/UL    DF MANUAL      PLATELET COMMENTS DECREASED PLATELETS      RBC COMMENTS OVALOCYTES  1+        RBC COMMENTS POLYCHROMASIA  1+       METABOLIC PANEL, COMPREHENSIVE    Collection Time: 01/25/18  9:46 AM   Result Value Ref Range    Sodium 122 (L) 136 - 145 mmol/L    Potassium 5.5 3.5 - 5.5 mmol/L    Chloride 88 (L) 100 - 108 mmol/L    CO2 18 (L) 21 - 32 mmol/L    Anion gap 16 3.0 - 18 mmol/L    Glucose 767 (HH) 74 - 99 mg/dL    BUN 50 (H) 7.0 - 18 MG/DL    Creatinine 2.07 (H) 0.6 - 1.3 MG/DL    BUN/Creatinine ratio 24 (H) 12 - 20      GFR est AA 28 (L) >60 ml/min/1.73m2    GFR est non-AA 23 (L) >60 ml/min/1.73m2    Calcium 8.4 (L) 8.5 - 10.1 MG/DL    Bilirubin, total 1.2 (H) 0.2 - 1.0 MG/DL    ALT (SGPT) 17 13 - 56 U/L    AST (SGOT) 16 15 - 37 U/L    Alk.  phosphatase 91 45 - 117 U/L    Protein, total 6.8 6.4 - 8.2 g/dL    Albumin 2.6 (L) 3.4 - 5.0 g/dL    Globulin 4.2 (H) 2.0 - 4.0 g/dL    A-G Ratio 0.6 (L) 0.8 - 1.7     LIPASE    Collection Time: 01/25/18  9:46 AM   Result Value Ref Range    Lipase 92 73 - 393 U/L   INFLUENZA A & B AG (RAPID TEST)    Collection Time: 01/25/18 10:20 AM   Result Value Ref Range    Influenza A Antigen NEGATIVE  NEG      Influenza B Antigen NEGATIVE  NEG     URINALYSIS W/ RFLX MICROSCOPIC    Collection Time: 01/25/18 11:25 AM   Result Value Ref Range    Color YELLOW      Appearance CLOUDY      Specific gravity 1.028 1.005 - 1.030      pH (UA) 5.0 5.0 - 8.0      Protein 30 (A) NEG mg/dL    Glucose >1000 (A) NEG mg/dL    Ketone 15 (A) NEG mg/dL    Bilirubin NEGATIVE  NEG      Blood NEGATIVE  NEG      Urobilinogen 0.2 0.2 - 1.0 EU/dL    Nitrites NEGATIVE  NEG      Leukocyte Esterase NEGATIVE  NEG     POC LACTIC ACID    Collection Time: 01/25/18 11:40 AM   Result Value Ref Range    Lactic Acid (POC) 1.8 0.4 - 2.0 mmol/L       Radiologic Studies -   XR CHEST PA LAT    (Results Pending)         Medical Decision Making   I am the first provider for this patient. I reviewed the vital signs, available nursing notes, past medical history, past surgical history, family history and social history. Vital Signs-Reviewed the patient's vital signs. EKG: Interpreted by the EP. Time Interpreted: 9:40 AM   Rate: 96   Rhythm: NSR   Interpretation: No STEMI, ST depression of lead V2   Comparison: New from previous    Records Reviewed: Nursing Notes, Old Medical Records and Previous electrocardiograms (Time of Review: 9:59 AM)    ED Course: Progress Notes, Reevaluation, and Consults:   10:55 AM Reevaluation: Labs resulted with glucose of 757 with small anion gap acidosis. Glucose stabalizer initiated. CXR shows left lower lobe PNA. Will initiate IV antibiotics. 11:45 AM Consult: I discussed care with Dr. Radha Feldman, Hospitalist. It was a standard discussion including patient history, chief complaint, available diagnostic results, and predicted treatment course. Dr. Radha Feldman agrees to admit pt. Provider Notes (Medical Decision Making): Pt with PNA and mild DKA. Admitted to stepdown unit on abx and insulin ggt. For Hospitalized Patients:    1. Hospitalization Decision Time:  The decision to hospitalize the patient was made by Dr. Radha Feldman at 11:45 AM on 1/25/2018    2.  Aspirin: Aspirin was not given because the patient did not present with a stroke at the time of their Emergency Department evaluation    Diagnosis     Clinical Impression:   1. Pneumonia of left lower lobe due to infectious organism ([address]    2. Hyperglycemia        Disposition: Admit     Follow-up Information     None           Patient's Medications   Start Taking    No medications on file   Continue Taking    AMLODIPINE (NORVASC) 5 MG TABLET    TAKE ONE TABLET BY MOUTH ONCE DAILY    ASCORBIC ACID, VITAMIN C, (VITAMIN C) 250 MG TABLET    Take 1 Tab by mouth daily. ASPIRIN 81 MG TABLET    Take 81 mg by mouth daily. CALCIUM POLYCARBOPHIL (FIBER LAXATIVE) 625 MG TABLET    Take 1,250 mg by mouth daily. CHOLECALCIFEROL (VITAMIN D3) 1,000 UNIT TABLET    Take 2 Tabs by mouth daily. CYANOCOBALAMIN (VITAMIN B12) 100 MCG TABLET    Take 100 mcg by mouth daily. FERROUS SULFATE 325 MG (65 MG IRON) TABLET    Take 1 Tab by mouth two (2) times daily (with meals). FUROSEMIDE (LASIX) 20 MG TABLET    20 mg po daily    HYDRALAZINE (APRESOLINE) 10 MG TABLET    Take 1 Tab by mouth three (3) times daily. METFORMIN (GLUCOPHAGE) 500 MG TABLET    Take 1 Tab by mouth daily (with breakfast). One half tab twice daily    METOPROLOL TARTRATE (LOPRESSOR) 50 MG TABLET    Take 1 Tab by mouth two (2) times a day. MULTIVITAMIN (ONE A DAY) TABLET    Take 1 Tab by mouth daily. NITROGLYCERIN (NITROLINGUAL) 400 MCG/SPRAY SPRAY    1 Spray by SubLINGual route every five (5) minutes as needed. OMEGA 3-DHA-EPA-FISH OIL (FISH OIL) 100-160-1,000 MG CAP    Take 1,000 mg by mouth two (2) times a day. PANTOPRAZOLE (PROTONIX) 40 MG TABLET    Take 1 Tab by mouth two (2) times a day. SIMVASTATIN (ZOCOR) 20 MG TABLET    Take 20 mg by mouth daily.      These Medications have changed    No medications on file   Stop Taking    No medications on file     _______________________________    Attestations:  Scribe Attestation     Genaro Montelongo acting as a scribe for and in the presence of Zahra Hargrove MD      January 25, 2018 at 9:59 AM       Provider Attestation:      I personally performed the services described in the documentation, reviewed the documentation, as recorded by the scribe in my presence, and it accurately and completely records my words and actions.  January 25, 2018 at 9:59 AM - Avery Golden MD    _______________________________

## 2018-01-26 ENCOUNTER — APPOINTMENT (OUTPATIENT)
Dept: GENERAL RADIOLOGY | Age: 78
DRG: 194 | End: 2018-01-26
Attending: INTERNAL MEDICINE
Payer: MEDICARE

## 2018-01-26 LAB
ADMINISTERED INITIALS, ADMINIT: NORMAL
ALBUMIN SERPL-MCNC: 2.1 G/DL (ref 3.4–5)
ALBUMIN/GLOB SERPL: 0.6 {RATIO} (ref 0.8–1.7)
ALP SERPL-CCNC: 81 U/L (ref 45–117)
ALT SERPL-CCNC: 14 U/L (ref 13–56)
ANION GAP SERPL CALC-SCNC: 11 MMOL/L (ref 3–18)
AST SERPL-CCNC: 21 U/L (ref 15–37)
BILIRUB SERPL-MCNC: 0.6 MG/DL (ref 0.2–1)
BUN SERPL-MCNC: 54 MG/DL (ref 7–18)
BUN/CREAT SERPL: 25 (ref 12–20)
CALCIUM SERPL-MCNC: 8.2 MG/DL (ref 8.5–10.1)
CHLORIDE SERPL-SCNC: 102 MMOL/L (ref 100–108)
CO2 SERPL-SCNC: 22 MMOL/L (ref 21–32)
CREAT SERPL-MCNC: 2.17 MG/DL (ref 0.6–1.3)
D50 ADMINISTERED, D50ADM: 0 ML
D50 ORDER, D50ORD: 0 ML
ERYTHROCYTE [DISTWIDTH] IN BLOOD BY AUTOMATED COUNT: 13.1 % (ref 11.6–14.5)
EST. AVERAGE GLUCOSE BLD GHB EST-MCNC: 306 MG/DL
GLOBULIN SER CALC-MCNC: 3.7 G/DL (ref 2–4)
GLUCOSE BLD STRIP.AUTO-MCNC: 119 MG/DL (ref 70–110)
GLUCOSE BLD STRIP.AUTO-MCNC: 145 MG/DL (ref 70–110)
GLUCOSE BLD STRIP.AUTO-MCNC: 145 MG/DL (ref 70–110)
GLUCOSE BLD STRIP.AUTO-MCNC: 147 MG/DL (ref 70–110)
GLUCOSE BLD STRIP.AUTO-MCNC: 152 MG/DL (ref 70–110)
GLUCOSE BLD STRIP.AUTO-MCNC: 159 MG/DL (ref 70–110)
GLUCOSE BLD STRIP.AUTO-MCNC: 160 MG/DL (ref 70–110)
GLUCOSE BLD STRIP.AUTO-MCNC: 162 MG/DL (ref 70–110)
GLUCOSE BLD STRIP.AUTO-MCNC: 165 MG/DL (ref 70–110)
GLUCOSE BLD STRIP.AUTO-MCNC: 170 MG/DL (ref 70–110)
GLUCOSE BLD STRIP.AUTO-MCNC: 185 MG/DL (ref 70–110)
GLUCOSE BLD STRIP.AUTO-MCNC: 186 MG/DL (ref 70–110)
GLUCOSE BLD STRIP.AUTO-MCNC: 199 MG/DL (ref 70–110)
GLUCOSE BLD STRIP.AUTO-MCNC: 213 MG/DL (ref 70–110)
GLUCOSE SERPL-MCNC: 149 MG/DL (ref 74–99)
GLUCOSE, GLC: 119 MG/DL
GLUCOSE, GLC: 145 MG/DL
GLUCOSE, GLC: 145 MG/DL
GLUCOSE, GLC: 147 MG/DL
GLUCOSE, GLC: 152 MG/DL
GLUCOSE, GLC: 159 MG/DL
GLUCOSE, GLC: 160 MG/DL
GLUCOSE, GLC: 162 MG/DL
GLUCOSE, GLC: 165 MG/DL
GLUCOSE, GLC: 170 MG/DL
GLUCOSE, GLC: 199 MG/DL
HBA1C MFR BLD: 12.3 % (ref 4.2–5.6)
HCT VFR BLD AUTO: 23.3 % (ref 35–45)
HGB BLD-MCNC: 8.3 G/DL (ref 12–16)
HIGH TARGET, HITG: 180 MG/DL
INSULIN ADMINSTERED, INSADM: 2.4 UNITS/HOUR
INSULIN ADMINSTERED, INSADM: 3.5 UNITS/HOUR
INSULIN ADMINSTERED, INSADM: 3.6 UNITS/HOUR
INSULIN ADMINSTERED, INSADM: 3.8 UNITS/HOUR
INSULIN ADMINSTERED, INSADM: 4.1 UNITS/HOUR
INSULIN ADMINSTERED, INSADM: 4.1 UNITS/HOUR
INSULIN ADMINSTERED, INSADM: 4.2 UNITS/HOUR
INSULIN ADMINSTERED, INSADM: 4.3 UNITS/HOUR
INSULIN ADMINSTERED, INSADM: 4.3 UNITS/HOUR
INSULIN ADMINSTERED, INSADM: 4.5 UNITS/HOUR
INSULIN ADMINSTERED, INSADM: 5.7 UNITS/HOUR
INSULIN ORDER, INSORD: 2.4 UNITS/HOUR
INSULIN ORDER, INSORD: 3.5 UNITS/HOUR
INSULIN ORDER, INSORD: 3.6 UNITS/HOUR
INSULIN ORDER, INSORD: 3.8 UNITS/HOUR
INSULIN ORDER, INSORD: 4.1 UNITS/HOUR
INSULIN ORDER, INSORD: 4.1 UNITS/HOUR
INSULIN ORDER, INSORD: 4.2 UNITS/HOUR
INSULIN ORDER, INSORD: 4.3 UNITS/HOUR
INSULIN ORDER, INSORD: 4.3 UNITS/HOUR
INSULIN ORDER, INSORD: 4.5 UNITS/HOUR
INSULIN ORDER, INSORD: 5.7 UNITS/HOUR
L PNEUMO AG UR QL IA: NEGATIVE
LOW TARGET, LOT: 140 MG/DL
MCH RBC QN AUTO: 28.5 PG (ref 24–34)
MCHC RBC AUTO-ENTMCNC: 35.6 G/DL (ref 31–37)
MCV RBC AUTO: 80.1 FL (ref 74–97)
MINUTES UNTIL NEXT BG, NBG: 120 MIN
MINUTES UNTIL NEXT BG, NBG: 60 MIN
MULTIPLIER, MUL: 0.04
MULTIPLIER, MUL: 0.05
ORDER INITIALS, ORDINIT: NORMAL
PLATELET # BLD AUTO: 79 K/UL (ref 135–420)
PMV BLD AUTO: 10.9 FL (ref 9.2–11.8)
POTASSIUM SERPL-SCNC: 4 MMOL/L (ref 3.5–5.5)
PROT SERPL-MCNC: 5.8 G/DL (ref 6.4–8.2)
RBC # BLD AUTO: 2.91 M/UL (ref 4.2–5.3)
S PNEUM AG UR QL: NEGATIVE
SODIUM SERPL-SCNC: 135 MMOL/L (ref 136–145)
WBC # BLD AUTO: 6.8 K/UL (ref 4.6–13.2)

## 2018-01-26 PROCEDURE — 74011250637 HC RX REV CODE- 250/637: Performed by: INTERNAL MEDICINE

## 2018-01-26 PROCEDURE — 74011636637 HC RX REV CODE- 636/637: Performed by: INTERNAL MEDICINE

## 2018-01-26 PROCEDURE — 65660000004 HC RM CVT STEPDOWN

## 2018-01-26 PROCEDURE — 80053 COMPREHEN METABOLIC PANEL: CPT | Performed by: INTERNAL MEDICINE

## 2018-01-26 PROCEDURE — 74011250636 HC RX REV CODE- 250/636: Performed by: INTERNAL MEDICINE

## 2018-01-26 PROCEDURE — 74011000258 HC RX REV CODE- 258: Performed by: INTERNAL MEDICINE

## 2018-01-26 PROCEDURE — 71046 X-RAY EXAM CHEST 2 VIEWS: CPT

## 2018-01-26 PROCEDURE — 51798 US URINE CAPACITY MEASURE: CPT

## 2018-01-26 PROCEDURE — 83036 HEMOGLOBIN GLYCOSYLATED A1C: CPT | Performed by: INTERNAL MEDICINE

## 2018-01-26 PROCEDURE — 82962 GLUCOSE BLOOD TEST: CPT

## 2018-01-26 PROCEDURE — 36415 COLL VENOUS BLD VENIPUNCTURE: CPT | Performed by: INTERNAL MEDICINE

## 2018-01-26 PROCEDURE — 74011250636 HC RX REV CODE- 250/636: Performed by: EMERGENCY MEDICINE

## 2018-01-26 PROCEDURE — 85027 COMPLETE CBC AUTOMATED: CPT | Performed by: INTERNAL MEDICINE

## 2018-01-26 PROCEDURE — 74011000250 HC RX REV CODE- 250: Performed by: EMERGENCY MEDICINE

## 2018-01-26 RX ORDER — DEXTROSE 50 % IN WATER (D50W) INTRAVENOUS SYRINGE
25-50 AS NEEDED
Status: DISCONTINUED | OUTPATIENT
Start: 2018-01-26 | End: 2018-01-29 | Stop reason: HOSPADM

## 2018-01-26 RX ORDER — INSULIN GLARGINE 100 [IU]/ML
20 INJECTION, SOLUTION SUBCUTANEOUS ONCE
Status: COMPLETED | OUTPATIENT
Start: 2018-01-26 | End: 2018-01-26

## 2018-01-26 RX ORDER — SODIUM CHLORIDE 9 MG/ML
125 INJECTION, SOLUTION INTRAVENOUS CONTINUOUS
Status: DISCONTINUED | OUTPATIENT
Start: 2018-01-26 | End: 2018-01-27

## 2018-01-26 RX ORDER — DEXTROSE MONOHYDRATE AND SODIUM CHLORIDE 5; .45 G/100ML; G/100ML
125 INJECTION, SOLUTION INTRAVENOUS CONTINUOUS
Status: DISCONTINUED | OUTPATIENT
Start: 2018-01-26 | End: 2018-01-26

## 2018-01-26 RX ORDER — INSULIN LISPRO 100 [IU]/ML
INJECTION, SOLUTION INTRAVENOUS; SUBCUTANEOUS
Status: DISCONTINUED | OUTPATIENT
Start: 2018-01-26 | End: 2018-01-29 | Stop reason: HOSPADM

## 2018-01-26 RX ADMIN — HEPARIN SODIUM 5000 UNITS: 5000 INJECTION, SOLUTION INTRAVENOUS; SUBCUTANEOUS at 05:11

## 2018-01-26 RX ADMIN — ASPIRIN 81 MG: 81 TABLET, COATED ORAL at 09:13

## 2018-01-26 RX ADMIN — FERROUS SULFATE TAB 325 MG (65 MG ELEMENTAL FE) 325 MG: 325 (65 FE) TAB at 17:00

## 2018-01-26 RX ADMIN — INSULIN LISPRO 2 UNITS: 100 INJECTION, SOLUTION INTRAVENOUS; SUBCUTANEOUS at 21:02

## 2018-01-26 RX ADMIN — VITAM B12 100 MCG: 100 TAB at 09:13

## 2018-01-26 RX ADMIN — Medication 10 ML: at 21:04

## 2018-01-26 RX ADMIN — WATER 2 G: 1 INJECTION INTRAMUSCULAR; INTRAVENOUS; SUBCUTANEOUS at 10:52

## 2018-01-26 RX ADMIN — SODIUM CHLORIDE 125 ML/HR: 900 INJECTION, SOLUTION INTRAVENOUS at 18:50

## 2018-01-26 RX ADMIN — HYDRALAZINE HYDROCHLORIDE 10 MG: 10 TABLET, FILM COATED ORAL at 21:02

## 2018-01-26 RX ADMIN — AMLODIPINE BESYLATE 5 MG: 5 TABLET ORAL at 09:13

## 2018-01-26 RX ADMIN — FERROUS SULFATE TAB 325 MG (65 MG ELEMENTAL FE) 325 MG: 325 (65 FE) TAB at 09:13

## 2018-01-26 RX ADMIN — DEXTROSE MONOHYDRATE AND SODIUM CHLORIDE 125 ML/HR: 5; .45 INJECTION, SOLUTION INTRAVENOUS at 02:00

## 2018-01-26 RX ADMIN — VITAMIN D, TAB 1000IU (100/BT) 2000 UNITS: 25 TAB at 09:13

## 2018-01-26 RX ADMIN — SIMVASTATIN 20 MG: 20 TABLET, FILM COATED ORAL at 09:13

## 2018-01-26 RX ADMIN — INSULIN GLARGINE 20 UNITS: 100 INJECTION, SOLUTION SUBCUTANEOUS at 17:15

## 2018-01-26 RX ADMIN — PANTOPRAZOLE SODIUM 40 MG: 40 TABLET, DELAYED RELEASE ORAL at 09:13

## 2018-01-26 RX ADMIN — METOPROLOL TARTRATE 50 MG: 50 TABLET ORAL at 18:00

## 2018-01-26 RX ADMIN — HYDRALAZINE HYDROCHLORIDE 10 MG: 10 TABLET, FILM COATED ORAL at 09:13

## 2018-01-26 RX ADMIN — HEPARIN SODIUM 5000 UNITS: 5000 INJECTION, SOLUTION INTRAVENOUS; SUBCUTANEOUS at 12:16

## 2018-01-26 RX ADMIN — METOPROLOL TARTRATE 50 MG: 50 TABLET ORAL at 09:13

## 2018-01-26 RX ADMIN — PANTOPRAZOLE SODIUM 40 MG: 40 TABLET, DELAYED RELEASE ORAL at 18:00

## 2018-01-26 RX ADMIN — SODIUM CHLORIDE 500 MG: 900 INJECTION, SOLUTION INTRAVENOUS at 09:17

## 2018-01-26 RX ADMIN — HYDRALAZINE HYDROCHLORIDE 10 MG: 10 TABLET, FILM COATED ORAL at 16:00

## 2018-01-26 NOTE — CDMP QUERY
Please clarify if this patient is being treated/managed for:    =>Demand ischemia in the setting of CAP/Sepsis  =>Other Explanation of clinical findings  =>Unable to Determine (no explanation of clinical findings)    The medical record reflects the following:    Risk:  CAP, sepsis    Clinical Indicators: Serum troponin 0.16    Treatment: O2; IV abx      If you DECLINE this query or would like to communicate with [employer], please utilize the \"[employer] message box\" at the TOP of the Progress Note on the right.       Thank you,  Chelsy Perry RN/CCDS  [phone]

## 2018-01-26 NOTE — ROUTINE PROCESS
Bedside shift change report given to LATRICIA Singh (oncoming nurse) by Nicole Deleon (offgoing nurse). Report included the following information SBAR, Kardex, MAR and Recent Results.

## 2018-01-26 NOTE — PROGRESS NOTES
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  Acute renal failure: m/l due to CAP, dehydration     CAD s/p stenting and cardiac cath  HTN: controlled at home  Hxof Thrombocytoenia: currently at baseline     Plan:   Continue Ceftriaxone and Azithromycin for CAP  Continue supplemental O2  CBC, BMP  Continue insulin drip, hourly glucose checks   - order placed to give Lantus 20 mg NOW, then may d/c drip in 2 hours  NPO until off insulin drip  Will try percocet for the right hip pain, concerned about nausea  Zofran for nausea  continue home BP/cardiac medications  F/u Sputum cultures, Urine strep pneumo Ag, legionella  F/u blood cx from 1/25  PT tomorrow    Current Antibtiocs:   ceftriaxone and azithromycin  Lines:   Peripheral    I spent 45 minutes with the patient in face-to-face consultation, of which greater than 50% was spent in counseling and coordination of care as described above. Case discussed with:  [x]Patient  []Family  []Nursing  []Case Management  DVT Prophylaxis:  []Lovenox  [x]Hep SQ  []SCDs  []Coumadin   []On Heparin gtt  I have independently examined the patient and reviewed all lab studies and imgaing as well as review of nursing notes and physican notes from the past 24 hours. The plan of care has been discussed with the patient and all questions are answered. Mary Turpin D.O. Pager [phone]      Allergies   Allergen Reactions    Latex Itching           Subjective:      Pt seen and examined. Jada feel a little better today but very sleepy. She tells me she feels wiped out. Pain in her hip is currently controlled. Still has an occasional cough. No fevers or chills.     Objective:        Visit Vitals    /67    Pulse 98    Temp 97.6 °F (36.4 °C)    Resp 18    Wt 62 kg (136 lb 9.6 oz)    SpO2 97%    BMI 26.68 kg/m2     Temp (24hrs), Av.6 °F (37 °C), Min:97.6 °F (36.4 °C), Max:99.6 °F (37.6 °C)        General:   awake alert and oriented, non-toxic   Skin:   dry, no rashes or skin lesions noted on limited exam, dry and warm   HEENT:  No scleral icterus or pallor; oral mucosa moist, lips moist   Lymph Nodes:   not assessed today   Lungs:   non, labored; bilateral rhonchi   Heart:  RRR, s1 and s2; no murmurs rubs or gallops; no edema, + pedal pulses   Abdomen:  soft, non-distended, active bowel sounds, non-tender   Genitourinary:  deferred   Extremities:   average muscle tone; no contractures, no joint effusions   Neurologic:  No gross focal motor or sensory abnormalities; CN 2-12 intact; Follows commands. Psychiatric:   appropriate and interactive.        Labs: Results:   Chemistry Recent Labs      18   0543  18   0946   GLU  149*  767*   NA  135*  122*   K  4.0  5.5   CL  102  88*   CO2  22  18*   BUN  54*  50*   CREA  2.17*  2.07*   CA  8.2*  8.4*   AGAP  11  16   BUCR  25*  24*   AP  81  91   TP  5.8*  6.8   ALB  2.1*  2.6*   GLOB  3.7  4.2*   AGRAT  0.6*  0.6*      CBC w/Diff Recent Labs      18   0543  18   0946   WBC  6.8  9.0   RBC  2.91*  3.16*   HGB  8.3*  9.0*   HCT  23.3*  25.8*   PLT  79*  73*   GRANS   --   73   LYMPH   --   4*   EOS   --   0        No results found for: SDES Lab Results   Component Value Date/Time    Culture result: NO GROWTH AFTER 15 HOURS 2018 11:35 AM    Culture result: NO GROWTH AFTER 15 HOURS 2018 11:05 AM    Culture result: NO GROWTH 6 DAYS 2017 03:12 PM    Culture result: >100,000  COLONIES/mL  KLEBSIELLA PNEUMONIAE   2017 02:57 PM    Culture result: [id number]  COLONIES/mL  ALCALIGENES SPECIES   2017 02:57 PM          Imagin/25 Right hip x-ray: No acute finding   CXR: Pneumonic infiltrates, and central atelectatic changes in left lower lobe.  In the left infrahilar and perihilar area there are infiltrates are mildly  decreased but at the left lung base the infiltrates or atelectasis or epidural  mildly increased

## 2018-01-26 NOTE — DIABETES MGMT
Glycemic Control Plan of Care    POC BG range on 01/25/2018: 193->600 mg/dL. Partient placed on regular insulin drip via GlucoStabilizer for hyperglycemia. Non gap, no acidosis. POC BG range on 01/26/2018 at time of review: 119-170 mg/dL. Results for Maikol Tatum (MRN [id number]) as of 1/26/2018 10:34   Ref. Range 1/26/2018 05:43   CO2 Latest Ref Range: 21 - 32 mmol/L 22   Anion gap Latest Ref Range: 3.0 - 18 mmol/L 11     Patient described stomach still feeling unsettled this morning but no vomiting. She is currently NPO.    IVF: D5 1/2NS infusing at 125 ml/hr, continuous infusion. Recommendation(s):  1.) Lantus insulin 20 units at least two hours before d/c GlucoStabilizer. 2.) Diabetic consistent carb diet when patient ready for solids. 3.) Discontinue dextrose in IVF if medically appropriate when patient preeti po. Addendum at 5:31 PM: Noted order for lantus insulin 20 units x1 dose ordered. Discussed with [place], RN, and will discontinue insulin drip two hours after lantus insulin d/c'd. I called Dr. Neha Chun and obtained order for correctional lispro insulin after insulin drip d/c'd. Discussed also with [place] to start correctional lispro at [facility] when patient is no longer on insulin drip. Assessment:  Patient is 65year old with past medical history including type 2 diabetes mellitus, CAD with stent, colon cancer, left breast cancer, hypercholesterolemia, hypertension, and thrombocytopenia - was admitted on 01/25/2018 with c/o worsening cough, shortness of breath, poor appetite, chills, and low grade temp. Noted:  Community acquired pneumonia. Sepsis. Hyperglycemia. Non gap, no acidosis. Acute renal failure. Type 2 diabetes mellitus. Pending A1c result ordered 01/26/2018. Most recent blood glucose values:    Results for Maikol Tatum (MRN [id number]) as of 1/26/2018 10:34   Ref.  Range 1/25/2018 12:13 1/25/2018 15:09 1/25/2018 15:57 1/25/2018 17:22 1/25/2018 18:03 1/25/2018 19:08 1/25/2018 20:23 1/25/2018 21:32 1/25/2018 22:35 1/25/2018 23:43   GLUCOSE,FAST - POC Latest Ref Range: 70 - 110 mg/dL >600 (HH) >600 (HH) 556 (HH) 452 (HH) 416 (HH) 341 (H) 283 (H) 193 (H) 139 (H) 108     Results for Wandy Barnes (MRN [id number]) as of 1/26/2018 10:34   Ref. Range 1/26/2018 00:46 1/26/2018 01:53 1/26/2018 02:58 1/26/2018 04:02 1/26/2018 05:07 1/26/2018 06:11 1/26/2018 07:19 1/26/2018 08:35 1/26/2018 10:17   GLUCOSE,FAST - POC Latest Ref Range: 70 - 110 mg/dL 145 (H) 119 (H) 159 (H) 160 (H) 170 (H) 152 (H) 145 (H) 162 (H) 147 (H)     Current A1C: 6.1% (10/19/2017). Ordered A1c 01/26/2018 and pending result. Current hospital diabetes medications:   Regular insulin drip via GlucoStabilizer. Drip rate 4.2 units/hr at 8:39 AM.    Total daily dose insulin requirement previous day: 01/25/2018  Regular insulin drip via GlucoStabilizer: 147.6 units. Home diabetes medications: As reported by patient on 01/26/2018:  Metformin 500 mg twice daily with food. Diet: Currently NPO. Goals: Blood glucose will be within target range of  mg/dL by 01/29/2018.     Education:  ___  Refer to Diabetes Education Record             __X_  Education not indicated at this time    Maria Isabel Crum RN CCM

## 2018-01-26 NOTE — CDMP QUERY
Please clarify if ARF is also associated with sepsis    =>Sepsis/CAP/dehydration with associated Acute renal failure  =>No association between sepsis and ARF  =>Other Explanation of clinical findings  =>Unable to Determine (no explanation of clinical findings)    The medical record reflects the following:    Risk:  Sepsis; pneumonia    Clinical Indicators: Sepsis due to CAP: blood cx pending; lactic acid 1.8  Acute renal failure: m/l due to CAP, dehydration     Treatment:  IV abx; IVF      If you DECLINE this query or would like to communicate with [facility], please utilize the \"[facility] message box\" at the TOP of the Progress Note on the right.       Thank you,  Dunia Pina RN/CCDS  [phone]

## 2018-01-27 LAB
GLUCOSE BLD STRIP.AUTO-MCNC: 256 MG/DL (ref 70–110)
GLUCOSE BLD STRIP.AUTO-MCNC: 287 MG/DL (ref 70–110)
GLUCOSE BLD STRIP.AUTO-MCNC: 291 MG/DL (ref 70–110)
GLUCOSE BLD STRIP.AUTO-MCNC: 355 MG/DL (ref 70–110)

## 2018-01-27 PROCEDURE — 74011000250 HC RX REV CODE- 250: Performed by: EMERGENCY MEDICINE

## 2018-01-27 PROCEDURE — 77030033263 HC DRSG MEPILEX 16-48IN BORD MOLN -B

## 2018-01-27 PROCEDURE — 74011250637 HC RX REV CODE- 250/637: Performed by: HOSPITALIST

## 2018-01-27 PROCEDURE — 74011636637 HC RX REV CODE- 636/637: Performed by: INTERNAL MEDICINE

## 2018-01-27 PROCEDURE — 97116 GAIT TRAINING THERAPY: CPT

## 2018-01-27 PROCEDURE — 74011000258 HC RX REV CODE- 258: Performed by: HOSPITALIST

## 2018-01-27 PROCEDURE — 74011250637 HC RX REV CODE- 250/637: Performed by: INTERNAL MEDICINE

## 2018-01-27 PROCEDURE — 82962 GLUCOSE BLOOD TEST: CPT

## 2018-01-27 PROCEDURE — 77030011256 HC DRSG MEPILEX <16IN NO BORD MOLN -A

## 2018-01-27 PROCEDURE — 74011250636 HC RX REV CODE- 250/636: Performed by: EMERGENCY MEDICINE

## 2018-01-27 PROCEDURE — 97161 PT EVAL LOW COMPLEX 20 MIN: CPT

## 2018-01-27 PROCEDURE — 74011000250 HC RX REV CODE- 250: Performed by: HOSPITALIST

## 2018-01-27 PROCEDURE — 65270000029 HC RM PRIVATE

## 2018-01-27 RX ORDER — TRAMADOL HYDROCHLORIDE 50 MG/1
50 TABLET ORAL
Status: DISCONTINUED | OUTPATIENT
Start: 2018-01-27 | End: 2018-01-29 | Stop reason: HOSPADM

## 2018-01-27 RX ADMIN — DOXYCYCLINE 100 MG: 100 INJECTION, POWDER, LYOPHILIZED, FOR SOLUTION INTRAVENOUS at 11:58

## 2018-01-27 RX ADMIN — HYDRALAZINE HYDROCHLORIDE 10 MG: 10 TABLET, FILM COATED ORAL at 09:21

## 2018-01-27 RX ADMIN — SIMVASTATIN 20 MG: 20 TABLET, FILM COATED ORAL at 09:21

## 2018-01-27 RX ADMIN — Medication 10 ML: at 04:43

## 2018-01-27 RX ADMIN — FERROUS SULFATE TAB 325 MG (65 MG ELEMENTAL FE) 325 MG: 325 (65 FE) TAB at 18:40

## 2018-01-27 RX ADMIN — WATER 2 G: 1 INJECTION INTRAMUSCULAR; INTRAVENOUS; SUBCUTANEOUS at 11:58

## 2018-01-27 RX ADMIN — Medication 10 ML: at 23:36

## 2018-01-27 RX ADMIN — METOPROLOL TARTRATE 50 MG: 50 TABLET ORAL at 09:21

## 2018-01-27 RX ADMIN — INSULIN LISPRO 6 UNITS: 100 INJECTION, SOLUTION INTRAVENOUS; SUBCUTANEOUS at 18:39

## 2018-01-27 RX ADMIN — VITAMIN D, TAB 1000IU (100/BT) 2000 UNITS: 25 TAB at 09:20

## 2018-01-27 RX ADMIN — METOPROLOL TARTRATE 50 MG: 50 TABLET ORAL at 18:40

## 2018-01-27 RX ADMIN — FERROUS SULFATE TAB 325 MG (65 MG ELEMENTAL FE) 325 MG: 325 (65 FE) TAB at 09:21

## 2018-01-27 RX ADMIN — Medication 10 ML: at 14:00

## 2018-01-27 RX ADMIN — PANTOPRAZOLE SODIUM 40 MG: 40 TABLET, DELAYED RELEASE ORAL at 18:41

## 2018-01-27 RX ADMIN — HYDRALAZINE HYDROCHLORIDE 10 MG: 10 TABLET, FILM COATED ORAL at 18:40

## 2018-01-27 RX ADMIN — VITAM B12 100 MCG: 100 TAB at 09:21

## 2018-01-27 RX ADMIN — TRAMADOL HYDROCHLORIDE 50 MG: 50 TABLET, FILM COATED ORAL at 18:41

## 2018-01-27 RX ADMIN — INSULIN LISPRO 10 UNITS: 100 INJECTION, SOLUTION INTRAVENOUS; SUBCUTANEOUS at 21:58

## 2018-01-27 RX ADMIN — ASPIRIN 81 MG: 81 TABLET, COATED ORAL at 09:21

## 2018-01-27 RX ADMIN — INSULIN LISPRO 6 UNITS: 100 INJECTION, SOLUTION INTRAVENOUS; SUBCUTANEOUS at 09:22

## 2018-01-27 RX ADMIN — HYDRALAZINE HYDROCHLORIDE 10 MG: 10 TABLET, FILM COATED ORAL at 21:36

## 2018-01-27 RX ADMIN — PANTOPRAZOLE SODIUM 40 MG: 40 TABLET, DELAYED RELEASE ORAL at 09:20

## 2018-01-27 RX ADMIN — AMLODIPINE BESYLATE 5 MG: 5 TABLET ORAL at 09:21

## 2018-01-27 RX ADMIN — INSULIN LISPRO 6 UNITS: 100 INJECTION, SOLUTION INTRAVENOUS; SUBCUTANEOUS at 12:11

## 2018-01-27 NOTE — PROGRESS NOTES
Bedside and Verbal shift change report given to [address] [place] [address] (oncoming nurse) by Lyssa Kramer (offgoing nurse). Report included the following information SBAR and Recent Results.

## 2018-01-27 NOTE — PROGRESS NOTES
Admit Date: 1/25/2018  Date of Service: 1/27/2018    Reason for follow-up: pneumonia      Assessment:         CAP: Legionella, strep pneumo, influenza negative. Lungs are clear today. No supplemental O2 needs. CAP without evidence of sepsis, SIRS present on admission, now resolved: blood cx 1/2 bottles growing gram positive cocci that I strongly suspect is a contaminant. lactic acid 1.8, normal WBC, no left shift   Hyperglycemia with DM type2  Hyponatremia  Acute renal failure  HTN: controlled at home  Hx of Thrombocytoenia: currently at baseline     Plan:   1. Continue Ceftriaxone, DC Azithromycin, and add Doxycycline, continue to follow cultures, current result is likely contaminant. +  DC supplemental as there is no docummentation of hypoxemia currently  Diabetic diet, Off insulin drip  DC percocet due to risk of delerium in this elderly patient, will have PT see her today, ambulate, OOB for all meals. Zofran for nausea  continue home BP/cardiac medications      Current Antibtiocs:   ceftriaxone and Doxycycline      I spent 25 minutes with the patient in face-to-face consultation, of which greater than 50% was spent in counseling and coordination of care as described above. Case discussed with:  [x]Patient  []Family  []Nursing  []Case Management  DVT Prophylaxis:  []Lovenox  [x]Hep SQ  []SCDs  []Coumadin   []On Heparin gtt  I have independently examined the patient and reviewed all lab studies and imgaing as well as review of nursing notes and physican notes from the past 24 hours. The plan of care has been discussed with the patient and all questions are answered. Allergies   Allergen Reactions    Latex Itching           Subjective:      Pt seen in rounds, She is resting in bed comfortably, no SOB, she has supplemental O2 on in spite of docummentation indicating room air and no hypoxemia. She states that she was not seen by PT yesterday and continues to have right hip and leg pain.  Indicates that she is very weak and needs to go to rehab to get help ambulating. Objective:        Visit Vitals    /71    Pulse 94    Temp 98.5 °F (36.9 °C)    Resp 18    Wt 64 kg (141 lb 3.2 oz)    SpO2 97%    BMI 27.58 kg/m2     Temp (24hrs), Av °F (36.7 °C), Min:97.6 °F (36.4 °C), Max:98.5 °F (36.9 °C)        General:   awake alert and oriented, non-toxic   Skin:   dry, no rashes or skin lesions noted on limited exam, dry and warm   HEENT:  No scleral icterus or pallor; oral mucosa moist, lips moist   Lymph Nodes:   not assessed today   Lungs:   Clear bilaterally,    Heart:  RRR, s1 and s2; 2/6 systolic murmur, no rubs or gallops; no edema, + pedal pulses   Abdomen:  soft, non-distended, active bowel sounds, non-tender   Genitourinary:  deferred   Extremities:   average muscle tone; no contractures, no joint effusions   Neurologic:  No gross focal motor or sensory abnormalities; CN 2-12 intact; Follows commands. Psychiatric:   appropriate and interactive.        Labs: Results:   Chemistry Recent Labs      18   0543  18   0946   GLU  149*  767*   NA  135*  122*   K  4.0  5.5   CL  102  88*   CO2  22  18*   BUN  54*  50*   CREA  2.17*  2.07*   CA  8.2*  8.4*   AGAP  11  16   BUCR  25*  24*   AP  81  91   TP  5.8*  6.8   ALB  2.1*  2.6*   GLOB  3.7  4.2*   AGRAT  0.6*  0.6*      CBC w/Diff Recent Labs      18   0543  18   0946   WBC  6.8  9.0   RBC  2.91*  3.16*   HGB  8.3*  9.0*   HCT  23.3*  25.8*   PLT  79*  73*   GRANS   --   73   LYMPH   --   4*   EOS   --   0        No results found for: SDES Lab Results   Component Value Date/Time    Culture result: CULTURE IN PROGRESS,FURTHER UPDATES TO FOLLOW 2018 11:35 AM    Culture result: NO GROWTH 2 DAYS 2018 11:05 AM    Culture result: NO GROWTH 6 DAYS 2017 03:12 PM    Culture result: >100,000  COLONIES/mL  KLEBSIELLA PNEUMONIAE   2017 02:57 PM    Culture result: [id number]  COLONIES/mL  ALCALIGENES SPECIES 2017 02:57 PM          Imagin/25 Right hip x-ray: No acute finding   CXR: Pneumonic infiltrates, and central atelectatic changes in left lower lobe.  In the left infrahilar and perihilar area there are infiltrates are mildly  decreased but at the left lung base the infiltrates or atelectasis or epidural  mildly increased

## 2018-01-27 NOTE — ROUTINE PROCESS
TRANSFER - IN REPORT:    Verbal report received from [id number] Lisandra Jamison RN(name) on Jemma Millard  being received from CVT SD(unit) for routine progression of care      Report consisted of patients Situation, Background, Assessment and   Recommendations(SBAR). Information from the following report(s) SBAR, Kardex, [facility] and Recent Results was reviewed with the receiving nurse. Opportunity for questions and clarification was provided. Assessment completed upon patients arrival to unit and care assumed.

## 2018-01-27 NOTE — PROGRESS NOTES
Problem: Mobility Impaired (Adult and Pediatric)  Goal: *Acute Goals and Plan of Care (Insert Text)  Physical Therapy Goals  Initiated 1/27/2018 and to be accomplished within 7 day(s)  1. Patient will move from supine to sit and sit to supine , scoot up and down and roll side to side in bed with supervision/set-up. 2.  Patient will transfer from bed to chair and chair to bed with supervision/set-up using the least restrictive device. 3.  Patient will perform sit to stand with supervision/set-up. 4.  Patient will ambulate with supervision/set-up for >50 feet with the least restrictive device. 5.  Patient will ascend/descend 2 stairs with 1 handrail(s) with supervision/set-up. physical Therapy EVALUATION    Patient: Hugo Valdovinos (10 y.o. female)  Date: 1/27/2018  Primary Diagnosis: Pneumonia    Precautions: Fall       ASSESSMENT :  Patient 67yo F admitted to hospital for pneumonia and presents today alert and agreeable to therapy and was supine in bed upon arrival. Patient was on bedpan and was assisted in rolling in bed. Patient transferred to sitting EOB for objective assessment and then transferred to standing. Patient denied dizziness and ambulated 25ft in room and transferred back to sitting EOB. Patient educated on role of therapy and was left resting with call bell by her side and instructions to call for assistance if she needed to get up for any reason. Encouraged patient not to use bedpan any longer and to transfer with assistance to [facility]. Patient will benefit from skilled intervention to address the above impairments.   Patients rehabilitation potential is considered to be Good  Factors which may influence rehabilitation potential include:   []         None noted  [x]         Mental ability/status  [x]         Medical condition  [x]         Home/family situation and support systems  [x]         Safety awareness  [x]         Pain tolerance/management  []         Other:      PLAN :  Recommendations and Planned Interventions:  [x]           Bed Mobility Training             [x]    Neuromuscular Re-Education  [x]           Transfer Training                   []    Orthotic/Prosthetic Training  [x]           Gait Training                          []    Modalities  [x]           Therapeutic Exercises          []    Edema Management/Control  [x]           Therapeutic Activities            [x]    Patient and Family Training/Education  []           Other (comment):    Frequency/Duration: Patient will be followed by physical therapy 1-2 times per day/4-7 days per week to address goals. Discharge Recommendations: Home Health with supervision  Further Equipment Recommendations for Discharge: N/A     G-CODES     Mobility  Current  CJ= 20-39%   Goal  CJ= 20-39%. The severity rating is based on the Level of Assistance required for Functional Mobility and ADLs.        G-CODES     Eval Complexity: History: MEDIUM  Complexity : 1-2 comorbidities / personal factors will impact the outcome/ POC Exam:LOW Complexity : 1-2 Standardized tests and measures addressing body structure, function, activity limitation and / or participation in recreation  Presentation: LOW Complexity : Stable, uncomplicated  Clinical Decision Making:Low Complexity   Overall Complexity:LOW     SUBJECTIVE:   Patient stated I feel better sitting up.     OBJECTIVE DATA SUMMARY:     Past Medical History:   Diagnosis Date    Anemia     CAD (coronary artery disease)     Coronary Stenting needed, hematology workup needed prior to stent placement, treating medically at this time    Cancer [facility])     colon    Cancer ([facility] [address]     breast - left    Cardiac catheterization 08/17/2016    LM patent. mLAD 99.9% (2.25 x 28-mm Xience BETHEL, resid 0%). oD1 60%. oD2 95%. pCX 90% (2.5 x 13-mm Xience BETHEL, resid 0%). OMB patent.  Cardiac echocardiogram 07/01/2016    EF 50%. Basal inferior, basal-mid inferolateral hypk. Gr 1 DDfx.   RVSP 50-60 mmHg. Mild LAE. Mod, eccentric MR.      Diabetes ([facility] [address]     Hypercholesteremia     Hypertension     Thrombocytopenia ([facility] [address]      Past Surgical History:   Procedure Laterality Date    CARDIAC CATHETERIZATION  7/16/2015     no stents at this time r/t anemia & thrombocytopenia (hematology work-up needed prior to stent placement)    CARDIAC CATHETERIZATION  7/11/2016         CARDIAC CATHETERIZATION  8/17/2016         CORONARY ARTERY ANGIOGRAM  7/16/2015         CORONARY ARTERY ANGIOGRAM  7/11/2016         CORONARY ARTERY ANGIOGRAM  8/17/2016         CORONARY STENT EA ADDL VESSEL  8/17/2016         CORONARY STENT SINGLE W/PTCA  8/17/2016         HX GI      colon surg secondary to cancer - removed lesion    HX HEENT  2/2013    cataract bilaterally    HX MASTECTOMY  march 2011    left    HX VASCULAR ACCESS      mediport - now removed    LV ANGIOGRAPHY  7/16/2015         LV ANGIOGRAPHY  7/11/2016          Barriers to Learning/Limitations: None  Compensate with: N/A  Prior Level of Function/Home Situation: Patient was living with sister in 1 story home with 2STE and was independent with mobility and I/ADL's and has no AD/DME at home.    Home Situation  Home Environment: Private residence  # Steps to Enter: 0  One/Two Story Residence: Two story  # of Interior Steps: 10  Interior Rails: Both  Lift Chair Available: No  Living Alone: No  Support Systems: Family member(s)  Patient Expects to be Discharged to[de-identified] Private residence  Current DME Used/Available at Home: None  Critical Behavior:    A&Ox4  Strength:    Strength: Generally decreased, functional (BLE)   Tone & Sensation:   Tone: Normal (BLE)   Sensation: Intact (BLE to LT)   Range Of Motion:  AROM: Within functional limits (BLE)   Functional Mobility:  Bed Mobility:  Rolling: Contact guard assistance  Supine to Sit: Minimum assistance  Sit to Supine: Contact guard assistance;Minimum assistance  Scooting: Contact guard assistance  Transfers:  Sit to Stand: Contact guard assistance  Stand to Sit: Contact guard assistance    Balance:   Sitting: Intact  Standing: Impaired; With support  Standing - Static: Fair  Standing - Dynamic : Fair  Ambulation/Gait Training:  Distance (ft): 25 Feet (ft)  Assistive Device: Walker, rolling  Ambulation - Level of Assistance: Contact guard assistance   Speed/Adrianna: Slow  Interventions: Verbal cues; Visual/Demos         Pain:  Pt reports 0/10 pain or discomfort prior to treatment.    Pt reports 0/10 pain or discomfort post treatment. Activity Tolerance:   Patient tolerated activity well and was left resting with call bell by her side. Please refer to the flowsheet for vital signs taken during this treatment. After treatment:   []         Patient left in no apparent distress sitting up in chair  [x]         Patient left in no apparent distress in bed  [x]         Call bell left within reach  []         Nursing notified   []         Caregiver present  []         Bed alarm activated  []         SCDs in place to B LE     COMMUNICATION/EDUCATION:   [x]         Fall prevention education was provided and the patient/caregiver indicated understanding. [x]         Patient/family have participated as able in goal setting and plan of care. [x]         Patient/family agree to work toward stated goals and plan of care. []         Patient understands intent and goals of therapy, but is neutral about his/her participation. []         Patient is unable to participate in goal setting and plan of care.     Thank you for this referral.  Leslie Lozoya, PT   Time Calculation: 23 mins

## 2018-01-28 PROBLEM — I10 HTN (HYPERTENSION), BENIGN: Status: ACTIVE | Noted: 2018-01-28

## 2018-01-28 LAB
GLUCOSE BLD STRIP.AUTO-MCNC: 272 MG/DL (ref 70–110)
GLUCOSE BLD STRIP.AUTO-MCNC: 304 MG/DL (ref 70–110)
GLUCOSE BLD STRIP.AUTO-MCNC: 318 MG/DL (ref 70–110)
GLUCOSE BLD STRIP.AUTO-MCNC: 371 MG/DL (ref 70–110)

## 2018-01-28 PROCEDURE — 97116 GAIT TRAINING THERAPY: CPT

## 2018-01-28 PROCEDURE — 74011636637 HC RX REV CODE- 636/637: Performed by: INTERNAL MEDICINE

## 2018-01-28 PROCEDURE — 74011000258 HC RX REV CODE- 258: Performed by: HOSPITALIST

## 2018-01-28 PROCEDURE — 74011000250 HC RX REV CODE- 250: Performed by: HOSPITALIST

## 2018-01-28 PROCEDURE — 74011000250 HC RX REV CODE- 250: Performed by: EMERGENCY MEDICINE

## 2018-01-28 PROCEDURE — 74011250636 HC RX REV CODE- 250/636: Performed by: EMERGENCY MEDICINE

## 2018-01-28 PROCEDURE — 74011250637 HC RX REV CODE- 250/637: Performed by: HOSPITALIST

## 2018-01-28 PROCEDURE — 82962 GLUCOSE BLOOD TEST: CPT

## 2018-01-28 PROCEDURE — 74011636637 HC RX REV CODE- 636/637: Performed by: HOSPITALIST

## 2018-01-28 PROCEDURE — 74011250637 HC RX REV CODE- 250/637: Performed by: INTERNAL MEDICINE

## 2018-01-28 PROCEDURE — 65270000029 HC RM PRIVATE

## 2018-01-28 PROCEDURE — 74011250636 HC RX REV CODE- 250/636: Performed by: HOSPITALIST

## 2018-01-28 RX ORDER — AMOXICILLIN AND CLAVULANATE POTASSIUM 500; 125 MG/1; MG/1
1 TABLET, FILM COATED ORAL EVERY 12 HOURS
Status: DISCONTINUED | OUTPATIENT
Start: 2018-01-28 | End: 2018-01-29 | Stop reason: HOSPADM

## 2018-01-28 RX ORDER — AMOXICILLIN AND CLAVULANATE POTASSIUM 875; 125 MG/1; MG/1
1 TABLET, FILM COATED ORAL EVERY 12 HOURS
Status: DISCONTINUED | OUTPATIENT
Start: 2018-01-28 | End: 2018-01-28

## 2018-01-28 RX ORDER — ONDANSETRON 2 MG/ML
4 INJECTION INTRAMUSCULAR; INTRAVENOUS
Status: DISCONTINUED | OUTPATIENT
Start: 2018-01-28 | End: 2018-01-29 | Stop reason: HOSPADM

## 2018-01-28 RX ADMIN — HYDRALAZINE HYDROCHLORIDE 10 MG: 10 TABLET, FILM COATED ORAL at 21:14

## 2018-01-28 RX ADMIN — Medication 10 ML: at 21:22

## 2018-01-28 RX ADMIN — AMLODIPINE BESYLATE 5 MG: 5 TABLET ORAL at 08:32

## 2018-01-28 RX ADMIN — INSULIN LISPRO 10 UNITS: 100 INJECTION, SOLUTION INTRAVENOUS; SUBCUTANEOUS at 12:10

## 2018-01-28 RX ADMIN — DOXYCYCLINE 100 MG: 100 INJECTION, POWDER, LYOPHILIZED, FOR SOLUTION INTRAVENOUS at 11:14

## 2018-01-28 RX ADMIN — INSULIN LISPRO 6 UNITS: 100 INJECTION, SOLUTION INTRAVENOUS; SUBCUTANEOUS at 08:33

## 2018-01-28 RX ADMIN — FERROUS SULFATE TAB 325 MG (65 MG ELEMENTAL FE) 325 MG: 325 (65 FE) TAB at 17:18

## 2018-01-28 RX ADMIN — INSULIN LISPRO 8 UNITS: 100 INJECTION, SOLUTION INTRAVENOUS; SUBCUTANEOUS at 17:19

## 2018-01-28 RX ADMIN — Medication 10 ML: at 13:28

## 2018-01-28 RX ADMIN — PANTOPRAZOLE SODIUM 40 MG: 40 TABLET, DELAYED RELEASE ORAL at 08:32

## 2018-01-28 RX ADMIN — PANTOPRAZOLE SODIUM 40 MG: 40 TABLET, DELAYED RELEASE ORAL at 17:18

## 2018-01-28 RX ADMIN — HYDRALAZINE HYDROCHLORIDE 10 MG: 10 TABLET, FILM COATED ORAL at 17:18

## 2018-01-28 RX ADMIN — TRAMADOL HYDROCHLORIDE 50 MG: 50 TABLET, FILM COATED ORAL at 08:48

## 2018-01-28 RX ADMIN — VITAM B12 100 MCG: 100 TAB at 08:32

## 2018-01-28 RX ADMIN — WATER 2 G: 1 INJECTION INTRAMUSCULAR; INTRAVENOUS; SUBCUTANEOUS at 11:18

## 2018-01-28 RX ADMIN — ASPIRIN 81 MG: 81 TABLET, COATED ORAL at 08:32

## 2018-01-28 RX ADMIN — FERROUS SULFATE TAB 325 MG (65 MG ELEMENTAL FE) 325 MG: 325 (65 FE) TAB at 08:32

## 2018-01-28 RX ADMIN — ONDANSETRON 4 MG: 2 INJECTION INTRAMUSCULAR; INTRAVENOUS at 17:26

## 2018-01-28 RX ADMIN — HYDRALAZINE HYDROCHLORIDE 10 MG: 10 TABLET, FILM COATED ORAL at 08:48

## 2018-01-28 RX ADMIN — METOPROLOL TARTRATE 50 MG: 50 TABLET ORAL at 08:32

## 2018-01-28 RX ADMIN — METOPROLOL TARTRATE 50 MG: 50 TABLET ORAL at 17:18

## 2018-01-28 RX ADMIN — INSULIN LISPRO 12 UNITS: 100 INJECTION, SOLUTION INTRAVENOUS; SUBCUTANEOUS at 21:21

## 2018-01-28 RX ADMIN — SIMVASTATIN 20 MG: 20 TABLET, FILM COATED ORAL at 08:32

## 2018-01-28 RX ADMIN — AMOXICILLIN AND CLAVULANATE POTASSIUM 1 TABLET: 500; 125 TABLET, FILM COATED ORAL at 17:26

## 2018-01-28 RX ADMIN — Medication 10 ML: at 07:43

## 2018-01-28 RX ADMIN — VITAMIN D, TAB 1000IU (100/BT) 2000 UNITS: 25 TAB at 08:32

## 2018-01-28 RX ADMIN — DOXYCYCLINE 100 MG: 100 INJECTION, POWDER, LYOPHILIZED, FOR SOLUTION INTRAVENOUS at 00:33

## 2018-01-28 NOTE — PROGRESS NOTES
Problem: Falls - Risk of  Goal: *Absence of Falls  Document Candy Fall Risk and appropriate interventions in the flowsheet.    Outcome: Progressing Towards Goal  Fall Risk Interventions:            Medication Interventions: Patient to call before getting OOB

## 2018-01-28 NOTE — ROUTINE PROCESS
Bedside and Verbal shift change report given to LATRICIA Spivey (oncoming nurse) by Jesenia Norwood RN (offgoing nurse). Report included the following information SBAR, Kardex, ED Summary, Intake/Output, MAR and Recent Results.

## 2018-01-28 NOTE — ROUTINE PROCESS
Bedside and Verbal shift change report given to Brianna Radford RN (oncoming nurse) by Norman Tao RN (offgoing nurse). Report included the following information SBAR, Kardex, MAR and Recent Results. SITUATION:    Code Status: Full Code   Reason for Admission: Pneumonia    [facility] day: 3   Problem List:       Hospital Problems  Date Reviewed: 6/29/2017          Codes Class Noted POA    Pneumonia ICD-10-CM: J18.9  ICD-9-CM: 114  1/25/2018 Unknown              BACKGROUND:    Past Medical History:   Past Medical History:   Diagnosis Date    Anemia     CAD (coronary artery disease)     Coronary Stenting needed, hematology workup needed prior to stent placement, treating medically at this time    Cancer [facility])     colon    Cancer ([facility] [address]     breast - left    Cardiac catheterization 08/17/2016    LM patent. mLAD 99.9% (2.25 x 28-mm Xience BETHEL, resid 0%). oD1 60%. oD2 95%. pCX 90% (2.5 x 13-mm Xience BETHEL, resid 0%). OMB patent.  Cardiac echocardiogram 07/01/2016    EF 50%. Basal inferior, basal-mid inferolateral hypk. Gr 1 DDfx. RVSP 50-60 mmHg. Mild LAE.   Mod, eccentric MR.      Diabetes ([facility] [address]     Hypercholesteremia     Hypertension     Thrombocytopenia ([facility] [address]          Patient taking anticoagulants yes     ASSESSMENT:    Changes in Assessment Throughout Shift: none     Patient has Central Line: no Reasons if yes: n/a   Patient has Jean Cath: no Reasons if yes: n/a      Last Vitals:     Vitals:    01/27/18 1819 01/27/18 1949 01/28/18 0035 01/28/18 0454   BP: 128/73 113/68 122/74 130/74   Pulse: 89 78 79 91   Resp: 18 18 18 19   Temp: 97 °F (36.1 °C) 98 °F (36.7 °C) 97.7 °F (36.5 °C) 97.4 °F (36.3 °C)   SpO2: 99% 99% 97% 96%   Weight:            IV and DRAINS (will only show if present)   Peripheral IV 01/25/18 Left Antecubital-Site Assessment: Clean, dry, & intact  Peripheral IV 01/25/18 Right Antecubital-Site Assessment: Clean, dry, & intact     WOUND (if present)   Wound Type: Stage 2 sacral   Dressing present Dressing Present : Yes   Wound Concerns/Notes:  none     PAIN    Pain Assessment    Pain Intensity 1: 0 (01/28/18 0742)    Pain Location 1: Leg         Patient Stated Pain Goal: 0  o Interventions for Pain:  none  o Intervention effective: yes  o Time of last intervention: n/a   o Reassessment Completed: yes      Last 3 Weights:  Last 3 Recorded Weights in this Encounter    01/25/18 1148 01/26/18 0431 01/27/18 0400   Weight: 61.2 kg (135 lb) 62 kg (136 lb 9.6 oz) 64 kg (141 lb 3.2 oz)     Weight change:      INTAKE/OUPUT    Current Shift:      Last three shifts: 01/26 1901 - 01/28 0700  In: 240 [P.O.:240]  Out: 450 [Urine:450]     LAB RESULTS     Recent Labs      01/26/18   0543  01/25/18   0946   WBC  6.8  9.0   HGB  8.3*  9.0*   HCT  23.3*  25.8*   PLT  79*  73*        Recent Labs      01/26/18   0543  01/25/18   0946   NA  135*  122*   K  4.0  5.5   GLU  149*  767*   BUN  54*  50*   CREA  2.17*  2.07*   CA  8.2*  8.4*       RECOMMENDATIONS AND DISCHARGE PLANNING     1. Pending tests/procedures/ Plan of Care or Other Needs: none     2. Discharge plan for patient and Needs/Barriers: home    3. Estimated Discharge Date: tbd Posted on Whiteboard in [facility]: yes      4. The patient's care plan was reviewed with the oncoming nurse. \"HEALS\" SAFETY CHECK      Fall Risk    Total Score: 1    Safety Measures: Safety Measures: Bed/Chair-Wheels locked, Bed in low position, Call light within reach    A safety check occurred in the patient's room between off going nurse and oncoming nurse listed above.     The safety check included the below items  Area Items   H  High Alert Medications - Verify all high alert medication drips (heparin, PCA, etc.)   E  Equipment - Suction is set up for ALL patients (with yanker)  - Red plugs utilized for all equipment (IV pumps, etc.)  - WOWs wiped down at end of shift.  - Room stocked with oxygen, suction, and other unit-specific supplies A  Alarms - Bed alarm is set for fall risk patients  - Ensure chair alarm is in place and activated if patient is up in a chair   L  Lines - Check IV for any infiltration  - Jean bag is empty if patient has a Jean   - Tubing and IV bags are labeled   S  Safety   - Room is clean, patient is clean, and equipment is clean. - Hallways are clear from equipment besides carts. - Fall bracelet on for fall risk patients  - Ensure room is clear and free of clutter  - Suction is set up for ALL patients (with yanker)  - Hallways are clear from equipment besides carts.    - Isolation precautions followed, supplies available outside room, sign posted     Teresa Yusuf RN

## 2018-01-28 NOTE — PROGRESS NOTES
Problem: Mobility Impaired (Adult and Pediatric)  Goal: *Acute Goals and Plan of Care (Insert Text)  Physical Therapy Goals  Initiated 1/27/2018 and to be accomplished within 7 day(s)  1. Patient will move from supine to sit and sit to supine , scoot up and down and roll side to side in bed with supervision/set-up. 2.  Patient will transfer from bed to chair and chair to bed with supervision/set-up using the least restrictive device. 3.  Patient will perform sit to stand with supervision/set-up. 4.  Patient will ambulate with supervision/set-up for >50 feet with the least restrictive device. 5.  Patient will ascend/descend 2 stairs with 1 handrail(s) with supervision/set-up. physical Therapy TREATMENT    Patient: Cristela Bound (49 y.o. female)  Date: 1/28/2018  Diagnosis: Pneumonia <principal problem not specified>       Precautions:     Chart, physical therapy assessment, plan of care and goals were reviewed. ASSESSMENT:  Pt found supine in bed with HOB elevated. Pt pleasant and agreed to participate in therapy. Pt performed supine to sit with Supervision to seated EOB. Pt then performed sit to stand to RW with SBA. Pt then amb in hallway with RW and SBA for ~150 ft, unable to continued to do increased fatigue and pain in R LE. Pt did shows very slight path deviations during amb towards right in hallway. Pt returned to bed into supine with Supervision. Pt left in bed with call bell in reach and all needs met. Progression toward goals:  [x]      Improving appropriately and progressing toward goals  []      Improving slowly and progressing toward goals  []      Not making progress toward goals and plan of care will be adjusted     PLAN:  Patient continues to benefit from skilled intervention to address the above impairments. Continue treatment per established plan of care.   Discharge Recommendations:  Home Health with supervision  Further Equipment Recommendations for Discharge:  rolling walker and N/A     SUBJECTIVE:   Patient stated Oh your back. Where we going this time.     OBJECTIVE DATA SUMMARY:   Critical Behavior:  Neurologic State: Alert  Orientation Level: Oriented X4   Functional Mobility Training:  Bed Mobility:  Rolling: Supervision  Supine to Sit: Supervision  Sit to Supine: Supervision  Scooting: Supervision  Transfers:  Sit to Stand: Supervision;Stand-by asssistance  Stand to Sit: Supervision;Stand-by asssistance  Balance:  Sitting: Intact  Standing: Intact; With support  Standing - Static: Fair  Standing - Dynamic : Fair  Ambulation/Gait Training:  Distance (ft): 150 Feet (ft)  Assistive Device: Walker, rolling  Ambulation - Level of Assistance: Stand-by asssistance;Contact guard assistance  Gait Description (WDL): Exceptions to WDL  Gait Abnormalities: Decreased step clearance  Base of Support: Narrowed  Speed/Adrianna: Slow  Step Length: Left shortened;Right shortened  Pain:  Pain Scale 1: Numeric (0 - 10)  Pain Intensity 1: 0  Pain Location 1: Leg  Pain Orientation 1: Right  Pain Description 1: Burning  Pain Intervention(s) 1: Medication (see MAR)  Activity Tolerance:   Fair+  Please refer to the flowsheet for vital signs taken during this treatment. After treatment:   [] Patient left in no apparent distress sitting up in chair  [x] Patient left in no apparent distress in bed  [x] Call bell left within reach  [x] Nursing notified  [] Caregiver present  [] Bed alarm activated      Arvid Session   Time Calculation: 18 mins    Mobility  Current  CI= 1-19%   Goal  CI= 1-19%  D/C  CI= 1-19%. The severity rating is based on the Level of Assistance required for Functional Mobility and ADLs.

## 2018-01-28 NOTE — PROGRESS NOTES
Problem: Mobility Impaired (Adult and Pediatric)  Goal: *Acute Goals and Plan of Care (Insert Text)  Physical Therapy Goals  Initiated 1/27/2018 and to be accomplished within 7 day(s)  1. Patient will move from supine to sit and sit to supine , scoot up and down and roll side to side in bed with supervision/set-up. 2.  Patient will transfer from bed to chair and chair to bed with supervision/set-up using the least restrictive device. 3.  Patient will perform sit to stand with supervision/set-up. 4.  Patient will ambulate with supervision/set-up for >50 feet with the least restrictive device. 5.  Patient will ascend/descend 2 stairs with 1 handrail(s) with supervision/set-up. physical Therapy TREATMENT    Patient: Rosendo Weaver (8 y.o. female)  Date: 1/28/2018  Diagnosis: Pneumonia <principal problem not specified>       Precautions:     Chart, physical therapy assessment, plan of care and goals were reviewed. ASSESSMENT:  Pt found supine in bed with HOB elevated. Pt pleasant and agreed to participate in therapy. Pt performed supine to sit with Supervision and sit to stand to RW with Supervision. Upon standing PTA had placed tele monitor in gown pocket but during standing PTA failed to see it was unsecured and monitor dropped to floor and pulled lead off. Nursing notified of incident and continued on with amb. Pt amb to bathroom with RW and SBA. Pt transfer to commode with SBA. Pt left on commode to perform toileting. Upon opening the door pt reported that IV was bleeding. Pt amb back to bed and given gauze for bleed. PTA retrieved nursing to handle situation. Will continue to f/u with pt as they become available.   Progression toward goals:  [x]      Improving appropriately and progressing toward goals  []      Improving slowly and progressing toward goals  []      Not making progress toward goals and plan of care will be adjusted     PLAN:  Patient continues to benefit from skilled intervention to address the above impairments. Continue treatment per established plan of care. Discharge Recommendations:  Rehab vs Home Health with Supervision  Further Equipment Recommendations for Discharge:  N/A     SUBJECTIVE:   Patient stated I'm doing fair today. Little tired.     OBJECTIVE DATA SUMMARY:   Critical Behavior:  Neurologic State: Alert  Orientation Level: Oriented X4   Functional Mobility Training:  Bed Mobility:  Rolling: Supervision  Supine to Sit: Supervision  Sit to Supine: Supervision  Scooting: Supervision  Transfers:  Sit to Stand: Supervision;Stand-by asssistance  Stand to Sit: Supervision;Stand-by asssistance  Balance:  Sitting: Intact  Standing: Intact; With support  Standing - Static: Fair  Standing - Dynamic : Fair  Ambulation/Gait Training:  Distance (ft): 15 Feet (ft)  Assistive Device: Walker, rolling  Ambulation - Level of Assistance: Stand-by asssistance;Contact guard assistance  Gait Description (WDL): Exceptions to WDL  Gait Abnormalities: Decreased step clearance  Base of Support: Narrowed  Speed/Adrianna: Slow  Step Length: Left shortened;Right shortened  Pain:  Pain Scale 1: Visual  Pain Intensity 1: 0  Pain Location 1: Leg  Pain Orientation 1: Right  Pain Description 1: Burning  Pain Intervention(s) 1: Medication (see MAR)  Activity Tolerance:   Fair+  Please refer to the flowsheet for vital signs taken during this treatment. After treatment:   [] Patient left in no apparent distress sitting up in chair  [] Patient left in no apparent distress in bed  [] Call bell left within reach  [] Nursing notified  [] Caregiver present  [] Bed alarm activated      Nyasia Willard   Time Calculation: 13 mins    Mobility  Current  CI= 1-19%   Goal  CI= 1-19%  D/C  CI= 1-19%. The severity rating is based on the Level of Assistance required for Functional Mobility and ADLs.

## 2018-01-28 NOTE — INTERDISCIPLINARY ROUNDS
Interdisciplinary Round Note   Patient Information:   Brigette Scanlon   [birth year]   Reason for Admission: Pneumonia   Attending Provider:   Socrates Lincoln MD  Primary Care Physician:       Arnie Atkins MD       [phone]   Estimated discharge date:  tbd   Hospital day: 3  [unfilled]  4d 21h  RRAT Score: High Risk            34       Total Score        3 Has Seen PCP in Last 6 Months (Yes=3, No=0)    4 IP Visits Last 12 Months (1-3=4, 4=9, >4=11)    9 Pt. Coverage (Medicare=5 , Medicaid, or Self-Pay=4)    18 Charlson Comorbidity Score (Age + Comorbid Conditions)        Criteria that do not apply:    . Living with Significant Other. Assisted Living. LTAC. SNF. or   Rehab    Patient Length of Stay (>5 days = 3)       normal sinus rhythm     No         Chemical      Lines, Drains, & Airways  None       IV Antibiotics:    Current Antimicrobial Therapy (168h ago through future)    Ordered     Start Stop    01/27/18 1033  doxycycline (VIBRAMYCIN) 100 mg in 0.9% sodium chloride (MBP/ADV) 100 mL MBP  100 mg,   IntraVENous,   EVERY 12 HOURS      01/27/18 1100 --    01/25/18 1058  cefTRIAXone (ROCEPHIN) 2 g in sterile water (preservative free) 20 mL IV syringe  2 g,   IntraVENous,   EVERY 24 HOURS     Comments:  First dose Stat    01/25/18 1059 --        GI Prophylaxis: GI Prophylaxis: no   Type: protonix Recent Glucose Results:   Lab Results   Component Value Date/Time    GLUCPOC 355 (H) 01/27/2018 09:40 PM    GLUCPOC 287 (H) 01/27/2018 04:10 PM    GLUCPOC 291 (H) 01/27/2018 11:36 AM      Activity Level: Activity Level:  Up with Assistance    Needs assistance with ADLs: no       Goals for Today: antibiotics   Recommendations:   Discharge Disposition: TBD, pending progress  CM      Needs for Discharge: education IDR Team: MD, RN, CM  Recommendations from IDR team:     Other Notes:

## 2018-01-29 VITALS
HEIGHT: 60 IN | BODY MASS INDEX: 28.35 KG/M2 | WEIGHT: 144.4 LBS | SYSTOLIC BLOOD PRESSURE: 106 MMHG | OXYGEN SATURATION: 94 % | RESPIRATION RATE: 18 BRPM | TEMPERATURE: 98 F | HEART RATE: 76 BPM | DIASTOLIC BLOOD PRESSURE: 56 MMHG

## 2018-01-29 LAB
GLUCOSE BLD STRIP.AUTO-MCNC: 283 MG/DL (ref 70–110)
GLUCOSE BLD STRIP.AUTO-MCNC: 311 MG/DL (ref 70–110)
GLUCOSE BLD STRIP.AUTO-MCNC: 319 MG/DL (ref 70–110)

## 2018-01-29 PROCEDURE — 97116 GAIT TRAINING THERAPY: CPT

## 2018-01-29 PROCEDURE — 77030011256 HC DRSG MEPILEX <16IN NO BORD MOLN -A

## 2018-01-29 PROCEDURE — 74011250636 HC RX REV CODE- 250/636: Performed by: INTERNAL MEDICINE

## 2018-01-29 PROCEDURE — 82962 GLUCOSE BLOOD TEST: CPT

## 2018-01-29 PROCEDURE — 74011250637 HC RX REV CODE- 250/637: Performed by: HOSPITALIST

## 2018-01-29 PROCEDURE — 74011250637 HC RX REV CODE- 250/637: Performed by: INTERNAL MEDICINE

## 2018-01-29 PROCEDURE — 74011636637 HC RX REV CODE- 636/637: Performed by: HOSPITALIST

## 2018-01-29 RX ORDER — INSULIN LISPRO 100 [IU]/ML
INJECTION, SOLUTION INTRAVENOUS; SUBCUTANEOUS
Qty: 1 VIAL | Refills: 1 | Status: SHIPPED
Start: 2018-01-29 | End: 2019-03-28

## 2018-01-29 RX ORDER — INSULIN GLARGINE 100 [IU]/ML
INJECTION, SOLUTION SUBCUTANEOUS
Qty: 1 ADJUSTABLE DOSE PRE-FILLED PEN SYRINGE | Refills: 3 | Status: SHIPPED | OUTPATIENT
Start: 2018-01-29 | End: 2019-03-28

## 2018-01-29 RX ADMIN — FERROUS SULFATE TAB 325 MG (65 MG ELEMENTAL FE) 325 MG: 325 (65 FE) TAB at 09:51

## 2018-01-29 RX ADMIN — HEPARIN SODIUM 5000 UNITS: 5000 INJECTION, SOLUTION INTRAVENOUS; SUBCUTANEOUS at 12:10

## 2018-01-29 RX ADMIN — INSULIN LISPRO 9 UNITS: 100 INJECTION, SOLUTION INTRAVENOUS; SUBCUTANEOUS at 09:57

## 2018-01-29 RX ADMIN — Medication 10 ML: at 15:48

## 2018-01-29 RX ADMIN — INSULIN LISPRO 8 UNITS: 100 INJECTION, SOLUTION INTRAVENOUS; SUBCUTANEOUS at 12:10

## 2018-01-29 RX ADMIN — HYDRALAZINE HYDROCHLORIDE 10 MG: 10 TABLET, FILM COATED ORAL at 09:51

## 2018-01-29 RX ADMIN — VITAMIN D, TAB 1000IU (100/BT) 2000 UNITS: 25 TAB at 09:51

## 2018-01-29 RX ADMIN — HYDRALAZINE HYDROCHLORIDE 10 MG: 10 TABLET, FILM COATED ORAL at 15:48

## 2018-01-29 RX ADMIN — Medication 10 ML: at 07:07

## 2018-01-29 RX ADMIN — PANTOPRAZOLE SODIUM 40 MG: 40 TABLET, DELAYED RELEASE ORAL at 09:51

## 2018-01-29 RX ADMIN — ASPIRIN 81 MG: 81 TABLET, COATED ORAL at 09:51

## 2018-01-29 RX ADMIN — METOPROLOL TARTRATE 50 MG: 50 TABLET ORAL at 12:43

## 2018-01-29 RX ADMIN — AMOXICILLIN AND CLAVULANATE POTASSIUM 1 TABLET: 500; 125 TABLET, FILM COATED ORAL at 06:32

## 2018-01-29 RX ADMIN — SIMVASTATIN 20 MG: 20 TABLET, FILM COATED ORAL at 09:51

## 2018-01-29 RX ADMIN — AMLODIPINE BESYLATE 5 MG: 5 TABLET ORAL at 09:51

## 2018-01-29 RX ADMIN — TRAMADOL HYDROCHLORIDE 50 MG: 50 TABLET, FILM COATED ORAL at 10:04

## 2018-01-29 RX ADMIN — VITAM B12 100 MCG: 100 TAB at 09:51

## 2018-01-29 NOTE — DISCHARGE SUMMARY
Physician Discharge Summary       Patient: Brigette Scanlon MRN: [id number]  SSN: [id number]    YOB: 1940  Age: 65 y.o. Sex: female    PCP: Arnie Atkins MD    Allergies: Latex    Admit date: 1/25/2018  Admitting Provider: He Avila MD    Discharge date: 1/29/2018  Discharging Provider: Socrates Lincoln MD    * Admission Diagnoses: Pneumonia    * Discharge Diagnoses:    Hospital Problems as of 1/29/2018  Date Reviewed: 6/29/2017          Codes Class Noted - Resolved POA    HTN (hypertension), benign ICD-10-CM: I10  ICD-9-CM: 401.1  1/28/2018 - Present Unknown        Pneumonia ICD-10-CM: J18.9  ICD-9-CM: 486  1/25/2018 - Present Yes              * Hospital Course: The patient is a pleasant 64 yo female presenting to [facility] on 1/25 with worsening cough and SOB. She notes that for the past week PTA she had an ongoing wet cough with scant sputum production and about 2 days PTA she started to lose her appetite and then developed some low grade temps and chills. She notes that she also became somewhat more weak and was sleeping. She was seen by her PCP and had been given a script for right hip pain, but then started vomiting after taking the pain medication. She was brought into the ED by her friend after she became increasingly weak and short of breath. In the ED she was diagnosed with pneumonia without hypoxemia and was admitted to the stepdown unit. She was started in IV antibiotics and seen by PT for her painful right hip and leg. Her pneumonia resolved and she was able to subsequently ambulate with assistance. A hemoglobin A1c was done and was noted to be greater than 11. She had Stage 3 chronic renal failure on admission and was taking metformin at home. I have discontinued her metformin and started her on Lantus and insulin sliding scale.  This will need to be adjusted over the next couple weeks while she is at rehab.            * Procedures: None  * No surgery found *      Consults: None    Significant Diagnostic Studies: None    Discharge Exam:  General appearance: alert, cooperative, no distress, appears stated age  Neck: supple, symmetrical, trachea midline, no adenopathy, thyroid: not enlarged, symmetric, no tenderness/mass/nodules, no carotid bruit and no JVD  Lungs: clear to auscultation bilaterally  Heart: regular rate and rhythm, S1, S2 normal, no murmur, click, rub or gallop  Abdomen: soft, non-tender. Bowel sounds normal. No masses,  no organomegaly  Extremities: extremities normal, atraumatic, no cyanosis or edema  Neurologic: Grossly normal    * Discharge Condition: good  * Disposition: Rehab    Discharge Medications:  Current Discharge Medication List      START taking these medications    Details   insulin lispro (HUMALOG) 100 unit/mL injection As directed  Indications: type 2 diabetes mellitus  Qty: 1 Vial, Refills: 1      insulin glargine (LANTUS,BASAGLAR) 100 unit/mL (3 mL) inpn 25 units subcutaneously every evening at 9 pm  Qty: 1 Adjustable Dose Pre-filled Pen Syringe, Refills: 3         CONTINUE these medications which have NOT CHANGED    Details   furosemide (LASIX) 20 mg tablet 20 mg po daily  Qty: 30 Tab, Refills: 0      pantoprazole (PROTONIX) 40 mg tablet Take 1 Tab by mouth two (2) times a day. Qty: 60 Tab, Refills: 0      amLODIPine (NORVASC) 5 mg tablet TAKE ONE TABLET BY MOUTH ONCE DAILY  Qty: 30 Tab, Refills: 0      nitroglycerin (NITROLINGUAL) 400 mcg/spray spray 1 Spray by SubLINGual route every five (5) minutes as needed. Qty: 1 Bottle, Refills: 2      hydrALAZINE (APRESOLINE) 10 mg tablet Take 1 Tab by mouth three (3) times daily. Qty: 90 Tab, Refills: 6      metoprolol tartrate (LOPRESSOR) 50 mg tablet Take 1 Tab by mouth two (2) times a day. Qty: 60 Tab, Refills: 6      ascorbic acid, vitamin C, (VITAMIN C) 250 mg tablet Take 1 Tab by mouth daily.   Qty: 60 Tab, Refills: 1      ferrous sulfate 325 mg (65 mg iron) tablet Take 1 Tab by mouth two (2) times daily (with meals). Qty: 60 Tab, Refills: 1      multivitamin (ONE A DAY) tablet Take 1 Tab by mouth daily. cholecalciferol (VITAMIN D3) 1,000 unit tablet Take 2 Tabs by mouth daily. Qty: 60 Tab, Refills: 1      simvastatin (ZOCOR) 20 mg tablet Take 20 mg by mouth daily. omega 3-dha-epa-fish oil (FISH OIL) 100-160-1,000 mg cap Take 1,000 mg by mouth two (2) times a day. cyanocobalamin (VITAMIN B12) 100 mcg tablet Take 100 mcg by mouth daily. aspirin 81 mg tablet Take 81 mg by mouth daily. calcium polycarbophil (FIBER LAXATIVE) 625 mg tablet Take 1,250 mg by mouth daily. STOP taking these medications       metFORMIN (GLUCOPHAGE) 500 mg tablet Comments:   Reason for Stopping:               * Follow-up Care/Patient Instructions:   Activity: Activity as tolerated  Diet: Cardiac Diet, Diabetic diet  Wound Care: None needed    Follow-up Information     Follow up With Details Comments Contact Info    [address] [address]  [phone]      [facility], [address] [phone]            Signed:  Jackie Martínez MD  1/29/2018  2:16 PM

## 2018-01-29 NOTE — ROUTINE PROCESS
Bedside and Verbal shift change report given to Otto Mcdowell RN (oncoming nurse) by Madelyn Cunha RN (offgoing nurse). Report included the following information SBAR, Kardex, MAR and Recent Results. SITUATION:    Code Status: Full Code   Reason for Admission: Pneumonia    [facility] day: 4   Problem List:       Hospital Problems  Date Reviewed: 6/29/2017          Codes Class Noted POA    HTN (hypertension), benign ICD-10-CM: I10  ICD-9-CM: 401.1  1/28/2018 Unknown        Pneumonia ICD-10-CM: J18.9  ICD-9-CM: 486  1/25/2018 Yes              BACKGROUND:    Past Medical History:   Past Medical History:   Diagnosis Date    Anemia     CAD (coronary artery disease)     Coronary Stenting needed, hematology workup needed prior to stent placement, treating medically at this time    Cancer [facility])     colon    Cancer ([facility] [address]     breast - left    Cardiac catheterization 08/17/2016    LM patent. mLAD 99.9% (2.25 x 28-mm Xience BETHEL, resid 0%). oD1 60%. oD2 95%. pCX 90% (2.5 x 13-mm Xience BETHEL, resid 0%). OMB patent.  Cardiac echocardiogram 07/01/2016    EF 50%. Basal inferior, basal-mid inferolateral hypk. Gr 1 DDfx. RVSP 50-60 mmHg. Mild LAE.   Mod, eccentric MR.      Diabetes ([facility] [address]     Hypercholesteremia     Hypertension     Thrombocytopenia ([facility] [address]          Patient taking anticoagulants yes     ASSESSMENT:    Changes in Assessment Throughout Shift: none     Patient has Central Line: no Reasons if yes: n/a   Patient has Jean Cath: no Reasons if yes: n/a      Last Vitals:     Vitals:    01/28/18 1448 01/28/18 1605 01/28/18 2058 01/29/18 0029   BP:  121/60 123/70 134/77   Pulse:  86 76 83   Resp:  18 21 18   Temp:  98.6 °F (37 °C) 98.2 °F (36.8 °C) 98.3 °F (36.8 °C)   SpO2:  96% 95% 96%   Weight:       Height: 4' 11.84\" (1.52 m)           IV and DRAINS (will only show if present)   Peripheral IV 01/25/18 Left Antecubital-Site Assessment: Clean, dry, & intact  [REMOVED] Peripheral IV 01/25/18 Right Antecubital-Site Assessment: Clean, dry, & intact     WOUND (if present)   Wound Type:  Stage 2 sacral   Dressing present Dressing Present : Yes, Intact, not due to be changed   Wound Concerns/Notes:  none     PAIN    Pain Assessment    Pain Intensity 1: 0 (01/28/18 1535)    Pain Location 1: Leg    Pain Intervention(s) 1: Medication (see MAR)    Patient Stated Pain Goal: 0  o Interventions for Pain:  none  o Intervention effective: yes  o Time of last intervention: n/a   o Reassessment Completed: yes      Last 3 Weights:  Last 3 Recorded Weights in this Encounter    01/26/18 0431 01/27/18 0400 01/28/18 0822   Weight: 62 kg (136 lb 9.6 oz) 64 kg (141 lb 3.2 oz) 65.5 kg (144 lb 6.4 oz)     Weight change:      INTAKE/OUPUT    Current Shift:      Last three shifts: 01/27 1901 - 01/29 0700  In: 120 [I.V.:120]  Out: -      LAB RESULTS     No results for input(s): WBC, HGB, HCT, PLT, HGBEXT, HCTEXT, PLTEXT, HGBEXT, HCTEXT, PLTEXT in the last 72 hours. No results for input(s): NA, K, GLU, BUN, CREA, CA, MG, INR in the last 72 hours. No lab exists for component: PT, PTT, INREXT, INREXT    RECOMMENDATIONS AND DISCHARGE PLANNING     1. Pending tests/procedures/ Plan of Care or Other Needs: PT/OT    2. Discharge plan for patient and Needs/Barriers: SNF    3. Estimated Discharge Date: tbd Posted on Whiteboard in \[facility]\"": yes      4. The patient's care plan was reviewed with the oncoming nurse. \"HEALS\" SAFETY CHECK      Fall Risk    Total Score: 2    Safety Measures: Safety Measures: Bed/Chair-Wheels locked, Bed in low position, Call light within reach    A safety check occurred in the patient's room between off going nurse and oncoming nurse listed above.     The safety check included the below items  Area Items   H  High Alert Medications - Verify all high alert medication drips (heparin, PCA, etc.)   E  Equipment - Suction is set up for ALL patients (with sarahker)  - Red plugs utilized for all equipment (IV pumps, etc.)  - WOWs wiped down at end of shift.  - Room stocked with oxygen, suction, and other unit-specific supplies   A  Alarms - Bed alarm is set for fall risk patients  - Ensure chair alarm is in place and activated if patient is up in a chair   L  Lines - Check IV for any infiltration  - Jean bag is empty if patient has a Jean   - Tubing and IV bags are labeled   S  Safety   - Room is clean, patient is clean, and equipment is clean. - Hallways are clear from equipment besides carts. - Fall bracelet on for fall risk patients  - Ensure room is clear and free of clutter  - Suction is set up for ALL patients (with yanker)  - Hallways are clear from equipment besides carts.    - Isolation precautions followed, supplies available outside room, sign posted     Jeison Hoyt, RN

## 2018-01-29 NOTE — PROGRESS NOTES
Problem: Falls - Risk of  Goal: *Absence of Falls  Document Candy Fall Risk and appropriate interventions in the flowsheet.    Outcome: Progressing Towards Goal  Fall Risk Interventions:  Mobility Interventions: OT consult for ADLs, PT Consult for mobility concerns         Medication Interventions: Patient to call before getting OOB    Elimination Interventions: Call light in reach

## 2018-01-29 NOTE — PROGRESS NOTES
Report called to Francois at [facility]. Pt left unit in no distress with stable vital signs via [facility]. Family also notified of discharge plan.

## 2018-01-29 NOTE — DIABETES MGMT
Glycemic Control Plan of Care    POC BG range on 01/28/2018: 272-371 mg/dL. POC BG range on 01/29/2018 at time of review: 283, 311 mg/dL. Seen patient this morning and noted elevated BG readings. She received a total of 36 units of correctional lispro insulin on 01/28/2018. Noted regular insulin drip via Cy[facility] Kettle was d/c'd at 6:30 PM on 1/26/2018 - but order still active. F/U this afternoon and noted patient discharged to home. Recommendation(s):  1.) Called Dr. Nishant Mace this morning and recommended to add basal lantus insulin. No order received. MD to evaluate patient. 2.) Modify diabetic diet from 2200kcal to 1800kcal and no concentrated sweets. Done. 3.) Discontinue GlucoStabilizer order. Done. Obtained order from Dr. Leobardo Sanchez. Assessment:  Patient is 65year old with past medical history including type 2 diabetes mellitus, CAD with stent, colon cancer, left breast cancer, hypercholesterolemia, hypertension, and thrombocytopenia - was admitted on 01/25/2018 with c/o worsening cough, shortness of breath, poor appetite, chills, and low grade temp. Noted:  Community acquired pneumonia. Sepsis. Hyperglycemia. Non gap, no acidosis. Acute renal failure. Type 2 diabetes mellitus. Pending A1c result ordered 01/26/2018. Most recent blood glucose values:    Results for Dean Au (MRN [id number]) as of 1/29/2018 15:26   Ref. Range 1/28/2018 08:27 1/28/2018 11:34 1/28/2018 16:10 1/28/2018 21:17   GLUCOSE,FAST - POC Latest Ref Range: 70 - 110 mg/dL 272 (H) 371 (H) 304 (H) 318 (H)     Results for Dean Au (MRN [id number]) as of 1/29/2018 15:26   Ref. Range 1/29/2018 07:57 1/29/2018 11:36   GLUCOSE,FAST - POC Latest Ref Range: 70 - 110 mg/dL 283 (H) 311 (H)     Current A1C: 12.3% (01/26/2018) which is equivalent to average blood glucose of 306 mg/dL during the past 2-3 months. Current hospital diabetes medications:   Correctional lispro insulin ACHS.  Very resistant dose.    Total daily dose insulin requirement previous day: 01/28/2018  Lispro: 36 units    Home diabetes medications: As reported by patient on 01/26/2018:  Metformin 500 mg twice daily with food. Diet: Diabetic consistent carb 1800kcal; no concentrated sweets. Goals: Blood glucose will be within target range of  mg/dL by 01/29/2018.     Education:  ___  Refer to Diabetes Education Record             __X_  Education not indicated at this time    Yvette Matamoros RN CCM

## 2018-01-29 NOTE — ROUTINE PROCESS
Bedside and Verbal shift change report given to LATRICIA Spivey (oncoming nurse) by Nahid Marina RN (offgoing nurse). Report included the following information SBAR, Kardex, MAR and Recent Results. SITUATION:    Code Status: Full Code   Reason for Admission: Pneumonia    [facility] day: 3   Problem List:       Hospital Problems  Date Reviewed: 6/29/2017          Codes Class Noted POA    HTN (hypertension), benign ICD-10-CM: I10  ICD-9-CM: 401.1  1/28/2018 Unknown        Pneumonia ICD-10-CM: J18.9  ICD-9-CM: 907  1/25/2018 Yes              BACKGROUND:    Past Medical History:   Past Medical History:   Diagnosis Date    Anemia     CAD (coronary artery disease)     Coronary Stenting needed, hematology workup needed prior to stent placement, treating medically at this time    Cancer [facility])     colon    Cancer ([facility] [address]     breast - left    Cardiac catheterization 08/17/2016    LM patent. mLAD 99.9% (2.25 x 28-mm Xience BETHEL, resid 0%). oD1 60%. oD2 95%. pCX 90% (2.5 x 13-mm Xience BETHEL, resid 0%). OMB patent.  Cardiac echocardiogram 07/01/2016    EF 50%. Basal inferior, basal-mid inferolateral hypk. Gr 1 DDfx. RVSP 50-60 mmHg. Mild LAE.   Mod, eccentric MR.      Diabetes ([facility] [address]     Hypercholesteremia     Hypertension     Thrombocytopenia ([facility] [address]          Patient taking anticoagulants yes     ASSESSMENT:    Changes in Assessment Throughout Shift: none     Patient has Central Line: no Reasons if yes: n/a   Patient has Jean Cath: no Reasons if yes: n/a      Last Vitals:     Vitals:    01/28/18 0833 01/28/18 1128 01/28/18 1448 01/28/18 1605   BP: 142/71 117/63  121/60   Pulse: 98 79  86   Resp: 22 22 18   Temp: 97.6 °F (36.4 °C) 97.7 °F (36.5 °C)  98.6 °F (37 °C)   SpO2: 97% 95%  96%   Weight:       Height:   4' 11.84\" (1.52 m)         IV and DRAINS (will only show if present)   Peripheral IV 01/25/18 Left Antecubital-Site Assessment: Clean, dry, & intact  [REMOVED] Peripheral IV 01/25/18 Right Antecubital-Site Assessment: Clean, dry, & intact     WOUND (if present)   Wound Type:  Stage 2 sacral   Dressing present Dressing Present : Yes   Wound Concerns/Notes:  none     PAIN    Pain Assessment    Pain Intensity 1: 0 (01/28/18 1535)    Pain Location 1: Leg    Pain Intervention(s) 1: Medication (see MAR)    Patient Stated Pain Goal: 0  o Interventions for Pain:  tramadol  o Intervention effective: yes  o Time of last intervention: 0848  o Reassessment Completed: yes      Last 3 Weights:  Last 3 Recorded Weights in this Encounter    01/26/18 0431 01/27/18 0400 01/28/18 0822   Weight: 62 kg (136 lb 9.6 oz) 64 kg (141 lb 3.2 oz) 65.5 kg (144 lb 6.4 oz)     Weight change:      INTAKE/OUPUT    Current Shift:      Last three shifts: 01/27 0701 - 01/28 1900  In: 360 [P.O.:240; I.V.:120]  Out: -      LAB RESULTS     Recent Labs      01/26/18   0543   WBC  6.8   HGB  8.3*   HCT  23.3*   PLT  79*        Recent Labs      01/26/18   0543   NA  135*   K  4.0   GLU  149*   BUN  54*   CREA  2.17*   CA  8.2*       RECOMMENDATIONS AND DISCHARGE PLANNING     1. Pending tests/procedures/ Plan of Care or Other Needs: oral antibiotics. PT/OT    2. Discharge plan for patient and Needs/Barriers: home vs snf    3. Estimated Discharge Date: tbd Posted on Whiteboard in Patients Room: no    4. The patient's care plan was reviewed with the oncoming nurse. \"HEALS\" SAFETY CHECK      Fall Risk    Total Score: 1    Safety Measures: Safety Measures: Bed/Chair-Wheels locked, Bed in low position, Call light within reach    A safety check occurred in the patient's room between off going nurse and oncoming nurse listed above.     The safety check included the below items  Area Items   H  High Alert Medications - Verify all high alert medication drips (heparin, PCA, etc.)   E  Equipment - Suction is set up for ALL patients (with yanker)  - Red plugs utilized for all equipment (IV pumps, etc.)  - WOWs wiped down at end of shift.  - Room stocked with oxygen, suction, and other unit-specific supplies   A  Alarms - Bed alarm is set for fall risk patients  - Ensure chair alarm is in place and activated if patient is up in a chair   L  Lines - Check IV for any infiltration  - Jean bag is empty if patient has a Jean   - Tubing and IV bags are labeled   S  Safety   - Room is clean, patient is clean, and equipment is clean. - Hallways are clear from equipment besides carts. - Fall bracelet on for fall risk patients  - Ensure room is clear and free of clutter  - Suction is set up for ALL patients (with sarahker)  - Hallways are clear from equipment besides carts.    - Isolation precautions followed, supplies available outside room, sign posted     Truman Beltran RN

## 2018-01-29 NOTE — PROGRESS NOTES
Problem: Mobility Impaired (Adult and Pediatric)  Goal: *Acute Goals and Plan of Care (Insert Text)  Physical Therapy Goals  Initiated 1/27/2018 and to be accomplished within 7 day(s)  1. Patient will move from supine to sit and sit to supine , scoot up and down and roll side to side in bed with supervision/set-up. 2.  Patient will transfer from bed to chair and chair to bed with supervision/set-up using the least restrictive device. 3.  Patient will perform sit to stand with supervision/set-up. 4.  Patient will ambulate with supervision/set-up for >50 feet with the least restrictive device. 5.  Patient will ascend/descend 2 stairs with 1 handrail(s) with supervision/set-up. 1100 - Pt found supine in bed, resting. Pt reports feeling fatigued and decreased energy. Pt reports nursing found blood sugar levels off. Pt requests PTA return after lunch do to possible improvement in status. Will f/u with pt as they become available.     ERIC Godwin

## 2018-01-29 NOTE — PROGRESS NOTES
Problem: Mobility Impaired (Adult and Pediatric)  Goal: *Acute Goals and Plan of Care (Insert Text)  Physical Therapy Goals  Initiated 1/27/2018 and to be accomplished within 7 day(s)  1. Patient will move from supine to sit and sit to supine , scoot up and down and roll side to side in bed with supervision/set-up. 2.  Patient will transfer from bed to chair and chair to bed with supervision/set-up using the least restrictive device. 3.  Patient will perform sit to stand with supervision/set-up. 4.  Patient will ambulate with supervision/set-up for >50 feet with the least restrictive device. 5.  Patient will ascend/descend 2 stairs with 1 handrail(s) with supervision/set-up. physical Therapy TREATMENT    Patient: Gauri Meraz (36 y.o. female)  Date: 1/29/2018  Diagnosis: Pneumonia <principal problem not specified>       Precautions:     Chart, physical therapy assessment, plan of care and goals were reviewed. ASSESSMENT:  Pt found supine in bed, on the phone. Pt pleasant and agreed to participate in therapy. Pt performed bed mobility to seated EOB with Supervision. Pt then performed sit to stand to RW with SBA due to pt current subjective status. Pt then amb in hallway with RW and SBA for ~100 ft at slow pace. Pt then returned to bed supine with Supervision. Pt left with all needs met and call bell left in reach. Progression toward goals:  [x]      Improving appropriately and progressing toward goals  []      Improving slowly and progressing toward goals  []      Not making progress toward goals and plan of care will be adjusted     PLAN:  Patient continues to benefit from skilled intervention to address the above impairments. Continue treatment per established plan of care. Discharge Recommendations:  Home Health with supervision  Further Equipment Recommendations for Discharge:  rolling walker     SUBJECTIVE:   Patient stated I still feel kind of cruddy but I will try.     OBJECTIVE DATA SUMMARY:   Critical Behavior:  Neurologic State: Alert  Orientation Level: Oriented X4   Functional Mobility Training:  Bed Mobility:  Rolling: Supervision  Supine to Sit: Supervision  Sit to Supine: Supervision  Scooting: Supervision  Transfers:  Sit to Stand: Supervision;Stand-by asssistance  Stand to Sit: Supervision;Stand-by asssistance   Balance:  Sitting: Intact  Standing: Intact; With support  Standing - Static: Fair  Standing - Dynamic : Fair  Ambulation/Gait Training:  Distance (ft): 100 Feet (ft)  Assistive Device: Walker, rolling  Ambulation - Level of Assistance: Stand-by asssistance  Gait Description (WDL): Exceptions to WDL  Gait Abnormalities: Decreased step clearance  Base of Support: Center of gravity altered;Narrowed  Speed/Adrianna: Slow  Step Length: Left shortened;Right shortened  Interventions: Verbal cues  Pain:  Pain Scale 1: Numeric (0 - 10)  Pain Intensity 1: 0  Pain Location 1: Coccyx  Pain Orientation 1: Mid;Posterior  Pain Description 1: Aching  Pain Intervention(s) 1: Medication (see MAR)  Activity Tolerance:   Fair+  Please refer to the flowsheet for vital signs taken during this treatment. After treatment:   [] Patient left in no apparent distress sitting up in chair  [x] Patient left in no apparent distress in bed  [x] Call bell left within reach  [x] Nursing notified  [] Caregiver present  [] Bed alarm activated      Sheila Neal   Time Calculation: 14 mins    Mobility  Current  CI= 1-19%   Goal  CI= 1-19%  D/C  CI= 1-19%. The severity rating is based on the Level of Assistance required for Functional Mobility and ADLs.

## 2018-01-29 NOTE — PROGRESS NOTES
Care Management Interventions  PCP Verified by CM: Yes (DR. Krysta Luke)  Palliative Care Criteria Met (RRAT>21 & CHF Dx)?: No  Mode of Transport at Discharge: BLS  Transition of Care Consult (CM Consult): Discharge Planning, SNF  Current Support Network: Own Home, Other (PT [address] [facility])  Confirm Follow Up Transport: Other (see comment)  Plan discussed with Pt/Family/Caregiver: Yes (PT PLAN IS TO BE PLACED AT [address] [place] [address] TO CARE FOR HERSELF SAFELY)  Freedom of Choice Offered: Yes   Resource Information Provided?: No  Discharge Location  Discharge Placement: Skilled nursing facility  Pt provided [facility] for this pt who request to return to [facility] for admission and return home  When physically able to do so. [facility] requested has been placed in [facility] for acceptance for this placement when ready. Pt accepted by [facility]. Pt will be transported by [facility] 4:30 p.m. This pt after speaking with her sister, felt that she was not well enough to be discharged to a SNF today.

## 2018-01-29 NOTE — ROUTINE PROCESS
Bedside shift change report given to Cheryl Rivera RN (oncoming nurse) by Brad Moran RN (offgoing nurse). Report included the following information SBAR, Kardex and Recent Results.

## 2018-01-30 LAB
BACTERIA SPEC CULT: ABNORMAL
GRAM STN SPEC: ABNORMAL
GRAM STN SPEC: ABNORMAL
M PNEUMO IGG SER IA-ACNC: 105 U/ML (ref 0–99)
M PNEUMO IGM SER IA-ACNC: <770 U/ML (ref 0–769)
SERVICE CMNT-IMP: ABNORMAL

## 2018-01-31 LAB
BACTERIA SPEC CULT: NORMAL
SERVICE CMNT-IMP: NORMAL

## 2018-02-16 ENCOUNTER — HOME HEALTH ADMISSION (OUTPATIENT)
Dept: HOME HEALTH SERVICES | Facility: HOME HEALTH | Age: 78
End: 2018-02-16

## 2018-02-21 ENCOUNTER — HOME CARE VISIT (OUTPATIENT)
Dept: HOME HEALTH SERVICES | Facility: HOME HEALTH | Age: 78
End: 2018-02-21

## 2018-02-22 ENCOUNTER — HOME CARE VISIT (OUTPATIENT)
Dept: HOME HEALTH SERVICES | Facility: HOME HEALTH | Age: 78
End: 2018-02-22

## 2018-03-08 ENCOUNTER — OFFICE VISIT (OUTPATIENT)
Dept: ORTHOPEDIC SURGERY | Age: 78
End: 2018-03-08

## 2018-03-08 VITALS
HEIGHT: 59 IN | TEMPERATURE: 97.5 F | WEIGHT: 130.6 LBS | OXYGEN SATURATION: 100 % | HEART RATE: 77 BPM | DIASTOLIC BLOOD PRESSURE: 66 MMHG | SYSTOLIC BLOOD PRESSURE: 144 MMHG | BODY MASS INDEX: 26.33 KG/M2

## 2018-03-08 DIAGNOSIS — M25.552 PAIN OF BOTH HIP JOINTS: ICD-10-CM

## 2018-03-08 DIAGNOSIS — M54.31 SCIATICA OF RIGHT SIDE: ICD-10-CM

## 2018-03-08 DIAGNOSIS — M54.5 BILATERAL LOW BACK PAIN, UNSPECIFIED CHRONICITY, WITH SCIATICA PRESENCE UNSPECIFIED: Primary | ICD-10-CM

## 2018-03-08 DIAGNOSIS — M51.16 LUMBAR DISC DISEASE WITH RADICULOPATHY: ICD-10-CM

## 2018-03-08 DIAGNOSIS — M47.816 ARTHRITIS, LUMBAR SPINE: ICD-10-CM

## 2018-03-08 DIAGNOSIS — M25.551 PAIN OF BOTH HIP JOINTS: ICD-10-CM

## 2018-03-08 RX ORDER — TRAMADOL HYDROCHLORIDE 50 MG/1
50 TABLET ORAL DAILY
Qty: 30 TAB | Refills: 0 | Status: ON HOLD | OUTPATIENT
Start: 2018-03-08 | End: 2019-05-01 | Stop reason: SDUPTHER

## 2018-03-08 NOTE — MR AVS SNAPSHOT
[address] [place] [address], [address] [facility] 
[phone] Patient: Dakota Stage MRN: [id number] Visit Information Date & Time Provider Department Dept. Phone Encounter #  
 3/8/2018  2:00 PM Thelma Newby MD 4 [facility], [address] and [facility] [phone] [id number] Upcoming Health Maintenance Date Due DTaP/Tdap/Td series (1 - Tdap) 10/28/1960 ZOSTER VACCINE AGE 60> 1999 GLAUCOMA SCREENING Q2Y 10/28/2004 Bone Densitometry (Dexa) Screening 10/28/2004 Pneumococcal 65+ High/Highest Risk (1 of 2 - PCV13) 10/28/2004 Allergies as of 3/8/2018  Review Complete On: 3/8/2018 By: Thelma Newby MD  
  
 Severity Noted Reaction Type Reactions Latex  2016    Itching Current Immunizations  Reviewed on 10/19/2017 Name Date Influenza Vaccine 2017 Not reviewed this visit You Were Diagnosed With   
  
 Codes Comments Bilateral low back pain, unspecified chronicity, with sciatica presence unspecified    -  Primary ICD-10-CM: M54.5 ICD-9-CM: 724.2 Pain of both hip joints     ICD-10-CM: M25.551, M25.552 ICD-9-CM: 719.45 Lumbar disc disease with radiculopathy     ICD-10-CM: M51.16 
ICD-9-CM: 722.10 Arthritis, lumbar spine ([facility]     ICD-10-CM: M46.96 
ICD-9-CM: 721.3 Sciatica of right side     ICD-10-CM: M54.31 
ICD-9-CM: 724.3 Vitals BP Pulse Temp Height(growth percentile) Weight(growth percentile) SpO2  
 144/66 77 97.5 °F (36.4 °C) 4' 11\" (1.499 m) 130 lb 9.6 oz (59.2 kg) 100% BMI OB Status Smoking Status 26.38 kg/m2 Postmenopausal Never Smoker Vitals History BMI and BSA Data Body Mass Index Body Surface Area  
 26.38 kg/m 2 1.57 m 2 Preferred Pharmacy Pharmacy Name Phone [address], [address] [place] [address] Avenue [phone] Your Updated Medication List  
  
   
 This list is accurate as of 3/8/18  2:59 PM.  Always use your most recent med list. amLODIPine 5 mg tablet Commonly known as:  Gabriela Ortizl TAKE ONE TABLET BY MOUTH ONCE DAILY  
  
 ascorbic acid (vitamin C) 250 mg tablet Commonly known as:  VITAMIN C Take 1 Tab by mouth daily. aspirin 81 mg tablet Take 81 mg by mouth daily. cholecalciferol 1,000 unit tablet Commonly known as:  VITAMIN D3 Take 2 Tabs by mouth daily. cyanocobalamin 100 mcg tablet Commonly known as:  VITAMIN B12 Take 100 mcg by mouth daily. ferrous sulfate 325 mg (65 mg iron) tablet Take 1 Tab by mouth two (2) times daily (with meals). FIBER LAXATIVE (CA POLYCARBO) 625 mg tablet Generic drug:  calcium polycarbophil Take 1,250 mg by mouth daily. FISH -160-1,000 mg Cap Generic drug:  omega 3-dha-epa-fish oil Take 1,000 mg by mouth two (2) times a day. furosemide 20 mg tablet Commonly known as:  LASIX  
20 mg po daily  
  
 hydrALAZINE 10 mg tablet Commonly known as:  APRESOLINE Take 1 Tab by mouth three (3) times daily. insulin glargine 100 unit/mL (3 mL) Inpn Commonly known as:  LANTUS,BASAGLAR  
25 units subcutaneously every evening at 9 pm  
  
 insulin lispro 100 unit/mL injection Commonly known as:  HUMALOG As directed  Indications: type 2 diabetes mellitus JANUMET  mg per tablet Generic drug:  SITagliptin-metFORMIN Take 1 Tab by mouth two (2) times daily (with meals). metoprolol tartrate 50 mg tablet Commonly known as:  LOPRESSOR Take 1 Tab by mouth two (2) times a day. multivitamin tablet Commonly known as:  ONE A DAY Take 1 Tab by mouth daily. nitroglycerin 400 mcg/spray spray Commonly known as:  NITROLINGUAL  
1 [place] by SubLINGual route every five (5) minutes as needed. pantoprazole 40 mg tablet Commonly known as:  PROTONIX Take 1 Tab by mouth two (2) times a day. simvastatin 20 mg tablet Commonly known as:  ZOCOR Take 20 mg by mouth daily. We Performed the Following AMB POC X-RAY RADEX HIP UNI WITH PELVIS 2-3 VIEWS [82917 CPT(R)] AMB POC XRAY, SPINE, LUMBOSACRAL; 2 O [52553 CPT(R)] Introducing [facility]! Normabakari Gore introduces [employer] patient portal. Now you can access parts of your medical record, email your doctor's office, and request medication refills online. 1. In your internet browser, go to [email or web address] 2. Click on the First Time User? Click Here link in the Sign In box. You will see the New Member Sign Up page. 3. Enter your [employer] Access Code exactly as it appears below. You will not need to use this code after youve completed the sign-up process. If you do not sign up before the expiration date, you must request a new code. · [employer] Access Code: [email or web address] Expires: 4/29/2018  4:35 PM 
 
4. Enter the last four digits of your Social Security Number (xxxx) and Date of Birth (mm/dd/yyyy) as indicated and click Submit. You will be taken to the next sign-up page. 5. Create a [employer] ID. This will be your [employer] login ID and cannot be changed, so think of one that is secure and easy to remember. 6. Create a [employer] password. You can change your password at any time. 7. Enter your Password Reset Question and Answer. This can be used at a later time if you forget your password. 8. Enter your e-mail address. You will receive e-mail notification when new information is available in [address] 9. Click Sign Up. You can now view and download portions of your medical record. 10. Click the Download Summary menu link to download a portable copy of your medical information. If you have questions, please visit the Frequently Asked Questions section of the [employer] website. Remember, [employer] is NOT to be used for urgent needs. For medical emergencies, dial 911. Now available from your iPhone and Android! Please provide this summary of care documentation to your next provider. Your primary care clinician is listed as Gama Mireles. If you have any questions after today's visit, please call [phone].

## 2018-03-08 NOTE — PROGRESS NOTES
HISTORY OF PRESENT ILLNESS: Ms. Sarah Lopez is here for consultation regarding right hip pain. On further questioning her pain is in the very posterior aspect of the right hip and buttock, more consistent with sciatica. The pain does radiate slightly down the right leg. She did have a Cortisone injection by her family physician a few weeks ago but this did not help her pain. She does not complain of pain in the right groin. She does not notice a leg length discrepancy. She is currently on a walker for ambulation assistance. She states her worse pain occurs at night. They did put her on some Norco medication, but she does not like this medication. She is not taking any anti-inflammatory medications currently. She has not had physical therapy. She denies bladder or bowel dysfunction. She denies numbness or tingling in the lower extremities. PHYSICAL EXAMINATION:  Reveals an elderly, 25-year-old female on a walker. She is alert and oriented x 3 and answers questions appropriately. With reference to her lumbar spine she has moderate paraspinal muscle spasm. With reference to her hips, she has a very good active and passive range of motion of both hips, without significant discomfort. Right and left hip roll tests are negative. She has clinically significant osteoarthritis of the right knee with a flexion contracture and significant varus deformity which is not correctable in neutral position passively. She states her right knee does not bother her at all. Straight leg raise test on the left side is negative. Kendalls test on the left side results in slight left buttock pain. Kendalls test on the right side also reproduces some pain in the right buttock and is positive on the right side. Neurovascular testing is intact to the lower extremities, including motor strength and sensation, proximally and distally.     RADIOGRAPHS:  X-rays of her pelvis today reveal just some osteophyte formation bilaterally on both hips above the hip joints on the superolateral aspect of the ilium consistent with more traction spurs. Her joint space is well maintained in both hips. X-rays of the lumbosacral spine reveal severe degenerative disc disease and arthritis of the lumbar spine from L2 through S1. She has significant degenerative disc disease at L2-3, L3-4, and L4-5. She has a slight retrolisthesis and definite spur formation posteriorly resulting in probable nerve impingement. IMPRESSION:   1. Arthritis, lumbar spine. 2. Degenerative disc disease lumbar spine, with right leg radiculopathy. 3. Sciatica, right leg. 4. Right hip strain. RECOMMENDATIONS:  I discussed with the patient that her pain is not originating from her hip. Her symptoms are coming from her lumbar spine in a neurogenic origin. We will first pursue some conservative treatment, including some physical therapy to see if we can help her sciatic type pain. In addition I have started her on a little Tramadol and she is not to take the Norco medication. I will check her back again in about two to three months. If her symptoms become worse in the meantime she is to notify me. If this does not help her, further consideration will be made for a MRI scan of her lumbosacral spine and possible epidural steroid injections. All of her questions were answered today.                  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    Esophageal varices (HCC) I85.00    CHF (congestive heart failure) (HCC) I50.9    Dyspnea R06.00    Pneumonia J18.9    HTN (hypertension), benign I10     Patient Active Problem List    Diagnosis Date Noted    HTN (hypertension), benign 01/28/2018    Pneumonia 01/25/2018    CHF (congestive heart failure) ([facility] 10/18/2017    Dyspnea 10/18/2017    Cirrhosis of liver not due to alcohol ([facility] 06/29/2017    Esophageal varices ([facility] 06/29/2017    UTI (urinary tract infection) 06/21/2017    Sepsis ([facility] 06/21/2017    PNA (pneumonia) 06/21/2017    Hypoxia 06/21/2017    NSTEMI (non-ST elevated myocardial infarction) ([facility] 06/30/2016    Anemia 07/07/2015    CAD (coronary artery disease) 07/07/2015    Thrombocytopenia (HCC) 07/07/2015     Current Outpatient Prescriptions   Medication Sig Dispense Refill    SITagliptin-metFORMIN (JANUMET)  mg per tablet Take 1 Tab by mouth two (2) times daily (with meals).  furosemide (LASIX) 20 mg tablet 20 mg po daily 30 Tab 0    pantoprazole (PROTONIX) 40 mg tablet Take 1 Tab by mouth two (2) times a day. 60 Tab 0    amLODIPine (NORVASC) 5 mg tablet TAKE ONE TABLET BY MOUTH ONCE DAILY 30 Tab 0    nitroglycerin (NITROLINGUAL) 400 mcg/spray spray 1 Spray by SubLINGual route every five (5) minutes as needed. 1 Bottle 2    hydrALAZINE (APRESOLINE) 10 mg tablet Take 1 Tab by mouth three (3) times daily. 90 Tab 6    metoprolol tartrate (LOPRESSOR) 50 mg tablet Take 1 Tab by mouth two (2) times a day. 60 Tab 6    ascorbic acid, vitamin C, (VITAMIN C) 250 mg tablet Take 1 Tab by mouth daily. 60 Tab 1    ferrous sulfate 325 mg (65 mg iron) tablet Take 1 Tab by mouth two (2) times daily (with meals). 60 Tab 1    multivitamin (ONE A DAY) tablet Take 1 Tab by mouth daily.  cholecalciferol (VITAMIN D3) 1,000 unit tablet Take 2 Tabs by mouth daily. 60 Tab 1    simvastatin (ZOCOR) 20 mg tablet Take 20 mg by mouth daily.         omega 3-dha-epa-fish oil (FISH OIL) 100-160-1,000 mg cap Take 1,000 mg by mouth two (2) times a day.  cyanocobalamin (VITAMIN B12) 100 mcg tablet Take 100 mcg by mouth daily.  aspirin 81 mg tablet Take 81 mg by mouth daily.  calcium polycarbophil (FIBER LAXATIVE) 625 mg tablet Take 1,250 mg by mouth daily.  insulin lispro (HUMALOG) 100 unit/mL injection As directed  Indications: type 2 diabetes mellitus 1 Vial 1    insulin glargine (LANTUS,BASAGLAR) 100 unit/mL (3 mL) inpn 25 units subcutaneously every evening at 9 pm 1 Adjustable Dose Pre-filled Pen Syringe 3     Allergies   Allergen Reactions    Latex Itching     Past Medical History:   Diagnosis Date    Anemia     CAD (coronary artery disease)     Coronary Stenting needed, hematology workup needed prior to stent placement, treating medically at this time    Cancer [facility])     colon    Cancer ([facility]     breast - left    Cardiac catheterization 08/17/2016    LM patent. mLAD 99.9% (2.25 x 28-mm Xience BETHEL, resid 0%). oD1 60%. oD2 95%. pCX 90% (2.5 x 13-mm Xience BETHEL, resid 0%). OMB patent.  Cardiac echocardiogram 07/01/2016    EF 50%. Basal inferior, basal-mid inferolateral hypk. Gr 1 DDfx. RVSP 50-60 mmHg. Mild LAE.   Mod, eccentric MR.      Diabetes ([facility]     Hypercholesteremia     Hypertension     Thrombocytopenia ([facility]      Past Surgical History:   Procedure Laterality Date    CARDIAC CATHETERIZATION  7/16/2015     no stents at this time r/t anemia & thrombocytopenia (hematology work-up needed prior to stent placement)    CARDIAC CATHETERIZATION  7/11/2016         CARDIAC CATHETERIZATION  8/17/2016         CORONARY ARTERY ANGIOGRAM  7/16/2015         CORONARY ARTERY ANGIOGRAM  7/11/2016         CORONARY ARTERY ANGIOGRAM  8/17/2016         CORONARY STENT EA ADDL VESSEL  8/17/2016         CORONARY STENT SINGLE W/PTCA  8/17/2016         HX GI      colon surg secondary to cancer - removed lesion    HX HEENT  2/2013    cataract bilaterally    HX MASTECTOMY  march 2011    left    HX VASCULAR ACCESS      mediport - now removed    LV ANGIOGRAPHY  7/16/2015         LV ANGIOGRAPHY  7/11/2016          History reviewed. No pertinent family history.   Social History   Substance Use Topics    Smoking status: Never Smoker    Smokeless tobacco: Never Used    Alcohol use No

## 2018-06-29 ENCOUNTER — OFFICE VISIT (OUTPATIENT)
Dept: ORTHOPEDIC SURGERY | Facility: CLINIC | Age: 78
End: 2018-06-29

## 2018-06-29 VITALS
DIASTOLIC BLOOD PRESSURE: 82 MMHG | TEMPERATURE: 97.4 F | OXYGEN SATURATION: 97 % | HEIGHT: 59 IN | RESPIRATION RATE: 16 BRPM | BODY MASS INDEX: 28.06 KG/M2 | HEART RATE: 75 BPM | SYSTOLIC BLOOD PRESSURE: 181 MMHG | WEIGHT: 139.2 LBS

## 2018-06-29 DIAGNOSIS — M25.551 RIGHT HIP PAIN: ICD-10-CM

## 2018-06-29 DIAGNOSIS — M16.11 PRIMARY OSTEOARTHRITIS OF RIGHT HIP: Primary | ICD-10-CM

## 2018-06-29 RX ORDER — BETAMETHASONE SODIUM PHOSPHATE AND BETAMETHASONE ACETATE 3; 3 MG/ML; MG/ML
6 INJECTION, SUSPENSION INTRA-ARTICULAR; INTRALESIONAL; INTRAMUSCULAR; SOFT TISSUE ONCE
Qty: 0.5 ML | Refills: 0
Start: 2018-06-29 | End: 2018-06-29

## 2018-06-29 RX ORDER — BUPIVACAINE HYDROCHLORIDE 2.5 MG/ML
4 INJECTION, SOLUTION EPIDURAL; INFILTRATION; INTRACAUDAL ONCE
Qty: 4 ML | Refills: 0
Start: 2018-06-29 | End: 2018-06-29

## 2018-06-29 NOTE — PROGRESS NOTES
Patient: Rain Marquez                MRN: [id number]       SSN: [id number]  YOB: 1940        AGE: 65 y.o. SEX: female    PCP: Patricia Betancourt MD  06/29/18    Chief Complaint   Patient presents with    Hip Pain     R HIP PAIN      HISTORY:  Rain Marquez is a 65 y.o. female who is seen for posterior lateral right hip pain pain. She has been experiencing right hip pain for the past few months. She reports no h/o injury. She uses a walker to ambulate. She feels unsteady due to her hip pains. She states that walking warms her hip up and decreases her pain. She notes pain with pressure applied to her right hip. She was previously seen by Dr. Jumana Paniagua for hip and back pain. Pain Assessment  6/29/2018   Location of Pain Hip   Location Modifiers Right   Severity of Pain 5   Quality of Pain Dull   Duration of Pain A few minutes   Frequency of Pain Intermittent   Aggravating Factors Other (Comment); Walking   Aggravating Factors Comment WHEN GETTING UP IN THE MORNING C/O STIFFNESS   Limiting Behavior Yes   Relieving Factors Exercises; Rest;Other (Comment); Heat   Relieving Factors Comment TYLENOL   Result of Injury No     Occupation, etc:  Ms. [employer] She is retired. She previously helped her  for [employer]. Her  is Ofe Celis's nephew. She lives in [place] with her  who is currently in [employer], [facility]. for pneumonia. She has no children but does have a nephew who is an ICU cardiac tech. Current weight is 139 pounds. She is 4'11\" tall.       Lab Results   Component Value Date/Time    Hemoglobin A1c 12.3 (H) 01/26/2018 05:43 AM     Weight Metrics 6/29/2018 3/8/2018 1/28/2018 10/21/2017 6/29/2017 9/19/2016 8/18/2016   Weight 139 lb 3.2 oz 130 lb 9.6 oz 144 lb 6.4 oz 151 lb 138 lb 14.4 oz 162 lb 6.4 oz 163 lb   BMI 28.11 kg/m2 26.38 kg/m2 28.35 kg/m2 29.49 kg/m2 27.13 kg/m2 31.72 kg/m2 31.83 kg/m2       Patient Active Problem List   Diagnosis Code    Anemia D64.9  CAD (coronary artery disease) I25.10    Thrombocytopenia (HCC) D69.6    NSTEMI (non-ST elevated myocardial infarction) (HCC) I21.4    UTI (urinary tract infection) N39.0    Sepsis (HCC) A41.9    PNA (pneumonia) J18.9    Hypoxia R09.02    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 Cardiovascular: negative for chest pain, claudication, leg swelling, SOB, CHAVEZ   Gastrointestinal: Negative for pain, N/V/C/D, Blood in stool or urine, dysuria,  hematuria, incontinence, pelvic pain. Musculoskeletal: See HPI   Neurological: Negative for dizziness and weakness. Negative for headaches, Visual changes, confusion, seizures   Phychiatric/Behavioral: Negative for depression, memory loss, substance  abuse. Extremities: Negative for hair changes, rash, or skin lesion changes. Hematologic: Negative for bleeding problems, bruising, pallor or swollen lymph  nodes   Peripheral Vascular: No calf pain, no circulation deficits. Social History     Social History    Marital status:      Spouse name: N/A    Number of children: N/A    Years of education: N/A     Occupational History    Not on file. Social History Main Topics    Smoking status: Never Smoker    Smokeless tobacco: Never Used    Alcohol use No    Drug use: No    Sexual activity: Not on file     Other Topics Concern    Not on file     Social History Narrative      Allergies   Allergen Reactions    Latex Itching      Current Outpatient Prescriptions   Medication Sig    SITagliptin-metFORMIN (JANUMET)  mg per tablet Take 1 Tab by mouth two (2) times daily (with meals).     insulin lispro (HUMALOG) 100 unit/mL injection As directed  Indications: type 2 diabetes mellitus    insulin glargine (LANTUS,BASAGLAR) 100 unit/mL (3 mL) inpn 25 units subcutaneously every evening at 9 pm    furosemide (LASIX) 20 mg tablet 20 mg po daily    pantoprazole (PROTONIX) 40 mg tablet Take 1 Tab by mouth two (2) times a day.  amLODIPine (NORVASC) 5 mg tablet TAKE ONE TABLET BY MOUTH ONCE DAILY    nitroglycerin (NITROLINGUAL) 400 mcg/spray spray 1 Spray by SubLINGual route every five (5) minutes as needed.  hydrALAZINE (APRESOLINE) 10 mg tablet Take 1 Tab by mouth three (3) times daily.  metoprolol tartrate (LOPRESSOR) 50 mg tablet Take 1 Tab by mouth two (2) times a day.  ascorbic acid, vitamin C, (VITAMIN C) 250 mg tablet Take 1 Tab by mouth daily.  ferrous sulfate 325 mg (65 mg iron) tablet Take 1 Tab by mouth two (2) times daily (with meals).  multivitamin (ONE A DAY) tablet Take 1 Tab by mouth daily.  cholecalciferol (VITAMIN D3) 1,000 unit tablet Take 2 Tabs by mouth daily.  simvastatin (ZOCOR) 20 mg tablet Take 20 mg by mouth daily.  omega 3-dha-epa-fish oil (FISH OIL) 100-160-1,000 mg cap Take 1,000 mg by mouth two (2) times a day.  cyanocobalamin (VITAMIN B12) 100 mcg tablet Take 100 mcg by mouth daily.  aspirin 81 mg tablet Take 81 mg by mouth daily.  calcium polycarbophil (FIBER LAXATIVE) 625 mg tablet Take 1,250 mg by mouth daily.  traMADol (ULTRAM) 50 mg tablet Take 1 Tab by mouth daily. Max Daily Amount: 50 mg. No current facility-administered medications for this visit.        PHYSICAL EXAMINATION:  Visit Vitals    /82    Pulse 75    Temp 97.4 °F (36.3 °C) (Oral)    Resp 16    Ht 4' 11\" (1.499 m)    Wt 139 lb 3.2 oz (63.1 kg)    SpO2 97%    BMI 28.11 kg/m2      ORTHO EXAMINATION:  Examination Right hip Left hip   Skin Intact Intact   External Rotation ROM 20 20   Internal Rotation ROM 10 10   Trochanteric tenderness ++ -   Hip flexion contracture - -   Antalgic gait - -   Trendelenberg sign - - Lumbar tenderness - -   Straight leg raise - -   Calf tenderness - -   Neurovascular Intact Intact     PROCEDURE:  After discussing treatment options, patient's right hip was injected with 4 cc Marcaine and 1/2 cc Celestone. Chart reviewed for the following:   Deborah Huerta MD, have reviewed the History, Physical and updated the Allergic reactions for [address] performed immediately prior to start of procedure:  Deborah Huerta MD, have performed the following reviews on [facility] prior to the start of the procedure:            * Patient was identified by name and date of birth   * Agreement on procedure being performed was verified  * Risks and Benefits explained to the patient  * Procedure site verified and marked as necessary  * Patient was positioned for comfort  * Consent was obtained     Time: 11:03 AM     Date of procedure: 6/29/2018    Procedure performed by:  Mary Hatch MD    Ms. Salter tolerated the procedure well with no complications. RADIOGRAPHS:  XR LUMB SPINE 3/8/2018 [facility]ER  IMPRESSION:  Two views - no fractures, mild disc space narrowing, no osteophytes present, no spondylolisthesis. XR KITTY HIP 3/8/2018 [facility]ER  IMPRESSION:  AP pelvis and two views - No fractures, moderate joint space narrowing, medial femoral osteophytes present. Tonnis grade 2    IMPRESSION:      ICD-10-CM ICD-9-CM    1. Primary osteoarthritis of right hip M16.11 715.15 betamethasone (CELESTONE SOLUSPAN) 6 mg/mL injection      BETAMETHASONE ACETATE & SODIUM PHOSPHATE INJECTION 3 MG EA.      DRAIN/INJECT LARGE JOINT/BURSA      bupivacaine, PF, (MARCAINE, PF,) 0.25 % (2.5 mg/mL) injection   2.  Right hip pain M25.551 719.45 betamethasone (CELESTONE SOLUSPAN) 6 mg/mL injection      BETAMETHASONE ACETATE & SODIUM PHOSPHATE INJECTION 3 MG EA.      DRAIN/INJECT LARGE JOINT/BURSA      bupivacaine, PF, (MARCAINE, PF,) 0.25 % (2.5 mg/mL) injection     PLAN:  After discussing treatment options, patient's right lateral hip was injected with 4 cc Marcaine and 1/2 cc Celestone. There is no need for surgery at this time. She will follow up as needed.       Scribed by Erik Diallo [address]) as dictated by Puja Oliva MD

## 2018-07-02 NOTE — PROGRESS NOTES
[facility]  -  Progress Note      Patient: Patti Villalobos MRN: [id number]  SSN: [id number]    YOB: 1940  Age: 67 y.o. Sex: female      Admit Date: 10/18/2017    Hospital Problem List:     Hospital Problems  Date Reviewed: 6/29/2017          Codes Class Noted POA    CHF (congestive heart failure) ([facility] [address] ICD-10-CM: I50.9  ICD-9-CM: 428.0  10/18/2017 Unknown        Dyspnea ICD-10-CM: R06.00  ICD-9-CM: 786.09  10/18/2017 Unknown            -Acute on chronic diastolic CHF  -Mild aortic stenosis with mean gradient 16mmHg and valve area 1.32cm2 on echo June 2017, this has slightly worsened on echo this admission with valve area of 1cm2 and mean gradient of 25mmHg. -CAD hx, 3 vessel disease, s/p PCI to pCx and mLAD with BETHEL 8/2016  -HTN  -Dyslipidemia  -DM type II  -Chronic anemia  -H/o hiatal hernia small in size, very small varices without bleeding.   -Hypertensive portal gastropathy with very friable fundal mucosa with occasional blood oozing  -H/o colon cancer  --History of breast CA and colon CA 2011, remission, Hx of mastectomy      Primary cardiologist is Dr Demi Byers:     -Output not accurate in chart. Pt symptomatically much improved. Lungs clear on exam. On room air. Continue lasix 20mg daily.  -Discontinuing heparin gtt.  -Continue lopressor, norvasc at increased dose of 10mg, hydralazine, and aspirin and statin.   -Hgb down to 7.8. Discussed with hospitalist team. Pt has chronic anemia. It will be followed up in the outpatient setting.   -No further cardiac work up needed. Stable for discharge today from our standpoint. Message sent to our office to call patient and schedule follow up appt. Subjective:     No chest pain or shortness of breath. Objective:      SUMMARY:  Left ventricle: Systolic function was normal. Ejection fraction was estimated   in the range of 55 % to 60 %.  No obvious  wall motion abnormalities identified in the views obtained. Wall thickness   was mildly increased. Mitral valve: There was moderate annular calcification. Aortic valve: The valve was trileaflet. Leaflets exhibited moderately   increased thickness, calcification, and reduced  mobility. There was moderate stenosis. Valve peak gradient was 45 mmHg. Valve   mean gradient was 25 mmHg. Aortic valve  area was 1 cm-sq by the continuity equation.     Patient Vitals for the past 8 hrs:   Temp Pulse Resp BP SpO2   10/20/17 1131 98 °F (36.7 °C) 72 20 127/65 96 %   10/20/17 0801 98.3 °F (36.8 °C) 91 27 151/77 95 %   10/20/17 0519 97.7 °F (36.5 °C) 84 18 135/63 95 %         Patient Vitals for the past 96 hrs:   Weight   10/20/17 0624 63.9 kg (140 lb 12.8 oz)   10/19/17 0139 65.4 kg (144 lb 1.6 oz)   10/18/17 0845 69.5 kg (153 lb 4.8 oz)         Intake/Output Summary (Last 24 hours) at 10/20/17 1154  Last data filed at 10/20/17 0930   Gross per 24 hour   Intake              945 ml   Output                0 ml   Net              945 ml       Physical Exam:  General:  Awake, alert, and oriented, pleasant and talkative  Neck:  Supple, no jvd  Lungs:  Clear to auscultation bilaterally on room air with normal effort  Heart:  Reg rate and rhythm, 2/6 MAEVE at RUSB  Abdomen:  Soft and non-tender  Extremities:  trace ankle edema, no cyanosis, atraumatic    Data Review:     Labs: Results:       Chemistry Recent Labs      10/20/17   0514  10/19/17   0508  10/18/17   0910   GLU  131*  119*  214*   NA  140  141  140   K  3.6  4.0  4.9   CL  109*  108  110*   CO2  23  24  23   BUN  32*  30*  28*   CREA  1.29  1.10  1.10   CA  8.5  9.1  8.7   AGAP  8  9  7   BUCR  25*  27*  25*   AP   --    --   100   TP   --    --   6.9   ALB   --    --   3.4   GLOB   --    --   3.5   AGRAT   --    --   1.0      CBC w/Diff Recent Labs      10/20/17   0514  10/19/17   0508  10/18/17   0910   WBC  3.0*  3.9*  4.4*   RBC  2.62*  2.81*  3.04*   HGB  7.8*  8.4*  9.2*   HCT  22.8*  24.4*  26.6*   PLT  70*  84* 72*   GRANS  75*  76*  82*   LYMPH  15*  18*  13*   EOS  2  1  1      Cardiac Enzymes Lab Results   Component Value Date/Time    TROIQ 0.88 (H) 10/19/2017 01:05 PM      Coagulation Recent Labs      10/20/17   0514  10/19/17   1846   PTP   --   14.1   INR   --   1.1   APTT  90.2*  71.2*       Lipid Panel Lab Results   Component Value Date/Time    Cholesterol, total 188 07/01/2016 11:53 PM    HDL Cholesterol 46 07/01/2016 11:53 PM    LDL, calculated 94.2 07/01/2016 11:53 PM    VLDL, calculated 47.8 07/01/2016 11:53 PM    Triglyceride 239 07/01/2016 11:53 PM    CHOL/HDL Ratio 4.1 07/01/2016 11:53 PM      BNP No results found for: BNP, BNPP, XBNPT   Liver Enzymes Recent Labs      10/18/17   0910   TP  6.9   ALB  3.4   AP  100   SGOT  33      Digoxin    Thyroid Studies Lab Results   Component Value Date/Time    TSH 0.77 06/29/2017 04:26 AM            Signed By: ELOISA Chun     October 20, 2017 CONSTITUTIONAL: No weakness, fevers or chills  EYES/ENT: No visual changes, no throat pain   RESPIRATORY: +cough, SOB  CARDIOVASCULAR: No chest pain or palpitations  GASTROINTESTINAL: No abdominal pain, nausea, vomiting, or hematemesis; No diarrhea or constipation. No melena or hematochezia.  GENITOURINARY: No dysuria, frequency or hematuria  NEUROLOGICAL: No dizziness, numbness, or weakness  SKIN: No itching, burning, rashes, or lesions   All other review of systems is negative unless indicated above. CONSTITUTIONAL: No weakness, fevers or chills  EYES/ENT: No visual changes, no throat pain   RESPIRATORY: +cough, SOB  CARDIOVASCULAR: No chest pain or palpitations  GASTROINTESTINAL: No abdominal pain, nausea, vomiting, or hematemesis; No diarrhea or constipation. No melena or hematochezia.  GENITOURINARY: No dysuria, frequency or hematuria  NEUROLOGICAL: No dizziness, numbness, or weakness  SKIN: No itching, burning, rashes, or lesions   HEME: No bleeding  MSK: No joint pain, no joint swelling   All other review of systems is negative unless indicated above.

## 2018-12-12 ENCOUNTER — HOSPITAL ENCOUNTER (EMERGENCY)
Age: 78
Discharge: HOME | End: 2018-12-13
Attending: EMERGENCY MEDICINE
Payer: MEDICARE

## 2018-12-12 ENCOUNTER — APPOINTMENT (OUTPATIENT)
Dept: CT IMAGING | Age: 78
End: 2018-12-12
Attending: EMERGENCY MEDICINE
Payer: MEDICARE

## 2018-12-12 DIAGNOSIS — R73.9 HYPERGLYCEMIA: ICD-10-CM

## 2018-12-12 DIAGNOSIS — N39.0 URINARY TRACT INFECTION WITHOUT HEMATURIA, SITE UNSPECIFIED: Primary | ICD-10-CM

## 2018-12-12 LAB
ALBUMIN SERPL-MCNC: 3.1 G/DL (ref 3.4–5)
ALBUMIN/GLOB SERPL: 1.1 {RATIO} (ref 0.8–1.7)
ALP SERPL-CCNC: 80 U/L (ref 45–117)
ALT SERPL-CCNC: 20 U/L (ref 13–56)
ANION GAP SERPL CALC-SCNC: 10 MMOL/L (ref 3–18)
AST SERPL-CCNC: 14 U/L (ref 15–37)
BASOPHILS # BLD: 0 K/UL (ref 0–0.1)
BASOPHILS NFR BLD: 0 % (ref 0–2)
BILIRUB SERPL-MCNC: 0.4 MG/DL (ref 0.2–1)
BUN SERPL-MCNC: 33 MG/DL (ref 7–18)
BUN/CREAT SERPL: 22 (ref 12–20)
CALCIUM SERPL-MCNC: 8.3 MG/DL (ref 8.5–10.1)
CHLORIDE SERPL-SCNC: 106 MMOL/L (ref 100–108)
CO2 SERPL-SCNC: 19 MMOL/L (ref 21–32)
CREAT SERPL-MCNC: 1.48 MG/DL (ref 0.6–1.3)
DIFFERENTIAL METHOD BLD: ABNORMAL
EOSINOPHIL # BLD: 0.1 K/UL (ref 0–0.4)
EOSINOPHIL NFR BLD: 3 % (ref 0–5)
ERYTHROCYTE [DISTWIDTH] IN BLOOD BY AUTOMATED COUNT: 14 % (ref 11.6–14.5)
GLOBULIN SER CALC-MCNC: 2.7 G/DL (ref 2–4)
GLUCOSE SERPL-MCNC: 484 MG/DL (ref 74–99)
HCT VFR BLD AUTO: 26 % (ref 35–45)
HGB BLD-MCNC: 9.1 G/DL (ref 12–16)
LIPASE SERPL-CCNC: 205 U/L (ref 73–393)
LYMPHOCYTES # BLD: 0.7 K/UL (ref 0.9–3.6)
LYMPHOCYTES NFR BLD: 22 % (ref 21–52)
MCH RBC QN AUTO: 28.8 PG (ref 24–34)
MCHC RBC AUTO-ENTMCNC: 35 G/DL (ref 31–37)
MCV RBC AUTO: 82.3 FL (ref 74–97)
MONOCYTES # BLD: 0.1 K/UL (ref 0.05–1.2)
MONOCYTES NFR BLD: 3 % (ref 3–10)
NEUTS SEG # BLD: 2.2 K/UL (ref 1.8–8)
NEUTS SEG NFR BLD: 72 % (ref 40–73)
PLATELET # BLD AUTO: 62 K/UL (ref 135–420)
PMV BLD AUTO: 10.3 FL (ref 9.2–11.8)
POTASSIUM SERPL-SCNC: 4.7 MMOL/L (ref 3.5–5.5)
PROT SERPL-MCNC: 5.8 G/DL (ref 6.4–8.2)
RBC # BLD AUTO: 3.16 M/UL (ref 4.2–5.3)
SODIUM SERPL-SCNC: 135 MMOL/L (ref 136–145)
TROPONIN I SERPL-MCNC: <0.02 NG/ML (ref 0–0.04)
WBC # BLD AUTO: 3 K/UL (ref 4.6–13.2)

## 2018-12-12 PROCEDURE — 93005 ELECTROCARDIOGRAM TRACING: CPT

## 2018-12-12 PROCEDURE — 85025 COMPLETE CBC W/AUTO DIFF WBC: CPT

## 2018-12-12 PROCEDURE — 99285 EMERGENCY DEPT VISIT HI MDM: CPT

## 2018-12-12 PROCEDURE — 80053 COMPREHEN METABOLIC PANEL: CPT

## 2018-12-12 PROCEDURE — 84484 ASSAY OF TROPONIN QUANT: CPT

## 2018-12-12 PROCEDURE — 74176 CT ABD & PELVIS W/O CONTRAST: CPT

## 2018-12-12 PROCEDURE — 83690 ASSAY OF LIPASE: CPT

## 2018-12-13 VITALS
HEART RATE: 82 BPM | BODY MASS INDEX: 29.29 KG/M2 | SYSTOLIC BLOOD PRESSURE: 146 MMHG | DIASTOLIC BLOOD PRESSURE: 62 MMHG | WEIGHT: 145 LBS | OXYGEN SATURATION: 97 % | RESPIRATION RATE: 21 BRPM | TEMPERATURE: 98.8 F

## 2018-12-13 LAB
APPEARANCE UR: ABNORMAL
ATRIAL RATE: 81 BPM
BACTERIA URNS QL MICRO: ABNORMAL /HPF
BILIRUB UR QL: NEGATIVE
CALCULATED P AXIS, ECG09: 31 DEGREES
CALCULATED R AXIS, ECG10: -12 DEGREES
CALCULATED T AXIS, ECG11: 55 DEGREES
COLOR UR: YELLOW
DIAGNOSIS, 93000: NORMAL
EPITH CASTS URNS QL MICRO: ABNORMAL /LPF (ref 0–5)
GLUCOSE BLD STRIP.AUTO-MCNC: 281 MG/DL (ref 70–110)
GLUCOSE BLD STRIP.AUTO-MCNC: 396 MG/DL (ref 70–110)
GLUCOSE UR STRIP.AUTO-MCNC: >1000 MG/DL
HGB UR QL STRIP: NEGATIVE
KETONES UR QL STRIP.AUTO: NEGATIVE MG/DL
LEUKOCYTE ESTERASE UR QL STRIP.AUTO: ABNORMAL
NITRITE UR QL STRIP.AUTO: NEGATIVE
P-R INTERVAL, ECG05: 152 MS
PH UR STRIP: 5 [PH] (ref 5–8)
PROT UR STRIP-MCNC: 100 MG/DL
Q-T INTERVAL, ECG07: 380 MS
QRS DURATION, ECG06: 110 MS
QTC CALCULATION (BEZET), ECG08: 441 MS
RBC #/AREA URNS HPF: ABNORMAL /HPF (ref 0–5)
SP GR UR REFRACTOMETRY: 1.02 (ref 1–1.03)
UROBILINOGEN UR QL STRIP.AUTO: 0.2 EU/DL (ref 0.2–1)
VENTRICULAR RATE, ECG03: 81 BPM
WBC URNS QL MICRO: ABNORMAL /HPF (ref 0–4)
YEAST URNS QL MICRO: ABNORMAL

## 2018-12-13 PROCEDURE — 81001 URINALYSIS AUTO W/SCOPE: CPT

## 2018-12-13 PROCEDURE — 82962 GLUCOSE BLOOD TEST: CPT

## 2018-12-13 PROCEDURE — 74011636637 HC RX REV CODE- 636/637: Performed by: EMERGENCY MEDICINE

## 2018-12-13 RX ORDER — SULFAMETHOXAZOLE AND TRIMETHOPRIM 800; 160 MG/1; MG/1
1 TABLET ORAL 2 TIMES DAILY
Qty: 6 TAB | Refills: 0 | Status: SHIPPED | OUTPATIENT
Start: 2018-12-13 | End: 2018-12-16

## 2018-12-13 RX ORDER — SULFAMETHOXAZOLE AND TRIMETHOPRIM 800; 160 MG/1; MG/1
1 TABLET ORAL 2 TIMES DAILY
Qty: 6 TAB | Refills: 0 | Status: SHIPPED | OUTPATIENT
Start: 2018-12-13 | End: 2018-12-13

## 2018-12-13 RX ADMIN — INSULIN HUMAN 10 UNITS: 100 INJECTION, SOLUTION PARENTERAL at 02:59

## 2018-12-13 NOTE — ED PROVIDER NOTES
EMERGENCY DEPARTMENT HISTORY AND PHYSICAL EXAM    9:16 PM      Date: 12/12/2018  Patient Name: Micah Lopez    History of Presenting Illness     Chief Complaint   Patient presents with    Abdominal Pain    Nausea         History Provided By: Patient    Chief Complaint: Abdominal Pain  Duration:  Today  Timing:  Constant  Location: GI  Severity: Severe  Modifying Factors: None. Associated Symptoms: nausea and diarrhea (however it is chronic and unchanged from baseline)      Additional History (Context): Micah Lopez is a 77 y.o. female with a PMHx of HTN, DM, ca (colon and left breast), hypercholesterolemia, thrombocytopenia and CAD who presents to the ED with c/o severe, constant abdominal pain onset today. Pt has an abdominal hernia and notes that she is supposed to have a CT scan Tuesday. She took a nap today and says that when she sat up she was in excruciating pain. She stood up to walk and became very nauseas. She has a hx of a colon surgery but no other previous abdominal hx. Associated sx include nausea and diarrhea but she states that her diarrhea is chronic secondary to medications she takes and that it is unchanged from baseline. Denies any fever, chills, CP, SOB, vomiting and urinary sx. No further concerns or complaints at this time. PCP: Aleena Adhikari MD    Current Outpatient Medications   Medication Sig Dispense Refill    SITagliptin-metFORMIN (JANUMET)  mg per tablet Take 1 Tab by mouth two (2) times daily (with meals).  traMADol (ULTRAM) 50 mg tablet Take 1 Tab by mouth daily.  Max Daily Amount: 50 mg. 30 Tab 0    insulin lispro (HUMALOG) 100 unit/mL injection As directed  Indications: type 2 diabetes mellitus 1 Vial 1    insulin glargine (LANTUS,BASAGLAR) 100 unit/mL (3 mL) inpn 25 units subcutaneously every evening at 9 pm 1 Adjustable Dose Pre-filled Pen Syringe 3    furosemide (LASIX) 20 mg tablet 20 mg po daily 30 Tab 0    pantoprazole (PROTONIX) 40 mg tablet Take 1 Tab by mouth two (2) times a day. 60 Tab 0    amLODIPine (NORVASC) 5 mg tablet TAKE ONE TABLET BY MOUTH ONCE DAILY 30 Tab 0    nitroglycerin (NITROLINGUAL) 400 mcg/spray spray 1 Spray by SubLINGual route every five (5) minutes as needed. 1 Bottle 2    hydrALAZINE (APRESOLINE) 10 mg tablet Take 1 Tab by mouth three (3) times daily. 90 Tab 6    metoprolol tartrate (LOPRESSOR) 50 mg tablet Take 1 Tab by mouth two (2) times a day. 60 Tab 6    ascorbic acid, vitamin C, (VITAMIN C) 250 mg tablet Take 1 Tab by mouth daily. 60 Tab 1    ferrous sulfate 325 mg (65 mg iron) tablet Take 1 Tab by mouth two (2) times daily (with meals). 60 Tab 1    multivitamin (ONE A DAY) tablet Take 1 Tab by mouth daily.  cholecalciferol (VITAMIN D3) 1,000 unit tablet Take 2 Tabs by mouth daily. 60 Tab 1    simvastatin (ZOCOR) 20 mg tablet Take 20 mg by mouth daily.  omega 3-dha-epa-fish oil (FISH OIL) 100-160-1,000 mg cap Take 1,000 mg by mouth two (2) times a day.  cyanocobalamin (VITAMIN B12) 100 mcg tablet Take 100 mcg by mouth daily.  aspirin 81 mg tablet Take 81 mg by mouth daily.  calcium polycarbophil (FIBER LAXATIVE) 625 mg tablet Take 1,250 mg by mouth daily. Past History     Past Medical History:  Past Medical History:   Diagnosis Date    Anemia     CAD (coronary artery disease)     Coronary Stenting needed, hematology workup needed prior to stent placement, treating medically at this time    Cancer [facility])     colon    Cancer ([facility]     breast - left    Cardiac catheterization 08/17/2016    LM patent. mLAD 99.9% (2.25 x 28-mm Xience BETHEL, resid 0%). oD1 60%. oD2 95%. pCX 90% (2.5 x 13-mm Xience BETHEL, resid 0%). OMB patent.  Cardiac echocardiogram 07/01/2016    EF 50%. Basal inferior, basal-mid inferolateral hypk. Gr 1 DDfx. RVSP 50-60 mmHg. Mild LAE.   Mod, eccentric MR.      Diabetes ([facility]     Hypercholesteremia     Hypertension     Thrombocytopenia ([facility]        Past Surgical History:  Past Surgical History:   Procedure Laterality Date    CARDIAC CATHETERIZATION  7/16/2015     no stents at this time r/t anemia & thrombocytopenia (hematology work-up needed prior to stent placement)    CARDIAC CATHETERIZATION  7/11/2016         CARDIAC CATHETERIZATION  8/17/2016         CORONARY ARTERY ANGIOGRAM  7/16/2015         CORONARY ARTERY ANGIOGRAM  7/11/2016         CORONARY ARTERY ANGIOGRAM  8/17/2016         CORONARY STENT EA ADDL VESSEL  8/17/2016         CORONARY STENT SINGLE W/PTCA  8/17/2016         HX GI      colon surg secondary to cancer - removed lesion    HX HEENT  2/2013    cataract bilaterally    HX MASTECTOMY  march 2011    left    HX VASCULAR ACCESS      mediport - now removed    LV ANGIOGRAPHY  7/16/2015         LV ANGIOGRAPHY  7/11/2016            Family History:  No family history on file. Social History:  Social History     Tobacco Use    Smoking status: Never Smoker    Smokeless tobacco: Never Used   Substance Use Topics    Alcohol use: No    Drug use: No       Allergies: Allergies   Allergen Reactions    Latex Itching         Review of Systems       Review of Systems   Constitutional: Negative for activity change, fatigue and fever. HENT: Negative for congestion and rhinorrhea. Eyes: Negative for visual disturbance. Respiratory: Negative for shortness of breath. Cardiovascular: Negative for chest pain and palpitations. Gastrointestinal: Positive for abdominal pain, diarrhea (chronic (unchanged from baseline)) and nausea. Negative for vomiting. Genitourinary: Negative for dysuria and hematuria. Musculoskeletal: Negative for back pain. Skin: Negative for rash. Neurological: Negative for dizziness, weakness and light-headedness. All other systems reviewed and are negative.         Physical Exam     Visit Vitals  BP (!) 205/81 (BP 1 Location: Right arm, BP Patient Position: Sitting)   Pulse 84   Temp 98 °F (36.7 °C)   Resp 20 Wt 65.8 kg (145 lb)   SpO2 99%   BMI 29.29 kg/m²         Physical Exam    Patient Vitals for the past 12 hrs:   Temp Pulse Resp BP SpO2   12/12/18 2056 98 °F (36.7 °C) 84 20 (!) 205/81 99 %     Gen: Well developed, well nourished 77 y.o. female  HEENT: Normocephalic, atraumatic, no scleral icterus  Respiratory: No accessory muscle use No wheeze, No rales, No rhonchi. Normal chest wall excursion. No subcutaneous air, no rib crepitus  Cardiovascular: Regular rhythm and rate, Normal pulses, Normal perfusion. No edema  Gastrointestinal: Non distended, No ascites. No organomegaly. No evidence of trauma. Broad base ventral hernia, mild tenderness. Midline scar well healed, consistent with prior surgical history. Musculoskeletal: Full range of motion at all other tested joints. No joint effusions. No spinal tenderness. Neuro: Normal strength, Normal sensation. Normal speech. No ataxia. Cranial Nerves II-XII normal as tested. Skin: No rash, No lesions, petechia or purpura. Warm and dry  Psyche: No suicidal ideation, No homicidal ideation. No hallucinations. Organized thoughts. Heme: Normal  : Deferred      Diagnostic Study Results     Labs -  No results found for this or any previous visit (from the past 12 hour(s)). Radiologic Studies -   No orders to display         Medical Decision Making   I am the first provider for this patient. I reviewed the vital signs, available nursing notes, past medical history, past surgical history, family history and social history. Vital Signs-Reviewed the patient's vital signs. Pulse Oximetry Analysis -  99% on room air (Normal)    Cardiac Monitor:  Rate: 84  Rhythm:  Normal Sinus Rhythm     EKG:   Time Interpreted: 22:03   Rate: 68 bpm   Rhythm: Normal Sinus Rhythm    Interpretation:  ms, No Stemi. Records Reviewed: Nursing Notes (Time of Review: 9:16 PM)    ED Course: Progress Notes, Reevaluation, and Consults:      Diagnosis     Diagnosis:   1.  Urinary tract infection without hematuria, site unspecified    2. 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 Discharge Medications:   Current Discharge Medication List      START taking these medications    Details   trimethoprim-sulfamethoxazole (BACTRIM DS) 160-800 mg per tablet Take 1 Tab by mouth two (2) times a day for 3 days. Qty: 6 Tab, Refills: 0                    Medication List      ASK your doctor about these medications    amLODIPine 5 mg tablet  Commonly known as:  NORVASC  TAKE ONE TABLET BY MOUTH ONCE DAILY     ascorbic acid (vitamin C) 250 mg tablet  Commonly known as:  VITAMIN C  Take 1 Tab by mouth daily. aspirin 81 mg tablet     cholecalciferol 1,000 unit tablet  Commonly known as:  VITAMIN D3  Take 2 Tabs by mouth daily. cyanocobalamin 100 mcg tablet  Commonly known as:  VITAMIN B12     ferrous sulfate 325 mg (65 mg iron) tablet  Take 1 Tab by mouth two (2) times daily (with meals). FIBER LAXATIVE (CA POLYCARBO) 625 mg tablet  Generic drug:  calcium polycarbophil     FISH -160-1,000 mg Cap  Generic drug:  omega 3-dha-epa-fish oil     furosemide 20 mg tablet  Commonly known as:  LASIX  20 mg po daily     hydrALAZINE 10 mg tablet  Commonly known as:  APRESOLINE  Take 1 Tab by mouth three (3) times daily.      insulin glargine 100 unit/mL (3 mL) Inpn  Commonly known as:  LANABDELRAHMANBASTIERRA  25 units subcutaneously every evening at 9 pm insulin lispro 100 unit/mL injection  Commonly known as:  HUMALOG  As directed  Indications: type 2 diabetes mellitus     JANUMET  mg per tablet  Generic drug:  SITagliptin-metFORMIN     metoprolol tartrate 50 mg tablet  Commonly known as:  LOPRESSOR  Take 1 Tab by mouth two (2) times a day. multivitamin tablet  Commonly known as:  ONE A DAY     nitroglycerin 400 mcg/spray spray  Commonly known as:  NITROLINGUAL  1 [place] by SubLINGual route every five (5) minutes as needed. pantoprazole 40 mg tablet  Commonly known as:  PROTONIX  Take 1 Tab by mouth two (2) times a day. simvastatin 20 mg tablet  Commonly known as:  ZOCOR     traMADol 50 mg tablet  Commonly known as:  ULTRAM  Take 1 Tab by mouth daily. Max Daily Amount: 50 mg.          _______________________________       Scribe Arthur Hammer acting as a scribe for and in the presence of Blayne Lindsey MD      December 12, 2018 at 9:16 PM       Provider Attestation:      I personally performed the services described in the documentation, reviewed the documentation, as recorded by the scribe in my presence, and it accurately and completely records my words and actions.  December 12, 2018 at 9:16 PM - Blayne Lindsey MD        _______________________________

## 2018-12-13 NOTE — ED NOTES
Discharge instructions reviewed with pt.  Pt voiced understanding,  Pt taken out via wheelchair by family

## 2018-12-13 NOTE — DISCHARGE INSTRUCTIONS
Urinary Tract Infection in Women: Care Instructions  Your Care Instructions    A urinary tract infection, or UTI, is a general term for an infection anywhere between the kidneys and the urethra (where urine comes out). Most UTIs are bladder infections. They often cause pain or burning when you urinate. UTIs are caused by bacteria and can be cured with antibiotics. Be sure to complete your treatment so that the infection goes away. Follow-up care is a key part of your treatment and safety. Be sure to make and go to all appointments, and call your doctor if you are having problems. It's also a good idea to know your test results and keep a list of the medicines you take. How can you care for yourself at home? · Take your antibiotics as directed. Do not stop taking them just because you feel better. You need to take the full course of antibiotics. · Drink extra water and other fluids for the next day or two. This may help wash out the bacteria that are causing the infection. (If you have kidney, heart, or liver disease and have to limit fluids, talk with your doctor before you increase your fluid intake.)  · Avoid drinks that are carbonated or have caffeine. They can irritate the bladder. · Urinate often. Try to empty your bladder each time. · To relieve pain, take a hot bath or lay a heating pad set on low over your lower belly or genital area. Never go to sleep with a heating pad in place. To prevent UTIs  · Drink plenty of water each day. This helps you urinate often, which clears bacteria from your system. (If you have kidney, heart, or liver disease and have to limit fluids, talk with your doctor before you increase your fluid intake.)  · Urinate when you need to. · Urinate right after you have sex. · Change sanitary pads often. · Avoid douches, bubble baths, feminine hygiene sprays, and other feminine hygiene products that have deodorants.   · After going to the bathroom, wipe from front to back.  When should you call for help? Call your doctor now or seek immediate medical care if:    · Symptoms such as fever, chills, nausea, or vomiting get worse or appear for the first time.     · You have new pain in your back just below your rib cage. This is called flank pain.     · There is new blood or pus in your urine.     · You have any problems with your antibiotic medicine.    Watch closely for changes in your health, and be sure to contact your doctor if:    · You are not getting better after taking an antibiotic for 2 days.     · Your symptoms go away but then come back. Where can you learn more? Go to http://perlita-linda.info/. Enter J768 in the search box to learn more about \"Urinary Tract Infection in Women: Care Instructions. \"  Current as of: March 21, 2018  Content Version: 11.8  © 7200-9950 Healthwise, Incorporated. Care instructions adapted under license by [employer] (which disclaims liability or warranty for this information). If you have questions about a medical condition or this instruction, always ask your healthcare professional. [address] any warranty or liability for your use of this information.

## 2019-01-14 ENCOUNTER — HOSPITAL ENCOUNTER (OUTPATIENT)
Dept: MRI IMAGING | Age: 79
Discharge: HOME | End: 2019-01-14
Attending: NURSE PRACTITIONER
Payer: MEDICARE

## 2019-01-14 DIAGNOSIS — R93.89 ABNORMAL COMPUTED TOMOGRAPHY SCAN: ICD-10-CM

## 2019-01-14 DIAGNOSIS — K74.60 NON-ALCOHOLIC CIRRHOSIS (HCC): ICD-10-CM

## 2019-01-14 DIAGNOSIS — E66.9 OBESITY: ICD-10-CM

## 2019-01-14 PROCEDURE — 74181 MRI ABDOMEN W/O CONTRAST: CPT

## 2019-01-15 ENCOUNTER — HOSPITAL ENCOUNTER (OUTPATIENT)
Dept: MAMMOGRAPHY | Age: 79
Discharge: HOME | End: 2019-01-15
Attending: INTERNAL MEDICINE
Payer: MEDICARE

## 2019-01-15 DIAGNOSIS — C50.919 MALIGNANT NEOPLASM OF BREAST (FEMALE) (HCC): ICD-10-CM

## 2019-01-15 DIAGNOSIS — Z12.31 VISIT FOR SCREENING MAMMOGRAM: ICD-10-CM

## 2019-01-15 DIAGNOSIS — C18.2 MALIGNANT NEOPLASM OF ASCENDING COLON (HCC): ICD-10-CM

## 2019-01-15 PROCEDURE — 77063 BREAST TOMOSYNTHESIS BI: CPT

## 2019-02-17 ENCOUNTER — HOSPITAL ENCOUNTER (EMERGENCY)
Age: 79
Discharge: HOME | End: 2019-02-17
Attending: EMERGENCY MEDICINE
Payer: MEDICARE

## 2019-02-17 ENCOUNTER — APPOINTMENT (OUTPATIENT)
Dept: CT IMAGING | Age: 79
End: 2019-02-17
Attending: EMERGENCY MEDICINE
Payer: MEDICARE

## 2019-02-17 VITALS
DIASTOLIC BLOOD PRESSURE: 64 MMHG | SYSTOLIC BLOOD PRESSURE: 143 MMHG | OXYGEN SATURATION: 99 % | HEART RATE: 67 BPM | RESPIRATION RATE: 15 BRPM | TEMPERATURE: 98.3 F

## 2019-02-17 DIAGNOSIS — K52.9 ENTERITIS: Primary | ICD-10-CM

## 2019-02-17 LAB
ALBUMIN SERPL-MCNC: 3 G/DL (ref 3.4–5)
ALBUMIN/GLOB SERPL: 0.9 {RATIO} (ref 0.8–1.7)
ALP SERPL-CCNC: 64 U/L (ref 45–117)
ALT SERPL-CCNC: 21 U/L (ref 13–56)
ANION GAP SERPL CALC-SCNC: 10 MMOL/L (ref 3–18)
AST SERPL-CCNC: 20 U/L (ref 15–37)
BASOPHILS # BLD: 0 K/UL (ref 0–0.1)
BASOPHILS NFR BLD: 0 % (ref 0–2)
BILIRUB SERPL-MCNC: 0.5 MG/DL (ref 0.2–1)
BUN SERPL-MCNC: 47 MG/DL (ref 7–18)
BUN/CREAT SERPL: 34 (ref 12–20)
CALCIUM SERPL-MCNC: 7.9 MG/DL (ref 8.5–10.1)
CHLORIDE SERPL-SCNC: 110 MMOL/L (ref 100–108)
CO2 SERPL-SCNC: 18 MMOL/L (ref 21–32)
CREAT SERPL-MCNC: 1.37 MG/DL (ref 0.6–1.3)
DIFFERENTIAL METHOD BLD: ABNORMAL
EOSINOPHIL # BLD: 0.1 K/UL (ref 0–0.4)
EOSINOPHIL NFR BLD: 2 % (ref 0–5)
ERYTHROCYTE [DISTWIDTH] IN BLOOD BY AUTOMATED COUNT: 15 % (ref 11.6–14.5)
GLOBULIN SER CALC-MCNC: 3.3 G/DL (ref 2–4)
GLUCOSE SERPL-MCNC: 148 MG/DL (ref 74–99)
HCT VFR BLD AUTO: 27.6 % (ref 35–45)
HGB BLD-MCNC: 9.4 G/DL (ref 12–16)
LIPASE SERPL-CCNC: 201 U/L (ref 73–393)
LYMPHOCYTES # BLD: 0.6 K/UL (ref 0.9–3.6)
LYMPHOCYTES NFR BLD: 15 % (ref 21–52)
MCH RBC QN AUTO: 28.7 PG (ref 24–34)
MCHC RBC AUTO-ENTMCNC: 34.1 G/DL (ref 31–37)
MCV RBC AUTO: 84.4 FL (ref 74–97)
MONOCYTES # BLD: 0.4 K/UL (ref 0.05–1.2)
MONOCYTES NFR BLD: 9 % (ref 3–10)
NEUTS SEG # BLD: 3.1 K/UL (ref 1.8–8)
NEUTS SEG NFR BLD: 74 % (ref 40–73)
PLATELET # BLD AUTO: 73 K/UL (ref 135–420)
PMV BLD AUTO: 10 FL (ref 9.2–11.8)
POTASSIUM SERPL-SCNC: 4.2 MMOL/L (ref 3.5–5.5)
PROT SERPL-MCNC: 6.3 G/DL (ref 6.4–8.2)
RBC # BLD AUTO: 3.27 M/UL (ref 4.2–5.3)
SODIUM SERPL-SCNC: 138 MMOL/L (ref 136–145)
WBC # BLD AUTO: 4.1 K/UL (ref 4.6–13.2)

## 2019-02-17 PROCEDURE — 85025 COMPLETE CBC W/AUTO DIFF WBC: CPT

## 2019-02-17 PROCEDURE — 96374 THER/PROPH/DIAG INJ IV PUSH: CPT

## 2019-02-17 PROCEDURE — 74011636320 HC RX REV CODE- 636/320: Performed by: EMERGENCY MEDICINE

## 2019-02-17 PROCEDURE — 99283 EMERGENCY DEPT VISIT LOW MDM: CPT

## 2019-02-17 PROCEDURE — 80053 COMPREHEN METABOLIC PANEL: CPT

## 2019-02-17 PROCEDURE — 74011250636 HC RX REV CODE- 250/636: Performed by: EMERGENCY MEDICINE

## 2019-02-17 PROCEDURE — 74177 CT ABD & PELVIS W/CONTRAST: CPT

## 2019-02-17 PROCEDURE — 83690 ASSAY OF LIPASE: CPT

## 2019-02-17 RX ORDER — ONDANSETRON 2 MG/ML
4 INJECTION INTRAMUSCULAR; INTRAVENOUS
Status: COMPLETED | OUTPATIENT
Start: 2019-02-17 | End: 2019-02-17

## 2019-02-17 RX ADMIN — ONDANSETRON 4 MG: 2 INJECTION INTRAMUSCULAR; INTRAVENOUS at 12:00

## 2019-02-17 RX ADMIN — IOPAMIDOL 60 ML: 612 INJECTION, SOLUTION INTRAVENOUS at 13:04

## 2019-02-17 NOTE — DISCHARGE INSTRUCTIONS
Your evaluation today showed diarrhea likely due to inflammation of your colon around where you had your colon surgery. Please follow up with a doctor as discussed. The evaluation and treatment done today requires that you follow up with a physician for re-evaluation. Not following up could result in chronic pain/disability/injury. Medical problems can change over time and symptoms can get worse or new symptoms can develop over time, therefore, it is important that you follow up as we discussed or return immedediately to the ER. Diseases don't read textbooks and there are limitations to our evaluation and testing, so please immediately return to the ER if you have any concerns. Call the ER if you have any questions about what we discussed. Please visit [employer] for discounts on any prescriptions you may have. At the [facility] Emergency Department we are genuinely concerned about your health and comfort. You may be selected to participate in a patient satisfaction survey mailed to your home. We are excited about the opportunity to learn from your experience so we may continue to improve. In striving for the very best we believe good is not good enough, but if you rate us as EXCELLENT in all boxes, we have succeeded. We strive to provide EXCELLENT care to you and your family. We appreciate the opportunity to take care of you, and hope you do well. Diarrhea: Care Instructions  Your Care Instructions    Diarrhea is loose, watery stools (bowel movements). The exact cause is often hard to find. Sometimes diarrhea is your body's way of getting rid of what caused an upset stomach. Viruses, food poisoning, and many medicines can cause diarrhea. Some people get diarrhea in response to emotional stress, anxiety, or certain foods. Almost everyone has diarrhea now and then. It usually isn't serious, and your stools will return to normal soon.  The important thing to do is replace the fluids you have lost, so you can prevent dehydration. The doctor has checked you carefully, but problems can develop later. If you notice any problems or new symptoms, get medical treatment right away. Follow-up care is a key part of your treatment and safety. Be sure to make and go to all appointments, and call your doctor if you are having problems. It's also a good idea to know your test results and keep a list of the medicines you take. How can you care for yourself at home? · Watch for signs of dehydration, which means your body has lost too much water. Dehydration is a serious condition and should be treated right away. Signs of dehydration are:  ? Increasing thirst and dry eyes and mouth. ? Feeling faint or lightheaded. ? Darker urine, and a smaller amount of urine than normal.  · To prevent dehydration, drink plenty of fluids, enough so that your urine is light yellow or clear like water. Choose water and other caffeine-free clear liquids until you feel better. If you have kidney, heart, or liver disease and have to limit fluids, talk with your doctor before you increase the amount of fluids you drink. · Begin eating small amounts of mild foods the next day, if you feel like it. ? Try yogurt that has live cultures of Lactobacillus. (Check the label.)  ? Avoid spicy foods, fruits, alcohol, and caffeine until 48 hours after all symptoms are gone. ? Avoid chewing gum that contains sorbitol. ? Avoid dairy products (except for yogurt with Lactobacillus) while you have diarrhea and for 3 days after symptoms are gone. · The doctor may recommend that you take over-the-counter medicine, such as loperamide (Imodium), if you still have diarrhea after 6 hours. Read and follow all instructions on the label. Do not use this medicine if you have bloody diarrhea, a high fever, or other signs of serious illness. Call your doctor if you think you are having a problem with your medicine. When should you call for help?   Call [phone] anytime you think you may need emergency care. For example, call if:    · You passed out (lost consciousness).     · Your stools are maroon or very bloody.    Call your doctor now or seek immediate medical care if:    · You are dizzy or lightheaded, or you feel like you may faint.     · Your stools are black and look like tar, or they have streaks of blood.     · You have new or worse belly pain.     · You have symptoms of dehydration, such as:  ? Dry eyes and a dry mouth. ? Passing only a little dark urine. ? Feeling thirstier than usual.     · You have a new or higher fever.    Watch closely for changes in your health, and be sure to contact your doctor if:    · Your diarrhea is getting worse.     · You see pus in the diarrhea.     · You are not getting better after 2 days (48 hours). Where can you learn more? Go to http://perlita-linda.info/. Enter E298 in the search box to learn more about \"Diarrhea: Care Instructions. \"  Current as of: September 23, 2018  Content Version: 11.9  © 9813-8436 Healthwise, Incorporated. Care instructions adapted under license by [employer] (which disclaims liability or warranty for this information). If you have questions about a medical condition or this instruction, always ask your healthcare professional. [address] any warranty or liability for your use of this information.

## 2019-02-17 NOTE — ED PROVIDER NOTES
EMERGENCY DEPARTMENT HISTORY AND PHYSICAL EXAM 
 
11:08 AM 
Date: 2/17/2019 Patient Name: Eloisa Quinonez History of Presenting Illness Chief Complaint:  
Chief Complaint Patient presents with  Diarrhea  Ear Pain History Provided By: Patient Additional History (Context): Eloisa Quinonez is a 77 y.o. female with  who presents with constant diarrhea onset x 2 days ago. Associated Sx include periumbilical abd pain, nausea, and vomiting. The pt reports she is currently having diarrhea about every 15-20 minutes. The pt says this is worse than a case of her usual diarrhea. She had one episode of vomiting yesterday. The pt initially had generalized abd pain, but has since localized to the periumbilical region. During transport pt was made aware of a hernia she did not know of. Pt also complains of \"fluid behind my ears\" making it difficult to hear. Pt has not recently been on Abx. Denies fever. No further concerns or complaints at this time. PCP: Rachele Contreras MD 
 
Duration: 2 Days Timing:  Constant Location: GI 
Quality: Not described Severity: Moderate Modifying Factors: N/A Associated Symptoms: abd pain, nausea, and vomiting Past History Past Medical History: 
Past Medical History:  
Diagnosis Date  Anemia  CAD (coronary artery disease) Coronary Stenting needed, hematology workup needed prior to stent placement, treating medically at this time  Cancer [facility])   
 colon  Cancer ([facility] [address] breast - left  Cardiac catheterization 08/17/2016 LM patent. mLAD 99.9% (2.25 x 28-mm Xience BETHEL, resid 0%). oD1 60%. oD2 95%. pCX 90% (2.5 x 13-mm Xience BETHEL, resid 0%). OMB patent.  Cardiac echocardiogram 07/01/2016 EF 50%. Basal inferior, basal-mid inferolateral hypk. Gr 1 DDfx. RVSP 50-60 mmHg. Mild LAE. Mod, eccentric MR.    
 Diabetes ([facility] [address]  Hypercholesteremia  Hypertension  Thrombocytopenia ([facility] [address] Past Surgical History: Past Surgical History:  
Procedure Laterality Date  CARDIAC CATHETERIZATION  7/16/2015  
  no stents at this time r/t anemia & thrombocytopenia (hematology work-up needed prior to stent placement)  CARDIAC CATHETERIZATION  7/11/2016  CARDIAC CATHETERIZATION  8/17/2016  CORONARY ARTERY ANGIOGRAM  7/16/2015  CORONARY ARTERY ANGIOGRAM  7/11/2016  CORONARY ARTERY ANGIOGRAM  8/17/2016  CORONARY STENT EA ADDL VESSEL  8/17/2016  CORONARY STENT SINGLE W/PTCA  8/17/2016  HX GI    
 colon surg secondary to cancer - removed lesion  HX HEENT  2/2013  
 cataract bilaterally  HX MASTECTOMY  march 2011  
 left  HX VASCULAR ACCESS    
 mediport - now removed  LV ANGIOGRAPHY  7/16/2015  LV ANGIOGRAPHY  7/11/2016 Family History: 
Family History Problem Relation Age of Onset  Cancer Maternal Aunt Social History: 
Social History Tobacco Use  Smoking status: Never Smoker  Smokeless tobacco: Never Used Substance Use Topics  Alcohol use: No  
 Drug use: No  
 
 
Allergies: Allergies Allergen Reactions  Latex Itching Review of Systems Review of Systems Constitutional: Negative for fever. HENT: Positive for ear pain and hearing loss. Gastrointestinal: Positive for diarrhea, nausea and vomiting. All other systems reviewed and are negative. Physical Exam  
 
Patient Vitals for the past 12 hrs: 
 Temp Pulse Resp BP SpO2  
02/17/19 1051 98.3 °F (36.8 °C) 67 15 143/64 99 % Physical Exam  
Constitutional: She is oriented to person, place, and time. She appears well-developed. HENT:  
Head: Normocephalic and atraumatic. Right Ear: Tympanic membrane is not erythematous. Decreased hearing is noted. Left Ear: Tympanic membrane is not erythematous. Decreased hearing is noted. There is old blood behind the bilateral TMs. There is no active bleeding. Eyes: Conjunctivae and EOM are normal.  
Neck: Normal range of motion. Cardiovascular: Normal heart sounds. Exam reveals no gallop and no friction rub. No murmur heard. Pulmonary/Chest: Effort normal and breath sounds normal. No stridor. Abdominal: Soft. There is no tenderness. A hernia (that is reducible) is present. Hernia confirmed positive in the ventral area. Musculoskeletal: Normal range of motion. She exhibits no tenderness. Neurological: She is alert and oriented to person, place, and time. Skin: Skin is warm and dry. She is not diaphoretic. Psychiatric: She has a normal mood and affect. Her behavior is normal.  
Nursing note and vitals reviewed. Diagnostic Study Results Labs - Recent Results (from the past 12 hour(s)) CBC WITH AUTOMATED DIFF Collection Time: 02/17/19 11:00 AM  
Result Value Ref Range WBC 4.1 (L) 4.6 - 13.2 K/uL  
 RBC 3.27 (L) 4.20 - 5.30 M/uL HGB 9.4 (L) 12.0 - 16.0 g/dL HCT 27.6 (L) 35.0 - 45.0 % MCV 84.4 74.0 - 97.0 FL  
 MCH 28.7 24.0 - 34.0 PG  
 MCHC 34.1 31.0 - 37.0 g/dL  
 RDW 15.0 (H) 11.6 - 14.5 % PLATELET 73 (L) 086 - 420 K/uL MPV 10.0 9.2 - 11.8 FL  
 NEUTROPHILS 74 (H) 40 - 73 % LYMPHOCYTES 15 (L) 21 - 52 % MONOCYTES 9 3 - 10 % EOSINOPHILS 2 0 - 5 % BASOPHILS 0 0 - 2 %  
 ABS. NEUTROPHILS 3.1 1.8 - 8.0 K/UL  
 ABS. LYMPHOCYTES 0.6 (L) 0.9 - 3.6 K/UL  
 ABS. MONOCYTES 0.4 0.05 - 1.2 K/UL  
 ABS. EOSINOPHILS 0.1 0.0 - 0.4 K/UL  
 ABS. BASOPHILS 0.0 0.0 - 0.1 K/UL  
 DF AUTOMATED METABOLIC PANEL, COMPREHENSIVE Collection Time: 02/17/19 11:00 AM  
Result Value Ref Range Sodium 138 136 - 145 mmol/L Potassium 4.2 3.5 - 5.5 mmol/L Chloride 110 (H) 100 - 108 mmol/L  
 CO2 18 (L) 21 - 32 mmol/L Anion gap 10 3.0 - 18 mmol/L Glucose 148 (H) 74 - 99 mg/dL BUN 47 (H) 7.0 - 18 MG/DL Creatinine 1.37 (H) 0.6 - 1.3 MG/DL  
 BUN/Creatinine ratio 34 (H) 12 - 20 GFR est AA 45 (L) >60 ml/min/1.73m2 GFR est non-AA 37 (L) >60 ml/min/1.73m2 Calcium 7.9 (L) 8.5 - 10.1 MG/DL Bilirubin, total 0.5 0.2 - 1.0 MG/DL  
 ALT (SGPT) 21 13 - 56 U/L  
 AST (SGOT) 20 15 - 37 U/L Alk. phosphatase 64 45 - 117 U/L Protein, total 6.3 (L) 6.4 - 8.2 g/dL Albumin 3.0 (L) 3.4 - 5.0 g/dL Globulin 3.3 2.0 - 4.0 g/dL A-G Ratio 0.9 0.8 - 1.7 LIPASE Collection Time: 02/17/19 11:00 AM  
Result Value Ref Range Lipase 201 73 - 393 U/L Radiologic Studies -  
CT ABD PELV W CONT Final Result Impression:   
  
Loops of distal small bowel proximal to ileocolic anastomosis demonstrate  
evidence for mild mural thickening, somewhat edematous appearing, concerning for  
an enteritis, not specific in appearance regarding etiology. Clinical  
correlation might be helpful regarding suspicion for infectious, inflammatory,  
or ischemic etiologies as discussed above. Ventral midline abdominal wall hernia sac containing small bowel and ascites  
again seen, without evidence of mechanical bowel obstruction or strangulation at  
this time. Small volume of ascites best seen around the liver similar to prior. Small right and very small left pleural effusions similar to prior. Adjacent  
atelectasis, right greater than left, which could be with or without underlying  
airspace disease as described. Dilatation of the extra hepatic main portal vein suspicious for portal  
hypertension. Marked splenomegaly similar to prior, possibly sequela of portal  
hypertension. Cholelithiasis. Gallbladder wall thickening and/or pericholecystic fluid,  
nonspecific with ascites. Pancreatic head cystic appearing mass again seen, better assessed on recent MRCP. Small gas again noted along the anterior nondependent urinary bladder, most  
commonly seen related to recent instrumentation. Otherwise as above. I have telephoned a wet reading directly to   Dr. Marcial Troy  at 14:07 on  
2/17/2019. Medical Decision Making ED Course: Progress Notes, Reevaluation, and Consults: 
2:42 PM 
Updated pt on CT abd results. She states her surgeon is Dr. Francine Bell at [facility]. Will page him now. 
 
2:51 PM, 2/17/2019 Consult:  Discussed care with Dr. Francine Bell, Specialty:   Standard discussion; including history of patients chief complaint, available diagnostic results, and treatment course. Agrees with plan and wants to see the pt in his clinic as soon as possible. Provider Notes (Medical Decision Making): Pt presents with diarrhea and a reducible ventral hernia. On serial exam the abdomen is soft without peritoneal signs and ED evaluation was non-diagnostic. CT is showing enteritis near her prior anastamosis for colon ca. She is overall very well and now completely asx without any ab pain or diarrhea. Lower concern for c diff but shes has no diarrhea or need to go to the restroom here. I got in contact with her surgeon and reviewed the CT with him. He is agreeable with the plan for her to follow up in the clinic and recommends no active abx for now. Hydration is now satisfactory. Pt is advised to have a follow-up with the primary care physician tomorrow for a recheck including evaluation of hydration status and a repeat abdominal exam. I advised the patient return to the emergency department for significant pain, fevers, not tolerating oral food or fluid, blood in the diarrhea, or new complaints Current Outpatient Medications Medication Sig Dispense Refill  SITagliptin-metFORMIN (JANUMET)  mg per tablet Take 1 Tab by mouth two (2) times daily (with meals).  traMADol (ULTRAM) 50 mg tablet Take 1 Tab by mouth daily.  Max Daily Amount: 50 mg. 30 Tab 0  
 insulin lispro (HUMALOG) 100 unit/mL injection As directed  Indications: type 2 diabetes mellitus 1 Vial 1  
 insulin glargine (LANTUS,BASAGLAR) 100 unit/mL (3 mL) inpn 25 units subcutaneously every evening at 9 pm 1 Adjustable Dose Pre-filled Pen Syringe 3  furosemide (LASIX) 20 mg tablet 20 mg po daily 30 Tab 0  
 pantoprazole (PROTONIX) 40 mg tablet Take 1 Tab by mouth two (2) times a day. 60 Tab 0  
 amLODIPine (NORVASC) 5 mg tablet TAKE ONE TABLET BY MOUTH ONCE DAILY 30 Tab 0  
 nitroglycerin (NITROLINGUAL) 400 mcg/spray spray 1 Spray by SubLINGual route every five (5) minutes as needed. 1 Bottle 2  
 hydrALAZINE (APRESOLINE) 10 mg tablet Take 1 Tab by mouth three (3) times daily. 90 Tab 6  
 metoprolol tartrate (LOPRESSOR) 50 mg tablet Take 1 Tab by mouth two (2) times a day. 60 Tab 6  
 ascorbic acid, vitamin C, (VITAMIN C) 250 mg tablet Take 1 Tab by mouth daily. 60 Tab 1  
 ferrous sulfate 325 mg (65 mg iron) tablet Take 1 Tab by mouth two (2) times daily (with meals). 60 Tab 1  
 multivitamin (ONE A DAY) tablet Take 1 Tab by mouth daily.  cholecalciferol (VITAMIN D3) 1,000 unit tablet Take 2 Tabs by mouth daily. 60 Tab 1  
 simvastatin (ZOCOR) 20 mg tablet Take 20 mg by mouth daily.  omega 3-dha-epa-fish oil (FISH OIL) 100-160-1,000 mg cap Take 1,000 mg by mouth two (2) times a day.  cyanocobalamin (VITAMIN B12) 100 mcg tablet Take 100 mcg by mouth daily.  aspirin 81 mg tablet Take 81 mg by mouth daily.  calcium polycarbophil (FIBER LAXATIVE) 625 mg tablet Take 1,250 mg by mouth daily. Vital Signs-Reviewed the patient's vital signs. Pulse Oximetry Analysis -  99% on room air, normal. 
 
Records Reviewed: Nursing Notes (Time of Review: 11:08 AM) 
-I am the first provider for this patient. 
-I reviewed the vital signs, available nursing notes, past medical history, past surgical history, family history and social history. Diagnosis Clinical Impression: No diagnosis found. Disposition: Discharged Follow-up Information Follow up With Specialties Details Why Contact Info [facility] EMERGENCY DEPT Emergency Medicine  As needed, If symptoms worsen [address] [id number] 
[phone] Lizbeth Nazario MD Internal Medicine Go to as soon as possible for follow up appointment [address] [address] 
[phone] Jean-Claude Tyson MD Hematology and Oncology, Hematology, Oncology Go to as soon as possible for follow up appointment [address] [facility] [address] [address] 
[phone] Jm Jessica MD Surgery, Gastroenterology Go to as soon as possible for follow up appointment [address] [facility] [address] [address] 
[phone] Medication List  
  
ASK your doctor about these medications   
amLODIPine 5 mg tablet Commonly known as:  Skip Newcomer TAKE ONE TABLET BY MOUTH ONCE DAILY 
  
ascorbic acid (vitamin C) 250 mg tablet Commonly known as:  VITAMIN C Take 1 Tab by mouth daily. aspirin 81 mg tablet 
  
cholecalciferol 1,000 unit tablet Commonly known as:  VITAMIN D3 Take 2 Tabs by mouth daily. cyanocobalamin 100 mcg tablet Commonly known as:  VITAMIN B12 
  
ferrous sulfate 325 mg (65 mg iron) tablet Take 1 Tab by mouth two (2) times daily (with meals). FIBER LAXATIVE (CA POLYCARBO) 625 mg tablet Generic drug:  calcium polycarbophil FISH -160-1,000 mg Cap Generic drug:  omega 3-dha-epa-fish oil 
  
furosemide 20 mg tablet Commonly known as:  LASIX 
20 mg po daily 
  
hydrALAZINE 10 mg tablet Commonly known as:  APRESOLINE Take 1 Tab by mouth three (3) times daily. insulin glargine 100 unit/mL (3 mL) Inpn Commonly known as:  LANTUS,BASAGLAR 
25 units subcutaneously every evening at 9 pm 
  
insulin lispro 100 unit/mL injection Commonly known as:  HUMALOG As directed  Indications: type 2 diabetes mellitus JANUMET  mg per tablet Generic drug:  SITagliptin-metFORMIN 
  
metoprolol tartrate 50 mg tablet Commonly known as:  LOPRESSOR 
 Take 1 Tab by mouth two (2) times a day. multivitamin tablet Commonly known as:  ONE A DAY 
  
nitroglycerin 400 mcg/spray spray Commonly known as:  NITROLINGUAL 
1 Wilson by SubLINGual route every five (5) minutes as needed. pantoprazole 40 mg tablet Commonly known as:  PROTONIX Take 1 Tab by mouth two (2) times a day. simvastatin 20 mg tablet Commonly known as:  ZOCOR 
  
traMADol 50 mg tablet Commonly known as:  ULTRAM 
Take 1 Tab by mouth daily. Max Daily Amount: 50 mg. 
  
  
 
_______________________________ Attestations: 
Scribe Attestation Charmaine Marie acting as a scribe for and in the presence of Chas Irwin MD     
February 17, 2019 at 11:08 AM 
    
Provider Attestation:     
I personally performed the services described in the documentation, reviewed the documentation, as recorded by the scribe in my presence, and it accurately and completely records my words and actions. February 17, 2019 at 11:08 AM - Chas Irwin MD   
_______________________________

## 2019-02-17 NOTE — ED TRIAGE NOTES
Pt arrived c/o diarrhea since Friday, states she hasn't been able to get it to stop, also c/o fluid in ears making it hard to hear, has lower abdominal hernia which she is unsure how long it has been there

## 2019-03-24 ENCOUNTER — HOSPITAL ENCOUNTER (INPATIENT)
Age: 79
LOS: 4 days | Discharge: NURSING, REHAB OR LONG-TERM CARE | DRG: 291 | End: 2019-03-28
Attending: EMERGENCY MEDICINE | Admitting: INTERNAL MEDICINE
Payer: MEDICARE

## 2019-03-24 ENCOUNTER — APPOINTMENT (OUTPATIENT)
Dept: VASCULAR SURGERY | Age: 79
DRG: 291 | End: 2019-03-24
Attending: PHYSICIAN ASSISTANT
Payer: MEDICARE

## 2019-03-24 ENCOUNTER — APPOINTMENT (OUTPATIENT)
Dept: CT IMAGING | Age: 79
DRG: 291 | End: 2019-03-24
Attending: EMERGENCY MEDICINE
Payer: MEDICARE

## 2019-03-24 ENCOUNTER — APPOINTMENT (OUTPATIENT)
Dept: GENERAL RADIOLOGY | Age: 79
DRG: 291 | End: 2019-03-24
Attending: PHYSICIAN ASSISTANT
Payer: MEDICARE

## 2019-03-24 DIAGNOSIS — I50.9 ACUTE ON CHRONIC CONGESTIVE HEART FAILURE, UNSPECIFIED HEART FAILURE TYPE (HCC): Primary | ICD-10-CM

## 2019-03-24 DIAGNOSIS — R06.00 DYSPNEA, UNSPECIFIED TYPE: ICD-10-CM

## 2019-03-24 DIAGNOSIS — J18.9 COMMUNITY ACQUIRED PNEUMONIA, UNSPECIFIED LATERALITY: ICD-10-CM

## 2019-03-24 DIAGNOSIS — I82.402 LEG DVT (DEEP VENOUS THROMBOEMBOLISM), ACUTE, LEFT (HCC): ICD-10-CM

## 2019-03-24 PROBLEM — I50.33 ACUTE ON CHRONIC DIASTOLIC CHF (CONGESTIVE HEART FAILURE) (HCC): Status: ACTIVE | Noted: 2019-03-24

## 2019-03-24 LAB
ALBUMIN SERPL-MCNC: 3.2 G/DL (ref 3.4–5)
ALBUMIN/GLOB SERPL: 1 {RATIO} (ref 0.8–1.7)
ALP SERPL-CCNC: 78 U/L (ref 45–117)
ALT SERPL-CCNC: 23 U/L (ref 13–56)
ANION GAP SERPL CALC-SCNC: 7 MMOL/L (ref 3–18)
APTT PPP: 31.4 SEC (ref 23–36.4)
AST SERPL-CCNC: 13 U/L (ref 15–37)
BASOPHILS # BLD: 0 K/UL (ref 0–0.1)
BASOPHILS NFR BLD: 1 % (ref 0–2)
BILIRUB SERPL-MCNC: 0.9 MG/DL (ref 0.2–1)
BNP SERPL-MCNC: 2983 PG/ML (ref 0–1800)
BUN SERPL-MCNC: 42 MG/DL (ref 7–18)
BUN/CREAT SERPL: 34 (ref 12–20)
CALCIUM SERPL-MCNC: 8.7 MG/DL (ref 8.5–10.1)
CHLORIDE SERPL-SCNC: 116 MMOL/L (ref 100–108)
CK MB CFR SERPL CALC: 3.5 % (ref 0–4)
CK MB SERPL-MCNC: 1.8 NG/ML (ref 5–25)
CK SERPL-CCNC: 51 U/L (ref 26–192)
CO2 SERPL-SCNC: 19 MMOL/L (ref 21–32)
CREAT SERPL-MCNC: 1.25 MG/DL (ref 0.6–1.3)
DIFFERENTIAL METHOD BLD: ABNORMAL
EOSINOPHIL # BLD: 0.1 K/UL (ref 0–0.4)
EOSINOPHIL NFR BLD: 2 % (ref 0–5)
ERYTHROCYTE [DISTWIDTH] IN BLOOD BY AUTOMATED COUNT: 16 % (ref 11.6–14.5)
GLOBULIN SER CALC-MCNC: 3.2 G/DL (ref 2–4)
GLUCOSE SERPL-MCNC: 260 MG/DL (ref 74–99)
HCT VFR BLD AUTO: 24 % (ref 35–45)
HGB BLD-MCNC: 8.4 G/DL (ref 12–16)
INR PPP: 1.1 (ref 0.8–1.2)
LYMPHOCYTES # BLD: 0.8 K/UL (ref 0.9–3.6)
LYMPHOCYTES NFR BLD: 16 % (ref 21–52)
MCH RBC QN AUTO: 29.3 PG (ref 24–34)
MCHC RBC AUTO-ENTMCNC: 35 G/DL (ref 31–37)
MCV RBC AUTO: 83.6 FL (ref 74–97)
MONOCYTES # BLD: 0.3 K/UL (ref 0.05–1.2)
MONOCYTES NFR BLD: 6 % (ref 3–10)
NEUTS SEG # BLD: 3.5 K/UL (ref 1.8–8)
NEUTS SEG NFR BLD: 75 % (ref 40–73)
PLATELET # BLD AUTO: 90 K/UL (ref 135–420)
PLATELET COMMENTS,PCOM: ABNORMAL
PMV BLD AUTO: 10.1 FL (ref 9.2–11.8)
POTASSIUM SERPL-SCNC: 4.6 MMOL/L (ref 3.5–5.5)
PROT SERPL-MCNC: 6.4 G/DL (ref 6.4–8.2)
PROTHROMBIN TIME: 14.2 SEC (ref 11.5–15.2)
RBC # BLD AUTO: 2.87 M/UL (ref 4.2–5.3)
RBC MORPH BLD: ABNORMAL
SODIUM SERPL-SCNC: 142 MMOL/L (ref 136–145)
TROPONIN I SERPL-MCNC: <0.02 NG/ML (ref 0–0.04)
WBC # BLD AUTO: 4.7 K/UL (ref 4.6–13.2)

## 2019-03-24 PROCEDURE — 80053 COMPREHEN METABOLIC PANEL: CPT

## 2019-03-24 PROCEDURE — 96374 THER/PROPH/DIAG INJ IV PUSH: CPT

## 2019-03-24 PROCEDURE — 71046 X-RAY EXAM CHEST 2 VIEWS: CPT

## 2019-03-24 PROCEDURE — 71275 CT ANGIOGRAPHY CHEST: CPT

## 2019-03-24 PROCEDURE — 74011250636 HC RX REV CODE- 250/636: Performed by: EMERGENCY MEDICINE

## 2019-03-24 PROCEDURE — 74011000250 HC RX REV CODE- 250: Performed by: EMERGENCY MEDICINE

## 2019-03-24 PROCEDURE — 93005 ELECTROCARDIOGRAM TRACING: CPT

## 2019-03-24 PROCEDURE — 93970 EXTREMITY STUDY: CPT

## 2019-03-24 PROCEDURE — 74011636320 HC RX REV CODE- 636/320: Performed by: EMERGENCY MEDICINE

## 2019-03-24 PROCEDURE — 93971 EXTREMITY STUDY: CPT

## 2019-03-24 PROCEDURE — 96375 TX/PRO/DX INJ NEW DRUG ADDON: CPT

## 2019-03-24 PROCEDURE — 82550 ASSAY OF CK (CPK): CPT

## 2019-03-24 PROCEDURE — 83880 ASSAY OF NATRIURETIC PEPTIDE: CPT

## 2019-03-24 PROCEDURE — 85730 THROMBOPLASTIN TIME PARTIAL: CPT

## 2019-03-24 PROCEDURE — 87040 BLOOD CULTURE FOR BACTERIA: CPT

## 2019-03-24 PROCEDURE — 99284 EMERGENCY DEPT VISIT MOD MDM: CPT

## 2019-03-24 PROCEDURE — 96372 THER/PROPH/DIAG INJ SC/IM: CPT

## 2019-03-24 PROCEDURE — 65270000029 HC RM PRIVATE

## 2019-03-24 PROCEDURE — 85025 COMPLETE CBC W/AUTO DIFF WBC: CPT

## 2019-03-24 PROCEDURE — 85610 PROTHROMBIN TIME: CPT

## 2019-03-24 PROCEDURE — 65660000000 HC RM CCU STEPDOWN

## 2019-03-24 RX ORDER — ENOXAPARIN SODIUM 100 MG/ML
1 INJECTION SUBCUTANEOUS
Status: COMPLETED | OUTPATIENT
Start: 2019-03-24 | End: 2019-03-24

## 2019-03-24 RX ORDER — AMLODIPINE BESYLATE 10 MG/1
10 TABLET ORAL DAILY
COMMUNITY
End: 2020-01-01

## 2019-03-24 RX ORDER — FUROSEMIDE 10 MG/ML
40 INJECTION INTRAMUSCULAR; INTRAVENOUS
Status: COMPLETED | OUTPATIENT
Start: 2019-03-24 | End: 2019-03-24

## 2019-03-24 RX ORDER — CARVEDILOL 12.5 MG/1
12.5 TABLET ORAL 2 TIMES DAILY WITH MEALS
COMMUNITY
End: 2020-01-01

## 2019-03-24 RX ADMIN — IOPAMIDOL 72 ML: 755 INJECTION, SOLUTION INTRAVENOUS at 17:56

## 2019-03-24 RX ADMIN — AZITHROMYCIN MONOHYDRATE 500 MG: 500 INJECTION, POWDER, LYOPHILIZED, FOR SOLUTION INTRAVENOUS at 20:07

## 2019-03-24 RX ADMIN — ENOXAPARIN SODIUM 70 MG: 80 INJECTION SUBCUTANEOUS at 20:05

## 2019-03-24 RX ADMIN — CEFTRIAXONE SODIUM 1 G: 1 INJECTION, POWDER, FOR SOLUTION INTRAMUSCULAR; INTRAVENOUS at 20:01

## 2019-03-24 RX ADMIN — FUROSEMIDE 40 MG: 10 INJECTION, SOLUTION INTRAMUSCULAR; INTRAVENOUS at 19:59

## 2019-03-24 NOTE — ED PROVIDER NOTES
EMERGENCY DEPARTMENT HISTORY AND PHYSICAL EXAM 
 
4:38 PM 
 
 
Date: 3/24/2019 Patient Name: Rosalba Salazar History of Presenting Illness Chief Complaint Patient presents with  Shortness of Breath  
  this week History Provided By: Patient Additional History (Context): Rosalba Salazar is a 77 y.o. female with past medical history diabetes, hypertension and CAD, colon cancer who presents with complaint of persistent worsening shortness of breath along with coughing. He states that her PCP has tried on 3 different antibiotics in the past 3 months for sinus infection as well as bronchitis with no improvement. States now she has left leg swelling and moderate pain for the past few days. States he gets even worsening shortness of breath on exertion. He also states that she had some chest pain a few days ago which resolved with nitroglycerin. She denies any current chest pain no dizziness, nausea, abdominal pain or vomiting. No other complaints PCP: Tenny Riedel, MD 
 
 
 
Past History Past Medical History: 
Past Medical History:  
Diagnosis Date  Anemia  CAD (coronary artery disease) Coronary Stenting needed, hematology workup needed prior to stent placement, treating medically at this time  Cancer [facility])   
 colon  Cancer ([facility] [address] breast - left  Cardiac catheterization 08/17/2016 LM patent. mLAD 99.9% (2.25 x 28-mm Xience BETHEL, resid 0%). oD1 60%. oD2 95%. pCX 90% (2.5 x 13-mm Xience BETHEL, resid 0%). OMB patent.  Cardiac echocardiogram 07/01/2016 EF 50%. Basal inferior, basal-mid inferolateral hypk. Gr 1 DDfx. RVSP 50-60 mmHg. Mild LAE. Mod, eccentric MR.    
 Diabetes ([facility] [address]  Hypercholesteremia  Hypertension  Thrombocytopenia ([facility] [address] Past Surgical History: 
Past Surgical History:  
Procedure Laterality Date  CARDIAC CATHETERIZATION  7/16/2015  
  no stents at this time r/t anemia & thrombocytopenia (hematology work-up needed prior to stent placement)  CARDIAC CATHETERIZATION  7/11/2016  CARDIAC CATHETERIZATION  8/17/2016  CORONARY ARTERY ANGIOGRAM  7/16/2015  CORONARY ARTERY ANGIOGRAM  7/11/2016  CORONARY ARTERY ANGIOGRAM  8/17/2016  CORONARY STENT EA ADDL VESSEL  8/17/2016  CORONARY STENT SINGLE W/PTCA  8/17/2016  HX GI    
 colon surg secondary to cancer - removed lesion  HX HEENT  2/2013  
 cataract bilaterally  HX MASTECTOMY  march 2011  
 left  HX VASCULAR ACCESS    
 mediport - now removed  LV ANGIOGRAPHY  7/16/2015  LV ANGIOGRAPHY  7/11/2016 Family History: 
Family History Problem Relation Age of Onset  Cancer Maternal Aunt Social History: 
Social History Tobacco Use  Smoking status: Never Smoker  Smokeless tobacco: Never Used Substance Use Topics  Alcohol use: No  
 Drug use: No  
 
 
Allergies: Allergies Allergen Reactions  Latex Itching Review of Systems Review of Systems Constitutional: Negative for chills and fever. HENT: Negative for congestion, rhinorrhea, sore throat and trouble swallowing. Eyes: Negative for visual disturbance. Respiratory: Positive for cough and shortness of breath. Negative for wheezing. Cardiovascular: Positive for chest pain (.  None today) and leg swelling. Gastrointestinal: Negative for abdominal pain, nausea and vomiting. Endocrine: Negative for polyuria. Genitourinary: Negative for dysuria. Musculoskeletal: Negative for arthralgias, neck pain and neck stiffness. Left calf pain Skin: Negative for pallor and rash. Neurological: Negative for dizziness, weakness, numbness and headaches. Hematological: Does not bruise/bleed easily. Psychiatric/Behavioral: Negative for confusion and dysphoric mood. All other systems reviewed and are negative. Physical Exam  
 
Visit Vitals /62 Pulse 83 Temp 97.8 °F (36.6 °C) Resp 20 Ht 4' 11\" (1.499 m) Wt 67.1 kg (148 lb) SpO2 98% BMI 29.89 kg/m² Physical Exam  
Constitutional: She is oriented to person, place, and time. She appears well-developed and well-nourished. No distress. HENT:  
Head: Normocephalic and atraumatic. Mouth/Throat: Oropharynx is clear and moist.  
Eyes: Pupils are equal, round, and reactive to light. Conjunctivae are normal. No scleral icterus. Neck: Normal range of motion. Neck supple. Cardiovascular: Normal rate and intact distal pulses. Capillary refill < 3 seconds Pulmonary/Chest: Effort normal and breath sounds normal. No respiratory distress. She has no wheezes. Coughing at the bedside Some mild rales at the bases Abdominal: Soft. Bowel sounds are normal. She exhibits no distension. There is no tenderness. Musculoskeletal: Normal range of motion. She exhibits no edema. Left calf tenderness Lymphadenopathy:  
  She has no cervical adenopathy. Neurological: She is alert and oriented to person, place, and time. No cranial nerve deficit. She exhibits normal muscle tone. Coordination normal.  
Skin: Skin is warm and dry. She is not diaphoretic. Psychiatric: She has a normal mood and affect. Her behavior is normal.  
Nursing note and vitals reviewed. Diagnostic Study Results Labs - Recent Results (from the past 12 hour(s)) EKG, 12 LEAD, INITIAL Collection Time: 03/24/19  2:55 PM  
Result Value Ref Range Ventricular Rate 79 BPM  
 Atrial Rate 79 BPM  
 P-R Interval 140 ms QRS Duration 106 ms  
 Q-T Interval 400 ms QTC Calculation (Bezet) 458 ms Calculated P Axis 32 degrees Calculated R Axis 0 degrees Calculated T Axis 74 degrees Diagnosis Normal sinus rhythm Possible Left atrial enlargement Borderline ECG When compared with ECG of 12-DEC-2018 22:03, No significant change was found CBC WITH AUTOMATED DIFF  Collection Time: 03/24/19  4:38 PM  
 Result Value Ref Range WBC 4.7 4.6 - 13.2 K/uL  
 RBC 2.87 (L) 4.20 - 5.30 M/uL HGB 8.4 (L) 12.0 - 16.0 g/dL HCT 24.0 (L) 35.0 - 45.0 % MCV 83.6 74.0 - 97.0 FL  
 MCH 29.3 24.0 - 34.0 PG  
 MCHC 35.0 31.0 - 37.0 g/dL  
 RDW 16.0 (H) 11.6 - 14.5 % PLATELET 90 (L) 802 - 420 K/uL MPV 10.1 9.2 - 11.8 FL  
 NEUTROPHILS 75 (H) 40 - 73 % LYMPHOCYTES 16 (L) 21 - 52 % MONOCYTES 6 3 - 10 % EOSINOPHILS 2 0 - 5 % BASOPHILS 1 0 - 2 %  
 ABS. NEUTROPHILS 3.5 1.8 - 8.0 K/UL  
 ABS. LYMPHOCYTES 0.8 (L) 0.9 - 3.6 K/UL  
 ABS. MONOCYTES 0.3 0.05 - 1.2 K/UL  
 ABS. EOSINOPHILS 0.1 0.0 - 0.4 K/UL  
 ABS. BASOPHILS 0.0 0.0 - 0.1 K/UL  
 DF AUTOMATED PLATELET COMMENTS DECREASED PLATELETS    
 RBC COMMENTS ANISOCYTOSIS 1+ 
    
 RBC COMMENTS MICROCYTOSIS 1+ 
    
 RBC COMMENTS HYPOCHROMIA 1+ 
    
 RBC COMMENTS OVALOCYTES 1+ METABOLIC PANEL, COMPREHENSIVE Collection Time: 03/24/19  4:38 PM  
Result Value Ref Range Sodium 142 136 - 145 mmol/L Potassium 4.6 3.5 - 5.5 mmol/L Chloride 116 (H) 100 - 108 mmol/L  
 CO2 19 (L) 21 - 32 mmol/L Anion gap 7 3.0 - 18 mmol/L Glucose 260 (H) 74 - 99 mg/dL BUN 42 (H) 7.0 - 18 MG/DL Creatinine 1.25 0.6 - 1.3 MG/DL  
 BUN/Creatinine ratio 34 (H) 12 - 20 GFR est AA 50 (L) >60 ml/min/1.73m2 GFR est non-AA 41 (L) >60 ml/min/1.73m2 Calcium 8.7 8.5 - 10.1 MG/DL Bilirubin, total 0.9 0.2 - 1.0 MG/DL  
 ALT (SGPT) 23 13 - 56 U/L  
 AST (SGOT) 13 (L) 15 - 37 U/L Alk. phosphatase 78 45 - 117 U/L Protein, total 6.4 6.4 - 8.2 g/dL Albumin 3.2 (L) 3.4 - 5.0 g/dL Globulin 3.2 2.0 - 4.0 g/dL A-G Ratio 1.0 0.8 - 1.7 CARDIAC PANEL,(CK, CKMB & TROPONIN) Collection Time: 03/24/19  4:38 PM  
Result Value Ref Range CK 51 26 - 192 U/L  
 CK - MB 1.8 <3.6 ng/ml CK-MB Index 3.5 0.0 - 4.0 % Troponin-I, QT <0.02 0.0 - 0.045 NG/ML  
PTT Collection Time: 03/24/19  4:38 PM  
Result Value Ref Range aPTT 31.4 23.0 - 36.4 SEC PROTHROMBIN TIME + INR Collection Time: 03/24/19  4:38 PM  
Result Value Ref Range Prothrombin time 14.2 11.5 - 15.2 sec INR 1.1 0.8 - 1.2 NT-PRO BNP Collection Time: 03/24/19  4:38 PM  
Result Value Ref Range NT pro-BNP 2,983 (H) 0 - 1,800 PG/ML Radiologic Studies -  
CTA CHEST W OR W WO CONT Final Result IMPRESSION:  
1. Findings compatible with portal hypertension. Splenomegaly, perigastric and  
paraesophageal varices. Thickening of the gastric wall likely reflects portal  
gastropathy. 2.  No pulmonary artery filling defect to suggest pulmonary embolism. 3.  Anasarca. Small to moderate right and small left pleural effusions with  
bibasilar compressive atelectasis. 4.  Tree-in-bud opacities in the posterior left upper lobe are compatible with  
infectious or inflammatory bronchiolitis. Recommend follow-up chest CT in 3  
months to document resolution. 5.  Additional findings as above. XR CHEST PA LAT Final Result IMPRESSION: Persistent left basilar infiltrate/atelectasis and left pleural  
effusion with developing small right pleural effusion and basilar disease as  
well. Mild vascular congestion. Medical Decision Making I am the first provider for this patient. I reviewed the vital signs, available nursing notes, past medical history, past surgical history, family history and social history. Vital Signs-Reviewed the patient's vital signs. Pulse Oximetry Analysis -  96 on room air (Interpretation) normal 
 
Cardiac Monitor: 
Rate: 88  
rhythm:  Normal Sinus Rhythm Records Reviewed: Nursing Notes and Old Medical Records (Time of Review: 4:38 PM) Provider Notes (Medical Decision Making): DDX: PE, infectious, DVT, metabolic, CHF, other cardiac Labs, chest x-ray, EKG, duplex study Patient with progressing shortness of breath and coughing despite multiple antibiotics by her PCP over the past 3 months. MDM Medications  
azithromycin (ZITHROMAX) 500 mg in  mL (500 mg IntraVENous Given 3/24/19 2007) iopamidol (ISOVUE-370) 76 % injection  mL (72 mL IntraVENous Given 3/24/19 1756)  
enoxaparin (LOVENOX) injection 70 mg (70 mg SubCUTAneous Given 3/24/19 2005) furosemide (LASIX) injection 40 mg (40 mg IntraVENous Given 3/24/19 1959) cefTRIAXone (ROCEPHIN) 1 g in sterile water (preservative free) 10 mL IV syringe (1 g IntraVENous Given 3/24/19 2001) ED Course: Progress Notes, Reevaluation, and Consults: 
Positive for acute DVT in left lower extremity. Patient with shortness of breath will get CTA chest to evaluate for PE. I reassessed patient and she is resting. O2 98% on room air. WBC within normal limits. Creatinine within normal limits I reassessed patient she states she is short of breath has some coughing. Still awaiting CTA I am concerned for PE will give dose of Lovenox ProBNP is almost 3,000, give dose of Lasix Consult:  Discussed care with Dr. Dontrell Ortiz, Specialty: Hospitalist standard discussion; including history of patients chief complaint, available diagnostic results, and treatment course. Accepts admission. He agrees with Rocephin and azithromycin for likely community-acquired pneumonia. Reassessed and patient is diuresing Discussed diagnosis, results and plan for admission with patient and sister who are both in agreement Critical care time:  
I have spent 49 minutes of critical care time involved in lab review, consultations with specialist, family decision making, and documentation. During this entire length of time I was immediately available to the patient. Critical care:  The reason for providing this level of medical care for this critically ill patient was due to a critical illness that impaired one or more vital organ systems such that there was a high probability of imminent or life threatening deterioration in the patients condition. This care involved high complexity decision making to assess, manipulate and support vital system functions, to treat this degree vital organ system failure and to prevent further life threatening deterioration of the patient's condition. Diagnosis Clinical Impression: 1. Acute on chronic congestive heart failure, unspecified heart failure type ([facility] [address] 2. Leg DVT (deep venous thromboembolism), acute, left ([facility] [address] 3. Dyspnea, unspecified type 4. Community acquired pneumonia, unspecified laterality Disposition: Admitted Follow-up Information None Patient's Medications Start Taking No medications on file Continue Taking AMLODIPINE (NORVASC) 5 MG TABLET    TAKE ONE TABLET BY MOUTH ONCE DAILY ASCORBIC ACID, VITAMIN C, (VITAMIN C) 250 MG TABLET    Take 1 Tab by mouth daily. ASPIRIN 81 MG TABLET    Take 81 mg by mouth daily. CALCIUM POLYCARBOPHIL (FIBER LAXATIVE) 625 MG TABLET    Take 1,250 mg by mouth daily. CHOLECALCIFEROL (VITAMIN D3) 1,000 UNIT TABLET    Take 2 Tabs by mouth daily. CYANOCOBALAMIN (VITAMIN B12) 100 MCG TABLET    Take 100 mcg by mouth daily. FERROUS SULFATE 325 MG (65 MG IRON) TABLET    Take 1 Tab by mouth two (2) times daily (with meals). FUROSEMIDE (LASIX) 20 MG TABLET    20 mg po daily HYDRALAZINE (APRESOLINE) 10 MG TABLET    Take 1 Tab by mouth three (3) times daily. INSULIN GLARGINE (LANTUS,BASAGLAR) 100 UNIT/ML (3 ML) INPN    25 units subcutaneously every evening at 9 pm  
 INSULIN LISPRO (HUMALOG) 100 UNIT/ML INJECTION    As directed  Indications: type 2 diabetes mellitus METOPROLOL TARTRATE (LOPRESSOR) 50 MG TABLET    Take 1 Tab by mouth two (2) times a day. MULTIVITAMIN (ONE A DAY) TABLET    Take 1 Tab by mouth daily. NITROGLYCERIN (NITROLINGUAL) 400 MCG/SPRAY SPRAY    1 Spray by SubLINGual route every five (5) minutes as needed. OMEGA 3-DHA-EPA-FISH OIL (FISH OIL) 100-160-1,000 MG CAP    Take 1,000 mg by mouth two (2) times a day. PANTOPRAZOLE (PROTONIX) 40 MG TABLET    Take 1 Tab by mouth two (2) times a day. SIMVASTATIN (ZOCOR) 20 MG TABLET    Take 20 mg by mouth daily. SITAGLIPTIN-METFORMIN (JANUMET)  MG PER TABLET    Take 1 Tab by mouth two (2) times daily (with meals). TRAMADOL (ULTRAM) 50 MG TABLET    Take 1 Tab by mouth daily. Max Daily Amount: 50 mg. These Medications have changed No medications on file Stop Taking No medications on file April Brown DO 
 
Dragon medical dictation software was used for portions of this report. Unintended transcription errors may occur. My signature above authenticates this document and my orders, the final   
diagnosis (es), discharge prescription (s), and instructions in the Epic   
record. If you have any questions please contact [phone].

## 2019-03-24 NOTE — ED TRIAGE NOTES
Pt here with worsening sob. States she has been treated for sinus infection, bronchitis since Dec with no improvement. States she feels more sob with exertion. Also reports left leg swelling/pain. No h/o PE/DVT. I performed a brief evaluation, including history and physical, of the patient here in triage and I have determined that pt will need further treatment and evaluation from the main side ER physician. I have placed initial orders to help in expediting patients care. March 24, 2019 at 2:53 PM - Glynn Harding PA-C Visit Vitals /68 (BP 1 Location: Right arm, BP Patient Position: At rest;Sitting) Pulse 78 Temp 97.8 °F (36.6 °C) Resp 18 Wt 67.1 kg (148 lb) SpO2 98% BMI 29.89 kg/m² Glynn Harding PA-C

## 2019-03-25 ENCOUNTER — APPOINTMENT (OUTPATIENT)
Dept: NON INVASIVE DIAGNOSTICS | Age: 79
DRG: 291 | End: 2019-03-25
Attending: INTERNAL MEDICINE
Payer: MEDICARE

## 2019-03-25 LAB
ANION GAP SERPL CALC-SCNC: 8 MMOL/L (ref 3–18)
ATRIAL RATE: 79 BPM
BUN SERPL-MCNC: 39 MG/DL (ref 7–18)
BUN/CREAT SERPL: 31 (ref 12–20)
CALCIUM SERPL-MCNC: 8.6 MG/DL (ref 8.5–10.1)
CALCULATED P AXIS, ECG09: 32 DEGREES
CALCULATED R AXIS, ECG10: 0 DEGREES
CALCULATED T AXIS, ECG11: 74 DEGREES
CHLORIDE SERPL-SCNC: 116 MMOL/L (ref 100–108)
CO2 SERPL-SCNC: 20 MMOL/L (ref 21–32)
CREAT SERPL-MCNC: 1.24 MG/DL (ref 0.6–1.3)
DIAGNOSIS, 93000: NORMAL
ECHO AO ROOT DIAM: 3.02 CM
ECHO LA VOL BP: 76.43 ML (ref 22–52)
ECHO LA VOL/BSA BIPLANE: 46.14 ML/M2 (ref 16–28)
ECHO PULMONARY ARTERY SYSTOLIC PRESSURE (PASP): 29 MMHG
ERYTHROCYTE [DISTWIDTH] IN BLOOD BY AUTOMATED COUNT: 16.4 % (ref 11.6–14.5)
EST. AVERAGE GLUCOSE BLD GHB EST-MCNC: 103 MG/DL
GLUCOSE BLD STRIP.AUTO-MCNC: 122 MG/DL (ref 70–110)
GLUCOSE BLD STRIP.AUTO-MCNC: 155 MG/DL (ref 70–110)
GLUCOSE BLD STRIP.AUTO-MCNC: 164 MG/DL (ref 70–110)
GLUCOSE BLD STRIP.AUTO-MCNC: 221 MG/DL (ref 70–110)
GLUCOSE BLD STRIP.AUTO-MCNC: 241 MG/DL (ref 70–110)
GLUCOSE SERPL-MCNC: 243 MG/DL (ref 74–99)
HBA1C MFR BLD: 5.2 % (ref 4.2–5.6)
HCT VFR BLD AUTO: 22.9 % (ref 35–45)
HGB BLD-MCNC: 7.9 G/DL (ref 12–16)
MCH RBC QN AUTO: 29.3 PG (ref 24–34)
MCHC RBC AUTO-ENTMCNC: 34.5 G/DL (ref 31–37)
MCV RBC AUTO: 84.8 FL (ref 74–97)
P-R INTERVAL, ECG05: 140 MS
PLATELET # BLD AUTO: 95 K/UL (ref 135–420)
PMV BLD AUTO: 10.4 FL (ref 9.2–11.8)
POTASSIUM SERPL-SCNC: 4.1 MMOL/L (ref 3.5–5.5)
Q-T INTERVAL, ECG07: 400 MS
QRS DURATION, ECG06: 106 MS
QTC CALCULATION (BEZET), ECG08: 458 MS
RBC # BLD AUTO: 2.7 M/UL (ref 4.2–5.3)
SODIUM SERPL-SCNC: 144 MMOL/L (ref 136–145)
T4 FREE SERPL-MCNC: 1.1 NG/DL (ref 0.7–1.5)
TSH SERPL DL<=0.05 MIU/L-ACNC: 1.12 UIU/ML (ref 0.36–3.74)
VENTRICULAR RATE, ECG03: 79 BPM
WBC # BLD AUTO: 5.1 K/UL (ref 4.6–13.2)

## 2019-03-25 PROCEDURE — 74011250636 HC RX REV CODE- 250/636: Performed by: NURSE PRACTITIONER

## 2019-03-25 PROCEDURE — 84443 ASSAY THYROID STIM HORMONE: CPT

## 2019-03-25 PROCEDURE — 74011636637 HC RX REV CODE- 636/637: Performed by: INTERNAL MEDICINE

## 2019-03-25 PROCEDURE — 84439 ASSAY OF FREE THYROXINE: CPT

## 2019-03-25 PROCEDURE — 74011250637 HC RX REV CODE- 250/637: Performed by: INTERNAL MEDICINE

## 2019-03-25 PROCEDURE — 74011250636 HC RX REV CODE- 250/636: Performed by: INTERNAL MEDICINE

## 2019-03-25 PROCEDURE — 82962 GLUCOSE BLOOD TEST: CPT

## 2019-03-25 PROCEDURE — 93306 TTE W/DOPPLER COMPLETE: CPT

## 2019-03-25 PROCEDURE — 77030038269 HC DRN EXT URIN PURWCK BARD -A

## 2019-03-25 PROCEDURE — 83036 HEMOGLOBIN GLYCOSYLATED A1C: CPT

## 2019-03-25 PROCEDURE — 74011636637 HC RX REV CODE- 636/637: Performed by: NURSE PRACTITIONER

## 2019-03-25 PROCEDURE — 85027 COMPLETE CBC AUTOMATED: CPT

## 2019-03-25 PROCEDURE — 80048 BASIC METABOLIC PNL TOTAL CA: CPT

## 2019-03-25 PROCEDURE — 74011000250 HC RX REV CODE- 250: Performed by: INTERNAL MEDICINE

## 2019-03-25 PROCEDURE — 77030027138 HC INCENT SPIROMETER -A

## 2019-03-25 PROCEDURE — 65270000029 HC RM PRIVATE

## 2019-03-25 PROCEDURE — 36415 COLL VENOUS BLD VENIPUNCTURE: CPT

## 2019-03-25 RX ORDER — INSULIN GLARGINE 100 [IU]/ML
5 INJECTION, SOLUTION SUBCUTANEOUS DAILY
Status: DISCONTINUED | OUTPATIENT
Start: 2019-03-25 | End: 2019-03-28 | Stop reason: HOSPADM

## 2019-03-25 RX ORDER — AMLODIPINE BESYLATE 10 MG/1
10 TABLET ORAL DAILY
Status: DISCONTINUED | OUTPATIENT
Start: 2019-03-25 | End: 2019-03-28 | Stop reason: HOSPADM

## 2019-03-25 RX ORDER — DEXTROSE 50 % IN WATER (D50W) INTRAVENOUS SYRINGE
25-50 AS NEEDED
Status: DISCONTINUED | OUTPATIENT
Start: 2019-03-25 | End: 2019-03-28 | Stop reason: HOSPADM

## 2019-03-25 RX ORDER — INSULIN GLARGINE 100 [IU]/ML
25 INJECTION, SOLUTION SUBCUTANEOUS
Status: DISCONTINUED | OUTPATIENT
Start: 2019-03-25 | End: 2019-03-25

## 2019-03-25 RX ORDER — HYDRALAZINE HYDROCHLORIDE 10 MG/1
10 TABLET, FILM COATED ORAL 3 TIMES DAILY
Status: DISCONTINUED | OUTPATIENT
Start: 2019-03-25 | End: 2019-03-26

## 2019-03-25 RX ORDER — FUROSEMIDE 10 MG/ML
20 INJECTION INTRAMUSCULAR; INTRAVENOUS EVERY 12 HOURS
Status: DISCONTINUED | OUTPATIENT
Start: 2019-03-25 | End: 2019-03-26

## 2019-03-25 RX ORDER — SODIUM CHLORIDE 0.9 % (FLUSH) 0.9 %
5-40 SYRINGE (ML) INJECTION EVERY 8 HOURS
Status: DISCONTINUED | OUTPATIENT
Start: 2019-03-25 | End: 2019-03-28 | Stop reason: HOSPADM

## 2019-03-25 RX ORDER — ENOXAPARIN SODIUM 100 MG/ML
70 INJECTION SUBCUTANEOUS EVERY 12 HOURS
Status: DISCONTINUED | OUTPATIENT
Start: 2019-03-25 | End: 2019-03-26

## 2019-03-25 RX ORDER — MAGNESIUM SULFATE 100 %
4 CRYSTALS MISCELLANEOUS AS NEEDED
Status: DISCONTINUED | OUTPATIENT
Start: 2019-03-25 | End: 2019-03-28 | Stop reason: HOSPADM

## 2019-03-25 RX ORDER — CARVEDILOL 12.5 MG/1
12.5 TABLET ORAL 2 TIMES DAILY WITH MEALS
Status: DISCONTINUED | OUTPATIENT
Start: 2019-03-25 | End: 2019-03-28 | Stop reason: HOSPADM

## 2019-03-25 RX ORDER — INSULIN LISPRO 100 [IU]/ML
INJECTION, SOLUTION INTRAVENOUS; SUBCUTANEOUS
Status: DISCONTINUED | OUTPATIENT
Start: 2019-03-25 | End: 2019-03-28 | Stop reason: HOSPADM

## 2019-03-25 RX ORDER — SIMVASTATIN 20 MG/1
20 TABLET, FILM COATED ORAL DAILY
Status: DISCONTINUED | OUTPATIENT
Start: 2019-03-25 | End: 2019-03-28 | Stop reason: HOSPADM

## 2019-03-25 RX ORDER — ASPIRIN 81 MG/1
81 TABLET ORAL DAILY
Status: DISCONTINUED | OUTPATIENT
Start: 2019-03-25 | End: 2019-03-28 | Stop reason: HOSPADM

## 2019-03-25 RX ORDER — SODIUM CHLORIDE 0.9 % (FLUSH) 0.9 %
5-40 SYRINGE (ML) INJECTION AS NEEDED
Status: DISCONTINUED | OUTPATIENT
Start: 2019-03-25 | End: 2019-03-28 | Stop reason: HOSPADM

## 2019-03-25 RX ADMIN — ASPIRIN 81 MG: 81 TABLET, COATED ORAL at 08:38

## 2019-03-25 RX ADMIN — SIMVASTATIN 20 MG: 20 TABLET, FILM COATED ORAL at 08:38

## 2019-03-25 RX ADMIN — ENOXAPARIN SODIUM 70 MG: 80 INJECTION SUBCUTANEOUS at 08:38

## 2019-03-25 RX ADMIN — CARVEDILOL 12.5 MG: 12.5 TABLET, FILM COATED ORAL at 17:51

## 2019-03-25 RX ADMIN — INSULIN LISPRO 3 UNITS: 100 INJECTION, SOLUTION INTRAVENOUS; SUBCUTANEOUS at 17:52

## 2019-03-25 RX ADMIN — AMLODIPINE BESYLATE 10 MG: 10 TABLET ORAL at 08:38

## 2019-03-25 RX ADMIN — AZITHROMYCIN MONOHYDRATE 500 MG: 500 INJECTION, POWDER, LYOPHILIZED, FOR SOLUTION INTRAVENOUS at 21:03

## 2019-03-25 RX ADMIN — ENOXAPARIN SODIUM 70 MG: 80 INJECTION SUBCUTANEOUS at 21:07

## 2019-03-25 RX ADMIN — Medication 10 ML: at 01:00

## 2019-03-25 RX ADMIN — CARVEDILOL 12.5 MG: 12.5 TABLET, FILM COATED ORAL at 08:37

## 2019-03-25 RX ADMIN — INSULIN LISPRO 6 UNITS: 100 INJECTION, SOLUTION INTRAVENOUS; SUBCUTANEOUS at 08:39

## 2019-03-25 RX ADMIN — HYDRALAZINE HYDROCHLORIDE 10 MG: 10 TABLET, FILM COATED ORAL at 17:51

## 2019-03-25 RX ADMIN — INSULIN LISPRO 6 UNITS: 100 INJECTION, SOLUTION INTRAVENOUS; SUBCUTANEOUS at 12:54

## 2019-03-25 RX ADMIN — Medication 1 CAPSULE: at 08:38

## 2019-03-25 RX ADMIN — Medication 10 ML: at 06:00

## 2019-03-25 RX ADMIN — HYDRALAZINE HYDROCHLORIDE 10 MG: 10 TABLET, FILM COATED ORAL at 21:04

## 2019-03-25 RX ADMIN — CEFTRIAXONE SODIUM 1 G: 1 INJECTION, POWDER, FOR SOLUTION INTRAMUSCULAR; INTRAVENOUS at 21:04

## 2019-03-25 RX ADMIN — FUROSEMIDE 20 MG: 10 INJECTION, SOLUTION INTRAMUSCULAR; INTRAVENOUS at 21:04

## 2019-03-25 RX ADMIN — HYDRALAZINE HYDROCHLORIDE 10 MG: 10 TABLET, FILM COATED ORAL at 08:37

## 2019-03-25 RX ADMIN — INSULIN GLARGINE 5 UNITS: 100 INJECTION, SOLUTION SUBCUTANEOUS at 17:52

## 2019-03-25 RX ADMIN — FUROSEMIDE 20 MG: 10 INJECTION, SOLUTION INTRAMUSCULAR; INTRAVENOUS at 08:39

## 2019-03-25 RX ADMIN — Medication 10 ML: at 17:53

## 2019-03-25 RX ADMIN — Medication 10 ML: at 21:08

## 2019-03-25 NOTE — PROGRESS NOTES
DISCHARGE PLANNING: 
Reason for Admission:   CHF 
            
RRAT Score:  26 Resources/supports as identified by patient/family:   CLOSE NETWORK OF FAMILY AND FRIENDS Top Challenges facing patient (as identified by patient/family and CM): Finances/Medication cost?  NO CONCERNS Transportation? DRIVE SELF AND FRIENDS AND FAMILY WILL ASSIST Support system or lack thereof? [facility] Living arrangements? LIVES IN HER OWN HOME WITH SPOUSE Self-care/ADLs/Cognition? ALERT AND ORIENTED X 3 ABLE TO PROVIDE SELF CARE Current Advanced Directive/Advance Care Plan:  NOT ON FILE Plan for utilizing home health:    PENDING ORDER Likelihood of readmission: MEDIUM Transition of Care Plan:   [address] [place] Care Management Interventions PCP Verified by CM: Anthony Ramirez) Palliative Care Criteria Met (RRAT>21 & CHF Dx)?: No 
Mode of Transport at Discharge: Other (see comment)(FAMILY WILL TRANSPORT THIS PT) Transition of Care Consult (CM Consult): Discharge Planning Current Support Network: Lives with Spouse, Own Home, Family Lives Nearby(PT'S [address] ) Confirm Follow Up Transport: Self Plan discussed with Pt/Family/Caregiver: Yes( [address] ) Discharge Location Discharge Placement: Home with family assistance [address] 
[phone]

## 2019-03-25 NOTE — ED NOTES
TRANSFER - OUT REPORT: 
 
Verbal report given to Kayla Gunn RN(name) on Kale Aguillon  being transferred to N(unit) for routine progression of care Report consisted of patients Situation, Background, Assessment and  
Recommendations(SBAR). Information from the following report(s) SBAR, Kardex, [facility] and Recent Results was reviewed with the receiving nurse. Lines:  
Peripheral IV 03/24/19 Right Antecubital (Active) Site Assessment Clean, dry, & intact 3/24/2019  4:38 PM  
Phlebitis Assessment 0 3/24/2019  4:38 PM  
Infiltration Assessment 0 3/24/2019  4:38 PM  
Dressing Status Clean, dry, & intact 3/24/2019  4:38 PM  
Dressing Type Tape;Transparent 3/24/2019  4:38 PM  
Hub Color/Line Status Pink;Flushed;Patent 3/24/2019  4:38 PM  
Action Taken Blood drawn 3/24/2019  4:38 PM  
Alcohol Cap Used No 3/24/2019  4:38 PM  
  
 
Opportunity for questions and clarification was provided. Patient transported with: 
 Monitor Registered Nurse

## 2019-03-25 NOTE — PROGRESS NOTES
0700-Received report assumed care. Patient in bed resting quietly with eyes closed. 1000-Security called to secure personal belongings. 1730-Assisted her from bedpan. 1930-Shift change report given Aníbal Phillip RN.

## 2019-03-25 NOTE — DIABETES MGMT
Diabetes Patient/Family Education RecordFactors That  May Influence Patients Ability  to Learn or  Comply with Recommendations []   Language barrier    []   Cultural needs   []   Motivation  
 []   Cognitive limitation    []   Physical   [x]   Education  
 []   Physiological factors   []   Hearing/vision/speaking impairment   []   Judaism beliefs []   Financial factors   []  Other:   []  No factors identified at this time. Person Instructed: [x]   Patient   []   Family   []  Other Preference for Learning: 
 [x]   Verbal   []   Written   []  Demonstration Level of Comprehension & Competence:   
[x]  Good                                      [] Fair                                     []  Poor                             []  Needs Reinforcement  
[x]  Teachback completed Education Component:  
[x]  Medication management, including how to administer insulin (if appropriate) and potential medication interactions: Yes. Patient reported that she was taking lantus insulin 20 units daily at bedtime but her doctor recently decreased the dose and told her to take between 8-10 units at bedtime. She was not given a parameter when to take 10 units. Educated patient. She will ask her doctor from now on to give her a blood sugar reading parameter in order to determine when to take a different dose. Patient also reported that she's on humalog sliding scale insulin but she didn't need to take this insulin because it never got to the point that she needed coverage. Patient denied experiencing low blood sugar below 70. Patient also reported that she was recently placed on Januvia 50 mg daily every evening. [x]  Nutritional management -obtain usual meal pattern: Yes. Discussed and encouraged patient to continue to eat 3 meals daily. Also discussed that she will likely have to take blood thinning medication due to admission on 3/24/2019 for blood clot.  Educated patient about effect of green leafy vegetable and explained the impt of eating the same amount each day. []  Exercise [x]  Signs, symptoms, and treatment of hyperglycemia and hypoglycemia: Yes. Patient denied experiencing symptoms of low blood sugar and glucose dropping below 70. She already know the symptoms and how to treat. [x] Prevention, recognition and treatment of hyperglycemia and hypoglycemia: Yes. [x]  Importance of blood glucose monitoring and how to obtain a blood glucose meter: Yes.   
[]  Instruction on use of the blood glucose meter [x]  Discuss the importance of HbA1C monitoring: Yes. Patient reported previous A1c of 12% but her recent A1c dropped to 5.3% that's why her doctor told her to decrease lantus insulin dose 8-10 units daily at bedtime. Patient verbalized understanding that A1c <7% is acceptable if she is not experiencing low blood sugar 2-3x/day. Current A1c level of 5.2% (3/25/2019) is equivalent to estimated average blood glucose of 103 mg/dL during the past 2-3 months.  
[]  Sick day guidelines  
[x]  Proper use and disposal of lancets, needles, syringes or insulin pens (if appropriate): Yes. [x]  Potential long-term complications (retinopathy, kidney disease, neuropathy, foot care): Yes. [x] Information about whom to contact in case of emergency or for more information: Yes. [x]  Goal:  Patient/family will demonstrate understanding of Diabetes Self Management Skills by: 4/01/2019 Plan for post-discharge education or self-management support: 
  [x] Outpatient class schedule provided            [] Patient Declined 
  [] Scheduled for outpatient classes (date) _______ Verify: 
Does patient understand how diabetes medications work? Yes. Does patient know what their most recent A1c is? Yes. Does patient monitor glucose at home? Yes. Does patient have difficulty obtaining diabetes medications and testing supplies? No. 
  
Dl Damico RN 
pgr [phone]

## 2019-03-25 NOTE — PROGRESS NOTES
NUTRITION Nursing Referral: [facility]  
  
RECOMMENDATIONS / PLAN:  
 
- Diet education provided today - Add supplement: Glucerna Shake once daily 
- Continue RD inpatient monitoring and evaluation. NUTRITION INTERVENTIONS & DIAGNOSIS:  
 
[x] Meals/snacks: modified composition 
[x] Medical food supplement therapy: initiate [x] Nutrition counseling: dietary considerations for CHF and DM on 3/25 Nutrition Diagnosis:  Food and nutrition related knowledge deficit related to CHF and diabetes as evidenced by pt report of needing additional information. Inadequate oral intake related to decreased appetite as evidenced by pt report ASSESSMENT:  
 
Pt reported fair appetite and meal intake PTA and since admission, consuming about 50% of meal. Tolerating diet. Pt asking questions regarding diabetic diet and CHF; questions answered; education provided. Glycemic Control following; provided education Average po intake adequate to meet patients estimated nutritional needs:   [] Yes     [x] No   [] Unable to determine at this time Diet: DIET DIABETIC WITH OPTIONS Consistent Carb 1800kcal; Regular; No Conc. Sweets Food Allergies:  None known   (latex) Current Appetite:   [] Good     [x] Fair     [] Poor     [] Other: 
Appetite/meal intake prior to admission:   [] Good     [x] Fair     [] Poor     [] Other: 
Feeding Limitations:  [] Swallowing difficulty    [] Chewing difficulty    [x] Other: wears dentures but reported tolerating regular consistency diet Current Meal Intake: No data found. BM: 3/24 Skin Integrity:  No pressure injury or wound noted Edema:   [] No     [x] Yes per NP note on 3/25 Pertinent Medications: Reviewed: lasix, lantus, SSI, lactobacillus Recent Labs  
  03/25/19 
0306 03/24/19 
1638  142  
K 4.1 4.6 * 116* CO2 20* 19* * 260* BUN 39* 42* CREA 1.24 1.25  
CA 8.6 8.7 ALB  --  3.2* SGOT  --  13* ALT  --  23  
 
 
 Intake/Output Summary (Last 24 hours) at 3/25/2019 1701 Last data filed at 3/25/2019 5661 Gross per 24 hour Intake 360 ml Output 300 ml Net 60 ml Anthropometrics: 
Ht Readings from Last 1 Encounters:  
03/25/19 5' (1.524 m) Last 3 Recorded Weights in this Encounter 03/24/19 1933 03/25/19 0020 03/25/19 1541 Weight: 67.1 kg (148 lb) 68.7 kg (151 lb 8 oz) 68.5 kg (151 lb) Body mass index is 29.49 kg/m². Weight History:   Pt reported experiencing weight gain PTA due to fluid retention;  +12 lb in past 9 month PTA Weight Metrics 3/25/2019 12/12/2018 6/29/2018 3/8/2018 1/28/2018 10/21/2017 6/29/2017 Weight 151 lb 145 lb 139 lb 3.2 oz 130 lb 9.6 oz 144 lb 6.4 oz 151 lb 138 lb 14.4 oz BMI 29.49 kg/m2 29.29 kg/m2 28.11 kg/m2 26.38 kg/m2 28.35 kg/m2 29.49 kg/m2 27.13 kg/m2 Admitting Diagnosis: Acute on chronic diastolic CHF (congestive heart failure) ([facility] [address] [I50.33] Leg DVT (deep venous thromboembolism), acute, left ([facility] [address] [I82.402] Dyspnea [R06.00] CAP (community acquired pneumonia) [J18.9] Pertinent PMHx:  Anemia, CAD, colon cancer, breast cancer, DM, HTN, hypercholesterolemia Education Needs:        [] None identified  [] Identified - Not appropriate at this time  [x]  Identified and addressed - refer to education log Learning Limitations:   [x] None identified  [] Identified Cultural, Buddhist & ethnic food preferences:  [x] None identified    [] Identified and addressed ESTIMATED NUTRITION NEEDS:  
 
Calories: 9222-1976 kcal (22-24 kcal/kg) based on  [x] Actual BW: 69 kg     [] IBW Protein: 76-83 gm (1.1-1.2 gm/kg) based on  [x] Actual BW:       [] IBW Fluid: 1 mL/kcal 
  
MONITORING & EVALUATION:  
 
Nutrition Goal(s): 1. Po intake of meals will meet >75% of patient estimated nutritional needs within the next 7 days. Outcome:  [] Met/Ongoing    []  Not Met    [x] New/Initial Goal  
2.  Patient will increase knowledge of appropriate food choices on a diabetic diet within 7 days. Outcome:  [] Met    []  Not Met    [x] New/Initial Goal  
3. Patient will increase knowledge of appropriate food choices on a low sodium and low fluid diet within 7 days. Outcome:  [] Met    []  Not Met    [x] New/Initial Goal  
 
Monitoring:   [x] Food and beverage intake   [x] Diet order   [x] Nutrition-focused physical findings   [x] Treatment/therapy   [] Weight   [] Enteral nutrition intake Previous Recommendations (for follow-up assessments only):     []   Implemented       []   Not Implemented (RD to address)      [] No Longer Appropriate     [] No Recommendation Made Discharge Planning:  Diabetic, cardiac diet [x] Participated in care planning, discharge planning, & interdisciplinary rounds as appropriate Zohreh Borges RD Pager: [phone]

## 2019-03-25 NOTE — DIABETES MGMT
Glycemic Control Plan of Care 
 
T2DM with current A1c of 5.2% (3/25/2019). See separate notes, 3/25/2019, for assessment of home diabetes management and education. See below about taking diabetes medications. Home diabetes medications: Patient reported on 3/25/2019: 
Lantus insulin 20 units daily at bedtime but her doctor recently decreased the dose and told her to take between 8-10 units at bedtime. She was not given a parameter when to take 10 units. Educated patient. She will ask her doctor from now on to give her a blood sugar reading parameter in order to determine when to take a different dose. Patient also reported that she's on humalog sliding scale insulin but she didn't need to take this insulin because it never got to the point that she needed coverage. Patient denied experiencing low blood sugar below 70. Patient also reported that she was recently placed on Januvia 50 mg daily every evening. POC BG report on 3/24/2019: None. POC BG report on 3/25/2019 at time of review: 164, 221, 241 mg/dL. Noted patient on correctional lispro insulin. Recommendation(s): 
1.) Consider adding 5 units of basal lantus insulin daily starting 3/25/2019. Called Carmen Shields NP, and obtained order. Assessment: 
Patient is 77year old with past medical history including type 2 diabetes mellitus, CAD s/p stenting, colon cancer, left breast cancer, hypertension, hypercholesterolemia, and thrombocytopenia - was admitted on 3/24/2019 with report of persistent dyspnea and swelling of  leg. Noted: Dyspnea on exertion. Acute on chronic diastolic heart failure. CAP vs bronchiolitis. Acute DVT of left leg. T2DM with current A1c with current A1c of 5.2% (3/25/2019). Most recent blood glucose values: 
 
No POC BG report for 3/24/2019. Results for Sathish Schroeder (MRN [id number]) as of 3/25/2019 13:58 Ref.  Range 3/25/2019 00:29 3/25/2019 08:04 3/25/2019 11:51  
 GLUCOSE,FAST - POC Latest Ref Range: 70 - 110 mg/dL 164 (H) 221 (H) 241 (H) Current A1C: 5.2% (3/25/2019) which is equivalent to estimated average blood glucose of 103 mg/dL during the past 2-3 months. Current hospital diabetes medications: 
Basal lantus insulin 5 units daily, first dose ordered 3/25/2019 Correctional lispro insulin ACHS. Very resistant dose Total daily dose insulin requirement previous day: 3/24/2019 None. Home diabetes medications: Patient reported on 3/25/2019: 
Lantus insulin 20 units daily at bedtime but her doctor recently decreased the dose and told her to take between 8-10 units at bedtime. She was not given a parameter when to take 10 units. Educated patient. She will ask her doctor from now on to give her a blood sugar reading parameter in order to determine when to take a different dose. Patient also reported that she's on humalog sliding scale insulin but she didn't need to take this insulin because it never got to the point that she needed coverage. Patient denied experiencing low blood sugar below 70. Patient also reported that she was recently placed on Januvia 50 mg daily every evening. Diet: Diabetic consistent carb 1800kcal; regular; no concentrated sweets Goals:  Blood glucose will be within target range of  mg/dL by 3/28/2019. Education:  _X__  Refer to Diabetes Education Record: 3/25/2019 
           ___  Education not indicated at this time Jonetta Blizzard, RN [facility] Pager: [phone]

## 2019-03-25 NOTE — H&P
History & Physical 
Patient: Alejandro Rao MRN: [id number]  CSN: [id number] YOB: 1940  Age: 77 y.o. Sex: female DOA: 3/24/2019 CC: worsened shortness of breath PCP: Jj Carvalho MD 
    
HPI:  
 
Alejandro Rao is a 77 y.o. female with medical co-morbidities listed presented from home with persistent dyspnea on exertion over the last few weeks. She has followed up with her PCP for antibiotic course for bronchitis/lung infection, however, her symptom persisted. Today, her dyspnea was more pronounced and she was not able to ambulate much. She denied associated chest pain or chest palpitation, however, she noted her left leg swollen more than her right leg. She denied fever/chill. She has been compliant with her lasix and low salt diet at home. In the ER, she was found to have new DVT on the left leg. CTA chest did not show PE, but she has evidence of pulmonary infection with superimposed pleural effusion. She received IV antibiotics and IV lasix which she tolerated well. She was given Lovenox weight based for DVT treatment. She has never been on anticoagulation ROS: no fever/chills, no headache, no dizziness, no facial pain, + sinus congestion, No swallowing pain, No chest pain, no palpitation, + shortness of breath, no abd pain, No diarrhea, no urinary complaint, + leg pain or swelling Past Medical History:  
Diagnosis Date  Anemia  CAD (coronary artery disease) Coronary Stenting needed, hematology workup needed prior to stent placement, treating medically at this time  Cancer [facility])   
 colon  Cancer ([facility] [address] breast - left  Cardiac catheterization 08/17/2016 LM patent. mLAD 99.9% (2.25 x 28-mm Xience BETHEL, resid 0%). oD1 60%. oD2 95%. pCX 90% (2.5 x 13-mm Xience BETHEL, resid 0%). OMB patent.  Cardiac echocardiogram 07/01/2016 EF 50%. Basal inferior, basal-mid inferolateral hypk. Gr 1 DDfx.   RVSP 50-60 mmHg. Mild LAE. Mod, eccentric MR.    
 Diabetes ([facility] [address]  Hypercholesteremia  Hypertension  Thrombocytopenia ([facility] [address] Past Surgical History:  
Procedure Laterality Date  CARDIAC CATHETERIZATION  7/16/2015  
  no stents at this time r/t anemia & thrombocytopenia (hematology work-up needed prior to stent placement)  CARDIAC CATHETERIZATION  7/11/2016  CARDIAC CATHETERIZATION  8/17/2016  CORONARY ARTERY ANGIOGRAM  7/16/2015  CORONARY ARTERY ANGIOGRAM  7/11/2016  CORONARY ARTERY ANGIOGRAM  8/17/2016  CORONARY STENT EA ADDL VESSEL  8/17/2016  CORONARY STENT SINGLE W/PTCA  8/17/2016  HX GI    
 colon surg secondary to cancer - removed lesion  HX HEENT  2/2013  
 cataract bilaterally  HX MASTECTOMY  march 2011  
 left  HX VASCULAR ACCESS    
 mediport - now removed  LV ANGIOGRAPHY  7/16/2015  LV ANGIOGRAPHY  7/11/2016 Family History Problem Relation Age of Onset  Cancer Maternal Aunt Social History Socioeconomic History  Marital status:  Spouse name: Not on file  Number of children: Not on file  Years of education: Not on file  Highest education level: Not on file Tobacco Use  Smoking status: Never Smoker  Smokeless tobacco: Never Used Substance and Sexual Activity  Alcohol use: No  
 Drug use: No  
 
 
Prior to Admission medications Medication Sig Start Date End Date Taking? Authorizing Provider  
amLODIPine (NORVASC) 10 mg tablet Take 10 mg by mouth daily. Yes Provider, Historical  
carvedilol (COREG) 12.5 mg tablet Take 12.5 mg by mouth two (2) times daily (with meals).    Yes Provider, Historical  
insulin lispro (HUMALOG) 100 unit/mL injection As directed  Indications: type 2 diabetes mellitus 1/29/18  Yes Shira Griffith MD  
insulin glargine (LANTUS,BASAGLAR) 100 unit/mL (3 mL) inpn 25 units subcutaneously every evening at 9 pm 
 Patient taking differently: 10 Units by SubCUTAneous route nightly. 25 units subcutaneously every evening at 9 pm 1/29/18  Yes Afua Claros MD  
furosemide (LASIX) 20 mg tablet 20 mg po daily Patient taking differently: 20 mg two (2) times a day. 20 mg po daily  Indications: visible water retention 6/29/17  Yes Tamara Daugherty MD  
nitroglycerin (NITROLINGUAL) 400 mcg/spray spray 1 Spray by SubLINGual route every five (5) minutes as needed. 8/17/16  Yes Allie Bledsoe PA  
hydrALAZINE (APRESOLINE) 10 mg tablet Take 1 Tab by mouth three (3) times daily. 8/8/16  Yes Darrian MANDEL NP  
multivitamin (ONE A DAY) tablet Take 1 Tab by mouth daily. Yes Provider, Historical  
simvastatin (ZOCOR) 20 mg tablet Take 20 mg by mouth daily. Yes Provider, Historical  
aspirin 81 mg tablet Take 81 mg by mouth daily. Yes Provider, Historical  
SITagliptin-metFORMIN (JANUMET)  mg per tablet Take 1 Tab by mouth two (2) times daily (with meals). Provider, Historical  
traMADol (ULTRAM) 50 mg tablet Take 1 Tab by mouth daily. Max Daily Amount: 50 mg. 3/8/18   Hang Monge MD  
pantoprazole (PROTONIX) 40 mg tablet Take 1 Tab by mouth two (2) times a day. 6/29/17   Tamara Daugherty MD  
ascorbic acid, vitamin C, (VITAMIN C) 250 mg tablet Take 1 Tab by mouth daily. 7/5/16   Rita Forte MD  
ferrous sulfate 325 mg (65 mg iron) tablet Take 1 Tab by mouth two (2) times daily (with meals). 7/5/16   Rita Forte MD  
omega 3-dha-epa-fish oil (FISH OIL) 100-160-1,000 mg cap Take 1,000 mg by mouth two (2) times a day. Provider, Historical  
cyanocobalamin (VITAMIN B12) 100 mcg tablet Take 100 mcg by mouth daily. Provider, Historical  
calcium polycarbophil (FIBER LAXATIVE) 625 mg tablet Take 1,250 mg by mouth daily. Provider, Historical  
 
 
Allergies Allergen Reactions  Latex Itching Physical Exam:  
  
Visit Vitals /76 Pulse 81 Temp 97.3 °F (36.3 °C) Resp 19 Ht 4' 11\" (1.499 m) Wt 67.1 kg (148 lb) SpO2 99% BMI 29.89 kg/m² Physical Exam: 
Tele: sinus rhythm General:  Cooperative, Not in acute distress, speaks in full sentence while in bed HEENT: PERRL, EOMI, supple neck, no JVD, dry oral mucosa Cardiovascular: S1S2 regular, no rub/gallop Pulmonary: Clear air entry bilaterally, no wheezing, ++crackle GI:  Soft, non tender, non distended, +bs, no guarding, ventral hernia noted Extremities:  + left pedal edema, +distal pulses appreciated Neuro: AOx3, moving all extremities, no gross deficit. Lab/Data Review: 
Labs: Results:  
   
Chemistry Recent Labs  
  03/24/19 
1638 *   
K 4.6 * CO2 19* BUN 42* CREA 1.25  
CA 8.7 AGAP 7  
BUCR 34* AP 78  
TP 6.4 ALB 3.2*  
GLOB 3.2 AGRAT 1.0  
  
CBC w/Diff Recent Labs  
  03/24/19 
1638 WBC 4.7  
RBC 2.87* HGB 8.4* HCT 24.0*  
PLT 90* GRANS 75* LYMPH 16* EOS 2 Coagulation Recent Labs  
  03/24/19 
1638 PTP 14.2 INR 1.1 APTT 31.4 Iron/Ferritin No results for input(s): IRON in the last 72 hours. No lab exists for component: TIBCCALC BNP No results for input(s): BNPP in the last 72 hours. Cardiac Enzymes Recent Labs  
  03/24/19 
1638 CPK 51 CKND1 3.5 Liver Enzymes Recent Labs  
  03/24/19 
1638 TP 6.4 ALB 3.2* AP 78 SGOT 13* Thyroid Studies Lab Results Component Value Date/Time TSH 0.77 06/29/2017 04:26 AM  
    
 
All Micro Results Procedure Component Value Units Date/Time CULTURE, BLOOD [[id number]] Collected:  03/24/19 1945 Order Status:  Completed Specimen:  Whole Blood Updated:  03/24/19 2020 CULTURE, BLOOD [[id number]] Collected:  03/24/19 2000 Order Status:  Completed Specimen:  Whole Blood Updated:  03/24/19 2019 Imaging Reviewed: CTA chest: 
IMPRESSION: 
1. Findings compatible with portal hypertension.  Splenomegaly, perigastric and 
 paraesophageal varices. Thickening of the gastric wall likely reflects portal 
gastropathy. 2.  No pulmonary artery filling defect to suggest pulmonary embolism. 3.  Anasarca. Small to moderate right and small left pleural effusions with 
bibasilar compressive atelectasis. 4.  Tree-in-bud opacities in the posterior left upper lobe are compatible with 
infectious or inflammatory bronchiolitis. Recommend follow-up chest CT in 3 
months to document resolution. 5.  Additional findings as above. Assessment:  
 
1) Dyspnea on exertion, multiple factors including lung infection, CHF/effusion, no PE on CT. 2) Acute on chronic diastolic heart failure 3) CAP vs bronchiolitis on CT chest  
4) new DVT of left leg. 5) Anasarca with pleural effusion bibasilar 6)  CAD 7)  HTN 
8)  DMT2 9)  Hyperlipidemia 10) Chronic thrombocytopenia 11)  Normocytic anemia 12)  H/o of colon CA 
13)  Ventral hernia Plan:  
 
1) She is admitted to telemetry for further diuresis. Will f/u on her Echocardiogram and consider Cardiology consult. 2) She will continue IV antibiotics for CAP, and ICS. Will follow up pulmonary consultation as needed. 3) She can be transitioned to new oral anticoagulation agent. Monitor for her Hgb/Hct. She can have transfusion if Hgb<7 
4) She needs nutritional support and education appropriated diet for CHF/DMT2.  
5) resume her on lantus, and ISS 6) Resume her home cardiac medications. DVT prophylaxis: lovenox Full Code Jenna Martinez MD 
3/24/2019, 9:48 PM

## 2019-03-25 NOTE — PROGRESS NOTES
[facility] Hospitalist Group Progress Note Patient: Delfina Davis Age: 77 y.o. : 1940 MR#: [id number] SSN: [id number] Date: 3/25/2019 Subjective:  
 
Reports breathing 50% better, cough productive of yellowish / green mucous, + pain to LLE; denies CP, n/v/loose stools or constipation. Assessment/Plan: 1. Dyspnea on exertion, multiple factors including lung infection, CHF/effusion - improved, check ambulatory O2 prior to discharge 2. Acute on chronic diastolic heart failure - mild; gentle diuresis 3. CAP vs bronchiolitis on CT chest - cont IV abx 4. Acute DVT of left leg. 5. HTN - improved; cont norvasc, BB, hydralazine 6. Type 2 DM, controlled - A1c 5.2 on admission; cont SSI - patient states recently being started on lantus 8-10 units for hyperglycemia 7. Hyperlipidemia - cont statin 8. Chronic thrombocytopenia - follow in setting of treatment for #4 9. Normocytic anemia - Follow in AM, monitor for hgb dec in setting of #4/#10 
10. H/o of colon CA - h/o colon resection in  11. Ventral hernia Additional Notes:   
 
Case discussed with:  [x]Patient  []Family  [x]Nursing  []Case Management DVT Prophylaxis:  [x]Lovenox  []Hep SQ  []SCDs  []Coumadin   []On Heparin gtt Objective:  
VS:  
Visit Vitals /69 Pulse 85 Temp 97.2 °F (36.2 °C) Resp 28 Ht 5' (1.524 m) Wt 68.7 kg (151 lb 8 oz) SpO2 96% Breastfeeding? Yes  
BMI 29.59 kg/m² Tmax/24hrs: Temp (24hrs), Av.5 °F (36.4 °C), Min:97.2 °F (36.2 °C), Max:97.8 °F (36.6 °C) Intake/Output Summary (Last 24 hours) at 3/25/2019 5462 Last data filed at 3/25/2019 0314 Gross per 24 hour Intake 360 ml Output 300 ml Net 60 ml General:  Alert, NAD Cardiovascular:  RRR Pulmonary:  Dim to B bases, otherwise LSCTA; respiratory effort WNL 
GI:  +BS in all four quadrants, soft, non-tender Extremities:  Trace LLE>RLE edema; 2+ dorsalis pedis pulses bilaterally Neuro: oriented x 3 Labs:   
Recent Results (from the past 24 hour(s)) EKG, 12 LEAD, INITIAL Collection Time: 03/24/19  2:55 PM  
Result Value Ref Range Ventricular Rate 79 BPM  
 Atrial Rate 79 BPM  
 P-R Interval 140 ms QRS Duration 106 ms  
 Q-T Interval 400 ms QTC Calculation (Bezet) 458 ms Calculated P Axis 32 degrees Calculated R Axis 0 degrees Calculated T Axis 74 degrees Diagnosis Normal sinus rhythm Possible Left atrial enlargement Borderline ECG When compared with ECG of 12-DEC-2018 22:03, No significant change was found CBC WITH AUTOMATED DIFF Collection Time: 03/24/19  4:38 PM  
Result Value Ref Range WBC 4.7 4.6 - 13.2 K/uL  
 RBC 2.87 (L) 4.20 - 5.30 M/uL HGB 8.4 (L) 12.0 - 16.0 g/dL HCT 24.0 (L) 35.0 - 45.0 % MCV 83.6 74.0 - 97.0 FL  
 MCH 29.3 24.0 - 34.0 PG  
 MCHC 35.0 31.0 - 37.0 g/dL  
 RDW 16.0 (H) 11.6 - 14.5 % PLATELET 90 (L) 913 - 420 K/uL MPV 10.1 9.2 - 11.8 FL  
 NEUTROPHILS 75 (H) 40 - 73 % LYMPHOCYTES 16 (L) 21 - 52 % MONOCYTES 6 3 - 10 % EOSINOPHILS 2 0 - 5 % BASOPHILS 1 0 - 2 %  
 ABS. NEUTROPHILS 3.5 1.8 - 8.0 K/UL  
 ABS. LYMPHOCYTES 0.8 (L) 0.9 - 3.6 K/UL  
 ABS. MONOCYTES 0.3 0.05 - 1.2 K/UL  
 ABS. EOSINOPHILS 0.1 0.0 - 0.4 K/UL  
 ABS. BASOPHILS 0.0 0.0 - 0.1 K/UL  
 DF AUTOMATED PLATELET COMMENTS DECREASED PLATELETS    
 RBC COMMENTS ANISOCYTOSIS 1+ 
    
 RBC COMMENTS MICROCYTOSIS 1+ 
    
 RBC COMMENTS HYPOCHROMIA 1+ 
    
 RBC COMMENTS OVALOCYTES 1+ METABOLIC PANEL, COMPREHENSIVE Collection Time: 03/24/19  4:38 PM  
Result Value Ref Range Sodium 142 136 - 145 mmol/L Potassium 4.6 3.5 - 5.5 mmol/L Chloride 116 (H) 100 - 108 mmol/L  
 CO2 19 (L) 21 - 32 mmol/L Anion gap 7 3.0 - 18 mmol/L Glucose 260 (H) 74 - 99 mg/dL BUN 42 (H) 7.0 - 18 MG/DL Creatinine 1.25 0.6 - 1.3 MG/DL  
 BUN/Creatinine ratio 34 (H) 12 - 20 GFR est AA 50 (L) >60 ml/min/1.73m2 GFR est non-AA 41 (L) >60 ml/min/1.73m2 Calcium 8.7 8.5 - 10.1 MG/DL Bilirubin, total 0.9 0.2 - 1.0 MG/DL  
 ALT (SGPT) 23 13 - 56 U/L  
 AST (SGOT) 13 (L) 15 - 37 U/L Alk. phosphatase 78 45 - 117 U/L Protein, total 6.4 6.4 - 8.2 g/dL Albumin 3.2 (L) 3.4 - 5.0 g/dL Globulin 3.2 2.0 - 4.0 g/dL A-G Ratio 1.0 0.8 - 1.7 CARDIAC PANEL,(CK, CKMB & TROPONIN) Collection Time: 03/24/19  4:38 PM  
Result Value Ref Range CK 51 26 - 192 U/L  
 CK - MB 1.8 <3.6 ng/ml CK-MB Index 3.5 0.0 - 4.0 % Troponin-I, QT <0.02 0.0 - 0.045 NG/ML  
PTT Collection Time: 03/24/19  4:38 PM  
Result Value Ref Range aPTT 31.4 23.0 - 36.4 SEC PROTHROMBIN TIME + INR Collection Time: 03/24/19  4:38 PM  
Result Value Ref Range Prothrombin time 14.2 11.5 - 15.2 sec INR 1.1 0.8 - 1.2 NT-PRO BNP Collection Time: 03/24/19  4:38 PM  
Result Value Ref Range NT pro-BNP 2,983 (H) 0 - 1,800 PG/ML  
CULTURE, BLOOD Collection Time: 03/24/19  7:45 PM  
Result Value Ref Range Special Requests: NO SPECIAL REQUESTS Culture result: NO GROWTH AFTER 10 HOURS    
CULTURE, BLOOD Collection Time: 03/24/19  8:00 PM  
Result Value Ref Range Special Requests: NO SPECIAL REQUESTS Culture result: NO GROWTH AFTER 10 HOURS    
GLUCOSE, POC Collection Time: 03/25/19 12:29 AM  
Result Value Ref Range Glucose (POC) 164 (H) 70 - 110 mg/dL METABOLIC PANEL, BASIC Collection Time: 03/25/19  3:06 AM  
Result Value Ref Range Sodium 144 136 - 145 mmol/L Potassium 4.1 3.5 - 5.5 mmol/L Chloride 116 (H) 100 - 108 mmol/L  
 CO2 20 (L) 21 - 32 mmol/L Anion gap 8 3.0 - 18 mmol/L Glucose 243 (H) 74 - 99 mg/dL BUN 39 (H) 7.0 - 18 MG/DL Creatinine 1.24 0.6 - 1.3 MG/DL  
 BUN/Creatinine ratio 31 (H) 12 - 20 GFR est AA 51 (L) >60 ml/min/1.73m2 GFR est non-AA 42 (L) >60 ml/min/1.73m2 Calcium 8.6 8.5 - 10.1 MG/DL  
CBC W/O DIFF  Collection Time: 03/25/19  3:06 AM  
 Result Value Ref Range WBC 5.1 4.6 - 13.2 K/uL  
 RBC 2.70 (L) 4.20 - 5.30 M/uL HGB 7.9 (L) 12.0 - 16.0 g/dL HCT 22.9 (L) 35.0 - 45.0 % MCV 84.8 74.0 - 97.0 FL  
 MCH 29.3 24.0 - 34.0 PG  
 MCHC 34.5 31.0 - 37.0 g/dL  
 RDW 16.4 (H) 11.6 - 14.5 % PLATELET 95 (L) 141 - 420 K/uL MPV 10.4 9.2 - 11.8 FL  
TSH 3RD GENERATION Collection Time: 03/25/19  3:06 AM  
Result Value Ref Range TSH 1.12 0.36 - 3.74 uIU/mL T4, FREE Collection Time: 03/25/19  3:06 AM  
Result Value Ref Range T4, Free 1.1 0.7 - 1.5 NG/DL  
HEMOGLOBIN A1C WITH EAG Collection Time: 03/25/19  3:06 AM  
Result Value Ref Range Hemoglobin A1c 5.2 4.2 - 5.6 % Est. average glucose 103 mg/dL Signed By: Jean Claude Johnson NP March 25, 2019

## 2019-03-26 LAB
ANION GAP SERPL CALC-SCNC: 8 MMOL/L (ref 3–18)
BASOPHILS # BLD: 0 K/UL (ref 0–0.1)
BASOPHILS NFR BLD: 0 % (ref 0–2)
BUN SERPL-MCNC: 40 MG/DL (ref 7–18)
BUN/CREAT SERPL: 27 (ref 12–20)
CALCIUM SERPL-MCNC: 8 MG/DL (ref 8.5–10.1)
CHLORIDE SERPL-SCNC: 112 MMOL/L (ref 100–108)
CO2 SERPL-SCNC: 19 MMOL/L (ref 21–32)
CREAT SERPL-MCNC: 1.48 MG/DL (ref 0.6–1.3)
DIFFERENTIAL METHOD BLD: ABNORMAL
EOSINOPHIL # BLD: 0.1 K/UL (ref 0–0.4)
EOSINOPHIL NFR BLD: 2 % (ref 0–5)
ERYTHROCYTE [DISTWIDTH] IN BLOOD BY AUTOMATED COUNT: 16.3 % (ref 11.6–14.5)
GLUCOSE BLD STRIP.AUTO-MCNC: 143 MG/DL (ref 70–110)
GLUCOSE BLD STRIP.AUTO-MCNC: 215 MG/DL (ref 70–110)
GLUCOSE BLD STRIP.AUTO-MCNC: 245 MG/DL (ref 70–110)
GLUCOSE BLD STRIP.AUTO-MCNC: 264 MG/DL (ref 70–110)
GLUCOSE SERPL-MCNC: 109 MG/DL (ref 74–99)
HCT VFR BLD AUTO: 22.8 % (ref 35–45)
HGB BLD-MCNC: 7.9 G/DL (ref 12–16)
L PNEUMO AG UR QL IA: NEGATIVE
LYMPHOCYTES # BLD: 0.8 K/UL (ref 0.9–3.6)
LYMPHOCYTES NFR BLD: 18 % (ref 21–52)
MAGNESIUM SERPL-MCNC: 2 MG/DL (ref 1.6–2.6)
MCH RBC QN AUTO: 29.6 PG (ref 24–34)
MCHC RBC AUTO-ENTMCNC: 34.6 G/DL (ref 31–37)
MCV RBC AUTO: 85.4 FL (ref 74–97)
MONOCYTES # BLD: 0.2 K/UL (ref 0.05–1.2)
MONOCYTES NFR BLD: 4 % (ref 3–10)
NEUTS SEG # BLD: 3.3 K/UL (ref 1.8–8)
NEUTS SEG NFR BLD: 76 % (ref 40–73)
PLATELET # BLD AUTO: 84 K/UL (ref 135–420)
PMV BLD AUTO: 10.3 FL (ref 9.2–11.8)
POTASSIUM SERPL-SCNC: 4 MMOL/L (ref 3.5–5.5)
RBC # BLD AUTO: 2.67 M/UL (ref 4.2–5.3)
S PNEUM AG UR QL: NEGATIVE
SODIUM SERPL-SCNC: 139 MMOL/L (ref 136–145)
WBC # BLD AUTO: 4.3 K/UL (ref 4.6–13.2)

## 2019-03-26 PROCEDURE — 83735 ASSAY OF MAGNESIUM: CPT

## 2019-03-26 PROCEDURE — 74011250636 HC RX REV CODE- 250/636: Performed by: INTERNAL MEDICINE

## 2019-03-26 PROCEDURE — 36415 COLL VENOUS BLD VENIPUNCTURE: CPT

## 2019-03-26 PROCEDURE — 74011636637 HC RX REV CODE- 636/637: Performed by: INTERNAL MEDICINE

## 2019-03-26 PROCEDURE — 87450 LEGIONELLA PNEUMOPHILA AG, URINE: CPT

## 2019-03-26 PROCEDURE — 85025 COMPLETE CBC W/AUTO DIFF WBC: CPT

## 2019-03-26 PROCEDURE — 65270000029 HC RM PRIVATE

## 2019-03-26 PROCEDURE — 74011250637 HC RX REV CODE- 250/637: Performed by: NURSE PRACTITIONER

## 2019-03-26 PROCEDURE — 74011000250 HC RX REV CODE- 250: Performed by: INTERNAL MEDICINE

## 2019-03-26 PROCEDURE — 74011636637 HC RX REV CODE- 636/637: Performed by: NURSE PRACTITIONER

## 2019-03-26 PROCEDURE — 77030038269 HC DRN EXT URIN PURWCK BARD -A

## 2019-03-26 PROCEDURE — 82962 GLUCOSE BLOOD TEST: CPT

## 2019-03-26 PROCEDURE — 74011250637 HC RX REV CODE- 250/637: Performed by: INTERNAL MEDICINE

## 2019-03-26 PROCEDURE — 87449 NOS EACH ORGANISM AG IA: CPT

## 2019-03-26 PROCEDURE — 80048 BASIC METABOLIC PNL TOTAL CA: CPT

## 2019-03-26 RX ORDER — CHOLECALCIFEROL (VITAMIN D3) 125 MCG
2000 CAPSULE ORAL DAILY
COMMUNITY
End: 2021-01-01

## 2019-03-26 RX ORDER — HYDRALAZINE HYDROCHLORIDE 25 MG/1
25 TABLET, FILM COATED ORAL 3 TIMES DAILY
Status: DISCONTINUED | OUTPATIENT
Start: 2019-03-26 | End: 2019-03-28 | Stop reason: HOSPADM

## 2019-03-26 RX ORDER — FUROSEMIDE 20 MG/1
20 TABLET ORAL 2 TIMES DAILY
COMMUNITY
End: 2020-01-01

## 2019-03-26 RX ORDER — LANOLIN ALCOHOL/MO/W.PET/CERES
1 CREAM (GRAM) TOPICAL 2 TIMES DAILY WITH MEALS
Status: DISCONTINUED | OUTPATIENT
Start: 2019-03-26 | End: 2019-03-28 | Stop reason: HOSPADM

## 2019-03-26 RX ORDER — ENOXAPARIN SODIUM 100 MG/ML
70 INJECTION SUBCUTANEOUS EVERY 24 HOURS
Status: DISCONTINUED | OUTPATIENT
Start: 2019-03-27 | End: 2019-03-26

## 2019-03-26 RX ORDER — FUROSEMIDE 20 MG/1
20 TABLET ORAL 2 TIMES DAILY
Status: DISCONTINUED | OUTPATIENT
Start: 2019-03-26 | End: 2019-03-28 | Stop reason: HOSPADM

## 2019-03-26 RX ADMIN — HYDRALAZINE HYDROCHLORIDE 25 MG: 25 TABLET, FILM COATED ORAL at 17:41

## 2019-03-26 RX ADMIN — CARVEDILOL 12.5 MG: 12.5 TABLET, FILM COATED ORAL at 09:35

## 2019-03-26 RX ADMIN — INSULIN GLARGINE 5 UNITS: 100 INJECTION, SOLUTION SUBCUTANEOUS at 09:37

## 2019-03-26 RX ADMIN — AMLODIPINE BESYLATE 10 MG: 10 TABLET ORAL at 09:35

## 2019-03-26 RX ADMIN — INSULIN LISPRO 9 UNITS: 100 INJECTION, SOLUTION INTRAVENOUS; SUBCUTANEOUS at 13:02

## 2019-03-26 RX ADMIN — INSULIN LISPRO 6 UNITS: 100 INJECTION, SOLUTION INTRAVENOUS; SUBCUTANEOUS at 22:16

## 2019-03-26 RX ADMIN — CEFTRIAXONE SODIUM 1 G: 1 INJECTION, POWDER, FOR SOLUTION INTRAMUSCULAR; INTRAVENOUS at 20:42

## 2019-03-26 RX ADMIN — HYDRALAZINE HYDROCHLORIDE 25 MG: 25 TABLET, FILM COATED ORAL at 22:12

## 2019-03-26 RX ADMIN — INSULIN LISPRO 6 UNITS: 100 INJECTION, SOLUTION INTRAVENOUS; SUBCUTANEOUS at 17:39

## 2019-03-26 RX ADMIN — ASPIRIN 81 MG: 81 TABLET, COATED ORAL at 09:35

## 2019-03-26 RX ADMIN — CARVEDILOL 12.5 MG: 12.5 TABLET, FILM COATED ORAL at 17:41

## 2019-03-26 RX ADMIN — Medication 1 CAPSULE: at 09:35

## 2019-03-26 RX ADMIN — AZITHROMYCIN MONOHYDRATE 500 MG: 500 INJECTION, POWDER, LYOPHILIZED, FOR SOLUTION INTRAVENOUS at 19:00

## 2019-03-26 RX ADMIN — HYDRALAZINE HYDROCHLORIDE 10 MG: 10 TABLET, FILM COATED ORAL at 09:35

## 2019-03-26 RX ADMIN — SIMVASTATIN 20 MG: 20 TABLET, FILM COATED ORAL at 09:35

## 2019-03-26 RX ADMIN — Medication 10 ML: at 22:21

## 2019-03-26 RX ADMIN — Medication 10 ML: at 17:43

## 2019-03-26 RX ADMIN — FUROSEMIDE 20 MG: 20 TABLET ORAL at 09:35

## 2019-03-26 RX ADMIN — FUROSEMIDE 20 MG: 20 TABLET ORAL at 17:41

## 2019-03-26 RX ADMIN — ENOXAPARIN SODIUM 70 MG: 80 INJECTION SUBCUTANEOUS at 11:34

## 2019-03-26 RX ADMIN — Medication 10 ML: at 06:20

## 2019-03-26 NOTE — ROUTINE PROCESS
1181 assumed care of pt after bedside verbal report was given by off going nurse, pt resting in bed awake, no acute distress noted, pt denies c/o pain, call bell at pt's bedside, will monitor 0297 assisted pt to recliner, advised pt to call for assistance when she wants to go back to bell, call bell within pt's reach 1236 pt sitting up in recliner eating lunch, no distress noted 1605 pt back in bed, no changes noted, will monitor 1956 Bedside and Verbal shift change report given to Maria Esther Booker 85 (oncoming nurse) by Anshul ROME (offgoing nurse). Report included the following information SBAR, Kardex and MAR.

## 2019-03-26 NOTE — PROGRESS NOTES
Bedside shift report given to Jacqueline(oncoming nurse)  including SBAR, MAR, and Recent Results, and plan for day. Opportunity for questions provided. Patient comfortable in bed, denies pain, call light in reach, side rails up x2, patient knows to call before getting up, denies any needs at this time.

## 2019-03-26 NOTE — DIABETES MGMT
Glycemic Control Plan of Care 
 
T2DM with current A1c of 5.2% (3/25/2019). See separate notes, 3/25/2019, for assessment of home diabetes management and education. See below about taking diabetes medications. Home diabetes medications: Patient reported on 3/25/2019: 
Lantus insulin 20 units daily at bedtime but her doctor recently decreased the dose and told her to take between 8-10 units at bedtime. She was not given a parameter when to take 10 units. Educated patient. She will ask her doctor from now on to give her a blood sugar reading parameter in order to determine when to take a different dose. Patient also reported that she's on humalog sliding scale insulin but she didn't need to take this insulin because it never got to the point that she needed coverage. Patient denied experiencing low blood sugar below 70. Patient also reported that she was recently placed on Januvia 50 mg daily every evening. POC BG range on 3/25/2019: 122-241 mg/dL. Added 5 units basal lantus insulin daily and cont on correctional lispro. POC BG report on 3/26/2019 at time of review: 143 mg/dL. Recommendation(s): 
1.) Continue inpatient glycemic monitoring and intervention. Assessment: 
Patient is 77year old with past medical history including type 2 diabetes mellitus, CAD s/p stenting, colon cancer, left breast cancer, hypertension, hypercholesterolemia, and thrombocytopenia - was admitted on 3/24/2019 with report of persistent dyspnea and swelling of  leg. Noted: Dyspnea on exertion. Acute on chronic diastolic heart failure. CAP vs bronchiolitis. Acute DVT of left leg. T2DM with current A1c with current A1c of 5.2% (3/25/2019). Most recent blood glucose values: 
 
Results for Tj Syed (MRN [id number]) as of 3/26/2019 11:04 Ref. Range 3/25/2019 00:29 3/25/2019 08:04 3/25/2019 11:51 3/25/2019 16:41 3/25/2019 21:11 GLUCOSE,FAST - POC Latest Ref Range: 70 - 110 mg/dL 164 (H) 221 (H) 241 (H) 155 (H) 122 (H) Results for Sathish Schroeder (MRN [id number]) as of 3/26/2019 11:04 Ref. Range 3/26/2019 07:48 GLUCOSE,FAST - POC Latest Ref Range: 70 - 110 mg/dL 143 (H) Current A1C: 5.2% (3/25/2019) which is equivalent to estimated average blood glucose of 103 mg/dL during the past 2-3 months. Current hospital diabetes medications: 
Basal lantus insulin 5 units daily since 3/25/2019 Correctional lispro insulin ACHS. Very resistant dose Total daily dose insulin requirement previous day: 3/25/2019 Lantus: 5 units LisproL 15 units TDD insulin: 20 units Home diabetes medications: Patient reported on 3/25/2019: 
Lantus insulin 20 units daily at bedtime but her doctor recently decreased the dose and told her to take between 8-10 units at bedtime. She was not given a parameter when to take 10 units. Educated patient. She will ask her doctor from now on to give her a blood sugar reading parameter in order to determine when to take a different dose. Patient also reported that she's on humalog sliding scale insulin but she didn't need to take this insulin because it never got to the point that she needed coverage. Patient denied experiencing low blood sugar below 70. Patient also reported that she was recently placed on Januvia 50 mg daily every evening. Diet: Diabetic consistent carb 1800kcal; regular; no concentrated sweets Goals:  Blood glucose will be within target range of  mg/dL by 3/29/2019. Education:  _X__  Refer to Diabetes Education Record: 3/25/2019 
           ___  Education not indicated at this time Pretty Mcdaniel RN [facility] Pager: [phone]

## 2019-03-26 NOTE — NURSE NAVIGATOR
She was not interested in [employer] program (Ipad is used by patients to monitor HF readings & symptoms). She states \"I need simple stuff that I can use. \"

## 2019-03-26 NOTE — PROGRESS NOTES
DISCHARGE PLANNING: 
Pt placed on the review list for possible placement at [facility] This writer spoke with Admissions Liaison Jerald Boucher R.N. 
Discuss with this pt one of the expectations [facility] Care Manager [phone]

## 2019-03-26 NOTE — NURSE NAVIGATOR
Transitional Care Team: Initial HUG Note Date of Assessment: 19 Time of Assessment:  02:00 PM 
 
Karley Kim is a 77 y.o. female inpatient at [facility]. Assessment & Plan  
-Social: she lives alone;   last October. She reports having a good support system consisting of her nephew and sister that live nearby to her. She reports being independent, but reports challenges with cooking sometimes; her sister brings her meals.  
-Prior to admission, she states that her Lasix dose was increased by her PCP. -Patient reports \"in 1/15/19 I think, I had an MRI that showed cysts on my pancreas;\" she wanted this information passed on to inpatient provider; this NN notified Courtney Mendoza, nurse. -CHF & PNA education reviewed 
-Encouraged daily weights (she has a scale); she was told the perimeters to report to Dr. Jai Rosen, cardiologist office. 
-Cardiac & DM diet encouraged; she states that she tries to follow; A1C 5.2%  
-She reports receiving her influenza vaccine last season, and being up to date with PNA vaccine. 
-Decreased platelet level 
-She reports having high blood pressure readings- this NN gave her a BP cuff and showed her how to use it. Primary Diagnosis: CHF (EF 56-60%; proBNP 2,953) & PNA, new DVT on LLE- on eliquis Advance Directive:  not on file; education provided. She was not interested in completing ACP at this time. Current Code Status:  Full Code Referral to Hospice/ Palliative Care Appropriate: no. 
 
Awareness of Medical Conditions: (Trajectory of illness and pts expectations). She's aware of her health conditions Discharge Needs: (to include safety issues) SNF vs. Inpatient rehab Barriers Identified: none at this time; she seems genuinely interested in learning about her disease processes so that she can take better care of her self.  
 
Patient is willing to go to SNF/Inpatient Rehab if recommended: yes, but she states that she prefers to go to inpatient rehab if accepted. [id number] [facility],  informed. Medication Review:  was not performed, awaiting final med list on AVS. 
 
Can patient afford medications:  yes. Patient is Compliant with Medication regimen: yes Who manages medications at home: self Best Patient Contact Number: [phone] HUG (Healthy Understanding of Goals) program introduced to patient/family. The Transitional Care Team bridges the gaps in care and education surrounding discharge from the acute care facility. The objective is to empower the patient and family in taking a proactive role in preventing readmission within the first thirty days after discharge. The team is also involved in the efforts to reduce readmission to the acute care setting after stabilization and discharge from the acute care environment either to skilled nursing facilities or community. [facility] RN/CNS will return with [facility] Calendar/ follow up appointments/ Ambulatory Nurse Navigator name and contact information when the patient is ready for discharge. No future appointments. Non-MG follow up appointment(s): to be determined Patient education focused on readmission zones as described as: The Red Zone: High risk for readmission, days 1-21 The Yellow Zone: Moderate  risk for readmission, days 22-29 The Green Zone: Lower risk for readmission, days 30 and after The [facility] Team will attempt to follow the patient from a distance while inpatient as well as be available for further transition/disposition needs. The [facility] team will continue to offer support during the 30- 90 day discharge from acute care setting. Notified the appropriate [facility] team members. Past Medical History:  
Diagnosis Date  Anemia  CAD (coronary artery disease) Coronary Stenting needed, hematology workup needed prior to stent placement, treating medically at this time  Cancer ([facility] [address] colon  Cancer ([facility] [address] breast - left  Cardiac catheterization 08/17/2016 LM patent. mLAD 99.9% (2.25 x 28-mm Xience BETHEL, resid 0%). oD1 60%. oD2 95%. pCX 90% (2.5 x 13-mm Xience BETHEL, resid 0%). OMB patent.  Cardiac echocardiogram 07/01/2016 EF 50%. Basal inferior, basal-mid inferolateral hypk. Gr 1 DDfx. RVSP 50-60 mmHg. Mild LAE. Mod, eccentric MR.    
 Diabetes ([facility] [address]  Hypercholesteremia  Hypertension  Thrombocytopenia ([facility] [address] Eve Marcos MSN, RN, [facility] Nurse Navigator [phone]

## 2019-03-26 NOTE — PROGRESS NOTES
[facility] Hospitalist Group Progress Note Patient: Samina Luke Age: 77 y.o. : 1940 MR#: [id number] SSN: [id number] Date: 3/26/2019 Subjective:  
 
Breathing improved - only c/o of back pain today; denies CP, n/v/loose stools or constipation. Assessment/Plan: 1. Dyspnea on exertion, multiple factors including lung infection, CHF/effusion - improved, ambulated short distance today fatigued 2. Acute on chronic diastolic heart failure - mild; gentle diuresis (change to oral lasix); +0.24 L 3. CAP vs bronchiolitis on CT chest - cont IV abx 4. Acute DVT of left leg - change lovenox to eliquis; reviewed w/ patient risks / benefit of oral anticoag 5. HTN - improved; cont norvasc, BB, hydralazine 6. Type 2 DM, controlled - A1c 5.2 on admission; cont SSI - low dose lantus 7. Hyperlipidemia - cont statin 8. Chronic thrombocytopenia - follow in setting of treatment for #4 9. Normocytic anemia - Follow in AM, monitor for hgb dec in setting of #4/#10 
10. H/o of colon CA - h/o colon resection in  11. Ventral hernia 12. Dispo - PT/OT ordered; pt states she would be interested in SNF if recommended. Additional Notes:   
 
Case discussed with:  [x]Patient  []Family  [x]Nursing  []Case Management DVT Prophylaxis:  [x]Eliquis  []Hep SQ  []SCDs  []Coumadin   []On Heparin gtt Objective:  
VS:  
Visit Vitals /74 (BP 1 Location: Right arm, BP Patient Position: At rest) Pulse 81 Temp 98.2 °F (36.8 °C) Resp 16 Ht 5' (1.524 m) Wt 68.1 kg (150 lb 3.2 oz) SpO2 94% Breastfeeding? Yes  
BMI 29.33 kg/m² Tmax/24hrs: Temp (24hrs), Av.8 °F (36.6 °C), Min:97 °F (36.1 °C), Max:98.5 °F (36.9 °C) Intake/Output Summary (Last 24 hours) at 3/26/2019 1208 Last data filed at 3/26/2019 1002 Gross per 24 hour Intake 580 ml Output 1000 ml Net -420 ml General:  Alert, NAD Cardiovascular:  RRR 
 Pulmonary:  Dim to B bases, otherwise LSCTA; respiratory effort WNL 
GI:  +BS in all four quadrants, soft, non-tender Extremities:  Trace LLE>RLE edema; 2+ dorsalis pedis pulses bilaterally Neuro: oriented x 3 Labs:   
Recent Results (from the past 24 hour(s)) ECHO ADULT COMPLETE Collection Time: 03/25/19  3:41 PM  
Result Value Ref Range LA Volume 76.43 (A) 22 - 52 mL Ao Root D 3.02 cm LA Vol Index 46.14 16 - 28 ml/m2 PASP 29.0 mmHg GLUCOSE, POC Collection Time: 03/25/19  4:41 PM  
Result Value Ref Range Glucose (POC) 155 (H) 70 - 110 mg/dL GLUCOSE, POC Collection Time: 03/25/19  9:11 PM  
Result Value Ref Range Glucose (POC) 122 (H) 70 - 110 mg/dL LEGIONELLA PNEUMOPHILA AG, URINE Collection Time: 03/26/19 12:45 AM  
Result Value Ref Range Legionella Ag, urine NEGATIVE  NEG    
STREP PNEUMO AG, URINE Collection Time: 03/26/19 12:45 AM  
Result Value Ref Range Strep pneumo Ag, urine NEGATIVE  NEG    
METABOLIC PANEL, BASIC Collection Time: 03/26/19  3:44 AM  
Result Value Ref Range Sodium 139 136 - 145 mmol/L Potassium 4.0 3.5 - 5.5 mmol/L Chloride 112 (H) 100 - 108 mmol/L  
 CO2 19 (L) 21 - 32 mmol/L Anion gap 8 3.0 - 18 mmol/L Glucose 109 (H) 74 - 99 mg/dL BUN 40 (H) 7.0 - 18 MG/DL Creatinine 1.48 (H) 0.6 - 1.3 MG/DL  
 BUN/Creatinine ratio 27 (H) 12 - 20 GFR est AA 41 (L) >60 ml/min/1.73m2 GFR est non-AA 34 (L) >60 ml/min/1.73m2 Calcium 8.0 (L) 8.5 - 10.1 MG/DL  
CBC WITH AUTOMATED DIFF Collection Time: 03/26/19  3:44 AM  
Result Value Ref Range WBC 4.3 (L) 4.6 - 13.2 K/uL  
 RBC 2.67 (L) 4.20 - 5.30 M/uL HGB 7.9 (L) 12.0 - 16.0 g/dL HCT 22.8 (L) 35.0 - 45.0 % MCV 85.4 74.0 - 97.0 FL  
 MCH 29.6 24.0 - 34.0 PG  
 MCHC 34.6 31.0 - 37.0 g/dL  
 RDW 16.3 (H) 11.6 - 14.5 % PLATELET 84 (L) 845 - 420 K/uL MPV 10.3 9.2 - 11.8 FL  
 NEUTROPHILS 76 (H) 40 - 73 % LYMPHOCYTES 18 (L) 21 - 52 % MONOCYTES 4 3 - 10 % EOSINOPHILS 2 0 - 5 % BASOPHILS 0 0 - 2 %  
 ABS. NEUTROPHILS 3.3 1.8 - 8.0 K/UL  
 ABS. LYMPHOCYTES 0.8 (L) 0.9 - 3.6 K/UL  
 ABS. MONOCYTES 0.2 0.05 - 1.2 K/UL  
 ABS. EOSINOPHILS 0.1 0.0 - 0.4 K/UL  
 ABS. BASOPHILS 0.0 0.0 - 0.1 K/UL  
 DF AUTOMATED MAGNESIUM Collection Time: 03/26/19  3:44 AM  
Result Value Ref Range Magnesium 2.0 1.6 - 2.6 mg/dL GLUCOSE, POC Collection Time: 03/26/19  7:48 AM  
Result Value Ref Range Glucose (POC) 143 (H) 70 - 110 mg/dL Signed By: Sukhi Choudhary NP March 26, 2019

## 2019-03-26 NOTE — PROGRESS NOTES
Problem: Pain Goal: *Control of Pain Outcome: Progressing Towards Goal 
  
Problem: Falls - Risk of 
Goal: *Absence of Falls Description Document Judith Latin Fall Risk and appropriate interventions in the flowsheet. Outcome: Progressing Towards Goal 
  
Problem: Nutrition Deficit Goal: *Optimize nutritional status Outcome: Progressing Towards Goal 
  
Problem: Pressure Injury - Risk of 
Goal: *Prevention of pressure injury Description Document Kyle Scale and appropriate interventions in the flowsheet. Outcome: Progressing Towards Goal 
  
Problem: Patient Education: Go to Patient Education Activity Goal: Patient/Family Education Outcome: Progressing Towards Goal

## 2019-03-26 NOTE — PROGRESS NOTES
Bedside shift report received from Krysta(offgoing nurse)  including SBAR, MAR, and Recent Results. Opportunity for questions provided. Patient comfortable in bed, denies pain, call light in reach, side rails up x2, bed alarm on, denies any needs at this time. Will continue to monitor.

## 2019-03-26 NOTE — PROGRESS NOTES
attempted to visit patient and was not able to do a spiritual assessment. Will follow up with patient as needed. Jaida Joiner MA  Spiritual Care Department 
[phone]

## 2019-03-27 LAB
ANION GAP SERPL CALC-SCNC: 6 MMOL/L (ref 3–18)
BASOPHILS # BLD: 0 K/UL (ref 0–0.1)
BASOPHILS NFR BLD: 0 % (ref 0–2)
BUN SERPL-MCNC: 38 MG/DL (ref 7–18)
BUN/CREAT SERPL: 25 (ref 12–20)
CALCIUM SERPL-MCNC: 8.1 MG/DL (ref 8.5–10.1)
CHLORIDE SERPL-SCNC: 112 MMOL/L (ref 100–108)
CO2 SERPL-SCNC: 21 MMOL/L (ref 21–32)
CREAT SERPL-MCNC: 1.55 MG/DL (ref 0.6–1.3)
DIFFERENTIAL METHOD BLD: ABNORMAL
EOSINOPHIL # BLD: 0.1 K/UL (ref 0–0.4)
EOSINOPHIL NFR BLD: 3 % (ref 0–5)
ERYTHROCYTE [DISTWIDTH] IN BLOOD BY AUTOMATED COUNT: 16.3 % (ref 11.6–14.5)
GLUCOSE BLD STRIP.AUTO-MCNC: 158 MG/DL (ref 70–110)
GLUCOSE BLD STRIP.AUTO-MCNC: 194 MG/DL (ref 70–110)
GLUCOSE BLD STRIP.AUTO-MCNC: 199 MG/DL (ref 70–110)
GLUCOSE BLD STRIP.AUTO-MCNC: 251 MG/DL (ref 70–110)
GLUCOSE SERPL-MCNC: 117 MG/DL (ref 74–99)
HCT VFR BLD AUTO: 22.9 % (ref 35–45)
HGB BLD-MCNC: 8 G/DL (ref 12–16)
LYMPHOCYTES # BLD: 0.7 K/UL (ref 0.9–3.6)
LYMPHOCYTES NFR BLD: 16 % (ref 21–52)
MAGNESIUM SERPL-MCNC: 2.1 MG/DL (ref 1.6–2.6)
MCH RBC QN AUTO: 29.6 PG (ref 24–34)
MCHC RBC AUTO-ENTMCNC: 34.9 G/DL (ref 31–37)
MCV RBC AUTO: 84.8 FL (ref 74–97)
MONOCYTES # BLD: 0.3 K/UL (ref 0.05–1.2)
MONOCYTES NFR BLD: 8 % (ref 3–10)
NEUTS SEG # BLD: 2.9 K/UL (ref 1.8–8)
NEUTS SEG NFR BLD: 73 % (ref 40–73)
PLATELET # BLD AUTO: 79 K/UL (ref 135–420)
PMV BLD AUTO: 10.1 FL (ref 9.2–11.8)
POTASSIUM SERPL-SCNC: 4.4 MMOL/L (ref 3.5–5.5)
RBC # BLD AUTO: 2.7 M/UL (ref 4.2–5.3)
SODIUM SERPL-SCNC: 139 MMOL/L (ref 136–145)
WBC # BLD AUTO: 4 K/UL (ref 4.6–13.2)

## 2019-03-27 PROCEDURE — 36415 COLL VENOUS BLD VENIPUNCTURE: CPT

## 2019-03-27 PROCEDURE — 80048 BASIC METABOLIC PNL TOTAL CA: CPT

## 2019-03-27 PROCEDURE — 74011250637 HC RX REV CODE- 250/637: Performed by: INTERNAL MEDICINE

## 2019-03-27 PROCEDURE — 97166 OT EVAL MOD COMPLEX 45 MIN: CPT

## 2019-03-27 PROCEDURE — 83735 ASSAY OF MAGNESIUM: CPT

## 2019-03-27 PROCEDURE — 74011000250 HC RX REV CODE- 250: Performed by: INTERNAL MEDICINE

## 2019-03-27 PROCEDURE — 85025 COMPLETE CBC W/AUTO DIFF WBC: CPT

## 2019-03-27 PROCEDURE — 97535 SELF CARE MNGMENT TRAINING: CPT

## 2019-03-27 PROCEDURE — 74011250637 HC RX REV CODE- 250/637: Performed by: NURSE PRACTITIONER

## 2019-03-27 PROCEDURE — 74011250636 HC RX REV CODE- 250/636: Performed by: INTERNAL MEDICINE

## 2019-03-27 PROCEDURE — 97161 PT EVAL LOW COMPLEX 20 MIN: CPT

## 2019-03-27 PROCEDURE — 74011636637 HC RX REV CODE- 636/637: Performed by: NURSE PRACTITIONER

## 2019-03-27 PROCEDURE — 65270000029 HC RM PRIVATE

## 2019-03-27 PROCEDURE — 82962 GLUCOSE BLOOD TEST: CPT

## 2019-03-27 PROCEDURE — 74011636637 HC RX REV CODE- 636/637: Performed by: INTERNAL MEDICINE

## 2019-03-27 RX ORDER — BENAZEPRIL HYDROCHLORIDE 10 MG/1
10 TABLET ORAL DAILY
COMMUNITY
End: 2019-03-28

## 2019-03-27 RX ADMIN — INSULIN LISPRO 3 UNITS: 100 INJECTION, SOLUTION INTRAVENOUS; SUBCUTANEOUS at 17:32

## 2019-03-27 RX ADMIN — Medication 10 ML: at 17:36

## 2019-03-27 RX ADMIN — HYDRALAZINE HYDROCHLORIDE 25 MG: 25 TABLET, FILM COATED ORAL at 22:13

## 2019-03-27 RX ADMIN — ASPIRIN 81 MG: 81 TABLET, COATED ORAL at 08:52

## 2019-03-27 RX ADMIN — CARVEDILOL 12.5 MG: 12.5 TABLET, FILM COATED ORAL at 17:32

## 2019-03-27 RX ADMIN — FERROUS SULFATE TAB 325 MG (65 MG ELEMENTAL FE) 325 MG: 325 (65 FE) TAB at 17:32

## 2019-03-27 RX ADMIN — FERROUS SULFATE TAB 325 MG (65 MG ELEMENTAL FE) 325 MG: 325 (65 FE) TAB at 08:51

## 2019-03-27 RX ADMIN — AZITHROMYCIN MONOHYDRATE 500 MG: 500 INJECTION, POWDER, LYOPHILIZED, FOR SOLUTION INTRAVENOUS at 19:54

## 2019-03-27 RX ADMIN — CARVEDILOL 12.5 MG: 12.5 TABLET, FILM COATED ORAL at 08:52

## 2019-03-27 RX ADMIN — CEFTRIAXONE SODIUM 1 G: 1 INJECTION, POWDER, FOR SOLUTION INTRAMUSCULAR; INTRAVENOUS at 19:53

## 2019-03-27 RX ADMIN — INSULIN GLARGINE 5 UNITS: 100 INJECTION, SOLUTION SUBCUTANEOUS at 09:00

## 2019-03-27 RX ADMIN — HYDRALAZINE HYDROCHLORIDE 25 MG: 25 TABLET, FILM COATED ORAL at 08:52

## 2019-03-27 RX ADMIN — INSULIN LISPRO 3 UNITS: 100 INJECTION, SOLUTION INTRAVENOUS; SUBCUTANEOUS at 08:52

## 2019-03-27 RX ADMIN — APIXABAN 10 MG: 5 TABLET, FILM COATED ORAL at 22:13

## 2019-03-27 RX ADMIN — INSULIN LISPRO 9 UNITS: 100 INJECTION, SOLUTION INTRAVENOUS; SUBCUTANEOUS at 12:08

## 2019-03-27 RX ADMIN — HYDRALAZINE HYDROCHLORIDE 25 MG: 25 TABLET, FILM COATED ORAL at 17:32

## 2019-03-27 RX ADMIN — Medication 10 ML: at 22:17

## 2019-03-27 RX ADMIN — SIMVASTATIN 20 MG: 20 TABLET, FILM COATED ORAL at 08:51

## 2019-03-27 RX ADMIN — Medication 10 ML: at 06:06

## 2019-03-27 RX ADMIN — APIXABAN 10 MG: 5 TABLET, FILM COATED ORAL at 12:08

## 2019-03-27 RX ADMIN — INSULIN LISPRO 3 UNITS: 100 INJECTION, SOLUTION INTRAVENOUS; SUBCUTANEOUS at 22:13

## 2019-03-27 RX ADMIN — AMLODIPINE BESYLATE 10 MG: 10 TABLET ORAL at 08:52

## 2019-03-27 RX ADMIN — Medication 1 CAPSULE: at 08:52

## 2019-03-27 RX ADMIN — FUROSEMIDE 20 MG: 20 TABLET ORAL at 08:52

## 2019-03-27 RX ADMIN — FUROSEMIDE 20 MG: 20 TABLET ORAL at 17:32

## 2019-03-27 NOTE — DIABETES MGMT
Glycemic Control Plan of Care 
 
T2DM with current A1c of 5.2% (3/25/2019). See separate notes, 3/25/2019, for assessment of home diabetes management and education. See below about taking diabetes medications. Home diabetes medications: Patient reported on 3/25/2019: 
Lantus insulin 20 units daily at bedtime but her doctor recently decreased the dose and told her to take between 8-10 units at bedtime. She was not given a parameter when to take 10 units. Educated patient. She will ask her doctor from now on to give her a blood sugar reading parameter in order to determine when to take a different dose. Patient also reported that she's on humalog sliding scale insulin but she didn't need to take this insulin because it never got to the point that she needed coverage. Patient denied experiencing low blood sugar below 70. Patient also reported that she was recently placed on Januvia 50 mg daily every evening. POC BG range on 3/27/2019: 143-264 mg/dL. POC BG report on 3/27/2019 at time of review: 158, 251 mg/dL. Patient is currently on 5 units of lantus daily. She also received a total of 21 units of correctional lispro insulin (very resistant dose) on 3/26/2019. Recommendation(s): 
1.) Consider increasing basal lantus insulin dose from 5 to 8 units daily. Assessment: 
Patient is 77year old with past medical history including type 2 diabetes mellitus, CAD s/p stenting, colon cancer, left breast cancer, hypertension, hypercholesterolemia, and thrombocytopenia - was admitted on 3/24/2019 with report of persistent dyspnea and swelling of  leg. Noted: Dyspnea on exertion. Acute on chronic diastolic heart failure. CAP vs bronchiolitis. Acute DVT of left leg. T2DM with current A1c with current A1c of 5.2% (3/25/2019). Most recent blood glucose values: 
 
Results for Robin Rodriguez (MRN [id number]) as of 3/27/2019 13:43 Ref.  Range 3/26/2019 07:48 3/26/2019 12:17 3/26/2019 17:05 3/26/2019 22:15  
GLUCOSE,FAST - POC Latest Ref Range: 70 - 110 mg/dL 143 (H) 264 (H) 215 (H) 245 (H) Results for Gabby Stewart (MRN [id number]) as of 3/27/2019 13:43 Ref. Range 3/27/2019 08:07 3/27/2019 11:50  
GLUCOSE,FAST - POC Latest Ref Range: 70 - 110 mg/dL 158 (H) 251 (H) Current A1C: 5.2% (3/25/2019) which is equivalent to estimated average blood glucose of 103 mg/dL during the past 2-3 months. Current hospital diabetes medications: 
Basal lantus insulin 5 units daily since 3/25/2019 Correctional lispro insulin ACHS. Very resistant dose Total daily dose insulin requirement previous day: 3/26/2019 Lantus: 5 units LisproL 21 units TDD insulin: 26 units Home diabetes medications: Patient reported on 3/25/2019: 
Lantus insulin 20 units daily at bedtime but her doctor recently decreased the dose and told her to take between 8-10 units at bedtime. She was not given a parameter when to take 10 units. Educated patient. She will ask her doctor from now on to give her a blood sugar reading parameter in order to determine when to take a different dose. Patient also reported that she's on humalog sliding scale insulin but she didn't need to take this insulin because it never got to the point that she needed coverage. Patient denied experiencing low blood sugar below 70. Patient also reported that she was recently placed on Januvia 50 mg daily every evening. Diet: Diabetic consistent carb 1800kcal; regular; no concentrated sweets Goals:  Blood glucose will be within target range of  mg/dL by 3/30/2019. Education:  _X__  Refer to Diabetes Education Record: 3/25/2019 
           ___  Education not indicated at this time Eunice Morton RN [facility] Pager: [phone]

## 2019-03-27 NOTE — PROGRESS NOTES
Problem: Self Care Deficits Care Plan (Adult) Goal: *Acute Goals and Plan of Care (Insert Text) Description Occupational Therapy Goals Initiated 3/27/2019 within 7 day(s). 1.  Patient will perform functional activity in stance with G balance x 3-5 min without signs/symptoms of SOB. 2.  Patient will perform UB and LB bathing with modified independence using energy conservation strategies as needed. 3.  Patient will perform bathroom mobility with MI and LRAD with good safety awareness. 4.  Patient will perform functional self care transfers with modified independence. 5.  Patient will participate in upper extremity therapeutic exercise/activities with modified independence for 8-10 minutes. Outcome: Progressing Towards Goal 
 OCCUPATIONAL THERAPY EVALUATION Patient: Derrell Villalobos (65 y.o. female) Date: 3/27/2019 Primary Diagnosis: Acute on chronic diastolic CHF (congestive heart failure) ([facility] [address] [I50.33] Leg DVT (deep venous thromboembolism), acute, left ([facility] [address] [I82.402] Dyspnea [R06.00] CAP (community acquired pneumonia) [J18.9] Precautions:   Fall ASSESSMENT : 
Based on the objective data described below, the patient presents with impaired UB strength, standing balance, and functional activity tolerance. Pt sitting on edge of bed when OT approached. Pt demonstrates intact sitting balance and is able to bring B feet to rest on edge of bed for sock management. Pt agreeable to ambulate to bathroom and performed with SBA and pt demonstrating increased need to reach for door handles and walls for stability. Pt would benefit from use of RW for stability at this time. Pt returned to arm chair with SBA and slightly short of breath after 1-2 min activity. Pt has poor support at her home and lives on [address] [place] [address] with bathrooms on second floor only. Pt reports crawling up stairs to get to the bathroom and spending nights on the couch.  Pt would benefit from energy conservation strategy training, strengthening, and activity tolerance training. Patient will benefit from skilled intervention to address the above impairments. Patient?s rehabilitation potential is considered to be Good Factors which may influence rehabilitation potential include: ? None noted ? Mental ability/status ? Medical condition ? Home/family situation and support systems ? Safety awareness ? Pain tolerance/management ? Other: PLAN : 
Recommendations and Planned Interventions:  
?               Self Care Training                  ? Therapeutic Activities ? Functional Mobility Training    ? Cognitive Retraining 
? Therapeutic Exercises           ? Endurance Activities ? Balance Training                   ? Neuromuscular Re-Education ? Visual/Perceptual Training     ? Home Safety Training 
? Patient Education                 ? Family Training/Education ? Other (comment): Frequency/Duration: Patient will be followed by occupational therapy 1-2 times per day/4-7 days per week to address goals. Discharge Recommendations: Rehab Further Equipment Recommendations for Discharge: bedside commode and transfer bench Barriers to Learning/Limitations: None Compensate with: visual, verbal, tactile, kinesthetic cues/model PATIENT COMPLEXITY Eval Complexity: History: MEDIUM Complexity : Expanded review of history including physical, cognitive and psychosocial  history ; Examination: MEDIUM Complexity : 3-5 performance deficits relating to physical, cognitive , or psychosocial skils that result in activity limitations and / or participation restrictions;  Decision Making:MEDIUM Complexity : Patient may present with comorbidities that affect occupational performnce. Miniml to moderate modification of tasks or assistance (eg, physical or verbal ) with assesment(s) is necessary to enable patient to complete evaluation  Assessment: mod Complexity SUBJECTIVE:  
Patient stated ? To be totally honest with you I was spending the night downstairs in the recliner. ? OBJECTIVE DATA SUMMARY:  
 
Past Medical History:  
Diagnosis Date Anemia CAD (coronary artery disease) Coronary Stenting needed, hematology workup needed prior to stent placement, treating medically at this time Cancer [facility])   
 colon Cancer ([facility] [address] breast - left Cardiac catheterization 08/17/2016 LM patent. mLAD 99.9% (2.25 x 28-mm Xience BETHEL, resid 0%). oD1 60%. oD2 95%. pCX 90% (2.5 x 13-mm Xience BETHEL, resid 0%). OMB patent. Cardiac echocardiogram 07/01/2016 EF 50%. Basal inferior, basal-mid inferolateral hypk. Gr 1 DDfx. RVSP 50-60 mmHg. Mild LAE. Mod, eccentric MR. Diabetes ([facility] [address] Hypercholesteremia Hypertension Thrombocytopenia ([facility] [address] Past Surgical History:  
Procedure Laterality Date CARDIAC CATHETERIZATION  7/16/2015  
  no stents at this time r/t anemia & thrombocytopenia (hematology work-up needed prior to stent placement) CARDIAC CATHETERIZATION  7/11/2016 CARDIAC CATHETERIZATION  8/17/2016 CORONARY ARTERY ANGIOGRAM  7/16/2015 CORONARY ARTERY ANGIOGRAM  7/11/2016 CORONARY ARTERY ANGIOGRAM  8/17/2016 CORONARY STENT EA ADDL VESSEL  8/17/2016 CORONARY STENT SINGLE W/PTCA  8/17/2016 HX GI    
 colon surg secondary to cancer - removed lesion HX HEENT  2/2013  
 cataract bilaterally HX MASTECTOMY  march 2011  
 left HX VASCULAR ACCESS    
 mediport - now removed LV ANGIOGRAPHY  7/16/2015 LV ANGIOGRAPHY  7/11/2016 Prior Level of Function/Home Situation: Pt reports she was driving, ambulating in her home without AD and using RW outside of the home.  Pt performed dressing and bathing with MI and reports having difficulty getting up her stairs. Home Situation Home Environment: Private residence # Steps to Enter: 1 One/Two Story Residence: Two story(bed/bath on second floor) # of Interior Steps: (13) Living Alone: Yes Support Systems: Family member(s) Patient Expects to be Discharged to[de-identified] Rehabilitation facility Current DME Used/Available at Home: Raeann Gillilandming, Shun Rodriguez, [address] chair Tub or Shower Type: Tub/Shower combination ? Right hand dominant   ? Left hand dominant Cognitive/Behavioral Status: 
Neurologic State: Alert Orientation Level: Oriented X4 Cognition: Follows commands Safety/Judgement: Fall prevention;Home safety Skin: intact BUEs Edema: none noted BUEs Coordination: 
Coordination: Within functional limits(BUEs) Fine Motor Skills-Upper: Left Intact; Right Intact Gross Motor Skills-Upper: Left Intact; Right Intact Balance: 
Sitting: Intact Standing: With support Standing - Static: Fair Standing - Dynamic : Fair Strength: 
Strength: Generally decreased, functional(BUEs) Tone & Sensation Tone: Normal(BUEs) Sensation: Intact(BUEs) Range of Motion AROM: Within functional limits(BUEs) Functional Mobility and Transfers for ADLs: 
Transfers: 
Sit to Stand: Stand-by assistance Bathroom Mobility: Stand-by assistance ADL Assessment: 
Feeding: Modified independent Oral Facial Hygiene/Grooming: Modified Independent Bathing: Contact guard assistance Upper Body Dressing: Modified independent Lower Body Dressing: Stand-by assistance Toileting: Stand by assistance ADL Intervention: 
 Cognitive Retraining Safety/Judgement: Fall prevention;Home safety Pain: 
Pt reports 0/10 pain or discomfort prior to treatment. Pt reports 0/10 pain or discomfort post treatment. Activity Tolerance:  
fair Please refer to the flowsheet for vital signs taken during this treatment. After treatment: ? Patient left in no apparent distress sitting up in chair ? Patient left in no apparent distress in bed 
? Call bell left within reach ? Nursing notified ? Caregiver present ? Bed alarm activated COMMUNICATION/EDUCATION:  
? Home safety education was provided and the patient/caregiver indicated understanding. ? Patient/family have participated as able in goal setting and plan of care. ? Patient/family agree to work toward stated goals and plan of care. ? Patient understands intent and goals of therapy, but is neutral about his/her participation. ? Patient is unable to participate in goal setting and plan of care. Thank you for this referral. 
Luis Armando Lipscomb, OT Time Calculation: 30 mins

## 2019-03-27 NOTE — PROGRESS NOTES
Bedside and Verbal shift change report given to Dorcas Newby RN (oncoming nurse) by Cherri Sifuentes RN (offgoing nurse). Report included the following information SBAR, Kardex and MAR.

## 2019-03-27 NOTE — PROGRESS NOTES
0715  Bedside and Verbal shift change report given to Frances Brooke RN (oncoming nurse) by FÁTIMA Carver RN (offgoing nurse). Report included the following information SBAR, Kardex, Intake/Output, MAR and Recent Results. 1915-  Bedside and Verbal shift change report given to Anastasia Maldonado (oncoming nurse) by Frances Brooke RN (offgoing nurse). Report included the following information SBAR, Kardex, Intake/Output, MAR and Recent Results.

## 2019-03-27 NOTE — PROGRESS NOTES
attempted to visit patient and was not able to do a spiritual assessment. Will follow up with patient as needed. Braden Cavazos MA  Spiritual Care Department 
[phone]

## 2019-03-27 NOTE — PROGRESS NOTES
[facility] REFERRAL CONTACT NOTE [id number] [facility] [place] [facility] RE: Abbie Lang Referral received to review this patient's case for admission to [address] [facility] [place] [facility]. Current status reviewed.  When appropriate, will need PT/OT evaluation/treatment on this patient to complete the pre-admission evaluation.  Will continue to follow. Thank you for this referral.  Should you have any questions please do not hesitate to call. Sincerely, Sharon Bower Or [facility] 
[phone]

## 2019-03-27 NOTE — PROGRESS NOTES
Problem: Mobility Impaired (Adult and Pediatric) Goal: *Acute Goals and Plan of Care (Insert Text) Description Physical Therapy Goals Initiated 3/27/2019 and to be accomplished within 7 day(s) 1. Patient will move from supine to sit and sit to supine  in bed with independence. 2.  Patient will transfer from bed to chair and chair to bed with modified independence using the least restrictive device. 3.  Patient will perform sit to stand with modified independence. 4.  Patient will ambulate with modified independence for 300 feet with the least restrictive device. 5.  Patient will ascend/descend 12 stairs with 1 handrail(s) with modified independence. Outcome: Progressing Towards Goal 
 PHYSICAL THERAPY EVALUATION Patient: Eric Butler (17 y.o. female) Date: 3/27/2019 Primary Diagnosis: Acute on chronic diastolic CHF (congestive heart failure) ([facility] [address] [I50.33] Leg DVT (deep venous thromboembolism), acute, left ([facility] [address] [I82.402] Dyspnea [R06.00] CAP (community acquired pneumonia) [J18.9] Precautions: fall ASSESSMENT : 
Based on the objective data described below, the patient presents with decreased strength, balance and activity tolerance resulting in decreased independence with functional mobility. Pt performed transfers at SBA level and ambulated 80 feet with RW and SBA however was visibly fatigued after this activity. This is a significant decline from patient's baseline which is independent with mobility at a community level without an assistive device. Patient will benefit from skilled intervention to address the above impairments. Patient?s rehabilitation potential is considered to be Good Factors which may influence rehabilitation potential include:  
? None noted ? Mental ability/status ? Medical condition ? Home/family situation and support systems ? Safety awareness 
? Pain tolerance/management 
? Other: PLAN : 
Recommendations and Planned Interventions: 
?           Bed Mobility Training             ? Neuromuscular Re-Education ? Transfer Training                   ? Orthotic/Prosthetic Training 
? Gait Training                          ? Modalities ? Therapeutic Exercises          ? Edema Management/Control ? Therapeutic Activities            ? Patient and Family Training/Education ? Other (comment): Frequency/Duration: Patient will be followed by physical therapy 1-2 times per day/4-7 days per week to address goals. Discharge Recommendations: Inpatient Rehab Further Equipment Recommendations for Discharge: N/A Eval Complexity: History: MEDIUM  Complexity : 1-2 comorbidities / personal factors will impact the outcome/ POC Exam:MEDIUM Complexity : 3 Standardized tests and measures addressing body structure, function, activity limitation and / or participation in recreation  Presentation: MEDIUM Complexity : Evolving with changing characteristics  Clinical Decision Making:Low Complexity    Overall Complexity:LOW SUBJECTIVE:  
Patient stated ? I feel so weak. ? OBJECTIVE DATA SUMMARY:  
 
Past Medical History:  
Diagnosis Date Anemia CAD (coronary artery disease) Coronary Stenting needed, hematology workup needed prior to stent placement, treating medically at this time Cancer [facility])   
 colon Cancer ([facility] [address] breast - left Cardiac catheterization 08/17/2016 LM patent. mLAD 99.9% (2.25 x 28-mm Xience BETHEL, resid 0%). oD1 60%. oD2 95%. pCX 90% (2.5 x 13-mm Xience BETHEL, resid 0%). OMB patent. Cardiac echocardiogram 07/01/2016 EF 50%. Basal inferior, basal-mid inferolateral hypk. Gr 1 DDfx. RVSP 50-60 mmHg. Mild LAE. Mod, eccentric MR. Diabetes ([facility] [address] Hypercholesteremia Hypertension Thrombocytopenia ([facility] [address] Past Surgical History:  
Procedure Laterality Date CARDIAC CATHETERIZATION  7/16/2015  
  no stents at this time r/t anemia & thrombocytopenia (hematology work-up needed prior to stent placement) CARDIAC CATHETERIZATION  7/11/2016 CARDIAC CATHETERIZATION  8/17/2016 CORONARY ARTERY ANGIOGRAM  7/16/2015 CORONARY ARTERY ANGIOGRAM  7/11/2016 CORONARY ARTERY ANGIOGRAM  8/17/2016 CORONARY STENT EA ADDL VESSEL  8/17/2016 CORONARY STENT SINGLE W/PTCA  8/17/2016 HX GI    
 colon surg secondary to cancer - removed lesion HX HEENT  2/2013  
 cataract bilaterally HX MASTECTOMY  march 2011  
 left HX VASCULAR ACCESS    
 mediport - now removed LV ANGIOGRAPHY  7/16/2015 LV ANGIOGRAPHY  7/11/2016 Prior Level of Function/Home Situation: independent with all mobility without an assistive device, bedroom on second floor, lives alone Home Situation Home Environment: Private residence # Steps to Enter: 1 One/Two Story Residence: Two story Living Alone: yes Support Systems: Family member(s) Patient Expects to be Discharged to[de-identified] Private residence Current DME Used/Available at Home: None Critical Behavior: 
 Calm and cooperative, motivated Strength:   
Strength: Generally decreased, functional 
   
Tone & Sensation:  
Tone: Normal 
 Sensation: Intact Range Of Motion: 
AROM: Within functional limits Functional Mobility: 
  
Transfers: 
Sit to Stand: Stand-by assistance Stand to Sit: Stand-by assistance Balance:  
Sitting: Intact Standing: With support Standing - Static: Fair Standing - Dynamic : Fair Ambulation/Gait Training: 
Distance (ft): 80 Feet (ft) Assistive Device: Walker, rolling Ambulation - Level of Assistance: Stand-by assistance Gait Abnormalities: Decreased step clearance Therapeutic Exercises: Ankle pumps, LAQ Pain: 
Pt reports 2/10 pain or discomfort prior to treatment. Pt reports 2/10 pain or discomfort post treatment. Activity Tolerance:  
Poor+ Please refer to the flowsheet for vital signs taken during this treatment. After treatment:  
?         Patient left in no apparent distress sitting up in chair ? Patient left in no apparent distress in bed 
? Call bell left within reach ? Nursing notified ? Caregiver present ? Bed alarm activated COMMUNICATION/EDUCATION:  
?         Fall prevention education was provided and the patient/caregiver indicated understanding. ? Patient/family have participated as able in goal setting and plan of care. ?         Patient/family agree to work toward stated goals and plan of care. ?         Patient understands intent and goals of therapy, but is neutral about his/her participation. ? Patient is unable to participate in goal setting and plan of care. Educated patient on activity pacing and ambulation with RW Thank you for this referral. 
Sameer Phelps, PT Time Calculation: 16 mins

## 2019-03-27 NOTE — PROGRESS NOTES
[facility] Progress Note Patient: Delfina Davis Age: 77 y.o. : 1940 MR#: [id number] SSN: [id number] Date: 3/27/2019 Subjective:  
 
Breathing improved, no pain complaints; denies CP, n/v/loose stools or constipation. Assessment/Plan: 1. Dyspnea on exertion, multiple factors including lung infection, CHF/effusion - no desaturation on ambulation short distance, await PT/OT input 2. Acute on chronic diastolic heart failure - mild; strict I&Os; gentle diuresis w/ oral lasix; -0.27 L 3. CAP vs bronchiolitis on CT chest - cont IV abx 4. Acute DVT of left leg - cont eliquis; reviewed w/ patient risks / benefit of oral anticoag; monitor CBC 5. HTN - improved; cont norvasc, BB, hydralazine 6. Type 2 DM, controlled - A1c 5.2 on admission; cont SSI - low dose lantus 7. Hyperlipidemia - cont statin 8. Chronic thrombocytopenia - follow in setting of treatment for #4 / monitor 9. Normocytic anemia - Follow in AM, monitor for hgb dec in setting of #4/#10 
10. H/o of colon CA - h/o colon resection in  11. Ventral hernia 12. Dispo - Await PT/OT input Additional Notes:   
 
Case discussed with:  [x]Patient  []Family  [x]Nursing  [x]Case Management DVT Prophylaxis:  [x]Eliquis  []Hep SQ  []SCDs  []Coumadin   []On Heparin gtt Objective:  
VS:  
Visit Vitals /73 (BP 1 Location: Left arm, BP Patient Position: At rest) Pulse 77 Temp 97.1 °F (36.2 °C) Resp 18 Ht 5' (1.524 m) Wt 68.1 kg (150 lb 3.2 oz) SpO2 98% Breastfeeding? Yes  
BMI 29.33 kg/m² Tmax/24hrs: Temp (24hrs), Av.7 °F (36.5 °C), Min:97.1 °F (36.2 °C), Max:98.2 °F (36.8 °C) Intake/Output Summary (Last 24 hours) at 3/27/2019 1040 Last data filed at 3/26/2019 1236 Gross per 24 hour Intake  Output 250 ml Net -250 ml General:  Alert, NAD Cardiovascular:  RRR Pulmonary:  LSCTA; respiratory effort WNL GI:  +BS in all four quadrants, soft, non-tender Extremities:  Trace LLE>RLE edema; 2+ dorsalis pedis pulses bilaterally Neuro: oriented x 3 Labs:   
Recent Results (from the past 24 hour(s)) GLUCOSE, POC Collection Time: 03/26/19 12:17 PM  
Result Value Ref Range Glucose (POC) 264 (H) 70 - 110 mg/dL GLUCOSE, POC Collection Time: 03/26/19  5:05 PM  
Result Value Ref Range Glucose (POC) 215 (H) 70 - 110 mg/dL GLUCOSE, POC Collection Time: 03/26/19 10:15 PM  
Result Value Ref Range Glucose (POC) 245 (H) 70 - 110 mg/dL METABOLIC PANEL, BASIC Collection Time: 03/27/19  3:09 AM  
Result Value Ref Range Sodium 139 136 - 145 mmol/L Potassium 4.4 3.5 - 5.5 mmol/L Chloride 112 (H) 100 - 108 mmol/L  
 CO2 21 21 - 32 mmol/L Anion gap 6 3.0 - 18 mmol/L Glucose 117 (H) 74 - 99 mg/dL BUN 38 (H) 7.0 - 18 MG/DL Creatinine 1.55 (H) 0.6 - 1.3 MG/DL  
 BUN/Creatinine ratio 25 (H) 12 - 20 GFR est AA 39 (L) >60 ml/min/1.73m2 GFR est non-AA 32 (L) >60 ml/min/1.73m2 Calcium 8.1 (L) 8.5 - 10.1 MG/DL  
CBC WITH AUTOMATED DIFF Collection Time: 03/27/19  3:09 AM  
Result Value Ref Range WBC 4.0 (L) 4.6 - 13.2 K/uL  
 RBC 2.70 (L) 4.20 - 5.30 M/uL HGB 8.0 (L) 12.0 - 16.0 g/dL HCT 22.9 (L) 35.0 - 45.0 % MCV 84.8 74.0 - 97.0 FL  
 MCH 29.6 24.0 - 34.0 PG  
 MCHC 34.9 31.0 - 37.0 g/dL  
 RDW 16.3 (H) 11.6 - 14.5 % PLATELET 79 (L) 038 - 420 K/uL MPV 10.1 9.2 - 11.8 FL  
 NEUTROPHILS 73 40 - 73 % LYMPHOCYTES 16 (L) 21 - 52 % MONOCYTES 8 3 - 10 % EOSINOPHILS 3 0 - 5 % BASOPHILS 0 0 - 2 %  
 ABS. NEUTROPHILS 2.9 1.8 - 8.0 K/UL  
 ABS. LYMPHOCYTES 0.7 (L) 0.9 - 3.6 K/UL  
 ABS. MONOCYTES 0.3 0.05 - 1.2 K/UL  
 ABS. EOSINOPHILS 0.1 0.0 - 0.4 K/UL  
 ABS. BASOPHILS 0.0 0.0 - 0.1 K/UL  
 DF AUTOMATED MAGNESIUM Collection Time: 03/27/19  3:09 AM  
Result Value Ref Range Magnesium 2.1 1.6 - 2.6 mg/dL GLUCOSE, POC  
 Collection Time: 03/27/19  8:07 AM  
Result Value Ref Range Glucose (POC) 158 (H) 70 - 110 mg/dL Signed By: Darrell Condon NP March 27, 2019

## 2019-03-28 VITALS
BODY MASS INDEX: 29.45 KG/M2 | DIASTOLIC BLOOD PRESSURE: 72 MMHG | SYSTOLIC BLOOD PRESSURE: 147 MMHG | HEIGHT: 60 IN | HEART RATE: 81 BPM | OXYGEN SATURATION: 97 % | RESPIRATION RATE: 18 BRPM | WEIGHT: 150 LBS | TEMPERATURE: 98.9 F

## 2019-03-28 LAB
ANION GAP SERPL CALC-SCNC: 6 MMOL/L (ref 3–18)
BASOPHILS # BLD: 0 K/UL (ref 0–0.1)
BASOPHILS NFR BLD: 1 % (ref 0–2)
BUN SERPL-MCNC: 39 MG/DL (ref 7–18)
BUN/CREAT SERPL: 28 (ref 12–20)
CALCIUM SERPL-MCNC: 8.1 MG/DL (ref 8.5–10.1)
CHLORIDE SERPL-SCNC: 113 MMOL/L (ref 100–108)
CO2 SERPL-SCNC: 19 MMOL/L (ref 21–32)
CREAT SERPL-MCNC: 1.39 MG/DL (ref 0.6–1.3)
DIFFERENTIAL METHOD BLD: ABNORMAL
EOSINOPHIL # BLD: 0.1 K/UL (ref 0–0.4)
EOSINOPHIL NFR BLD: 3 % (ref 0–5)
ERYTHROCYTE [DISTWIDTH] IN BLOOD BY AUTOMATED COUNT: 16.3 % (ref 11.6–14.5)
GLUCOSE BLD STRIP.AUTO-MCNC: 160 MG/DL (ref 70–110)
GLUCOSE BLD STRIP.AUTO-MCNC: 184 MG/DL (ref 70–110)
GLUCOSE SERPL-MCNC: 120 MG/DL (ref 74–99)
HCT VFR BLD AUTO: 22.7 % (ref 35–45)
HGB BLD-MCNC: 7.9 G/DL (ref 12–16)
LYMPHOCYTES # BLD: 0.8 K/UL (ref 0.9–3.6)
LYMPHOCYTES NFR BLD: 19 % (ref 21–52)
MCH RBC QN AUTO: 29.8 PG (ref 24–34)
MCHC RBC AUTO-ENTMCNC: 34.8 G/DL (ref 31–37)
MCV RBC AUTO: 85.7 FL (ref 74–97)
MONOCYTES # BLD: 0.3 K/UL (ref 0.05–1.2)
MONOCYTES NFR BLD: 8 % (ref 3–10)
NEUTS SEG # BLD: 2.9 K/UL (ref 1.8–8)
NEUTS SEG NFR BLD: 69 % (ref 40–73)
PLATELET # BLD AUTO: 81 K/UL (ref 135–420)
PMV BLD AUTO: 9.9 FL (ref 9.2–11.8)
POTASSIUM SERPL-SCNC: 4.3 MMOL/L (ref 3.5–5.5)
RBC # BLD AUTO: 2.65 M/UL (ref 4.2–5.3)
SODIUM SERPL-SCNC: 138 MMOL/L (ref 136–145)
WBC # BLD AUTO: 4.2 K/UL (ref 4.6–13.2)

## 2019-03-28 PROCEDURE — 74011636637 HC RX REV CODE- 636/637: Performed by: INTERNAL MEDICINE

## 2019-03-28 PROCEDURE — 82962 GLUCOSE BLOOD TEST: CPT

## 2019-03-28 PROCEDURE — 36415 COLL VENOUS BLD VENIPUNCTURE: CPT

## 2019-03-28 PROCEDURE — 74011250637 HC RX REV CODE- 250/637: Performed by: INTERNAL MEDICINE

## 2019-03-28 PROCEDURE — 80048 BASIC METABOLIC PNL TOTAL CA: CPT

## 2019-03-28 PROCEDURE — 74011636637 HC RX REV CODE- 636/637: Performed by: NURSE PRACTITIONER

## 2019-03-28 PROCEDURE — 85025 COMPLETE CBC W/AUTO DIFF WBC: CPT

## 2019-03-28 PROCEDURE — 74011250637 HC RX REV CODE- 250/637: Performed by: NURSE PRACTITIONER

## 2019-03-28 RX ORDER — DIPHENHYDRAMINE HCL 25 MG
25 CAPSULE ORAL
Qty: 10 CAP | Refills: 0 | Status: SHIPPED
Start: 2019-03-28 | End: 2019-04-07

## 2019-03-28 RX ORDER — INSULIN GLARGINE 100 [IU]/ML
5 INJECTION, SOLUTION SUBCUTANEOUS DAILY
Qty: 1 VIAL | Refills: 0 | Status: ON HOLD | OUTPATIENT
Start: 2019-03-28 | End: 2019-09-30

## 2019-03-28 RX ORDER — INSULIN LISPRO 100 [IU]/ML
INJECTION, SOLUTION INTRAVENOUS; SUBCUTANEOUS
Qty: 1 VIAL | Refills: 0 | Status: ON HOLD
Start: 2019-03-28 | End: 2019-09-30

## 2019-03-28 RX ORDER — MAGNESIUM SULFATE 100 %
4 CRYSTALS MISCELLANEOUS AS NEEDED
Qty: 30 TAB | Refills: 0 | Status: SHIPPED
Start: 2019-03-28

## 2019-03-28 RX ORDER — AZITHROMYCIN 250 MG/1
250 TABLET, FILM COATED ORAL DAILY
Qty: 5 TAB | Refills: 0 | Status: SHIPPED
Start: 2019-03-28 | End: 2019-04-02

## 2019-03-28 RX ORDER — HYDRALAZINE HYDROCHLORIDE 25 MG/1
25 TABLET, FILM COATED ORAL 3 TIMES DAILY
Qty: 90 TAB | Refills: 0 | Status: SHIPPED | OUTPATIENT
Start: 2019-03-28 | End: 2020-01-01

## 2019-03-28 RX ORDER — ACETAMINOPHEN 325 MG/1
650 TABLET ORAL
Qty: 30 TAB | Refills: 0 | Status: SHIPPED
Start: 2019-03-28 | End: 2019-10-16 | Stop reason: ALTCHOICE

## 2019-03-28 RX ADMIN — INSULIN LISPRO 3 UNITS: 100 INJECTION, SOLUTION INTRAVENOUS; SUBCUTANEOUS at 12:07

## 2019-03-28 RX ADMIN — Medication 1 CAPSULE: at 08:41

## 2019-03-28 RX ADMIN — CARVEDILOL 12.5 MG: 12.5 TABLET, FILM COATED ORAL at 08:41

## 2019-03-28 RX ADMIN — FERROUS SULFATE TAB 325 MG (65 MG ELEMENTAL FE) 325 MG: 325 (65 FE) TAB at 08:41

## 2019-03-28 RX ADMIN — INSULIN LISPRO 3 UNITS: 100 INJECTION, SOLUTION INTRAVENOUS; SUBCUTANEOUS at 08:42

## 2019-03-28 RX ADMIN — ASPIRIN 81 MG: 81 TABLET, COATED ORAL at 08:42

## 2019-03-28 RX ADMIN — Medication 10 ML: at 12:07

## 2019-03-28 RX ADMIN — APIXABAN 10 MG: 5 TABLET, FILM COATED ORAL at 08:41

## 2019-03-28 RX ADMIN — AMLODIPINE BESYLATE 10 MG: 10 TABLET ORAL at 08:41

## 2019-03-28 RX ADMIN — HYDRALAZINE HYDROCHLORIDE 25 MG: 25 TABLET, FILM COATED ORAL at 08:41

## 2019-03-28 RX ADMIN — FUROSEMIDE 20 MG: 20 TABLET ORAL at 08:52

## 2019-03-28 RX ADMIN — INSULIN GLARGINE 5 UNITS: 100 INJECTION, SOLUTION SUBCUTANEOUS at 08:42

## 2019-03-28 RX ADMIN — SIMVASTATIN 20 MG: 20 TABLET, FILM COATED ORAL at 08:41

## 2019-03-28 RX ADMIN — Medication 10 ML: at 05:17

## 2019-03-28 NOTE — PROGRESS NOTES
Discharge Planning: · Pt accepted by [facility] for rehab · Pt agreed to this placement as her second choice if [facility] cannot accept her to their facility · Transportation set-up for 2:30 p.m. To [phone]

## 2019-03-28 NOTE — DISCHARGE INSTRUCTIONS
Patient Education        Learning About COPD and How to Prevent Lung Infections  How do lung infections affect COPD? Lung infections like pneumonia and acute bronchitis are common causes of COPD flare-ups. And people who have COPD are more likely to get these lung infections, especially if they smoke. When you have COPD, it is important to know the symptoms of pneumonia and acute bronchitis and call your doctor if you have them. Symptoms include:  · A cough that brings up more mucus than usual.  · Fever. · Shortness of breath. What can you do to prevent these infections? Stay healthy  · Get a flu shot every year. · Get a pneumococcal vaccine shot. If you have had one before, ask your doctor whether you need another dose. Two different types of pneumococcal vaccines are recommended for people ages 72 and older. · If you must be around people with colds or the flu, wash your hands often. · Do not smoke. This is the most important step you can take to prevent more damage to your lungs. If you need help quitting, talk to your doctor about stop-smoking programs and medicines. These can increase your chances of quitting for good. · Avoid secondhand smoke, air pollution, and high altitudes. Also avoid cold, dry air and hot, humid air. Stay at home with your windows closed when air pollution is bad. Exercise and eat well  · If your doctor recommends it, get more exercise. Walking is a good choice. Bit by bit, increase the amount you walk every day. Try for at least 30 minutes on most days of the week. · Eat regular, well-balanced meals. Eating right keeps your energy levels up and helps your body fight infection. · Get plenty of rest and sleep. Follow-up care is a key part of your treatment and safety. Be sure to make and go to all appointments, and call your doctor if you are having problems. It's also a good idea to know your test results and keep a list of the medicines you take.   Where can you learn more?  Go to http://perlita-linda.info/. Enter I939 in the search box to learn more about \"Learning About COPD and How to Prevent Lung Infections. \"  Current as of: September 5, 2018  Content Version: 11.9  © 8650-0175 [employer]. Care instructions adapted under license by [employer] (which disclaims liability or warranty for this information). If you have questions about a medical condition or this instruction, always ask your healthcare professional. [place] [address] any warranty or liability for your use of this information. Patient Education        8 Things You Can Do to Help Prevent Blood Clots  A blood clot is a clump of blood that forms in a blood vessel, such as a vein or an artery. If a clot gets stuck, it can stop blood flow and cause serious health problems, even death. That's why it's important to take steps to prevent a clot. Take a blood-thinning medicine (called an anticoagulant), if prescribed. If your doctor prescribes medicine, take it as directed. Exercise your lower leg muscles. This helps keep the blood moving through your legs. Point your toes up toward your head so the calves of your legs are stretched, then relax. Repeat. This is a good exercise to do when you are sitting for long periods of time. Get up out of bed as soon as your doctor says it's okay. Being active is one of the best things you can do to prevent clots. Take plenty of breaks when you travel. On long car trips, stop the car and walk around every hour or so. On the bus, plane, or train, get out of your seat and walk up and down the aisle every hour, if you can. Be active. Try to get 30 minutes or more of activity on most days of the week. Don't smoke. Smoking can increase your risk of blood clots. If you need help quitting, talk to your doctor about stop-smoking programs and medicines.     Check with your doctor about whether you should use hormone forms of birth control or hormone therapy. These may increase your risk of blood clots. Use compression stockings if your doctor prescribes them. These are specially fitted stockings that may prevent blood clots by keeping blood from pooling in your legs. Current as of: March 28, 2018  Content Version: 11.9  © 8494-6807 [employer]. Care instructions adapted under license by [employer] (which disclaims liability or warranty for this information). If you have questions about a medical condition or this instruction, always ask your healthcare professional. [address] any warranty or liability for your use of this information. Patient Education        Deep Vein Thrombosis: Care Instructions  Your Care Instructions    A deep vein thrombosis (DVT) is a blood clot in certain veins of the legs, pelvis, or arms. Blood clots in these veins need to be treated because they can get bigger, break loose, and travel through the bloodstream to the lungs. A blood clot in a lung can be life-threatening. The doctor may have given you a blood thinner (anticoagulant). A blood thinner can stop the blood clot from growing larger and prevent new clots from forming. You will need to take a blood thinner for 3 to 6 months or longer. The doctor has checked you carefully, but problems can develop later. If you notice any problems or new symptoms, get medical treatment right away. Follow-up care is a key part of your treatment and safety. Be sure to make and go to all appointments, and call your doctor if you are having problems. It's also a good idea to know your test results and keep a list of the medicines you take. How can you care for yourself at home? · Take your medicines exactly as prescribed. Call your doctor if you think you are having a problem with your medicine.   · If you are taking a blood thinner, be sure you get instructions about how to take your medicine safely. Blood thinners can cause serious bleeding problems. · Wear compression stockings if your doctor recommends them. These stockings are tighter at the feet than on the legs. They may reduce pain and swelling in your legs. But there are different types of stockings, and they need to fit right. So your doctor will recommend what you need. · When you sit, use a pillow to raise the arm or leg that has the blood clot. Try to keep it above the level of your heart. When should you call for help? Call 911 anytime you think you may need emergency care. For example, call if:    · You passed out (lost consciousness).     · You have symptoms of a blood clot in your lung (called a pulmonary embolism). These include:  ? Sudden chest pain. ? Trouble breathing. ? Coughing up blood.    Call your doctor now or seek immediate medical care if:    · You have new or worse trouble breathing.     · You are dizzy or lightheaded, or you feel like you may faint.     · You have symptoms of a blood clot in your arm or leg. These may include:  ? Pain in the arm, calf, back of the knee, thigh, or groin. ? Redness and swelling in the arm, leg, or groin.    Watch closely for changes in your health, and be sure to contact your doctor if:    · You do not get better as expected. Where can you learn more? Go to http://perlita-linda.info/. Enter C992 in the search box to learn more about \"Deep Vein Thrombosis: Care Instructions. \"  Current as of: September 26, 2018  Content Version: 11.9  © 5813-8481 [employer]. Care instructions adapted under license by [employer] (which disclaims liability or warranty for this information). If you have questions about a medical condition or this instruction, always ask your healthcare professional. [place] [address] any warranty or liability for your use of this information.          Patient Education        Pneumonia: Care Instructions  Your Care Instructions    Pneumonia is an infection of the lungs. Most cases are caused by infections from bacteria or viruses. Pneumonia may be mild or very severe. If it is caused by bacteria, you will be treated with antibiotics. It may take a few weeks to a few months to recover fully from pneumonia, depending on how sick you were and whether your overall health is good. Follow-up care is a key part of your treatment and safety. Be sure to make and go to all appointments, and call your doctor if you are having problems. It's also a good idea to know your test results and keep a list of the medicines you take. How can you care for yourself at home? · Take your antibiotics exactly as directed. Do not stop taking the medicine just because you are feeling better. You need to take the full course of antibiotics. · Take your medicines exactly as prescribed. Call your doctor if you think you are having a problem with your medicine. · Get plenty of rest and sleep. You may feel weak and tired for a while, but your energy level will improve with time. · To prevent dehydration, drink plenty of fluids, enough so that your urine is light yellow or clear like water. Choose water and other caffeine-free clear liquids until you feel better. If you have kidney, heart, or liver disease and have to limit fluids, talk with your doctor before you increase the amount of fluids you drink. · Take care of your cough so you can rest. A cough that brings up mucus from your lungs is common with pneumonia. It is one way your body gets rid of the infection. But if coughing keeps you from resting or causes severe fatigue and chest-wall pain, talk to your doctor. He or she may suggest that you take a medicine to reduce the cough. · Use a vaporizer or humidifier to add moisture to your bedroom. Follow the directions for cleaning the machine. · Do not smoke or allow others to smoke around you.  Smoke will make your cough last longer. If you need help quitting, talk to your doctor about stop-smoking programs and medicines. These can increase your chances of quitting for good. · Take an over-the-counter pain medicine, such as acetaminophen (Tylenol), ibuprofen (Advil, Motrin), or naproxen (Aleve). Read and follow all instructions on the label. · Do not take two or more pain medicines at the same time unless the doctor told you to. Many pain medicines have acetaminophen, which is Tylenol. Too much acetaminophen (Tylenol) can be harmful. · If you were given a spirometer to measure how well your lungs are working, use it as instructed. This can help your doctor tell how your recovery is going. · To prevent pneumonia in the future, talk to your doctor about getting a flu vaccine (once a year) and a pneumococcal vaccine (one time only for most people). When should you call for help? Call 911 anytime you think you may need emergency care. For example, call if:    · You have severe trouble breathing.    Call your doctor now or seek immediate medical care if:    · You cough up dark brown or bloody mucus (sputum).     · You have new or worse trouble breathing.     · You are dizzy or lightheaded, or you feel like you may faint.    Watch closely for changes in your health, and be sure to contact your doctor if:    · You have a new or higher fever.     · You are coughing more deeply or more often.     · You are not getting better after 2 days (48 hours).     · You do not get better as expected. Where can you learn more? Go to http://perlita-linda.info/. Enter [id number] in the search box to learn more about \"Pneumonia: Care Instructions. \"  Current as of: September 5, 2018  Content Version: 11.9  © 3350-9918 [employer]. Care instructions adapted under license by [employer] (which disclaims liability or warranty for this information).  If you have questions about a medical condition or this instruction, always ask your healthcare professional. [place] [address] any warranty or liability for your use of this information. Patient Education        Shortness of Breath in Children: Care Instructions  Your Care Instructions  Shortness of breath has many causes. Sometimes conditions such as anxiety can lead to shortness of breath. Some children get mild shortness of breath when they exercise. Trouble breathing also can be a symptom of a serious problem, such as asthma, lung disease, heart problems, and pneumonia. If your child's shortness of breath continues, he or she may need tests and treatment. Watch for any changes in your child's breathing and other symptoms. Follow-up care is a key part of your child's treatment and safety. Be sure to make and go to all appointments, and call your doctor if your child is having problems. It's also a good idea to know your child's test results and keep a list of the medicines your child takes. How can you care for your child at home? · Keep your child away from smoke. Do not smoke or let anyone else smoke around your child or in your house. · Make sure your child gets plenty of rest and sleep. · Have your child take medicines exactly as prescribed. Call your doctor if you think your child is having a problem with his or her medicine. · Help your child find healthy ways to deal with stress. ? Have your child exercise daily. ? Make sure your child gets plenty of sleep. ? Make sure your child eats regularly and well. When should you call for help? Call 911 anytime you think your child may need emergency care. For example, call if:    · Your child has severe trouble breathing. Symptoms may include:  ? Using the belly muscles to breathe.   ? The chest sinking in or the nostrils flaring when your child struggles to breathe.    Call your doctor now or seek immediate medical care if:    · Your child's shortness of breath gets worse or your child starts to wheeze. Wheezing is a high-pitched sound when your child breathes.     · Your child wakes up at night out of breath or has to prop up his or her head on several pillows to breathe.     · Your child is short of breath after only light activity or while at rest.    Watch closely for changes in your child's health, and be sure to contact your doctor if:    · Your child does not get better over the next 1 to 2 days. Where can you learn more? Go to http://perlita-linda.info/. Enter P670 in the search box to learn more about \"Shortness of Breath in Children: Care Instructions. \"  Current as of: September 5, 2018  Content Version: 11.9  © 2754-5113 [employer]. Care instructions adapted under license by [employer] (which disclaims liability or warranty for this information). If you have questions about a medical condition or this instruction, always ask your healthcare professional. [place] [address] any warranty or liability for your use of this information. Patient Education        Shortness of Breath: Care Instructions  Your Care Instructions  Shortness of breath has many causes. Sometimes conditions such as anxiety can lead to shortness of breath. Some people get mild shortness of breath when they exercise. Trouble breathing also can be a symptom of a serious problem, such as asthma, lung disease, emphysema, heart problems, and pneumonia. If your shortness of breath continues, you may need tests and treatment. Watch for any changes in your breathing and other symptoms. Follow-up care is a key part of your treatment and safety. Be sure to make and go to all appointments, and call your doctor if you are having problems. It's also a good idea to know your test results and keep a list of the medicines you take. How can you care for yourself at home? · Do not smoke or allow others to smoke around you.  If you need help quitting, talk to your doctor about stop-smoking programs and medicines. These can increase your chances of quitting for good. · Get plenty of rest and sleep. · Take your medicines exactly as prescribed. Call your doctor if you think you are having a problem with your medicine. · Find healthy ways to deal with stress. ? Exercise daily. ? Get plenty of sleep. ? Eat regularly and well. When should you call for help? Call 911 anytime you think you may need emergency care. For example, call if:    · You have severe shortness of breath.     · You have symptoms of a heart attack. These may include:  ? Chest pain or pressure, or a strange feeling in the chest.  ? Sweating. ? Shortness of breath. ? Nausea or vomiting. ? Pain, pressure, or a strange feeling in the back, neck, jaw, or upper belly or in one or both shoulders or arms. ? Lightheadedness or sudden weakness. ? A fast or irregular heartbeat. After you call 911, the  may tell you to chew 1 adult-strength or 2 to 4 low-dose aspirin. Wait for an ambulance. Do not try to drive yourself.    Call your doctor now or seek immediate medical care if:    · Your shortness of breath gets worse or you start to wheeze. Wheezing is a high-pitched sound when you breathe.     · You wake up at night out of breath or have to prop your head up on several pillows to breathe.     · You are short of breath after only light activity or while at rest.    Watch closely for changes in your health, and be sure to contact your doctor if:    · You do not get better over the next 1 to 2 days. Where can you learn more? Go to http://perlita-linda.info/. Enter S780 in the search box to learn more about \"Shortness of Breath: Care Instructions. \"  Current as of: September 5, 2018  Content Version: 11.9  © 7770-6797 [email or web address]. Care instructions adapted under license by [employer] (which disclaims liability or warranty for this information).  If you have questions about a medical condition or this instruction, always ask your healthcare professional. [place] [address] any warranty or liability for your use of this information.

## 2019-03-28 NOTE — PROGRESS NOTES
ARU/IPR REFERRAL CONTACT NOTE [id number] [place] [address] for Physical Rehabilitation RE: Maddie Benson Thank you for the opportunity to review this patient's case for admission to [address] [place] [address] for Physical Rehabilitation. Based on our pre-admission screening:  
 
[x ] This patient does not meet criteria for admission to [facility] for Physical Rehabilitation due to: 
 
[x ] Too functional, per documentation, patient does not require both Physical and Occupational Therapy Services at an acute rehabilitation level of intensity. Again, Thank you for this referral. Should you have any questions please do not hesitate to call. Sincerely, Sharon Moran [facility] 
[phone]

## 2019-03-28 NOTE — PROGRESS NOTES
Problem: Falls - Risk of 
Goal: *Absence of Falls Description Document Edwige Holguin Fall Risk and appropriate interventions in the flowsheet.  
Outcome: Progressing Towards Goal

## 2019-03-28 NOTE — NURSE NAVIGATOR
Transitional Care Team: [facility] Discharge Review Date: 03/28/19 Time:  3:05 PM 
Hospital Nurse Navigator: Debby Tanner MSN, RN, [facility] 
 
Kalyn Parsons is a 77 y.o. female inpatient at [facility] with Acute on chronic diastolic CHF (congestive heart failure) ([facility] [I50.33] Leg DVT (deep venous thromboembolism), acute, left ([facility] [I82.402] Dyspnea [R06.00] CAP (community acquired pneumonia) [J18.9] on  3/24/2019. Primary Diagnosis CHF (EF 56-60%; proBNP 2,953) & PNA, new DVT on LLE- on eliquis Review and Plan as below: 1. Decreased platelet level- she has a follow up appointment with Dr. Salvador Mejía for 4/11/19 at 8:45 AM. She will need to follow up with her PCP after she's discharged from SNF. This Nurse Navigator accessed the patient's chart to offer support and placement in the [address] [place] [address] Team bridges the gaps in care and education surrounding discharge from the acute care facility. The objective is to empower the patient and family in taking a proactive role in the task of preventing readmission within the first thirty days after discharge from the acute care setting. The Transitional Care Team is also involved in the efforts to reduce readmission to the acute care setting after stabilization and discharge from the acute care environment either to the skilled nursing facilities or community. EDUCATION / INTERVENTIONS OFFERED:   
 
1. Discharge medication list was reviewed for accuracy and potential interactions. 2. Patient was instructed on specific signs/symptoms to look for with regards to worsening of their medical conditions. Learning was assessed using teach back. 3. Patient was given all educational material from the [facility] with services identified for complete wrap around services during their 30 day recovery phase.  The patient and I discussed wrap around services including nurse navigation, care coordination, home health, transitional care appointments with their primary care provider and specialist team.  
 
 Patient education focused on readmission zones as described as: The Red Zone: High risk for readmission, days 1-21 The Yellow Zone: Moderate risk for readmission, days 22-29 The Green Zone: Lower risk for readmission, days 30 and after 4. Advance Care Planning: not on file; education provided. She was not interested in completing ACP at this time. BARRIERS IDENTIFIED: 
 
Mounika Márquez expressed the following barriers to her discharge:  
none PLAN: 
A written individual care plan including education materials, a calendar individualized with their follow up appointments with primary care providers, specialist, and telephone numbers of healthcare personnel that are available for them to ask questions during the recovery phase was provided to the patient and/or family. The patient will be discharged to skilled nursing facility :  [address] [place] [address] today. The TC Team will follow the patient from a distance while inpatient as well as be available for further transition disposition as needed. The TC Team will continue to offer support 30- 90 days post discharge from the acute care setting. The appropriate TC Team members were notified. Past Medical History:  
Diagnosis Date  Anemia  CAD (coronary artery disease) Coronary Stenting needed, hematology workup needed prior to stent placement, treating medically at this time  Cancer [facility])   
 colon  Cancer ([facility] [address] breast - left  Cardiac catheterization 08/17/2016 LM patent. mLAD 99.9% (2.25 x 28-mm Xience BETHEL, resid 0%). oD1 60%. oD2 95%. pCX 90% (2.5 x 13-mm Xience BETHEL, resid 0%). OMB patent.  Cardiac echocardiogram 07/01/2016 EF 50%. Basal inferior, basal-mid inferolateral hypk. Gr 1 DDfx. RVSP 50-60 mmHg. Mild LAE. Mod, eccentric MR.  Diabetes ([facility] [address]  Hypercholesteremia  Hypertension  Thrombocytopenia ([facility] [address] Advance Care Planning 3/25/2019 Patient's Healthcare Decision Maker is: Legal Next of Kin Primary Decision Maker Name -  
Primary Decision Maker Phone Number -  
Primary Decision Maker Relationship to Patient - Secondary Decision Maker Name - Secondary Decision Maker Phone Number - Secondary Decision Maker Relationship to Patient -  
Confirm Advance Directive None Patient Would Like to Complete Advance Directive No  
Does the patient have other document types - Rafaela Almeida MSN, RN, [facility]-BC Nurse Navigator [phone]

## 2019-03-28 NOTE — DISCHARGE SUMMARY
[facility] Discharge Summary Patient: Kale Aguillon Age: 77 y.o. : 1940 MR#: [id number] SSN: [id number] PCP on record: Bess Mohamud MD 
Admit date: 3/24/2019 Discharge date: 3/28/2019 []Home   []Home with Home Health   [x]SNF/NH   []Rehab   []Home with family []Alternate Facility:____________________ Admission Diagnoses: 
Acute on chronic diastolic CHF (congestive heart failure) ([facility] [address] [I50.33] Leg DVT (deep venous thromboembolism), acute, left ([facility] [address] [I82.402] Dyspnea [R06.00] CAP (community acquired pneumonia) [J18.9] Discharge Diagnoses: 1. Dyspnea on exertion, multiple factors including lung infection, CHF/effusion 2. Acute on chronic diastolic heart failure 3. CAP vs bronchiolitis on CT chest 
4. Acute DVT of left leg 5. HTN  
6. Type 2 DM 7. Hyperlipidemia 8. Chronic thrombocytopenia 9. Normocytic anemia 10. H/o of colon CA 11. Ventral hernia Discharge Medications:  
 
Current Discharge Medication List  
  
START taking these medications Details  
insulin glargine (LANTUS) 100 unit/mL injection 5 Units by SubCUTAneous route daily. Qty: 1 Vial, Refills: 0  
  
apixaban (ELIQUIS) 5 mg tablet Take 2 Tabs by mouth every twelve (12) hours. Qty: 60 Tab, Refills: 0  
  
lactobacillus sp. 50 billion cpu (BIO-K PLUS) 50 billion cell -375 mg cap capsule Take 1 Cap by mouth daily. Qty: 10 Cap, Refills: 0  
  
glucose 4 gram chewable tablet Take 4 Tabs by mouth as needed for Other (hypoglycemia). Qty: 30 Tab, Refills: 0  
  
diphenhydrAMINE (BENADRYL) 25 mg capsule Take 1 Cap by mouth nightly as needed for Sleep for up to 10 days. Qty: 10 Cap, Refills: 0  
  
acetaminophen (TYLENOL) 325 mg tablet Take 2 Tabs by mouth every four (4) hours as needed for Pain. Qty: 30 Tab, Refills: 0  
  
azithromycin (ZITHROMAX) 250 mg tablet Take 1 Tab by mouth daily for 5 days. Qty: 5 Tab, Refills: 0 CONTINUE these medications which have CHANGED Details  
hydrALAZINE (APRESOLINE) 25 mg tablet Take 1 Tab by mouth three (3) times daily. Qty: 90 Tab, Refills: 0  
  
insulin lispro (HUMALOG) 100 unit/mL injection INITIATE INSULIN CORRECTIVE PROTOCOL: Very Insulin Resistant For Blood Sugar (mg/dL) of:             
Less than 150 =   0 units 150 -199 =   3 units 200 -249 =   6 units 250 -299 =   9 units 300 -349 =   12 units 350 and above =   15 units and Call Physician Initiate Hypoglycemic protocol if 
Qty: 1 Vial, Refills: 0 CONTINUE these medications which have NOT CHANGED Details  
furosemide (LASIX) 20 mg tablet Take 20 mg by mouth two (2) times a day. Indications: visible water retention SITagliptin (JANUVIA) 50 mg tablet Take 50 mg by mouth daily. cholecalciferol, vitamin D3, (VITAMIN D3) 2,000 unit tab Take 2,000 Units by mouth daily. amLODIPine (NORVASC) 10 mg tablet Take 10 mg by mouth daily. carvedilol (COREG) 12.5 mg tablet Take 12.5 mg by mouth two (2) times daily (with meals). nitroglycerin (NITROLINGUAL) 400 mcg/spray spray 1 Spray by SubLINGual route every five (5) minutes as needed. Qty: 1 Bottle, Refills: 2  
  
multivitamin (ONE A DAY) tablet Take 1 Tab by mouth daily. simvastatin (ZOCOR) 20 mg tablet Take 20 mg by mouth daily. traMADol (ULTRAM) 50 mg tablet Take 1 Tab by mouth daily. Max Daily Amount: 50 mg. 
Qty: 30 Tab, Refills: 0 Associated Diagnoses: Lumbar disc disease with radiculopathy; Sciatica of right side  
  
ascorbic acid, vitamin C, (VITAMIN C) 250 mg tablet Take 1 Tab by mouth daily. Qty: 60 Tab, Refills: 1  
  
ferrous sulfate 325 mg (65 mg iron) tablet Take 1 Tab by mouth two (2) times daily (with meals). Qty: 60 Tab, Refills: 1  
  
omega 3-dha-epa-fish oil (FISH OIL) 100-160-1,000 mg cap Take 1,000 mg by mouth two (2) times a day. cyanocobalamin (VITAMIN B12) 100 mcg tablet Take 100 mcg by mouth daily. calcium polycarbophil (FIBER LAXATIVE) 625 mg tablet Take 1,250 mg by mouth daily as needed. STOP taking these medications  
  
 benazepril (LOTENSIN) 10 mg tablet Comments:  
Reason for Stopping:   
   
 insulin glargine (LANTUS,BASAGLAR) 100 unit/mL (3 mL) inpn Comments:  
Reason for Stopping:   
   
 aspirin 81 mg tablet Comments:  
Reason for Stopping:   
   
  
 
Consults:   
- none Procedures: 
-   None Significant Diagnostic Studies: -  Xr Chest Pa Lat Result Date: 3/24/2019 IMPRESSION: Persistent left basilar infiltrate/atelectasis and left pleural effusion with developing small right pleural effusion and basilar disease as well. Mild vascular congestion. Cta Chest W Or W Wo Cont Result Date: 3/24/2019 IMPRESSION: 1. Findings compatible with portal hypertension. Splenomegaly, perigastric and paraesophageal varices. Thickening of the gastric wall likely reflects portal gastropathy. 2.  No pulmonary artery filling defect to suggest pulmonary embolism. 3.  Anasarca. Small to moderate right and small left pleural effusions with bibasilar compressive atelectasis. 4.  Tree-in-bud opacities in the posterior left upper lobe are compatible with infectious or inflammatory bronchiolitis. Recommend follow-up chest CT in 3 months to document resolution. 5.  Additional findings as above. ECHO ADULT COMPLETE on 03/25/2019 · Estimated left ventricular ejection fraction is 56 - 60%. Visually measured ejection fraction. Left ventricular moderate concentric hypertrophy. No regional wall motion abnormality noted. · Moderate aortic valve stenosis is present. · Aortic valve peak gradient is 39 mmHg. Aortic valve mean gradient is 25 mmHg. Aortic valve area is 1 cm2. · Moderate mitral valve stenosis. Mild mitral valve regurgitation. · Mitral valve peak gradient is 11 mmHg. Mitral valve mean gradient is 6 mmHg. Mitral valve area is 1.6 cm2. · Right atrial cavity size is moderately dilated. · Right ventricular cavity size is mildly dilated. · Left atrial cavity size is moderately dilated. DUPLEX LOWER EXT VENOUS LEFT on 03/24/2019 Right Lower Venous For comparison purposes, the right common femoral vein was briefly interrogated. The vein demonstrates normal color filing and compressibility. Doppler flow was phasic and spontaneous. Left Lower Venous Acute occlusive thrombus present in the left peroneal vein. The common femoral, saphenous femoral junction, proximal femoral, mid femoral, distal femoral, popliteal and posterior tibial vein(s) were imaged in the transverse and longitudinal planes. The vessels showed normal color filling and compressibility. Doppler interrogation of the veins showed phasic and spontaneous flow. The left posterior tibial artery has biphasic waveforms. Hospital Course by Problem 1. Dyspnea on exertion, multiple factors including lung infection, CHF/effusion - present on admission and improved upon discharge. Patient remains weak and receptive to continued rehabilitation upon discharge. No oxygen desaturation present at rest and during ambulation at time of discharge. 2.  Acute on chronic diastolic heart failure -at time of admission with mild fluid overload mild, gentle diuresis lasix during admission. Total output during admission -0.2 liters (? Accuracy). 3.  CAP vs bronchiolitis on CT chest - Treated with IV antibiotics during admission and complete course of azithromycin upon discharge. 4.  Acute DVT of left leg - Started on lovenox upon admission for treatment. Discussed with patient regarding risk / benefit of anticoagulation including increased risk of bleeding. Options of coumadin / Rafaela Garner / Arely Hill reviewed and patient opted to start on eliquis. Blood counts / platelet levels have been stable during admission.    
5.  HTN - improved; cont norvasc, BB, hydralazine; consider resumption of ACEI if renal function stable on discharge 6. Type 2 DM - A1c 5.2 on admission; cont SSI during admission and then with addition of low dose lantus durinig hospital course. Review for adjustments as outpatient. 7.  Hyperlipidemia - cont statin 8. Chronic thrombocytopenia - followed closely in setting of treatment for DVT, at time of discharge platelets stable at 81 
9. Normocytic anemia - followed closely in setting of treatment for DVT, stable Hgb / Hct at time of discharge 7.9 and 22.7 respectively 10  H/o of colon CA - h/o colon resection in  11. Ventral hernia Today's examination of the patient revealed:  
 
Subjective:  
  
Breathing improved, no pain complaints; denies CP, n/v/loose stools or constipation. Objective:  
VS:  
Visit Vitals /73 (BP 1 Location: Right arm, BP Patient Position: At rest) Pulse 74 Temp 98.8 °F (37.1 °C) Resp 18 Ht 5' (1.524 m) Wt 68 kg (150 lb) SpO2 96% Breastfeeding? Yes  
BMI 29.29 kg/m² Tmax/24hrs: Temp (24hrs), Av.5 °F (36.9 °C), Min:97.6 °F (36.4 °C), Max:99.1 °F (37.3 °C) Input/Output:  
 
Intake/Output Summary (Last 24 hours) at 3/28/2019 1130 Last data filed at 3/28/2019 4108 Gross per 24 hour Intake 240 ml Output 300 ml Net -60 ml General:  Alert, NAD Cardiovascular:  RRR Pulmonary:  LSC throughout; respiratory effort WNL 
GI:  +BS in all four quadrants, soft, non-tender Extremities:  No edema; 2+ dorsalis pedis pulses bilaterally Neuro: alert and oriented x 3 Labs:   
Recent Results (from the past 24 hour(s)) GLUCOSE, POC Collection Time: 19 11:50 AM  
Result Value Ref Range Glucose (POC) 251 (H) 70 - 110 mg/dL GLUCOSE, POC Collection Time: 19  4:18 PM  
Result Value Ref Range Glucose (POC) 199 (H) 70 - 110 mg/dL GLUCOSE, POC Collection Time: 19  9:55 PM  
Result Value Ref Range Glucose (POC) 194 (H) 70 - 110 mg/dL METABOLIC PANEL, BASIC  
 Collection Time: 03/28/19  3:07 AM  
Result Value Ref Range Sodium 138 136 - 145 mmol/L Potassium 4.3 3.5 - 5.5 mmol/L Chloride 113 (H) 100 - 108 mmol/L  
 CO2 19 (L) 21 - 32 mmol/L Anion gap 6 3.0 - 18 mmol/L Glucose 120 (H) 74 - 99 mg/dL BUN 39 (H) 7.0 - 18 MG/DL Creatinine 1.39 (H) 0.6 - 1.3 MG/DL  
 BUN/Creatinine ratio 28 (H) 12 - 20 GFR est AA 44 (L) >60 ml/min/1.73m2 GFR est non-AA 37 (L) >60 ml/min/1.73m2 Calcium 8.1 (L) 8.5 - 10.1 MG/DL  
CBC WITH AUTOMATED DIFF Collection Time: 03/28/19  3:07 AM  
Result Value Ref Range WBC 4.2 (L) 4.6 - 13.2 K/uL  
 RBC 2.65 (L) 4.20 - 5.30 M/uL HGB 7.9 (L) 12.0 - 16.0 g/dL HCT 22.7 (L) 35.0 - 45.0 % MCV 85.7 74.0 - 97.0 FL  
 MCH 29.8 24.0 - 34.0 PG  
 MCHC 34.8 31.0 - 37.0 g/dL  
 RDW 16.3 (H) 11.6 - 14.5 % PLATELET 81 (L) 310 - 420 K/uL MPV 9.9 9.2 - 11.8 FL  
 NEUTROPHILS 69 40 - 73 % LYMPHOCYTES 19 (L) 21 - 52 % MONOCYTES 8 3 - 10 % EOSINOPHILS 3 0 - 5 % BASOPHILS 1 0 - 2 %  
 ABS. NEUTROPHILS 2.9 1.8 - 8.0 K/UL  
 ABS. LYMPHOCYTES 0.8 (L) 0.9 - 3.6 K/UL  
 ABS. MONOCYTES 0.3 0.05 - 1.2 K/UL  
 ABS. EOSINOPHILS 0.1 0.0 - 0.4 K/UL  
 ABS. BASOPHILS 0.0 0.0 - 0.1 K/UL  
 DF AUTOMATED    
GLUCOSE, POC Collection Time: 03/28/19  8:39 AM  
Result Value Ref Range Glucose (POC) 160 (H) 70 - 110 mg/dL Additional Data Reviewed: 
  
Condition: Stable Follow-up Appointments: Will need PCP follow up 5-7 days from discharge from SNF Dr. Forest Maldonado in 2-3 weeks >30 minutes spent coordinating this discharge (review instructions/follow-up, prescriptions, preparing report for sign off) Signed: 
Hunter Hastings NP 
3/28/2019 
11:30 AM

## 2019-03-28 NOTE — PROGRESS NOTES
Nursing report given to FARHAD Echeverria [facility]. Understanding verbalized. IVs removed. Patient stable at this time. She is notified that medic transport was scheduled for 2:30pm, but they have not yet arrived. All of patient's belongings are at the bedside. No needs at this time.

## 2019-03-28 NOTE — ROUTINE PROCESS
Bedside shift change report given to  DARIN Drew (oncoming nurse) by  Yolie Contreras (offgoing nurse). Report included the following information SBAR, Kardex, ED Summary and Recent Results.

## 2019-03-29 ENCOUNTER — PATIENT OUTREACH (OUTPATIENT)
Dept: CARDIOLOGY CLINIC | Age: 79
End: 2019-03-29

## 2019-03-30 LAB
BACTERIA SPEC CULT: NORMAL
BACTERIA SPEC CULT: NORMAL
SERVICE CMNT-IMP: NORMAL
SERVICE CMNT-IMP: NORMAL

## 2019-04-03 ENCOUNTER — PATIENT OUTREACH (OUTPATIENT)
Dept: CASE MANAGEMENT | Age: 79
End: 2019-04-03

## 2019-04-06 ENCOUNTER — HOSPITAL ENCOUNTER (EMERGENCY)
Age: 79
Discharge: NURSING, REHAB OR LONG-TERM CARE | End: 2019-04-06
Attending: EMERGENCY MEDICINE
Payer: MEDICARE

## 2019-04-06 VITALS
DIASTOLIC BLOOD PRESSURE: 56 MMHG | OXYGEN SATURATION: 97 % | RESPIRATION RATE: 27 BRPM | BODY MASS INDEX: 31.25 KG/M2 | SYSTOLIC BLOOD PRESSURE: 138 MMHG | TEMPERATURE: 98.6 F | WEIGHT: 160 LBS | HEART RATE: 81 BPM

## 2019-04-06 DIAGNOSIS — D64.9 ANEMIA REQUIRING TRANSFUSIONS: Primary | ICD-10-CM

## 2019-04-06 LAB
ALBUMIN SERPL-MCNC: 3.1 G/DL (ref 3.4–5)
ALBUMIN/GLOB SERPL: 1 {RATIO} (ref 0.8–1.7)
ALP SERPL-CCNC: 71 U/L (ref 45–117)
ALT SERPL-CCNC: 25 U/L (ref 13–56)
ANION GAP SERPL CALC-SCNC: 9 MMOL/L (ref 3–18)
AST SERPL-CCNC: 22 U/L (ref 15–37)
BASOPHILS # BLD: 0 K/UL (ref 0–0.1)
BASOPHILS NFR BLD: 0 % (ref 0–2)
BILIRUB SERPL-MCNC: 0.7 MG/DL (ref 0.2–1)
BUN SERPL-MCNC: 57 MG/DL (ref 7–18)
BUN/CREAT SERPL: 42 (ref 12–20)
CALCIUM SERPL-MCNC: 8.9 MG/DL (ref 8.5–10.1)
CHLORIDE SERPL-SCNC: 109 MMOL/L (ref 100–108)
CO2 SERPL-SCNC: 21 MMOL/L (ref 21–32)
CREAT SERPL-MCNC: 1.36 MG/DL (ref 0.6–1.3)
DIFFERENTIAL METHOD BLD: ABNORMAL
EOSINOPHIL # BLD: 0.1 K/UL (ref 0–0.4)
EOSINOPHIL NFR BLD: 3 % (ref 0–5)
ERYTHROCYTE [DISTWIDTH] IN BLOOD BY AUTOMATED COUNT: 17.2 % (ref 11.6–14.5)
GLOBULIN SER CALC-MCNC: 3.1 G/DL (ref 2–4)
GLUCOSE SERPL-MCNC: 128 MG/DL (ref 74–99)
HCT VFR BLD AUTO: 19.5 % (ref 35–45)
HGB BLD-MCNC: 6.8 G/DL (ref 12–16)
INR PPP: 1.2 (ref 0.8–1.2)
LYMPHOCYTES # BLD: 0.5 K/UL (ref 0.9–3.6)
LYMPHOCYTES NFR BLD: 18 % (ref 21–52)
MCH RBC QN AUTO: 30.2 PG (ref 24–34)
MCHC RBC AUTO-ENTMCNC: 34.9 G/DL (ref 31–37)
MCV RBC AUTO: 86.7 FL (ref 74–97)
MONOCYTES # BLD: 0.3 K/UL (ref 0.05–1.2)
MONOCYTES NFR BLD: 9 % (ref 3–10)
NEUTS SEG # BLD: 2 K/UL (ref 1.8–8)
NEUTS SEG NFR BLD: 70 % (ref 40–73)
PLATELET # BLD AUTO: 70 K/UL (ref 135–420)
PMV BLD AUTO: 9.4 FL (ref 9.2–11.8)
POTASSIUM SERPL-SCNC: 4.4 MMOL/L (ref 3.5–5.5)
PROT SERPL-MCNC: 6.2 G/DL (ref 6.4–8.2)
PROTHROMBIN TIME: 15.4 SEC (ref 11.5–15.2)
RBC # BLD AUTO: 2.25 M/UL (ref 4.2–5.3)
SODIUM SERPL-SCNC: 139 MMOL/L (ref 136–145)
WBC # BLD AUTO: 2.9 K/UL (ref 4.6–13.2)

## 2019-04-06 PROCEDURE — 99285 EMERGENCY DEPT VISIT HI MDM: CPT

## 2019-04-06 PROCEDURE — 85610 PROTHROMBIN TIME: CPT

## 2019-04-06 PROCEDURE — 77030013169 SET IV BLD ICUM -A

## 2019-04-06 PROCEDURE — 80053 COMPREHEN METABOLIC PANEL: CPT

## 2019-04-06 PROCEDURE — 86900 BLOOD TYPING SEROLOGIC ABO: CPT

## 2019-04-06 PROCEDURE — 86923 COMPATIBILITY TEST ELECTRIC: CPT

## 2019-04-06 PROCEDURE — 36430 TRANSFUSION BLD/BLD COMPNT: CPT

## 2019-04-06 PROCEDURE — P9016 RBC LEUKOCYTES REDUCED: HCPCS

## 2019-04-06 PROCEDURE — 85025 COMPLETE CBC W/AUTO DIFF WBC: CPT

## 2019-04-06 RX ORDER — SODIUM CHLORIDE 9 MG/ML
250 INJECTION, SOLUTION INTRAVENOUS AS NEEDED
Status: DISCONTINUED | OUTPATIENT
Start: 2019-04-06 | End: 2019-04-06 | Stop reason: HOSPADM

## 2019-04-06 NOTE — DISCHARGE INSTRUCTIONS

## 2019-04-06 NOTE — ED NOTES
Assumed care of patient, report received from [facility]. Pt resting quietly on stretcher

## 2019-04-06 NOTE — ED PROVIDER NOTES
EMERGENCY DEPARTMENT HISTORY AND PHYSICAL EXAM 
This was created with voice recognition software and transcription errors may be present. 2:04 AM 
Date: 4/6/2019 Patient Name: Patti Villalobos History of Presenting Illness Chief Complaint: 
 
History Provided By:  
 
HPI: Patti Villalobos is a 77 y.o. female with a past medical history of anemia, coronary disease, cancer,, diabetes, high cholesterol, hypertension, thrombocytopenia, diagnosed with a DVT last week started on Eliquis who presents with anemia. Patient states she is also on iron so her stools have been dark but denies any overt bleeding. Denies any chest pain shortness of breath or lightheadedness. PCP: Tamra Yee MD 
 
 
Past History Past Medical History: 
Past Medical History:  
Diagnosis Date  Anemia  CAD (coronary artery disease) Coronary Stenting needed, hematology workup needed prior to stent placement, treating medically at this time  Cancer [facility])   
 colon  Cancer ([facility] [address] breast - left  Cardiac catheterization 08/17/2016 LM patent. mLAD 99.9% (2.25 x 28-mm Xience BETHEL, resid 0%). oD1 60%. oD2 95%. pCX 90% (2.5 x 13-mm Xience BETHEL, resid 0%). OMB patent.  Cardiac echocardiogram 07/01/2016 EF 50%. Basal inferior, basal-mid inferolateral hypk. Gr 1 DDfx. RVSP 50-60 mmHg. Mild LAE. Mod, eccentric MR.    
 Diabetes ([facility] [address]  Hypercholesteremia  Hypertension  Thrombocytopenia ([facility] [address] Past Surgical History: 
Past Surgical History:  
Procedure Laterality Date  CARDIAC CATHETERIZATION  7/16/2015  
  no stents at this time r/t anemia & thrombocytopenia (hematology work-up needed prior to stent placement)  CARDIAC CATHETERIZATION  7/11/2016  CARDIAC CATHETERIZATION  8/17/2016  CORONARY ARTERY ANGIOGRAM  7/16/2015  CORONARY ARTERY ANGIOGRAM  7/11/2016  CORONARY ARTERY ANGIOGRAM  8/17/2016  CORONARY STENT EA ADDL VESSEL  8/17/2016  CORONARY STENT SINGLE W/PTCA  8/17/2016  HX GI    
 colon surg secondary to cancer - removed lesion  HX HEENT  2/2013  
 cataract bilaterally  HX MASTECTOMY  march 2011  
 left  HX VASCULAR ACCESS    
 mediport - now removed  LV ANGIOGRAPHY  7/16/2015  LV ANGIOGRAPHY  7/11/2016 Family History: 
Family History Problem Relation Age of Onset  Cancer Maternal Aunt Social History: 
Social History Tobacco Use  Smoking status: Never Smoker  Smokeless tobacco: Never Used Substance Use Topics  Alcohol use: No  
 Drug use: No  
 
 
Allergies: Allergies Allergen Reactions  Latex Itching Skin breaks out causing itching Review of Systems Review of Systems Constitutional: Negative for chills, diaphoresis, fever and unexpected weight change. Respiratory: Negative for shortness of breath. All other systems reviewed and are negative. Physical Exam  
 
 
Physical Exam  
Constitutional: She is oriented to person, place, and time. She appears well-developed. HENT:  
Head: Normocephalic and atraumatic. Eyes: Pupils are equal, round, and reactive to light. EOM are normal.  
Neck: Normal range of motion. Neck supple. Cardiovascular: Normal rate, regular rhythm and normal heart sounds. Exam reveals no friction rub. No murmur heard. Pulmonary/Chest: Effort normal and breath sounds normal. No respiratory distress. She has no wheezes. Abdominal: Soft. She exhibits no distension. There is no tenderness. There is no rebound and no guarding. Musculoskeletal: Normal range of motion. Neurological: She is alert and oriented to person, place, and time. Skin: Skin is warm and dry. Left leg is actually fairly swollen compared to the right warm and neurovascularly intact no discoloration Psychiatric: She has a normal mood and affect. Her behavior is normal. Thought content normal.  
 
 
Diagnostic Study Results Vital Signs EKG: 
 
Labs:  
 
Imaging:  
Medical Decision Making ED Course: Progress Notes, Reevaluation, and Consults: 
 
 
Provider Notes (Medical Decision Making): This is a very pleasant 69-year-old who was diagnosed with a DVT last week started on anticoagulation and presents for anemia. She has no complaints. Given that she is on iron Hemoccults is going to be of no real value. She denies any red blood but states her stools been dark which is likely from the iron. Will transfuse and d/c 
 
  
 
Diagnosis Clinical Impression: No diagnosis found. Disposition: 
 
Patient's Medications Start Taking No medications on file Continue Taking ACETAMINOPHEN (TYLENOL) 325 MG TABLET    Take 2 Tabs by mouth every four (4) hours as needed for Pain. AMLODIPINE (NORVASC) 10 MG TABLET    Take 10 mg by mouth daily. APIXABAN (ELIQUIS) 2.5 MG TABLET    Take 1 Tab by mouth every twelve (12) hours. ASCORBIC ACID, VITAMIN C, (VITAMIN C) 250 MG TABLET    Take 1 Tab by mouth daily. CALCIUM POLYCARBOPHIL (FIBER LAXATIVE) 625 MG TABLET    Take 1,250 mg by mouth daily as needed. CARVEDILOL (COREG) 12.5 MG TABLET    Take 12.5 mg by mouth two (2) times daily (with meals). CHOLECALCIFEROL, VITAMIN D3, (VITAMIN D3) 2,000 UNIT TAB    Take 2,000 Units by mouth daily. CYANOCOBALAMIN (VITAMIN B12) 100 MCG TABLET    Take 100 mcg by mouth daily. DIPHENHYDRAMINE (BENADRYL) 25 MG CAPSULE    Take 1 Cap by mouth nightly as needed for Sleep for up to 10 days. FERROUS SULFATE 325 MG (65 MG IRON) TABLET    Take 1 Tab by mouth two (2) times daily (with meals). FUROSEMIDE (LASIX) 20 MG TABLET    Take 20 mg by mouth two (2) times a day. Indications: visible water retention GLUCOSE 4 GRAM CHEWABLE TABLET    Take 4 Tabs by mouth as needed for Other (hypoglycemia). HYDRALAZINE (APRESOLINE) 25 MG TABLET    Take 1 Tab by mouth three (3) times daily. INSULIN GLARGINE (LANTUS) 100 UNIT/ML INJECTION    5 Units by SubCUTAneous route daily. INSULIN LISPRO (HUMALOG) 100 UNIT/ML INJECTION    INITIATE INSULIN CORRECTIVE PROTOCOL: Very Insulin Resistant For Blood Sugar (mg/dL) of:             
Less than 150 =   0 units 150 -199 =   3 units 200 -249 =   6 units 250 -299 =   9 units 300 -349 =   12 units 350 and above =   15 units and Call Physician Initiate Hypoglycemic protocol if  
 LACTOBACILLUS SP. 50 BILLION CPU (BIO-K PLUS) 50 BILLION CELL -375 MG CAP CAPSULE    Take 1 Cap by mouth daily. MULTIVITAMIN (ONE A DAY) TABLET    Take 1 Tab by mouth daily. NITROGLYCERIN (NITROLINGUAL) 400 MCG/SPRAY SPRAY    1 Spray by SubLINGual route every five (5) minutes as needed. OMEGA 3-DHA-EPA-FISH OIL (FISH OIL) 100-160-1,000 MG CAP    Take 1,000 mg by mouth two (2) times a day. SIMVASTATIN (ZOCOR) 20 MG TABLET    Take 20 mg by mouth daily. SITAGLIPTIN (JANUVIA) 50 MG TABLET    Take 50 mg by mouth daily. TRAMADOL (ULTRAM) 50 MG TABLET    Take 1 Tab by mouth daily. Max Daily Amount: 50 mg. These Medications have changed No medications on file Stop Taking No medications on file

## 2019-04-06 NOTE — PROGRESS NOTES
Miguelina Adam presents today for a post-hospital follow-up. She was hospitalized from 3/24/19 through 3/28/19 for acute on chronic diastolic heart failure, CAP versus bronchiolitis, and acute left leg DVT. She presented with complaints of weakness and dyspnea on exertion. She had a CTA of the chest done which showed small to moderate bilateral pleural effusions, tree-in-bud opacities in the posterior JERED compatible with infectious or inflammatory bronchiolitis. Her troponin was <0.02 and NT pro-BNp was 2983. She had an echo done on 3/25/19 which showed an EF of 56-60%, moderate concentric LVH, moderate AV stenosis (peak gradient 39 mmHg, mean gradient 25 mmHg), and moderate MV stenosis (peak gradient 11 mmHg and mean gradient 6 mmHg). She ws treated with diuretics and IV antibiotics. She was given lovenox and then started on Eliquis. She is currently staying at a skilled nursing facility for strengthening and rehab. She had to receive a blood transfusion on 4/6/19 for a Hgb of 6.8 and Hct of 19.5. She is a 77year old  female with history of CAD (s/p non STEMI in June 2015). Cardiac catheterization was postponed at that time due to sepsis. In the fall of 2015, she underwent EGD and was found to have esophageal varices. She has a history of a right hemicolectomy in 2011, for right hemicolon adenocarcinoma and she also underwent treatment for left breast cancer in February 2011. She is s/p elective cardiac catheterization on 8/17/16 for which she received a BETHEL to the mid LAD (mid long 70%, with focal subtotal 95% lesion). She was also found to have D1 with ostial 60%, D2 with ostial 95% and the circumflex had proximal 90%. The OM branches were angiographically normal.  She was last seen in the office by me on September 19, 2016 for post hospital follow-up. She has been lost to follow-up since that time as she states that her  was very ill and she was his primary caregiver.   She also has history of hypertension, dyslipidemia, diabetes, ventral hernia, normocytic anemia. She is feeling fairly well and doing better. She denies chest pain, tightness, heaviness, and palpitations. She admits to some mild, occasional shortness of breath. She denies dyspnea on exertion, orthopnea and PND. She denies abdominal bloating. She denies lightheadedness, dizziness, and syncope. She denies claudication. Denies nausea, vomiting, diarrhea, fever, chills. PMH:  Past Medical History:   Diagnosis Date    Anemia     CAD (coronary artery disease)     Coronary Stenting needed, hematology workup needed prior to stent placement, treating medically at this time    Cancer [facility])     colon    Cancer ([facility]     breast - left    Cardiac catheterization 08/17/2016    LM patent. mLAD 99.9% (2.25 x 28-mm Xience BETHEL, resid 0%). oD1 60%. oD2 95%. pCX 90% (2.5 x 13-mm Xience BETHEL, resid 0%). OMB patent.  Cardiac echocardiogram 07/01/2016    EF 50%. Basal inferior, basal-mid inferolateral hypk. Gr 1 DDfx. RVSP 50-60 mmHg. Mild LAE.   Mod, eccentric MR.      Diabetes ([facility]     Hypercholesteremia     Hypertension     Thrombocytopenia (HCC)        PSH:  Past Surgical History:   Procedure Laterality Date    CARDIAC CATHETERIZATION  7/16/2015     no stents at this time r/t anemia & thrombocytopenia (hematology work-up needed prior to stent placement)    CARDIAC CATHETERIZATION  7/11/2016         CARDIAC CATHETERIZATION  8/17/2016         CORONARY ARTERY ANGIOGRAM  7/16/2015         CORONARY ARTERY ANGIOGRAM  7/11/2016         CORONARY ARTERY ANGIOGRAM  8/17/2016         CORONARY STENT EA ADDL VESSEL  8/17/2016         CORONARY STENT SINGLE W/PTCA  8/17/2016         HX GI      colon surg secondary to cancer - removed lesion    HX HEENT  2/2013    cataract bilaterally    HX MASTECTOMY  march 2011    left    HX VASCULAR ACCESS      mediport - now removed    LV ANGIOGRAPHY  7/16/2015  LV ANGIOGRAPHY  7/11/2016            MEDS:  Current Outpatient Medications   Medication Sig    apixaban (ELIQUIS) 2.5 mg tablet Take 1 Tab by mouth every twelve (12) hours.  hydrALAZINE (APRESOLINE) 25 mg tablet Take 1 Tab by mouth three (3) times daily.  insulin glargine (LANTUS) 100 unit/mL injection 5 Units by SubCUTAneous route daily.  insulin lispro (HUMALOG) 100 unit/mL injection INITIATE INSULIN CORRECTIVE PROTOCOL: Very Insulin Resistant  For Blood Sugar (mg/dL) of:              Less than 150 =   0 units  150 -199 =   3 units  200 -249 =   6 units  250 -299 =   9 units  300 -349 =   12 units  350 and above =   15 units and Call Physician  Initiate Hypoglycemic protocol if    lactobacillus sp. 50 billion cpu (BIO-K PLUS) 50 billion cell -375 mg cap capsule Take 1 Cap by mouth daily.  glucose 4 gram chewable tablet Take 4 Tabs by mouth as needed for Other (hypoglycemia).  acetaminophen (TYLENOL) 325 mg tablet Take 2 Tabs by mouth every four (4) hours as needed for Pain.  furosemide (LASIX) 20 mg tablet Take 20 mg by mouth two (2) times a day. Indications: visible water retention    SITagliptin (JANUVIA) 50 mg tablet Take 50 mg by mouth daily.  cholecalciferol, vitamin D3, (VITAMIN D3) 2,000 unit tab Take 2,000 Units by mouth daily.  amLODIPine (NORVASC) 10 mg tablet Take 10 mg by mouth daily.  carvedilol (COREG) 12.5 mg tablet Take 12.5 mg by mouth two (2) times daily (with meals).  traMADol (ULTRAM) 50 mg tablet Take 1 Tab by mouth daily. Max Daily Amount: 50 mg.    nitroglycerin (NITROLINGUAL) 400 mcg/spray spray 1 Spray by SubLINGual route every five (5) minutes as needed.  ascorbic acid, vitamin C, (VITAMIN C) 250 mg tablet Take 1 Tab by mouth daily.  ferrous sulfate 325 mg (65 mg iron) tablet Take 1 Tab by mouth two (2) times daily (with meals).  multivitamin (ONE A DAY) tablet Take 1 Tab by mouth daily.     simvastatin (ZOCOR) 20 mg tablet Take 20 mg by mouth daily.  omega 3-dha-epa-fish oil (FISH OIL) 100-160-1,000 mg cap Take 1,000 mg by mouth two (2) times a day.  cyanocobalamin (VITAMIN B12) 100 mcg tablet Take 100 mcg by mouth daily.  calcium polycarbophil (FIBER LAXATIVE) 625 mg tablet Take 1,250 mg by mouth daily as needed. No current facility-administered medications for this visit. Allergies and Sensitivities:  Allergies   Allergen Reactions    Latex Itching     Skin breaks out causing itching       Family History:  Family History   Problem Relation Age of Onset    Cancer Maternal Aunt        Social History:  She  reports that she has never smoked. She has never used smokeless tobacco.  She  reports that she does not drink alcohol. Physical:  Visit Vitals  /64   Pulse 77   Ht 5' (1.524 m)   Wt 64 kg (141 lb)   SpO2 97%   BMI 27.54 kg/m²       Exam:  Neck:  Supple, no JVD, no carotid bruits  CV:  Normal S1 and  S2, grade II/VI MAEVE noted, no rubs or gallops noted  Lungs:  Clear to ausculation throughout, no wheezes or rales  Abd:  Soft, non-tender, non-distended with good bowel sounds. No hepatosplenomegaly  Extremities:  Trace left lower extremity edema, none on the right      Data:  EKG:   Read by Erica Winchester, DO - Sinus rhythm.  Left axis, anterior fascicular block.  Voltage criteria for LVH.  Nonspecific QRS widening.  Nonspecific ST depression seen with left ventricular hypertrophy (strain).       LABS:  Lab Results   Component Value Date/Time    Sodium 139 04/06/2019 02:28 AM    Potassium 4.4 04/06/2019 02:28 AM    Chloride 109 (H) 04/06/2019 02:28 AM    CO2 21 04/06/2019 02:28 AM    Glucose 128 (H) 04/06/2019 02:28 AM    BUN 57 (H) 04/06/2019 02:28 AM    Creatinine 1.36 (H) 04/06/2019 02:28 AM     Lab Results   Component Value Date/Time    Cholesterol, total 188 07/01/2016 11:53 PM    HDL Cholesterol 46 07/01/2016 11:53 PM    LDL, calculated 94.2 07/01/2016 11:53 PM    Triglyceride 239 (H) 07/01/2016 11:53 PM CHOL/HDL Ratio 4.1 07/01/2016 11:53 PM     Lab Results   Component Value Date/Time    ALT (SGPT) 25 04/06/2019 02:28 AM       Impression / Plan:  1.  CAD, s/p non-STEMI in June 2015, s/p successful PCI/stents to a 90% proximal circumflex lesion and a subtotal 99.9% mild LAD lesion in 2016, appears stable  2. Essential hypertension, blood pressure controlled  3. Dyslipidemia, on simvastatin 20mg  4. Diabetes mellitus, recommend Hgb A1c less than 7% from cardiac standpoint  5. Colon cancer, s/p resection in 2011  6. Anemia    Mrs. Demarco Prince was seen today for a post-hospital follow-up. She reports that she is still at [facility] for rehab and strengthening after a late March admission for CAP, CHF, and acute DVT of the LLE. She is feeling better after receiving a blood transfusion on 4/6/19. Her Hgb was 6.5. She only offers complaints of mild dyspnea on exertion but no chest pain or palpitations. Her blood pressure is well controlled as is her heart rate. Her breath sounds are clear and she only has trace edema in the left lower extremity. She reports compliance with her medications. At this time, no changes were made to her medication regimen. Her heart failure appears compensated. She is anticoagulated with Eliquis. She has requested follow-up with Dr. Jara  in about 3 months. She states that her he was her 's cardiologist and she would like follow-up with him. Her appointment has been scheduled. Promise Osborne MSN, FNP-BC    Please note:  Portions of this chart were created with Dragon medical speech to text program.  Unrecognized errors may be present.

## 2019-04-06 NOTE — ED TRIAGE NOTES
Pt sent from [facility] for abnormal H and H, pt states she feels weak and has some shortness of breath with movement, pt is well appearing, normal vital signs, pt states she was diagnosed last week with blood clot in left leg and is on eloquis, pt states she is also on iron pills so has dark stools but unsure of any bleeding in stool. Pt has hx of colon cancer and breast cancer, hx left mastectomy

## 2019-04-07 LAB
ABO + RH BLD: NORMAL
BLD PROD TYP BPU: NORMAL
BLOOD GROUP ANTIBODIES SERPL: NORMAL
BPU ID: NORMAL
CALLED TO:,BCALL1: NORMAL
CROSSMATCH RESULT,%XM: NORMAL
SPECIMEN EXP DATE BLD: NORMAL
STATUS OF UNIT,%ST: NORMAL
UNIT DIVISION, %UDIV: 0

## 2019-04-09 ENCOUNTER — OFFICE VISIT (OUTPATIENT)
Dept: CARDIOLOGY CLINIC | Age: 79
End: 2019-04-09

## 2019-04-09 VITALS
WEIGHT: 141 LBS | DIASTOLIC BLOOD PRESSURE: 64 MMHG | HEART RATE: 77 BPM | HEIGHT: 60 IN | OXYGEN SATURATION: 97 % | SYSTOLIC BLOOD PRESSURE: 136 MMHG | BODY MASS INDEX: 27.68 KG/M2

## 2019-04-09 DIAGNOSIS — I82.402 LEG DVT (DEEP VENOUS THROMBOEMBOLISM), ACUTE, LEFT (HCC): ICD-10-CM

## 2019-04-09 DIAGNOSIS — I50.33 ACUTE ON CHRONIC DIASTOLIC CHF (CONGESTIVE HEART FAILURE) (HCC): Primary | ICD-10-CM

## 2019-04-09 DIAGNOSIS — I21.4 NSTEMI (NON-ST ELEVATED MYOCARDIAL INFARCTION) (HCC): ICD-10-CM

## 2019-04-09 DIAGNOSIS — I25.10 CORONARY ARTERY DISEASE INVOLVING NATIVE CORONARY ARTERY OF NATIVE HEART WITHOUT ANGINA PECTORIS: ICD-10-CM

## 2019-04-09 DIAGNOSIS — I10 HTN (HYPERTENSION), BENIGN: ICD-10-CM

## 2019-04-09 NOTE — PROGRESS NOTES
Maggie Regan presents today for   Chief Complaint   Patient presents with   [facility] Follow Up     2-3 week PH    Shortness of Breath     exertion       Maggie Regan preferred language for health care discussion is english/other. Is someone accompanying this pt? no    Is the patient using any DME equipment during [address]? Wheelchair     Depression Screening:  3 most recent PHQ Screens 6/29/2018   Little interest or pleasure in doing things Not at all   Feeling down, depressed, irritable, or hopeless Not at all   Total Score PHQ 2 0       Learning Assessment:  Learning Assessment 4/9/2019   PRIMARY LEARNER Patient   PRIMARY LANGUAGE ENGLISH   LEARNER PREFERENCE PRIMARY DEMONSTRATION   ANSWERED BY Patient   RELATIONSHIP SELF       Abuse Screening:  No flowsheet data found. Fall Risk  Fall Risk Assessment, last 12 mths 4/9/2019   Able to walk? Yes   Fall in past 12 months? No       Pt currently taking Anticoagulant therapy? eliquis     Coordination of Care:  1. Have you been to the ER, urgent care clinic since your last visit? Hospitalized since your last visit? 4/6 for anemia; 3/24 - 3/28 for CHF/DVT/CHAVEZ    2. Have you seen or consulted any other health care providers outside of the [address] [place] [address] since your last visit? Include any pap smears or colon screening.  No

## 2019-04-10 ENCOUNTER — PATIENT OUTREACH (OUTPATIENT)
Dept: CASE MANAGEMENT | Age: 79
End: 2019-04-10

## 2019-04-15 NOTE — PROGRESS NOTES
Community Care Team Documentation for Patient in [facility]     Patient discharged from [facility] to [facility], on 3/28/2019. Hospital Discharge diagnosis:    1. Dyspnea on exertion, multiple factors including lung infection, CHF/effusion   2. Acute on chronic diastolic heart failure    3. CAP vs bronchiolitis on CT chest  4. Acute DVT of left leg   5. HTN   6. Type 2 DM     7. Hyperlipidemia  8. Chronic thrombocytopenia  9. Normocytic anemia  10. H/o of colon CA  11. Ventral hernia     SNF Attending Provider: Juan Hughes     Anticipated discharge date from SNF:        PCP : Tamra Yee MD    Nurse Navigator:     [facility] team rounds completed, updates provided by facility. Full Code. 150lbs  PT/OT: progressing well. Will do home visit. Dispo: return to home alone. High Risk            21       Total Score        3 Has Seen PCP in Last 6 Months (Yes=3, No=0)    18 Charlson Comorbidity Score (Age + Comorbid Conditions)        Criteria that do not apply:    . Living with Significant Other. Assisted Living. LTAC. SNF. or   Rehab    Patient Length of Stay (>5 days = 3)    IP Visits Last 12 Months (1-3=4, 4=9, >4=11)    Pt.  Coverage (Medicare=5 , Medicaid, or Self-Pay=4)      Active Ambulatory Problems     Diagnosis Date Noted    Anemia 07/07/2015    CAD (coronary artery disease) 07/07/2015    Thrombocytopenia ([address] 07/07/2015    NSTEMI (non-ST elevated myocardial infarction) ([address] 06/30/2016    UTI (urinary tract infection) 06/21/2017    Sepsis ([address] 06/21/2017    PNA (pneumonia) 06/21/2017    Hypoxia 06/21/2017    Cirrhosis of liver not due to alcohol ([address] 06/29/2017    Esophageal varices ([address] 06/29/2017    CHF (congestive heart failure) ([address] 10/18/2017    Dyspnea 10/18/2017    Pneumonia 01/25/2018    HTN (hypertension), benign 01/28/2018    CAP (community acquired pneumonia) 03/24/2019    Acute on chronic diastolic CHF (congestive heart failure) ([email or web address] 03/24/2019    Leg DVT (deep venous thromboembolism), acute, left ([email or web address] 03/24/2019     Resolved Ambulatory Problems     Diagnosis Date Noted    Sepsis ([email or web address] 06/15/2015     Past Medical History:   Diagnosis Date    Anemia     CAD (coronary artery disease)     Cancer ([email or web address]     Cancer ([email or web address]     Cardiac catheterization 08/17/2016    Cardiac echocardiogram 07/01/2016    Diabetes ([email or web address]     Hypercholesteremia     Hypertension     Thrombocytopenia ([email or web address]

## 2019-04-15 NOTE — PROGRESS NOTES
Community Care Team Documentation for Patient in [facility]     Patient discharged from [facility] to [facility], on 3/28/2019. Hospital Discharge diagnosis:    1. Dyspnea on exertion, multiple factors including lung infection, CHF/effusion   2. Acute on chronic diastolic heart failure    3. CAP vs bronchiolitis on CT chest  4. Acute DVT of left leg   5. HTN   6. Type 2 DM     7. Hyperlipidemia  8. Chronic thrombocytopenia  9. Normocytic anemia  10. H/o of colon CA  11. Ventral hernia     SNF Attending Provider: Malou Odom     Anticipated discharge date from SNF:        PCP : Katlyn Vizcaino MD    Nurse Navigator:     [facility] team rounds completed, updates provided by facility. Full Code. ED 4/6 for anemia, transfused. CBC 4/11. PT/OT: Progressing well with therapy. Needs to be independent, dc 4/17 so no copay days. 151lbs  Dispo: return to home alone. High Risk            21       Total Score        3 Has Seen PCP in Last 6 Months (Yes=3, No=0)    18 Charlson Comorbidity Score (Age + Comorbid Conditions)        Criteria that do not apply:    . Living with Significant Other. Assisted Living. LTAC. SNF. or   Rehab    Patient Length of Stay (>5 days = 3)    IP Visits Last 12 Months (1-3=4, 4=9, >4=11)    Pt.  Coverage (Medicare=5 , Medicaid, or Self-Pay=4)      Active Ambulatory Problems     Diagnosis Date Noted    Anemia 07/07/2015    CAD (coronary artery disease) 07/07/2015    Thrombocytopenia ([address] 07/07/2015    NSTEMI (non-ST elevated myocardial infarction) ([address] 06/30/2016    UTI (urinary tract infection) 06/21/2017    Sepsis ([address] 06/21/2017    PNA (pneumonia) 06/21/2017    Hypoxia 06/21/2017    Cirrhosis of liver not due to alcohol ([address] 06/29/2017    Esophageal varices ([address] 06/29/2017    CHF (congestive heart failure) ([address] 10/18/2017    Dyspnea 10/18/2017    Pneumonia 01/25/2018    HTN (hypertension), benign 01/28/2018    CAP (community acquired pneumonia) 03/24/2019    Acute on chronic diastolic CHF (congestive heart failure) ([facility] 03/24/2019    Leg DVT (deep venous thromboembolism), acute, left ([facility] 03/24/2019     Resolved Ambulatory Problems     Diagnosis Date Noted    Sepsis ([facility] 06/15/2015     Past Medical History:   Diagnosis Date    Anemia     CAD (coronary artery disease)     Cancer ([facility]     Cancer ([facility]     Cardiac catheterization 08/17/2016    Cardiac echocardiogram 07/01/2016    Diabetes ([facility]     Hypercholesteremia     Hypertension     Thrombocytopenia ([facility]

## 2019-04-17 ENCOUNTER — PATIENT OUTREACH (OUTPATIENT)
Dept: CASE MANAGEMENT | Age: 79
End: 2019-04-17

## 2019-04-17 NOTE — PROGRESS NOTES
Community Care Team Documentation for Patient in [facility] Patient discharged from [facility] to [facility], on 3/28/2019. Hospital Discharge diagnosis: 1. Dyspnea on exertion, multiple factors including lung infection, CHF/effusion 2. Acute on chronic diastolic heart failure 3. CAP vs bronchiolitis on CT chest 
4. Acute DVT of left leg 5. HTN  
6. Type 2 DM 7. Hyperlipidemia 8. Chronic thrombocytopenia 9. Normocytic anemia 10. H/o of colon CA 11. Ventral hernia SNF Attending Provider: Tyra Thomas Anticipated discharge date from SNF:   
 
 
PCP : Kevin Briones MD 
 
Nurse Navigator:  
 
[facility] team rounds completed, updates provided by facility. Full Code. Discharge to home 4/17/2019. Declined HHC. Dispo: return to home alone. High Risk   
      
 21 Total Score 3 Has Seen PCP in Last 6 Months (Yes=3, No=0) 18 Charlson Comorbidity Score (Age + Comorbid Conditions) Criteria that do not apply:  
 . Living with Significant Other. Assisted Living. LTAC. SNF. or  
Rehab Patient Length of Stay (>5 days = 3) IP Visits Last 12 Months (1-3=4, 4=9, >4=11) Pt. Coverage (Medicare=5 , Medicaid, or Self-Pay=4) Active Ambulatory Problems Diagnosis Date Noted  Anemia 07/07/2015  CAD (coronary artery disease) 07/07/2015  Thrombocytopenia ([address] 07/07/2015  
 NSTEMI (non-ST elevated myocardial infarction) ([address] 06/30/2016  UTI (urinary tract infection) 06/21/2017  Sepsis ([address] 06/21/2017  PNA (pneumonia) 06/21/2017  Hypoxia 06/21/2017  Cirrhosis of liver not due to alcohol ([address] 06/29/2017  Esophageal varices ([address] 06/29/2017  CHF (congestive heart failure) ([address] 10/18/2017  Dyspnea 10/18/2017  Pneumonia 01/25/2018  
 HTN (hypertension), benign 01/28/2018  CAP (community acquired pneumonia) 03/24/2019  Acute on chronic diastolic CHF (congestive heart failure) ([address] 03/24/2019  Leg DVT (deep venous thromboembolism), [facility] 03/24/2019 Resolved Ambulatory Problems Diagnosis Date Noted  Sepsis ([facility] 06/15/2015 Past Medical History:  
Diagnosis Date  Anemia  CAD (coronary artery disease)  Cancer ([facility]  Cancer ([facility]  Cardiac catheterization 08/17/2016  Cardiac echocardiogram 07/01/2016  Diabetes ([facility]  Hypercholesteremia  Hypertension  Thrombocytopenia ([facility]

## 2019-04-18 ENCOUNTER — PATIENT OUTREACH (OUTPATIENT)
Dept: CARDIOLOGY CLINIC | Age: 79
End: 2019-04-18

## 2019-04-22 ENCOUNTER — PATIENT OUTREACH (OUTPATIENT)
Dept: CARDIOLOGY CLINIC | Age: 79
End: 2019-04-22

## 2019-04-22 ENCOUNTER — HOSPITAL ENCOUNTER (OUTPATIENT)
Dept: LAB | Age: 79
Discharge: HOME | DRG: 811 | End: 2019-04-22
Payer: MEDICARE

## 2019-04-22 DIAGNOSIS — D53.8 ANEMIA DUE TO VITAMIN A DEFICIENCY: ICD-10-CM

## 2019-04-22 DIAGNOSIS — E50.8 ANEMIA DUE TO VITAMIN A DEFICIENCY: ICD-10-CM

## 2019-04-22 DIAGNOSIS — D53.9 MACROCYTIC ANEMIA: ICD-10-CM

## 2019-04-22 LAB
ERYTHROCYTE [DISTWIDTH] IN BLOOD BY AUTOMATED COUNT: 14.9 % (ref 11.6–14.5)
HCT VFR BLD AUTO: 21.9 % (ref 35–45)
HGB BLD-MCNC: 7.4 G/DL (ref 12–16)
MCH RBC QN AUTO: 29.8 PG (ref 24–34)
MCHC RBC AUTO-ENTMCNC: 33.8 G/DL (ref 31–37)
MCV RBC AUTO: 88.3 FL (ref 74–97)
PLATELET # BLD AUTO: 61 K/UL (ref 135–420)
PMV BLD AUTO: 10.4 FL (ref 9.2–11.8)
RBC # BLD AUTO: 2.48 M/UL (ref 4.2–5.3)
WBC # BLD AUTO: 3.2 K/UL (ref 4.6–13.2)

## 2019-04-22 PROCEDURE — 36415 COLL VENOUS BLD VENIPUNCTURE: CPT

## 2019-04-22 PROCEDURE — 85027 COMPLETE CBC AUTOMATED: CPT

## 2019-04-24 ENCOUNTER — HOSPITAL ENCOUNTER (INPATIENT)
Age: 79
LOS: 6 days | Discharge: NURSING, REHAB OR LONG-TERM CARE | DRG: 811 | End: 2019-05-01
Attending: EMERGENCY MEDICINE | Admitting: INTERNAL MEDICINE
Payer: MEDICARE

## 2019-04-24 DIAGNOSIS — M54.31 SCIATICA OF RIGHT SIDE: ICD-10-CM

## 2019-04-24 DIAGNOSIS — J81.0 ACUTE PULMONARY EDEMA (HCC): Primary | ICD-10-CM

## 2019-04-24 DIAGNOSIS — R53.83 MALAISE AND FATIGUE: ICD-10-CM

## 2019-04-24 DIAGNOSIS — R06.09 DYSPNEA ON EXERTION: ICD-10-CM

## 2019-04-24 DIAGNOSIS — R53.81 MALAISE AND FATIGUE: ICD-10-CM

## 2019-04-24 DIAGNOSIS — N17.9 AKI (ACUTE KIDNEY INJURY) (HCC): ICD-10-CM

## 2019-04-24 DIAGNOSIS — M51.16 LUMBAR DISC DISEASE WITH RADICULOPATHY: ICD-10-CM

## 2019-04-24 DIAGNOSIS — D64.9 SYMPTOMATIC ANEMIA: ICD-10-CM

## 2019-04-24 PROCEDURE — 85025 COMPLETE CBC W/AUTO DIFF WBC: CPT

## 2019-04-24 PROCEDURE — 99285 EMERGENCY DEPT VISIT HI MDM: CPT

## 2019-04-24 PROCEDURE — 93005 ELECTROCARDIOGRAM TRACING: CPT

## 2019-04-24 PROCEDURE — 82550 ASSAY OF CK (CPK): CPT

## 2019-04-24 PROCEDURE — 5A09357 ASSISTANCE WITH RESPIRATORY VENTILATION, LESS THAN 24 CONSECUTIVE HOURS, CONTINUOUS POSITIVE AIRWAY PRESSURE: ICD-10-PCS | Performed by: INTERNAL MEDICINE

## 2019-04-24 PROCEDURE — 80053 COMPREHEN METABOLIC PANEL: CPT

## 2019-04-24 RX ORDER — FUROSEMIDE 10 MG/ML
40 INJECTION INTRAMUSCULAR; INTRAVENOUS ONCE
Status: CANCELLED | OUTPATIENT
Start: 2019-04-26 | End: 2019-04-26

## 2019-04-24 RX ORDER — ACETAMINOPHEN 325 MG/1
650 TABLET ORAL ONCE
Status: CANCELLED | OUTPATIENT
Start: 2019-04-26 | End: 2019-04-26

## 2019-04-24 RX ORDER — DIPHENHYDRAMINE HCL 25 MG
25 CAPSULE ORAL ONCE
Status: CANCELLED | OUTPATIENT
Start: 2019-04-26 | End: 2019-04-26

## 2019-04-25 ENCOUNTER — APPOINTMENT (OUTPATIENT)
Dept: GENERAL RADIOLOGY | Age: 79
DRG: 811 | End: 2019-04-25
Attending: EMERGENCY MEDICINE
Payer: MEDICARE

## 2019-04-25 ENCOUNTER — APPOINTMENT (OUTPATIENT)
Dept: INFUSION THERAPY | Age: 79
End: 2019-04-25

## 2019-04-25 ENCOUNTER — APPOINTMENT (OUTPATIENT)
Dept: NUCLEAR MEDICINE | Age: 79
DRG: 811 | End: 2019-04-25
Attending: EMERGENCY MEDICINE
Payer: MEDICARE

## 2019-04-25 ENCOUNTER — APPOINTMENT (OUTPATIENT)
Dept: CT IMAGING | Age: 79
DRG: 811 | End: 2019-04-25
Attending: EMERGENCY MEDICINE
Payer: MEDICARE

## 2019-04-25 PROBLEM — D61.818 PANCYTOPENIA (HCC): Status: ACTIVE | Noted: 2019-04-25

## 2019-04-25 PROBLEM — Z79.01 CHRONIC ANTICOAGULATION: Status: ACTIVE | Noted: 2019-04-25

## 2019-04-25 PROBLEM — K76.6 PORTAL HYPERTENSION (HCC): Status: ACTIVE | Noted: 2019-04-25

## 2019-04-25 PROBLEM — R53.81 MALAISE AND FATIGUE: Status: ACTIVE | Noted: 2019-04-25

## 2019-04-25 PROBLEM — R53.83 MALAISE AND FATIGUE: Status: ACTIVE | Noted: 2019-04-25

## 2019-04-25 PROBLEM — R00.2 PALPITATIONS: Status: ACTIVE | Noted: 2019-04-25

## 2019-04-25 PROBLEM — J81.0 ACUTE PULMONARY EDEMA (HCC): Status: ACTIVE | Noted: 2019-04-25

## 2019-04-25 LAB
ALBUMIN SERPL-MCNC: 3.6 G/DL (ref 3.4–5)
ALBUMIN/GLOB SERPL: 1 {RATIO} (ref 0.8–1.7)
ALP SERPL-CCNC: 94 U/L (ref 45–117)
ALT SERPL-CCNC: 32 U/L (ref 13–56)
ANION GAP SERPL CALC-SCNC: 8 MMOL/L (ref 3–18)
APTT PPP: 32 SEC (ref 23–36.4)
ARTERIAL PATENCY WRIST A: YES
AST SERPL-CCNC: 24 U/L (ref 15–37)
ATRIAL RATE: 81 BPM
BASE DEFICIT BLD-SCNC: 8 MMOL/L
BASOPHILS # BLD: 0 K/UL (ref 0–0.1)
BASOPHILS # BLD: 0 K/UL (ref 0–0.1)
BASOPHILS NFR BLD: 0 % (ref 0–2)
BASOPHILS NFR BLD: 0 % (ref 0–2)
BDY SITE: ABNORMAL
BILIRUB SERPL-MCNC: 1 MG/DL (ref 0.2–1)
BNP SERPL-MCNC: 1569 PG/ML (ref 0–1800)
BUN SERPL-MCNC: 58 MG/DL (ref 7–18)
BUN/CREAT SERPL: 32 (ref 12–20)
CALCIUM SERPL-MCNC: 9 MG/DL (ref 8.5–10.1)
CALCULATED P AXIS, ECG09: 36 DEGREES
CALCULATED R AXIS, ECG10: -4 DEGREES
CALCULATED T AXIS, ECG11: 73 DEGREES
CHLORIDE SERPL-SCNC: 112 MMOL/L (ref 100–108)
CK MB CFR SERPL CALC: 2.3 % (ref 0–4)
CK MB CFR SERPL CALC: NORMAL % (ref 0–4)
CK MB SERPL-MCNC: 1.2 NG/ML (ref 5–25)
CK MB SERPL-MCNC: <1 NG/ML (ref 5–25)
CK SERPL-CCNC: 38 U/L (ref 26–192)
CK SERPL-CCNC: 53 U/L (ref 26–192)
CO2 SERPL-SCNC: 17 MMOL/L (ref 21–32)
CREAT SERPL-MCNC: 1.8 MG/DL (ref 0.6–1.3)
DIAGNOSIS, 93000: NORMAL
DIFFERENTIAL METHOD BLD: ABNORMAL
DIFFERENTIAL METHOD BLD: ABNORMAL
EOSINOPHIL # BLD: 0.1 K/UL (ref 0–0.4)
EOSINOPHIL # BLD: 0.1 K/UL (ref 0–0.4)
EOSINOPHIL NFR BLD: 2 % (ref 0–5)
EOSINOPHIL NFR BLD: 2 % (ref 0–5)
ERYTHROCYTE [DISTWIDTH] IN BLOOD BY AUTOMATED COUNT: 14.7 % (ref 11.6–14.5)
ERYTHROCYTE [DISTWIDTH] IN BLOOD BY AUTOMATED COUNT: 14.8 % (ref 11.6–14.5)
FOLATE SERPL-MCNC: >20 NG/ML (ref 3.1–17.5)
GAS FLOW.O2 O2 DELIVERY SYS: ABNORMAL L/MIN
GLOBULIN SER CALC-MCNC: 3.5 G/DL (ref 2–4)
GLUCOSE SERPL-MCNC: 269 MG/DL (ref 74–99)
HCO3 BLD-SCNC: 16.1 MMOL/L (ref 22–26)
HCT VFR BLD AUTO: 21.7 % (ref 35–45)
HCT VFR BLD AUTO: 23 % (ref 35–45)
HGB BLD-MCNC: 7.4 G/DL (ref 12–16)
HGB BLD-MCNC: 7.7 G/DL (ref 12–16)
INR PPP: 1.2 (ref 0.8–1.2)
IRON SATN MFR SERPL: 13 %
IRON SERPL-MCNC: 35 UG/DL (ref 50–175)
LYMPHOCYTES # BLD: 0.6 K/UL (ref 0.9–3.6)
LYMPHOCYTES # BLD: 0.8 K/UL (ref 0.9–3.6)
LYMPHOCYTES NFR BLD: 22 % (ref 21–52)
LYMPHOCYTES NFR BLD: 28 % (ref 21–52)
MAGNESIUM SERPL-MCNC: 2.7 MG/DL (ref 1.6–2.6)
MCH RBC QN AUTO: 29.1 PG (ref 24–34)
MCH RBC QN AUTO: 29.6 PG (ref 24–34)
MCHC RBC AUTO-ENTMCNC: 33.5 G/DL (ref 31–37)
MCHC RBC AUTO-ENTMCNC: 34.1 G/DL (ref 31–37)
MCV RBC AUTO: 86.8 FL (ref 74–97)
MCV RBC AUTO: 86.8 FL (ref 74–97)
MONOCYTES # BLD: 0 K/UL (ref 0.05–1.2)
MONOCYTES # BLD: 0.2 K/UL (ref 0.05–1.2)
MONOCYTES NFR BLD: 1 % (ref 3–10)
MONOCYTES NFR BLD: 7 % (ref 3–10)
NEUTS SEG # BLD: 1.9 K/UL (ref 1.8–8)
NEUTS SEG # BLD: 2 K/UL (ref 1.8–8)
NEUTS SEG NFR BLD: 69 % (ref 40–73)
NEUTS SEG NFR BLD: 69 % (ref 40–73)
O2/TOTAL GAS SETTING VFR VENT: 50 %
P-R INTERVAL, ECG05: 154 MS
PCO2 BLD: 26.6 MMHG (ref 35–45)
PEEP RESPIRATORY: 5 CMH2O
PH BLD: 7.39 [PH] (ref 7.35–7.45)
PIP ISTAT,IPIP: 12
PLATELET # BLD AUTO: 52 K/UL (ref 135–420)
PLATELET # BLD AUTO: 59 K/UL (ref 135–420)
PMV BLD AUTO: 9.1 FL (ref 9.2–11.8)
PMV BLD AUTO: 9.6 FL (ref 9.2–11.8)
PO2 BLD: 144 MMHG (ref 80–100)
POTASSIUM SERPL-SCNC: 4.5 MMOL/L (ref 3.5–5.5)
PROT SERPL-MCNC: 7.1 G/DL (ref 6.4–8.2)
PROTHROMBIN TIME: 15.4 SEC (ref 11.5–15.2)
Q-T INTERVAL, ECG07: 386 MS
QRS DURATION, ECG06: 114 MS
QTC CALCULATION (BEZET), ECG08: 448 MS
RBC # BLD AUTO: 2.5 M/UL (ref 4.2–5.3)
RBC # BLD AUTO: 2.65 M/UL (ref 4.2–5.3)
SAO2 % BLD: 99 % (ref 92–97)
SERVICE CMNT-IMP: 50 L/MIN
SERVICE CMNT-IMP: ABNORMAL
SODIUM SERPL-SCNC: 137 MMOL/L (ref 136–145)
SPECIMEN TYPE: ABNORMAL
SPONTANEOUS TIMED, IST: YES
TIBC SERPL-MCNC: 276 UG/DL (ref 250–450)
TOTAL RESP. RATE, ITRR: 18
TROPONIN I SERPL-MCNC: <0.02 NG/ML (ref 0–0.04)
TROPONIN I SERPL-MCNC: <0.02 NG/ML (ref 0–0.04)
VENTRICULAR RATE, ECG03: 81 BPM
VIT B12 SERPL-MCNC: 1304 PG/ML (ref 211–911)
WBC # BLD AUTO: 2.7 K/UL (ref 4.6–13.2)
WBC # BLD AUTO: 3 K/UL (ref 4.6–13.2)

## 2019-04-25 PROCEDURE — 86900 BLOOD TYPING SEROLOGIC ABO: CPT

## 2019-04-25 PROCEDURE — 71045 X-RAY EXAM CHEST 1 VIEW: CPT

## 2019-04-25 PROCEDURE — 74011250636 HC RX REV CODE- 250/636: Performed by: INTERNAL MEDICINE

## 2019-04-25 PROCEDURE — 36600 WITHDRAWAL OF ARTERIAL BLOOD: CPT

## 2019-04-25 PROCEDURE — 83540 ASSAY OF IRON: CPT

## 2019-04-25 PROCEDURE — 74011000250 HC RX REV CODE- 250: Performed by: EMERGENCY MEDICINE

## 2019-04-25 PROCEDURE — 86923 COMPATIBILITY TEST ELECTRIC: CPT

## 2019-04-25 PROCEDURE — 85610 PROTHROMBIN TIME: CPT

## 2019-04-25 PROCEDURE — 83880 ASSAY OF NATRIURETIC PEPTIDE: CPT

## 2019-04-25 PROCEDURE — 85025 COMPLETE CBC W/AUTO DIFF WBC: CPT

## 2019-04-25 PROCEDURE — 71046 X-RAY EXAM CHEST 2 VIEWS: CPT

## 2019-04-25 PROCEDURE — 74011250636 HC RX REV CODE- 250/636: Performed by: EMERGENCY MEDICINE

## 2019-04-25 PROCEDURE — 74011250637 HC RX REV CODE- 250/637: Performed by: INTERNAL MEDICINE

## 2019-04-25 PROCEDURE — 83735 ASSAY OF MAGNESIUM: CPT

## 2019-04-25 PROCEDURE — A9540 TC99M MAA: HCPCS

## 2019-04-25 PROCEDURE — 65660000004 HC RM CVT STEPDOWN

## 2019-04-25 PROCEDURE — 82550 ASSAY OF CK (CPK): CPT

## 2019-04-25 PROCEDURE — 82607 VITAMIN B-12: CPT

## 2019-04-25 PROCEDURE — 82803 BLOOD GASES ANY COMBINATION: CPT

## 2019-04-25 PROCEDURE — 85730 THROMBOPLASTIN TIME PARTIAL: CPT

## 2019-04-25 PROCEDURE — 84466 ASSAY OF TRANSFERRIN: CPT

## 2019-04-25 RX ORDER — CARVEDILOL 12.5 MG/1
12.5 TABLET ORAL 2 TIMES DAILY WITH MEALS
Status: DISCONTINUED | OUTPATIENT
Start: 2019-04-26 | End: 2019-05-01 | Stop reason: HOSPADM

## 2019-04-25 RX ORDER — DIPHENHYDRAMINE HCL 25 MG
25 CAPSULE ORAL ONCE
Status: COMPLETED | OUTPATIENT
Start: 2019-04-26 | End: 2019-04-26

## 2019-04-25 RX ORDER — MAGNESIUM SULFATE 100 %
4 CRYSTALS MISCELLANEOUS AS NEEDED
Status: DISCONTINUED | OUTPATIENT
Start: 2019-04-25 | End: 2019-05-01 | Stop reason: HOSPADM

## 2019-04-25 RX ORDER — INSULIN LISPRO 100 [IU]/ML
INJECTION, SOLUTION INTRAVENOUS; SUBCUTANEOUS
Status: DISCONTINUED | OUTPATIENT
Start: 2019-04-25 | End: 2019-05-01 | Stop reason: HOSPADM

## 2019-04-25 RX ORDER — ONDANSETRON 2 MG/ML
4 INJECTION INTRAMUSCULAR; INTRAVENOUS
Status: DISCONTINUED | OUTPATIENT
Start: 2019-04-25 | End: 2019-05-01 | Stop reason: HOSPADM

## 2019-04-25 RX ORDER — FUROSEMIDE 10 MG/ML
20 INJECTION INTRAMUSCULAR; INTRAVENOUS ONCE
Status: COMPLETED | OUTPATIENT
Start: 2019-04-26 | End: 2019-04-26

## 2019-04-25 RX ORDER — OXYCODONE AND ACETAMINOPHEN 5; 325 MG/1; MG/1
1 TABLET ORAL
Status: DISCONTINUED | OUTPATIENT
Start: 2019-04-25 | End: 2019-05-01 | Stop reason: HOSPADM

## 2019-04-25 RX ORDER — FUROSEMIDE 10 MG/ML
40 INJECTION INTRAMUSCULAR; INTRAVENOUS
Status: COMPLETED | OUTPATIENT
Start: 2019-04-25 | End: 2019-04-25

## 2019-04-25 RX ORDER — ACETAMINOPHEN 325 MG/1
650 TABLET ORAL ONCE
Status: COMPLETED | OUTPATIENT
Start: 2019-04-26 | End: 2019-04-26

## 2019-04-25 RX ORDER — ACETAMINOPHEN 325 MG/1
650 TABLET ORAL
Status: DISCONTINUED | OUTPATIENT
Start: 2019-04-25 | End: 2019-05-01 | Stop reason: HOSPADM

## 2019-04-25 RX ORDER — FUROSEMIDE 10 MG/ML
20 INJECTION INTRAMUSCULAR; INTRAVENOUS EVERY 12 HOURS
Status: DISCONTINUED | OUTPATIENT
Start: 2019-04-25 | End: 2019-04-26

## 2019-04-25 RX ORDER — DOCUSATE SODIUM 100 MG/1
100 CAPSULE, LIQUID FILLED ORAL
Status: DISCONTINUED | OUTPATIENT
Start: 2019-04-25 | End: 2019-05-01 | Stop reason: HOSPADM

## 2019-04-25 RX ORDER — FUROSEMIDE 10 MG/ML
40 INJECTION INTRAMUSCULAR; INTRAVENOUS ONCE
Status: DISCONTINUED | OUTPATIENT
Start: 2019-04-26 | End: 2019-04-25

## 2019-04-25 RX ORDER — AMLODIPINE BESYLATE 10 MG/1
10 TABLET ORAL DAILY
Status: DISCONTINUED | OUTPATIENT
Start: 2019-04-25 | End: 2019-05-01 | Stop reason: HOSPADM

## 2019-04-25 RX ORDER — IPRATROPIUM BROMIDE AND ALBUTEROL SULFATE 2.5; .5 MG/3ML; MG/3ML
3 SOLUTION RESPIRATORY (INHALATION)
Status: COMPLETED | OUTPATIENT
Start: 2019-04-25 | End: 2019-04-25

## 2019-04-25 RX ORDER — HYDRALAZINE HYDROCHLORIDE 25 MG/1
25 TABLET, FILM COATED ORAL 3 TIMES DAILY
Status: DISCONTINUED | OUTPATIENT
Start: 2019-04-25 | End: 2019-05-01 | Stop reason: HOSPADM

## 2019-04-25 RX ORDER — DEXTROSE 50 % IN WATER (D50W) INTRAVENOUS SYRINGE
25-50 AS NEEDED
Status: DISCONTINUED | OUTPATIENT
Start: 2019-04-25 | End: 2019-05-01 | Stop reason: HOSPADM

## 2019-04-25 RX ORDER — NALOXONE HYDROCHLORIDE 0.4 MG/ML
0.4 INJECTION, SOLUTION INTRAMUSCULAR; INTRAVENOUS; SUBCUTANEOUS AS NEEDED
Status: DISCONTINUED | OUTPATIENT
Start: 2019-04-25 | End: 2019-05-01 | Stop reason: HOSPADM

## 2019-04-25 RX ORDER — IPRATROPIUM BROMIDE AND ALBUTEROL SULFATE 2.5; .5 MG/3ML; MG/3ML
3 SOLUTION RESPIRATORY (INHALATION)
Status: ACTIVE | OUTPATIENT
Start: 2019-04-25 | End: 2019-04-25

## 2019-04-25 RX ORDER — TRAMADOL HYDROCHLORIDE 50 MG/1
50 TABLET ORAL DAILY
Status: DISCONTINUED | OUTPATIENT
Start: 2019-04-26 | End: 2019-05-01 | Stop reason: HOSPADM

## 2019-04-25 RX ORDER — SIMVASTATIN 20 MG/1
20 TABLET, FILM COATED ORAL DAILY
Status: DISCONTINUED | OUTPATIENT
Start: 2019-04-26 | End: 2019-05-01 | Stop reason: HOSPADM

## 2019-04-25 RX ADMIN — FUROSEMIDE 40 MG: 10 INJECTION, SOLUTION INTRAMUSCULAR; INTRAVENOUS at 13:22

## 2019-04-25 RX ADMIN — AMLODIPINE BESYLATE 10 MG: 10 TABLET ORAL at 21:34

## 2019-04-25 RX ADMIN — FUROSEMIDE 20 MG: 10 INJECTION, SOLUTION INTRAMUSCULAR; INTRAVENOUS at 21:34

## 2019-04-25 RX ADMIN — HYDRALAZINE HYDROCHLORIDE 25 MG: 25 TABLET ORAL at 21:34

## 2019-04-25 RX ADMIN — IPRATROPIUM BROMIDE AND ALBUTEROL SULFATE 3 ML: .5; 3 SOLUTION RESPIRATORY (INHALATION) at 12:30

## 2019-04-25 NOTE — H&P
History & Physical 
Patient: Herman Valdovinos MRN: [id number]  CSN: [id number] YOB: 1940  Age: 77 y.o. Sex: female DOA: 4/24/2019 Chief Complaint:  
Chief Complaint Patient presents with  Fatigue HPI:  
 
Herman Valdovinos is a 77 y.o.  female who has complicated PMH including breast and colon cancer s/p chemo and radiation. Hx of Liver Cirrhosis, portal HTN and esophageal Varices 2 ry to BLOOM, severe anemia 2 ry to liver Cirrhosis, Mitral stenosis, HTN and LE edema  and recent Dx of Left Leg DVT in 03/2019 on Eliquis. Pt presented to ER as a referral from her PCP office for low blood count, symptomatic anemia and generalized weakness for possible blood transfusion. Pt was also c/o progressively worsening SOB and in ER, she was placed on NC 2 L for hypoxia and tachypnea Pt was found to have Hb of 7.4 , guaiac -ve and her CXR showed Acute P. Edema. Pt was started on IV Lasix and will be admitted for further work up and Tx States that she is starting to feel better and much more comfortable after diuresis. Has elevated BUN and worsening Creatinine. Has Pancytopenia which is chronic but worsening. Denies GI bleed episodes, abdominal pain and CP Past Medical History:  
Diagnosis Date  Anemia  CAD (coronary artery disease) Coronary Stenting needed, hematology workup needed prior to stent placement, treating medically at this time  Cancer [facility])   
 colon  Cancer ([facility] breast - left  Cardiac catheterization 08/17/2016 LM patent. mLAD 99.9% (2.25 x 28-mm Xience BETHEL, resid 0%). oD1 60%. oD2 95%. pCX 90% (2.5 x 13-mm Xience BETHEL, resid 0%). OMB patent.  Cardiac echocardiogram 07/01/2016 EF 50%. Basal inferior, basal-mid inferolateral hypk. Gr 1 DDfx. RVSP 50-60 mmHg. Mild LAE. Mod, eccentric MR.    
 Diabetes ([facility]  Hypercholesteremia  Hypertension  Thrombocytopenia ([facility] Past Surgical History: Procedure Laterality Date  CARDIAC CATHETERIZATION  7/16/2015  
  no stents at this time r/t anemia & thrombocytopenia (hematology work-up needed prior to stent placement)  CARDIAC CATHETERIZATION  7/11/2016  CARDIAC CATHETERIZATION  8/17/2016  CORONARY ARTERY ANGIOGRAM  7/16/2015  CORONARY ARTERY ANGIOGRAM  7/11/2016  CORONARY ARTERY ANGIOGRAM  8/17/2016  CORONARY STENT EA ADDL VESSEL  8/17/2016  CORONARY STENT SINGLE W/PTCA  8/17/2016  HX GI    
 colon surg secondary to cancer - removed lesion  HX HEENT  2/2013  
 cataract bilaterally  HX MASTECTOMY  march 2011  
 left  HX VASCULAR ACCESS    
 mediport - now removed  LV ANGIOGRAPHY  7/16/2015  LV ANGIOGRAPHY  7/11/2016 Family History Problem Relation Age of Onset  Cancer Maternal Aunt Social History Socioeconomic History  Marital status:  Spouse name: Not on file  Number of children: Not on file  Years of education: Not on file  Highest education level: Not on file Tobacco Use  Smoking status: Never Smoker  Smokeless tobacco: Never Used Substance and Sexual Activity  Alcohol use: No  
 Drug use: No  
 
 
Prior to Admission medications Medication Sig Start Date End Date Taking? Authorizing Provider  
apixaban (ELIQUIS) 2.5 mg tablet Take 1 Tab by mouth every twelve (12) hours. 3/29/19  Yes Don Araiza NP  
acetaminophen (TYLENOL) 325 mg tablet Take 2 Tabs by mouth every four (4) hours as needed for Pain. 3/28/19  Yes Suyapa PANDEY NP  
amLODIPine (NORVASC) 10 mg tablet Take 10 mg by mouth daily. Yes Provider, Historical  
hydrALAZINE (APRESOLINE) 25 mg tablet Take 1 Tab by mouth three (3) times daily. 3/28/19   Don Araiza NP  
insulin glargine (LANTUS) 100 unit/mL injection 5 Units by SubCUTAneous route daily.  3/28/19   Don Araiza NP  
 insulin lispro (HUMALOG) 100 unit/mL injection INITIATE INSULIN CORRECTIVE PROTOCOL: Very Insulin Resistant For Blood Sugar (mg/dL) of:             
Less than 150 =   0 units 150 -199 =   3 units 200 -249 =   6 units 250 -299 =   9 units 300 -349 =   12 units 350 and above =   15 units and Call Physician Initiate Hypoglycemic protocol if 3/28/19   Jasmine PANDEY NP  
lactobacillus sp. 50 billion cpu (BIO-K PLUS) 50 billion cell -375 mg cap capsule Take 1 Cap by mouth daily. 3/29/19   Jasmine PANDEY NP  
glucose 4 gram chewable tablet Take 4 Tabs by mouth as needed for Other (hypoglycemia). 3/28/19   Angela Gleason NP  
furosemide (LASIX) 20 mg tablet Take 20 mg by mouth two (2) times a day. Indications: visible water retention    Provider, Historical  
SITagliptin (JANUVIA) 50 mg tablet Take 50 mg by mouth daily. Provider, Historical  
cholecalciferol, vitamin D3, (VITAMIN D3) 2,000 unit tab Take 2,000 Units by mouth daily. Provider, Historical  
carvedilol (COREG) 12.5 mg tablet Take 12.5 mg by mouth two (2) times daily (with meals). Provider, Historical  
traMADol (ULTRAM) 50 mg tablet Take 1 Tab by mouth daily. Max Daily Amount: 50 mg. 3/8/18   Jose Zuñiga MD  
nitroglycerin (NITROLINGUAL) 400 mcg/spray spray 1 Spray by SubLINGual route every five (5) minutes as needed. 8/17/16   Allie Bledsoe PA  
ascorbic acid, vitamin C, (VITAMIN C) 250 mg tablet Take 1 Tab by mouth daily. 7/5/16   Michelene Landau, MD  
ferrous sulfate 325 mg (65 mg iron) tablet Take 1 Tab by mouth two (2) times daily (with meals). 7/5/16   Michelene Landau, MD  
multivitamin (ONE A DAY) tablet Take 1 Tab by mouth daily. Provider, Historical  
simvastatin (ZOCOR) 20 mg tablet Take 20 mg by mouth daily. Provider, Historical  
omega 3-dha-epa-fish oil (FISH OIL) 100-160-1,000 mg cap Take 1,000 mg by mouth two (2) times a day.     Provider, Historical  
 cyanocobalamin (VITAMIN B12) 100 mcg tablet Take 100 mcg by mouth daily. Provider, Historical  
calcium polycarbophil (FIBER LAXATIVE) 625 mg tablet Take 1,250 mg by mouth daily as needed. Provider, Historical  
 
 
Allergies Allergen Reactions  Latex Itching Skin breaks out causing itching Review of Systems GENERAL: Patient alert, awake and oriented times 3, was unable to communicate full sentences and in distress. HEENT: No change in vision, no earache, tinnitus, sore throat or sinus congestion. NECK: No pain or stiffness. PULMONARY: +ve shortness of breath, cough or wheeze. Cardiovascular: + pnd / orthopnea, no CP GASTROINTESTINAL: No abdominal pain, nausea, vomiting or diarrhea, melena or bright red blood per rectum. GENITOURINARY: No urinary frequency, urgency, hesitancy or dysuria. MUSCULOSKELETAL: No joint or muscle pain, no back pain, no recent trauma. DERMATOLOGIC: No rash, no itching, no lesions. ENDOCRINE: No polyuria, polydipsia, no heat or cold intolerance. No recent change in weight. HEMATOLOGICAL: No anemia or easy bruising or bleeding. NEUROLOGIC: No headache, seizures, numbness, tingling + weakness. Physical Exam:  
 
Physical Exam: 
Visit Vitals /61 Pulse 85 Temp 98.4 °F (36.9 °C) Resp 18 Ht 5' (1.524 m) Wt 67.6 kg (149 lb 0.5 oz) SpO2 98% Breastfeeding? No  
BMI 29.11 kg/m² O2 Flow Rate (L/min): 3 l/min O2 Device: Nasal cannula Temp (24hrs), Av.5 °F (36.9 °C), Min:98.2 °F (36.8 °C), Max:98.9 °F (37.2 °C) No intake/output data recorded.  1901 -  0700 In: -  
Out: 1500 [Urine:1500] General:  Alert, cooperative, in distress, appears stated age. Head: Normocephalic, without obvious abnormality, atraumatic. Eyes:  Conjunctivae/corneas clear. PERRL, EOMs intact. Nose: Nares normal. No drainage or sinus tenderness.   
Neck: Supple, symmetrical, trachea midline, no adenopathy, thyroid: no enlargement, no carotid bruit and no JVD. Lungs:   Decrease BS with +ve rales Heart:  Regular rate and rhythm, S1, S2 normal.  
  Abdomen: Soft, non-tender. Bowel sounds normal.   
Extremities: Extremities normal, atraumatic, no cyanosis +2 LE edema. Pulses: 2+ and symmetric all extremities. Skin:  No rashes or lesions Neurologic: AAOx3, No focal motor or sensory deficit. Labs Reviewed: 
 
Lab results reviewed. For significant abnormal values and values requiring intervention, see assessment and plan. CXR and EKG Procedures/imaging: see electronic medical records for all procedures/Xrays and details which were not copied into this note but were reviewed prior to creation of Plan Assessment/Plan Principal Problem: 
  Acute pulmonary edema ([address] (4/25/2019) Active Problems: 
  Esophageal varices ([address] (6/29/2017) Leg DVT (deep venous thromboembolism), acute, left ([address] (3/24/2019) Palpitations (4/25/2019) Malaise and fatigue (4/25/2019) Pancytopenia ([address] (4/25/2019) Portal hypertension ([address] (4/25/2019) Chronic anticoagulation (4/25/2019) Pt is being admitted for symptomatic anemia and acute pulmonary edema Pancytopenia 2 ry to chemotherapy and prolonged BM inhibition Iron def anemia Pulmonary edema etiology possibly 2 ry to DHF Vs uncontrolled BP Vs aortic and Mitral stenosis. Has EF of 55% Liver Cirrhosis, Varices and portal HTN Recent LE DVT Will continue IV diuresis 20 mg BID and monitor renal function Monitor H/H and transfuse if Hb <7 >> d/w RN Case D/w Dr Reyes Levans to consult in AM for further work up Iron profile, transferrin and B12, folate levels BP control Continue Eliquis DVT/GI Prophylaxis: Eliquis Plan of care is discussed in details with Patient/Family at bedside and agreed upon Jelani Gonzalez MD 
4/26/2019 3:55 PM

## 2019-04-25 NOTE — ED NOTES
Assumed care of pt at this time from [facility]. All essential information handed off. Pt stable at this time. Awaiting initial eval from provider. Family at bedside. No new orders. Call device w/in reach. Will continue to monitor.

## 2019-04-25 NOTE — ED PROVIDER NOTES
EMERGENCY DEPARTMENT HISTORY AND PHYSICAL EXAM 
 
9:06 AM 
 
 
Date: 4/24/2019 Patient Name: Herman Valdovinos History of Presenting Illness Chief Complaint Patient presents with  Fatigue History Provided By: Patient Location/Duration/Severity/Modifying factors She is having 27-year-old female with a history of dyspnea on exertion, CHF, pneumonia, DVT of the left leg on NOAC therapy, chronic anemia, chronic thrombus cytopenia, ventral hernia, and diabetes a presents emergency department with 4 days of progressive shortness of breath. She was admitted to the hospital 1 month ago was discharged from the hospital 8 days prior to her presentation here. She went to see her primary doctor 4 days prior and was told that her blood counts were low and blood transfusion was arranged for tomorrow. Patient noted the last 2 days that she cannot get up and even get her medications without getting short of breath and having palpitations. She denies any weight gain in fact she is noticed that her urine output has been lower than usual.  If she is been taking her Lasix and is able to lay down flat in bed. She is not a smoker drinker and is now a  for less than 1 year. She lives alone but has a nephew that lives nearby and helps take care of her. Patient denies any fevers or chills but still has a mildly productive cough. PCP: Geraldine Rodriguez MD 
 
Current Facility-Administered Medications Medication Dose Route Frequency Provider Last Rate Last Dose  albuterol-ipratropium (DUO-NEB) 2.5 MG-0.5 MG/3 ML  3 mL Nebulization NOW James Ferrell MD      
 nitroglycerin (NITROBID) 2 % ointment 0.5 Inch  0.5 Inch Topical NOW James Ferrell MD      
 
Current Outpatient Medications Medication Sig Dispense Refill  apixaban (ELIQUIS) 2.5 mg tablet Take 1 Tab by mouth every twelve (12) hours.  60 Tab 0  
 hydrALAZINE (APRESOLINE) 25 mg tablet Take 1 Tab by mouth three (3) times daily. 90 Tab 0  
 insulin glargine (LANTUS) 100 unit/mL injection 5 Units by SubCUTAneous route daily. 1 Vial 0  
 insulin lispro (HUMALOG) 100 unit/mL injection INITIATE INSULIN CORRECTIVE PROTOCOL: Very Insulin Resistant For Blood Sugar (mg/dL) of:             
Less than 150 =   0 units 150 -199 =   3 units 200 -249 =   6 units 250 -299 =   9 units 300 -349 =   12 units 350 and above =   15 units and Call Physician Initiate Hypoglycemic protocol if 1 Vial 0  
 lactobacillus sp. 50 billion cpu (BIO-K PLUS) 50 billion cell -375 mg cap capsule Take 1 Cap by mouth daily. 10 Cap 0  
 glucose 4 gram chewable tablet Take 4 Tabs by mouth as needed for Other (hypoglycemia). 30 Tab 0  
 acetaminophen (TYLENOL) 325 mg tablet Take 2 Tabs by mouth every four (4) hours as needed for Pain. 30 Tab 0  
 furosemide (LASIX) 20 mg tablet Take 20 mg by mouth two (2) times a day. Indications: visible water retention  SITagliptin (JANUVIA) 50 mg tablet Take 50 mg by mouth daily.  cholecalciferol, vitamin D3, (VITAMIN D3) 2,000 unit tab Take 2,000 Units by mouth daily.  amLODIPine (NORVASC) 10 mg tablet Take 10 mg by mouth daily.  carvedilol (COREG) 12.5 mg tablet Take 12.5 mg by mouth two (2) times daily (with meals).  traMADol (ULTRAM) 50 mg tablet Take 1 Tab by mouth daily. Max Daily Amount: 50 mg. 30 Tab 0  
 nitroglycerin (NITROLINGUAL) 400 mcg/spray spray 1 Spray by SubLINGual route every five (5) minutes as needed. 1 Bottle 2  
 ascorbic acid, vitamin C, (VITAMIN C) 250 mg tablet Take 1 Tab by mouth daily. 60 Tab 1  
 ferrous sulfate 325 mg (65 mg iron) tablet Take 1 Tab by mouth two (2) times daily (with meals). 60 Tab 1  
 multivitamin (ONE A DAY) tablet Take 1 Tab by mouth daily.  simvastatin (ZOCOR) 20 mg tablet Take 20 mg by mouth daily.  omega 3-dha-epa-fish oil (FISH OIL) 100-160-1,000 mg cap Take 1,000 mg by mouth two (2) times a day.  cyanocobalamin (VITAMIN B12) 100 mcg tablet Take 100 mcg by mouth daily.  calcium polycarbophil (FIBER LAXATIVE) 625 mg tablet Take 1,250 mg by mouth daily as needed. Past History Past Medical History: 
Past Medical History:  
Diagnosis Date  Anemia  CAD (coronary artery disease) Coronary Stenting needed, hematology workup needed prior to stent placement, treating medically at this time  Cancer [facility])   
 colon  Cancer ([facility] [address] breast - left  Cardiac catheterization 08/17/2016 LM patent. mLAD 99.9% (2.25 x 28-mm Xience BETHEL, resid 0%). oD1 60%. oD2 95%. pCX 90% (2.5 x 13-mm Xience BETHEL, resid 0%). OMB patent.  Cardiac echocardiogram 07/01/2016 EF 50%. Basal inferior, basal-mid inferolateral hypk. Gr 1 DDfx. RVSP 50-60 mmHg. Mild LAE. Mod, eccentric MR.    
 Diabetes ([facility] [address]  Hypercholesteremia  Hypertension  Thrombocytopenia ([facility] [address] Past Surgical History: 
Past Surgical History:  
Procedure Laterality Date  CARDIAC CATHETERIZATION  7/16/2015  
  no stents at this time r/t anemia & thrombocytopenia (hematology work-up needed prior to stent placement)  CARDIAC CATHETERIZATION  7/11/2016  CARDIAC CATHETERIZATION  8/17/2016  CORONARY ARTERY ANGIOGRAM  7/16/2015  CORONARY ARTERY ANGIOGRAM  7/11/2016  CORONARY ARTERY ANGIOGRAM  8/17/2016  CORONARY STENT EA ADDL VESSEL  8/17/2016  CORONARY STENT SINGLE W/PTCA  8/17/2016  HX GI    
 colon surg secondary to cancer - removed lesion  HX HEENT  2/2013  
 cataract bilaterally  HX MASTECTOMY  march 2011  
 left  HX VASCULAR ACCESS    
 mediport - now removed  LV ANGIOGRAPHY  7/16/2015  LV ANGIOGRAPHY  7/11/2016 Family History: 
Family History Problem Relation Age of Onset  Cancer Maternal Aunt Social History: 
Social History Tobacco Use  Smoking status: Never Smoker  Smokeless tobacco: Never Used Substance Use Topics  Alcohol use: No  
 Drug use: No  
 
 
Allergies: Allergies Allergen Reactions  Latex Itching Skin breaks out causing itching Review of Systems Review of Systems Constitutional: Positive for activity change and fatigue. Negative for fever and unexpected weight change. HENT: Negative for congestion and rhinorrhea. Eyes: Negative for visual disturbance. Respiratory: Positive for shortness of breath. Cardiovascular: Positive for palpitations and leg swelling. Negative for chest pain. Gastrointestinal: Negative for abdominal pain, diarrhea, nausea and vomiting. Genitourinary: Negative for dysuria and hematuria. Musculoskeletal: Negative for back pain. Skin: Negative for rash. Neurological: Negative for dizziness, weakness and light-headedness. All other systems reviewed and are negative. Physical Exam  
 
Visit Vitals /49 Pulse 81 Temp 98.5 °F (36.9 °C) Resp 24 Ht 5' (1.524 m) Wt 63.5 kg (140 lb) SpO2 96% BMI 27.34 kg/m² Physical Exam  
Constitutional: She is oriented to person, place, and time. She appears well-developed and well-nourished. No distress. HENT:  
Head: Normocephalic and atraumatic. Right Ear: External ear normal.  
Left Ear: External ear normal.  
Nose: Nose normal.  
Mouth/Throat: Oropharynx is clear and moist.  
Eyes: Pupils are equal, round, and reactive to light. EOM are normal. No scleral icterus. Mild pallor Neck: Normal range of motion. Neck supple. No JVD present. No tracheal deviation present. No thyromegaly present. Cardiovascular: Normal rate, regular rhythm, normal heart sounds and intact distal pulses. Exam reveals no gallop and no friction rub. No murmur heard. Pulmonary/Chest: Effort normal. She exhibits no tenderness. Rhonchi Abdominal: Soft. Bowel sounds are normal. She exhibits no distension. There is no tenderness. There is no rebound and no guarding. Nontender ventral hernia that is easily reducible Genitourinary:  
Genitourinary Comments: Brown stool heme-negative Musculoskeletal: She exhibits edema. She exhibits no tenderness. Trace edema Lymphadenopathy:  
  She has no cervical adenopathy. Neurological: She is alert and oriented to person, place, and time. No cranial nerve deficit. Coordination normal.  
No sensory loss, Gait not observed, Motor 5/5 Skin: Skin is warm and dry. Psychiatric:  
Patient lives alone no family is currently at the bedside Nursing note and vitals reviewed. Diagnostic Study Results Labs - Recent Results (from the past 12 hour(s)) NT-PRO BNP Collection Time: 04/25/19  8:54 AM  
Result Value Ref Range NT pro-BNP 1,569 0 - 1,800 PG/ML  
MAGNESIUM Collection Time: 04/25/19  8:54 AM  
Result Value Ref Range Magnesium 2.7 (H) 1.6 - 2.6 mg/dL CARDIAC PANEL,(CK, CKMB & TROPONIN) Collection Time: 04/25/19  8:54 AM  
Result Value Ref Range CK 38 26 - 192 U/L  
 CK - MB <1.0 <3.6 ng/ml CK-MB Index  0.0 - 4.0 % CALCULATION NOT PERFORMED WHEN RESULT IS BELOW LINEAR LIMIT Troponin-I, QT <0.02 0.0 - 0.045 NG/ML  
PROTHROMBIN TIME + INR Collection Time: 04/25/19  8:54 AM  
Result Value Ref Range Prothrombin time 15.4 (H) 11.5 - 15.2 sec INR 1.2 0.8 - 1.2 PTT Collection Time: 04/25/19  8:54 AM  
Result Value Ref Range aPTT 32.0 23.0 - 36.4 SEC  
CBC WITH AUTOMATED DIFF Collection Time: 04/25/19  8:54 AM  
Result Value Ref Range WBC 2.7 (L) 4.6 - 13.2 K/uL  
 RBC 2.50 (L) 4.20 - 5.30 M/uL HGB 7.4 (L) 12.0 - 16.0 g/dL HCT 21.7 (L) 35.0 - 45.0 % MCV 86.8 74.0 - 97.0 FL  
 MCH 29.6 24.0 - 34.0 PG  
 MCHC 34.1 31.0 - 37.0 g/dL  
 RDW 14.8 (H) 11.6 - 14.5 % PLATELET 52 (L) 783 - 420 K/uL MPV 9.1 (L) 9.2 - 11.8 FL  
 NEUTROPHILS 69 40 - 73 % LYMPHOCYTES 22 21 - 52 % MONOCYTES 7 3 - 10 % EOSINOPHILS 2 0 - 5 % BASOPHILS 0 0 - 2 %  
 ABS. NEUTROPHILS 1.9 1.8 - 8.0 K/UL  
 ABS. LYMPHOCYTES 0.6 (L) 0.9 - 3.6 K/UL  
 ABS. MONOCYTES 0.2 0.05 - 1.2 K/UL  
 ABS. EOSINOPHILS 0.1 0.0 - 0.4 K/UL  
 ABS. BASOPHILS 0.0 0.0 - 0.1 K/UL  
 DF AUTOMATED Radiologic Studies -  
XR CHEST SNGL V Final Result IMPRESSION:  
  
1. Increasing bilateral lower lung zone hazy streaky densities. Bilateral  
pleural effusion. There are hazy streaky densities may be related to developing  
infiltrate or atelectatic changes. 2.  Moderate cardiac silhouette enlargement. NM LUNG VENT/PERF Final Result IMPRESSION:  
  
No evidence of pulmonary embolism. XR CHEST PA LAT Final Result IMPRESSION:  
  
1. Developing streaky densities in the retrocardiac region suggestive of  
atelectatic changes vs. infiltrate. 2.  Chronic mild bilateral pleural effusion. 3.  Moderate cardiac silhouette enlargement. Medical Decision Making I am the first provider for this patient. I reviewed the vital signs, available nursing notes, past medical history, past surgical history, family history and social history. Vital Signs-Reviewed the patient's vital signs. EKG: NSR at 81, IVCD, no stemi 2337 Records Reviewed: Nursing Notes and Old Medical Records (Time of Review: 9:06 AM) 
 
ED Course: Progress Notes, Reevaluation, and Consults: 
 
Patient returned back from her VQ scan became acutely short of breath. Sats were in the 80s repeated a chest x-ray looks like she has developed edema. We will give her a dose of Lasix and albuterol neb and follow the patient closely. Thompson Marin, DO 12:46 PM 
 
Patient is on BiPAP feeling much better now I discussed the case with Dr. Hortencia Healy and given her relatively symptomatic anemia volume overload and dyspnea and hypoxia will proceed with admission for further care. Thompson Marin, DO 2:56 PM 
 
 
 
 
 Provider Notes (Medical Decision Making): MDM Number of Diagnoses or Management Options Diagnosis management comments: She is 65-year-old female with a history of recent acute DVT on Eliquis, diastolic heart failure, hypertension, diabetes, chronic thrombocytopenia and anemia the presents after recent discharge from skilled nursing facility with several days of increasing fatigue and shortness of breath. She notes that she has dyspnea on exertion and some leg swelling. She is taking her Eliquis however she knows she can only walk only a few feet before she has palpitations. She takes Lasix and her renal functions up slightly and her hemoglobin is 7.7. She has a plan for transfusion tomorrow however this to my evaluation unsure if this is related to dehydration versus acute PE versus acute cardiac process. We will repeat her cardiac enzymes as her initial was negative but repeat her hemoglobin make sure is not dropping and proceed with imaging of her chest for pulmonary embolus. I initially plan for CTA however the patient's renal function were not tolerated. We will follow patient closely. Amadou Orantes DO 9:16 AM 
 
 
 
Procedures Critical Care Time: Critical Care Time: The services I provided to this patient were to treat and/or prevent clinically significant deterioration that could result in the failure of one or more body systems and/or organ systems due to hypervolemia needing lasix, dyspnea, and anemia . Services included the following: 
-reviewing nursing notes and old charts 
-vital sign assessments 
-direct patient care 
-medication orders and management 
-interpreting and reviewing diagnostic studies/labs 
-re-evaluations 
-documentation time Aggregate critical care time was 60 minutes, which includes only time during which I was engaged in work directly related to the patient's care as described above, whether I was at bedside or elsewhere in the Emergency Department. It did not include time spent performing other reported procedures or the services of residents, students, nurses, or advance practice providers. Werner Aragon DO 2:58 PM 
 
Diagnosis Clinical Impression: 1. Acute pulmonary edema (HCC) 2. Dyspnea on exertion 3. NANCY (acute kidney injury) ([address] 4. Malaise and fatigue 5. Symptomatic anemia Disposition: Admit Follow-up Information None Patient's Medications Start Taking No medications on file Continue Taking ACETAMINOPHEN (TYLENOL) 325 MG TABLET    Take 2 Tabs by mouth every four (4) hours as needed for Pain. AMLODIPINE (NORVASC) 10 MG TABLET    Take 10 mg by mouth daily. APIXABAN (ELIQUIS) 2.5 MG TABLET    Take 1 Tab by mouth every twelve (12) hours. ASCORBIC ACID, VITAMIN C, (VITAMIN C) 250 MG TABLET    Take 1 Tab by mouth daily. CALCIUM POLYCARBOPHIL (FIBER LAXATIVE) 625 MG TABLET    Take 1,250 mg by mouth daily as needed. CARVEDILOL (COREG) 12.5 MG TABLET    Take 12.5 mg by mouth two (2) times daily (with meals). CHOLECALCIFEROL, VITAMIN D3, (VITAMIN D3) 2,000 UNIT TAB    Take 2,000 Units by mouth daily. CYANOCOBALAMIN (VITAMIN B12) 100 MCG TABLET    Take 100 mcg by mouth daily. FERROUS SULFATE 325 MG (65 MG IRON) TABLET    Take 1 Tab by mouth two (2) times daily (with meals). FUROSEMIDE (LASIX) 20 MG TABLET    Take 20 mg by mouth two (2) times a day. Indications: visible water retention GLUCOSE 4 GRAM CHEWABLE TABLET    Take 4 Tabs by mouth as needed for Other (hypoglycemia). HYDRALAZINE (APRESOLINE) 25 MG TABLET    Take 1 Tab by mouth three (3) times daily. INSULIN GLARGINE (LANTUS) 100 UNIT/ML INJECTION    5 Units by SubCUTAneous route daily. INSULIN LISPRO (HUMALOG) 100 UNIT/ML INJECTION    INITIATE INSULIN CORRECTIVE PROTOCOL: Very Insulin Resistant For Blood Sugar (mg/dL) of:             
Less than 150 =   0 units 150 -199 =   3 units 200 -249 =   6 units 250 -299 =   9 units 300 -349 =   12 units 350 and above =   15 units and Call Physician Initiate Hypoglycemic protocol if  
 LACTOBACILLUS SP. 50 BILLION CPU (BIO-K PLUS) 50 BILLION CELL -375 MG CAP CAPSULE    Take 1 Cap by mouth daily. MULTIVITAMIN (ONE A DAY) TABLET    Take 1 Tab by mouth daily. NITROGLYCERIN (NITROLINGUAL) 400 MCG/SPRAY SPRAY    1 Spray by SubLINGual route every five (5) minutes as needed. OMEGA 3-DHA-EPA-FISH OIL (FISH OIL) 100-160-1,000 MG CAP    Take 1,000 mg by mouth two (2) times a day. SIMVASTATIN (ZOCOR) 20 MG TABLET    Take 20 mg by mouth daily. SITAGLIPTIN (JANUVIA) 50 MG TABLET    Take 50 mg by mouth daily. TRAMADOL (ULTRAM) 50 MG TABLET    Take 1 Tab by mouth daily. Max Daily Amount: 50 mg. These Medications have changed No medications on file Stop Taking No medications on file Disclaimer: Sections of this note are dictated using utilizing voice recognition software. Minor typographical errors may be present. If questions arise, please do not hesitate to contact me or call our department.

## 2019-04-25 NOTE — ED TRIAGE NOTES
Pt presents via EMS from home feeling increasing weak and shortness of breath. Pt is scheduled for a blood transfusion on Friday. Hct 6.2 from blood drawn on 6.2. DVT on left leg, on blood thinner. Denies noticing dark tarry stools.

## 2019-04-26 ENCOUNTER — HOSPITAL ENCOUNTER (OUTPATIENT)
Dept: INFUSION THERAPY | Age: 79
End: 2019-04-26

## 2019-04-26 LAB
ANION GAP SERPL CALC-SCNC: 6 MMOL/L (ref 3–18)
BASOPHILS # BLD: 0 K/UL (ref 0–0.1)
BASOPHILS NFR BLD: 0 % (ref 0–2)
BUN SERPL-MCNC: 48 MG/DL (ref 7–18)
BUN/CREAT SERPL: 38 (ref 12–20)
CALCIUM SERPL-MCNC: 9 MG/DL (ref 8.5–10.1)
CHLORIDE SERPL-SCNC: 114 MMOL/L (ref 100–108)
CO2 SERPL-SCNC: 21 MMOL/L (ref 21–32)
CREAT SERPL-MCNC: 1.25 MG/DL (ref 0.6–1.3)
DIFFERENTIAL METHOD BLD: ABNORMAL
EOSINOPHIL # BLD: 0.1 K/UL (ref 0–0.4)
EOSINOPHIL NFR BLD: 2 % (ref 0–5)
ERYTHROCYTE [DISTWIDTH] IN BLOOD BY AUTOMATED COUNT: 14.5 % (ref 11.6–14.5)
FERRITIN SERPL-MCNC: 91 NG/ML (ref 8–388)
GLUCOSE BLD STRIP.AUTO-MCNC: 154 MG/DL (ref 70–110)
GLUCOSE BLD STRIP.AUTO-MCNC: 238 MG/DL (ref 70–110)
GLUCOSE BLD STRIP.AUTO-MCNC: 288 MG/DL (ref 70–110)
GLUCOSE SERPL-MCNC: 185 MG/DL (ref 74–99)
HCT VFR BLD AUTO: 19.6 % (ref 35–45)
HEMOCCULT STL QL: NEGATIVE
HGB BLD-MCNC: 6.8 G/DL (ref 12–16)
LYMPHOCYTES # BLD: 0.7 K/UL (ref 0.9–3.6)
LYMPHOCYTES NFR BLD: 26 % (ref 21–52)
MAGNESIUM SERPL-MCNC: 2.2 MG/DL (ref 1.6–2.6)
MCH RBC QN AUTO: 30.2 PG (ref 24–34)
MCHC RBC AUTO-ENTMCNC: 34.7 G/DL (ref 31–37)
MCV RBC AUTO: 87.1 FL (ref 74–97)
MONOCYTES # BLD: 0.2 K/UL (ref 0.05–1.2)
MONOCYTES NFR BLD: 6 % (ref 3–10)
NEUTS SEG # BLD: 1.7 K/UL (ref 1.8–8)
NEUTS SEG NFR BLD: 66 % (ref 40–73)
PHOSPHATE SERPL-MCNC: 3.9 MG/DL (ref 2.5–4.9)
PLATELET # BLD AUTO: 50 K/UL (ref 135–420)
PMV BLD AUTO: 9.4 FL (ref 9.2–11.8)
POTASSIUM SERPL-SCNC: 4.3 MMOL/L (ref 3.5–5.5)
RBC # BLD AUTO: 2.25 M/UL (ref 4.2–5.3)
SODIUM SERPL-SCNC: 141 MMOL/L (ref 136–145)
TRANSFERRIN SERPL-MCNC: 239 MG/DL (ref 200–370)
WBC # BLD AUTO: 2.6 K/UL (ref 4.6–13.2)

## 2019-04-26 PROCEDURE — 74011250636 HC RX REV CODE- 250/636: Performed by: INTERNAL MEDICINE

## 2019-04-26 PROCEDURE — 77030038269 HC DRN EXT URIN PURWCK BARD -A

## 2019-04-26 PROCEDURE — 74011250637 HC RX REV CODE- 250/637: Performed by: INTERNAL MEDICINE

## 2019-04-26 PROCEDURE — 74011000258 HC RX REV CODE- 258: Performed by: INTERNAL MEDICINE

## 2019-04-26 PROCEDURE — 36430 TRANSFUSION BLD/BLD COMPNT: CPT

## 2019-04-26 PROCEDURE — P9016 RBC LEUKOCYTES REDUCED: HCPCS

## 2019-04-26 PROCEDURE — 82272 OCCULT BLD FECES 1-3 TESTS: CPT

## 2019-04-26 PROCEDURE — 83735 ASSAY OF MAGNESIUM: CPT

## 2019-04-26 PROCEDURE — 77010033678 HC OXYGEN DAILY

## 2019-04-26 PROCEDURE — 36415 COLL VENOUS BLD VENIPUNCTURE: CPT

## 2019-04-26 PROCEDURE — 74011636637 HC RX REV CODE- 636/637: Performed by: INTERNAL MEDICINE

## 2019-04-26 PROCEDURE — 30233N1 TRANSFUSION OF NONAUTOLOGOUS RED BLOOD CELLS INTO PERIPHERAL VEIN, PERCUTANEOUS APPROACH: ICD-10-PCS | Performed by: INTERNAL MEDICINE

## 2019-04-26 PROCEDURE — 85025 COMPLETE CBC W/AUTO DIFF WBC: CPT

## 2019-04-26 PROCEDURE — 82962 GLUCOSE BLOOD TEST: CPT

## 2019-04-26 PROCEDURE — 65660000004 HC RM CVT STEPDOWN

## 2019-04-26 PROCEDURE — 82728 ASSAY OF FERRITIN: CPT

## 2019-04-26 PROCEDURE — 84100 ASSAY OF PHOSPHORUS: CPT

## 2019-04-26 PROCEDURE — 80048 BASIC METABOLIC PNL TOTAL CA: CPT

## 2019-04-26 RX ORDER — LANOLIN ALCOHOL/MO/W.PET/CERES
1 CREAM (GRAM) TOPICAL
Status: DISCONTINUED | OUTPATIENT
Start: 2019-04-26 | End: 2019-05-01 | Stop reason: HOSPADM

## 2019-04-26 RX ORDER — SODIUM CHLORIDE 9 MG/ML
250 INJECTION, SOLUTION INTRAVENOUS AS NEEDED
Status: DISCONTINUED | OUTPATIENT
Start: 2019-04-26 | End: 2019-05-01 | Stop reason: HOSPADM

## 2019-04-26 RX ADMIN — HYDRALAZINE HYDROCHLORIDE 25 MG: 25 TABLET ORAL at 09:07

## 2019-04-26 RX ADMIN — INSULIN LISPRO 6 UNITS: 100 INJECTION, SOLUTION INTRAVENOUS; SUBCUTANEOUS at 13:09

## 2019-04-26 RX ADMIN — CARVEDILOL 12.5 MG: 12.5 TABLET, FILM COATED ORAL at 09:07

## 2019-04-26 RX ADMIN — DIPHENHYDRAMINE HYDROCHLORIDE 25 MG: 25 CAPSULE ORAL at 09:00

## 2019-04-26 RX ADMIN — FUROSEMIDE 20 MG: 10 INJECTION, SOLUTION INTRAMUSCULAR; INTRAVENOUS at 13:09

## 2019-04-26 RX ADMIN — FUROSEMIDE 20 MG: 10 INJECTION, SOLUTION INTRAMUSCULAR; INTRAVENOUS at 09:09

## 2019-04-26 RX ADMIN — HYDRALAZINE HYDROCHLORIDE 25 MG: 25 TABLET ORAL at 21:52

## 2019-04-26 RX ADMIN — IRON SUCROSE 200 MG: 20 INJECTION, SOLUTION INTRAVENOUS at 13:09

## 2019-04-26 RX ADMIN — FERROUS SULFATE TAB 325 MG (65 MG ELEMENTAL FE) 325 MG: 325 (65 FE) TAB at 09:07

## 2019-04-26 RX ADMIN — CARVEDILOL 12.5 MG: 12.5 TABLET, FILM COATED ORAL at 18:05

## 2019-04-26 RX ADMIN — Medication 1 CAPSULE: at 09:07

## 2019-04-26 RX ADMIN — APIXABAN 2.5 MG: 2.5 TABLET, FILM COATED ORAL at 09:07

## 2019-04-26 RX ADMIN — INSULIN LISPRO 4 UNITS: 100 INJECTION, SOLUTION INTRAVENOUS; SUBCUTANEOUS at 21:52

## 2019-04-26 RX ADMIN — ACETAMINOPHEN 650 MG: 325 TABLET ORAL at 09:07

## 2019-04-26 RX ADMIN — APIXABAN 2.5 MG: 2.5 TABLET, FILM COATED ORAL at 20:34

## 2019-04-26 RX ADMIN — AMLODIPINE BESYLATE 10 MG: 10 TABLET ORAL at 09:07

## 2019-04-26 RX ADMIN — INSULIN LISPRO 2 UNITS: 100 INJECTION, SOLUTION INTRAVENOUS; SUBCUTANEOUS at 18:05

## 2019-04-26 RX ADMIN — SIMVASTATIN 20 MG: 20 TABLET, FILM COATED ORAL at 09:07

## 2019-04-26 NOTE — PROGRESS NOTES
Reason for Admission:  Acute pulmonary edema ([facility] [address] [J81.0] Malaise and fatigue [R53.81, R53.83] Palpitations [R00.2] RRAT Score:    30  
        
Plan for utilizing home health:    No orders at this time Likelihood of Readmission:   Moderate Do you (patient/family) have any concerns for transition/discharge?  no 
 
Transition of Care Plan:    
 
Initial assessment completed with patient. Cognitive status of patient: oriented to time, place, person and situation. Face sheet information confirmed:  yes. The patient designates sister Kandace Bates and her nephew who is a RN  to participate in her discharge plan and to receive any needed information. This patient lives in a [facility]. Patient is able to navigate steps as needed. Prior to hospitalization, patient was considered to be independent with ADLs/IADLS : yes . Patient had recent admission to hospital then discharged to rehab, [address] [facility]. Once at home after a few days started having trouble with SOB and weakness then came to the ED. Patient has a current ACP document on file: no The family will be able to transport patient home upon discharge. The patient already has [facility] Real, Patient is not currently active with home health. This patient is on dialysis :no 
 
PIOTR [place] of choice signed: no, Currently, the discharge plan is to go home The patient states that she can obtain her medications from the pharmacy, and take her medications as directed. Patient's current insurance is [facility] Care Management Interventions PCP Verified by CM: Yes Last Visit to PCP: 04/22/19 Transition of Care Consult (CM Consult): Discharge Planning Current Support Network: Lives Alone, Family Lives Wallace Benjamin Chong RN BSN Care Management

## 2019-04-26 NOTE — DIABETES MGMT
Glycemic Control Plan of Care 
 
T2DM with current A1c of 5.2% (3/25/2019). Home diabetes medications:  
Lantus insulin 5 units daily. Humalog sliding scale insulin. POC BG report on 4/24/2019: None. POC BG report on 4/25/2019 at time of review: 288 mg/dl. Patient seen this morning and she was getting blood transfusion. Recommendation(s): 
1.) Continue inpatient glycemic monitoring and correctional lispro insulin. 2.) Consider adding daily basal lantus insulin 5 units (home dose)  if BG remain above target range. Assessment: 
Patient is 77year old with past medical history including type 2 diabetes mellitus, CAD s/p stent, left breast CA, hypercholesterolemia, hypertension, liver cirrhosis, thrombocytopenia and recent left leg DVT- was admitted on 4/25/2019 with report of shortness of breath. Noted: 
Pancytopenia. Acute LLE DVT on March 2019 admission. Iron deficiency anemia. BLOOM cirrhosis. H/O left breast cancer and colon cancer - on remission. 
T2DM. Most recent blood glucose values: 
 
Results for Renu Cai (MRN [id number]) as of 4/26/2019 13:08 Ref. Range 4/26/2019 11:29  
GLUCOSE,FAST - POC Latest Ref Range: 70 - 110 mg/dL 288 (H) Current A1C: 5.2% (3/25/2019) which is equivalent to estimated average blood glucose of 103 mg/dl during the past 2-3 months. Current hospital diabetes medications: 
Correctional lispro insulin ACHS. Normal sensitivity dose. Total daily dose insulin requirement previous day: 4/25/2019: 
None. Home diabetes medications:  
Lantus insulin 5 units daily. Humalog sliding scale insulin. Diet: Cardiac regular; consistent carb 1800kcal. 
 
Goals:  Blood glucose will be within target range of  mg/dL by 4/29/2019. Education:  ___  Refer to Diabetes Education Record _X__  Education not indicated at this time Jose J Torres RN [facility] Pager: [phone]

## 2019-04-26 NOTE — PROGRESS NOTES
TRANSFER - IN REPORT: 
 
Verbal report received from Luis Daniel Oro RN (name) on Naima Bacon  being received from ED(unit) for routine progression of care Report consisted of patients Situation, Background, Assessment and  
Recommendations(SBAR). Information from the following report(s) SBAR, Kardex, ED Summary, Procedure Summary, Intake/Output, MAR and Recent Results was reviewed with the receiving nurse. Opportunity for questions and clarification was provided. Assessment completed upon patients arrival to unit and care assumed.

## 2019-04-26 NOTE — PROGRESS NOTES
Arrive on unit, via stretcher, continuous cardiac monitoring. Assumed care of patient. Placed on Tele Sinus chris no ectopy. Initial assessment compete. Denies pain or discomfort. Monitoring continues.

## 2019-04-26 NOTE — PROGRESS NOTES
1800:  Afternoon dose hydralazine held for /62. Has had diuresis very well today; 600cc output. New c/o intermittent stabbing pain left anterior shin area; leg wnl color, no redness, no warm, cool to touch. Provider Sanjeev paged, notified all this info; new orders noted to d/c IV Lasix; same done. Continue to monitor for leg discomfort. 1900:  Bedside and Verbal shift change report given to LATRICIA Garsia (oncoming nurse) by Joseph Krabbe, RN(offgoing nurse). Report included the following information SBAR, Kardex, Intake/Output, MAR, Accordion, Recent Results, Cardiac Rhythm NSR. and Alarm Parameters .

## 2019-04-26 NOTE — PROGRESS NOTES
Reviewed current case with Hospitalist covering, Dr. Kimberlee Ramirez. Discussed current status and plan:  Adm SOB, Treated in ED Flash Pulm Edema. Reviewed [address] and Labs. H/H 7/21. Plan: Type and screen 1 unit PRBCs , H/H with AM labs, and  Lasix between each transfusion. Continuous Tele monitoring continues.

## 2019-04-26 NOTE — PROGRESS NOTES
Phone: [phone] Hematology / Oncology Progress Note [address] Patient: Naima Bacon   MRN: [id number]         CSN: [id number] YOB: 1940 Admit Date: 2019 Assessment:  
 
Principal Problem: 
  Acute pulmonary edema ([address] (2019) Active Problems: 
  Esophageal varices ([address] (2017) Leg DVT (deep venous thromboembolism), acute, left ([address] (3/24/2019) Palpitations (2019) Malaise and fatigue (2019) Pancytopenia ([address] (2019) Portal hypertension ([address] (2019) Chronic anticoagulation (2019) 
 
stage III colon ca, 2011,in remission Left breast ca, stage II, 2011, in remission 
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 
venofer iv x 2 1111 [address] [place] [address] Office [phone] Subjective: SOB Objective:  
 
Visit Vitals /74 (BP 1 Location: Right arm, BP Patient Position: At rest) Pulse 81 Temp 98.9 °F (37.2 °C) Resp 18 Ht 5' (1.524 m) Wt 67.6 kg (149 lb 0.5 oz) SpO2 95% Breastfeeding? No  
BMI 29.11 kg/m² Temp (24hrs), Av.6 °F (37 °C), Min:98.5 °F (36.9 °C), Max:98.9 °F (37.2 °C) Intake/Output Summary (Last 24 hours) at 2019 1165 Last data filed at 2019 8635 Gross per 24 hour Intake  Output 1500 ml Net -1500 ml Review of Systems:  
Constitutional: negative for fevers, chills, sweats and positive for fatigue Eyes: negative for visual disturbance, redness and icterus Ears, Nose, Mouth, Throat, and Face: negative for tinnitus, epistaxis, sore mouth and hoarseness Respiratory: negative for cough, sputum, hemoptysis, pleurisy/chest pain or wheezing Cardiovascular: SOB , negative for chest pain, chest pressure/discomfort, palpitations, irregular heart beats, syncope, paroxysmal nocturnal dyspnea Gastrointestinal: negative for reflux symptoms, nausea, vomiting, change in bowel habits, melena, diarrhea, constipation and abdominal pain Genitourinary:negative for dysuria, nocturia, urinary incontinence, hesitancy and hematuria Integument: negative for rash, skin lesion(s) and pruritus Hematologic/Lymphatic: negative for easy bruising, bleeding and lymphadenopathy Musculoskeletal:negative for myalgias, arthralgias and bone pain Neurological: negative for headaches, dizziness, seizures, paresthesia and weakness Physical Exam: 
Constitutional: Alert, oriented, not in distress Eyes: PERRLA, anicteric, pallor Ears, nose, mouth, throat, and face: no palpablenodes 
 cardiac reg HS, no pathologic murmur, no rub. Lungs elvia basal rales Gastrointestinal: soft, non tender, splenomegaly Integument: no rash, no petechiae, no ecchymosis. Musculoskeletal: no edema, no cyanosis, no clubbing. Neurological: intact, cranial nerves, no focal motor or sensory deficits. Labs: 
Recent Results (from the past 24 hour(s)) NT-PRO BNP Collection Time: 04/25/19  8:54 AM  
Result Value Ref Range NT pro-BNP 1,569 0 - 1,800 PG/ML  
MAGNESIUM Collection Time: 04/25/19  8:54 AM  
Result Value Ref Range Magnesium 2.7 (H) 1.6 - 2.6 mg/dL CARDIAC PANEL,(CK, CKMB & TROPONIN) Collection Time: 04/25/19  8:54 AM  
Result Value Ref Range CK 38 26 - 192 U/L  
 CK - MB <1.0 <3.6 ng/ml CK-MB Index  0.0 - 4.0 % CALCULATION NOT PERFORMED WHEN RESULT IS BELOW LINEAR LIMIT Troponin-I, QT <0.02 0.0 - 0.045 NG/ML  
PROTHROMBIN TIME + INR Collection Time: 04/25/19  8:54 AM  
Result Value Ref Range Prothrombin time 15.4 (H) 11.5 - 15.2 sec INR 1.2 0.8 - 1.2 PTT Collection Time: 04/25/19  8:54 AM  
Result Value Ref Range aPTT 32.0 23.0 - 36.4 SEC  
CBC WITH AUTOMATED DIFF Collection Time: 04/25/19  8:54 AM  
Result Value Ref Range WBC 2.7 (L) 4.6 - 13.2 K/uL  
 RBC 2.50 (L) 4.20 - 5.30 M/uL HGB 7.4 (L) 12.0 - 16.0 g/dL HCT 21.7 (L) 35.0 - 45.0 % MCV 86.8 74.0 - 97.0 FL  
 MCH 29.6 24.0 - 34.0 PG  
 MCHC 34.1 31.0 - 37.0 g/dL  
 RDW 14.8 (H) 11.6 - 14.5 % PLATELET 52 (L) 261 - 420 K/uL MPV 9.1 (L) 9.2 - 11.8 FL  
 NEUTROPHILS 69 40 - 73 % LYMPHOCYTES 22 21 - 52 % MONOCYTES 7 3 - 10 % EOSINOPHILS 2 0 - 5 % BASOPHILS 0 0 - 2 %  
 ABS. NEUTROPHILS 1.9 1.8 - 8.0 K/UL  
 ABS. LYMPHOCYTES 0.6 (L) 0.9 - 3.6 K/UL  
 ABS. MONOCYTES 0.2 0.05 - 1.2 K/UL  
 ABS. EOSINOPHILS 0.1 0.0 - 0.4 K/UL  
 ABS. BASOPHILS 0.0 0.0 - 0.1 K/UL  
 DF AUTOMATED IRON PROFILE Collection Time: 04/25/19  8:54 AM  
Result Value Ref Range Iron 35 (L) 50 - 175 ug/dL TIBC 276 250 - 450 ug/dL Iron % saturation 13 % VITAMIN B12 & FOLATE Collection Time: 04/25/19  8:54 AM  
Result Value Ref Range Vitamin B12 1,304 (H) 211 - 911 pg/mL Folate >20.0 (H) 3.10 - 17.50 ng/mL POC G3 Collection Time: 04/25/19  3:21 PM  
Result Value Ref Range Device: BIPAP    
 FIO2 (POC) 50 % pH (POC) 7.390 7.35 - 7.45    
 pCO2 (POC) 26.6 (L) 35.0 - 45.0 MMHG  
 pO2 (POC) 144 (H) 80 - 100 MMHG  
 HCO3 (POC) 16.1 (L) 22 - 26 MMOL/L  
 sO2 (POC) 99 (H) 92 - 97 % Base deficit (POC) 8 mmol/L  
 PEEP/CPAP (POC) 5 cmH2O  
 PIP (POC) 12 Allens test (POC) YES Bleed In 50 L/min Total resp. rate 18 Site RIGHT RADIAL Specimen type (POC) ARTERIAL Performed by Gorge Conley Spontaneous timed YES    
METABOLIC PANEL, BASIC Collection Time: 04/26/19  5:15 AM  
Result Value Ref Range Sodium 141 136 - 145 mmol/L Potassium 4.3 3.5 - 5.5 mmol/L Chloride 114 (H) 100 - 108 mmol/L  
 CO2 21 21 - 32 mmol/L  Anion gap 6 3.0 - 18 mmol/L  
 Glucose 185 (H) 74 - 99 mg/dL BUN 48 (H) 7.0 - 18 MG/DL Creatinine 1.25 0.6 - 1.3 MG/DL  
 BUN/Creatinine ratio 38 (H) 12 - 20 GFR est AA 50 (L) >60 ml/min/1.73m2 GFR est non-AA 41 (L) >60 ml/min/1.73m2 Calcium 9.0 8.5 - 10.1 MG/DL MAGNESIUM Collection Time: 04/26/19  5:15 AM  
Result Value Ref Range Magnesium 2.2 1.6 - 2.6 mg/dL PHOSPHORUS Collection Time: 04/26/19  5:15 AM  
Result Value Ref Range Phosphorus 3.9 2.5 - 4.9 MG/DL  
CBC WITH AUTOMATED DIFF Collection Time: 04/26/19  5:15 AM  
Result Value Ref Range WBC 2.6 (L) 4.6 - 13.2 K/uL  
 RBC 2.25 (L) 4.20 - 5.30 M/uL HGB 6.8 (L) 12.0 - 16.0 g/dL HCT 19.6 (L) 35.0 - 45.0 % MCV 87.1 74.0 - 97.0 FL  
 MCH 30.2 24.0 - 34.0 PG  
 MCHC 34.7 31.0 - 37.0 g/dL  
 RDW 14.5 11.6 - 14.5 % PLATELET 50 (L) 167 - 420 K/uL MPV 9.4 9.2 - 11.8 FL  
 NEUTROPHILS 66 40 - 73 % LYMPHOCYTES 26 21 - 52 % MONOCYTES 6 3 - 10 % EOSINOPHILS 2 0 - 5 % BASOPHILS 0 0 - 2 %  
 ABS. NEUTROPHILS 1.7 (L) 1.8 - 8.0 K/UL  
 ABS. LYMPHOCYTES 0.7 (L) 0.9 - 3.6 K/UL  
 ABS. MONOCYTES 0.2 0.05 - 1.2 K/UL  
 ABS. EOSINOPHILS 0.1 0.0 - 0.4 K/UL  
 ABS. BASOPHILS 0.0 0.0 - 0.1 K/UL  
 DF AUTOMATED

## 2019-04-26 NOTE — PROGRESS NOTES
Reviewed am lab results, H/H 6.9/19, PLT 50. Paged [phone] re: plan to transfuse. Dr. Neptali Sánchez covering pending return call.

## 2019-04-26 NOTE — PROGRESS NOTES
Bedside and Verbal shift change report given to Derrell Howard RN  (oncoming nurse) by Jose Gallo RN  (offgoing nurse). Report included the following information SBAR, Kardex, ED Summary, OR Summary, Procedure Summary, Intake/Output, MAR, Med Rec Status, Cardiac Rhythm NSR, Procedure Verification and Quality Measures.

## 2019-04-26 NOTE — ED NOTES
TRANSFER - OUT REPORT: 
 
Verbal report given to Lia Patterson RN (name) on Kelli Klinefelter  being transferred to CT Stepdown (unit) for routine progression of care Report consisted of patients Situation, Background, Assessment and  
Recommendations(SBAR). Information from the following report(s) SBAR, ED Summary and MAR was reviewed with the receiving nurse. Lines:  
Peripheral IV 04/24/19 Right Antecubital (Active) Site Assessment Clean, dry, & intact 4/24/2019 11:29 PM  
Phlebitis Assessment 0 4/24/2019 11:29 PM  
Infiltration Assessment 0 4/24/2019 11:29 PM  
Dressing Status Clean, dry, & intact 4/24/2019 11:29 PM  
Hub Color/Line Status Pink 4/24/2019 11:29 PM  
  
 
Opportunity for questions and clarification was provided. Patient transported with: 
 Monitor Registered Nurse

## 2019-04-26 NOTE — PROGRESS NOTES
[facility] Hospitalist Group Progress Note Patient: Rosalba Salazar Age: 77 y.o. : 1940 MR#: [id number] SSN: [id number] Date 2019 Subjective/24-hour events:  
 
Feeling OK overall. Denies chest pain acutely and no SOB at rest. 
Transfusion in progress. Assessment:  
Pancytopenia Acute LLE DVT dx 3/2019 Iron deficiency anemia BLOOM cirrhosis with portal HTN and esophageal varices Hx breast stage 3 colon cancer and stage 2 L breast cancer, both in remission Plan: 
Transfuse and monitor response. If no improvement in symptoms following transfusion will ask cardiology to see tomorrow as patient has been admitted in past for diastolic CHF. She does not appear to be particularly volume overloaded at this point but will continue IV lasix as ordered in interim. IV iron infusions per heme/onc. AM labs for lytes/renal function. PT/OT evals. Supportive care o/w. Follow. Case discussed with:  [x]Patient  []Family  [x]Nursing  []Case Management DVT Prophylaxis:  []Lovenox  []Hep SQ  []SCDs  []Coumadin   []On Heparin gtt Objective:  
VS:  
Visit Vitals /69 Pulse 86 Temp 99.3 °F (37.4 °C) Resp 18 Ht 5' (1.524 m) Wt 67.6 kg (149 lb 0.5 oz) SpO2 97% Breastfeeding? No  
BMI 29.11 kg/m² Tmax/24hrs: Temp (24hrs), Av.6 °F (37 °C), Min:98.2 °F (36.8 °C), Max:99.3 °F (37.4 °C) Intake/Output Summary (Last 24 hours) at 2019 9321 Last data filed at 2019 8034 Gross per 24 hour Intake 240 ml Output 1500 ml Net -1260 ml General:  In NAD. Nontoxic-appearing. Cardiovascular:  RRR. Pulmonary:  No active wheezing, effort nonlabored. GI:  Abdomen soft, NTTP. Extremities:  Warm, no ischemia. No LE pitting edema. Neuro:  Awake and alert, motor grossly nonfocal. 
 
 
Labs:   
Recent Results (from the past 24 hour(s)) POC G3 Collection Time: 19  3:21 PM  
Result Value Ref Range Device: BIPAP FIO2 (POC) 50 % pH (POC) 7.390 7.35 - 7.45    
 pCO2 (POC) 26.6 (L) 35.0 - 45.0 MMHG  
 pO2 (POC) 144 (H) 80 - 100 MMHG  
 HCO3 (POC) 16.1 (L) 22 - 26 MMOL/L  
 sO2 (POC) 99 (H) 92 - 97 % Base deficit (POC) 8 mmol/L  
 PEEP/CPAP (POC) 5 cmH2O  
 PIP (POC) 12 Allens test (POC) YES Bleed In 50 L/min Total resp. rate 18 Site RIGHT RADIAL Specimen type (POC) ARTERIAL Performed by Collin Vince Spontaneous timed YES    
METABOLIC PANEL, BASIC Collection Time: 04/26/19  5:15 AM  
Result Value Ref Range Sodium 141 136 - 145 mmol/L Potassium 4.3 3.5 - 5.5 mmol/L Chloride 114 (H) 100 - 108 mmol/L  
 CO2 21 21 - 32 mmol/L Anion gap 6 3.0 - 18 mmol/L Glucose 185 (H) 74 - 99 mg/dL BUN 48 (H) 7.0 - 18 MG/DL Creatinine 1.25 0.6 - 1.3 MG/DL  
 BUN/Creatinine ratio 38 (H) 12 - 20 GFR est AA 50 (L) >60 ml/min/1.73m2 GFR est non-AA 41 (L) >60 ml/min/1.73m2 Calcium 9.0 8.5 - 10.1 MG/DL MAGNESIUM Collection Time: 04/26/19  5:15 AM  
Result Value Ref Range Magnesium 2.2 1.6 - 2.6 mg/dL PHOSPHORUS Collection Time: 04/26/19  5:15 AM  
Result Value Ref Range Phosphorus 3.9 2.5 - 4.9 MG/DL  
CBC WITH AUTOMATED DIFF Collection Time: 04/26/19  5:15 AM  
Result Value Ref Range WBC 2.6 (L) 4.6 - 13.2 K/uL  
 RBC 2.25 (L) 4.20 - 5.30 M/uL HGB 6.8 (L) 12.0 - 16.0 g/dL HCT 19.6 (L) 35.0 - 45.0 % MCV 87.1 74.0 - 97.0 FL  
 MCH 30.2 24.0 - 34.0 PG  
 MCHC 34.7 31.0 - 37.0 g/dL  
 RDW 14.5 11.6 - 14.5 % PLATELET 50 (L) 483 - 420 K/uL MPV 9.4 9.2 - 11.8 FL  
 NEUTROPHILS 66 40 - 73 % LYMPHOCYTES 26 21 - 52 % MONOCYTES 6 3 - 10 % EOSINOPHILS 2 0 - 5 % BASOPHILS 0 0 - 2 %  
 ABS. NEUTROPHILS 1.7 (L) 1.8 - 8.0 K/UL  
 ABS. LYMPHOCYTES 0.7 (L) 0.9 - 3.6 K/UL  
 ABS. MONOCYTES 0.2 0.05 - 1.2 K/UL  
 ABS. EOSINOPHILS 0.1 0.0 - 0.4 K/UL  
 ABS. BASOPHILS 0.0 0.0 - 0.1 K/UL  
 DF AUTOMATED Signed By: Manjula Bonner MD   
 April 26, 2019

## 2019-04-27 LAB
ABO + RH BLD: NORMAL
ANION GAP SERPL CALC-SCNC: 7 MMOL/L (ref 3–18)
BASOPHILS # BLD: 0 K/UL (ref 0–0.1)
BASOPHILS NFR BLD: 0 % (ref 0–2)
BLD PROD TYP BPU: NORMAL
BLOOD GROUP ANTIBODIES SERPL: NORMAL
BPU ID: NORMAL
BUN SERPL-MCNC: 49 MG/DL (ref 7–18)
BUN/CREAT SERPL: 39 (ref 12–20)
CALCIUM SERPL-MCNC: 8.4 MG/DL (ref 8.5–10.1)
CALLED TO:,BCALL1: NORMAL
CHLORIDE SERPL-SCNC: 114 MMOL/L (ref 100–108)
CO2 SERPL-SCNC: 20 MMOL/L (ref 21–32)
CREAT SERPL-MCNC: 1.25 MG/DL (ref 0.6–1.3)
CROSSMATCH RESULT,%XM: NORMAL
DIFFERENTIAL METHOD BLD: ABNORMAL
EOSINOPHIL # BLD: 0.1 K/UL (ref 0–0.4)
EOSINOPHIL NFR BLD: 2 % (ref 0–5)
ERYTHROCYTE [DISTWIDTH] IN BLOOD BY AUTOMATED COUNT: 14.6 % (ref 11.6–14.5)
GLUCOSE BLD STRIP.AUTO-MCNC: 148 MG/DL (ref 70–110)
GLUCOSE BLD STRIP.AUTO-MCNC: 156 MG/DL (ref 70–110)
GLUCOSE BLD STRIP.AUTO-MCNC: 176 MG/DL (ref 70–110)
GLUCOSE BLD STRIP.AUTO-MCNC: 234 MG/DL (ref 70–110)
GLUCOSE SERPL-MCNC: 125 MG/DL (ref 74–99)
HCT VFR BLD AUTO: 22.1 % (ref 35–45)
HGB BLD-MCNC: 7.5 G/DL (ref 12–16)
LYMPHOCYTES # BLD: 0.5 K/UL (ref 0.9–3.6)
LYMPHOCYTES NFR BLD: 20 % (ref 21–52)
MAGNESIUM SERPL-MCNC: 2 MG/DL (ref 1.6–2.6)
MCH RBC QN AUTO: 29 PG (ref 24–34)
MCHC RBC AUTO-ENTMCNC: 33.9 G/DL (ref 31–37)
MCV RBC AUTO: 85.3 FL (ref 74–97)
MONOCYTES # BLD: 0.2 K/UL (ref 0.05–1.2)
MONOCYTES NFR BLD: 9 % (ref 3–10)
NEUTS SEG # BLD: 1.7 K/UL (ref 1.8–8)
NEUTS SEG NFR BLD: 69 % (ref 40–73)
PLATELET # BLD AUTO: 52 K/UL (ref 135–420)
PMV BLD AUTO: 9.5 FL (ref 9.2–11.8)
POTASSIUM SERPL-SCNC: 4.1 MMOL/L (ref 3.5–5.5)
RBC # BLD AUTO: 2.59 M/UL (ref 4.2–5.3)
SODIUM SERPL-SCNC: 141 MMOL/L (ref 136–145)
SPECIMEN EXP DATE BLD: NORMAL
STATUS OF UNIT,%ST: NORMAL
UNIT DIVISION, %UDIV: 0
WBC # BLD AUTO: 2.5 K/UL (ref 4.6–13.2)

## 2019-04-27 PROCEDURE — 85025 COMPLETE CBC W/AUTO DIFF WBC: CPT

## 2019-04-27 PROCEDURE — 74011250637 HC RX REV CODE- 250/637: Performed by: FAMILY MEDICINE

## 2019-04-27 PROCEDURE — 74011636637 HC RX REV CODE- 636/637: Performed by: INTERNAL MEDICINE

## 2019-04-27 PROCEDURE — 82962 GLUCOSE BLOOD TEST: CPT

## 2019-04-27 PROCEDURE — 80048 BASIC METABOLIC PNL TOTAL CA: CPT

## 2019-04-27 PROCEDURE — 83735 ASSAY OF MAGNESIUM: CPT

## 2019-04-27 PROCEDURE — 36415 COLL VENOUS BLD VENIPUNCTURE: CPT

## 2019-04-27 PROCEDURE — 65270000029 HC RM PRIVATE

## 2019-04-27 PROCEDURE — 74011250637 HC RX REV CODE- 250/637: Performed by: INTERNAL MEDICINE

## 2019-04-27 PROCEDURE — 74011000258 HC RX REV CODE- 258: Performed by: INTERNAL MEDICINE

## 2019-04-27 PROCEDURE — 74011250636 HC RX REV CODE- 250/636: Performed by: INTERNAL MEDICINE

## 2019-04-27 RX ORDER — FUROSEMIDE 20 MG/1
20 TABLET ORAL 2 TIMES DAILY
Status: DISCONTINUED | OUTPATIENT
Start: 2019-04-27 | End: 2019-05-01 | Stop reason: HOSPADM

## 2019-04-27 RX ADMIN — FUROSEMIDE 20 MG: 20 TABLET ORAL at 19:11

## 2019-04-27 RX ADMIN — SIMVASTATIN 20 MG: 20 TABLET, FILM COATED ORAL at 11:26

## 2019-04-27 RX ADMIN — HYDRALAZINE HYDROCHLORIDE 25 MG: 25 TABLET ORAL at 19:11

## 2019-04-27 RX ADMIN — INSULIN LISPRO 2 UNITS: 100 INJECTION, SOLUTION INTRAVENOUS; SUBCUTANEOUS at 19:09

## 2019-04-27 RX ADMIN — APIXABAN 2.5 MG: 2.5 TABLET, FILM COATED ORAL at 11:26

## 2019-04-27 RX ADMIN — Medication 1 CAPSULE: at 11:25

## 2019-04-27 RX ADMIN — HYDRALAZINE HYDROCHLORIDE 25 MG: 25 TABLET ORAL at 21:49

## 2019-04-27 RX ADMIN — INSULIN LISPRO 2 UNITS: 100 INJECTION, SOLUTION INTRAVENOUS; SUBCUTANEOUS at 21:49

## 2019-04-27 RX ADMIN — FUROSEMIDE 20 MG: 20 TABLET ORAL at 11:34

## 2019-04-27 RX ADMIN — FERROUS SULFATE TAB 325 MG (65 MG ELEMENTAL FE) 325 MG: 325 (65 FE) TAB at 11:27

## 2019-04-27 RX ADMIN — AMLODIPINE BESYLATE 10 MG: 10 TABLET ORAL at 11:26

## 2019-04-27 RX ADMIN — OXYCODONE HYDROCHLORIDE AND ACETAMINOPHEN 1 TABLET: 5; 325 TABLET ORAL at 03:19

## 2019-04-27 RX ADMIN — IRON SUCROSE 200 MG: 20 INJECTION, SOLUTION INTRAVENOUS at 11:34

## 2019-04-27 RX ADMIN — HYDRALAZINE HYDROCHLORIDE 25 MG: 25 TABLET ORAL at 11:27

## 2019-04-27 RX ADMIN — CARVEDILOL 12.5 MG: 12.5 TABLET, FILM COATED ORAL at 11:27

## 2019-04-27 RX ADMIN — APIXABAN 2.5 MG: 2.5 TABLET, FILM COATED ORAL at 21:49

## 2019-04-27 RX ADMIN — INSULIN LISPRO 4 UNITS: 100 INJECTION, SOLUTION INTRAVENOUS; SUBCUTANEOUS at 11:57

## 2019-04-27 NOTE — PROGRESS NOTES
[facility] Hospitalist Group Progress Note Patient: Leo Frances Age: 77 y.o. : 1940 MR#: [id number] SSN: [id number] Date 2019 Subjective/24-hour events:  
 
Complains of having some R sided back pain overnight but no complaints currently. Respiratory status is better overall, patient stable on RA. Afebrile. Assessment:  
Pancytopenia Acute LLE DVT dx 3/2019 Iron deficiency anemia BLOOM cirrhosis with portal HTN and esophageal varices Hx breast stage 3 colon cancer and stage 2 L breast cancer, both in remission Plan: H/H up appropriately following transfusion of 1 unit PRBCs yesterday. IV lasix held yesterday evening. Will resume lasix this AM but will give PO dose as prescirbed PTA. Continue to monitor lytes/renal indices. PT/OT evals pending. Await any further heme/onc recs Potential discharge from medical perspective later today if stable. If remains hospitalized can transfer to medical bed. Case discussed with:  [x]Patient  []Family  [x]Nursing  []Case Management DVT Prophylaxis:  []Lovenox  []Hep SQ  []SCDs  []Coumadin   []On Heparin gtt Objective:  
VS:  
Visit Vitals /69 Pulse 74 Temp 98.2 °F (36.8 °C) Resp 18 Ht 5' (1.524 m) Wt 68 kg (150 lb) SpO2 94% Breastfeeding? No  
BMI 29.29 kg/m² Tmax/24hrs: Temp (24hrs), Av.4 °F (36.9 °C), Min:98 °F (36.7 °C), Max:99.3 °F (37.4 °C) Intake/Output Summary (Last 24 hours) at 2019 1488 Last data filed at 2019 0370 Gross per 24 hour Intake 1140 ml Output 601 ml Net 539 ml General:  In NAD. Nontoxic-appearing. Cardiovascular:  RRR. Pulmonary:  No active wheezing, effort nonlabored. GI:  Abdomen soft, NTTP. Extremities:  Warm, no ischemia. No LE pitting edema. Neuro:  Awake and alert, motor grossly nonfocal. 
 
 
Labs:   
Recent Results (from the past 24 hour(s)) GLUCOSE, POC  Collection Time: 19 11:29 AM  
 Result Value Ref Range Glucose (POC) 288 (H) 70 - 110 mg/dL GLUCOSE, POC Collection Time: 04/26/19  4:22 PM  
Result Value Ref Range Glucose (POC) 154 (H) 70 - 110 mg/dL OCCULT BLOOD, STOOL Collection Time: 04/26/19  8:40 PM  
Result Value Ref Range Occult blood, stool NEGATIVE  NEG    
GLUCOSE, POC Collection Time: 04/26/19  9:31 PM  
Result Value Ref Range Glucose (POC) 238 (H) 70 - 110 mg/dL METABOLIC PANEL, BASIC Collection Time: 04/27/19  3:53 AM  
Result Value Ref Range Sodium 141 136 - 145 mmol/L Potassium 4.1 3.5 - 5.5 mmol/L Chloride 114 (H) 100 - 108 mmol/L  
 CO2 20 (L) 21 - 32 mmol/L Anion gap 7 3.0 - 18 mmol/L Glucose 125 (H) 74 - 99 mg/dL BUN 49 (H) 7.0 - 18 MG/DL Creatinine 1.25 0.6 - 1.3 MG/DL  
 BUN/Creatinine ratio 39 (H) 12 - 20 GFR est AA 50 (L) >60 ml/min/1.73m2 GFR est non-AA 41 (L) >60 ml/min/1.73m2 Calcium 8.4 (L) 8.5 - 10.1 MG/DL  
CBC WITH AUTOMATED DIFF Collection Time: 04/27/19  3:53 AM  
Result Value Ref Range WBC 2.5 (L) 4.6 - 13.2 K/uL  
 RBC 2.59 (L) 4.20 - 5.30 M/uL HGB 7.5 (L) 12.0 - 16.0 g/dL HCT 22.1 (L) 35.0 - 45.0 % MCV 85.3 74.0 - 97.0 FL  
 MCH 29.0 24.0 - 34.0 PG  
 MCHC 33.9 31.0 - 37.0 g/dL  
 RDW 14.6 (H) 11.6 - 14.5 % PLATELET 52 (L) 921 - 420 K/uL MPV 9.5 9.2 - 11.8 FL  
 NEUTROPHILS 69 40 - 73 % LYMPHOCYTES 20 (L) 21 - 52 % MONOCYTES 9 3 - 10 % EOSINOPHILS 2 0 - 5 % BASOPHILS 0 0 - 2 %  
 ABS. NEUTROPHILS 1.7 (L) 1.8 - 8.0 K/UL  
 ABS. LYMPHOCYTES 0.5 (L) 0.9 - 3.6 K/UL  
 ABS. MONOCYTES 0.2 0.05 - 1.2 K/UL  
 ABS. EOSINOPHILS 0.1 0.0 - 0.4 K/UL  
 ABS. BASOPHILS 0.0 0.0 - 0.1 K/UL  
 DF AUTOMATED MAGNESIUM Collection Time: 04/27/19  3:53 AM  
Result Value Ref Range Magnesium 2.0 1.6 - 2.6 mg/dL GLUCOSE, POC Collection Time: 04/27/19  7:49 AM  
Result Value Ref Range Glucose (POC) 148 (H) 70 - 110 mg/dL Signed By: Mary Stewart MD   
 April 27, 2019

## 2019-04-27 NOTE — PROGRESS NOTES
Still awaiting therapy evals. Will transfer to medical bed with plan for d/c in AM pending PT/OT recommendations.

## 2019-04-28 LAB
ANION GAP SERPL CALC-SCNC: 9 MMOL/L (ref 3–18)
BASOPHILS # BLD: 0 K/UL (ref 0–0.1)
BASOPHILS NFR BLD: 0 % (ref 0–2)
BUN SERPL-MCNC: 50 MG/DL (ref 7–18)
BUN/CREAT SERPL: 38 (ref 12–20)
CALCIUM SERPL-MCNC: 8.5 MG/DL (ref 8.5–10.1)
CHLORIDE SERPL-SCNC: 111 MMOL/L (ref 100–108)
CO2 SERPL-SCNC: 19 MMOL/L (ref 21–32)
CREAT SERPL-MCNC: 1.3 MG/DL (ref 0.6–1.3)
DIFFERENTIAL METHOD BLD: ABNORMAL
EOSINOPHIL # BLD: 0.1 K/UL (ref 0–0.4)
EOSINOPHIL NFR BLD: 4 % (ref 0–5)
ERYTHROCYTE [DISTWIDTH] IN BLOOD BY AUTOMATED COUNT: 14.4 % (ref 11.6–14.5)
GLUCOSE BLD STRIP.AUTO-MCNC: 138 MG/DL (ref 70–110)
GLUCOSE BLD STRIP.AUTO-MCNC: 182 MG/DL (ref 70–110)
GLUCOSE BLD STRIP.AUTO-MCNC: 198 MG/DL (ref 70–110)
GLUCOSE BLD STRIP.AUTO-MCNC: 212 MG/DL (ref 70–110)
GLUCOSE SERPL-MCNC: 116 MG/DL (ref 74–99)
HCT VFR BLD AUTO: 22.2 % (ref 35–45)
HGB BLD-MCNC: 7.4 G/DL (ref 12–16)
LYMPHOCYTES # BLD: 0.5 K/UL (ref 0.9–3.6)
LYMPHOCYTES NFR BLD: 19 % (ref 21–52)
MCH RBC QN AUTO: 28.8 PG (ref 24–34)
MCHC RBC AUTO-ENTMCNC: 33.3 G/DL (ref 31–37)
MCV RBC AUTO: 86.4 FL (ref 74–97)
MONOCYTES # BLD: 0.3 K/UL (ref 0.05–1.2)
MONOCYTES NFR BLD: 9 % (ref 3–10)
NEUTS SEG # BLD: 1.9 K/UL (ref 1.8–8)
NEUTS SEG NFR BLD: 68 % (ref 40–73)
PLATELET # BLD AUTO: 58 K/UL (ref 135–420)
PMV BLD AUTO: 9.9 FL (ref 9.2–11.8)
POTASSIUM SERPL-SCNC: 4.2 MMOL/L (ref 3.5–5.5)
RBC # BLD AUTO: 2.57 M/UL (ref 4.2–5.3)
SODIUM SERPL-SCNC: 139 MMOL/L (ref 136–145)
WBC # BLD AUTO: 2.8 K/UL (ref 4.6–13.2)

## 2019-04-28 PROCEDURE — 82962 GLUCOSE BLOOD TEST: CPT

## 2019-04-28 PROCEDURE — 77030038269 HC DRN EXT URIN PURWCK BARD -A

## 2019-04-28 PROCEDURE — 36415 COLL VENOUS BLD VENIPUNCTURE: CPT

## 2019-04-28 PROCEDURE — 80048 BASIC METABOLIC PNL TOTAL CA: CPT

## 2019-04-28 PROCEDURE — 97162 PT EVAL MOD COMPLEX 30 MIN: CPT

## 2019-04-28 PROCEDURE — 74011250637 HC RX REV CODE- 250/637: Performed by: INTERNAL MEDICINE

## 2019-04-28 PROCEDURE — 74011250637 HC RX REV CODE- 250/637: Performed by: FAMILY MEDICINE

## 2019-04-28 PROCEDURE — 77010033678 HC OXYGEN DAILY

## 2019-04-28 PROCEDURE — 85025 COMPLETE CBC W/AUTO DIFF WBC: CPT

## 2019-04-28 PROCEDURE — 74011250636 HC RX REV CODE- 250/636: Performed by: INTERNAL MEDICINE

## 2019-04-28 PROCEDURE — 97116 GAIT TRAINING THERAPY: CPT

## 2019-04-28 PROCEDURE — 74011636637 HC RX REV CODE- 636/637: Performed by: INTERNAL MEDICINE

## 2019-04-28 PROCEDURE — 65270000029 HC RM PRIVATE

## 2019-04-28 RX ADMIN — CARVEDILOL 12.5 MG: 12.5 TABLET, FILM COATED ORAL at 16:46

## 2019-04-28 RX ADMIN — HYDRALAZINE HYDROCHLORIDE 25 MG: 25 TABLET ORAL at 22:13

## 2019-04-28 RX ADMIN — Medication 1 CAPSULE: at 08:19

## 2019-04-28 RX ADMIN — FERROUS SULFATE TAB 325 MG (65 MG ELEMENTAL FE) 325 MG: 325 (65 FE) TAB at 08:19

## 2019-04-28 RX ADMIN — ONDANSETRON 4 MG: 2 INJECTION INTRAMUSCULAR; INTRAVENOUS at 10:44

## 2019-04-28 RX ADMIN — HYDRALAZINE HYDROCHLORIDE 25 MG: 25 TABLET ORAL at 08:19

## 2019-04-28 RX ADMIN — OXYCODONE HYDROCHLORIDE AND ACETAMINOPHEN 1 TABLET: 5; 325 TABLET ORAL at 01:51

## 2019-04-28 RX ADMIN — INSULIN LISPRO 2 UNITS: 100 INJECTION, SOLUTION INTRAVENOUS; SUBCUTANEOUS at 22:13

## 2019-04-28 RX ADMIN — HYDRALAZINE HYDROCHLORIDE 25 MG: 25 TABLET ORAL at 16:46

## 2019-04-28 RX ADMIN — INSULIN LISPRO 4 UNITS: 100 INJECTION, SOLUTION INTRAVENOUS; SUBCUTANEOUS at 12:28

## 2019-04-28 RX ADMIN — FUROSEMIDE 20 MG: 20 TABLET ORAL at 16:48

## 2019-04-28 RX ADMIN — CARVEDILOL 12.5 MG: 12.5 TABLET, FILM COATED ORAL at 08:19

## 2019-04-28 RX ADMIN — APIXABAN 2.5 MG: 2.5 TABLET, FILM COATED ORAL at 08:19

## 2019-04-28 RX ADMIN — ONDANSETRON 4 MG: 2 INJECTION INTRAMUSCULAR; INTRAVENOUS at 16:51

## 2019-04-28 RX ADMIN — FUROSEMIDE 20 MG: 20 TABLET ORAL at 08:19

## 2019-04-28 RX ADMIN — AMLODIPINE BESYLATE 10 MG: 10 TABLET ORAL at 08:19

## 2019-04-28 RX ADMIN — INSULIN LISPRO 2 UNITS: 100 INJECTION, SOLUTION INTRAVENOUS; SUBCUTANEOUS at 16:46

## 2019-04-28 RX ADMIN — APIXABAN 2.5 MG: 2.5 TABLET, FILM COATED ORAL at 22:13

## 2019-04-28 RX ADMIN — SIMVASTATIN 20 MG: 20 TABLET, FILM COATED ORAL at 08:19

## 2019-04-28 NOTE — ROUTINE PROCESS
Bedside shift change report given to Azael Garcia Se (oncoming nurse) by Rj Olguni RN (offgoing nurse). Report included the following information SBAR, Intake/Output, MAR and Recent Results.

## 2019-04-28 NOTE — PROGRESS NOTES
[facility] Hospitalist Group Progress Note Patient: Delfina Davis Age: 77 y.o. : 1940 MR#: [id number] SSN: [id number] Date 2019 Subjective/24-hour events:  
 
Up in chair at bedside. No worsening SOB overnight, denies dizziness/lightheadedness and chest pain. Just finished working with PT. Assessment:  
Pancytopenia Acute LLE DVT dx 3/2019 Iron deficiency anemia BLOOM cirrhosis with portal HTN and esophageal varices Hx breast stage 3 colon cancer and stage 2 L breast cancer, both in remission Plan: SNF disposition recommended by PT and patient is amenable. Have made [facility] aware and hope to have placement available by tomorrow if she remains medically stable. Supportive care as ordered in interim  Follow. Case discussed with:  [x]Patient  []Family  [x]Nursing  []Case Management DVT Prophylaxis:  []Lovenox  []Hep SQ  []SCDs  []Coumadin   []On Heparin gtt Objective:  
VS:  
Visit Vitals /75 (BP 1 Location: Right arm, BP Patient Position: Sitting) Pulse 80 Temp 97.5 °F (36.4 °C) Resp 16 Ht 5' (1.524 m) Wt 69.2 kg (152 lb 9.6 oz) SpO2 94% Breastfeeding? No  
BMI 29.80 kg/m² Tmax/24hrs: Temp (24hrs), Av.2 °F (36.8 °C), Min:97.5 °F (36.4 °C), Max:98.9 °F (37.2 °C) Intake/Output Summary (Last 24 hours) at 2019 1105 Last data filed at 2019 5460 Gross per 24 hour Intake 580 ml Output 1300 ml Net -720 ml General:  In NAD. Nontoxic-appearing. Cardiovascular:  RRR. Pulmonary:  No active wheezing, effort nonlabored. GI:  Abdomen soft, NTTP. Extremities:  Warm, no ischemia. No LE pitting edema. Neuro:  Awake and alert, motor grossly nonfocal. 
 
 
Labs:   
Recent Results (from the past 24 hour(s)) GLUCOSE, POC Collection Time: 19 11:32 AM  
Result Value Ref Range Glucose (POC) 234 (H) 70 - 110 mg/dL GLUCOSE, POC Collection Time: 19  4:05 PM  
Result Value Ref Range Glucose (POC) 156 (H) 70 - 110 mg/dL GLUCOSE, POC Collection Time: 04/27/19  9:39 PM  
Result Value Ref Range Glucose (POC) 176 (H) 70 - 110 mg/dL METABOLIC PANEL, BASIC Collection Time: 04/28/19  4:41 AM  
Result Value Ref Range Sodium 139 136 - 145 mmol/L Potassium 4.2 3.5 - 5.5 mmol/L Chloride 111 (H) 100 - 108 mmol/L  
 CO2 19 (L) 21 - 32 mmol/L Anion gap 9 3.0 - 18 mmol/L Glucose 116 (H) 74 - 99 mg/dL BUN 50 (H) 7.0 - 18 MG/DL Creatinine 1.30 0.6 - 1.3 MG/DL  
 BUN/Creatinine ratio 38 (H) 12 - 20 GFR est AA 48 (L) >60 ml/min/1.73m2 GFR est non-AA 40 (L) >60 ml/min/1.73m2 Calcium 8.5 8.5 - 10.1 MG/DL  
CBC WITH AUTOMATED DIFF Collection Time: 04/28/19  4:41 AM  
Result Value Ref Range WBC 2.8 (L) 4.6 - 13.2 K/uL  
 RBC 2.57 (L) 4.20 - 5.30 M/uL HGB 7.4 (L) 12.0 - 16.0 g/dL HCT 22.2 (L) 35.0 - 45.0 % MCV 86.4 74.0 - 97.0 FL  
 MCH 28.8 24.0 - 34.0 PG  
 MCHC 33.3 31.0 - 37.0 g/dL  
 RDW 14.4 11.6 - 14.5 % PLATELET 58 (L) 503 - 420 K/uL MPV 9.9 9.2 - 11.8 FL  
 NEUTROPHILS 68 40 - 73 % LYMPHOCYTES 19 (L) 21 - 52 % MONOCYTES 9 3 - 10 % EOSINOPHILS 4 0 - 5 % BASOPHILS 0 0 - 2 %  
 ABS. NEUTROPHILS 1.9 1.8 - 8.0 K/UL  
 ABS. LYMPHOCYTES 0.5 (L) 0.9 - 3.6 K/UL  
 ABS. MONOCYTES 0.3 0.05 - 1.2 K/UL  
 ABS. EOSINOPHILS 0.1 0.0 - 0.4 K/UL  
 ABS. BASOPHILS 0.0 0.0 - 0.1 K/UL  
 DF AUTOMATED    
GLUCOSE, POC Collection Time: 04/28/19  8:08 AM  
Result Value Ref Range Glucose (POC) 138 (H) 70 - 110 mg/dL Signed By: Nadya Shanks MD   
 April 28, 2019

## 2019-04-28 NOTE — PROGRESS NOTES
0730-Received report assumed care. She is in bed resting quietly. She denies pain, discomfort and shortness of breath. Call light within reach. 1030-Responded to nurse call light. She reports vomiting. Looked into trash can and there was a small amount of emesis noted. Will administer PRN Zofran IV. 
 
1915-Shift change report given to Amy Rivera RN.

## 2019-04-28 NOTE — PROGRESS NOTES
DISCHARGE PLANNING: 
This writer spoke with this pt regarding discharge planning for this pt selected [facility] as her first choice and [facility] as her second choice. Both facilities notified of this request. 
[facility] [facility]- telephone message [place] Care Manager

## 2019-04-28 NOTE — ROUTINE PROCESS
Bedside shift change report given to Jamia Dawson (oncoming nurse) by Devon Bamberger, RN  (offgoing nurse). Report included the following information SBAR, MAR, KARDEX AND RECENT RESULTS

## 2019-04-29 LAB
GLUCOSE BLD STRIP.AUTO-MCNC: 120 MG/DL (ref 70–110)
GLUCOSE BLD STRIP.AUTO-MCNC: 143 MG/DL (ref 70–110)
GLUCOSE BLD STRIP.AUTO-MCNC: 183 MG/DL (ref 70–110)
GLUCOSE BLD STRIP.AUTO-MCNC: 213 MG/DL (ref 70–110)

## 2019-04-29 PROCEDURE — 77010033678 HC OXYGEN DAILY

## 2019-04-29 PROCEDURE — 97530 THERAPEUTIC ACTIVITIES: CPT

## 2019-04-29 PROCEDURE — 97535 SELF CARE MNGMENT TRAINING: CPT

## 2019-04-29 PROCEDURE — 65270000029 HC RM PRIVATE

## 2019-04-29 PROCEDURE — 74011636637 HC RX REV CODE- 636/637: Performed by: FAMILY MEDICINE

## 2019-04-29 PROCEDURE — 74011250637 HC RX REV CODE- 250/637: Performed by: INTERNAL MEDICINE

## 2019-04-29 PROCEDURE — 74011636637 HC RX REV CODE- 636/637: Performed by: INTERNAL MEDICINE

## 2019-04-29 PROCEDURE — 82962 GLUCOSE BLOOD TEST: CPT

## 2019-04-29 PROCEDURE — 97165 OT EVAL LOW COMPLEX 30 MIN: CPT

## 2019-04-29 PROCEDURE — 77030038269 HC DRN EXT URIN PURWCK BARD -A

## 2019-04-29 PROCEDURE — 74011250637 HC RX REV CODE- 250/637: Performed by: FAMILY MEDICINE

## 2019-04-29 RX ORDER — INSULIN GLARGINE 100 [IU]/ML
5 INJECTION, SOLUTION SUBCUTANEOUS DAILY
Status: DISCONTINUED | OUTPATIENT
Start: 2019-04-29 | End: 2019-05-01 | Stop reason: HOSPADM

## 2019-04-29 RX ADMIN — AMLODIPINE BESYLATE 10 MG: 10 TABLET ORAL at 08:32

## 2019-04-29 RX ADMIN — FERROUS SULFATE TAB 325 MG (65 MG ELEMENTAL FE) 325 MG: 325 (65 FE) TAB at 08:32

## 2019-04-29 RX ADMIN — CARVEDILOL 12.5 MG: 12.5 TABLET, FILM COATED ORAL at 08:32

## 2019-04-29 RX ADMIN — TRAMADOL HYDROCHLORIDE 50 MG: 50 TABLET, FILM COATED ORAL at 08:32

## 2019-04-29 RX ADMIN — INSULIN LISPRO 4 UNITS: 100 INJECTION, SOLUTION INTRAVENOUS; SUBCUTANEOUS at 14:26

## 2019-04-29 RX ADMIN — HYDRALAZINE HYDROCHLORIDE 25 MG: 25 TABLET ORAL at 08:32

## 2019-04-29 RX ADMIN — INSULIN GLARGINE 5 UNITS: 100 INJECTION, SOLUTION SUBCUTANEOUS at 17:41

## 2019-04-29 RX ADMIN — SIMVASTATIN 20 MG: 20 TABLET, FILM COATED ORAL at 08:31

## 2019-04-29 RX ADMIN — INSULIN LISPRO 3 UNITS: 100 INJECTION, SOLUTION INTRAVENOUS; SUBCUTANEOUS at 16:49

## 2019-04-29 RX ADMIN — CARVEDILOL 12.5 MG: 12.5 TABLET, FILM COATED ORAL at 17:41

## 2019-04-29 RX ADMIN — APIXABAN 2.5 MG: 2.5 TABLET, FILM COATED ORAL at 08:32

## 2019-04-29 RX ADMIN — APIXABAN 2.5 MG: 2.5 TABLET, FILM COATED ORAL at 22:00

## 2019-04-29 RX ADMIN — HYDRALAZINE HYDROCHLORIDE 25 MG: 25 TABLET ORAL at 16:48

## 2019-04-29 RX ADMIN — Medication 1 CAPSULE: at 08:32

## 2019-04-29 RX ADMIN — FUROSEMIDE 20 MG: 20 TABLET ORAL at 19:15

## 2019-04-29 RX ADMIN — HYDRALAZINE HYDROCHLORIDE 25 MG: 25 TABLET ORAL at 22:00

## 2019-04-29 RX ADMIN — FUROSEMIDE 20 MG: 20 TABLET ORAL at 08:32

## 2019-04-29 NOTE — DIABETES MGMT
Glycemic Control Plan of Care 
 
T2DM with current A1c of 5.2% (3/25/2019). Home diabetes medications:  
Lantus insulin 5 units daily. Humalog sliding scale insulin. POC BG range on 4/28/2019: 843-145 POC BG report on 4/29/2019 at time of review: 143, 213, 183 mg/dl. Patient seen this morning and stated that she's planning to attend diabetes classes once she is medically stable and able to travel. Recommendation(s): 
1.) Continue inpatient glycemic monitoring 
2.) Modified correctional lispro insulin to very resistant dose. 3.) Consider adding daily basal lantus insulin 5 units (home dose)  Because BG remain above target range. Called Dr. Dayanara Cowart and obtained order. Assessment: 
Patient is 77year old with past medical history including type 2 diabetes mellitus, CAD s/p stent, left breast CA, hypercholesterolemia, hypertension, liver cirrhosis, thrombocytopenia and recent left leg DVT- was admitted on 4/25/2019 with report of shortness of breath. Noted: 
Pancytopenia. Acute LLE DVT on March 2019 admission. Iron deficiency anemia. BLOOM cirrhosis. H/O left breast cancer and colon cancer - on remission. 
T2DM. Most recent blood glucose values: 
 
Results for Matias Almonte (MRN [id number]) as of 4/29/2019 15:51 Ref. Range 4/28/2019 08:08 4/28/2019 11:34 4/28/2019 16:23 4/28/2019 20:14 GLUCOSE,FAST - POC Latest Ref Range: 70 - 110 mg/dL 138 (H) 212 (H) 198 (H) 182 (H) Results for Matias Almonte (MRN [id number]) as of 4/29/2019 15:51 Ref. Range 4/29/2019 07:38 4/29/2019 11:06 4/29/2019 15:36 GLUCOSE,FAST - POC Latest Ref Range: 70 - 110 mg/dL 143 (H) 213 (H) 183 (H) Current A1C: 5.2% (3/25/2019) which is equivalent to estimated average blood glucose of 103 mg/dl during the past 2-3 months. Current hospital diabetes medications: 
Basal lantus insulin 5 units daily, first dose ordered 4/29/2019. Correctional lispro insulin ACHS. Normal sensitivity dose. Total daily dose insulin requirement previous day: 4/28/2019: 
Lispro: 8 units Home diabetes medications:  
Lantus insulin 5 units daily. Humalog sliding scale insulin. Diet: Cardiac regular; consistent carb 1800kcal. 
 
Goals:  Blood glucose will be within target range of  mg/dL by 5/02/2019. Education:  ___  Refer to Diabetes Education Record _X__  Education not indicated at this time Segundo Moulton RN [facility] Pager: [phone]

## 2019-04-29 NOTE — CDMP QUERY
Diastolic heart failure is noted in the History and Physical.  Could you please further specify if patient is being treated for: ? Diastolic congestive heart failure, chronic ? Diastolic congestive heart failure, decompensated / acute,  on chronic 
? Other Explanation of the clinical findings ? Clinically Undetermined (no explanation for clinical findings) The medical record reflects the following clinical findings, risk factors and treatment:  
 
-----> Risk Factors:  77 yr old with multiple comorbidities and hx diastolic CHF  
 
-----> Clinical Indicators: * 4-25-19: NT pro-BNP:  1,569 * 4-25-19 H&P:  \". Bryon Christianson Pulmonary edema etiology possibly 2 ry to DHF Vs uncontrolled BP Vs aortic and Mitral stenosis. Bryon Christianson \" 
     * 3-25-19 Echo:   \"Estimated left ventricular ejection fraction is 56 - 60%. Visually measured ejection fraction. Left ventricular moderate concentric hypertrophy. No regional wall motion abnormality noted. · Moderate aortic valve stenosis is present. · Aortic valve peak gradient is 39 mmHg. Aortic valve mean gradient is 25 mmHg. Aortic valve area is 1 cm2. · Moderate mitral valve stenosis. Mild mitral valve regurgitation. · Mitral valve peak gradient is 11 mmHg. Mitral valve mean gradient is 6 mmHg. Mitral valve area is 1.6 cm2. · Right atrial cavity size is moderately dilated. · Right ventricular cavity size is mildly dilated. Left atrial cavity size is moderately dilated. \" 
 
-----> Treatment: CXR; lasix IV and po;  
 
Please clarify and document your clinical opinion in the progress notes and discharge summary including the definitive and/or presumptive diagnosis, (suspected or probable), related to the above clinical findings. Please include clinical findings supporting your diagnosis. Thank you Salima Arceo RN 
[phone]

## 2019-04-29 NOTE — PROGRESS NOTES
Problem: Self Care Deficits Care Plan (Adult) Goal: *Acute Goals and Plan of Care (Insert Text) Description Occupational Therapy Goals Initiated 4/29/2019 within 7 day(s). 1.  Patient will perform lower body dressing with supervision/set-up 2. Patient will perform toileting with supervision/set-up. 3.  Patient will perform toilet transfer with supervision/set-up. 4.  Patient will perform a functional activity while standing with supervision/set-up for 5 minutes. 5.  Patient will participate in upper extremity therapeutic exercise/activities with supervision/set-up for 8 minutes. 6.  Patient will utilize energy conservation techniques during functional activities with minimal verbal cues. Prior Level of Function: Pt reports she lives alone in a [facility]. She was (I) with basic self-care/ADLs and IADLs, including cooking, cleaning, laundry, grocery shopping, medication management, and driving PTA. Outcome: Progressing Towards Goal 
 OCCUPATIONAL THERAPY EVALUATION Patient: Priya Bruce (2 y.o. female) Date: 4/29/2019 Primary Diagnosis: Acute pulmonary edema (HCC) [J81.0] Malaise and fatigue [R53.81, R53.83] Palpitations [R00.2] Precautions:  Fall PLOF: See goals stated above ASSESSMENT : 
Pt cleared to participate in OT evaluation by RN. Upon entering room, pt supine with HOB elevated, alert, and agreeable to therapy session. Based on the objective data described below, the patient presents with decreased strength, decreased endurance, decreased functional balance, and decreased functional mobility, affecting her performance in basic self-care/ADL tasks. Upon arrival, pt immediately requested to use the bathroom d/t Bm. Pt is able to perform bed mobility with supervision and ambulate to the bathroom with CGA while using a Rw with no LOB noted. She is able to perform pericare while standing with supervision, with assist of grab bar. Pt returned to bed, where she was able to perform LB dressing with SBA using cross-legs method. Educated pt on energy conservation, pacing, and pursed-lip breathing, with pt demonstrating good understanding. Provided pt with a handout on energy conservation techniques with pt reporting she will read handout post-session. The pt will benefit from further OT services, in order to maximize her ADL performance and decrease the risk for complications associated with decreased functional activity. Patient will benefit from skilled intervention to address the above impairments. Patient's rehabilitation potential is considered to be Good Factors which may influence rehabilitation potential include: ? None noted ? Mental ability/status ? Medical condition ? Home/family situation and support systems ? Safety awareness ? Pain tolerance/management ? Other: PLAN : 
Recommendations and Planned Interventions:  
?               Self Care Training                  ? Therapeutic Activities ? Functional Mobility Training   ? Cognitive Retraining 
? Therapeutic Exercises           ? Endurance Activities ? Balance Training                    ? Neuromuscular Re-Education ? Visual/Perceptual Training     ? Home Safety Training 
? Patient Education                   ? Family Training/Education ? Other (comment): Frequency/Duration: Patient will be followed by occupational therapy 1-2 times per day/4-7 days per week to address goals. Discharge Recommendations: Inpatient Rehab Further Equipment Recommendations for Discharge: TBD at the next level of care SUBJECTIVE:  
Patient stated ? I feel better without it (O2)? OBJECTIVE DATA SUMMARY:  
 
Past Medical History:  
Diagnosis Date Anemia CAD (coronary artery disease) Coronary Stenting needed, hematology workup needed prior to stent placement, treating medically at this time Cancer [facility])   
 colon Cancer ([facility] [address] breast - left Cardiac catheterization 08/17/2016 LM patent. mLAD 99.9% (2.25 x 28-mm Xience BETHEL, resid 0%). oD1 60%. oD2 95%. pCX 90% (2.5 x 13-mm Xience BETHEL, resid 0%). OMB patent. Cardiac echocardiogram 07/01/2016 EF 50%. Basal inferior, basal-mid inferolateral hypk. Gr 1 DDfx. RVSP 50-60 mmHg. Mild LAE. Mod, eccentric MR. Diabetes ([facility] [address] Hypercholesteremia Hypertension Thrombocytopenia ([facility] [address] Past Surgical History:  
Procedure Laterality Date CARDIAC CATHETERIZATION  7/16/2015  
  no stents at this time r/t anemia & thrombocytopenia (hematology work-up needed prior to stent placement) CARDIAC CATHETERIZATION  7/11/2016 CARDIAC CATHETERIZATION  8/17/2016 CORONARY ARTERY ANGIOGRAM  7/16/2015 CORONARY ARTERY ANGIOGRAM  7/11/2016 CORONARY ARTERY ANGIOGRAM  8/17/2016 CORONARY STENT EA ADDL VESSEL  8/17/2016 CORONARY STENT SINGLE W/PTCA  8/17/2016 HX GI    
 colon surg secondary to cancer - removed lesion HX HEENT  2/2013  
 cataract bilaterally HX MASTECTOMY  march 2011  
 left HX VASCULAR ACCESS    
 mediport - now removed LV ANGIOGRAPHY  7/16/2015 LV ANGIOGRAPHY  7/11/2016 Barriers to Learning/Limitations: None Compensate with: visual, verbal, tactile, kinesthetic cues/model Home Situation:  
Home Situation Home Environment: Private residence # Steps to Enter: 0 One/Two Story Residence: Two story # of Interior Steps: 15 Living Alone: Yes Support Systems: Family member(s) Patient Expects to be Discharged to[de-identified] Skilled nursing facility Current DME Used/Available at Home: Walker, rolling; Shower chair; Raised toilet seat Tub or Shower Type: Tub/Shower combination ?  Right hand dominant   ? Left hand dominant Cognitive/Behavioral Status: 
Neurologic State: Alert Orientation Level: Appropriate for age Cognition: Follows commands Safety/Judgement: Fall prevention Skin: Visible skin appeared intact Edema: None noted Coordination: BUE Coordination: Within functional limits Balance: 
Sitting: Intact Standing: Impaired; With support Standing - Static: Fair Standing - Dynamic : Fair Strength: BUE Strength: Generally decreased, functional 
 
 
Tone & Sensation: BUE Tone: Normal 
Sensation: Intact Range of Motion: BUE 
AROM: Within functional limits Functional Mobility and Transfers for ADLs: 
Bed Mobility: 
Supine to Sit: Stand-by assistance Sit to Supine: Stand-by assistance Scooting: Supervision Transfers: 
Sit to Stand: Contact guard assistance Toilet Transfer : Contact guard assistance Bathroom Mobility: Contact guard assistance ADL Assessment:  
Feeding: Setup Oral Facial Hygiene/Grooming: Setup Bathing: Contact guard assistance Upper Body Dressing: Supervision Lower Body Dressing: Stand-by assistance Toileting: Contact guard assistance ADL Intervention: Lower Body Dressing Assistance Dressing Assistance: Stand-by assistance Socks: Stand-by assistance Leg Crossed Method Used: Yes Position Performed: Seated edge of bed Toileting Toileting Assistance: Contact guard assistance for ambulation from bed to toilet with RW. No LOB noted. Bowel Hygiene: Supervision/set-up Cognitive Retraining Safety/Judgement: Fall prevention Pain: 
Pain level pre-treatment: 0/10 Pain level post-treatment: 0/10 Pain Intervention(s): Medication (see MAR); Rest, Ice, Repositioning Response to intervention: Nurse notified, See doc flow Activity Tolerance:  
Good Please refer to the flowsheet for vital signs taken during this treatment. After treatment:  
? Patient left in no apparent distress sitting up in chair ? Patient left in no apparent distress in bed 
? Call bell left within reach ? Nursing notified ? Caregiver present ? Bed alarm activated COMMUNICATION/EDUCATION:  
? Role of Occupational Therapy in the acute care setting 
? Home safety education was provided and the patient/caregiver indicated understanding. ? Patient/family have participated as able in goal setting and plan of care. ? Patient/family agree to work toward stated goals and plan of care. ? Patient understands intent and goals of therapy, but is neutral about his/her participation. ? Patient is unable to participate in goal setting and plan of care. Thank you for this referral. 
Lucian Klein OT Time Calculation: 39 mins Eval Complexity: History: MEDIUM Complexity : Expanded review of history including physical, cognitive and psychosocial  history ; Examination: LOW Complexity : 1-3 performance deficits relating to physical, cognitive , or psychosocial skils that result in activity limitations and / or participation restrictions ; Decision Making:LOW Complexity : No comorbidities that affect functional and no verbal or physical assistance needed to complete eval tasks

## 2019-04-29 NOTE — NURSE NAVIGATOR
Rehab unable to accept patient. Awaiting bed availability at [facility]. Will try and get patient to choose another facility in the meantime. Dereck Faria. Alex, BSN, RN Care Management [phone]

## 2019-04-29 NOTE — PROGRESS NOTES
Bedside and Verbal shift change report received from  Skyler Jarrell RN (off going nurse). Report included the following information SBAR, Kardex, MAR and Recent Results. Assumed care patient in bed awake. Fall prevention program maintained,call bell and bedside table within reach. No problems identified at this time,no complaints made by patient ,will continue care. 1931- Bedside and Verbal shift change report given to Darylene Dike, RN (oncoming nurse) by Radha Clement RN (offgoing nurse).  Report included the following information SBAR, Kardex, MAR and Recent Results.

## 2019-04-29 NOTE — PROGRESS NOTES
Problem: Falls - Risk of 
Goal: *Absence of Falls Description Document Dexter Dc Fall Risk and appropriate interventions in the flowsheet. Outcome: Progressing Towards Goal 
  
Problem: Pressure Injury - Risk of 
Goal: *Prevention of pressure injury Description Document Kyle Scale and appropriate interventions in the flowsheet. Outcome: Progressing Towards Goal 
  
Problem: Pain Goal: *Control of Pain Outcome: Progressing Towards Goal

## 2019-04-29 NOTE — PROGRESS NOTES
[facility] Progress Note Patient: Channing Marr Age: 77 y.o. : 1940 MR#: [id number] SSN: [id number] Date 2019 Subjective/24-hour events: No new issues overnight, continues to feel improved overall relative to admission. Denies worsening CP/SOB. Assessment:  
Pancytopenia Acute LLE DVT dx 3/2019 Iron deficiency anemia BLOOM cirrhosis with portal HTN and esophageal varices Hx breast stage 3 colon cancer and stage 2 L breast cancer, both in remission Diastolic HF ruled out Plan: H/H stable, no need for transfusion. Await OT evaluation and decision on possible acceptance to ARU. If not ARU candidate, will proceed with SNF placement. Supportive care in interim. Follow. Case discussed with:  [x]Patient  []Family  []Nursing  [x]Case Management DVT Prophylaxis:  []Lovenox  []Hep SQ  []SCDs  []Coumadin   []On Heparin gtt Objective:  
VS:  
Visit Vitals /64 Pulse 71 Temp 98.2 °F (36.8 °C) Resp 18 Ht 5' (1.524 m) Wt 66 kg (145 lb 6.4 oz) SpO2 98% Breastfeeding? No  
BMI 28.40 kg/m² Tmax/24hrs: Temp (24hrs), Av.2 °F (36.8 °C), Min:97.4 °F (36.3 °C), Max:99 °F (37.2 °C) Intake/Output Summary (Last 24 hours) at 2019 1134 Last data filed at 2019 9081 Gross per 24 hour Intake 600 ml Output 700 ml Net -100 ml General:  In NAD. Nontoxic-appearing. Cardiovascular:  RRR. Pulmonary:  No active wheezing, effort nonlabored. GI:  Abdomen soft, NTTP. Extremities:  Warm, no ischemia. No LE pitting edema. Neuro:  Awake and alert, motor grossly nonfocal. 
 
 
Labs:   
Recent Results (from the past 24 hour(s)) GLUCOSE, POC Collection Time: 19  4:23 PM  
Result Value Ref Range Glucose (POC) 198 (H) 70 - 110 mg/dL GLUCOSE, POC Collection Time: 19  8:14 PM  
Result Value Ref Range Glucose (POC) 182 (H) 70 - 110 mg/dL GLUCOSE, POC  
 Collection Time: 04/29/19  7:38 AM  
Result Value Ref Range Glucose (POC) 143 (H) 70 - 110 mg/dL GLUCOSE, POC Collection Time: 04/29/19 11:06 AM  
Result Value Ref Range Glucose (POC) 213 (H) 70 - 110 mg/dL Signed By: Ismael Finch MD   
 April 29, 2019

## 2019-04-29 NOTE — PROGRESS NOTES
ARU/IPR REFERRAL CONTACT NOTE [id number] [place] [address] for Physical Rehabilitation RE: Cipriano Cheney Thank you for the opportunity to review this patient's case for admission to [address] [place] [address] for Physical Rehabilitation. Based on our pre-admission screening:  
[x ] This patient does not meet criteria for admission to [facility] for Physical Rehabilitation due to: 
 
[x ] Documents do not reflect active medical necessity requiring close Physician involvement and Rehabilitation Nursing. Again, Thank you for this referral. Should you have any questions please do not hesitate to call. Sincerely, Sharon Shaw [facility] 
[phone]

## 2019-04-29 NOTE — PROGRESS NOTES
ARU/IPR REFERRAL CONTACT NOTE [address] [facility] Physical Rehabilitation RE: Marycruz Jeong Referral received to review this patient's case for admission to [id number] [place] [address] Physical Rehabilitation. Current status reviewed.  When appropriate, will need OT evaluation/treatment on this patient to complete the pre-admission evaluation.  Will continue to follow. Thank you for this referral.  Should you have any questions please do not hesitate to call. Sincerely, Sharon Powell Admissions [facility] Physical Rehabilitation 
[phone]

## 2019-04-29 NOTE — PROGRESS NOTES
Problem: Falls - Risk of 
Goal: *Absence of Falls Description Document Dani Castro Fall Risk and appropriate interventions in the flowsheet. Outcome: Progressing Towards Goal 
  
Problem: Patient Education: Go to Patient Education Activity Goal: Patient/Family Education Outcome: Progressing Towards Goal 
  
Problem: Pressure Injury - Risk of 
Goal: *Prevention of pressure injury Description Document Kyle Scale and appropriate interventions in the flowsheet. Outcome: Progressing Towards Goal 
  
Problem: Pain Goal: *Control of Pain Outcome: Progressing Towards Goal 
  
Problem: Patient Education: Go to Patient Education Activity Goal: Patient/Family Education Outcome: Progressing Towards Goal

## 2019-04-29 NOTE — ROUTINE PROCESS
1920 Assumed care of patient from off going nurse, Byron Keller. Patient resting in bed. No distress noted. Call bell within reach, siderails up x 3, bed in lowest position, and patient instructed to use call bell for assistance. Will continue to monitor. Family at bedside. Bed alarm activated. 
 
0703 Bedside and Verbal shift change report given to Sarah Leroy RN(oncoming nurse) by Walter Larios RN(offgoing nurse). Report included the following information Kardex, Intake/Output, MAR and Recent Results.

## 2019-04-30 LAB
GLUCOSE BLD STRIP.AUTO-MCNC: 117 MG/DL (ref 70–110)
GLUCOSE BLD STRIP.AUTO-MCNC: 145 MG/DL (ref 70–110)
GLUCOSE BLD STRIP.AUTO-MCNC: 183 MG/DL (ref 70–110)
GLUCOSE BLD STRIP.AUTO-MCNC: 192 MG/DL (ref 70–110)

## 2019-04-30 PROCEDURE — 77010033678 HC OXYGEN DAILY

## 2019-04-30 PROCEDURE — 97530 THERAPEUTIC ACTIVITIES: CPT

## 2019-04-30 PROCEDURE — 82962 GLUCOSE BLOOD TEST: CPT

## 2019-04-30 PROCEDURE — 74011636637 HC RX REV CODE- 636/637: Performed by: FAMILY MEDICINE

## 2019-04-30 PROCEDURE — 97116 GAIT TRAINING THERAPY: CPT

## 2019-04-30 PROCEDURE — 74011250637 HC RX REV CODE- 250/637: Performed by: INTERNAL MEDICINE

## 2019-04-30 PROCEDURE — 74011250637 HC RX REV CODE- 250/637: Performed by: FAMILY MEDICINE

## 2019-04-30 PROCEDURE — 65270000029 HC RM PRIVATE

## 2019-04-30 RX ADMIN — INSULIN LISPRO 3 UNITS: 100 INJECTION, SOLUTION INTRAVENOUS; SUBCUTANEOUS at 12:40

## 2019-04-30 RX ADMIN — Medication 1 CAPSULE: at 08:22

## 2019-04-30 RX ADMIN — APIXABAN 2.5 MG: 2.5 TABLET, FILM COATED ORAL at 08:22

## 2019-04-30 RX ADMIN — APIXABAN 2.5 MG: 2.5 TABLET, FILM COATED ORAL at 21:32

## 2019-04-30 RX ADMIN — FUROSEMIDE 20 MG: 20 TABLET ORAL at 17:21

## 2019-04-30 RX ADMIN — FUROSEMIDE 20 MG: 20 TABLET ORAL at 08:22

## 2019-04-30 RX ADMIN — INSULIN GLARGINE 5 UNITS: 100 INJECTION, SOLUTION SUBCUTANEOUS at 08:23

## 2019-04-30 RX ADMIN — HYDRALAZINE HYDROCHLORIDE 25 MG: 25 TABLET ORAL at 17:21

## 2019-04-30 RX ADMIN — AMLODIPINE BESYLATE 10 MG: 10 TABLET ORAL at 08:22

## 2019-04-30 RX ADMIN — HYDRALAZINE HYDROCHLORIDE 25 MG: 25 TABLET ORAL at 08:22

## 2019-04-30 RX ADMIN — FERROUS SULFATE TAB 325 MG (65 MG ELEMENTAL FE) 325 MG: 325 (65 FE) TAB at 08:22

## 2019-04-30 RX ADMIN — SIMVASTATIN 20 MG: 20 TABLET, FILM COATED ORAL at 08:22

## 2019-04-30 RX ADMIN — CARVEDILOL 12.5 MG: 12.5 TABLET, FILM COATED ORAL at 08:22

## 2019-04-30 RX ADMIN — CARVEDILOL 12.5 MG: 12.5 TABLET, FILM COATED ORAL at 17:20

## 2019-04-30 RX ADMIN — INSULIN LISPRO 3 UNITS: 100 INJECTION, SOLUTION INTRAVENOUS; SUBCUTANEOUS at 21:33

## 2019-04-30 RX ADMIN — HYDRALAZINE HYDROCHLORIDE 25 MG: 25 TABLET ORAL at 21:32

## 2019-04-30 RX ADMIN — TRAMADOL HYDROCHLORIDE 50 MG: 50 TABLET, FILM COATED ORAL at 08:22

## 2019-04-30 NOTE — PROGRESS NOTES
Problem: Mobility Impaired (Adult and Pediatric) Goal: *Acute Goals and Plan of Care (Insert Text) Description Physical Therapy Goals Initiated 4/28/2019 and to be accomplished within 7 day(s) 1. Patient will move from supine to sit and sit to supine  in bed with independence. 2.  Patient will transfer from bed to chair and chair to bed with independence using the least restrictive device. 3.  Patient will perform sit to stand with modified independence. 4.  Patient will ambulate with modified independence for 200 feet with the least restrictive device. 5.  Patient will ascend/descend 12 stairs with 1 handrail(s) with supervision/set-up. To prepare pt for home mobility. PLOF: Pt lives alone in a 2 story home with no steps to enter. Pt's  recently passed away and pt has very limited number of people in her life to assist her. Pt ambulated in her home with a RW. She recently discharged from a SNF back to her home. Outcome: Progressing Towards GoalPHYSICAL THERAPY TREATMENT Patient: Kenton Peña (80 y.o. female) Date: 4/30/2019 Diagnosis: Acute pulmonary edema (HCC) [J81.0] Malaise and fatigue [R53.81, R53.83] Palpitations [R00.2] Acute pulmonary edema (HCC) Precautions: Fall ASSESSMENT: 
Nursing cleared pt to participate with PT. Pt found semi reclined in bed on 3L of wall O2 and willing to work with PT. Pt performed bed mobility with Supervision this tx. Pt requested to perform gait training without supplemental oxygen. Pt's O2 sat's 97% on 3L. Pt stood to RW from EOB SBA and performed standing marches 30sec on RA and O2 sats dropped to 91%. Pt was put on 3L of portable O2 and ambulated into hallway with RW and SBA for 120ft and no standing rest breaks. Pt demonstrated a decreased minesh and no LOB. Pt returned to room and requested to use bathroom. Pt able to perform toileting with Supervision.  Pt returned to bed, was put back on 3L of wall O2, and was positioned to comfort. Pt was left semi reclined in bed with all needs in reach and nursing assistant present. Progression toward goals:  
?      Improving appropriately and progressing toward goals ? Improving slowly and progressing toward goals ? Not making progress toward goals and plan of care will be adjusted PLAN: 
Patient continues to benefit from skilled intervention to address the above impairments. Continue treatment per established plan of care. Discharge Recommendations:  Rehab Further Equipment Recommendations for Discharge:  rolling walker SUBJECTIVE:  
Patient stated Oh it feels so good to stand up OBJECTIVE DATA SUMMARY:  
Critical Behavior: 
Neurologic State: Alert Orientation Level: Oriented X4 Cognition: Follows commands Safety/Judgement: Fall prevention Functional Mobility Training: 
Bed Mobility: 
Supine to Sit: Supervision Sit to Supine: Supervision Scooting: Supervision Transfers: 
Sit to Stand: Stand-by assistance Stand to Sit: Stand-by assistance Balance: 
Sitting: Intact Standing: Impaired; With support Standing - Static: Good Standing - Dynamic : Fair Ambulation/Gait Training: 
Distance (ft): 180 Feet (ft)(120ft and 40ft) Assistive Device: Walker, rolling Ambulation - Level of Assistance: Stand-by assistance Gait Abnormalities: Decreased step clearance Base of Support: Center of gravity altered Speed/Adrianna: Slow;Pace decreased (<100 feet/min) Step Length: Right shortened;Left shortened Interventions: Verbal cues; Safety awareness training Pain: 
Pain level pre-treatment: 0/10 Pain level post-treatment: 0/10 Activity Tolerance:  
fair Please refer to the flowsheet for vital signs taken during this treatment. After treatment:  
? Patient left in no apparent distress sitting up in chair ? Patient left in no apparent distress in bed 
? Call bell left within reach ? Nursing assistant present ? Caregiver present ? Bed alarm activated ? SCDs applied COMMUNICATION/EDUCATION:  
?         Role of Physical Therapy in the acute care setting. ?         Fall prevention education was provided and the patient/caregiver indicated understanding. ? Patient/family have participated as able in working toward goals and plan of care. ?         Patient/family agree to work toward stated goals and plan of care. ?         Patient understands intent and goals of therapy, but is neutral about his/her participation. ? Patient is unable to participate in stated goals/plan of care: ongoing with therapy staff. ?         Other: 
 
   
Asha Pratt PTA Time Calculation: 34 mins

## 2019-04-30 NOTE — DIABETES MGMT
Glycemic Control Plan of Care 
 
T2DM with current A1c of 5.2% (3/25/2019). Home diabetes medications:  
Lantus insulin 5 units daily. Humalog sliding scale insulin. POC BG range on 4/29/2019: 120-213 mg/dl. Modified correctional lispro insulin to very resistant dose and added basal lantus insulin 5 units daily. POC BG report on 4/30/2019 at time of review: 117, 192 mg/dl. Patient stated that she was happy with BG of 117 this morning but it went up again to 192 before lunch. Patient stated that she's getting fruit juice and suspect that it made her blood sugar go up. Recommendation(s): 
1.) Modified cardiac regular consistent carb 1800kcal diet by including no consistent sweets. 2.) Continue inpatient glycemic monitoring. Consider increasing basal lantus insulin dose if BG remain above target range. Assessment: 
Patient is 77year old with past medical history including type 2 diabetes mellitus, CAD s/p stent, left breast CA, hypercholesterolemia, hypertension, liver cirrhosis, thrombocytopenia and recent left leg DVT- was admitted on 4/25/2019 with report of shortness of breath. Noted: 
Pancytopenia. Acute LLE DVT on March 2019 admission. Iron deficiency anemia. BLOOM cirrhosis. H/O left breast cancer and colon cancer - on remission. 
T2DM. Most recent blood glucose values: 
 
Results for Tj Syed (MRN [id number]) as of 4/30/2019 12:27 Ref. Range 4/29/2019 07:38 4/29/2019 11:06 4/29/2019 15:36 4/29/2019 21:59 GLUCOSE,FAST - POC Latest Ref Range: 70 - 110 mg/dL 143 (H) 213 (H) 183 (H) 120 (H) Results for Tj Syed (MRN [id number]) as of 4/30/2019 12:27 Ref. Range 4/30/2019 07:52 4/30/2019 11:27 GLUCOSE,FAST - POC Latest Ref Range: 70 - 110 mg/dL 117 (H) 192 (H) Current A1C: 5.2% (3/25/2019) which is equivalent to estimated average blood glucose of 103 mg/dl during the past 2-3 months. Current hospital diabetes medications: Basal lantus insulin 5 units daily since 4/29/2019. Correctional lispro insulin ACHS. Very resistant dose. Total daily dose insulin requirement previous day: 4/29/2019: 
Lantus: 5 units Lispro: 7 units TDD insulin: 12 units Home diabetes medications:  
Lantus insulin 5 units daily. Humalog sliding scale insulin. Diet: Cardiac regular; consistent carb 1800kcal; no concentrated sweets Goals:  Blood glucose will be within target range of  mg/dL by 5/03/2019. Education:  ___  Refer to Diabetes Education Record _X__  Education not indicated at this time Shirin Barton RN [facility] Pager: [phone]

## 2019-04-30 NOTE — CDMP QUERY
After further study, do you concur with the findings of acute kidney injury noted in the ED note and that patient is being treated / managed for:  
 
=> acute kidney injury, improving 
=> Other Explanation of the clinical findings 
=> Clinically Undetermined (no explanation for clinical findings) The medical record reflects the following clinical findings, risk factors and treatment:  
 
-----> Risk Factors:  77 yr old with multiple comorbidities 
 
-----> Clinical Indicators:    
     * Labs: BUN / Creat: 4-24:  58/1.80;   4-26: 48/1.25;   4-27: 49/1.25;   4-28: 50/1.30 * 4-25-19 ED MD note:  \". ..3. NANCY (acute kidney injury). .. \" 
     * 4-25-19 H&P:  \". Eliseo Broach Eliseo Broach Has elevated BUN and worsening Creatinine. Eliseo Broach Eliseo Broach Eliseo Broach \"  
 
-----> Treatment: Per H&P: \". Eliseo Broach Eliseo Broach Will continue IV diuresis 20 mg BID and monitor renal function. Eliseo Broach Eliseo Broach \" - (pt was admitted with acute pulmonary edema) Please clarify and document your clinical opinion in the progress notes and discharge summary including the definitive and/or presumptive diagnosis, (suspected or probable), related to the above clinical findings. Please include clinical findings supporting your diagnosis. Thank you, Eliud Hays RN 
[phone]

## 2019-04-30 NOTE — ROUTINE PROCESS
Rec'd pt alert and oriented x4. [place]. Pt denies pain. Call bell within reach at bedside. Will continue to monitor pt. 
0430 Pt resting quietly in bed. Navdeep mccauley within reach. 0730 Bedside shift change report given to 1812 Frank Johnson (oncoming nurse) by Paramjit Crook (offgoing nurse). Report given with SBAR, Kardex, Intake/Output, MAR and Recent Results.

## 2019-04-30 NOTE — PROGRESS NOTES
[facility] Hospitalist Group Progress Note Patient: Kelli Klinefelter Age: 77 y.o. : 1940 MR#: [id number] SSN: [id number] Date 2019 Subjective/24-hour events:  
 
Nothing new/acute, feeling well overall. Assessment:  
Pancytopenia Acute LLE DVT dx 3/2019 Iron deficiency anemia BLOOM cirrhosis with portal HTN and esophageal varices Hx breast stage 3 colon cancer and stage 2 L breast cancer, both in remission Diastolic HF ruled out Plan: Not ARU candidate as expected. Currently awaiting SNF placement. Continue supportive care until placement found. D/W [facility] on unit. Follow. Case discussed with:  [x]Patient  []Family  []Nursing  [x]Case Management DVT Prophylaxis:  []Lovenox  []Hep SQ  []SCDs  []Coumadin   []On Heparin gtt Objective:  
VS:  
Visit Vitals /68 Pulse 77 Temp 98.4 °F (36.9 °C) Resp 20 Ht 5' (1.524 m) Wt 68.5 kg (151 lb) SpO2 98% Breastfeeding? No  
BMI 29.49 kg/m² Tmax/24hrs: Temp (24hrs), Av.3 °F (36.8 °C), Min:98 °F (36.7 °C), Max:98.6 °F (37 °C) Intake/Output Summary (Last 24 hours) at 2019 4958 Last data filed at 2019 7454 Gross per 24 hour Intake 960 ml Output 1100 ml Net -140 ml General:  In NAD. Nontoxic-appearing. Cardiovascular:  RRR. Pulmonary:  No active wheezing, effort nonlabored. GI:  Abdomen soft, NTTP. Extremities:  Warm, no ischemia. No LE pitting edema. Neuro:  Awake and alert, motor grossly nonfocal. 
 
 
Labs:   
Recent Results (from the past 24 hour(s)) GLUCOSE, POC Collection Time: 19 11:06 AM  
Result Value Ref Range Glucose (POC) 213 (H) 70 - 110 mg/dL GLUCOSE, POC Collection Time: 19  3:36 PM  
Result Value Ref Range Glucose (POC) 183 (H) 70 - 110 mg/dL GLUCOSE, POC Collection Time: 19  9:59 PM  
Result Value Ref Range Glucose (POC) 120 (H) 70 - 110 mg/dL GLUCOSE, POC  Collection Time: 19  7:52 AM  
 Result Value Ref Range Glucose (POC) 117 (H) 70 - 110 mg/dL Signed By: Manjula Bonner MD   
 April 30, 2019

## 2019-04-30 NOTE — ACP (ADVANCE CARE PLANNING)
Advance Directive:  She states that she has a completed ACP at home, but she does not want to share it. She states \"I don't want you (medical personnel) to say, oh she said to not do this and put me aside to die. I know what you're going to say I've heard before, but I don't want to share it. My nephew is a ICU nurse with [employer]; My sister and him know what I want. \"  
 
-Chancemelissa Bonilla, sister- contact numbers are [phone] and [phone] 
 
-No contact information for nephew on file

## 2019-05-01 VITALS
SYSTOLIC BLOOD PRESSURE: 133 MMHG | WEIGHT: 147.71 LBS | RESPIRATION RATE: 18 BRPM | BODY MASS INDEX: 29 KG/M2 | HEIGHT: 60 IN | DIASTOLIC BLOOD PRESSURE: 59 MMHG | HEART RATE: 70 BPM | TEMPERATURE: 97.7 F | OXYGEN SATURATION: 93 %

## 2019-05-01 LAB
ERYTHROCYTE [DISTWIDTH] IN BLOOD BY AUTOMATED COUNT: 14.1 % (ref 11.6–14.5)
GLUCOSE BLD STRIP.AUTO-MCNC: 220 MG/DL (ref 70–110)
HCT VFR BLD AUTO: 22.1 % (ref 35–45)
HGB BLD-MCNC: 7.6 G/DL (ref 12–16)
MCH RBC QN AUTO: 29.5 PG (ref 24–34)
MCHC RBC AUTO-ENTMCNC: 34.4 G/DL (ref 31–37)
MCV RBC AUTO: 85.7 FL (ref 74–97)
PLATELET # BLD AUTO: 53 K/UL (ref 135–420)
PMV BLD AUTO: 10.1 FL (ref 9.2–11.8)
RBC # BLD AUTO: 2.58 M/UL (ref 4.2–5.3)
WBC # BLD AUTO: 2 K/UL (ref 4.6–13.2)

## 2019-05-01 PROCEDURE — 36415 COLL VENOUS BLD VENIPUNCTURE: CPT

## 2019-05-01 PROCEDURE — 85027 COMPLETE CBC AUTOMATED: CPT

## 2019-05-01 PROCEDURE — 74011636637 HC RX REV CODE- 636/637: Performed by: FAMILY MEDICINE

## 2019-05-01 PROCEDURE — 74011250637 HC RX REV CODE- 250/637: Performed by: INTERNAL MEDICINE

## 2019-05-01 PROCEDURE — 97116 GAIT TRAINING THERAPY: CPT

## 2019-05-01 PROCEDURE — 74011250637 HC RX REV CODE- 250/637: Performed by: FAMILY MEDICINE

## 2019-05-01 PROCEDURE — 82962 GLUCOSE BLOOD TEST: CPT

## 2019-05-01 RX ORDER — TRAMADOL HYDROCHLORIDE 50 MG/1
50 TABLET ORAL DAILY
Qty: 12 TAB | Refills: 0 | Status: SHIPPED | OUTPATIENT
Start: 2019-05-01 | End: 2019-05-04

## 2019-05-01 RX ADMIN — AMLODIPINE BESYLATE 10 MG: 10 TABLET ORAL at 08:50

## 2019-05-01 RX ADMIN — TRAMADOL HYDROCHLORIDE 50 MG: 50 TABLET, FILM COATED ORAL at 08:50

## 2019-05-01 RX ADMIN — APIXABAN 2.5 MG: 2.5 TABLET, FILM COATED ORAL at 08:50

## 2019-05-01 RX ADMIN — HYDRALAZINE HYDROCHLORIDE 25 MG: 25 TABLET ORAL at 08:50

## 2019-05-01 RX ADMIN — INSULIN LISPRO 6 UNITS: 100 INJECTION, SOLUTION INTRAVENOUS; SUBCUTANEOUS at 08:54

## 2019-05-01 RX ADMIN — FUROSEMIDE 20 MG: 20 TABLET ORAL at 08:50

## 2019-05-01 RX ADMIN — CARVEDILOL 12.5 MG: 12.5 TABLET, FILM COATED ORAL at 08:50

## 2019-05-01 RX ADMIN — FERROUS SULFATE TAB 325 MG (65 MG ELEMENTAL FE) 325 MG: 325 (65 FE) TAB at 08:50

## 2019-05-01 RX ADMIN — INSULIN GLARGINE 5 UNITS: 100 INJECTION, SOLUTION SUBCUTANEOUS at 08:49

## 2019-05-01 RX ADMIN — Medication 1 CAPSULE: at 08:50

## 2019-05-01 RX ADMIN — SIMVASTATIN 20 MG: 20 TABLET, FILM COATED ORAL at 08:50

## 2019-05-01 NOTE — PROGRESS NOTES
Problem: Mobility Impaired (Adult and Pediatric) Goal: *Acute Goals and Plan of Care (Insert Text) Description Physical Therapy Goals Initiated 4/28/2019 and to be accomplished within 7 day(s) 1. Patient will move from supine to sit and sit to supine  in bed with independence. 2.  Patient will transfer from bed to chair and chair to bed with independence using the least restrictive device. 3.  Patient will perform sit to stand with modified independence. 4.  Patient will ambulate with modified independence for 200 feet with the least restrictive device. 5.  Patient will ascend/descend 12 stairs with 1 handrail(s) with supervision/set-up. To prepare pt for home mobility. PLOF: Pt lives alone in a 2 story home with no steps to enter. Pt's  recently passed away and pt has very limited number of people in her life to assist her. Pt ambulated in her home with a RW. She recently discharged from a SNF back to her home. Outcome: Progressing Towards Goal 
 PHYSICAL THERAPY TREATMENT Patient: Ale Borges (52 y.o. female) Date: 5/1/2019 Diagnosis: Acute pulmonary edema (HCC) [J81.0] Malaise and fatigue [R53.81, R53.83] Palpitations [R00.2] Acute pulmonary edema (HCC) Precautions: Fall ASSESSMENT: 
Patient presents today alert and agreeable to therapy and was supine in bed upon arrival. Patient transferred to sitting at EOB and performed mobility as listed below. Patient transferred to sitting between RW 250ft ambulation trial and 80ft no AD trial. Patient required SBA with RW and Gerry for LoB. Patient required several standing rest breaks due to SOB but demos good safety awareness and good insight into deficits. Patient tolerated activity well and was left resting with call bell by her side with instructions not to get up without assist. She acknowledged understanding. Progression toward goals: 
?      Improving appropriately and progressing toward goals ?      Improving slowly and progressing toward goals ? Not making progress toward goals and plan of care will be adjusted PLAN: 
Patient continues to benefit from skilled intervention to address the above impairments. Continue treatment per established plan of care. Discharge Recommendations:  Rehab Further Equipment Recommendations for Discharge:  N/A  
 
SUBJECTIVE:  
Patient stated ? I think I can try to walk without a walker. ? OBJECTIVE DATA SUMMARY:  
Critical Behavior: 
Neurologic State: Alert Orientation Level: Oriented X4 Cognition: Appropriate for age attention/concentration Safety/Judgement: Fall prevention Functional Mobility Training: 
Bed Mobility: 
 Supine to Sit: Supervision Scooting: Supervision Transfers: 
Sit to Stand: Supervision Stand to Sit: Supervision Balance: 
Sitting: Intact Standing: Impaired; With support Standing - Static: Good Standing - Dynamic : Fair Ambulation/Gait Training: 
Distance (ft): 330 Feet (ft)(250ft + 80ft) Assistive Device: Walker, rolling Ambulation - Level of Assistance: Stand-by assistance Gait Abnormalities: Decreased step clearance Speed/Adrianna: Slow Step Length: Left shortened;Right shortened Interventions: Verbal cues; Visual/Demos Education: 
?         Bed mobility ? Transfers ? Ambulation ? Assistive device management ? Stairs ? Body mechanics ? Position change ? Activity pacing/energy conservation ? Other:  
Pain: 
Pain level pre-treatment: 0/10 Pain level post-treatment: 0/10 Activity Tolerance:  
Patient demos fair to good tolerance to therapy. SpO2 >90% during mobility with several standing rest breaks during session. Please refer to the flowsheet for vital signs taken during this treatment. After treatment:  
? Patient left in no apparent distress sitting up in chair ? Patient left in no apparent distress in bed 
? Call bell left within reach ? Nursing notified ? Caregiver present ? Bed alarm activated ? SCDs applied COMMUNICATION/EDUCATION:  
?         Role of Physical Therapy in the acute care setting. ?         Fall prevention education was provided and the patient/caregiver indicated understanding. ? Patient/family have participated as able in working toward goals and plan of care. ?         Patient/family agree to work toward stated goals and plan of care. ?         Patient understands intent and goals of therapy, but is neutral about his/her participation. ? Patient is unable to participate in stated goals/plan of care: ongoing with therapy staff. ?         Other: 
 
   
Shady Thomas, PT Time Calculation: 23 mins

## 2019-05-01 NOTE — PROGRESS NOTES
NUTRITION Nutrition Screen RECOMMENDATIONS / PLAN:  
 
- Add supplement: Glucerna Shake once daily; to be included in fluid restriction - Add drink preference to diet order - Continue RD inpatient monitoring and evaluation. NUTRITION INTERVENTIONS & DIAGNOSIS:  
 
[x] Meals/snacks: modified composition 
[x] Medical food supplement therapy: initiate Nutrition Diagnosis: inadequate oral intake related to decreased appetite as evidenced by pt with fair meal intake x 3-4 days PTA ASSESSMENT:  
 
Pt reported good appetite and meal intake since admission; tolerating diet. Usually consumes small meals; current intake is typical of usual intake. Did have a little decreased meal intake PTA; agreeable to nutrition supplement. Pt with questions regarding meal planning and diabetic diet; answered to her satisfaction. Beverage preference, likes lemon juice, discussed Average po intake adequate to meet patients estimated nutritional needs:   [] Yes     [] No   [x] Unable to determine at this time Diet: DIET CARDIAC Regular; Consistent Carb 1800kcal; FR 1500ML; No Conc. Sweets Food Allergies:  None known   (latex) Current Appetite:   [x] Good     [] Fair     [] Poor     [] Other: 
Appetite/meal intake prior to admission:   [x] Good     [x] Fair: was a little decreased for a few days, consuming 50% of usual intake. [] Poor     [] Other: 
Feeding Limitations:  [] Swallowing difficulty    [] Chewing difficulty    [] Other: 
Current Meal Intake:  
Patient Vitals for the past 100 hrs: 
 % Diet Eaten 05/01/19 0857 30 % 04/30/19 1750 0 % 04/30/19 1257 100 % 04/30/19 0910 50 % 04/29/19 1736 25 % 04/29/19 1422 50 % 04/29/19 0901 25 % 04/28/19 1342 50 % 04/27/19 1825 85 % 04/27/19 1309 50 % 04/27/19 0911 95 % BM: 4/29 Skin Integrity:  No pressure injury or wound noted Edema:   [x] No     [] Yes Pertinent Medications: Reviewed: bowel regimen, ferrous sulfate, lasix, lantus, SSI, lactobacillus, zofran No results for input(s): NA, K, CL, CO2, GLU, BUN, CREA, CA, MG, PHOS, ALB, PREALB, TBIL, SGOT, ALT in the last 72 hours. Intake/Output Summary (Last 24 hours) at 5/1/2019 1058 Last data filed at 5/1/2019 1689 Gross per 24 hour Intake 720 ml Output 300 ml Net 420 ml Anthropometrics: 
Ht Readings from Last 1 Encounters:  
04/26/19 5' (1.524 m) Last 3 Recorded Weights in this Encounter 04/29/19 0403 04/30/19 0750 05/01/19 0330 Weight: 66 kg (145 lb 6.4 oz) 68.5 kg (151 lb) 67 kg (147 lb 11.3 oz) Body mass index is 28.85 kg/m². Weight History:   Pt denied experiencing any recent changes in weight. Noted wt fluctuations PTA; question accuracy of wt taken on 4/6/19 due to significant wt changes prior to and after that date Weight Metrics 5/1/2019 4/9/2019 4/6/2019 3/28/2019 12/12/2018 6/29/2018 3/8/2018 Weight 147 lb 11.3 oz 141 lb 160 lb 150 lb 145 lb 139 lb 3.2 oz 130 lb 9.6 oz  
BMI 28.85 kg/m2 27.54 kg/m2 31.25 kg/m2 29.29 kg/m2 29.29 kg/m2 28.11 kg/m2 26.38 kg/m2 Admitting Diagnosis: Acute pulmonary edema ([facility] [address] [J81.0] Malaise and fatigue [R53.81, R53.83] Palpitations [R00.2] Pertinent PMHx: CAD, colon cancer, breast cancer, DM, HTN, hypercholesterolemia Education Needs:        [] None identified  [] Identified - Not appropriate at this time  []  Identified and addressed - refer to education log Learning Limitations:   [] None identified  [x] Identified: hearing difficulty Cultural, Taoism & ethnic food preferences:  [x] None identified    [] Identified and addressed ESTIMATED NUTRITION NEEDS:  
 
Calories: 0506-6406 kcal (MSJx1.2-1.3) based on  [x] Actual BW: 67 kg      [] IBW Protein: 54-67 gm (0.8-1 gm/kg) based on  [x] Actual BW      [] IBW Fluid: 1 mL/kcal 
  
MONITORING & EVALUATION:  
 
Nutrition Goal(s): 1.  Po intake of meals will meet >75% of patient estimated nutritional needs within the next 7 days. Outcome:  [] Met/Ongoing    [] Progressing towards goal    [] Not progressing towards goal    [x] New/Initial goal  
 
Monitoring:   [x] Food and beverage intake   [x] Diet order   [x] Nutrition-focused physical findings   [x] Treatment/therapy   [] Weight   [] Enteral nutrition intake Previous Recommendations (for follow-up assessments only):     []   Implemented       []   Not Implemented (RD to address)      [] No Longer Appropriate     [] No Recommendation Made Discharge Planning:  Diabetic, cardiac diet [x] Participated in care planning, discharge planning, & interdisciplinary rounds as appropriate Lazara Ybarra RD Pager: [phone]

## 2019-05-01 NOTE — PROGRESS NOTES
DISCHARGE PLANNING: 
Pt accepted by [facility] This writer will upload D/C Summary for update Pt will be transported to [place], once arranged [id number] discharge uploaded in [facility] and [place] notified of this transport time [facility] Care Manager

## 2019-05-01 NOTE — NURSE NAVIGATOR
Patient has now agreed to go to [facility]. Loaded to CC link - will check on bed availability. Have tried multiple times during the day today to reach [facility] at [address] are out in the area. Emailed [facility] also. Unable to reach them at this time. Javon Sorto. Alex, MELQUIADESN, RN Care Management [phone]

## 2019-05-01 NOTE — ROUTINE PROCESS
TRANSFER - IN REPORT: 
 
Verbal report received from Megan(name) on Delfina Reef  being received from [facility](unit) for routine progression of care Report consisted of patients Situation, Background, Assessment and  
Recommendations(SBAR). Information from the following report(s) SBAR, Kardex and MAR was reviewed with the receiving nurse. Opportunity for questions and clarification was provided. Assessment completed upon patients arrival to unit and care assumed. 2255 - Received pt from [facility] per hospital bed in stable condition. Orientation to room provided. Call light placed in reach. Primary Nurse Marshal Mcdermott RN and Tereza Welch RN performed a dual skin assessment on this patient No impairment noted Kyle score is 18. 
 
 
7:39 AM - Bedside shift change report given to Kae Rodriguez (oncoming nurse) by Wolfgang Pierce RN (offgoing nurse). Report given with SBAR, Kardex, Intake/Output, MAR and Recent Results.

## 2019-05-01 NOTE — PROGRESS NOTES
Patient discharged to [facility]. AVS/ FU and prescriptions explained to patient with verbalized understanding and given to [facility] . Report called to Ej Camacho LPN at [facility]. Patient transferred via stretcher by [address] [place] [address] transportation.

## 2019-05-01 NOTE — NURSE NAVIGATOR
[place] [facility] [place] Date: 05/01/19 Time:  12:53 PM 
Hospital Nurse Navigator: Baljinder Villaseñor MSN, RN, [facility] 
 
Britta Marie is a 77 y.o. female inpatient [facility] with Acute pulmonary edema ([facility] [address] [J81.0] Malaise and fatigue [R53.81, R53.83] Palpitations [R00.2] on  4/24/2019. Primary Diagnosis \"Pancytopenia Acute LLE DVT dx 3/2019 Iron deficiency anemia BLOOM cirrhosis with portal HTN and esophageal varices\" as per Dr. Hazel More note. Review and Plan as below: 1. Follow up appts: 
-Follow up with PCP after discharged from SNF 
-Follow up with Verena Borrero MD (Hematology and Oncology) on 5/21/2019; @ 1:15 Future Appointments Date Time Provider Abdiaziz Johnson 7/15/2019  3:40 PM Lynda Joseph  [facility] 2. she needs a home oxygen evaluation prior to SNF discharge This Nurse Navigator accessed the patient's chart to offer support and placement in the [address] [place] [address] Team bridges the gaps in care and education surrounding discharge from the acute care facility. The objective is to empower the patient and family in taking a proactive role in the task of preventing readmission within the first thirty days after discharge from the acute care setting. The Transitional Care Team is also involved in the efforts to reduce readmission to the acute care setting after stabilization and discharge from the acute care environment either to the skilled nursing facilities or community. EDUCATION / INTERVENTIONS OFFERED:   
 
1. Discharge medication list was reviewed for accuracy and potential interactions. 2. Advance Care Planning:  see my ACP note from 4/30/19. BARRIERS IDENTIFIED: 
 
Britta Marie expressed the following barriers to her discharge:  
      none, at this time PLAN: 
The patient was discharged a skilled nursing facility : [facility] today. The TC Team will follow the patient from a distance while inpatient as well as be available for further transition disposition as needed. The TC Team will continue to offer support 30- 90 days post discharge from the acute care setting. The appropriate TC Team members were notified. Past Medical History:  
Diagnosis Date  Anemia  CAD (coronary artery disease) Coronary Stenting needed, hematology workup needed prior to stent placement, treating medically at this time  Cancer [facility])   
 colon  Cancer ([facility] [address] breast - left  Cardiac catheterization 08/17/2016 LM patent. mLAD 99.9% (2.25 x 28-mm Xience BETHEL, resid 0%). oD1 60%. oD2 95%. pCX 90% (2.5 x 13-mm Xience BETHEL, resid 0%). OMB patent.  Cardiac echocardiogram 07/01/2016 EF 50%. Basal inferior, basal-mid inferolateral hypk. Gr 1 DDfx. RVSP 50-60 mmHg. Mild LAE. Mod, eccentric MR.    
 Diabetes ([facility] [address]  Hypercholesteremia  Hypertension  Thrombocytopenia ([facility] [address] Advance Care Planning 4/26/2019 Patient's Healthcare Decision Maker is: Legal Next of Kin Primary Decision Maker Name -  
Primary Decision Maker Phone Number -  
Primary Decision Maker Relationship to Patient - Secondary Decision Maker Name - Secondary Decision Maker Phone Number - Secondary Decision Maker Relationship to Patient -  
Confirm Advance Directive None Patient Would Like to Complete Advance Directive No  
Does the patient have other document types Other (comment) Indra Guzmán MSN, RN, [facility] Nurse Navigator [phone]

## 2019-05-01 NOTE — PROGRESS NOTES
attempted to visit patient and was not able to do a spiritual assessment. Will follow up with patient as needed. Brian Virk MA  Spiritual Care Department 
[phone]

## 2019-05-01 NOTE — DISCHARGE SUMMARY
Discharge Summary Patient: Maggie Regan MRN: [id number]  CSN: [id number] YOB: 1940  Age: 77 y.o. Sex: female DOA: 4/24/2019 LOS:  LOS: 6 days   Discharge Date: 5/1/2017 Admission Diagnoses: 
Anemia Discharge Diagnoses:   
Pancytopenia Acute LLE DVT dx 3/2019 Iron deficiency anemia CKD 3, NANCY ruled out BLOOM cirrhosis with portal HTN and esophageal varices Hx breast stage 3 colon cancer and stage 2 L breast cancer, both in remission Diastolic HF ruled out Discharge Condition: Stable PHYSICAL EXAM 
Visit Vitals /59 (BP 1 Location: Right arm, BP Patient Position: At rest) Pulse 70 Temp 97.7 °F (36.5 °C) Resp 18 Ht 5' (1.524 m) Wt 67 kg (147 lb 11.3 oz) SpO2 93% Breastfeeding? No  
BMI 28.85 kg/m² General: In NAD. HEENT: NCAT. Sclerae anicteric. EOMI. Lungs:  Clear, no wheezes. Effort nonlabored. Heart:  RRR. Abdomen: Soft, NTTP Extremities: Warm, no ischemia or edema. Psych:   Mood normal. 
Neurologic:  Awake and alert, motor nonfocal. 
 
 
Hospital Course:  
See admission H&P for full details of HPI. Patient was admitted to medical stepdown bed from ED for evaluation of symptomatic anemia. She was transfused 1 unit of PRBCs with good response. Blood counts have remained stable and there have been no need  for any additional hematologic or GI workup/testing at this time. Patient has not had any evidence for any acute blood loss while hospitalized. She has been evaluated by PT/OT with recommendation for skilled nursing placement at discharge. Patient is medically stable for transfer to the accepting facility today for further management. Consults:  
None Significant Diagnostic Studies: CXR: 
IMPRESSION: 
  
1. Increasing bilateral lower lung zone hazy streaky densities. Bilateral 
pleural effusion.   There are hazy streaky densities may be related to developing 
infiltrate or atelectatic changes. 
  
 2.  Moderate cardiac silhouette enlargement. Discharge Medications:    
Current Discharge Medication List  
  
CONTINUE these medications which have NOT CHANGED Details  
apixaban (ELIQUIS) 2.5 mg tablet Take 1 Tab by mouth every twelve (12) hours. Qty: 60 Tab, Refills: 0  
  
acetaminophen (TYLENOL) 325 mg tablet Take 2 Tabs by mouth every four (4) hours as needed for Pain. Qty: 30 Tab, Refills: 0  
  
amLODIPine (NORVASC) 10 mg tablet Take 10 mg by mouth daily. hydrALAZINE (APRESOLINE) 25 mg tablet Take 1 Tab by mouth three (3) times daily. Qty: 90 Tab, Refills: 0  
  
insulin glargine (LANTUS) 100 unit/mL injection 5 Units by SubCUTAneous route daily. Qty: 1 Vial, Refills: 0  
  
insulin lispro (HUMALOG) 100 unit/mL injection INITIATE INSULIN CORRECTIVE PROTOCOL: Very Insulin Resistant For Blood Sugar (mg/dL) of:             
Less than 150 =   0 units 150 -199 =   3 units 200 -249 =   6 units 250 -299 =   9 units 300 -349 =   12 units 350 and above =   15 units and Call Physician Initiate Hypoglycemic protocol if 
Qty: 1 Vial, Refills: 0  
  
lactobacillus sp. 50 billion cpu (BIO-K PLUS) 50 billion cell -375 mg cap capsule Take 1 Cap by mouth daily. Qty: 10 Cap, Refills: 0  
  
glucose 4 gram chewable tablet Take 4 Tabs by mouth as needed for Other (hypoglycemia). Qty: 30 Tab, Refills: 0  
  
furosemide (LASIX) 20 mg tablet Take 20 mg by mouth two (2) times a day. Indications: visible water retention SITagliptin (JANUVIA) 50 mg tablet Take 50 mg by mouth daily. cholecalciferol, vitamin D3, (VITAMIN D3) 2,000 unit tab Take 2,000 Units by mouth daily. carvedilol (COREG) 12.5 mg tablet Take 12.5 mg by mouth two (2) times daily (with meals). traMADol (ULTRAM) 50 mg tablet Take 1 Tab by mouth daily. Max Daily Amount: 50 mg. 
Qty: 30 Tab, Refills: 0 Associated Diagnoses: Lumbar disc disease with radiculopathy; Sciatica of right side nitroglycerin (NITROLINGUAL) 400 mcg/spray spray 1 Spray by SubLINGual route every five (5) minutes as needed. Qty: 1 Bottle, Refills: 2  
  
ascorbic acid, vitamin C, (VITAMIN C) 250 mg tablet Take 1 Tab by mouth daily. Qty: 60 Tab, Refills: 1  
  
ferrous sulfate 325 mg (65 mg iron) tablet Take 1 Tab by mouth two (2) times daily (with meals). Qty: 60 Tab, Refills: 1  
  
multivitamin (ONE A DAY) tablet Take 1 Tab by mouth daily. simvastatin (ZOCOR) 20 mg tablet Take 20 mg by mouth daily. omega 3-dha-epa-fish oil (FISH OIL) 100-160-1,000 mg cap Take 1,000 mg by mouth two (2) times a day. cyanocobalamin (VITAMIN B12) 100 mcg tablet Take 100 mcg by mouth daily. calcium polycarbophil (FIBER LAXATIVE) 625 mg tablet Take 1,250 mg by mouth daily as needed. Activity: As tolerated Diet: Cardiac Diet and Diabetic Diet Disposition: [facility] Abigail Calderon. Siddharth Ledezma MD 
[facility]

## 2019-05-01 NOTE — PROGRESS NOTES
Transported patient to  456. Report given to Allie ROME. Personal belongings given to patient. Patient on 2L 02, denies pain, SOB or any discomfort. Patient informed family member regarding the transfer. Offered if she wants the RN to call them, patient declined.

## 2019-05-02 ENCOUNTER — PATIENT OUTREACH (OUTPATIENT)
Dept: CARDIOLOGY CLINIC | Age: 79
End: 2019-05-02

## 2019-05-02 NOTE — PROGRESS NOTES
Transition of Care Coordination/Hospital to Post Acute Facility: 
  
Date/Time:  2019 8:51 AM 
 
Patient was admitted to [facility] on 19 for treatment of: 
 
Pancytopenia Acute LLE DVT dx 3/2019 Iron deficiency anemia CKD 3, NANCY ruled out BLOOM cirrhosis with portal HTN and esophageal varices Patient was discharged 19 to [facility] for continuation of care. Inpatient RRAT score: 33 Top Challenges reviewed Multiple Comorbidities including CA, low blood counts, and Congestive Heart Failure. Method of communication with care team :phone Nurse Navigator(NN) spoke with FARHAD Dorsey to provide introduction to self and explanation of the Nurse Navigator Role. Verified name and  as patient identifiers. Discussed and reviewed  anticipated length of stay, anticipated needs at time of discharge, daily weights, diet restrictions, discharge summary, fluid restrictions, follow up appointments, medication reconciliation ACP:  
Does the patient have a current ACP (including DDNR):  no 
Does the post acute facility have a copy of the patients ACP:  N/A Medication(s):  
New Medications at Discharge: n/a Changed Medications at Discharge: n/a Discontinued Medications at Discharge: n/a 
  
PCP/Specialist follow up:  
Future Appointments Date Time Provider Abdiaziz Elizabeth 7/15/2019  3:40 PM Erin Soto MD [address] [place] [address]  
  
-Follow up with Tu Davis MD (Hematology and Oncology) on 2019; @ 1:15 Opportunity to ask questions was provided. Contact information was provided for future reference or further questions. Will continue to monitor.

## 2019-05-09 ENCOUNTER — PATIENT OUTREACH (OUTPATIENT)
Dept: CASE MANAGEMENT | Age: 79
End: 2019-05-09

## 2019-05-14 ENCOUNTER — HOSPITAL ENCOUNTER (OUTPATIENT)
Dept: LAB | Age: 79
Discharge: HOME | End: 2019-05-14

## 2019-05-14 LAB
ANION GAP SERPL CALC-SCNC: 8 MMOL/L (ref 3–18)
BASOPHILS # BLD: 0 K/UL (ref 0–0.1)
BASOPHILS NFR BLD: 0 % (ref 0–2)
BUN SERPL-MCNC: 41 MG/DL (ref 7–18)
BUN/CREAT SERPL: 32 (ref 12–20)
CALCIUM SERPL-MCNC: 9.2 MG/DL (ref 8.5–10.1)
CHLORIDE SERPL-SCNC: 109 MMOL/L (ref 100–108)
CO2 SERPL-SCNC: 24 MMOL/L (ref 21–32)
CREAT SERPL-MCNC: 1.28 MG/DL (ref 0.6–1.3)
DIFFERENTIAL METHOD BLD: ABNORMAL
EOSINOPHIL # BLD: 0 K/UL (ref 0–0.4)
EOSINOPHIL NFR BLD: 1 % (ref 0–5)
ERYTHROCYTE [DISTWIDTH] IN BLOOD BY AUTOMATED COUNT: 13.9 % (ref 11.6–14.5)
GLUCOSE SERPL-MCNC: 121 MG/DL (ref 74–99)
HCT VFR BLD AUTO: 26.5 % (ref 35–45)
HGB BLD-MCNC: 9 G/DL (ref 12–16)
LYMPHOCYTES # BLD: 0.4 K/UL (ref 0.9–3.6)
LYMPHOCYTES NFR BLD: 13 % (ref 21–52)
MCH RBC QN AUTO: 29 PG (ref 24–34)
MCHC RBC AUTO-ENTMCNC: 34 G/DL (ref 31–37)
MCV RBC AUTO: 85.5 FL (ref 74–97)
MONOCYTES # BLD: 0.2 K/UL (ref 0.05–1.2)
MONOCYTES NFR BLD: 8 % (ref 3–10)
NEUTS SEG # BLD: 2.3 K/UL (ref 1.8–8)
NEUTS SEG NFR BLD: 78 % (ref 40–73)
PLATELET # BLD AUTO: 62 K/UL (ref 135–420)
PMV BLD AUTO: 10.6 FL (ref 9.2–11.8)
POTASSIUM SERPL-SCNC: 4.4 MMOL/L (ref 3.5–5.5)
RBC # BLD AUTO: 3.1 M/UL (ref 4.2–5.3)
SODIUM SERPL-SCNC: 141 MMOL/L (ref 136–145)
WBC # BLD AUTO: 2.9 K/UL (ref 4.6–13.2)

## 2019-05-14 PROCEDURE — 80048 BASIC METABOLIC PNL TOTAL CA: CPT

## 2019-05-14 PROCEDURE — 85025 COMPLETE CBC W/AUTO DIFF WBC: CPT

## 2019-05-15 ENCOUNTER — HOME HEALTH ADMISSION (OUTPATIENT)
Dept: HOME HEALTH SERVICES | Facility: HOME HEALTH | Age: 79
End: 2019-05-15

## 2019-05-16 ENCOUNTER — PATIENT OUTREACH (OUTPATIENT)
Dept: CASE MANAGEMENT | Age: 79
End: 2019-05-16

## 2019-05-16 ENCOUNTER — HOME CARE VISIT (OUTPATIENT)
Dept: HOME HEALTH SERVICES | Facility: HOME HEALTH | Age: 79
End: 2019-05-16

## 2019-05-16 NOTE — PROGRESS NOTES
Community Care Team Documentation for Patient in [facility]     Patient discharged from [facility] 4/24/2019 - 5/1/2019 to [facility], on 5/1/2019. Previous admission [facility] 3/24/2019 - 3/28/2019 for lung infection, CHF/effusion. Transferred to [facility] for skilled services. Discharged to home 4/17/2019. Declined HHC. Was home for 7 days then readmitted. Hospital Discharge diagnosis:    Pancytopenia  Acute LLE DVT dx 3/2019  Iron deficiency anemia   CKD 3, NANCY ruled out  BLOOM cirrhosis with portal HTN and esophageal varices  Hx breast stage 3 colon cancer and stage 2 L breast cancer, both in remission  Diastolic HF ruled out    SNF Attending Provider:      Anticipated discharge date from SNF: 5/16/2019      PCP : Kevin Briones MD    Nurse Navigator:     [facility] Team rounds completed, updates provided by facility. LCD 5-15-19 D/C 5-16-19   PT/OT: progressing well, making gains. Dispo: Home alone,   CHF: 141.6lbs  Full Code    High Risk            25       Total Score        3 Has Seen PCP in Last 6 Months (Yes=3, No=0)    4 IP Visits Last 12 Months (1-3=4, 4=9, >4=11)    18 Charlson Comorbidity Score (Age + Comorbid Conditions)        Criteria that do not apply:    . Living with Significant Other. Assisted Living. LTAC. SNF. or   Rehab    Patient Length of Stay (>5 days = 3)    Pt.  Coverage (Medicare=5 , Medicaid, or Self-Pay=4)      Active Ambulatory Problems     Diagnosis Date Noted    Anemia 07/07/2015    CAD (coronary artery disease) 07/07/2015    Thrombocytopenia ([address] 07/07/2015    NSTEMI (non-ST elevated myocardial infarction) ([address] 06/30/2016    UTI (urinary tract infection) 06/21/2017    Sepsis ([address] 06/21/2017    PNA (pneumonia) 06/21/2017    Hypoxia 06/21/2017    Cirrhosis of liver not due to alcohol ([address] 06/29/2017    Esophageal varices ([address] 06/29/2017    CHF (congestive heart failure) ([address] 10/18/2017    Dyspnea 10/18/2017  Pneumonia 01/25/2018    HTN (hypertension), benign 01/28/2018    CAP (community acquired pneumonia) 03/24/2019    Acute on chronic diastolic CHF (congestive heart failure) ([facility] 03/24/2019    Leg DVT (deep venous thromboembolism), acute, left ([facility] 03/24/2019    Palpitations 04/25/2019    Acute pulmonary edema (HCC) 04/25/2019    Malaise and fatigue 04/25/2019    Pancytopenia ([facility] 04/25/2019    Portal hypertension ([facility] 04/25/2019    Chronic anticoagulation 04/25/2019     Resolved Ambulatory Problems     Diagnosis Date Noted    Sepsis ([facility] 06/15/2015     Past Medical History:   Diagnosis Date    Anemia     CAD (coronary artery disease)     Cancer ([facility]     Cancer ([facility]     Cardiac catheterization 08/17/2016    Cardiac echocardiogram 07/01/2016    Diabetes ([facility]     Hypercholesteremia     Hypertension     Thrombocytopenia ([facility]

## 2019-05-16 NOTE — PROGRESS NOTES
Community Care Team Documentation for Patient in [facility] Patient discharged from [facility] 4/24/2019 - 5/1/2019 to [facility], on 5/1/2019. Previous admission [facility] 3/24/2019 - 3/28/2019 for lung infection, CHF/effusion. Transferred to [facility] for skilled services. Discharged to home 4/17/2019. Declined HHC. Was home for 7 days then readmitted. Hospital Discharge diagnosis:   
Pancytopenia Acute LLE DVT dx 3/2019 Iron deficiency anemia CKD 3, NANCY ruled out BLOOM cirrhosis with portal HTN and esophageal varices Hx breast stage 3 colon cancer and stage 2 L breast cancer, both in remission Diastolic HF ruled out SNF Attending Provider: Anticipated discharge date from SNF: 5/16/2019 PCP : Nadeen Wright MD 
 
Nurse Navigator:  
 
[facility] Team rounds completed, updates provided by facility. Discharged to home 5/15/2019 with [facility] 
PT/OT: met all goals. Dispo: Home alone, Full Code High Risk   
      
 25 Total Score 3 Has Seen PCP in Last 6 Months (Yes=3, No=0)  
 4 IP Visits Last 12 Months (1-3=4, 4=9, >4=11) 18 Charlson Comorbidity Score (Age + Comorbid Conditions) Criteria that do not apply:  
 . Living with Significant Other. Assisted Living. LTAC. SNF. or  
Rehab Patient Length of Stay (>5 days = 3) Pt. Coverage (Medicare=5 , Medicaid, or Self-Pay=4) Active Ambulatory Problems Diagnosis Date Noted  Anemia 07/07/2015  CAD (coronary artery disease) 07/07/2015  Thrombocytopenia ([address] 07/07/2015  
 NSTEMI (non-ST elevated myocardial infarction) ([address] 06/30/2016  UTI (urinary tract infection) 06/21/2017  Sepsis ([address] 06/21/2017  PNA (pneumonia) 06/21/2017  Hypoxia 06/21/2017  Cirrhosis of liver not due to alcohol ([address] 06/29/2017  Esophageal varices ([address] 06/29/2017  CHF (congestive heart failure) ([address] 10/18/2017  Dyspnea 10/18/2017  Pneumonia 01/25/2018  
 HTN (hypertension), benign 01/28/2018  CAP (community acquired pneumonia) 03/24/2019  Acute on chronic diastolic CHF (congestive heart failure) ([facility] 03/24/2019  Leg DVT (deep venous thromboembolism), acute, left ([facility] 03/24/2019  Palpitations 04/25/2019  Acute pulmonary edema ([address] 04/25/2019  Malaise and fatigue 04/25/2019  Pancytopenia ([facility] 04/25/2019  Portal hypertension ([facility] 04/25/2019  Chronic anticoagulation 04/25/2019 Resolved Ambulatory Problems Diagnosis Date Noted  Sepsis ([facility] 06/15/2015 Past Medical History:  
Diagnosis Date  Anemia  CAD (coronary artery disease)  Cancer ([facility]  Cancer ([facility]  Cardiac catheterization 08/17/2016  Cardiac echocardiogram 07/01/2016  Diabetes ([facility]  Hypercholesteremia  Hypertension  Thrombocytopenia ([address]

## 2019-05-20 ENCOUNTER — PATIENT OUTREACH (OUTPATIENT)
Dept: CARDIOLOGY CLINIC | Age: 79
End: 2019-05-20

## 2019-05-23 ENCOUNTER — PATIENT OUTREACH (OUTPATIENT)
Dept: CARDIOLOGY CLINIC | Age: 79
End: 2019-05-23

## 2019-05-28 ENCOUNTER — PATIENT OUTREACH (OUTPATIENT)
Dept: CARDIOLOGY CLINIC | Age: 79
End: 2019-05-28

## 2019-05-28 NOTE — PROGRESS NOTES
NN contacted the patient by telephone to perform CHF follow Up. Noted Priorities/Plan:  Continued return to baseline. Daily Weight: 136 lbs today. Last weight was 147 lbs on discharge from the hospital on 5/1/19 Zone: green Signs/Symptoms: Edema none; SOB none; orthopnea none. Pt states she feels well. Goals  Maintains daily weight.   
  5/28/19 Pt will weigh her self daily and log the results to be evaluated on each outreach through 8/28/19  Prepare patients and caregivers for end of life decisions (ie. need for hospice, pain management, symptom relief, advance directives etc.)   
  5/28/19 Pt will complete an ACP and have scanned into the EMR by 8/28/19  Understands and adheres to diet. 5/28/19 Pt will maintain a strict low sodium diet to be evaluated on each outreach through 8/28/19 Needs addressed from pathway:  
Week 1-4 Provide Daily Disease Management 
(NN initiated) ? Reviewed importance of Daily weight (Before Breakfast- 
Reviewed Daily Zone Identification (symptom management; weight, edema, SOB, activity/sleep changes)-notify provider immediately as indicated ? Reviewed Cardiac Low-sodium Diet and include carbohydrate controlled diet education ? Reviewed importance of Fluid restriction ? Comorbidity Management ? Confirmed follow-up appointments/transportation. Additional assessment ? Reinforced Fall precautions ? Activity tolerance assessment: pt reports at baseline ? Reviewed Energy conservation management and balancing activity with rest 
? Labs/diagnostics per MD office ? Medication Therapy: no issues identified ? Diet/appetite assessment: pt reports no issues ? Reviewed appropriate ED/Hospital utilization ? Immunizations up to date Pneumonia Flu 
? Home assessment/modifications: no needs identified ? Pt denies transportation assistance needs ? Pt denies medication assistance needs ? Home health services are in place Psychosocial: Reassurance/emotional support Monitoring: ? Home health 
? H2H 
? Tele-monitoring ? Cardiac device monitoring ? My Chart Education: 
? Reviewed Advanced care planning status ? Support system identification ( eg: Caregiver, meal planning, community resources, family, friends, [employer], support group) ? Health literacy for heart failure ? Caregiver education and preparation Have you been to an ER/Hospital since discharge from [facility]? No   
 
Have you followed up with PCP/Cardiologist/Specialist?  
 
Upcomin/15/19 Card appt w/ Dr Celena Ortiz Transportation:  self/family Diet: cardiac/no salt added Activity/ADLs: self. Medications Reconciled at this time:  pt declined Home health:  Company/Completion: n/a Social Support: family Advanced Care Plan: not on file. Pt educated Patient reminded that there is a physician on call 24 hours a day / 7 days a week (M-F 5pm to 8am and from Friday 5pm until Monday 8a for the weekend) should the patient have questions or concerns. Patient reminded to call 911 if situation is emergent or patient feels the situation is emergent. Pt verbalizes understanding.

## 2019-05-30 ENCOUNTER — PATIENT OUTREACH (OUTPATIENT)
Dept: CARDIOLOGY CLINIC | Age: 79
End: 2019-05-30

## 2019-05-30 NOTE — PROGRESS NOTES
NN contacted the patient by telephone to perform CHF follow Up. Noted Priorities/Plan:  Continued return to baseline. Daily Weight: 135 lbs today. Last weight was 136 lbs on 5/28/19 outreach. Zone: green     Signs/Symptoms: Edema none; SOB none; orthopnea none. Pt states she feels well. Goals      Maintains daily weight.      5/28/19 Pt will weigh her self daily and log the results to be evaluated on each outreach through 8/28/19       Prepare patients and caregivers for end of life decisions (ie. need for hospice, pain management, symptom relief, advance directives etc.)      5/28/19 Pt will complete an ACP and have scanned into the EMR by 8/28/19       Understands and adheres to diet. 5/28/19 Pt will maintain a strict low sodium diet to be evaluated on each outreach through 8/28/19            Needs addressed from pathway:   Week 1-4   Provide Daily Disease Management  (NN initiated)  ? Reviewed importance of Daily weight (Before Breakfast-  Reviewed Daily Zone Identification (symptom management; weight, edema, SOB, activity/sleep changes)-notify provider immediately as indicated  ? Reviewed Cardiac Low-sodium Diet and include carbohydrate controlled diet education  ? Reviewed importance of Fluid restriction  ? Comorbidity Management  ? Confirmed follow-up appointments/transportation. Additional assessment   ? Reinforced Fall precautions    ? Activity tolerance assessment: pt reports at baseline  ? Reviewed Energy conservation management and balancing activity with rest  ? Labs/diagnostics per MD office  ? Medication Therapy: no issues identified  ? Diet/appetite assessment: pt reports no issues  ? Reviewed appropriate ED/Hospital utilization  ? Immunizations up to date        Pneumonia       Flu  ? Home assessment/modifications: no needs identified  ? Pt denies transportation assistance needs  ? Pt denies medication assistance needs  ?  Home health services are in place     Psychosocial: Reassurance/emotional support     Monitoring:  ? Home health  ? H2H  ? Tele-monitoring  ? Cardiac device monitoring  ? My Chart    Education:  ? Reviewed Advanced care planning status   ? Support system identification ( eg: Caregiver, meal planning, community resources, family, friends, [employer], support group)   ? Health literacy for heart failure  ? Caregiver education and preparation       Have you been to an ER/Hospital since discharge from [facility]? No      Have you followed up with PCP/Cardiologist/Specialist?     Upcomin/15/19 Card appt w/ Dr Clarissa Moore    Transportation:  self/family  Diet: cardiac/no salt added  Activity/ADLs: self. Medications Reconciled at this time:  no change since last outreach  Home health:  Company/Completion: n/a  Social Support: family    Advanced Care Plan: not on file. Pt educated    Patient reminded that there is a physician on call 24 hours a day / 7 days a week (M-F 5pm to 8am and from Friday 5pm until Monday 8a for the weekend) should the patient have questions or concerns. Patient reminded to call 911 if situation is emergent or patient feels the situation is emergent. Pt verbalizes understanding.

## 2019-06-05 ENCOUNTER — PATIENT OUTREACH (OUTPATIENT)
Dept: CARDIOLOGY CLINIC | Age: 79
End: 2019-06-05

## 2019-06-11 ENCOUNTER — PATIENT OUTREACH (OUTPATIENT)
Dept: CARDIOLOGY CLINIC | Age: 79
End: 2019-06-11

## 2019-06-18 ENCOUNTER — PATIENT OUTREACH (OUTPATIENT)
Dept: CARDIOLOGY CLINIC | Age: 79
End: 2019-06-18

## 2019-06-25 ENCOUNTER — PATIENT OUTREACH (OUTPATIENT)
Dept: CARDIOLOGY CLINIC | Age: 79
End: 2019-06-25

## 2019-06-25 NOTE — LETTER
6/25/2019 12:41 PM 
 
Ms. Navjot Rangel [id number] [address] [facility] [address] Dorina Ledesma To Whom It May Concern: 
 
   I am a Heart Failure Nurse Navigator working with your cardiologist.  Part of my job is to assist you transitioning from hospital to home by following up with patients who have been in the hospital to see how they are feeling, answer any questions they may have about their visit and review upcoming follow-up appointments to see your primary care doctor as well as specialists. I have been unable to reach you by telephone and wanted to make sure that you scheduled a follow-up appointment to come in and talk to your cardiologist about your recent visit to the hospital. If you have any questions or concerns, please feel free to call me at the number listed above. Thank you for allowing [facility] to participate in your care. Sincerely, Teri Hernandez RN

## 2019-06-25 NOTE — PROGRESS NOTES
Multiple attempts to contact patient for Transition of Care follow up. Messages with contact information provided. Will send letter and end episode.

## 2019-08-06 ENCOUNTER — HOSPITAL ENCOUNTER (EMERGENCY)
Age: 79
Discharge: HOME | End: 2019-08-06
Attending: EMERGENCY MEDICINE
Payer: MEDICARE

## 2019-08-06 ENCOUNTER — APPOINTMENT (OUTPATIENT)
Dept: GENERAL RADIOLOGY | Age: 79
End: 2019-08-06
Attending: EMERGENCY MEDICINE
Payer: MEDICARE

## 2019-08-06 VITALS
HEIGHT: 60 IN | DIASTOLIC BLOOD PRESSURE: 83 MMHG | BODY MASS INDEX: 28.47 KG/M2 | RESPIRATION RATE: 19 BRPM | WEIGHT: 145 LBS | HEART RATE: 80 BPM | SYSTOLIC BLOOD PRESSURE: 157 MMHG | TEMPERATURE: 97.2 F | OXYGEN SATURATION: 96 %

## 2019-08-06 DIAGNOSIS — D64.9 ANEMIA, UNSPECIFIED TYPE: Primary | ICD-10-CM

## 2019-08-06 LAB
ALBUMIN SERPL-MCNC: 3.2 G/DL (ref 3.4–5)
ALBUMIN/GLOB SERPL: 1 {RATIO} (ref 0.8–1.7)
ALP SERPL-CCNC: 132 U/L (ref 45–117)
ALT SERPL-CCNC: 26 U/L (ref 13–56)
ANION GAP SERPL CALC-SCNC: 8 MMOL/L (ref 3–18)
APPEARANCE UR: ABNORMAL
APTT PPP: 37.7 SEC (ref 23–36.4)
AST SERPL-CCNC: 17 U/L (ref 10–38)
ATRIAL RATE: 77 BPM
BACTERIA URNS QL MICRO: ABNORMAL /HPF
BASOPHILS # BLD: 0 K/UL (ref 0–0.1)
BASOPHILS NFR BLD: 0 % (ref 0–2)
BILIRUB SERPL-MCNC: 1.4 MG/DL (ref 0.2–1)
BILIRUB UR QL: NEGATIVE
BNP SERPL-MCNC: 6071 PG/ML (ref 0–1800)
BUN SERPL-MCNC: 48 MG/DL (ref 7–18)
BUN/CREAT SERPL: 33 (ref 12–20)
CALCIUM SERPL-MCNC: 8.5 MG/DL (ref 8.5–10.1)
CALCULATED P AXIS, ECG09: 25 DEGREES
CALCULATED R AXIS, ECG10: -8 DEGREES
CALCULATED T AXIS, ECG11: 64 DEGREES
CHLORIDE SERPL-SCNC: 114 MMOL/L (ref 100–111)
CO2 SERPL-SCNC: 19 MMOL/L (ref 21–32)
COLOR UR: YELLOW
CREAT SERPL-MCNC: 1.45 MG/DL (ref 0.6–1.3)
DIAGNOSIS, 93000: NORMAL
DIFFERENTIAL METHOD BLD: ABNORMAL
EOSINOPHIL # BLD: 0.1 K/UL (ref 0–0.4)
EOSINOPHIL NFR BLD: 2 % (ref 0–5)
EPITH CASTS URNS QL MICRO: ABNORMAL /LPF (ref 0–5)
ERYTHROCYTE [DISTWIDTH] IN BLOOD BY AUTOMATED COUNT: 15 % (ref 11.6–14.5)
GLOBULIN SER CALC-MCNC: 3.2 G/DL (ref 2–4)
GLUCOSE BLD STRIP.AUTO-MCNC: 359 MG/DL (ref 70–110)
GLUCOSE SERPL-MCNC: 343 MG/DL (ref 74–99)
GLUCOSE UR STRIP.AUTO-MCNC: NEGATIVE MG/DL
HCT VFR BLD AUTO: 21.2 % (ref 35–45)
HEMOCCULT STL QL: NEGATIVE
HGB BLD-MCNC: 7.3 G/DL (ref 12–16)
HGB UR QL STRIP: NEGATIVE
INR PPP: 1.2 (ref 0.8–1.2)
KETONES UR QL STRIP.AUTO: NEGATIVE MG/DL
LACTATE BLD-SCNC: 1.48 MMOL/L (ref 0.4–2)
LEUKOCYTE ESTERASE UR QL STRIP.AUTO: ABNORMAL
LYMPHOCYTES # BLD: 0.5 K/UL (ref 0.9–3.6)
LYMPHOCYTES NFR BLD: 12 % (ref 21–52)
MCH RBC QN AUTO: 30.7 PG (ref 24–34)
MCHC RBC AUTO-ENTMCNC: 34.4 G/DL (ref 31–37)
MCV RBC AUTO: 89.1 FL (ref 74–97)
MONOCYTES # BLD: 0.1 K/UL (ref 0.05–1.2)
MONOCYTES NFR BLD: 4 % (ref 3–10)
NEUTS SEG # BLD: 3.2 K/UL (ref 1.8–8)
NEUTS SEG NFR BLD: 82 % (ref 40–73)
NITRITE UR QL STRIP.AUTO: NEGATIVE
P-R INTERVAL, ECG05: 130 MS
PH UR STRIP: 5 [PH] (ref 5–8)
PLATELET # BLD AUTO: 60 K/UL (ref 135–420)
PMV BLD AUTO: 10.3 FL (ref 9.2–11.8)
POTASSIUM SERPL-SCNC: 4.4 MMOL/L (ref 3.5–5.5)
PROT SERPL-MCNC: 6.4 G/DL (ref 6.4–8.2)
PROT UR STRIP-MCNC: 100 MG/DL
PROTHROMBIN TIME: 15.1 SEC (ref 11.5–15.2)
Q-T INTERVAL, ECG07: 412 MS
QRS DURATION, ECG06: 106 MS
QTC CALCULATION (BEZET), ECG08: 466 MS
RBC # BLD AUTO: 2.38 M/UL (ref 4.2–5.3)
RBC #/AREA URNS HPF: NEGATIVE /HPF (ref 0–5)
SODIUM SERPL-SCNC: 141 MMOL/L (ref 136–145)
SP GR UR REFRACTOMETRY: 1.01 (ref 1–1.03)
TROPONIN I SERPL-MCNC: <0.02 NG/ML (ref 0–0.04)
UROBILINOGEN UR QL STRIP.AUTO: 0.2 EU/DL (ref 0.2–1)
VENTRICULAR RATE, ECG03: 77 BPM
WBC # BLD AUTO: 3.9 K/UL (ref 4.6–13.2)
WBC URNS QL MICRO: ABNORMAL /HPF (ref 0–4)

## 2019-08-06 PROCEDURE — 36430 TRANSFUSION BLD/BLD COMPNT: CPT

## 2019-08-06 PROCEDURE — 99285 EMERGENCY DEPT VISIT HI MDM: CPT

## 2019-08-06 PROCEDURE — 93005 ELECTROCARDIOGRAM TRACING: CPT

## 2019-08-06 PROCEDURE — 82962 GLUCOSE BLOOD TEST: CPT

## 2019-08-06 PROCEDURE — 83880 ASSAY OF NATRIURETIC PEPTIDE: CPT

## 2019-08-06 PROCEDURE — 84484 ASSAY OF TROPONIN QUANT: CPT

## 2019-08-06 PROCEDURE — 86900 BLOOD TYPING SEROLOGIC ABO: CPT

## 2019-08-06 PROCEDURE — 82272 OCCULT BLD FECES 1-3 TESTS: CPT

## 2019-08-06 PROCEDURE — 85730 THROMBOPLASTIN TIME PARTIAL: CPT

## 2019-08-06 PROCEDURE — 85025 COMPLETE CBC W/AUTO DIFF WBC: CPT

## 2019-08-06 PROCEDURE — 86923 COMPATIBILITY TEST ELECTRIC: CPT

## 2019-08-06 PROCEDURE — 80053 COMPREHEN METABOLIC PANEL: CPT

## 2019-08-06 PROCEDURE — P9016 RBC LEUKOCYTES REDUCED: HCPCS

## 2019-08-06 PROCEDURE — 74011250636 HC RX REV CODE- 250/636: Performed by: EMERGENCY MEDICINE

## 2019-08-06 PROCEDURE — 83605 ASSAY OF LACTIC ACID: CPT

## 2019-08-06 PROCEDURE — 81001 URINALYSIS AUTO W/SCOPE: CPT

## 2019-08-06 PROCEDURE — 71045 X-RAY EXAM CHEST 1 VIEW: CPT

## 2019-08-06 PROCEDURE — 85610 PROTHROMBIN TIME: CPT

## 2019-08-06 PROCEDURE — 96374 THER/PROPH/DIAG INJ IV PUSH: CPT

## 2019-08-06 RX ORDER — SODIUM CHLORIDE 9 MG/ML
250 INJECTION, SOLUTION INTRAVENOUS AS NEEDED
Status: DISCONTINUED | OUTPATIENT
Start: 2019-08-06 | End: 2019-08-06 | Stop reason: HOSPADM

## 2019-08-06 RX ORDER — FUROSEMIDE 10 MG/ML
40 INJECTION INTRAMUSCULAR; INTRAVENOUS
Status: COMPLETED | OUTPATIENT
Start: 2019-08-06 | End: 2019-08-06

## 2019-08-06 RX ADMIN — FUROSEMIDE 40 MG: 10 INJECTION, SOLUTION INTRAMUSCULAR; INTRAVENOUS at 11:56

## 2019-08-06 NOTE — ED NOTES
I have reviewed discharge instructions with the patient. The patient verbalized understanding. Wheeled pt to car

## 2019-08-06 NOTE — DISCHARGE INSTRUCTIONS

## 2019-08-06 NOTE — ED PROVIDER NOTES
EMERGENCY DEPARTMENT HISTORY AND PHYSICAL EXAM 
 
11:03 AM 
Date: 8/6/2019 Patient Name: Reddy Esteban History of Presenting Illness Chief Complaint Patient presents with  Shortness of Breath History Provided By: Patient HPI: Reddy Esteban is a 77 y.o. female with past medical history of anemia, stent DVT CHF on 20 mg of Lasix presents with difficulty breathing since yesterday. Difficulty breathing is more pronounced when patient is moving around. She experience also some nasal congestion but no cough, no fever. She describes a feeling of chest tightness. No diaphoresis. She has dark stool but she also takes iron for her anemia. Patient had transfusions previously. PCP: Reyes Tenorio MD 
 
Past History Past Medical History: 
Past Medical History:  
Diagnosis Date  Anemia  CAD (coronary artery disease) Coronary Stenting needed, hematology workup needed prior to stent placement, treating medically at this time  Cancer [facility])   
 colon  Cancer ([facility] [address] breast - left  Cardiac catheterization 08/17/2016 LM patent. mLAD 99.9% (2.25 x 28-mm Xience BETHEL, resid 0%). oD1 60%. oD2 95%. pCX 90% (2.5 x 13-mm Xience BETHEL, resid 0%). OMB patent.  Cardiac echocardiogram 07/01/2016 EF 50%. Basal inferior, basal-mid inferolateral hypk. Gr 1 DDfx. RVSP 50-60 mmHg. Mild LAE. Mod, eccentric MR.    
 Diabetes ([facility] [address]  Hypercholesteremia  Hypertension  Thrombocytopenia ([facility] [address] Past Surgical History: 
Past Surgical History:  
Procedure Laterality Date  CARDIAC CATHETERIZATION  7/16/2015  
  no stents at this time r/t anemia & thrombocytopenia (hematology work-up needed prior to stent placement)  CARDIAC CATHETERIZATION  7/11/2016  CARDIAC CATHETERIZATION  8/17/2016  CORONARY ARTERY ANGIOGRAM  7/16/2015  CORONARY ARTERY ANGIOGRAM  7/11/2016  CORONARY ARTERY ANGIOGRAM  8/17/2016  CORONARY STENT EA ADDL VESSEL  8/17/2016  CORONARY STENT SINGLE W/PTCA  8/17/2016  HX GI    
 colon surg secondary to cancer - removed lesion  HX HEENT  2/2013  
 cataract bilaterally  HX MASTECTOMY  march 2011  
 left  HX VASCULAR ACCESS    
 mediport - now removed  LV ANGIOGRAPHY  7/16/2015  LV ANGIOGRAPHY  7/11/2016 Family History: 
Family History Problem Relation Age of Onset  Cancer Maternal Aunt Social History: 
Social History Tobacco Use  Smoking status: Never Smoker  Smokeless tobacco: Never Used Substance Use Topics  Alcohol use: No  
 Drug use: No  
 
 
Allergies: Allergies Allergen Reactions  Latex Itching Skin breaks out causing itching Review of Systems Review of Systems Constitutional: Negative for activity change, appetite change and chills. HENT: Negative for congestion, ear discharge, ear pain and sore throat. Eyes: Negative for photophobia and pain. Respiratory: Positive for chest tightness and shortness of breath. Negative for cough and choking. Cardiovascular: Negative for palpitations and leg swelling. Gastrointestinal: Negative for anal bleeding and rectal pain. Endocrine: Negative for polydipsia and polyuria. Genitourinary: Negative for genital sores and urgency. Musculoskeletal: Negative for arthralgias and myalgias. Neurological: Negative for dizziness, seizures and speech difficulty. Psychiatric/Behavioral: Negative for hallucinations, self-injury and suicidal ideas. Physical Exam  
 
Patient Vitals for the past 12 hrs: 
 Temp Pulse Resp BP SpO2  
08/06/19 1156  81  150/54   
08/06/19 0925 97 °F (36.1 °C) 83 18 163/68 94 % Physical Exam  
Constitutional: She is oriented to person, place, and time. She appears well-developed and well-nourished. HENT:  
Head: Normocephalic and atraumatic. Eyes: Right eye exhibits no discharge. Left eye exhibits no discharge. Neck: Normal range of motion. Neck supple. Cardiovascular: Normal rate, regular rhythm, normal heart sounds and intact distal pulses. Pulmonary/Chest: Effort normal and breath sounds normal. No respiratory distress. She has no wheezes. She has no rales. Abdominal: Soft. Bowel sounds are normal. She exhibits no distension. There is no tenderness. There is no rebound. Genitourinary: Rectal exam shows guaiac negative stool. No penile tenderness. Musculoskeletal: Normal range of motion. Neurological: She is alert and oriented to person, place, and time. Skin: Skin is warm and dry. There is pallor. Psychiatric: She has a normal mood and affect. Rectal exam: brown stool, no masses Diagnostic Study Results Labs - Recent Results (from the past 12 hour(s)) EKG, 12 LEAD, INITIAL Collection Time: 08/06/19  9:32 AM  
Result Value Ref Range Ventricular Rate 77 BPM  
 Atrial Rate 77 BPM  
 P-R Interval 130 ms QRS Duration 106 ms  
 Q-T Interval 412 ms QTC Calculation (Bezet) 466 ms Calculated P Axis 25 degrees Calculated R Axis -8 degrees Calculated T Axis 64 degrees Diagnosis Normal sinus rhythm Nonspecific ST abnormality Abnormal ECG When compared with ECG of 24-APR-2019 23:37, No significant change was found CBC WITH AUTOMATED DIFF Collection Time: 08/06/19  9:53 AM  
Result Value Ref Range WBC 3.9 (L) 4.6 - 13.2 K/uL  
 RBC 2.38 (L) 4.20 - 5.30 M/uL HGB 7.3 (L) 12.0 - 16.0 g/dL HCT 21.2 (L) 35.0 - 45.0 % MCV 89.1 74.0 - 97.0 FL  
 MCH 30.7 24.0 - 34.0 PG  
 MCHC 34.4 31.0 - 37.0 g/dL  
 RDW 15.0 (H) 11.6 - 14.5 % PLATELET 60 (L) 473 - 420 K/uL MPV 10.3 9.2 - 11.8 FL  
 NEUTROPHILS 82 (H) 40 - 73 % LYMPHOCYTES 12 (L) 21 - 52 % MONOCYTES 4 3 - 10 % EOSINOPHILS 2 0 - 5 % BASOPHILS 0 0 - 2 %  
 ABS. NEUTROPHILS 3.2 1.8 - 8.0 K/UL ABS. LYMPHOCYTES 0.5 (L) 0.9 - 3.6 K/UL  
 ABS. MONOCYTES 0.1 0.05 - 1.2 K/UL  
 ABS. EOSINOPHILS 0.1 0.0 - 0.4 K/UL  
 ABS. BASOPHILS 0.0 0.0 - 0.1 K/UL  
 DF AUTOMATED    
TYPE & SCREEN Collection Time: 08/06/19  9:53 AM  
Result Value Ref Range Crossmatch Expiration 08/09/2019 ABO/Rh(D) O POSITIVE Antibody screen NEG PROTHROMBIN TIME + INR Collection Time: 08/06/19  9:53 AM  
Result Value Ref Range Prothrombin time 15.1 11.5 - 15.2 sec INR 1.2 0.8 - 1.2 METABOLIC PANEL, COMPREHENSIVE Collection Time: 08/06/19  9:53 AM  
Result Value Ref Range Sodium 141 136 - 145 mmol/L Potassium 4.4 3.5 - 5.5 mmol/L Chloride 114 (H) 100 - 111 mmol/L  
 CO2 19 (L) 21 - 32 mmol/L Anion gap 8 3.0 - 18 mmol/L Glucose 343 (H) 74 - 99 mg/dL BUN 48 (H) 7.0 - 18 MG/DL Creatinine 1.45 (H) 0.6 - 1.3 MG/DL  
 BUN/Creatinine ratio 33 (H) 12 - 20 GFR est AA 42 (L) >60 ml/min/1.73m2 GFR est non-AA 35 (L) >60 ml/min/1.73m2 Calcium 8.5 8.5 - 10.1 MG/DL Bilirubin, total 1.4 (H) 0.2 - 1.0 MG/DL  
 ALT (SGPT) 26 13 - 56 U/L  
 AST (SGOT) 17 10 - 38 U/L Alk. phosphatase 132 (H) 45 - 117 U/L Protein, total 6.4 6.4 - 8.2 g/dL Albumin 3.2 (L) 3.4 - 5.0 g/dL Globulin 3.2 2.0 - 4.0 g/dL A-G Ratio 1.0 0.8 - 1.7 PTT Collection Time: 08/06/19  9:53 AM  
Result Value Ref Range aPTT 37.7 (H) 23.0 - 36.4 SEC NT-PRO BNP Collection Time: 08/06/19  9:53 AM  
Result Value Ref Range NT pro-BNP 6,071 (H) 0 - 1,800 PG/ML  
TROPONIN I Collection Time: 08/06/19  9:53 AM  
Result Value Ref Range Troponin-I, QT <0.02 0.0 - 0.045 NG/ML  
POC LACTIC ACID Collection Time: 08/06/19 10:04 AM  
Result Value Ref Range Lactic Acid (POC) 1.48 0.40 - 2.00 mmol/L  
GLUCOSE, POC Collection Time: 08/06/19 10:10 AM  
Result Value Ref Range Glucose (POC) 359 (H) 70 - 110 mg/dL OCCULT BLOOD, STOOL Collection Time: 08/06/19 11:24 AM  
Result Value Ref Range Occult blood, stool NEGATIVE  NEG Radiologic Studies - No results found. EK, NSR, normal intervals, no STEMI Medical Decision Making ED Course: Progress Notes, Reevaluation, and Consults: 
 
11:03 AM Initial assessment performed. The patients presenting problems have been discussed, and they/their family are in agreement with the care plan formulated and outlined with them. I have encouraged them to ask questions as they arise throughout their visit. Patient presents with breathing, history of anemia. Experienced some chest tightness and lightheadedness worse on exertion Provider Notes (Medical Decision Making): Patient presents with difficulty breathing worsening ambulation. Lungs sound clear. X-ray will be performed. Cardiac work-up. Impression is symptomatic anemia. Hemoglobin 7.3. Patient very short of breath on exertion. Today she felt that she may could not breathe and she could pass out. Hemoglobin 7.3, given the patient symptoms of tiredness,lightheadedness, SOB patient will be transfused with 1 unit of blood. Guaiac negative She will follow-up with PMD.  Is better on reassessment able to walk without difficulty Vital Signs-Reviewed the patient's vital signs. Reviewed pt's pulse ox reading. Records Reviewed: Old Medical Records (Time of Review: 11:03 AM) 
-I am the first provider for this patient. 
-I reviewed the vital signs, available nursing notes, past medical history, past surgical history, family history and social history. Current Outpatient Medications Medication Sig Dispense Refill  apixaban (ELIQUIS) 2.5 mg tablet Take 1 Tab by mouth every twelve (12) hours. 60 Tab 0  
 hydrALAZINE (APRESOLINE) 25 mg tablet Take 1 Tab by mouth three (3) times daily. 90 Tab 0  
 insulin glargine (LANTUS) 100 unit/mL injection 5 Units by SubCUTAneous route daily.  1 Vial 0  
 insulin lispro (HUMALOG) 100 unit/mL injection INITIATE INSULIN CORRECTIVE PROTOCOL: Very Insulin Resistant For Blood Sugar (mg/dL) of:             
Less than 150 =   0 units 150 -199 =   3 units 200 -249 =   6 units 250 -299 =   9 units 300 -349 =   12 units 350 and above =   15 units and Call Physician Initiate Hypoglycemic protocol if 1 Vial 0  
 lactobacillus sp. 50 billion cpu (BIO-K PLUS) 50 billion cell -375 mg cap capsule Take 1 Cap by mouth daily. 10 Cap 0  
 glucose 4 gram chewable tablet Take 4 Tabs by mouth as needed for Other (hypoglycemia). 30 Tab 0  
 acetaminophen (TYLENOL) 325 mg tablet Take 2 Tabs by mouth every four (4) hours as needed for Pain. 30 Tab 0  
 furosemide (LASIX) 20 mg tablet Take 20 mg by mouth two (2) times a day. Indications: visible water retention  SITagliptin (JANUVIA) 50 mg tablet Take 50 mg by mouth daily.  cholecalciferol, vitamin D3, (VITAMIN D3) 2,000 unit tab Take 2,000 Units by mouth daily.  amLODIPine (NORVASC) 10 mg tablet Take 10 mg by mouth daily.  carvedilol (COREG) 12.5 mg tablet Take 12.5 mg by mouth two (2) times daily (with meals).  nitroglycerin (NITROLINGUAL) 400 mcg/spray spray 1 Spray by SubLINGual route every five (5) minutes as needed. 1 Bottle 2  
 ascorbic acid, vitamin C, (VITAMIN C) 250 mg tablet Take 1 Tab by mouth daily. 60 Tab 1  
 ferrous sulfate 325 mg (65 mg iron) tablet Take 1 Tab by mouth two (2) times daily (with meals). 60 Tab 1  
 multivitamin (ONE A DAY) tablet Take 1 Tab by mouth daily.  simvastatin (ZOCOR) 20 mg tablet Take 20 mg by mouth daily.  omega 3-dha-epa-fish oil (FISH OIL) 100-160-1,000 mg cap Take 1,000 mg by mouth two (2) times a day.  cyanocobalamin (VITAMIN B12) 100 mcg tablet Take 100 mcg by mouth daily.  calcium polycarbophil (FIBER LAXATIVE) 625 mg tablet Take 1,250 mg by mouth daily as needed. Clinical Impression Clinical Impression: No diagnosis found.  
 
Disposition:  
 
 
DISCHARGE NOTE:  
 
 Pt has been reexamined. Patient has no new complaints, changes, or physical findings. Care plan outlined and precautions discussed. Results of labs and imaging were reviewed with the patient. All medications were reviewed with the patient; will d/c home . All of pt's questions and concerns were addressed. Patient was instructed and agrees to follow up with PCP, as well as to return to the ED upon further deterioration. Patient is ready to go home. This note was dictated utilizing voice recognition software which may lead to typographical errors. I apologize in advance if the situation occurs. If questions arise please do not hesitate to contact me or call our department.  
 
Ankur Mart MD 
11:03 AM

## 2019-08-06 NOTE — ED TRIAGE NOTES
Patient arrived from home c/o shortness of breath, hx chf. Patient reports began yesterday with some sinus drainage. Allergies up to date

## 2019-09-18 RX ORDER — SODIUM CHLORIDE 9 MG/ML
20 INJECTION, SOLUTION INTRAVENOUS CONTINUOUS
Status: CANCELLED | OUTPATIENT
Start: 2019-09-18

## 2019-09-19 ENCOUNTER — HOSPITAL ENCOUNTER (OUTPATIENT)
Dept: INTERVENTIONAL RADIOLOGY/VASCULAR | Age: 79
Discharge: HOME | End: 2019-09-19
Attending: PHYSICIAN ASSISTANT

## 2019-09-30 ENCOUNTER — HOSPITAL ENCOUNTER (OUTPATIENT)
Dept: INTERVENTIONAL RADIOLOGY/VASCULAR | Age: 79
Discharge: HOME | End: 2019-09-30
Attending: PHYSICIAN ASSISTANT | Admitting: RADIOLOGY
Payer: MEDICARE

## 2019-09-30 VITALS
OXYGEN SATURATION: 96 % | BODY MASS INDEX: 27.48 KG/M2 | WEIGHT: 140 LBS | HEART RATE: 70 BPM | SYSTOLIC BLOOD PRESSURE: 143 MMHG | HEIGHT: 60 IN | DIASTOLIC BLOOD PRESSURE: 65 MMHG | RESPIRATION RATE: 19 BRPM | TEMPERATURE: 97.9 F

## 2019-09-30 DIAGNOSIS — D64.9 CHRONIC ANEMIA: ICD-10-CM

## 2019-09-30 LAB
ANION GAP SERPL CALC-SCNC: 8 MMOL/L (ref 3–18)
APTT PPP: 35.7 SEC (ref 23–36.4)
BASOPHILS # BLD: 0 K/UL (ref 0–0.1)
BASOPHILS NFR BLD: 0 % (ref 0–2)
BUN SERPL-MCNC: 51 MG/DL (ref 7–18)
BUN/CREAT SERPL: 39 (ref 12–20)
CALCIUM SERPL-MCNC: 8.8 MG/DL (ref 8.5–10.1)
CHLORIDE SERPL-SCNC: 112 MMOL/L (ref 100–111)
CO2 SERPL-SCNC: 19 MMOL/L (ref 21–32)
CREAT SERPL-MCNC: 1.31 MG/DL (ref 0.6–1.3)
DIFFERENTIAL METHOD BLD: ABNORMAL
EOSINOPHIL # BLD: 0 K/UL (ref 0–0.4)
EOSINOPHIL NFR BLD: 1 % (ref 0–5)
ERYTHROCYTE [DISTWIDTH] IN BLOOD BY AUTOMATED COUNT: 14.7 % (ref 11.6–14.5)
GLUCOSE SERPL-MCNC: 189 MG/DL (ref 74–99)
HCT VFR BLD AUTO: 25.8 % (ref 35–45)
HGB BLD-MCNC: 9.1 G/DL (ref 12–16)
INR PPP: 1.2 (ref 0.8–1.2)
LYMPHOCYTES # BLD: 0.6 K/UL (ref 0.9–3.6)
LYMPHOCYTES NFR BLD: 13 % (ref 21–52)
MCH RBC QN AUTO: 31.1 PG (ref 24–34)
MCHC RBC AUTO-ENTMCNC: 35.3 G/DL (ref 31–37)
MCV RBC AUTO: 88.1 FL (ref 74–97)
MONOCYTES # BLD: 0.2 K/UL (ref 0.05–1.2)
MONOCYTES NFR BLD: 5 % (ref 3–10)
NEUTS SEG # BLD: 4 K/UL (ref 1.8–8)
NEUTS SEG NFR BLD: 81 % (ref 40–73)
PLATELET # BLD AUTO: 59 K/UL (ref 135–420)
PLATELET COMMENTS,PCOM: ABNORMAL
PMV BLD AUTO: 10.5 FL (ref 9.2–11.8)
POTASSIUM SERPL-SCNC: 4.4 MMOL/L (ref 3.5–5.5)
PROTHROMBIN TIME: 14.6 SEC (ref 11.5–15.2)
RBC # BLD AUTO: 2.93 M/UL (ref 4.2–5.3)
RBC MORPH BLD: ABNORMAL
SODIUM SERPL-SCNC: 139 MMOL/L (ref 136–145)
WBC # BLD AUTO: 4.8 K/UL (ref 4.6–13.2)

## 2019-09-30 PROCEDURE — 88185 FLOWCYTOMETRY/TC ADD-ON: CPT

## 2019-09-30 PROCEDURE — 88374 M/PHMTRC ALYS ISHQUANT/SEMIQ: CPT

## 2019-09-30 PROCEDURE — 38221 DX BONE MARROW BIOPSIES: CPT

## 2019-09-30 PROCEDURE — 88264 CHROMOSOME ANALYSIS 20-25: CPT

## 2019-09-30 PROCEDURE — 85025 COMPLETE CBC W/AUTO DIFF WBC: CPT

## 2019-09-30 PROCEDURE — 88313 SPECIAL STAINS GROUP 2: CPT

## 2019-09-30 PROCEDURE — 88237 TISSUE CULTURE BONE MARROW: CPT

## 2019-09-30 PROCEDURE — 85610 PROTHROMBIN TIME: CPT

## 2019-09-30 PROCEDURE — 80048 BASIC METABOLIC PNL TOTAL CA: CPT

## 2019-09-30 PROCEDURE — 88305 TISSUE EXAM BY PATHOLOGIST: CPT

## 2019-09-30 PROCEDURE — 74011250636 HC RX REV CODE- 250/636: Performed by: RADIOLOGY

## 2019-09-30 PROCEDURE — 74011250636 HC RX REV CODE- 250/636

## 2019-09-30 PROCEDURE — 88184 FLOWCYTOMETRY/ TC 1 MARKER: CPT

## 2019-09-30 PROCEDURE — 88311 DECALCIFY TISSUE: CPT

## 2019-09-30 PROCEDURE — 74011000250 HC RX REV CODE- 250: Performed by: RADIOLOGY

## 2019-09-30 PROCEDURE — 85730 THROMBOPLASTIN TIME PARTIAL: CPT

## 2019-09-30 RX ORDER — SODIUM CHLORIDE 9 MG/ML
20 INJECTION, SOLUTION INTRAVENOUS CONTINUOUS
Status: DISCONTINUED | OUTPATIENT
Start: 2019-09-30 | End: 2019-09-30 | Stop reason: HOSPADM

## 2019-09-30 RX ORDER — LIDOCAINE HYDROCHLORIDE 10 MG/ML
30 INJECTION, SOLUTION EPIDURAL; INFILTRATION; INTRACAUDAL; PERINEURAL ONCE
Status: COMPLETED | OUTPATIENT
Start: 2019-09-30 | End: 2019-09-30

## 2019-09-30 RX ORDER — LIDOCAINE HYDROCHLORIDE 10 MG/ML
30 INJECTION, SOLUTION EPIDURAL; INFILTRATION; INTRACAUDAL; PERINEURAL ONCE
Status: DISCONTINUED | OUTPATIENT
Start: 2019-09-30 | End: 2019-09-30 | Stop reason: HOSPADM

## 2019-09-30 RX ORDER — MIDAZOLAM HYDROCHLORIDE 1 MG/ML
1 INJECTION, SOLUTION INTRAMUSCULAR; INTRAVENOUS
Status: DISCONTINUED | OUTPATIENT
Start: 2019-09-30 | End: 2019-09-30 | Stop reason: HOSPADM

## 2019-09-30 RX ORDER — ONDANSETRON 2 MG/ML
INJECTION INTRAMUSCULAR; INTRAVENOUS
Status: COMPLETED
Start: 2019-09-30 | End: 2019-09-30

## 2019-09-30 RX ORDER — FENTANYL CITRATE 50 UG/ML
12.5-5 INJECTION, SOLUTION INTRAMUSCULAR; INTRAVENOUS
Status: DISCONTINUED | OUTPATIENT
Start: 2019-09-30 | End: 2019-09-30 | Stop reason: HOSPADM

## 2019-09-30 RX ADMIN — LIDOCAINE HYDROCHLORIDE 30 ML: 10 INJECTION, SOLUTION EPIDURAL; INFILTRATION; INTRACAUDAL; PERINEURAL at 13:50

## 2019-09-30 RX ADMIN — ONDANSETRON 4 MG: 2 INJECTION INTRAMUSCULAR; INTRAVENOUS at 15:00

## 2019-09-30 RX ADMIN — MIDAZOLAM HYDROCHLORIDE 1 MG: 2 INJECTION, SOLUTION INTRAMUSCULAR; INTRAVENOUS at 13:55

## 2019-09-30 RX ADMIN — FENTANYL CITRATE 50 MCG: 50 INJECTION INTRAMUSCULAR; INTRAVENOUS at 13:55

## 2019-09-30 RX ADMIN — MIDAZOLAM HYDROCHLORIDE 1 MG: 2 INJECTION, SOLUTION INTRAMUSCULAR; INTRAVENOUS at 13:50

## 2019-09-30 RX ADMIN — FENTANYL CITRATE 50 MCG: 50 INJECTION INTRAMUSCULAR; INTRAVENOUS at 13:50

## 2019-09-30 NOTE — H&P
OUTPATIENT HISTORY AND PHYSICAL      Today 9/30/2019     Indication/Symptoms:   Inocencia Rodriguez is a 77 y.o. female with a history of cryptogenic cirrhosis and anemia who presents for an image-guided bone marrow biopsy with moderate sedation. Patient has been NPO since midnight and takes on blood thinning medications. Current Meds:    Prior to Admission medications    Medication Sig Start Date End Date Taking? Authorizing Provider   hydrALAZINE (APRESOLINE) 25 mg tablet Take 1 Tab by mouth three (3) times daily. 3/28/19  Yes Nohemi PANDEY, NP   furosemide (LASIX) 20 mg tablet Take 20 mg by mouth two (2) times a day. Indications: visible water retention   Yes Provider, Historical   SITagliptin (JANUVIA) 50 mg tablet Take 50 mg by mouth daily. Yes Provider, Historical   cholecalciferol, vitamin D3, (VITAMIN D3) 2,000 unit tab Take 2,000 Units by mouth daily. Yes Provider, Historical   amLODIPine (NORVASC) 10 mg tablet Take 10 mg by mouth daily. Yes Provider, Historical   carvedilol (COREG) 12.5 mg tablet Take 12.5 mg by mouth two (2) times daily (with meals). Yes Provider, Historical   ascorbic acid, vitamin C, (VITAMIN C) 250 mg tablet Take 1 Tab by mouth daily. 7/5/16  Yes Matt Daniel MD   ferrous sulfate 325 mg (65 mg iron) tablet Take 1 Tab by mouth two (2) times daily (with meals). 7/5/16  Yes Matt Daniel MD   multivitamin (ONE A DAY) tablet Take 1 Tab by mouth daily. Yes Provider, Historical   simvastatin (ZOCOR) 20 mg tablet Take 20 mg by mouth daily. Yes Provider, Historical   omega 3-dha-epa-fish oil (FISH OIL) 100-160-1,000 mg cap Take 1,000 mg by mouth two (2) times a day. Yes Provider, Historical   cyanocobalamin (VITAMIN B12) 100 mcg tablet Take 100 mcg by mouth daily. Yes Provider, Historical   calcium polycarbophil (FIBER LAXATIVE) 625 mg tablet Take 1,250 mg by mouth daily as needed.    Yes Provider, Historical   apixaban (ELIQUIS) 2.5 mg tablet Take 1 Tab by mouth every twelve (12) hours. 3/29/19   Soni PANDEY, NP   glucose 4 gram chewable tablet Take 4 Tabs by mouth as needed for Other (hypoglycemia). 3/28/19   Elly Ye, NP   acetaminophen (TYLENOL) 325 mg tablet Take 2 Tabs by mouth every four (4) hours as needed for Pain. 3/28/19   Elly Ephraim, NP   nitroglycerin (NITROLINGUAL) 400 mcg/spray spray 1 Spray by SubLINGual route every five (5) minutes as needed. 8/17/16   Allie Bledsoe PA       Allergies: Allergies   Allergen Reactions    Latex Itching     Skin breaks out causing itching       Comorbid Conditions:    Past Medical History:   Diagnosis Date    Anemia     CAD (coronary artery disease)     Coronary Stenting needed, hematology workup needed prior to stent placement, treating medically at this time    Cancer [facility])     colon    Cancer ([facility] [address]     breast - left    Cardiac catheterization 08/17/2016    LM patent. mLAD 99.9% (2.25 x 28-mm Xience BETHEL, resid 0%). oD1 60%. oD2 95%. pCX 90% (2.5 x 13-mm Xience BETHEL, resid 0%). OMB patent.  Cardiac echocardiogram 07/01/2016    EF 50%. Basal inferior, basal-mid inferolateral hypk. Gr 1 DDfx. RVSP 50-60 mmHg. Mild LAE.   Mod, eccentric MR.      Diabetes ([facility] [address]     Hypercholesteremia     Hypertension     Thrombocytopenia ([facility] [address]           Past Surgical History:   Procedure Laterality Date    CARDIAC CATHETERIZATION  7/16/2015     no stents at this time r/t anemia & thrombocytopenia (hematology work-up needed prior to stent placement)    CARDIAC CATHETERIZATION  7/11/2016         CARDIAC CATHETERIZATION  8/17/2016         CORONARY ARTERY ANGIOGRAM  7/16/2015         CORONARY ARTERY ANGIOGRAM  7/11/2016         CORONARY ARTERY ANGIOGRAM  8/17/2016         CORONARY STENT EA ADDL VESSEL  8/17/2016         CORONARY STENT SINGLE W/PTCA  8/17/2016         HX GI      colon surg secondary to cancer - removed lesion    HX HEENT  2/2013    cataract bilaterally    HX MASTECTOMY  march 2011    left    HX VASCULAR ACCESS      mediport - now removed    IR BX BONE MARROW DIAGNOSTIC  9/30/2019    LV ANGIOGRAPHY  7/16/2015         LV ANGIOGRAPHY  7/11/2016          Data:    Visit Vitals  /65   Pulse 70   Temp 97.9 °F (36.6 °C)   Resp 19   Ht 5' (1.524 m)   Wt 63.5 kg (140 lb)   SpO2 96%   Breastfeeding? No   BMI 27.34 kg/m²   :  Recent Labs     09/30/19  1145   PLT 59*     Recent Labs     09/30/19  1145   INR 1.2   APTT 35.7       The H & P and/or progress notes and any available imaging were reviewed. The risks, indications and possible alternatives to the procedure, including doing nothing, were discussed and informed consent was obtained. Physical Exam:      Mental status:   Alert and oriented. Examination specific to the procedure proposed to be performed and any co morbid conditions:   Mallampati classification 2 ,  ASA 2   Heart:   Regular rate. Lungs:   Normal respiratory effort. Symmetrical rise and fall of chest    The patient is an appropriate candidate to undergo the planned procedure and sedation.     Gonzalo White

## 2019-09-30 NOTE — ROUTINE PROCESS
A+Ox4, ambulatory without difficulty. Discharge informaiton reviewed. Escorted to car, tot her sister for transport.

## 2019-09-30 NOTE — DISCHARGE INSTRUCTIONS
DISCHARGE SUMMARY from Nurse:    Please resume taking your home medication as prescribed. PATIENT INSTRUCTIONS:    After general anesthesia or intravenous sedation, for 24 hours or while taking prescription Narcotics:  · Limit your activities  · Do not drive and operate hazardous machinery  · Do not make important personal or business decisions  · Do  not drink alcoholic beverages  · If you have not urinated within 8 hours after discharge, please contact your surgeon on call. Report the following to your surgeon:  · Excessive pain, swelling, redness or odor of or around the surgical area  · Temperature over 100.5  · Nausea and vomiting lasting longer than 4 hours or if unable to take medications  · Any signs of decreased circulation or nerve impairment to extremity: change in color, persistent  numbness, tingling, coldness or increase pain  · Any questions    What to do at Home:  Recommended activity: Activity as tolerated and no driving for today. If you experience any of the following symptoms bleeding,swelling,acute pain or numbness, fever, please follow up with Dr. Victorino Shaikh MD @ [phone]. *  Please give a list of your current medications to your Primary Care Provider. *  Please update this list whenever your medications are discontinued, doses are      changed, or new medications (including over-the-counter products) are added. *  Please carry medication information at all times in case of emergency situations. These are general instructions for a healthy lifestyle:    No smoking/ No tobacco products/ Avoid exposure to second hand smoke  Surgeon General's Warning:  Quitting smoking now greatly reduces serious risk to your health.     Obesity, smoking, and sedentary lifestyle greatly increases your risk for illness    A healthy diet, regular physical exercise & weight monitoring are important for maintaining a healthy lifestyle    You may be retaining fluid if you have a history of heart failure or if you experience any of the following symptoms:  Weight gain of 3 pounds or more overnight or 5 pounds in a week, increased swelling in our hands or feet or shortness of breath while lying flat in bed. Please call your doctor as soon as you notice any of these symptoms; do not wait until your next office visit. The discharge information has been reviewed with the patient. The patient verbalized understanding. Discharge medications reviewed with the patient and appropriate educational materials and side effects teaching were provided. ___________________________________________________________________________________________________________________________________BIOPSY DISCHARGE INSTRUCTIONS    General Instructions:     A biopsy is the removal of a small piece of tissue for microscopic examination or testing. Healthy tissue can be obtained for the purpose of tissue-type matching for transplants. Unhealthy tissues are more commonly biopsied to diagnose disease. Lung Biopsy:     A needle lung biopsy is performed when there is a mass discovered in the lung area. The most serious risk is infection, bleeding, and pneumothorax (a collapsed lung). Signs of pneumothorax include shortness of breath, rapid heart rate, and blueness of the skin. If any of these occur, call 911. Liver Biopsy: This test helps detect cancer, infections, and the cause of an enlargement of the liver or elevated liver enzymes. It also helps to diagnose a number of liver diseases. The pain associated with the procedure may be felt in the shoulder. The risks include internal bleeding, pneumothorax, and injury to the surrounding organs. Renal Biopsy: This procedure is sometimes done to evaluate a transplanted kidney. It is also used to evaluate unexplained decrease in kidney function, blood, or protein in the urine.  You may see bright red blood in the urine the first 24 hours after the test. If the bleeding lasts longer, you need to call your doctor. There is a risk of infection and bleeding into the muscle, which may cause soreness. Spinal Biopsy: This test is sometimes done in conjunction with other procedures. Your back will be sore, as we are taking a small sample of bone, which is slightly more difficult to sample than tissue. General Biopsy:     A mass can grow in any area of the body, and we are taking a specimen as ordered by your doctor. The risks are the same. They include bleeding, pain, and infection. Home Care Instructions: You may resume your regular diet and medication regimen. Do not drink alcohol, drive, or make any important legal decisions in the next 24 hours. Do not lift anything heavier than a gallon of milk until the soreness goes away. You may use over the counter acetaminophen or ibuprofen for the soreness. You may apply an ice pack to the affected area for 20-30 minutes at time for the first 24 hours. After that, you may apply a heat pack. Call If: You should call your Physician and/or the Radiology Nurse if you have any questions or concerns about the biopsy site. Call if you should have increased pain, fever, redness, drainage, or bleeding more than a small spot on the bandage. Follow-Up Instructions: Please see your ordering doctor as he/she has requested. To Reach Us: Interventional Radiology Lab: [phone]. Patient armband removed and shredded  MyChart Activation    Thank you for requesting access to [facility]. Please follow the instructions below to securely access and download your online medical record. [facility] allows you to send messages to your doctor, view your test results, renew your prescriptions, schedule appointments, and more. How Do I Sign Up? 1. In your internet browser, go to [email or web address]. 2. Click on the First Time User? Click Here link in the Sign In box.  You will see the New Member Sign Up page.  3. Enter your [employer] Access Code exactly as it appears below. You will not need to use this code after youve completed the sign-up process. If you do not sign up before the expiration date, you must request a new code. [employer] Access Code: [email or web address]  Expires: 2019 11:59 AM (This is the date your [employer] access code will )    4. Enter the last four digits of your Social Security Number (xxxx) and Date of Birth (mm/dd/yyyy) as indicated and click Submit. You will be taken to the next sign-up page. 5. Create a [employer] ID. This will be your [employer] login ID and cannot be changed, so think of one that is secure and easy to remember. 6. Create a [employer] password. You can change your password at any time. 7. Enter your Password Reset Question and Answer. This can be used at a later time if you forget your password. 8. Enter your e-mail address. You will receive e-mail notification when new information is available in [address]. 9. Click Sign Up. You can now view and download portions of your medical record. 10. Click the Download Summary menu link to download a portable copy of your medical information. Additional Information    If you have questions, please visit the Frequently Asked Questions section of the [employer] website at [email or web address] Remember, [employer] is NOT to be used for urgent needs. For medical emergencies, dial 911.

## 2019-10-16 ENCOUNTER — OFFICE VISIT (OUTPATIENT)
Dept: CARDIOLOGY CLINIC | Age: 79
End: 2019-10-16

## 2019-10-16 VITALS
SYSTOLIC BLOOD PRESSURE: 142 MMHG | HEIGHT: 60 IN | BODY MASS INDEX: 28.07 KG/M2 | DIASTOLIC BLOOD PRESSURE: 80 MMHG | WEIGHT: 143 LBS | HEART RATE: 92 BPM | OXYGEN SATURATION: 98 %

## 2019-10-16 DIAGNOSIS — I25.10 CORONARY ARTERY DISEASE INVOLVING NATIVE CORONARY ARTERY OF NATIVE HEART WITHOUT ANGINA PECTORIS: ICD-10-CM

## 2019-10-16 DIAGNOSIS — I10 HTN (HYPERTENSION), BENIGN: ICD-10-CM

## 2019-10-16 DIAGNOSIS — I35.0 AORTIC VALVE STENOSIS, ETIOLOGY OF CARDIAC VALVE DISEASE UNSPECIFIED: Primary | ICD-10-CM

## 2019-10-16 DIAGNOSIS — D64.9 CHRONIC ANEMIA: ICD-10-CM

## 2019-10-16 DIAGNOSIS — I50.32 CHRONIC DIASTOLIC HEART FAILURE (HCC): ICD-10-CM

## 2019-10-16 RX ORDER — BENAZEPRIL HYDROCHLORIDE 10 MG/1
10 TABLET ORAL DAILY
COMMUNITY
Start: 2019-10-15 | End: 2020-01-01

## 2019-10-16 NOTE — PROGRESS NOTES
Aleidaadeola Martell presents today for Chief Complaint Patient presents with  New Patient  
  reestablish care Soniya Martell preferred language for health care discussion is english/other. Is someone accompanying this pt? no 
 
Is the patient using any DME equipment during OV? no 
 
Depression Screening: 
3 most recent PHQ Screens 10/16/2019 Little interest or pleasure in doing things Not at all Feeling down, depressed, irritable, or hopeless Not at all Total Score PHQ 2 0 Learning Assessment: 
Learning Assessment 10/16/2019 PRIMARY LEARNER Patient PRIMARY LANGUAGE ENGLISH  
LEARNER PREFERENCE PRIMARY DEMONSTRATION  
ANSWERED BY Patient RELATIONSHIP SELF Abuse Screening: 
Abuse Screening Questionnaire 10/16/2019 Do you ever feel afraid of your partner? Jacinto Sevilla Are you in a relationship with someone who physically or mentally threatens you? Jacinto Sevilla Is it safe for you to go home? Alec Henley Fall Risk Fall Risk Assessment, last 12 mths 10/16/2019 Able to walk? Yes Fall in past 12 months? No  
 
 
Pt currently taking Anticoagulant therapy? no 
 
Coordination of Care: 1. Have you been to the ER, urgent care clinic since your last visit? Hospitalized since your last visit? no 
 
2. Have you seen or consulted any other health care providers outside of the [address] [place] [address] since your last visit? Include any pap smears or colon screening.  no

## 2019-10-16 NOTE — PROGRESS NOTES
HISTORY OF PRESENT ILLNESS Sharif Conrad is a 77 y.o. female. HPI Patient presents for an overdue follow-up office visit. She was initially seen in our office by Dr. Favian Barrera, however, has not seen him in nearly 4 years. She was last seen in our office by our nurse practitioner approximately 6 months ago for a post hospital follow-up office visit. Patient has a history of known coronary disease, status post drug-eluting stents placed to both her LAD and her left circumflex in 2016. She also has a history for diastolic heart failure and more recently discovered aortic valve stenosis in the moderate range. Patient was hospitalized in March 2019 for respiratory failure which is felt to be a combination from diastolic heart failure and possibly a pneumonic process. She was also discovered to have a left lower extremity acute DVT which was treated with anticoagulation for 4 months. An echocardiogram during her hospital stay, showed preserved LV systolic function, EF 56 to 60%, moderate concentric LVH, moderate aortic valve stenosis with a mean valve gradient 25 mmHg. Over the past 6 months, the patient states she has been feeling back to baseline. She denies any chest pain, no worsening shortness of breath above her baseline. She does have chronic  dyspnea on exertion. Denies any leg swelling, no orthopnea, no PND. No heart palpitations, dizziness, nor syncope. She reports that her biggest complaint is weakness and fatigue. Past Medical History:  
Diagnosis Date  Anemia  Aortic stenosis, moderate 03/2019  CAD (coronary artery disease) Coronary Stenting needed, hematology workup needed prior to stent placement, treating medically at this time  Cancer [facility])   
 colon  Cancer ([facility] [address] breast - left  Cardiac catheterization 08/17/2016 LM patent. mLAD 99.9% (2.25 x 28-mm Xience BETHEL, resid 0%). oD1 60%. oD2 95%. pCX 90% (2.5 x 13-mm Xience BETHEL, resid 0%). OMB patent.  Cardiac echocardiogram 03/2019 EF 56 to 60%, moderate concentric LVH, moderate aortic stenosis, mean valve gradient 25 mmHg  Diabetes ([facility] [address]  Hypercholesteremia  Hypertension  Thrombocytopenia ([facility] [address] Current Outpatient Medications Medication Sig Dispense Refill  hydrALAZINE (APRESOLINE) 25 mg tablet Take 1 Tab by mouth three (3) times daily. 90 Tab 0  
 glucose 4 gram chewable tablet Take 4 Tabs by mouth as needed for Other (hypoglycemia). 30 Tab 0  
 furosemide (LASIX) 20 mg tablet Take 20 mg by mouth two (2) times a day. Indications: visible water retention  SITagliptin (JANUVIA) 50 mg tablet Take 50 mg by mouth daily.  cholecalciferol, vitamin D3, (VITAMIN D3) 2,000 unit tab Take 2,000 Units by mouth daily.  amLODIPine (NORVASC) 10 mg tablet Take 10 mg by mouth daily.  carvedilol (COREG) 12.5 mg tablet Take 12.5 mg by mouth two (2) times daily (with meals).  nitroglycerin (NITROLINGUAL) 400 mcg/spray spray 1 Spray by SubLINGual route every five (5) minutes as needed. 1 Bottle 2  
 ascorbic acid, vitamin C, (VITAMIN C) 250 mg tablet Take 1 Tab by mouth daily. 60 Tab 1  
 multivitamin (ONE A DAY) tablet Take 1 Tab by mouth daily.  simvastatin (ZOCOR) 20 mg tablet Take 20 mg by mouth daily.  omega 3-dha-epa-fish oil (FISH OIL) 100-160-1,000 mg cap Take 1,000 mg by mouth two (2) times a day.  cyanocobalamin (VITAMIN B12) 100 mcg tablet Take 100 mcg by mouth daily.  calcium polycarbophil (FIBER LAXATIVE) 625 mg tablet Take 1,250 mg by mouth daily as needed.  benazepril (LOTENSIN) 10 mg tablet Take 10 mg by mouth daily. Allergies Allergen Reactions  Latex Itching Skin breaks out causing itching Social History Tobacco Use  Smoking status: Never Smoker  Smokeless tobacco: Never Used Substance Use Topics  Alcohol use:  No  
  Drug use: No  
 
Family History Problem Relation Age of Onset  Cancer Maternal Aunt Review of Systems Constitutional: Positive for malaise/fatigue. Negative for chills, fever and weight loss. HENT: Negative for nosebleeds. Eyes: Negative for blurred vision and double vision. Respiratory: Positive for shortness of breath. Negative for cough and wheezing. Cardiovascular: Negative for chest pain, palpitations, orthopnea, claudication, leg swelling and PND. Gastrointestinal: Negative for abdominal pain, heartburn, nausea and vomiting. Genitourinary: Negative for dysuria and hematuria. Musculoskeletal: Negative for falls and myalgias. Skin: Negative for rash. Neurological: Negative for dizziness, focal weakness and headaches. Endo/Heme/Allergies: Does not bruise/bleed easily. Psychiatric/Behavioral: Negative for substance abuse. Visit Vitals /80 (BP 1 Location: Right arm, BP Patient Position: Sitting) Pulse 92 Ht 5' (1.524 m) Wt 64.9 kg (143 lb) SpO2 98% BMI 27.93 kg/m² Physical Exam  
Constitutional: She is oriented to person, place, and time. She appears well-developed and well-nourished. HENT:  
Head: Normocephalic and atraumatic. Eyes: Conjunctivae are normal.  
Neck: Neck supple. No JVD present. Carotid bruit is not present. Cardiovascular: Normal rate, regular rhythm, S1 normal, S2 normal and normal pulses. Exam reveals no gallop. Murmur heard. Harsh midsystolic murmur is present at the upper right sternal border radiating to the neck. Pulmonary/Chest: Effort normal and breath sounds normal. She has no wheezes. She has no rales. Abdominal: Soft. Bowel sounds are normal. There is no tenderness. Musculoskeletal: She exhibits no edema, tenderness or deformity. Neurological: She is alert and oriented to person, place, and time. Skin: Skin is warm and dry. Psychiatric: She has a normal mood and affect.  Her behavior is normal. Thought content normal.  
 
EKG: Normal sinus rhythm, left axis deviation, borderline voltage criteria for LVH, poor R wave progression, nonspecific T wave abnormality. No change in the previous EKG. ASSESSMENT and PLAN Encounter Diagnoses Name Primary?  Aortic valve stenosis, etiology of cardiac valve disease unspecified Yes  Chronic anemia  Chronic diastolic heart failure ([address]  Coronary artery disease involving native coronary artery of native heart without angina pectoris  HTN (hypertension), benign Coronary artery disease. History of drug-eluting stents to both her LAD and left circumflex in August 2016. Patient also had residual severe distal RCA disease which was well collateralized from the left. The patient states her aspirin was stopped when she was taking her oral anticoagulation earlier this year, but I would like to resume aspirin 81 mg daily since her oral anticoagulation has been stopped. I would also continue her current dose of simvastatin and carvedilol. Lastly, I recommended a pharmacologic nuclear stress test since it has been greater than 3 years since her PCI. Aortic valve stenosis. This was in the moderate range by echocardiogram in March 2019. I recommended a repeat echocardiogram in 5 to 6 months. Essential hypertension. Patient blood pressures currently upper limits of normal on her current regimen which includes an ACE inhibitor, calcium channel blocker, beta-blocker and diuretic. All of which I would continue. Dyslipidemia. Patient has been treated with simvastatin 20 mg daily. Her goal LDL should be less than 70. I requested a copy of her most recent lipids from her PCP. If they are significantly elevated, would consider a more potent statin such as atorvastatin or rosuvastatin. Diabetes mellitus, type II. Managed with oral agents. Patient is currently being followed closely by her PCP.   Her goal hemoglobin A1c should be less than 7 from a cardiac standpoint. History of left lower extremity DVT. This was diagnosed in March 2019. Patient was anticoagulated with Eliquis for approximately 4 months. This was then discontinued. This is followed by her hematologist/oncologist. 
 
Chronic anemia. She has required transfusions in the past.  I suspect this is why she is having chronic fatigue. She is followed closely by her hematologist for this. Follow-up in 6 months, sooner if needed

## 2019-10-16 NOTE — PATIENT INSTRUCTIONS
Pharm nuc for cad Echo in 6 months Follow up in 6 months after echo If you have not heard from the central scheduler to schedule your testing in 48 hours, please call [phone].

## 2019-12-01 ENCOUNTER — HOSPITAL ENCOUNTER (EMERGENCY)
Age: 79
Discharge: HOME | End: 2019-12-01
Attending: EMERGENCY MEDICINE
Payer: MEDICARE

## 2019-12-01 ENCOUNTER — APPOINTMENT (OUTPATIENT)
Dept: VASCULAR SURGERY | Age: 79
End: 2019-12-01
Attending: PHYSICIAN ASSISTANT
Payer: MEDICARE

## 2019-12-01 VITALS
TEMPERATURE: 97 F | DIASTOLIC BLOOD PRESSURE: 76 MMHG | HEART RATE: 81 BPM | WEIGHT: 140 LBS | SYSTOLIC BLOOD PRESSURE: 158 MMHG | OXYGEN SATURATION: 99 % | HEIGHT: 60 IN | RESPIRATION RATE: 18 BRPM | BODY MASS INDEX: 27.48 KG/M2

## 2019-12-01 DIAGNOSIS — I73.9 PERIPHERAL ARTERIAL DISEASE (HCC): ICD-10-CM

## 2019-12-01 DIAGNOSIS — M79.89 SWELLING OF LOWER LEG: Primary | ICD-10-CM

## 2019-12-01 DIAGNOSIS — D64.9 ANEMIA, UNSPECIFIED TYPE: ICD-10-CM

## 2019-12-01 LAB
ANION GAP BLD CALC-SCNC: 15 MMOL/L (ref 10–20)
BUN BLD-MCNC: 36 MG/DL (ref 7–18)
CA-I BLD-MCNC: 1.23 MMOL/L (ref 1.12–1.32)
CHLORIDE BLD-SCNC: 110 MMOL/L (ref 100–108)
CO2 BLD-SCNC: 17 MMOL/L (ref 19–24)
CREAT UR-MCNC: 1.3 MG/DL (ref 0.6–1.3)
GLUCOSE BLD STRIP.AUTO-MCNC: 308 MG/DL (ref 74–106)
HCT VFR BLD CALC: 24 % (ref 36–49)
HGB BLD-MCNC: 8.2 G/DL (ref 12–16)
POTASSIUM BLD-SCNC: 5.3 MMOL/L (ref 3.5–5.5)
SODIUM BLD-SCNC: 137 MMOL/L (ref 136–145)

## 2019-12-01 PROCEDURE — 93971 EXTREMITY STUDY: CPT

## 2019-12-01 PROCEDURE — 80047 BASIC METABLC PNL IONIZED CA: CPT

## 2019-12-01 PROCEDURE — 99283 EMERGENCY DEPT VISIT LOW MDM: CPT

## 2019-12-01 PROCEDURE — 74011250637 HC RX REV CODE- 250/637: Performed by: PHYSICIAN ASSISTANT

## 2019-12-01 RX ORDER — ACETAMINOPHEN 325 MG/1
650 TABLET ORAL
Qty: 20 TAB | Refills: 0 | Status: SHIPPED | OUTPATIENT
Start: 2019-12-01

## 2019-12-01 RX ORDER — METHOCARBAMOL 500 MG/1
500 TABLET, FILM COATED ORAL
Status: COMPLETED | OUTPATIENT
Start: 2019-12-01 | End: 2019-12-01

## 2019-12-01 RX ADMIN — METHOCARBAMOL 500 MG: 500 TABLET ORAL at 12:48

## 2019-12-01 NOTE — PROGRESS NOTES
Spoke with pt via phone, two patient identifiers use, discussed PAD with pt, discussed using compression stockings and need for prompt PCP f/u, pt states she has appt scheduled with PCP for tomorrow. All questions answered.

## 2019-12-01 NOTE — ED PROVIDER NOTES
EMERGENCY DEPARTMENT HISTORY AND PHYSICAL EXAM 
 
 
Date: 12/1/2019 Patient Name: Justus Dey History of Presenting Illness Chief Complaint Patient presents with  Leg Pain History Provided By: Patient HPI: Justus Dey, [de-identified] y.o. female PMHx significant for htn, DM, breast cancer, hypercholesterolemia, thrombocytopenia, CAD, aortic stenosis, previous DVT presents ambulatory to the ED with cc left lower leg swelling x 5 days. Denies known trauma or injury. Denies numbness/tingling, radiating pain. Denies fever/chills. Pt reports hx DVT 3/2019. Pt was previously anticoagulated and taken off medication 7/2019. Pt has not taken anything for sx. Describes pain as a dull ache. Pt denies pain with movement or exercise. Denies CP, SOB, dizziness. There are no other complaints, changes, or physical findings at this time. PCP: Inderjit Lewis MD 
 
No current facility-administered medications on file prior to encounter. Current Outpatient Medications on File Prior to Encounter Medication Sig Dispense Refill  benazepril (LOTENSIN) 10 mg tablet Take 10 mg by mouth daily.  hydrALAZINE (APRESOLINE) 25 mg tablet Take 1 Tab by mouth three (3) times daily. 90 Tab 0  
 glucose 4 gram chewable tablet Take 4 Tabs by mouth as needed for Other (hypoglycemia). 30 Tab 0  
 furosemide (LASIX) 20 mg tablet Take 20 mg by mouth two (2) times a day. Indications: visible water retention  SITagliptin (JANUVIA) 50 mg tablet Take 50 mg by mouth daily.  cholecalciferol, vitamin D3, (VITAMIN D3) 2,000 unit tab Take 2,000 Units by mouth daily.  amLODIPine (NORVASC) 10 mg tablet Take 10 mg by mouth daily.  carvedilol (COREG) 12.5 mg tablet Take 12.5 mg by mouth two (2) times daily (with meals).  nitroglycerin (NITROLINGUAL) 400 mcg/spray spray 1 Spray by SubLINGual route every five (5) minutes as needed.  1 Bottle 2  
  ascorbic acid, vitamin C, (VITAMIN C) 250 mg tablet Take 1 Tab by mouth daily. 60 Tab 1  
 multivitamin (ONE A DAY) tablet Take 1 Tab by mouth daily.  simvastatin (ZOCOR) 20 mg tablet Take 20 mg by mouth daily.  omega 3-dha-epa-fish oil (FISH OIL) 100-160-1,000 mg cap Take 1,000 mg by mouth two (2) times a day.  cyanocobalamin (VITAMIN B12) 100 mcg tablet Take 100 mcg by mouth daily.  calcium polycarbophil (FIBER LAXATIVE) 625 mg tablet Take 1,250 mg by mouth daily as needed. Past History Past Medical History: 
Past Medical History:  
Diagnosis Date  Anemia  Aortic stenosis, moderate 03/2019  CAD (coronary artery disease) Coronary Stenting needed, hematology workup needed prior to stent placement, treating medically at this time  Cancer [facility])   
 colon  Cancer ([facility] breast - left  Cardiac catheterization 08/17/2016 LM patent. mLAD 99.9% (2.25 x 28-mm Xience BETHEL, resid 0%). oD1 60%. oD2 95%. pCX 90% (2.5 x 13-mm Xience BETHEL, resid 0%). OMB patent.  Cardiac echocardiogram 03/2019 EF 56 to 60%, moderate concentric LVH, moderate aortic stenosis, mean valve gradient 25 mmHg  Diabetes ([facility]  Hypercholesteremia  Hypertension  Thrombocytopenia ([facility] Past Surgical History: 
Past Surgical History:  
Procedure Laterality Date  CARDIAC CATHETERIZATION  7/16/2015  
  no stents at this time r/t anemia & thrombocytopenia (hematology work-up needed prior to stent placement)  CARDIAC CATHETERIZATION  7/11/2016  CARDIAC CATHETERIZATION  8/17/2016  CORONARY ARTERY ANGIOGRAM  7/16/2015  CORONARY ARTERY ANGIOGRAM  7/11/2016  CORONARY ARTERY ANGIOGRAM  8/17/2016  CORONARY STENT EA ADDL VESSEL  8/17/2016  CORONARY STENT SINGLE W/PTCA  8/17/2016  HX GI    
 colon surg secondary to cancer - removed lesion  HX HEENT  2/2013  
 cataract bilaterally  HX MASTECTOMY  march 2011  
 left  HX VASCULAR ACCESS    
 mediport - now removed  IR BX BONE MARROW DIAGNOSTIC  9/30/2019  LV ANGIOGRAPHY  7/16/2015  LV ANGIOGRAPHY  7/11/2016 Family History: 
Family History Problem Relation Age of Onset  Cancer Maternal Aunt Social History: 
Social History Tobacco Use  Smoking status: Never Smoker  Smokeless tobacco: Never Used Substance Use Topics  Alcohol use: No  
 Drug use: No  
 
 
Allergies: Allergies Allergen Reactions  Latex Itching Skin breaks out causing itching Review of Systems Review of Systems Constitutional: Negative for chills and fever. Respiratory: Negative for shortness of breath. Cardiovascular: Negative for chest pain. Gastrointestinal: Negative for abdominal pain, nausea and vomiting. Genitourinary: Negative for flank pain. Musculoskeletal: Negative for back pain and myalgias. Left lower leg swelling Skin: Negative for color change, pallor, rash and wound. Neurological: Negative for dizziness, weakness and light-headedness. All other systems reviewed and are negative. Physical Exam  
Physical Exam 
Vitals signs and nursing note reviewed. Constitutional:   
   General: She is not in acute distress. Appearance: She is well-developed. HENT:  
   Head: Normocephalic and atraumatic. Eyes:  
   Conjunctiva/sclera: Conjunctivae normal.  
Cardiovascular:  
   Rate and Rhythm: Normal rate and regular rhythm. Heart sounds: Normal heart sounds. Pulmonary:  
   Effort: Pulmonary effort is normal. No respiratory distress. Breath sounds: Normal breath sounds. Abdominal:  
   General: Bowel sounds are normal. There is no distension. Palpations: Abdomen is soft. Musculoskeletal: Normal range of motion. Skin: 
   General: Skin is warm. Findings: No rash. Comments: Left lower leg: Swelling to calf and lower leg.  No pitting edema. Sensation intact. DP pulses strong and equal b/l. No overlying erythema, warmth, ecchymosis. Neurological:  
   Mental Status: She is alert and oriented to person, place, and time. Psychiatric:     
   Behavior: Behavior normal.  
 
 
 
Diagnostic Study Results Labs - Recent Results (from the past 12 hour(s)) POC CHEM8 Collection Time: 12/01/19 12:15 PM  
Result Value Ref Range CO2, POC 17 (L) 19 - 24 MMOL/L Glucose,  (H) 74 - 106 MG/DL  
 BUN, POC 36 (H) 7 - 18 MG/DL Creatinine, POC 1.3 0.6 - 1.3 MG/DL  
 GFRAA, POC 48 (L) >60 ml/min/1.73m2 GFRNA, POC 39 (L) >60 ml/min/1.73m2 Sodium,  136 - 145 MMOL/L Potassium, POC 5.3 3.5 - 5.5 MMOL/L Calcium, ionized (POC) 1.23 1.12 - 1.32 mmol/L Chloride,  (H) 100 - 108 MMOL/L Anion gap, POC 15 10 - 20 Hematocrit, POC 24 (L) 36 - 49 % Hemoglobin, POC 8.2 (L) 12 - 16 G/DL Radiologic Studies - No orders to display CT Results  (Last 48 hours) None CXR Results  (Last 48 hours) None Medical Decision Making I am the first provider for this patient. I reviewed the vital signs, available nursing notes, past medical history, past surgical history, family history and social history. Vital Signs-Reviewed the patient's vital signs. Patient Vitals for the past 12 hrs: 
 Temp Pulse Resp BP SpO2  
12/01/19 1253 97 °F (36.1 °C) 81 18 158/76 99 % 12/01/19 0959 97.8 °F (36.6 °C) 74 18 136/68 99 % Records Reviewed: Nursing Notes and Old Medical Records Provider Notes (Medical Decision Making): DDx: DVT, Lower leg swelling, Muscle spasm, Hypokalemia [de-identified] yo F with left lower leg swelling x 5 days. Denies numbness/tingling, radiating pain. No overlying skin changes. Previous hx DVT. Not currently anticoagulated. Low level of concern for acute arterial etiology due to strong pulses, with warm extremities and sensation intact.  No pitting edema or swelling b/l to indicate fluid overload. Potassium WNL. Low level of concern for PE due to lack of CP, SOB, dizziness, tachycardia, and SpO2: 99%. Discussed PAD findings with pt and need for PCP f/u. Discussed use of compression stockings. Pt is chronically anemic - last hgb: 9.1. ED Course:  
Initial assessment performed. The patients presenting problems have been discussed, and they are in agreement with the care plan formulated and outlined with them. I have encouraged them to ask questions as they arise throughout their visit. [address] Negative DVT study. When discussed with pt she states she feels her muscle is now spasming. Pt denies hx muscle spasm or hypokalemia. Chem 8 ordered. [address] Discussed lab results with pt. Pt continues to deny CP, SOB, dizziness. Discussed compression stocking and prompt f/u with PCP. Return if sx worsen. Disposition: 
1:10 PM 
Discussed lab results imaging results with pt along with dx and treatment plan. Discussed importance of PCP follow up. All questions answered. Pt voiced they understood. Return if sx worsen. PLAN: 
1. Discharge Medication List as of 12/1/2019 12:57 PM  
  
 
2. Follow-up Information Follow up With Specialties Details Why Contact Info Carrillo Henderson MD Internal Medicine Schedule an appointment as soon as possible for a visit in 1 day  [address] [place] [id number] 
[phone] [facility] EMERGENCY DEPT Emergency Medicine  If symptoms worsen [address] [place] [id number] 
[phone] Return to ED if worse Diagnosis Clinical Impression: 1. Swelling of lower leg 2. Anemia, unspecified type 3. Peripheral arterial [facility] Attestations: 
 
ELOISA Romero Please note that this dictation was completed with [employer], the [employer] voice recognition software.   Quite often unanticipated grammatical, syntax, homophones, and other interpretive errors are inadvertently transcribed by the computer software. Please disregard these errors. Please excuse any errors that have escaped final proofreading. Thank you.

## 2019-12-01 NOTE — DISCHARGE INSTRUCTIONS
Patient Education        Leg and Ankle Edema: Care Instructions  Your Care Instructions  Swelling in the legs, ankles, and feet is called edema. It is common after you sit or stand for a while. Long plane flights or car rides often cause swelling in the legs and feet. You may also have swelling if you have to stand for long periods of time at your job. Problems with the veins in the legs (varicose veins) and changes in hormones can also cause swelling. Sometimes the swelling in the ankles and feet is caused by a more serious problem, such as heart failure, infection, blood clots, or liver or kidney disease. Follow-up care is a key part of your treatment and safety. Be sure to make and go to all appointments, and call your doctor if you are having problems. It's also a good idea to know your test results and keep a list of the medicines you take. How can you care for yourself at home? · If your doctor gave you medicine, take it as prescribed. Call your doctor if you think you are having a problem with your medicine. · Whenever you are resting, raise your legs up. Try to keep the swollen area higher than the level of your heart. · Take breaks from standing or sitting in one position. ? Walk around to increase the blood flow in your lower legs. ? Move your feet and ankles often while you stand, or tighten and relax your leg muscles. · Wear support stockings. Put them on in the morning, before swelling gets worse. · Eat a balanced diet. Lose weight if you need to. · Limit the amount of salt (sodium) in your diet. Salt holds fluid in the body and may increase swelling. When should you call for help? Call 911 anytime you think you may need emergency care. For example, call if:    · You have symptoms of a blood clot in your lung (called a pulmonary embolism). These may include:  ? Sudden chest pain. ? Trouble breathing. ?  Coughing up blood.    Call your doctor now or seek immediate medical care if:    · You have signs of a blood clot, such as:  ? Pain in your calf, back of the knee, thigh, or groin. ? Redness and swelling in your leg or groin.     · You have symptoms of infection, such as:  ? Increased pain, swelling, warmth, or redness. ? Red streaks or pus. ? A fever.    Watch closely for changes in your health, and be sure to contact your doctor if:    · Your swelling is getting worse.     · You have new or worsening pain in your legs.     · You do not get better as expected. Where can you learn more? Go to http://perlita-linda.info/. Enter V950 in the search box to learn more about \"Leg and Ankle Edema: Care Instructions. \"  Current as of: June 26, 2019  Content Version: 12.2  © 2393-1758 [employer]. Care instructions adapted under license by [employer] (which disclaims liability or warranty for this information). If you have questions about a medical condition or this instruction, always ask your healthcare professional. [place] [address] any warranty or liability for your use of this information.

## 2020-01-01 ENCOUNTER — APPOINTMENT (OUTPATIENT)
Dept: NON INVASIVE DIAGNOSTICS | Age: 80
DRG: 291 | End: 2020-01-01
Attending: EMERGENCY MEDICINE
Payer: MEDICARE

## 2020-01-01 ENCOUNTER — PATIENT OUTREACH (OUTPATIENT)
Dept: CASE MANAGEMENT | Age: 80
End: 2020-01-01

## 2020-01-01 ENCOUNTER — VIRTUAL VISIT (OUTPATIENT)
Dept: ORTHOPEDIC SURGERY | Age: 80
End: 2020-01-01
Payer: MEDICARE

## 2020-01-01 ENCOUNTER — HOSPITAL ENCOUNTER (INPATIENT)
Age: 80
LOS: 5 days | Discharge: NURSING, REHAB OR LONG-TERM CARE | DRG: 481 | End: 2020-12-11
Attending: EMERGENCY MEDICINE | Admitting: HOSPITALIST
Payer: MEDICARE

## 2020-01-01 ENCOUNTER — ANESTHESIA (OUTPATIENT)
Dept: SURGERY | Age: 80
DRG: 481 | End: 2020-01-01
Payer: MEDICARE

## 2020-01-01 ENCOUNTER — ANESTHESIA EVENT (OUTPATIENT)
Dept: SURGERY | Age: 80
DRG: 481 | End: 2020-01-01
Payer: MEDICARE

## 2020-01-01 ENCOUNTER — TELEPHONE (OUTPATIENT)
Dept: ORTHOPEDIC SURGERY | Age: 80
End: 2020-01-01

## 2020-01-01 ENCOUNTER — APPOINTMENT (OUTPATIENT)
Dept: GENERAL RADIOLOGY | Age: 80
DRG: 481 | End: 2020-01-01
Attending: HOSPITALIST
Payer: MEDICARE

## 2020-01-01 ENCOUNTER — HOSPITAL ENCOUNTER (OUTPATIENT)
Dept: MAMMOGRAPHY | Age: 80
Discharge: HOME | End: 2020-02-20
Attending: PHYSICIAN ASSISTANT
Payer: MEDICARE

## 2020-01-01 ENCOUNTER — APPOINTMENT (OUTPATIENT)
Dept: GENERAL RADIOLOGY | Age: 80
DRG: 291 | End: 2020-01-01
Attending: EMERGENCY MEDICINE
Payer: MEDICARE

## 2020-01-01 ENCOUNTER — HOSPITAL ENCOUNTER (INPATIENT)
Age: 80
LOS: 2 days | Discharge: HOME | DRG: 291 | End: 2020-09-28
Attending: EMERGENCY MEDICINE | Admitting: EMERGENCY MEDICINE
Payer: MEDICARE

## 2020-01-01 ENCOUNTER — APPOINTMENT (OUTPATIENT)
Dept: VASCULAR SURGERY | Age: 80
DRG: 291 | End: 2020-01-01
Attending: EMERGENCY MEDICINE
Payer: MEDICARE

## 2020-01-01 ENCOUNTER — APPOINTMENT (OUTPATIENT)
Dept: ULTRASOUND IMAGING | Age: 80
DRG: 481 | End: 2020-01-01
Attending: INTERNAL MEDICINE
Payer: MEDICARE

## 2020-01-01 ENCOUNTER — APPOINTMENT (OUTPATIENT)
Dept: GENERAL RADIOLOGY | Age: 80
DRG: 481 | End: 2020-01-01
Attending: EMERGENCY MEDICINE
Payer: MEDICARE

## 2020-01-01 ENCOUNTER — APPOINTMENT (OUTPATIENT)
Dept: GENERAL RADIOLOGY | Age: 80
DRG: 481 | End: 2020-01-01
Attending: ORTHOPAEDIC SURGERY
Payer: MEDICARE

## 2020-01-01 VITALS
HEIGHT: 60 IN | WEIGHT: 149.9 LBS | SYSTOLIC BLOOD PRESSURE: 137 MMHG | TEMPERATURE: 96.7 F | DIASTOLIC BLOOD PRESSURE: 65 MMHG | HEART RATE: 65 BPM | BODY MASS INDEX: 29.43 KG/M2 | OXYGEN SATURATION: 99 % | RESPIRATION RATE: 16 BRPM

## 2020-01-01 VITALS
WEIGHT: 157.5 LBS | SYSTOLIC BLOOD PRESSURE: 124 MMHG | TEMPERATURE: 97.5 F | HEART RATE: 85 BPM | DIASTOLIC BLOOD PRESSURE: 76 MMHG | BODY MASS INDEX: 30.92 KG/M2 | HEIGHT: 60 IN | OXYGEN SATURATION: 99 % | RESPIRATION RATE: 15 BRPM

## 2020-01-01 DIAGNOSIS — S72.002A CLOSED FRACTURE OF LEFT HIP, INITIAL ENCOUNTER (HCC): Primary | ICD-10-CM

## 2020-01-01 DIAGNOSIS — N18.3 TYPE 2 DIABETES MELLITUS WITH STAGE 3 CHRONIC KIDNEY DISEASE, UNSPECIFIED WHETHER LONG TERM INSULIN USE: ICD-10-CM

## 2020-01-01 DIAGNOSIS — I50.33 ACUTE ON CHRONIC DIASTOLIC CHF (CONGESTIVE HEART FAILURE) (HCC): ICD-10-CM

## 2020-01-01 DIAGNOSIS — D69.6 THROMBOCYTOPENIA (HCC): ICD-10-CM

## 2020-01-01 DIAGNOSIS — D64.9 ANEMIA, UNSPECIFIED TYPE: ICD-10-CM

## 2020-01-01 DIAGNOSIS — Z12.31 VISIT FOR SCREENING MAMMOGRAM: ICD-10-CM

## 2020-01-01 DIAGNOSIS — E11.22 TYPE 2 DIABETES MELLITUS WITH STAGE 3 CHRONIC KIDNEY DISEASE, UNSPECIFIED WHETHER LONG TERM INSULIN USE: ICD-10-CM

## 2020-01-01 DIAGNOSIS — K74.60 CIRRHOSIS OF LIVER NOT DUE TO ALCOHOL (HCC): ICD-10-CM

## 2020-01-01 DIAGNOSIS — S72.142A DISPLACED INTERTROCHANTERIC FRACTURE OF LEFT FEMUR, INITIAL ENCOUNTER FOR CLOSED FRACTURE (HCC): ICD-10-CM

## 2020-01-01 DIAGNOSIS — I25.10 CORONARY ARTERY DISEASE DUE TO LIPID RICH PLAQUE: ICD-10-CM

## 2020-01-01 DIAGNOSIS — I25.83 CORONARY ARTERY DISEASE DUE TO LIPID RICH PLAQUE: ICD-10-CM

## 2020-01-01 DIAGNOSIS — I10 HTN (HYPERTENSION), BENIGN: ICD-10-CM

## 2020-01-01 LAB
ABO + RH BLD: NORMAL
ALBUMIN SERPL-MCNC: 2.4 G/DL (ref 3.4–5)
ALBUMIN SERPL-MCNC: 2.7 G/DL (ref 3.4–5)
ALBUMIN SERPL-MCNC: 2.9 G/DL (ref 3.4–5)
ALBUMIN SERPL-MCNC: 3.4 G/DL (ref 3.4–5)
ALBUMIN SERPL-MCNC: 3.5 G/DL (ref 3.4–5)
ALBUMIN/GLOB SERPL: 1 {RATIO} (ref 0.8–1.7)
ALBUMIN/GLOB SERPL: 1.2 {RATIO} (ref 0.8–1.7)
ALP SERPL-CCNC: 70 U/L (ref 45–117)
ALP SERPL-CCNC: 95 U/L (ref 45–117)
ALT SERPL-CCNC: 18 U/L (ref 13–56)
ALT SERPL-CCNC: 30 U/L (ref 13–56)
ANION GAP SERPL CALC-SCNC: 10 MMOL/L (ref 3–18)
ANION GAP SERPL CALC-SCNC: 10 MMOL/L (ref 3–18)
ANION GAP SERPL CALC-SCNC: 12 MMOL/L (ref 3–18)
ANION GAP SERPL CALC-SCNC: 6 MMOL/L (ref 3–18)
ANION GAP SERPL CALC-SCNC: 6 MMOL/L (ref 3–18)
ANION GAP SERPL CALC-SCNC: 7 MMOL/L (ref 3–18)
ANION GAP SERPL CALC-SCNC: 7 MMOL/L (ref 3–18)
ANION GAP SERPL CALC-SCNC: 8 MMOL/L (ref 3–18)
ANION GAP SERPL CALC-SCNC: 9 MMOL/L (ref 3–18)
APPEARANCE UR: ABNORMAL
APTT PPP: 36.4 SEC (ref 23–36.4)
AST SERPL-CCNC: 15 U/L (ref 10–38)
AST SERPL-CCNC: 21 U/L (ref 10–38)
ATRIAL RATE: 72 BPM
ATRIAL RATE: 76 BPM
BACTERIA URNS QL MICRO: ABNORMAL /HPF
BASOPHILS # BLD: 0 K/UL (ref 0–0.1)
BASOPHILS NFR BLD: 0 % (ref 0–2)
BILIRUB SERPL-MCNC: 0.5 MG/DL (ref 0.2–1)
BILIRUB SERPL-MCNC: 1 MG/DL (ref 0.2–1)
BILIRUB UR QL: NEGATIVE
BLD PROD TYP BPU: NORMAL
BLOOD GROUP ANTIBODIES SERPL: NORMAL
BNP SERPL-MCNC: 2352 PG/ML (ref 0–1800)
BNP SERPL-MCNC: 3884 PG/ML (ref 0–1800)
BPU ID: NORMAL
BUN SERPL-MCNC: 48 MG/DL (ref 7–18)
BUN SERPL-MCNC: 48 MG/DL (ref 7–18)
BUN SERPL-MCNC: 80 MG/DL (ref 7–18)
BUN SERPL-MCNC: 82 MG/DL (ref 7–18)
BUN SERPL-MCNC: 84 MG/DL (ref 7–18)
BUN SERPL-MCNC: 90 MG/DL (ref 7–18)
BUN SERPL-MCNC: 93 MG/DL (ref 7–18)
BUN SERPL-MCNC: 95 MG/DL (ref 7–18)
BUN SERPL-MCNC: 96 MG/DL (ref 7–18)
BUN/CREAT SERPL: 30 (ref 12–20)
BUN/CREAT SERPL: 30 (ref 12–20)
BUN/CREAT SERPL: 33 (ref 12–20)
BUN/CREAT SERPL: 33 (ref 12–20)
BUN/CREAT SERPL: 37 (ref 12–20)
BUN/CREAT SERPL: 43 (ref 12–20)
BUN/CREAT SERPL: 44 (ref 12–20)
BUN/CREAT SERPL: 49 (ref 12–20)
BUN/CREAT SERPL: 56 (ref 12–20)
CALCIUM SERPL-MCNC: 7.7 MG/DL (ref 8.5–10.1)
CALCIUM SERPL-MCNC: 8.1 MG/DL (ref 8.5–10.1)
CALCIUM SERPL-MCNC: 8.1 MG/DL (ref 8.5–10.1)
CALCIUM SERPL-MCNC: 8.2 MG/DL (ref 8.5–10.1)
CALCIUM SERPL-MCNC: 8.5 MG/DL (ref 8.5–10.1)
CALCIUM SERPL-MCNC: 8.7 MG/DL (ref 8.5–10.1)
CALCIUM SERPL-MCNC: 8.7 MG/DL (ref 8.5–10.1)
CALCIUM SERPL-MCNC: 8.8 MG/DL (ref 8.5–10.1)
CALCIUM SERPL-MCNC: 9.3 MG/DL (ref 8.5–10.1)
CALCULATED P AXIS, ECG09: 33 DEGREES
CALCULATED P AXIS, ECG09: 47 DEGREES
CALCULATED R AXIS, ECG10: -30 DEGREES
CALCULATED R AXIS, ECG10: 7 DEGREES
CALCULATED T AXIS, ECG11: 74 DEGREES
CALCULATED T AXIS, ECG11: 78 DEGREES
CALLED TO:,BCALL1: NORMAL
CALLED TO:,BCALL2: NORMAL
CALLED TO:,BCALL3: NORMAL
CHLORIDE SERPL-SCNC: 107 MMOL/L (ref 100–111)
CHLORIDE SERPL-SCNC: 108 MMOL/L (ref 100–111)
CHLORIDE SERPL-SCNC: 110 MMOL/L (ref 100–111)
CHLORIDE SERPL-SCNC: 117 MMOL/L (ref 100–111)
CHLORIDE SERPL-SCNC: 119 MMOL/L (ref 100–111)
CK MB CFR SERPL CALC: 5.3 % (ref 0–4)
CK MB CFR SERPL CALC: 5.7 % (ref 0–4)
CK MB SERPL-MCNC: 3.1 NG/ML (ref 5–25)
CK MB SERPL-MCNC: 3.3 NG/ML (ref 5–25)
CK SERPL-CCNC: 121 U/L (ref 26–192)
CK SERPL-CCNC: 58 U/L (ref 26–192)
CK SERPL-CCNC: 59 U/L (ref 26–192)
CO2 SERPL-SCNC: 15 MMOL/L (ref 21–32)
CO2 SERPL-SCNC: 15 MMOL/L (ref 21–32)
CO2 SERPL-SCNC: 16 MMOL/L (ref 21–32)
CO2 SERPL-SCNC: 16 MMOL/L (ref 21–32)
CO2 SERPL-SCNC: 18 MMOL/L (ref 21–32)
CO2 SERPL-SCNC: 19 MMOL/L (ref 21–32)
CO2 SERPL-SCNC: 23 MMOL/L (ref 21–32)
COLOR UR: YELLOW
COVID-19 RAPID TEST, COVR: NOT DETECTED
CREAT SERPL-MCNC: 1.31 MG/DL (ref 0.6–1.3)
CREAT SERPL-MCNC: 1.43 MG/DL (ref 0.6–1.3)
CREAT SERPL-MCNC: 1.45 MG/DL (ref 0.6–1.3)
CREAT SERPL-MCNC: 1.73 MG/DL (ref 0.6–1.3)
CREAT SERPL-MCNC: 1.92 MG/DL (ref 0.6–1.3)
CREAT SERPL-MCNC: 2.04 MG/DL (ref 0.6–1.3)
CREAT SERPL-MCNC: 2.85 MG/DL (ref 0.6–1.3)
CREAT SERPL-MCNC: 3.18 MG/DL (ref 0.6–1.3)
CREAT SERPL-MCNC: 3.24 MG/DL (ref 0.6–1.3)
CREAT UR-MCNC: 141 MG/DL (ref 30–125)
CROSSMATCH RESULT,%XM: NORMAL
DIAGNOSIS, 93000: NORMAL
DIAGNOSIS, 93000: NORMAL
DIFFERENTIAL METHOD BLD: ABNORMAL
ECHO AO ROOT DIAM: 3.09 CM
ECHO AV AREA PEAK VELOCITY: 0.99 CM2
ECHO AV AREA PEAK VELOCITY: 1 CM2
ECHO AV AREA VTI: 1.05 CM2
ECHO AV AREA VTI: 1.06 CM2
ECHO AV MEAN GRADIENT: 28.14 MMHG
ECHO AV MEAN VELOCITY: 254.83 CM/S
ECHO AV PEAK GRADIENT: 45.1 MMHG
ECHO AV PEAK VELOCITY: 335.76 CM/S
ECHO AV VTI: 87.53 CM
ECHO LA AREA 4C: 19.73 CM2
ECHO LA VOL 2C: 102.7 ML (ref 22–52)
ECHO LA VOL 4C: 65.17 ML (ref 22–52)
ECHO LA VOL BP: 92.22 ML (ref 22–52)
ECHO LA VOL/BSA BIPLANE: 57.49 ML/M2 (ref 16–28)
ECHO LA VOLUME INDEX A2C: 64.03 ML/M2 (ref 16–28)
ECHO LA VOLUME INDEX A4C: 40.63 ML/M2 (ref 16–28)
ECHO LV E' LATERAL VELOCITY: 4.45 CM/S
ECHO LV E' SEPTAL VELOCITY: 2.71 CM/S
ECHO LV INTERNAL DIMENSION DIASTOLIC: 4.47 CM (ref 3.9–5.3)
ECHO LV INTERNAL DIMENSION SYSTOLIC: 3.41 CM
ECHO LV IVSD: 1.24 CM (ref 0.6–0.9)
ECHO LV MASS 2D: 202 G (ref 67–162)
ECHO LV MASS INDEX 2D: 125.9 G/M2 (ref 43–95)
ECHO LV POSTERIOR WALL DIASTOLIC: 1.21 CM (ref 0.6–0.9)
ECHO LVOT CARDIAC OUTPUT: 6.53 LITER/MINUTE
ECHO LVOT DIAM: 1.95 CM
ECHO LVOT PEAK GRADIENT: 5.02 MMHG
ECHO LVOT PEAK VELOCITY: 112.06 CM/S
ECHO LVOT SV: 92.7 ML
ECHO LVOT VTI: 31.03 CM
ECHO MV A VELOCITY: 140.29 CM/S
ECHO MV AREA VTI: 1.86 CM2
ECHO MV E DECELERATION TIME (DT): 0.21 S
ECHO MV E VELOCITY: 138.45 CM/S
ECHO MV E/A RATIO: 0.99
ECHO MV E/E' LATERAL: 31.11
ECHO MV E/E' RATIO (AVERAGED): 41.1
ECHO MV E/E' SEPTAL: 51.09
ECHO MV EROA PISA: 0.04 CM2
ECHO MV MAX VELOCITY: 166.7 CM/S
ECHO MV MEAN GRADIENT: 5.63 MMHG
ECHO MV PEAK GRADIENT: 11.12 MMHG
ECHO MV REGURGITANT RADIUS PISA: 0.43 CM
ECHO MV REGURGITANT VOLUME: 9.82 ML
ECHO MV REGURGITANT VTIA: 242.84 CM
ECHO MV VTI: 49.8 CM
ECHO PV REGURGITANT MAX VELOCITY: 732.31 CM/S
ECHO RV INTERNAL DIMENSION: 3.55 CM
EOSINOPHIL # BLD: 0.1 K/UL (ref 0–0.4)
EOSINOPHIL # BLD: 0.2 K/UL (ref 0–0.4)
EOSINOPHIL # BLD: 0.3 K/UL (ref 0–0.4)
EOSINOPHIL # BLD: 0.3 K/UL (ref 0–0.4)
EOSINOPHIL NFR BLD: 1 % (ref 0–5)
EOSINOPHIL NFR BLD: 2 % (ref 0–5)
EOSINOPHIL NFR BLD: 3 % (ref 0–5)
EOSINOPHIL NFR BLD: 4 % (ref 0–5)
EOSINOPHIL NFR BLD: 7 % (ref 0–5)
EOSINOPHIL NFR BLD: 8 % (ref 0–5)
EPITH CASTS URNS QL MICRO: ABNORMAL /LPF (ref 0–5)
ERYTHROCYTE [DISTWIDTH] IN BLOOD BY AUTOMATED COUNT: 15.2 % (ref 11.6–14.5)
ERYTHROCYTE [DISTWIDTH] IN BLOOD BY AUTOMATED COUNT: 15.5 % (ref 11.6–14.5)
ERYTHROCYTE [DISTWIDTH] IN BLOOD BY AUTOMATED COUNT: 15.6 % (ref 11.6–14.5)
ERYTHROCYTE [DISTWIDTH] IN BLOOD BY AUTOMATED COUNT: 15.8 % (ref 11.6–14.5)
ERYTHROCYTE [DISTWIDTH] IN BLOOD BY AUTOMATED COUNT: 15.8 % (ref 11.6–14.5)
ERYTHROCYTE [DISTWIDTH] IN BLOOD BY AUTOMATED COUNT: 15.9 % (ref 11.6–14.5)
ERYTHROCYTE [DISTWIDTH] IN BLOOD BY AUTOMATED COUNT: 15.9 % (ref 11.6–14.5)
ERYTHROCYTE [DISTWIDTH] IN BLOOD BY AUTOMATED COUNT: 16.2 % (ref 11.6–14.5)
EST. AVERAGE GLUCOSE BLD GHB EST-MCNC: 100 MG/DL
EST. AVERAGE GLUCOSE BLD GHB EST-MCNC: 111 MG/DL
FERRITIN SERPL-MCNC: 128 NG/ML (ref 8–388)
FOLATE SERPL-MCNC: >20 NG/ML (ref 3.1–17.5)
FOLATE SERPL-MCNC: >20 NG/ML (ref 3.1–17.5)
GLOBULIN SER CALC-MCNC: 3 G/DL (ref 2–4)
GLOBULIN SER CALC-MCNC: 3.4 G/DL (ref 2–4)
GLUCOSE BLD STRIP.AUTO-MCNC: 119 MG/DL (ref 70–110)
GLUCOSE BLD STRIP.AUTO-MCNC: 130 MG/DL (ref 70–110)
GLUCOSE BLD STRIP.AUTO-MCNC: 130 MG/DL (ref 70–110)
GLUCOSE BLD STRIP.AUTO-MCNC: 135 MG/DL (ref 70–110)
GLUCOSE BLD STRIP.AUTO-MCNC: 141 MG/DL (ref 70–110)
GLUCOSE BLD STRIP.AUTO-MCNC: 155 MG/DL (ref 70–110)
GLUCOSE BLD STRIP.AUTO-MCNC: 156 MG/DL (ref 70–110)
GLUCOSE BLD STRIP.AUTO-MCNC: 161 MG/DL (ref 70–110)
GLUCOSE BLD STRIP.AUTO-MCNC: 168 MG/DL (ref 70–110)
GLUCOSE BLD STRIP.AUTO-MCNC: 170 MG/DL (ref 70–110)
GLUCOSE BLD STRIP.AUTO-MCNC: 174 MG/DL (ref 70–110)
GLUCOSE BLD STRIP.AUTO-MCNC: 182 MG/DL (ref 70–110)
GLUCOSE BLD STRIP.AUTO-MCNC: 185 MG/DL (ref 70–110)
GLUCOSE BLD STRIP.AUTO-MCNC: 187 MG/DL (ref 70–110)
GLUCOSE BLD STRIP.AUTO-MCNC: 189 MG/DL (ref 70–110)
GLUCOSE BLD STRIP.AUTO-MCNC: 196 MG/DL (ref 70–110)
GLUCOSE BLD STRIP.AUTO-MCNC: 198 MG/DL (ref 70–110)
GLUCOSE BLD STRIP.AUTO-MCNC: 205 MG/DL (ref 70–110)
GLUCOSE BLD STRIP.AUTO-MCNC: 206 MG/DL (ref 70–110)
GLUCOSE BLD STRIP.AUTO-MCNC: 210 MG/DL (ref 70–110)
GLUCOSE BLD STRIP.AUTO-MCNC: 211 MG/DL (ref 70–110)
GLUCOSE BLD STRIP.AUTO-MCNC: 215 MG/DL (ref 70–110)
GLUCOSE BLD STRIP.AUTO-MCNC: 216 MG/DL (ref 70–110)
GLUCOSE BLD STRIP.AUTO-MCNC: 217 MG/DL (ref 70–110)
GLUCOSE BLD STRIP.AUTO-MCNC: 225 MG/DL (ref 70–110)
GLUCOSE BLD STRIP.AUTO-MCNC: 234 MG/DL (ref 70–110)
GLUCOSE BLD STRIP.AUTO-MCNC: 236 MG/DL (ref 70–110)
GLUCOSE BLD STRIP.AUTO-MCNC: 239 MG/DL (ref 70–110)
GLUCOSE BLD STRIP.AUTO-MCNC: 248 MG/DL (ref 70–110)
GLUCOSE SERPL-MCNC: 125 MG/DL (ref 74–99)
GLUCOSE SERPL-MCNC: 132 MG/DL (ref 74–99)
GLUCOSE SERPL-MCNC: 178 MG/DL (ref 74–99)
GLUCOSE SERPL-MCNC: 188 MG/DL (ref 74–99)
GLUCOSE SERPL-MCNC: 193 MG/DL (ref 74–99)
GLUCOSE SERPL-MCNC: 193 MG/DL (ref 74–99)
GLUCOSE SERPL-MCNC: 194 MG/DL (ref 74–99)
GLUCOSE SERPL-MCNC: 197 MG/DL (ref 74–99)
GLUCOSE SERPL-MCNC: 98 MG/DL (ref 74–99)
GLUCOSE UR STRIP.AUTO-MCNC: NEGATIVE MG/DL
HBA1C MFR BLD: 5.1 % (ref 4.2–5.6)
HBA1C MFR BLD: 5.5 % (ref 4.2–5.6)
HCT VFR BLD AUTO: 17.8 % (ref 35–45)
HCT VFR BLD AUTO: 19.4 % (ref 35–45)
HCT VFR BLD AUTO: 20.8 % (ref 35–45)
HCT VFR BLD AUTO: 21.4 % (ref 35–45)
HCT VFR BLD AUTO: 21.7 % (ref 35–45)
HCT VFR BLD AUTO: 21.8 % (ref 35–45)
HCT VFR BLD AUTO: 22.2 % (ref 35–45)
HCT VFR BLD AUTO: 22.8 % (ref 35–45)
HCT VFR BLD AUTO: 23 % (ref 35–45)
HCT VFR BLD AUTO: 23.4 % (ref 35–45)
HCT VFR BLD AUTO: 24.7 % (ref 35–45)
HCT VFR BLD AUTO: 26.9 % (ref 35–45)
HCT VFR BLD AUTO: 29.7 % (ref 35–45)
HEALTH STATUS, XMCV2T: NORMAL
HEMOCCULT STL QL: POSITIVE
HGB BLD-MCNC: 10.1 G/DL (ref 12–16)
HGB BLD-MCNC: 6 G/DL (ref 12–16)
HGB BLD-MCNC: 6.7 G/DL (ref 12–16)
HGB BLD-MCNC: 7.2 G/DL (ref 12–16)
HGB BLD-MCNC: 7.3 G/DL (ref 12–16)
HGB BLD-MCNC: 7.5 G/DL (ref 12–16)
HGB BLD-MCNC: 7.5 G/DL (ref 12–16)
HGB BLD-MCNC: 7.6 G/DL (ref 12–16)
HGB BLD-MCNC: 7.8 G/DL (ref 12–16)
HGB BLD-MCNC: 7.8 G/DL (ref 12–16)
HGB BLD-MCNC: 7.9 G/DL (ref 12–16)
HGB BLD-MCNC: 8.5 G/DL (ref 12–16)
HGB BLD-MCNC: 9.1 G/DL (ref 12–16)
HGB UR QL STRIP: ABNORMAL
HISTORY CHECKED?,CKHIST: NORMAL
INR PPP: 1.2 (ref 0.8–1.2)
INR PPP: 1.3 (ref 0.8–1.2)
IRON SATN MFR SERPL: 27 % (ref 20–50)
IRON SATN MFR SERPL: 34 % (ref 20–50)
IRON SERPL-MCNC: 59 UG/DL (ref 50–175)
IRON SERPL-MCNC: 65 UG/DL (ref 50–175)
KETONES UR QL STRIP.AUTO: ABNORMAL MG/DL
LEUKOCYTE ESTERASE UR QL STRIP.AUTO: ABNORMAL
LVOT MG: 3.53 MMHG
LYMPHOCYTES # BLD: 0.6 K/UL (ref 0.9–3.6)
LYMPHOCYTES # BLD: 0.7 K/UL (ref 0.9–3.6)
LYMPHOCYTES # BLD: 0.7 K/UL (ref 0.9–3.6)
LYMPHOCYTES NFR BLD: 13 % (ref 21–52)
LYMPHOCYTES NFR BLD: 13 % (ref 21–52)
LYMPHOCYTES NFR BLD: 14 % (ref 21–52)
LYMPHOCYTES NFR BLD: 15 % (ref 21–52)
LYMPHOCYTES NFR BLD: 16 % (ref 21–52)
LYMPHOCYTES NFR BLD: 18 % (ref 21–52)
MAGNESIUM SERPL-MCNC: 1.8 MG/DL (ref 1.6–2.6)
MAGNESIUM SERPL-MCNC: 1.9 MG/DL (ref 1.6–2.6)
MCH RBC QN AUTO: 28.8 PG (ref 24–34)
MCH RBC QN AUTO: 28.9 PG (ref 24–34)
MCH RBC QN AUTO: 29.2 PG (ref 24–34)
MCH RBC QN AUTO: 30 PG (ref 24–34)
MCH RBC QN AUTO: 30.2 PG (ref 24–34)
MCH RBC QN AUTO: 30.3 PG (ref 24–34)
MCH RBC QN AUTO: 30.5 PG (ref 24–34)
MCH RBC QN AUTO: 30.8 PG (ref 24–34)
MCHC RBC AUTO-ENTMCNC: 33.7 G/DL (ref 31–37)
MCHC RBC AUTO-ENTMCNC: 33.8 G/DL (ref 31–37)
MCHC RBC AUTO-ENTMCNC: 33.8 G/DL (ref 31–37)
MCHC RBC AUTO-ENTMCNC: 33.9 G/DL (ref 31–37)
MCHC RBC AUTO-ENTMCNC: 34.1 G/DL (ref 31–37)
MCHC RBC AUTO-ENTMCNC: 34.4 G/DL (ref 31–37)
MCHC RBC AUTO-ENTMCNC: 34.5 G/DL (ref 31–37)
MCHC RBC AUTO-ENTMCNC: 34.6 G/DL (ref 31–37)
MCV RBC AUTO: 84.9 FL (ref 74–97)
MCV RBC AUTO: 85.4 FL (ref 74–97)
MCV RBC AUTO: 85.6 FL (ref 74–97)
MCV RBC AUTO: 87.8 FL (ref 74–97)
MCV RBC AUTO: 88.6 FL (ref 74–97)
MCV RBC AUTO: 88.9 FL (ref 74–97)
MCV RBC AUTO: 89 FL (ref 74–97)
MCV RBC AUTO: 89.4 FL (ref 74–97)
MONOCYTES # BLD: 0.1 K/UL (ref 0.05–1.2)
MONOCYTES # BLD: 0.2 K/UL (ref 0.05–1.2)
MONOCYTES # BLD: 0.2 K/UL (ref 0.05–1.2)
MONOCYTES # BLD: 0.3 K/UL (ref 0.05–1.2)
MONOCYTES NFR BLD: 1 % (ref 3–10)
MONOCYTES NFR BLD: 5 % (ref 3–10)
MONOCYTES NFR BLD: 6 % (ref 3–10)
MONOCYTES NFR BLD: 6 % (ref 3–10)
MONOCYTES NFR BLD: 7 % (ref 3–10)
MONOCYTES NFR BLD: 7 % (ref 3–10)
NEUTS SEG # BLD: 2.7 K/UL (ref 1.8–8)
NEUTS SEG # BLD: 2.8 K/UL (ref 1.8–8)
NEUTS SEG # BLD: 2.9 K/UL (ref 1.8–8)
NEUTS SEG # BLD: 3 K/UL (ref 1.8–8)
NEUTS SEG # BLD: 3.8 K/UL (ref 1.8–8)
NEUTS SEG # BLD: 4.9 K/UL (ref 1.8–8)
NEUTS SEG NFR BLD: 67 % (ref 40–73)
NEUTS SEG NFR BLD: 73 % (ref 40–73)
NEUTS SEG NFR BLD: 75 % (ref 40–73)
NEUTS SEG NFR BLD: 75 % (ref 40–73)
NEUTS SEG NFR BLD: 79 % (ref 40–73)
NEUTS SEG NFR BLD: 85 % (ref 40–73)
NITRITE UR QL STRIP.AUTO: NEGATIVE
P-R INTERVAL, ECG05: 152 MS
P-R INTERVAL, ECG05: 170 MS
PH UR STRIP: 5 [PH] (ref 5–8)
PHOSPHATE SERPL-MCNC: 3.8 MG/DL (ref 2.5–4.9)
PHOSPHATE SERPL-MCNC: 5.1 MG/DL (ref 2.5–4.9)
PHOSPHATE SERPL-MCNC: 5.1 MG/DL (ref 2.5–4.9)
PLATELET # BLD AUTO: 33 K/UL (ref 135–420)
PLATELET # BLD AUTO: 34 K/UL (ref 135–420)
PLATELET # BLD AUTO: 41 K/UL (ref 135–420)
PLATELET # BLD AUTO: 45 K/UL (ref 135–420)
PLATELET # BLD AUTO: 53 K/UL (ref 135–420)
PLATELET # BLD AUTO: 59 K/UL (ref 135–420)
PLATELET # BLD AUTO: 66 K/UL (ref 135–420)
PLATELET # BLD AUTO: 66 K/UL (ref 135–420)
PMV BLD AUTO: 10 FL (ref 9.2–11.8)
PMV BLD AUTO: 10.1 FL (ref 9.2–11.8)
PMV BLD AUTO: 10.1 FL (ref 9.2–11.8)
PMV BLD AUTO: 10.3 FL (ref 9.2–11.8)
PMV BLD AUTO: 9 FL (ref 9.2–11.8)
PMV BLD AUTO: 9.8 FL (ref 9.2–11.8)
PMV BLD AUTO: 9.9 FL (ref 9.2–11.8)
PMV BLD AUTO: 9.9 FL (ref 9.2–11.8)
POTASSIUM SERPL-SCNC: 4.2 MMOL/L (ref 3.5–5.5)
POTASSIUM SERPL-SCNC: 4.3 MMOL/L (ref 3.5–5.5)
POTASSIUM SERPL-SCNC: 4.6 MMOL/L (ref 3.5–5.5)
POTASSIUM SERPL-SCNC: 4.8 MMOL/L (ref 3.5–5.5)
POTASSIUM SERPL-SCNC: 4.9 MMOL/L (ref 3.5–5.5)
POTASSIUM SERPL-SCNC: 5 MMOL/L (ref 3.5–5.5)
POTASSIUM SERPL-SCNC: 5.2 MMOL/L (ref 3.5–5.5)
POTASSIUM SERPL-SCNC: 5.5 MMOL/L (ref 3.5–5.5)
POTASSIUM SERPL-SCNC: 5.7 MMOL/L (ref 3.5–5.5)
PROT SERPL-MCNC: 6.5 G/DL (ref 6.4–8.2)
PROT SERPL-MCNC: 6.8 G/DL (ref 6.4–8.2)
PROT UR STRIP-MCNC: 100 MG/DL
PROTHROMBIN TIME: 15.2 SEC (ref 11.5–15.2)
PROTHROMBIN TIME: 15.8 SEC (ref 11.5–15.2)
Q-T INTERVAL, ECG07: 414 MS
Q-T INTERVAL, ECG07: 414 MS
QRS DURATION, ECG06: 112 MS
QRS DURATION, ECG06: 122 MS
QTC CALCULATION (BEZET), ECG08: 453 MS
QTC CALCULATION (BEZET), ECG08: 465 MS
RBC # BLD AUTO: 1.99 M/UL (ref 4.2–5.3)
RBC # BLD AUTO: 2.21 M/UL (ref 4.2–5.3)
RBC # BLD AUTO: 2.34 M/UL (ref 4.2–5.3)
RBC # BLD AUTO: 2.46 M/UL (ref 4.2–5.3)
RBC # BLD AUTO: 2.5 M/UL (ref 4.2–5.3)
RBC # BLD AUTO: 2.63 M/UL (ref 4.2–5.3)
RBC # BLD AUTO: 2.71 M/UL (ref 4.2–5.3)
RBC # BLD AUTO: 3.15 M/UL (ref 4.2–5.3)
RBC #/AREA URNS HPF: ABNORMAL /HPF (ref 0–5)
SARS-COV-2, COV2NT: NOT DETECTED
SODIUM SERPL-SCNC: 132 MMOL/L (ref 136–145)
SODIUM SERPL-SCNC: 134 MMOL/L (ref 136–145)
SODIUM SERPL-SCNC: 135 MMOL/L (ref 136–145)
SODIUM SERPL-SCNC: 136 MMOL/L (ref 136–145)
SODIUM SERPL-SCNC: 139 MMOL/L (ref 136–145)
SODIUM SERPL-SCNC: 140 MMOL/L (ref 136–145)
SODIUM SERPL-SCNC: 142 MMOL/L (ref 136–145)
SODIUM SERPL-SCNC: 143 MMOL/L (ref 136–145)
SODIUM SERPL-SCNC: 143 MMOL/L (ref 136–145)
SODIUM UR-SCNC: 20 MMOL/L (ref 20–110)
SOURCE, COVRS: NORMAL
SOURCE, COVRS: NORMAL
SP GR UR REFRACTOMETRY: 1.02 (ref 1–1.03)
SPECIMEN EXP DATE BLD: NORMAL
SPECIMEN TYPE, XMCV1T: NORMAL
SPECIMEN TYPE, XMCV1T: NORMAL
STATUS OF UNIT,%ST: NORMAL
TIBC SERPL-MCNC: 189 UG/DL (ref 250–450)
TIBC SERPL-MCNC: 217 UG/DL (ref 250–450)
TROPONIN I SERPL-MCNC: <0.02 NG/ML (ref 0–0.04)
UNIT DIVISION, %UDIV: 0
UROBILINOGEN UR QL STRIP.AUTO: 0.2 EU/DL (ref 0.2–1)
VENTRICULAR RATE, ECG03: 72 BPM
VENTRICULAR RATE, ECG03: 76 BPM
VIT B12 SERPL-MCNC: 1076 PG/ML (ref 211–911)
VIT B12 SERPL-MCNC: 747 PG/ML (ref 211–911)
WBC # BLD AUTO: 3.8 K/UL (ref 4.6–13.2)
WBC # BLD AUTO: 3.8 K/UL (ref 4.6–13.2)
WBC # BLD AUTO: 3.9 K/UL (ref 4.6–13.2)
WBC # BLD AUTO: 4.1 K/UL (ref 4.6–13.2)
WBC # BLD AUTO: 4.1 K/UL (ref 4.6–13.2)
WBC # BLD AUTO: 4.8 K/UL (ref 4.6–13.2)
WBC # BLD AUTO: 5.8 K/UL (ref 4.6–13.2)
WBC # BLD AUTO: 7.3 K/UL (ref 4.6–13.2)
WBC URNS QL MICRO: ABNORMAL /HPF (ref 0–4)
YEAST URNS QL MICRO: ABNORMAL

## 2020-01-01 PROCEDURE — 74011250637 HC RX REV CODE- 250/637: Performed by: PHYSICIAN ASSISTANT

## 2020-01-01 PROCEDURE — 85027 COMPLETE CBC AUTOMATED: CPT

## 2020-01-01 PROCEDURE — 65270000029 HC RM PRIVATE

## 2020-01-01 PROCEDURE — 82962 GLUCOSE BLOOD TEST: CPT

## 2020-01-01 PROCEDURE — 83880 ASSAY OF NATRIURETIC PEPTIDE: CPT

## 2020-01-01 PROCEDURE — P9045 ALBUMIN (HUMAN), 5%, 250 ML: HCPCS | Performed by: NURSE ANESTHETIST, CERTIFIED REGISTERED

## 2020-01-01 PROCEDURE — 85025 COMPLETE CBC W/AUTO DIFF WBC: CPT

## 2020-01-01 PROCEDURE — 93005 ELECTROCARDIOGRAM TRACING: CPT

## 2020-01-01 PROCEDURE — 74011250637 HC RX REV CODE- 250/637: Performed by: INTERNAL MEDICINE

## 2020-01-01 PROCEDURE — 77030008462 HC STPLR SKN PROX J&J -A: Performed by: ORTHOPAEDIC SURGERY

## 2020-01-01 PROCEDURE — 74011250637 HC RX REV CODE- 250/637: Performed by: HOSPITALIST

## 2020-01-01 PROCEDURE — 85610 PROTHROMBIN TIME: CPT

## 2020-01-01 PROCEDURE — 74011636637 HC RX REV CODE- 636/637: Performed by: EMERGENCY MEDICINE

## 2020-01-01 PROCEDURE — 99232 SBSQ HOSP IP/OBS MODERATE 35: CPT | Performed by: FAMILY MEDICINE

## 2020-01-01 PROCEDURE — 74011636637 HC RX REV CODE- 636/637: Performed by: HOSPITALIST

## 2020-01-01 PROCEDURE — 83036 HEMOGLOBIN GLYCOSYLATED A1C: CPT

## 2020-01-01 PROCEDURE — 77030031139 HC SUT VCRL2 J&J -A: Performed by: ORTHOPAEDIC SURGERY

## 2020-01-01 PROCEDURE — 82728 ASSAY OF FERRITIN: CPT

## 2020-01-01 PROCEDURE — 99284 EMERGENCY DEPT VISIT MOD MDM: CPT

## 2020-01-01 PROCEDURE — P9016 RBC LEUKOCYTES REDUCED: HCPCS

## 2020-01-01 PROCEDURE — 36430 TRANSFUSION BLD/BLD COMPNT: CPT

## 2020-01-01 PROCEDURE — 83735 ASSAY OF MAGNESIUM: CPT

## 2020-01-01 PROCEDURE — 82607 VITAMIN B-12: CPT

## 2020-01-01 PROCEDURE — 36415 COLL VENOUS BLD VENIPUNCTURE: CPT

## 2020-01-01 PROCEDURE — 97530 THERAPEUTIC ACTIVITIES: CPT

## 2020-01-01 PROCEDURE — 80069 RENAL FUNCTION PANEL: CPT

## 2020-01-01 PROCEDURE — 65660000000 HC RM CCU STEPDOWN

## 2020-01-01 PROCEDURE — 77030027138 HC INCENT SPIROMETER -A

## 2020-01-01 PROCEDURE — 85018 HEMOGLOBIN: CPT

## 2020-01-01 PROCEDURE — 2709999900 HC NON-CHARGEABLE SUPPLY

## 2020-01-01 PROCEDURE — 80048 BASIC METABOLIC PNL TOTAL CA: CPT

## 2020-01-01 PROCEDURE — 77030016474 HC BIT DRL QC3 SYNT -C: Performed by: ORTHOPAEDIC SURGERY

## 2020-01-01 PROCEDURE — 76210000006 HC OR PH I REC 0.5 TO 1 HR: Performed by: ORTHOPAEDIC SURGERY

## 2020-01-01 PROCEDURE — 84100 ASSAY OF PHOSPHORUS: CPT

## 2020-01-01 PROCEDURE — 74011250636 HC RX REV CODE- 250/636: Performed by: HOSPITALIST

## 2020-01-01 PROCEDURE — 80053 COMPREHEN METABOLIC PANEL: CPT

## 2020-01-01 PROCEDURE — 74011000258 HC RX REV CODE- 258: Performed by: NURSE ANESTHETIST, CERTIFIED REGISTERED

## 2020-01-01 PROCEDURE — 74011250636 HC RX REV CODE- 250/636: Performed by: EMERGENCY MEDICINE

## 2020-01-01 PROCEDURE — 99024 POSTOP FOLLOW-UP VISIT: CPT | Performed by: PHYSICIAN ASSISTANT

## 2020-01-01 PROCEDURE — 30233R1 TRANSFUSION OF NONAUTOLOGOUS PLATELETS INTO PERIPHERAL VEIN, PERCUTANEOUS APPROACH: ICD-10-PCS | Performed by: ORTHOPAEDIC SURGERY

## 2020-01-01 PROCEDURE — 99100 ANES PT EXTEME AGE<1 YR&>70: CPT | Performed by: ANESTHESIOLOGY

## 2020-01-01 PROCEDURE — 74011250636 HC RX REV CODE- 250/636: Performed by: PHYSICIAN ASSISTANT

## 2020-01-01 PROCEDURE — 2709999900 HC NON-CHARGEABLE SUPPLY: Performed by: ORTHOPAEDIC SURGERY

## 2020-01-01 PROCEDURE — 01230 ANES OPN UPPER 2/3 FEMUR NOS: CPT | Performed by: ANESTHESIOLOGY

## 2020-01-01 PROCEDURE — 74011250637 HC RX REV CODE- 250/637: Performed by: EMERGENCY MEDICINE

## 2020-01-01 PROCEDURE — 99285 EMERGENCY DEPT VISIT HI MDM: CPT

## 2020-01-01 PROCEDURE — 84484 ASSAY OF TROPONIN QUANT: CPT

## 2020-01-01 PROCEDURE — 99232 SBSQ HOSP IP/OBS MODERATE 35: CPT | Performed by: HOSPITALIST

## 2020-01-01 PROCEDURE — 76010000149 HC OR TIME 1 TO 1.5 HR: Performed by: ORTHOPAEDIC SURGERY

## 2020-01-01 PROCEDURE — 93970 EXTREMITY STUDY: CPT

## 2020-01-01 PROCEDURE — 30233N1 TRANSFUSION OF NONAUTOLOGOUS RED BLOOD CELLS INTO PERIPHERAL VEIN, PERCUTANEOUS APPROACH: ICD-10-PCS | Performed by: EMERGENCY MEDICINE

## 2020-01-01 PROCEDURE — 74011000250 HC RX REV CODE- 250: Performed by: HOSPITALIST

## 2020-01-01 PROCEDURE — 85730 THROMBOPLASTIN TIME PARTIAL: CPT

## 2020-01-01 PROCEDURE — 97110 THERAPEUTIC EXERCISES: CPT

## 2020-01-01 PROCEDURE — 74011250636 HC RX REV CODE- 250/636: Performed by: NURSE PRACTITIONER

## 2020-01-01 PROCEDURE — 74011000250 HC RX REV CODE- 250: Performed by: NURSE ANESTHETIST, CERTIFIED REGISTERED

## 2020-01-01 PROCEDURE — 97167 OT EVAL HIGH COMPLEX 60 MIN: CPT

## 2020-01-01 PROCEDURE — 83540 ASSAY OF IRON: CPT

## 2020-01-01 PROCEDURE — 82040 ASSAY OF SERUM ALBUMIN: CPT

## 2020-01-01 PROCEDURE — 74011000250 HC RX REV CODE- 250: Performed by: INTERNAL MEDICINE

## 2020-01-01 PROCEDURE — 51798 US URINE CAPACITY MEASURE: CPT

## 2020-01-01 PROCEDURE — C1713 ANCHOR/SCREW BN/BN,TIS/BN: HCPCS | Performed by: ORTHOPAEDIC SURGERY

## 2020-01-01 PROCEDURE — 86923 COMPATIBILITY TEST ELECTRIC: CPT

## 2020-01-01 PROCEDURE — 99100 ANES PT EXTEME AGE<1 YR&>70: CPT | Performed by: NURSE ANESTHETIST, CERTIFIED REGISTERED

## 2020-01-01 PROCEDURE — 01230 ANES OPN UPPER 2/3 FEMUR NOS: CPT | Performed by: NURSE ANESTHETIST, CERTIFIED REGISTERED

## 2020-01-01 PROCEDURE — 99223 1ST HOSP IP/OBS HIGH 75: CPT | Performed by: EMERGENCY MEDICINE

## 2020-01-01 PROCEDURE — 86900 BLOOD TYPING SEROLOGIC ABO: CPT

## 2020-01-01 PROCEDURE — 97166 OT EVAL MOD COMPLEX 45 MIN: CPT

## 2020-01-01 PROCEDURE — 84300 ASSAY OF URINE SODIUM: CPT

## 2020-01-01 PROCEDURE — 97535 SELF CARE MNGMENT TRAINING: CPT

## 2020-01-01 PROCEDURE — 74011250636 HC RX REV CODE- 250/636: Performed by: NURSE ANESTHETIST, CERTIFIED REGISTERED

## 2020-01-01 PROCEDURE — 77063 BREAST TOMOSYNTHESIS BI: CPT

## 2020-01-01 PROCEDURE — 82272 OCCULT BLD FECES 1-3 TESTS: CPT

## 2020-01-01 PROCEDURE — 96374 THER/PROPH/DIAG INJ IV PUSH: CPT

## 2020-01-01 PROCEDURE — 0QS736Z REPOSITION LEFT UPPER FEMUR WITH INTRAMEDULLARY INTERNAL FIXATION DEVICE, PERCUTANEOUS APPROACH: ICD-10-PCS | Performed by: ORTHOPAEDIC SURGERY

## 2020-01-01 PROCEDURE — 99239 HOSP IP/OBS DSCHRG MGMT >30: CPT | Performed by: HOSPITALIST

## 2020-01-01 PROCEDURE — 27245 TREAT THIGH FRACTURE: CPT | Performed by: ORTHOPAEDIC SURGERY

## 2020-01-01 PROCEDURE — 82570 ASSAY OF URINE CREATININE: CPT

## 2020-01-01 PROCEDURE — 82550 ASSAY OF CK (CPK): CPT

## 2020-01-01 PROCEDURE — 76060000033 HC ANESTHESIA 1 TO 1.5 HR: Performed by: ORTHOPAEDIC SURGERY

## 2020-01-01 PROCEDURE — 51702 INSERT TEMP BLADDER CATH: CPT

## 2020-01-01 PROCEDURE — 99238 HOSP IP/OBS DSCHRG MGMT 30/<: CPT | Performed by: FAMILY MEDICINE

## 2020-01-01 PROCEDURE — 73502 X-RAY EXAM HIP UNI 2-3 VIEWS: CPT

## 2020-01-01 PROCEDURE — 74011000258 HC RX REV CODE- 258: Performed by: INTERNAL MEDICINE

## 2020-01-01 PROCEDURE — 97162 PT EVAL MOD COMPLEX 30 MIN: CPT

## 2020-01-01 PROCEDURE — 81001 URINALYSIS AUTO W/SCOPE: CPT

## 2020-01-01 PROCEDURE — 71046 X-RAY EXAM CHEST 2 VIEWS: CPT

## 2020-01-01 PROCEDURE — 76770 US EXAM ABDO BACK WALL COMP: CPT

## 2020-01-01 PROCEDURE — 99231 SBSQ HOSP IP/OBS SF/LOW 25: CPT | Performed by: PHYSICIAN ASSISTANT

## 2020-01-01 PROCEDURE — 77030003029 HC SUT VCRL J&J -B: Performed by: ORTHOPAEDIC SURGERY

## 2020-01-01 PROCEDURE — 87635 SARS-COV-2 COVID-19 AMP PRB: CPT

## 2020-01-01 PROCEDURE — 71045 X-RAY EXAM CHEST 1 VIEW: CPT

## 2020-01-01 PROCEDURE — C1769 GUIDE WIRE: HCPCS | Performed by: ORTHOPAEDIC SURGERY

## 2020-01-01 PROCEDURE — 93306 TTE W/DOPPLER COMPLETE: CPT

## 2020-01-01 PROCEDURE — P9035 PLATELET PHERES LEUKOREDUCED: HCPCS

## 2020-01-01 DEVICE — 3.2MM GUIDE WIRE 400MM: Type: IMPLANTABLE DEVICE | Site: HIP | Status: FUNCTIONAL

## 2020-01-01 DEVICE — IMPLANTABLE DEVICE: Type: IMPLANTABLE DEVICE | Site: HIP | Status: FUNCTIONAL

## 2020-01-01 DEVICE — SCREW BNE L32MM DIA5MM NONSTERILE TIB LT GRN TI ST CANN LOK: Type: IMPLANTABLE DEVICE | Site: HIP | Status: FUNCTIONAL

## 2020-01-01 RX ORDER — LIDOCAINE HYDROCHLORIDE 20 MG/ML
INJECTION, SOLUTION EPIDURAL; INFILTRATION; INTRACAUDAL; PERINEURAL AS NEEDED
Status: DISCONTINUED | OUTPATIENT
Start: 2020-01-01 | End: 2020-01-01 | Stop reason: HOSPADM

## 2020-01-01 RX ORDER — CARVEDILOL 12.5 MG/1
12.5 TABLET ORAL 2 TIMES DAILY WITH MEALS
Status: DISCONTINUED | OUTPATIENT
Start: 2020-01-01 | End: 2020-01-01 | Stop reason: HOSPADM

## 2020-01-01 RX ORDER — ONDANSETRON 2 MG/ML
4 INJECTION INTRAMUSCULAR; INTRAVENOUS
Status: COMPLETED | OUTPATIENT
Start: 2020-01-01 | End: 2020-01-01

## 2020-01-01 RX ORDER — ONDANSETRON 2 MG/ML
INJECTION INTRAMUSCULAR; INTRAVENOUS AS NEEDED
Status: DISCONTINUED | OUTPATIENT
Start: 2020-01-01 | End: 2020-01-01 | Stop reason: HOSPADM

## 2020-01-01 RX ORDER — ENOXAPARIN SODIUM 100 MG/ML
30 INJECTION SUBCUTANEOUS EVERY 24 HOURS
Status: CANCELLED | OUTPATIENT
Start: 2020-01-01

## 2020-01-01 RX ORDER — SODIUM CHLORIDE 9 MG/ML
250 INJECTION, SOLUTION INTRAVENOUS AS NEEDED
Status: DISCONTINUED | OUTPATIENT
Start: 2020-01-01 | End: 2020-01-01 | Stop reason: SDUPTHER

## 2020-01-01 RX ORDER — INSULIN LISPRO 100 [IU]/ML
INJECTION, SOLUTION INTRAVENOUS; SUBCUTANEOUS
Status: DISCONTINUED | OUTPATIENT
Start: 2020-01-01 | End: 2020-01-01 | Stop reason: HOSPADM

## 2020-01-01 RX ORDER — HYDROCODONE BITARTRATE AND ACETAMINOPHEN 5; 325 MG/1; MG/1
1 TABLET ORAL
Qty: 40 TAB | Refills: 0 | Status: SHIPPED | OUTPATIENT
Start: 2020-01-01 | End: 2021-01-01

## 2020-01-01 RX ORDER — MORPHINE SULFATE 4 MG/ML
2 INJECTION, SOLUTION INTRAMUSCULAR; INTRAVENOUS
Status: COMPLETED | OUTPATIENT
Start: 2020-01-01 | End: 2020-01-01

## 2020-01-01 RX ORDER — ACETAMINOPHEN 325 MG/1
650 TABLET ORAL
Status: DISCONTINUED | OUTPATIENT
Start: 2020-01-01 | End: 2020-01-01 | Stop reason: HOSPADM

## 2020-01-01 RX ORDER — CEFPODOXIME PROXETIL 200 MG/1
200 TABLET, FILM COATED ORAL 2 TIMES DAILY
Qty: 10 TAB | Refills: 0 | Status: SHIPPED
Start: 2020-01-01 | End: 2020-01-01

## 2020-01-01 RX ORDER — CARVEDILOL 3.12 MG/1
6.25 TABLET ORAL 2 TIMES DAILY WITH MEALS
Status: DISCONTINUED | OUTPATIENT
Start: 2020-01-01 | End: 2020-01-01

## 2020-01-01 RX ORDER — ACETAMINOPHEN 500 MG
1000 TABLET ORAL ONCE
Status: COMPLETED | OUTPATIENT
Start: 2020-01-01 | End: 2020-01-01

## 2020-01-01 RX ORDER — SODIUM CHLORIDE 0.9 % (FLUSH) 0.9 %
5-40 SYRINGE (ML) INJECTION AS NEEDED
Status: DISCONTINUED | OUTPATIENT
Start: 2020-01-01 | End: 2020-01-01 | Stop reason: HOSPADM

## 2020-01-01 RX ORDER — MORPHINE SULFATE 2 MG/ML
1-2 INJECTION, SOLUTION INTRAMUSCULAR; INTRAVENOUS
Status: DISCONTINUED | OUTPATIENT
Start: 2020-01-01 | End: 2020-01-01

## 2020-01-01 RX ORDER — SODIUM CHLORIDE, SODIUM LACTATE, POTASSIUM CHLORIDE, CALCIUM CHLORIDE 600; 310; 30; 20 MG/100ML; MG/100ML; MG/100ML; MG/100ML
INJECTION, SOLUTION INTRAVENOUS
Status: DISCONTINUED | OUTPATIENT
Start: 2020-01-01 | End: 2020-01-01 | Stop reason: HOSPADM

## 2020-01-01 RX ORDER — ROCURONIUM BROMIDE 10 MG/ML
INJECTION, SOLUTION INTRAVENOUS AS NEEDED
Status: DISCONTINUED | OUTPATIENT
Start: 2020-01-01 | End: 2020-01-01 | Stop reason: HOSPADM

## 2020-01-01 RX ORDER — LABETALOL HCL 20 MG/4 ML
10 SYRINGE (ML) INTRAVENOUS
Status: DISCONTINUED | OUTPATIENT
Start: 2020-01-01 | End: 2020-01-01 | Stop reason: HOSPADM

## 2020-01-01 RX ORDER — ATORVASTATIN CALCIUM 10 MG/1
10 TABLET, FILM COATED ORAL
Status: DISCONTINUED | OUTPATIENT
Start: 2020-01-01 | End: 2020-01-01 | Stop reason: HOSPADM

## 2020-01-01 RX ORDER — SODIUM CHLORIDE 0.9 % (FLUSH) 0.9 %
5-40 SYRINGE (ML) INJECTION EVERY 8 HOURS
Status: DISCONTINUED | OUTPATIENT
Start: 2020-01-01 | End: 2020-01-01 | Stop reason: HOSPADM

## 2020-01-01 RX ORDER — CARVEDILOL 3.12 MG/1
3.12 TABLET ORAL 2 TIMES DAILY WITH MEALS
Status: DISCONTINUED | OUTPATIENT
Start: 2020-01-01 | End: 2020-01-01

## 2020-01-01 RX ORDER — CEFAZOLIN SODIUM 2 G/50ML
2 SOLUTION INTRAVENOUS EVERY 12 HOURS
Status: COMPLETED | OUTPATIENT
Start: 2020-01-01 | End: 2020-01-01

## 2020-01-01 RX ORDER — DEXTROSE 50 % IN WATER (D50W) INTRAVENOUS SYRINGE
25-50 AS NEEDED
Status: DISCONTINUED | OUTPATIENT
Start: 2020-01-01 | End: 2020-01-01 | Stop reason: HOSPADM

## 2020-01-01 RX ORDER — HYDRALAZINE HYDROCHLORIDE 25 MG/1
25 TABLET, FILM COATED ORAL 3 TIMES DAILY
Status: DISCONTINUED | OUTPATIENT
Start: 2020-01-01 | End: 2020-01-01 | Stop reason: HOSPADM

## 2020-01-01 RX ORDER — NALOXONE HYDROCHLORIDE 1 MG/ML
1 INJECTION INTRAMUSCULAR; INTRAVENOUS; SUBCUTANEOUS
Status: ACTIVE | OUTPATIENT
Start: 2020-01-01 | End: 2020-01-01

## 2020-01-01 RX ORDER — NEOSTIGMINE METHYLSULFATE 1 MG/ML
INJECTION, SOLUTION INTRAVENOUS AS NEEDED
Status: DISCONTINUED | OUTPATIENT
Start: 2020-01-01 | End: 2020-01-01 | Stop reason: HOSPADM

## 2020-01-01 RX ORDER — HEPARIN SODIUM 5000 [USP'U]/ML
5000 INJECTION, SOLUTION INTRAVENOUS; SUBCUTANEOUS EVERY 8 HOURS
Status: DISCONTINUED | OUTPATIENT
Start: 2020-01-01 | End: 2020-01-01

## 2020-01-01 RX ORDER — MAGNESIUM SULFATE 100 %
4 CRYSTALS MISCELLANEOUS AS NEEDED
Status: DISCONTINUED | OUTPATIENT
Start: 2020-01-01 | End: 2020-01-01 | Stop reason: HOSPADM

## 2020-01-01 RX ORDER — DEXAMETHASONE SODIUM PHOSPHATE 4 MG/ML
INJECTION, SOLUTION INTRA-ARTICULAR; INTRALESIONAL; INTRAMUSCULAR; INTRAVENOUS; SOFT TISSUE AS NEEDED
Status: DISCONTINUED | OUTPATIENT
Start: 2020-01-01 | End: 2020-01-01 | Stop reason: HOSPADM

## 2020-01-01 RX ORDER — ONDANSETRON 2 MG/ML
4 INJECTION INTRAMUSCULAR; INTRAVENOUS
Status: DISCONTINUED | OUTPATIENT
Start: 2020-01-01 | End: 2020-01-01 | Stop reason: HOSPADM

## 2020-01-01 RX ORDER — FENTANYL CITRATE 50 UG/ML
INJECTION, SOLUTION INTRAMUSCULAR; INTRAVENOUS AS NEEDED
Status: DISCONTINUED | OUTPATIENT
Start: 2020-01-01 | End: 2020-01-01 | Stop reason: HOSPADM

## 2020-01-01 RX ORDER — ACETAMINOPHEN 650 MG/1
650 SUPPOSITORY RECTAL
Status: DISCONTINUED | OUTPATIENT
Start: 2020-01-01 | End: 2020-01-01 | Stop reason: HOSPADM

## 2020-01-01 RX ORDER — ALBUMIN HUMAN 50 G/1000ML
SOLUTION INTRAVENOUS AS NEEDED
Status: DISCONTINUED | OUTPATIENT
Start: 2020-01-01 | End: 2020-01-01 | Stop reason: HOSPADM

## 2020-01-01 RX ORDER — SODIUM CHLORIDE 9 MG/ML
75 INJECTION, SOLUTION INTRAVENOUS CONTINUOUS
Status: DISCONTINUED | OUTPATIENT
Start: 2020-01-01 | End: 2020-01-01

## 2020-01-01 RX ORDER — MELATONIN
2000 DAILY
Status: DISCONTINUED | OUTPATIENT
Start: 2020-01-01 | End: 2020-01-01 | Stop reason: HOSPADM

## 2020-01-01 RX ORDER — MIDODRINE HYDROCHLORIDE 5 MG/1
5 TABLET ORAL
Status: DISCONTINUED | OUTPATIENT
Start: 2020-01-01 | End: 2020-01-01

## 2020-01-01 RX ORDER — POLYETHYLENE GLYCOL 3350 17 G/17G
17 POWDER, FOR SOLUTION ORAL DAILY PRN
Status: DISCONTINUED | OUTPATIENT
Start: 2020-01-01 | End: 2020-01-01 | Stop reason: HOSPADM

## 2020-01-01 RX ORDER — SODIUM CHLORIDE 9 MG/ML
250 INJECTION, SOLUTION INTRAVENOUS AS NEEDED
Status: DISCONTINUED | OUTPATIENT
Start: 2020-01-01 | End: 2020-01-01 | Stop reason: HOSPADM

## 2020-01-01 RX ORDER — FUROSEMIDE 10 MG/ML
40 INJECTION INTRAMUSCULAR; INTRAVENOUS
Status: COMPLETED | OUTPATIENT
Start: 2020-01-01 | End: 2020-01-01

## 2020-01-01 RX ORDER — OXYCODONE AND ACETAMINOPHEN 5; 325 MG/1; MG/1
1 TABLET ORAL
Status: COMPLETED | OUTPATIENT
Start: 2020-01-01 | End: 2020-01-01

## 2020-01-01 RX ORDER — SODIUM POLYSTYRENE SULFONATE 15 G/60ML
30 SUSPENSION ORAL; RECTAL
Status: COMPLETED | OUTPATIENT
Start: 2020-01-01 | End: 2020-01-01

## 2020-01-01 RX ORDER — CEFAZOLIN SODIUM 2 G/50ML
2 SOLUTION INTRAVENOUS ONCE
Status: DISCONTINUED | OUTPATIENT
Start: 2020-01-01 | End: 2020-01-01 | Stop reason: HOSPADM

## 2020-01-01 RX ORDER — PROPOFOL 10 MG/ML
INJECTION, EMULSION INTRAVENOUS AS NEEDED
Status: DISCONTINUED | OUTPATIENT
Start: 2020-01-01 | End: 2020-01-01 | Stop reason: HOSPADM

## 2020-01-01 RX ORDER — PROMETHAZINE HYDROCHLORIDE 25 MG/1
12.5 TABLET ORAL
Status: DISCONTINUED | OUTPATIENT
Start: 2020-01-01 | End: 2020-01-01 | Stop reason: HOSPADM

## 2020-01-01 RX ORDER — CEFAZOLIN SODIUM 1 G/3ML
INJECTION, POWDER, FOR SOLUTION INTRAMUSCULAR; INTRAVENOUS AS NEEDED
Status: DISCONTINUED | OUTPATIENT
Start: 2020-01-01 | End: 2020-01-01 | Stop reason: HOSPADM

## 2020-01-01 RX ORDER — AMLODIPINE BESYLATE 10 MG/1
10 TABLET ORAL DAILY
Status: DISCONTINUED | OUTPATIENT
Start: 2020-01-01 | End: 2020-01-01 | Stop reason: HOSPADM

## 2020-01-01 RX ORDER — THERA TABS 400 MCG
1 TAB ORAL DAILY
Status: DISCONTINUED | OUTPATIENT
Start: 2020-01-01 | End: 2020-01-01 | Stop reason: HOSPADM

## 2020-01-01 RX ORDER — HYDROCODONE BITARTRATE AND ACETAMINOPHEN 5; 325 MG/1; MG/1
1-2 TABLET ORAL
Qty: 42 TAB | Refills: 0 | Status: SHIPPED | OUTPATIENT
Start: 2020-01-01 | End: 2020-01-01

## 2020-01-01 RX ORDER — HYDRALAZINE HYDROCHLORIDE 20 MG/ML
10 INJECTION INTRAMUSCULAR; INTRAVENOUS
Status: DISCONTINUED | OUTPATIENT
Start: 2020-01-01 | End: 2020-01-01 | Stop reason: HOSPADM

## 2020-01-01 RX ORDER — KETAMINE HYDROCHLORIDE 100 MG/ML
INJECTION, SOLUTION INTRAMUSCULAR; INTRAVENOUS AS NEEDED
Status: DISCONTINUED | OUTPATIENT
Start: 2020-01-01 | End: 2020-01-01 | Stop reason: HOSPADM

## 2020-01-01 RX ORDER — HYDROCODONE BITARTRATE AND ACETAMINOPHEN 5; 325 MG/1; MG/1
1-2 TABLET ORAL
Status: DISCONTINUED | OUTPATIENT
Start: 2020-01-01 | End: 2020-01-01 | Stop reason: HOSPADM

## 2020-01-01 RX ORDER — AMLODIPINE BESYLATE 5 MG/1
5 TABLET ORAL DAILY
Status: DISCONTINUED | OUTPATIENT
Start: 2020-01-01 | End: 2020-01-01

## 2020-01-01 RX ORDER — ONDANSETRON 2 MG/ML
8 INJECTION INTRAMUSCULAR; INTRAVENOUS
Status: DISCONTINUED | OUTPATIENT
Start: 2020-01-01 | End: 2020-01-01 | Stop reason: HOSPADM

## 2020-01-01 RX ORDER — INSULIN ASPART 100 [IU]/ML
INJECTION, SOLUTION INTRAVENOUS; SUBCUTANEOUS
COMMUNITY
End: 2021-01-01

## 2020-01-01 RX ORDER — GUAIFENESIN 100 MG/5ML
162 LIQUID (ML) ORAL DAILY
Qty: 60 TAB | Refills: 0 | Status: SHIPPED | OUTPATIENT
Start: 2020-01-01 | End: 2021-01-01

## 2020-01-01 RX ORDER — ACETAMINOPHEN 500 MG
1000 TABLET ORAL EVERY 8 HOURS
Status: DISCONTINUED | OUTPATIENT
Start: 2020-01-01 | End: 2020-01-01 | Stop reason: HOSPADM

## 2020-01-01 RX ORDER — INSULIN DEGLUDEC INJECTION 100 U/ML
16 INJECTION, SOLUTION SUBCUTANEOUS DAILY
COMMUNITY
End: 2020-01-01

## 2020-01-01 RX ORDER — MIDODRINE HYDROCHLORIDE 2.5 MG/1
2.5 TABLET ORAL
Status: DISCONTINUED | OUTPATIENT
Start: 2020-01-01 | End: 2020-01-01

## 2020-01-01 RX ORDER — FAMOTIDINE 20 MG/1
20 TABLET, FILM COATED ORAL DAILY
Status: DISCONTINUED | OUTPATIENT
Start: 2020-01-01 | End: 2020-01-01 | Stop reason: HOSPADM

## 2020-01-01 RX ORDER — UREA 10 %
100 LOTION (ML) TOPICAL DAILY
Status: DISCONTINUED | OUTPATIENT
Start: 2020-01-01 | End: 2020-01-01 | Stop reason: HOSPADM

## 2020-01-01 RX ORDER — SUCCINYLCHOLINE CHLORIDE 20 MG/ML
INJECTION INTRAMUSCULAR; INTRAVENOUS AS NEEDED
Status: DISCONTINUED | OUTPATIENT
Start: 2020-01-01 | End: 2020-01-01 | Stop reason: HOSPADM

## 2020-01-01 RX ORDER — GLYCOPYRROLATE 0.2 MG/ML
INJECTION INTRAMUSCULAR; INTRAVENOUS AS NEEDED
Status: DISCONTINUED | OUTPATIENT
Start: 2020-01-01 | End: 2020-01-01 | Stop reason: HOSPADM

## 2020-01-01 RX ORDER — EPHEDRINE SULFATE/0.9% NACL/PF 50 MG/5 ML
SYRINGE (ML) INTRAVENOUS AS NEEDED
Status: DISCONTINUED | OUTPATIENT
Start: 2020-01-01 | End: 2020-01-01 | Stop reason: HOSPADM

## 2020-01-01 RX ORDER — GUAIFENESIN 100 MG/5ML
162 LIQUID (ML) ORAL DAILY
Status: DISCONTINUED | OUTPATIENT
Start: 2020-01-01 | End: 2020-01-01 | Stop reason: HOSPADM

## 2020-01-01 RX ORDER — MORPHINE SULFATE 2 MG/ML
2 INJECTION, SOLUTION INTRAMUSCULAR; INTRAVENOUS
Status: DISCONTINUED | OUTPATIENT
Start: 2020-01-01 | End: 2020-01-01 | Stop reason: SDUPTHER

## 2020-01-01 RX ORDER — OXYCODONE HYDROCHLORIDE 5 MG/1
5 TABLET ORAL
Status: DISCONTINUED | OUTPATIENT
Start: 2020-01-01 | End: 2020-01-01 | Stop reason: HOSPADM

## 2020-01-01 RX ADMIN — ONDANSETRON 4 MG: 2 INJECTION INTRAMUSCULAR; INTRAVENOUS at 16:10

## 2020-01-01 RX ADMIN — CEFAZOLIN SODIUM 2 G: 2 SOLUTION INTRAVENOUS at 11:39

## 2020-01-01 RX ADMIN — ATORVASTATIN CALCIUM 10 MG: 10 TABLET, FILM COATED ORAL at 21:14

## 2020-01-01 RX ADMIN — CARVEDILOL 12.5 MG: 12.5 TABLET, FILM COATED ORAL at 17:13

## 2020-01-01 RX ADMIN — MIDODRINE HYDROCHLORIDE 2.5 MG: 2.5 TABLET ORAL at 11:14

## 2020-01-01 RX ADMIN — HYDRALAZINE HYDROCHLORIDE 25 MG: 25 TABLET, FILM COATED ORAL at 09:41

## 2020-01-01 RX ADMIN — ASPIRIN 81 MG CHEWABLE TABLET 162 MG: 81 TABLET CHEWABLE at 08:13

## 2020-01-01 RX ADMIN — LIDOCAINE HYDROCHLORIDE 80 MG: 20 INJECTION, SOLUTION EPIDURAL; INFILTRATION; INTRACAUDAL; PERINEURAL at 15:26

## 2020-01-01 RX ADMIN — INSULIN LISPRO 3 UNITS: 100 INJECTION, SOLUTION INTRAVENOUS; SUBCUTANEOUS at 22:49

## 2020-01-01 RX ADMIN — INSULIN LISPRO 3 UNITS: 100 INJECTION, SOLUTION INTRAVENOUS; SUBCUTANEOUS at 07:30

## 2020-01-01 RX ADMIN — Medication 10 ML: at 22:00

## 2020-01-01 RX ADMIN — Medication 10 ML: at 06:00

## 2020-01-01 RX ADMIN — ROCURONIUM BROMIDE 20 MG: 50 INJECTION INTRAVENOUS at 15:30

## 2020-01-01 RX ADMIN — SODIUM BICARBONATE: 84 INJECTION, SOLUTION INTRAVENOUS at 14:58

## 2020-01-01 RX ADMIN — AMLODIPINE BESYLATE 10 MG: 10 TABLET ORAL at 09:41

## 2020-01-01 RX ADMIN — THERA TABS 1 TABLET: TAB at 09:15

## 2020-01-01 RX ADMIN — INSULIN LISPRO 6 UNITS: 100 INJECTION, SOLUTION INTRAVENOUS; SUBCUTANEOUS at 22:00

## 2020-01-01 RX ADMIN — KETAMINE HYDROCHLORIDE 25 MG: 100 INJECTION, SOLUTION, CONCENTRATE INTRAMUSCULAR; INTRAVENOUS at 15:47

## 2020-01-01 RX ADMIN — CEFAZOLIN SODIUM 2 G: 1 INJECTION, POWDER, FOR SOLUTION INTRAMUSCULAR; INTRAVENOUS at 15:39

## 2020-01-01 RX ADMIN — CEFTRIAXONE SODIUM 1 G: 1 INJECTION, POWDER, FOR SOLUTION INTRAMUSCULAR; INTRAVENOUS at 16:39

## 2020-01-01 RX ADMIN — OXYCODONE HYDROCHLORIDE 5 MG: 5 TABLET ORAL at 20:03

## 2020-01-01 RX ADMIN — SUCCINYLCHOLINE CHLORIDE 120 MG: 20 INJECTION, SOLUTION INTRAMUSCULAR; INTRAVENOUS at 15:27

## 2020-01-01 RX ADMIN — PROPOFOL 120 MG: 10 INJECTION, EMULSION INTRAVENOUS at 15:26

## 2020-01-01 RX ADMIN — FAMOTIDINE 20 MG: 20 TABLET ORAL at 09:40

## 2020-01-01 RX ADMIN — CARVEDILOL 6.25 MG: 3.12 TABLET, FILM COATED ORAL at 18:43

## 2020-01-01 RX ADMIN — MORPHINE SULFATE 2 MG: 4 INJECTION, SOLUTION INTRAMUSCULAR; INTRAVENOUS at 18:44

## 2020-01-01 RX ADMIN — ACETAMINOPHEN 1000 MG: 500 TABLET ORAL at 22:22

## 2020-01-01 RX ADMIN — INSULIN LISPRO 6 UNITS: 100 INJECTION, SOLUTION INTRAVENOUS; SUBCUTANEOUS at 15:30

## 2020-01-01 RX ADMIN — OXYCODONE HYDROCHLORIDE AND ACETAMINOPHEN 1 TABLET: 5; 325 TABLET ORAL at 17:00

## 2020-01-01 RX ADMIN — AMLODIPINE BESYLATE 10 MG: 10 TABLET ORAL at 09:11

## 2020-01-01 RX ADMIN — FUROSEMIDE 40 MG: 10 INJECTION, SOLUTION INTRAMUSCULAR; INTRAVENOUS at 23:00

## 2020-01-01 RX ADMIN — ACETAMINOPHEN 1000 MG: 500 TABLET ORAL at 01:02

## 2020-01-01 RX ADMIN — CARVEDILOL 12.5 MG: 12.5 TABLET, FILM COATED ORAL at 09:41

## 2020-01-01 RX ADMIN — ALBUMIN (HUMAN) 250 ML: 12.5 INJECTION, SOLUTION INTRAVENOUS at 15:45

## 2020-01-01 RX ADMIN — ASPIRIN 81 MG CHEWABLE TABLET 162 MG: 81 TABLET CHEWABLE at 09:44

## 2020-01-01 RX ADMIN — ATORVASTATIN CALCIUM 10 MG: 10 TABLET, FILM COATED ORAL at 00:55

## 2020-01-01 RX ADMIN — Medication 2 MG: at 16:17

## 2020-01-01 RX ADMIN — ACETAMINOPHEN 1000 MG: 500 TABLET ORAL at 05:40

## 2020-01-01 RX ADMIN — MIDODRINE HYDROCHLORIDE 5 MG: 5 TABLET ORAL at 13:58

## 2020-01-01 RX ADMIN — Medication 2 TABLET: at 08:17

## 2020-01-01 RX ADMIN — ATORVASTATIN CALCIUM 10 MG: 10 TABLET, FILM COATED ORAL at 21:22

## 2020-01-01 RX ADMIN — INSULIN LISPRO 4 UNITS: 100 INJECTION, SOLUTION INTRAVENOUS; SUBCUTANEOUS at 13:14

## 2020-01-01 RX ADMIN — ACETAMINOPHEN 1000 MG: 500 TABLET ORAL at 06:22

## 2020-01-01 RX ADMIN — FAMOTIDINE 20 MG: 20 TABLET ORAL at 09:15

## 2020-01-01 RX ADMIN — Medication 2 TABLET: at 08:34

## 2020-01-01 RX ADMIN — HYDRALAZINE HYDROCHLORIDE 25 MG: 25 TABLET, FILM COATED ORAL at 09:15

## 2020-01-01 RX ADMIN — ATORVASTATIN CALCIUM 10 MG: 10 TABLET, FILM COATED ORAL at 01:02

## 2020-01-01 RX ADMIN — MIDODRINE HYDROCHLORIDE 5 MG: 5 TABLET ORAL at 08:13

## 2020-01-01 RX ADMIN — INSULIN LISPRO 4 UNITS: 100 INJECTION, SOLUTION INTRAVENOUS; SUBCUTANEOUS at 22:27

## 2020-01-01 RX ADMIN — MIDODRINE HYDROCHLORIDE 5 MG: 5 TABLET ORAL at 16:59

## 2020-01-01 RX ADMIN — THERA TABS 1 TABLET: TAB at 08:17

## 2020-01-01 RX ADMIN — PHENYLEPHRINE HYDROCHLORIDE 100 MCG: 10 INJECTION INTRAVENOUS at 16:27

## 2020-01-01 RX ADMIN — ACETAMINOPHEN 1000 MG: 500 TABLET ORAL at 13:34

## 2020-01-01 RX ADMIN — HYDROCODONE BITARTRATE AND ACETAMINOPHEN 1 TABLET: 5; 325 TABLET ORAL at 02:15

## 2020-01-01 RX ADMIN — INSULIN LISPRO 4 UNITS: 100 INJECTION, SOLUTION INTRAVENOUS; SUBCUTANEOUS at 17:15

## 2020-01-01 RX ADMIN — INSULIN LISPRO 6 UNITS: 100 INJECTION, SOLUTION INTRAVENOUS; SUBCUTANEOUS at 11:52

## 2020-01-01 RX ADMIN — FENTANYL CITRATE 50 MCG: 50 INJECTION, SOLUTION INTRAMUSCULAR; INTRAVENOUS at 15:26

## 2020-01-01 RX ADMIN — INSULIN LISPRO 6 UNITS: 100 INJECTION, SOLUTION INTRAVENOUS; SUBCUTANEOUS at 16:54

## 2020-01-01 RX ADMIN — CARVEDILOL 12.5 MG: 12.5 TABLET, FILM COATED ORAL at 07:55

## 2020-01-01 RX ADMIN — ACETAMINOPHEN 1000 MG: 500 TABLET ORAL at 15:34

## 2020-01-01 RX ADMIN — VITAM B12 100 MCG: 100 TAB at 10:21

## 2020-01-01 RX ADMIN — INSULIN LISPRO 6 UNITS: 100 INJECTION, SOLUTION INTRAVENOUS; SUBCUTANEOUS at 16:30

## 2020-01-01 RX ADMIN — ATORVASTATIN CALCIUM 10 MG: 10 TABLET, FILM COATED ORAL at 21:52

## 2020-01-01 RX ADMIN — Medication 10 ML: at 13:15

## 2020-01-01 RX ADMIN — INSULIN LISPRO 6 UNITS: 100 INJECTION, SOLUTION INTRAVENOUS; SUBCUTANEOUS at 16:56

## 2020-01-01 RX ADMIN — INSULIN LISPRO 2 UNITS: 100 INJECTION, SOLUTION INTRAVENOUS; SUBCUTANEOUS at 11:38

## 2020-01-01 RX ADMIN — CEFAZOLIN SODIUM 2 G: 2 SOLUTION INTRAVENOUS at 23:33

## 2020-01-01 RX ADMIN — HYDRALAZINE HYDROCHLORIDE 25 MG: 25 TABLET, FILM COATED ORAL at 17:14

## 2020-01-01 RX ADMIN — MIDODRINE HYDROCHLORIDE 5 MG: 5 TABLET ORAL at 11:38

## 2020-01-01 RX ADMIN — INSULIN LISPRO 3 UNITS: 100 INJECTION, SOLUTION INTRAVENOUS; SUBCUTANEOUS at 08:12

## 2020-01-01 RX ADMIN — ACETAMINOPHEN 1000 MG: 500 TABLET ORAL at 05:33

## 2020-01-01 RX ADMIN — ONDANSETRON 8 MG: 2 INJECTION INTRAMUSCULAR; INTRAVENOUS at 18:43

## 2020-01-01 RX ADMIN — VITAM B12 100 MCG: 100 TAB at 09:40

## 2020-01-01 RX ADMIN — ACETAMINOPHEN 1000 MG: 500 TABLET ORAL at 13:25

## 2020-01-01 RX ADMIN — INSULIN LISPRO 6 UNITS: 100 INJECTION, SOLUTION INTRAVENOUS; SUBCUTANEOUS at 05:44

## 2020-01-01 RX ADMIN — Medication 2 TABLET: at 08:13

## 2020-01-01 RX ADMIN — THERA TABS 1 TABLET: TAB at 09:40

## 2020-01-01 RX ADMIN — HYDROCODONE BITARTRATE AND ACETAMINOPHEN 1 TABLET: 5; 325 TABLET ORAL at 09:33

## 2020-01-01 RX ADMIN — ACETAMINOPHEN 650 MG: 325 TABLET ORAL at 02:37

## 2020-01-01 RX ADMIN — INSULIN LISPRO 2 UNITS: 100 INJECTION, SOLUTION INTRAVENOUS; SUBCUTANEOUS at 09:42

## 2020-01-01 RX ADMIN — INSULIN LISPRO 2 UNITS: 100 INJECTION, SOLUTION INTRAVENOUS; SUBCUTANEOUS at 07:55

## 2020-01-01 RX ADMIN — INSULIN LISPRO 6 UNITS: 100 INJECTION, SOLUTION INTRAVENOUS; SUBCUTANEOUS at 11:10

## 2020-01-01 RX ADMIN — CEFTRIAXONE SODIUM 1 G: 1 INJECTION, POWDER, FOR SOLUTION INTRAMUSCULAR; INTRAVENOUS at 16:16

## 2020-01-01 RX ADMIN — MIDODRINE HYDROCHLORIDE 5 MG: 5 TABLET ORAL at 08:35

## 2020-01-01 RX ADMIN — HYDROCODONE BITARTRATE AND ACETAMINOPHEN 1 TABLET: 5; 325 TABLET ORAL at 20:07

## 2020-01-01 RX ADMIN — INSULIN LISPRO 6 UNITS: 100 INJECTION, SOLUTION INTRAVENOUS; SUBCUTANEOUS at 11:38

## 2020-01-01 RX ADMIN — KETAMINE HYDROCHLORIDE 25 MG: 100 INJECTION, SOLUTION, CONCENTRATE INTRAMUSCULAR; INTRAVENOUS at 15:59

## 2020-01-01 RX ADMIN — THERA TABS 1 TABLET: TAB at 08:35

## 2020-01-01 RX ADMIN — DEXAMETHASONE SODIUM PHOSPHATE 4 MG: 4 INJECTION, SOLUTION INTRAMUSCULAR; INTRAVENOUS at 15:35

## 2020-01-01 RX ADMIN — INSULIN LISPRO 2 UNITS: 100 INJECTION, SOLUTION INTRAVENOUS; SUBCUTANEOUS at 12:37

## 2020-01-01 RX ADMIN — LABETALOL 20 MG/4 ML (5 MG/ML) INTRAVENOUS SYRINGE 10 MG: at 03:10

## 2020-01-01 RX ADMIN — MIDODRINE HYDROCHLORIDE 5 MG: 5 TABLET ORAL at 16:38

## 2020-01-01 RX ADMIN — ACETAMINOPHEN 1000 MG: 500 TABLET ORAL at 21:52

## 2020-01-01 RX ADMIN — SODIUM BICARBONATE: 84 INJECTION, SOLUTION INTRAVENOUS at 14:09

## 2020-01-01 RX ADMIN — OXYCODONE HYDROCHLORIDE 5 MG: 5 TABLET ORAL at 22:25

## 2020-01-01 RX ADMIN — Medication 10 MG: at 16:09

## 2020-01-01 RX ADMIN — ASPIRIN 81 MG CHEWABLE TABLET 162 MG: 81 TABLET CHEWABLE at 08:34

## 2020-01-01 RX ADMIN — CARVEDILOL 6.25 MG: 3.12 TABLET, FILM COATED ORAL at 09:33

## 2020-01-01 RX ADMIN — ONDANSETRON 4 MG: 2 INJECTION INTRAMUSCULAR; INTRAVENOUS at 19:58

## 2020-01-01 RX ADMIN — CEFTRIAXONE SODIUM 1 G: 1 INJECTION, POWDER, FOR SOLUTION INTRAMUSCULAR; INTRAVENOUS at 16:59

## 2020-01-01 RX ADMIN — INSULIN LISPRO 2 UNITS: 100 INJECTION, SOLUTION INTRAVENOUS; SUBCUTANEOUS at 21:29

## 2020-01-01 RX ADMIN — ACETAMINOPHEN 1000 MG: 500 TABLET ORAL at 05:17

## 2020-01-01 RX ADMIN — HYDRALAZINE HYDROCHLORIDE 25 MG: 25 TABLET, FILM COATED ORAL at 21:21

## 2020-01-01 RX ADMIN — ACETAMINOPHEN 1000 MG: 500 TABLET ORAL at 21:14

## 2020-01-01 RX ADMIN — THERA TABS 1 TABLET: TAB at 08:13

## 2020-01-01 RX ADMIN — Medication 10 ML: at 17:17

## 2020-01-01 RX ADMIN — FENTANYL CITRATE 50 MCG: 50 INJECTION, SOLUTION INTRAMUSCULAR; INTRAVENOUS at 15:49

## 2020-01-01 RX ADMIN — SODIUM POLYSTYRENE SULFONATE 30 G: 15 SUSPENSION ORAL; RECTAL at 09:45

## 2020-01-01 RX ADMIN — SODIUM CHLORIDE 75 ML/HR: 900 INJECTION, SOLUTION INTRAVENOUS at 09:39

## 2020-01-01 RX ADMIN — GLYCOPYRROLATE 0.4 MG: 0.2 INJECTION INTRAMUSCULAR; INTRAVENOUS at 16:17

## 2020-01-01 RX ADMIN — HYDROCODONE BITARTRATE AND ACETAMINOPHEN 1 TABLET: 5; 325 TABLET ORAL at 23:17

## 2020-01-01 RX ADMIN — SODIUM CHLORIDE, SODIUM LACTATE, POTASSIUM CHLORIDE, AND CALCIUM CHLORIDE: 600; 310; 30; 20 INJECTION, SOLUTION INTRAVENOUS at 14:28

## 2020-08-05 NOTE — NURSE NAVIGATOR
Transitional Care Team: Readmission Initial HUG Note Date of Assessment: 19 Time of Assessment:  09:45 AM 
 
Ag Yanez is a 77 y.o. female inpatient at [facility]. Assessment & Plan  
-Social: she lives alone;   last October. She reports having a good support system consisting of her nephew (ICU RN w/ [facility] [employer] [facility]) and sister that live nearby to her. She reports being independent, but reports challenges with cooking sometimes; her sister brings her meals or they go out to eat. She has a walker; no home oxygen or nebulizer.  
-Cardiac & DM diet encouraged; she states that she tries to follow; A1C 5.2% on 3/25/19 
-She reports receiving her influenza vaccine last season, and being up to date with PNA vaccine. 
-she needs a home oxygen evaluation prior to discharge  
-\"VQ scan current adm neg for PE\" as per Dr. Ankur Maya on 19 Primary Diagnosis: \"Pancytopenia Acute LLE DVT dx 3/2019 Iron deficiency anemia BLOOM cirrhosis with portal HTN and esophageal varices\" as per Dr. Matias Farmer note. Advance Directive:  She states that she has a completed ACP at home, but she does not want to share it. She states \"I don't want you (medical personnel) to say, oh she said to not do this and put me aside to die. I know what you're going to say I've heard before, but I don't want to share it. My nephew is a ICU nurse with [facility] [employer] [facility]; My sister and him know what I want. \" Current Code Status:  Full Code Referral to Hospice/ Palliative Care Appropriate: no. 
 
Awareness of Medical Conditions: (Trajectory of illness and pts expectations). She's aware Discharge Needs: (to include safety issues) SNF Barriers Identified: none, at this time Patient is willing to go to SNF/Inpatient Rehab if recommended: yes. She states that she wants to get better at SNF so that she can return home. Medication Review:  was not performed, awaiting final med list on AVS. Can patient afford medications:  yes. Patient is Compliant with Medication regimen: yes Who manages medications at home: self Best Patient Contact Number: [phone] HUG (Healthy Understanding of Goals) program introduced to patient/family. The Transitional Care Team bridges the gaps in care and education surrounding discharge from the acute care facility. The objective is to empower the patient and family in taking a proactive role in preventing readmission within the first thirty days after discharge. The team is also involved in the efforts to reduce readmission to the acute care setting after stabilization and discharge from the acute care environment either to skilled nursing facilities or community. G RN/CNS will return with [facility] [place]/ follow up appointments/ Ambulatory Nurse Navigator name and contact information when the patient is ready for discharge. Future Appointments Date Time Provider Abdiaziz Johnson 7/15/2019  3:40 PM Manuel Campuzano MD [address] [place] [address] [facility] follow up appointment(s): to be determined Patient education focused on readmission zones as described as: The Red Zone: High risk for readmission, days 1-21 The Yellow Zone: Moderate  risk for readmission, days 22-29 The Green Zone: Lower risk for readmission, days 30 and after The [facility] Team will attempt to follow the patient from a distance while inpatient as well as be available for further transition/disposition needs. The [facility] team will continue to offer support during the 30- 90 day discharge from acute care setting. Notified the appropriate [facility] team members. Past Medical History:  
Diagnosis Date  Anemia  CAD (coronary artery disease) Coronary Stenting needed, hematology workup needed prior to stent placement, treating medically at this time  Cancer [facility])   
 colon  Cancer ([facility] [address] breast - left  Cardiac catheterization 08/17/2016 LM patent. mLAD 99.9% (2.25 x 28-mm Xience BETHEL, resid 0%). oD1 60%. oD2 95%. pCX 90% (2.5 x 13-mm Xience BETHEL, resid 0%). OMB patent.  Cardiac echocardiogram 07/01/2016 EF 50%. Basal inferior, basal-mid inferolateral hypk. Gr 1 DDfx. RVSP 50-60 mmHg. Mild LAE. Mod, eccentric MR.    
 Diabetes ([address]  Hypercholesteremia  Hypertension  Thrombocytopenia ([address] Romel Colindres MSN, RN, [facility] Nurse Navigator [phone] heavy meconium

## 2020-09-26 NOTE — ED TRIAGE NOTES
\"For the past 1.5 weeks I've been short of breath. My ear is stopped up so I thought it was my sinuses but now I don't think it is. I walk 10 feet and I'm out of breath. \"

## 2020-09-26 NOTE — ED TRIAGE NOTES
Patient states \" my left leg is swollen and it feels tingly. I had a blood clot before and it feels similar. \"

## 2020-09-27 NOTE — H&P
Hospitalist Admission History and Physical 
 
NAME:  Ernestina Zhong :   1940 MRN:   [id number] PCP:  Pablito Nicholson MD 
Date/Time:  2020 Subjective: CHIEF COMPLAINT: Shortness of breath and dyspnea on exertion HISTORY OF PRESENT ILLNESS:    
This is a 40-year-old female with known past medical history of coronary artery disease and congestive heart failure and hypertension and diabetes and chronic kidney disease and cirrhosis who presented to the ED with complaints of worsening shortness of breath over the last 30 days. Patient states that she recently increased her dose of p.o. Lasix but her breathing has not improved. Patient states that she gets short of breath on taking 10 steps. Patient denies any chest pain. No history of any fever chills. No history of any cough. No history of any sick contacts. No history of any headaches numbness or focal weakness. No history of any abdominal pain urinary complaints diarrhea or rectal bleeding. No history of any black or dark-colored stools. Patient has some chronic lower extremity swelling which she states is a little worse over the last few weeks. No history of any rash Past Medical History:  
Diagnosis Date  Anemia  Aortic stenosis, moderate 2019  Breast cancer ([facility]  CAD (coronary artery disease) Coronary Stenting needed, hematology workup needed prior to stent placement, treating medically at this time  Cancer [facility])   
 colon  Cancer ([facility] breast - left  Cardiac catheterization 2016 LM patent. mLAD 99.9% (2.25 x 28-mm Xience BETHEL, resid 0%). oD1 60%. oD2 95%. pCX 90% (2.5 x 13-mm Xience BETHEL, resid 0%). OMB patent.  Cardiac echocardiogram 2019 EF 56 to 60%, moderate concentric LVH, moderate aortic stenosis, mean valve gradient 25 mmHg  Diabetes ([facility]  Hypercholesteremia  Hypertension  Thrombocytopenia ([facility] Past Surgical History: Procedure Laterality Date  CARDIAC CATHETERIZATION  7/16/2015  
  no stents at this time r/t anemia & thrombocytopenia (hematology work-up needed prior to stent placement)  CARDIAC CATHETERIZATION  7/11/2016  CARDIAC CATHETERIZATION  8/17/2016  CORONARY ARTERY ANGIOGRAM  7/16/2015  CORONARY ARTERY ANGIOGRAM  7/11/2016  CORONARY ARTERY ANGIOGRAM  8/17/2016  CORONARY STENT EA ADDL VESSEL  8/17/2016  CORONARY STENT SINGLE W/PTCA  8/17/2016  HX GI    
 colon surg secondary to cancer - removed lesion  HX HEENT  2/2013  
 cataract bilaterally  HX MASTECTOMY  march 2011  
 left  HX VASCULAR ACCESS    
 mediport - now removed  IR BX BONE MARROW DIAGNOSTIC  9/30/2019  LV ANGIOGRAPHY  7/16/2015  LV ANGIOGRAPHY  7/11/2016 Social History Tobacco Use  Smoking status: Never Smoker  Smokeless tobacco: Never Used Substance Use Topics  Alcohol use: No  
  
 
Family History Problem Relation Age of Onset  Cancer Maternal Aunt Allergies Allergen Reactions  Latex Itching Skin breaks out causing itching Prior to Admission Medications Prescriptions Last Dose Informant Patient Reported? Taking? SITagliptin (JANUVIA) 50 mg tablet  Self Yes No  
Sig: Take 50 mg by mouth daily. acetaminophen (TYLENOL) 325 mg tablet   No No  
Sig: Take 2 Tabs by mouth every four (4) hours as needed for Pain. amLODIPine (NORVASC) 10 mg tablet   Yes No  
Sig: Take 10 mg by mouth daily. ascorbic acid, vitamin C, (VITAMIN C) 250 mg tablet   No No  
Sig: Take 1 Tab by mouth daily. benazepril (LOTENSIN) 10 mg tablet   Yes No  
Sig: Take 10 mg by mouth daily. calcium polycarbophil (FIBER LAXATIVE) 625 mg tablet  Self Yes No  
Sig: Take 1,250 mg by mouth daily as needed. carvedilol (COREG) 12.5 mg tablet   Yes No  
Sig: Take 12.5 mg by mouth two (2) times daily (with meals). cholecalciferol, vitamin D3, (VITAMIN D3) 2,000 unit tab  Self Yes No  
Sig: Take 2,000 Units by mouth daily. cyanocobalamin (VITAMIN B12) 100 mcg tablet   Yes No  
Sig: Take 100 mcg by mouth daily. furosemide (LASIX) 20 mg tablet  Self Yes No  
Sig: Take 20 mg by mouth two (2) times a day. Indications: visible water retention  
glucose 4 gram chewable tablet   No No  
Sig: Take 4 Tabs by mouth as needed for Other (hypoglycemia). hydrALAZINE (APRESOLINE) 25 mg tablet   No No  
Sig: Take 1 Tab by mouth three (3) times daily. multivitamin (ONE A DAY) tablet   Yes No  
Sig: Take 1 Tab by mouth daily. nitroglycerin (NITROLINGUAL) 400 mcg/spray spray   No No  
Si Spray by SubLINGual route every five (5) minutes as needed. omega 3-dha-epa-fish oil (FISH OIL) 100-160-1,000 mg cap   Yes No  
Sig: Take 1,000 mg by mouth two (2) times a day. simvastatin (ZOCOR) 20 mg tablet   Yes No  
Sig: Take 20 mg by mouth daily. Facility-Administered Medications: None REVIEW OF SYSTEMS:   
Review of Systems GENERAL: Patient alert, awake and oriented times 3, able to communicate full sentences and not in distress. HEENT: No change in vision, no earache, tinnitus, sore throat or sinus congestion. NECK: No pain or stiffness. PULMONARY: Patient has shortness of breath and dyspnea on exertion. Patient denies any cough or wheezing Cardiovascular: no pnd or orthopnea, no CP GASTROINTESTINAL: No abdominal pain, nausea, vomiting or diarrhea, melena or bright red blood per rectum. GENITOURINARY: No urinary frequency, urgency, hesitancy or dysuria. MUSCULOSKELETAL: No back pain, no leg pain DERMATOLOGIC: No rash, no itching, no lesions. ENDOCRINE: No polyuria, polydipsia, no heat or cold intolerance. No recent change in weight. HEMATOLOGICAL: No anemia or easy bruising or bleeding. NEUROLOGIC: No headache, seizures, numbness, tingling or weakness. Patient has some chronic lower extremity swelling which she states is worse Objective: VITALS:   
Visit Vitals BP (!) 171/69 (BP 1 Location: Right arm, BP Patient Position: At rest;Sitting) Pulse 73 Temp 97.3 °F (36.3 °C) Resp 18 SpO2 97% Temp (24hrs), Av.3 °F (36.3 °C), Min:97.3 °F (36.3 °C), Max:97.3 °F (36.3 °C) PHYSICAL EXAM:  
General:    Patient is sitting in a chair in no apparent distress HEENT:  Pupils equal.  Sclera anicteric. Conjunctiva pink. Mucous membranes Moist, no ear or nasal discharge Neck:  Supple. Trachea midline. No accessory muscle use. No thyromegaly. No jugular venous distention, no carotid bruit CV:                  Regular rate and rhythm. S1S2+ Lungs:   Decreased breath sounds are noted at bilateral bases, no Rales no rhonchi no wheezing Abdomen:   Soft, non-tender. Not distended. Bowel sounds normal.  
Extremities: No cyanosis. Patient has bilateral lower extremity edema left greater than right. Pulses 1+ b/l Neurologic: Alert and oriented X 3. Follows commands, responds appropriately. No focal neurological deficit was noted Skin:                Warm and dry. No rashes. LAB DATA REVIEWED:   
No components found for: [place] Recent Labs  
  20 
1710   
K 4.8  
* CO2 18* BUN 48* CREA 1.45* * CA 8.8 ALB 3.5 WBC 4.1* HGB 7.2* HCT 20.8* PLT 66* CXR Results  (Last 48 hours) 20 1841  XR CHEST PA LAT Final result Impression:  IMPRESSION:  
   
Suspect pulmonary arterial hypertension given prominence of the hilar  
structures. Similar small bilateral pleural effusions. Cardiomegaly. Narrative:  TWO VIEW CHEST RADIOGRAPH   
   
CPT CODE: 03323 INDICATION: Shortness of breath. COMPARISON: 2019 FINDINGS:   
   
Cardiomediastinal silhouette is enlarged, as before.  There is similar prominence  
of the hilar structures bilaterally and symmetrically. Similar small bilateral  
pleural effusions. No focal opacity or pneumothorax. Multifocal degenerative  
disc disease. Assessment/Plan:  
  
Acute on chronic diastolic congestive heart failure exacerbation Coronary artery disease Hypertension, uncontrolled Type 2 diabetes Anemia of chronic kidney disease History of colon cancer Chronic kidney disease stage III Cirrhosis of liver with a history of esophageal varices Thrombocytopenia which appears to be chronic 
___________________________________________________ PLAN:   
Patient has received 1 dose of IV Lasix in the ED. We will monitor I's and O's. We will determine further dosing of Lasix based on labs and cardiology evaluation tomorrow. We will continue other prior to admission medications. Blood pressure will be monitored. Sugars will be monitored. We will start sliding scale insulin. Renal function will be monitored. Given the patient's low platelets we will hold off pharmacologic DVT prophylaxis at this time. We will check lower extremity Dopplers to look for DVT. If no DVT is noted please consider starting SCDs. We will order graded compression stockings. We will also check an echocardiogram.  Patient's anemia appears chronic. No overt bleeding noted. We will check iron studies and vitamin B12 and folate levels. Stool Hemoccult is also been ordered. Rest depending on patient's further hospital course. Plan of care was discussed with the patient and she verbalized understanding and agreed. I also discussed the level of care and patient wishes to be a full code. Disposition:  [x]Home w/ Family   [] PT,OT,RN   []SNF/LTC   []SAH/Rehab Discussed Code Status:    [x]Full Code      []DNR    
___________________________________________________ Care Plan discussed with: 
  [x]Patient   []Family    []ED Care Manager  [x]ED Doc   []Specialist : 
 Total Time Coordinating Admission: 70  minutes []Total Critical Care Time:    
___________________________________________________ Admitting Physician: Nelli Montalvo MD

## 2020-09-27 NOTE — CONSULTS
[facility] - Consult Note Consultation request by Tete Reynaga MD for advice/opinion related to evaluating CHF (congestive heart failure) ([facility] [I50.9] CHF (congestive heart failure) ([facility] [I50.9] Date of  Admission: 9/26/2020  6:26 PM  
Primary Care Physician:  Sharda Valverde MD  
 
Consult for: AECHF/ HFpEF, moderate AS, HTN Assessment:  
 
Patient Active Problem List  
Diagnosis Code  Anemia D64.9  
 CAD (coronary artery disease) I25.10  Thrombocytopenia ([facility] D69.6  NSTEMI (non-ST elevated myocardial infarction) ([facility]) I21.4  
 UTI (urinary tract infection) N39.0  Sepsis ([facility] A41.9  PNA (pneumonia) J18.9  Hypoxia R09.02  
 Cirrhosis of liver not due to alcohol ([facility] K74.60  Esophageal varices (H[facility]) I85.00  
 CHF (congestive heart failure) ([facility]) I50.9  Dyspnea R06.00  
 Pneumonia J18.9  
 HTN (hypertension), benign I10  
 CAP (community acquired pneumonia) J18.9  Acute on chronic diastolic CHF (congestive heart failure) ([facility]) I50.33  Leg DVT (deep venous thromboembolism), acute, left ([facility]) I82.402  Palpitations R00.2  Acute pulmonary edema ([facility]) J81.0  Malaise and fatigue R53.81, R53.83  
 Pancytopenia ([facility] [id number]  Portal hypertension ([facility] K76.6  Chronic anticoagulation Z79.01  
 
-Acute on chronic HFpEF with last echo with EF 55-60% and moderate aortic stenosis. She has a moderate amount of volume overload on exam. No distress noted. Coronary artery disease. History of drug-eluting stents to both her LAD and left circumflex in August 2016. Patient also had residual severe distal RCA disease which was well collateralized from the left.    
-Aortic valve stenosis- moderate range by echocardiogram in March 2019.   
-Essential hypertension-regimen which includes an ACE inhibitor, calcium channel blocker, beta-blocker and diuretic.   
-Dyslipidemia- on simvastatin 20 mg daily. -Diabetes mellitus, type II. Managed with oral agents.    
-History of left lower extremity DVT since March 2019. Patient was anticoagulated with Eliquis for approximately 4 months and then discontinued. This is followed by her hematologist/oncologist. 
-Chronic anemia. She has required transfusions in the past.  She is followed closely by her hematologist for this. 
-Thrombocytopenia- chronic and at baseline 
 
-Primary cardiologist Dr. Jethro Blackwood Plan:  
 
-Patient starting to diurese and feeling a little better and renal function improving 
-BP slightly elevated and will give her full amlodipine 10mg dose today and monitor. May need to add another agent if not able to control. 
-Do not suspect ACS and her CAD history appears to be stable.  
-Echo is unchanged, has moderate aortic stenosis History of Present Illness: This is a [de-identified] y.o. female admitted for CHF (congestive heart failure) ([facility] [I50.9] CHF (congestive heart failure) ([facility] [I50.9]. Patient complains of:  Patient presented with a 1-month history of increasing shortness of breath, CHAVEZ, orthopnea despite taking more of her diuretics at home. She denies any fevers, cough, chest pain, nausea or other symptoms. Cardiac risk factors: dyslipidemia, diabetes mellitus, sedentary life style, hypertension, post-menopausal, age, CAD Review of Symptoms:  Except as stated above include: 
Constitutional:  negative Respiratory:  negative Cardiovascular:  negative Gastrointestinal: negative Genitourinary:  negative Musculoskeletal:  Negative Neurological:  Negative Dermatological:  Negative Endocrinological: Negative Psychological:  Negative Pertinent items are noted in HPI. Past Medical History:  
 
Past Medical History:  
Diagnosis Date  Anemia  Aortic stenosis, moderate 03/2019  Breast cancer ([facility]  CAD (coronary artery disease)  Coronary Stenting needed, hematology workup needed prior to stent placement, treating medically at this time  Cancer [facility])   
 colon  Cancer ([facility] breast - left  Cardiac catheterization 08/17/2016 LM patent. mLAD 99.9% (2.25 x 28-mm Xience BETHEL, resid 0%). oD1 60%. oD2 95%. pCX 90% (2.5 x 13-mm Xience BETHEL, resid 0%). OMB patent.  Cardiac echocardiogram 03/2019 EF 56 to 60%, moderate concentric LVH, moderate aortic stenosis, mean valve gradient 25 mmHg  Diabetes ([facility]  Hypercholesteremia  Hypertension  [facility] Social History:  
 
Social History Socioeconomic History  Marital status:  Spouse name: Not on file  Number of children: Not on file  Years of education: Not on file  Highest education level: Not on file Tobacco Use  Smoking status: Never Smoker  Smokeless tobacco: Never Used Substance and Sexual Activity  Alcohol use: No  
 Drug use: No  
 
 
 Family History:  
 
Family History Problem Relation Age of Onset  Cancer Maternal Aunt Medications: Allergies Allergen Reactions  Latex Itching Skin breaks out causing itching Current Facility-Administered Medications Medication Dose Route Frequency  carvediloL (COREG) tablet 12.5 mg  12.5 mg Oral BID WITH MEALS  cyanocobalamin (VITAMIN B12) tablet 100 mcg  100 mcg Oral DAILY  hydrALAZINE (APRESOLINE) tablet 25 mg  25 mg Oral TID  therapeutic multivitamin (THERAGRAN) tablet 1 Tab  1 Tab Oral DAILY  atorvastatin (LIPITOR) tablet 10 mg  10 mg Oral QHS  amLODIPine (NORVASC) tablet 5 mg  5 mg Oral DAILY  sodium chloride (NS) flush 5-40 mL  5-40 mL IntraVENous Q8H  
 sodium chloride (NS) flush 5-40 mL  5-40 mL IntraVENous PRN  
 acetaminophen (TYLENOL) tablet 650 mg  650 mg Oral Q6H PRN Or  
 acetaminophen (TYLENOL) suppository 650 mg  650 mg Rectal Q6H PRN  polyethylene glycol (MIRALAX) packet 17 g  17 g Oral DAILY PRN  
  promethazine (PHENERGAN) tablet 12.5 mg  12.5 mg Oral Q6H PRN Or  
 ondansetron (ZOFRAN) injection 4 mg  4 mg IntraVENous Q6H PRN  
 insulin lispro (HUMALOG) injection   SubCUTAneous AC&HS  
 glucose chewable tablet 16 g  4 Tab Oral PRN  
 glucagon (GLUCAGEN) injection 1 mg  1 mg IntraMUSCular PRN  
 dextrose (D50W) injection syrg 12.5-25 g  25-50 mL IntraVENous PRN  
 labetaloL (NORMODYNE;TRANDATE) 20 mg/4 mL (5 mg/mL) injection 10 mg  10 mg IntraVENous Q6H PRN  
 famotidine (PEPCID) tablet 20 mg  20 mg Oral DAILY Current Outpatient Medications Medication Sig  
 acetaminophen (TYLENOL) 325 mg tablet Take 2 Tabs by mouth every four (4) hours as needed for Pain.  benazepril (LOTENSIN) 10 mg tablet Take 10 mg by mouth daily.  hydrALAZINE (APRESOLINE) 25 mg tablet Take 1 Tab by mouth three (3) times daily.  glucose 4 gram chewable tablet Take 4 Tabs by mouth as needed for Other (hypoglycemia).  furosemide (LASIX) 20 mg tablet Take 20 mg by mouth two (2) times a day. Indications: visible water retention  SITagliptin (JANUVIA) 50 mg tablet Take 50 mg by mouth daily.  cholecalciferol, vitamin D3, (VITAMIN D3) 2,000 unit tab Take 2,000 Units by mouth daily.  amLODIPine (NORVASC) 10 mg tablet Take 10 mg by mouth daily.  carvedilol (COREG) 12.5 mg tablet Take 12.5 mg by mouth two (2) times daily (with meals).  nitroglycerin (NITROLINGUAL) 400 mcg/spray spray 1 Spray by SubLINGual route every five (5) minutes as needed.  ascorbic acid, vitamin C, (VITAMIN C) 250 mg tablet Take 1 Tab by mouth daily.  multivitamin (ONE A DAY) tablet Take 1 Tab by mouth daily.  simvastatin (ZOCOR) 20 mg tablet Take 20 mg by mouth daily.  omega 3-dha-epa-fish oil (FISH OIL) 100-160-1,000 mg cap Take 1,000 mg by mouth two (2) times a day.  cyanocobalamin (VITAMIN B12) 100 mcg tablet Take 100 mcg by mouth daily.     
 calcium polycarbophil (FIBER LAXATIVE) 625 mg tablet Take 1,250 mg by mouth daily as needed. Physical Exam:  
 
Visit Vitals BP (!) 167/51 Pulse 70 Temp 97.3 °F (36.3 °C) Resp 19 SpO2 95% BP Readings from Last 3 Encounters:  
09/27/20 (!) 167/51  
12/01/19 158/76  
10/16/19 142/80 Pulse Readings from Last 3 Encounters:  
09/27/20 70  
12/01/19 81  
10/16/19 92 Wt Readings from Last 3 Encounters:  
12/01/19 63.5 kg (140 lb) 10/16/19 64.9 kg (143 lb)  
09/30/19 63.5 kg (140 lb) General:  alert, cooperative, no distress, appears stated age Neck:  nontender, no carotid bruit, no JVD Lungs:  clear to auscultation bilaterally Heart:  regular rate and rhythm, S1, S2 normal, systolic murmur: early systolic 3/6, crescendo at 2nd right intercostal space Abdomen:  abdomen is soft without significant tenderness, masses, organomegaly or guarding Extremities:  edema 2+ l>r Skin: Warm and dry. no hyperpigmentation, vitiligo, horny growth to upper back, small approx 7mm Neuro: alert, oriented x3, affect appropriate, no focal neurological deficits, moves all extremities well, no involuntary movements Psych: non focal 
 
 Data Review:  
 
Recent Labs  
  09/27/20 
0318 09/26/20 
1710 WBC 4.1* 4.1* HGB 7.5* 7.2* HCT 21.8* 20.8* PLT 59* 66* Recent Labs  
  09/27/20 
0318 09/26/20 
1710  143  
K 4.6 4.8  
* 117* CO2 19* 18* * 194* BUN 48* 48* CREA 1.31* 1.45* CA 9.3 8.8 MG 1.8 1.9 ALB  --  3.5 ALT  --  30 Results for orders placed or performed during the hospital encounter of 09/26/20 EKG, 12 LEAD, INITIAL Result Value Ref Range Ventricular Rate 72 BPM  
 Atrial Rate 72 BPM  
 P-R Interval 152 ms QRS Duration 112 ms  
 Q-T Interval 414 ms QTC Calculation (Bezet) 453 ms Calculated P Axis 33 degrees Calculated R Axis -30 degrees Calculated T Axis 74 degrees Diagnosis Normal sinus rhythm Left axis deviation Septal infarct , age undetermined Abnormal ECG 
 When compared with ECG of 06-AUG-2019 09:32, No significant change was found Results for orders placed or performed in visit on 10/16/19 AMB POC EKG ROUTINE W/ 12 LEADS, INTER & REP Impression See progress note. All Cardiac Markers in the last 24 hours:   
Lab Results Component Value Date/Time CPK 58 09/27/2020 03:18 AM  
 CKMB 3.3 09/27/2020 03:18 AM  
 CKND1 5.7 (H) 09/27/2020 03:18 AM  
 TROIQ <0.02 09/27/2020 03:18 AM  
 TROIQ <0.02 09/26/2020 05:10 PM  
 
 
Last Lipid:   
Lab Results Component Value Date/Time Cholesterol, total 188 07/01/2016 11:53 PM  
 HDL Cholesterol 46 07/01/2016 11:53 PM  
 LDL, calculated 94.2 07/01/2016 11:53 PM  
 Triglyceride 239 (H) 07/01/2016 11:53 PM  
 CHOL/HDL Ratio 4.1 07/01/2016 11:53 PM  
 
09/26/20 ECHO ADULT COMPLETE 09/27/2020 9/27/2020 Narrative · LV: Estimated LVEF is 60 - 65%. Visually measured ejection fraction. Normal cavity size and systolic function (ejection fraction normal). Mildly increased wall thickness. Wall motion: normal. Inconclusive left  
ventricular diastolic function. · AV: AV is functionally bicuspid, with moderate aortic stenosis. Aortic  
valve peak gradient is 45 mmHg. Aortic valve mean gradient is 28 mmHg. Aortic valve area is 1.1 cm2. 
· MV: Mitral valve peak gradient is 11 mmHg. Mitral valve mean gradient is  
6 mmHg. Mitral valve area is 1.9 cm2. Moderate regurgitation. · TV: Mild tricuspid valve regurgitation is present. · LA: Severely dilated left atrium. Left Atrium volume index is 58 mL/m2. · No significant change to aortic stenosis when compared to prior report. Signed by: Moses Armendariz DO Signed By: ELOISA Trotter September 27, 2020

## 2020-09-27 NOTE — PROGRESS NOTES
OCCUPATIONAL THERAPY EVALUATION/DISCHARGE Patient: Willy Burrows (50 y.o. female) Date: 9/27/2020 Primary Diagnosis: CHF (congestive heart failure) ([facility] [I50.9] CHF (congestive heart failure) ([facility] [address] [I50.9] Precautions: Fall PLOF: Mod I with ADLs and functional mobility w/o AD, owns a RW and shower chair-but did not use ASSESSMENT AND RECOMMENDATIONS: 
Nursing/RN cleared for pt to participate in OT evaluation and tx session. Patient admitted to hospital with SOB, exertional dyspnea. Patient presents lying semi-reclined on stretcher in ED, A & O x 4, no c/o pain. Supine <-> sit w/ Mod I, BUE AROM & MMT WFL. Bedside commode transfer: mod I w/o AD and good balance, Mod I toilet tasks. LB dress: Mod I doff and don slip on shoes seated on edge of stretcher with good balance. Patient presents at baseline as PLOF with ADLs and functional mobility. Patient verbalized understanding of skilled OT services not indicated at this time while in acute hospital. Call bell within reach & pt verbalized understanding and provided return demonstration to utilize for assist e.g. functional transfers in order to prevent falls. Patient lying semi-reclined on stretcher, b/l rails up, call bell within reach at end of tx session. Skilled occupational therapy is not indicated at this time. Discharge Recommendations: None Further Equipment Recommendations for Discharge: none SUBJECTIVE:  
Patient stated I have a shower chair, but I never used it.  OBJECTIVE DATA SUMMARY:  
 
Past Medical History:  
Diagnosis Date Anemia Aortic stenosis, moderate 03/2019 Breast cancer ([facility] [facility] CAD (coronary artery disease) Coronary Stenting needed, hematology workup needed prior to stent placement, treating medically at this time [facility])   
 colon Cancer ([facility] [address] breast - left Cardiac catheterization 08/17/2016 LM patent. mLAD 99.9% (2.25 x 28-mm Xience BETHEL, resid 0%). oD1 60%. oD2 95%. pCX 90% (2.5 x 13-mm Xience BETHEL, resid 0%). OMB patent. Cardiac echocardiogram 03/2019 EF 56 to 60%, moderate concentric LVH, moderate aortic stenosis, mean valve gradient 25 mmHg Diabetes ([facility] [address] Hypercholesteremia Hypertension Thrombocytopenia ([facility] Past Surgical History:  
Procedure Laterality Date CARDIAC CATHETERIZATION  7/16/2015  
  no stents at this time r/t anemia & thrombocytopenia (hematology work-up needed prior to stent placement) CARDIAC CATHETERIZATION  7/11/2016 CARDIAC CATHETERIZATION  8/17/2016 CORONARY ARTERY ANGIOGRAM  7/16/2015 CORONARY ARTERY ANGIOGRAM  7/11/2016 CORONARY ARTERY ANGIOGRAM  8/17/2016 CORONARY STENT EA ADDL VESSEL  8/17/2016 CORONARY STENT SINGLE W/PTCA  8/17/2016 HX GI    
 colon surg secondary to cancer - removed lesion HX HEENT  2/2013  
 cataract bilaterally HX MASTECTOMY  march 2011  
 left HX VASCULAR ACCESS    
 mediport - now removed IR BX BONE MARROW DIAGNOSTIC  9/30/2019 LV ANGIOGRAPHY  7/16/2015 LV ANGIOGRAPHY  7/11/2016 Barriers to Learning/Limitations: None Compensate with: visual, verbal, tactile, kinesthetic cues/model Home Situation:  
Home Situation Home Environment: Private residence # Steps to Enter: 0 One/Two Story Residence: Two story Living Alone: Yes Support Systems: Family member(s) Patient Expects to be Discharged to[de-identified] Private residence Current DME Used/Available at Home: Shower chair, Walker, rolling Tub or Shower Type: Tub/Shower combination 
[x]     Right hand dominant   []     Left hand dominant Cognitive/Behavioral Status: 
Neurologic State: Alert; Appropriate for age Orientation Level: Oriented X4 Cognition: Follows commands Safety/Judgement: Fall prevention Skin: appears intact Edema: none noted Vision/Perceptual:  appears intact Coordination: BUE 
 Coordination: Within functional limits(BUEs) Fine Motor Skills-Upper: Left Intact; Right Intact Gross Motor Skills-Upper: Left Intact; Right Intact Balance: 
Sitting: Intact Sitting - Static: Good (unsupported) Sitting - Dynamic: Good (unsupported) Standing: Without support; Intact Standing - Static: Good Standing - Dynamic : Good Strength: BUE Strength: Within functional limits(BUEs) Tone & Sensation: BUE Tone: Normal(BUEs) Sensation: Intact(BUEs) Range of Motion: BUE 
AROM: Within functional limits(BUEs) Functional Mobility and Transfers for ADLs: 
Bed Mobility: 
  
Supine to Sit: Modified independent Sit to Supine: Modified independent Scooting: Modified independent Transfers: 
Sit to Stand: Modified independent Stand to Sit: Modified independent Toilet Transfer : Modified independent; Other (comment)(bedside commode) ADL Assessment: 
Feeding: Modified independent Oral Facial Hygiene/Grooming: Modified Independent Bathing: Modified independent Upper Body Dressing: Modified independent Lower Body Dressing: Modified independent Toileting: Modified independent ADL Intervention: 
 Bedside commode transfer: mod I w/o AD and good balance, Mod I toilet tasks. LB dress: Mod I doff and don slip on shoes seated on edge of stretcher with good balance. Cognitive Retraining Safety/Judgement: Fall prevention Pain: 
Pain level pre-treatment: 0/10 Pain level post-treatment: 0/10 Pain Intervention(s): Medication (see MAR); Rest, Ice, Repositioning Response to intervention: Nurse notified, See doc flow Activity Tolerance:  
fair Please refer to the flowsheet for vital signs taken during this treatment. After treatment:  
[]  Patient left in no apparent distress sitting up in chair 
[x]  Patient left in no apparent distress in bed 
[x]  Call bell left within reach [x]  Nursing notified 
[]  Caregiver present 
[]  Bed alarm activated COMMUNICATION/EDUCATION:  
[x]      Role of Occupational Therapy in the acute care setting 
[x]      Home safety education was provided and the patient/caregiver indicated understanding. [x]      Patient/family have participated as able and agree with findings and recommendations. []      Patient is unable to participate in plan of care at this time. Thank you for this referral. 
Rainravi Castro Time Calculation: 18 mins Eval Complexity: History: MEDIUM Complexity : Expanded review of history including physical, cognitive and psychosocial  history ; Examination: MEDIUM Complexity : 3-5 performance deficits relating to physical, cognitive , or psychosocial skils that result in activity limitations and / or participation restrictions; Decision Making:MEDIUM Complexity : Patient may present with comorbidities that affect occupational performnce. Miniml to moderate modification of tasks or assistance (eg, physical or verbal ) with assesment(s) is necessary to enable patient to complete evaluation

## 2020-09-27 NOTE — ED PROVIDER NOTES
EMERGENCY DEPARTMENT HISTORY AND PHYSICAL EXAM 
 
8:56 PM 
Date: 9/26/2020 Patient Name: Adama Walker History of Presenting Illness Chief Complaint Patient presents with  Shortness of Breath  Leg Swelling History Provided By: Patient HPI: Adama Walker is a [de-identified] y.o. female with multiple medical problems as below including unprovoked PE treated with Eliquis. No longer on anticoagulants. Patient is presenting with exertional dyspnea for about a month that got progressively worse over the past couple of days. She has been doubling up on her Lasix dose without improvement in her symptoms. She states that she is unable to walk 3 steps without having to stop and feeling short of breath. Denies any chest pain. No history of fever or other respiratory symptoms. No vomiting or diarrhea. Denies any bloody bowel movements or black bowel movement. No history of fever or sick contacts. Location: 
Severity: 
Timing/course: Onset/Duration: PCP: Zenaida Smith MD 
 
Past History Past Medical History: 
Past Medical History:  
Diagnosis Date  Anemia  Aortic stenosis, moderate 03/2019  Breast cancer ([facility] [address]  CAD (coronary artery disease) Coronary Stenting needed, hematology workup needed prior to stent placement, treating medically at this time  Cancer [facility])   
 colon  Cancer ([facility] [address] breast - left  Cardiac catheterization 08/17/2016 LM patent. mLAD 99.9% (2.25 x 28-mm Xience BETHEL, resid 0%). oD1 60%. oD2 95%. pCX 90% (2.5 x 13-mm Xience BETHEL, resid 0%). OMB patent.  Cardiac echocardiogram 03/2019 EF 56 to 60%, moderate concentric LVH, moderate aortic stenosis, mean valve gradient 25 mmHg  Diabetes ([address]  Hypercholesteremia  Hypertension  Thrombocytopenia ([address] Past Surgical History: 
Past Surgical History:  
Procedure Laterality Date  CARDIAC CATHETERIZATION  7/16/2015 no stents at this time r/t anemia & thrombocytopenia (hematology work-up needed prior to stent placement)  CARDIAC CATHETERIZATION  7/11/2016  CARDIAC CATHETERIZATION  8/17/2016  CORONARY ARTERY ANGIOGRAM  7/16/2015  CORONARY ARTERY ANGIOGRAM  7/11/2016  CORONARY ARTERY ANGIOGRAM  8/17/2016  CORONARY STENT EA ADDL VESSEL  8/17/2016  CORONARY STENT SINGLE W/PTCA  8/17/2016  HX GI    
 colon surg secondary to cancer - removed lesion  HX HEENT  2/2013  
 cataract bilaterally  HX MASTECTOMY  march 2011  
 left  HX VASCULAR ACCESS    
 mediport - now removed  IR BX BONE MARROW DIAGNOSTIC  9/30/2019  LV ANGIOGRAPHY  7/16/2015  LV ANGIOGRAPHY  7/11/2016 Family History: 
Family History Problem Relation Age of Onset  Cancer Maternal Aunt Social History: 
Social History Tobacco Use  Smoking status: Never Smoker  Smokeless tobacco: Never Used Substance Use Topics  Alcohol use: No  
 Drug use: No  
 
 
Allergies: Allergies Allergen Reactions  Latex Itching Skin breaks out causing itching Review of Systems Review of Systems Respiratory: Positive for chest tightness and shortness of breath. All other systems reviewed and are negative. Physical Exam  
 
Patient Vitals for the past 12 hrs: 
 Temp Pulse Resp BP SpO2  
09/26/20 1702 97.3 °F (36.3 °C) 73 18 (!) 171/69 97 % Physical Exam 
Vitals signs and nursing note reviewed. Constitutional:   
   Appearance: She is well-developed. HENT:  
   Head: Normocephalic and atraumatic. Neck: Musculoskeletal: Neck supple. Vascular: No JVD. Cardiovascular:  
   Rate and Rhythm: Normal rate. Pulses: Normal pulses. Pulmonary:  
   Effort: Pulmonary effort is normal. No respiratory distress. Musculoskeletal: Normal range of motion. Right lower leg: Edema present. Left lower leg: Edema present.   
Skin: 
 General: Skin is warm and dry. Neurological:  
   General: No focal deficit present. Mental Status: She is alert and oriented to person, place, and time. Psychiatric:     
   Mood and Affect: Mood normal.     
   Behavior: Behavior normal.  
 
 
 
Diagnostic Study Results Labs - Recent Results (from the past 12 hour(s)) METABOLIC PANEL, COMPREHENSIVE Collection Time: 09/26/20  5:10 PM  
Result Value Ref Range Sodium 143 136 - 145 mmol/L Potassium 4.8 3.5 - 5.5 mmol/L Chloride 117 (H) 100 - 111 mmol/L  
 CO2 18 (L) 21 - 32 mmol/L Anion gap 8 3.0 - 18 mmol/L Glucose 194 (H) 74 - 99 mg/dL BUN 48 (H) 7.0 - 18 MG/DL Creatinine 1.45 (H) 0.6 - 1.3 MG/DL  
 BUN/Creatinine ratio 33 (H) 12 - 20 GFR est AA 42 (L) >60 ml/min/1.73m2 GFR est non-AA 35 (L) >60 ml/min/1.73m2 Calcium 8.8 8.5 - 10.1 MG/DL Bilirubin, total 1.0 0.2 - 1.0 MG/DL  
 ALT (SGPT) 30 13 - 56 U/L  
 AST (SGOT) 21 10 - 38 U/L Alk. phosphatase 95 45 - 117 U/L Protein, total 6.5 6.4 - 8.2 g/dL Albumin 3.5 3.4 - 5.0 g/dL Globulin 3.0 2.0 - 4.0 g/dL A-G Ratio 1.2 0.8 - 1.7    
CBC WITH AUTOMATED DIFF Collection Time: 09/26/20  5:10 PM  
Result Value Ref Range WBC 4.1 (L) 4.6 - 13.2 K/uL  
 RBC 2.34 (L) 4.20 - 5.30 M/uL HGB 7.2 (L) 12.0 - 16.0 g/dL HCT 20.8 (L) 35.0 - 45.0 % MCV 88.9 74.0 - 97.0 FL  
 MCH 30.8 24.0 - 34.0 PG  
 MCHC 34.6 31.0 - 37.0 g/dL  
 RDW 15.5 (H) 11.6 - 14.5 % PLATELET 66 (L) 111 - 420 K/uL MPV 9.9 9.2 - 11.8 FL  
 NEUTROPHILS 73 40 - 73 % LYMPHOCYTES 14 (L) 21 - 52 % MONOCYTES 6 3 - 10 % EOSINOPHILS 7 (H) 0 - 5 % BASOPHILS 0 0 - 2 %  
 ABS. NEUTROPHILS 3.0 1.8 - 8.0 K/UL  
 ABS. LYMPHOCYTES 0.6 (L) 0.9 - 3.6 K/UL  
 ABS. MONOCYTES 0.3 0.05 - 1.2 K/UL  
 ABS. EOSINOPHILS 0.3 0.0 - 0.4 K/UL  
 ABS. BASOPHILS 0.0 0.0 - 0.1 K/UL  
 DF AUTOMATED    
NT-PRO BNP Collection Time: 09/26/20  5:10 PM  
Result Value Ref Range NT pro-BNP 3,884 (H) 0 - 1,800 PG/ML  
TROPONIN I Collection Time: 09/26/20  5:10 PM  
Result Value Ref Range Troponin-I, QT <0.02 0.0 - 0.045 NG/ML  
MAGNESIUM Collection Time: 09/26/20  5:10 PM  
Result Value Ref Range Magnesium 1.9 1.6 - 2.6 mg/dL Radiologic Studies - Xr Chest Pa Lat Result Date: 9/26/2020 IMPRESSION: Suspect pulmonary arterial hypertension given prominence of the hilar structures. Similar small bilateral pleural effusions. Cardiomegaly. Medical Decision Making ED Course: Progress Notes, Reevaluation, and Consults: 
 
8:56 PM Initial assessment performed. The patients presenting problems have been discussed, and they/their family are in agreement with the care plan formulated and outlined with them. I have encouraged them to ask questions as they arise throughout their visit. Provider Notes (Medical Decision Making): 25-year-old female with history of coronary artery disease, diabetes and hypertension as well as prior history of PE. Patient is presenting with exertional dyspnea. She is well-appearing on exam and not in any distress. Screening labs were done in triage and she has low hemoglobin but it seems more consistent with her baseline chronic anemia. Shortness of breath could possibly be related to anemia but I am more concerned about cardiac etiologies versus pulmonary hypertension. Certainly high risk for PE but due to her GFR will be difficult to scan her but will discuss with the hospitalist and decide whether or not to start anticoagulation versus VQ scan. Case discussed with the hospitalist Dr. Mary Mcdaniels. We both agreed that the patient will need a work-up for PE but hold off on anticoagulation now especially that she is not hypoxic nor tachycardic the patient does seem to have risk factors for bleeding still she will be admitted and observed, will continue IV diuresis.   Possibly worsening of her heart failure versus pulmonary hypertension. Will get an echo and Doppler in the morning. Procedures:  
 
Critical Care Time:  
 
Vital Signs-Reviewed the patient's vital signs. Reviewed pt's pulse ox reading. EKG: Interpreted by the EP. Time Interpreted:  
 Rate:  
 Rhythm:  
 Interpretation: 
 Comparison:  
 
Records Reviewed: Nursing Notes (Time of Review: 8:56 PM) 
-I am the first provider for this patient. 
-I reviewed the vital signs, available nursing notes, past medical history, past surgical history, family history and social history. Current Outpatient Medications Medication Sig Dispense Refill  acetaminophen (TYLENOL) 325 mg tablet Take 2 Tabs by mouth every four (4) hours as needed for Pain. 20 Tab 0  
 benazepril (LOTENSIN) 10 mg tablet Take 10 mg by mouth daily.  hydrALAZINE (APRESOLINE) 25 mg tablet Take 1 Tab by mouth three (3) times daily. 90 Tab 0  
 glucose 4 gram chewable tablet Take 4 Tabs by mouth as needed for Other (hypoglycemia). 30 Tab 0  
 furosemide (LASIX) 20 mg tablet Take 20 mg by mouth two (2) times a day. Indications: visible water retention  SITagliptin (JANUVIA) 50 mg tablet Take 50 mg by mouth daily.  cholecalciferol, vitamin D3, (VITAMIN D3) 2,000 unit tab Take 2,000 Units by mouth daily.  amLODIPine (NORVASC) 10 mg tablet Take 10 mg by mouth daily.  carvedilol (COREG) 12.5 mg tablet Take 12.5 mg by mouth two (2) times daily (with meals).  nitroglycerin (NITROLINGUAL) 400 mcg/spray spray 1 Spray by SubLINGual route every five (5) minutes as needed. 1 Bottle 2  
 ascorbic acid, vitamin C, (VITAMIN C) 250 mg tablet Take 1 Tab by mouth daily. 60 Tab 1  
 multivitamin (ONE A DAY) tablet Take 1 Tab by mouth daily.  simvastatin (ZOCOR) 20 mg tablet Take 20 mg by mouth daily.  omega 3-dha-epa-fish oil (FISH OIL) 100-160-1,000 mg cap Take 1,000 mg by mouth two (2) times a day.  cyanocobalamin (VITAMIN B12) 100 mcg tablet Take 100 mcg by mouth daily.  calcium polycarbophil (FIBER LAXATIVE) 625 mg tablet Take 1,250 mg by mouth daily as needed. Clinical Impression Clinical Impression: No diagnosis found. Disposition: admit This note was dictated utilizing voice recognition software which may lead to typographical errors. I apologize in advance if the situation occurs. If questions arise please do not hesitate to contact me or call our department.  
 
Merline Dos Santos MD 
8:56 PM

## 2020-09-27 NOTE — ED NOTES
Care assumed from night RN. Pt VSS on monitor and asleep at time of arrival but wakes to voice, sts no complaints at present. 1700mL drained from bedside commode.

## 2020-09-27 NOTE — PROGRESS NOTES
[facility] Progress Note Patient: Justus Dey Age: [de-identified] y.o. : 1940 MR#: [id number] SSN: [id number] Date: 2020 Subjective/24-hour events:  
 
Remains roomed in the emergency department. No acute chest pain or shortness of breath. Assessment:  
Acute on chronic diastolic CHF Hypertension DM 2 CKD 3 Dyslipidemia Anemia of chronic disease Chronic thrombocytopenia CAD Cirrhosis with history of esophageal varices Plan:  
Continue diuresis per cardiology. Monitor electrolytes and renal function. BP is a bit high at times. Monitor and adjust antihypertensive/cardiac regimen as necessary. PT OT, mobilize as tolerated. SSI, monitor sugars. Supportive care otherwise. Follow. Case discussed with:  [x]Patient  []Family  [x]Nursing  []Case Management DVT Prophylaxis:  []Lovenox  []Hep SQ  []SCDs  []Coumadin   []On Heparin gtt Objective:  
VS:  
Visit Vitals BP (!) 167/51 Pulse 70 Temp 97.3 °F (36.3 °C) Resp 19 Ht 5' (1.524 m) Wt 63.5 kg (140 lb) SpO2 95% BMI 27.34 kg/m² Tmax/24hrs: Temp (24hrs), Av.3 °F (36.3 °C), Min:97.3 °F (36.3 °C), Max:97.3 °F (36.3 °C) Intake/Output Summary (Last 24 hours) at 2020 1106 Last data filed at 2020 3712 Gross per 24 hour Intake  Output 1700 ml Net -1700 ml General:  In NAD. Nontoxic-appearing. Cardiovascular:  RRR. Pulmonary:  Lungs clear bilaterally, no wheezes. GI:  Abdomen soft, NTTP. Extremities:  Warm, no ischemia. Neuro:  Awake and alert. Motor grossly nonfocal. 
 
Labs:   
Recent Results (from the past 24 hour(s)) EKG, 12 LEAD, INITIAL Collection Time: 20  5:01 PM  
Result Value Ref Range Ventricular Rate 72 BPM  
 Atrial Rate 72 BPM  
 P-R Interval 152 ms QRS Duration 112 ms  
 Q-T Interval 414 ms QTC Calculation (Bezet) 453 ms Calculated P Axis 33 degrees Calculated R Axis -30 degrees Calculated T Axis 74 degrees Diagnosis Normal sinus rhythm Left axis deviation Septal infarct , age undetermined Abnormal ECG When compared with ECG of 06-AUG-2019 09:32, No significant change was found METABOLIC PANEL, COMPREHENSIVE Collection Time: 09/26/20  5:10 PM  
Result Value Ref Range Sodium 143 136 - 145 mmol/L Potassium 4.8 3.5 - 5.5 mmol/L Chloride 117 (H) 100 - 111 mmol/L  
 CO2 18 (L) 21 - 32 mmol/L Anion gap 8 3.0 - 18 mmol/L Glucose 194 (H) 74 - 99 mg/dL BUN 48 (H) 7.0 - 18 MG/DL Creatinine 1.45 (H) 0.6 - 1.3 MG/DL  
 BUN/Creatinine ratio 33 (H) 12 - 20 GFR est AA 42 (L) >60 ml/min/1.73m2 GFR est non-AA 35 (L) >60 ml/min/1.73m2 Calcium 8.8 8.5 - 10.1 MG/DL Bilirubin, total 1.0 0.2 - 1.0 MG/DL  
 ALT (SGPT) 30 13 - 56 U/L  
 AST (SGOT) 21 10 - 38 U/L Alk. phosphatase 95 45 - 117 U/L Protein, total 6.5 6.4 - 8.2 g/dL Albumin 3.5 3.4 - 5.0 g/dL Globulin 3.0 2.0 - 4.0 g/dL A-G Ratio 1.2 0.8 - 1.7    
CBC WITH AUTOMATED DIFF Collection Time: 09/26/20  5:10 PM  
Result Value Ref Range WBC 4.1 (L) 4.6 - 13.2 K/uL  
 RBC 2.34 (L) 4.20 - 5.30 M/uL HGB 7.2 (L) 12.0 - 16.0 g/dL HCT 20.8 (L) 35.0 - 45.0 % MCV 88.9 74.0 - 97.0 FL  
 MCH 30.8 24.0 - 34.0 PG  
 MCHC 34.6 31.0 - 37.0 g/dL  
 RDW 15.5 (H) 11.6 - 14.5 % PLATELET 66 (L) 549 - 420 K/uL MPV 9.9 9.2 - 11.8 FL  
 NEUTROPHILS 73 40 - 73 % LYMPHOCYTES 14 (L) 21 - 52 % MONOCYTES 6 3 - 10 % EOSINOPHILS 7 (H) 0 - 5 % BASOPHILS 0 0 - 2 %  
 ABS. NEUTROPHILS 3.0 1.8 - 8.0 K/UL  
 ABS. LYMPHOCYTES 0.6 (L) 0.9 - 3.6 K/UL  
 ABS. MONOCYTES 0.3 0.05 - 1.2 K/UL  
 ABS. EOSINOPHILS 0.3 0.0 - 0.4 K/UL  
 ABS. BASOPHILS 0.0 0.0 - 0.1 K/UL  
 DF AUTOMATED    
NT-PRO BNP Collection Time: 09/26/20  5:10 PM  
Result Value Ref Range NT pro-BNP 3,884 (H) 0 - 1,800 PG/ML  
TROPONIN I Collection Time: 09/26/20  5:10 PM  
Result Value Ref Range Troponin-I, QT <0.02 0.0 - 0.045 NG/ML  
MAGNESIUM Collection Time: 09/26/20  5:10 PM  
Result Value Ref Range Magnesium 1.9 1.6 - 2.6 mg/dL GLUCOSE, POC Collection Time: 09/27/20  1:14 AM  
Result Value Ref Range Glucose (POC) 189 (H) 70 - 110 mg/dL HEMOGLOBIN A1C WITH EAG Collection Time: 09/27/20  3:15 AM  
Result Value Ref Range Hemoglobin A1c 5.5 4.2 - 5.6 % Est. average glucose 111 mg/dL CBC WITH AUTOMATED DIFF Collection Time: 09/27/20  3:18 AM  
Result Value Ref Range WBC 4.1 (L) 4.6 - 13.2 K/uL  
 RBC 2.46 (L) 4.20 - 5.30 M/uL HGB 7.5 (L) 12.0 - 16.0 g/dL HCT 21.8 (L) 35.0 - 45.0 % MCV 88.6 74.0 - 97.0 FL  
 MCH 30.5 24.0 - 34.0 PG  
 MCHC 34.4 31.0 - 37.0 g/dL  
 RDW 15.2 (H) 11.6 - 14.5 % PLATELET 59 (L) 835 - 420 K/uL MPV 10.0 9.2 - 11.8 FL  
 NEUTROPHILS 67 40 - 73 % LYMPHOCYTES 18 (L) 21 - 52 % MONOCYTES 7 3 - 10 % EOSINOPHILS 8 (H) 0 - 5 % BASOPHILS 0 0 - 2 %  
 ABS. NEUTROPHILS 2.7 1.8 - 8.0 K/UL  
 ABS. LYMPHOCYTES 0.7 (L) 0.9 - 3.6 K/UL  
 ABS. MONOCYTES 0.3 0.05 - 1.2 K/UL  
 ABS. EOSINOPHILS 0.3 0.0 - 0.4 K/UL  
 ABS. BASOPHILS 0.0 0.0 - 0.1 K/UL  
 DF AUTOMATED METABOLIC PANEL, BASIC Collection Time: 09/27/20  3:18 AM  
Result Value Ref Range Sodium 143 136 - 145 mmol/L Potassium 4.6 3.5 - 5.5 mmol/L Chloride 117 (H) 100 - 111 mmol/L  
 CO2 19 (L) 21 - 32 mmol/L Anion gap 7 3.0 - 18 mmol/L Glucose 178 (H) 74 - 99 mg/dL BUN 48 (H) 7.0 - 18 MG/DL Creatinine 1.31 (H) 0.6 - 1.3 MG/DL  
 BUN/Creatinine ratio 37 (H) 12 - 20 GFR est AA 47 (L) >60 ml/min/1.73m2 GFR est non-AA 39 (L) >60 ml/min/1.73m2 Calcium 9.3 8.5 - 10.1 MG/DL MAGNESIUM Collection Time: 09/27/20  3:18 AM  
Result Value Ref Range Magnesium 1.8 1.6 - 2.6 mg/dL VITAMIN B12 & FOLATE Collection Time: 09/27/20  3:18 AM  
Result Value Ref Range Vitamin B12 747 211 - 911 pg/mL Folate >20.0 (H) 3.10 - 17.50 ng/mL IRON PROFILE Collection Time: 09/27/20  3:18 AM  
Result Value Ref Range Iron 59 50 - 175 ug/dL TIBC 217 (L) 250 - 450 ug/dL Iron % saturation 27 20 - 50 % CARDIAC PANEL,(CK, CKMB & TROPONIN) Collection Time: 09/27/20  3:18 AM  
Result Value Ref Range CK - MB 3.3 <3.6 ng/ml CK-MB Index 5.7 (H) 0.0 - 4.0 % CK 58 26 - 192 U/L Troponin-I, QT <0.02 0.0 - 0.045 NG/ML  
ECHO ADULT COMPLETE Collection Time: 09/27/20  8:53 AM  
Result Value Ref Range IVSd 1.24 (A) 0.6 - 0.9 cm LVIDd 4.47 3.9 - 5.3 cm LVIDs 3.41 cm  
 LVOT d 1.95 cm  
 LVPWd 1.21 (A) 0.6 - 0.9 cm  
 LVOT Cardiac Output 6.53 liter/minute LVOT Peak Gradient 5.02 mmHg Left Ventricular Outflow Tract Mean Gradient 3.53 mmHg LVOT SV 92.7 mL  
 LVOT Peak Velocity 112.06 cm/s LVOT VTI 31.03 cm RVIDd 3.55 cm  
 LA Volume 92.22 22 - 52 mL  
 LA Area 4C 19.73 cm2 LA Vol 2C 102.70 (A) 22 - 52 mL  
 LA Vol 4C 65.17 (A) 22 - 52 mL Aortic Valve Area by Continuity of Peak Velocity 1.00 cm2 Aortic Valve Area by Continuity of Peak Velocity 0.99 cm2 Aortic Valve Area by Continuity of VTI 1.06 cm2 Aortic Valve Area by Continuity of VTI 1.05 cm2 AoV PG 45.10 mmHg Aortic Valve Systolic Mean Gradient 28.15 mmHg Aortic Valve Systolic Peak Velocity 351.75 cm/s Aortic valve mean velocity 254.83 cm/s AoV VTI 87.53 cm PISA MR Rad 0.43 cm  
 MV A Justin 140.29 cm/s Mitral Valve E Wave Deceleration Time 0.21 s MV E Justin 138.45 cm/s  
 MV E/A 0.99   
 E/E' lateral 31.11   
 E/E' septal 51.09   
 LV E' Lateral Velocity 4.45 cm/s LV E' Septal Velocity 2.71 cm/s MVA VTI 1.86 cm2 Mitral Effective Regurgitant Orifice Area 0.04 cm2  
 MV regurgitant volume 9.82 mL  
 MV Peak Gradient 11.12 mmHg MV Mean Gradient 5.63 mmHg Mitral Valve Max Velocity 166.70 cm/s Mitral Valve Annulus Velocity Time Integral 49.80 cm Pulmonic Regurgitant End Max Velocity 732.31 cm/s Mitral Regurgitant Velocity Time Integral 242.84 cm Ao Root D 3.09 cm  
 LV Mass .0 67 - 162 g  
 LV Mass AL Index 125.9 43 - 95 g/m2 E/E' ratio (averaged) 41.10 LA Vol Index 57.49 16 - 28 ml/m2 LA Vol Index 64.03 16 - 28 ml/m2 LA Vol Index 40.63 16 - 28 ml/m2 GLUCOSE, POC Collection Time: 09/27/20  9:35 AM  
Result Value Ref Range Glucose (POC) 155 (H) 70 - 110 mg/dL Signed By: Denise Florez MD   
 September 27, 2020

## 2020-09-28 NOTE — DISCHARGE INSTRUCTIONS
Patient Education        Heart Failure: Care Instructions  Your Care Instructions     Heart failure occurs when your heart does not pump as much blood as the body needs. Failure does not mean that the heart has stopped pumping but rather that it is not pumping as well as it should. Over time, this causes fluid buildup in your lungs and other parts of your body. Fluid buildup can cause shortness of breath, fatigue, swollen ankles, and other problems. By taking medicines regularly, reducing sodium (salt) in your diet, checking your weight every day, and making lifestyle changes, you can feel better and live longer. Follow-up care is a key part of your treatment and safety. Be sure to make and go to all appointments, and call your doctor if you are having problems. It's also a good idea to know your test results and keep a list of the medicines you take. How can you care for yourself at home? Medicines    · Be safe with medicines. Take your medicines exactly as prescribed. Call your doctor if you think you are having a problem with your medicine.     · Do not take any vitamins, over-the-counter medicine, or herbal products without talking to your doctor first. Angélica Rolls not take ibuprofen (Advil or Motrin) and naproxen (Aleve) without talking to your doctor first. They could make your heart failure worse.     · You may take some of the following medicine. ? Angiotensin-converting enzyme inhibitors (ACEIs) or angiotensin II receptor blockers (ARBs) reduce the heart's workload, lower blood pressure, and reduce swelling. Taking an ACEI or ARB may lower your chance of needing to be hospitalized. ? Beta-blockers can slow heart rate, decrease blood pressure, and improve your condition. Taking a beta-blocker may lower your chance of needing to be hospitalized. ? Diuretics, also called water pills, reduce swelling. You will get more details on the specific medicines your doctor prescribes.   Diet    · Your doctor may suggest that you limit sodium. Your doctor can tell you how much sodium is right for you. An example is less than 3,000 mg a day. This includes all the salt you eat in cooking or in packaged foods. People get most of their sodium from processed foods. Fast food and restaurant meals also tend to be very high in sodium.     · Ask your doctor how much liquid you can drink each day. You may have to limit liquids. Weight    · Weigh yourself without clothing at the same time each day. Record your weight. Call your doctor if you have a sudden weight gain, such as more than 2 to 3 pounds in a day or 5 pounds in a week. (Your doctor may suggest a different range of weight gain.) A sudden weight gain may mean that your heart failure is getting worse. Activity level    · Start light exercise (if your doctor says it is okay). Even if you can only do a small amount, exercise will help you get stronger, have more energy, and manage your weight and your stress. Walking is an easy way to get exercise. Start out by walking a little more than you did before. Bit by bit, increase the amount you walk.     · When you exercise, watch for signs that your heart is working too hard. You are pushing yourself too hard if you cannot talk while you are exercising. If you become short of breath or dizzy or have chest pain, stop, sit down, and rest.     · If you feel \"wiped out\" the day after you exercise, walk slower or for a shorter distance until you can work up to a better pace.     · Get enough rest at night. Sleeping with 1 or 2 pillows under your upper body and head may help you breathe easier. Lifestyle changes    · Do not smoke. Smoking can make a heart condition worse. If you need help quitting, talk to your doctor about stop-smoking programs and medicines. These can increase your chances of quitting for good.  Quitting smoking may be the most important step you can take to protect your heart.     · Limit alcohol to 2 drinks a day for men and 1 drink a day for women. Too much alcohol can cause health problems.     · Avoid getting sick from colds and the flu. Get a pneumococcal vaccine shot. If you have had one before, ask your doctor whether you need another dose. Get a flu shot each year. If you must be around people with colds or the flu, wash your hands often. When should you call for help? Call 911 if you have symptoms of sudden heart failure such as:    · You have severe trouble breathing.     · You cough up pink, foamy mucus.     · You have a new irregular or rapid heartbeat. Call your doctor now or seek immediate medical care if:    · You have new or increased shortness of breath.     · You are dizzy or lightheaded, or you feel like you may faint.     · You have sudden weight gain, such as more than 2 to 3 pounds in a day or 5 pounds in a week. (Your doctor may suggest a different range of weight gain.)     · You have increased swelling in your legs, ankles, or feet.     · You are suddenly so tired or weak that you cannot do your usual activities. Watch closely for changes in your health, and be sure to contact your doctor if you develop new symptoms. Where can you learn more? Go to http://perlita-linda.info/  Enter Y166 in the search box to learn more about \"Heart Failure: Care Instructions. \"  Current as of: December 16, 2019               Content Version: 12.6  © 5789-9785 Healthwise, Incorporated. Care instructions adapted under license by [employer] (which disclaims liability or warranty for this information). If you have questions about a medical condition or this instruction, always ask your healthcare professional. [place] [address] any warranty or liability for your use of this information. Patient Education        Avoiding Triggers With Heart Failure: Care Instructions  Your Care Instructions     Triggers are anything that make your heart failure flare up.  A flare-up is also called \"sudden heart failure\" or \"acute heart failure. \" When you have a flare-up, fluid builds up in your lungs, and you have problems breathing. You might need to go to the hospital. By watching for changes in your condition and avoiding triggers, you can prevent heart failure flare-ups. Follow-up care is a key part of your treatment and safety. Be sure to make and go to all appointments, and call your doctor if you are having problems. It's also a good idea to know your test results and keep a list of the medicines you take. How can you care for yourself at home? Watch for changes in your weight and condition  · Weigh yourself without clothing at the same time each day. Record your weight. Call your doctor if you have sudden weight gain, such as more than 2 to 3 pounds in a day or 5 pounds in a week. (Your doctor may suggest a different range of weight gain.) A sudden weight gain may mean that your heart failure is getting worse. · Keep a daily record of your symptoms. Write down any changes in how you feel, such as new shortness of breath, cough, or problems eating. Also record if your ankles are more swollen than usual and if you feel more tired than usual. Note anything that you ate or did that could have triggered these changes. Limit sodium  Sodium causes your body to hold on to extra water. This may cause your heart failure symptoms to get worse. People get most of their sodium from processed foods. Fast food and restaurant meals also tend to be very high in sodium. · Your doctor may suggest that you limit sodium. Your doctor can tell you how much sodium is right for you. This includes limiting sodium in cooked and packaged foods. · Read food labels on cans and food packages. They tell you how much sodium you get in one serving. Check the serving size. If you eat more than one serving, you are getting more sodium.   · Be aware that sodium can come in forms other than salt, including monosodium glutamate (MSG), sodium citrate, and sodium bicarbonate (baking soda). MSG is often added to Asian food. You can sometimes ask for food without MSG or salt. · Slowly reducing salt will help you adjust to the taste. Take the salt shaker off the table. · Flavor your food with garlic, lemon juice, onion, vinegar, herbs, and spices instead of salt. Do not use soy sauce, steak sauce, onion salt, garlic salt, mustard, or ketchup on your food, unless it is labeled \"low-sodium\" or \"low-salt. \"  · Make your own salad dressings, sauces, and ketchup without adding salt. · Use fresh or frozen ingredients, instead of canned ones, whenever you can. Choose low-sodium canned goods. · Eat less processed food and food from restaurants, including fast food. Exercise as directed  Moderate, regular exercise is very good for your heart. It improves your blood flow and helps control your weight. But too much exercise can stress your heart and cause a heart failure flare-up. · Check with your doctor before you start an exercise program.  · Walking is an easy way to get exercise. Start out slowly. Gradually increase the length and pace of your walk. Swimming, riding a bike, and using a treadmill are also good forms of exercise. · When you exercise, watch for signs that your heart is working too hard. You are pushing yourself too hard if you cannot talk while you are exercising. If you become short of breath or dizzy or have chest pain, stop, sit down, and rest.  · Do not exercise when you do not feel well. Take medicines correctly  · Take your medicines exactly as prescribed. Call your doctor if you think you are having a problem with your medicine. · Make a list of all the medicines you take. Include those prescribed to you by other doctors and any over-the-counter medicines, vitamins, or supplements you take. Take this list with you when you go to any doctor. · Take your medicines at the same time every day.  It may help you to post a list of all the medicines you take every day and what time of day you take them. · Make taking your medicine as simple as you can. Plan times to take your medicines when you are doing other things, such as eating a meal or getting ready for bed. This will make it easier to remember to take your medicines. · Get organized. Use helpful tools, such as daily or weekly pill containers. When should you call for help? Call 911 if you have symptoms of sudden heart failure such as:    · You have severe trouble breathing.     · You cough up pink, foamy mucus.     · You have a new irregular or rapid heartbeat. Call your doctor now or seek immediate medical care if:    · You have new or increased shortness of breath.     · You are dizzy or lightheaded, or you feel like you may faint.     · You have sudden weight gain, such as more than 2 to 3 pounds in a day or 5 pounds in a week. (Your doctor may suggest a different range of weight gain.)     · You have increased swelling in your legs, ankles, or feet.     · You are suddenly so tired or weak that you cannot do your usual activities. Watch closely for changes in your health, and be sure to contact your doctor if you develop new symptoms. Where can you learn more? Go to http://perlita-linda.info/  Enter V089 in the search box to learn more about \"Avoiding Triggers With Heart Failure: Care Instructions. \"  Current as of: December 16, 2019               Content Version: 12.6  © 4480-7528 Healthwise, Incorporated. Care instructions adapted under license by [employer] (which disclaims liability or warranty for this information). If you have questions about a medical condition or this instruction, always ask your healthcare professional. [place] [address] any warranty or liability for your use of this information.          Patient Education        Limiting Sodium With Heart Failure: Care Instructions  Your Care Instructions     Sodium causes your body to hold on to extra water. This may cause your heart failure symptoms to get worse. Limiting sodium may help you feel better. People get most of their sodium from processed foods. Fast food and restaurant meals also tend to be very high in sodium. Your doctor may suggest that you limit sodium. Your doctor can tell you how much sodium is right for you. An example is less than 3,000 mg a day. This includes all the salt you eat in cooked or packaged foods. Follow-up care is a key part of your treatment and safety. Be sure to make and go to all appointments, and call your doctor if you are having problems. It's also a good idea to know your test results and keep a list of the medicines you take. How can you care for yourself at home? Read food labels  · Read food labels on cans and food packages. The labels tell you how much sodium is in each serving. Make sure that you look at the serving size. If you eat more than the serving size, you have eaten more sodium than is listed for one serving. · Food labels also tell you the Percent Daily Value for sodium. Choose products with low Percent Daily Values for sodium. · Be aware that sodium can come in forms other than salt, including monosodium glutamate (MSG), sodium citrate, and sodium bicarbonate (baking soda). MSG is often added to Asian food. You can sometimes ask for food without MSG or salt. Buy low-sodium foods  · Buy foods that are labeled \"unsalted\" (no salt added), \"sodium-free\" (less than 5 mg of sodium per serving), or \"low-sodium\" (less than 140 mg of sodium per serving). A food labeled \"light sodium\" has less than half of the full-sodium version of that food. Foods labeled \"reduced-sodium\" may still have too much sodium. · Buy fresh vegetables or plain, frozen vegetables. Buy low-sodium versions of canned vegetables, soups, and other canned goods. Prepare low-sodium meals  · Use less salt each day when cooking. Reducing salt in this way will help you adjust to the taste. Do not add salt after cooking. Take the salt shaker off the table. · Flavor your food with garlic, lemon juice, onion, vinegar, herbs, and spices instead of salt. Do not use soy sauce, steak sauce, onion salt, garlic salt, mustard, or ketchup on your food. · Make your own salad dressings, sauces, and ketchup without adding salt. · Use less salt (or none) when recipes call for it. You can often use half the salt a recipe calls for without losing flavor. Other dishes like rice, pasta, and grains do not need added salt. · Rinse canned vegetables. This removes some--but not all--of the salt. · Avoid water that has a naturally high sodium content or that has been treated with water softeners, which add sodium. Call your local water company to find out the sodium content of your water supply. If you buy bottled water, read the label and choose a sodium-free brand. Avoid high-sodium foods, such as:  · Smoked, cured, salted, and canned meat, fish, and poultry. · Ham, gil, hot dogs, and luncheon meats. · Regular, hard, and processed cheese and regular peanut butter. · Crackers with salted tops. · Frozen prepared meals. · Canned and dried soups, broths, and bouillon, unless labeled sodium-free or low-sodium. · Canned vegetables, unless labeled sodium-free or low-sodium. · Salted snack foods such as chips and pretzels. · Western Cecily fries, pizza, tacos, and other fast foods. · Pickles, olives, ketchup, and other condiments, especially soy sauce, unless labeled sodium-free or low-sodium. If you cannot cook for yourself  · Have family members or friends help you, or have someone cook low-sodium meals. · Check with your local senior nutrition program to find out where meals are served and whether they offer a low-sodium option. You can often find these programs through your local health department or hospital.  · Have meals delivered to your home.  Most [facility] have a Meals on [employer] [place]. These programs provide one hot meal a day for older adults, delivered to their homes. Ask whether these meals are low-sodium. Let them know that you are on a low-sodium diet. Where can you learn more? Go to http://www.gray.com/  Enter A166 in the search box to learn more about \"Limiting Sodium With Heart Failure: Care Instructions. \"  Current as of: December 16, 2019               Content Version: 12.6  © 8067-4405 [employer]. Care instructions adapted under license by [employer] (which disclaims liability or warranty for this information). If you have questions about a medical condition or this instruction, always ask your healthcare professional. [place] [address] any warranty or liability for your use of this information. Patient armband removed and shredded        DISCHARGE SUMMARY from Nurse    PATIENT INSTRUCTIONS:    After general anesthesia or intravenous sedation, for 24 hours or while taking prescription Narcotics:  · Limit your activities  · Do not drive and operate hazardous machinery  · Do not make important personal or business decisions  · Do  not drink alcoholic beverages  · If you have not urinated within 8 hours after discharge, please contact your surgeon on call.     Report the following to your surgeon:  · Excessive pain, swelling, redness or odor of or around the surgical area  · Temperature over 100.5  · Nausea and vomiting lasting longer than 4 hours or if unable to take medications  · Any signs of decreased circulation or nerve impairment to extremity: change in color, persistent  numbness, tingling, coldness or increase pain  · Any questions    What to do at Home:  Recommended activity: Activity as tolerated    If you experience any of the following symptoms Nausea, Vomiting, Diarrhea, Fever greater than 100.5, Dizziness, Severe headache, Shortness of breath, Chest pain, Increased pain, please follow up with PCP. *  Please give a list of your current medications to your Primary Care Provider. *  Please update this list whenever your medications are discontinued, doses are      changed, or new medications (including over-the-counter products) are added. *  Please carry medication information at all times in case of emergency situations. These are general instructions for a healthy lifestyle:    No smoking/ No tobacco products/ Avoid exposure to second hand smoke  Surgeon General's Warning:  Quitting smoking now greatly reduces serious risk to your health. Obesity, smoking, and sedentary lifestyle greatly increases your risk for illness    A healthy diet, regular physical exercise & weight monitoring are important for maintaining a healthy lifestyle    You may be retaining fluid if you have a history of heart failure or if you experience any of the following symptoms:  Weight gain of 3 pounds or more overnight or 5 pounds in a week, increased swelling in our hands or feet or shortness of breath while lying flat in bed. Please call your doctor as soon as you notice any of these symptoms; do not wait until your next office visit. The discharge information has been reviewed with the patient. The patient verbalized understanding. Discharge medications reviewed with the patient and appropriate educational materials and side effects teaching were provided.   ___________________________________________________________________________________________________________________________________

## 2020-09-28 NOTE — PROGRESS NOTES
Discharge: 
 
 
Patient will discharge home today (9/28/2020) with family assistance. Patient has no home health orders, in place, at this time. Patient's sister will transport her home, upon discharge. This writer will continue to monitor for discharge planning to ensure a safe discharge home from [facility]. Promise Estes. MSW Care Manager Pager#: [phone]

## 2020-09-28 NOTE — PROGRESS NOTES
Problem: Falls - Risk of 
Goal: *Absence of Falls Description: Document Ronak Mercedes Fall Risk and appropriate interventions in the flowsheet. Outcome: Progressing Towards Goal 
Note: Fall Risk Interventions: 
  
 
  
 
Medication Interventions: Patient to call before getting OOB, Teach patient to arise slowly, Bed/chair exit alarm Problem: General Medical Care Plan Goal: *Vital signs within specified parameters Outcome: Progressing Towards Goal 
Goal: *Labs within defined limits Outcome: Progressing Towards Goal 
Goal: *Absence of infection signs and symptoms Outcome: Progressing Towards Goal 
Goal: *Optimal pain control at patient's stated goal 
Outcome: Progressing Towards Goal 
Goal: *Skin integrity maintained Outcome: Progressing Towards Goal 
Goal: *Fluid volume balance Outcome: Progressing Towards Goal 
Goal: *Optimize nutritional status Outcome: Progressing Towards Goal 
Goal: *Anxiety reduced or absent Outcome: Progressing Towards Goal 
  
Problem: Pain Goal: *Control of Pain Outcome: Progressing Towards Goal 
  
Problem: Falls - Risk of 
Goal: *Absence of Falls Description: Document Ronak Mercedes Fall Risk and appropriate interventions in the flowsheet. Outcome: Progressing Towards Goal 
Note: Fall Risk Interventions: 
  
 
  
 
Medication Interventions: Patient to call before getting OOB, Teach patient to arise slowly, Bed/chair exit alarm Problem: Heart Failure: Day 1 Goal: Activity/Safety Outcome: Progressing Towards Goal 
Goal: Consults, if ordered Outcome: Progressing Towards Goal 
Goal: Diagnostic Test/Procedures Outcome: Progressing Towards Goal 
Goal: Nutrition/Diet Outcome: Progressing Towards Goal 
Goal: Discharge Planning Outcome: Progressing Towards Goal 
Goal: Medications Outcome: Progressing Towards Goal 
Goal: Respiratory Outcome: Progressing Towards Goal 
Goal: Treatments/Interventions/Procedures Outcome: Progressing Towards Goal 
 Goal: Psychosocial 
Outcome: Progressing Towards Goal 
Goal: *Oxygen saturation within defined limits Outcome: Progressing Towards Goal 
Goal: *Hemodynamically stable Outcome: Progressing Towards Goal 
Goal: *Optimal pain control at patient's stated goal 
Outcome: Progressing Towards Goal 
Goal: *Anxiety reduced or absent Outcome: Progressing Towards Goal

## 2020-09-28 NOTE — ED NOTES
Pt sitting in bed and reading. Pt says she still cannot sleep. Labs drawn and sent. No current signs or symptoms of distress.

## 2020-09-28 NOTE — ED NOTES
Report received from [facility]. Pt sitting on the edge of the bed, no c/o at this time. No shortness of breath. Blood sugar 161, covered with 2 units of insulin.

## 2020-09-28 NOTE — ED NOTES
Pt sitting in bed. Awake and alert. Verbally responsive to questions. No current signs or symptoms of distress.

## 2020-09-28 NOTE — PROGRESS NOTES
[facility]  -  Progress Note Patient: Sharif Conrad MRN: [id number]  SSN: [id number] YOB: 1940  Age: [de-identified] y.o. Sex: female Admit Date: 9/26/2020 Assessment:  
 
Hospital Problems  Date Reviewed: 10/16/2019 Codes Class Noted POA  
 CHF (congestive heart failure) ([facility] ICD-10-CM: I50.9 ICD-9-CM: 428.0  10/18/2017 Unknown  
   
  
 
-Acute on chronic HFpEF with last echo with EF 55-60% and moderate aortic stenosis. She has a moderate amount of volume overload on presentation and now improved. No distress noted. -Coronary artery disease.  History of drug-eluting stents to both her LAD and left circumflex in August 2016.  Patient also had residual severe distal RCA disease which was well collateralized from the left.    
-Aortic valve stenosis- moderate range by echocardiogram in March 2019. No change in AS or LV function this visit by echo on 9/27/2020  
-Essential hypertension-regimen which includes an ACE inhibitor, calcium channel blocker, beta-blocker and diuretic.   
-Dyslipidemia- on simvastatin 20 mg daily.  
-Diabetes mellitus, type II.  Managed with oral agents.    
-History of left lower extremity DVT since March 2019.  Patient was anticoagulated with Eliquis for approximately 4 months and then discontinued.  This is followed by her hematologist/oncologist. 
-Chronic anemia.  She has required transfusions in the past.  She is followed closely by her hematologist for this. 
-Thrombocytopenia- chronic and at baseline 
  
-Primary cardiologist Dr. Callie Larsen Plan:  
 
Stable, improved and at baseline. There was no change to her echo and her aortic stenosis remains at moderate. She appears to be volume stable and feeling better. No other CV recommendations at this time and feel patient is stable to go home with close follow up with Dr. Callie Larsen in the next 2-3 weeks. All questions answered. Subjective: No new complaints. Feeling much better and at baseline. No pain. Objective:  
  
Patient Vitals for the past 8 hrs: 
 Temp Pulse Resp BP SpO2  
09/28/20 0829 97.5 °F (36.4 °C) 76 18  96 %  
09/28/20 0815  65 23 (!) 137/53 97 % 09/28/20 0800  69 15 (!) 163/54 97 % 09/28/20 0755  70  (!) 149/54   
09/28/20 0700  69 18 (!) 145/54 96 %  
09/28/20 0600  69 18 (!) 167/49 97 % 09/28/20 0500  75 16 (!) 160/58 97 % 09/28/20 0400  77 21 (!) 155/78 99 % Patient Vitals for the past 96 hrs: 
 Weight  
09/27/20 0812 63.5 kg (140 lb) Intake/Output Summary (Last 24 hours) at 9/28/2020 2139 Last data filed at 9/27/2020 1830 Gross per 24 hour Intake  Output 150 ml Net -150 ml Physical Exam: 
General:  alert, cooperative, no distress, appears stated age Neck:  nontender, no JVD Lungs:  clear to auscultation bilaterally Heart:  regular rate and rhythm, S1, S2 normal, systolic murmur: early systolic 3/6, crescendo throughout the precordium Extremities:  extremities normal, atraumatic, no cyanosis or edema Data Review:  
 
Labs: Results:  
   
Chemistry Recent Labs  
  09/27/20 0318 09/26/20 
1710 * 194*  143  
K 4.6 4.8  
* 117* CO2 19* 18* BUN 48* 48* CREA 1.31* 1.45* CA 9.3 8.8 MG 1.8 1.9 AGAP 7 8 BUCR 37* 33* AP  --  95  
TP  --  6.5 ALB  --  3.5 GLOB  --  3.0 AGRAT  --  1.2  
  
CBC w/Diff Recent Labs  
  09/27/20 0318 09/26/20 
1710 WBC 4.1* 4.1*  
RBC 2.46* 2.34* HGB 7.5* 7.2* HCT 21.8* 20.8* PLT 59* 66* GRANS 67 73 LYMPH 18* 14* EOS 8* 7* Cardiac Enzymes Lab Results Component Value Date/Time CPK 59 09/27/2020 12:40 PM  
 CKMB 3.1 09/27/2020 12:40 PM  
 CKND1 5.3 (H) 09/27/2020 12:40 PM  
 TROIQ <0.02 09/27/2020 12:40 PM  
  
Coagulation No results for input(s): PTP, INR, APTT, INREXT in the last 72 hours. Lipid Panel Lab Results Component Value Date/Time Cholesterol, total 188 07/01/2016 11:53 PM  
 HDL Cholesterol 46 07/01/2016 11:53 PM  
 LDL, calculated 94.2 07/01/2016 11:53 PM  
 VLDL, calculated 47.8 07/01/2016 11:53 PM  
 Triglyceride 239 (H) 07/01/2016 11:53 PM  
 CHOL/HDL Ratio 4.1 07/01/2016 11:53 PM  
  
BNP No results found for: BNP, BNPP, XBNPT Liver Enzymes Recent Labs  
  09/26/20 
1710 TP 6.5 ALB 3.5 AP 95 Digoxin Thyroid Studies Lab Results Component Value Date/Time  TSH 1.12 03/25/2019 03:06 AM

## 2020-09-28 NOTE — ED NOTES
Pt given bedside commode. Pt sitting up on side of bed. No current complaints or signs of distress at this time.

## 2020-09-29 NOTE — PROGRESS NOTES
Care Transitions Nurse ( CTN) attempted to contact patient via telephone call re: hospital discharge and Covid-19 risk information. There was no response. A voicemail message was left requesting a non-emergency return telephone call. CTN  contact information provided. Patient returned telephone call and left a voicemail message for CTN . CTN returned telephone call and spoke with patient. Patient was admitted to [facility] on  2020 and discharged on 2020 for: Chief Complaint:  
- Shortness of breath - Leg swelling Per ED progress note of 2020 by Dr. Shyla Day. Patient was contacted within 1 business days of discharge. Top Discharge Challenges to be reviewed by the provider Additional needs identified to be addressed with provider Not at this time. Per chart review, [facility]. COVID-19 related testing which was not done at this time. Method of communication with provider : NA Please review lab results for additional verification. Advance Care Planning:  
Does patient have an Advance Directive:  Not on file at this time Inpatient Readmission Risk score: 71% Was this a readmission? no  
 
Patient stated reason for the admission: n/a Patients top risk factors for readmission: medical condition Interventions to address risk factors:  
Support provided Patient referred to Patient Advocate to provide any feedback positive or otherwise regarding recent hospital experience. CTN reviewed follow up appointments with patient. Care Transition Nurse (CTN) contacted the patient by telephone to perform post hospital discharge assessment. Verified name and  with patient as identifiers. Provided introduction to self, and explanation of the CTN role.   
 
 
Medication reconciliation was not able to be performed with patient at this time as patient was discussing her experiences during her hospital stay. Patient  verbalizes understanding of administration of home medications. Patient reports taking an increased amount of lasix per her physicians instructions. Referral to Pharm D needed: no  
 
 
Home Health/Outpatient orders at discharge: no orders noted at this time [address]: n/a Date of initial visit: n/a Durable Medical Equipment ordered at discharge: none noted at this time [facility] [facility]:  
Durable Medical Equipment received:  
 
Covid Risk Education Covid risk Educational information could not be reviewed with patient via telephone Call at this time as tonio was discussing her recent hospital admission. CTN referred patient to [facility] patient advocate and provided contact phone number. Discussed follow-up appointments. If no appointment was previously scheduled, appointment scheduling offered: no  
Patient reports that she has a follow up PCP appointment scheduled. [facility] follow up appointment(s):  
Future Appointments Date Time Provider Abdiaziz Johnson 10/22/2020 11:00 AM David Moreno NP [facility] Non-BS follow up appointment(s):  
None noted at this time. Plan for follow-up call in 10-14 days/as needed based on severity of symptoms and risk factors. CTN provided contact information for future needs. Goals Addressed This Visit's Progress  Attends follow-up appointments as directed. Target Date:   
 
Patient to follow up as follows:    
 
- PCP follow up appointment scheduled for 10/5/2020 
- Cardiology follow up appointment scheduled for 10/22/2020

## 2020-10-02 NOTE — PROGRESS NOTES
Physician Progress Note Job Estrella 
CSN #:                  [id number] :                       1940 ADMIT DATE:       2020 6:26 PM 
[address] DATE:        2020 5:52 PM 
RESPONDING 
PROVIDER #:        Ag Paulino MD 
 
 
 
 
QUERY TEXT: 
 
Pt admitted with CHF. Pt noted to also have HTN, CAD, aortic stenosis. If possible, please document in progress notes and discharge summary the etiology of CHF, if able to be determined. The medical record reflects the following: 
Risk Factors: acute on chronic CHF, esophageal varices without bleeding, portal hypertension, CKD stage 3, diabetes mellitus Clinical Indicators: BNP:  3,884 on 2020 BP:  171/69 on 20 and 183/53 on 2020 
\"? LV: Estimated LVEF is 60 - 65%. Visually measured ejection fraction. Normal cavity size and systolic function (ejection fraction normal). Mildly increased wall thickness. Wall motion: normal. Inconclusive left ventricular diastolic function. ? AV: AV is functionally bicuspid, with moderate aortic stenosis. Aortic valve peak gradient is 45 mmHg. Aortic valve mean gradient is 28 mmHg. Aortic valve area is 1.1 cm2. \"  per Echo report, 2020. Treatment: amlodipine 5mg po daily, carvedilol 12.5mg po bid, furosemide 40mg IV x1 on 2020 Thank you, JAG Lange RN, [email or web address] 
Office:  [phone] Options provided: 
-- CHF due to Hypertensive Heart Disease 
-- CHF due to Hypertensive Heart Disease and CAD 
-- CHF not due to Hypertension but due to aortic stenosis -- CHF due to Hypertensive Heart Disease, CAD, and aortic stenosis -- CHF not due to Hypertension but due to CAD and aortic stenosis -- Other - I will add my own diagnosis -- Disagree - Not applicable / Not valid -- Disagree - Clinically unable to determine / Unknown 
-- Refer to Clinical Documentation Reviewer PROVIDER RESPONSE TEXT: 
 
 This patient has CHF due to hypertensive heart disease. Query created by:  Carrie Goodwin on 10/2/2020 12:18 PM 
 
 
Electronically signed by:  Morey Skiff MD Shelda Bogus MD 10/2/2020 1:58 PM

## 2020-10-12 NOTE — DISCHARGE SUMMARY
Discharge Summary Patient: Matthew Palacios MRN: [id number]  CSN: [id number] YOB: 1940  Age: [de-identified] y.o. Sex: female DOA: 9/26/2020 LOS:  LOS: 2 days   Discharge Date: 9/28/2020 Admission Diagnoses: CHF (congestive heart failure) ([facility] [I50.9] CHF (congestive heart failure) ([facility] [I50.9] Discharge Diagnoses:   
Acute on chronic diastolic CHF Hypertension DM 2 CKD 3 Dyslipidemia Anemia of chronic disease Chronic thrombocytopenia CAD Cirrhosis with history of esophageal varices Discharge Condition: Stable PHYSICAL EXAM 
Visit Vitals /65 Pulse 65 Temp (!) 96.7 °F (35.9 °C) Resp 16 Ht 5' (1.524 m) Wt 68 kg (149 lb 14.4 oz) SpO2 99% BMI 29.28 kg/m² General: In NAD. Nontoxic-appearing. HEENT: NCAT. Sclerae anicteric, EOMI. OP clear. Lungs:  Clear, no wheezes. No accessory muscle use. Heart:  RRR. Abdomen: Soft, NT/ND. Extremities: Warm, no edema or ischemia. Psych:   Mood normal. 
Neurologic:  Awake and alert, moves extremities spontaneously. Motor grossly nonfocal. 
 
Hospital Course:  
See admission H&P for full details of HPI. Patient was admitted to the hospital after presenting to the emergency department complaining of shortness of breath and dyspnea on exertion. Clinical picture was consistent with acute exacerbation of her chronic diastolic heart failure and patient was diuresed with good result. She improved relatively quickly and has been transitioned to oral diuretic therapy without clinical decompensation. The patient is felt to be medically stable for discharge home with outpatient follow-up as advised. Consults:  
Cardiology Significant Diagnostic Studies:  
2D echo: Interpretation Summary Result status: Final result · LV: Estimated LVEF is 60 - 65%. Visually measured ejection fraction. Normal cavity size and systolic function (ejection fraction normal). Mildly increased wall thickness. Wall motion: normal. Inconclusive left ventricular diastolic function. · AV: AV is functionally bicuspid, with moderate aortic stenosis. Aortic valve peak gradient is 45 mmHg. Aortic valve mean gradient is 28 mmHg. Aortic valve area is 1.1 cm2. 
· MV: Mitral valve peak gradient is 11 mmHg. Mitral valve mean gradient is 6 mmHg. Mitral valve area is 1.9 cm2. Moderate regurgitation. · TV: Mild tricuspid valve regurgitation is present. · LA: Severely dilated left atrium. Left Atrium volume index is 58 mL/m2. · No significant change to aortic stenosis when compared to prior report. Comparison Study Information Prior Study There is a prior study available for comparison. Prior study date: 3/25/2019. As compared to the previous study, there are significant changes. MV regurgitation increased. Lower extremity venous PVL: 
Interpretation Summary  
 
  
· No evidence of acute deep vein thrombosis in the right common femoral, superficial femoral, popliteal, posterior tibial, and peroneal veins. The veins were imaged in the transverse and longitudinal planes. The vessels showed normal color filling and compressibility. Doppler interrogation showed phasic and spontaneous flow. · No evidence of acute deep vein thrombosis in the left common femoral, superficial femoral, popliteal, posterior tibial, and peroneal veins. The veins were imaged in the transverse and longitudinal planes. The vessels showed normal color filling and compressibility. Doppler interrogation showed phasic and spontaneous flow. No evidence of deep vein thrombosis within the bilateral lower extremities. Monophasic waveforms identified within the bilateral posterior tibial arteries. CXR: 
IMPRESSION: 
  
Suspect pulmonary arterial hypertension given prominence of the hilar 
structures. Similar small bilateral pleural effusions. Cardiomegaly. Discharge Medications: Discharge Medication List as of 9/28/2020  4:25 PM  
  
CONTINUE these medications which have NOT CHANGED Details  
acetaminophen (TYLENOL) 325 mg tablet Take 2 Tabs by mouth every four (4) hours as needed for Pain., Normal, Disp-20 Tab, R-0  
  
benazepril (LOTENSIN) 10 mg tablet Take 10 mg by mouth daily. , Historical Med  
  
hydrALAZINE (APRESOLINE) 25 mg tablet Take 1 Tab by mouth three (3) times daily. , Normal, Disp-90 Tab, R-0  
  
furosemide (LASIX) 20 mg tablet Take 20 mg by mouth two (2) times a day. Indications: visible water retention, Historical Med  
  
cholecalciferol, vitamin D3, (VITAMIN D3) 2,000 unit tab Take 2,000 Units by mouth daily. , Historical Med  
  
amLODIPine (NORVASC) 10 mg tablet Take 10 mg by mouth daily. , Historical Med  
  
carvedilol (COREG) 12.5 mg tablet Take 12.5 mg by mouth two (2) times daily (with meals). , Historical Med  
  
ascorbic acid, vitamin C, (VITAMIN C) 250 mg tablet Take 1 Tab by mouth daily. , Print, Disp-60 Tab, R-1  
  
multivitamin (ONE A DAY) tablet Take 1 Tab by mouth daily. , Historical Med  
  
simvastatin (ZOCOR) 20 mg tablet Take 20 mg by mouth daily. , Historical Med  
  
omega 3-dha-epa-fish oil (FISH OIL) 100-160-1,000 mg cap Take 1,000 mg by mouth two (2) times a day., Historical Med  
  
cyanocobalamin (VITAMIN B12) 100 mcg tablet Take 100 mcg by mouth daily. , Historical Med  
  
calcium polycarbophil (FIBER LAXATIVE) 625 mg tablet Take 1,250 mg by mouth daily as needed., Historical Med  
  
glucose 4 gram chewable tablet Take 4 Tabs by mouth as needed for Other (hypoglycemia). , No Print, Disp-30 Tab, R-0  
  
SITagliptin (JANUVIA) 50 mg tablet Take 50 mg by mouth daily. , Historical Med  
  
nitroglycerin (NITROLINGUAL) 400 mcg/spray spray 1 Spray by SubLINGual route every five (5) minutes as needed. , Print, Disp-1 Bottle, R-2 Activity: As tolerated. Diet: Cardiac/diabetic diet Disposition: Follow-up: with PCP in 1 week and cardiology as directed.  
 
 
Madeleine Flores MD

## 2020-10-13 NOTE — PROGRESS NOTES
Care Transitions Nurse ( CTN) attempted to contact patient via telephone call for Transition of Care  follow up. There was no response. A voicemail message was left requesting a non-emergency return telephone call. CTN  contact information provided.

## 2020-12-06 PROBLEM — C18.9 COLON CANCER (HCC): Status: ACTIVE | Noted: 2020-01-01

## 2020-12-06 PROBLEM — C50.919 BREAST CANCER (HCC): Status: ACTIVE | Noted: 2020-01-01

## 2020-12-06 PROBLEM — S72.009A HIP FRACTURE (HCC): Status: ACTIVE | Noted: 2020-01-01

## 2020-12-06 NOTE — ED TRIAGE NOTES
Pt arrived via [employer]. Per medic, pt tripped while walking in parking lot and fell on left side. Pt is complaining of left upper leg pain.

## 2020-12-06 NOTE — ED PROVIDER NOTES
EMERGENCY DEPARTMENT HISTORY AND PHYSICAL EXAM    4:23 PM  Date: 12/6/2020  Patient Name: Lambert Shoemaker    History of Presenting Illness     Chief Complaint   Patient presents with    Leg Pain       History Provided By: patient     HPI: Lambert Shoemaker is a 79 y.o. female with past medical history of CHF, chronic anemia, diabetes presents after a fall at the parking lot. Patient said tried to catch up with her sister and then she lost her footing and fell on the left side. Has some mild left hip pain. Pain is sharp, severe, no radiation, worse with movement. Denies any head or other injury. Patient was not able to get up after the fall. Patient not on anticoagulants. PCP: Shawn Walker MD    Past History     Past Medical History:  Past Medical History:   Diagnosis Date    Anemia     Aortic stenosis, moderate 03/2019    [facility] [address]     CAD (coronary artery disease)     Coronary Stenting needed, hematology workup needed prior to stent placement, treating medically at this time    Cancer [facility])     colon    Cancer ([facility] [address]     breast - left    Cardiac catheterization 08/17/2016    LM patent. mLAD 99.9% (2.25 x 28-mm Xience BETHEL, resid 0%). oD1 60%. oD2 95%. pCX 90% (2.5 x 13-mm Xience BETHEL, resid 0%). OMB patent.       Cardiac echocardiogram 03/2019    EF 56 to 60%, moderate concentric LVH, moderate aortic stenosis, mean valve gradient 25 mmHg    Diabetes (HCC)     Hypercholesteremia     Hypertension     Thrombocytopenia (HCC)        Past Surgical History:  Past Surgical History:   Procedure Laterality Date    CARDIAC CATHETERIZATION  7/16/2015     no stents at this time r/t anemia & thrombocytopenia (hematology work-up needed prior to stent placement)    CARDIAC CATHETERIZATION  7/11/2016         CARDIAC CATHETERIZATION  8/17/2016         CORONARY ARTERY ANGIOGRAM  7/16/2015         CORONARY ARTERY ANGIOGRAM  7/11/2016         CORONARY ARTERY ANGIOGRAM  8/17/2016         CORONARY STENT EA ADDL VESSEL  8/17/2016         CORONARY STENT SINGLE W/PTCA  8/17/2016         HX GI      colon surg secondary to cancer - removed lesion    HX HEENT  2/2013    cataract bilaterally    HX MASTECTOMY  march 2011    left    HX VASCULAR ACCESS      mediport - now removed    IR BX BONE MARROW DIAGNOSTIC  9/30/2019    LV ANGIOGRAPHY  7/16/2015         LV ANGIOGRAPHY  7/11/2016            Family History:  Family History   Problem Relation Age of Onset    Cancer Maternal Aunt        Social History:  Social History     Tobacco Use    Smoking status: Never Smoker    Smokeless tobacco: Never Used   Substance Use Topics    Alcohol use: No    Drug use: No       Allergies: Allergies   Allergen Reactions    Latex Itching     Skin breaks out causing itching       Review of Systems   Review of Systems   Constitutional: Negative for activity change, appetite change and chills. HENT: Negative for congestion, ear discharge, ear pain and sore throat. Eyes: Negative for photophobia and pain. Respiratory: Negative for cough and choking. Cardiovascular: Negative for palpitations and leg swelling. Gastrointestinal: Negative for anal bleeding and rectal pain. Endocrine: Negative for polydipsia and polyuria. Genitourinary: Negative for genital sores and urgency. Musculoskeletal: Negative for arthralgias and myalgias. Neurological: Negative for dizziness, seizures and speech difficulty. Psychiatric/Behavioral: Negative for hallucinations, self-injury and suicidal ideas.         Physical Exam     Patient Vitals for the past 12 hrs:   Temp Pulse Resp BP SpO2   12/08/20 1536 97.6 °F (36.4 °C) 84 16 109/65 99 %   12/08/20 1307 97.1 °F (36.2 °C) 85 16 113/61 98 %   12/08/20 1252 97 °F (36.1 °C) 80 16 (!) 105/59    12/08/20 1119 97 °F (36.1 °C) 82 15 (!) 91/49    12/08/20 1010 97.3 °F (36.3 °C) 88 15 115/64 99 %   12/08/20 0905 98.3 °F (36.8 °C) 77 16 (!) 91/48 99 %   12/08/20 0801 97.3 °F (36.3 °C) 90 16 (!) 94/52 98 %   12/08/20 0745 (!) 96.1 °F (35.6 °C) 82 16 (!) 91/53 99 %   12/08/20 0722 97.2 °F (36.2 °C) 74 16 (!) 90/49    12/08/20 0511 97.3 °F (36.3 °C) 79 17 101/62 97 %       Physical Exam  Vitals signs and nursing note reviewed. Constitutional:       Appearance: She is well-developed. HENT:      Head: Normocephalic and atraumatic. Eyes:      General:         Right eye: No discharge. Left eye: No discharge. Neck:      Musculoskeletal: Normal range of motion and neck supple. Cardiovascular:      Rate and Rhythm: Normal rate and regular rhythm. Heart sounds: Normal heart sounds. No murmur. Pulmonary:      Effort: Pulmonary effort is normal. No respiratory distress. Breath sounds: Normal breath sounds. No stridor. No wheezing or rales. Chest:      Chest wall: No tenderness. Abdominal:      General: Bowel sounds are normal. There is no distension. Palpations: Abdomen is soft. Tenderness: There is no abdominal tenderness. There is no guarding or rebound. Musculoskeletal: Normal range of motion. Comments: Left hip tenderness on palpation  Noted that left lower extremity appears shortened and externally rotated  Not able to mobilize it  neurovascular intact   Skin:     General: Skin is warm and dry. Neurological:      Mental Status: She is alert and oriented to person, place, and time. Diagnostic Study Results     Labs -  Recent Results (from the past 12 hour(s))   RBC, ALLOCATE    Collection Time: 12/08/20  5:30 AM   Result Value Ref Range    HISTORY CHECKED? Historical check performed    GLUCOSE, POC    Collection Time: 12/08/20  5:32 AM   Result Value Ref Range    Glucose (POC) 182 (H) 70 - 110 mg/dL   RBC, ALLOCATE    Collection Time: 12/08/20 10:30 AM   Result Value Ref Range    HISTORY CHECKED?  Historical check performed    GLUCOSE, POC    Collection Time: 12/08/20 11:16 AM   Result Value Ref Range    Glucose (POC) 196 (H) 70 - 110 mg/dL   FERRITIN    Collection Time: 12/08/20 11:30 AM   Result Value Ref Range    Ferritin 128 8 - 388 NG/ML   IRON PROFILE    Collection Time: 12/08/20 11:30 AM   Result Value Ref Range    Iron 65 50 - 175 ug/dL    TIBC 189 (L) 250 - 450 ug/dL    Iron % saturation 34 20 - 50 %   VITAMIN B12 & FOLATE    Collection Time: 12/08/20 11:30 AM   Result Value Ref Range    Vitamin B12 1,076 (H) 211 - 911 pg/mL    Folate >20.0 (H) 3.10 - 17.50 ng/mL   GLUCOSE, POC    Collection Time: 12/08/20  3:24 PM   Result Value Ref Range    Glucose (POC) 234 (H) 70 - 110 mg/dL       Radiologic Studies -   Xr Hip Lt W Or Wo Pelv 2-3 Vws    Result Date: 12/8/2020  IMPRESSION: Intramedullary daniela and femoral pin in good position affixing intertrochanteric fracture alignment is well maintained. Degenerative changes in the lateral acetabulum incidentally noted. Medical Decision Making     ED Course: Progress Notes, Reevaluation, and Consults:    4:23 PM Initial assessment performed. The patients presenting problems have been discussed, and they/their family are in agreement with the care plan formulated and outlined with them. I have encouraged them to ask questions as they arise throughout their visit. Provider Notes (Medical Decision Making):   Patient presents after a fall   Left intertrochanteric fracture comminuted  Consulted Dr. Fahad Conrad orthopedics  Patient will be admitted  Labs as interpreted by me: Hemoglobin 6.7  Patient denies any history of bloody stool. Says she has chronic anemia and receives iron infusions  Patient will be transfused with 1 units of PRBC . Consent obtained  Discussed with hospitalist patient will be admitted    Vital Signs-Reviewed the patient's vital signs. Reviewed pt's pulse ox reading. EKG: Interpreted by the EP.    Time Interpreted: 6:56   Rate: 76   Rhythm: NSR   Interpretation:no ST changes   Normal intervals    Records Reviewed:  old medical records (Time of Review: 4:23 PM)  -I am the first provider for this patient.  -I reviewed the vital signs, available nursing notes, past medical history, past surgical history, family history and social history.     Current Facility-Administered Medications   Medication Dose Route Frequency Provider Last Rate Last Dose    0.9% sodium chloride infusion 250 mL  250 mL IntraVENous PRN Sonido Garcia MD        0.9% sodium chloride infusion 250 mL  250 mL IntraVENous PRN Nusrat Gracia PA-C        carvediloL (COREG) tablet 3.125 mg  3.125 mg Oral BID WITH MEALS Jayda Crook MD        [START ON 12/9/2020] cholecalciferol (VITAMIN D3) (1000 Units /25 mcg) tablet 2 Tab  2,000 Units Oral DAILY Jayda Crook MD        [START ON 12/9/2020] therapeutic multivitamin (THERAGRAN) tablet 1 Tab  1 Tab Oral DAILY Talur-Matadha, Kennth Cheadle, MD        atorvastatin (LIPITOR) tablet 10 mg  10 mg Oral QHS Talur-Matadha, Kennth Cheadle, MD        acetaminophen (TYLENOL) tablet 1,000 mg  1,000 mg Oral Q8H ELOISA Wyman   1,000 mg at 12/08/20 1534    naloxone [facility]) injection 1 mg  1 mg IntraVENous ONCE PRN Amanda Allred PA        promethazine (PHENERGAN) 12.5 mg in 0.9% sodium chloride 50 mL IVPB  12.5 mg IntraVENous Q4H PRN ELOISA Wyman        oxyCODONE IR (ROXICODONE) tablet 5 mg  5 mg Oral Q4H PRN ELOISA Wyman   5 mg at 12/07/20 2225    morphine injection 1-2 mg  1-2 mg IntraVENous Q4H PRN Jaun Maya [address]        hydrALAZINE (APRESOLINE) 20 mg/mL injection 10 mg  10 mg IntraVENous Q6H PRN Lindsay Conde MD        ondansetron [facility]) injection 8 mg  8 mg IntraVENous Q6H PRN ELOISA Wyman   8 mg at 12/07/20 1843    insulin lispro (HUMALOG) injection   SubCUTAneous AC&HS Jayda Crook MD   2 Units at 12/08/20 1138    glucose chewable tablet 16 g  4 Tab Oral PRN Talur-Matadha, Kennth Cheadle, MD        glucagon (GLUCAGEN) injection 1 mg  1 mg IntraMUSCular PRN Montserrat Wharton MD        dextrose (D50W) injection syrg 12.5-25 g  25-50 mL IntraVENous PRN Marla Steve MD        HYDROcodone-acetaminophen (NORCO) 5-325 mg per tablet 1-2 Tab  1-2 Tab Oral Q6H PRN Montserrat Wharton MD   1 Tab at 12/07/20 0933    acetaminophen (TYLENOL) tablet 650 mg  650 mg Oral Q4H PRN Marla Steve MD            Clinical Impression     Clinical Impression:   1. Displaced intertrochanteric fracture of left femur, initial encounter for closed fracture [facility])        Disposition: admit        This note was dictated utilizing voice recognition software which may lead to typographical errors. I apologize in advance if the situation occurs. If questions arise please do not hesitate to contact me or call our department.     Odette Russell MD  4:23 PM

## 2020-12-07 NOTE — H&P
History and Physical    Patient: Bronson Skiff               Sex: female          DOA: 12/6/2020       YOB: 1940      Age:  79 y.o.        LOS:  LOS: 0 days        HPI:     Bronson Skiff is a 79 y.o. female who presented to the ER after falling and sustaining a hip fracture. She reports that she fell in the parking lot. She says that she was hurrying and not being careful. She had no chest pain or SOB. She did not have syncope. She ambulates without difficulty normally. She presented to the ER where she is found to have hip fracture and will require surgery. Ms Rodolfo Moses has significant medical history. She has CAD and is s/p stent placement about 2 years ago. She has a reported history of CHF. She had Echocardiogram done in September that shows a preserved ejection fraction. There is no conclusive evidence of diastolic dysfunction on that Echo, so her cardiac function appears good. She has no evidence for decompensated heart failure now clinically. From a hematological standpoint, Ms Rodolfo Moses has had pancytopenia long term. She feels this begun around the time she was diagnosed with colon cancer and reports that she had chemotherapy at that time. She was ordered for blood transfusion in the ER. From a hepatic perspective, Ms Rodolfo Moses has a history of cirrhosis in her chart. She says that \"this is not an issue at this point. \"  Her LFT's are normal, and she does not have coagulopathy. She is admitted and will require surgical correction. Past Medical History:   Diagnosis Date    Anemia     Aortic stenosis, moderate 03/2019    Breast cancer (HCC)     CAD (coronary artery disease)     Coronary Stenting needed, hematology workup needed prior to stent placement, treating medically at this time    Cancer [facility])     colon    Cancer ([facility] [address]     breast - left    Cardiac catheterization 08/17/2016    LM patent. mLAD 99.9% (2.25 x 28-mm Xience BETHEL, resid 0%). oD1 60%. oD2 95%. pCX 90% (2.5 x 13-mm Xience BETHEL, resid 0%). OMB patent.  Cardiac echocardiogram 2019    EF 56 to 60%, moderate concentric LVH, moderate aortic stenosis, mean valve gradient 25 mmHg    Diabetes (HCC)     Hypercholesteremia     Hypertension     Thrombocytopenia (HCC)        Social History:   Tobacco use:  Patient does not use tobacco   Alcohol use:  Patient does not use alcohol   Patient lives alone. Her   2 years ago    Family History: Mother had diabetes   Father had diabetes and CAD    Review of Systems    Constitutional:  No fever or weight loss  HEENT:  No headache or visual changes  Cardiovascular:  No chest pain or diaphoresis  Respiratory:  No coughing, wheezing, or shortness of breath. GI:  No nausea or vomitting. No diarrhea  :  No hematuria or dysuria  Skin:  No rashes or moles  Neuro:  No seizures or syncope  Hematological:  No bruising or bleeding  Endocrine:  Patient has known diabetes, no thyroid disease    Physical Exam:      Visit Vitals  BP (!) 129/48   Pulse (P) 80   Temp (P) 97 °F (36.1 °C)   Resp (P) 18   Ht 5' (1.524 m)   Wt 65.8 kg (145 lb)   SpO2 (P) 98%   BMI 28.32 kg/m²       Physical Exam:    Gen:  No distress, alert  HEENT:  Normal cephalic atraumatic, extra-occular movements are intact. Neck:  Supple, No JVD  Lungs:  Clear bilaterally, no wheeze, no rales, normal effort  Heart:  Regular Rate and Rhythm, normal S1 and S2, no edema  Abdomen:  Soft, non tender, normal bowel sounds, no guarding.   Extremities:  Well perfused, no cyanosis or edema  Neurological:  Awake and alert, CN's are intact, normal strength throughout extremities  Skin:  No rashes or moles  Mental Status:  Normal thought process, does not appear anxious    Laboratory Studies:    BMP:   Lab Results   Component Value Date/Time     2020 05:03 PM    K 5.0 2020 05:03 PM     (H) 2020 05:03 PM    CO2 18 (L) 2020 05:03 PM    AGAP 7 2020 05:03 PM GLU 98 12/06/2020 05:03 PM    BUN 90 (H) 12/06/2020 05:03 PM    CREA 2.04 (H) 12/06/2020 05:03 PM    GFRAA 28 (L) 12/06/2020 05:03 PM    GFRNA 23 (L) 12/06/2020 05:03 PM     CBC:   Lab Results   Component Value Date/Time    WBC 3.8 (L) 12/06/2020 05:03 PM    HGB 6.7 (L) 12/06/2020 05:03 PM    HCT 19.4 (L) 12/06/2020 05:03 PM    PLT 53 (L) 12/06/2020 05:03 PM       COAGS:   Lab Results   Component Value Date/Time    APTT 36.4 12/06/2020 05:03 PM    PTP 15.2 12/06/2020 05:03 PM    INR 1.2 12/06/2020 05:03 PM     Liver Panel:   Lab Results   Component Value Date/Time    ALB 3.4 12/06/2020 05:03 PM    TP 6.8 12/06/2020 05:03 PM    GLOB 3.4 12/06/2020 05:03 PM    AGRAT 1.0 12/06/2020 05:03 PM    ALT 18 12/06/2020 05:03 PM    AP 70 12/06/2020 05:03 PM       Assessment/Plan     Principal Problem:    Hip fracture ([facility] [address] (12/6/2020)    Active Problems:    HTN (hypertension), benign (1/28/2018)      Pancytopenia ([facility] [address] (4/25/2019)      CAD (coronary artery disease) (7/7/2015)      CHF (congestive heart failure) ([facility] [address] (10/18/2017)      Breast cancer ([facility] [address] (12/6/2020)      [facility] (12/6/2020)    Questionable history of cirrhosis, no evidence for liver failure currently  Acute kidney injury    PLAN:    Ms Leonardo has numerous comorbid medical issues. That being said, they all seem to be in relatively good control  She does not have decompensated heart failure  She does not have failed hepatic function  She is getting transfusions of PRBC's and platelets  She should need little intervention tomorrow before proceeding with surgery. Assess labs in the AM prior to final surgical clearance.

## 2020-12-07 NOTE — PROGRESS NOTES
Problem: Falls - Risk of  Goal: *Absence of Falls  Description: Document Rochele Gavino Fall Risk and appropriate interventions in the flowsheet.   Outcome: Progressing Towards Goal  Note: Fall Risk Interventions:            Medication Interventions: Bed/chair exit alarm    Elimination Interventions: Call light in reach    History of Falls Interventions: Room close to nurse's station

## 2020-12-07 NOTE — CONSULTS
[facility]    Consult Note    Patient: Autumn Villanueva               Sex: female          DOA: 12/6/2020         YOB: 1940      Age:  79 y.o.        LOS:  LOS: 0 days              HPI:     uAtumn Villanueva is a 79 y.o. female who has been seen for left hip pain. past medical history of CHF, chronic anemia, diabetes. She presents after a fall in a parking lot. Patient said tried to catch up with her sister and then she lost her footing and fell on the left side. Has some mild left hip pain. Pain is sharp, severe, no radiation, worse with movement. denies any head or other injury. Patient was not able to get up after the fall     Past Medical History:   Diagnosis Date    Anemia     Aortic stenosis, moderate 03/2019    Breast cancer ([facility] [address]     CAD (coronary artery disease)     Coronary Stenting needed, hematology workup needed prior to stent placement, treating medically at this time    Cancer [facility])     colon    Cancer ([facility] [address]     breast - left    Cardiac catheterization 08/17/2016    LM patent. mLAD 99.9% (2.25 x 28-mm Xience BETHEL, resid 0%). oD1 60%. oD2 95%. pCX 90% (2.5 x 13-mm Xience BETHEL, resid 0%). OMB patent.       Cardiac echocardiogram 03/2019    EF 56 to 60%, moderate concentric LVH, moderate aortic stenosis, mean valve gradient 25 mmHg    Diabetes (HCC)     Hypercholesteremia     Hypertension     Thrombocytopenia (HCC)        Past Surgical History:   Procedure Laterality Date    CARDIAC CATHETERIZATION  7/16/2015     no stents at this time r/t anemia & thrombocytopenia (hematology work-up needed prior to stent placement)    CARDIAC CATHETERIZATION  7/11/2016         CARDIAC CATHETERIZATION  8/17/2016         CORONARY ARTERY ANGIOGRAM  7/16/2015         CORONARY ARTERY ANGIOGRAM  7/11/2016         CORONARY ARTERY ANGIOGRAM  8/17/2016         CORONARY STENT EA ADDL VESSEL  8/17/2016         CORONARY STENT SINGLE W/PTCA  8/17/2016         HX GI      colon surg secondary to cancer - removed lesion    HX HEENT  2/2013    cataract bilaterally    HX MASTECTOMY  march 2011    left    HX VASCULAR ACCESS      mediport - now removed    IR BX BONE MARROW DIAGNOSTIC  9/30/2019    LV ANGIOGRAPHY  7/16/2015         LV ANGIOGRAPHY  7/11/2016            Family History   Problem Relation Age of Onset    Cancer Maternal Aunt        Social History     Socioeconomic History    Marital status:      Spouse name: Not on file    Number of children: Not on file    Years of education: Not on file    Highest education level: Not on file   Tobacco Use    Smoking status: Never Smoker    Smokeless tobacco: Never Used   Substance and Sexual Activity    Alcohol use: No    Drug use: No       Prior to Admission medications    Medication Sig Start Date End Date Taking? Authorizing Provider   acetaminophen (TYLENOL) 325 mg tablet Take 2 Tabs by mouth every four (4) hours as needed for Pain. 12/1/19   ELOISA Durham   benazepril (LOTENSIN) 10 mg tablet Take 10 mg by mouth daily. 10/15/19   Provider, Historical   hydrALAZINE (APRESOLINE) 25 mg tablet Take 1 Tab by mouth three (3) times daily. 3/28/19   Delories Maurilio B, NP   glucose 4 gram chewable tablet Take 4 Tabs by mouth as needed for Other (hypoglycemia). 3/28/19   Pam Palmer NP   furosemide (LASIX) 20 mg tablet Take 20 mg by mouth two (2) times a day. Indications: visible water retention    Provider, Historical   SITagliptin (JANUVIA) 50 mg tablet Take 50 mg by mouth daily. Provider, Historical   cholecalciferol, vitamin D3, (VITAMIN D3) 2,000 unit tab Take 2,000 Units by mouth daily. Provider, Historical   amLODIPine (NORVASC) 10 mg tablet Take 10 mg by mouth daily. Provider, Historical   carvedilol (COREG) 12.5 mg tablet Take 12.5 mg by mouth two (2) times daily (with meals).     Provider, Historical   nitroglycerin (NITROLINGUAL) 400 mcg/spray spray 1 Spray by SubLINGual route every five (5) minutes as needed. 8/17/16   Allie Bledsoe PA   ascorbic acid, vitamin C, (VITAMIN C) 250 mg tablet Take 1 Tab by mouth daily. 7/5/16   Mirtha Condon MD   multivitamin (ONE A DAY) tablet Take 1 Tab by mouth daily. Provider, Historical   simvastatin (ZOCOR) 20 mg tablet Take 20 mg by mouth daily. Provider, Historical   omega 3-dha-epa-fish oil (FISH OIL) 100-160-1,000 mg cap Take 1,000 mg by mouth two (2) times a day. Provider, Historical   cyanocobalamin (VITAMIN B12) 100 mcg tablet Take 100 mcg by mouth daily. Provider, Historical   calcium polycarbophil (FIBER LAXATIVE) 625 mg tablet Take 1,250 mg by mouth daily as needed. Provider, Historical       Allergies   Allergen Reactions    Latex Itching     Skin breaks out causing itching       Review of Systems  Negative for any fever, chills, sweats, headache, blurred vision, cough, congestion, SOB, abdominal pain, bleeding, bruising, numbness, or tingling. 12 point ROS otherwise negative other than noted in the HPI      Physical Exam:      Visit Vitals  BP (!) 112/43   Pulse 75   Temp 97.8 °F (36.6 °C)   Resp 17   Ht 5' (1.524 m)   Wt 145 lb (65.8 kg)   SpO2 98%   BMI 28.32 kg/m²       Physical Exam:  General: Well developed, well nourished, 79 y.o. female in  NAD   Vitals: Blood pressure (!) 112/43, pulse 75, temperature 97.8 °F (36.6 °C), resp. rate 17, height 5' (1.524 m), weight 145 lb (65.8 kg), SpO2 98 %. Skin: No rashs, ulcers, icterus, or other obvious lesions/ lacerations  Eyes: EOM intact, PERRLA   ENM: Without obvious lesions. Nares patent. Moist mucous membranes. Neck: Supple, FROM   Lungs: CTAB   Heart: RRR, normal S1, S2. No murmurs, rubs, or gallops  Abdomen: Soft, NT, bowel sounds present all 4 quadrants   Musculoskeletal: left hip rotated. Ttp. Sensation is intact  Neuro: AAOx3.  Without obvious deficits     IMAGING: EXAM: XR HIP LT W OR WO PELV 2-3 VWS     INDICATION: Hip pain on the left after a trip and fall injury     COMPARISON:  January 2018     FINDINGS:     There is a slightly comminuted stellate pattern of fracture at the  intertrochanteric region of left hip. Mild fragmentation better appreciated at  the greater trochanter. No joint dislocation.     Prominent enthesopathy along the upper margin of the bilateral hips. Advanced  lower lumbar degenerative findings. Labs Reviewed:  BMP:   Lab Results   Component Value Date/Time     12/06/2020 05:03 PM    K 5.0 12/06/2020 05:03 PM     (H) 12/06/2020 05:03 PM    CO2 18 (L) 12/06/2020 05:03 PM    AGAP 7 12/06/2020 05:03 PM    GLU 98 12/06/2020 05:03 PM    BUN 90 (H) 12/06/2020 05:03 PM    CREA 2.04 (H) 12/06/2020 05:03 PM    GFRAA 28 (L) 12/06/2020 05:03 PM    GFRNA 23 (L) 12/06/2020 05:03 PM     CBC:   Lab Results   Component Value Date/Time    WBC 3.8 (L) 12/06/2020 05:03 PM    HGB 6.7 (L) 12/06/2020 05:03 PM    HCT 19.4 (L) 12/06/2020 05:03 PM    PLT 53 (L) 12/06/2020 05:03 PM     COAGS:   Lab Results   Component Value Date/Time    APTT 36.4 12/06/2020 05:03 PM    PTP 15.2 12/06/2020 05:03 PM    INR 1.2 12/06/2020 05:03 PM       Assessment/Plan   [unfilled]  Active Problems:    Hip fracture ([address] (12/6/2020)        Pt. Stable  Anemia noted - 1 unit PRBC to be transfused. Thrombocytopenia noted - 1 unit of platelets to be transfused Monday am  Patient with intertroch fracture. Plan for Trochanteric femoral nailing Monday afternoon.    Appreciate medicines input on patient being cleared for surgery  NPO after midnight        Mike PAVAN [facility]   [phone]

## 2020-12-07 NOTE — ANESTHESIA PREPROCEDURE EVALUATION
Relevant Problems   No relevant active problems   Anesthetic History   No history of anesthetic complications            Review of Systems / Medical History  Patient summary reviewed and pertinent labs reviewed    Pulmonary  Within defined limits                 Neuro/Psych   Within defined limits           Cardiovascular    Hypertension      CHF    CAD    Exercise tolerance: <4 METS  Comments: Extremely [place]. Most history obtained from chart. GI/Hepatic/Renal           Liver disease (BLOOM,Liver Cirhossis and Thrombocytopenia)     Endo/Other    Diabetes: type 2    Anemia (Recieved 2 units PRBC)     Other Findings   Comments:   Risk Factors for Postoperative nausea/vomiting:       History of postoperative nausea/vomiting? NO       Female? YES       Motion sickness? NO       Intended opioid administration for postoperative analgesia? NO      Smoking Abstinence  Current Smoker? NO  Elective Surgery? YES  Seen preoperatively by anesthesiologist or proxy prior to day of surgery? YES  Pt abstained from smoking 24 hours prior to anesthesia?  N/A           Physical Exam    Airway  Mallampati: II  TM Distance: 4 - 6 cm  Neck ROM: normal range of motion   Mouth opening: Normal     Cardiovascular  Regular rate and rhythm,  S1 and S2 normal,  no murmur, click, rub, or gallop             Dental    Dentition: Edentulous     Pulmonary  Breath sounds clear to auscultation               Abdominal  GI exam deferred       Other Findings            Anesthetic Plan    ASA: 3  Anesthesia type: MAC          Induction: Intravenous  Anesthetic plan and risks discussed with: Patient          Anesthetic History   No history of anesthetic complications            Review of Systems / Medical History  Patient summary reviewed and pertinent labs reviewed    Pulmonary  Within defined limits                 Neuro/Psych   Within defined limits           Cardiovascular    Hypertension      CHF    CAD    Exercise tolerance: <4 METS  Comments: Extremely [place]. Most history obtained from chart. GI/Hepatic/Renal           Liver disease (BLOOM,Liver Cirhossis and Thrombocytopenia)     Endo/Other    Diabetes: type 2    Anemia (Recieved 2 units PRBC)     Other Findings   Comments:   Risk Factors for Postoperative nausea/vomiting:       History of postoperative nausea/vomiting? NO       Female? YES       Motion sickness? NO       Intended opioid administration for postoperative analgesia? NO      Smoking Abstinence  Current Smoker? NO  Elective Surgery? YES  Seen preoperatively by anesthesiologist or proxy prior to day of surgery? YES  Pt abstained from smoking 24 hours prior to anesthesia?  N/A           Physical Exam    Airway  Mallampati: II  TM Distance: 4 - 6 cm  Neck ROM: normal range of motion   Mouth opening: Normal     Cardiovascular  Regular rate and rhythm,  S1 and S2 normal,  no murmur, click, rub, or gallop             Dental    Dentition: Edentulous     Pulmonary  Breath sounds clear to auscultation               Abdominal  GI exam deferred       Other Findings            Anesthetic Plan    ASA: 3  Anesthesia type: MAC          Induction: Intravenous  Anesthetic plan and risks discussed with: Patient          Anesthetic History   No history of anesthetic complications            Review of Systems / Medical History  Patient summary reviewed and pertinent labs reviewed    Pulmonary  Within defined limits                 Neuro/Psych   Within defined limits           Cardiovascular    Hypertension          CAD         GI/Hepatic/Renal  Within defined limits              Endo/Other    Diabetes         Other Findings              Physical Exam    Airway  Mallampati: II  TM Distance: 4 - 6 cm  Neck ROM: normal range of motion   Mouth opening: Normal     Cardiovascular  Regular rate and rhythm,  S1 and S2 normal,  no murmur, click, rub, or gallop             Dental    Dentition: Edentulous     Pulmonary  Breath sounds clear to auscultation               Abdominal  Abdominal exam normal       Other Findings            Anesthetic Plan    ASA: 3  Anesthesia type: MAC          Induction: Intravenous  Anesthetic plan and risks discussed with: Patient                Anesthetic History   No history of anesthetic complications            Review of Systems / Medical History  Patient summary reviewed and pertinent labs reviewed    Pulmonary  Within defined limits                 Neuro/Psych   Within defined limits           Cardiovascular    Hypertension: well controlled  Valvular problems/murmurs: aortic stenosis        CAD         GI/Hepatic/Renal           Liver disease     Endo/Other    Diabetes: type 2    Anemia     Other Findings              Physical Exam    Airway  Mallampati: II  TM Distance: 4 - 6 cm  Neck ROM: normal range of motion   Mouth opening: Normal     Cardiovascular               Dental         Pulmonary                 Abdominal  GI exam deferred       Other Findings            Anesthetic Plan    ASA: 3  Anesthesia type: general          Induction: Intravenous  Anesthetic plan and risks discussed with: Patient

## 2020-12-07 NOTE — BRIEF OP NOTE
Brief Postoperative Note    Patient: Mikey Palma  YOB: 1940  MRN: [id number]    Date of Procedure: 12/7/2020     Pre-Op Diagnosis: Intertrochanteric femur fracture left hip    Post-Op Diagnosis: Same as preoperative diagnosis. Procedure(s):  LEFT FEMORAL INTERTROCHANTERIC SHORT NAIL INSERTION    Surgeon(s):  Vijay Saenz MD    Surgical Assistant: Surg Asst-1: Angella PANDEY    Anesthesia: General     Estimated Blood Loss (mL): less than 50     Complications: None    Specimens: * No specimens in log *     Implants:   Implant Name Type Inv. Item Serial No.  Lot No. LRB No. Used Action   NAIL IM L170MM [id number] 130DEG SHT PROX FEM GRN TI BRADLEY RHIANNA - [id number]  NAIL IM L170MM [id number] 130DEG SHT PROX FEM GRN TI BRADLEY RHIANNA  DEPU[employer]WD [id number] Left 1 Implanted   GUIDEWIRE ORTH L400MM DIA3. 2MM FOR TFN - [id number]  GUIDEWIRE ORTH L400MM DIA3. 2MM FOR TFN  DEPU[employer] [place]_WD [id number] Left 2 Implanted   BLADE IM L85MM DIA10.35MM PROX FEM G TI BRADLEY FEN STEFFANY FOR - [id number]  BLADE IM L85MM DIA10.35MM PROX FEM G TI BRADLEY FEN STEFFANY FOR  DEPUY [employer]_WD [id number] Left 1 Implanted   SCREW BNE L32MM DIA5MM NONSTERILE TIB LT GRN TI ST BRADLEY DAVINA - [id number]  SCREW BNE L32MM DIA5MM NONSTERILE TIB LT GRN TI ST BRADLEY DAVINA  DEPUY [employer]_WD 000 Left 1 Implanted       Drains:   [REMOVED] External Female Catheter 03/25/19 (Removed)       [REMOVED] External Female Catheter 04/26/19 (Removed)       [REMOVED] External Female Catheter 04/29/19 (Removed)       Findings: as above    Electronically Signed by Mihai Alonso MD on 12/7/2020 at 4:24 PM

## 2020-12-07 NOTE — ROUTINE PROCESS
Bedside and Verbal shift change report given to Natasha ROME (oncoming nurse) by Denia Masterson RN (offgoing nurse). Report included the following information SBAR, Kardex, Procedure Summary, Intake/Output and MAR.

## 2020-12-07 NOTE — PERIOP NOTES
TRANSFER - OUT REPORT:    Verbal report given to Maria Esther(name) on Mikey Palma  being transferred to [facility](unit) for routine post - op       Report consisted of patients Situation, Background, Assessment and   Recommendations(SBAR). Information from the following report(s) SBAR and OR Summary was reviewed with the receiving nurse. Lines:   Peripheral IV 12/06/20 Right Antecubital (Active)   Site Assessment Clean, dry, & intact 12/07/20 1639   Phlebitis Assessment 0 12/07/20 1639   Infiltration Assessment 0 12/07/20 1639   Dressing Status Clean, dry, & intact 12/07/20 1639   Dressing Type Tape;Transparent 12/07/20 1639   Hub Color/Line Status Pink; Infusing 12/07/20 1639   Action Taken Open ports on tubing capped 12/07/20 0332   Alcohol Cap Used No 12/06/20 1707        Opportunity for questions and clarification was provided.       Patient transported with:   Zecco

## 2020-12-07 NOTE — PROGRESS NOTES
Problem: Falls - Risk of  Goal: *Absence of Falls  Description: Document Keila Ziegler Fall Risk and appropriate interventions in the flowsheet.   Outcome: Progressing Towards Goal  Note: Fall Risk Interventions:            Medication Interventions: Bed/chair exit alarm    Elimination Interventions: Call light in reach    History of Falls Interventions: Room close to nurse's station         Problem: Patient Education: Go to Patient Education Activity  Goal: Patient/Family Education  Outcome: Progressing Towards Goal     Problem: Pain  Goal: *Control of Pain  Outcome: Progressing Towards Goal     Problem: Patient Education: Go to Patient Education Activity  Goal: Patient/Family Education  Outcome: Progressing Towards Goal

## 2020-12-07 NOTE — PROGRESS NOTES
Reason for Admission:  Hip fracture ([facility] [address] [S72.009A]                 RUR Score:    23            Plan for utilizing home health: To be determined                      Likelihood of Readmission:   Moderate                         Do you (patient/family) have any concerns for transition/discharge?  no    Transition of Care Plan:       Initial assessment completed with patient. Cognitive status of patient: oriented to time, place, person and situation. Face sheet information confirmed:  yes. The patient designates her sister Shereen Argueta [phone] and her nephew Ankit Martin [phone] to participate in her discharge plan and to receive any needed information. This patient lives in a  home Patient is not able to navigate steps as needed. Prior to hospitalization, patient was considered to be independent with ADLs/IADLS : yes . Patient has a current ACP document on file: no  The sister will be available to transport patient home upon discharge. The patient already has [address] equipment available in the home. Patient is not currently active with home health. Patient has not stayed in a skilled nursing facility or rehab. Was  stay within last 60 days : no. This patient is on dialysis :no    Currently, the discharge plan is to be determined    The patient states that she can obtain her medications from the pharmacy, and take her medications as directed. Patient's current insurance is [employer] Medicare      Care Management Interventions  PCP Verified by CM: Yes  Palliative Care Criteria Met (RRAT>21 & CHF Dx)?: No  Mode of Transport at Discharge:  Other (see comment)(family)  Transition of Care Consult (CM Consult): Discharge Planning  Current Support Network: Lives Alone, Family Lives Nearby  Confirm Follow Up Transport: Family  Discharge Location  Discharge Placement: Unable to determine at this time        MIRA Roa RN  Care Management  Pager: [phone]

## 2020-12-07 NOTE — PROGRESS NOTES
[address] [address] Hospitalists  Progress Note    Patient: Patti Villalobos Age: 79 y.o. : 1940 MR#: [id number] SSN: [id number]  Date: 2020     Subjective/24-hour events:     Resting comfortably currently, no significant pain reported. Denies chest pain and shortness of breath. Assessment:   Hip fracture status post fall  Hypertension  Anemia of chronic disease  Thrombocytopenia  HTN  DM 2  CKD 3  CAD  Advanced age    Plan:   Intertrochanteric nailing this afternoon per orthopedics. Monitor BPs, adjust antihyypertensive therapy as necessary. PRN IV agent with parameters added. DVT prophylaxis per ortho. PT/OT, mobilize as soon as able. Disposition TBD. Case discussed with:  [x]Patient  []Family  [x]Nursing  []Case Management  DVT Prophylaxis:  []Lovenox  []Hep SQ  []SCDs  []Coumadin   []On Heparin gtt    Objective:   VS:   Visit Vitals  BP (!) 140/76 (BP 1 Location: Right arm, BP Patient Position: At rest)   Pulse 85   Temp 97.9 °F (36.6 °C)   Resp 16   Ht 5' (1.524 m)   Wt 65.8 kg (145 lb)   SpO2 98%   Breastfeeding No   BMI 28.32 kg/m²      Tmax/24hrs: Temp (24hrs), Av.5 °F (36.4 °C), Min:96.8 °F (36 °C), Max:98 °F (36.7 °C)      Intake/Output Summary (Last 24 hours) at 2020 1145  Last data filed at 2020 0635  Gross per 24 hour   Intake 340 ml   Output 1250 ml   Net -910 ml       General:  In NAD. Nontoxic-appearing. Cardiovascular:  RRR. Pulmonary:  Lungs clear bilaterally, no wheezes. GI:  Abdomen soft, NTTP. Extremities:  Warm, no edema or ischemia. Neuro:  Awake and alert. Moves extremities spontaneously.     Labs:    Recent Results (from the past 24 hour(s))   CBC WITH AUTOMATED DIFF    Collection Time: 20  5:03 PM   Result Value Ref Range    WBC 3.8 (L) 4.6 - 13.2 K/uL    RBC 2.21 (L) 4.20 - 5.30 M/uL    HGB 6.7 (L) 12.0 - 16.0 g/dL    HCT 19.4 (L) 35.0 - 45.0 %    MCV 87.8 74.0 - 97.0 FL    MCH 30.3 24.0 - 34.0 PG    MCHC 34.5 31.0 - 37.0 g/dL    RDW 16.2 (H) 11.6 - 14.5 %    PLATELET 53 (L) 148 - 420 K/uL    MPV 9.0 (L) 9.2 - 11.8 FL    NEUTROPHILS 75 (H) 40 - 73 %    LYMPHOCYTES 15 (L) 21 - 52 %    MONOCYTES 6 3 - 10 %    EOSINOPHILS 4 0 - 5 %    BASOPHILS 0 0 - 2 %    ABS. NEUTROPHILS 2.8 1.8 - 8.0 K/UL    ABS. LYMPHOCYTES 0.6 (L) 0.9 - 3.6 K/UL    ABS. MONOCYTES 0.2 0.05 - 1.2 K/UL    ABS. EOSINOPHILS 0.2 0.0 - 0.4 K/UL    ABS. BASOPHILS 0.0 0.0 - 0.1 K/UL    DF AUTOMATED     METABOLIC PANEL, COMPREHENSIVE    Collection Time: 12/06/20  5:03 PM   Result Value Ref Range    Sodium 142 136 - 145 mmol/L    Potassium 5.0 3.5 - 5.5 mmol/L    Chloride 117 (H) 100 - 111 mmol/L    CO2 18 (L) 21 - 32 mmol/L    Anion gap 7 3.0 - 18 mmol/L    Glucose 98 74 - 99 mg/dL    BUN 90 (H) 7.0 - 18 MG/DL    Creatinine 2.04 (H) 0.6 - 1.3 MG/DL    BUN/Creatinine ratio 44 (H) 12 - 20      GFR est AA 28 (L) >60 ml/min/1.73m2    GFR est non-AA 23 (L) >60 ml/min/1.73m2    Calcium 8.7 8.5 - 10.1 MG/DL    Bilirubin, total 0.5 0.2 - 1.0 MG/DL    ALT (SGPT) 18 13 - 56 U/L    AST (SGOT) 15 10 - 38 U/L    Alk. phosphatase 70 45 - 117 U/L    Protein, total 6.8 6.4 - 8.2 g/dL    Albumin 3.4 3.4 - 5.0 g/dL    Globulin 3.4 2.0 - 4.0 g/dL    A-G Ratio 1.0 0.8 - 1.7     PROTHROMBIN TIME + INR    Collection Time: 12/06/20  5:03 PM   Result Value Ref Range    Prothrombin time 15.2 11.5 - 15.2 sec    INR 1.2 0.8 - 1.2     PTT    Collection Time: 12/06/20  5:03 PM   Result Value Ref Range    aPTT 36.4 23.0 - 36.4 SEC   HEMOGLOBIN A1C WITH EAG    Collection Time: 12/06/20  5:03 PM   Result Value Ref Range    Hemoglobin A1c 5.1 4.2 - 5.6 %    Est. average glucose 100 mg/dL   RBC, ALLOCATE    Collection Time: 12/06/20  6:00 PM   Result Value Ref Range    HISTORY CHECKED?  Historical check performed    TYPE & SCREEN    Collection Time: 12/06/20  6:05 PM   Result Value Ref Range    Crossmatch Expiration 12/09/2020,2359     ABO/Rh(D) O POSITIVE     Antibody screen NEG     CALLED TO: Amrita Ramos Nadya Clements [id number]     Unit number [id number]     Blood component type RC LR     Unit division 00     Status of unit TRANSFUSED     Crossmatch result Compatible    EKG, 12 LEAD, INITIAL    Collection Time: 12/06/20  6:33 PM   Result Value Ref Range    Ventricular Rate 76 BPM    Atrial Rate 76 BPM    P-R Interval 170 ms    QRS Duration 122 ms    Q-T Interval 414 ms    QTC Calculation (Bezet) 465 ms    Calculated P Axis 47 degrees    Calculated R Axis 7 degrees    Calculated T Axis 78 degrees    Diagnosis       Normal sinus rhythm  Nonspecific intraventricular conduction delay  Borderline ECG  When compared with ECG of 26-SEP-2020 17:01,  Criteria for Septal infarct are no longer present     GLUCOSE, POC    Collection Time: 12/06/20 11:30 PM   Result Value Ref Range    Glucose (POC) 119 (H) 70 - 110 mg/dL   SARS-COV-2    Collection Time: 12/06/20 11:30 PM   Result Value Ref Range    Specimen source Nasopharyngeal      COVID-19 rapid test Not detected NOTD      Specimen type NP Swab      Health status Nasopharyngeal     CBC WITH AUTOMATED DIFF    Collection Time: 12/07/20  5:15 AM   Result Value Ref Range    WBC 4.8 4.6 - 13.2 K/uL    RBC 2.63 (L) 4.20 - 5.30 M/uL    HGB 7.9 (L) 12.0 - 16.0 g/dL    HCT 23.4 (L) 35.0 - 45.0 %    MCV 89.0 74.0 - 97.0 FL    MCH 30.0 24.0 - 34.0 PG    MCHC 33.8 31.0 - 37.0 g/dL    RDW 15.8 (H) 11.6 - 14.5 %    PLATELET 45 (L) 519 - 420 K/uL    MPV 9.9 9.2 - 11.8 FL    NEUTROPHILS 79 (H) 40 - 73 %    LYMPHOCYTES 13 (L) 21 - 52 %    MONOCYTES 5 3 - 10 %    EOSINOPHILS 3 0 - 5 %    BASOPHILS 0 0 - 2 %    ABS. NEUTROPHILS 3.8 1.8 - 8.0 K/UL    ABS. LYMPHOCYTES 0.6 (L) 0.9 - 3.6 K/UL    ABS. MONOCYTES 0.2 0.05 - 1.2 K/UL    ABS. EOSINOPHILS 0.1 0.0 - 0.4 K/UL    ABS.  BASOPHILS 0.0 0.0 - 0.1 K/UL    DF AUTOMATED     METABOLIC PANEL, BASIC    Collection Time: 12/07/20  5:15 AM   Result Value Ref Range    Sodium 140 136 - 145 mmol/L    Potassium 5.2 3.5 - 5.5 mmol/L    Chloride 119 (H) 100 - 111 mmol/L    CO2 15 (L) 21 - 32 mmol/L    Anion gap 6 3.0 - 18 mmol/L    Glucose 125 (H) 74 - 99 mg/dL    BUN 84 (H) 7.0 - 18 MG/DL    Creatinine 1.73 (H) 0.6 - 1.3 MG/DL    BUN/Creatinine ratio 49 (H) 12 - 20      GFR est AA 34 (L) >60 ml/min/1.73m2    GFR est non-AA 28 (L) >60 ml/min/1.73m2    Calcium 8.7 8.5 - 10.1 MG/DL   PROTHROMBIN TIME + INR    Collection Time: 12/07/20  5:15 AM   Result Value Ref Range    Prothrombin time 15.8 (H) 11.5 - 15.2 sec    INR 1.3 (H) 0.8 - 1.2     GLUCOSE, POC    Collection Time: 12/07/20  5:31 AM   Result Value Ref Range    Glucose (POC) 141 (H) 70 - 110 mg/dL   GLUCOSE, POC    Collection Time: 12/07/20 10:29 AM   Result Value Ref Range    Glucose (POC) 130 (H) 70 - 110 mg/dL       Signed By: Mike Elizabeth MD     December 7, 2020

## 2020-12-07 NOTE — PERIOP NOTES
Dr. Bennie Phillips, OR nurse and surgical assistant all at bedside and made aware of swelling at left posterior incision. Ice-pack applied.

## 2020-12-07 NOTE — ROUTINE PROCESS
2255 Blood transfusion started, patient explained and educated about sides effects and blood benefits. 2315 Patient tolerating well blood transfusion ,no distress noted. 0220 Blood transfusion complete no blood reaction noted.

## 2020-12-07 NOTE — PROGRESS NOTES
Date of Surgery Update:  Doree Frankel was seen and examined. History and physical has been reviewed. The patient has been examined.  There have been no significant clinical changes since the completion of the originally dated History and Physical.    Signed By: Eduar Vasquez MD     December 7, 2020 3:13 PM

## 2020-12-07 NOTE — ED NOTES
TRANSFER - OUT REPORT:    Verbal report given to [facility] RN(name) on Lang Corona  being transferred to (unit) for routine progression of care       Report consisted of patients Situation, Background, Assessment and   Recommendations(SBAR). Information from the following report(s) SBAR, Kardex, ED Summary, Intake/Output, MAR, Accordion and Recent Results was reviewed with the receiving nurse. Lines:   Peripheral IV 12/06/20 Right Antecubital (Active)   Site Assessment Clean, dry, & intact 12/06/20 1707   Phlebitis Assessment 0 12/06/20 1707   Infiltration Assessment 0 12/06/20 1707   Dressing Status Clean, dry, & intact 12/06/20 1707   Dressing Type Tape;Transparent 12/06/20 1707   Hub Color/Line Status Pink;Flushed;Patent 12/06/20 1707   Action Taken Blood drawn 12/06/20 1707   Alcohol Cap Used No 12/06/20 1707        Opportunity for questions and clarification was provided.       Patient transported with:   Registered Nurse

## 2020-12-07 NOTE — OP NOTES
Operative Report    Patient: Cata Snyder MRN: [id number]  SSN: [id number]    YOB: 1940  Age: 79 y.o. Sex: female       Date of Surgery: 12/7/2020     Preoperative Diagnosis: Intertrochanteric femur fracture left hip    Postoperative Diagnosis: Intertrochanteric femur fracture left hip    Surgeon(s) and Role:     Kathe Sanford MD - Primary    Anesthesia: General     Procedure: Procedure(s):  LEFT FEMORAL INTERTROCHANTERIC SHORT NAIL INSERTION     Procedure in Detail: Patient was taken to the operating room induced under general trach anesthesia with anesthesia staff. Was placed in the [place] table the left leg was prepped with ChloraPrep solution draped as a free sterile field. A 3 cm incision was made proximal to the greater trochanter. Incision carried to IT band which was incised in line with the incision. Taking care maintaining hemostasis throughout the procedure. Syliva Gloss was placed in the middle of the trochanter at the tip of the trochanter on the AP and lateral planes as visualized under image intensifier. Syliva Gloss was followed by short reamer and then a 10 x 170 short trochanteric nail. Using the targeting device the triple trocar for the guidepin for the spiral blade was placed after making a 1 inch incision at that level. The guidepin was in place the inferior posterior quadrant the femoral neck. Measured 90 for which an 85 mm reamer was used followed by a 85 mm spiral blade which was impacted into place. It was then locked in compression of the fracture site was achieved. Through the same distal incision the trocar for the distal screw was placed it was drilled and then a 32 mm screw secured. Very stable construct achieved in this manner. Deep fascia was closed with 0 Vicryl. Subcutaneous means subcu subcutaneous tissues were closed with 2-0 Vicryl and the skin with staples.   Patient tolerated procedure well was taken to recovery room without problems sterile dressings were applied patient tolerated procedure well. Estimated Blood Loss: 50 cc    Tourniquet Time: * No tourniquets in log *      Implants:   Implant Name Type Inv. Item Serial No.  Lot No. LRB No. Used Action   NAIL IM L170MM [id number] 130DEG SHT PROX FEM GRN TI BRADLEY RHIANNA - [id number]  NAIL IM L170MM [id number] 130DEG SHT PROX FEM GRN TI BRADLEY RHIANNA  [employer] [id number] Left 1 Implanted   GUIDEWIRE ORTH L400MM DIA3. 2MM FOR TFN - [id number]  GUIDEWIRE ORTH L400MM DIA3. 2MM FOR TFN  [employer] [place]WD [id number] Left 2 Implanted   BLADE IM L85MM DIA10.35MM PROX FEM G TI BRADLEY FEN STEFFANY FOR - [id number]  BLADE IM L85MM DIA10.35MM PROX FEM G TI BRADLEY FEN STEFFANY FOR  DE[employer]WD 000 Left 1 Implanted   SCREW BNE L32MM DIA5MM NONSTERILE TIB LT GRN TI ST BRADLEY DAVINA - [id number]  SCREW BNE L32MM DIA5MM NONSTERILE TIB LT GRN TI ST BRADLEY DAVINA  DE[employer] [place]WD 000 Left 1 Implanted               Specimens: * No specimens in log *        Drains: None                Complications: None    Counts: Sponge and needle counts were correct times two.     Signed By:  Teresa Ordonez MD     December 7, 2020

## 2020-12-07 NOTE — ED NOTES
Received shift report from [employer]. Patient alert and oriented x 4. Patient resting comfortably on stretcher. Patient denies chest pain, shortness of breath, nausea, vomiting, and diarrhea. Side rails up x2. Call bell within reach. No obvious signs of distress noted.

## 2020-12-07 NOTE — ROUTINE PROCESS
Bedside shift change report given to Myranda (oncoming nurse) by Hui Gonzalez (offgoing nurse). Report included the following information SBAR, Kardex, Intake/Output, MAR and Recent Results.

## 2020-12-08 NOTE — PROGRESS NOTES
OT order received and chart reviewed. Second attempt to see patient for skilled OT evaluation, per nurse patient receiving another unit of blood at this time. Will continue to follow and see patient tomorrow when available/as appropriate.             Thank you for this referral,   Kim Bland MS, OTR/L

## 2020-12-08 NOTE — PROGRESS NOTES
[facility] Hospitalists  Progress Note    Patient: Alma Delia Larger Age: 79 y.o. : 1940 MR#: [id number] SSN: [id number]  Date: 2020     Subjective/24-hour events:     Patient feels fine, denies any abdominal pain. No nausea or vomiting. Did not have any BM today or yesterday. Denies any melena hematochezia. Assessment:   Hip fracture status post fall  Acute blood loss anemia due to surgery  History of liver cirrhosis and esophageal varices. Hypertension  Anemia of chronic disease  Thrombocytopenia  HTN  DM 2  CKD 3  CAD  Advanced age    Plan:   Agree with blood transfusion, monitor H&H. Given the history of esophageal varices, will monitor closely with stool occult. We will continue Coreg with holding parameters. DVT prophylaxis per ortho. PT/OT, mobilize as soon as able. Disposition TBD. Discussed with the patient at the bedside, offered to talk to family but patient states she will update the family. Rapid Covid negative, patient might need SNF placement    Case discussed with:  [x]Patient  []Family  [x]Nursing  []Case Management  DVT Prophylaxis:  []Lovenox  []Hep SQ  [x]SCDs  []Coumadin   []On Heparin gtt    Objective:   VS:   Visit Vitals  BP (P) 113/61   Pulse (P) 85   Temp (P) 97.1 °F (36.2 °C)   Resp (P) 16   Ht 5' (1.524 m)   Wt 64 kg (141 lb 1.6 oz)   SpO2 (P) 98%   Breastfeeding No   BMI 27.56 kg/m²      Tmax/24hrs: Temp (24hrs), Av.1 °F (36.2 °C), Min:96.1 °F (35.6 °C), Max:98.3 °F (36.8 °C)      Intake/Output Summary (Last 24 hours) at 2020 1418  Last data filed at 2020 1118  Gross per 24 hour   Intake 1838.3 ml   Output 825 ml   Net 1013.3 ml       General:  In NAD. Nontoxic-appearing. Cardiovascular:  RRR. Pulmonary:  Lungs clear bilaterally, no wheezes. GI:  Abdomen soft, NTTP. Extremities: Left hip area dressing clean, no lower extremity erythema. Neuro: AAOx4,. Moves extremities spontaneously.     Labs:    Recent Results (from the past 24 hour(s))   GLUCOSE, POC    Collection Time: 12/07/20  5:25 PM   Result Value Ref Range    Glucose (POC) 156 (H) 70 - 110 mg/dL   GLUCOSE, POC    Collection Time: 12/07/20  9:59 PM   Result Value Ref Range    Glucose (POC) 217 (H) 70 - 110 mg/dL   CBC W/O DIFF    Collection Time: 12/08/20  3:33 AM   Result Value Ref Range    WBC 7.3 4.6 - 13.2 K/uL    RBC 1.99 (L) 4.20 - 5.30 M/uL    HGB 6.0 (L) 12.0 - 16.0 g/dL    HCT 17.8 (LL) 35.0 - 45.0 %    MCV 89.4 74.0 - 97.0 FL    MCH 30.2 24.0 - 34.0 PG    MCHC 33.7 31.0 - 37.0 g/dL    RDW 15.9 (H) 11.6 - 14.5 %    PLATELET 66 (L) 256 - 420 K/uL    MPV 10.1 9.2 - 11.8 FL   RBC, ALLOCATE    Collection Time: 12/08/20  5:30 AM   Result Value Ref Range    HISTORY CHECKED? Historical check performed    GLUCOSE, POC    Collection Time: 12/08/20  5:32 AM   Result Value Ref Range    Glucose (POC) 182 (H) 70 - 110 mg/dL   RBC, ALLOCATE    Collection Time: 12/08/20 10:30 AM   Result Value Ref Range    HISTORY CHECKED?  Historical check performed    GLUCOSE, POC    Collection Time: 12/08/20 11:16 AM   Result Value Ref Range    Glucose (POC) 196 (H) 70 - 110 mg/dL   IRON PROFILE    Collection Time: 12/08/20 11:30 AM   Result Value Ref Range    Iron 65 50 - 175 ug/dL    TIBC 189 (L) 250 - 450 ug/dL    Iron % saturation 34 20 - 50 %   VITAMIN B12 & FOLATE    Collection Time: 12/08/20 11:30 AM   Result Value Ref Range    Vitamin B12 1,076 (H) 211 - 911 pg/mL    Folate >20.0 (H) 3.10 - 17.50 ng/mL       Signed By: Fouzia Stock MD     December 8, 2020

## 2020-12-08 NOTE — PROGRESS NOTES
Ortho    Pt seen and evaluated  Doing well  Pain well controlled  No cp, sob, abd pain    Visit Vitals  BP (!) 91/48   Pulse 77   Temp 98.3 °F (36.8 °C)   Resp 16   Ht 5' (1.524 m)   Wt 141 lb 1.6 oz (64 kg)   SpO2 99%   Breastfeeding No   BMI 27.56 kg/m²     Left LE  Dressing cdi  Compartments soft  nv intact  Neg calf tenderness    Labs  Lab Results   Component Value Date/Time    WBC 7.3 12/08/2020 03:33 AM    Hemoglobin, POC 8.2 (L) 12/01/2019 12:15 PM    HGB 6.0 (L) 12/08/2020 03:33 AM    Hematocrit, POC 24 (L) 12/01/2019 12:15 PM    HCT 17.8 (LL) 12/08/2020 03:33 AM    PLATELET 66 (L) 51/84/4595 03:33 AM    MCV 89.4 12/08/2020 03:33 AM     A: sp left  Hip TFN,  Acute blood loss anemia    P: recommend transfusion. PWB left LE. Pain control.

## 2020-12-08 NOTE — ROUTINE PROCESS
Patient in bed drowsy but easily aroused, respiration unlabored. Dressing to right hip dry and clean ice pack applied. CMS intact sensation present, wiggle toesm pedal pulses palpable. 2225 Patient more awake, complaining incisional pain ,gave Roxicodone 5 mg po.  0520 Notified  May about Hg=6. 8. Hcrt=17.8, New order.

## 2020-12-08 NOTE — DIABETES MGMT
Glycemic Control Plan of Care    Recommend lantus 10 units daily -    Assessment: admitted with left femure fx. She is s/p femoral nailing. She confirms her home DM medications -    Answered her questions re: BG monitoring at home and provided printed materials for BG targets and DM meal planning.   Reviewed her A1c   Reviewed our hospital insulin protocol   Will continue to monitor     Most recent blood glucose values:      Current A1C:  Lab Results   Component Value Date/Time    Hemoglobin A1c 5.1 12/06/2020 05:03 PM   .    Current hospital diabetes medications:  Corrective lispro, advanced to very insulin resistant, 4 times daily    TTD previous day = 4 units lispro   mg/dl    Home diabetes medications:  Tresiba 16 units daily in the evening  Novolog with meals - sliding scale for BG > 160 mg/dl    Goals:  Blood glucose will be within target range of  mg/dL by 12/10/2020     Education:  ___  Refer to Diabetes Education Record             _X_  Education not indicated at this time      Jose Juan Prater MPH RN CDE  Pager [phone]

## 2020-12-08 NOTE — PROGRESS NOTES
Paged by nursing about patient's critical hemoglobin level this morning of 6 g/dL. I am ordering a single unit of packed red blood cells to be transfused. Platelets remain stable at 66 k/uL.      Signed By: Michelle Magana MD     December 8, 2020

## 2020-12-08 NOTE — PROGRESS NOTES
Problem: Falls - Risk of  Goal: *Absence of Falls  Description: Document Lei Qureshi Fall Risk and appropriate interventions in the flowsheet.   Outcome: Progressing Towards Goal  Note: Fall Risk Interventions:  Mobility Interventions: Communicate number of staff needed for ambulation/transfer         Medication Interventions: Patient to call before getting OOB    Elimination Interventions: Call light in reach    History of Falls Interventions: Room close to nurse's station

## 2020-12-08 NOTE — ROUTINE PROCESS
Bedside shift change report given to Tamera (oncoming nurse) by Altagracia Walsh (offgoing nurse). Report included the following information SBAR, Kardex, Intake/Output, MAR and Recent Results.

## 2020-12-08 NOTE — PROGRESS NOTES
Discharge planning    Reviewed chart. Will need PT/OT recommendations to assist with discharge planning. CM will continue to monitor for recommendations.      MELQUIADES Chadwick ResN, RN  Pager # [phone]  Care Manager

## 2020-12-08 NOTE — PROGRESS NOTES
PT eval order received and chart reviewed. Unable to see patient at this time as patient has a low HGB of 6.0 g/dL. Will follow up as patient schedule allows. Thank you for this referral. Jc Olivier, PT, DPT.

## 2020-12-08 NOTE — PROGRESS NOTES
OT order received and chart reviewed. Pt not seen for skilled OT this morning as pt currently has low HGB of 6.0 g/dL. Patient with unit of blood order placed. Will f/u when pt is medically appropriate to be seen and as patient schedule allows.             Thank you for this referral.  Loulou Lowery, OTR/L

## 2020-12-08 NOTE — PROGRESS NOTES
Problem: Falls - Risk of  Goal: *Absence of Falls  Description: Document Zaynab Capone Fall Risk and appropriate interventions in the flowsheet.   Outcome: Progressing Towards Goal  Note: Fall Risk Interventions:  Mobility Interventions: Communicate number of staff needed for ambulation/transfer         Medication Interventions: Patient to call before getting OOB    Elimination Interventions: Call light in reach    History of Falls Interventions: Room close to nurse's station         Problem: Patient Education: Go to Patient Education Activity  Goal: Patient/Family Education  Outcome: Progressing Towards Goal     Problem: Pain  Goal: *Control of Pain  Outcome: Progressing Towards Goal     Problem: Patient Education: Go to Patient Education Activity  Goal: Patient/Family Education  Outcome: Progressing Towards Goal     Problem: Patient Education: Go to Patient Education Activity  Goal: Patient/Family Education  Outcome: Resolved/Met     Problem: Hip Fracture:Day of Admission Pre-op  Goal: Activity/Safety  Outcome: Resolved/Met  Goal: Consults, if ordered  Outcome: Resolved/Met  Goal: Diagnostic Test/Procedures  Outcome: Resolved/Met  Goal: Nutrition/Diet  Outcome: Resolved/Met  Goal: Medications  Outcome: Resolved/Met  Goal: Respiratory  Outcome: Resolved/Met  Goal: Treatments/Interventions/Procedures  Outcome: Resolved/Met  Goal: Psychosocial  Outcome: Resolved/Met  Goal: *Labs/Tests Within Defined Limits in Preparation for Surgery  Outcome: Resolved/Met  Goal: *Optimal pain control at patient's stated goal  Outcome: Resolved/Met  Goal: *Hemodynamically stable  Outcome: Resolved/Met     Problem: Hip Fracture: Day of Surgery Post-op Care  Goal: Activity/Safety  Outcome: Resolved/Met  Goal: Consults, if ordered  Outcome: Resolved/Met  Goal: Diagnostic Test/Procedures  Outcome: Resolved/Met  Goal: Nutrition/Diet  Outcome: Resolved/Met  Goal: Medications  Outcome: Resolved/Met  Goal: Respiratory  Outcome: Resolved/Met  Goal: Treatments/Interventions/Procedures  Outcome: Resolved/Met  Goal: Psychosocial  Outcome: Resolved/Met  Goal: *Absence of skin breakdown  Outcome: Resolved/Met  Goal: *Optimal pain control at patient's stated goal  Outcome: Resolved/Met     Problem: Hip Fracture: Day of Surgery Post-op Care  Goal: Activity/Safety  Outcome: Resolved/Met  Goal: Consults, if ordered  Outcome: Resolved/Met  Goal: Diagnostic Test/Procedures  Outcome: Resolved/Met  Goal: Nutrition/Diet  Outcome: Resolved/Met  Goal: Medications  Outcome: Resolved/Met  Goal: Respiratory  Outcome: Resolved/Met  Goal: Treatments/Interventions/Procedures  Outcome: Resolved/Met  Goal: Psychosocial  Outcome: Resolved/Met  Goal: *Absence of skin breakdown  Outcome: Resolved/Met  Goal: *Optimal pain control at patient's stated goal  Outcome: Resolved/Met

## 2020-12-09 NOTE — PROGRESS NOTES
Problem: Self Care Deficits Care Plan (Adult) Goal: *Acute Goals and Plan of Care (Insert Text) Description: Occupational Therapy Goals Initiated 12/9/2020 within 7 day(s). 1.  Patient will perform bed mobility in preparation for self-care with supervision/set-up. 2.  Patient will perform functional activity seated EOB for 4-7 minutes with independence and G balance. 3.  Patient will perform lower body dressing with minimal assistance/contact guard assist. 
4.  Patient will perform toilet transfers with moderate assistance . 5. Patient will perform all aspects of toileting with moderate assistance . 6. Patient will participate in upper extremity therapeutic exercise/activities with independence for 7-10 minutes to increase independence for self-care tasks/functional transfers in preparation for ADLs. 7.  Patient will utilize energy conservation techniques during functional activities with verbal cues. Prior Level of Function: Patient was independent with self-care and functional mobility PTA Outcome: Progressing Towards Goal 
  
OCCUPATIONAL THERAPY EVALUATION Patient: Tonny Mariee (57 y.o. female) Date: 12/9/2020 Primary Diagnosis: Hip fracture ([address] Vitor Walker Procedure(s) (LRB): LEFT FEMORAL INTERTROCHANTERIC SHORT NAIL INSERTION (Left) 2 Days Post-Op Precautions:  Fall, PWB(LLE; hip nailing) ASSESSMENT : 
Upon entering the room, the patient was semi-reclined in bed, alert, and agreeable to participate in OT evaluation. Patient educated on the role of OT, PWB status, evaluation process, and safety during this admission with patient verbalizing understanding. Patient was seen with PT to maximize patient safety, participation, and functional mobility in preparation for self-care tasks. Patient min assist LLE for supine to sit. Once sitting EOB patient reported feeling dizzy, /62.  Patient educated on energy conservation techniques such as pursed lip breathing and keeping eyes open with focus on steady object. Patient reported dizziness improved. Patient nauseated, (+) emesis and given extended rest break EOB. RN notified and MD aware. Based on the objective data described below, the patient presents with decreased strength, decreased endurance, decreased independence, decreased functional balance, and decreased functional mobility, which impedes pt performance in basic self-care/ADL tasks. Patient would benefit from skilled OT to restore PLOF and maximize function. Patient will benefit from skilled intervention to address the above impairments. Patient's rehabilitation potential is considered to be Good Factors which may influence rehabilitation potential include:  
[]             None noted [x]             Mental ability/status [x]             Medical condition [x]             Home/family situation and support systems [x]             Safety awareness [x]             Pain tolerance/management 
[]             Other: PLAN : 
Recommendations and Planned Interventions:  
[x]               Self Care Training                  [x]      Therapeutic Activities [x]               Functional Mobility Training   []      Cognitive Retraining 
[x]               Therapeutic Exercises           [x]      Endurance Activities [x]               Balance Training                    []      Neuromuscular Re-Education []               Visual/Perceptual Training     [x]      Home Safety Training 
[x]               Patient Education                   [x]      Family Training/Education []               Other (comment): Frequency/Duration: Patient will be followed by occupational therapy 1-2 times per day/4-7 days per week to address goals. Discharge Recommendations: Rehab Further Equipment Recommendations for Discharge: Patient has all DME SUBJECTIVE:  
Patient stated I'm ready. im motivated to work with you Daniel Mckeon OBJECTIVE DATA SUMMARY:  
 
Past Medical History:  
Diagnosis Date  Anemia  Aortic stenosis, moderate 03/2019  Breast cancer ([facility] [address]  CAD (coronary artery disease) Coronary Stenting needed, hematology workup needed prior to stent placement, treating medically at this time  Cancer [facility])   
 colon  Cancer ([facility] [address] breast - left  Cardiac catheterization 08/17/2016 LM patent. mLAD 99.9% (2.25 x 28-mm Xience BETHEL, resid 0%). oD1 60%. oD2 95%. pCX 90% (2.5 x 13-mm Xience BETHEL, resid 0%). OMB patent.  Cardiac echocardiogram 03/2019 EF 56 to 60%, moderate concentric LVH, moderate aortic stenosis, mean valve gradient 25 mmHg  Diabetes ([facility] [address]  Hypercholesteremia  Hypertension  Thrombocytopenia ([facility] [address] Past Surgical History:  
Procedure Laterality Date  CARDIAC CATHETERIZATION  7/16/2015  
  no stents at this time r/t anemia & thrombocytopenia (hematology work-up needed prior to stent placement)  CARDIAC CATHETERIZATION  7/11/2016  CARDIAC CATHETERIZATION  8/17/2016  CORONARY ARTERY ANGIOGRAM  7/16/2015  CORONARY ARTERY ANGIOGRAM  7/11/2016  CORONARY ARTERY ANGIOGRAM  8/17/2016  CORONARY STENT EA ADDL VESSEL  8/17/2016  CORONARY STENT SINGLE W/PTCA  8/17/2016  HX GI    
 colon surg secondary to cancer - removed lesion  HX HEENT  2/2013  
 cataract bilaterally  HX MASTECTOMY  march 2011  
 left  HX VASCULAR ACCESS    
 mediport - now removed  IR BX BONE MARROW DIAGNOSTIC  9/30/2019  LV ANGIOGRAPHY  7/16/2015  LV ANGIOGRAPHY  7/11/2016 Barriers to Learning/Limitations: None Compensate with: visual, verbal, tactile, kinesthetic cues/model Home Situation:  
Home Situation Home Environment: Private residence # Steps to Enter: 1 One/Two Story Residence: Two story # of Interior Steps: 15 Height of Each Step (in): 6 inches Interior Rails: Right Lift Chair Available:  No 
 Living Alone: Yes Support Systems: Family member(s) Patient Expects to be Discharged to[de-identified] Private residence Current DME Used/Available at Home: [employer]row Larsen Tub or Shower Type: Tub/Shower combination 
[x]  Right hand dominant   []  Left hand dominant Cognitive/Behavioral Status: 
Neurologic State: Alert Orientation Level: Oriented X4 Cognition: Follows commands Safety/Judgement: Fall prevention Skin: Intact Edema: None noted Vision/Perceptual:   
    
    
    
  
    
Acuity: Impaired Far vision Coordination: BUE Fine Motor Skills-Upper: Left Intact; Right Intact Gross Motor Skills-Upper: Left Intact; Right Intact Balance: 
Sitting: Impaired; With support Sitting - Static: Fair (occasional)(+) Sitting - Dynamic: Fair (occasional) Standing: Impaired; With support Standing - Static: Fair Standing - Dynamic : Fair;Occasional 
 
Strength: BUE Strength: Generally decreased, functional 
  
  
  
  
 
Tone & Sensation: BUE Tone: Normal 
Sensation: Intact Range of Motion: BUE 
 
AROM: Within functional limits Functional Mobility and Transfers for ADLs: 
Bed Mobility: 
  
Supine to Sit: Minimum assistance Scooting: Contact guard assistance;Maximum assistance(CGA towards EOB; maxA into chair) Transfers: 
Sit to Stand: Maximum assistance;Assist x2 Stand to Sit: Moderate assistance;Assist x2 Toilet Transfer : Moderate assistance;Maximum assistance;Assist x2(simulation with recliner) ADL Assessment:  
Feeding: Independent Oral Facial Hygiene/Grooming: Independent Bathing: Maximum assistance Upper Body Dressing: Stand-by assistance Lower Body Dressing: Maximum assistance Toileting: Maximum assistance ADL Intervention: 
Grooming Grooming Assistance: Independent Brushing/Combing Hair: Independent Upper Body Dressing Assistance Dressing Assistance: Stand-by assistance Hospital Gown: Stand-by assistance Lower Body Dressing Assistance Socks: (TotalA adjusting this session) Cognitive Retraining Safety/Judgement: Fall prevention Pain: 
Pain level pre-treatment: 3/10, LLE Pain level post-treatment: 4/10 , LLE Pain Intervention(s): Medication (see MAR); Response to intervention: Nurse notified, See doc flow Activity Tolerance:  
Fair Please refer to the flowsheet for vital signs taken during this treatment. After treatment:  
[x] Patient left in no apparent distress sitting up in chair 
[] Patient left in no apparent distress in bed 
[x] Call bell left within reach [x] Nursing notified 
[] Caregiver present 
[] Bed alarm activated COMMUNICATION/EDUCATION:  
[x] Role of Occupational Therapy in the acute care setting 
[x] Home safety education was provided and the patient/caregiver indicated understanding. [x] Patient/family have participated as able in goal setting and plan of care. [x] Patient/family agree to work toward stated goals and plan of care. [] Patient understands intent and goals of therapy, but is neutral about his/her participation. [] Patient is unable to participate in goal setting and plan of care. Thank you for this referral. 
Rj Davis, OTR/L Time Calculation: 38 mins Eval Complexity: History: MEDIUM Complexity : Expanded review of history including physical, cognitive and psychosocial  history ; Examination: MEDIUM Complexity : 3-5 performance deficits relating to physical, cognitive , or psychosocial skils that result in activity limitations and / or participation restrictions; Decision Making:HIGH Complexity : Patient presents with comorbidities that affect occupational performance. Signifigant modification of tasks or assistance (eg, physical or verbal) with assessment (s) is necessary to enable patient to complete evaluation

## 2020-12-09 NOTE — ROUTINE PROCESS
Bedside shift change report given to Devi Vasquez RN (oncoming nurse) by Pipo Stratton RN (offgoing nurse). Report included the following information SBAR, Kardex, Procedure Summary, Intake/Output, MAR, Recent Results and Med Rec Status.

## 2020-12-09 NOTE — PROGRESS NOTES
PROGRESS NOTE 
 
 
 
 LOS: 4 days ASSESSMENT:  
 
 
Hip fracture ([address] Angela Fuentes 3 Days Post-Op s/p:  
 
Procedure(s): LEFT FEMORAL INTERTROCHANTERIC SHORT NAIL INSERTION  
 
 
PLAN:  
 
 
1)  Pain management - per pain protocol 2)  Antibiotics:  Continue ABX as currently ordered 3)  Incentive Spirometry 4)  Acute blood loss anemia/Post -op anemia: Continue monitoring 5)  DVT prophylaxis:  ASA,TEDS, foot /ankle pumps, Kend[place], encourage mobilization 6)  Internal Medicine following for medical management 7)  PT/OT - PWB LLE 
8) Incision Care: Routine Incision Care and Dressing Changes as necessary per Dr. Philippe Payment protocol. Please change dressing prior to discharge 9) D/C planning:  Intervention required - okay to discharge to SNF from ortho standpoint when able to do so Follow up with Dr. Ivis Shoemaker in 7 days SUBJECTIVE:  
 
 
Patient seen and evaluated. No complaints resting comfortably, NAD. Appetite has improved Denies: CP, SOB, ABD PAIN, Calf pain OBJECTIVE:  
 
Visit Vitals BP (!) 97/48 (BP 1 Location: Right arm, BP Patient Position: At rest) Pulse 85 Temp 98.3 °F (36.8 °C) Resp 16 Ht 5' (1.524 m) Wt 157 lb 8 oz (71.4 kg) SpO2 97% Breastfeeding No  
BMI 30.76 kg/m²  
 
 
speech normal in context and clarity 
memory intact grossly Alert, Oriented x 3 in bed CHEST/ABDOMEN: Observation reveals: No audible wheezing from mouth. No accessory use of chest muscles during breathing. Non tender abdomen Examination of LLE reveals wound covered. Incision/Dressings:  Dressings intact with slight soilage and should be changed prior to discharge Extremities:        No significant edema or embolic phenomena to the foot/toes or hands cap refill < 2 seconds. Sensory, Motor, NV grossly intact Lower extremities are warm and appear well perfused Neg calf tenderness nor evidence DVT 
                             SCD is are in place to BLE Labs: CBC Lab Results Component Value Date/Time WBC 3.8 (L) 12/10/2020 02:00 AM  
 RBC 2.71 (L) 12/10/2020 02:00 AM  
 HCT 23.0 (L) 12/10/2020 02:00 AM  
 MCV 84.9 12/10/2020 02:00 AM  
 MCH 28.8 12/10/2020 02:00 AM  
 MCHC 33.9 12/10/2020 02:00 AM  
 RDW 15.9 (H) 12/10/2020 02:00 AM  
 
 
 
Coagulation Lab Results Component Value Date INR 1.3 (H) 12/07/2020 APTT 36.4 12/06/2020 Basic Metabolic Profile Lab Results Component Value Date  12/09/2020 CO2 16 (L) 12/09/2020 BUN 93 (H) 12/09/2020 Recent Results (from the past 24 hour(s)) METABOLIC PANEL, BASIC Collection Time: 12/09/20 10:44 AM  
Result Value Ref Range Sodium 134 (L) 136 - 145 mmol/L Potassium 5.5 3.5 - 5.5 mmol/L Chloride 108 100 - 111 mmol/L  
 CO2 16 (L) 21 - 32 mmol/L Anion gap 10 3.0 - 18 mmol/L Glucose 188 (H) 74 - 99 mg/dL BUN 96 (H) 7.0 - 18 MG/DL Creatinine 3.24 (H) 0.6 - 1.3 MG/DL  
 BUN/Creatinine ratio 30 (H) 12 - 20 GFR est AA 17 (L) >60 ml/min/1.73m2 GFR est non-AA 14 (L) >60 ml/min/1.73m2 Calcium 8.5 8.5 - 10.1 MG/DL  
HGB & HCT Collection Time: 12/09/20 10:44 AM  
Result Value Ref Range HGB 10.1 (L) 12.0 - 16.0 g/dL HCT 29.7 (L) 35.0 - 45.0 % CK Collection Time: 12/09/20 10:44 AM  
Result Value Ref Range  26 - 192 U/L ALBUMIN Collection Time: 12/09/20 10:44 AM  
Result Value Ref Range Albumin 2.9 (L) 3.4 - 5.0 g/dL NT-PRO BNP Collection Time: 12/09/20 10:44 AM  
Result Value Ref Range NT pro-BNP 2,352 (H) 0 - 1,800 PG/ML  
PHOSPHORUS Collection Time: 12/09/20 10:44 AM  
Result Value Ref Range Phosphorus 5.1 (H) 2.5 - 4.9 MG/DL  
GLUCOSE, POC Collection Time: 12/09/20 11:42 AM  
Result Value Ref Range Glucose (POC) 216 (H) 70 - 110 mg/dL URINALYSIS W/ RFLX MICROSCOPIC  
 Collection Time: 12/09/20  1:35 PM  
Result Value Ref Range Color YELLOW Appearance CLOUDY Specific gravity 1.020 1.005 - 1.030    
 pH (UA) 5.0 5.0 - 8.0 Protein 100 (A) NEG mg/dL Glucose Negative NEG mg/dL Ketone TRACE (A) NEG mg/dL Bilirubin Negative NEG Blood LARGE (A) NEG Urobilinogen 0.2 0.2 - 1.0 EU/dL Nitrites Negative NEG Leukocyte Esterase MODERATE (A) NEG    
SODIUM, UR, RANDOM Collection Time: 12/09/20  1:35 PM  
Result Value Ref Range Sodium,urine random 20 20 - 110 MMOL/L  
CREATININE, UR, RANDOM Collection Time: 12/09/20  1:35 PM  
Result Value Ref Range Creatinine, urine 141.00 (H) 30 - 125 mg/dL URINE MICROSCOPIC ONLY Collection Time: 12/09/20  1:35 PM  
Result Value Ref Range WBC 15 to 18 0 - 4 /hpf  
 RBC 12 to 15 0 - 5 /hpf Epithelial cells 1+ 0 - 5 /lpf Bacteria 1+ (A) NEG /hpf Yeast 1+ (A) NEG  
SARS-COV-2 Collection Time: 12/09/20  2:00 PM  
Result Value Ref Range SARS-CoV-2 PENDING Specimen source Nasopharyngeal    
 Specimen type NP Swab GLUCOSE, POC Collection Time: 12/09/20  4:15 PM  
Result Value Ref Range Glucose (POC) 205 (H) 70 - 110 mg/dL RENAL FUNCTION PANEL Collection Time: 12/09/20  4:55 PM  
Result Value Ref Range Sodium 136 136 - 145 mmol/L Potassium 4.9 3.5 - 5.5 mmol/L Chloride 108 100 - 111 mmol/L  
 CO2 16 (L) 21 - 32 mmol/L Anion gap 12 3.0 - 18 mmol/L Glucose 193 (H) 74 - 99 mg/dL BUN 93 (H) 7.0 - 18 MG/DL Creatinine 2.85 (H) 0.6 - 1.3 MG/DL  
 BUN/Creatinine ratio 33 (H) 12 - 20 GFR est AA 19 (L) >60 ml/min/1.73m2 GFR est non-AA 16 (L) >60 ml/min/1.73m2 Calcium 8.1 (L) 8.5 - 10.1 MG/DL Phosphorus 5.1 (H) 2.5 - 4.9 MG/DL Albumin 2.7 (L) 3.4 - 5.0 g/dL HGB & HCT Collection Time: 12/09/20  9:08 PM  
Result Value Ref Range HGB 8.5 (L) 12.0 - 16.0 g/dL HCT 24.7 (L) 35.0 - 45.0 % GLUCOSE, POC  
 Collection Time: 12/10/20  1:01 AM  
Result Value Ref Range Glucose (POC) 168 (H) 70 - 110 mg/dL CBC W/O DIFF Collection Time: 12/10/20  2:00 AM  
Result Value Ref Range WBC 3.8 (L) 4.6 - 13.2 K/uL  
 RBC 2.71 (L) 4.20 - 5.30 M/uL HGB 7.8 (L) 12.0 - 16.0 g/dL HCT 23.0 (L) 35.0 - 45.0 % MCV 84.9 74.0 - 97.0 FL  
 MCH 28.8 24.0 - 34.0 PG  
 MCHC 33.9 31.0 - 37.0 g/dL  
 RDW 15.9 (H) 11.6 - 14.5 % PLATELET 34 (L) 978 - 420 K/uL MPV 10.3 9.2 - 11.8 FL  
GLUCOSE, POC Collection Time: 12/10/20  6:22 AM  
Result Value Ref Range Glucose (POC) 174 (H) 70 - 110 mg/dL Temp Readings from Last 3 Encounters:  
12/10/20 98.3 °F (36.8 °C)  
09/28/20 (!) 96.7 °F (35.9 °C)  
12/01/19 97 °F (36.1 °C) All Micro Results None Jackie Pereira, [facility], MPA, [id number]

## 2020-12-09 NOTE — ROUTINE PROCESS
Bedside shift change report given to Alta Senior rn (oncoming nurse) by Khai Carrion (offgoing nurse). Report included the following information SBAR.

## 2020-12-09 NOTE — ROUTINE PROCESS
Patient is alert ando riented times three with no signs or symptoms of distress. Dressing on hip has old drainage on them but are intact and pulses and sensation present.

## 2020-12-09 NOTE — PROGRESS NOTES
Patient see and examined, consultation for NANCY , orders placed, A/P in brief as below Patient is a 23-year-old white female with past medical history of diabetes mellitus, hypertension, dyslipidemia, chronic thrombocytopenia, history of breast cancer, history of diastolic CHF, presents the hospital after having a fall in the parking lot. She lost her footing and fell on her left side. Patient was noted to have left-sided hip fracture, underwent left femoral intertrochanteric short nail insertion on the December 7. On presentation patient was normotensive with blood pressures of 130s to 140s. Postop patient's blood pressure trended down into the 53M 336J systolic. It was noted that her urine output had been marginal.  Patient's creatinine was 1.3-1.4 at baseline, bumped up to 2.04 on admission and over the last 48 hours is trended up to 3.2. BUN of 96. Hemoglobin did drop down to 6 range but received blood transfusion and is up to 9 today. Nephrology consulted for NANCY. Assessment and plan: 
 
#1 acute kidney injury, on chronic kidney disease stage III, baseline creatinine of 1.3-1.4. Etiology secondary to perioperative NANCY due to ischemic ATN, 
 less likely prerenal azotemia, possibility of obstructive uropathy needs to be ruled out. Patient does have a Jean catheter, appears to be making suboptimal urine. Relative hypotension ,severe anemia ,  being on benzapril could have contributed as well . #2 hip fracture due to mechanical fall s/p repair #3 acute blood loss anemia secondary to surgery s/p PRBC #4 history of liver cirrhosis and esophageal varices #5 hypertension but presently hypotensive #6 anemia of chronic disease #7Thrombocytopenia stable #8 diabetes mellitus type 2 #9 metabolic acidosis nongap d/t NANCY #10 hyperkalemia d/t NANCY #48 h/o diastolic CHF , presently appears compensated Plan: #1 start bicarb drip at 75 cc an hour #2 checking urine studies #3 adding Midodrin to improve perfusion pressure #4 check CKs , BNP #5 checking renal ultrasound evaluate for obstructive uropathy #6 avoid NSAIDs and other nephrotoxins #7 checking BMP later today #8 management of hyperkalemia, given Kayexalate this morning, 
adding bicarb drip rechecking this evening Please call with questions, 
 
Ashu Magallanes MD [facility] Cell [phone] Pager: [phone]

## 2020-12-09 NOTE — CONSULTS
Consult Note 
 
  
Consult requested by: Blaine Stephen ADMIT DATE: 12/6/2020 CONSULT DATE: December 9, 2020 Admission diagnosis: Hip fracture ([address] Reason for Nephrology Consultation: NANCY Assessment and plan: 
  
#1 acute kidney injury, on chronic kidney disease stage III, baseline creatinine of 1.3-1.4. Etiology secondary to perioperative NANCY due to ischemic ATN, 
 less likely prerenal azotemia, possibility of obstructive uropathy needs to be ruled out. Patient does have a Jean catheter, appears to be making suboptimal urine. Relative hypotension ,severe anemia ,  being on benzapril could have contributed as well . #2 hip fracture due to mechanical fall s/p repair #3 acute blood loss anemia secondary to surgery s/p PRBC #4 history of liver cirrhosis and esophageal varices #5 hypertension but presently hypotensive #6 anemia of chronic disease #7Thrombocytopenia stable #8 diabetes mellitus type 2 #9 metabolic acidosis nongap d/t NANCY #10 hyperkalemia d/t NANCY #15 h/o diastolic CHF , presently appears compensated 
  
Plan: 
  
#1 start bicarb drip at 50 cc an hour #2 checking urine studies #3 adding Midodrin to improve perfusion pressure #4 check CKs , BNP #5 checking renal ultrasound evaluate for obstructive uropathy #6 avoid NSAIDs and other nephrotoxins #7 checking BMP later today #8 management of hyperkalemia, given Kayexalate this morning, 
adding bicarb drip rechecking this evening 
  
Please call with questions, 
  
Danya Mcdaniel MD FASN Cell [phone] Pager: [phone] HPI:  
Patient is a 25-year-old white female with past medical history of diabetes mellitus, hypertension, dyslipidemia, chronic thrombocytopenia, history of breast cancer, history of diastolic CHF, presents the hospital after having a fall in the parking lot. She lost her footing and fell on her left side.   Patient was noted to have left-sided hip fracture, underwent left femoral intertrochanteric short nail insertion on the December 7. On presentation patient was normotensive with blood pressures of 130s to 140s. Postop patient's blood pressure trended down into the [id number] [id number] systolic. It was noted that her urine output had been marginal.  Patient's creatinine was 1.3-1.4 at baseline, bumped up to 2.04 on admission and over the last 48 hours is trended up to 3.2. BUN of 96. Hemoglobin did drop down to 6 range but received blood transfusion and is up to 9 today. Nephrology consulted for NANCY. 
  
 
  
Past Medical History:  
Diagnosis Date  Anemia  Aortic stenosis, moderate 03/2019  Breast cancer ([facility]  CAD (coronary artery disease) Coronary Stenting needed, hematology workup needed prior to stent placement, treating medically at this time  Cancer [facility])   
 colon  Cancer ([facility] breast - left  Cardiac catheterization 08/17/2016 LM patent. mLAD 99.9% (2.25 x 28-mm Xience BETHEL, resid 0%). oD1 60%. oD2 95%. pCX 90% (2.5 x 13-mm Xience BETHEL, resid 0%). OMB patent.  Cardiac echocardiogram 03/2019 EF 56 to 60%, moderate concentric LVH, moderate aortic stenosis, mean valve gradient 25 mmHg  Diabetes ([facility]  Hypercholesteremia  Hypertension  Thrombocytopenia ([facility] Past Surgical History:  
Procedure Laterality Date  CARDIAC CATHETERIZATION  7/16/2015  
  no stents at this time r/t anemia & thrombocytopenia (hematology work-up needed prior to stent placement)  CARDIAC CATHETERIZATION  7/11/2016  CARDIAC CATHETERIZATION  8/17/2016  CORONARY ARTERY ANGIOGRAM  7/16/2015  CORONARY ARTERY ANGIOGRAM  7/11/2016  CORONARY ARTERY ANGIOGRAM  8/17/2016  CORONARY STENT EA ADDL VESSEL  8/17/2016  CORONARY STENT SINGLE W/PTCA  8/17/2016  HX GI    
 colon surg secondary to cancer - removed lesion  HX HEENT  2/2013  
 cataract bilaterally  HX MASTECTOMY  march 2011 left  
 HX VASCULAR ACCESS    
 mediport - now removed  IR BX BONE MARROW DIAGNOSTIC  9/30/2019  LV ANGIOGRAPHY  7/16/2015  LV ANGIOGRAPHY  7/11/2016 Social History Socioeconomic History  Marital status:  Spouse name: Not on file  Number of children: Not on file  Years of education: Not on file  Highest education level: Not on file Occupational History  Not on file Social Needs  Financial resource strain: Not on file  Food insecurity Worry: Not on file Inability: Not on file  Transportation needs Medical: Not on file Non-medical: Not on file Tobacco Use  Smoking status: Never Smoker  Smokeless tobacco: Never Used Substance and Sexual Activity  Alcohol use: No  
 Drug use: No  
 Sexual activity: Not on file Lifestyle  Physical activity Days per week: Not on file Minutes per session: Not on file  Stress: Not on file Relationships  Social connections Talks on phone: Not on file Gets together: Not on file Attends Latter day service: Not on file Active member of club or organization: Not on file Attends meetings of clubs or organizations: Not on file Relationship status: Not on file  Intimate partner violence Fear of current or ex partner: Not on file Emotionally abused: Not on file Physically abused: Not on file Forced sexual activity: Not on file Other Topics Concern  Not on file Social History Narrative  Not on file Family History Problem Relation Age of Onset  Cancer Maternal Aunt Allergies Allergen Reactions  Latex Itching Skin breaks out causing itching Home Medications:  
 
Medications Prior to Admission Medication Sig  
 insulin degludec [place] Slight FlexTouch U-100) 100 unit/mL (3 mL) inpn 16 Units by SubCUTAneous route daily.   
 insulin aspart U-100 (NovoLOG Flexpen U-100 Insulin) 100 unit/mL (3 mL) inpn by SubCUTAneous route Before breakfast, lunch, and dinner. Sliding scale per instructions  benazepril (LOTENSIN) 10 mg tablet Take 10 mg by mouth daily.  furosemide (LASIX) 20 mg tablet Take 20 mg by mouth two (2) times a day. Indications: visible water retention  cholecalciferol, vitamin D3, (VITAMIN D3) 2,000 unit tab Take 2,000 Units by mouth daily.  amLODIPine (NORVASC) 10 mg tablet Take 10 mg by mouth daily.  carvedilol (COREG) 12.5 mg tablet Take 12.5 mg by mouth two (2) times daily (with meals).  nitroglycerin (NITROLINGUAL) 400 mcg/spray spray 1 Spray by SubLINGual route every five (5) minutes as needed.  ascorbic acid, vitamin C, (VITAMIN C) 250 mg tablet Take 1 Tab by mouth daily.  multivitamin (ONE A DAY) tablet Take 1 Tab by mouth daily.  simvastatin (ZOCOR) 20 mg tablet Take 20 mg by mouth daily.  omega 3-dha-epa-fish oil (FISH OIL) 100-160-1,000 mg cap Take 1,000 mg by mouth two (2) times a day.  cyanocobalamin (VITAMIN B12) 100 mcg tablet Take 100 mcg by mouth daily.  acetaminophen (TYLENOL) 325 mg tablet Take 2 Tabs by mouth every four (4) hours as needed for Pain.  hydrALAZINE (APRESOLINE) 25 mg tablet Take 1 Tab by mouth three (3) times daily.  glucose 4 gram chewable tablet Take 4 Tabs by mouth as needed for Other (hypoglycemia).  SITagliptin (JANUVIA) 50 mg tablet Take 50 mg by mouth daily.  calcium polycarbophil (FIBER LAXATIVE) 625 mg tablet Take 1,250 mg by mouth daily as needed. Current Inpatient Medications:  
 
Current Facility-Administered Medications Medication Dose Route Frequency  influenza vaccine 2020-21 (6 mos+)(PF) (FLUARIX/FLULAVAL/FLUZONE QUAD) injection 0.5 mL  0.5 mL IntraMUSCular PRIOR TO DISCHARGE  aspirin chewable tablet 162 mg  162 mg Oral DAILY  midodrine (PROAMATINE) tablet 5 mg  5 mg Oral TID WITH MEALS  sodium bicarbonate (8.4%) 75 mEq in 0.45% sodium chloride 1,000 mL infusion   IntraVENous CONTINUOUS  cefTRIAXone (ROCEPHIN) 1 g in sterile water (preservative free) 10 mL IV syringe  1 g IntraVENous Q24H  
 [Held by provider] carvediloL (COREG) tablet 3.125 mg  3.125 mg Oral BID WITH MEALS  cholecalciferol (VITAMIN D3) (1000 Units /25 mcg) tablet 2 Tab  2,000 Units Oral DAILY  therapeutic multivitamin (THERAGRAN) tablet 1 Tab  1 Tab Oral DAILY  atorvastatin (LIPITOR) tablet 10 mg  10 mg Oral QHS  
 0.9% sodium chloride infusion 250 mL  250 mL IntraVENous PRN  
 acetaminophen (TYLENOL) tablet 1,000 mg  1,000 mg Oral Q8H  promethazine (PHENERGAN) 12.5 mg in 0.9% sodium chloride 50 mL IVPB  12.5 mg IntraVENous Q4H PRN  
 oxyCODONE IR (ROXICODONE) tablet 5 mg  5 mg Oral Q4H PRN  
 morphine injection 1-2 mg  1-2 mg IntraVENous Q4H PRN  
 hydrALAZINE (APRESOLINE) 20 mg/mL injection 10 mg  10 mg IntraVENous Q6H PRN  
 ondansetron (ZOFRAN) injection 8 mg  8 mg IntraVENous Q6H PRN  
 insulin lispro (HUMALOG) injection   SubCUTAneous AC&HS  
 glucose chewable tablet 16 g  4 Tab Oral PRN  
 glucagon (GLUCAGEN) injection 1 mg  1 mg IntraMUSCular PRN  
 dextrose (D50W) injection syrg 12.5-25 g  25-50 mL IntraVENous PRN  
 HYDROcodone-acetaminophen (NORCO) 5-325 mg per tablet 1-2 Tab  1-2 Tab Oral Q6H PRN  
 acetaminophen (TYLENOL) tablet 650 mg  650 mg Oral Q4H PRN Review of Systems: No fever or chills. No sore throat. No cough or hemoptysis. No shortness of breath or chest pain. No orthopnea or paroxysmal nocturnal dyspnea. No nausea, vomiting, abdominal pain, melena or hematochezia. No constipation or diarrhea. No dysuria, no gross hematuria of voiding difficulties. No ankle swelling, no joint paints. No muscle aches. No skin changes. Physical Assessment:  
 
Vitals:  
 12/09/20 0734 12/09/20 0806 12/09/20 1139 12/09/20 1620 BP: (!) 98/58  (!) 102/53 138/72 Pulse: 78  84 92 Resp: 18  17 18 Temp: 97.2 °F (36.2 °C)  97 °F (36.1 °C) 97.6 °F (36.4 °C) SpO2: 98% 98% 98% 99% Weight:      
Height:      
 
Last 3 Recorded Weights in this Encounter 12/06/20 1626 12/07/20 1732 Weight: 65.8 kg (145 lb) 64 kg (141 lb 1.6 oz) Admission weight: Weight: 65.8 kg (145 lb) (12/06/20 1626) Intake/Output Summary (Last 24 hours) at 12/9/2020 1844 Last data filed at 12/9/2020 1731 Gross per 24 hour Intake 2053.8 ml Output 651 ml Net 1402.8 ml Patient is in no apparent distress. HEENT: mmm Lungs: good air entry, clear to auscultation bilaterally. Trachea at the midline. Cardiovascular system: S1, S2, regular rate and rhythm. Abdomen: soft, non tender, non distended. Positive bowel sounds Extremities: pain left hip jt Integumentary: skin is grossly intact. Neurologic: Alert, oriented time three. Data Review: 
 
Labs: Results:  
   
Chemistry Recent Labs 12/09/20 
1655 12/09/20 
1044  12/09/20 
0444 * 188*  --  193*  134*  --  132* K 4.9 5.5  --  5.7*  
 108  --  108 CO2 16* 16*  --  15* BUN 93* 96*  --  95* CREA 2.85* 3.24*  --  3.18* CA 8.1* 8.5  --  8.1* AGAP 12 10  --  9  
BUCR 33* 30*  --  30* ALB 2.7* 2.9*  --   --   
PHOS 5.1* 5.1*   < >  --   
 < > = values in this interval not displayed. CBC w/Diff Recent Labs 12/09/20 
1044 12/09/20 
0444 12/08/20 
2758 12/07/20 
0515 WBC  --  5.8 7.3 4.8  
RBC  --  3.15* 1.99* 2.63* HGB 10.1* 9.1* 6.0* 7.9*  
HCT 29.7* 26.9* 17.8* 23.4*  
PLT  --  41* 66* 45* GRANS  --  85*  --  79* LYMPH  --  13*  --  13* EOS  --  1  --  3 Iron/Ferritin Recent Labs 12/08/20 
1130 IRON 65 PTH/VIT D No results for input(s): PTH in the last 72 hours. No lab exists for component: VITD Mark Bhardwaj MD 
12/9/2020 
6:44 PM 
 
 
December 9, 2020

## 2020-12-09 NOTE — PROGRESS NOTES
[facility] Progress Note Patient: Taty Dey Age: 79 y.o. : 1940 MR#: [id number] SSN: [id number] Date: 2020 Subjective/24-hour events:  
 
Patient feels fine, denies any shortness of breath, no cough, no abdominal pain. No nausea or vomiting. Denies any melena hematochezia. Assessment:  
Hip fracture status post fall Acute renal failure on chronic congestive stage III Mild hyperkalemia Mild metabolic acidosis Abnormal UA, ? UTI Acute blood loss anemia due to surgery History of liver cirrhosis and esophageal varices. Hypertension Anemia of chronic disease Thrombocytopenia HTN 
DM 2 CKD 3 
CAD Advanced age Plan:  
H&H better posttransfusion Due to worsening renal failure, will start IV fluids, nephrology consultation. Will start ceftriaxone and monitor, less likely UTI. We will keep Jean given worsening renal failure. Kayexalate for hyperkalemia. Given the history of esophageal varices, will monitor closely with stool occult. We will continue Coreg with holding parameters. DVT prophylaxis per ortho. PT/OT, mobilize as soon as able. Disposition TBD. Discussed with the patient at the bedside, offered to talk to family but patient states she will update the family. Covid pending for placement. Case discussed with:  [x]Patient  []Family  [x]Nursing  []Case Management DVT Prophylaxis:  []Lovenox  []Hep SQ  [x]SCDs  []Coumadin   []On Heparin gtt Objective:  
VS:  
Visit Vitals BP (!) 102/53 (BP 1 Location: Right arm, BP Patient Position: At rest) Pulse 84 Temp 97 °F (36.1 °C) Resp 17 Ht 5' (1.524 m) Wt 64 kg (141 lb 1.6 oz) SpO2 98% Breastfeeding No  
BMI 27.56 kg/m² Tmax/24hrs: Temp (24hrs), Av.4 °F (36.3 °C), Min:97 °F (36.1 °C), Max:98.1 °F (36.7 °C) Intake/Output Summary (Last 24 hours) at 2020 1223 Last data filed at 2020 9437 Gross per 24 hour Intake 2120.5 ml  
 Output 200 ml Net 1920.5 ml  
 
 
General:  In NAD. Nontoxic-appearing. Cardiovascular:  RRR. Pulmonary:  Lungs clear bilaterally, no wheezes. GI:  Abdomen soft, NTTP. Extremities: Left hip area dressing clean, no lower extremity erythema. Neuro: AAOx4,. Moves extremities spontaneously. Urine clear in the Jean catheter. Labs:   
Recent Results (from the past 24 hour(s)) GLUCOSE, POC Collection Time: 12/08/20  3:24 PM  
Result Value Ref Range Glucose (POC) 234 (H) 70 - 110 mg/dL  
RBC, ALLOCATE Collection Time: 12/08/20  5:00 PM  
Result Value Ref Range HISTORY CHECKED? Historical check performed GLUCOSE, POC Collection Time: 12/08/20 10:17 PM  
Result Value Ref Range Glucose (POC) 198 (H) 70 - 110 mg/dL METABOLIC PANEL, BASIC Collection Time: 12/09/20  4:44 AM  
Result Value Ref Range Sodium 132 (L) 136 - 145 mmol/L Potassium 5.7 (H) 3.5 - 5.5 mmol/L Chloride 108 100 - 111 mmol/L  
 CO2 15 (L) 21 - 32 mmol/L Anion gap 9 3.0 - 18 mmol/L Glucose 193 (H) 74 - 99 mg/dL BUN 95 (H) 7.0 - 18 MG/DL Creatinine 3.18 (H) 0.6 - 1.3 MG/DL  
 BUN/Creatinine ratio 30 (H) 12 - 20 GFR est AA 17 (L) >60 ml/min/1.73m2 GFR est non-AA 14 (L) >60 ml/min/1.73m2 Calcium 8.1 (L) 8.5 - 10.1 MG/DL  
CBC WITH AUTOMATED DIFF Collection Time: 12/09/20  4:44 AM  
Result Value Ref Range WBC 5.8 4.6 - 13.2 K/uL  
 RBC 3.15 (L) 4.20 - 5.30 M/uL HGB 9.1 (L) 12.0 - 16.0 g/dL HCT 26.9 (L) 35.0 - 45.0 % MCV 85.4 74.0 - 97.0 FL  
 MCH 28.9 24.0 - 34.0 PG  
 MCHC 33.8 31.0 - 37.0 g/dL  
 RDW 15.8 (H) 11.6 - 14.5 % PLATELET 41 (L) 853 - 420 K/uL MPV 9.8 9.2 - 11.8 FL  
 NEUTROPHILS 85 (H) 40 - 73 % LYMPHOCYTES 13 (L) 21 - 52 % MONOCYTES 1 (L) 3 - 10 % EOSINOPHILS 1 0 - 5 % BASOPHILS 0 0 - 2 %  
 ABS. NEUTROPHILS 4.9 1.8 - 8.0 K/UL  
 ABS. LYMPHOCYTES 0.7 (L) 0.9 - 3.6 K/UL  
 ABS. MONOCYTES 0.1 0.05 - 1.2 K/UL ABS. EOSINOPHILS 0.1 0.0 - 0.4 K/UL  
 ABS. BASOPHILS 0.0 0.0 - 0.1 K/UL  
 DF AUTOMATED    
GLUCOSE, POC Collection Time: 12/09/20  5:37 AM  
Result Value Ref Range Glucose (POC) 206 (H) 70 - 110 mg/dL METABOLIC PANEL, BASIC Collection Time: 12/09/20 10:44 AM  
Result Value Ref Range Sodium 134 (L) 136 - 145 mmol/L Potassium 5.5 3.5 - 5.5 mmol/L Chloride 108 100 - 111 mmol/L  
 CO2 16 (L) 21 - 32 mmol/L Anion gap 10 3.0 - 18 mmol/L Glucose 188 (H) 74 - 99 mg/dL BUN 96 (H) 7.0 - 18 MG/DL Creatinine 3.24 (H) 0.6 - 1.3 MG/DL  
 BUN/Creatinine ratio 30 (H) 12 - 20 GFR est AA 17 (L) >60 ml/min/1.73m2 GFR est non-AA 14 (L) >60 ml/min/1.73m2 Calcium 8.5 8.5 - 10.1 MG/DL  
GLUCOSE, POC Collection Time: 12/09/20 11:42 AM  
Result Value Ref Range Glucose (POC) 216 (H) 70 - 110 mg/dL Signed By: Katlyn Brody MD   
 December 9, 2020

## 2020-12-09 NOTE — PROGRESS NOTES
Problem: Falls - Risk of 
Goal: *Absence of Falls Description: Document Anca Gonzalez Fall Risk and appropriate interventions in the flowsheet. Outcome: Progressing Towards Goal 
Note: Fall Risk Interventions: 
Mobility Interventions: Communicate number of staff needed for ambulation/transfer, Patient to call before getting OOB, Utilize walker, cane, or other assistive device Medication Interventions: Assess postural VS orthostatic hypotension, Patient to call before getting OOB, Teach patient to arise slowly Elimination Interventions: Call light in reach, Patient to call for help with toileting needs, Toileting schedule/hourly rounds History of Falls Interventions: Bed/chair exit alarm, Room close to nurse's station, Vital signs minimum Q4HRs X 24 hrs (comment for end date) Problem: Patient Education: Go to Patient Education Activity Goal: Patient/Family Education Outcome: Progressing Towards Goal 
  
Problem: Pain Goal: *Control of Pain Outcome: Progressing Towards Goal 
  
Problem: Patient Education: Go to Patient Education Activity Goal: Patient/Family Education Outcome: Progressing Towards Goal 
  
Problem: Patient Education: Go to Patient Education Activity Goal: Patient/Family Education Outcome: Progressing Towards Goal 
  
Problem: Infection - Risk of, Surgical Site Infection Goal: *Absence of surgical site infection signs and symptoms Outcome: Progressing Towards Goal 
  
Problem: Patient Education: Go to Patient Education Activity Goal: Patient/Family Education Outcome: Progressing Towards Goal

## 2020-12-09 NOTE — DIABETES MGMT
Glycemic Control Plan of Care Recommend adding 10 units lantus daily TDD previous day = 11 units lispro , 206 mg/dl Current hospital diabetes medications: 
Corrective lispro, advanced to very insulin resistant, 4 times daily Home diabetes medications: 
Tresiba 16 units daily in the evening Novolog with meals - sliding scale for BG > 160 mg/dl Juan Davis MPH RN CDE Pager [phone]

## 2020-12-09 NOTE — ROUTINE PROCESS
Patient remains alert and oriented times three with no signs or symptoms of distress.  Dressing remains dry and intact and pulses palpable

## 2020-12-09 NOTE — PROGRESS NOTES
Problem: Falls - Risk of  Goal: *Absence of Falls  Description: Document Barbara Otto Fall Risk and appropriate interventions in the flowsheet.   Outcome: Progressing Towards Goal  Note: Fall Risk Interventions:  Mobility Interventions: Assess mobility with egress test, Communicate number of staff needed for ambulation/transfer, OT consult for ADLs, Patient to call before getting OOB, PT Consult for mobility concerns         Medication Interventions: Assess postural VS orthostatic hypotension, Evaluate medications/consider consulting pharmacy, Patient to call before getting OOB, Teach patient to arise slowly    Elimination Interventions: Call light in reach, Patient to call for help with toileting needs, Stay With Me (per policy), Toilet paper/wipes in reach, Toileting schedule/hourly rounds    History of Falls Interventions: Consult care management for discharge planning, Room close to nurse's station, Utilize gait belt for transfer/ambulation         Problem: Patient Education: Go to Patient Education Activity  Goal: Patient/Family Education  Outcome: Progressing Towards Goal     Problem: Pain  Goal: *Control of Pain  Outcome: Progressing Towards Goal     Problem: Patient Education: Go to Patient Education Activity  Goal: Patient/Family Education  Outcome: Progressing Towards Goal     Problem: Hip Fracture: Day of Surgery Post-op Care  Goal: *Hemodynamically stable  Outcome: Progressing Towards Goal     Problem: Hip Fracture: Post-Op Day 1  Goal: Activity/Safety  Outcome: Progressing Towards Goal  Goal: Diagnostic Test/Procedures  Outcome: Progressing Towards Goal  Goal: Nutrition/Diet  Outcome: Progressing Towards Goal  Goal: Medications  Outcome: Progressing Towards Goal  Goal: Respiratory  Outcome: Progressing Towards Goal  Goal: Treatments/Interventions/Procedures  Outcome: Progressing Towards Goal  Goal: Psychosocial  Outcome: Progressing Towards Goal  Goal: Discharge Planning  Outcome: Progressing Towards Goal  Goal: *Demonstrates progressive activity  Outcome: Progressing Towards Goal  Goal: *Optimal pain control at patient's stated goal  Outcome: Progressing Towards Goal  Goal: *Hemodynamically stable  Outcome: Progressing Towards Goal  Goal: *Discharge plan identified  Outcome: Progressing Towards Goal  Goal: *Absence of skin breakdown  Outcome: Progressing Towards Goal     Problem: Hip Fracture: Post-Op Day 2  Goal: Off Pathway (Use only if patient is Off Pathway)  Outcome: Progressing Towards Goal  Goal: Activity/Safety  Outcome: Progressing Towards Goal  Goal: Diagnostic Test/Procedures  Outcome: Progressing Towards Goal  Goal: Nutrition/Diet  Outcome: Progressing Towards Goal  Goal: Medications  Outcome: Progressing Towards Goal  Goal: Respiratory  Outcome: Progressing Towards Goal  Goal: Treatments/Interventions/Procedures  Outcome: Progressing Towards Goal  Goal: Psychosocial  Outcome: Progressing Towards Goal  Goal: Discharge Planning  Outcome: Progressing Towards Goal  Goal: *Optimal pain control at patient's stated goal  Outcome: Progressing Towards Goal  Goal: *Hemodynamically stable  Outcome: Progressing Towards Goal  Goal: *Adequate oxygenation  Outcome: Progressing Towards Goal  Goal: *Tolerates increased activity  Outcome: Progressing Towards Goal  Goal: *Tolerates nutrition therapy  Outcome: Progressing Towards Goal  Goal: *Absence of skin breakdown  Outcome: Progressing Towards Goal     Problem: Hip Fracture: Post-Op Day 3  Goal: Activity/Safety  Outcome: Progressing Towards Goal  Goal: Diagnostic Test/Procedures  Outcome: Progressing Towards Goal  Goal: Nutrition/Diet  Outcome: Progressing Towards Goal  Goal: Medications  Outcome: Progressing Towards Goal  Goal: Respiratory  Outcome: Progressing Towards Goal  Goal: Treatments/Interventions/Procedures  Outcome: Progressing Towards Goal  Goal: Psychosocial  Outcome: Progressing Towards Goal  Goal: Discharge Planning  Outcome: Progressing Towards Goal  Goal: *Met physical therapy criteria for discharge to next level of care  Outcome: Progressing Towards Goal  Goal: *Optimal pain control at patient's stated goal  Outcome: Progressing Towards Goal  Goal: *Hemodynamically stable  Outcome: Progressing Towards Goal  Goal: *Tolerating diet  Outcome: Progressing Towards Goal  Goal: *Active bowel function  Outcome: Progressing Towards Goal  Goal: *Adequate urinary output  Outcome: Progressing Towards Goal  Goal: *Absence of skin breakdown  Outcome: Progressing Towards Goal  Goal: *Patient verbalizes understanding of discharge instructions  Outcome: Progressing Towards Goal     Problem: Hip Fracture: Post-Op Day 4  Goal: Activity/Safety  Outcome: Progressing Towards Goal  Goal: Diagnostic Test/Procedures  Outcome: Progressing Towards Goal  Goal: Nutrition/Diet  Outcome: Progressing Towards Goal  Goal: Medications  Outcome: Progressing Towards Goal  Goal: Respiratory  Outcome: Progressing Towards Goal  Goal: Treatments/Interventions/Procedures  Outcome: Progressing Towards Goal  Goal: Psychosocial  Outcome: Progressing Towards Goal  Goal: Discharge Planning  Outcome: Progressing Towards Goal  Goal: *Met physical therapy criteria for discharge to next level of care  Outcome: Progressing Towards Goal  Goal: *Optimal pain control at patient's stated goal  Outcome: Progressing Towards Goal  Goal: *Hemodynamically stable  Outcome: Progressing Towards Goal  Goal: *Tolerating diet  Outcome: Progressing Towards Goal  Goal: *Active bowel function  Outcome: Progressing Towards Goal  Goal: *Adequate urinary output  Outcome: Progressing Towards Goal  Goal: *Absence of skin breakdown  Outcome: Progressing Towards Goal  Goal: *Patient verbalizes understanding of discharge instructions  Outcome: Progressing Towards Goal     Problem: Anemia Care Plan (Adult and Pediatric)  Goal: *Labs within defined limits  Outcome: Progressing Towards Goal  Goal: *Tolerates increased activity  Outcome: Progressing Towards Goal     Problem: Patient Education: Go to Patient Education Activity  Goal: Patient/Family Education  Outcome: Progressing Towards Goal

## 2020-12-09 NOTE — ROUTINE PROCESS
Bedside shift change report given to Hilaria Miranda RN (oncoming nurse) by Kim Mcclure RN (offgoing nurse). Report included the following information SBAR, Kardex, Procedure Summary, Intake/Output, MAR, Recent Results and Med Rec Status.

## 2020-12-09 NOTE — ROUTINE PROCESS
Patient is alert and oriented times three with no signs or symptoms of distress.  Dressing is cleand ry and intact and pulses palpable

## 2020-12-09 NOTE — PROGRESS NOTES
Problem: Mobility Impaired (Adult and Pediatric) Goal: *Acute Goals and Plan of Care (Insert Text) Description: Physical Therapy Goals Initiated 12/9/2020 and to be accomplished within 7 day(s) 1. Patient will move from supine to sit and sit to supine , scoot up and down, and roll side to side in bed with minimal assistance/contact guard assist.   
2.  Patient will transfer from bed to chair and chair to bed with minimal assistance/contact guard assist using the least restrictive device. 3.  Patient will perform sit to stand with minimal assistance/contact guard assist. 
4.  Patient will ambulate with moderate assistance  for 10-25 feet with the least restrictive device. 5.  Patient will perform stair training when appropriate. Prior Level of Function:  
Patient was independence for all mobility including gait using no AD. Patient lives alone in a 2 story home 13 steps to bedroom R HRA and 1 step to enter her home. Pt has a rollator and another walker at home. Outcome: Progressing Towards Goal 
 PHYSICAL THERAPY EVALUATION Patient: Diane Daigle (89 y.o. female) Date: 12/9/2020 Primary Diagnosis: Hip fracture ([address] Amos Hunt Procedure(s) (LRB): LEFT FEMORAL INTERTROCHANTERIC SHORT NAIL INSERTION (Left) 2 Days Post-Op Precautions:   Fall, PWB(LLE; hip nailing) ASSESSMENT : 
PT orders received and patient cleared by nursing to participate with therapy. Patient is a 79 y.o. female admitted to the hospital due to fall with L hip fracture s/p L hip ORIF Dr. Jerald Bullock 12/7/2020. Patient consents to PT evaluation and treatment. Pt educated on safety, mobility, therex, PWB precautions, and OOB 3-5 times with nursing assistance. Pt performing supine to sit minimal assistance. Sit to stands moderate/maximum assistance x2. Gait 2 feet with rolling walker maximum assistance x2 with assist for weight shifting, cues for PWB, and for balance.  Pt had some nausea with emesis when sitting edge of bed. Pt ended therapy sitting in recliner with all needs met. Patient will benefit from skilled intervention to address the above impairments. Patient's rehabilitation potential is considered to be Good Factors which may influence rehabilitation potential include:   
[]         None noted 
[x]         Mental ability/status []         Medical condition 
[]         Home/family situation and support systems 
[]         Safety awareness 
[]         Pain tolerance/management 
[]         Other: PLAN : 
Recommendations and Planned Interventions:   
[x]           Bed Mobility Training             [x]    Neuromuscular Re-Education 
[x]           Transfer Training                   []    Orthotic/Prosthetic Training 
[x]           Gait Training                          [x]    Modalities [x]           Therapeutic Exercises           [x]    Edema Management/Control 
[x]           Therapeutic Activities            [x]    Family Training/Education 
[x]           Patient Education 
[]           Other (comment): Frequency/Duration: Patient will be followed by physical therapy 1-2 times per day/4-7 days per week to address goals. Discharge Recommendations: Inpatient Rehab Further Equipment Recommendations for Discharge: rolling walker SUBJECTIVE:  
Patient stated I'm ready (for therapy).  OBJECTIVE DATA SUMMARY:  
 
Past Medical History:  
Diagnosis Date Anemia Aortic stenosis, moderate 03/2019 Breast cancer ([facility] [address] CAD (coronary artery disease) Coronary Stenting needed, hematology workup needed prior to stent placement, treating medically at this time Cancer [facility])   
 colon Cancer ([facility] [address] breast - left Cardiac catheterization 08/17/2016 LM patent. mLAD 99.9% (2.25 x 28-mm Xience BETHEL, resid 0%). oD1 60%. oD2 95%. pCX 90% (2.5 x 13-mm Xience BETHEL, resid 0%). OMB patent. Cardiac echocardiogram 03/2019 EF 56 to 60%, moderate concentric LVH, moderate aortic stenosis, mean valve gradient 25 mmHg Diabetes ([address] Hypercholesteremia Hypertension Thrombocytopenia ([address] Past Surgical History:  
Procedure Laterality Date CARDIAC CATHETERIZATION  7/16/2015  
  no stents at this time r/t anemia & thrombocytopenia (hematology work-up needed prior to stent placement) CARDIAC CATHETERIZATION  7/11/2016 CARDIAC CATHETERIZATION  8/17/2016 CORONARY ARTERY ANGIOGRAM  7/16/2015 CORONARY ARTERY ANGIOGRAM  7/11/2016 CORONARY ARTERY ANGIOGRAM  8/17/2016 CORONARY STENT EA ADDL VESSEL  8/17/2016 CORONARY STENT SINGLE W/PTCA  8/17/2016 HX GI    
 colon surg secondary to cancer - removed lesion HX HEENT  2/2013  
 cataract bilaterally HX MASTECTOMY  march 2011  
 left HX VASCULAR ACCESS    
 mediport - now removed IR BX BONE MARROW DIAGNOSTIC  9/30/2019 LV ANGIOGRAPHY  7/16/2015 LV ANGIOGRAPHY  7/11/2016 Barriers to Learning/Limitations: None Compensate with: N/A Home Situation: 
Home Situation Home Environment: Private residence # Steps to Enter: 1 One/Two Story Residence: Two story # of Interior Steps: 15 Height of Each Step (in): 6 inches Interior Rails: Right Lift Chair Available: No 
Living Alone: Yes Support Systems: Family member(s) Patient Expects to be Discharged to[de-identified] Private residence Current DME Used/Available at Home: Teresita Cade Tub or Shower Type: Tub/Shower combination Critical Behavior: 
Neurologic State: Alert Orientation Level: Oriented X4 Cognition: Follows commands Safety/Judgement: Fall prevention Psychosocial 
Patient Behaviors: Calm; Cooperative Purposeful Interaction: Yes Pt Identified Daily Priority: Clinical issues (comment) Caritas Process: Nurture loving kindness;Establish trust;Teaching/learning; Attend basic human needs;Create healing environment Caring Interventions: Reassure Reassure: Therapeutic listening; Informing Skin Integrity: Incision (comment) Skin Integumentary Skin Integrity: Incision (comment) B LE Strength:   
Strength: Generally decreased, functional             
B LE Tone & Sensation:  
Tone: Normal         
Sensation: Intact B LE Range Of Motion: 
AROM: Generally decreased, functional       
        
Posture: 
Posture (WDL): Exceptions to [facility] Posture Assessment: Forward head Functional Mobility: 
Bed Mobility: 
  
Supine to Sit: Minimum assistance Scooting: Contact guard assistance;Maximum assistance(CGA towards EOB; maxA into chair) Transfers: 
Sit to Stand: Maximum assistance;Assist x2 Stand to Sit: Moderate assistance;Assist x2 Balance:  
Sitting: Impaired; With support Sitting - Static: Fair (occasional)(+) Sitting - Dynamic: Fair (occasional) Standing: Impaired; With support Standing - Static: Fair Standing - Dynamic : Fair;Occasional 
 
Ambulation/Gait Training: 
Distance (ft): 2 Feet (ft) Assistive Device: Walker, rolling Ambulation - Level of Assistance: Maximum assistance;Assist x2 Gait Abnormalities: Decreased step clearance Base of Support: Center of gravity altered Speed/Adrianna: Slow;Shuffled Step Length: Right shortened;Left shortened Therapeutic Exercises:  
Reviewed and performed ankle pumps to increase blood flow and circulation. Pain: 
Pain level pre-treatment: 3-4/10 L hip Pain level post-treatment: same Pain Intervention(s) : Medication (see MAR); Rest, Ice, Repositioning Response to intervention: Nurse notified, See doc flow Activity Tolerance:  
fair Please refer to the flowsheet for vital signs taken during this treatment. After treatment:  
[x]         Patient left in no apparent distress sitting up in chair 
[]         Patient left in no apparent distress in bed 
[x]         Call bell left within reach [x]   Personal items in reach [x]         Nursing notified Lashonda Sweeney 
[]         Caregiver present 
[]         Bed/chair alarm activated 
[]         SCDs applied COMMUNICATION/EDUCATION:  
[x]         Role of Physical Therapy in the acute care setting. [x]         Fall prevention education was provided and the patient/caregiver indicated understanding. [x]         Patient/family have participated as able in goal setting and plan of care. [x]         Patient/family agree to work toward stated goals and plan of care. []         Patient understands intent and goals of therapy, but is neutral about his/her participation. []         Patient is unable to participate in goal setting/plan of care: ongoing with therapy staff. [x]         Out of bed with nursing assistance 3-5 times a day. []         Other: Thank you for this referral. 
Dayan Mistry, PT, DPT Time Calculation: 38 mins Eval Complexity: History: MEDIUM  Complexity : 1-2 comorbidities / personal factors will impact the outcome/ POC Exam:HIGH Complexity : 4+ Standardized tests and measures addressing body structure, function, activity limitation and / or participation in recreation  Presentation: MEDIUM Complexity : Evolving with changing characteristics  Clinical Decision Making:Medium Complexity    Overall Complexity:MEDIUM

## 2020-12-09 NOTE — PROGRESS NOTES
k 4.6 Creat down to 2.8 Continue gentlebicarb gtt Midodrine Please call with questions Ese Zarco MD FASN Cell [phone] Pager: [phone]

## 2020-12-10 NOTE — PROGRESS NOTES
In Patient Progress note Admit Date: 12/6/2020 Impression:  
 
#1 acute kidney injury, on chronic kidney disease stage III, baseline creatinine of 1.3-1.4.  Etiology secondary to perioperative NANCY prerenal azotemia/ 
Relative hypotension . Renal functions improved with hydration and improved perfusion pressure. Renal USG with mild rt hydro , but doesn't appear to be the cause of her NANCY , needs to be followed #2 hip fracture due to mechanical fall s/p repair #3 acute blood loss anemia secondary to surgery s/p PRBC stable #4 history of liver cirrhosis and esophageal varices #5 hypertension but presently hypotensive #6 anemia of chronic disease #7Thrombocytopenia stable  
#8 diabetes mellitus type 2 #9 metabolic acidosis nongap d/t NANCY #10 hyperkalemia d/t NANCY #89 h/o diastolic CHF , compensated #12 rt mild hydro 
  
Plan: 
  
#1 continue bicarb drip at 50 cc an hour , anticipate d/c in AM   
#2 continue Midodrin to improve perfusion pressure #3 avoid NSAIDs and other nephrotoxins #4 checking BMP in AM 
#5 needs outpatient urology appt/follow up Discussed with Dr Keary Galeazzi Please call with questions, 
  
Pete Guzmán MD FASN Cell [phone] Pager: [phone] Subjective:  
 
Feels better Denies SOB Current Facility-Administered Medications:  
  influenza vaccine 2020-21 (6 mos+)(PF) (FLUARIX/FLULAVAL/FLUZONE QUAD) injection 0.5 mL, 0.5 mL, IntraMUSCular, PRIOR TO DISCHARGE, Matt Steve MD 
  aspirin chewable tablet 162 mg, 162 mg, Oral, DAILY, Norma Abdi PA-C, 162 mg at 12/10/20 0891 
  midodrine (PROAMATINE) tablet 5 mg, 5 mg, Oral, TID WITH MEALS, Kendrick Smith MD, 5 mg at 12/10/20 1138   sodium bicarbonate (8.4%) 75 mEq in 0.45% sodium chloride 1,000 mL infusion, , IntraVENous, CONTINUOUS, Kendrick Smith MD, Last Rate: 50 mL/hr at 12/10/20 1409   cefTRIAXone (ROCEPHIN) 1 g in sterile water (preservative free) 10 mL IV syringe, 1 g, IntraVENous, Q24H, Elijah Gore MD, 1 g at 12/09/20 1659   [Held by provider] carvediloL (COREG) tablet 3.125 mg, 3.125 mg, Oral, BID WITH MEALS, Reagan Steve MD, Stopped at 12/08/20 1654   cholecalciferol (VITAMIN D3) (1000 Units /25 mcg) tablet 2 Tab, 2,000 Units, Oral, DAILY, Elijah Gore MD, 2 Tab at 12/10/20 2515   therapeutic multivitamin (THERAGRAN) tablet 1 Tab, 1 Tab, Oral, DAILY, Elijah Gore MD, 1 Tab at 12/10/20 0835 
  atorvastatin (LIPITOR) tablet 10 mg, 10 mg, Oral, QHS, Chelo Steve MD, 10 mg at 12/10/20 0102 
  0.9% sodium chloride infusion 250 mL, 250 mL, IntraVENous, PRN, Silver Medellin MD 
  acetaminophen (TYLENOL) tablet 1,000 mg, 1,000 mg, Oral, Q8H, Amanda Allred PA, 1,000 mg at 12/10/20 1325   promethazine (PHENERGAN) 12.5 mg in 0.9% sodium chloride 50 mL IVPB, 12.5 mg, IntraVENous, Q4H PRN, Amanda Allred, PA 
  oxyCODONE IR (ROXICODONE) tablet 5 mg, 5 mg, Oral, Q4H PRN, ELOISA Triana, 5 mg at 12/09/20 2003   morphine injection 1-2 mg, 1-2 mg, IntraVENous, Q4H PRN, Amanda Allred, PA 
  hydrALAZINE (APRESOLINE) 20 mg/mL injection 10 mg, 10 mg, IntraVENous, Q6H PRN, Gladys Fofana MD 
  ondansetron [facility]) injection 8 mg, 8 mg, IntraVENous, Q6H PRN, ELOISA Triana, 8 mg at 12/07/20 1843 
  insulin lispro (HUMALOG) injection, , SubCUTAneous, AC&HS, Elijah Gore MD, 6 Units at 12/10/20 1138 
  glucose chewable tablet 16 g, 4 Tab, Oral, PRN, Reagan Steve MD 
  glucagon (GLUCAGEN) injection 1 mg, 1 mg, IntraMUSCular, PRN, Reagan Steve MD 
  dextrose (D50W) injection syrg 12.5-25 g, 25-50 mL, IntraVENous, PRN, Reagan Steve MD 
  HYDROcodone-acetaminophen (NORCO) 5-325 mg per tablet 1-2 Tab, 1-2 Tab, Oral, Q6H PRN, Elijah Gore MD, 1 Tab at 12/09/20 7537   acetaminophen (TYLENOL) tablet 650 mg, 650 mg, Oral, Q4H PRN, Nila Hardin MD 
 
 
 
Objective:  
 
Visit Vitals BP (!) 144/69 (BP 1 Location: Right arm, BP Patient Position: At rest) Pulse 82 Temp 97.3 °F (36.3 °C) Resp 16 Ht 5' (1.524 m) Wt 71.4 kg (157 lb 8 oz) SpO2 100% Breastfeeding No  
BMI 30.76 kg/m² Intake/Output Summary (Last 24 hours) at 12/10/2020 1601 Last data filed at 12/10/2020 1421 Gross per 24 hour Intake 2320.33 ml Output 1801 ml Net 519.33 ml Physical Exam:  
 
  
Patient is in no apparent distress. HEENT: mmm Lungs: good air entry, clear to auscultation bilaterally. Trachea at the midline. Cardiovascular system: S1, S2, regular rate and rhythm. Abdomen: soft, non tender, non distended. Positive bowel sounds Extremities: pain left hip jt Integumentary: skin is grossly intact. Neurologic: Alert, oriented time three. Data Review: 
 
Recent Labs 12/10/20 
1347 12/10/20 
0200 WBC  --  3.8*  
RBC  --  2.71* HCT 22.8* 23.0*  
MCV  --  84.9 MCH  --  28.8 MCHC  --  33.9 RDW  --  15.9* Recent Labs 12/10/20 
1119 12/09/20 
1655 12/09/20 
1044 12/09/20 
0444 BUN 82* 93* 96* 95* CREA 1.92* 2.85* 3.24* 3.18* CA 7.7* 8.1* 8.5 8.1* ALB 2.4* 2.7* 2.9*  --   
K 4.3 4.9 5.5 5.7* * 136 134* 132*  108 108 108 CO2 18* 16* 16* 15* PHOS 3.8 5.1* 5.1*  --   
* 193* 188* 193* Celso Stratton MD

## 2020-12-10 NOTE — PROGRESS NOTES
[facility] Hospitalists Progress Note Patient: Jeniffer Dunlap Age: 79 y.o. : 1940 MR#: [id number] SSN: [id number] Date: 12/10/2020 Subjective/24-hour events:  
 
Patient feels fine, denies any shortness of breath, no cough, no abdominal pain. No nausea or vomiting. Denies any melena hematochezia. Assessment:  
Hip fracture status post fall Acute renal failure on chronic congestive stage III Mild metabolic acidosis Abnormal UA, ? UTI Acute blood loss anemia due to surgery History of liver cirrhosis and esophageal varices. Hypertension Anemia of chronic disease Thrombocytopenia HTN 
DM 2 CKD 3 
CAD Advanced age Plan:  
H&H better posttransfusion Creatinine improving with fluids. Renal ultrasound noted, discussed with nephrology about mild right hydronephrosis. Nephrology recommend no intervention, repeat ultrasound outpatient. Agree with holding Coreg, continue midodrine. Will DC Jean catheter. Given the history of esophageal varices, will monitor closely with stool occult. DVT prophylaxis per ortho. PT/OT, mobilize as soon as able. Disposition TBD. Discussed with the patient at the bedside, offered to talk to family but patient states she will update the family. Covid pending for placement. We will ask case management for SNF/ARU placement. Case discussed with:  [x]Patient  []Family  [x]Nursing  [x]Case Management DVT Prophylaxis:  []Lovenox  []Hep SQ  [x]SCDs  []Coumadin   []On Heparin gtt Objective:  
VS:  
Visit Vitals BP (!) 97/48 (BP 1 Location: Right arm, BP Patient Position: At rest) Pulse 85 Temp 98.3 °F (36.8 °C) Resp 16 Ht 5' (1.524 m) Wt 71.4 kg (157 lb 8 oz) SpO2 97% Breastfeeding No  
BMI 30.76 kg/m² Tmax/24hrs: Temp (24hrs), Av.8 °F (36.6 °C), Min:96.8 °F (36 °C), Max:98.4 °F (36.9 °C) Intake/Output Summary (Last 24 hours) at 12/10/2020 1100 Last data filed at 12/10/2020 9435 Gross per 24 hour Intake 1920.33 ml Output 1251 ml Net 669.33 ml General:  In NAD. Nontoxic-appearing. Cardiovascular:  RRR. Pulmonary:  Lungs clear bilaterally, no wheezes. GI:  Abdomen soft, NTTP. Extremities: Left hip area dressing clean, no lower extremity erythema. Neuro: AAOx4,. Moves extremities spontaneously. Urine clear in the Jean catheter. Labs:   
Recent Results (from the past 24 hour(s)) URINALYSIS W/ RFLX MICROSCOPIC Collection Time: 12/09/20  1:35 PM  
Result Value Ref Range Color YELLOW Appearance CLOUDY Specific gravity 1.020 1.005 - 1.030    
 pH (UA) 5.0 5.0 - 8.0 Protein 100 (A) NEG mg/dL Glucose Negative NEG mg/dL Ketone TRACE (A) NEG mg/dL Bilirubin Negative NEG Blood LARGE (A) NEG Urobilinogen 0.2 0.2 - 1.0 EU/dL Nitrites Negative NEG Leukocyte Esterase MODERATE (A) NEG    
SODIUM, UR, RANDOM Collection Time: 12/09/20  1:35 PM  
Result Value Ref Range Sodium,urine random 20 20 - 110 MMOL/L  
CREATININE, UR, RANDOM Collection Time: 12/09/20  1:35 PM  
Result Value Ref Range Creatinine, urine 141.00 (H) 30 - 125 mg/dL URINE MICROSCOPIC ONLY Collection Time: 12/09/20  1:35 PM  
Result Value Ref Range WBC 15 to 18 0 - 4 /hpf  
 RBC 12 to 15 0 - 5 /hpf Epithelial cells 1+ 0 - 5 /lpf Bacteria 1+ (A) NEG /hpf Yeast 1+ (A) NEG  
SARS-COV-2 Collection Time: 12/09/20  2:00 PM  
Result Value Ref Range SARS-CoV-2 PENDING Specimen source Nasopharyngeal    
 Specimen type NP Swab GLUCOSE, POC Collection Time: 12/09/20  4:15 PM  
Result Value Ref Range Glucose (POC) 205 (H) 70 - 110 mg/dL RENAL FUNCTION PANEL Collection Time: 12/09/20  4:55 PM  
Result Value Ref Range Sodium 136 136 - 145 mmol/L Potassium 4.9 3.5 - 5.5 mmol/L Chloride 108 100 - 111 mmol/L  
 CO2 16 (L) 21 - 32 mmol/L Anion gap 12 3.0 - 18 mmol/L Glucose 193 (H) 74 - 99 mg/dL BUN 93 (H) 7.0 - 18 MG/DL Creatinine 2.85 (H) 0.6 - 1.3 MG/DL  
 BUN/Creatinine ratio 33 (H) 12 - 20 GFR est AA 19 (L) >60 ml/min/1.73m2 GFR est non-AA 16 (L) >60 ml/min/1.73m2 Calcium 8.1 (L) 8.5 - 10.1 MG/DL Phosphorus 5.1 (H) 2.5 - 4.9 MG/DL Albumin 2.7 (L) 3.4 - 5.0 g/dL HGB & HCT Collection Time: 12/09/20  9:08 PM  
Result Value Ref Range HGB 8.5 (L) 12.0 - 16.0 g/dL HCT 24.7 (L) 35.0 - 45.0 % GLUCOSE, POC Collection Time: 12/10/20  1:01 AM  
Result Value Ref Range Glucose (POC) 168 (H) 70 - 110 mg/dL CBC W/O DIFF Collection Time: 12/10/20  2:00 AM  
Result Value Ref Range WBC 3.8 (L) 4.6 - 13.2 K/uL  
 RBC 2.71 (L) 4.20 - 5.30 M/uL HGB 7.8 (L) 12.0 - 16.0 g/dL HCT 23.0 (L) 35.0 - 45.0 % MCV 84.9 74.0 - 97.0 FL  
 MCH 28.8 24.0 - 34.0 PG  
 MCHC 33.9 31.0 - 37.0 g/dL  
 RDW 15.9 (H) 11.6 - 14.5 % PLATELET 34 (L) 590 - 420 K/uL MPV 10.3 9.2 - 11.8 FL  
GLUCOSE, POC Collection Time: 12/10/20  6:22 AM  
Result Value Ref Range Glucose (POC) 174 (H) 70 - 110 mg/dL GLUCOSE, POC Collection Time: 12/10/20 10:50 AM  
Result Value Ref Range Glucose (POC) 236 (H) 70 - 110 mg/dL RENAL FUNCTION PANEL Collection Time: 12/10/20 11:19 AM  
Result Value Ref Range Sodium 135 (L) 136 - 145 mmol/L Potassium 4.3 3.5 - 5.5 mmol/L Chloride 107 100 - 111 mmol/L  
 CO2 18 (L) 21 - 32 mmol/L Anion gap 10 3.0 - 18 mmol/L Glucose 197 (H) 74 - 99 mg/dL BUN 82 (H) 7.0 - 18 MG/DL Creatinine 1.92 (H) 0.6 - 1.3 MG/DL  
 BUN/Creatinine ratio 43 (H) 12 - 20 GFR est AA 30 (L) >60 ml/min/1.73m2 GFR est non-AA 25 (L) >60 ml/min/1.73m2 Calcium 7.7 (L) 8.5 - 10.1 MG/DL Phosphorus 3.8 2.5 - 4.9 MG/DL Albumin 2.4 (L) 3.4 - 5.0 g/dL Signed By: Staci Eaton MD   
 December 10, 2020

## 2020-12-10 NOTE — PROGRESS NOTES
PROGRESS NOTE 
 
 
 
 LOS: 5 days ASSESSMENT:  
 
 
Hip fracture ([address] Bhavana Herrera 4 Days Post-Op s/p:  
 
Procedure(s): LEFT FEMORAL INTERTROCHANTERIC SHORT NAIL INSERTION  
 
 
PLAN:  
 
 
1)  Pain management - per pain protocol 2)  Antibiotics:  Continue ABX as currently ordered 3)  Incentive Spirometry 4)  Acute blood loss anemia/Post -op anemia: Continue monitoring 5)  DVT prophylaxis:  ASA,TEDS, foot /ankle pumps, [place], encourage mobilization 6)  Internal Medicine following for medical management 7)  PT/OT - PWB LLE 
8) Incision Care: Routine Incision Care and Dressing Changes as necessary per Dr. Davis Standing protocol. Please change dressing prior to discharge 9) D/C planning:  Intervention required - okay to discharge to SNF from ortho standpoint when able to do so. Patient has been accepted at [address] [place] [address] and [facility]. Patient would prefer ARU. Jason Mock will complete chart review for acceptance  - decision pending. Follow up with Dr. Yonatan Singletary in 7-14 days SUBJECTIVE:  
 
 
Patient seen and evaluated. No complaints resting comfortably, NAD. Appetite has improved Denies: CP, SOB, ABD PAIN, Calf pain OBJECTIVE:  
 
Visit Vitals BP (!) 145/71 (BP 1 Location: Right arm, BP Patient Position: At rest) Pulse (!) 103 Temp 98.9 °F (37.2 °C) Resp 18 Ht 5' (1.524 m) Wt 157 lb 8 oz (71.4 kg) SpO2 98% Breastfeeding No  
BMI 30.76 kg/m²  
 
 
speech normal in context and clarity 
memory intact grossly Alert, Oriented x 3 in bed CHEST/ABDOMEN: Observation reveals: No audible wheezing from mouth. No accessory use of chest muscles during breathing. Non tender abdomen Examination of LLE reveals wound covered. Incision/Dressings:  Dressings intact with slight soilage and should be changed prior to discharge Extremities:        No significant edema or embolic phenomena to the foot/toes or hands cap refill < 2 seconds. Sensory, Motor, NV grossly intact Lower extremities are warm and appear well perfused Neg calf tenderness nor evidence DVT 
                             SCD is are in place to BLE Labs: CBC Lab Results Component Value Date/Time WBC 3.9 (L) 12/11/2020 03:40 AM  
 RBC 2.50 (L) 12/11/2020 03:40 AM  
 HCT 21.4 (L) 12/11/2020 03:40 AM  
 MCV 85.6 12/11/2020 03:40 AM  
 MCH 29.2 12/11/2020 03:40 AM  
 MCHC 34.1 12/11/2020 03:40 AM  
 RDW 15.6 (H) 12/11/2020 03:40 AM  
 
 
 
Coagulation Lab Results Component Value Date INR 1.3 (H) 12/07/2020 APTT 36.4 12/06/2020 Basic Metabolic Profile Lab Results Component Value Date  12/11/2020 CO2 23 12/11/2020 BUN 80 (H) 12/11/2020 Recent Results (from the past 24 hour(s)) RENAL FUNCTION PANEL Collection Time: 12/10/20 11:19 AM  
Result Value Ref Range Sodium 135 (L) 136 - 145 mmol/L Potassium 4.3 3.5 - 5.5 mmol/L Chloride 107 100 - 111 mmol/L  
 CO2 18 (L) 21 - 32 mmol/L Anion gap 10 3.0 - 18 mmol/L Glucose 197 (H) 74 - 99 mg/dL BUN 82 (H) 7.0 - 18 MG/DL Creatinine 1.92 (H) 0.6 - 1.3 MG/DL  
 BUN/Creatinine ratio 43 (H) 12 - 20 GFR est AA 30 (L) >60 ml/min/1.73m2 GFR est non-AA 25 (L) >60 ml/min/1.73m2 Calcium 7.7 (L) 8.5 - 10.1 MG/DL Phosphorus 3.8 2.5 - 4.9 MG/DL Albumin 2.4 (L) 3.4 - 5.0 g/dL HGB & HCT Collection Time: 12/10/20  1:47 PM  
Result Value Ref Range HGB 7.8 (L) 12.0 - 16.0 g/dL HCT 22.8 (L) 35.0 - 45.0 % GLUCOSE, POC Collection Time: 12/10/20  4:52 PM  
Result Value Ref Range Glucose (POC) 211 (H) 70 - 110 mg/dL HGB & HCT Collection Time: 12/10/20  9:16 PM  
Result Value Ref Range HGB 7.6 (L) 12.0 - 16.0 g/dL HCT 21.7 (L) 35.0 - 45.0 % GLUCOSE, POC Collection Time: 12/10/20  9:45 PM  
Result Value Ref Range Glucose (POC) 225 (H) 70 - 110 mg/dL METABOLIC PANEL, BASIC Collection Time: 12/11/20  3:40 AM  
Result Value Ref Range Sodium 139 136 - 145 mmol/L Potassium 4.2 3.5 - 5.5 mmol/L Chloride 110 100 - 111 mmol/L  
 CO2 23 21 - 32 mmol/L Anion gap 6 3.0 - 18 mmol/L Glucose 132 (H) 74 - 99 mg/dL BUN 80 (H) 7.0 - 18 MG/DL Creatinine 1.43 (H) 0.6 - 1.3 MG/DL  
 BUN/Creatinine ratio 56 (H) 12 - 20 GFR est AA 43 (L) >60 ml/min/1.73m2 GFR est non-AA 35 (L) >60 ml/min/1.73m2 Calcium 8.2 (L) 8.5 - 10.1 MG/DL  
CBC WITH AUTOMATED DIFF Collection Time: 12/11/20  3:40 AM  
Result Value Ref Range WBC 3.9 (L) 4.6 - 13.2 K/uL  
 RBC 2.50 (L) 4.20 - 5.30 M/uL HGB 7.3 (L) 12.0 - 16.0 g/dL HCT 21.4 (L) 35.0 - 45.0 % MCV 85.6 74.0 - 97.0 FL  
 MCH 29.2 24.0 - 34.0 PG  
 MCHC 34.1 31.0 - 37.0 g/dL  
 RDW 15.6 (H) 11.6 - 14.5 % PLATELET 33 (L) 630 - 420 K/uL MPV 10.1 9.2 - 11.8 FL  
 NEUTROPHILS 75 (H) 40 - 73 % LYMPHOCYTES 16 (L) 21 - 52 % MONOCYTES 7 3 - 10 % EOSINOPHILS 2 0 - 5 % BASOPHILS 0 0 - 2 %  
 ABS. NEUTROPHILS 2.9 1.8 - 8.0 K/UL  
 ABS. LYMPHOCYTES 0.6 (L) 0.9 - 3.6 K/UL  
 ABS. MONOCYTES 0.3 0.05 - 1.2 K/UL  
 ABS. EOSINOPHILS 0.1 0.0 - 0.4 K/UL  
 ABS. BASOPHILS 0.0 0.0 - 0.1 K/UL  
 DF AUTOMATED    
GLUCOSE, POC Collection Time: 12/11/20  8:07 AM  
Result Value Ref Range Glucose (POC) 185 (H) 70 - 110 mg/dL Temp Readings from Last 3 Encounters:  
12/11/20 98.9 °F (37.2 °C)  
09/28/20 (!) 96.7 °F (35.9 °C)  
12/01/19 97 °F (36.1 °C) All Micro Results None Jackie Pereira, DHSC, MPA, PA-C

## 2020-12-10 NOTE — ROUTINE PROCESS
Bedside shift change report given to Antonio Riley RN (oncoming nurse) by Joyce Montalvo RN (offgoing nurse). Report included the following information SBAR, Kardex, Procedure Summary, Intake/Output, MAR, Recent Results and Med Rec Status.

## 2020-12-10 NOTE — PROGRESS NOTES
Problem: Self Care Deficits Care Plan (Adult) Goal: *Acute Goals and Plan of Care (Insert Text) Description: Occupational Therapy Goals Initiated 12/9/2020 within 7 day(s). 1.  Patient will perform bed mobility in preparation for self-care with supervision/set-up. 2.  Patient will perform functional activity seated EOB for 4-7 minutes with independence and G balance. 3.  Patient will perform lower body dressing with minimal assistance/contact guard assist. 
4.  Patient will perform toilet transfers with moderate assistance . 5. Patient will perform all aspects of toileting with moderate assistance . 6. Patient will participate in upper extremity therapeutic exercise/activities with independence for 7-10 minutes to increase independence for self-care tasks/functional transfers in preparation foe ADLs. 7.  Patient will utilize energy conservation techniques during functional activities with verbal cues. Prior Level of Function: Patient was independent with self-care and functional mobility PTA Outcome: Progressing Towards Goal 
 OCCUPATIONAL THERAPY TREATMENT Patient: Vicente Blank (13 y.o. female) Date: 12/10/2020 Diagnosis: Hip fracture ([address] Rhonda Sine Hip fracture ([facility] [address] Procedure(s) (LRB): LEFT FEMORAL INTERTROCHANTERIC SHORT NAIL INSERTION (Left) 3 Days Post-Op Precautions: Fall, PWB(LLE; hip nailing) Chart, occupational therapy assessment, plan of care, and goals were reviewed. ASSESSMENT: 
Co treated w/PT to maximize Pt safety and participation. Upon entry Pt was supine w/HOB elevated but agreed to participate in therapy session. Pt was able to maneuver to EOB with increased time but required Min assist for hold LLE to advance leg off EOB. Pt seated EOB don/doff gown w/setup and no assist needed to tie/untie gown tie. Pt was able to bathe bilateral axillas and facial hygiene with setup. Pt tolerated EOB ADL's . Pt required Min A to sit to stand w/ RW. Pt required Max assist for posterior mojgan care. Noted skin breakdown on bottom. Nurse notified. Pt educated on PWB when standing and functional transferring w/ RW. Pt required Mod A of 2 to advance LLE w/ RW. Pt required verbal cues ,tactile cues and physical assist w/moving LLE to advance LLE. Pt educated on energy conservation w/ use of RW. Pt was able to functional transfer from bed to chair in preparation for ADL's task toileting w/BSC. Pt seated in chair applied lotion to B hands w/ setup. Pt seated in chair competed  hair grooming task w/ setup. Pt left in chair w/call bell in hand Progression toward goals: 
[x]          Improving appropriately and progressing toward goals 
[]          Improving slowly and progressing toward goals 
[]          Not making progress toward goals and plan of care will be adjusted PLAN: 
Patient continues to benefit from skilled intervention to address the above impairments. Continue treatment per established plan of care. Discharge Recommendations:  Inpatient Rehab Further Equipment Recommendations for Discharge:  shower chair, transfer bench, and walker SUBJECTIVE:  
Patient stated \" My  had therapy before.  OBJECTIVE DATA SUMMARY:  
Cognitive/Behavioral Status: 
Neurologic State: Alert Orientation Level: Oriented X4 Cognition: Follows commands Safety/Judgement: Fall prevention Functional Mobility and Transfers for ADLs: 
 Bed Mobility: 
  
Supine to Sit: Minimum assistance Transfers: 
Sit to Stand: Moderate assistance Stand to Sit: Minimum assistance Bed to Chair: Moderate assistance Bathroom Mobility: Minimum assistance Balance: 
Sitting: Intact Sitting - Static: Good (unsupported) Sitting - Dynamic: Good (unsupported) Standing: Impaired Standing - Static: Fair Standing - Dynamic : Fair ADL Intervention: 
  
 
Grooming Washing Face: Set-up Washing Hands: Set-up Applying Makeup: Set-up(applied lotion on bilateral LE) Brushing/Combing Hair: Set-up Upper Body Bathing Bathing Assistance: Set-up(Bathe Bilateral Axilla) Position Performed: Seated edge of bed Upper Body Dressing Assistance Hospital Gown: Set-up Toileting Toileting Assistance: Maximum assistance(Assit w/posterior mojgan care) Pain: 
Pain level pre-treatment: 2/10 Pain level post-treatment: 2/10 Activity Tolerance:   
good Please refer to the flowsheet for vital signs taken during this treatment. After treatment:  
[x]  Patient left in no apparent distress sitting up in chair 
[]  Patient left in no apparent distress in bed 
[x]  Call bell left within reach [x]  Nursing notified of skin breakdown on Pt bottom 
[]  Caregiver present 
[]  Bed alarm activated COMMUNICATION/EDUCATION:  
[] Role of Occupational Therapy in the acute care setting 
[] Home safety education was provided and the patient/caregiver indicated understanding. [] Patient/family have participated as able in working towards goals and plan of care. [x] Patient/family agree to work toward stated goals and plan of care. [] Patient understands intent and goals of therapy, but is neutral about his/her participation. [] Patient is unable to participate in goal setting and plan of care. Thank you for this referral. 
Delta Wyman Time Calculation: 26 mins

## 2020-12-10 NOTE — PROGRESS NOTES
Problem: Falls - Risk of 
Goal: *Absence of Falls Description: Document Deny Moore Fall Risk and appropriate interventions in the flowsheet. Outcome: Progressing Towards Goal 
Note: Fall Risk Interventions: 
Mobility Interventions: Communicate number of staff needed for ambulation/transfer Medication Interventions: Patient to call before getting OOB Elimination Interventions: Call light in reach History of Falls Interventions: Bed/chair exit alarm Problem: Patient Education: Go to Patient Education Activity Goal: Patient/Family Education Outcome: Progressing Towards Goal 
  
Problem: Pain Goal: *Control of Pain Outcome: Progressing Towards Goal 
  
Problem: Patient Education: Go to Patient Education Activity Goal: Patient/Family Education Outcome: Progressing Towards Goal 
  
Problem: Patient Education: Go to Patient Education Activity Goal: Patient/Family Education Outcome: Progressing Towards Goal 
  
Problem: Infection - Risk of, Surgical Site Infection Goal: *Absence of surgical site infection signs and symptoms Outcome: Progressing Towards Goal 
  
Problem: Patient Education: Go to Patient Education Activity Goal: Patient/Family Education Outcome: Progressing Towards Goal

## 2020-12-10 NOTE — ROUTINE PROCESS
Bedside and Verbal shift change report given to Ang Ramirez (oncoming nurse) by Klarissa Rojas RN (offgoing nurse). Report included the following information SBAR, Kardex, Intake/Output, MAR and Recent Results.

## 2020-12-10 NOTE — ROUTINE PROCESS
Bedside shift change report given to Nazia Ma RN (oncoming nurse) by Tavon Banda RN (offgoing nurse). Report included the following information SBAR, Kardex, Procedure Summary, Intake/Output, MAR, Recent Results and Med Rec Status.

## 2020-12-10 NOTE — ROUTINE PROCESS
Met with patient at bedside. Pt is willing to go to rehab but would like to go to ARU if possible. Called and spoke with Vincent Vargas; she will review her chart and get back to me. Pt has acceptances at [address] [place] [address] also. Plan is for patient to discharge to rehab when medically stable. Nishi Guo RN - Outcomes Manager  [phone]

## 2020-12-10 NOTE — DIABETES MGMT
Glycemic Control Plan of Care Recommend lantus 10 units daily - takes tresiba 16 units daily at home She is s/p left femur fx and femoral nailing Awaiting SNF placement TDD previous day = 18 units lispro  mg/dl this morning Reviewed with patient her recent BG and insulin - recommend adding lantus She hopes to go to ARU at discharge Will continue to monitor Current hospital diabetes medications: 
Corrective lispro, advanced to very insulin resistant, 4 times daily Home diabetes medications: 
Tresiba 16 units daily in the evening Novolog with meals - sliding scale for BG > 160 mg/dl Diet - DM cons.carb.1800kcal 2 GM NA Winnie Emery MPH RN CDE Pager [phone]

## 2020-12-10 NOTE — PROGRESS NOTES
Problem: Mobility Impaired (Adult and Pediatric) Goal: *Acute Goals and Plan of Care (Insert Text) Description: Physical Therapy Goals Initiated 12/9/2020 and to be accomplished within 7 day(s) 1. Patient will move from supine to sit and sit to supine , scoot up and down, and roll side to side in bed with minimal assistance/contact guard assist.   
2.  Patient will transfer from bed to chair and chair to bed with minimal assistance/contact guard assist using the least restrictive device. 3.  Patient will perform sit to stand with minimal assistance/contact guard assist. 
4.  Patient will ambulate with moderate assistance  for 10-25 feet with the least restrictive device. 5.  Patient will perform stair training when appropriate. Prior Level of Function:  
Patient was independence for all mobility including gait using no AD. Patient lives alone in a 2 story home 13 steps to bedroom R HRA and 1 step to enter her home. Pt has a rollator and another walker at home. Outcome: Progressing Towards Goal 
 
PHYSICAL THERAPY TREATMENT Patient: Jez Maradiaga (82 y.o. female) Date: 12/10/2020 Diagnosis: Hip fracture ([address] [facility] Hip fracture ([address] Procedure(s) (LRB): LEFT FEMORAL INTERTROCHANTERIC SHORT NAIL INSERTION (Left) 3 Days Post-Op Precautions: Fall, PWB(LLE; hip nailing) PLOF: see above ASSESSMENT: 
Pt cleared to participate by RN, completing with OT to maximize safety and mobility. Pt completing supine to sit with Spenser for LLE management. Sitting on EOB with independence during static and dynamic activities with OT, completing LAQ in sitting for LLE \"warm up\". Pt educated on weight bearing status and verbalized understanding. Pt then standing with Spenser with increased time. Ambulating x4 feet forward with Spenser and antalgic gait pattern. Pivoting with min-modA for RW management. Sitting with CGA to recliner. Able to scoot back with SBA.  Pt left with all needs met and call bell in reach. Progression toward goals:  
[]      Improving appropriately and progressing toward goals [x]      Improving slowly and progressing toward goals 
[]      Not making progress toward goals and plan of care will be adjusted PLAN: 
Patient continues to benefit from skilled intervention to address the above impairments. Continue treatment per established plan of care. Discharge Recommendations:  Inpatient Rehab Further Equipment Recommendations for Discharge:  rolling walker SUBJECTIVE:  
Patient stated I am doing so much better.  OBJECTIVE DATA SUMMARY:  
Critical Behavior: 
Neurologic State: Alert Orientation Level: Oriented X4 Cognition: Follows commands Safety/Judgement: Fall prevention Functional Mobility Training: 
Bed Mobility: 
  
Supine to Sit: Minimum assistance(for LLE management ) Scooting: Contact guard assistance;Minimum assistance Transfers: 
Sit to Stand: Minimum assistance; Moderate assistance Stand to Sit: Contact guard assistance Bed to Chair: Moderate assistance Balance: 
Sitting: Intact Sitting - Static: Good (unsupported) Sitting - Dynamic: Good (unsupported) Standing: Impaired Standing - Static: Fair(+) Standing - Dynamic : Fair Ambulation/Gait Training: 
Distance (ft): 4 Feet (ft) Assistive Device: Walker, rolling Ambulation - Level of Assistance: Minimal assistance Gait Abnormalities: Antalgic;Decreased step clearance Base of Support: Center of gravity altered;Shift to right Speed/Adrianna: Slow Step Length: Right shortened;Left shortened Therapeutic Exercises:  
Instructed in and completed the following EXERCISE Sets Reps Active Active Assist  
Passive Self ROM Comments Ankle Pumps    [] [] [] [] Quad Sets/Glut Sets    [] [] [] [] Hold for 5 secs Hamstring Sets   [] [] [] [] Short Arc Quads   [] [] [] [] Heel Slides   [] [] [] [] Straight Leg Raises   [] [] [] [] Hip Add   [] [] [] [] Hold for 5 secs, w/ pillow squeeze Long Arc Quads 1 10 [x] [] [] [] Seated Marching   [] [] [] []   
Standing Marching   [] [] [] []   
   [] [] [] []   
 
 
Pain: 
Pain level pre-treatment: 1/10 Pain level post-treatment: 2/10 Pain Intervention(s): Medication (see MAR); Rest, Repositioning Response to intervention: Nurse notified, See doc flow Activity Tolerance:  
Fair activity tolerance, decreased assistance from last session Please refer to the flowsheet for vital signs taken during this treatment. After treatment:  
[x] Patient left in no apparent distress sitting up in chair 
[] Patient left in no apparent distress in bed 
[x] Call bell left within reach [x] Nursing notified 
[] Caregiver present 
[] Bed alarm activated 
[] SCDs applied COMMUNICATION/EDUCATION:  
[x]         Role of Physical Therapy in the acute care setting. [x]         Fall prevention education was provided and the patient/caregiver indicated understanding. [x]         Patient/family have participated as able in working toward goals and plan of care. [x]         Patient/family agree to work toward stated goals and plan of care. []         Patient understands intent and goals of therapy, but is neutral about his/her participation. []         Patient is unable to participate in stated goals/plan of care: ongoing with therapy staff. 
[]         Other: 
 
   
Walter Ochoa, PT Time Calculation: 27 mins

## 2020-12-10 NOTE — PROGRESS NOTES
Problem: Falls - Risk of 
Goal: *Absence of Falls Description: Document Ab Chacon Fall Risk and appropriate interventions in the flowsheet. Outcome: Progressing Towards Goal 
Note: Fall Risk Interventions: 
Mobility Interventions: Communicate number of staff needed for ambulation/transfer, Patient to call before getting OOB, Utilize walker, cane, or other assistive device Medication Interventions: Assess postural VS orthostatic hypotension, Patient to call before getting OOB, Teach patient to arise slowly Elimination Interventions: Call light in reach, Patient to call for help with toileting needs, Toileting schedule/hourly rounds History of Falls Interventions: Door open when patient unattended, Vital signs minimum Q4HRs X 24 hrs (comment for end date), Room close to nurse's station Problem: Patient Education: Go to Patient Education Activity Goal: Patient/Family Education Outcome: Progressing Towards Goal 
  
Problem: Pain Goal: *Control of Pain Outcome: Progressing Towards Goal 
  
Problem: Patient Education: Go to Patient Education Activity Goal: Patient/Family Education Outcome: Progressing Towards Goal 
  
Problem: Patient Education: Go to Patient Education Activity Goal: Patient/Family Education Outcome: Progressing Towards Goal 
  
Problem: Infection - Risk of, Surgical Site Infection Goal: *Absence of surgical site infection signs and symptoms Outcome: Progressing Towards Goal 
  
Problem: Patient Education: Go to Patient Education Activity Goal: Patient/Family Education Outcome: Progressing Towards Goal 
  
Problem: Patient Education: Go to Patient Education Activity Goal: Patient/Family Education Outcome: Progressing Towards Goal 
  
Problem: Pressure Injury - Risk of 
Goal: *Prevention of pressure injury Description: Document Kyle Scale and appropriate interventions in the flowsheet. Outcome: Progressing Towards Goal 
Note: Pressure Injury Interventions: Sensory Interventions: Assess changes in LOC, Keep linens dry and wrinkle-free, Minimize linen layers Moisture Interventions: Absorbent underpads, Internal/External urinary devices, Minimize layers Activity Interventions: Assess need for specialty bed, Increase time out of bed, Pressure redistribution bed/mattress(bed type), PT/OT evaluation Mobility Interventions: Assess need for specialty bed, Pressure redistribution bed/mattress (bed type), PT/OT evaluation Nutrition Interventions: Document food/fluid/supplement intake, Offer support with meals,snacks and hydration Problem: Patient Education: Go to Patient Education Activity Goal: Patient/Family Education Outcome: Progressing Towards Goal

## 2020-12-10 NOTE — PROGRESS NOTES
conducted an initial consultation and Spiritual Assessment for Mary Atkins, who is a 79 y.o.,female. Patients Primary Language is: [place]. According to the patients EMR Jain Affiliation is: [facility].  
 
The reason the Patient came to the hospital is:  
Patient Active Problem List  
 Diagnosis Date Noted  Hip fracture ([address] 12/06/2020  Breast cancer ([address] 12/06/2020  Colon cancer ([address] 12/06/2020  Palpitations 04/25/2019  Acute pulmonary edema ([address] 04/25/2019  Malaise and fatigue 04/25/2019  Pancytopenia ([address] 04/25/2019  Portal hypertension ([address] 04/25/2019  Chronic anticoagulation 04/25/2019  CAP (community acquired pneumonia) 03/24/2019  Acute on chronic diastolic CHF (congestive heart failure) ([address] 03/24/2019  Leg DVT (deep venous thromboembolism), acute, left ([address] 03/24/2019  
 HTN (hypertension), benign 01/28/2018  Pneumonia 01/25/2018  CHF (congestive heart failure) ([address] 10/18/2017  Dyspnea 10/18/2017  Cirrhosis of liver not due to alcohol ([address] 06/29/2017  Esophageal varices ([address] 06/29/2017  UTI (urinary tract infection) 06/21/2017  Sepsis ([address] 06/21/2017  PNA (pneumonia) 06/21/2017  Hypoxia 06/21/2017  
 NSTEMI (non-ST elevated myocardial infarction) ([address] 06/30/2016  Anemia 07/07/2015  CAD (coronary artery disease) 07/07/2015  Thrombocytopenia ([address] 07/07/2015 The  provided the following Interventions: 
Initiated a relationship of care and support. Patient was happy to be visited but at the same time she also wants to talk to a friend who called. Patient asked if I could visit her tomorrow instead. Provided information about Spiritual Care Services. Offered assurance of continued prayers on patient's behalf. Chart reviewed. The following outcomes where achieved: 
Patient not able to share information about both her medical narrative and spiritual journey/beliefs.  confirmed Patient's [employer] Affiliation. Patient processed feeling about current hospitalization. Patient expressed gratitude for 's visit. Assessment: 
Patient does not have any Mormon/cultural needs that will affect patients preferences in health care. There are no spiritual or Mormon issues which require intervention at this time. Plan: 
Chaplains will continue to follow and will provide pastoral care on an as needed/requested basis.  recommends bedside caregivers page  on duty if patient shows signs of acute spiritual or emotional distress. [address] [place] [address] Spiritual Care  
[phone]

## 2020-12-11 NOTE — PROGRESS NOTES
Problem: Falls - Risk of 
Goal: *Absence of Falls Description: Document Ab Chacon Fall Risk and appropriate interventions in the flowsheet. Outcome: Progressing Towards Goal 
Note: Fall Risk Interventions: 
Mobility Interventions: Communicate number of staff needed for ambulation/transfer, Patient to call before getting OOB, Utilize walker, cane, or other assistive device Medication Interventions: Assess postural VS orthostatic hypotension, Patient to call before getting OOB, Teach patient to arise slowly Elimination Interventions: Call light in reach, Patient to call for help with toileting needs, Toileting schedule/hourly rounds History of Falls Interventions: Door open when patient unattended, Vital signs minimum Q4HRs X 24 hrs (comment for end date), Room close to nurse's station Problem: Patient Education: Go to Patient Education Activity Goal: Patient/Family Education Outcome: Progressing Towards Goal 
  
Problem: Pain Goal: *Control of Pain Outcome: Progressing Towards Goal 
  
Problem: Infection - Risk of, Surgical Site Infection Goal: *Absence of surgical site infection signs and symptoms Outcome: Progressing Towards Goal 
  
Problem: Patient Education: Go to Patient Education Activity Goal: Patient/Family Education Outcome: Progressing Towards Goal 
  
Problem: Pressure Injury - Risk of 
Goal: *Prevention of pressure injury Description: Document Kyle Scale and appropriate interventions in the flowsheet. Outcome: Progressing Towards Goal 
Note: Pressure Injury Interventions: 
Sensory Interventions: Assess changes in LOC, Keep linens dry and wrinkle-free, Minimize linen layers Moisture Interventions: Absorbent underpads, Internal/External urinary devices, Minimize layers Activity Interventions: Assess need for specialty bed, Increase time out of bed, Pressure redistribution bed/mattress(bed type), PT/OT evaluation Mobility Interventions: Assess need for specialty bed, Pressure redistribution bed/mattress (bed type), PT/OT evaluation Nutrition Interventions: Document food/fluid/supplement intake, Offer support with meals,snacks and hydration Problem: Pressure Injury - Risk of 
Goal: *Prevention of pressure injury Description: Document Kyle Scale and appropriate interventions in the flowsheet. Outcome: Progressing Towards Goal 
Note: Pressure Injury Interventions: 
Sensory Interventions: Assess changes in LOC, Keep linens dry and wrinkle-free, Minimize linen layers Moisture Interventions: Absorbent underpads, Internal/External urinary devices, Minimize layers Activity Interventions: Assess need for specialty bed, Increase time out of bed, Pressure redistribution bed/mattress(bed type), PT/OT evaluation Mobility Interventions: Assess need for specialty bed, Pressure redistribution bed/mattress (bed type), PT/OT evaluation Nutrition Interventions: Document food/fluid/supplement intake, Offer support with meals,snacks and hydration

## 2020-12-11 NOTE — PROGRESS NOTES
PROGRESS NOTE 
 
 
 
 LOS: 5 days ASSESSMENT:  
 
 
Hip fracture ([address] Artemio Cazares 4 Days Post-Op s/p:  
 
Procedure(s): LEFT FEMORAL INTERTROCHANTERIC SHORT NAIL INSERTION  
 
 
PLAN:  
 
 
1)  Pain management - per pain protocol 2)  Antibiotics:  Continue ABX as currently ordered 3)  Incentive Spirometry 4)  Acute blood loss anemia/Post -op anemia: Continue monitoring 5)  DVT prophylaxis:  ASA,TEDS, foot /ankle pumps, [place], encourage mobilization 6)  Internal Medicine following for medical management 7)  PT/OT - PWB LLE 
8) Incision Care: Routine Incision Care and Dressing Changes as necessary per Dr. Veras Service protocol. Please change dressing prior to discharge 9) D/C planning:  Intervention required - okay to discharge to SNF from ortho standpoint when able to do so. Patient has been accepted at [address] [place] [address] and [place]. Patient would prefer [facility]. Catarina Canchola will complete chart review for acceptance  - decision pending. Follow up with Dr. Tram Marquis in 7-14 days SUBJECTIVE:  
 
 
Patient seen and evaluated. No complaints resting comfortably, NAD. Appetite has improved Denies: CP, SOB, ABD PAIN, Calf pain OBJECTIVE:  
 
Visit Vitals /77 Pulse 90 Temp 98.7 °F (37.1 °C) Resp 16 Ht 5' (1.524 m) Wt 157 lb 8 oz (71.4 kg) SpO2 98% Breastfeeding No  
BMI 30.76 kg/m²  
 
 
speech normal in context and clarity 
memory intact grossly Alert, Oriented x 3 in bed CHEST/ABDOMEN: Observation reveals: No audible wheezing from mouth. No accessory use of chest muscles during breathing. Non tender abdomen Examination of LLE reveals wound covered. Incision/Dressings:  Dressings intact with slight soilage and should be changed prior to discharge Extremities:        No significant edema or embolic phenomena to the foot/toes or hands cap refill < 2 seconds. Sensory, Motor, NV grossly intact Lower extremities are warm and appear well perfused Neg calf tenderness nor evidence DVT 
                             SCD is are in place to BLE Labs: CBC Lab Results Component Value Date/Time WBC 3.9 (L) 12/11/2020 03:40 AM  
 RBC 2.50 (L) 12/11/2020 03:40 AM  
 HCT 22.2 (L) 12/11/2020 01:37 PM  
 MCV 85.6 12/11/2020 03:40 AM  
 MCH 29.2 12/11/2020 03:40 AM  
 MCHC 34.1 12/11/2020 03:40 AM  
 RDW 15.6 (H) 12/11/2020 03:40 AM  
 
 
 
Coagulation Lab Results Component Value Date INR 1.3 (H) 12/07/2020 APTT 36.4 12/06/2020 Basic Metabolic Profile Lab Results Component Value Date  12/11/2020 CO2 23 12/11/2020 BUN 80 (H) 12/11/2020 Recent Results (from the past 24 hour(s)) GLUCOSE, POC Collection Time: 12/10/20  4:52 PM  
Result Value Ref Range Glucose (POC) 211 (H) 70 - 110 mg/dL HGB & HCT Collection Time: 12/10/20  9:16 PM  
Result Value Ref Range HGB 7.6 (L) 12.0 - 16.0 g/dL HCT 21.7 (L) 35.0 - 45.0 % GLUCOSE, POC Collection Time: 12/10/20  9:45 PM  
Result Value Ref Range Glucose (POC) 225 (H) 70 - 110 mg/dL METABOLIC PANEL, BASIC Collection Time: 12/11/20  3:40 AM  
Result Value Ref Range Sodium 139 136 - 145 mmol/L Potassium 4.2 3.5 - 5.5 mmol/L Chloride 110 100 - 111 mmol/L  
 CO2 23 21 - 32 mmol/L Anion gap 6 3.0 - 18 mmol/L Glucose 132 (H) 74 - 99 mg/dL BUN 80 (H) 7.0 - 18 MG/DL Creatinine 1.43 (H) 0.6 - 1.3 MG/DL  
 BUN/Creatinine ratio 56 (H) 12 - 20 GFR est AA 43 (L) >60 ml/min/1.73m2 GFR est non-AA 35 (L) >60 ml/min/1.73m2 Calcium 8.2 (L) 8.5 - 10.1 MG/DL  
CBC WITH AUTOMATED DIFF Collection Time: 12/11/20  3:40 AM  
Result Value Ref Range WBC 3.9 (L) 4.6 - 13.2 K/uL  
 RBC 2.50 (L) 4.20 - 5.30 M/uL HGB 7.3 (L) 12.0 - 16.0 g/dL HCT 21.4 (L) 35.0 - 45.0 %  MCV 85.6 74.0 - 97.0 FL  
 MCH 29.2 24.0 - 34.0 PG  
 MCHC 34.1 31.0 - 37.0 g/dL  
 RDW 15.6 (H) 11.6 - 14.5 % PLATELET 33 (L) 481 - 420 K/uL MPV 10.1 9.2 - 11.8 FL  
 NEUTROPHILS 75 (H) 40 - 73 % LYMPHOCYTES 16 (L) 21 - 52 % MONOCYTES 7 3 - 10 % EOSINOPHILS 2 0 - 5 % BASOPHILS 0 0 - 2 %  
 ABS. NEUTROPHILS 2.9 1.8 - 8.0 K/UL  
 ABS. LYMPHOCYTES 0.6 (L) 0.9 - 3.6 K/UL  
 ABS. MONOCYTES 0.3 0.05 - 1.2 K/UL  
 ABS. EOSINOPHILS 0.1 0.0 - 0.4 K/UL  
 ABS. BASOPHILS 0.0 0.0 - 0.1 K/UL  
 DF AUTOMATED    
GLUCOSE, POC Collection Time: 12/11/20  8:07 AM  
Result Value Ref Range Glucose (POC) 185 (H) 70 - 110 mg/dL GLUCOSE, POC Collection Time: 12/11/20 11:02 AM  
Result Value Ref Range Glucose (POC) 248 (H) 70 - 110 mg/dL HGB & HCT Collection Time: 12/11/20  1:37 PM  
Result Value Ref Range HGB 7.5 (L) 12.0 - 16.0 g/dL HCT 22.2 (L) 35.0 - 45.0 % Temp Readings from Last 3 Encounters:  
12/11/20 98.7 °F (37.1 °C)  
09/28/20 (!) 96.7 °F (35.9 °C)  
12/01/19 97 °F (36.1 °C) All Micro Results None Jackie Pereira, DHSC, MPA, PAAyannaC

## 2020-12-11 NOTE — ROUTINE PROCESS
Bedside and Verbal shift change report given to Klarissa Potter (oncoming nurse) by Harinder Martin RN (offgoing nurse). Report included the following information SBAR, Kardex, Intake/Output, MAR and Recent Results.

## 2020-12-11 NOTE — PROGRESS NOTES
Problem: Falls - Risk of 
Goal: *Absence of Falls Description: Document Ketty Jose Fall Risk and appropriate interventions in the flowsheet. Outcome: Progressing Towards Goal 
Note: Fall Risk Interventions: 
Mobility Interventions: Communicate number of staff needed for ambulation/transfer, Patient to call before getting OOB, Utilize walker, cane, or other assistive device Medication Interventions: Assess postural VS orthostatic hypotension, Patient to call before getting OOB, Teach patient to arise slowly Elimination Interventions: Call light in reach, Patient to call for help with toileting needs, Toileting schedule/hourly rounds History of Falls Interventions: Bed/chair exit alarm, Door open when patient unattended, Investigate reason for fall, Room close to nurse's station, Vital signs minimum Q4HRs X 24 hrs (comment for end date) Problem: Patient Education: Go to Patient Education Activity Goal: Patient/Family Education Outcome: Progressing Towards Goal 
  
Problem: Pain Goal: *Control of Pain Outcome: Progressing Towards Goal 
  
Problem: Patient Education: Go to Patient Education Activity Goal: Patient/Family Education Outcome: Progressing Towards Goal 
  
Problem: Patient Education: Go to Patient Education Activity Goal: Patient/Family Education Outcome: Progressing Towards Goal 
  
Problem: Infection - Risk of, Surgical Site Infection Goal: *Absence of surgical site infection signs and symptoms Outcome: Progressing Towards Goal 
  
Problem: Patient Education: Go to Patient Education Activity Goal: Patient/Family Education Outcome: Progressing Towards Goal 
  
Problem: Patient Education: Go to Patient Education Activity Goal: Patient/Family Education Outcome: Progressing Towards Goal 
  
Problem: Pressure Injury - Risk of 
Goal: *Prevention of pressure injury Description: Document Kyle Scale and appropriate interventions in the flowsheet. Outcome: Progressing Towards Goal 
  
Problem: Patient Education: Go to Patient Education Activity Goal: Patient/Family Education Outcome: Progressing Towards Goal

## 2020-12-11 NOTE — PROGRESS NOTES
Met with patient at bedside to let her know that ARU will accept her but that they wont have a bed available until med week next week. She has been accepted at [facility] and [facility], [address] and [facility]. Pt declines [facility] and [address] as they are too far away. She wanted [facility] but, per Kay, they dont have a bed available today. [facility] does. Pt is reluctantly willing to go there today. Will contact Kay again to arrange admission there. Nappanee Divina RN - Outcomes Manager  [phone]

## 2020-12-11 NOTE — PROGRESS NOTES
In Patient Progress note Admit Date: 12/6/2020 Impression:  
 
#1 Acute kidney injury, on Chronic kidney disease stage III, baseline creatinine of 1.3-1.4.   
Etiology secondary to perioperative NANCY prerenal azotemia/ 
Relative hypotension . Renal functions improved with hydration and improved perfusion pressure. Renal USG with mild rt hydro , but not the cause of her NANCY , needs to be followed #2 hip fracture due to mechanical fall s/p repair #3 acute blood loss anemia secondary to surgery s/p PRBC stable #4 history of liver cirrhosis and esophageal varices #5 hypertension but presently hypotensive #6 anemia of chronic disease #7Thrombocytopenia stable  
#8 diabetes mellitus type 2 #9 metabolic acidosis nongap d/t NANCY #10 hyperkalemia d/t NANCY #63 h/o diastolic CHF , compensated #12 rt mild hydro  
  
Plan: 
  
#1 d/c IVF , encourage po intake #2 d/c midodrine #3 encourage po intake #4 avoid NSAIDs and other nephrotoxins #5 checking BMP in AM 
#6 needs recheck USG outpatient OK to be d/c ed with F/U BMP in 1 week F/U with nephrology in 3-4 weeks with repeat renal USG Discussed with Dr Sena Flank Please call with questions, 
  
Carlie Oh MD FASN Cell [phone] Pager: [phone] Subjective:  
 
Feels better Denies SOB Current Facility-Administered Medications:  
  midodrine (PROAMATINE) tablet 2.5 mg, 2.5 mg, Oral, TID WITH MEALS, Kendrick Smith MD, 2.5 mg at 12/11/20 1114 
  influenza vaccine 2020-21 (6 mos+)(PF) (FLUARIX/FLULAVAL/FLUZONE QUAD) injection 0.5 mL, 0.5 mL, IntraMUSCular, PRIOR TO DISCHARGE, Brionna Steve MD 
  aspirin chewable tablet 162 mg, 162 mg, Oral, DAILY, Sarika David PA-C, 162 mg at 12/11/20 1415   cefTRIAXone (ROCEPHIN) 1 g in sterile water (preservative free) 10 mL IV syringe, 1 g, IntraVENous, Q24H, Brionna Steve MD, 1 g at 12/10/20 1108   [Held by provider] carvediloL (COREG) tablet 3.125 mg, 3.125 mg, Oral, BID WITH MEALS, Jerald Steve MD, Stopped at 12/08/20 1654   cholecalciferol (VITAMIN D3) (1000 Units /25 mcg) tablet 2 Tab, 2,000 Units, Oral, DAILY, Paty Mckeon MD, 2 Tab at 12/11/20 4203   therapeutic multivitamin (THERAGRAN) tablet 1 Tab, 1 Tab, Oral, DAILY, Paty Mckeon MD, 1 Tab at 12/11/20 0813 
  atorvastatin (LIPITOR) tablet 10 mg, 10 mg, Oral, QHS, Chelo Steve MD, 10 mg at 12/10/20 2152 
  0.9% sodium chloride infusion 250 mL, 250 mL, IntraVENous, PRN, Jose Martin Beaulieu MD 
  acetaminophen (TYLENOL) tablet 1,000 mg, 1,000 mg, Oral, Q8H, Amanda Allred PA, 1,000 mg at 12/11/20 0540   promethazine (PHENERGAN) 12.5 mg in 0.9% sodium chloride 50 mL IVPB, 12.5 mg, IntraVENous, Q4H PRN, Amanda Allred PA 
  oxyCODONE IR (ROXICODONE) tablet 5 mg, 5 mg, Oral, Q4H PRN, ELOISA Claudio, 5 mg at 12/09/20 2003   hydrALAZINE (APRESOLINE) 20 mg/mL injection 10 mg, 10 mg, IntraVENous, Q6H PRN, Duarte Kline MD 
  ondansetron [facility]) injection 8 mg, 8 mg, IntraVENous, Q6H PRN, Vicente Perkins, PA, 8 mg at 12/07/20 1843 
  insulin lispro (HUMALOG) injection, , SubCUTAneous, AC&HS, Paty Mckeon MD, 6 Units at 12/11/20 1110 
  glucose chewable tablet 16 g, 4 Tab, Oral, PRN, Jerald Steve MD 
  glucagon (GLUCAGEN) injection 1 mg, 1 mg, IntraMUSCular, PRN, Jerald Steve MD 
  dextrose (D50W) injection syrg 12.5-25 g, 25-50 mL, IntraVENous, PRN, Jerald Steve MD 
  HYDROcodone-acetaminophen (NORCO) 5-325 mg per tablet 1-2 Tab, 1-2 Tab, Oral, Q6H PRN, Paty Mckeon MD, 1 Tab at 12/09/20 6103   acetaminophen (TYLENOL) tablet 650 mg, 650 mg, Oral, Q4H PRN, Paty Mckeon MD 
 
 
 
Objective:  
 
Visit Vitals BP (!) 145/71 (BP 1 Location: Right arm, BP Patient Position: At rest) Pulse (!) 103 Temp 98.9 °F (37.2 °C) Resp 18 Ht 5' (1.524 m) Wt 71.4 kg (157 lb 8 oz) SpO2 98% Breastfeeding No  
BMI 30.76 kg/m² Intake/Output Summary (Last 24 hours) at 12/11/2020 1123 Last data filed at 12/11/2020 7535 Gross per 24 hour Intake 2186 ml Output 1900 ml Net 286 ml Physical Exam:  
 
  
Patient is in no apparent distress. HEENT: mmm Lungs: good air entry, clear to auscultation bilaterally. Trachea at the midline. Cardiovascular system: S1, S2, regular rate and rhythm. Abdomen: soft, non tender, non distended. Positive bowel sounds Extremities: pain left hip jt Integumentary: skin is grossly intact. Neurologic: Alert, oriented time three. Data Review: 
 
Recent Labs 12/11/20 
0340 WBC 3.9*  
RBC 2.50* HCT 21.4* MCV 85.6 MCH 29.2 MCHC 34.1  
RDW 15.6* Recent Labs 12/11/20 
0340 12/10/20 
1119 12/09/20 
1655 12/09/20 
1044 12/09/20 
0444 BUN 80* 82* 93* 96* 95* CREA 1.43* 1.92* 2.85* 3.24* 3.18* CA 8.2* 7.7* 8.1* 8.5 8.1* ALB  --  2.4* 2.7* 2.9*  --   
K 4.2 4.3 4.9 5.5 5.7*  135* 136 134* 132*  107 108 108 108 CO2 23 18* 16* 16* 15* PHOS  --  3.8 5.1* 5.1*  --   
* 197* 193* 188* 193* Xavi Yepez MD

## 2020-12-11 NOTE — PROGRESS NOTES
ARU/IPR REFERRAL CONTACT NOTE [id number] [facility] RE: Leanne Whitmore Thank you for the opportunity to review this patient's case for admission to [id number] [facility]. Based on our pre-admission screening:  
 
[x ] Our Team/Medical Director is following this case. Comments: Will continue to follow for medical stability and therapy progress pending bed availability. Will not have a bed available until mid next week. Again, Thank you for this referral. Should you have any questions please do not hesitate to call. Sincerely, Sharon Contreras Admissions [facility] 
[phone]

## 2020-12-11 NOTE — PROGRESS NOTES
Problem: Mobility Impaired (Adult and Pediatric) Goal: *Acute Goals and Plan of Care (Insert Text) Description: Physical Therapy Goals Initiated 12/9/2020 and to be accomplished within 7 day(s) 1. Patient will move from supine to sit and sit to supine , scoot up and down, and roll side to side in bed with minimal assistance/contact guard assist.   
2.  Patient will transfer from bed to chair and chair to bed with minimal assistance/contact guard assist using the least restrictive device. 3.  Patient will perform sit to stand with minimal assistance/contact guard assist. 
4.  Patient will ambulate with moderate assistance  for 10-25 feet with the least restrictive device. 5.  Patient will perform stair training when appropriate. Prior Level of Function:  
Patient was independence for all mobility including gait using no AD. Patient lives alone in a 2 story home 13 steps to bedroom R HRA and 1 step to enter her home. Pt has a rollator and another walker at home. Outcome: Progressing Towards Goal 
 PHYSICAL THERAPY TREATMENT Patient: Rekha Amaro (14 y.o. female) Date: 12/11/2020 Diagnosis: Hip fracture ([facility] [address] Tiara Balls Hip fracture ([facility] [address] Procedure(s) (LRB): LEFT FEMORAL INTERTROCHANTERIC SHORT NAIL INSERTION (Left) 4 Days Post-Op Precautions: Fall, PWB(LLE; hip nailing) PLOF: see above ASSESSMENT: 
Pt is sitting in the recliner on arrival and is agreeable to PT session. Pt performed LE exercises as noted prior to initiating transfers. Pt was able to stand from the recliner with min A and increased time. Pt ambulated 2 feet forward and 2 feet back with RW and min A with verbal cues for sequencing and maintaining partial weight bearing. Pt returned to sitting in the recliner with min A and was left with leg rest elevated and needs in reach. Progression toward goals:  
[]      Improving appropriately and progressing toward goals [x]      Improving slowly and progressing toward goals 
[]      Not making progress toward goals and plan of care will be adjusted PLAN: 
Patient continues to benefit from skilled intervention to address the above impairments. Continue treatment per established plan of care. Discharge Recommendations:  Inpatient Rehab Further Equipment Recommendations for Discharge:  N/A  
 
SUBJECTIVE:  
Patient stated I'm frustrated that I can't go where I want for rehab.  OBJECTIVE DATA SUMMARY:  
Critical Behavior: 
Neurologic State: Alert Orientation Level: Oriented X4 Cognition: Appropriate decision making Safety/Judgement: Fall prevention Functional Mobility Training: 
Bed Mobility: 
Supine to Sit: Minimum assistance Scooting: Stand-by assistance Transfers: 
Sit to Stand: Minimum assistance(from recliner) Stand to Sit: Minimum assistance Bed to Chair: Moderate assistance Balance: 
Sitting: Intact Sitting - Static: Good (unsupported) Sitting - Dynamic: Good (unsupported) Standing: With support Standing - Static: Fair Standing - Dynamic : (fair-/poor+) Ambulation/Gait Training: 
Distance (ft): 4 Feet (ft) Assistive Device: Walker, rolling Ambulation - Level of Assistance: Minimal assistance Gait Abnormalities: Decreased step clearance; Antalgic; Step to gait Speed/Adrianna: Slow Therapeutic Exercises:  
 
 
 
EXERCISE Sets Reps Active Active Assist  
Passive Self ROM Comments Ankle Pumps 1 20  [x] [] [] [] Glut Sets 1 10  [x] [] [] [] Hold for 5 secs Hamstring Sets   [] [] [] [] Short Arc Quads   [] [] [] [] Heel Slides 2 10 [] [x] [] [] Straight Leg Raises   [] [] [] [] Hip Add   [] [] [] [] Hold for 5 secs, w/ pillow squeeze Long Arc Quads 2 10 [x] [] [] [] Seated Marching 2 10 [] [x] [] []   
Standing Marching   [] [] [] []   
   [] [] [] []   
 
 
Pain: 
Pain level pre-treatment: 2/10 Pain level post-treatment: 4/10 Pain Intervention(s): Rest, Repositioning Response to intervention: Nurse notified, See doc flow Activity Tolerance:  
Poor+ Please refer to the flowsheet for vital signs taken during this treatment. After treatment:  
[x] Patient left in no apparent distress sitting up in chair 
[] Patient left in no apparent distress in bed 
[x] Call bell left within reach [x] Nursing notified 
[] Caregiver present 
[] Bed alarm activated 
[] SCDs applied COMMUNICATION/EDUCATION:  
[x]         Role of Physical Therapy in the acute care setting. [x]         Fall prevention education was provided and the patient/caregiver indicated understanding. [x]         Patient/family have participated as able in working toward goals and plan of care. [x]         Patient/family agree to work toward stated goals and plan of care. []         Patient understands intent and goals of therapy, but is neutral about his/her participation. []         Patient is unable to participate in stated goals/plan of care: ongoing with therapy staff. [x]         Other: pt was re-educated on PWB status Celso Dunn, PT Time Calculation: 25 mins

## 2020-12-11 NOTE — ROUTINE PROCESS
Bedside shift change report given to Richard Fontaine RN (oncoming nurse) by Joyce Montalvo RN (offgoing nurse). Report included the following information SBAR, Kardex, Procedure Summary, Intake/Output, MAR, Recent Results and Med Rec Status.

## 2020-12-11 NOTE — ROUTINE PROCESS
I have reviewed discharge instructions with the patient. The patient verbalized understanding. Discharge medications reviewed with patient and appropriate educational materials and side effects teaching were provided. Current Discharge Medication List  
  
START taking these medications Details HYDROcodone-acetaminophen (NORCO) 5-325 mg per tablet Take 1-2 Tabs by mouth every eight (8) hours as needed for Pain (POST OPERATIVE) for up to 7 days. Max Daily Amount: 6 Tabs. Qty: 42 Tab, Refills: 0 Associated Diagnoses: Displaced intertrochanteric fracture of left femur, initial encounter for closed fracture ([address] aspirin 81 mg chewable tablet Take 2 Tabs by mouth daily. Qty: 60 Tab, Refills: 0  
  
cefpodoxime (VANTIN) 200 mg tablet Take 1 Tab by mouth two (2) times a day for 5 days. Qty: 10 Tab, Refills: 0 CONTINUE these medications which have NOT CHANGED Details  
insulin aspart U-100 (NovoLOG Flexpen U-100 Insulin) 100 unit/mL (3 mL) inpn by SubCUTAneous route Before breakfast, lunch, and dinner. Sliding scale per instructions  
  
cholecalciferol, vitamin D3, (VITAMIN D3) 2,000 unit tab Take 2,000 Units by mouth daily. nitroglycerin (NITROLINGUAL) 400 mcg/spray spray 1 Spray by SubLINGual route every five (5) minutes as needed. Qty: 1 Bottle, Refills: 2  
  
ascorbic acid, vitamin C, (VITAMIN C) 250 mg tablet Take 1 Tab by mouth daily. Qty: 60 Tab, Refills: 1  
  
multivitamin (ONE A DAY) tablet Take 1 Tab by mouth daily. simvastatin (ZOCOR) 20 mg tablet Take 20 mg by mouth daily. omega 3-dha-epa-fish oil (FISH OIL) 100-160-1,000 mg cap Take 1,000 mg by mouth two (2) times a day. cyanocobalamin (VITAMIN B12) 100 mcg tablet Take 100 mcg by mouth daily. acetaminophen (TYLENOL) 325 mg tablet Take 2 Tabs by mouth every four (4) hours as needed for Pain.  
Qty: 20 Tab, Refills: 0  
  
glucose 4 gram chewable tablet Take 4 Tabs by mouth as needed for Other (hypoglycemia). Qty: 30 Tab, Refills: 0 SITagliptin (JANUVIA) 50 mg tablet Take 50 mg by mouth daily. calcium polycarbophil (FIBER LAXATIVE) 625 mg tablet Take 1,250 mg by mouth daily as needed. STOP taking these medications  
  
 insulin degludec Cassand[employer]y FlexTouch U-100) 100 unit/mL (3 mL) inpn Comments:  
Reason for Stopping:   
   
 benazepril (LOTENSIN) 10 mg tablet Comments:  
Reason for Stopping:   
   
 furosemide (LASIX) 20 mg tablet Comments:  
Reason for Stopping:   
   
 amLODIPine (NORVASC) 10 mg tablet Comments:  
Reason for Stopping:   
   
 carvedilol (COREG) 12.5 mg tablet Comments:  
Reason for Stopping:   
   
 hydrALAZINE (APRESOLINE) 25 mg tablet Comments:  
Reason for Stopping:   
   
  
 
Patient armband removed and shredded.

## 2020-12-11 NOTE — PROGRESS NOTES
Attempted to call report to [address] [place] [address]. No one was available to take report.  took my name and number and said she would have someone call back when they were available to take report.

## 2020-12-11 NOTE — ROUTINE PROCESS
Bedside shift change report given to Alta Senior RN (oncoming nurse) by Carroll Jovel RN (offgoing nurse). Report included the following information SBAR, Kardex, Procedure Summary, Intake/Output, MAR, Recent Results and Med Rec Status.

## 2020-12-11 NOTE — DISCHARGE SUMMARY
Transfer Summary Patient: Mary Atkins MRN: [id number]  CSN: [id number] YOB: 1940  Age: 79 y.o. Sex: female DOA: 12/6/2020 LOS:  LOS: 5 days   Discharge Date: 12/11/2020 Disposition: Transfer to SNF Admission Diagnoses: Hip fracture ([address] Marshall Allyson Discharge Diagnoses: 1. Hip fracture status post fall 2. Acute renal failure on chronic congestive stage III 3. Mild metabolic acidosis 4. Abnormal UA, ? UTI 5. Acute blood loss anemia due to surgery 6. History of liver cirrhosis and esophageal varices. 7. Hypertension 8. Anemia of chronic disease 9. Thrombocytopenia, chronic 10. HTN 11. DM 2 
12. CKD 3 
13. CAD Discharge Condition: Stable PHYSICAL EXAM 
Visit Vitals /77 Pulse 90 Temp 98.7 °F (37.1 °C) Resp 16 Ht 5' (1.524 m) Wt 71.4 kg (157 lb 8 oz) SpO2 98% Breastfeeding No  
BMI 30.76 kg/m² General:  In NAD. Nontoxic-appearing. Cardiovascular:  RRR. Pulmonary:  Lungs clear bilaterally, no wheezes. GI:  Abdomen soft, NTTP. Extremities: Left hip area dressing clean, no lower extremity erythema. Neuro: AAOx4,  Moves extremities spontaneously. Hospital Course: Mary Atkins is a 79 y.o. female who presented to the ER after falling and sustaining a hip fracture. She reports that she fell in the parking lot. She says that she was hurrying and not being careful. She had no chest pain or SOB. She did not have syncope. She ambulates without difficulty normally. She presented to the ER where she is found to have hip fracture and will require surgery. Patient was admitted to hospital and orthopedic was consulted. Orthopedic saw the patient and recommended ORIF. Patient underwent surgery, tolerated procedure well. Postoperatively patient developed a blood loss anemia, requiring unit of blood transfusion.   Patient also developed acute renal failure, metabolic acidosis on postoperative day 2. Patient responded well with IV fluids and bicarb supplement. Nephrology was consulted. Nephrology thought that acute renal failure is most likely from low normal blood pressure. Patient's antiplatelet medications were stopped and was also started on midodrine. Patient blood pressure improved, she has been taken off midodrine now. Blood pressure currently stable, if started trending up patient needs to be started back on antihypertensive medications. This is suspicion of UTI and the patient but no culture, patient got treated with ceftriaxone in the hospital.  Metabolic acidosis improved. Patient had a renal ultrasound, showed mild right hydronephrosis. Nephrology recommended outpatient repeat ultrasound in 1 week. Patient is known history of liver cirrhosis and varices in the past.  Patient did not have any signs of GI bleeding in the hospital.  H&H remained stable posttransfusion. Ortho cleared the patient for discharge. Discussed with nephrology, okay for discharge from the standpoint. Patient is currently hemodynamically and medically stable for discharge, patient will be discharged to SNF today. Patient had COVID-19 testing for placement, it is negative. Discussed with the patient about discharge plan, follow-up appointments, medication changes and need for follow-up ultrasound. Patient understood and agreed with the plan. I offered to talk to patient's brother but patient states she will update the brother. Procedures: Intertrochanteric femur fracture left hip on 12/7/2020 Consults: Dr. Sherin Carbajal, orthopedic surgery Imaging studies:  
US Results (most recent): 
Results from Hospital Encounter encounter on 12/06/20 US RETROPERITONEUM COMP Narrative Complete retroperitoneal ultrasound HISTORY: Acute renal failure. Right kidney measures 11.9 cm in length. Normal echogenicity. Diffuse thinning 
of the cortex. Mild hydronephrosis.  No renal mass or renal stones. Left kidney measures 10.3 cm. Normal echogenicity. Diffuse thinning of the 
cortex with no focal mass, stones or hydronephrosis. Bladder is drained by Jean catheter. Splenomegaly, up to 16 cm in length. Impression IMPRESSION: 
1. Diffuse thinning of the cortex in both kidneys with no dominant mass or renal 
stones. Mild right hydronephrosis. 2. Splenomegaly. Discharge Medications:    
Current Discharge Medication List  
  
START taking these medications Details HYDROcodone-acetaminophen (NORCO) 5-325 mg per tablet Take 1-2 Tabs by mouth every eight (8) hours as needed for Pain (POST OPERATIVE) for up to 7 days. Max Daily Amount: 6 Tabs. Qty: 42 Tab, Refills: 0 Associated Diagnoses: Displaced intertrochanteric fracture of left femur, initial encounter for closed fracture ([facility] [address] aspirin 81 mg chewable tablet Take 2 Tabs by mouth daily. Qty: 60 Tab, Refills: 0  
  
cefpodoxime (VANTIN) 200 mg tablet Take 1 Tab by mouth two (2) times a day for 5 days. Qty: 10 Tab, Refills: 0 CONTINUE these medications which have NOT CHANGED Details  
insulin aspart U-100 (NovoLOG Flexpen U-100 Insulin) 100 unit/mL (3 mL) inpn by SubCUTAneous route Before breakfast, lunch, and dinner. Sliding scale per instructions  
  
cholecalciferol, vitamin D3, (VITAMIN D3) 2,000 unit tab Take 2,000 Units by mouth daily. nitroglycerin (NITROLINGUAL) 400 mcg/spray spray 1 Spray by SubLINGual route every five (5) minutes as needed. Qty: 1 Bottle, Refills: 2  
  
ascorbic acid, vitamin C, (VITAMIN C) 250 mg tablet Take 1 Tab by mouth daily. Qty: 60 Tab, Refills: 1  
  
multivitamin (ONE A DAY) tablet Take 1 Tab by mouth daily. simvastatin (ZOCOR) 20 mg tablet Take 20 mg by mouth daily. omega 3-dha-epa-fish oil (FISH OIL) 100-160-1,000 mg cap Take 1,000 mg by mouth two (2) times a day. cyanocobalamin (VITAMIN B12) 100 mcg tablet Take 100 mcg by mouth daily. acetaminophen (TYLENOL) 325 mg tablet Take 2 Tabs by mouth every four (4) hours as needed for Pain. Qty: 20 Tab, Refills: 0  
  
glucose 4 gram chewable tablet Take 4 Tabs by mouth as needed for Other (hypoglycemia). Qty: 30 Tab, Refills: 0 SITagliptin (JANUVIA) 50 mg tablet Take 50 mg by mouth daily. calcium polycarbophil (FIBER LAXATIVE) 625 mg tablet Take 1,250 mg by mouth daily as needed. STOP taking these medications  
  
 insulin degludec Cleta Nest FlexTouch U-100) 100 unit/mL (3 mL) inpn Comments:  
Reason for Stopping:   
   
 benazepril (LOTENSIN) 10 mg tablet Comments:  
Reason for Stopping:   
   
 furosemide (LASIX) 20 mg tablet Comments:  
Reason for Stopping:   
   
 amLODIPine (NORVASC) 10 mg tablet Comments:  
Reason for Stopping:   
   
 carvedilol (COREG) 12.5 mg tablet Comments:  
Reason for Stopping:   
   
 hydrALAZINE (APRESOLINE) 25 mg tablet Comments:  
Reason for Stopping: DIET:  Cardiac and ADA Diet ACTIVITY: Activity as tolerated Patient needs to be on Fall, aspiration, decubitus precaution. ·    PT/OT consult ·             Speech consult ·  Wound care consult ·  Accuchecks  QAC and QHS ·  PICC line care per routine ·             DVT prophylaxis ADDITIONAL INFORMATION: If you experience any of the following symptoms but not limited to Fever, chills, nausea, vomiting, diarrhea, change in mentation, falling, bleeding, shortness of breath, chest pain, please call your primary care physician or return to the emergency room if you cannot get hold of your doctor:  
 
FOLLOW UP CARE: 
Follow-up with 1. Physician at [facility] in 1-2 days with Cbc with diff, bmp, mg.   
                         2. Dr. Abhijit Hopper in 1 week 3. Dr. Severo Hugger in 2 weeks Pt's PCP: Shawn Walker MD. 
 
Minutes spent on discharge: >40 minutes spent coordinating this discharge (review instructions/follow-up, prescriptions, preparing report for sign off) 
 
Patricio Jacobson MD 
12/11/2020 12:27 PM 
 
Disclaimer: Sections of this note are dictated using utilizing voice recognition software. Minor typographical errors may be present. If questions arise, please do not hesitate to contact me or call our department.

## 2020-12-11 NOTE — PROGRESS NOTES
Problem: Self Care Deficits Care Plan (Adult) Goal: *Acute Goals and Plan of Care (Insert Text) Description: Occupational Therapy Goals Initiated 12/9/2020 within 7 day(s). 1.  Patient will perform bed mobility in preparation for self-care with supervision/set-up. 2.  Patient will perform functional activity seated EOB for 4-7 minutes with independence and G balance. 3.  Patient will perform lower body dressing with minimal assistance/contact guard assist. 
4.  Patient will perform toilet transfers with moderate assistance . 5. Patient will perform all aspects of toileting with moderate assistance . 6. Patient will participate in upper extremity therapeutic exercise/activities with independence for 7-10 minutes to increase independence for self-care tasks/functional transfers in preparation foe ADLs. 7.  Patient will utilize energy conservation techniques during functional activities with verbal cues. Prior Level of Function: Patient was independent with self-care and functional mobility PTA Outcome: Progressing Towards Goal 
 OCCUPATIONAL THERAPY TREATMENT Patient: Kevin Quispe (6 y.o. female) Date: 12/11/2020 Diagnosis: Hip fracture ([facility] [address] Iván Robinsons Hip fracture ([facility] [address] Procedure(s) (LRB): LEFT FEMORAL INTERTROCHANTERIC SHORT NAIL INSERTION (Left) 4 Days Post-Op Precautions: Fall, PWB(LLE; hip nailing) Chart, occupational therapy assessment, plan of care, and goals were reviewed. ASSESSMENT: 
Upon therapist entry Pt supine HOB elevated and eager to participate in OT therapy session. Pt was educated on PWB status before participating in EOB ADL's activity. Pt was able to maneuver to EOB with increased time and Min A to support her LLE to advance off of bed. Pt was educated on energy conservation techniques when engaging in EOB ADL's activities.  Pt was issued and educated on the use of a sock aid, reach and long handle sponge. Pt was able to doff/don B socks w/sock aid but required verbal cues and Min A to pull tops of  B socks up to shin. Pt demonstrated good static and dynamic sitting balance EOB. Pt demonstrated good BUE ROM and reach,  when teaching back with long handle sponge on how to bathe her back and BLE. Pt seated EOB demonstrated good FM strength w/conyainer management when completing oral hygiene . Pt w/RW came from sitting to standing with Mod assist of 2 to help clear her hips off EOB. Pt demonstrated fair standing balance and adhere to PWB status. Pt was able to advance forward w/ RW and Mod A of 2 to advancing LLE forward w/RW. Pt w/ Mod A of 2 was lexie to functional transfer from bed to chair. Pt was able to come from standing w/ RW to sitting w/ verbal cues for hand placement ,Min A for walker management, safe control and descent. Pt left seated in chair, eating lunch and call bell within reach. Progression toward goals: 
[x]          Improving appropriately and progressing toward goals 
[]          Improving slowly and progressing toward goals 
[]          Not making progress toward goals and plan of care will be adjusted PLAN: 
Patient continues to benefit from skilled intervention to address the above impairments. Continue treatment per established plan of care. Discharge Recommendations:  Inpatient Rehab Further Equipment Recommendations for Discharge:  bedside commode, shower chair, transfer bench, and rolling walker SUBJECTIVE:  
Patient stated I think the tools you left me with are amazing.  OBJECTIVE DATA SUMMARY:  
Cognitive/Behavioral Status: 
Neurologic State: Alert Orientation Level: Oriented X4 Cognition: Appropriate decision making Safety/Judgement: Fall prevention Functional Mobility and Transfers for ADLs: 
 Bed Mobility: 
  
Supine to Sit: Minimum assistance Scooting: Stand-by assistance Transfers: 
Sit to Stand: Moderate assistance Stand to Sit: Minimum assistance Bed to Chair: Moderate assistance Balance: 
Sitting: Intact Sitting - Static: Good (unsupported) Sitting - Dynamic: Good (unsupported) Standing: Impaired Standing - Static: Fair(fair) Standing - Dynamic : Fair ADL Intervention: 
 
Grooming Brushing Teeth: Set-up Lower Body Dressing Assistance Socks: Minimum assistance Leg Crossed Method Used: No 
Position Performed: Seated edge of bed Cues: Verbal cues provided Adaptive Equipment Used: Reacher;Sock aid Pain: 
Pain level pre-treatment: 0/10 Pain level post-treatment: 0/10 Activity Tolerance:   
good Please refer to the flowsheet for vital signs taken during this treatment. After treatment:  
[x]  Patient left in no apparent distress sitting up in chair 
[]  Patient left in no apparent distress in bed 
[x]  Call bell left within reach 
[]  Nursing notified 
[]  Caregiver present 
[]  Bed alarm activated COMMUNICATION/EDUCATION:  
[] Role of Occupational Therapy in the acute care setting 
[] Home safety education was provided and the patient/caregiver indicated understanding. [] Patient/family have participated as able in working towards goals and plan of care. [x] Patient/family agree to work toward stated goals and plan of care. [] Patient understands intent and goals of therapy, but is neutral about his/her participation. [] Patient is unable to participate in goal setting and plan of care. Thank you for this referral. 
Jean Albert Time Calculation: 30 mins

## 2020-12-23 NOTE — PROGRESS NOTES
Patti Villalobos is a 79 y.o. female who was seen by synchronous (real-time) audio-video technology on 12/23/2020 via doxy. me. The visit was performed by myself at home while the patient was in the office. Amol Jeter helped to initiate the visit and was present during entire visit. Subjective:  
Patti Villalobos is a 79 y.o. female who presents today for reevaluation of left hip s/p troch nail on 12/7/2020 . She overall. Working hard in therapy. Pain score 4/10. Patient denies any fever, chills, chest pain, shortness of breath or calf pain. The remainder of the review of systems is negative. There are no new illness or injuries to report since last seen in the office. No changes in medications, allergies, social or family history. Prior to Admission medications Medication Sig Start Date End Date Taking? Authorizing Provider HYDROcodone-acetaminophen (NORCO) 5-325 mg per tablet Take 1 Tab by mouth every six (6) hours as needed for Pain for up to 10 days. Max Daily Amount: 4 Tabs. 12/23/20 1/2/21 Yes Nely Allred PA  
aspirin 81 mg chewable tablet Take 2 Tabs by mouth daily. 12/12/20   Sophie Bourgeois PA-C  
insulin aspart U-100 (NovoLOG Flexpen U-100 Insulin) 100 unit/mL (3 mL) inpn by SubCUTAneous route Before breakfast, lunch, and dinner. Sliding scale per instructions    Provider, Historical  
acetaminophen (TYLENOL) 325 mg tablet Take 2 Tabs by mouth every four (4) hours as needed for Pain. 12/1/19   ELOISA Mckeon  
glucose 4 gram chewable tablet Take 4 Tabs by mouth as needed for Other (hypoglycemia). 3/28/19   Malini Gillespie NP  
SITagliptin (JANUVIA) 50 mg tablet Take 50 mg by mouth daily. Provider, Historical  
cholecalciferol, vitamin D3, (VITAMIN D3) 2,000 unit tab Take 2,000 Units by mouth daily. Provider, Historical  
nitroglycerin (NITROLINGUAL) 400 mcg/spray spray 1 Spray by SubLINGual route every five (5) minutes as needed.  8/17/16   Allie Bledsoe PA  
 ascorbic acid, vitamin C, (VITAMIN C) 250 mg tablet Take 1 Tab by mouth daily. 7/5/16   Karoline Batista MD  
multivitamin (ONE A DAY) tablet Take 1 Tab by mouth daily. Provider, Historical  
simvastatin (ZOCOR) 20 mg tablet Take 20 mg by mouth daily. Provider, Historical  
omega 3-dha-epa-fish oil (FISH OIL) 100-160-1,000 mg cap Take 1,000 mg by mouth two (2) times a day. Provider, Historical  
cyanocobalamin (VITAMIN B12) 100 mcg tablet Take 100 mcg by mouth daily. Provider, Historical  
calcium polycarbophil (FIBER LAXATIVE) 625 mg tablet Take 1,250 mg by mouth daily as needed. Provider, Historical  
 
Allergies Allergen Reactions  Latex Itching Skin breaks out causing itching ROS Objective:  
 
General: alert, cooperative, no distress Mental  status: mental status: alert, oriented to person, place, and time, normal mood, behavior, speech, dress, motor activity, and thought processes Resp: resp: normal effort and no respiratory distress Neuro: neuro: no gross deficits Skin: skin: no discoloration or lesions of concern on visible areas Due to this being a TeleHealth evaluation, many elements of the physical examination are unable to be assessed. Assessment & Plan:  
Diagnoses and all orders for this visit: 1. Closed fracture of left hip, initial encounter ([facility] [address] -     AMB SUPPLY ORDER 
-     HYDROcodone-acetaminophen (NORCO) 5-325 mg per tablet; Take 1 Tab by mouth every six (6) hours as needed for Pain for up to 10 days. Max Daily Amount: 4 Tabs. 1. Patient doing well post operatively. She is WBAT now. Order given for staple removal 
Risk factors include: n/a 2. No cortisone injection indicated today 3. Yes Physical/Occupational Therapy indicated today 4. No diagnostic test indicated today: 5. No durable medical equipment indicated today 6. No referral indicated today 7. No medications indicated today: 8. Yes Narcotic indicated today for short term acute pain secondary to acute post op pain. Patient given pain medication for short term acute pain relief. Goal is to treat patient according to above plan and to ultimately have patient off all pain medications once appropriate. If chronic pain management is required beyond what is expected for current orthopedic problem, will refer patient to pain management.  was reviewed and will be reviewed with every medication refill request.  
 
 
 
RTC 2 months for xrays We discussed the expected course, resolution and complications of the diagnosis(es) in detail. Medication risks, benefits, costs, interactions, and alternatives were discussed as indicated. I advised her to contact the office if her condition worsens, changes or fails to improve as anticipated. She expressed understanding with the diagnosis(es) and plan. CPT Codes 09765-81608 for Established Patients may apply to this Telehealth Visit Time-based coding, delete if not needed: I spent at least 15 minutes with this established patient, and >50% of the time was spent counseling and/or coordinating care. Pursuant to the emergency declaration under the [facility], 1135 waiver authority and the [employer] Act, this Virtual  Visit was conducted, with patient's consent, to reduce the patient's risk of exposure to COVID-19 and provide continuity of care for an established patient. Services were provided through a video synchronous discussion virtually to substitute for in-person clinic visit. [facility]

## 2020-12-30 NOTE — TELEPHONE ENCOUNTER
Buzz Gould with [facility] physical therapy called requesting an order for the patient's weight bearing status. She asked if the order can be faxed to [phone] [facility].

## 2021-01-01 ENCOUNTER — APPOINTMENT (OUTPATIENT)
Dept: NON INVASIVE DIAGNOSTICS | Age: 81
DRG: 870 | End: 2021-01-01
Attending: PHYSICIAN ASSISTANT
Payer: MEDICARE

## 2021-01-01 ENCOUNTER — HOSPITAL ENCOUNTER (OUTPATIENT)
Dept: LAB | Age: 81
Discharge: HOME | End: 2021-01-08

## 2021-01-01 ENCOUNTER — APPOINTMENT (OUTPATIENT)
Dept: GENERAL RADIOLOGY | Age: 81
DRG: 870 | End: 2021-01-01
Attending: EMERGENCY MEDICINE
Payer: MEDICARE

## 2021-01-01 ENCOUNTER — APPOINTMENT (OUTPATIENT)
Dept: GENERAL RADIOLOGY | Age: 81
DRG: 870 | End: 2021-01-01
Attending: INTERNAL MEDICINE
Payer: MEDICARE

## 2021-01-01 ENCOUNTER — HOSPITAL ENCOUNTER (OUTPATIENT)
Dept: INFUSION THERAPY | Age: 81
End: 2021-01-01

## 2021-01-01 ENCOUNTER — APPOINTMENT (OUTPATIENT)
Dept: CT IMAGING | Age: 81
DRG: 870 | End: 2021-01-01
Attending: EMERGENCY MEDICINE
Payer: MEDICARE

## 2021-01-01 ENCOUNTER — APPOINTMENT (OUTPATIENT)
Dept: GENERAL RADIOLOGY | Age: 81
DRG: 870 | End: 2021-01-01
Attending: NURSE PRACTITIONER
Payer: MEDICARE

## 2021-01-01 ENCOUNTER — APPOINTMENT (OUTPATIENT)
Dept: GENERAL RADIOLOGY | Age: 81
DRG: 870 | End: 2021-01-01
Attending: PHYSICIAN ASSISTANT
Payer: MEDICARE

## 2021-01-01 ENCOUNTER — APPOINTMENT (OUTPATIENT)
Dept: GENERAL RADIOLOGY | Age: 81
DRG: 870 | End: 2021-01-01
Attending: STUDENT IN AN ORGANIZED HEALTH CARE EDUCATION/TRAINING PROGRAM
Payer: MEDICARE

## 2021-01-01 ENCOUNTER — ANESTHESIA (OUTPATIENT)
Dept: ICU | Age: 81
DRG: 870 | End: 2021-01-01
Payer: MEDICARE

## 2021-01-01 ENCOUNTER — ANESTHESIA EVENT (OUTPATIENT)
Dept: ICU | Age: 81
DRG: 870 | End: 2021-01-01
Payer: MEDICARE

## 2021-01-01 ENCOUNTER — APPOINTMENT (OUTPATIENT)
Dept: VASCULAR SURGERY | Age: 81
DRG: 870 | End: 2021-01-01
Attending: STUDENT IN AN ORGANIZED HEALTH CARE EDUCATION/TRAINING PROGRAM
Payer: MEDICARE

## 2021-01-01 ENCOUNTER — APPOINTMENT (OUTPATIENT)
Dept: INFUSION THERAPY | Age: 81
End: 2021-01-01

## 2021-01-01 ENCOUNTER — HOSPITAL ENCOUNTER (INPATIENT)
Age: 81
LOS: 24 days | DRG: 870 | End: 2021-02-20
Attending: EMERGENCY MEDICINE | Admitting: INTERNAL MEDICINE
Payer: MEDICARE

## 2021-01-01 ENCOUNTER — APPOINTMENT (OUTPATIENT)
Dept: VASCULAR SURGERY | Age: 81
DRG: 870 | End: 2021-01-01
Attending: EMERGENCY MEDICINE
Payer: MEDICARE

## 2021-01-01 ENCOUNTER — HOSPICE ADMISSION (OUTPATIENT)
Dept: HOSPICE | Facility: HOSPICE | Age: 81
End: 2021-01-01

## 2021-01-01 VITALS
TEMPERATURE: 99.2 F | WEIGHT: 151.8 LBS | SYSTOLIC BLOOD PRESSURE: 121 MMHG | BODY MASS INDEX: 29.8 KG/M2 | HEART RATE: 105 BPM | HEIGHT: 60 IN | DIASTOLIC BLOOD PRESSURE: 72 MMHG | RESPIRATION RATE: 30 BRPM | OXYGEN SATURATION: 90 %

## 2021-01-01 DIAGNOSIS — Z71.89 GOALS OF CARE, COUNSELING/DISCUSSION: ICD-10-CM

## 2021-01-01 DIAGNOSIS — I50.33 ACUTE ON CHRONIC DIASTOLIC CHF (CONGESTIVE HEART FAILURE) (HCC): ICD-10-CM

## 2021-01-01 DIAGNOSIS — D64.9 ANEMIA, UNSPECIFIED TYPE: ICD-10-CM

## 2021-01-01 DIAGNOSIS — J96.01 ACUTE RESPIRATORY FAILURE WITH HYPOXIA (HCC): ICD-10-CM

## 2021-01-01 DIAGNOSIS — K92.1 GASTROINTESTINAL HEMORRHAGE WITH MELENA: ICD-10-CM

## 2021-01-01 DIAGNOSIS — U07.1 PNEUMONIA DUE TO COVID-19 VIRUS: Primary | ICD-10-CM

## 2021-01-01 DIAGNOSIS — I21.4 NSTEMI (NON-ST ELEVATED MYOCARDIAL INFARCTION) (HCC): ICD-10-CM

## 2021-01-01 DIAGNOSIS — U07.1 COVID-19: ICD-10-CM

## 2021-01-01 DIAGNOSIS — K74.60 CIRRHOSIS OF LIVER NOT DUE TO ALCOHOL (HCC): ICD-10-CM

## 2021-01-01 DIAGNOSIS — R53.81 DEBILITY: ICD-10-CM

## 2021-01-01 DIAGNOSIS — J12.82 PNEUMONIA DUE TO COVID-19 VIRUS: Primary | ICD-10-CM

## 2021-01-01 DIAGNOSIS — I35.0 AORTIC VALVE STENOSIS, ETIOLOGY OF CARDIAC VALVE DISEASE UNSPECIFIED: ICD-10-CM

## 2021-01-01 DIAGNOSIS — N30.00 ACUTE CYSTITIS WITHOUT HEMATURIA: ICD-10-CM

## 2021-01-01 LAB
ABO + RH BLD: NORMAL
ALBUMIN SERPL-MCNC: 1.9 G/DL (ref 3.4–5)
ALBUMIN SERPL-MCNC: 1.9 G/DL (ref 3.4–5)
ALBUMIN SERPL-MCNC: 2 G/DL (ref 3.4–5)
ALBUMIN SERPL-MCNC: 2.3 G/DL (ref 3.4–5)
ALBUMIN SERPL-MCNC: 2.3 G/DL (ref 3.4–5)
ALBUMIN SERPL-MCNC: 2.4 G/DL (ref 3.4–5)
ALBUMIN SERPL-MCNC: 2.5 G/DL (ref 3.4–5)
ALBUMIN SERPL-MCNC: 2.6 G/DL (ref 3.4–5)
ALBUMIN SERPL-MCNC: 2.6 G/DL (ref 3.4–5)
ALBUMIN SERPL-MCNC: 2.7 G/DL (ref 3.4–5)
ALBUMIN SERPL-MCNC: 2.8 G/DL (ref 3.4–5)
ALBUMIN SERPL-MCNC: 2.9 G/DL (ref 3.4–5)
ALBUMIN SERPL-MCNC: 2.9 G/DL (ref 3.4–5)
ALBUMIN SERPL-MCNC: 3.1 G/DL (ref 3.4–5)
ALBUMIN SERPL-MCNC: 3.2 G/DL (ref 3.4–5)
ALBUMIN SERPL-MCNC: 3.3 G/DL (ref 3.4–5)
ALBUMIN SERPL-MCNC: 3.4 G/DL (ref 3.4–5)
ALBUMIN SERPL-MCNC: 3.5 G/DL (ref 3.4–5)
ALBUMIN SERPL-MCNC: 3.6 G/DL (ref 3.4–5)
ALBUMIN SERPL-MCNC: 3.6 G/DL (ref 3.4–5)
ALBUMIN SERPL-MCNC: 3.7 G/DL (ref 3.4–5)
ALBUMIN/GLOB SERPL: 0.7 {RATIO} (ref 0.8–1.7)
ALBUMIN/GLOB SERPL: 0.8 {RATIO} (ref 0.8–1.7)
ALBUMIN/GLOB SERPL: 0.9 {RATIO} (ref 0.8–1.7)
ALBUMIN/GLOB SERPL: 1 {RATIO} (ref 0.8–1.7)
ALBUMIN/GLOB SERPL: 1.2 {RATIO} (ref 0.8–1.7)
ALBUMIN/GLOB SERPL: 1.3 {RATIO} (ref 0.8–1.7)
ALBUMIN/GLOB SERPL: 1.4 {RATIO} (ref 0.8–1.7)
ALP SERPL-CCNC: 104 U/L (ref 45–117)
ALP SERPL-CCNC: 108 U/L (ref 45–117)
ALP SERPL-CCNC: 109 U/L (ref 45–117)
ALP SERPL-CCNC: 121 U/L (ref 45–117)
ALP SERPL-CCNC: 123 U/L (ref 45–117)
ALP SERPL-CCNC: 130 U/L (ref 45–117)
ALP SERPL-CCNC: 140 U/L (ref 45–117)
ALP SERPL-CCNC: 171 U/L (ref 45–117)
ALP SERPL-CCNC: 68 U/L (ref 45–117)
ALP SERPL-CCNC: 69 U/L (ref 45–117)
ALP SERPL-CCNC: 77 U/L (ref 45–117)
ALP SERPL-CCNC: 81 U/L (ref 45–117)
ALP SERPL-CCNC: 84 U/L (ref 45–117)
ALP SERPL-CCNC: 85 U/L (ref 45–117)
ALP SERPL-CCNC: 91 U/L (ref 45–117)
ALT SERPL-CCNC: 13 U/L (ref 13–56)
ALT SERPL-CCNC: 14 U/L (ref 13–56)
ALT SERPL-CCNC: 15 U/L (ref 13–56)
ALT SERPL-CCNC: 16 U/L (ref 13–56)
ALT SERPL-CCNC: 16 U/L (ref 13–56)
ALT SERPL-CCNC: 18 U/L (ref 13–56)
ALT SERPL-CCNC: 20 U/L (ref 13–56)
ALT SERPL-CCNC: 21 U/L (ref 13–56)
ALT SERPL-CCNC: 22 U/L (ref 13–56)
ALT SERPL-CCNC: 31 U/L (ref 13–56)
ALT SERPL-CCNC: 35 U/L (ref 13–56)
ALT SERPL-CCNC: 39 U/L (ref 13–56)
ALT SERPL-CCNC: 53 U/L (ref 13–56)
ALT SERPL-CCNC: 66 U/L (ref 13–56)
ALT SERPL-CCNC: 77 U/L (ref 13–56)
ANION GAP SERPL CALC-SCNC: 10 MMOL/L (ref 3–18)
ANION GAP SERPL CALC-SCNC: 11 MMOL/L (ref 3–18)
ANION GAP SERPL CALC-SCNC: 12 MMOL/L (ref 3–18)
ANION GAP SERPL CALC-SCNC: 12 MMOL/L (ref 3–18)
ANION GAP SERPL CALC-SCNC: 14 MMOL/L (ref 3–18)
ANION GAP SERPL CALC-SCNC: 15 MMOL/L (ref 3–18)
ANION GAP SERPL CALC-SCNC: 15 MMOL/L (ref 3–18)
ANION GAP SERPL CALC-SCNC: 4 MMOL/L (ref 3–18)
ANION GAP SERPL CALC-SCNC: 5 MMOL/L (ref 3–18)
ANION GAP SERPL CALC-SCNC: 6 MMOL/L (ref 3–18)
ANION GAP SERPL CALC-SCNC: 7 MMOL/L (ref 3–18)
ANION GAP SERPL CALC-SCNC: 8 MMOL/L (ref 3–18)
ANION GAP SERPL CALC-SCNC: 9 MMOL/L (ref 3–18)
APPEARANCE UR: ABNORMAL
ARTERIAL PATENCY WRIST A: ABNORMAL
ARTERIAL PATENCY WRIST A: YES
AST SERPL-CCNC: 112 U/L (ref 10–38)
AST SERPL-CCNC: 12 U/L (ref 10–38)
AST SERPL-CCNC: 12 U/L (ref 10–38)
AST SERPL-CCNC: 15 U/L (ref 10–38)
AST SERPL-CCNC: 15 U/L (ref 10–38)
AST SERPL-CCNC: 17 U/L (ref 10–38)
AST SERPL-CCNC: 175 U/L (ref 10–38)
AST SERPL-CCNC: 18 U/L (ref 10–38)
AST SERPL-CCNC: 26 U/L (ref 10–38)
AST SERPL-CCNC: 48 U/L (ref 10–38)
AST SERPL-CCNC: 57 U/L (ref 10–38)
AST SERPL-CCNC: 68 U/L (ref 10–38)
AST SERPL-CCNC: 92 U/L (ref 10–38)
BACTERIA SPEC CULT: ABNORMAL
BACTERIA SPEC CULT: NORMAL
BACTERIA URNS QL MICRO: ABNORMAL /HPF
BASE DEFICIT BLD-SCNC: 1 MMOL/L
BASE DEFICIT BLD-SCNC: 11 MMOL/L
BASE DEFICIT BLD-SCNC: 15 MMOL/L
BASE DEFICIT BLD-SCNC: 15 MMOL/L
BASE DEFICIT BLD-SCNC: 16 MMOL/L
BASE DEFICIT BLD-SCNC: 4 MMOL/L
BASE DEFICIT BLD-SCNC: 8 MMOL/L
BASE EXCESS BLD CALC-SCNC: 0 MMOL/L
BASE EXCESS BLD CALC-SCNC: 1 MMOL/L
BASE EXCESS BLD CALC-SCNC: 3 MMOL/L
BASE EXCESS BLD CALC-SCNC: 3 MMOL/L
BASE EXCESS BLD CALC-SCNC: 5 MMOL/L
BASE EXCESS BLD CALC-SCNC: 6 MMOL/L
BASE EXCESS BLD CALC-SCNC: 7 MMOL/L
BASE EXCESS BLD CALC-SCNC: 8 MMOL/L
BASOPHILS # BLD: 0 K/UL (ref 0–0.06)
BASOPHILS # BLD: 0 K/UL (ref 0–0.1)
BASOPHILS # BLD: 0.1 K/UL (ref 0–0.06)
BASOPHILS NFR BLD: 0 % (ref 0–2)
BASOPHILS NFR BLD: 0 % (ref 0–3)
BASOPHILS NFR BLD: 1 % (ref 0–2)
BASOPHILS NFR BLD: 1 % (ref 0–3)
BDY SITE: ABNORMAL
BDY SITE: NORMAL
BILIRUB DIRECT SERPL-MCNC: 0.4 MG/DL (ref 0–0.2)
BILIRUB SERPL-MCNC: 0.7 MG/DL (ref 0.2–1)
BILIRUB SERPL-MCNC: 0.7 MG/DL (ref 0.2–1)
BILIRUB SERPL-MCNC: 0.8 MG/DL (ref 0.2–1)
BILIRUB SERPL-MCNC: 0.9 MG/DL (ref 0.2–1)
BILIRUB SERPL-MCNC: 1.5 MG/DL (ref 0.2–1)
BILIRUB SERPL-MCNC: 1.5 MG/DL (ref 0.2–1)
BILIRUB SERPL-MCNC: 1.6 MG/DL (ref 0.2–1)
BILIRUB SERPL-MCNC: 1.7 MG/DL (ref 0.2–1)
BILIRUB SERPL-MCNC: 1.7 MG/DL (ref 0.2–1)
BILIRUB UR QL: ABNORMAL
BLD PROD TYP BPU: NORMAL
BLOOD GROUP ANTIBODIES SERPL: NORMAL
BNP SERPL-MCNC: ABNORMAL PG/ML (ref 0–1800)
BODY TEMPERATURE: 35.6
BODY TEMPERATURE: 36.1
BODY TEMPERATURE: 36.3
BODY TEMPERATURE: 36.5
BODY TEMPERATURE: 36.8
BODY TEMPERATURE: 37
BODY TEMPERATURE: 37.8
BODY TEMPERATURE: 98.6
BODY TEMPERATURE: 98.6
BPU ID: NORMAL
BUN SERPL-MCNC: 103 MG/DL (ref 7–18)
BUN SERPL-MCNC: 104 MG/DL (ref 7–18)
BUN SERPL-MCNC: 105 MG/DL (ref 7–18)
BUN SERPL-MCNC: 107 MG/DL (ref 7–18)
BUN SERPL-MCNC: 110 MG/DL (ref 7–18)
BUN SERPL-MCNC: 117 MG/DL (ref 7–18)
BUN SERPL-MCNC: 130 MG/DL (ref 7–18)
BUN SERPL-MCNC: 135 MG/DL (ref 7–18)
BUN SERPL-MCNC: 136 MG/DL (ref 7–18)
BUN SERPL-MCNC: 143 MG/DL (ref 7–18)
BUN SERPL-MCNC: 144 MG/DL (ref 7–18)
BUN SERPL-MCNC: 147 MG/DL (ref 7–18)
BUN SERPL-MCNC: 148 MG/DL (ref 7–18)
BUN SERPL-MCNC: 148 MG/DL (ref 7–18)
BUN SERPL-MCNC: 153 MG/DL (ref 7–18)
BUN SERPL-MCNC: 159 MG/DL (ref 7–18)
BUN SERPL-MCNC: 31 MG/DL (ref 7–18)
BUN SERPL-MCNC: 39 MG/DL (ref 7–18)
BUN SERPL-MCNC: 41 MG/DL (ref 7–18)
BUN SERPL-MCNC: 43 MG/DL (ref 7–18)
BUN SERPL-MCNC: 45 MG/DL (ref 7–18)
BUN SERPL-MCNC: 48 MG/DL (ref 7–18)
BUN SERPL-MCNC: 50 MG/DL (ref 7–18)
BUN SERPL-MCNC: 51 MG/DL (ref 7–18)
BUN SERPL-MCNC: 51 MG/DL (ref 7–18)
BUN SERPL-MCNC: 52 MG/DL (ref 7–18)
BUN SERPL-MCNC: 61 MG/DL (ref 7–18)
BUN SERPL-MCNC: 61 MG/DL (ref 7–18)
BUN SERPL-MCNC: 62 MG/DL (ref 7–18)
BUN SERPL-MCNC: 72 MG/DL (ref 7–18)
BUN SERPL-MCNC: 73 MG/DL (ref 7–18)
BUN SERPL-MCNC: 78 MG/DL (ref 7–18)
BUN SERPL-MCNC: 79 MG/DL (ref 7–18)
BUN SERPL-MCNC: 80 MG/DL (ref 7–18)
BUN SERPL-MCNC: 83 MG/DL (ref 7–18)
BUN SERPL-MCNC: 90 MG/DL (ref 7–18)
BUN SERPL-MCNC: 91 MG/DL (ref 7–18)
BUN SERPL-MCNC: 91 MG/DL (ref 7–18)
BUN SERPL-MCNC: 94 MG/DL (ref 7–18)
BUN SERPL-MCNC: 95 MG/DL (ref 7–18)
BUN SERPL-MCNC: 98 MG/DL (ref 7–18)
BUN SERPL-MCNC: 98 MG/DL (ref 7–18)
BUN/CREAT SERPL: 23 (ref 12–20)
BUN/CREAT SERPL: 23 (ref 12–20)
BUN/CREAT SERPL: 24 (ref 12–20)
BUN/CREAT SERPL: 24 (ref 12–20)
BUN/CREAT SERPL: 25 (ref 12–20)
BUN/CREAT SERPL: 26 (ref 12–20)
BUN/CREAT SERPL: 26 (ref 12–20)
BUN/CREAT SERPL: 27 (ref 12–20)
BUN/CREAT SERPL: 27 (ref 12–20)
BUN/CREAT SERPL: 28 (ref 12–20)
BUN/CREAT SERPL: 32 (ref 12–20)
BUN/CREAT SERPL: 33 (ref 12–20)
BUN/CREAT SERPL: 34 (ref 12–20)
BUN/CREAT SERPL: 35 (ref 12–20)
BUN/CREAT SERPL: 35 (ref 12–20)
BUN/CREAT SERPL: 36 (ref 12–20)
BUN/CREAT SERPL: 36 (ref 12–20)
BUN/CREAT SERPL: 37 (ref 12–20)
BUN/CREAT SERPL: 37 (ref 12–20)
BUN/CREAT SERPL: 38 (ref 12–20)
BUN/CREAT SERPL: 39 (ref 12–20)
BUN/CREAT SERPL: 39 (ref 12–20)
BUN/CREAT SERPL: 40 (ref 12–20)
BUN/CREAT SERPL: 42 (ref 12–20)
BUN/CREAT SERPL: 42 (ref 12–20)
BUN/CREAT SERPL: 43 (ref 12–20)
BUN/CREAT SERPL: 44 (ref 12–20)
BUN/CREAT SERPL: 45 (ref 12–20)
BUN/CREAT SERPL: 46 (ref 12–20)
BUN/CREAT SERPL: 50 (ref 12–20)
CA-I BLD-MCNC: 1.23 MMOL/L (ref 1.12–1.32)
CA-I SERPL-SCNC: 1.08 MMOL/L (ref 1.12–1.32)
CA-I SERPL-SCNC: 1.09 MMOL/L (ref 1.12–1.32)
CA-I SERPL-SCNC: 1.12 MMOL/L (ref 1.12–1.32)
CALCIUM SERPL-MCNC: 7 MG/DL (ref 8.5–10.1)
CALCIUM SERPL-MCNC: 7 MG/DL (ref 8.5–10.1)
CALCIUM SERPL-MCNC: 7.1 MG/DL (ref 8.5–10.1)
CALCIUM SERPL-MCNC: 7.2 MG/DL (ref 8.5–10.1)
CALCIUM SERPL-MCNC: 7.3 MG/DL (ref 8.5–10.1)
CALCIUM SERPL-MCNC: 7.3 MG/DL (ref 8.5–10.1)
CALCIUM SERPL-MCNC: 7.4 MG/DL (ref 8.5–10.1)
CALCIUM SERPL-MCNC: 7.4 MG/DL (ref 8.5–10.1)
CALCIUM SERPL-MCNC: 7.5 MG/DL (ref 8.5–10.1)
CALCIUM SERPL-MCNC: 7.6 MG/DL (ref 8.5–10.1)
CALCIUM SERPL-MCNC: 7.7 MG/DL (ref 8.5–10.1)
CALCIUM SERPL-MCNC: 7.8 MG/DL (ref 8.5–10.1)
CALCIUM SERPL-MCNC: 7.8 MG/DL (ref 8.5–10.1)
CALCIUM SERPL-MCNC: 8 MG/DL (ref 8.5–10.1)
CALCIUM SERPL-MCNC: 8.1 MG/DL (ref 8.5–10.1)
CALCIUM SERPL-MCNC: 8.2 MG/DL (ref 8.5–10.1)
CALCIUM SERPL-MCNC: 8.3 MG/DL (ref 8.5–10.1)
CALCIUM SERPL-MCNC: 8.5 MG/DL (ref 8.5–10.1)
CALCIUM SERPL-MCNC: 8.6 MG/DL (ref 8.5–10.1)
CALCIUM SERPL-MCNC: 8.7 MG/DL (ref 8.5–10.1)
CALCIUM SERPL-MCNC: 8.8 MG/DL (ref 8.5–10.1)
CALCIUM SERPL-MCNC: 8.8 MG/DL (ref 8.5–10.1)
CALLED TO:,BCALL1: NORMAL
CC UR VC: ABNORMAL
CHLORIDE SERPL-SCNC: 103 MMOL/L (ref 100–111)
CHLORIDE SERPL-SCNC: 104 MMOL/L (ref 100–111)
CHLORIDE SERPL-SCNC: 104 MMOL/L (ref 100–111)
CHLORIDE SERPL-SCNC: 105 MMOL/L (ref 100–111)
CHLORIDE SERPL-SCNC: 106 MMOL/L (ref 100–111)
CHLORIDE SERPL-SCNC: 106 MMOL/L (ref 100–111)
CHLORIDE SERPL-SCNC: 107 MMOL/L (ref 100–111)
CHLORIDE SERPL-SCNC: 108 MMOL/L (ref 100–111)
CHLORIDE SERPL-SCNC: 109 MMOL/L (ref 100–111)
CHLORIDE SERPL-SCNC: 110 MMOL/L (ref 100–111)
CHLORIDE SERPL-SCNC: 111 MMOL/L (ref 100–111)
CHLORIDE SERPL-SCNC: 112 MMOL/L (ref 100–111)
CHLORIDE SERPL-SCNC: 113 MMOL/L (ref 100–111)
CHLORIDE SERPL-SCNC: 113 MMOL/L (ref 100–111)
CK MB CFR SERPL CALC: 3.2 % (ref 0–4)
CK MB CFR SERPL CALC: 3.4 % (ref 0–4)
CK MB CFR SERPL CALC: 3.8 % (ref 0–4)
CK MB CFR SERPL CALC: 4.4 % (ref 0–4)
CK MB SERPL-MCNC: 4.4 NG/ML (ref 5–25)
CK MB SERPL-MCNC: 5.9 NG/ML (ref 5–25)
CK MB SERPL-MCNC: 6.6 NG/ML (ref 5–25)
CK MB SERPL-MCNC: 6.8 NG/ML (ref 5–25)
CK SERPL-CCNC: 129 U/L (ref 26–192)
CK SERPL-CCNC: 153 U/L (ref 26–192)
CK SERPL-CCNC: 174 U/L (ref 26–192)
CK SERPL-CCNC: 182 U/L (ref 26–192)
CO2 SERPL-SCNC: 13 MMOL/L (ref 21–32)
CO2 SERPL-SCNC: 14 MMOL/L (ref 21–32)
CO2 SERPL-SCNC: 15 MMOL/L (ref 21–32)
CO2 SERPL-SCNC: 16 MMOL/L (ref 21–32)
CO2 SERPL-SCNC: 19 MMOL/L (ref 21–32)
CO2 SERPL-SCNC: 20 MMOL/L (ref 21–32)
CO2 SERPL-SCNC: 20 MMOL/L (ref 21–32)
CO2 SERPL-SCNC: 21 MMOL/L (ref 21–32)
CO2 SERPL-SCNC: 22 MMOL/L (ref 21–32)
CO2 SERPL-SCNC: 23 MMOL/L (ref 21–32)
CO2 SERPL-SCNC: 24 MMOL/L (ref 21–32)
CO2 SERPL-SCNC: 25 MMOL/L (ref 21–32)
CO2 SERPL-SCNC: 26 MMOL/L (ref 21–32)
CO2 SERPL-SCNC: 27 MMOL/L (ref 21–32)
CO2 SERPL-SCNC: 28 MMOL/L (ref 21–32)
CO2 SERPL-SCNC: 29 MMOL/L (ref 21–32)
CO2 SERPL-SCNC: 29 MMOL/L (ref 21–32)
CO2 SERPL-SCNC: 30 MMOL/L (ref 21–32)
CO2 SERPL-SCNC: 30 MMOL/L (ref 21–32)
COLOR UR: ABNORMAL
COVID-19 RAPID TEST, COVR: DETECTED
CREAT SERPL-MCNC: 1.1 MG/DL (ref 0.6–1.3)
CREAT SERPL-MCNC: 1.34 MG/DL (ref 0.6–1.3)
CREAT SERPL-MCNC: 1.5 MG/DL (ref 0.6–1.3)
CREAT SERPL-MCNC: 1.71 MG/DL (ref 0.6–1.3)
CREAT SERPL-MCNC: 1.79 MG/DL (ref 0.6–1.3)
CREAT SERPL-MCNC: 1.8 MG/DL (ref 0.6–1.3)
CREAT SERPL-MCNC: 1.83 MG/DL (ref 0.6–1.3)
CREAT SERPL-MCNC: 1.89 MG/DL (ref 0.6–1.3)
CREAT SERPL-MCNC: 2.03 MG/DL (ref 0.6–1.3)
CREAT SERPL-MCNC: 2.07 MG/DL (ref 0.6–1.3)
CREAT SERPL-MCNC: 2.1 MG/DL (ref 0.6–1.3)
CREAT SERPL-MCNC: 2.1 MG/DL (ref 0.6–1.3)
CREAT SERPL-MCNC: 2.14 MG/DL (ref 0.6–1.3)
CREAT SERPL-MCNC: 2.17 MG/DL (ref 0.6–1.3)
CREAT SERPL-MCNC: 2.19 MG/DL (ref 0.6–1.3)
CREAT SERPL-MCNC: 2.33 MG/DL (ref 0.6–1.3)
CREAT SERPL-MCNC: 2.34 MG/DL (ref 0.6–1.3)
CREAT SERPL-MCNC: 2.35 MG/DL (ref 0.6–1.3)
CREAT SERPL-MCNC: 2.45 MG/DL (ref 0.6–1.3)
CREAT SERPL-MCNC: 2.51 MG/DL (ref 0.6–1.3)
CREAT SERPL-MCNC: 2.68 MG/DL (ref 0.6–1.3)
CREAT SERPL-MCNC: 2.71 MG/DL (ref 0.6–1.3)
CREAT SERPL-MCNC: 2.81 MG/DL (ref 0.6–1.3)
CREAT SERPL-MCNC: 2.86 MG/DL (ref 0.6–1.3)
CREAT SERPL-MCNC: 2.93 MG/DL (ref 0.6–1.3)
CREAT SERPL-MCNC: 3.02 MG/DL (ref 0.6–1.3)
CREAT SERPL-MCNC: 3.12 MG/DL (ref 0.6–1.3)
CREAT SERPL-MCNC: 3.15 MG/DL (ref 0.6–1.3)
CREAT SERPL-MCNC: 3.17 MG/DL (ref 0.6–1.3)
CREAT SERPL-MCNC: 3.2 MG/DL (ref 0.6–1.3)
CREAT SERPL-MCNC: 3.2 MG/DL (ref 0.6–1.3)
CREAT SERPL-MCNC: 3.24 MG/DL (ref 0.6–1.3)
CREAT SERPL-MCNC: 3.26 MG/DL (ref 0.6–1.3)
CREAT SERPL-MCNC: 3.31 MG/DL (ref 0.6–1.3)
CREAT SERPL-MCNC: 3.42 MG/DL (ref 0.6–1.3)
CREAT SERPL-MCNC: 3.47 MG/DL (ref 0.6–1.3)
CREAT SERPL-MCNC: 3.48 MG/DL (ref 0.6–1.3)
CREAT SERPL-MCNC: 3.5 MG/DL (ref 0.6–1.3)
CREAT SERPL-MCNC: 3.52 MG/DL (ref 0.6–1.3)
CREAT SERPL-MCNC: 3.59 MG/DL (ref 0.6–1.3)
CREAT SERPL-MCNC: 3.61 MG/DL (ref 0.6–1.3)
CREAT SERPL-MCNC: 3.65 MG/DL (ref 0.6–1.3)
CROSSMATCH RESULT,%XM: NORMAL
CRP SERPL-MCNC: 1.7 MG/DL (ref 0–0.3)
CRP SERPL-MCNC: 11.7 MG/DL (ref 0–0.3)
CRP SERPL-MCNC: 13.1 MG/DL (ref 0–0.3)
CRP SERPL-MCNC: 2.4 MG/DL (ref 0–0.3)
CRP SERPL-MCNC: 2.4 MG/DL (ref 0–0.3)
CRP SERPL-MCNC: 4 MG/DL (ref 0–0.3)
CRP SERPL-MCNC: 5.8 MG/DL (ref 0–0.3)
CRP SERPL-MCNC: 7.5 MG/DL (ref 0–0.3)
CRP SERPL-MCNC: 8.9 MG/DL (ref 0–0.3)
D DIMER PPP FEU-MCNC: 12.1 UG/ML(FEU)
D DIMER PPP FEU-MCNC: 13.21 UG/ML(FEU)
DIFFERENTIAL METHOD BLD: ABNORMAL
ECHO AO ROOT DIAM: 2.7 CM
ECHO AV AREA PEAK VELOCITY: 0.64 CM2
ECHO AV AREA VTI: 0.63 CM2
ECHO AV AREA/BSA PEAK VELOCITY: 0.4 CM2/M2
ECHO AV AREA/BSA VTI: 0.4 CM2/M2
ECHO AV MEAN GRADIENT: 38.27 MMHG
ECHO AV PEAK GRADIENT: 67.02 MMHG
ECHO AV PEAK VELOCITY: 409.33 CM/S
ECHO AV VTI: 78.87 CM
ECHO LV INTERNAL DIMENSION DIASTOLIC: 4.3 CM (ref 3.9–5.3)
ECHO LV INTERNAL DIMENSION SYSTOLIC: 3.16 CM
ECHO LV IVSD: 1.38 CM (ref 0.6–0.9)
ECHO LV MASS 2D: 227.2 G (ref 67–162)
ECHO LV MASS INDEX 2D: 134.9 G/M2 (ref 43–95)
ECHO LV POSTERIOR WALL DIASTOLIC: 1.38 CM (ref 0.6–0.9)
ECHO LVOT CARDIAC OUTPUT: 10.95 LITER/MINUTE
ECHO LVOT DIAM: 1.61 CM
ECHO LVOT PEAK GRADIENT: 0 MMHG
ECHO LVOT PEAK GRADIENT: 6.64 MMHG
ECHO LVOT PEAK VELOCITY: 0.84 CM/S
ECHO LVOT PEAK VELOCITY: 128.86 CM/S
ECHO LVOT SV: 49.9 ML
ECHO LVOT VTI: 24.58 CM
ECHO TV REGURGITANT MAX VELOCITY: 488.17 CM/S
ECHO TV REGURGITANT PEAK GRADIENT: 95.32 MMHG
EOSINOPHIL # BLD: 0 K/UL (ref 0–0.4)
EOSINOPHIL # BLD: 0.1 K/UL (ref 0–0.4)
EOSINOPHIL # BLD: 0.2 K/UL (ref 0–0.4)
EOSINOPHIL NFR BLD: 0 % (ref 0–5)
EOSINOPHIL NFR BLD: 1 % (ref 0–5)
EOSINOPHIL NFR BLD: 2 % (ref 0–5)
EOSINOPHIL NFR BLD: 2 % (ref 0–5)
EOSINOPHIL NFR BLD: 3 % (ref 0–5)
EPITH CASTS URNS QL MICRO: ABNORMAL /LPF (ref 0–5)
ERYTHROCYTE [DISTWIDTH] IN BLOOD BY AUTOMATED COUNT: 15.9 % (ref 11.6–14.5)
ERYTHROCYTE [DISTWIDTH] IN BLOOD BY AUTOMATED COUNT: 16 % (ref 11.6–14.5)
ERYTHROCYTE [DISTWIDTH] IN BLOOD BY AUTOMATED COUNT: 16.1 % (ref 11.6–14.5)
ERYTHROCYTE [DISTWIDTH] IN BLOOD BY AUTOMATED COUNT: 16.1 % (ref 11.6–14.5)
ERYTHROCYTE [DISTWIDTH] IN BLOOD BY AUTOMATED COUNT: 16.2 % (ref 11.6–14.5)
ERYTHROCYTE [DISTWIDTH] IN BLOOD BY AUTOMATED COUNT: 16.3 % (ref 11.6–14.5)
ERYTHROCYTE [DISTWIDTH] IN BLOOD BY AUTOMATED COUNT: 16.7 % (ref 11.6–14.5)
ERYTHROCYTE [DISTWIDTH] IN BLOOD BY AUTOMATED COUNT: 16.8 % (ref 11.6–14.5)
ERYTHROCYTE [DISTWIDTH] IN BLOOD BY AUTOMATED COUNT: 16.9 % (ref 11.6–14.5)
ERYTHROCYTE [DISTWIDTH] IN BLOOD BY AUTOMATED COUNT: 17.1 % (ref 11.6–14.5)
ERYTHROCYTE [DISTWIDTH] IN BLOOD BY AUTOMATED COUNT: 17.2 % (ref 11.6–14.5)
ERYTHROCYTE [DISTWIDTH] IN BLOOD BY AUTOMATED COUNT: 17.3 % (ref 11.6–14.5)
ERYTHROCYTE [DISTWIDTH] IN BLOOD BY AUTOMATED COUNT: 17.3 % (ref 11.6–14.5)
ERYTHROCYTE [DISTWIDTH] IN BLOOD BY AUTOMATED COUNT: 17.6 % (ref 11.6–14.5)
ERYTHROCYTE [DISTWIDTH] IN BLOOD BY AUTOMATED COUNT: 17.6 % (ref 11.6–14.5)
ERYTHROCYTE [DISTWIDTH] IN BLOOD BY AUTOMATED COUNT: 17.8 % (ref 11.6–14.5)
ERYTHROCYTE [DISTWIDTH] IN BLOOD BY AUTOMATED COUNT: 17.9 % (ref 11.6–14.5)
EST. AVERAGE GLUCOSE BLD GHB EST-MCNC: 105 MG/DL
FERRITIN SERPL-MCNC: 1406 NG/ML (ref 8–388)
FERRITIN SERPL-MCNC: 2706 NG/ML (ref 8–388)
FERRITIN SERPL-MCNC: 3116 NG/ML (ref 8–388)
FERRITIN SERPL-MCNC: 3690 NG/ML (ref 8–388)
FERRITIN SERPL-MCNC: 627 NG/ML (ref 8–388)
FERRITIN SERPL-MCNC: 669 NG/ML (ref 8–388)
FERRITIN SERPL-MCNC: 811 NG/ML (ref 8–388)
FERRITIN SERPL-MCNC: 824 NG/ML (ref 8–388)
FERRITIN SERPL-MCNC: 982 NG/ML (ref 8–388)
FIBRINOGEN PPP-MCNC: 121 MG/DL (ref 210–451)
FIBRINOGEN PPP-MCNC: 166 MG/DL (ref 210–451)
GAS FLOW.O2 O2 DELIVERY SYS: ABNORMAL L/MIN
GAS FLOW.O2 O2 DELIVERY SYS: NORMAL L/MIN
GAS FLOW.O2 SETTING OXYMISER: 12 BPM
GAS FLOW.O2 SETTING OXYMISER: 12 BPM
GAS FLOW.O2 SETTING OXYMISER: 14 BPM
GAS FLOW.O2 SETTING OXYMISER: 15 L/M
GAS FLOW.O2 SETTING OXYMISER: 16 BPM
GAS FLOW.O2 SETTING OXYMISER: 16 BPM
GAS FLOW.O2 SETTING OXYMISER: 18 BPM
GAS FLOW.O2 SETTING OXYMISER: 20 BPM
GLOBULIN SER CALC-MCNC: 2.3 G/DL (ref 2–4)
GLOBULIN SER CALC-MCNC: 2.3 G/DL (ref 2–4)
GLOBULIN SER CALC-MCNC: 2.6 G/DL (ref 2–4)
GLOBULIN SER CALC-MCNC: 2.7 G/DL (ref 2–4)
GLOBULIN SER CALC-MCNC: 2.8 G/DL (ref 2–4)
GLOBULIN SER CALC-MCNC: 2.8 G/DL (ref 2–4)
GLOBULIN SER CALC-MCNC: 2.9 G/DL (ref 2–4)
GLOBULIN SER CALC-MCNC: 2.9 G/DL (ref 2–4)
GLOBULIN SER CALC-MCNC: 3.1 G/DL (ref 2–4)
GLOBULIN SER CALC-MCNC: 3.2 G/DL (ref 2–4)
GLOBULIN SER CALC-MCNC: 3.5 G/DL (ref 2–4)
GLOBULIN SER CALC-MCNC: 3.8 G/DL (ref 2–4)
GLUCOSE BLD STRIP.AUTO-MCNC: 106 MG/DL (ref 70–110)
GLUCOSE BLD STRIP.AUTO-MCNC: 110 MG/DL (ref 70–110)
GLUCOSE BLD STRIP.AUTO-MCNC: 111 MG/DL (ref 70–110)
GLUCOSE BLD STRIP.AUTO-MCNC: 115 MG/DL (ref 70–110)
GLUCOSE BLD STRIP.AUTO-MCNC: 123 MG/DL (ref 70–110)
GLUCOSE BLD STRIP.AUTO-MCNC: 128 MG/DL (ref 70–110)
GLUCOSE BLD STRIP.AUTO-MCNC: 130 MG/DL (ref 70–110)
GLUCOSE BLD STRIP.AUTO-MCNC: 137 MG/DL (ref 70–110)
GLUCOSE BLD STRIP.AUTO-MCNC: 140 MG/DL (ref 70–110)
GLUCOSE BLD STRIP.AUTO-MCNC: 142 MG/DL (ref 70–110)
GLUCOSE BLD STRIP.AUTO-MCNC: 143 MG/DL (ref 70–110)
GLUCOSE BLD STRIP.AUTO-MCNC: 144 MG/DL (ref 70–110)
GLUCOSE BLD STRIP.AUTO-MCNC: 150 MG/DL (ref 70–110)
GLUCOSE BLD STRIP.AUTO-MCNC: 150 MG/DL (ref 70–110)
GLUCOSE BLD STRIP.AUTO-MCNC: 158 MG/DL (ref 70–110)
GLUCOSE BLD STRIP.AUTO-MCNC: 158 MG/DL (ref 70–110)
GLUCOSE BLD STRIP.AUTO-MCNC: 160 MG/DL (ref 70–110)
GLUCOSE BLD STRIP.AUTO-MCNC: 163 MG/DL (ref 70–110)
GLUCOSE BLD STRIP.AUTO-MCNC: 167 MG/DL (ref 70–110)
GLUCOSE BLD STRIP.AUTO-MCNC: 168 MG/DL (ref 70–110)
GLUCOSE BLD STRIP.AUTO-MCNC: 169 MG/DL (ref 70–110)
GLUCOSE BLD STRIP.AUTO-MCNC: 170 MG/DL (ref 70–110)
GLUCOSE BLD STRIP.AUTO-MCNC: 173 MG/DL (ref 70–110)
GLUCOSE BLD STRIP.AUTO-MCNC: 174 MG/DL (ref 70–110)
GLUCOSE BLD STRIP.AUTO-MCNC: 180 MG/DL (ref 70–110)
GLUCOSE BLD STRIP.AUTO-MCNC: 182 MG/DL (ref 70–110)
GLUCOSE BLD STRIP.AUTO-MCNC: 183 MG/DL (ref 70–110)
GLUCOSE BLD STRIP.AUTO-MCNC: 183 MG/DL (ref 70–110)
GLUCOSE BLD STRIP.AUTO-MCNC: 186 MG/DL (ref 70–110)
GLUCOSE BLD STRIP.AUTO-MCNC: 198 MG/DL (ref 70–110)
GLUCOSE BLD STRIP.AUTO-MCNC: 201 MG/DL (ref 74–106)
GLUCOSE BLD STRIP.AUTO-MCNC: 205 MG/DL (ref 70–110)
GLUCOSE BLD STRIP.AUTO-MCNC: 209 MG/DL (ref 70–110)
GLUCOSE BLD STRIP.AUTO-MCNC: 210 MG/DL (ref 70–110)
GLUCOSE BLD STRIP.AUTO-MCNC: 216 MG/DL (ref 70–110)
GLUCOSE BLD STRIP.AUTO-MCNC: 216 MG/DL (ref 70–110)
GLUCOSE BLD STRIP.AUTO-MCNC: 220 MG/DL (ref 70–110)
GLUCOSE BLD STRIP.AUTO-MCNC: 222 MG/DL (ref 70–110)
GLUCOSE BLD STRIP.AUTO-MCNC: 228 MG/DL (ref 70–110)
GLUCOSE BLD STRIP.AUTO-MCNC: 231 MG/DL (ref 70–110)
GLUCOSE BLD STRIP.AUTO-MCNC: 232 MG/DL (ref 70–110)
GLUCOSE BLD STRIP.AUTO-MCNC: 232 MG/DL (ref 70–110)
GLUCOSE BLD STRIP.AUTO-MCNC: 237 MG/DL (ref 70–110)
GLUCOSE BLD STRIP.AUTO-MCNC: 239 MG/DL (ref 70–110)
GLUCOSE BLD STRIP.AUTO-MCNC: 251 MG/DL (ref 70–110)
GLUCOSE BLD STRIP.AUTO-MCNC: 258 MG/DL (ref 70–110)
GLUCOSE BLD STRIP.AUTO-MCNC: 260 MG/DL (ref 70–110)
GLUCOSE BLD STRIP.AUTO-MCNC: 261 MG/DL (ref 70–110)
GLUCOSE BLD STRIP.AUTO-MCNC: 271 MG/DL (ref 70–110)
GLUCOSE BLD STRIP.AUTO-MCNC: 273 MG/DL (ref 70–110)
GLUCOSE BLD STRIP.AUTO-MCNC: 275 MG/DL (ref 70–110)
GLUCOSE BLD STRIP.AUTO-MCNC: 291 MG/DL (ref 70–110)
GLUCOSE BLD STRIP.AUTO-MCNC: 295 MG/DL (ref 70–110)
GLUCOSE BLD STRIP.AUTO-MCNC: 296 MG/DL (ref 70–110)
GLUCOSE BLD STRIP.AUTO-MCNC: 298 MG/DL (ref 70–110)
GLUCOSE BLD STRIP.AUTO-MCNC: 299 MG/DL (ref 70–110)
GLUCOSE BLD STRIP.AUTO-MCNC: 305 MG/DL (ref 70–110)
GLUCOSE BLD STRIP.AUTO-MCNC: 309 MG/DL (ref 70–110)
GLUCOSE BLD STRIP.AUTO-MCNC: 311 MG/DL (ref 70–110)
GLUCOSE BLD STRIP.AUTO-MCNC: 322 MG/DL (ref 70–110)
GLUCOSE BLD STRIP.AUTO-MCNC: 325 MG/DL (ref 70–110)
GLUCOSE BLD STRIP.AUTO-MCNC: 325 MG/DL (ref 70–110)
GLUCOSE BLD STRIP.AUTO-MCNC: 329 MG/DL (ref 70–110)
GLUCOSE BLD STRIP.AUTO-MCNC: 335 MG/DL (ref 70–110)
GLUCOSE BLD STRIP.AUTO-MCNC: 340 MG/DL (ref 70–110)
GLUCOSE BLD STRIP.AUTO-MCNC: 353 MG/DL (ref 70–110)
GLUCOSE BLD STRIP.AUTO-MCNC: 362 MG/DL (ref 70–110)
GLUCOSE BLD STRIP.AUTO-MCNC: 366 MG/DL (ref 70–110)
GLUCOSE BLD STRIP.AUTO-MCNC: 65 MG/DL (ref 70–110)
GLUCOSE BLD STRIP.AUTO-MCNC: 66 MG/DL (ref 70–110)
GLUCOSE BLD STRIP.AUTO-MCNC: 72 MG/DL (ref 70–110)
GLUCOSE BLD STRIP.AUTO-MCNC: 80 MG/DL (ref 70–110)
GLUCOSE BLD STRIP.AUTO-MCNC: 84 MG/DL (ref 70–110)
GLUCOSE BLD STRIP.AUTO-MCNC: 88 MG/DL (ref 70–110)
GLUCOSE BLD STRIP.AUTO-MCNC: 90 MG/DL (ref 70–110)
GLUCOSE BLD STRIP.AUTO-MCNC: 92 MG/DL (ref 70–110)
GLUCOSE BLD STRIP.AUTO-MCNC: 94 MG/DL (ref 70–110)
GLUCOSE BLD STRIP.AUTO-MCNC: 97 MG/DL (ref 70–110)
GLUCOSE SERPL-MCNC: 100 MG/DL (ref 74–99)
GLUCOSE SERPL-MCNC: 102 MG/DL (ref 74–99)
GLUCOSE SERPL-MCNC: 109 MG/DL (ref 74–99)
GLUCOSE SERPL-MCNC: 113 MG/DL (ref 74–99)
GLUCOSE SERPL-MCNC: 115 MG/DL (ref 74–99)
GLUCOSE SERPL-MCNC: 122 MG/DL (ref 74–99)
GLUCOSE SERPL-MCNC: 138 MG/DL (ref 74–99)
GLUCOSE SERPL-MCNC: 150 MG/DL (ref 74–99)
GLUCOSE SERPL-MCNC: 152 MG/DL (ref 74–99)
GLUCOSE SERPL-MCNC: 154 MG/DL (ref 74–99)
GLUCOSE SERPL-MCNC: 160 MG/DL (ref 74–99)
GLUCOSE SERPL-MCNC: 161 MG/DL (ref 74–99)
GLUCOSE SERPL-MCNC: 165 MG/DL (ref 74–99)
GLUCOSE SERPL-MCNC: 167 MG/DL (ref 74–99)
GLUCOSE SERPL-MCNC: 170 MG/DL (ref 74–99)
GLUCOSE SERPL-MCNC: 186 MG/DL (ref 74–99)
GLUCOSE SERPL-MCNC: 191 MG/DL (ref 74–99)
GLUCOSE SERPL-MCNC: 194 MG/DL (ref 74–99)
GLUCOSE SERPL-MCNC: 204 MG/DL (ref 74–99)
GLUCOSE SERPL-MCNC: 204 MG/DL (ref 74–99)
GLUCOSE SERPL-MCNC: 205 MG/DL (ref 74–99)
GLUCOSE SERPL-MCNC: 213 MG/DL (ref 74–99)
GLUCOSE SERPL-MCNC: 217 MG/DL (ref 74–99)
GLUCOSE SERPL-MCNC: 229 MG/DL (ref 74–99)
GLUCOSE SERPL-MCNC: 230 MG/DL (ref 74–99)
GLUCOSE SERPL-MCNC: 231 MG/DL (ref 74–99)
GLUCOSE SERPL-MCNC: 241 MG/DL (ref 74–99)
GLUCOSE SERPL-MCNC: 251 MG/DL (ref 74–99)
GLUCOSE SERPL-MCNC: 256 MG/DL (ref 74–99)
GLUCOSE SERPL-MCNC: 260 MG/DL (ref 74–99)
GLUCOSE SERPL-MCNC: 267 MG/DL (ref 74–99)
GLUCOSE SERPL-MCNC: 270 MG/DL (ref 74–99)
GLUCOSE SERPL-MCNC: 273 MG/DL (ref 74–99)
GLUCOSE SERPL-MCNC: 273 MG/DL (ref 74–99)
GLUCOSE SERPL-MCNC: 291 MG/DL (ref 74–99)
GLUCOSE SERPL-MCNC: 296 MG/DL (ref 74–99)
GLUCOSE SERPL-MCNC: 297 MG/DL (ref 74–99)
GLUCOSE SERPL-MCNC: 314 MG/DL (ref 74–99)
GLUCOSE SERPL-MCNC: 350 MG/DL (ref 74–99)
GLUCOSE SERPL-MCNC: 45 MG/DL (ref 74–99)
GLUCOSE SERPL-MCNC: 62 MG/DL (ref 74–99)
GLUCOSE SERPL-MCNC: 62 MG/DL (ref 74–99)
GLUCOSE UR STRIP.AUTO-MCNC: NEGATIVE MG/DL
GRAM STN SPEC: ABNORMAL
HBA1C MFR BLD: 5.3 % (ref 4.2–5.6)
HBV SURFACE AB SER QL IA: NEGATIVE
HBV SURFACE AB SERPL IA-ACNC: <3.1 MIU/ML
HBV SURFACE AG SER QL: <0.1 INDEX
HBV SURFACE AG SER QL: NEGATIVE
HCO3 BLD-SCNC: 10.9 MMOL/L (ref 22–26)
HCO3 BLD-SCNC: 11.7 MMOL/L (ref 22–26)
HCO3 BLD-SCNC: 14.3 MMOL/L (ref 22–26)
HCO3 BLD-SCNC: 14.7 MMOL/L (ref 22–26)
HCO3 BLD-SCNC: 16.4 MMOL/L (ref 22–26)
HCO3 BLD-SCNC: 21.6 MMOL/L (ref 22–26)
HCO3 BLD-SCNC: 23.6 MMOL/L (ref 22–26)
HCO3 BLD-SCNC: 23.8 MMOL/L (ref 22–26)
HCO3 BLD-SCNC: 24 MMOL/L (ref 22–26)
HCO3 BLD-SCNC: 24 MMOL/L (ref 22–26)
HCO3 BLD-SCNC: 24.4 MMOL/L (ref 22–26)
HCO3 BLD-SCNC: 24.5 MMOL/L (ref 22–26)
HCO3 BLD-SCNC: 24.5 MMOL/L (ref 22–26)
HCO3 BLD-SCNC: 25 MMOL/L (ref 22–26)
HCO3 BLD-SCNC: 25.4 MMOL/L (ref 22–26)
HCO3 BLD-SCNC: 25.9 MMOL/L (ref 22–26)
HCO3 BLD-SCNC: 28.6 MMOL/L (ref 22–26)
HCO3 BLD-SCNC: 28.8 MMOL/L (ref 22–26)
HCO3 BLD-SCNC: 30.9 MMOL/L (ref 22–26)
HCO3 BLD-SCNC: 31.3 MMOL/L (ref 22–26)
HCT VFR BLD AUTO: 18.1 % (ref 35–45)
HCT VFR BLD AUTO: 18.4 % (ref 35–45)
HCT VFR BLD AUTO: 18.6 % (ref 35–45)
HCT VFR BLD AUTO: 20.5 % (ref 35–45)
HCT VFR BLD AUTO: 20.5 % (ref 35–45)
HCT VFR BLD AUTO: 20.7 % (ref 35–45)
HCT VFR BLD AUTO: 20.9 % (ref 35–45)
HCT VFR BLD AUTO: 21.2 % (ref 35–45)
HCT VFR BLD AUTO: 21.3 % (ref 35–45)
HCT VFR BLD AUTO: 21.5 % (ref 35–45)
HCT VFR BLD AUTO: 21.6 % (ref 35–45)
HCT VFR BLD AUTO: 21.7 % (ref 35–45)
HCT VFR BLD AUTO: 21.8 % (ref 35–45)
HCT VFR BLD AUTO: 21.9 % (ref 35–45)
HCT VFR BLD AUTO: 22.2 % (ref 35–45)
HCT VFR BLD AUTO: 23.4 % (ref 35–45)
HCT VFR BLD AUTO: 23.9 % (ref 35–45)
HCT VFR BLD AUTO: 24.7 % (ref 35–45)
HCT VFR BLD AUTO: 24.8 % (ref 35–45)
HCT VFR BLD AUTO: 24.9 % (ref 35–45)
HCT VFR BLD AUTO: 25.7 % (ref 35–45)
HCT VFR BLD AUTO: 26.2 % (ref 35–45)
HCT VFR BLD AUTO: 26.3 % (ref 35–45)
HCT VFR BLD AUTO: 26.8 % (ref 35–45)
HCT VFR BLD AUTO: 27.4 % (ref 35–45)
HCT VFR BLD AUTO: 27.6 % (ref 35–45)
HCT VFR BLD AUTO: 28.4 % (ref 35–45)
HCT VFR BLD AUTO: 29 % (ref 35–45)
HCT VFR BLD CALC: 16 % (ref 36–49)
HEP BS AB COMMENT,HBSAC: ABNORMAL
HGB BLD-MCNC: 5.4 G/DL (ref 12–16)
HGB BLD-MCNC: 6 G/DL (ref 12–16)
HGB BLD-MCNC: 6.3 G/DL (ref 12–16)
HGB BLD-MCNC: 6.4 G/DL (ref 12–16)
HGB BLD-MCNC: 7 G/DL (ref 12–16)
HGB BLD-MCNC: 7.1 G/DL (ref 12–16)
HGB BLD-MCNC: 7.2 G/DL (ref 12–16)
HGB BLD-MCNC: 7.2 G/DL (ref 12–16)
HGB BLD-MCNC: 7.3 G/DL (ref 12–16)
HGB BLD-MCNC: 7.3 G/DL (ref 12–16)
HGB BLD-MCNC: 7.4 G/DL (ref 12–16)
HGB BLD-MCNC: 7.5 G/DL (ref 12–16)
HGB BLD-MCNC: 7.5 G/DL (ref 12–16)
HGB BLD-MCNC: 7.7 G/DL (ref 12–16)
HGB BLD-MCNC: 7.9 G/DL (ref 12–16)
HGB BLD-MCNC: 8.1 G/DL (ref 12–16)
HGB BLD-MCNC: 8.2 G/DL (ref 12–16)
HGB BLD-MCNC: 8.4 G/DL (ref 12–16)
HGB BLD-MCNC: 8.5 G/DL (ref 12–16)
HGB BLD-MCNC: 8.5 G/DL (ref 12–16)
HGB BLD-MCNC: 8.6 G/DL (ref 12–16)
HGB BLD-MCNC: 8.9 G/DL (ref 12–16)
HGB BLD-MCNC: 9.1 G/DL (ref 12–16)
HGB BLD-MCNC: 9.4 G/DL (ref 12–16)
HGB BLD-MCNC: 9.5 G/DL (ref 12–16)
HGB BLD-MCNC: 9.6 G/DL (ref 12–16)
HGB UR QL STRIP: ABNORMAL
HISTORY CHECKED?,CKHIST: NORMAL
INR PPP: 1.7 (ref 0.8–1.2)
INSPIRATION.DURATION SETTING TIME VENT: 0.9 SEC
INSPIRATION.DURATION SETTING TIME VENT: 1 SEC
KETONES UR QL STRIP.AUTO: ABNORMAL MG/DL
LACTATE BLD-SCNC: 0.99 MMOL/L (ref 0.4–2)
LACTATE SERPL-SCNC: 1.5 MMOL/L (ref 0.4–2)
LACTATE SERPL-SCNC: 1.8 MMOL/L (ref 0.4–2)
LACTATE SERPL-SCNC: 1.9 MMOL/L (ref 0.4–2)
LACTATE SERPL-SCNC: 3 MMOL/L (ref 0.4–2)
LDH SERPL L TO P-CCNC: 297 U/L (ref 81–234)
LDH SERPL L TO P-CCNC: 318 U/L (ref 81–234)
LDH SERPL L TO P-CCNC: 439 U/L (ref 81–234)
LDH SERPL L TO P-CCNC: 474 U/L (ref 81–234)
LDH SERPL L TO P-CCNC: 509 U/L (ref 81–234)
LDH SERPL L TO P-CCNC: 523 U/L (ref 81–234)
LDH SERPL L TO P-CCNC: 608 U/L (ref 81–234)
LDH SERPL L TO P-CCNC: 608 U/L (ref 81–234)
LDH SERPL L TO P-CCNC: 619 U/L (ref 81–234)
LEUKOCYTE ESTERASE UR QL STRIP.AUTO: ABNORMAL
LVOT MG: 3.61 MMHG
LYMPHOCYTES # BLD: 0.2 K/UL (ref 0.8–3.5)
LYMPHOCYTES # BLD: 0.2 K/UL (ref 0.8–3.5)
LYMPHOCYTES # BLD: 0.3 K/UL (ref 0.8–3.5)
LYMPHOCYTES # BLD: 0.3 K/UL (ref 0.9–3.6)
LYMPHOCYTES # BLD: 0.4 K/UL (ref 0.8–3.5)
LYMPHOCYTES # BLD: 0.4 K/UL (ref 0.9–3.6)
LYMPHOCYTES # BLD: 0.5 K/UL (ref 0.9–3.6)
LYMPHOCYTES # BLD: 0.5 K/UL (ref 0.9–3.6)
LYMPHOCYTES # BLD: 0.6 K/UL (ref 0.9–3.6)
LYMPHOCYTES # BLD: 0.6 K/UL (ref 0.9–3.6)
LYMPHOCYTES # BLD: 0.7 K/UL (ref 0.8–3.5)
LYMPHOCYTES # BLD: 0.7 K/UL (ref 0.8–3.5)
LYMPHOCYTES # BLD: 0.7 K/UL (ref 0.9–3.6)
LYMPHOCYTES # BLD: 0.7 K/UL (ref 0.9–3.6)
LYMPHOCYTES # BLD: 1 K/UL (ref 0.9–3.6)
LYMPHOCYTES # BLD: 1.1 K/UL (ref 0.9–3.6)
LYMPHOCYTES # BLD: 1.7 K/UL (ref 0.9–3.6)
LYMPHOCYTES NFR BLD: 11 % (ref 20–51)
LYMPHOCYTES NFR BLD: 11 % (ref 21–52)
LYMPHOCYTES NFR BLD: 12 % (ref 20–51)
LYMPHOCYTES NFR BLD: 12 % (ref 21–52)
LYMPHOCYTES NFR BLD: 13 % (ref 21–52)
LYMPHOCYTES NFR BLD: 14 % (ref 21–52)
LYMPHOCYTES NFR BLD: 15 % (ref 21–52)
LYMPHOCYTES NFR BLD: 16 % (ref 21–52)
LYMPHOCYTES NFR BLD: 18 % (ref 21–52)
LYMPHOCYTES NFR BLD: 25 % (ref 21–52)
LYMPHOCYTES NFR BLD: 3 % (ref 20–51)
LYMPHOCYTES NFR BLD: 5 % (ref 21–52)
LYMPHOCYTES NFR BLD: 6 % (ref 20–51)
LYMPHOCYTES NFR BLD: 6 % (ref 21–52)
LYMPHOCYTES NFR BLD: 7 % (ref 20–51)
LYMPHOCYTES NFR BLD: 7 % (ref 20–51)
LYMPHOCYTES NFR BLD: 7 % (ref 21–52)
LYMPHOCYTES NFR BLD: 7 % (ref 21–52)
LYMPHOCYTES NFR BLD: 8 % (ref 20–51)
LYMPHOCYTES NFR BLD: 9 % (ref 20–51)
LYMPHOCYTES NFR BLD: 9 % (ref 21–52)
MAGNESIUM SERPL-MCNC: 1.8 MG/DL (ref 1.6–2.6)
MAGNESIUM SERPL-MCNC: 1.8 MG/DL (ref 1.6–2.6)
MAGNESIUM SERPL-MCNC: 1.9 MG/DL (ref 1.6–2.6)
MAGNESIUM SERPL-MCNC: 2 MG/DL (ref 1.6–2.6)
MAGNESIUM SERPL-MCNC: 2 MG/DL (ref 1.6–2.6)
MAGNESIUM SERPL-MCNC: 2.1 MG/DL (ref 1.6–2.6)
MAGNESIUM SERPL-MCNC: 2.2 MG/DL (ref 1.6–2.6)
MAGNESIUM SERPL-MCNC: 2.3 MG/DL (ref 1.6–2.6)
MAGNESIUM SERPL-MCNC: 2.4 MG/DL (ref 1.6–2.6)
MAGNESIUM SERPL-MCNC: 2.4 MG/DL (ref 1.6–2.6)
MCH RBC QN AUTO: 28.2 PG (ref 24–34)
MCH RBC QN AUTO: 28.3 PG (ref 24–34)
MCH RBC QN AUTO: 28.4 PG (ref 24–34)
MCH RBC QN AUTO: 28.4 PG (ref 24–34)
MCH RBC QN AUTO: 28.5 PG (ref 24–34)
MCH RBC QN AUTO: 28.6 PG (ref 24–34)
MCH RBC QN AUTO: 28.7 PG (ref 24–34)
MCH RBC QN AUTO: 28.8 PG (ref 24–34)
MCH RBC QN AUTO: 28.9 PG (ref 24–34)
MCH RBC QN AUTO: 29 PG (ref 24–34)
MCH RBC QN AUTO: 29.2 PG (ref 24–34)
MCH RBC QN AUTO: 29.2 PG (ref 24–34)
MCH RBC QN AUTO: 29.3 PG (ref 24–34)
MCH RBC QN AUTO: 29.3 PG (ref 24–34)
MCHC RBC AUTO-ENTMCNC: 32 G/DL (ref 31–37)
MCHC RBC AUTO-ENTMCNC: 32.3 G/DL (ref 31–37)
MCHC RBC AUTO-ENTMCNC: 32.6 G/DL (ref 31–37)
MCHC RBC AUTO-ENTMCNC: 32.7 G/DL (ref 31–37)
MCHC RBC AUTO-ENTMCNC: 32.8 G/DL (ref 31–37)
MCHC RBC AUTO-ENTMCNC: 32.9 G/DL (ref 31–37)
MCHC RBC AUTO-ENTMCNC: 33 G/DL (ref 31–37)
MCHC RBC AUTO-ENTMCNC: 33.1 G/DL (ref 31–37)
MCHC RBC AUTO-ENTMCNC: 33.2 G/DL (ref 31–37)
MCHC RBC AUTO-ENTMCNC: 33.3 G/DL (ref 31–37)
MCHC RBC AUTO-ENTMCNC: 33.5 G/DL (ref 31–37)
MCHC RBC AUTO-ENTMCNC: 33.5 G/DL (ref 31–37)
MCHC RBC AUTO-ENTMCNC: 33.7 G/DL (ref 31–37)
MCHC RBC AUTO-ENTMCNC: 33.9 G/DL (ref 31–37)
MCHC RBC AUTO-ENTMCNC: 34 G/DL (ref 31–37)
MCHC RBC AUTO-ENTMCNC: 34 G/DL (ref 31–37)
MCHC RBC AUTO-ENTMCNC: 34.1 G/DL (ref 31–37)
MCHC RBC AUTO-ENTMCNC: 34.3 G/DL (ref 31–37)
MCHC RBC AUTO-ENTMCNC: 34.3 G/DL (ref 31–37)
MCHC RBC AUTO-ENTMCNC: 34.6 G/DL (ref 31–37)
MCHC RBC AUTO-ENTMCNC: 34.7 G/DL (ref 31–37)
MCHC RBC AUTO-ENTMCNC: 35.4 G/DL (ref 31–37)
MCV RBC AUTO: 81.5 FL (ref 74–97)
MCV RBC AUTO: 83.4 FL (ref 74–97)
MCV RBC AUTO: 84.4 FL (ref 74–97)
MCV RBC AUTO: 84.4 FL (ref 74–97)
MCV RBC AUTO: 84.5 FL (ref 74–97)
MCV RBC AUTO: 84.7 FL (ref 74–97)
MCV RBC AUTO: 84.7 FL (ref 74–97)
MCV RBC AUTO: 84.9 FL (ref 74–97)
MCV RBC AUTO: 85.6 FL (ref 74–97)
MCV RBC AUTO: 85.7 FL (ref 74–97)
MCV RBC AUTO: 85.9 FL (ref 74–97)
MCV RBC AUTO: 86 FL (ref 74–97)
MCV RBC AUTO: 86.1 FL (ref 74–97)
MCV RBC AUTO: 86.3 FL (ref 74–97)
MCV RBC AUTO: 86.4 FL (ref 74–97)
MCV RBC AUTO: 86.9 FL (ref 74–97)
MCV RBC AUTO: 87.3 FL (ref 74–97)
MCV RBC AUTO: 87.4 FL (ref 74–97)
MCV RBC AUTO: 87.6 FL (ref 74–97)
MCV RBC AUTO: 87.7 FL (ref 74–97)
MCV RBC AUTO: 87.9 FL (ref 74–97)
MCV RBC AUTO: 88 FL (ref 74–97)
MCV RBC AUTO: 91.6 FL (ref 74–97)
MONOCYTES # BLD: 0 K/UL (ref 0.05–1.2)
MONOCYTES # BLD: 0.1 K/UL (ref 0.05–1.2)
MONOCYTES # BLD: 0.1 K/UL (ref 0–1)
MONOCYTES # BLD: 0.2 K/UL (ref 0.05–1.2)
MONOCYTES # BLD: 0.2 K/UL (ref 0–1)
MONOCYTES # BLD: 0.3 K/UL (ref 0.05–1.2)
MONOCYTES # BLD: 0.3 K/UL (ref 0–1)
MONOCYTES # BLD: 0.3 K/UL (ref 0–1)
MONOCYTES # BLD: 0.4 K/UL (ref 0.05–1.2)
MONOCYTES # BLD: 0.5 K/UL (ref 0–1)
MONOCYTES # BLD: 0.6 K/UL (ref 0.05–1.2)
MONOCYTES # BLD: 0.8 K/UL (ref 0.05–1.2)
MONOCYTES NFR BLD: 0 % (ref 3–10)
MONOCYTES NFR BLD: 1 % (ref 3–10)
MONOCYTES NFR BLD: 1 % (ref 3–10)
MONOCYTES NFR BLD: 11 % (ref 3–10)
MONOCYTES NFR BLD: 2 % (ref 3–10)
MONOCYTES NFR BLD: 3 % (ref 2–9)
MONOCYTES NFR BLD: 3 % (ref 3–10)
MONOCYTES NFR BLD: 4 % (ref 2–9)
MONOCYTES NFR BLD: 4 % (ref 3–10)
MONOCYTES NFR BLD: 5 % (ref 2–9)
MONOCYTES NFR BLD: 5 % (ref 3–10)
MONOCYTES NFR BLD: 5 % (ref 3–10)
MONOCYTES NFR BLD: 6 % (ref 3–10)
MONOCYTES NFR BLD: 6 % (ref 3–10)
MONOCYTES NFR BLD: 7 % (ref 2–9)
MONOCYTES NFR BLD: 8 % (ref 3–10)
NEUTS BAND NFR BLD MANUAL: 1 % (ref 0–5)
NEUTS BAND NFR BLD MANUAL: 2 % (ref 0–5)
NEUTS BAND NFR BLD MANUAL: 3 % (ref 0–5)
NEUTS SEG # BLD: 1.7 K/UL (ref 1.8–8)
NEUTS SEG # BLD: 1.8 K/UL (ref 1.8–8)
NEUTS SEG # BLD: 10.1 K/UL (ref 1.8–8)
NEUTS SEG # BLD: 10.9 K/UL (ref 1.8–8)
NEUTS SEG # BLD: 12.2 K/UL (ref 1.8–8)
NEUTS SEG # BLD: 2 K/UL (ref 1.8–8)
NEUTS SEG # BLD: 2.1 K/UL (ref 1.8–8)
NEUTS SEG # BLD: 2.1 K/UL (ref 1.8–8)
NEUTS SEG # BLD: 2.4 K/UL (ref 1.8–8)
NEUTS SEG # BLD: 2.5 K/UL (ref 1.8–8)
NEUTS SEG # BLD: 2.6 K/UL (ref 1.8–8)
NEUTS SEG # BLD: 2.7 K/UL (ref 1.8–8)
NEUTS SEG # BLD: 2.7 K/UL (ref 1.8–8)
NEUTS SEG # BLD: 2.8 K/UL (ref 1.8–8)
NEUTS SEG # BLD: 3.5 K/UL (ref 1.8–8)
NEUTS SEG # BLD: 4.5 K/UL (ref 1.8–8)
NEUTS SEG # BLD: 4.6 K/UL (ref 1.8–8)
NEUTS SEG # BLD: 4.8 K/UL (ref 1.8–8)
NEUTS SEG # BLD: 4.9 K/UL (ref 1.8–8)
NEUTS SEG # BLD: 5.3 K/UL (ref 1.8–8)
NEUTS SEG # BLD: 5.5 K/UL (ref 1.8–8)
NEUTS SEG # BLD: 6.4 K/UL (ref 1.8–8)
NEUTS SEG # BLD: 6.7 K/UL (ref 1.8–8)
NEUTS SEG # BLD: 7 K/UL (ref 1.8–8)
NEUTS SEG # BLD: 7.3 K/UL (ref 1.8–8)
NEUTS SEG # BLD: 7.6 K/UL (ref 1.8–8)
NEUTS SEG # BLD: 9.2 K/UL (ref 1.8–8)
NEUTS SEG NFR BLD: 72 % (ref 40–73)
NEUTS SEG NFR BLD: 73 % (ref 40–73)
NEUTS SEG NFR BLD: 80 % (ref 42–75)
NEUTS SEG NFR BLD: 81 % (ref 40–73)
NEUTS SEG NFR BLD: 82 % (ref 40–73)
NEUTS SEG NFR BLD: 82 % (ref 40–73)
NEUTS SEG NFR BLD: 82 % (ref 42–75)
NEUTS SEG NFR BLD: 83 % (ref 42–75)
NEUTS SEG NFR BLD: 83 % (ref 42–75)
NEUTS SEG NFR BLD: 84 % (ref 40–73)
NEUTS SEG NFR BLD: 85 % (ref 40–73)
NEUTS SEG NFR BLD: 86 % (ref 40–73)
NEUTS SEG NFR BLD: 87 % (ref 40–73)
NEUTS SEG NFR BLD: 88 % (ref 40–73)
NEUTS SEG NFR BLD: 88 % (ref 42–75)
NEUTS SEG NFR BLD: 88 % (ref 42–75)
NEUTS SEG NFR BLD: 89 % (ref 40–73)
NEUTS SEG NFR BLD: 89 % (ref 42–75)
NEUTS SEG NFR BLD: 91 % (ref 42–75)
NEUTS SEG NFR BLD: 92 % (ref 40–73)
NEUTS SEG NFR BLD: 94 % (ref 40–73)
NITRITE UR QL STRIP.AUTO: NEGATIVE
O2/TOTAL GAS SETTING VFR VENT: 0.3 %
O2/TOTAL GAS SETTING VFR VENT: 0.4 %
O2/TOTAL GAS SETTING VFR VENT: 100 %
O2/TOTAL GAS SETTING VFR VENT: 30 %
O2/TOTAL GAS SETTING VFR VENT: 35 %
O2/TOTAL GAS SETTING VFR VENT: 40 %
O2/TOTAL GAS SETTING VFR VENT: 45 %
O2/TOTAL GAS SETTING VFR VENT: 50 %
O2/TOTAL GAS SETTING VFR VENT: 55 %
O2/TOTAL GAS SETTING VFR VENT: 65 %
O2/TOTAL GAS SETTING VFR VENT: 80 %
OTHER CELLS NFR BLD MANUAL: 2 %
PCO2 BLD: 25.9 MMHG (ref 35–45)
PCO2 BLD: 29.5 MMHG (ref 35–45)
PCO2 BLD: 30 MMHG (ref 35–45)
PCO2 BLD: 30.6 MMHG (ref 35–45)
PCO2 BLD: 30.7 MMHG (ref 35–45)
PCO2 BLD: 31.6 MMHG (ref 35–45)
PCO2 BLD: 32.4 MMHG (ref 35–45)
PCO2 BLD: 32.9 MMHG (ref 35–45)
PCO2 BLD: 34.9 MMHG (ref 35–45)
PCO2 BLD: 36.2 MMHG (ref 35–45)
PCO2 BLD: 36.5 MMHG (ref 35–45)
PCO2 BLD: 36.5 MMHG (ref 35–45)
PCO2 BLD: 36.6 MMHG (ref 35–45)
PCO2 BLD: 37.1 MMHG (ref 35–45)
PCO2 BLD: 37.9 MMHG (ref 35–45)
PCO2 BLD: 38.2 MMHG (ref 35–45)
PCO2 BLD: 38.5 MMHG (ref 35–45)
PCO2 BLD: 38.6 MMHG (ref 35–45)
PCO2 BLD: 40.1 MMHG (ref 35–45)
PCO2 BLD: 42.1 MMHG (ref 35–45)
PCO2 BLD: 45.9 MMHG (ref 35–45)
PCO2 BLD: 49.3 MMHG (ref 35–45)
PEEP RESPIRATORY: 5 CMH2O
PEEP RESPIRATORY: 7 CMH2O
PEEP RESPIRATORY: 8 CMH2O
PH BLD: 7.07 [PH] (ref 7.35–7.45)
PH BLD: 7.17 [PH] (ref 7.35–7.45)
PH BLD: 7.18 [PH] (ref 7.35–7.45)
PH BLD: 7.31 [PH] (ref 7.35–7.45)
PH BLD: 7.35 [PH] (ref 7.35–7.45)
PH BLD: 7.38 [PH] (ref 7.35–7.45)
PH BLD: 7.39 [PH] (ref 7.35–7.45)
PH BLD: 7.41 [PH] (ref 7.35–7.45)
PH BLD: 7.42 [PH] (ref 7.35–7.45)
PH BLD: 7.42 [PH] (ref 7.35–7.45)
PH BLD: 7.43 [PH] (ref 7.35–7.45)
PH BLD: 7.44 [PH] (ref 7.35–7.45)
PH BLD: 7.47 [PH] (ref 7.35–7.45)
PH BLD: 7.48 [PH] (ref 7.35–7.45)
PH BLD: 7.5 [PH] (ref 7.35–7.45)
PH BLD: 7.5 [PH] (ref 7.35–7.45)
PH BLD: 7.52 [PH] (ref 7.35–7.45)
PH BLD: 7.53 [PH] (ref 7.35–7.45)
PH BLD: 7.55 [PH] (ref 7.35–7.45)
PH UR STRIP: 5 [PH] (ref 5–8)
PHOSPHATE SERPL-MCNC: 1.9 MG/DL (ref 2.5–4.9)
PHOSPHATE SERPL-MCNC: 1.9 MG/DL (ref 2.5–4.9)
PHOSPHATE SERPL-MCNC: 2.1 MG/DL (ref 2.5–4.9)
PHOSPHATE SERPL-MCNC: 2.2 MG/DL (ref 2.5–4.9)
PHOSPHATE SERPL-MCNC: 2.4 MG/DL (ref 2.5–4.9)
PHOSPHATE SERPL-MCNC: 2.5 MG/DL (ref 2.5–4.9)
PHOSPHATE SERPL-MCNC: 2.6 MG/DL (ref 2.5–4.9)
PHOSPHATE SERPL-MCNC: 2.6 MG/DL (ref 2.5–4.9)
PHOSPHATE SERPL-MCNC: 2.9 MG/DL (ref 2.5–4.9)
PHOSPHATE SERPL-MCNC: 2.9 MG/DL (ref 2.5–4.9)
PHOSPHATE SERPL-MCNC: 3 MG/DL (ref 2.5–4.9)
PHOSPHATE SERPL-MCNC: 3.5 MG/DL (ref 2.5–4.9)
PHOSPHATE SERPL-MCNC: 3.5 MG/DL (ref 2.5–4.9)
PHOSPHATE SERPL-MCNC: 3.6 MG/DL (ref 2.5–4.9)
PHOSPHATE SERPL-MCNC: 3.8 MG/DL (ref 2.5–4.9)
PHOSPHATE SERPL-MCNC: 3.9 MG/DL (ref 2.5–4.9)
PHOSPHATE SERPL-MCNC: 3.9 MG/DL (ref 2.5–4.9)
PHOSPHATE SERPL-MCNC: 4 MG/DL (ref 2.5–4.9)
PHOSPHATE SERPL-MCNC: 4.3 MG/DL (ref 2.5–4.9)
PHOSPHATE SERPL-MCNC: 4.4 MG/DL (ref 2.5–4.9)
PHOSPHATE SERPL-MCNC: 4.5 MG/DL (ref 2.5–4.9)
PHOSPHATE SERPL-MCNC: 4.5 MG/DL (ref 2.5–4.9)
PHOSPHATE SERPL-MCNC: 4.7 MG/DL (ref 2.5–4.9)
PHOSPHATE SERPL-MCNC: 4.9 MG/DL (ref 2.5–4.9)
PHOSPHATE SERPL-MCNC: 5 MG/DL (ref 2.5–4.9)
PHOSPHATE SERPL-MCNC: 5.2 MG/DL (ref 2.5–4.9)
PHOSPHATE SERPL-MCNC: 5.3 MG/DL (ref 2.5–4.9)
PHOSPHATE SERPL-MCNC: 5.4 MG/DL (ref 2.5–4.9)
PHOSPHATE SERPL-MCNC: 5.4 MG/DL (ref 2.5–4.9)
PHOSPHATE SERPL-MCNC: 5.5 MG/DL (ref 2.5–4.9)
PHOSPHATE SERPL-MCNC: 5.5 MG/DL (ref 2.5–4.9)
PHOSPHATE SERPL-MCNC: 6.1 MG/DL (ref 2.5–4.9)
PHOSPHATE SERPL-MCNC: 6.3 MG/DL (ref 2.5–4.9)
PHOSPHATE SERPL-MCNC: 6.9 MG/DL (ref 2.5–4.9)
PIP ISTAT,IPIP: 14
PIP ISTAT,IPIP: 15
PIP ISTAT,IPIP: 26
PIP ISTAT,IPIP: 27
PIP ISTAT,IPIP: 33
PLATELET # BLD AUTO: 110 K/UL (ref 135–420)
PLATELET # BLD AUTO: 115 K/UL (ref 135–420)
PLATELET # BLD AUTO: 30 K/UL (ref 135–420)
PLATELET # BLD AUTO: 32 K/UL (ref 135–420)
PLATELET # BLD AUTO: 33 K/UL (ref 135–420)
PLATELET # BLD AUTO: 34 K/UL (ref 135–420)
PLATELET # BLD AUTO: 35 K/UL (ref 135–420)
PLATELET # BLD AUTO: 37 K/UL (ref 135–420)
PLATELET # BLD AUTO: 39 K/UL (ref 135–420)
PLATELET # BLD AUTO: 40 K/UL (ref 135–420)
PLATELET # BLD AUTO: 43 K/UL (ref 135–420)
PLATELET # BLD AUTO: 44 K/UL (ref 135–420)
PLATELET # BLD AUTO: 46 K/UL (ref 135–420)
PLATELET # BLD AUTO: 46 K/UL (ref 135–420)
PLATELET # BLD AUTO: 48 K/UL (ref 135–420)
PLATELET # BLD AUTO: 49 K/UL (ref 135–420)
PLATELET # BLD AUTO: 50 K/UL (ref 135–420)
PLATELET # BLD AUTO: 52 K/UL (ref 135–420)
PLATELET # BLD AUTO: 52 K/UL (ref 135–420)
PLATELET # BLD AUTO: 58 K/UL (ref 135–420)
PLATELET # BLD AUTO: 59 K/UL (ref 135–420)
PLATELET # BLD AUTO: 76 K/UL (ref 135–420)
PLATELET COMMENTS,PCOM: ABNORMAL
PMV BLD AUTO: 10.1 FL (ref 9.2–11.8)
PMV BLD AUTO: 10.2 FL (ref 9.2–11.8)
PMV BLD AUTO: 10.4 FL (ref 9.2–11.8)
PMV BLD AUTO: 10.4 FL (ref 9.2–11.8)
PMV BLD AUTO: 10.5 FL (ref 9.2–11.8)
PMV BLD AUTO: 10.6 FL (ref 9.2–11.8)
PMV BLD AUTO: 10.6 FL (ref 9.2–11.8)
PMV BLD AUTO: 10.7 FL (ref 9.2–11.8)
PMV BLD AUTO: 10.8 FL (ref 9.2–11.8)
PMV BLD AUTO: 11.4 FL (ref 9.2–11.8)
PMV BLD AUTO: 11.5 FL (ref 9.2–11.8)
PMV BLD AUTO: 11.8 FL (ref 9.2–11.8)
PMV BLD AUTO: 11.9 FL (ref 9.2–11.8)
PO2 BLD: 107 MMHG (ref 80–100)
PO2 BLD: 110 MMHG (ref 80–100)
PO2 BLD: 124 MMHG (ref 80–100)
PO2 BLD: 125 MMHG (ref 80–100)
PO2 BLD: 129 MMHG (ref 80–100)
PO2 BLD: 139 MMHG (ref 80–100)
PO2 BLD: 201 MMHG (ref 80–100)
PO2 BLD: 66 MMHG (ref 80–100)
PO2 BLD: 76 MMHG (ref 80–100)
PO2 BLD: 79 MMHG (ref 80–100)
PO2 BLD: 82 MMHG (ref 80–100)
PO2 BLD: 82 MMHG (ref 80–100)
PO2 BLD: 85 MMHG (ref 80–100)
PO2 BLD: 85 MMHG (ref 80–100)
PO2 BLD: 90 MMHG (ref 80–100)
PO2 BLD: 91 MMHG (ref 80–100)
PO2 BLD: 92 MMHG (ref 80–100)
PO2 BLD: 94 MMHG (ref 80–100)
PO2 BLD: 95 MMHG (ref 80–100)
PO2 BLD: 96 MMHG (ref 80–100)
PO2 BLD: 99 MMHG (ref 80–100)
PO2 BLD: 99 MMHG (ref 80–100)
POTASSIUM BLD-SCNC: 3.4 MMOL/L (ref 3.5–5.5)
POTASSIUM SERPL-SCNC: 3.1 MMOL/L (ref 3.5–5.5)
POTASSIUM SERPL-SCNC: 3.1 MMOL/L (ref 3.5–5.5)
POTASSIUM SERPL-SCNC: 3.2 MMOL/L (ref 3.5–5.5)
POTASSIUM SERPL-SCNC: 3.2 MMOL/L (ref 3.5–5.5)
POTASSIUM SERPL-SCNC: 3.3 MMOL/L (ref 3.5–5.5)
POTASSIUM SERPL-SCNC: 3.4 MMOL/L (ref 3.5–5.5)
POTASSIUM SERPL-SCNC: 3.5 MMOL/L (ref 3.5–5.5)
POTASSIUM SERPL-SCNC: 3.6 MMOL/L (ref 3.5–5.5)
POTASSIUM SERPL-SCNC: 3.7 MMOL/L (ref 3.5–5.5)
POTASSIUM SERPL-SCNC: 3.8 MMOL/L (ref 3.5–5.5)
POTASSIUM SERPL-SCNC: 3.9 MMOL/L (ref 3.5–5.5)
POTASSIUM SERPL-SCNC: 4 MMOL/L (ref 3.5–5.5)
POTASSIUM SERPL-SCNC: 4.1 MMOL/L (ref 3.5–5.5)
POTASSIUM SERPL-SCNC: 4.2 MMOL/L (ref 3.5–5.5)
POTASSIUM SERPL-SCNC: 4.3 MMOL/L (ref 3.5–5.5)
POTASSIUM SERPL-SCNC: 4.3 MMOL/L (ref 3.5–5.5)
POTASSIUM SERPL-SCNC: 4.4 MMOL/L (ref 3.5–5.5)
POTASSIUM SERPL-SCNC: 4.5 MMOL/L (ref 3.5–5.5)
POTASSIUM SERPL-SCNC: 4.5 MMOL/L (ref 3.5–5.5)
POTASSIUM SERPL-SCNC: 4.6 MMOL/L (ref 3.5–5.5)
POTASSIUM SERPL-SCNC: 4.9 MMOL/L (ref 3.5–5.5)
POTASSIUM SERPL-SCNC: 5 MMOL/L (ref 3.5–5.5)
PRESSURE SUPPORT SETTING VENT: 10 CMH2O
PRESSURE SUPPORT SETTING VENT: 7 CMH2O
PROCALCITONIN SERPL-MCNC: 0.91 NG/ML
PROCALCITONIN SERPL-MCNC: 0.98 NG/ML
PROCALCITONIN SERPL-MCNC: 1.17 NG/ML
PROCALCITONIN SERPL-MCNC: 1.18 NG/ML
PROCALCITONIN SERPL-MCNC: 1.41 NG/ML
PROCALCITONIN SERPL-MCNC: 1.43 NG/ML
PROCALCITONIN SERPL-MCNC: 1.45 NG/ML
PROCALCITONIN SERPL-MCNC: 15.92 NG/ML
PROCALCITONIN SERPL-MCNC: 17.45 NG/ML
PROCALCITONIN SERPL-MCNC: 2.06 NG/ML
PROCALCITONIN SERPL-MCNC: 2.16 NG/ML
PROCALCITONIN SERPL-MCNC: 2.82 NG/ML
PROCALCITONIN SERPL-MCNC: 27.89 NG/ML
PROCALCITONIN SERPL-MCNC: 3.09 NG/ML
PROCALCITONIN SERPL-MCNC: 4.31 NG/ML
PROCALCITONIN SERPL-MCNC: 43.66 NG/ML
PROCALCITONIN SERPL-MCNC: 5.93 NG/ML
PROCALCITONIN SERPL-MCNC: 51.89 NG/ML
PROCALCITONIN SERPL-MCNC: 8.11 NG/ML
PROT SERPL-MCNC: 4.7 G/DL (ref 6.4–8.2)
PROT SERPL-MCNC: 4.9 G/DL (ref 6.4–8.2)
PROT SERPL-MCNC: 5.1 G/DL (ref 6.4–8.2)
PROT SERPL-MCNC: 5.1 G/DL (ref 6.4–8.2)
PROT SERPL-MCNC: 5.2 G/DL (ref 6.4–8.2)
PROT SERPL-MCNC: 5.3 G/DL (ref 6.4–8.2)
PROT SERPL-MCNC: 5.5 G/DL (ref 6.4–8.2)
PROT SERPL-MCNC: 5.5 G/DL (ref 6.4–8.2)
PROT SERPL-MCNC: 5.6 G/DL (ref 6.4–8.2)
PROT SERPL-MCNC: 5.6 G/DL (ref 6.4–8.2)
PROT SERPL-MCNC: 5.7 G/DL (ref 6.4–8.2)
PROT SERPL-MCNC: 5.7 G/DL (ref 6.4–8.2)
PROT SERPL-MCNC: 6.1 G/DL (ref 6.4–8.2)
PROT SERPL-MCNC: 6.2 G/DL (ref 6.4–8.2)
PROT SERPL-MCNC: 6.9 G/DL (ref 6.4–8.2)
PROT UR STRIP-MCNC: 100 MG/DL
PROTHROMBIN TIME: 20 SEC (ref 11.5–15.2)
RBC # BLD AUTO: 2.09 M/UL (ref 4.2–5.3)
RBC # BLD AUTO: 2.17 M/UL (ref 4.2–5.3)
RBC # BLD AUTO: 2.22 M/UL (ref 4.2–5.3)
RBC # BLD AUTO: 2.39 M/UL (ref 4.2–5.3)
RBC # BLD AUTO: 2.42 M/UL (ref 4.2–5.3)
RBC # BLD AUTO: 2.42 M/UL (ref 4.2–5.3)
RBC # BLD AUTO: 2.43 M/UL (ref 4.2–5.3)
RBC # BLD AUTO: 2.45 M/UL (ref 4.2–5.3)
RBC # BLD AUTO: 2.48 M/UL (ref 4.2–5.3)
RBC # BLD AUTO: 2.49 M/UL (ref 4.2–5.3)
RBC # BLD AUTO: 2.51 M/UL (ref 4.2–5.3)
RBC # BLD AUTO: 2.52 M/UL (ref 4.2–5.3)
RBC # BLD AUTO: 2.53 M/UL (ref 4.2–5.3)
RBC # BLD AUTO: 2.57 M/UL (ref 4.2–5.3)
RBC # BLD AUTO: 2.59 M/UL (ref 4.2–5.3)
RBC # BLD AUTO: 2.68 M/UL (ref 4.2–5.3)
RBC # BLD AUTO: 2.75 M/UL (ref 4.2–5.3)
RBC # BLD AUTO: 2.83 M/UL (ref 4.2–5.3)
RBC # BLD AUTO: 2.84 M/UL (ref 4.2–5.3)
RBC # BLD AUTO: 2.94 M/UL (ref 4.2–5.3)
RBC # BLD AUTO: 2.99 M/UL (ref 4.2–5.3)
RBC # BLD AUTO: 2.99 M/UL (ref 4.2–5.3)
RBC # BLD AUTO: 3.01 M/UL (ref 4.2–5.3)
RBC # BLD AUTO: 3.12 M/UL (ref 4.2–5.3)
RBC # BLD AUTO: 3.2 M/UL (ref 4.2–5.3)
RBC # BLD AUTO: 3.25 M/UL (ref 4.2–5.3)
RBC # BLD AUTO: 3.36 M/UL (ref 4.2–5.3)
RBC # BLD AUTO: 3.37 M/UL (ref 4.2–5.3)
RBC #/AREA URNS HPF: ABNORMAL /HPF (ref 0–5)
RBC MORPH BLD: ABNORMAL
SAO2 % BLD: 95 % (ref 92–97)
SAO2 % BLD: 95 % (ref 92–97)
SAO2 % BLD: 96 % (ref 92–97)
SAO2 % BLD: 97 % (ref 92–97)
SAO2 % BLD: 98 % (ref 92–97)
SAO2 % BLD: 99 % (ref 92–97)
SARS-COV-2, COV2: NORMAL
SERVICE CMNT-IMP: ABNORMAL
SERVICE CMNT-IMP: NORMAL
SODIUM BLD-SCNC: 138 MMOL/L (ref 136–145)
SODIUM SERPL-SCNC: 135 MMOL/L (ref 136–145)
SODIUM SERPL-SCNC: 138 MMOL/L (ref 136–145)
SODIUM SERPL-SCNC: 139 MMOL/L (ref 136–145)
SODIUM SERPL-SCNC: 140 MMOL/L (ref 136–145)
SODIUM SERPL-SCNC: 141 MMOL/L (ref 136–145)
SODIUM SERPL-SCNC: 142 MMOL/L (ref 136–145)
SODIUM SERPL-SCNC: 143 MMOL/L (ref 136–145)
SODIUM SERPL-SCNC: 144 MMOL/L (ref 136–145)
SODIUM SERPL-SCNC: 145 MMOL/L (ref 136–145)
SODIUM SERPL-SCNC: 145 MMOL/L (ref 136–145)
SODIUM SERPL-SCNC: 146 MMOL/L (ref 136–145)
SODIUM SERPL-SCNC: 147 MMOL/L (ref 136–145)
SODIUM SERPL-SCNC: 148 MMOL/L (ref 136–145)
SODIUM SERPL-SCNC: 149 MMOL/L (ref 136–145)
SODIUM SERPL-SCNC: 150 MMOL/L (ref 136–145)
SOURCE, COVRS: ABNORMAL
SP GR UR REFRACTOMETRY: 1.02 (ref 1–1.03)
SPECIMEN EXP DATE BLD: NORMAL
SPECIMEN TYPE: ABNORMAL
SPECIMEN TYPE: NORMAL
STATUS OF UNIT,%ST: NORMAL
TOTAL RESP. RATE, ITRR: 14
TOTAL RESP. RATE, ITRR: 15
TOTAL RESP. RATE, ITRR: 15
TOTAL RESP. RATE, ITRR: 16
TOTAL RESP. RATE, ITRR: 17
TOTAL RESP. RATE, ITRR: 17
TOTAL RESP. RATE, ITRR: 18
TOTAL RESP. RATE, ITRR: 19
TOTAL RESP. RATE, ITRR: 20
TOTAL RESP. RATE, ITRR: 20
TOTAL RESP. RATE, ITRR: 26
TOTAL RESP. RATE, ITRR: 28
TOTAL RESP. RATE, ITRR: 29
TOTAL RESP. RATE, ITRR: 30
TOTAL RESP. RATE, ITRR: 31
TOTAL RESP. RATE, ITRR: 31
TROPONIN I SERPL-MCNC: 0.33 NG/ML (ref 0–0.04)
TROPONIN I SERPL-MCNC: 0.66 NG/ML (ref 0–0.04)
TROPONIN I SERPL-MCNC: 1.25 NG/ML (ref 0–0.04)
TROPONIN I SERPL-MCNC: 1.3 NG/ML (ref 0–0.04)
UNIT DIVISION, %UDIV: 0
UROBILINOGEN UR QL STRIP.AUTO: 0.2 EU/DL (ref 0.2–1)
VANCOMYCIN SERPL-MCNC: 12.6 UG/ML (ref 5–40)
VANCOMYCIN SERPL-MCNC: 14.4 UG/ML (ref 5–40)
VANCOMYCIN SERPL-MCNC: 16.8 UG/ML (ref 5–40)
VANCOMYCIN SERPL-MCNC: 18.4 UG/ML (ref 5–40)
VANCOMYCIN SERPL-MCNC: 19.5 UG/ML (ref 5–40)
VANCOMYCIN SERPL-MCNC: 20.1 UG/ML (ref 5–40)
VENTILATION MODE VENT: ABNORMAL
VENTILATION MODE VENT: NORMAL
VOLUME CONTROL PLUS IVLCP: YES
VT SETTING VENT: 40 ML
VT SETTING VENT: 400 ML
VT SETTING VENT: 450 ML
VT SETTING VENT: 500 ML
VT SETTING VENT: 500 ML
WBC # BLD AUTO: 10 K/UL (ref 4.6–13.2)
WBC # BLD AUTO: 10.9 K/UL (ref 4.6–13.2)
WBC # BLD AUTO: 12.2 K/UL (ref 4.6–13.2)
WBC # BLD AUTO: 13.7 K/UL (ref 4.6–13.2)
WBC # BLD AUTO: 2 K/UL (ref 4.6–13.2)
WBC # BLD AUTO: 2.2 K/UL (ref 4.6–13.2)
WBC # BLD AUTO: 2.4 K/UL (ref 4.6–13.2)
WBC # BLD AUTO: 2.4 K/UL (ref 4.6–13.2)
WBC # BLD AUTO: 2.5 K/UL (ref 4.6–13.2)
WBC # BLD AUTO: 2.9 K/UL (ref 4.6–13.2)
WBC # BLD AUTO: 3.1 K/UL (ref 4.6–13.2)
WBC # BLD AUTO: 3.1 K/UL (ref 4.6–13.2)
WBC # BLD AUTO: 3.2 K/UL (ref 4.6–13.2)
WBC # BLD AUTO: 3.2 K/UL (ref 4.6–13.2)
WBC # BLD AUTO: 3.3 K/UL (ref 4.6–13.2)
WBC # BLD AUTO: 3.3 K/UL (ref 4.6–13.2)
WBC # BLD AUTO: 4.1 K/UL (ref 4.6–13.2)
WBC # BLD AUTO: 4.9 K/UL (ref 4.6–13.2)
WBC # BLD AUTO: 5.5 K/UL (ref 4.6–13.2)
WBC # BLD AUTO: 5.8 K/UL (ref 4.6–13.2)
WBC # BLD AUTO: 5.9 K/UL (ref 4.6–13.2)
WBC # BLD AUTO: 6.2 K/UL (ref 4.6–13.2)
WBC # BLD AUTO: 6.8 K/UL (ref 4.6–13.2)
WBC # BLD AUTO: 7.5 K/UL (ref 4.6–13.2)
WBC # BLD AUTO: 7.9 K/UL (ref 4.6–13.2)
WBC # BLD AUTO: 8 K/UL (ref 4.6–13.2)
WBC # BLD AUTO: 8.5 K/UL (ref 4.6–13.2)
WBC # BLD AUTO: 8.5 K/UL (ref 4.6–13.2)
WBC URNS QL MICRO: ABNORMAL /HPF (ref 0–4)

## 2021-01-01 PROCEDURE — 80069 RENAL FUNCTION PANEL: CPT

## 2021-01-01 PROCEDURE — 74011250637 HC RX REV CODE- 250/637: Performed by: INTERNAL MEDICINE

## 2021-01-01 PROCEDURE — C9113 INJ PANTOPRAZOLE SODIUM, VIA: HCPCS | Performed by: INTERNAL MEDICINE

## 2021-01-01 PROCEDURE — 74011250637 HC RX REV CODE- 250/637: Performed by: FAMILY MEDICINE

## 2021-01-01 PROCEDURE — 36600 WITHDRAWAL OF ARTERIAL BLOOD: CPT

## 2021-01-01 PROCEDURE — 80076 HEPATIC FUNCTION PANEL: CPT

## 2021-01-01 PROCEDURE — 99291 CRITICAL CARE FIRST HOUR: CPT | Performed by: INTERNAL MEDICINE

## 2021-01-01 PROCEDURE — 2709999900 HC NON-CHARGEABLE SUPPLY

## 2021-01-01 PROCEDURE — 74011250636 HC RX REV CODE- 250/636: Performed by: PHYSICIAN ASSISTANT

## 2021-01-01 PROCEDURE — 84145 PROCALCITONIN (PCT): CPT

## 2021-01-01 PROCEDURE — 87040 BLOOD CULTURE FOR BACTERIA: CPT

## 2021-01-01 PROCEDURE — 94003 VENT MGMT INPAT SUBQ DAY: CPT

## 2021-01-01 PROCEDURE — 74011250636 HC RX REV CODE- 250/636: Performed by: STUDENT IN AN ORGANIZED HEALTH CARE EDUCATION/TRAINING PROGRAM

## 2021-01-01 PROCEDURE — 74011000250 HC RX REV CODE- 250: Performed by: PHYSICIAN ASSISTANT

## 2021-01-01 PROCEDURE — P9047 ALBUMIN (HUMAN), 25%, 50ML: HCPCS | Performed by: INTERNAL MEDICINE

## 2021-01-01 PROCEDURE — 36592 COLLECT BLOOD FROM PICC: CPT

## 2021-01-01 PROCEDURE — P9045 ALBUMIN (HUMAN), 5%, 250 ML: HCPCS | Performed by: PHYSICIAN ASSISTANT

## 2021-01-01 PROCEDURE — P9047 ALBUMIN (HUMAN), 25%, 50ML: HCPCS

## 2021-01-01 PROCEDURE — 74011000258 HC RX REV CODE- 258: Performed by: PHYSICIAN ASSISTANT

## 2021-01-01 PROCEDURE — 65610000006 HC RM INTENSIVE CARE

## 2021-01-01 PROCEDURE — 82962 GLUCOSE BLOOD TEST: CPT

## 2021-01-01 PROCEDURE — 83605 ASSAY OF LACTIC ACID: CPT

## 2021-01-01 PROCEDURE — 74011250636 HC RX REV CODE- 250/636

## 2021-01-01 PROCEDURE — 74011636637 HC RX REV CODE- 636/637: Performed by: INTERNAL MEDICINE

## 2021-01-01 PROCEDURE — 71045 X-RAY EXAM CHEST 1 VIEW: CPT

## 2021-01-01 PROCEDURE — 86850 RBC ANTIBODY SCREEN: CPT

## 2021-01-01 PROCEDURE — 83880 ASSAY OF NATRIURETIC PEPTIDE: CPT

## 2021-01-01 PROCEDURE — 74011000258 HC RX REV CODE- 258: Performed by: STUDENT IN AN ORGANIZED HEALTH CARE EDUCATION/TRAINING PROGRAM

## 2021-01-01 PROCEDURE — 36556 INSERT NON-TUNNEL CV CATH: CPT | Performed by: INTERNAL MEDICINE

## 2021-01-01 PROCEDURE — 83615 LACTATE (LD) (LDH) ENZYME: CPT

## 2021-01-01 PROCEDURE — 84100 ASSAY OF PHOSPHORUS: CPT

## 2021-01-01 PROCEDURE — 80053 COMPREHEN METABOLIC PANEL: CPT

## 2021-01-01 PROCEDURE — 90935 HEMODIALYSIS ONE EVALUATION: CPT

## 2021-01-01 PROCEDURE — 74011250636 HC RX REV CODE- 250/636: Performed by: INTERNAL MEDICINE

## 2021-01-01 PROCEDURE — 74011636637 HC RX REV CODE- 636/637: Performed by: PHYSICIAN ASSISTANT

## 2021-01-01 PROCEDURE — 74011250636 HC RX REV CODE- 250/636: Performed by: HOSPITALIST

## 2021-01-01 PROCEDURE — P9047 ALBUMIN (HUMAN), 25%, 50ML: HCPCS | Performed by: STUDENT IN AN ORGANIZED HEALTH CARE EDUCATION/TRAINING PROGRAM

## 2021-01-01 PROCEDURE — 86140 C-REACTIVE PROTEIN: CPT

## 2021-01-01 PROCEDURE — 74011250636 HC RX REV CODE- 250/636: Performed by: NURSE PRACTITIONER

## 2021-01-01 PROCEDURE — 74011000250 HC RX REV CODE- 250: Performed by: INTERNAL MEDICINE

## 2021-01-01 PROCEDURE — 74018 RADEX ABDOMEN 1 VIEW: CPT

## 2021-01-01 PROCEDURE — 97166 OT EVAL MOD COMPLEX 45 MIN: CPT

## 2021-01-01 PROCEDURE — 82803 BLOOD GASES ANY COMBINATION: CPT

## 2021-01-01 PROCEDURE — 85025 COMPLETE CBC W/AUTO DIFF WBC: CPT

## 2021-01-01 PROCEDURE — 36430 TRANSFUSION BLD/BLD COMPNT: CPT

## 2021-01-01 PROCEDURE — 83735 ASSAY OF MAGNESIUM: CPT

## 2021-01-01 PROCEDURE — 74011250637 HC RX REV CODE- 250/637: Performed by: PHYSICIAN ASSISTANT

## 2021-01-01 PROCEDURE — 82330 ASSAY OF CALCIUM: CPT

## 2021-01-01 PROCEDURE — 5A1955Z RESPIRATORY VENTILATION, GREATER THAN 96 CONSECUTIVE HOURS: ICD-10-PCS | Performed by: INTERNAL MEDICINE

## 2021-01-01 PROCEDURE — 83036 HEMOGLOBIN GLYCOSYLATED A1C: CPT

## 2021-01-01 PROCEDURE — 74011000258 HC RX REV CODE- 258: Performed by: EMERGENCY MEDICINE

## 2021-01-01 PROCEDURE — 94640 AIRWAY INHALATION TREATMENT: CPT

## 2021-01-01 PROCEDURE — 5A1D70Z PERFORMANCE OF URINARY FILTRATION, INTERMITTENT, LESS THAN 6 HOURS PER DAY: ICD-10-PCS | Performed by: INTERNAL MEDICINE

## 2021-01-01 PROCEDURE — 36415 COLL VENOUS BLD VENIPUNCTURE: CPT

## 2021-01-01 PROCEDURE — 82040 ASSAY OF SERUM ALBUMIN: CPT

## 2021-01-01 PROCEDURE — 31720 CLEARANCE OF AIRWAYS: CPT

## 2021-01-01 PROCEDURE — 86923 COMPATIBILITY TEST ELECTRIC: CPT

## 2021-01-01 PROCEDURE — 74011250637 HC RX REV CODE- 250/637: Performed by: HOSPITALIST

## 2021-01-01 PROCEDURE — 80048 BASIC METABOLIC PNL TOTAL CA: CPT

## 2021-01-01 PROCEDURE — C9113 INJ PANTOPRAZOLE SODIUM, VIA: HCPCS | Performed by: PHYSICIAN ASSISTANT

## 2021-01-01 PROCEDURE — P9016 RBC LEUKOCYTES REDUCED: HCPCS

## 2021-01-01 PROCEDURE — 74011000250 HC RX REV CODE- 250: Performed by: HOSPITALIST

## 2021-01-01 PROCEDURE — 92526 ORAL FUNCTION THERAPY: CPT

## 2021-01-01 PROCEDURE — 74011250637 HC RX REV CODE- 250/637: Performed by: STUDENT IN AN ORGANIZED HEALTH CARE EDUCATION/TRAINING PROGRAM

## 2021-01-01 PROCEDURE — 82553 CREATINE MB FRACTION: CPT

## 2021-01-01 PROCEDURE — 85384 FIBRINOGEN ACTIVITY: CPT

## 2021-01-01 PROCEDURE — 99231 SBSQ HOSP IP/OBS SF/LOW 25: CPT | Performed by: HOSPITALIST

## 2021-01-01 PROCEDURE — 92610 EVALUATE SWALLOWING FUNCTION: CPT

## 2021-01-01 PROCEDURE — 65660000000 HC RM CCU STEPDOWN

## 2021-01-01 PROCEDURE — 77030040392 HC DRSG OPTIFOAM MDII -A

## 2021-01-01 PROCEDURE — 99221 1ST HOSP IP/OBS SF/LOW 40: CPT | Performed by: NURSE PRACTITIONER

## 2021-01-01 PROCEDURE — 93970 EXTREMITY STUDY: CPT

## 2021-01-01 PROCEDURE — 99232 SBSQ HOSP IP/OBS MODERATE 35: CPT | Performed by: INTERNAL MEDICINE

## 2021-01-01 PROCEDURE — 82728 ASSAY OF FERRITIN: CPT

## 2021-01-01 PROCEDURE — 77030008771 HC TU NG SALEM SUMP -A

## 2021-01-01 PROCEDURE — 80202 ASSAY OF VANCOMYCIN: CPT

## 2021-01-01 PROCEDURE — 77010033678 HC OXYGEN DAILY

## 2021-01-01 PROCEDURE — 99232 SBSQ HOSP IP/OBS MODERATE 35: CPT | Performed by: NURSE PRACTITIONER

## 2021-01-01 PROCEDURE — 86706 HEP B SURFACE ANTIBODY: CPT

## 2021-01-01 PROCEDURE — 02HV33Z INSERTION OF INFUSION DEVICE INTO SUPERIOR VENA CAVA, PERCUTANEOUS APPROACH: ICD-10-PCS | Performed by: PHYSICIAN ASSISTANT

## 2021-01-01 PROCEDURE — 85610 PROTHROMBIN TIME: CPT

## 2021-01-01 PROCEDURE — 96376 TX/PRO/DX INJ SAME DRUG ADON: CPT

## 2021-01-01 PROCEDURE — 94002 VENT MGMT INPAT INIT DAY: CPT

## 2021-01-01 PROCEDURE — 77010033711 HC HIGH FLOW OXYGEN

## 2021-01-01 PROCEDURE — 94762 N-INVAS EAR/PLS OXIMTRY CONT: CPT

## 2021-01-01 PROCEDURE — 85027 COMPLETE CBC AUTOMATED: CPT

## 2021-01-01 PROCEDURE — 74011000250 HC RX REV CODE- 250: Performed by: EMERGENCY MEDICINE

## 2021-01-01 PROCEDURE — 76450000000

## 2021-01-01 PROCEDURE — 99232 SBSQ HOSP IP/OBS MODERATE 35: CPT | Performed by: HOSPITALIST

## 2021-01-01 PROCEDURE — 77030037878 HC DRSG MEPILEX >48IN BORD MOLN -B

## 2021-01-01 PROCEDURE — 31500 INSERT EMERGENCY AIRWAY: CPT

## 2021-01-01 PROCEDURE — 81001 URINALYSIS AUTO W/SCOPE: CPT

## 2021-01-01 PROCEDURE — 74011636637 HC RX REV CODE- 636/637: Performed by: STUDENT IN AN ORGANIZED HEALTH CARE EDUCATION/TRAINING PROGRAM

## 2021-01-01 PROCEDURE — 74011250637 HC RX REV CODE- 250/637: Performed by: EMERGENCY MEDICINE

## 2021-01-01 PROCEDURE — P9012 CRYOPRECIPITATE EACH UNIT: HCPCS

## 2021-01-01 PROCEDURE — 87077 CULTURE AEROBIC IDENTIFY: CPT

## 2021-01-01 PROCEDURE — XW033E5 INTRODUCTION OF REMDESIVIR ANTI-INFECTIVE INTO PERIPHERAL VEIN, PERCUTANEOUS APPROACH, NEW TECHNOLOGY GROUP 5: ICD-10-PCS | Performed by: INTERNAL MEDICINE

## 2021-01-01 PROCEDURE — 74011250636 HC RX REV CODE- 250/636: Performed by: EMERGENCY MEDICINE

## 2021-01-01 PROCEDURE — 85379 FIBRIN DEGRADATION QUANT: CPT

## 2021-01-01 PROCEDURE — 74011000258 HC RX REV CODE- 258: Performed by: NURSE PRACTITIONER

## 2021-01-01 PROCEDURE — 87186 SC STD MICRODIL/AGAR DIL: CPT

## 2021-01-01 PROCEDURE — 0BH17EZ INSERTION OF ENDOTRACHEAL AIRWAY INTO TRACHEA, VIA NATURAL OR ARTIFICIAL OPENING: ICD-10-PCS | Performed by: NURSE ANESTHETIST, CERTIFIED REGISTERED

## 2021-01-01 PROCEDURE — 86901 BLOOD TYPING SEROLOGIC RH(D): CPT

## 2021-01-01 PROCEDURE — 0BH17EZ INSERTION OF ENDOTRACHEAL AIRWAY INTO TRACHEA, VIA NATURAL OR ARTIFICIAL OPENING: ICD-10-PCS | Performed by: EMERGENCY MEDICINE

## 2021-01-01 PROCEDURE — 3E0G76Z INTRODUCTION OF NUTRITIONAL SUBSTANCE INTO UPPER GI, VIA NATURAL OR ARTIFICIAL OPENING: ICD-10-PCS | Performed by: INTERNAL MEDICINE

## 2021-01-01 PROCEDURE — 96375 TX/PRO/DX INJ NEW DRUG ADDON: CPT

## 2021-01-01 PROCEDURE — 97162 PT EVAL MOD COMPLEX 30 MIN: CPT

## 2021-01-01 PROCEDURE — 97535 SELF CARE MNGMENT TRAINING: CPT

## 2021-01-01 PROCEDURE — 87635 SARS-COV-2 COVID-19 AMP PRB: CPT

## 2021-01-01 PROCEDURE — APPSS45 APP SPLIT SHARED TIME 31-45 MINUTES: Performed by: NURSE PRACTITIONER

## 2021-01-01 PROCEDURE — 74011000250 HC RX REV CODE- 250: Performed by: STUDENT IN AN ORGANIZED HEALTH CARE EDUCATION/TRAINING PROGRAM

## 2021-01-01 PROCEDURE — 36556 INSERT NON-TUNNEL CV CATH: CPT | Performed by: PHYSICIAN ASSISTANT

## 2021-01-01 PROCEDURE — 99285 EMERGENCY DEPT VISIT HI MDM: CPT

## 2021-01-01 PROCEDURE — 02H633Z INSERTION OF INFUSION DEVICE INTO RIGHT ATRIUM, PERCUTANEOUS APPROACH: ICD-10-PCS | Performed by: PHYSICIAN ASSISTANT

## 2021-01-01 PROCEDURE — 96374 THER/PROPH/DIAG INJ IV PUSH: CPT

## 2021-01-01 PROCEDURE — 93321 DOPPLER ECHO F-UP/LMTD STD: CPT

## 2021-01-01 PROCEDURE — 84295 ASSAY OF SERUM SODIUM: CPT

## 2021-01-01 PROCEDURE — 30233N1 TRANSFUSION OF NONAUTOLOGOUS RED BLOOD CELLS INTO PERIPHERAL VEIN, PERCUTANEOUS APPROACH: ICD-10-PCS | Performed by: INTERNAL MEDICINE

## 2021-01-01 PROCEDURE — 0DH67UZ INSERTION OF FEEDING DEVICE INTO STOMACH, VIA NATURAL OR ARTIFICIAL OPENING: ICD-10-PCS | Performed by: INTERNAL MEDICINE

## 2021-01-01 PROCEDURE — 87340 HEPATITIS B SURFACE AG IA: CPT

## 2021-01-01 PROCEDURE — 87086 URINE CULTURE/COLONY COUNT: CPT

## 2021-01-01 PROCEDURE — 94761 N-INVAS EAR/PLS OXIMETRY MLT: CPT

## 2021-01-01 PROCEDURE — 99239 HOSP IP/OBS DSCHRG MGMT >30: CPT | Performed by: INTERNAL MEDICINE

## 2021-01-01 PROCEDURE — XW14325 TRANSFUSION OF CONVALESCENT PLASMA (NONAUTOLOGOUS) INTO CENTRAL VEIN, PERCUTANEOUS APPROACH, NEW TECHNOLOGY GROUP 5: ICD-10-PCS | Performed by: INTERNAL MEDICINE

## 2021-01-01 PROCEDURE — 99233 SBSQ HOSP IP/OBS HIGH 50: CPT | Performed by: FAMILY MEDICINE

## 2021-01-01 PROCEDURE — 97530 THERAPEUTIC ACTIVITIES: CPT

## 2021-01-01 PROCEDURE — 87205 SMEAR GRAM STAIN: CPT

## 2021-01-01 PROCEDURE — 99223 1ST HOSP IP/OBS HIGH 75: CPT | Performed by: INTERNAL MEDICINE

## 2021-01-01 PROCEDURE — 71250 CT THORAX DX C-: CPT

## 2021-01-01 RX ORDER — DEXTROSE MONOHYDRATE 50 MG/ML
75 INJECTION, SOLUTION INTRAVENOUS CONTINUOUS
Status: DISCONTINUED | OUTPATIENT
Start: 2021-01-01 | End: 2021-01-01

## 2021-01-01 RX ORDER — LORAZEPAM 2 MG/ML
1 CONCENTRATE ORAL
Qty: 30 ML | Refills: 0 | Status: SHIPPED | OUTPATIENT
Start: 2021-01-01

## 2021-01-01 RX ORDER — CARVEDILOL 12.5 MG/1
12.5 TABLET ORAL EVERY 12 HOURS
Status: DISCONTINUED | OUTPATIENT
Start: 2021-01-01 | End: 2021-01-01

## 2021-01-01 RX ORDER — LEVOFLOXACIN 5 MG/ML
750 INJECTION, SOLUTION INTRAVENOUS
Status: DISCONTINUED | OUTPATIENT
Start: 2021-01-01 | End: 2021-01-01

## 2021-01-01 RX ORDER — FUROSEMIDE 10 MG/ML
80 INJECTION INTRAMUSCULAR; INTRAVENOUS ONCE
Status: COMPLETED | OUTPATIENT
Start: 2021-01-01 | End: 2021-01-01

## 2021-01-01 RX ORDER — SODIUM CHLORIDE 9 MG/ML
250 INJECTION, SOLUTION INTRAVENOUS AS NEEDED
Status: DISCONTINUED | OUTPATIENT
Start: 2021-01-01 | End: 2021-01-01

## 2021-01-01 RX ORDER — METRONIDAZOLE 500 MG/100ML
500 INJECTION, SOLUTION INTRAVENOUS EVERY 12 HOURS
Status: DISCONTINUED | OUTPATIENT
Start: 2021-01-01 | End: 2021-01-01

## 2021-01-01 RX ORDER — ALBUMIN HUMAN 50 G/1000ML
25 SOLUTION INTRAVENOUS ONCE
Status: COMPLETED | OUTPATIENT
Start: 2021-01-01 | End: 2021-01-01

## 2021-01-01 RX ORDER — IPRATROPIUM BROMIDE AND ALBUTEROL SULFATE 2.5; .5 MG/3ML; MG/3ML
3 SOLUTION RESPIRATORY (INHALATION)
Status: DISCONTINUED | OUTPATIENT
Start: 2021-01-01 | End: 2021-01-01 | Stop reason: HOSPADM

## 2021-01-01 RX ORDER — LEVOFLOXACIN 5 MG/ML
500 INJECTION, SOLUTION INTRAVENOUS
Status: DISCONTINUED | OUTPATIENT
Start: 2021-01-01 | End: 2021-01-01

## 2021-01-01 RX ORDER — SODIUM CHLORIDE 9 MG/ML
250 INJECTION, SOLUTION INTRAVENOUS AS NEEDED
Status: DISCONTINUED | OUTPATIENT
Start: 2021-01-01 | End: 2021-01-01 | Stop reason: ALTCHOICE

## 2021-01-01 RX ORDER — INSULIN GLARGINE 100 [IU]/ML
10 INJECTION, SOLUTION SUBCUTANEOUS DAILY
Status: DISCONTINUED | OUTPATIENT
Start: 2021-01-01 | End: 2021-01-01

## 2021-01-01 RX ORDER — ALBUMIN HUMAN 250 G/1000ML
12.5 SOLUTION INTRAVENOUS EVERY 6 HOURS
Status: COMPLETED | OUTPATIENT
Start: 2021-01-01 | End: 2021-01-01

## 2021-01-01 RX ORDER — METOPROLOL TARTRATE 5 MG/5ML
2.5 INJECTION INTRAVENOUS EVERY 12 HOURS
Status: DISCONTINUED | OUTPATIENT
Start: 2021-01-01 | End: 2021-01-01

## 2021-01-01 RX ORDER — MAGNESIUM SULFATE 100 %
4 CRYSTALS MISCELLANEOUS AS NEEDED
Status: DISCONTINUED | OUTPATIENT
Start: 2021-01-01 | End: 2021-01-01 | Stop reason: HOSPADM

## 2021-01-01 RX ORDER — ACETAMINOPHEN 325 MG/1
650 TABLET ORAL ONCE
Status: CANCELLED | OUTPATIENT
Start: 2021-01-01 | End: 2021-01-01

## 2021-01-01 RX ORDER — INSULIN GLARGINE 100 [IU]/ML
10 INJECTION, SOLUTION SUBCUTANEOUS ONCE
Status: COMPLETED | OUTPATIENT
Start: 2021-01-01 | End: 2021-01-01

## 2021-01-01 RX ORDER — CALCIUM GLUCONATE 94 MG/ML
INJECTION, SOLUTION INTRAVENOUS
Status: COMPLETED
Start: 2021-01-01 | End: 2021-01-01

## 2021-01-01 RX ORDER — ETOMIDATE 2 MG/ML
20 INJECTION INTRAVENOUS
Status: COMPLETED | OUTPATIENT
Start: 2021-01-01 | End: 2021-01-01

## 2021-01-01 RX ORDER — MAGNESIUM SULFATE HEPTAHYDRATE 40 MG/ML
2 INJECTION, SOLUTION INTRAVENOUS ONCE
Status: COMPLETED | OUTPATIENT
Start: 2021-01-01 | End: 2021-01-01

## 2021-01-01 RX ORDER — ALBUMIN HUMAN 250 G/1000ML
SOLUTION INTRAVENOUS
Status: DISCONTINUED
Start: 2021-01-01 | End: 2021-01-01 | Stop reason: WASHOUT

## 2021-01-01 RX ORDER — ALBUMIN HUMAN 250 G/1000ML
25 SOLUTION INTRAVENOUS EVERY 6 HOURS
Status: DISCONTINUED | OUTPATIENT
Start: 2021-01-01 | End: 2021-01-01

## 2021-01-01 RX ORDER — INSULIN LISPRO 100 [IU]/ML
INJECTION, SOLUTION INTRAVENOUS; SUBCUTANEOUS EVERY 6 HOURS
Status: DISCONTINUED | OUTPATIENT
Start: 2021-01-01 | End: 2021-01-01

## 2021-01-01 RX ORDER — POTASSIUM CHLORIDE 7.45 MG/ML
10 INJECTION INTRAVENOUS ONCE
Status: COMPLETED | OUTPATIENT
Start: 2021-01-01 | End: 2021-01-01

## 2021-01-01 RX ORDER — CARVEDILOL 25 MG/1
25 TABLET ORAL EVERY 12 HOURS
Status: DISCONTINUED | OUTPATIENT
Start: 2021-01-01 | End: 2021-01-01

## 2021-01-01 RX ORDER — ALBUTEROL SULFATE 1.25 MG/3ML
1.25 SOLUTION RESPIRATORY (INHALATION)
Status: DISCONTINUED | OUTPATIENT
Start: 2021-01-01 | End: 2021-01-01

## 2021-01-01 RX ORDER — ACETAMINOPHEN 325 MG/1
650 TABLET ORAL
Status: DISCONTINUED | OUTPATIENT
Start: 2021-01-01 | End: 2021-01-01 | Stop reason: HOSPADM

## 2021-01-01 RX ORDER — DEXAMETHASONE SODIUM PHOSPHATE 4 MG/ML
6 INJECTION, SOLUTION INTRA-ARTICULAR; INTRALESIONAL; INTRAMUSCULAR; INTRAVENOUS; SOFT TISSUE EVERY 12 HOURS
Status: COMPLETED | OUTPATIENT
Start: 2021-01-01 | End: 2021-01-01

## 2021-01-01 RX ORDER — TRISODIUM CITRATE DIHYDRATE 4 G/100ML
2 INJECTION, SOLUTION INTRAVENOUS
Status: DISCONTINUED | OUTPATIENT
Start: 2021-01-01 | End: 2021-01-01 | Stop reason: HOSPADM

## 2021-01-01 RX ORDER — HEPARIN SODIUM 1000 [USP'U]/ML
INJECTION, SOLUTION INTRAVENOUS; SUBCUTANEOUS
Status: COMPLETED
Start: 2021-01-01 | End: 2021-01-01

## 2021-01-01 RX ORDER — ALBUMIN HUMAN 250 G/1000ML
SOLUTION INTRAVENOUS
Status: COMPLETED
Start: 2021-01-01 | End: 2021-01-01

## 2021-01-01 RX ORDER — MIDAZOLAM IN 0.9 % SOD.CHLORID 1 MG/ML
0-10 PLASTIC BAG, INJECTION (ML) INTRAVENOUS
Status: DISCONTINUED | OUTPATIENT
Start: 2021-01-01 | End: 2021-01-01

## 2021-01-01 RX ORDER — BUMETANIDE 0.25 MG/ML
2 INJECTION INTRAMUSCULAR; INTRAVENOUS
Status: DISCONTINUED | OUTPATIENT
Start: 2021-01-01 | End: 2021-01-01

## 2021-01-01 RX ORDER — FUROSEMIDE 10 MG/ML
60 INJECTION INTRAMUSCULAR; INTRAVENOUS ONCE
Status: COMPLETED | OUTPATIENT
Start: 2021-01-01 | End: 2021-01-01

## 2021-01-01 RX ORDER — FENTANYL CITRATE 50 UG/ML
50-100 INJECTION, SOLUTION INTRAMUSCULAR; INTRAVENOUS
Status: DISCONTINUED | OUTPATIENT
Start: 2021-01-01 | End: 2021-01-01

## 2021-01-01 RX ORDER — SCOLOPAMINE TRANSDERMAL SYSTEM 1 MG/1
1 PATCH, EXTENDED RELEASE TRANSDERMAL
Status: DISCONTINUED | OUTPATIENT
Start: 2021-01-01 | End: 2021-01-01 | Stop reason: HOSPADM

## 2021-01-01 RX ORDER — NOREPINEPHRINE BITARTRATE/D5W 8 MG/250ML
.5-5 PLASTIC BAG, INJECTION (ML) INTRAVENOUS
Status: DISCONTINUED | OUTPATIENT
Start: 2021-01-01 | End: 2021-01-01

## 2021-01-01 RX ORDER — MENTHOL AND ZINC OXIDE .44; 20.625 G/100G; G/100G
OINTMENT TOPICAL 3 TIMES DAILY
Status: DISCONTINUED | OUTPATIENT
Start: 2021-01-01 | End: 2021-01-01 | Stop reason: HOSPADM

## 2021-01-01 RX ORDER — NOREPINEPHRINE BITARTRATE/D5W 8 MG/250ML
.5-3 PLASTIC BAG, INJECTION (ML) INTRAVENOUS
Status: DISCONTINUED | OUTPATIENT
Start: 2021-01-01 | End: 2021-01-01

## 2021-01-01 RX ORDER — HYDROMORPHONE HYDROCHLORIDE 1 MG/ML
1 INJECTION, SOLUTION INTRAMUSCULAR; INTRAVENOUS; SUBCUTANEOUS ONCE
Status: ACTIVE | OUTPATIENT
Start: 2021-01-01 | End: 2021-01-01

## 2021-01-01 RX ORDER — HYDRALAZINE HYDROCHLORIDE 25 MG/1
25 TABLET, FILM COATED ORAL 3 TIMES DAILY
Status: DISCONTINUED | OUTPATIENT
Start: 2021-01-01 | End: 2021-01-01

## 2021-01-01 RX ORDER — ENOXAPARIN SODIUM 100 MG/ML
40 INJECTION SUBCUTANEOUS
Status: DISCONTINUED | OUTPATIENT
Start: 2021-01-01 | End: 2021-01-01

## 2021-01-01 RX ORDER — SODIUM BICARBONATE 1 MEQ/ML
50 SYRINGE (ML) INTRAVENOUS ONCE
Status: COMPLETED | OUTPATIENT
Start: 2021-01-01 | End: 2021-01-01

## 2021-01-01 RX ORDER — LORAZEPAM 2 MG/ML
1 INJECTION INTRAMUSCULAR
Status: COMPLETED | OUTPATIENT
Start: 2021-01-01 | End: 2021-01-01

## 2021-01-01 RX ORDER — FUROSEMIDE 10 MG/ML
40 INJECTION INTRAMUSCULAR; INTRAVENOUS ONCE
Status: COMPLETED | OUTPATIENT
Start: 2021-01-01 | End: 2021-01-01

## 2021-01-01 RX ORDER — SCOLOPAMINE TRANSDERMAL SYSTEM 1 MG/1
1 PATCH, EXTENDED RELEASE TRANSDERMAL
Qty: 3 PATCH | Refills: 0 | Status: SHIPPED
Start: 2021-01-01

## 2021-01-01 RX ORDER — ALBUMIN HUMAN 250 G/1000ML
SOLUTION INTRAVENOUS
Status: DISPENSED
Start: 2021-01-01 | End: 2021-01-01

## 2021-01-01 RX ORDER — ACETAMINOPHEN 325 MG/1
650 TABLET ORAL
Status: DISCONTINUED | OUTPATIENT
Start: 2021-01-01 | End: 2021-01-01

## 2021-01-01 RX ORDER — SODIUM CHLORIDE FOR INHALATION 3 %
4 VIAL, NEBULIZER (ML) INHALATION
Status: DISCONTINUED | OUTPATIENT
Start: 2021-01-01 | End: 2021-01-01

## 2021-01-01 RX ORDER — MENTHOL AND ZINC OXIDE .44; 20.625 G/100G; G/100G
1 OINTMENT TOPICAL 3 TIMES DAILY
Qty: 113 TUBE | Refills: 0 | Status: SHIPPED
Start: 2021-01-01

## 2021-01-01 RX ORDER — LEVOFLOXACIN 5 MG/ML
750 INJECTION, SOLUTION INTRAVENOUS
Status: COMPLETED | OUTPATIENT
Start: 2021-01-01 | End: 2021-01-01

## 2021-01-01 RX ORDER — DEXMEDETOMIDINE HYDROCHLORIDE 4 UG/ML
.1-1.5 INJECTION, SOLUTION INTRAVENOUS
Status: DISCONTINUED | OUTPATIENT
Start: 2021-01-01 | End: 2021-01-01

## 2021-01-01 RX ORDER — POTASSIUM CHLORIDE 7.45 MG/ML
10 INJECTION INTRAVENOUS
Status: COMPLETED | OUTPATIENT
Start: 2021-01-01 | End: 2021-01-01

## 2021-01-01 RX ORDER — SODIUM BICARBONATE 1 MEQ/ML
50 SYRINGE (ML) INTRAVENOUS
Status: COMPLETED | OUTPATIENT
Start: 2021-01-01 | End: 2021-01-01

## 2021-01-01 RX ORDER — BUMETANIDE 0.25 MG/ML
1 INJECTION INTRAMUSCULAR; INTRAVENOUS ONCE
Status: COMPLETED | OUTPATIENT
Start: 2021-01-01 | End: 2021-01-01

## 2021-01-01 RX ORDER — MORPHINE SULFATE 2 MG/ML
2 INJECTION, SOLUTION INTRAMUSCULAR; INTRAVENOUS
Status: DISCONTINUED | OUTPATIENT
Start: 2021-01-01 | End: 2021-01-01 | Stop reason: HOSPADM

## 2021-01-01 RX ORDER — MORPHINE SULFATE 20 MG/5ML
5 SOLUTION ORAL
Qty: 100 ML | Refills: 0 | Status: SHIPPED | OUTPATIENT
Start: 2021-01-01 | End: 2021-01-01

## 2021-01-01 RX ORDER — CHLORHEXIDINE GLUCONATE 1.2 MG/ML
10 RINSE ORAL EVERY 12 HOURS
Status: DISCONTINUED | OUTPATIENT
Start: 2021-01-01 | End: 2021-01-01

## 2021-01-01 RX ORDER — DEXTROSE 50 % IN WATER (D50W) INTRAVENOUS SYRINGE
25-50 AS NEEDED
Status: DISCONTINUED | OUTPATIENT
Start: 2021-01-01 | End: 2021-01-01 | Stop reason: HOSPADM

## 2021-01-01 RX ORDER — HYDRALAZINE HYDROCHLORIDE 25 MG/1
25 TABLET, FILM COATED ORAL EVERY 8 HOURS
Status: DISCONTINUED | OUTPATIENT
Start: 2021-01-01 | End: 2021-01-01

## 2021-01-01 RX ORDER — HYDRALAZINE HYDROCHLORIDE 20 MG/ML
10 INJECTION INTRAMUSCULAR; INTRAVENOUS
Status: DISCONTINUED | OUTPATIENT
Start: 2021-01-01 | End: 2021-01-01

## 2021-01-01 RX ORDER — ALBUMIN HUMAN 250 G/1000ML
25 SOLUTION INTRAVENOUS ONCE
Status: COMPLETED | OUTPATIENT
Start: 2021-01-01 | End: 2021-01-01

## 2021-01-01 RX ORDER — MIDODRINE HYDROCHLORIDE 5 MG/1
10 TABLET ORAL
Status: DISCONTINUED | OUTPATIENT
Start: 2021-01-01 | End: 2021-01-01 | Stop reason: HOSPADM

## 2021-01-01 RX ORDER — MORPHINE SULFATE 2 MG/ML
1 INJECTION, SOLUTION INTRAMUSCULAR; INTRAVENOUS EVERY 4 HOURS
Status: DISCONTINUED | OUTPATIENT
Start: 2021-01-01 | End: 2021-01-01 | Stop reason: HOSPADM

## 2021-01-01 RX ORDER — BUMETANIDE 0.25 MG/ML
INJECTION INTRAMUSCULAR; INTRAVENOUS
Status: DISCONTINUED
Start: 2021-01-01 | End: 2021-01-01 | Stop reason: WASHOUT

## 2021-01-01 RX ORDER — LORAZEPAM 2 MG/ML
1 INJECTION INTRAMUSCULAR
Status: DISCONTINUED | OUTPATIENT
Start: 2021-01-01 | End: 2021-01-01 | Stop reason: HOSPADM

## 2021-01-01 RX ORDER — DEXAMETHASONE SODIUM PHOSPHATE 100 MG/10ML
10 INJECTION INTRAMUSCULAR; INTRAVENOUS ONCE
Status: DISCONTINUED | OUTPATIENT
Start: 2021-01-01 | End: 2021-01-01 | Stop reason: SDUPTHER

## 2021-01-01 RX ORDER — SUCCINYLCHOLINE CHLORIDE 20 MG/ML INJECTION SOLUTION
150 SOLUTION
Status: COMPLETED | OUTPATIENT
Start: 2021-01-01 | End: 2021-01-01

## 2021-01-01 RX ORDER — VANCOMYCIN/0.9 % SOD CHLORIDE 1.5G/250ML
1500 PLASTIC BAG, INJECTION (ML) INTRAVENOUS ONCE
Status: COMPLETED | OUTPATIENT
Start: 2021-01-01 | End: 2021-01-01

## 2021-01-01 RX ORDER — METRONIDAZOLE 500 MG/100ML
500 INJECTION, SOLUTION INTRAVENOUS EVERY 8 HOURS
Status: DISCONTINUED | OUTPATIENT
Start: 2021-01-01 | End: 2021-01-01

## 2021-01-01 RX ORDER — INSULIN GLARGINE 100 [IU]/ML
20 INJECTION, SOLUTION SUBCUTANEOUS DAILY
Status: DISCONTINUED | OUTPATIENT
Start: 2021-01-01 | End: 2021-01-01

## 2021-01-01 RX ORDER — MIDAZOLAM HYDROCHLORIDE 1 MG/ML
1-2 INJECTION, SOLUTION INTRAMUSCULAR; INTRAVENOUS
Status: DISCONTINUED | OUTPATIENT
Start: 2021-01-01 | End: 2021-01-01

## 2021-01-01 RX ORDER — BUMETANIDE 0.25 MG/ML
2 INJECTION INTRAMUSCULAR; INTRAVENOUS 2 TIMES DAILY
Status: DISCONTINUED | OUTPATIENT
Start: 2021-01-01 | End: 2021-01-01

## 2021-01-01 RX ORDER — MIDODRINE HYDROCHLORIDE 5 MG/1
10 TABLET ORAL
Status: DISCONTINUED | OUTPATIENT
Start: 2021-01-01 | End: 2021-01-01

## 2021-01-01 RX ORDER — DIPHENHYDRAMINE HCL 25 MG
25 CAPSULE ORAL ONCE
Status: CANCELLED | OUTPATIENT
Start: 2021-01-01 | End: 2021-01-01

## 2021-01-01 RX ADMIN — METRONIDAZOLE 500 MG: 500 INJECTION, SOLUTION INTRAVENOUS at 20:28

## 2021-01-01 RX ADMIN — METOROPROLOL TARTRATE 2.5 MG: 5 INJECTION, SOLUTION INTRAVENOUS at 15:46

## 2021-01-01 RX ADMIN — Medication: at 22:00

## 2021-01-01 RX ADMIN — CHLORHEXIDINE GLUCONATE 0.12% ORAL RINSE 10 ML: 1.2 LIQUID ORAL at 09:59

## 2021-01-01 RX ADMIN — CHLORHEXIDINE GLUCONATE 0.12% ORAL RINSE 10 ML: 1.2 LIQUID ORAL at 09:51

## 2021-01-01 RX ADMIN — POTASSIUM CHLORIDE 10 MEQ: 7.46 INJECTION, SOLUTION INTRAVENOUS at 13:43

## 2021-01-01 RX ADMIN — INSULIN GLARGINE 20 UNITS: 100 INJECTION, SOLUTION SUBCUTANEOUS at 10:14

## 2021-01-01 RX ADMIN — INSULIN LISPRO 9 UNITS: 100 INJECTION, SOLUTION INTRAVENOUS; SUBCUTANEOUS at 12:30

## 2021-01-01 RX ADMIN — Medication 150 MG: at 04:07

## 2021-01-01 RX ADMIN — METRONIDAZOLE 500 MG: 500 INJECTION, SOLUTION INTRAVENOUS at 08:36

## 2021-01-01 RX ADMIN — ALBUTEROL SULFATE 1.25 MG: 1.25 SOLUTION RESPIRATORY (INHALATION) at 20:19

## 2021-01-01 RX ADMIN — DEXMEDETOMIDINE HYDROCHLORIDE 0.4 MCG/KG/HR: 100 INJECTION, SOLUTION INTRAVENOUS at 03:00

## 2021-01-01 RX ADMIN — AZITHROMYCIN DIHYDRATE 500 MG: 500 INJECTION, POWDER, LYOPHILIZED, FOR SOLUTION INTRAVENOUS at 05:00

## 2021-01-01 RX ADMIN — SODIUM CHLORIDE SOLN NEBU 3% 4 ML: 3 NEBU SOLN at 04:45

## 2021-01-01 RX ADMIN — MIDAZOLAM HYDROCHLORIDE 2 MG: 2 INJECTION, SOLUTION INTRAMUSCULAR; INTRAVENOUS at 10:14

## 2021-01-01 RX ADMIN — SODIUM CHLORIDE SOLN NEBU 3% 4 ML: 3 NEBU SOLN at 08:00

## 2021-01-01 RX ADMIN — ALBUTEROL SULFATE 1.25 MG: 1.25 SOLUTION RESPIRATORY (INHALATION) at 08:00

## 2021-01-01 RX ADMIN — INSULIN LISPRO 6 UNITS: 100 INJECTION, SOLUTION INTRAVENOUS; SUBCUTANEOUS at 09:52

## 2021-01-01 RX ADMIN — CARVEDILOL 12.5 MG: 12.5 TABLET, FILM COATED ORAL at 08:46

## 2021-01-01 RX ADMIN — DEXMEDETOMIDINE HYDROCHLORIDE 0.9 MCG/KG/HR: 100 INJECTION, SOLUTION INTRAVENOUS at 16:36

## 2021-01-01 RX ADMIN — SODIUM CHLORIDE 40 MG: 9 INJECTION, SOLUTION INTRAMUSCULAR; INTRAVENOUS; SUBCUTANEOUS at 09:14

## 2021-01-01 RX ADMIN — DEXMEDETOMIDINE HYDROCHLORIDE 0.6 MCG/KG/HR: 100 INJECTION, SOLUTION INTRAVENOUS at 15:39

## 2021-01-01 RX ADMIN — SODIUM CHLORIDE SOLN NEBU 3% 4 ML: 3 NEBU SOLN at 19:16

## 2021-01-01 RX ADMIN — INSULIN LISPRO 3 UNITS: 100 INJECTION, SOLUTION INTRAVENOUS; SUBCUTANEOUS at 09:31

## 2021-01-01 RX ADMIN — FAMOTIDINE 20 MG: 10 INJECTION INTRAVENOUS at 10:54

## 2021-01-01 RX ADMIN — Medication: at 09:00

## 2021-01-01 RX ADMIN — SODIUM CHLORIDE SOLN NEBU 3% 4 ML: 3 NEBU SOLN at 16:25

## 2021-01-01 RX ADMIN — SODIUM CHLORIDE SOLN NEBU 3% 4 ML: 3 NEBU SOLN at 19:07

## 2021-01-01 RX ADMIN — Medication 5 MCG/MIN: at 06:37

## 2021-01-01 RX ADMIN — Medication 50 MEQ: at 02:58

## 2021-01-01 RX ADMIN — INSULIN LISPRO 12 UNITS: 100 INJECTION, SOLUTION INTRAVENOUS; SUBCUTANEOUS at 15:00

## 2021-01-01 RX ADMIN — ALBUTEROL SULFATE 1.25 MG: 1.25 SOLUTION RESPIRATORY (INHALATION) at 04:45

## 2021-01-01 RX ADMIN — SODIUM CHLORIDE SOLN NEBU 3% 4 ML: 3 NEBU SOLN at 19:57

## 2021-01-01 RX ADMIN — LORAZEPAM 1 MG: 2 INJECTION INTRAMUSCULAR; INTRAVENOUS at 21:00

## 2021-01-01 RX ADMIN — Medication: at 16:00

## 2021-01-01 RX ADMIN — Medication: at 21:25

## 2021-01-01 RX ADMIN — INSULIN LISPRO 3 UNITS: 100 INJECTION, SOLUTION INTRAVENOUS; SUBCUTANEOUS at 21:32

## 2021-01-01 RX ADMIN — FENTANYL CITRATE 100 MCG: 50 INJECTION INTRAMUSCULAR; INTRAVENOUS at 10:13

## 2021-01-01 RX ADMIN — CARVEDILOL 12.5 MG: 12.5 TABLET, FILM COATED ORAL at 09:51

## 2021-01-01 RX ADMIN — DEXMEDETOMIDINE HYDROCHLORIDE 1.5 MCG/KG/HR: 100 INJECTION, SOLUTION INTRAVENOUS at 19:00

## 2021-01-01 RX ADMIN — INSULIN GLARGINE 10 UNITS: 100 INJECTION, SOLUTION SUBCUTANEOUS at 12:04

## 2021-01-01 RX ADMIN — INSULIN LISPRO 9 UNITS: 100 INJECTION, SOLUTION INTRAVENOUS; SUBCUTANEOUS at 03:20

## 2021-01-01 RX ADMIN — CALCIUM GLUCONATE 2 G: 98 INJECTION, SOLUTION INTRAVENOUS at 16:45

## 2021-01-01 RX ADMIN — INSULIN LISPRO 3 UNITS: 100 INJECTION, SOLUTION INTRAVENOUS; SUBCUTANEOUS at 11:08

## 2021-01-01 RX ADMIN — FENTANYL CITRATE 100 MCG: 50 INJECTION INTRAMUSCULAR; INTRAVENOUS at 05:48

## 2021-01-01 RX ADMIN — SODIUM BICARBONATE: 84 INJECTION, SOLUTION INTRAVENOUS at 09:08

## 2021-01-01 RX ADMIN — MORPHINE SULFATE 1 MG: 2 INJECTION, SOLUTION INTRAMUSCULAR; INTRAVENOUS at 00:00

## 2021-01-01 RX ADMIN — HYDRALAZINE HYDROCHLORIDE 10 MG: 20 INJECTION INTRAMUSCULAR; INTRAVENOUS at 00:09

## 2021-01-01 RX ADMIN — BUMETANIDE 2 MG: 0.25 INJECTION INTRAMUSCULAR; INTRAVENOUS at 20:34

## 2021-01-01 RX ADMIN — MAGNESIUM SULFATE HEPTAHYDRATE 2 G: 2 INJECTION, SOLUTION INTRAVENOUS at 12:53

## 2021-01-01 RX ADMIN — INSULIN LISPRO 6 UNITS: 100 INJECTION, SOLUTION INTRAVENOUS; SUBCUTANEOUS at 06:00

## 2021-01-01 RX ADMIN — MORPHINE SULFATE 1 MG: 2 INJECTION, SOLUTION INTRAMUSCULAR; INTRAVENOUS at 03:53

## 2021-01-01 RX ADMIN — ALBUTEROL SULFATE 1.25 MG: 1.25 SOLUTION RESPIRATORY (INHALATION) at 08:25

## 2021-01-01 RX ADMIN — SODIUM CHLORIDE SOLN NEBU 3% 4 ML: 3 NEBU SOLN at 08:36

## 2021-01-01 RX ADMIN — INSULIN LISPRO 6 UNITS: 100 INJECTION, SOLUTION INTRAVENOUS; SUBCUTANEOUS at 18:39

## 2021-01-01 RX ADMIN — ALBUMIN (HUMAN) 25 G: 0.25 INJECTION, SOLUTION INTRAVENOUS at 19:30

## 2021-01-01 RX ADMIN — ALBUMIN (HUMAN) 25 G: 0.25 INJECTION, SOLUTION INTRAVENOUS at 00:13

## 2021-01-01 RX ADMIN — FUROSEMIDE 60 MG: 10 INJECTION, SOLUTION INTRAMUSCULAR; INTRAVENOUS at 10:13

## 2021-01-01 RX ADMIN — MORPHINE SULFATE 1 MG: 2 INJECTION, SOLUTION INTRAMUSCULAR; INTRAVENOUS at 11:57

## 2021-01-01 RX ADMIN — MIDAZOLAM HYDROCHLORIDE 2 MG: 2 INJECTION, SOLUTION INTRAMUSCULAR; INTRAVENOUS at 14:58

## 2021-01-01 RX ADMIN — ACETAMINOPHEN 650 MG: 325 TABLET ORAL at 22:30

## 2021-01-01 RX ADMIN — MEROPENEM 500 MG: 500 INJECTION, POWDER, FOR SOLUTION INTRAVENOUS at 18:36

## 2021-01-01 RX ADMIN — ALBUMIN (HUMAN) 12.5 G: 0.25 INJECTION, SOLUTION INTRAVENOUS at 13:00

## 2021-01-01 RX ADMIN — DEXTROSE MONOHYDRATE 25 G: 25 INJECTION, SOLUTION INTRAVENOUS at 20:02

## 2021-01-01 RX ADMIN — DEXAMETHASONE SODIUM PHOSPHATE 6 MG: 4 INJECTION, SOLUTION INTRAMUSCULAR; INTRAVENOUS at 08:17

## 2021-01-01 RX ADMIN — INSULIN LISPRO 6 UNITS: 100 INJECTION, SOLUTION INTRAVENOUS; SUBCUTANEOUS at 15:00

## 2021-01-01 RX ADMIN — Medication 75 MCG/HR: at 03:55

## 2021-01-01 RX ADMIN — DEXMEDETOMIDINE HYDROCHLORIDE 0.6 MCG/KG/HR: 100 INJECTION, SOLUTION INTRAVENOUS at 00:35

## 2021-01-01 RX ADMIN — DEXMEDETOMIDINE HYDROCHLORIDE 0.7 MCG/KG/HR: 100 INJECTION, SOLUTION INTRAVENOUS at 03:30

## 2021-01-01 RX ADMIN — HEPARIN SODIUM 10000 UNITS: 1000 INJECTION INTRAVENOUS; SUBCUTANEOUS at 16:45

## 2021-01-01 RX ADMIN — BUMETANIDE 1 MG: 0.25 INJECTION INTRAMUSCULAR; INTRAVENOUS at 03:00

## 2021-01-01 RX ADMIN — Medication: at 21:00

## 2021-01-01 RX ADMIN — Medication 30 ML: at 09:15

## 2021-01-01 RX ADMIN — CHLORHEXIDINE GLUCONATE 0.12% ORAL RINSE 10 ML: 1.2 LIQUID ORAL at 20:44

## 2021-01-01 RX ADMIN — SODIUM BICARBONATE: 84 INJECTION, SOLUTION INTRAVENOUS at 09:43

## 2021-01-01 RX ADMIN — ALBUMIN (HUMAN) 25 G: 0.25 INJECTION, SOLUTION INTRAVENOUS at 18:00

## 2021-01-01 RX ADMIN — CHLORHEXIDINE GLUCONATE 0.12% ORAL RINSE 10 ML: 1.2 LIQUID ORAL at 08:32

## 2021-01-01 RX ADMIN — INSULIN LISPRO 9 UNITS: 100 INJECTION, SOLUTION INTRAVENOUS; SUBCUTANEOUS at 03:48

## 2021-01-01 RX ADMIN — FENTANYL CITRATE 100 MCG: 50 INJECTION INTRAMUSCULAR; INTRAVENOUS at 18:30

## 2021-01-01 RX ADMIN — FAMOTIDINE 20 MG: 10 INJECTION INTRAVENOUS at 08:46

## 2021-01-01 RX ADMIN — Medication: at 00:03

## 2021-01-01 RX ADMIN — ALBUTEROL SULFATE 1.25 MG: 1.25 SOLUTION RESPIRATORY (INHALATION) at 07:45

## 2021-01-01 RX ADMIN — MIDAZOLAM HYDROCHLORIDE 2 MG: 2 INJECTION, SOLUTION INTRAMUSCULAR; INTRAVENOUS at 17:53

## 2021-01-01 RX ADMIN — VANCOMYCIN HYDROCHLORIDE 1500 MG: 10 INJECTION, POWDER, LYOPHILIZED, FOR SOLUTION INTRAVENOUS at 08:39

## 2021-01-01 RX ADMIN — METHYLPREDNISOLONE SODIUM SUCCINATE 40 MG: 40 INJECTION, POWDER, FOR SOLUTION INTRAMUSCULAR; INTRAVENOUS at 04:03

## 2021-01-01 RX ADMIN — BUMETANIDE 2 MG: 0.25 INJECTION INTRAMUSCULAR; INTRAVENOUS at 22:55

## 2021-01-01 RX ADMIN — CALCIUM GLUCONATE: 98 INJECTION, SOLUTION INTRAVENOUS at 16:51

## 2021-01-01 RX ADMIN — DEXMEDETOMIDINE HYDROCHLORIDE 1 MCG/KG/HR: 100 INJECTION, SOLUTION INTRAVENOUS at 12:59

## 2021-01-01 RX ADMIN — ALBUMIN (HUMAN) 25 G: 12.5 INJECTION, SOLUTION INTRAVENOUS at 23:35

## 2021-01-01 RX ADMIN — DEXMEDETOMIDINE HYDROCHLORIDE 0.9 MCG/KG/HR: 100 INJECTION, SOLUTION INTRAVENOUS at 22:17

## 2021-01-01 RX ADMIN — DEXMEDETOMIDINE HYDROCHLORIDE 0.7 MCG/KG/HR: 100 INJECTION, SOLUTION INTRAVENOUS at 17:24

## 2021-01-01 RX ADMIN — INSULIN GLARGINE 10 UNITS: 100 INJECTION, SOLUTION SUBCUTANEOUS at 09:13

## 2021-01-01 RX ADMIN — INSULIN LISPRO 9 UNITS: 100 INJECTION, SOLUTION INTRAVENOUS; SUBCUTANEOUS at 03:00

## 2021-01-01 RX ADMIN — INSULIN LISPRO 12 UNITS: 100 INJECTION, SOLUTION INTRAVENOUS; SUBCUTANEOUS at 22:05

## 2021-01-01 RX ADMIN — DEXMEDETOMIDINE HYDROCHLORIDE 0.5 MCG/KG/HR: 100 INJECTION, SOLUTION INTRAVENOUS at 11:33

## 2021-01-01 RX ADMIN — SODIUM CHLORIDE SOLN NEBU 3% 4 ML: 3 NEBU SOLN at 14:00

## 2021-01-01 RX ADMIN — INSULIN LISPRO 6 UNITS: 100 INJECTION, SOLUTION INTRAVENOUS; SUBCUTANEOUS at 10:52

## 2021-01-01 RX ADMIN — ALBUMIN (HUMAN) 12.5 G: 0.25 INJECTION, SOLUTION INTRAVENOUS at 18:00

## 2021-01-01 RX ADMIN — MORPHINE SULFATE 1 MG: 2 INJECTION, SOLUTION INTRAMUSCULAR; INTRAVENOUS at 11:33

## 2021-01-01 RX ADMIN — FAMOTIDINE 20 MG: 10 INJECTION INTRAVENOUS at 08:08

## 2021-01-01 RX ADMIN — LORAZEPAM 1 MG: 2 INJECTION INTRAMUSCULAR; INTRAVENOUS at 23:38

## 2021-01-01 RX ADMIN — MORPHINE SULFATE 2 MG: 2 INJECTION, SOLUTION INTRAMUSCULAR; INTRAVENOUS at 04:06

## 2021-01-01 RX ADMIN — ALBUTEROL SULFATE 1.25 MG: 1.25 SOLUTION RESPIRATORY (INHALATION) at 08:36

## 2021-01-01 RX ADMIN — INSULIN LISPRO 15 UNITS: 100 INJECTION, SOLUTION INTRAVENOUS; SUBCUTANEOUS at 21:25

## 2021-01-01 RX ADMIN — HYDRALAZINE HYDROCHLORIDE 10 MG: 20 INJECTION INTRAMUSCULAR; INTRAVENOUS at 22:05

## 2021-01-01 RX ADMIN — SODIUM CHLORIDE SOLN NEBU 3% 4 ML: 3 NEBU SOLN at 16:45

## 2021-01-01 RX ADMIN — WATER 2 G: 1 INJECTION INTRAMUSCULAR; INTRAVENOUS; SUBCUTANEOUS at 05:52

## 2021-01-01 RX ADMIN — ALBUTEROL SULFATE 1.25 MG: 1.25 SOLUTION RESPIRATORY (INHALATION) at 21:05

## 2021-01-01 RX ADMIN — INSULIN LISPRO 9 UNITS: 100 INJECTION, SOLUTION INTRAVENOUS; SUBCUTANEOUS at 21:21

## 2021-01-01 RX ADMIN — FENTANYL CITRATE 50 MCG: 50 INJECTION INTRAMUSCULAR; INTRAVENOUS at 23:28

## 2021-01-01 RX ADMIN — INSULIN GLARGINE 20 UNITS: 100 INJECTION, SOLUTION SUBCUTANEOUS at 10:30

## 2021-01-01 RX ADMIN — DEXAMETHASONE SODIUM PHOSPHATE 6 MG: 4 INJECTION, SOLUTION INTRAMUSCULAR; INTRAVENOUS at 09:16

## 2021-01-01 RX ADMIN — SODIUM CHLORIDE SOLN NEBU 3% 4 ML: 3 NEBU SOLN at 20:24

## 2021-01-01 RX ADMIN — CARVEDILOL 12.5 MG: 12.5 TABLET, FILM COATED ORAL at 10:13

## 2021-01-01 RX ADMIN — Medication 50 MCG/HR: at 09:29

## 2021-01-01 RX ADMIN — SODIUM BICARBONATE: 84 INJECTION, SOLUTION INTRAVENOUS at 04:00

## 2021-01-01 RX ADMIN — DEXAMETHASONE SODIUM PHOSPHATE 6 MG: 4 INJECTION, SOLUTION INTRAMUSCULAR; INTRAVENOUS at 08:32

## 2021-01-01 RX ADMIN — CHLORHEXIDINE GLUCONATE 0.12% ORAL RINSE 10 ML: 1.2 LIQUID ORAL at 21:18

## 2021-01-01 RX ADMIN — Medication 75 MCG/HR: at 16:05

## 2021-01-01 RX ADMIN — CARVEDILOL 12.5 MG: 12.5 TABLET, FILM COATED ORAL at 12:06

## 2021-01-01 RX ADMIN — POTASSIUM CHLORIDE 10 MEQ: 7.46 INJECTION, SOLUTION INTRAVENOUS at 00:55

## 2021-01-01 RX ADMIN — BUMETANIDE 2 MG: 0.25 INJECTION INTRAMUSCULAR; INTRAVENOUS at 10:04

## 2021-01-01 RX ADMIN — SODIUM CHLORIDE SOLN NEBU 3% 4 ML: 3 NEBU SOLN at 08:35

## 2021-01-01 RX ADMIN — FENTANYL CITRATE 100 MCG: 50 INJECTION INTRAMUSCULAR; INTRAVENOUS at 14:58

## 2021-01-01 RX ADMIN — SODIUM BICARBONATE: 84 INJECTION, SOLUTION INTRAVENOUS at 23:00

## 2021-01-01 RX ADMIN — Medication 100 MCG/HR: at 04:30

## 2021-01-01 RX ADMIN — ALBUTEROL SULFATE 1.25 MG: 1.25 SOLUTION RESPIRATORY (INHALATION) at 08:30

## 2021-01-01 RX ADMIN — FUROSEMIDE 80 MG: 10 INJECTION, SOLUTION INTRAMUSCULAR; INTRAVENOUS at 00:11

## 2021-01-01 RX ADMIN — Medication: at 10:55

## 2021-01-01 RX ADMIN — POTASSIUM BICARBONATE 40 MEQ: 782 TABLET, EFFERVESCENT ORAL at 22:12

## 2021-01-01 RX ADMIN — DEXMEDETOMIDINE HYDROCHLORIDE 0.4 MCG/KG/HR: 100 INJECTION, SOLUTION INTRAVENOUS at 21:24

## 2021-01-01 RX ADMIN — MIDODRINE HYDROCHLORIDE 10 MG: 5 TABLET ORAL at 08:46

## 2021-01-01 RX ADMIN — REMDESIVIR 200 MG: 100 INJECTION, POWDER, LYOPHILIZED, FOR SOLUTION INTRAVENOUS at 01:00

## 2021-01-01 RX ADMIN — INSULIN LISPRO 3 UNITS: 100 INJECTION, SOLUTION INTRAVENOUS; SUBCUTANEOUS at 15:00

## 2021-01-01 RX ADMIN — ALBUTEROL SULFATE 1.25 MG: 1.25 SOLUTION RESPIRATORY (INHALATION) at 19:57

## 2021-01-01 RX ADMIN — Medication 30 ML: at 10:27

## 2021-01-01 RX ADMIN — INSULIN LISPRO 6 UNITS: 100 INJECTION, SOLUTION INTRAVENOUS; SUBCUTANEOUS at 09:12

## 2021-01-01 RX ADMIN — MIDODRINE HYDROCHLORIDE 10 MG: 5 TABLET ORAL at 09:51

## 2021-01-01 RX ADMIN — CARVEDILOL 12.5 MG: 12.5 TABLET, FILM COATED ORAL at 14:13

## 2021-01-01 RX ADMIN — INSULIN GLARGINE 10 UNITS: 100 INJECTION, SOLUTION SUBCUTANEOUS at 12:53

## 2021-01-01 RX ADMIN — CHLORHEXIDINE GLUCONATE 0.12% ORAL RINSE 10 ML: 1.2 LIQUID ORAL at 10:04

## 2021-01-01 RX ADMIN — MIDAZOLAM HYDROCHLORIDE 2 MG: 2 INJECTION, SOLUTION INTRAMUSCULAR; INTRAVENOUS at 11:52

## 2021-01-01 RX ADMIN — DEXMEDETOMIDINE HYDROCHLORIDE 1.2 MCG/KG/HR: 100 INJECTION, SOLUTION INTRAVENOUS at 10:06

## 2021-01-01 RX ADMIN — NITROGLYCERIN 1 INCH: 20 OINTMENT TOPICAL at 00:03

## 2021-01-01 RX ADMIN — INSULIN LISPRO 10 UNITS: 100 INJECTION, SOLUTION INTRAVENOUS; SUBCUTANEOUS at 12:36

## 2021-01-01 RX ADMIN — INSULIN LISPRO 9 UNITS: 100 INJECTION, SOLUTION INTRAVENOUS; SUBCUTANEOUS at 08:45

## 2021-01-01 RX ADMIN — CHLORHEXIDINE GLUCONATE 0.12% ORAL RINSE 10 ML: 1.2 LIQUID ORAL at 20:50

## 2021-01-01 RX ADMIN — DEXAMETHASONE SODIUM PHOSPHATE 6 MG: 4 INJECTION, SOLUTION INTRAMUSCULAR; INTRAVENOUS at 09:14

## 2021-01-01 RX ADMIN — MEROPENEM 500 MG: 500 INJECTION, POWDER, FOR SOLUTION INTRAVENOUS at 17:24

## 2021-01-01 RX ADMIN — CHLORHEXIDINE GLUCONATE 0.12% ORAL RINSE 10 ML: 1.2 LIQUID ORAL at 21:47

## 2021-01-01 RX ADMIN — DEXMEDETOMIDINE HYDROCHLORIDE 1.1 MCG/KG/HR: 100 INJECTION, SOLUTION INTRAVENOUS at 17:58

## 2021-01-01 RX ADMIN — OCTREOTIDE ACETATE 25 MCG/HR: 500 INJECTION, SOLUTION INTRAVENOUS; SUBCUTANEOUS at 11:13

## 2021-01-01 RX ADMIN — SODIUM CHLORIDE SOLN NEBU 3% 4 ML: 3 NEBU SOLN at 08:26

## 2021-01-01 RX ADMIN — ALBUTEROL SULFATE 1.25 MG: 1.25 SOLUTION RESPIRATORY (INHALATION) at 05:44

## 2021-01-01 RX ADMIN — DEXMEDETOMIDINE HYDROCHLORIDE 0.5 MCG/KG/HR: 100 INJECTION, SOLUTION INTRAVENOUS at 01:00

## 2021-01-01 RX ADMIN — BUMETANIDE 2 MG: 0.25 INJECTION INTRAMUSCULAR; INTRAVENOUS at 11:48

## 2021-01-01 RX ADMIN — CHLORHEXIDINE GLUCONATE 0.12% ORAL RINSE 10 ML: 1.2 LIQUID ORAL at 22:55

## 2021-01-01 RX ADMIN — ALBUTEROL SULFATE 1.25 MG: 1.25 SOLUTION RESPIRATORY (INHALATION) at 13:04

## 2021-01-01 RX ADMIN — MEROPENEM 500 MG: 500 INJECTION, POWDER, FOR SOLUTION INTRAVENOUS at 21:16

## 2021-01-01 RX ADMIN — FAMOTIDINE 20 MG: 10 INJECTION INTRAVENOUS at 10:33

## 2021-01-01 RX ADMIN — ALBUTEROL SULFATE 1.25 MG: 1.25 SOLUTION RESPIRATORY (INHALATION) at 19:32

## 2021-01-01 RX ADMIN — Medication 30 ML: at 09:59

## 2021-01-01 RX ADMIN — DEXMEDETOMIDINE HYDROCHLORIDE 1.1 MCG/KG/HR: 100 INJECTION, SOLUTION INTRAVENOUS at 04:17

## 2021-01-01 RX ADMIN — METHYLPREDNISOLONE SODIUM SUCCINATE 40 MG: 40 INJECTION, POWDER, FOR SOLUTION INTRAMUSCULAR; INTRAVENOUS at 01:00

## 2021-01-01 RX ADMIN — INSULIN LISPRO 9 UNITS: 100 INJECTION, SOLUTION INTRAVENOUS; SUBCUTANEOUS at 15:00

## 2021-01-01 RX ADMIN — Medication: at 17:55

## 2021-01-01 RX ADMIN — CARVEDILOL 12.5 MG: 12.5 TABLET, FILM COATED ORAL at 20:19

## 2021-01-01 RX ADMIN — CHLORHEXIDINE GLUCONATE 0.12% ORAL RINSE 10 ML: 1.2 LIQUID ORAL at 10:28

## 2021-01-01 RX ADMIN — SODIUM CHLORIDE SOLN NEBU 3% 4 ML: 3 NEBU SOLN at 19:35

## 2021-01-01 RX ADMIN — SODIUM CHLORIDE SOLN NEBU 3% 4 ML: 3 NEBU SOLN at 08:45

## 2021-01-01 RX ADMIN — SODIUM CHLORIDE SOLN NEBU 3% 4 ML: 3 NEBU SOLN at 01:16

## 2021-01-01 RX ADMIN — DEXMEDETOMIDINE HYDROCHLORIDE 0.7 MCG/KG/HR: 100 INJECTION, SOLUTION INTRAVENOUS at 22:58

## 2021-01-01 RX ADMIN — INSULIN GLARGINE 20 UNITS: 100 INJECTION, SOLUTION SUBCUTANEOUS at 12:30

## 2021-01-01 RX ADMIN — WATER 2 G: 1 INJECTION INTRAMUSCULAR; INTRAVENOUS; SUBCUTANEOUS at 09:15

## 2021-01-01 RX ADMIN — METOROPROLOL TARTRATE 2.5 MG: 5 INJECTION, SOLUTION INTRAVENOUS at 15:48

## 2021-01-01 RX ADMIN — INSULIN LISPRO 6 UNITS: 100 INJECTION, SOLUTION INTRAVENOUS; SUBCUTANEOUS at 08:59

## 2021-01-01 RX ADMIN — CHLORHEXIDINE GLUCONATE 0.12% ORAL RINSE 10 ML: 1.2 LIQUID ORAL at 21:25

## 2021-01-01 RX ADMIN — CARVEDILOL 12.5 MG: 12.5 TABLET, FILM COATED ORAL at 20:45

## 2021-01-01 RX ADMIN — INSULIN GLARGINE 10 UNITS: 100 INJECTION, SOLUTION SUBCUTANEOUS at 09:00

## 2021-01-01 RX ADMIN — METRONIDAZOLE 500 MG: 500 INJECTION, SOLUTION INTRAVENOUS at 20:37

## 2021-01-01 RX ADMIN — SODIUM CHLORIDE SOLN NEBU 3% 4 ML: 3 NEBU SOLN at 14:41

## 2021-01-01 RX ADMIN — ALBUTEROL SULFATE 1.25 MG: 1.25 SOLUTION RESPIRATORY (INHALATION) at 13:44

## 2021-01-01 RX ADMIN — INSULIN LISPRO 9 UNITS: 100 INJECTION, SOLUTION INTRAVENOUS; SUBCUTANEOUS at 10:09

## 2021-01-01 RX ADMIN — CARVEDILOL 12.5 MG: 12.5 TABLET, FILM COATED ORAL at 10:33

## 2021-01-01 RX ADMIN — DEXMEDETOMIDINE HYDROCHLORIDE 1.1 MCG/KG/HR: 100 INJECTION, SOLUTION INTRAVENOUS at 11:51

## 2021-01-01 RX ADMIN — VANCOMYCIN HYDROCHLORIDE 1000 MG: 1 INJECTION, POWDER, LYOPHILIZED, FOR SOLUTION INTRAVENOUS at 09:21

## 2021-01-01 RX ADMIN — ALBUTEROL SULFATE 1.25 MG: 1.25 SOLUTION RESPIRATORY (INHALATION) at 15:23

## 2021-01-01 RX ADMIN — POTASSIUM CHLORIDE 10 MEQ: 7.46 INJECTION, SOLUTION INTRAVENOUS at 22:55

## 2021-01-01 RX ADMIN — ALBUTEROL SULFATE 1.25 MG: 1.25 SOLUTION RESPIRATORY (INHALATION) at 20:24

## 2021-01-01 RX ADMIN — CARVEDILOL 12.5 MG: 12.5 TABLET, FILM COATED ORAL at 10:04

## 2021-01-01 RX ADMIN — MORPHINE SULFATE 2 MG: 2 INJECTION, SOLUTION INTRAMUSCULAR; INTRAVENOUS at 20:52

## 2021-01-01 RX ADMIN — DEXAMETHASONE SODIUM PHOSPHATE 6 MG: 4 INJECTION, SOLUTION INTRAMUSCULAR; INTRAVENOUS at 20:28

## 2021-01-01 RX ADMIN — CHLORHEXIDINE GLUCONATE 0.12% ORAL RINSE 10 ML: 1.2 LIQUID ORAL at 20:28

## 2021-01-01 RX ADMIN — INSULIN GLARGINE 10 UNITS: 100 INJECTION, SOLUTION SUBCUTANEOUS at 12:00

## 2021-01-01 RX ADMIN — AZITHROMYCIN DIHYDRATE 500 MG: 500 INJECTION, POWDER, LYOPHILIZED, FOR SOLUTION INTRAVENOUS at 05:52

## 2021-01-01 RX ADMIN — ANTICOAGULANT SODIUM CITRATE SOLUTION 0.08 G: 4 SOLUTION INTRAVENOUS at 15:44

## 2021-01-01 RX ADMIN — INSULIN LISPRO 9 UNITS: 100 INJECTION, SOLUTION INTRAVENOUS; SUBCUTANEOUS at 12:00

## 2021-01-01 RX ADMIN — IPRATROPIUM BROMIDE AND ALBUTEROL SULFATE 3 ML: .5; 3 SOLUTION RESPIRATORY (INHALATION) at 13:37

## 2021-01-01 RX ADMIN — ALBUTEROL SULFATE 1.25 MG: 1.25 SOLUTION RESPIRATORY (INHALATION) at 20:14

## 2021-01-01 RX ADMIN — INSULIN GLARGINE 20 UNITS: 100 INJECTION, SOLUTION SUBCUTANEOUS at 08:47

## 2021-01-01 RX ADMIN — ALBUMIN (HUMAN) 12.5 G: 0.25 INJECTION, SOLUTION INTRAVENOUS at 00:30

## 2021-01-01 RX ADMIN — METHYLPREDNISOLONE SODIUM SUCCINATE 40 MG: 40 INJECTION, POWDER, FOR SOLUTION INTRAMUSCULAR; INTRAVENOUS at 18:30

## 2021-01-01 RX ADMIN — CARVEDILOL 12.5 MG: 12.5 TABLET, FILM COATED ORAL at 20:50

## 2021-01-01 RX ADMIN — MIDAZOLAM HYDROCHLORIDE 2 MG: 2 INJECTION, SOLUTION INTRAMUSCULAR; INTRAVENOUS at 11:56

## 2021-01-01 RX ADMIN — MIDODRINE HYDROCHLORIDE 10 MG: 5 TABLET ORAL at 18:30

## 2021-01-01 RX ADMIN — DEXMEDETOMIDINE HYDROCHLORIDE 0.9 MCG/KG/HR: 100 INJECTION, SOLUTION INTRAVENOUS at 06:04

## 2021-01-01 RX ADMIN — CHLORHEXIDINE GLUCONATE 0.12% ORAL RINSE 10 ML: 1.2 LIQUID ORAL at 09:18

## 2021-01-01 RX ADMIN — INSULIN LISPRO 6 UNITS: 100 INJECTION, SOLUTION INTRAVENOUS; SUBCUTANEOUS at 10:14

## 2021-01-01 RX ADMIN — INSULIN LISPRO 3 UNITS: 100 INJECTION, SOLUTION INTRAVENOUS; SUBCUTANEOUS at 03:26

## 2021-01-01 RX ADMIN — Medication 30 ML: at 10:54

## 2021-01-01 RX ADMIN — VANCOMYCIN HYDROCHLORIDE 1000 MG: 1 INJECTION, POWDER, LYOPHILIZED, FOR SOLUTION INTRAVENOUS at 15:27

## 2021-01-01 RX ADMIN — DEXMEDETOMIDINE HYDROCHLORIDE 0.4 MCG/KG/HR: 100 INJECTION, SOLUTION INTRAVENOUS at 05:14

## 2021-01-01 RX ADMIN — ALBUMIN HUMAN 25 G: 250 SOLUTION INTRAVENOUS at 18:00

## 2021-01-01 RX ADMIN — CHLORHEXIDINE GLUCONATE 0.12% ORAL RINSE 10 ML: 1.2 LIQUID ORAL at 20:19

## 2021-01-01 RX ADMIN — Medication 30 ML: at 12:28

## 2021-01-01 RX ADMIN — FAMOTIDINE 20 MG: 10 INJECTION INTRAVENOUS at 12:28

## 2021-01-01 RX ADMIN — DEXMEDETOMIDINE HYDROCHLORIDE 0.6 MCG/KG/HR: 100 INJECTION, SOLUTION INTRAVENOUS at 12:02

## 2021-01-01 RX ADMIN — SODIUM CHLORIDE 40 MG: 9 INJECTION, SOLUTION INTRAMUSCULAR; INTRAVENOUS; SUBCUTANEOUS at 09:59

## 2021-01-01 RX ADMIN — DEXMEDETOMIDINE HYDROCHLORIDE 0.4 MCG/KG/HR: 100 INJECTION, SOLUTION INTRAVENOUS at 09:24

## 2021-01-01 RX ADMIN — Medication 1 MCG/MIN: at 08:03

## 2021-01-01 RX ADMIN — FENTANYL CITRATE 100 MCG: 50 INJECTION INTRAMUSCULAR; INTRAVENOUS at 06:21

## 2021-01-01 RX ADMIN — FAMOTIDINE 20 MG: 10 INJECTION INTRAVENOUS at 10:13

## 2021-01-01 RX ADMIN — FENTANYL CITRATE 75 MCG: 50 INJECTION INTRAMUSCULAR; INTRAVENOUS at 00:19

## 2021-01-01 RX ADMIN — DEXTROSE MONOHYDRATE 25 G: 25 INJECTION, SOLUTION INTRAVENOUS at 22:05

## 2021-01-01 RX ADMIN — LORAZEPAM 1 MG: 2 INJECTION INTRAMUSCULAR; INTRAVENOUS at 09:17

## 2021-01-01 RX ADMIN — CHLORHEXIDINE GLUCONATE 0.12% ORAL RINSE 10 ML: 1.2 LIQUID ORAL at 10:53

## 2021-01-01 RX ADMIN — DEXMEDETOMIDINE HYDROCHLORIDE 0.6 MCG/KG/HR: 100 INJECTION, SOLUTION INTRAVENOUS at 20:47

## 2021-01-01 RX ADMIN — POTASSIUM CHLORIDE 10 MEQ: 7.46 INJECTION, SOLUTION INTRAVENOUS at 23:55

## 2021-01-01 RX ADMIN — FUROSEMIDE 40 MG: 10 INJECTION, SOLUTION INTRAMUSCULAR; INTRAVENOUS at 11:53

## 2021-01-01 RX ADMIN — SODIUM CHLORIDE SOLN NEBU 3% 4 ML: 3 NEBU SOLN at 08:30

## 2021-01-01 RX ADMIN — Medication 75 MCG/HR: at 11:50

## 2021-01-01 RX ADMIN — LORAZEPAM 1 MG: 2 INJECTION INTRAMUSCULAR; INTRAVENOUS at 00:11

## 2021-01-01 RX ADMIN — INSULIN GLARGINE 20 UNITS: 100 INJECTION, SOLUTION SUBCUTANEOUS at 10:12

## 2021-01-01 RX ADMIN — SODIUM CHLORIDE SOLN NEBU 3% 4 ML: 3 NEBU SOLN at 15:53

## 2021-01-01 RX ADMIN — MIDODRINE HYDROCHLORIDE 10 MG: 5 TABLET ORAL at 14:04

## 2021-01-01 RX ADMIN — SODIUM CHLORIDE 40 MG: 9 INJECTION, SOLUTION INTRAMUSCULAR; INTRAVENOUS; SUBCUTANEOUS at 10:04

## 2021-01-01 RX ADMIN — INSULIN LISPRO 12 UNITS: 100 INJECTION, SOLUTION INTRAVENOUS; SUBCUTANEOUS at 15:35

## 2021-01-01 RX ADMIN — SODIUM CHLORIDE SOLN NEBU 3% 4 ML: 3 NEBU SOLN at 05:44

## 2021-01-01 RX ADMIN — Medication 50 MCG/HR: at 12:15

## 2021-01-01 RX ADMIN — SODIUM CHLORIDE SOLN NEBU 3% 4 ML: 3 NEBU SOLN at 09:09

## 2021-01-01 RX ADMIN — FENTANYL CITRATE 50 MCG: 50 INJECTION INTRAMUSCULAR; INTRAVENOUS at 03:58

## 2021-01-01 RX ADMIN — Medication 50 MCG/HR: at 18:35

## 2021-01-01 RX ADMIN — FAMOTIDINE 20 MG: 10 INJECTION INTRAVENOUS at 11:08

## 2021-01-01 RX ADMIN — CHLORHEXIDINE GLUCONATE 0.12% ORAL RINSE 10 ML: 1.2 LIQUID ORAL at 12:28

## 2021-01-01 RX ADMIN — LEVOFLOXACIN 500 MG: 5 INJECTION, SOLUTION INTRAVENOUS at 09:58

## 2021-01-01 RX ADMIN — INSULIN LISPRO 4 UNITS: 100 INJECTION, SOLUTION INTRAVENOUS; SUBCUTANEOUS at 00:19

## 2021-01-01 RX ADMIN — Medication: at 10:57

## 2021-01-01 RX ADMIN — Medication 30 ML: at 10:04

## 2021-01-01 RX ADMIN — METRONIDAZOLE 500 MG: 500 INJECTION, SOLUTION INTRAVENOUS at 08:33

## 2021-01-01 RX ADMIN — CHLORHEXIDINE GLUCONATE 0.12% ORAL RINSE 10 ML: 1.2 LIQUID ORAL at 21:34

## 2021-01-01 RX ADMIN — SODIUM CHLORIDE SOLN NEBU 3% 4 ML: 3 NEBU SOLN at 15:23

## 2021-01-01 RX ADMIN — Medication 50 MCG/HR: at 01:32

## 2021-01-01 RX ADMIN — INSULIN GLARGINE 20 UNITS: 100 INJECTION, SOLUTION SUBCUTANEOUS at 08:01

## 2021-01-01 RX ADMIN — DEXMEDETOMIDINE HYDROCHLORIDE 0.8 MCG/KG/HR: 100 INJECTION, SOLUTION INTRAVENOUS at 20:46

## 2021-01-01 RX ADMIN — ALBUTEROL SULFATE 1.25 MG: 1.25 SOLUTION RESPIRATORY (INHALATION) at 01:22

## 2021-01-01 RX ADMIN — Medication 100 MCG/HR: at 05:09

## 2021-01-01 RX ADMIN — MEROPENEM 500 MG: 500 INJECTION, POWDER, FOR SOLUTION INTRAVENOUS at 19:20

## 2021-01-01 RX ADMIN — CHLORHEXIDINE GLUCONATE 0.12% ORAL RINSE 10 ML: 1.2 LIQUID ORAL at 20:04

## 2021-01-01 RX ADMIN — BUMETANIDE 2 MG: 0.25 INJECTION INTRAMUSCULAR; INTRAVENOUS at 17:59

## 2021-01-01 RX ADMIN — MIDAZOLAM HYDROCHLORIDE 2 MG: 2 INJECTION, SOLUTION INTRAMUSCULAR; INTRAVENOUS at 03:20

## 2021-01-01 RX ADMIN — ALBUTEROL SULFATE 1.25 MG: 1.25 SOLUTION RESPIRATORY (INHALATION) at 16:25

## 2021-01-01 RX ADMIN — METHYLPREDNISOLONE SODIUM SUCCINATE 40 MG: 40 INJECTION, POWDER, FOR SOLUTION INTRAMUSCULAR; INTRAVENOUS at 20:03

## 2021-01-01 RX ADMIN — SODIUM CHLORIDE 40 MG: 9 INJECTION, SOLUTION INTRAMUSCULAR; INTRAVENOUS; SUBCUTANEOUS at 10:30

## 2021-01-01 RX ADMIN — DEXAMETHASONE SODIUM PHOSPHATE 6 MG: 4 INJECTION, SOLUTION INTRAMUSCULAR; INTRAVENOUS at 09:59

## 2021-01-01 RX ADMIN — INSULIN LISPRO 6 UNITS: 100 INJECTION, SOLUTION INTRAVENOUS; SUBCUTANEOUS at 18:00

## 2021-01-01 RX ADMIN — ACETAMINOPHEN 650 MG: 325 TABLET ORAL at 03:04

## 2021-01-01 RX ADMIN — FENTANYL CITRATE 50 MCG: 50 INJECTION INTRAMUSCULAR; INTRAVENOUS at 00:15

## 2021-01-01 RX ADMIN — CARVEDILOL 12.5 MG: 12.5 TABLET, FILM COATED ORAL at 20:17

## 2021-01-01 RX ADMIN — MORPHINE SULFATE 2 MG: 2 INJECTION, SOLUTION INTRAMUSCULAR; INTRAVENOUS at 09:27

## 2021-01-01 RX ADMIN — INSULIN LISPRO 6 UNITS: 100 INJECTION, SOLUTION INTRAVENOUS; SUBCUTANEOUS at 15:12

## 2021-01-01 RX ADMIN — INSULIN LISPRO 15 UNITS: 100 INJECTION, SOLUTION INTRAVENOUS; SUBCUTANEOUS at 10:31

## 2021-01-01 RX ADMIN — ALBUTEROL SULFATE 1.25 MG: 1.25 SOLUTION RESPIRATORY (INHALATION) at 19:36

## 2021-01-01 RX ADMIN — LORAZEPAM 1 MG: 2 INJECTION INTRAMUSCULAR; INTRAVENOUS at 05:41

## 2021-01-01 RX ADMIN — MEROPENEM 500 MG: 500 INJECTION, POWDER, FOR SOLUTION INTRAVENOUS at 21:18

## 2021-01-01 RX ADMIN — DEXMEDETOMIDINE HYDROCHLORIDE 1.1 MCG/KG/HR: 100 INJECTION, SOLUTION INTRAVENOUS at 11:12

## 2021-01-01 RX ADMIN — ALBUMIN (HUMAN) 25 G: 0.25 INJECTION, SOLUTION INTRAVENOUS at 12:19

## 2021-01-01 RX ADMIN — CHLORHEXIDINE GLUCONATE 0.12% ORAL RINSE 10 ML: 1.2 LIQUID ORAL at 10:13

## 2021-01-01 RX ADMIN — ALBUTEROL SULFATE 1.25 MG: 1.25 SOLUTION RESPIRATORY (INHALATION) at 07:55

## 2021-01-01 RX ADMIN — DEXMEDETOMIDINE HYDROCHLORIDE 0.6 MCG/KG/HR: 100 INJECTION, SOLUTION INTRAVENOUS at 03:51

## 2021-01-01 RX ADMIN — CALCIUM GLUCONATE 2 G: 98 INJECTION, SOLUTION INTRAVENOUS at 17:00

## 2021-01-01 RX ADMIN — INSULIN GLARGINE 20 UNITS: 100 INJECTION, SOLUTION SUBCUTANEOUS at 10:57

## 2021-01-01 RX ADMIN — WATER 2 G: 1 INJECTION INTRAMUSCULAR; INTRAVENOUS; SUBCUTANEOUS at 08:21

## 2021-01-01 RX ADMIN — ALBUMIN (HUMAN) 25 G: 0.25 INJECTION, SOLUTION INTRAVENOUS at 05:42

## 2021-01-01 RX ADMIN — Medication 100 MCG/HR: at 23:30

## 2021-01-01 RX ADMIN — FAMOTIDINE 20 MG: 10 INJECTION INTRAVENOUS at 10:58

## 2021-01-01 RX ADMIN — MORPHINE SULFATE 1 MG: 2 INJECTION, SOLUTION INTRAMUSCULAR; INTRAVENOUS at 09:17

## 2021-01-01 RX ADMIN — ACETAMINOPHEN 650 MG: 325 TABLET ORAL at 14:43

## 2021-01-01 RX ADMIN — ETOMIDATE 20 MG: 2 INJECTION, SOLUTION INTRAVENOUS at 04:07

## 2021-01-01 RX ADMIN — INSULIN GLARGINE 20 UNITS: 100 INJECTION, SOLUTION SUBCUTANEOUS at 09:32

## 2021-01-01 RX ADMIN — INSULIN LISPRO 12 UNITS: 100 INJECTION, SOLUTION INTRAVENOUS; SUBCUTANEOUS at 03:35

## 2021-01-01 RX ADMIN — MIDODRINE HYDROCHLORIDE 10 MG: 5 TABLET ORAL at 17:00

## 2021-01-01 RX ADMIN — EPOETIN ALFA-EPBX 10000 UNITS: 10000 INJECTION, SOLUTION INTRAVENOUS; SUBCUTANEOUS at 20:46

## 2021-01-01 RX ADMIN — MIDODRINE HYDROCHLORIDE 10 MG: 5 TABLET ORAL at 09:00

## 2021-01-01 RX ADMIN — INSULIN LISPRO 9 UNITS: 100 INJECTION, SOLUTION INTRAVENOUS; SUBCUTANEOUS at 03:11

## 2021-01-01 RX ADMIN — DEXAMETHASONE SODIUM PHOSPHATE 6 MG: 4 INJECTION, SOLUTION INTRAMUSCULAR; INTRAVENOUS at 00:24

## 2021-01-01 RX ADMIN — DEXMEDETOMIDINE HYDROCHLORIDE 1.1 MCG/KG/HR: 100 INJECTION, SOLUTION INTRAVENOUS at 06:30

## 2021-01-01 RX ADMIN — MIDODRINE HYDROCHLORIDE 10 MG: 5 TABLET ORAL at 12:25

## 2021-01-01 RX ADMIN — MAGNESIUM SULFATE HEPTAHYDRATE 2 G: 2 INJECTION, SOLUTION INTRAVENOUS at 04:56

## 2021-01-01 RX ADMIN — MIDODRINE HYDROCHLORIDE 10 MG: 5 TABLET ORAL at 18:37

## 2021-01-01 RX ADMIN — ALBUTEROL SULFATE 1.25 MG: 1.25 SOLUTION RESPIRATORY (INHALATION) at 01:16

## 2021-01-01 RX ADMIN — CHLORHEXIDINE GLUCONATE 0.12% ORAL RINSE 10 ML: 1.2 LIQUID ORAL at 08:18

## 2021-01-01 RX ADMIN — DEXMEDETOMIDINE HYDROCHLORIDE 1.5 MCG/KG/HR: 100 INJECTION, SOLUTION INTRAVENOUS at 07:10

## 2021-01-01 RX ADMIN — MORPHINE SULFATE 1 MG: 2 INJECTION, SOLUTION INTRAMUSCULAR; INTRAVENOUS at 19:44

## 2021-01-01 RX ADMIN — INSULIN LISPRO 12 UNITS: 100 INJECTION, SOLUTION INTRAVENOUS; SUBCUTANEOUS at 14:37

## 2021-01-01 RX ADMIN — Medication 50 MCG/HR: at 14:22

## 2021-01-01 RX ADMIN — INSULIN GLARGINE 20 UNITS: 100 INJECTION, SOLUTION SUBCUTANEOUS at 11:08

## 2021-01-01 RX ADMIN — CHLORHEXIDINE GLUCONATE 0.12% ORAL RINSE 10 ML: 1.2 LIQUID ORAL at 20:35

## 2021-01-01 RX ADMIN — EPOETIN ALFA-EPBX 10000 UNITS: 10000 INJECTION, SOLUTION INTRAVENOUS; SUBCUTANEOUS at 23:26

## 2021-01-01 RX ADMIN — Medication: at 17:40

## 2021-01-01 RX ADMIN — INSULIN LISPRO 3 UNITS: 100 INJECTION, SOLUTION INTRAVENOUS; SUBCUTANEOUS at 03:00

## 2021-01-01 RX ADMIN — POTASSIUM BICARBONATE 40 MEQ: 782 TABLET, EFFERVESCENT ORAL at 12:53

## 2021-01-01 RX ADMIN — Medication 30 ML: at 09:51

## 2021-01-01 RX ADMIN — Medication 50 MEQ: at 05:10

## 2021-01-01 RX ADMIN — CARVEDILOL 12.5 MG: 12.5 TABLET, FILM COATED ORAL at 22:56

## 2021-01-01 RX ADMIN — ACETAMINOPHEN 650 MG: 325 TABLET ORAL at 21:36

## 2021-01-01 RX ADMIN — Medication 30 ML: at 12:04

## 2021-01-01 RX ADMIN — SODIUM CHLORIDE SOLN NEBU 3% 4 ML: 3 NEBU SOLN at 20:19

## 2021-01-01 RX ADMIN — ALBUMIN (HUMAN) 25 G: 0.25 INJECTION, SOLUTION INTRAVENOUS at 17:55

## 2021-01-01 RX ADMIN — MIDODRINE HYDROCHLORIDE 10 MG: 5 TABLET ORAL at 12:22

## 2021-01-01 RX ADMIN — CARVEDILOL 12.5 MG: 12.5 TABLET, FILM COATED ORAL at 12:28

## 2021-01-01 RX ADMIN — DEXMEDETOMIDINE HYDROCHLORIDE 1 MCG/KG/HR: 100 INJECTION, SOLUTION INTRAVENOUS at 12:27

## 2021-01-01 RX ADMIN — ALBUTEROL SULFATE 1.25 MG: 1.25 SOLUTION RESPIRATORY (INHALATION) at 19:07

## 2021-01-01 RX ADMIN — CHLORHEXIDINE GLUCONATE 0.12% ORAL RINSE 10 ML: 1.2 LIQUID ORAL at 08:46

## 2021-01-01 RX ADMIN — SODIUM CHLORIDE SOLN NEBU 3% 4 ML: 3 NEBU SOLN at 19:36

## 2021-01-01 RX ADMIN — INSULIN LISPRO 12 UNITS: 100 INJECTION, SOLUTION INTRAVENOUS; SUBCUTANEOUS at 23:30

## 2021-01-01 RX ADMIN — INSULIN LISPRO 12 UNITS: 100 INJECTION, SOLUTION INTRAVENOUS; SUBCUTANEOUS at 22:39

## 2021-01-01 RX ADMIN — MEROPENEM 500 MG: 500 INJECTION, POWDER, FOR SOLUTION INTRAVENOUS at 21:25

## 2021-01-01 RX ADMIN — DEXMEDETOMIDINE HYDROCHLORIDE 1 MCG/KG/HR: 100 INJECTION, SOLUTION INTRAVENOUS at 20:40

## 2021-01-01 RX ADMIN — AZITHROMYCIN DIHYDRATE 500 MG: 500 INJECTION, POWDER, LYOPHILIZED, FOR SOLUTION INTRAVENOUS at 05:14

## 2021-01-01 RX ADMIN — MORPHINE SULFATE 2 MG: 2 INJECTION, SOLUTION INTRAMUSCULAR; INTRAVENOUS at 15:13

## 2021-01-01 RX ADMIN — FAMOTIDINE 20 MG: 10 INJECTION INTRAVENOUS at 08:01

## 2021-01-01 RX ADMIN — DEXTROSE MONOHYDRATE 75 ML/HR: 5 INJECTION, SOLUTION INTRAVENOUS at 10:51

## 2021-01-01 RX ADMIN — DEXMEDETOMIDINE HYDROCHLORIDE 1.1 MCG/KG/HR: 100 INJECTION, SOLUTION INTRAVENOUS at 01:51

## 2021-01-01 RX ADMIN — MIDAZOLAM HYDROCHLORIDE 2 MG: 2 INJECTION, SOLUTION INTRAMUSCULAR; INTRAVENOUS at 14:33

## 2021-01-01 RX ADMIN — INSULIN LISPRO 3 UNITS: 100 INJECTION, SOLUTION INTRAVENOUS; SUBCUTANEOUS at 03:03

## 2021-01-01 RX ADMIN — DEXAMETHASONE SODIUM PHOSPHATE 6 MG: 4 INJECTION, SOLUTION INTRAMUSCULAR; INTRAVENOUS at 21:34

## 2021-01-01 RX ADMIN — MIDODRINE HYDROCHLORIDE 10 MG: 5 TABLET ORAL at 18:10

## 2021-01-01 RX ADMIN — MIDODRINE HYDROCHLORIDE 10 MG: 5 TABLET ORAL at 12:00

## 2021-01-01 RX ADMIN — INSULIN LISPRO 12 UNITS: 100 INJECTION, SOLUTION INTRAVENOUS; SUBCUTANEOUS at 15:27

## 2021-01-01 RX ADMIN — WATER 2 G: 1 INJECTION INTRAMUSCULAR; INTRAVENOUS; SUBCUTANEOUS at 10:00

## 2021-01-01 RX ADMIN — MIDAZOLAM HYDROCHLORIDE 1 MG: 2 INJECTION, SOLUTION INTRAMUSCULAR; INTRAVENOUS at 07:26

## 2021-01-01 RX ADMIN — Medication: at 21:16

## 2021-01-01 RX ADMIN — INSULIN LISPRO 3 UNITS: 100 INJECTION, SOLUTION INTRAVENOUS; SUBCUTANEOUS at 08:49

## 2021-01-01 RX ADMIN — DEXMEDETOMIDINE HYDROCHLORIDE 1.5 MCG/KG/HR: 100 INJECTION, SOLUTION INTRAVENOUS at 11:21

## 2021-01-01 RX ADMIN — CARVEDILOL 12.5 MG: 12.5 TABLET, FILM COATED ORAL at 21:25

## 2021-01-01 RX ADMIN — DEXMEDETOMIDINE HYDROCHLORIDE 1.1 MCG/KG/HR: 100 INJECTION, SOLUTION INTRAVENOUS at 22:10

## 2021-01-01 RX ADMIN — MORPHINE SULFATE 1 MG: 2 INJECTION, SOLUTION INTRAMUSCULAR; INTRAVENOUS at 15:44

## 2021-01-01 RX ADMIN — Medication 30 ML: at 08:46

## 2021-01-01 RX ADMIN — INSULIN LISPRO 3 UNITS: 100 INJECTION, SOLUTION INTRAVENOUS; SUBCUTANEOUS at 20:50

## 2021-01-01 RX ADMIN — FAMOTIDINE 20 MG: 10 INJECTION INTRAVENOUS at 09:51

## 2021-01-01 RX ADMIN — SODIUM CHLORIDE 40 MG: 9 INJECTION, SOLUTION INTRAMUSCULAR; INTRAVENOUS; SUBCUTANEOUS at 12:04

## 2021-01-01 RX ADMIN — BUMETANIDE 2 MG: 0.25 INJECTION INTRAMUSCULAR; INTRAVENOUS at 08:00

## 2021-01-01 RX ADMIN — ALBUMIN HUMAN: 250 SOLUTION INTRAVENOUS at 16:00

## 2021-01-01 RX ADMIN — DEXMEDETOMIDINE HYDROCHLORIDE 0.7 MCG/KG/HR: 100 INJECTION, SOLUTION INTRAVENOUS at 08:45

## 2021-01-01 RX ADMIN — Medication 50 MCG/HR: at 05:45

## 2021-01-01 RX ADMIN — CARVEDILOL 12.5 MG: 12.5 TABLET, FILM COATED ORAL at 21:16

## 2021-01-01 RX ADMIN — CARVEDILOL 12.5 MG: 12.5 TABLET, FILM COATED ORAL at 10:28

## 2021-01-01 RX ADMIN — DEXMEDETOMIDINE HYDROCHLORIDE 0.4 MCG/KG/HR: 100 INJECTION, SOLUTION INTRAVENOUS at 17:51

## 2021-01-01 RX ADMIN — WATER 2 G: 1 INJECTION INTRAMUSCULAR; INTRAVENOUS; SUBCUTANEOUS at 06:42

## 2021-01-01 RX ADMIN — SODIUM CHLORIDE 40 MG: 9 INJECTION, SOLUTION INTRAMUSCULAR; INTRAVENOUS; SUBCUTANEOUS at 10:28

## 2021-01-01 RX ADMIN — CHLORHEXIDINE GLUCONATE 0.12% ORAL RINSE 10 ML: 1.2 LIQUID ORAL at 10:32

## 2021-01-01 RX ADMIN — ALBUMIN (HUMAN) 25 G: 0.25 INJECTION, SOLUTION INTRAVENOUS at 13:32

## 2021-01-01 RX ADMIN — SODIUM BICARBONATE: 84 INJECTION, SOLUTION INTRAVENOUS at 05:53

## 2021-01-01 RX ADMIN — INSULIN GLARGINE 20 UNITS: 100 INJECTION, SOLUTION SUBCUTANEOUS at 10:04

## 2021-01-01 RX ADMIN — ALBUTEROL SULFATE 1.25 MG: 1.25 SOLUTION RESPIRATORY (INHALATION) at 08:45

## 2021-01-01 RX ADMIN — ALBUTEROL SULFATE 1.25 MG: 1.25 SOLUTION RESPIRATORY (INHALATION) at 19:35

## 2021-01-01 RX ADMIN — SODIUM CHLORIDE 40 MG: 9 INJECTION, SOLUTION INTRAMUSCULAR; INTRAVENOUS; SUBCUTANEOUS at 09:39

## 2021-01-01 RX ADMIN — LEVOFLOXACIN 750 MG: 750 INJECTION, SOLUTION INTRAVENOUS at 11:00

## 2021-01-01 RX ADMIN — Medication 50 MCG/HR: at 21:21

## 2021-01-01 RX ADMIN — DEXMEDETOMIDINE HYDROCHLORIDE 1.1 MCG/KG/HR: 100 INJECTION, SOLUTION INTRAVENOUS at 23:29

## 2021-01-01 RX ADMIN — CHLORHEXIDINE GLUCONATE 0.12% ORAL RINSE 10 ML: 1.2 LIQUID ORAL at 12:06

## 2021-01-01 RX ADMIN — ALBUMIN (HUMAN) 12.5 G: 0.25 INJECTION, SOLUTION INTRAVENOUS at 05:29

## 2021-01-01 RX ADMIN — CALCIUM GLUCONATE 2 G: 98 INJECTION, SOLUTION INTRAVENOUS at 08:29

## 2021-01-01 RX ADMIN — CARVEDILOL 12.5 MG: 12.5 TABLET, FILM COATED ORAL at 10:53

## 2021-01-01 RX ADMIN — DEXAMETHASONE SODIUM PHOSPHATE 6 MG: 4 INJECTION, SOLUTION INTRAMUSCULAR; INTRAVENOUS at 21:38

## 2021-01-01 RX ADMIN — SODIUM CHLORIDE SOLN NEBU 3% 4 ML: 3 NEBU SOLN at 20:14

## 2021-01-01 RX ADMIN — METRONIDAZOLE 500 MG: 500 INJECTION, SOLUTION INTRAVENOUS at 09:42

## 2021-01-01 RX ADMIN — CHLORHEXIDINE GLUCONATE 0.12% ORAL RINSE 10 ML: 1.2 LIQUID ORAL at 09:00

## 2021-01-01 RX ADMIN — DEXAMETHASONE SODIUM PHOSPHATE 6 MG: 4 INJECTION, SOLUTION INTRAMUSCULAR; INTRAVENOUS at 20:37

## 2021-01-01 RX ADMIN — INSULIN LISPRO 6 UNITS: 100 INJECTION, SOLUTION INTRAVENOUS; SUBCUTANEOUS at 04:05

## 2021-01-01 RX ADMIN — SODIUM CHLORIDE SOLN NEBU 3% 4 ML: 3 NEBU SOLN at 19:32

## 2021-01-01 RX ADMIN — ALBUMIN (HUMAN) 25 G: 0.25 INJECTION, SOLUTION INTRAVENOUS at 17:48

## 2021-01-01 RX ADMIN — FENTANYL CITRATE 50 MCG: 50 INJECTION INTRAMUSCULAR; INTRAVENOUS at 23:37

## 2021-01-01 RX ADMIN — DEXMEDETOMIDINE HYDROCHLORIDE 1.1 MCG/KG/HR: 100 INJECTION, SOLUTION INTRAVENOUS at 03:53

## 2021-01-01 RX ADMIN — Medication 75 MCG/HR: at 08:45

## 2021-01-01 RX ADMIN — Medication 75 MCG/HR: at 22:30

## 2021-01-01 RX ADMIN — SODIUM CHLORIDE SOLN NEBU 3% 4 ML: 3 NEBU SOLN at 21:06

## 2021-01-01 RX ADMIN — MEROPENEM 500 MG: 500 INJECTION, POWDER, FOR SOLUTION INTRAVENOUS at 18:03

## 2021-01-01 RX ADMIN — FENTANYL CITRATE 100 MCG: 50 INJECTION INTRAMUSCULAR; INTRAVENOUS at 14:14

## 2021-01-01 RX ADMIN — DEXMEDETOMIDINE HYDROCHLORIDE 1 MCG/KG/HR: 100 INJECTION, SOLUTION INTRAVENOUS at 18:08

## 2021-01-01 RX ADMIN — INSULIN LISPRO 9 UNITS: 100 INJECTION, SOLUTION INTRAVENOUS; SUBCUTANEOUS at 09:00

## 2021-01-01 RX ADMIN — MORPHINE SULFATE 2 MG: 2 INJECTION, SOLUTION INTRAMUSCULAR; INTRAVENOUS at 12:00

## 2021-01-01 RX ADMIN — HEPARIN SODIUM 10000 UNITS: 1000 INJECTION INTRAVENOUS; SUBCUTANEOUS at 21:00

## 2021-01-01 RX ADMIN — FENTANYL CITRATE 50 MCG: 50 INJECTION INTRAMUSCULAR; INTRAVENOUS at 22:06

## 2021-01-01 RX ADMIN — INSULIN LISPRO 9 UNITS: 100 INJECTION, SOLUTION INTRAVENOUS; SUBCUTANEOUS at 03:55

## 2021-01-01 RX ADMIN — Medication 50 MCG/HR: at 05:43

## 2021-01-01 RX ADMIN — METOROPROLOL TARTRATE 2.5 MG: 5 INJECTION, SOLUTION INTRAVENOUS at 03:31

## 2021-01-01 RX ADMIN — CHLORHEXIDINE GLUCONATE 0.12% ORAL RINSE 10 ML: 1.2 LIQUID ORAL at 21:16

## 2021-01-01 RX ADMIN — INSULIN GLARGINE 10 UNITS: 100 INJECTION, SOLUTION SUBCUTANEOUS at 10:54

## 2021-01-01 RX ADMIN — Medication 5 MCG/MIN: at 12:25

## 2021-01-01 RX ADMIN — FENTANYL CITRATE 100 MCG: 50 INJECTION INTRAMUSCULAR; INTRAVENOUS at 02:03

## 2021-01-01 RX ADMIN — Medication 50 MCG/HR: at 04:00

## 2021-01-01 RX ADMIN — POTASSIUM BICARBONATE 20 MEQ: 782 TABLET, EFFERVESCENT ORAL at 22:55

## 2021-01-01 RX ADMIN — SODIUM CHLORIDE SOLN NEBU 3% 4 ML: 3 NEBU SOLN at 01:22

## 2021-01-01 RX ADMIN — ALBUTEROL SULFATE 1.25 MG: 1.25 SOLUTION RESPIRATORY (INHALATION) at 15:53

## 2021-01-01 RX ADMIN — BUMETANIDE 2 MG: 0.25 INJECTION INTRAMUSCULAR; INTRAVENOUS at 20:19

## 2021-01-01 RX ADMIN — DEXMEDETOMIDINE HYDROCHLORIDE 0.6 MCG/KG/HR: 100 INJECTION, SOLUTION INTRAVENOUS at 13:43

## 2021-01-01 RX ADMIN — BUMETANIDE 2 MG: 0.25 INJECTION INTRAMUSCULAR; INTRAVENOUS at 21:38

## 2021-01-01 RX ADMIN — Medication 75 MCG/HR: at 18:02

## 2021-01-01 RX ADMIN — SODIUM CHLORIDE 40 MG: 9 INJECTION, SOLUTION INTRAMUSCULAR; INTRAVENOUS; SUBCUTANEOUS at 08:32

## 2021-01-01 RX ADMIN — DEXMEDETOMIDINE HYDROCHLORIDE 1.5 MCG/KG/HR: 100 INJECTION, SOLUTION INTRAVENOUS at 02:54

## 2021-01-01 RX ADMIN — MAGNESIUM SULFATE HEPTAHYDRATE 2 G: 2 INJECTION, SOLUTION INTRAVENOUS at 00:20

## 2021-01-01 RX ADMIN — DEXMEDETOMIDINE HYDROCHLORIDE 1 MCG/KG/HR: 100 INJECTION, SOLUTION INTRAVENOUS at 03:50

## 2021-01-01 RX ADMIN — SODIUM CHLORIDE 40 MG: 9 INJECTION, SOLUTION INTRAMUSCULAR; INTRAVENOUS; SUBCUTANEOUS at 08:27

## 2021-01-01 RX ADMIN — INSULIN LISPRO 6 UNITS: 100 INJECTION, SOLUTION INTRAVENOUS; SUBCUTANEOUS at 03:00

## 2021-01-01 RX ADMIN — DEXMEDETOMIDINE HYDROCHLORIDE 1.1 MCG/KG/HR: 100 INJECTION, SOLUTION INTRAVENOUS at 16:34

## 2021-01-01 RX ADMIN — MIDODRINE HYDROCHLORIDE 10 MG: 5 TABLET ORAL at 10:13

## 2021-01-01 RX ADMIN — ALBUTEROL SULFATE 1.25 MG: 1.25 SOLUTION RESPIRATORY (INHALATION) at 09:09

## 2021-01-01 RX ADMIN — Medication: at 22:13

## 2021-01-01 RX ADMIN — Medication 100 MCG/HR: at 20:00

## 2021-01-01 RX ADMIN — ALBUMIN (HUMAN) 25 G: 12.5 INJECTION, SOLUTION INTRAVENOUS at 08:12

## 2021-01-01 RX ADMIN — CHLORHEXIDINE GLUCONATE 0.12% ORAL RINSE 10 ML: 1.2 LIQUID ORAL at 20:17

## 2021-01-01 RX ADMIN — MIDAZOLAM HYDROCHLORIDE 2 MG/HR: 5 INJECTION, SOLUTION INTRAMUSCULAR; INTRAVENOUS at 04:50

## 2021-01-01 RX ADMIN — INSULIN LISPRO 3 UNITS: 100 INJECTION, SOLUTION INTRAVENOUS; SUBCUTANEOUS at 10:56

## 2021-01-01 RX ADMIN — INSULIN GLARGINE 20 UNITS: 100 INJECTION, SOLUTION SUBCUTANEOUS at 09:00

## 2021-01-01 RX ADMIN — Medication: at 12:28

## 2021-01-01 RX ADMIN — FENTANYL CITRATE 100 MCG: 50 INJECTION INTRAMUSCULAR; INTRAVENOUS at 06:52

## 2021-01-01 RX ADMIN — Medication 50 MCG/HR: at 06:19

## 2021-01-01 RX ADMIN — VANCOMYCIN HYDROCHLORIDE 1000 MG: 1 INJECTION, POWDER, LYOPHILIZED, FOR SOLUTION INTRAVENOUS at 09:00

## 2021-01-01 RX ADMIN — ALBUTEROL SULFATE 1.25 MG: 1.25 SOLUTION RESPIRATORY (INHALATION) at 19:16

## 2021-01-01 RX ADMIN — HYDRALAZINE HYDROCHLORIDE 10 MG: 20 INJECTION INTRAMUSCULAR; INTRAVENOUS at 17:41

## 2021-01-01 RX ADMIN — EPOETIN ALFA-EPBX 10000 UNITS: 10000 INJECTION, SOLUTION INTRAVENOUS; SUBCUTANEOUS at 22:02

## 2021-01-01 RX ADMIN — ALBUMIN (HUMAN) 25 G: 12.5 SOLUTION INTRAVENOUS at 05:09

## 2021-01-01 RX ADMIN — FENTANYL CITRATE 100 MCG: 50 INJECTION INTRAMUSCULAR; INTRAVENOUS at 04:02

## 2021-01-01 RX ADMIN — CALCIUM GLUCONATE 2 G: 98 INJECTION, SOLUTION INTRAVENOUS at 16:00

## 2021-01-01 RX ADMIN — INSULIN GLARGINE 20 UNITS: 100 INJECTION, SOLUTION SUBCUTANEOUS at 10:31

## 2021-01-01 RX ADMIN — ALBUTEROL SULFATE 1.25 MG: 1.25 SOLUTION RESPIRATORY (INHALATION) at 16:45

## 2021-01-01 RX ADMIN — CHLORHEXIDINE GLUCONATE 0.12% ORAL RINSE 10 ML: 1.2 LIQUID ORAL at 09:15

## 2021-01-01 RX ADMIN — INSULIN LISPRO 3 UNITS: 100 INJECTION, SOLUTION INTRAVENOUS; SUBCUTANEOUS at 00:00

## 2021-01-01 RX ADMIN — ALBUTEROL SULFATE 1.25 MG: 1.25 SOLUTION RESPIRATORY (INHALATION) at 14:41

## 2021-01-01 RX ADMIN — LORAZEPAM 1 MG: 2 INJECTION INTRAMUSCULAR; INTRAVENOUS at 11:59

## 2021-01-01 RX ADMIN — INSULIN LISPRO 6 UNITS: 100 INJECTION, SOLUTION INTRAVENOUS; SUBCUTANEOUS at 21:00

## 2021-01-01 NOTE — ANESTHESIA POSTPROCEDURE EVALUATION
Procedure(s): LEFT FEMORAL INTERTROCHANTERIC SHORT NAIL INSERTION. general 
 
Anesthesia Post Evaluation Multimodal analgesia: multimodal analgesia used between 6 hours prior to anesthesia start to PACU discharge Patient location during evaluation: bedside Patient participation: complete - patient participated Level of consciousness: awake Pain management: adequate Airway patency: patent Anesthetic complications: no 
Cardiovascular status: stable Respiratory status: acceptable Hydration status: acceptable Post anesthesia nausea and vomiting:  controlled INITIAL Post-op Vital signs:  
Vitals Value Taken Time /42 12/07/20 1729 Temp 36.3 °C (97.4 °F) 12/07/20 1719 Pulse 70 12/07/20 1732 Resp 14 12/07/20 1732 SpO2 98 % 12/07/20 1732

## 2021-01-26 NOTE — Clinical Note
Status[de-identified] INPATIENT [101]   Type of Bed: Stepdown [17]   Inpatient Hospitalization Certified Necessary for the Following Reasons: 3.  Patient receiving treatment that can only be provided in an inpatient setting (further clarification in H&P documentation)   Admitting Diagnosis: Pneumonia due to COVID-19 virus [[id number]]   Admitting Physician: Rekha Mcintosh [[id number]]   Attending Physician: Rekha Mcintosh [[id number]]   Estimated Length of Stay: 3-4 Midnights   Discharge Plan[de-identified] Extended Care Facility (e.g. Adult Home, Nursing Home, etc.)

## 2021-01-27 PROBLEM — J12.82 PNEUMONIA DUE TO COVID-19 VIRUS: Status: ACTIVE | Noted: 2021-01-01

## 2021-01-27 PROBLEM — U07.1 PNEUMONIA DUE TO COVID-19 VIRUS: Status: ACTIVE | Noted: 2021-01-01

## 2021-01-27 NOTE — ED NOTES
Pt appears comfortably sedated, on Precedex & fentanyl drips. Bicarb drip maintained. Versed drip turned off due to bradycardia to 50s and hypotension of 62/50. Levophed not running upon assumption of patient care due to pump error - levophed initiated at 12 mcg/min RT at bedside to draw repeat ABG. Intensivist fellow at bedside assessing pt.

## 2021-01-27 NOTE — ED TRIAGE NOTES
Pt brought by medic from [facility]. She tested positive for covid a week ago and has increased shortness of breath, cough and bloody sputum. Audible rales noted. She has a hx of CHF as well. Pt 79% on 2L at facility. Pt then put on 6L w/ o2 sat . Pt placed on non rebreather with increased o2 to 95%

## 2021-01-27 NOTE — PROGRESS NOTES
Called by ED earlier this AM to admit patient for respiratory failure suspected to be due to COVID-19 pneumonia. She is known to our service from previous hospitalizations and was admitted here most recently in early December for management of a L hip fracture secondary to fall. Patient was subsequently discharged to a local skilled care facility for continued convalescence and has remained there until now. Per ED report, pateint tested positive for COVID-19 last week and has since developed progressively worsening SOB and hemoptysis. The case was initially discussed by the ED with the ICU team but she was not felt to be a candidate for ICU admission at that time. When I was called by ED to admit, the patient was requiring HFNC and appeared to be relatively stable for admission to medical stepdown. By the time I was able to evaluate patient, however, she had been intubated and placed on mechanical ventilation with admission status being upgraded to ICU. I was unaware of this change as no update was provided by ED. Assistance of critical care team is appreciated. will gladly assume care of patient once she is stable for transfer of ICU to a medical/stepdown floor.

## 2021-01-27 NOTE — ED PROVIDER NOTES
The patient was evaluated in the Emergency Department for shortness of breath. Physical examination revealed respiratory distress with coarse rhonchi throughout. Although initial history and physical exam information was obtained by Antionette Humphries DO (PGY-[id number]), who also dictated a record of this visit, I independently examined and evaluated this patient and participated in the diagnostic, treatment, and disposition decisions. 79year old female w/ hx of HFpEF (EF 60-65%), CKD-3, esophageal varices, cirrhosis, DM, HTN, CAD presents from nursing home after testing positive for Covid last week and brought in by EMTs after developing worsening SOB and hemoptysis. History is limited given patient being on hi flow NC and difficult to understand but besides the shortness of breath she currently denies any other symptoms to include CP, abdominal pain, nausea, vomiting, flank pain, or dizziness/lightheadedness. She is noted to be satting at 94+ on 15L/100% hi flow at admission. Past Medical History:  
Diagnosis Date  Anemia  Aortic stenosis, moderate 03/2019  Breast cancer ([facility] [address]  CAD (coronary artery disease) Coronary Stenting needed, hematology workup needed prior to stent placement, treating medically at this time  Cancer [facility])   
 colon  Cancer ([facility] [address] breast - left  Cardiac catheterization 08/17/2016 LM patent. mLAD 99.9% (2.25 x 28-mm Xience BETHEL, resid 0%). oD1 60%. oD2 95%. pCX 90% (2.5 x 13-mm Xience BETHEL, resid 0%). OMB patent.  Cardiac echocardiogram 03/2019 EF 56 to 60%, moderate concentric LVH, moderate aortic stenosis, mean valve gradient 25 mmHg  Diabetes ([address]  Hypercholesteremia  Hypertension  Thrombocytopenia ([address] Past Surgical History:  
Procedure Laterality Date  CARDIAC CATHETERIZATION  7/16/2015  
  no stents at this time r/t anemia & thrombocytopenia (hematology work-up needed prior to stent placement)  CARDIAC CATHETERIZATION  7/11/2016  CARDIAC CATHETERIZATION  8/17/2016  CORONARY ARTERY ANGIOGRAM  7/16/2015  CORONARY ARTERY ANGIOGRAM  7/11/2016  CORONARY ARTERY ANGIOGRAM  8/17/2016  CORONARY STENT EA ADDL VESSEL  8/17/2016  CORONARY STENT SINGLE W/PTCA  8/17/2016  HX GI    
 colon surg secondary to cancer - removed lesion  HX HEENT  2/2013  
 cataract bilaterally  HX MASTECTOMY  march 2011  
 left  HX VASCULAR ACCESS    
 mediport - now removed  IR BX BONE MARROW DIAGNOSTIC  9/30/2019  LV ANGIOGRAPHY  7/16/2015  LV ANGIOGRAPHY  7/11/2016 Family History:  
Problem Relation Age of Onset  Cancer Maternal Aunt Social History Socioeconomic History  Marital status:  Spouse name: Not on file  Number of children: Not on file  Years of education: Not on file  Highest education level: Not on file Occupational History  Not on file Social Needs  Financial resource strain: Not on file  Food insecurity Worry: Not on file Inability: Not on file  Transportation needs Medical: Not on file Non-medical: Not on file Tobacco Use  Smoking status: Never Smoker  Smokeless tobacco: Never Used Substance and Sexual Activity  Alcohol use: No  
 Drug use: No  
 Sexual activity: Not on file Lifestyle  Physical activity Days per week: Not on file Minutes per session: Not on file  Stress: Not on file Relationships  Social connections Talks on phone: Not on file Gets together: Not on file Attends [employer] service: Not on file Active member of club or organization: Not on file Attends meetings of clubs or organizations: Not on file Relationship status: Not on file  Intimate partner violence Fear of current or ex partner: Not on file Emotionally abused: Not on file Physically abused: Not on file Forced sexual activity: Not on file Other Topics Concern  Not on file Social History Narrative  Not on file ALLERGIES: Latex Review of Systems Constitutional: Negative for diaphoresis and fever. Respiratory: Positive for shortness of breath. Cardiovascular: Positive for leg swelling. Negative for chest pain and palpitations. Gastrointestinal: Positive for abdominal pain. Negative for nausea and vomiting. All other systems reviewed and are negative. Vitals:  
 01/26/21 2315 01/26/21 2321 01/27/21 0000 BP: 91/75 124/84 (!) 140/79 Pulse: (!) 133 (!) 134 (!) 133 Resp: (!) 39 24 26 Temp:  99 °F (37.2 °C) SpO2: 96% 94% 93% Physical Exam 
Vitals signs reviewed. Constitutional:   
   Appearance: She is not diaphoretic. Eyes:  
   Extraocular Movements: Extraocular movements intact. Pupils: Pupils are equal, round, and reactive to light. Cardiovascular:  
   Rate and Rhythm: Regular rhythm. Tachycardia present. Pulses: Normal pulses. Pulmonary:  
   Effort: Respiratory distress present. Breath sounds: Rales present. Abdominal:  
   General: Abdomen is flat. Palpations: Abdomen is soft. Skin: 
   General: Skin is warm and dry. Coloration: Skin is not jaundiced. Neurological:  
   Mental Status: She is alert. MDM Dx: Severe Covid Pneumonia Medically complicated patient presenting from [address] [place] [address] home with worsening SOB after testing Covid + last week. On arrival she showed tachypnea and was placed on 15L/100% hi flow which improved her stats to 95-96%. Also diuresed with 80mg IV lasix after increased BNP in known CHF (last echo with 60-65% EF 2 months ago). Consulted with ID Dr Carmen Potter who recommended starting her on Decadron 6mg IV q12 and Remdesivir 200mg initial dose then 100mg qday for 5 days. Also some concern for hemoptysis given history of cirrhosis with esophageal varices as patient seems to be occasionally spitting out some blood. Her hgb was 9.5 on admission which seems at or slightly above her baseline per chart review, however bedside heme occult test was positive today as well. Given these findings, we will hold anticoagulating her at this time even with risk of DVT/PE present as she is a high bleeding risk (HASBLED score = 5 = 9.1% risk). DVT bilateral LE ordered as well. Consulted with ICU who recommended admitting her to step down given that she is not currently intubated. Spoke with hospitalist Dr Radha Ramon at [address] [place] [address] who agreed to admit patient to step down. Dispo: Admitted Intubation Date/Time: 1/27/2021 6:04 AM 
Performed by: Manuel Gonsalez MD 
Authorized by: Manuel Gonsalez MD  
 
Consent:  
  Consent obtained:  Emergent situation Pre-procedure details:  
  Patient status:  Awake Mallampati score:  II 
  Pretreatment medications:  None Paralytics:  Succinylcholine Procedure details:  
  Preoxygenation:  Nasal cannula CPR in progress: no Intubation method:  Oral 
  Oral intubation technique:  Video-assisted Laryngoscope blade: Mac 3 Tube size (mm):  7.5 Tube type:  Cuffed Number of attempts:  1 Ventilation between attempts: no   
  Cricoid pressure: no   
  Tube visualized through cords: yes Placement assessment: ETT to lip:  23 ETT to teeth:  22 Tube secured with:  ETT hall Breath sounds:  Equal 
  Placement verification: chest rise, condensation, CXR verification, equal breath sounds, ETCO2 detector and tube exhalation CXR findings:  ETT in proper place Post-procedure details:  
  Patient tolerance of procedure: Tolerated well, no immediate complications Critical Care Performed by: Manuel Gonsalez MD 
Authorized by: Manuel Gonsalez MD  
 
Critical care provider statement:  
  Critical care time (minutes):  60 
 Critical care time was exclusive of:  Separately billable procedures and treating other patients Critical care was necessary to treat or prevent imminent or life-threatening deterioration of the following conditions:  Cardiac failure, circulatory failure, CNS failure or compromise, renal failure, respiratory failure, shock and sepsis Critical care was time spent personally by me on the following activities:  Development of treatment plan with patient or surrogate, discussions with consultants, evaluation of patient's response to treatment, examination of patient, obtaining history from patient or surrogate, ordering and performing treatments and interventions, ordering and review of laboratory studies, ordering and review of radiographic studies, pulse oximetry, re-evaluation of patient's condition, review of old charts and ventilator management   I assumed direction of critical care for this patient from another provider in my specialty: no

## 2021-01-27 NOTE — PROGRESS NOTES
PCCM Update: 
 
Updated nephew via phone conversation. Consented for CVL and arterial line. Maribel Hartmann PA-C 
01/27/21 Pulmonary, Critical Care Medicine [facility]

## 2021-01-27 NOTE — H&P
[facility] Pulmonary, Critical Care, and Sleep Medicine Name: Felipe Cavazos MRN: [id number] : 1940 Hospital: [address] [place] [address] Dr Date: 2021 Critical Care History and Physical 
 
 
IMPRESSION:  
· Hypoxic, Hypercapnic Respiratory Failure/ presumed ARDS-  likely due to COVID-19 Pneumonia w/ Acute on Chronic CHF Exacerbation, now requiring Mechanical Ventilation. · COVID-19 Pneumonia- Covid-19 Rapid Test (+) on 2021. Known (+) prior to admission. · Sepsis 2/2 COVID · Metabolic Acidosis- Reasons above. PH: 7.17, pCO2: 30, HCO3: 10.9 · NANCY on Stage 3 CKD, oliguric- likely in setting of Distributive Shock . BUN: 73, Cr: 2.10 · Encephalopathy- Metabolic, Reasons above · Hemoptysis- Hx of Esophageal Varices vs Pulmonary Edema · Electrolyte Derangements- Hypocalcemia, Navdeep: 8.0 
· Hx malnutrition · Thrombocytopenia- Baseline Chronically low. Platelets: 077 · Elevated Troponin- likely CHF Exacerbation, Troponin: 0.33.  
· Acute on chronic HFpEF - EF 60-65%. ProBNP: 85,558 · Hx of Esophageal Varices · Hx Liver Cirrhosis · Hx DM · HX HTN  
· Hx of CAD · Hx Recent Left Hip ORIF (2020) Patient Active Problem List  
Diagnosis Code  Anemia D64.9  
 CAD (coronary artery disease) I25.10  Thrombocytopenia ([facility] D69.6  NSTEMI (non-ST elevated myocardial infarction) (HCC) I21.4  
 UTI (urinary tract infection) N39.0  Sepsis ([address] A41.9  PNA (pneumonia) J18.9  Hypoxia R09.02  
 Cirrhosis of liver not due to alcohol ([facility] K74.60  Esophageal varices (HCC) I85.00  
 CHF (congestive heart failure) (HCC) I50.9  Dyspnea R06.00  
 Pneumonia J18.9  
 HTN (hypertension), benign I10  
 CAP (community acquired pneumonia) J18.9  Acute on chronic diastolic CHF (congestive heart failure) (HCC) I50.33  Leg DVT (deep venous thromboembolism), acute, left (HCC) I82.402  Palpitations R00.2  Acute pulmonary edema (HCC) J81.0  Malaise and fatigue R53.81, R53.83  
 Pancytopenia ([facility] [id number]  Portal hypertension ([facility] K76.6  Chronic anticoagulation Z79.01  
 Hip fracture ([facility] S72.009A  Breast cancer ([facility] C50.919  
 Colon cancer ([facility] C18.9  Pneumonia due to COVID-19 virus U07.1, J12.82 RECOMMENDATIONS:  
Neuro: Titrate sedation to RASS 0 to -1. Currently on Fentanyl + Precedex. Pulm: (+) COVID-19, CXR resembling ARDS. Now requiring intubation following evaluation. Aspiration Precautions, maintain HOB>30'. Decadron q12 hrs. I/D: Remdesivir given on admission. Begin Azithromycin. WBC: 6.8. Procal, UA + Cx, Blood Cx, LD, Ferritin pending. Trend WBCs + Temp. Heme/ Onc: Monitor Thrombocytopenia. Trend H/H, Monitor for s/o active bleeding. Daily CBC w/ diff. Not a candidate for [facility] with hemoptysis, type and screen. CVS: Bumex x1 given. Cardiac Panel: CKMB: 4.4, Troponin: 0.33, ProBNP: 48,783. LE Duplex US, EKG, & Echo pending. Monitor HD, MAP goal >65 mmHg. GI: NPO Diet. Monitor Liver Enzymes + Bili. Renal: Nephrology consulted. Albumin x 1 dose. Velazquez in place. Trend renal parameters & Monitor UOP. Metabolic: Bicarb x 2 amps, + bicarb gtt  Trend daily BMP + Mag + Phos + Electrolytes, replace per protocol. Endocrine: SSI, q6 Glucose checks. Musc/ Skin: No acute issues. Partial Code, INTUBATE ONLY Discussed w/ attending physician Best practice : 
Glycemic control IHI ICU bundles: 
 Vent Bundle Followed, Vent Day 1 [facility] Vent patients- VAP bundle, aim to keep peak plateau pressure 73-77UB H2O Stress ulcer prophylaxis. FAMOTIDINE  
DVT prophylaxis. hold Need for Lines, velazquez assessed. Restraints need. Palliative care evaluation. Subjective/History: This patient has been seen and evaluated at the request of Dr. Akanksha Olivas for Hypoxic, Hypercapnic Respiratory Failure on 01/27/2021.   
  
 Patient is a 79 y.o.  Female w/ PMH of HFpPEF (EF 60-65%), Stage 3 CKD, Recent L Hip ORIF (12/2020), Esophageal Varices, Liver Cirrhosis, DM, HTN, CAD, who presents [facility] to [facility] ED via EMS after developing worsening SOB and Hemoptysis after testing (+) for COVID-19 x1 week prior. HPI is limited to chart due to Pt on HFNC and altered.  
  
ICU consulted for Hypoxia and is noted to be satting at 94+ on 15L/100% HFNC. Shortly after examination, Pt required Endotracheal Intubation w/ Mechanical Ventilation. Discussed with patient's sister and nephew. Nephew is CC nurse a [employer]. Updated them on critical nature and high likelihood for mortality due to age and underlying conditions, wished to proceed with intubation but DNR should her heart stop. Past Medical History:  
Diagnosis Date  Anemia  Aortic stenosis, moderate 03/2019  Breast cancer ([facility]  CAD (coronary artery disease) Coronary Stenting needed, hematology workup needed prior to stent placement, treating medically at this time  Cancer [facility])   
 colon  Cancer ([facility] breast - left  Cardiac catheterization 08/17/2016 LM patent. mLAD 99.9% (2.25 x 28-mm Xience BETHEL, resid 0%). oD1 60%. oD2 95%. pCX 90% (2.5 x 13-mm Xience BETHEL, resid 0%). OMB patent.  Cardiac echocardiogram 03/2019 EF 56 to 60%, moderate concentric LVH, moderate aortic stenosis, mean valve gradient 25 mmHg  Diabetes ([address]  Hypercholesteremia  Hypertension  Thrombocytopenia ([address] Past Surgical History:  
Procedure Laterality Date  CARDIAC CATHETERIZATION  7/16/2015  
  no stents at this time r/t anemia & thrombocytopenia (hematology work-up needed prior to stent placement)  CARDIAC CATHETERIZATION  7/11/2016  CARDIAC CATHETERIZATION  8/17/2016  CORONARY ARTERY ANGIOGRAM  7/16/2015  CORONARY ARTERY ANGIOGRAM  7/11/2016  CORONARY ARTERY ANGIOGRAM  8/17/2016  CORONARY STENT EA ADDL VESSEL  8/17/2016  CORONARY STENT SINGLE W/PTCA  8/17/2016  HX GI    
 colon surg secondary to cancer - removed lesion  HX HEENT  2/2013  
 cataract bilaterally  HX MASTECTOMY  march 2011  
 left  HX VASCULAR ACCESS    
 mediport - now removed  IR BX BONE MARROW DIAGNOSTIC  9/30/2019  LV ANGIOGRAPHY  7/16/2015  LV ANGIOGRAPHY  7/11/2016 Prior to Admission medications Medication Sig Start Date End Date Taking? Authorizing Provider  
aspirin 81 mg chewable tablet Take 2 Tabs by mouth daily. 12/12/20   Greta Gold PA-C  
insulin aspart U-100 (NovoLOG Flexpen U-100 Insulin) 100 unit/mL (3 mL) inpn by SubCUTAneous route Before breakfast, lunch, and dinner. Sliding scale per instructions    Provider, Historical  
acetaminophen (TYLENOL) 325 mg tablet Take 2 Tabs by mouth every four (4) hours as needed for Pain. 12/1/19   ELOISA Lackey  
glucose 4 gram chewable tablet Take 4 Tabs by mouth as needed for Other (hypoglycemia). 3/28/19   Andrea Syed NP  
SITagliptin (JANUVIA) 50 mg tablet Take 50 mg by mouth daily. Provider, Historical  
cholecalciferol, vitamin D3, (VITAMIN D3) 2,000 unit tab Take 2,000 Units by mouth daily. Provider, Historical  
nitroglycerin (NITROLINGUAL) 400 mcg/spray spray 1 Spray by SubLINGual route every five (5) minutes as needed. 8/17/16   Allie Bledsoe PA  
ascorbic acid, vitamin C, (VITAMIN C) 250 mg tablet Take 1 Tab by mouth daily. 7/5/16   Desirae Willoughby MD  
multivitamin (ONE A DAY) tablet Take 1 Tab by mouth daily. Provider, Historical  
simvastatin (ZOCOR) 20 mg tablet Take 20 mg by mouth daily. Provider, Historical  
omega 3-dha-epa-fish oil (FISH OIL) 100-160-1,000 mg cap Take 1,000 mg by mouth two (2) times a day. Provider, Historical  
cyanocobalamin (VITAMIN B12) 100 mcg tablet Take 100 mcg by mouth daily.       Provider, Historical  
 calcium polycarbophil (FIBER LAXATIVE) 625 mg tablet Take 1,250 mg by mouth daily as needed. Provider, Historical  
 
 
Current Facility-Administered Medications Medication Dose Route Frequency  dexamethasone (DECADRON) 4 mg/mL injection 6 mg  6 mg IntraVENous Q12H  
 [START ON 2021] remdesivir 100 mg in 0.9% sodium chloride 250 mL IVPB  100 mg IntraVENous Q24H  
 sodium bicarbonate (8.4%) 150 mEq in dextrose 5% 1,000 mL infusion   IntraVENous CONTINUOUS  
 midazolam in normal saline (VERSED) 1 mg/mL infusion  0-10 mg/hr IntraVENous TITRATE  
 . HEIGHT and WEIGHT documentation  1 Each Other ONCE  chlorhexidine (PERIDEX) 0.12 % mouthwash 10 mL  10 mL Oral Q12H  
 fentaNYL (PF) 900 mcg/30 ml infusion soln  0-200 mcg/hr IntraVENous TITRATE  albumin human 5% (BUMINATE) solution 25 g  25 g IntraVENous ONCE  
 sodium bicarbonate 8.4 % (1 mEq/mL) injection 50 mEq  50 mEq IntraVENous ONCE  
 azithromycin (ZITHROMAX) 500 mg in 0.9% sodium chloride 250 mL (VIAL-MATE)  500 mg IntraVENous Q24H  
 dexmedeTOMidine in 0.9 % NaCl (PRECEDEX) 400 mcg/100 mL (4 mcg/mL) infusion soln  0.1-1.5 mcg/kg/hr (Order-Specific) IntraVENous TITRATE  insulin lispro (HUMALOG) injection   SubCUTAneous Q6H Allergies Allergen Reactions  Latex Itching Skin breaks out causing itching Social History Tobacco Use  Smoking status: Never Smoker  Smokeless tobacco: Never Used Substance Use Topics  Alcohol use: No  
  
 
Family History Problem Relation Age of Onset  Cancer Maternal Aunt Review of Systems: 
Review of systems not obtained due to patient factors. Objective:  
Vital Signs:   
Visit Vitals BP (!) 81/61 Pulse (!) 116 Temp 99 °F (37.2 °C) Resp 26 SpO2 (!) 81% O2 Device: Hi flow nasal cannula(salter) O2 Flow Rate (L/min): 15 l/min Temp (24hrs), Av °F (37.2 °C), Min:99 °F (37.2 °C), Max:99 °F (37.2 °C) Intake/Output: Last shift:      No intake/output data recorded. Last 3 shifts: No intake/output data recorded. No intake or output data in the 24 hours ending 01/27/21 0525 Ventilator Settings: 
Mode Rate Tidal Volume Pressure FiO2 PEEP Peak airway pressure:     
Minute ventilation:     
 
ARDS network Guidelines:  
Lung protective strategy and Plateau  Pressure goal < 30 cm H2O goals Oxygenation Goals PaO2 55-80 mm Hg or SaO2 88-95% PH goal 7.30-7.45 VAP bundle: 
Reviewed. Bates tube to suction at 20-30 cm Hg. Maintain Bates tube with 5-10ml air every 4 hours Routine oral care every 4 hours Elevation of head > 45 degree Daily sedation holiday and SBT evaluation starting at 6.00am. 
 
Physical Exam:  
 
General:  Alert, cooperative, mod acute resp distress Head:  Normocephalic, without obvious abnormality, atraumatic. Eyes:  Conjunctivae/corneas clear. PERRL, Nose: Nares normal. Septum midline. Mucosa dry Throat: Lips, mucosa, and tongue normal. Teeth and gums normal. Dried Blood noted around mouth Neck: Supple, symmetrical, trachea midline, no adenopathy Lungs:   RR 36, abdominal/incostal retractions, on HFNC 100%/40L Heart:  Regular rate and rhythm, S1, S2 normal, no murmur, click, rub or gallop. Abdomen:   Soft, non-tender. Bowel sounds normal. No masses,  No organomegaly. Extremities: 1-2+ BLE pitting edema with erythema b/l Pulses: 2+ and symmetric all extremities. Skin: Skin color, texture, turgor normal. No rashes or lesions Lymph nodes:  Cervical, supraclavicular, and axillary nodes normal.  
Neurologic: Alert to self, hyperactive, mumbling, picking at blanket and lines · Data:  
 
Recent Results (from the past 24 hour(s)) CBC WITH AUTOMATED DIFF Collection Time: 01/26/21 11:24 PM  
Result Value Ref Range WBC 6.8 4.6 - 13.2 K/uL  
 RBC 3.36 (L) 4.20 - 5.30 M/uL HGB 9.5 (L) 12.0 - 16.0 g/dL HCT 28.4 (L) 35.0 - 45.0 % MCV 84.5 74.0 - 97.0 FL  
 MCH 28.3 24.0 - 34.0 PG  
 MCHC 33.5 31.0 - 37.0 g/dL  
 RDW 16.0 (H) 11.6 - 14.5 % PLATELET 485 (L) 896 - 420 K/uL MPV 10.8 9.2 - 11.8 FL  
 NEUTROPHILS 72 40 - 73 % LYMPHOCYTES 25 21 - 52 % MONOCYTES 3 3 - 10 % EOSINOPHILS 0 0 - 5 % BASOPHILS 0 0 - 2 %  
 ABS. NEUTROPHILS 4.9 1.8 - 8.0 K/UL  
 ABS. LYMPHOCYTES 1.7 0.9 - 3.6 K/UL  
 ABS. MONOCYTES 0.2 0.05 - 1.2 K/UL  
 ABS. EOSINOPHILS 0.0 0.0 - 0.4 K/UL  
 ABS. BASOPHILS 0.0 0.0 - 0.1 K/UL  
 DF AUTOMATED METABOLIC PANEL, BASIC Collection Time: 01/26/21 11:24 PM  
Result Value Ref Range Sodium 138 136 - 145 mmol/L Potassium 4.9 3.5 - 5.5 mmol/L Chloride 110 100 - 111 mmol/L  
 CO2 13 (L) 21 - 32 mmol/L Anion gap 15 3.0 - 18 mmol/L Glucose 213 (H) 74 - 99 mg/dL BUN 73 (H) 7.0 - 18 MG/DL Creatinine 2.10 (H) 0.6 - 1.3 MG/DL  
 BUN/Creatinine ratio 35 (H) 12 - 20 GFR est AA 27 (L) >60 ml/min/1.73m2 GFR est non-AA 23 (L) >60 ml/min/1.73m2 Calcium 8.0 (L) 8.5 - 10.1 MG/DL  
NT-PRO BNP Collection Time: 01/26/21 11:24 PM  
Result Value Ref Range NT pro-BNP 28,724 (H) 0 - 1,800 PG/ML  
CARDIAC PANEL,(CK, CKMB & TROPONIN) Collection Time: 01/26/21 11:24 PM  
Result Value Ref Range CK - MB 4.4 (H) <3.6 ng/ml CK-MB Index 3.4 0.0 - 4.0 %  26 - 192 U/L Troponin-I, QT 0.33 (H) 0.0 - 0.045 NG/ML  
D DIMER Collection Time: 01/26/21 11:24 PM  
Result Value Ref Range D DIMER 12.10 (H) <0.46 ug/ml(FEU) HEPATIC FUNCTION PANEL Collection Time: 01/26/21 11:24 PM  
Result Value Ref Range Protein, total 6.9 6.4 - 8.2 g/dL Albumin 3.1 (L) 3.4 - 5.0 g/dL Globulin 3.8 2.0 - 4.0 g/dL A-G Ratio 0.8 0.8 - 1.7 Bilirubin, total 0.9 0.2 - 1.0 MG/DL Bilirubin, direct 0.4 (H) 0.0 - 0.2 MG/DL Alk. phosphatase 108 45 - 117 U/L  
 AST (SGOT) 57 (H) 10 - 38 U/L  
 ALT (SGPT) 20 13 - 56 U/L  
SARS-COV-2  Collection Time: 01/26/21 11:55 PM  
 Result Value Ref Range SARS-CoV-2 Please find results under separate order COVID-19 RAPID TEST Collection Time: 01/26/21 11:55 PM  
Result Value Ref Range Specimen source Nasopharyngeal    
 COVID-19 rapid test Detected (AA) NOTD    
POC G3 Collection Time: 01/27/21 12:00 AM  
Result Value Ref Range Device: High Flow Nasal Cannula Flow rate (POC) 15 L/M  
 pH (POC) 7.17 (LL) 7.35 - 7.45    
 pCO2 (POC) 30.0 (L) 35.0 - 45.0 MMHG  
 pO2 (POC) 91 80 - 100 MMHG  
 HCO3 (POC) 10.9 (L) 22 - 26 MMOL/L  
 sO2 (POC) 95 92 - 97 % Base deficit (POC) 16 mmol/L Allens test (POC) YES Total resp. rate 28 Site RIGHT RADIAL Specimen type (POC) ARTERIAL Performed by Augusto Victor METABOLIC PANEL, COMPREHENSIVE Collection Time: 01/27/21  4:00 AM  
Result Value Ref Range Sodium 138 136 - 145 mmol/L Potassium 4.5 3.5 - 5.5 mmol/L Chloride 112 (H) 100 - 111 mmol/L  
 CO2 14 (L) 21 - 32 mmol/L Anion gap 12 3.0 - 18 mmol/L Glucose 204 (H) 74 - 99 mg/dL BUN 79 (H) 7.0 - 18 MG/DL Creatinine 2.10 (H) 0.6 - 1.3 MG/DL  
 BUN/Creatinine ratio 38 (H) 12 - 20 GFR est AA 27 (L) >60 ml/min/1.73m2 GFR est non-AA 23 (L) >60 ml/min/1.73m2 Calcium 7.6 (L) 8.5 - 10.1 MG/DL Bilirubin, total 0.8 0.2 - 1.0 MG/DL  
 ALT (SGPT) 22 13 - 56 U/L  
 AST (SGOT) 68 (H) 10 - 38 U/L Alk. phosphatase 121 (H) 45 - 117 U/L Protein, total 6.2 (L) 6.4 - 8.2 g/dL Albumin 2.7 (L) 3.4 - 5.0 g/dL Globulin 3.5 2.0 - 4.0 g/dL A-G Ratio 0.8 0.8 - 1.7    
CBC W/O DIFF Collection Time: 01/27/21  4:00 AM  
Result Value Ref Range WBC 10.9 4.6 - 13.2 K/uL  
 RBC 2.94 (L) 4.20 - 5.30 M/uL HGB 8.4 (L) 12.0 - 16.0 g/dL HCT 24.9 (L) 35.0 - 45.0 % MCV 84.7 74.0 - 97.0 FL  
 MCH 28.6 24.0 - 34.0 PG  
 MCHC 33.7 31.0 - 37.0 g/dL  
 RDW 16.2 (H) 11.6 - 14.5 % PLATELET 433 (L) 428 - 420 K/uL MPV 10.5 9.2 - 11.8 FL  
POC G3  Collection Time: 01/27/21  4:54 AM  
 Result Value Ref Range Device: VENT    
 FIO2 (POC) 100 % pH (POC) 7.07 (LL) 7.35 - 7.45    
 pCO2 (POC) 49.3 (H) 35.0 - 45.0 MMHG  
 pO2 (POC) 201 (H) 80 - 100 MMHG  
 HCO3 (POC) 14.3 (L) 22 - 26 MMOL/L  
 sO2 (POC) 99 (H) 92 - 97 % Base deficit (POC) 15 mmol/L Mode ASSIST CONTROL Tidal volume 400 ml Set Rate 16 bpm  
 PEEP/CPAP (POC) 8 cmH2O Allens test (POC) N/A Total resp. rate 30 Site RIGHT RADIAL Specimen type (POC) ARTERIAL Performed by Binu Severe Volume control plus YES Recent Labs  
  01/27/21 
0454 01/27/21 
0000 FIO2I 100  -- HCO3I 14.3* 10.9* PCO2I 49.3* 30.0* PHI 7.07* 7.17* PO2I 201* 91 Telemetry:normal sinus rhythm Imaging: 
I have personally reviewed the patients radiographs and have reviewed the reports: XR Results (most recent): 
Results from Hospital Encounter encounter on 12/06/20 XR CHEST PORT Narrative Portable CXR: 
 
Comparison September 26, 2020 HISTORY: Pneumonia. Hip fracture. Stable mild cardiomegaly. No vascular congestion. No consolidation. No pleural 
effusion. Right hilar density, likely vascular shadow summation, stable. Impression IMPRESSION: No pneumonia CT Results (most recent): 
Results from [facility] Encounter encounter on 03/24/19 CTA CHEST W OR W WO CONT Narrative EXAM: CT Angiogram of the Chest 
 
CLINICAL INDICATION: Worsening shortness of breath. Leg swelling. TECHNIQUE: CT angiogram of the chest performed in an axial, sagittal, and 
coronal planes. MIPS performed. All CT scans at this facility are performed using dose optimization technique as 
appropriate to a performed exam, to include automated exposure control, 
adjustment of the mA and/or kV according to patient size (including appropriate 
matching for site specific examination) or use of iterative reconstruction 
technique. IV CONTRAST: 72 cc of Isovue 370 COMPARISON: CT thorax without contrast 3/15/2016 FINDINGS: 
Limitations: The examination is mildly degraded by respiratory motion artifact, 
predominantly in the upper lobes. Mediastinum/Lymph nodes:  Unchanged 9 mm left lower paratracheal lymph node, 
nonspecific and possibly reactive. Nonspecific 11 mm short axis subcarinal lymph 
node which is increased in size compared to 3/15/2016 but it measured 7 mm, 
possibly reactive Cardiovascular: There is no pulmonary artery filling defect within the main, lobar, segmental, 
or proximal subsegmental branches to suggest pulmonary embolism. There is mild enlargement of the pulmonary trunk and right pulmonary artery 
which may be seen with pulmonary hypertension. There is no recent flux of 
contrast into the IVC. There is no flattening of the interventricular septum. The left ventricle right ventricle ratio measures greater than 1. Coronary artery atherosclerotic ossifications are present. Trace pericardial effusion. Lungs/Airways:  
Limited assessment of the lung parenchyma secondary to respiratory motion 
artifact. Scattered centrilobular emphysematous changes. Tree-in-bud opacities 
within the posterior left upper lobe (axial series 2 image 62) compatible with 
infectious or inflammatory bronchiolitis. Recommend follow-up chest CT in 3 
months to document resolution. Bibasilar compressive atelectasis. No definite underlying pneumonia in the 
bilateral lower lobes. Likely scarring within the inferior medial right middle 
lobe. Pleura: Small to moderate right and small left bilateral pleural effusions. Axilla/Chest wall: Mild generalized body wall edema. Upper Abdomen: Small hiatal hernia with mild distal esophageal mural thickening, 
similar to 3/15/2016. Several paraesophageal varices are noted. Moderate 
splenomegaly. Small perihepatic and mild perisplenic ascites.  Mild periportal 
 edema, compatible with volume overload. No definite hepatic mass. Hypoattenuating 11 mm nodule in the right adrenal gland which is unchanged in 
size compared to 3/15/2016 and therefore likely benign lesion such as an 
adenoma. Multifocal right renal cortical scarring. Small hiatal hernia. Multiple 
perigastric and paraesophageal varices. Mural thickening within the visualized 
stomach, likely related to portal gastropathy. Musculoskeletal: Unchanged tiny skin nodule within the right mid chest (axial 
series 2 image 142) recommend correlation with physical examination. No evidence 
of acute osseous abnormality. There are changes of the shoulders and spine. Impression IMPRESSION: 
1. Findings compatible with portal hypertension. Splenomegaly, perigastric and 
paraesophageal varices. Thickening of the gastric wall likely reflects portal 
gastropathy. 2.  No pulmonary artery filling defect to suggest pulmonary embolism. 3.  Anasarca. Small to moderate right and small left pleural effusions with 
bibasilar compressive atelectasis. 4.  Tree-in-bud opacities in the posterior left upper lobe are compatible with 
infectious or inflammatory bronchiolitis. Recommend follow-up chest CT in 3 
months to document resolution. 5.  Additional findings as above. Ilana Melchor PA-C 
01/27/21 Pulmonary, Critical Care Medicine [facility] Pulmonary / Critical Care Physician: 
 
Chart and note reviewed. Data reviewed. Seen on rounds earlier today. I have independently evaluated and examined the patient. I agree with the exam, assessment and plans outlined by ELOISA Funez. In brief, my findings, evaluation and recommendations are as stated below: 80 YO F with cirrhosis who was recently diagnosed with covid19 presented to the ED with hemoptysis and shortness of breath. The patient was initially on HFNC with the intention of being admitted to medicine. However, she decompensated apparently and was intubated. She was apparently hypotensive and started on levophed after intubation but no central access was placed by the ED team. Incomplete workup was done, there were no blood Cx drawn or lactic acid almost 8 hours after admission. Central line was placed in the ED this AM by my service. On my assessment, the patient is sedated and resting on the ventilator. She is on 12 mcg of levophed. She was started on protonix bid and octreotide for a GI bleed but no NGT or OGT was never placed. Plan is for supportive care on the ventilator for now. Empiric ABX. Will ask for the blood cultures and lactic acid from the 5am order to be done. Will place NGT, if no blood, will D/c octreotide. NANCY noted, renal consulted, currently on bicarb gtt. Will consult ID as well, start on dexamethasone. Unfortunately the patient may spend extended time in ED as we do not have beds or nursing staff to be able to accommodate the patient. Rest of details and diagnostic/treatment plans per APC note. I have personally reviewed all pertinent data including labs, imaging and recommendations of treatment team providers. Total of 50 min critical care time spent at bedside during the course of care providing evaluation,management and care decisions and ordering appropriate treatment related to critical care problems exclusive of procedures. The reason for providing this level of medical care for this critically ill patient was due a critical illness that impaired one or more vital organ systems such that there was a high probability of imminent or life threatening deterioration in the patients condition. This care involved high complexity decision making to assess, manipulate, and support vital system functions, to treat this degree vital organ system failure and to prevent further life threatening deterioration of the patients condition.  
   
Mane Daniel MD 
9:58 PM

## 2021-01-27 NOTE — CONSULTS
Infectious Disease Consultation Note Consulted by: dr. Brice Files Reason: septic shock, acute hypoxic respiratory failure, covid positive Current abx Prior abx Cefepime,metronidazole, azithromycin, vancomycin  since 1/27 Lines:  
 
 
Assessment : 
 
79 y.o.  Female w/ PMH of HFpPEF (EF 60-65%), Stage 3 CKD, Recent L Hip ORIF (12/2020), Esophageal Varices, Liver Cirrhosis, DM, HTN, CAD, who presents from [address] to [facility] via EMS after developing worsening SOB and Hemoptysis after testing (+) for COVID-19 x1 week prior. Clinical presentation c/w acute hypoxic respiratory failure-present on admission due to severe COVID-19 pneumonia/ARDS Elevated [id number] to exclude COVID-19 associated hypercoagulability, pulmonary embolism with full certainty Hypotension-likely septic shock secondary to COVID-19 infection. Rule out healthcare associated pneumonia, C. Difficile (history of diarrhea per nursing report) Acute on chronic kidney injury-likely due to sepsis. Monitor for COVID-19 associated nephropathy Patient's age, cirrhosis, diabetes, hypertension, chronic kidney disease puts her at high risk of clinical deterioration. Recommendations: 1. Continue cefepime, metronidazole, azithromycin, vancomycin for now 2. Obtain blood culture, sputum culture, urine culture, stool C. Difficile 3. Continue steroids, anticoagulation per ICU team 
4. Obtain venous duplex bilateral lower extremity, upper extremity 5. Weaning from vent per ICU team 
6. Titrate pressors as tolerated 7. F/u echo to make decisions about diuresis Thank you for consultation request. Above plan was discussed in details with rn, dr Sonido Nickerson. Please call me if any further questions or concerns. Will continue to participate in the care of this patient.  
HPI: 
 
 79 y.o.  Female w/ PMH of HFpPEF (EF 60-65%), Stage 3 CKD, Recent L Hip ORIF (12/2020), Esophageal Varices, Liver Cirrhosis, DM, HTN, CAD, who presents from [facility] to [facility] via EMS after developing worsening SOB and Hemoptysis after testing (+) for COVID-19 x1 week prior. Patient was placed on high flow oxygen. I was consulted for remdesivir. I approved remdesivir. Recommended to obtain inflammatory markers and initiate Decadron. Unfortunately patient was noted to have worsening hypoxia on high flow oxygen. ICU consulted. Pt required Endotracheal Intubation w/ Mechanical Ventilation.  I am seeing the patient for further management. Patient is currently intubated. Nonverbal.  Patient has had diarrhea per nursing staff. Apparently blood cultures were not sent overnight. Urine culture sent. 
  
 
 
 
Past Medical History:  
Diagnosis Date  Anemia  Aortic stenosis, moderate 03/2019  Breast cancer ([facility] [address]  CAD (coronary artery disease) Coronary Stenting needed, hematology workup needed prior to stent placement, treating medically at this time  Cancer [facility])   
 colon  Cancer ([facility] [address] breast - left  Cardiac catheterization 08/17/2016 LM patent. mLAD 99.9% (2.25 x 28-mm Xience BETHEL, resid 0%). oD1 60%. oD2 95%. pCX 90% (2.5 x 13-mm Xience BETHEL, resid 0%). OMB patent.  Cardiac echocardiogram 03/2019 EF 56 to 60%, moderate concentric LVH, moderate aortic stenosis, mean valve gradient 25 mmHg  Diabetes ([facility] [address]  Hypercholesteremia  Hypertension  Thrombocytopenia ([facility] [address] Past Surgical History:  
Procedure Laterality Date  CARDIAC CATHETERIZATION  7/16/2015  
  no stents at this time r/t anemia & thrombocytopenia (hematology work-up needed prior to stent placement)  CARDIAC CATHETERIZATION  7/11/2016  CARDIAC CATHETERIZATION  8/17/2016  CORONARY ARTERY ANGIOGRAM  7/16/2015  CORONARY ARTERY ANGIOGRAM  7/11/2016  CORONARY ARTERY ANGIOGRAM  8/17/2016  CORONARY STENT EA ADDL VESSEL  8/17/2016  CORONARY STENT SINGLE W/PTCA  8/17/2016  HX GI    
 colon surg secondary to cancer - removed lesion  HX HEENT  2/2013  
 cataract bilaterally  HX MASTECTOMY  march 2011  
 left  HX VASCULAR ACCESS    
 mediport - now removed  IR BX BONE MARROW DIAGNOSTIC  9/30/2019  LV ANGIOGRAPHY  7/16/2015  LV ANGIOGRAPHY  7/11/2016 Patient's Medications Start Taking No medications on file Continue Taking ACETAMINOPHEN (TYLENOL) 325 MG TABLET    Take 2 Tabs by mouth every four (4) hours as needed for Pain. ASCORBIC ACID, VITAMIN C, (VITAMIN C) 250 MG TABLET    Take 1 Tab by mouth daily. ASPIRIN 81 MG CHEWABLE TABLET    Take 2 Tabs by mouth daily. CALCIUM POLYCARBOPHIL (FIBER LAXATIVE) 625 MG TABLET    Take 1,250 mg by mouth daily as needed. CHOLECALCIFEROL, VITAMIN D3, (VITAMIN D3) 2,000 UNIT TAB    Take 2,000 Units by mouth daily. CYANOCOBALAMIN (VITAMIN B12) 100 MCG TABLET    Take 100 mcg by mouth daily. GLUCOSE 4 GRAM CHEWABLE TABLET    Take 4 Tabs by mouth as needed for Other (hypoglycemia). INSULIN ASPART U-100 (NOVOLOG FLEXPEN U-100 INSULIN) 100 UNIT/ML (3 ML) INPN    by SubCUTAneous route Before breakfast, lunch, and dinner. Sliding scale per instructions MULTIVITAMIN (ONE A DAY) TABLET    Take 1 Tab by mouth daily. NITROGLYCERIN (NITROLINGUAL) 400 MCG/SPRAY SPRAY    1 Spray by SubLINGual route every five (5) minutes as needed. OMEGA 3-DHA-EPA-FISH OIL (FISH OIL) 100-160-1,000 MG CAP    Take 1,000 mg by mouth two (2) times a day. SIMVASTATIN (ZOCOR) 20 MG TABLET    Take 20 mg by mouth daily. SITAGLIPTIN (JANUVIA) 50 MG TABLET    Take 50 mg by mouth daily. These Medications have changed No medications on file Stop Taking No medications on file Current Facility-Administered Medications Medication Dose Route Frequency  dexamethasone (DECADRON) 4 mg/mL injection 6 mg  6 mg IntraVENous Q12H  
 sodium bicarbonate (8.4%) 150 mEq in dextrose 5% 1,000 mL infusion   IntraVENous CONTINUOUS  chlorhexidine (PERIDEX) 0.12 % mouthwash 10 mL  10 mL Oral Q12H  
 albuterol-ipratropium (DUO-NEB) 2.5 MG-0.5 MG/3 ML  3 mL Nebulization Q6H PRN  
 fentaNYL (PF) 900 mcg/30 ml infusion soln  0-200 mcg/hr IntraVENous TITRATE  midazolam (VERSED) injection 1-2 mg  1-2 mg IntraVENous Q10MIN PRN  
 fentaNYL citrate (PF) injection  mcg   mcg IntraVENous Q30MIN PRN  
 azithromycin (ZITHROMAX) 500 mg in 0.9% sodium chloride 250 mL (VIAL-MATE)  500 mg IntraVENous Q24H  
 dexmedeTOMidine in 0.9 % NaCl (PRECEDEX) 400 mcg/100 mL (4 mcg/mL) infusion soln  0.1-1.5 mcg/kg/hr (Order-Specific) IntraVENous TITRATE  insulin lispro (HUMALOG) injection   SubCUTAneous Q6H  
 glucose chewable tablet 16 g  4 Tab Oral PRN  
 glucagon (GLUCAGEN) injection 1 mg  1 mg IntraMUSCular PRN  
 dextrose (D50W) injection syrg 12.5-25 g  25-50 mL IntraVENous PRN  
 NOREPINephrine (LEVOPHED) 8 mg in 5% dextrose 250mL (32 mcg/mL) infusion  0.5-30 mcg/min IntraVENous TITRATE  cefepime (MAXIPIME) 2 g in sterile water (preservative free) 10 mL IV syringe  2 g IntraVENous Q24H  
 metroNIDAZOLE (FLAGYL) IVPB premix 500 mg  500 mg IntraVENous Q12H  VANCOMYCIN INFORMATION NOTE   Other Rx Dosing/Monitoring  [START ON 1/28/2021] pantoprazole (PROTONIX) 40 mg in 0.9% sodium chloride 10 mL injection  40 mg IntraVENous DAILY Current Outpatient Medications Medication Sig  
 aspirin 81 mg chewable tablet Take 2 Tabs by mouth daily.  insulin aspart U-100 (NovoLOG Flexpen U-100 Insulin) 100 unit/mL (3 mL) inpn by SubCUTAneous route Before breakfast, lunch, and dinner. Sliding scale per instructions  acetaminophen (TYLENOL) 325 mg tablet Take 2 Tabs by mouth every four (4) hours as needed for Pain.  glucose 4 gram chewable tablet Take 4 Tabs by mouth as needed for Other (hypoglycemia).  SITagliptin (JANUVIA) 50 mg tablet Take 50 mg by mouth daily.  cholecalciferol, vitamin D3, (VITAMIN D3) 2,000 unit tab Take 2,000 Units by mouth daily.  nitroglycerin (NITROLINGUAL) 400 mcg/spray spray 1 Spray by SubLINGual route every five (5) minutes as needed.  ascorbic acid, vitamin C, (VITAMIN C) 250 mg tablet Take 1 Tab by mouth daily.  multivitamin (ONE A DAY) tablet Take 1 Tab by mouth daily.  simvastatin (ZOCOR) 20 mg tablet Take 20 mg by mouth daily.  omega 3-dha-epa-fish oil (FISH OIL) 100-160-1,000 mg cap Take 1,000 mg by mouth two (2) times a day.  cyanocobalamin (VITAMIN B12) 100 mcg tablet Take 100 mcg by mouth daily.  calcium polycarbophil (FIBER LAXATIVE) 625 mg tablet Take 1,250 mg by mouth daily as needed. Allergies: Latex Family History Problem Relation Age of Onset  Cancer Maternal Aunt Social History Socioeconomic History  Marital status:  Spouse name: Not on file  Number of children: Not on file  Years of education: Not on file  Highest education level: Not on file Occupational History  Not on file Social Needs  Financial resource strain: Not on file  Food insecurity Worry: Not on file Inability: Not on file  Transportation needs Medical: Not on file Non-medical: Not on file Tobacco Use  Smoking status: Never Smoker  Smokeless tobacco: Never Used Substance and Sexual Activity  Alcohol use: No  
 Drug use: No  
 Sexual activity: Not on file Lifestyle  Physical activity Days per week: Not on file Minutes per session: Not on file  Stress: Not on file Relationships  Social connections Talks on phone: Not on file Gets together: Not on file Attends Yarsani service: Not on file Active member of club or organization: Not on file Attends meetings of clubs or organizations: Not on file Relationship status: Not on file  Intimate partner violence Fear of current or ex partner: Not on file Emotionally abused: Not on file Physically abused: Not on file Forced sexual activity: Not on file Other Topics Concern  Not on file Social History Narrative  Not on file Social History Tobacco Use Smoking Status Never Smoker Smokeless Tobacco Never Used Temp (24hrs), Av °F (37.2 °C), Min:99 °F (37.2 °C), Max:99 °F (37.2 °C) Visit Vitals /80 Pulse 94 Temp 99 °F (37.2 °C) Resp 23 Ht 5' (1.524 m) Wt 71.2 kg (157 lb) SpO2 99% BMI 30.66 kg/m² ROS: unable to obtain Physical Exam: 
 
General:  Alert, cooperative, no distress. Head:  Normocephalic, without obvious abnormality, atraumatic. Eyes:  Conjunctivae/corneas clear. PERRL, Nose: Nares normal. Septum midline. Mucosa normal. No drainage or sinus tenderness. Throat: Lips, mucosa, and tongue normal. Teeth and gums normal.  
Neck: Supple, symmetrical, trachea midline, no adenopathy, thyroid: no enlargment/tenderness/nodules, no carotid bruit and no JVD. Back:   Symmetric, no curvature. ROM normal.  
Lungs:   Clear to auscultation bilaterally. Chest wall:  No tenderness or deformity. Heart:  Regular rate and rhythm, S1, S2 normal, no murmur, click, rub or gallop. Abdomen:   Soft, non-tender. Bowel sounds normal. No masses,  No organomegaly. Extremities: Extremities normal, atraumatic, no cyanosis or edema. Pulses: 2+ and symmetric all extremities. Skin: Skin color, texture, turgor normal. No rashes or lesions Lymph Nodes:  Cervical, supraclavicular, and axillary nodes normal.  
Neurologic:  Grossly nonfocal  
 
 
 
Labs: Results:  
Chemistry Recent Labs  
  21 
0840 21 
0400 21 201 [facility] [address] * [phone]* [phone]*  [phone]  
K 5.0 4.5 4.9  
 112* 110  
CO2 15* 14* 13* BUN 78* 79* 73* CREA 2.35* 2.10* 2.10* CA 7.2* 7.6* 8.0* AGAP 15 12 15 BUCR 33* 38* 35* AP  --  121* 108 TP  --  6.2* 6.9 ALB 2.9* 2.7* 3.1*  
GLOB  --  3.5 3.8 AGRAT  --  0.8 0.8 CBC w/Diff Recent Labs  
  01/27/21 
0400 01/26/21 
2324 WBC 10.9 6.8  
RBC 2.94* 3.36* HGB 8.4* 9.5* HCT 24.9* 28.4*  
* 110* GRANS 92* 72 LYMPH 6* 25 EOS 0 0 Microbiology No results for input(s): CULT in the last 72 hours. RADIOLOGY: 
 
All available imaging studies/reports in [facility] for this admission were reviewed Dr. Angel Sousa, Infectious Disease Specialist 
[phone] January 27, 2021 
1:25 PM

## 2021-01-27 NOTE — H&P
[facility] Pulmonary, Critical Care, and Sleep Medicine Name: Devon Hernández MRN: [id number] : 1940 Hospital: [address] [place] [address] Dr Date: 2021 Critical Care Initial Patient Consult IMPRESSION:  
· Hypoxic, Hypercapnic Respiratory Failure/ presumed ARDS-  likely due to COVID-19 Pneumonia w/ Acute on Chronic CHF Exacerbation, now requiring Mechanical Ventilation. · COVID-19 Pneumonia- Covid-19 Rapid Test (+) on 2021. Known (+) prior to admission. · Sepsis Criteria reasons above, COVID-19 PNA vs 
· Metabolic Acidosis- Reasons above. PH: 7.17, pCO2: 30, HCO3: 10.9 · NANCY on Stage 3 CKD- likely in setting of Distributive Shock . BUN: 73, Cr: 2.10 · Encephalopathy- Metabolic, Reasons above · Hemoptysis- Hx of Esophageal Varices vs Pulmonary Edema · Electrolyte Derangements- Hypocalcemia, Navdeep: 8.0 · Hypoalbuminemia- Chronic. Albumin: 3.1 · Thrombocytopenia- Baseline Chronically low. Platelets: 727 · Elevated Creatinine Kinase- likely CHF Exacerbation, CK: 4.4 · Elevated Troponin- likely CHF Exacerbation, Troponin: 0.33.  
· Hx of HFpEF Heart Failure- EF 60-65%. ProBNP: 90,256 · Hx of Esophageal Varices · Hx Liver Cirrhosis · Hx DM · HX HTN  
· Hx of CAD · Hx Recent Left Hip ORIF (2020) Patient Active Problem List  
Diagnosis Code  Anemia D64.9  
 CAD (coronary artery disease) I25.10  Thrombocytopenia ([address] D69.6  NSTEMI (non-ST elevated myocardial infarction) (HCC) I21.4  
 UTI (urinary tract infection) N39.0  Sepsis ([address] A41.9  PNA (pneumonia) J18.9  Hypoxia R09.02  
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  
 CHF (congestive heart failure) (HCC) I50.9  Dyspnea R06.00  
 Pneumonia J18.9  
 HTN (hypertension), benign I10  
 CAP (community acquired pneumonia) J18.9  Acute on chronic diastolic CHF (congestive heart failure) ([facility]) I50.33  
  Leg DVT (deep venous thromboembolism), acute, left (HCC) I82.402  Palpitations R00.2  Acute pulmonary edema (HCC) J81.0  Malaise and fatigue R53.81, R53.83  
 Pancytopenia ([facility] [id number]  Portal hypertension ([facility] K76.6  Chronic anticoagulation Z79.01  
 Hip fracture ([facility] S72.009A  Breast cancer ([facility] C50.919  
 Colon cancer ([facility] C18.9 RECOMMENDATIONS:  
Neuro: Titrate sedation to RASS 0 to -1. Currently on Fentanyl + Precedex. I/D: Remdesivir given on admission. Begin Azithromycin. WBC: 6.8. Procal, UA + Cx, Blood Cx, LD, Ferritin pending. Trend WBCs + Temp. Heme/ Onc: Monitor Thrombocytopenia. Trend H/H, Monitor for s/o active bleeding. Daily CBC w/ diff. Pulm: (+) COVID-19, CXR resembling ARDS. Now intubated. Aspiration Precautions, maintain HOB>30'. Decadron q12 hrs. CVS: Bumex x1 given. Cardiac Panel: CKMB: 4.4, Troponin: 0.33, ProBNP: 87,340. LE Duplex US, EKG, & Echo pending. Monitor HD, MAP goal >65 mmHg. GI: NPO Diet. Monitor Liver Enzymes + Bili. Renal: Nephrology consulted. NANCY- BUN: 73, Cr: 2.10. Jean in place. Trend renal parameters & Monitor UOP. Metabolic: Bicarb given for Metabolic Acidosis. Lactic Acid, LD pending. Trend daily BMP + Mag + Phos + Electrolytes, replace per protocol. Endocrine: SSI, q6 Glucose checks. Musc/ Skin: No acute issues. Code Status: Partial Code- Intubation ONLY. Subjective/History: This patient has been seen and evaluated at the request of Dr. Lisa Cohen for Hypoxic, Hypercapnic Respiratory Failure on 01/27/2021. Patient is a 79 y.o.  Female w/ PMH of HFpPEF (EF 60-65%), Stage 3 CKD, Recent L Hip ORIF (12/2020), Esophageal Varices, Liver Cirrhosis, DM, HTN, CAD, who presents from [facility] to [facility] via EMS after developing worsening SOB and Hemoptysis after testing (+) for COVID-19 x1 week prior. HPI is limited due to Pt on HFNC and difficulty understanding speech due to dyspnea, however, she does deny Chest Pain, Abdominal Pain, N/V, Flank Pain, or Dizziness/ Lightheadedness upon admission. ICU consulted for Hypoxia and is noted to be satting at 94+ on 15L/100% HFNC. Shortly after examination, Pt required Endotracheal Intubation w/ Mechanical Ventilation. The patient is critically ill and can not provide additional history due to Unable to speak with Mild Delirium. Past Medical History:  
Diagnosis Date  Anemia  Aortic stenosis, moderate 03/2019  Breast cancer ([facility]  CAD (coronary artery disease) Coronary Stenting needed, hematology workup needed prior to stent placement, treating medically at this time  Cancer [facility])   
 colon  Cancer ([facility] breast - left  Cardiac catheterization 08/17/2016 LM patent. mLAD 99.9% (2.25 x 28-mm Xience BETHEL, resid 0%). oD1 60%. oD2 95%. pCX 90% (2.5 x 13-mm Xience BETHEL, resid 0%). OMB patent.  Cardiac echocardiogram 03/2019 EF 56 to 60%, moderate concentric LVH, moderate aortic stenosis, mean valve gradient 25 mmHg  Diabetes ([facility]  Hypercholesteremia  Hypertension  Thrombocytopenia ([facility] Past Surgical History:  
Procedure Laterality Date  CARDIAC CATHETERIZATION  7/16/2015  
  no stents at this time r/t anemia & thrombocytopenia (hematology work-up needed prior to stent placement)  CARDIAC CATHETERIZATION  7/11/2016  CARDIAC CATHETERIZATION  8/17/2016  CORONARY ARTERY ANGIOGRAM  7/16/2015  CORONARY ARTERY ANGIOGRAM  7/11/2016  CORONARY ARTERY ANGIOGRAM  8/17/2016  CORONARY STENT EA ADDL VESSEL  8/17/2016  CORONARY STENT SINGLE W/PTCA  8/17/2016  HX GI    
 colon surg secondary to cancer - removed lesion  HX HEENT  2/2013  
 cataract bilaterally  HX MASTECTOMY  march 2011  
 left  HX VASCULAR ACCESS    
 mediport - now removed  IR BX BONE MARROW DIAGNOSTIC  9/30/2019  LV ANGIOGRAPHY  7/16/2015  LV ANGIOGRAPHY  7/11/2016 Prior to Admission medications Medication Sig Start Date End Date Taking? Authorizing Provider  
aspirin 81 mg chewable tablet Take 2 Tabs by mouth daily. 12/12/20   Demetrio Smith PA-C  
insulin aspart U-100 (NovoLOG Flexpen U-100 Insulin) 100 unit/mL (3 mL) inpn by SubCUTAneous route Before breakfast, lunch, and dinner. Sliding scale per instructions    Provider, Historical  
acetaminophen (TYLENOL) 325 mg tablet Take 2 Tabs by mouth every four (4) hours as needed for Pain. 12/1/19   ELOISA Walker  
glucose 4 gram chewable tablet Take 4 Tabs by mouth as needed for Other (hypoglycemia). 3/28/19   Lissy Figueroa NP  
SITagliptin (JANUVIA) 50 mg tablet Take 50 mg by mouth daily. Provider, Historical  
cholecalciferol, vitamin D3, (VITAMIN D3) 2,000 unit tab Take 2,000 Units by mouth daily. Provider, Historical  
nitroglycerin (NITROLINGUAL) 400 mcg/spray spray 1 Spray by SubLINGual route every five (5) minutes as needed. 8/17/16   Allie Bledsoe PA  
ascorbic acid, vitamin C, (VITAMIN C) 250 mg tablet Take 1 Tab by mouth daily. 7/5/16   Belen Park MD  
multivitamin (ONE A DAY) tablet Take 1 Tab by mouth daily. Provider, Historical  
simvastatin (ZOCOR) 20 mg tablet Take 20 mg by mouth daily. Provider, Historical  
omega 3-dha-epa-fish oil (FISH OIL) 100-160-1,000 mg cap Take 1,000 mg by mouth two (2) times a day.     Provider, Historical  
 cyanocobalamin (VITAMIN B12) 100 mcg tablet Take 100 mcg by mouth daily. Provider, Historical  
calcium polycarbophil (FIBER LAXATIVE) 625 mg tablet Take 1,250 mg by mouth daily as needed. Provider, Historical  
 
Current Facility-Administered Medications Medication Dose Route Frequency  dexamethasone (DECADRON) 4 mg/mL injection 6 mg  6 mg IntraVENous Q12H  
 [START ON 2021] remdesivir 100 mg in 0.9% sodium chloride 250 mL IVPB  100 mg IntraVENous Q24H  
 sodium bicarbonate 8.4 % (1 mEq/mL) injection 50 mEq  50 mEq IntraVENous NOW  bumetanide (BUMEX) injection 1 mg  1 mg IntraVENous ONCE  
 sodium bicarbonate (8.4%) 150 mEq in dextrose 5% 1,000 mL infusion   IntraVENous CONTINUOUS Allergies Allergen Reactions  Latex Itching Skin breaks out causing itching Social History Tobacco Use  Smoking status: Never Smoker  Smokeless tobacco: Never Used Substance Use Topics  Alcohol use: No  
  
Family History Problem Relation Age of Onset  Cancer Maternal Aunt Review of Systems: 
Review of systems not obtained due to patient factors. Objective:  
Vital Signs:   
Visit Vitals BP (!) 140/79 Pulse (!) 133 Temp 99 °F (37.2 °C) Resp 26 SpO2 93% Comment: Simultaneous filing. User may not have seen previous data. O2 Device: Hi flow nasal cannula(salter) O2 Flow Rate (L/min): 15 l/min Temp (24hrs), Av °F (37.2 °C), Min:99 °F (37.2 °C), Max:99 °F (37.2 °C) Intake/Output:  
Last shift:      No intake/output data recorded. Last 3 shifts: No intake/output data recorded. No intake or output data in the 24 hours ending 21 Physical Exam: ETT   OGT   Lines    Devices     Drains  Jean General:  Alert, cooperative, no distress. Head:  Normocephalic, without obvious abnormality, atraumatic. Eyes:  Conjunctivae/corneas clear.  PERRL,  
 Nose: Nares normal. Septum midline. Mucosa normal. No drainage or sinus tenderness.  
Throat: Lips, mucosa, and tongue normal. Teeth and gums normal.  
Neck: Supple, symmetrical, trachea midline, no adenopathy, thyroid: no enlargment/tenderness/nodules, no carotid bruit and no JVD.  
Back:   Symmetric, no curvature. ROM normal.  
Lungs:   Clear to auscultation bilaterally.  
Chest wall:  No tenderness or deformity.  
Heart:  Regular rate and rhythm, S1, S2 normal, no murmur, click, rub or gallop.  
Abdomen:   Soft, non-tender. Bowel sounds normal. No masses,  No organomegaly.  
Extremities: Extremities normal, atraumatic, no cyanosis or edema.  
Pulses: 2+ and symmetric all extremities.  
Skin: Skin color, texture, turgor normal. No rashes or lesions  
      Lymph Nodes:  Cervical, supraclavicular, and axillary nodes normal.  
Neurologic:  Grossly nonfocal 
  
 
Data:  
 
Recent Results (from the past 24 hour(s))  
CBC WITH AUTOMATED DIFF  
 Collection Time: 01/26/21 11:24 PM  
Result Value Ref Range  
 WBC 6.8 4.6 - 13.2 K/uL  
 RBC 3.36 (L) 4.20 - 5.30 M/uL  
 HGB 9.5 (L) 12.0 - 16.0 g/dL  
 HCT 28.4 (L) 35.0 - 45.0 %  
 MCV 84.5 74.0 - 97.0 FL  
 MCH 28.3 24.0 - 34.0 PG  
 MCHC 33.5 31.0 - 37.0 g/dL  
 RDW 16.0 (H) 11.6 - 14.5 %  
 PLATELET 110 (L) 135 - 420 K/uL  
 MPV 10.8 9.2 - 11.8 FL  
 NEUTROPHILS 72 40 - 73 %  
 LYMPHOCYTES 25 21 - 52 %  
 MONOCYTES 3 3 - 10 %  
 EOSINOPHILS 0 0 - 5 %  
 BASOPHILS 0 0 - 2 %  
 ABS. NEUTROPHILS 4.9 1.8 - 8.0 K/UL  
 ABS. LYMPHOCYTES 1.7 0.9 - 3.6 K/UL  
 ABS. MONOCYTES 0.2 0.05 - 1.2 K/UL  
 ABS. EOSINOPHILS 0.0 0.0 - 0.4 K/UL  
 ABS. BASOPHILS 0.0 0.0 - 0.1 K/UL  
 DF AUTOMATED    
METABOLIC PANEL, BASIC  
 Collection Time: 01/26/21 11:24 PM  
Result Value Ref Range  
 Sodium 138 136 - 145 mmol/L  
 Potassium 4.9 3.5 - 5.5 mmol/L  
 Chloride 110 100 - 111 mmol/L  
 CO2 13 (L) 21 - 32 mmol/L  
 Anion gap 15 3.0 - 18 mmol/L  
 Glucose 213 (H) 74 - 99 mg/dL  
 BUN 73 (H) 7.0 - 18 MG/DL Creatinine 2.10 (H) 0.6 - 1.3 MG/DL  
 BUN/Creatinine ratio 35 (H) 12 - 20 GFR est AA 27 (L) >60 ml/min/1.73m2 GFR est non-AA 23 (L) >60 ml/min/1.73m2 Calcium 8.0 (L) 8.5 - 10.1 MG/DL  
NT-PRO BNP Collection Time: 01/26/21 11:24 PM  
Result Value Ref Range NT pro-BNP 28,724 (H) 0 - 1,800 PG/ML  
CARDIAC PANEL,(CK, CKMB & TROPONIN) Collection Time: 01/26/21 11:24 PM  
Result Value Ref Range CK - MB 4.4 (H) <3.6 ng/ml CK-MB Index 3.4 0.0 - 4.0 %  26 - 192 U/L Troponin-I, QT 0.33 (H) 0.0 - 0.045 NG/ML  
D DIMER Collection Time: 01/26/21 11:24 PM  
Result Value Ref Range D DIMER 12.10 (H) <0.46 ug/ml(FEU) HEPATIC FUNCTION PANEL Collection Time: 01/26/21 11:24 PM  
Result Value Ref Range Protein, total 6.9 6.4 - 8.2 g/dL Albumin 3.1 (L) 3.4 - 5.0 g/dL Globulin 3.8 2.0 - 4.0 g/dL A-G Ratio 0.8 0.8 - 1.7 Bilirubin, total 0.9 0.2 - 1.0 MG/DL Bilirubin, direct 0.4 (H) 0.0 - 0.2 MG/DL Alk. phosphatase 108 45 - 117 U/L  
 AST (SGOT) 57 (H) 10 - 38 U/L  
 ALT (SGPT) 20 13 - 56 U/L  
SARS-COV-2 Collection Time: 01/26/21 11:55 PM  
Result Value Ref Range SARS-CoV-2 Please find results under separate order COVID-19 RAPID TEST Collection Time: 01/26/21 11:55 PM  
Result Value Ref Range Specimen source Nasopharyngeal    
 COVID-19 rapid test Detected (AA) NOTD    
POC G3 Collection Time: 01/27/21 12:00 AM  
Result Value Ref Range Device: High Flow Nasal Cannula Flow rate (POC) 15 L/M  
 pH (POC) 7.17 (LL) 7.35 - 7.45    
 pCO2 (POC) 30.0 (L) 35.0 - 45.0 MMHG  
 pO2 (POC) 91 80 - 100 MMHG  
 HCO3 (POC) 10.9 (L) 22 - 26 MMOL/L  
 sO2 (POC) 95 92 - 97 % Base deficit (POC) 16 mmol/L Allens test (POC) YES Total resp. rate 28 Site RIGHT RADIAL Specimen type (POC) ARTERIAL Performed by Pilar Lala Recent Labs  
  01/27/21 
0000 HCO3I 10.9* PCO2I 30.0* PHI 7.17* PO2I 91 Telemetry: Sinus Tachycardia Imaging: 
I have personally reviewed the patients radiographs and have reviewed the reports: CXR Results  (Last 48 hours) None Best Practice: 
Glycemic Control IHI ICU Bundles: MVentilated patients- VAP bundle , aim to keep peak plateau pressure 42-51RW H2O Sress Ulcer Prophylaxis DVT Prophylaxis Need for Lines, Jean Assessed Palliative Care Consult Jean Bundle Followed Total of     min critical care time spent at bedside during the course of care providing evaluation,management and care decisions and ordering appropriate treatment related to critical care problems exclusive of procedures. The reason for providing this level of medical care for this critically ill patient was due a critical illness that impaired one or more vital organ systems such that there was a high probability of imminent or life threatening deterioration in the patients condition. This care involved high complexity decision making to assess, manipulate, and support vital system functions, to treat this degree vital organ system failure and to prevent further life threatening deterioration of the patients condition.  
 
HAIR Harris

## 2021-01-27 NOTE — PROCEDURES
[facility] 
[facility] Procedure Note With Parul Indication: Need for vasopressors Risks, benefits, alternatives explained and consent obtained by family. Time out performed. Patient positioned in Trendelenburg. Central line Bundle: 
Full sterile barrier precautions used. 7-Step Sterility Protocol followed. (cap, mask, sterile gown, sterile gloves, large sterile sheet, hand hygiene, 2% chlorhexidine for cutaneous antisepsis) 5 mL 1% Lidocaine placed at insertion site. Using ultrasound guidance, Left internal jugular vein cannulated x 1 attempt(s) utilizing the modified Seldinger technique. Position of guidewire confirmed in vein using ultrasound prior to dilating. Guidewire was removed. Central venous catheter was placed without difficulty. Small hematoma formed after lidocaine which improved with direct pressure Good blood return on all 3 ports. Catheter secured & Biopatch applied. Sterile Tegaderm placed. CXR pending. Supervised and assisted by Megan Grayson MD Critical Care fellow. Dr. Marge Hidalgo was immediately available for the entire procedure. Bethany Burris MD PGY-1 
01/27/21 Pulmonary, Critical Care Medicine [facility]

## 2021-01-27 NOTE — PROGRESS NOTES
CM called patient's sister Nellie Quiroz [phone], and received voicemail, CM left phone number for a return call. 
 
 
 
Laura Jerome RN 
Case Management [phone]

## 2021-01-27 NOTE — ED NOTES
Unable to place OG tube. NG tube placed in right nare. Approximately 50 cc black, dark red secretions suctioned out. NG hooked up to low continuous suction at this time

## 2021-01-27 NOTE — PROGRESS NOTES
CM received a phone call from patient's sister Darien Clement, she prefers that we use her home phone number [phone] to contact her. Patient's sister was unable to complete an initial CM assessment at this time. Eusebia Mccarthy, RN Case Management [phone]

## 2021-01-27 NOTE — PROGRESS NOTES
Tried patient on Salter HFNC, patient having shortness of breath. Placed on Vapotherm 40LPM and 100%. Sp02 99% .

## 2021-01-27 NOTE — CONSULTS
Consult Note Consult requested by: Dr Kalyn Caruso Oz Zaays is a 79 y.o. female [facility] who is being seen on consult for NANCY. Chief Complaint Patient presents with  Respiratory Distress Admission diagnosis: <principal problem not specified> HPI: 79 y. o. emaciated WF with PMH of HFpEF 60-65%, CKD3, Lt Hip ORIF (12/2020), Esophageal Varices, Liver Cirrhosis, DM, HTN, CAD admitted with SOB from NH & testing (+) for COVID-19 a week ago. Seen intubated in ER. Pt DNR. Making urine. Creat stable. Past Medical History:  
Diagnosis Date  Anemia  Aortic stenosis, moderate 03/2019  Breast cancer ([facility]  CAD (coronary artery disease) Coronary Stenting needed, hematology workup needed prior to stent placement, treating medically at this time  Cancer [facility])   
 colon  Cancer ([facility] [address] breast - left  Cardiac catheterization 08/17/2016 LM patent. mLAD 99.9% (2.25 x 28-mm Xience BETHEL, resid 0%). oD1 60%. oD2 95%. pCX 90% (2.5 x 13-mm Xience BETHEL, resid 0%). OMB patent.  Cardiac echocardiogram 03/2019 EF 56 to 60%, moderate concentric LVH, moderate aortic stenosis, mean valve gradient 25 mmHg  Diabetes ([facility]  Hypercholesteremia  Hypertension  Thrombocytopenia ([facility] [facility] Past Surgical History:  
Procedure Laterality Date  CARDIAC CATHETERIZATION  7/16/2015  
  no stents at this time r/t anemia & thrombocytopenia (hematology work-up needed prior to stent placement)  CARDIAC CATHETERIZATION  7/11/2016  CARDIAC CATHETERIZATION  8/17/2016  CORONARY ARTERY ANGIOGRAM  7/16/2015  CORONARY ARTERY ANGIOGRAM  7/11/2016  CORONARY ARTERY ANGIOGRAM  8/17/2016  CORONARY STENT EA ADDL VESSEL  8/17/2016  CORONARY STENT SINGLE W/PTCA  8/17/2016  HX GI    
 colon surg secondary to cancer - removed lesion  HX HEENT  2/2013  
 cataract bilaterally  HX MASTECTOMY  march 2011  
 left  HX VASCULAR ACCESS    
 mediport - now removed  IR BX BONE MARROW DIAGNOSTIC  9/30/2019  LV ANGIOGRAPHY  7/16/2015  LV ANGIOGRAPHY  7/11/2016 Social History Socioeconomic History  Marital status:  Spouse name: Not on file  Number of children: Not on file  Years of education: Not on file  Highest education level: Not on file Occupational History  Not on file Social Needs  Financial resource strain: Not on file  Food insecurity Worry: Not on file Inability: Not on file  Transportation needs Medical: Not on file Non-medical: Not on file Tobacco Use  Smoking status: Never Smoker  Smokeless tobacco: Never Used Substance and Sexual Activity  Alcohol use: No  
 Drug use: No  
 Sexual activity: Not on file Lifestyle  Physical activity Days per week: Not on file Minutes per session: Not on file  Stress: Not on file Relationships  Social connections Talks on phone: Not on file Gets together: Not on file Attends Restoration service: Not on file Active member of club or organization: Not on file Attends meetings of clubs or organizations: Not on file Relationship status: Not on file  Intimate partner violence Fear of current or ex partner: Not on file Emotionally abused: Not on file Physically abused: Not on file Forced sexual activity: Not on file Other Topics Concern  Not on file Social History Narrative  Not on file Family History Problem Relation Age of Onset  Cancer Maternal Aunt Allergies Allergen Reactions  Latex Itching Skin breaks out causing itching Home Medications:  
 
Current Facility-Administered Medications Medication Dose Route Frequency  dexamethasone (DECADRON) 4 mg/mL injection 6 mg  6 mg IntraVENous Q12H  
 sodium bicarbonate (8.4%) 150 mEq in dextrose 5% 1,000 mL infusion   IntraVENous CONTINUOUS  
  chlorhexidine (PERIDEX) 0.12 % mouthwash 10 mL  10 mL Oral Q12H  
 albuterol-ipratropium (DUO-NEB) 2.5 MG-0.5 MG/3 ML  3 mL Nebulization Q6H PRN  
 fentaNYL (PF) 900 mcg/30 ml infusion soln  0-200 mcg/hr IntraVENous TITRATE  midazolam (VERSED) injection 1-2 mg  1-2 mg IntraVENous Q10MIN PRN  
 fentaNYL citrate (PF) injection  mcg   mcg IntraVENous Q30MIN PRN  
 azithromycin (ZITHROMAX) 500 mg in 0.9% sodium chloride 250 mL (VIAL-MATE)  500 mg IntraVENous Q24H  
 dexmedeTOMidine in 0.9 % NaCl (PRECEDEX) 400 mcg/100 mL (4 mcg/mL) infusion soln  0.1-1.5 mcg/kg/hr (Order-Specific) IntraVENous TITRATE  insulin lispro (HUMALOG) injection   SubCUTAneous Q6H  
 glucose chewable tablet 16 g  4 Tab Oral PRN  
 glucagon (GLUCAGEN) injection 1 mg  1 mg IntraMUSCular PRN  
 dextrose (D50W) injection syrg 12.5-25 g  25-50 mL IntraVENous PRN  
 NOREPINephrine (LEVOPHED) 8 mg in 5% dextrose 250mL (32 mcg/mL) infusion  0.5-30 mcg/min IntraVENous TITRATE  cefepime (MAXIPIME) 2 g in sterile water (preservative free) 10 mL IV syringe  2 g IntraVENous Q24H  
 metroNIDAZOLE (FLAGYL) IVPB premix 500 mg  500 mg IntraVENous Q12H  VANCOMYCIN INFORMATION NOTE   Other Rx Dosing/Monitoring  pantoprazole (PROTONIX) 40 mg in 0.9% sodium chloride 10 mL injection  40 mg IntraVENous Q12H  
 octreotide (SANDOSTATIN) 500 mcg in 0.9% sodium chloride 500 mL infusion  25 mcg/hr IntraVENous CONTINUOUS Current Outpatient Medications Medication Sig  
 aspirin 81 mg chewable tablet Take 2 Tabs by mouth daily.  insulin aspart U-100 (NovoLOG Flexpen U-100 Insulin) 100 unit/mL (3 mL) inpn by SubCUTAneous route Before breakfast, lunch, and dinner. Sliding scale per instructions  acetaminophen (TYLENOL) 325 mg tablet Take 2 Tabs by mouth every four (4) hours as needed for Pain.  glucose 4 gram chewable tablet Take 4 Tabs by mouth as needed for Other (hypoglycemia).  SITagliptin (JANUVIA) 50 mg tablet Take 50 mg by mouth daily.  cholecalciferol, vitamin D3, (VITAMIN D3) 2,000 unit tab Take 2,000 Units by mouth daily.  nitroglycerin (NITROLINGUAL) 400 mcg/spray spray 1 Spray by SubLINGual route every five (5) minutes as needed.  ascorbic acid, vitamin C, (VITAMIN C) 250 mg tablet Take 1 Tab by mouth daily.  multivitamin (ONE A DAY) tablet Take 1 Tab by mouth daily.  simvastatin (ZOCOR) 20 mg tablet Take 20 mg by mouth daily.  omega 3-dha-epa-fish oil (FISH OIL) 100-160-1,000 mg cap Take 1,000 mg by mouth two (2) times a day.  cyanocobalamin (VITAMIN B12) 100 mcg tablet Take 100 mcg by mouth daily.  calcium polycarbophil (FIBER LAXATIVE) 625 mg tablet Take 1,250 mg by mouth daily as needed. Review of Systems:  
Review of Systems: 
General : intubated Data Review: 
 
Labs: Results:  
   
Chemistry Recent Labs  
  01/27/21 
0840 01/27/21 
0400 01/26/21 
2324 * 204* 213*  138 138  
K 5.0 4.5 4.9  
 112* 110  
CO2 15* 14* 13* BUN 78* 79* 73* CREA 2.35* 2.10* 2.10* CA 7.2* 7.6* 8.0* AGAP 15 12 15 BUCR 33* 38* 35* AP  --  121* 108 TP  --  6.2* 6.9 ALB 2.9* 2.7* 3.1*  
GLOB  --  3.5 3.8 AGRAT  --  0.8 0.8 CBC w/Diff Recent Labs  
  01/27/21 
0400 01/26/21 
2324 WBC 10.9 6.8  
RBC 2.94* 3.36* HGB 8.4* 9.5* HCT 24.9* 28.4*  
* 110* GRANS 92* 72 LYMPH 6* 25 EOS 0 0 Coagulation No results for input(s): PTP, INR, APTT, INREXT in the last 72 hours. Iron/Ferritin No results for input(s): IRON in the last 72 hours. No lab exists for component: TIBCCALC BNP No results for input(s): BNPP in the last 72 hours. Cardiac Enzymes Recent Labs  
  01/27/21 
0840 01/27/21 
0400  182 CKND1 3.8 3.2 Liver Enzymes Recent Labs  
  01/27/21 
0840 01/27/21 
0400 TP  --  6.2* ALB 2.9* 2.7* AP  --  121* Thyroid Studies Lab Results Component Value Date/Time TSH 1.12 03/25/2019 03:06 AM  
    
  
 
Physical Assessment:  
 
Visit Vitals BP (!) 115/51 Pulse 93 Temp 99 °F (37.2 °C) Resp 29 Ht 5' (1.524 m) Wt 68 kg (150 lb) SpO2 100% BMI 29.29 kg/m² Intake/Output Summary (Last 24 hours) at 1/27/2021 1119 Last data filed at 1/27/2021 4286 Gross per 24 hour Intake 100 ml Output  Net 100 ml Physial Exam: seen in ER General appearance: intubated Neurologic: not assessed Neck: no JVD Lungs: elvia air entry Heart: S1S2 Abdomen: Soft, NT Extremities: + edema Impression and Plan: NANCY on CKD3: nonoliguric. Stable creat. Continue IVF. Covid PNA/Resp failure mech vent per PCCM. Met Acidosis: agree with bicarb gtt. Not a HD candidate. Cirrhosis/Varices: dismal prognosis. Christy Kumar MD 
January 27, 2021 Pager : [phone]

## 2021-01-28 NOTE — PROGRESS NOTES
[facility]. Pulmonary, Critical Care, and Sleep Medicine Name: Carmen Stanton MRN: [id number] : 1940 Hospital: [address] [place] [address] Dr Date: 2021  Admission Date: 2021 Chart and notes reviewed. Data reviewed. I have evaluated all findings. [x]I have reviewed the flowsheet and previous days notes. [x]The patient is unable to give any meaningful history or review of systems because the patient is: 
[x]Intubated [x]Sedated  
[]Unresponsive [x]The patient is critically ill on     
[x]Mechanical ventilation [x]Pressors []BiPAP [] Interval HPI:  
Vear MinsJesse Leonardo is a 79 y.o.  Female w/ PMH of HFpPEF (EF 55-60%), Stage 3 CKD, Recent L Hip ORIF (2020), Esophageal Varices, Liver Cirrhosis, DM, HTN, CAD, who presents from [address] [place] [address] to [facility] ED on 2021 via EMS after developing worsening SOB and Hemoptysis after testing (+) for COVID-19 x1 week prior. HPI is limited to chart due to Pt on HFNC and altered.  
  
ICU consulted for Hypoxia and is noted to be satting at 94+ on 15L/100% HFNC. Shortly after examination, Pt required Endotracheal Intubation w/ Mechanical Ventilation.  
  
Discussed with patient's sister and nephew. Nephew is CC nurse a [employer]. Updated them on critical nature and high likelihood for mortality due to age and underlying conditions, wished to proceed with intubation but DNR should her heart stop.  
  
 
21 Hospital Day: 3 Vent Day: 2 Overnight Events: Tachycardic, Hypotensive. Now on Levophed ggt. Mentation: Sedated Respiratory/ Secretions: Intubated, Oral Cavity w/out copious secretions Hemodynamics: VSS- on Levophed. Diet: NPO Urine/Fecal Output: 
Need for Procedures: 
      
      
ROS:Review of systems not obtained due to patient factors. Events and notes from last 24 hours reviewed. Care plan discussed on multidisciplinary rounds.  
Patient Active Problem List  
 Diagnosis Code  
• Anemia D64.9  
• CAD (coronary artery disease) I25.10  
• Thrombocytopenia (HCC) D69.6  
• NSTEMI (non-ST elevated myocardial infarction) (HCC) I21.4  
• UTI (urinary tract infection) N39.0  
• Sepsis (HCC) A41.9  
• PNA (pneumonia) J18.9  
• Hypoxia R09.02  
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  
• Esophageal varices (HCC) I85.00  
• CHF (congestive heart failure) (HCC) I50.9  
• Dyspnea R06.00  
• Pneumonia J18.9  
• HTN (hypertension), benign I10  
• CAP (community acquired pneumonia) J18.9  
• Acute on chronic diastolic CHF (congestive heart failure) (HCC) I50.33  
• Leg DVT (deep venous thromboembolism), acute, left (HCC) I82.402  
• Palpitations R00.2  
• Acute pulmonary edema (HCC) J81.0  
• Malaise and fatigue R53.81, R53.83  
• Pancytopenia (HCC) D61.818  
• Portal hypertension (HCC) K76.6  
• Chronic anticoagulation Z79.01  
• Hip fracture (HCC) S72.009A  
• Breast cancer (HCC) C50.919  
• Colon cancer (HCC) C18.9  
• Pneumonia due to COVID-19 virus U07.1, J12.82  
 
 
Vital Signs: 
Visit Vitals 
BP (!) 122/59  
Pulse 78  
Temp 97.9 °F (36.6 °C)  
Resp (!) 31  
Ht 5' (1.524 m)  
Wt 71.2 kg (157 lb)  
SpO2 100%  
BMI 30.66 kg/m²  
   
O2 Device: Endotracheal tube  
O2 Flow Rate (L/min): 15 l/min  
Temp (24hrs), Av.2 °F (36.8 °C), Min:97.9 °F (36.6 °C), Max:98.4 °F (36.9 °C) 
  
 
 
Intake/Output:  
Last shift:      No intake/output data recorded. 
Last 3 shifts:  0701 -  1900 
In: 100 [I.V.:100] 
Out: -  
 
Intake/Output Summary (Last 24 hours) at 2021 2340 
Last data filed at 2021 0635 
Gross per 24 hour  
Intake 100 ml  
Output —  
Net 100 ml  
  
 
Ventilator Settings: 
Ventilator Mode: Assist control, VC+ 
Respiratory Rate 
Back-Up Rate: 20 
Insp Time (sec): 0.9 sec 
Ventilator Volumes 
Vt Set (ml): 400 ml 
Vt Exhaled (Machine Breath) (ml): 416 ml 
Vt Spont (ml): 348 ml 
Ve Observed (l/min): 12 l/min 
Ventilator Pressures 
 PIP Observed (cm H2O): 21 cm H2O Plateau Pressure (cm H2O): 25 cm H2O 
MAP (cm H2O): 13 PEEP/VENT (cm H2O): 7 cm H20 Current Facility-Administered Medications Medication Dose Route Frequency  dexamethasone (DECADRON) 4 mg/mL injection 6 mg  6 mg IntraVENous Q12H  
 sodium bicarbonate (8.4%) 150 mEq in dextrose 5% 1,000 mL infusion   IntraVENous CONTINUOUS  chlorhexidine (PERIDEX) 0.12 % mouthwash 10 mL  10 mL Oral Q12H  
 fentaNYL (PF) 900 mcg/30 ml infusion soln  0-200 mcg/hr IntraVENous TITRATE  azithromycin (ZITHROMAX) 500 mg in 0.9% sodium chloride 250 mL (VIAL-MATE)  500 mg IntraVENous Q24H  
 dexmedeTOMidine in 0.9 % NaCl (PRECEDEX) 400 mcg/100 mL (4 mcg/mL) infusion soln  0.1-1.5 mcg/kg/hr (Order-Specific) IntraVENous TITRATE  insulin lispro (HUMALOG) injection   SubCUTAneous Q6H  
 NOREPINephrine (LEVOPHED) 8 mg in 5% dextrose 250mL (32 mcg/mL) infusion  0.5-30 mcg/min IntraVENous TITRATE  cefepime (MAXIPIME) 2 g in sterile water (preservative free) 10 mL IV syringe  2 g IntraVENous Q24H  
 metroNIDAZOLE (FLAGYL) IVPB premix 500 mg  500 mg IntraVENous Q12H  VANCOMYCIN INFORMATION NOTE   Other Rx Dosing/Monitoring  [START ON 1/28/2021] pantoprazole (PROTONIX) 40 mg in 0.9% sodium chloride 10 mL injection  40 mg IntraVENous DAILY Telemetry: Sinus Rhythm Limited Physical Exam:  
 General: Intubated, Sedated HEENT: NC/AT, Septum midline, No visible nasal drainage, Oral Cavity clear w/out copious drainage, Trachea midline Chest: No visible trauma or deformity to chest wall, RRR- via Tele Lungs: Equal & Bilateral chest rise, Airway clear, ET Tube in place and secured Abdomen: Non-Distended Extremity: No visible pitting edema to LE B Neuro: Sedated Skin: Cortez Type II, No new skin lesions, rashes, or ulcers DATA: 
MAR reviewed and pertinent medications noted or modified as needed Labs: 
Recent Labs  
  01/27/21 
0400 01/26/21 [address] [place] WBC 10.9 6.8 HGB 8.4* 9.5* HCT 24.9* 28.4*  
* 110* Recent Labs  
  01/27/21 
1445 01/27/21 
0840 01/27/21 
0400 01/26/21 
2324  141 138 138  
K 4.6 5.0 4.5 4.9  
* 111 112* 110  
CO2 16* 15* 14* 13* * 251* 204* 213* BUN 83* 78* 79* 73* CREA 2.34* 2.35* 2.10* 2.10* CA 7.0* 7.2* 7.6* 8.0*  
MG  --   --  1.8  --   
PHOS 6.3* 6.9* 6.1*  --   
ALB 3.1* 2.9* 2.7* 3.1* ALT  --   --  22 20 No results for input(s): PH, PCO2, PO2, HCO3, FIO2 in the last 72 hours. Recent Labs  
  01/27/21 
1735 01/27/21 
0757 01/27/21 
0454 FIO2I 65 80 100 HCO3I 14.7* 11.7* 14.3*  
PCO2I 29.5* 31.6* 49.3* PHI 7.31* 7.18* 7.07* PO2I 107* 99 201* Imaging: 
[x]   I have personally reviewed the patients radiographs and reports XR Results (most recent): 
Results from [facility] Encounter encounter on 01/26/21 XR CHEST PORT Narrative ONE VIEW CHEST RADIOGRAPH INDICATION: IJ placement. COMPARISON: Chest radiograph 1/27/2021 at 4:58. TECHNIQUE: AP view of the chest 
 
FINDINGS: 
  
LINES/TUBES: Endotracheal tube tip is in stable position. Interval placement of 
left IJ central line with tip in the upper SVC. No pneumothorax. MEDIASTINUM: Cardiac silhouette is within normal limits. LUNGS: Similar appearance of mid to lower lung opacities bilaterally, not 
significantly changed from prior. BONES/OTHER: No acute osseous abnormality. Impression Interval placement of left IJ central line with tip in the upper SVC. No 
pneumothorax. CT Results (most recent): 
Results from [facility] Encounter encounter on 03/24/19 CTA CHEST W OR W WO CONT Narrative EXAM: CT Angiogram of the Chest 
 
CLINICAL INDICATION: Worsening shortness of breath. Leg swelling. TECHNIQUE: CT angiogram of the chest performed in an axial, sagittal, and 
coronal planes. MIPS performed.  
 
All CT scans at this facility are performed using dose optimization technique as 
 appropriate to a performed exam, to include automated exposure control, 
adjustment of the mA and/or kV according to patient size (including appropriate 
matching for site specific examination) or use of iterative reconstruction 
technique. IV CONTRAST: 72 cc of Isovue 370 COMPARISON: CT thorax without contrast 3/15/2016 FINDINGS: 
Limitations: The examination is mildly degraded by respiratory motion artifact, 
predominantly in the upper lobes. Mediastinum/Lymph nodes:  Unchanged 9 mm left lower paratracheal lymph node, 
nonspecific and possibly reactive. Nonspecific 11 mm short axis subcarinal lymph 
node which is increased in size compared to 3/15/2016 but it measured 7 mm, 
possibly reactive Cardiovascular: There is no pulmonary artery filling defect within the main, lobar, segmental, 
or proximal subsegmental branches to suggest pulmonary embolism. There is mild enlargement of the pulmonary trunk and right pulmonary artery 
which may be seen with pulmonary hypertension. There is no recent flux of 
contrast into the IVC. There is no flattening of the interventricular septum. The left ventricle right ventricle ratio measures greater than 1. Coronary artery atherosclerotic ossifications are present. Trace pericardial effusion. Lungs/Airways:  
Limited assessment of the lung parenchyma secondary to respiratory motion 
artifact. Scattered centrilobular emphysematous changes. Tree-in-bud opacities 
within the posterior left upper lobe (axial series 2 image 62) compatible with 
infectious or inflammatory bronchiolitis. Recommend follow-up chest CT in 3 
months to document resolution. Bibasilar compressive atelectasis. No definite underlying pneumonia in the 
bilateral lower lobes. Likely scarring within the inferior medial right middle 
lobe. Pleura: Small to moderate right and small left bilateral pleural effusions. Axilla/Chest wall: Mild generalized body wall edema. Upper Abdomen: Small hiatal hernia with mild distal esophageal mural thickening, 
similar to 3/15/2016. Several paraesophageal varices are noted. Moderate 
splenomegaly. Small perihepatic and mild perisplenic ascites. Mild periportal 
edema, compatible with volume overload. No definite hepatic mass. Hypoattenuating 11 mm nodule in the right adrenal gland which is unchanged in 
size compared to 3/15/2016 and therefore likely benign lesion such as an 
adenoma. Multifocal right renal cortical scarring. Small hiatal hernia. Multiple 
perigastric and paraesophageal varices. Mural thickening within the visualized 
stomach, likely related to portal gastropathy. Musculoskeletal: Unchanged tiny skin nodule within the right mid chest (axial 
series 2 image 142) recommend correlation with physical examination. No evidence 
of acute osseous abnormality. There are changes of the shoulders and spine. Impression IMPRESSION: 
1. Findings compatible with portal hypertension. Splenomegaly, perigastric and 
paraesophageal varices. Thickening of the gastric wall likely reflects portal 
gastropathy. 2.  No pulmonary artery filling defect to suggest pulmonary embolism. 3.  Anasarca. Small to moderate right and small left pleural effusions with 
bibasilar compressive atelectasis. 4.  Tree-in-bud opacities in the posterior left upper lobe are compatible with 
infectious or inflammatory bronchiolitis. Recommend follow-up chest CT in 3 
months to document resolution. 5.  Additional findings as above. IMPRESSION:  
· Hypoxic, Hypercapnic Respiratory Failure/ presumed ARDS-  likely due to COVID-19 Pneumonia w/ Acute on Chronic CHF Exacerbation, now requiring Mechanical Ventilation.  
· Pulmonary HTN- based on Echo (01/27/2021)- LV EF: 55-60%, Pulmonary arterial systolic pressure is 70 mmHg. · COVID-19 Pneumonia- Covid-19 Rapid Test (+) on 01/26/2021. Known (+) prior to admission. CXR (+) Fluffy B/L mid to LL Opacities, consistent w/ multifocal infection.   
· Sepsis- likely COVID-19 vs NANCY. WBC: 10.9. Procal: 15.92. LD: 608. Ferritin: 3,116. UA (+) 3+ Bacteria + Large Leuks. Urine Cx pending. · Metabolic Acidosis- Reasons above. PH: 7.31, pCO2: 29.5, HCO3: 14.7 · NANCY on Stage 3 CKD, Oliguric- likely in setting of Distributive Shock. BUN: 83, Cr: 2.34 
· Encephalopathy- Metabolic, Reasons above · Hemoptysis- Hx of Esophageal Varices vs Pulmonary Edema · Electrolyte Derangements- Hypocalcemia, Navdeep: 7.0. Hyperphosphatemia, Phos: 6.3. Hypoalbuminemia- Albumin: 3.1 · Thrombocytopenia- Baseline Chronically low. Platelets: 002 · Elevated Troponin- likely CHF Exacerbation + worsening Pulm HTN , Troponin: 1.30- Increasing · Acute on chronic HFpEF - EF 60-65%. ProBNP: 35,765 · Hx Malnutrition · Hx of Esophageal Varices · Hx Liver Cirrhosis   
· Hx DM- HgbA1c: 5.3 
· HX HTN  
· Hx of CAD  
· Hx Recent Left Hip ORIF (12/07/2020) Patient Active Problem List  
Diagnosis Code  Anemia D64.9  
 CAD (coronary artery disease) I25.10  Thrombocytopenia ([address] D69.6  NSTEMI (non-ST elevated myocardial infarction) (HCC) I21.4  
 UTI (urinary tract infection) N39.0  Sepsis ([address] A41.9  PNA (pneumonia) J18.9  Hypoxia R09.02  
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  
 CHF (congestive heart failure) (HCC) I50.9  Dyspnea R06.00  
 Pneumonia J18.9  
 HTN (hypertension), benign I10  
 CAP (community acquired pneumonia) J18.9  Acute on chronic diastolic CHF (congestive heart failure) (HCC) I50.33  Leg DVT (deep venous thromboembolism), acute, left (HCC) I82.402  Palpitations R00.2  Acute pulmonary edema (HCC) J81.0  Malaise and fatigue R53.81, R53.83  
 Pancytopenia ([facility] [id number]  Portal hypertension ([facility] K76.6 • Chronic anticoagulation Z79.01  
• Hip fracture (HCC) S72.009A  
• Breast cancer (HCC) C50.919  
• Colon cancer (HCC) C18.9  
• Pneumonia due to COVID-19 virus U07.1, J12.82  
 
  
RECOMMENDATIONS:  
Neuro: Titrate sedation to RASS 0 to -1. Currently on Fentanyl + Precedex. Versed for breakthrough sedation PRN. 
Resp: MRSA Cx pending. VAP Bundle, Bronchial Hygiene. Aspiration Precautions, maintain HOB>30'. Decadron q12 hrs, Duonebs q6 hrs PRN. 
I/D: I/D following. Cont Azithromycin. I/D recs Cefepime, Metronidazole, Vancomycin. Remdesivir given on admission. WBC: 10.9. Procal: 15.92. LD: 608. Ferritin: 3,116. UA (+) 3+ Bacteria + Large Leuks. Urine Cx pending. Blood Cx + C.diff Assay pending. Trend WBCs + Temp.  
Hem/Onc: Monitor Thrombocytopenia. Trend H/H, Monitor for s/o active bleeding. Daily CBC w/ diff.  
CVS: Bumex x1 given. Levophed gtt started. Echo (+) LV EF: 55-60%, Severe Pulm HTN. Cardiac Panel: CKMB: 6.8, Troponin: 1.30, ProBNP: 28,724. LE Duplex (-). Echo pending. Monitor HD, MAP goal >65 mmHg. 
Metabolic: Bicarb ggt started. Trend daily BMP + Mag + Phos 
Renal: Nephrology following. Albumin replaced. Jean in place. Trend renal parameters & Monitor UOP. 
Endocrine: SSI, q6 Glucose checks.  
GI: NPO Diet.  
Musc/Skin: No acute issues.  
Partial Code, INTUBATE ONLY  
 
 
Best Practice: 
Glycemic Control 
IHI ICU Bundles: 
 Central Line Bundle Followed , Jean Bundle Followed and Vent Bundle Followed, Vent Day 2   
[facility] Vent patients- VAP bundle, aim to keep peak plateau pressure 25-30cm H2O 
Sress Ulcer Prophylaxis. 
DVT Prophylaxis. 
Need for Lines, Jean Assessed. 
Restraints Need. 
Palliative Care Evaluation. 
 
 
High complexity decision making was performed during this consultation and evaluation. 
[x]       Pt is at high risk for further organ failure and dysfunction.  
 
Critical care time spent:    minutes with patient exclusive of procedures. 
 
 
HAIR Harris

## 2021-01-28 NOTE — ED NOTES
Received shift report from [facility]. Patient resting comfortably on the stretcher. No obvious signs of discomfort. Patient partial code. Per prior nurse and chart. No shock. No ACLS, No compressions. Patient afebrile. No obvious signs of distress noted. Patient on precedex, Bicarb and levo. Vital signs within range.

## 2021-01-28 NOTE — PROGRESS NOTES
Palliative Medicine Consult received. Chart review in progress. Thank you for the Palliative Medicine consult and allowing us to participate in the care of Ms. Leonardo. Will continue to monitor and provide support. Georgina ARNETTN, RN, [facility] Palliative Medicine [facility] Palliative [place] Line: [phone])

## 2021-01-28 NOTE — DIABETES MGMT
GLYCEMIC CONTROL PLAN OF CARE  
 
Assessment/Recommendations: 
Blood glucose this am 232 mg/dl 
Noted basal insulin started this am. 
Continue corrective insulin coverage as needed 
Already receiving very insulin resistant dosing 
Will continue inpatient monitoring. 
 
Most recent blood glucose values: 
 
Results for MARLI DAVIS (MRN [id number]) as of 1/28/2021 11:25 
 Ref. Range 1/27/2021 12:33 1/27/2021 14:51 1/27/2021 18:33 1/27/2021 23:23 1/28/2021 07:13  
GLUCOSE,FAST - POC Latest Ref Range: 70 - 110 mg/dL 340 (H)  291 (H) 237 (H) 232 (H)  
 
Current A1C of 5.3 % is equivalent to average blood glucose of 105 mg/dl over the past 2-3 months. 
? Accuracy.  Patient anemic. 
Current hospital diabetes medications:  
Lantus 10 units daily 
Lispro corrective insulin coverage every 6 hours 
Previous day's insulin requirements:  
Lispro 16 units corrective insulin 
Home diabetes medications: 
Januvia 50 mg daily 
Novolog corrective insulin AC breakfast, lunch and dinner. 
Diet:   
NPO 
Education:  ____Refer to Diabetes Education Record  
          __x_Education not indicated at this time 
Covid-19 ruleout. 
 
Yumi Hsieh RN CDE 
Ext [phone]

## 2021-01-28 NOTE — PROGRESS NOTES
Physician Progress Note PATIENTPasari Pat 
CSN #:                  [id number] :                       1940 ADMIT DATE:       2021 11:13 PM 
DISCH DATE: 
RESPONDING 
PROVIDER #:        SUSANNAH JUAREZ PA-C 
 
 
 
 
QUERY TEXT: 
 
Patient admitted with COVID-19 pneumonia and respiratory failure. Noted documentation of sepsis in sepsis. In order to support the diagnosis of sepsis, please include additional clinical indicators in your documentation. Or please document if the diagnosis of sepsis has been ruled out after further study. The medical record reflects the following: 
Risk Factors: Covid +; pneumonia Clinical Indicators: Temp 99 oral; WBC 6.8, 10.9; Lactic acid 0.99; tachycardia; tachypnea; hypotension Treatment: Abx; Cultures; pressors; vent Thank you, Iván Mccartney RN/CCDS Options provided: 
-- Sepsis present as evidenced by, Please document evidence. -- Sepsis was ruled out after study -- Other - I will add my own diagnosis -- Disagree - Not applicable / Not valid -- Disagree - Clinically unable to determine / Unknown 
-- Refer to Clinical Documentation Reviewer PROVIDER RESPONSE TEXT: 
 
Sepsis is present as evidenced by elevated procalcitonin, tachycardia, tachypnea Query created by: Naun Rdz on 2021 3:14 PM 
 
 
Electronically signed by:  Tony Rockwell PA-C 2021 10:14 PM

## 2021-01-28 NOTE — ED NOTES
Blood transfusion completed. Approximately 150 cc urine out in velazquez bag. NG tube suctioning dark green gastric contents at this time. Pt gown changed, electrode leads changed. Levophed rate changed to 1 mcg/min

## 2021-01-28 NOTE — PROGRESS NOTES
Check completed and tolerated well. Patient found on these settings with these results. No resp distress noted. 01/27/21 1930 Patient Observations Pulse (Heart Rate) 81 Resp Rate 30  
O2 Sat (%) 100 % Airway - Continuous Aspiration of Subglottic Secretions (MAAME) Tube 01/27/21 Oral  
Placement Date/Time: 01/27/21 0439   Number of Attempts: 1  Location: Oral  Airway Types: Endotracheal, cuffed Insertion Depth (cm) 23 cm Line Galileo Lips Side Secured Device; Left Cuff Pressure  
(MOV) Site Assessment Clean, dry, & intact Respiratory Patient on Vent Yes - If patient is on vent, add Doc Flowsheet Ventilator (). Respiratory Pattern Tachypneic Chest/Tracheal Assessment Chest expansion, symmetrical  
Breath Sounds Bilateral Diminished Airway Clearance Suction ET Tube Sputum Method Obtained Endotracheal  
Sputum Amount Scant Vent Settings FIO2 (%) 55 % SpO2/FIO2 Ratio 181.82  
CMV Rate Set 20 Back-Up Rate 20 Vt Set (ml) 400 ml PEEP/VENT (cm H2O) 7 cm H20 Insp Time (sec) 0.9 sec Insp Rise Time % 50 % Flow Trigger 3 Ventilator Measurements Vt Exhaled (Machine Breath) (ml) 416 ml Ve Observed (l/min) 12 l/min PIP Observed (cm H2O) 21 cm H2O  
MAP (cm H2O) 13 I:E Ratio Actual 1:2 Safety & Alarms Circuit Temperature  
(HME) Backup Mode Checked/Apnea Yes Pressure Max 40 cm H2O Ve Min 2 Ve Max 20 Vt Min 200 ml Vt Max 1000 ml  
RR Max 40 Ambu Bag Yes Ambu Mask Yes Age Specific Ventilator Associated Pneumonia Bundle Patient Age Group Adult Vent Method/Mode Ventilation Method Conventional  
Ventilator Mode Assist control;VC+ Pulmonary Toilet Pulmonary Toilet H. O.B elevated;Suction

## 2021-01-28 NOTE — ED NOTES
Report given Sundeep Hodge. Pt to be transported to room 314 on stretcher with RN, RT on transport monitor, x2 IV pumps, PCA pump, mechanical vent

## 2021-01-28 NOTE — PROGRESS NOTES
[facility]. Pulmonary, Critical Care, and Sleep Medicine Name: Maxine Quintana MRN: [id number] : 1940 Hospital: [facility] Date: 2021  Admission Date: 2021 Chart and notes reviewed. Data reviewed. I have evaluated all findings. [x]I have reviewed the flowsheet and previous days notes. [x]The patient is unable to give any meaningful history or review of systems because the patient is: 
[x]Intubated [x]Sedated  
[]Unresponsive [x]The patient is critically ill on     
[x]Mechanical ventilation [x]Pressors []BiPAP [] Interval HPI:  
Vanessa Mariscal. Celeste Bowling is a 79 y.o.  Female w/ PMH of HFpPEF (EF 55-60%), Stage 3 CKD, Recent L Hip ORIF (2020), Esophageal Varices, Liver Cirrhosis, DM, HTN, CAD, who presented from [address] [place] [address] to [facility] ED on 2021 via EMS after developing worsening SOB and Hemoptysis after testing (+) for COVID-19 x1 week prior.  
  
ICU consulted for Hypoxia and is noted to be satting at 94+ on 15L/100% HFNC. Shortly after examination, Pt required Endotracheal Intubation w/ Mechanical Ventilation.  
  
Discussed with patient's sister and nephew. Nephew is CC nurse a [employer]. Updated them on critical nature and high likelihood for mortality due to age and underlying conditions, wished to proceed with intubation but DNR otherwise. 21 Hospital Day: 3 Vent Day: 2 Overnight Events: none Mentation: Sedated Respiratory/ Secretions: Intubated, Oral Cavity w/out copious secretions Hemodynamics: VSS- on Levophed. Diet: NPO Urine/Fecal Output: scant UOP overnight, 300 mL overnight Need for Procedures: a line ROS:Review of systems not obtained due to patient factors. Events and notes from last 24 hours reviewed. Care plan discussed on multidisciplinary rounds. Patient Active Problem List  
Diagnosis Code  Anemia D64.9  
 CAD (coronary artery disease) I25.10  Thrombocytopenia ([facility] D69.6  NSTEMI (non-ST elevated myocardial infarction) (HCC) I21.4  
 UTI (urinary tract infection) N39.0  Sepsis ([facility] A41.9  PNA (pneumonia) J18.9  Hypoxia R09.02  
 Cirrhosis of liver not due to alcohol ([facility] K74.60  Esophageal varices (HCC) I85.00  
 CHF (congestive heart failure) (HCC) I50.9  Dyspnea R06.00  
 Pneumonia J18.9  
 HTN (hypertension), benign I10  
 CAP (community acquired pneumonia) J18.9  Acute on chronic diastolic CHF (congestive heart failure) (HCC) I50.33  Leg DVT (deep venous thromboembolism), acute, left (HCC) I82.402  Palpitations R00.2  Acute pulmonary edema (HCC) J81.0  Malaise and fatigue R53.81, R53.83  
 Pancytopenia ([facility] [id number]  Portal hypertension ([facility] K76.6  Chronic anticoagulation Z79.01  
 Hip fracture ([facility] S72.009A  Breast cancer ([facility] C50.919  
 Colon cancer ([facility] C18.9  Pneumonia due to COVID-19 virus U07.1, J12.82 Vital Signs: 
Visit Vitals /65 Pulse 80 Temp 97.9 °F (36.6 °C) Resp 30 Ht 5' (1.524 m) Wt 71.2 kg (157 lb) SpO2 100% BMI 30.66 kg/m² O2 Device: Endotracheal tube O2 Flow Rate (L/min): 15 l/min Temp (24hrs), Av.2 °F (36.8 °C), Min:97.9 °F (36.6 °C), Max:98.4 °F (36.9 °C) Intake/Output:  
Last shift:      1901 - 700 In: -  
Out: 958 [[facility]:775] Last 3 shifts: 701 - 1900 In: 100 [I.V.:100] Out: - Intake/Output Summary (Last 24 hours) at 2021 0228 Last data filed at 2021 3442 Gross per 24 hour Intake 100 ml Output 300 ml Net -200 ml Ventilator Settings: 
Ventilator Mode: Assist control, VC+ Respiratory Rate Back-Up Rate: 20 Insp Time (sec): 0.9 sec Ventilator Volumes Vt Set (ml): 400 ml Vt Exhaled (Machine Breath) (ml): 427 ml Vt Spont (ml): 348 ml Ve Observed (l/min): 12.3 l/min Ventilator Pressures PIP Observed (cm H2O): 22 cm H2O Plateau Pressure (cm H2O): 25 cm H2O 
 MAP (cm H2O): 14 PEEP/VENT (cm H2O): 7 cm H20 Current Facility-Administered Medications Medication Dose Route Frequency  dexamethasone (DECADRON) 4 mg/mL injection 6 mg  6 mg IntraVENous Q12H  
 sodium bicarbonate (8.4%) 150 mEq in dextrose 5% 1,000 mL infusion   IntraVENous CONTINUOUS  chlorhexidine (PERIDEX) 0.12 % mouthwash 10 mL  10 mL Oral Q12H  
 fentaNYL (PF) 900 mcg/30 ml infusion soln  0-200 mcg/hr IntraVENous TITRATE  azithromycin (ZITHROMAX) 500 mg in 0.9% sodium chloride 250 mL (VIAL-MATE)  500 mg IntraVENous Q24H  
 dexmedeTOMidine in 0.9 % NaCl (PRECEDEX) 400 mcg/100 mL (4 mcg/mL) infusion soln  0.1-1.5 mcg/kg/hr (Order-Specific) IntraVENous TITRATE  insulin lispro (HUMALOG) injection   SubCUTAneous Q6H  
 NOREPINephrine (LEVOPHED) 8 mg in 5% dextrose 250mL (32 mcg/mL) infusion  0.5-30 mcg/min IntraVENous TITRATE  cefepime (MAXIPIME) 2 g in sterile water (preservative free) 10 mL IV syringe  2 g IntraVENous Q24H  
 metroNIDAZOLE (FLAGYL) IVPB premix 500 mg  500 mg IntraVENous Q12H  VANCOMYCIN INFORMATION NOTE   Other Rx Dosing/Monitoring  pantoprazole (PROTONIX) 40 mg in 0.9% sodium chloride 10 mL injection  40 mg IntraVENous DAILY  insulin glargine (LANTUS) injection 10 Units  10 Units SubCUTAneous DAILY Telemetry: Sinus Rhythm Limited Physical Exam:  
 General: Intubated, Sedated HEENT: NC/AT, Septum midline, No visible nasal drainage, Oral Cavity clear w/out copious drainage, Trachea midline Chest: No visible trauma or deformity to chest wall, RRR- via Tele Lungs: Equal & Bilateral chest rise, Airway clear, ET Tube in place and secured Abdomen: Non-Distended Extremity: 2+pitting edema BLE Neuro: Sedated Skin: Cortez Type II, No new skin lesions, rashes, or ulcers DATA: 
MAR reviewed and pertinent medications noted or modified as needed Labs: 
Recent Labs  
  01/27/21 
0400 01/26/21 
2324 WBC 10.9 6.8 HGB 8.4* 9.5* HCT 24.9* 28.4*  
* 110* Recent Labs  
  01/27/21 
1445 01/27/21 
0840 01/27/21 
0400 01/26/21 
2324  141 138 138  
K 4.6 5.0 4.5 4.9  
* 111 112* 110  
CO2 16* 15* 14* 13* * 251* 204* 213* BUN 83* 78* 79* 73* CREA 2.34* 2.35* 2.10* 2.10* CA 7.0* 7.2* 7.6* 8.0*  
MG  --   --  1.8  --   
PHOS 6.3* 6.9* 6.1*  --   
ALB 3.1* 2.9* 2.7* 3.1* ALT  --   --  22 20 No results for input(s): PH, PCO2, PO2, HCO3, FIO2 in the last 72 hours. Recent Labs  
  01/27/21 
1735 01/27/21 
0757 01/27/21 
0454 FIO2I 65 80 100 HCO3I 14.7* 11.7* 14.3*  
PCO2I 29.5* 31.6* 49.3* PHI 7.31* 7.18* 7.07* PO2I 107* 99 201* Imaging: 
[x]   I have personally reviewed the patients radiographs and reports XR Results (most recent): 
[place] from [facility] [place] on 01/26/21 XR CHEST PORT Narrative ONE VIEW CHEST RADIOGRAPH INDICATION: IJ placement. COMPARISON: Chest radiograph 1/27/2021 at 4:58. TECHNIQUE: AP view of the chest 
 
FINDINGS: 
  
LINES/TUBES: Endotracheal tube tip is in stable position. Interval placement of 
left IJ central line with tip in the upper SVC. No pneumothorax. MEDIASTINUM: Cardiac silhouette is within normal limits. LUNGS: Similar appearance of mid to lower lung opacities bilaterally, not 
significantly changed from prior. BONES/OTHER: No acute osseous abnormality. Impression Interval placement of left IJ central line with tip in the upper SVC. No 
pneumothorax. CT Results (most recent): 
Results from [facility] [place] encounter on 03/24/19 CTA CHEST W OR W WO CONT Narrative EXAM: CT Angiogram of the Chest 
 
CLINICAL INDICATION: Worsening shortness of breath. Leg swelling. TECHNIQUE: CT angiogram of the chest performed in an axial, sagittal, and 
coronal planes. MIPS performed.  
 
All CT scans at this facility are performed using dose optimization technique as 
 appropriate to a performed exam, to include automated exposure control, 
adjustment of the mA and/or kV according to patient size (including appropriate 
matching for site specific examination) or use of iterative reconstruction 
technique. IV CONTRAST: 72 cc of Isovue 370 COMPARISON: CT thorax without contrast 3/15/2016 FINDINGS: 
Limitations: The examination is mildly degraded by respiratory motion artifact, 
predominantly in the upper lobes. Mediastinum/Lymph nodes:  Unchanged 9 mm left lower paratracheal lymph node, 
nonspecific and possibly reactive. Nonspecific 11 mm short axis subcarinal lymph 
node which is increased in size compared to 3/15/2016 but it measured 7 mm, 
possibly reactive Cardiovascular: There is no pulmonary artery filling defect within the main, lobar, segmental, 
or proximal subsegmental branches to suggest pulmonary embolism. There is mild enlargement of the pulmonary trunk and right pulmonary artery 
which may be seen with pulmonary hypertension. There is no recent flux of 
contrast into the IVC. There is no flattening of the interventricular septum. The left ventricle right ventricle ratio measures greater than 1. Coronary artery atherosclerotic ossifications are present. Trace pericardial effusion. Lungs/Airways:  
Limited assessment of the lung parenchyma secondary to respiratory motion 
artifact. Scattered centrilobular emphysematous changes. Tree-in-bud opacities 
within the posterior left upper lobe (axial series 2 image 62) compatible with 
infectious or inflammatory bronchiolitis. Recommend follow-up chest CT in 3 
months to document resolution. Bibasilar compressive atelectasis. No definite underlying pneumonia in the 
bilateral lower lobes. Likely scarring within the inferior medial right middle 
lobe. Pleura: Small to moderate right and small left bilateral pleural effusions. Axilla/Chest wall: Mild generalized body wall edema. Upper Abdomen: Small hiatal hernia with mild distal esophageal mural thickening, 
similar to 3/15/2016. Several paraesophageal varices are noted. Moderate 
splenomegaly. Small perihepatic and mild perisplenic ascites. Mild periportal 
edema, compatible with volume overload. No definite hepatic mass. Hypoattenuating 11 mm nodule in the right adrenal gland which is unchanged in 
size compared to 3/15/2016 and therefore likely benign lesion such as an 
adenoma. Multifocal right renal cortical scarring. Small hiatal hernia. Multiple 
perigastric and paraesophageal varices. Mural thickening within the visualized 
stomach, likely related to portal gastropathy. Musculoskeletal: Unchanged tiny skin nodule within the right mid chest (axial 
series 2 image 142) recommend correlation with physical examination. No evidence 
of acute osseous abnormality. There are changes of the shoulders and spine. Impression IMPRESSION: 
1. Findings compatible with portal hypertension. Splenomegaly, perigastric and 
paraesophageal varices. Thickening of the gastric wall likely reflects portal 
gastropathy. 2.  No pulmonary artery filling defect to suggest pulmonary embolism. 3.  Anasarca. Small to moderate right and small left pleural effusions with 
bibasilar compressive atelectasis. 4.  Tree-in-bud opacities in the posterior left upper lobe are compatible with 
infectious or inflammatory bronchiolitis. Recommend follow-up chest CT in 3 
months to document resolution. 5.  Additional findings as above. IMPRESSION:  
· Hypoxic, Hypercapnic Respiratory Failure/ presumed ARDS-  likely due to COVID-19 Pneumonia w/ Acute on Chronic CHF Exacerbation, now requiring Mechanical Ventilation.  
· Pulmonary HTN- based on Echo (01/27/2021)- LV EF: 55-60%, Pulmonary arterial systolic pressure is 70 mmHg. · COVID-19 Pneumonia- Covid-19 Rapid Test (+) on 01/26/2021. Known (+) prior to admission. CXR (+) Fluffy B/L mid to LL Opacities, consistent w/ multifocal infection.   
· Septic shock- tachycardia, tachypnea, elevated procal on admission, 2/2 COVID-19 + UTI, ?underlying aspiration. UA (+) 3+ Bacteria + Large Leuks. Urine Cx pending. Worsening procal, white count. · Metabolic Acidosis- Reasons above. PH: 7.31, pCO2: 29.5, HCO3: 14.7 · NANCY on Stage 3 CKD, Oliguric- likely in setting of Distributive Shock. BUN: 83, Cr: 2.34 
· Encephalopathy- Metabolic, Reasons above · UTI · Hemoptysis- Hx of Esophageal Varices vs Pulmonary Edema · Electrolyte Derangements- Hypocalcemia, Navdeep: 7.0. Hyperphosphatemia, Phos: 6.3. Hypoalbuminemia- Albumin: 3.1 · Thrombocytopenia- Baseline Chronically low. Platelets: 137 · Elevated Troponin- likely CHF Exacerbation + worsening Pulm HTN , Troponin: 1.30- Increasing · Acute on chronic HFpEF - EF 60-65%. ProBNP: 03,593 · Hx Malnutrition · Hx of Esophageal Varices · Hx Liver Cirrhosis   
· Hx DM- HgbA1c: 5.3 
· HX HTN  
· Hx of CAD  
· Hx Recent Left Hip ORIF (12/07/2020) Patient Active Problem List  
Diagnosis Code  Anemia D64.9  
 CAD (coronary artery disease) I25.10  Thrombocytopenia ([facility] D69.6  NSTEMI (non-ST elevated myocardial infarction) ([facility]) I21.4  
 UTI (urinary tract infection) N39.0  Sepsis ([address] A41.9  PNA (pneumonia) J18.9  Hypoxia R09.02  
 Cirrhosis of liver not due to alcohol ([facility] K74.60  Esophageal varices (HCC) I85.00  
 CHF (congestive heart failure) (HCC) I50.9  Dyspnea R06.00  
 Pneumonia J18.9  
 HTN (hypertension), benign I10  
 CAP (community acquired pneumonia) J18.9  Acute on chronic diastolic CHF (congestive heart failure) ([facility]) I50.33  Leg DVT (deep venous thromboembolism), acute, left ([facility]) I82.402  Palpitations R00.2  Acute pulmonary edema (HCC) J81.0  Malaise and fatigue R53.81, R53.83  
  Pancytopenia ([facility] H12.087  Portal hypertension ([facility] K76.6  Chronic anticoagulation Z79.01  
 Hip fracture ([facility] S72.009A  Breast cancer ([facility] C50.919  
 Colon cancer ([facility] C18.9  Pneumonia due to COVID-19 virus U07.1, J12.82 RECOMMENDATIONS:  
Neuro: Titrate sedation to RASS 0 to -1. Currently on Fentanyl + Precedex. Versed for breakthrough sedation PRN. Resp: VAP Bundle, Bronchial Hygiene. Aspiration Precautions, maintain HOB>30'. Decadron q12 hrs, Duonebs q6 hrs PRN. I/D: I/D following. Cont Azithromycin. I/D recs Cefepime, Metronidazole, Vancomycin. Remdesivir given on admission, d/c'd due to renal fx and intubation. WBC: 10.9. Procal: 15.92. LD: 608. Ferritin: 3,116. UA (+) 3+ Bacteria + Large Leuks. Urine Cx pending. Blood Cx + C.diff Assay pending. MRSA Cx pending. Trend WBCs + Temp. Hem/Onc: Monitor Thrombocytopenia. Trend H/H, Monitor for s/o active bleeding. Daily CBC w/ diff. CVS: Bumex x1 given. Levophed gtt started- minimal need. Echo (+) LV EF: 55-60%, Severe Pulm HTN. Cardiac Panel: CKMB: 6.8, Troponin: 1.30, ProBNP: 28,724. LE Duplex (-). Monitor HD, MAP goal >65 mmHg. Originally not a candidate for [facility] [place] due to hemoptysis Metabolic: Bicarb gtt started. Trend daily BMP + Mag + Phos Renal: Nephrology following. Albumin replaced. Jean in place. Trend renal parameters & Monitor UOP. Endocrine: SSI, q6 Glucose checks. Added lantus for hyperglycemia GI: NPO Diet. Musc/Skin: No acute issues. Partial Code, INTUBATE ONLY Best Practice: 
Glycemic Control IHI ICU Bundles: 
 Central Line Bundle Followed , Jean Bundle Followed and Vent Bundle Followed, Vent Day 2 [facility] Vent patients- VAP bundle, aim to keep peak plateau pressure 83-75IR H2O Sress Ulcer Prophylaxis. DVT Prophylaxis. Need for Lines, Jean Assessed. Restraints Need. Palliative Care Evaluation. High complexity decision making was performed during this consultation and evaluation. [x]       Pt is at high risk for further organ failure and dysfunction. Hugo Cherry PA-C 
01/28/21 Pulmonary, Critical Care Medicine [facility] Pulmonary / Critical Care Physician: 
 
Chart and note reviewed. Data reviewed. Seen on rounds earlier today. I have independently evaluated and examined the patient. I agree with the exam, assessment and plans outlined by ELOISA Cage. In brief, my findings, evaluation and recommendations are as stated below: 
 
Pt remains stable. Pressors titrated off. Remains sedated on fentanyl gtt and precedex gtt. Remains on bicarb gtt. Low urine output noted. Nephrology following. Will plan for convalescent plasma per ID. Low hemoglobin noted, will transfuse one unit pRBCs. Monitor for bleeding, NGT to suction. Continue steroids. Urine Cx with GNR on cefepime and flagyl. ID following. Rest of details and diagnostic/treatment plans per APC note. I have personally reviewed all pertinent data including labs, imaging and recommendations of treatment team providers. Total of 45 min critical care time spent at bedside during the course of care providing evaluation,management and care decisions and ordering appropriate treatment related to critical care problems exclusive of procedures. The reason for providing this level of medical care for this critically ill patient was due a critical illness that impaired one or more vital organ systems such that there was a high probability of imminent or life threatening deterioration in the patients condition. This care involved high complexity decision making to assess, manipulate, and support vital system functions, to treat this degree vital organ system failure and to prevent further life threatening deterioration of the patients condition.  
   
Na Stovall MD 
3:02 PM

## 2021-01-28 NOTE — PROGRESS NOTES
TRANSFER - IN REPORT: 
 
Verbal report received from LATRICIA Mina on Francesco Abiola  being received from ED for routine progression of care Report consisted of patients Situation, Background, Assessment and  
Recommendations(SBAR). Information from the following report(s) SBAR, ED Summary, Intake/Output, MAR, Recent Results, Cardiac Rhythm SR and Alarm Parameters  was reviewed with the receiving nurse. Opportunity for questions and clarification was provided. Assessment will be completed upon patients arrival to unit and care assumed. Pt arrived to ICU unit approximately 1100

## 2021-01-28 NOTE — PROGRESS NOTES
Infectious Disease progress Note Reason: septic shock, acute hypoxic respiratory failure, covid positive Current abx Prior abx Cefepime,metronidazole, azithromycin, vancomycin  since 1/27 Lines:  
 
 
Assessment : 
 
79 y.o.  Female w/ PMH of HFpPEF (EF 60-65%), Stage 3 CKD, Recent L Hip ORIF (12/2020), Esophageal Varices, Liver Cirrhosis, DM, HTN, CAD, who presents from [address] [facility] to [facility] via EMS after developing worsening SOB and Hemoptysis after testing (+) for COVID-19 x1 week prior. Clinical presentation c/w acute hypoxic respiratory failure-present on admission due to severe COVID-19 pneumonia/ARDS Elevated [id number] to exclude COVID-19 associated hypercoagulability, pulmonary embolism with full certainty Hypotension-likely septic shock secondary to COVID-19 infection, gram-negative cystitis. Urine culture 1/27-greater than 100,000 colonies of gram-negative rods No further diarrhea argues against C. difficile colitis. Initial diarrhea could be secondary to COVID-19 gastroenteritis. Acute on chronic kidney injury-likely due to sepsis. Monitor for COVID-19 associated nephropathy Elevated  V-mlqoc-ebidar due to sepsis. No evidence of DVT noted on venous duplex 1/27/2021 Improving hypotension. Off pressors. Improving hypoxia. On 55% FiO2. Recommendations: 1. Continue cefepime, metronidazole, azithromycin, vancomycin for now 2. Follow-up blood culture,  urine culture, obtain sputum culture, nasal MRSA screen 3. Continue steroids, anticoagulation per ICU team 
4. Transfuse convalescent plasma 5. Weaning from vent per ICU team 
 
 
 
Above plan was discussed in details with rn, dr Nathan Monge. Please call me if any further questions or concerns. Will continue to participate in the care of this patient. HPI: 
 
 
Patient is currently intubated. Nonverbal.  No further diarrhea. 
  
 
 
Patient's Medications Start Taking No medications on file Continue Taking ACETAMINOPHEN (TYLENOL) 325 MG TABLET    Take 2 Tabs by mouth every four (4) hours as needed for Pain. ASCORBIC ACID, VITAMIN C, (VITAMIN C) 250 MG TABLET    Take 1 Tab by mouth daily. ASPIRIN 81 MG CHEWABLE TABLET    Take 2 Tabs by mouth daily. CALCIUM POLYCARBOPHIL (FIBER LAXATIVE) 625 MG TABLET    Take 1,250 mg by mouth daily as needed. CHOLECALCIFEROL, VITAMIN D3, (VITAMIN D3) 2,000 UNIT TAB    Take 2,000 Units by mouth daily. CYANOCOBALAMIN (VITAMIN B12) 100 MCG TABLET    Take 100 mcg by mouth daily. GLUCOSE 4 GRAM CHEWABLE TABLET    Take 4 Tabs by mouth as needed for Other (hypoglycemia). INSULIN ASPART U-100 (NOVOLOG FLEXPEN U-100 INSULIN) 100 UNIT/ML (3 ML) INPN    by SubCUTAneous route Before breakfast, lunch, and dinner. Sliding scale per instructions MULTIVITAMIN (ONE A DAY) TABLET    Take 1 Tab by mouth daily. NITROGLYCERIN (NITROLINGUAL) 400 MCG/SPRAY SPRAY    1 Spray by SubLINGual route every five (5) minutes as needed. OMEGA 3-DHA-EPA-FISH OIL (FISH OIL) 100-160-1,000 MG CAP    Take 1,000 mg by mouth two (2) times a day. SIMVASTATIN (ZOCOR) 20 MG TABLET    Take 20 mg by mouth daily. SITAGLIPTIN (JANUVIA) 50 MG TABLET    Take 50 mg by mouth daily. These Medications have changed No medications on file Stop Taking No medications on file Current Facility-Administered Medications Medication Dose Route Frequency  0.9% sodium chloride infusion 250 mL  250 mL IntraVENous PRN  
 vancomycin (VANCOCIN) 1,000 mg in 0.9% sodium chloride 250 mL (VIAL-MATE)  1,000 mg IntraVENous ONCE  
 dexamethasone (DECADRON) 4 mg/mL injection 6 mg  6 mg IntraVENous Q12H  
 sodium bicarbonate (8.4%) 150 mEq in dextrose 5% 1,000 mL infusion   IntraVENous CONTINUOUS  chlorhexidine (PERIDEX) 0.12 % mouthwash 10 mL  10 mL Oral Q12H  
 albuterol-ipratropium (DUO-NEB) 2.5 MG-0.5 MG/3 ML  3 mL Nebulization Q6H PRN  
  fentaNYL (PF) 900 mcg/30 ml infusion soln  0-200 mcg/hr IntraVENous TITRATE  midazolam (VERSED) injection 1-2 mg  1-2 mg IntraVENous Q10MIN PRN  
 fentaNYL citrate (PF) injection  mcg   mcg IntraVENous Q30MIN PRN  
 azithromycin (ZITHROMAX) 500 mg in 0.9% sodium chloride 250 mL (VIAL-MATE)  500 mg IntraVENous Q24H  
 dexmedeTOMidine in 0.9 % NaCl (PRECEDEX) 400 mcg/100 mL (4 mcg/mL) infusion soln  0.1-1.5 mcg/kg/hr (Order-Specific) IntraVENous TITRATE  insulin lispro (HUMALOG) injection   SubCUTAneous Q6H  
 glucose chewable tablet 16 g  4 Tab Oral PRN  
 glucagon (GLUCAGEN) injection 1 mg  1 mg IntraMUSCular PRN  
 dextrose (D50W) injection syrg 12.5-25 g  25-50 mL IntraVENous PRN  
 NOREPINephrine (LEVOPHED) 8 mg in 5% dextrose 250mL (32 mcg/mL) infusion  0.5-30 mcg/min IntraVENous TITRATE  cefepime (MAXIPIME) 2 g in sterile water (preservative free) 10 mL IV syringe  2 g IntraVENous Q24H  
 metroNIDAZOLE (FLAGYL) IVPB premix 500 mg  500 mg IntraVENous Q12H  VANCOMYCIN INFORMATION NOTE   Other Rx Dosing/Monitoring  pantoprazole (PROTONIX) 40 mg in 0.9% sodium chloride 10 mL injection  40 mg IntraVENous DAILY  insulin glargine (LANTUS) injection 10 Units  10 Units SubCUTAneous DAILY Current Outpatient Medications Medication Sig  
 aspirin 81 mg chewable tablet Take 2 Tabs by mouth daily.  insulin aspart U-100 (NovoLOG Flexpen U-100 Insulin) 100 unit/mL (3 mL) inpn by SubCUTAneous route Before breakfast, lunch, and dinner. Sliding scale per instructions  acetaminophen (TYLENOL) 325 mg tablet Take 2 Tabs by mouth every four (4) hours as needed for Pain.  glucose 4 gram chewable tablet Take 4 Tabs by mouth as needed for Other (hypoglycemia).  SITagliptin (JANUVIA) 50 mg tablet Take 50 mg by mouth daily.  cholecalciferol, vitamin D3, (VITAMIN D3) 2,000 unit tab Take 2,000 Units by mouth daily.  nitroglycerin (NITROLINGUAL) 400 mcg/spray spray 1 Spray by SubLINGual route every five (5) minutes as needed.  ascorbic acid, vitamin C, (VITAMIN C) 250 mg tablet Take 1 Tab by mouth daily.  multivitamin (ONE A DAY) tablet Take 1 Tab by mouth daily.  simvastatin (ZOCOR) 20 mg tablet Take 20 mg by mouth daily.  omega 3-dha-epa-fish oil (FISH OIL) 100-160-1,000 mg cap Take 1,000 mg by mouth two (2) times a day.  cyanocobalamin (VITAMIN B12) 100 mcg tablet Take 100 mcg by mouth daily.  calcium polycarbophil (FIBER LAXATIVE) 625 mg tablet Take 1,250 mg by mouth daily as needed. Allergies: Latex Temp (24hrs), Av.4 °F (36.9 °C), Min:97.9 °F (36.6 °C), Max:98.7 °F (37.1 °C) Visit Vitals BP (!) 149/71 Pulse 77 Temp 98.4 °F (36.9 °C) Resp 30 Ht 5' (1.524 m) Wt 71.2 kg (157 lb) SpO2 100% BMI 30.66 kg/m² ROS: unable to obtain Physical Exam: 
 
General:  Alert, cooperative, no distress. Head:  Normocephalic, without obvious abnormality, atraumatic. Eyes:  Conjunctivae/corneas clear. PERRL, Nose: Nares normal. Septum midline. Mucosa normal. No drainage or sinus tenderness. Throat: Lips, mucosa, and tongue normal. Teeth and gums normal.  
Neck: Supple, symmetrical, trachea midline, no adenopathy, thyroid: no enlargment/tenderness/nodules, no carotid bruit and no JVD. Back:   Symmetric, no curvature. ROM normal.  
Lungs:   Clear to auscultation bilaterally. Chest wall:  No tenderness or deformity. Heart:  Regular rate and rhythm, S1, S2 normal, no murmur, click, rub or gallop. Abdomen:   Soft, non-tender. Bowel sounds normal. No masses,  No organomegaly. Extremities: Extremities normal, atraumatic, no cyanosis or edema. Pulses: 2+ and symmetric all extremities. Skin: Skin color, texture, turgor normal. No rashes or lesions       Lymph Nodes:  Cervical, supraclavicular, and axillary nodes normal.  
Neurologic:  Grossly nonfocal  
 
 
 
 Labs: Results:  
Chemistry Recent Labs  
  01/28/21 
0300 01/27/21 
1445 01/27/21 
0840 01/27/21 
0400 01/26/21 
2324 * 291* 251* 204* 213*  142 141 138 138  
K 4.3 4.6 5.0 4.5 4.9  
* 112* 111 112* 110  
CO2 19* 16* 15* 14* 13* BUN 90* 83* 78* 79* 73* CREA 2.51* 2.34* 2.35* 2.10* 2.10* CA 7.4* 7.0* 7.2* 7.6* 8.0* AGAP 11 14 15 12 15 BUCR 36* 35* 33* 38* 35* *  --   --  121* 108 TP 5.7*  --   --  6.2* 6.9 ALB 2.8* 3.1* 2.9* 2.7* 3.1*  
GLOB 2.9  --   --  3.5 3.8 AGRAT 1.0  --   --  0.8 0.8 CBC w/Diff Recent Labs  
  01/28/21 
0300 01/27/21 
0400 01/26/21 
2324 WBC 3.2* 10.9 6.8  
RBC 2.22* 2.94* 3.36* HGB 6.4* 8.4* 9.5* HCT 18.1* 24.9* 28.4*  
PLT 50* 115* 110* GRANS  --  92* 72 LYMPH  --  6* 25 EOS  --  0 0 Microbiology Recent Labs  
  01/27/21 
1250 01/27/21 
1245 CULT NO GROWTH AFTER 17 HOURS NO GROWTH AFTER 17 HOURS  
  
 
 
RADIOLOGY: 
 
All available imaging studies/reports in [facility] care for this admission were reviewed Total time spent >35 minutes. High complexity decision making was performed during the evaluation of this patient at high risk for decompensation with multiple organ involvement Above mentioned total time spent on reviewing the case/medical record/data/notes/EMR/patient examination/documentation/coordinating care with nurse/consultants, exclusive of procedures with complex decision making performed and > 50% time spent in face to face evaluation. Dr. Kathi Garcia, Infectious Disease Specialist 
[phone] January 28, 2021 
1:25 PM

## 2021-01-28 NOTE — PROGRESS NOTES
RENAL PROGRESS NOTE Nila Carnes Assessment/Plan:  
 
  
NANCY on CKD3: nonoliguric. Increase IVF. Covid PNA/Resp failure mech vent per PCCM. Met Acidosis: controlled on bicarb gtt. Cirrhosis/Varices: dismal prognosis No indicatio but sub-optimal RRT candidate. Subjective: 
Patient complaints of: vent Patient Active Problem List  
Diagnosis Code  Anemia D64.9  
 CAD (coronary artery disease) I25.10  Thrombocytopenia ([facility] D69.6  NSTEMI (non-ST elevated myocardial infarction) (HCC) I21.4  
 UTI (urinary tract infection) N39.0  Sepsis ([facility] A41.9  PNA (pneumonia) J18.9  Hypoxia R09.02  
 Cirrhosis of liver not due to alcohol ([facility] K74.60  Esophageal varices (HCC) I85.00  
 CHF (congestive heart failure) (HCC) I50.9  Dyspnea R06.00  
 Pneumonia J18.9  
 HTN (hypertension), benign I10  
 CAP (community acquired pneumonia) J18.9  Acute on chronic diastolic CHF (congestive heart failure) (HCC) I50.33  Leg DVT (deep venous thromboembolism), acute, left (HCC) I82.402  Palpitations R00.2  Acute pulmonary edema (HCC) J81.0  Malaise and fatigue R53.81, R53.83  
 Pancytopenia ([facility] [id number]  Portal hypertension ([facility] K76.6  Chronic anticoagulation Z79.01  
 Hip fracture ([facility] S72.009A  Breast cancer ([facility] C50.919  
 Colon cancer ([facility] C18.9  Pneumonia due to COVID-19 virus U07.1, J12.82 Current Facility-Administered Medications Medication Dose Route Frequency Provider Last Rate Last Admin  
 0.9% sodium chloride infusion 250 mL  250 mL IntraVENous PRN Magali Barrera PA-C      
 vancomycin (VANCOCIN) 1,000 mg in 0.9% sodium chloride 250 mL (VIAL-MATE)  1,000 mg IntraVENous ONCE Magali Barrera PA-C   1,000 mg at 01/28/21 9574  dexamethasone (DECADRON) 4 mg/mL injection 6 mg  6 mg IntraVENous Q12H Sharon Gonzales DO   6 mg at 01/28/21 4696  sodium bicarbonate (8.4%) 150 mEq in dextrose 5% 1,000 mL infusion   IntraVENous CONTINUOUS Magali Barrera PA-C 50 mL/hr at 01/28/21 0553 New Bag at 01/28/21 6732  chlorhexidine (PERIDEX) 0.12 % mouthwash 10 mL  10 mL Oral Q12H Magali Barrera PA-C   10 mL at 01/28/21 0340  albuterol-ipratropium (DUO-NEB) 2.5 MG-0.5 MG/3 ML  3 mL Nebulization Q6H PRN Magali Barrera PA-C      
 fentaNYL (PF) 900 mcg/30 ml infusion soln  0-200 mcg/hr IntraVENous TITRATE Magali Solorio PA-C 1.7 mL/hr at 01/28/21 0929 50 mcg/hr at 01/28/21 0929  
 midazolam (VERSED) injection 1-2 mg  1-2 mg IntraVENous Q10MIN PRN Magali Solorio PA-C      
 fentaNYL citrate (PF) injection  mcg   mcg IntraVENous Q30MIN PRN Magali Solorio PA-C   100 mcg at 01/28/21 0621  
 azithromycin (ZITHROMAX) 500 mg in 0.9% sodium chloride 250 mL (VIAL-MATE)  500 mg IntraVENous Q24H Magali Barrera PA-C   500 mg at 01/28/21 7120  dexmedeTOMidine in 0.9 % NaCl (PRECEDEX) 400 mcg/100 mL (4 mcg/mL) infusion soln  0.1-1.5 mcg/kg/hr (Order-Specific) IntraVENous TITRATE Magali Solorio PA-C 26.3 mL/hr at 01/28/21 0928 1.5 mcg/kg/hr at 01/28/21 3335  insulin lispro (HUMALOG) injection   SubCUTAneous Q6H Carol Carney MD   6 Units at 01/28/21 0912  
 glucose chewable tablet 16 g  4 Tab Oral PRN Magali Solorio PA-C      
 glucagon (GLUCAGEN) injection 1 mg  1 mg IntraMUSCular PRN Magali Solorio PA-C      
 dextrose (D50W) injection syrg 12.5-25 g  25-50 mL IntraVENous PRN Maagli Barrera PA-C      
 NOREPINephrine (LEVOPHED) 8 mg in 5% dextrose 250mL (32 mcg/mL) infusion  0.5-30 mcg/min IntraVENous TITRATE Kayla Cat MD   Stopped at 01/28/21 1000  cefepime (MAXIPIME) 2 g in sterile water (preservative free) 10 mL IV syringe  2 g IntraVENous Q24H Magali Barrera PA-C   2 g at 01/28/21 3743  metroNIDAZOLE (FLAGYL) IVPB premix 500 mg  500 mg IntraVENous Q12H Magali Barrera PA-C 100 mL/hr at 01/28/21 0942 500 mg at 01/28/21 1749  VANCOMYCIN INFORMATION NOTE   Other Rx Dosing/Monitoring Magali Barrera PA-C      
 pantoprazole (PROTONIX) 40 mg in 0.9% sodium chloride 10 mL injection  40 mg IntraVENous DAILY Lavonne Mckinnon MD   40 mg at 01/28/21 0939  
 insulin glargine (LANTUS) injection 10 Units  10 Units SubCUTAneous DAILY Vikas SCOTT PA-C   10 Units at 01/28/21 4631 Objective Vitals:  
 01/28/21 0938 01/28/21 0945 01/28/21 1000 01/28/21 1054 BP:  (!) 158/77 (!) 149/71 Pulse: 77 77 77 77 Resp: (!) 31 30 30 25 Temp:      
SpO2: 100%  100% 100% Weight:      
Height:      
 
 
 
Intake/Output Summary (Last 24 hours) at 1/28/2021 1111 Last data filed at 1/28/2021 9265 Gross per 24 hour Intake 310 ml Output 300 ml Net 10 ml Admission weight: Weight: 68 kg (150 lb) (01/27/21 0830) Last Weight Metrics: 
Weight Loss Metrics 1/27/2021 12/10/2020 9/28/2020 12/1/2019 10/16/2019 9/30/2019 8/6/2019 Today's Wt 157 lb 157 lb 8 oz 149 lb 14.4 oz 140 lb 143 lb 140 lb 145 lb BMI 30.66 kg/m2 30.76 kg/m2 29.28 kg/m2 27.34 kg/m2 27.93 kg/m2 27.34 kg/m2 28.32 kg/m2 Physical Assessment:  
 
General: intubated. Neck: No jvd. LUNGS: Clear to Auscultation, No rales, rhonchi or wheezes. CVS EXM: S1, S2  RRR, no murmurs/gallops/rubs. Abdomen: soft, non tender. Lower Extremities: + edema. Lab CBC w/Diff Recent Labs  
  01/28/21 
0300 01/27/21 
0400 01/26/21 
2324 WBC 3.2* 10.9 6.8  
RBC 2.22* 2.94* 3.36* HGB 6.4* 8.4* 9.5* HCT 18.1* 24.9* 28.4*  
PLT 50* 115* 110* GRANS  --  92* 72 LYMPH  --  6* 25 EOS  --  0 0 Chemistry Recent Labs  
  01/28/21 
0300 01/27/21 
1445 01/27/21 0840 01/27/21 
0400 01/26/21 
2324 01/26/21 
2324 * 291* 251* 204*  --  213*  142 141 138  --  138  
K 4.3 4.6 5.0 4.5  --  4.9 * 112* 111 112*  --  110 CO2 19* 16* 15* 14*  --  13* BUN 90* 83* 78* 79*  --  73* CREA 2.51* 2.34* 2.35* 2.10*  --  2.10* CA 7.4* 7.0* 7.2* 7.6*  --  8.0* AGAP 11 14 15 12  --  15  
BUCR 36* 35* 33* 38*  --  35* *  --   --  121*  --  108 TP 5.7*  --   --  6.2*  --  6.9 ALB 2.8* 3.1* 2.9* 2.7*  --  3.1*  
GLOB 2.9  --   --  3.5  --  3.8 AGRAT 1.0  --   --  0.8  --  0.8 PHOS 5.3* 6.3* 6.9* 6.1*   < >  --   
 < > = values in this interval not displayed. Lab Results Component Value Date/Time Iron 65 12/08/2020 11:30 AM  
 TIBC 189 (L) 12/08/2020 11:30 AM  
 Iron % saturation 34 12/08/2020 11:30 AM  
 Ferritin 3,690 (H) 01/28/2021 03:00 AM  
  
Lab Results Component Value Date/Time Calcium 7.4 (L) 01/28/2021 03:00 AM  
 Phosphorus 5.3 (H) 01/28/2021 03:00 Reynaldo Mckeon MD 
[facility] Pager: [phone]

## 2021-01-28 NOTE — PROGRESS NOTES
01/28/21 0954 Patient Observations Pulse (Heart Rate) 80 Resp Rate 30  
O2 Sat (%) 100 % Airway - Continuous Aspiration of Subglottic Secretions (MAAME) Tube 01/27/21 Oral  
Placement Date/Time: 01/27/21 0439   Number of Attempts: 1  Location: Oral  Airway Types: Endotracheal, cuffed Insertion Depth (cm) 23 cm Line Galileo Lips Side Secured Device; Left Cuff Pressure  
(MOV) Site Assessment Clean, dry, & intact Respiratory Patient on Vent Yes - If patient is on vent, add Doc Flowsheet Ventilator (). Respiratory Pattern Tachypneic Chest/Tracheal Assessment Chest expansion, symmetrical  
Breath Sounds Bilateral Diminished Vent Settings FIO2 (%) 55 % SpO2/FIO2 Ratio 181.82  
CMV Rate Set 20 Back-Up Rate 20 Vt Set (ml) 400 ml PEEP/VENT (cm H2O) 7 cm H20 Insp Time (sec) 0.9 sec Insp Rise Time % 50 % Flow Trigger 3 Ventilator Measurements Resp Rate Observed 30 Vt Exhaled (Machine Breath) (ml) 427 ml Ve Observed (l/min) 12.3 l/min PIP Observed (cm H2O) 22 cm H2O  
MAP (cm H2O) 14 Safety & Alarms Circuit Temperature  
(HME) Backup Mode Checked/Apnea Yes Pressure Max 40 cm H2O Ve Min 2 Ve Max 20 Vt Min 200 ml Vt Max 1000 ml  
RR Max 40 Ambu Bag Yes Ambu Mask Yes Age Specific Ventilator Associated Pneumonia Bundle Patient Age Group Adult Vent Method/Mode Ventilation Method Conventional  
Ventilator Mode Assist control;VC+  
$$ Ventilator Subsequent Subsequent Vent Day

## 2021-01-28 NOTE — PROGRESS NOTES
Results for Nikki Man (MRN [id number]) as of 1/28/2021 05:20 Ref. Range 1/28/2021 04:55  
pH (POC) Latest Ref Range: 7.35 - 7.45   7.41  
pCO2 (POC) Latest Ref Range: 35.0 - 45.0 MMHG 25.9 (L)  
pO2 (POC) Latest Ref Range: 80 - 100 MMHG 96 HCO3 (POC) Latest Ref Range: 22 - 26 MMOL/L 16.4 (L)  
sO2 (POC) Latest Ref Range: 92 - 97 % 98 (H) Base deficit (POC) Latest Units: mmol/L 8  
FIO2 (POC) Latest Units: % 55 Patient temp. Latest Units:   37.0 Specimen type (POC) Latest Units:   ARTERIAL Set Rate Latest Units: bpm 20 Site Latest Units:   LEFT RADIAL Device: Latest Units:   VENT Total resp. rate Latest Units:   31 Mode Latest Units:   ASSIST CONTROL Tidal volume Latest Units: ml 400 Volume control plus Latest Units:   YES  
PIP (POC) Latest Units:   27  
PEEP/CPAP (POC) Latest Units: cmH2O 7 Allens test (POC) Latest Units:   YES Inspiratory Time Latest Units: sec 0.9

## 2021-01-28 NOTE — PROGRESS NOTES
conducted an initial consultation and Spiritual Assessment for Kailee Monahan, who is a 79 y.o.,female. Patient's Primary Language is: [place]. According to the patient's EMR Temple Affiliation is: Goldy Najera.  
 
The reason the Patient came to the hospital is:  
Patient Active Problem List  
 Diagnosis Date Noted  Pneumonia due to COVID-19 virus 01/27/2021  Hip fracture ([address] 12/06/2020  Breast cancer ([address] 12/06/2020  Colon cancer ([address] 12/06/2020  Palpitations 04/25/2019  Acute pulmonary edema ([address] 04/25/2019  Malaise and fatigue 04/25/2019  Pancytopenia ([address] 04/25/2019  Portal hypertension ([address] 04/25/2019  Chronic anticoagulation 04/25/2019  CAP (community acquired pneumonia) 03/24/2019  Acute on chronic diastolic CHF (congestive heart failure) ([address] 03/24/2019  Leg DVT (deep venous thromboembolism), acute, left ([address] 03/24/2019  
 HTN (hypertension), benign 01/28/2018  Pneumonia 01/25/2018  CHF (congestive heart failure) ([address] 10/18/2017  Dyspnea 10/18/2017  Cirrhosis of liver not due to alcohol ([address] 06/29/2017  Esophageal varices ([address] 06/29/2017  UTI (urinary tract infection) 06/21/2017  Sepsis ([address] 06/21/2017  PNA (pneumonia) 06/21/2017  Hypoxia 06/21/2017  
 NSTEMI (non-ST elevated myocardial infarction) ([address] 06/30/2016  Anemia 07/07/2015  CAD (coronary artery disease) 07/07/2015  Thrombocytopenia ([address] 07/07/2015 The  provided the following Interventions: 
Offered prayer and assurance of continued prayers on patient's behalf. Chart reviewed. Assessment: 
There are no spiritual or Druze issues which require intervention at this time. Plan: 
Chaplains will continue to follow and will provide pastoral care on an as needed/requested basis.  recommends bedside caregivers page  on duty if patient shows signs of acute spiritual or emotional distress. [address] [place] [address] Care  
[phone]

## 2021-01-29 NOTE — PROGRESS NOTES
Comprehensive Nutrition Assessment Type and Reason for Visit: Initial, Positive nutrition screen Nutrition Recommendations/Plan: - If minimal non-bloody output via OGT, plan to initiate tube feeding of Nepro at 10 mL/hr with prosource once daily and 100 mL q 4 hour water flushes; monitor tolerance and ability to advance to goal rate of 40 mL/hr (goal regimen to provide 1788 kcal, 93 gm protein, 696 mL free water, 100% RDIs). - IVF per MD.  
 
Nutrition Assessment:  Intubated with OGT to intermittent suction, bilious output. Malnutrition Assessment: 
Malnutrition Status: At risk for malnutrition (specify)(unable to eat due to intubation) Nutrition History and Allergies: PMH of HFpPEF (EF 60-65%), Stage 3 CKD, Recent L Hip ORIF (12/2020), Esophageal Varices, Liver Cirrhosis, DM, HTN, CAD, who presents from [address] [place] [address] to ED via EMS after developing worsening SOB and Hemoptysis after testing (+) for COVID-19 x1 week prior. NKFA Estimated Daily Nutrient Needs: 
Energy (kcal): 7800-8117; Weight Used for Energy Requirements: Admission(68 kg) Protein (g):  ; Weight Used for Protein Requirements: Admission(1.2-2) Fluid (ml/day): 3156-9273; Method Used for Fluid Requirements: 1 ml/kcal 
 
Nutrition Related Findings:  Last BM 1/26 (hypoactive bowel sounds); Oliguria Wounds:  None Additional Caloric Sources: Sodium bicarbonate in D5 at 50 mL/hr (60 gm dextrose, 204 kcal per day) Current Nutrition Therapies: DIET NPO Anthropometric Measures: 
· Height:  5' (152.4 cm) · Current Body Wt:  76.1 kg (167 lb 12.3 oz) · Admission Body Wt:  149 lb 14.6 oz   
· Ideal Body Wt:  100 lbs:  167.8 % · BMI Category:  Obese class 1 (BMI 30.0-34. 9) Nutrition Diagnosis:  
· Inadequate oral intake related to cognitive or neurological impairment, impaired respiratory function as evidenced by intubation, NPO or clear liquid status due to medical condition Nutrition Interventions:  
Food and/or Nutrient Delivery: Continue NPO Nutrition Education and Counseling: Education not indicated Coordination of Nutrition Care: Continue to monitor while inpatient Goals: 
Nutritional needs will be met through adequate oral intake or nutrition support within the next 7 days Nutrition Monitoring and Evaluation:  
Behavioral-Environmental Outcomes: None identified Food/Nutrient Intake Outcomes: Diet advancement/tolerance, Enteral nutrition intake/tolerance Physical Signs/Symptoms Outcomes: Biochemical data, GI status, Nausea/vomiting, Fluid status or edema, Hemodynamic status, Nutrition focused physical findings Discharge Planning: Too soon to determine Electronically signed by Radha Kasper RD, [address]  on 1/29/2021 at 12:11 PM 
 
Contact: [phone]

## 2021-01-29 NOTE — PROGRESS NOTES
Reason for Admission:   Pneumonia due to COVID-19 virus [U07.1, J12.82] RUR Score:    37% Resources/supports as identified by patient/family:   Sister, nephew, and SNF nursing staff. Top Challenges facing patient (as identified by patient/family and CM): Finances/Medication cost?     n/a Transportation      Stretcher transportation Support system or lack thereof? Good family support. Living arrangements? Patient owns home but at [facility] for therapy. Self-care/ADLs/Cognition? Needs assistance. Intubated at this time. Current Advanced Directive/Advance Care Plan:   no 
                       
Plan for utilizing home health:    no 
                   
Likelihood of readmission:   HIGH Transition of Care Plan:               
 
 
Initial assessment completed with Jerome Silverio via phone. Cognitive status of patient: unable to assess. Patient is intubated and isolation room/COVID + Face sheet information confirmed:  yes. Gerhardt Done, sister ([phone]) provided need information. . This patient is in [facility] to complete therapy per sister. Prior to hospitalization, patient was considered to be independent with ADLs/IADLS : no . If not independent,  patient needs assist with : dressing, bathing, food preparation, toileting and grooming Patient has a current ACP document on file: no  
 
Transportation needs will be determined  upon discharge. The patient's sister reported that patient already has [employer] medical equipment available in the home. Patient has stayed in a skilled nursing facility or rehab. Was  stay within last 60 days : yes. [facility]. This patient is on dialysis :no 
 
 Freedom of choice telephone verbal consent from Gerhardt Done: yes, for  [facility] and placed in cc/Link. Currently, the discharge plan is SNF. Patient was discharge to [facility] on 12/11/2020. Per . Sunny Franco, patient on stayed in [place] for a short time until she was transferred to [address] [place] [address]. Mrs. Sunny Franco stated \" I want her to go back to [facility] to finish her therapy. \"  
 
CM will continue to assist with transitional needs. . 
 
Patient's current insurance is Medicare Part A & B Care Management Interventions PCP Verified by CM: Yes Mode of Transport at Discharge: S Transition of Care Consult (CM Consult): Discharge Planning Discharge Durable Medical Equipment: No 
Physical Therapy Consult: No 
Occupational Therapy Consult: No 
Speech Therapy Consult: No 
Current Support Network: [address] [place] [address] Discharge Location Discharge Placement: Skilled nursing facility MIRA Love, RN Pager # [phone] Care Manager

## 2021-01-29 NOTE — PROGRESS NOTES
visited with the family of Carlos Barrett, who is a 79 y.o.,female. The  provided the following Interventions: 
Initiated a relationship of care and support. Offered a Zoom Video to see their love one, but at this time, they do not want to do one with the patient intubated. They will call us back when they want to do one. Plan: 
Chaplains will continue to follow and will provide pastoral care on an as needed/requested basis.  recommends bedside caregivers page  on duty if patient shows signs of acute spiritual or emotional distress. [facility]  
[phone]

## 2021-01-29 NOTE — PROGRESS NOTES
[facility]. Pulmonary, Critical Care, and Sleep Medicine Name: Denisa Cortes MRN: [id number] : 1940 Hospital: [facility] Date: 2021  Admission Date: 2021 Chart and notes reviewed. Data reviewed. I have evaluated all findings. [x]I have reviewed the flowsheet and previous days notes. [x]The patient is unable to give any meaningful history or review of systems because the patient is: 
[x]Intubated [x]Sedated  
[]Unresponsive [x]The patient is critically ill on     
[x]Mechanical ventilation [x]Pressors []BiPAP [] Interval HPI:  
Griselda Leonardo is a 79 y.o.  Female w/ PMH of HFpPEF (EF 55-60%), Stage 3 CKD, Recent L Hip ORIF (2020), Esophageal Varices, Liver Cirrhosis, DM, HTN, CAD, who presented from [address] [place] [address] to [facility] ED on 2021 via EMS after developing worsening SOB and Hemoptysis after testing (+) for COVID-19 x1 week prior.  
  
ICU consulted for Hypoxia and is noted to be satting at 94+ on 15L/100% HFNC. Shortly after examination, she required Endotracheal Intubation w/ Mechanical Ventilation.  
  
Discussed with patient's sister and nephew. Nephew is CC nurse magdy Abdi. Updated them on critical nature and high likelihood for mortality due to age and underlying conditions, wished to proceed with intubation but DNR otherwise. 21 Hospital Day: 4 Vent Day: 3 Overnight Events: none Mentation: Sedated Respiratory/ Secretions: Intubated, Oral Cavity w/out copious secretions Hemodynamics: VSS Diet: NPO Urine/Fecal Output: decreased overnight, no bm. Need for Procedures: a line ROS:Review of systems not obtained due to patient factors. Events and notes from last 24 hours reviewed. Care plan discussed on multidisciplinary rounds. Patient Active Problem List  
Diagnosis Code  Anemia D64.9  
 CAD (coronary artery disease) I25.10  Thrombocytopenia ([facility] D69.6  NSTEMI (non-ST elevated myocardial infarction) (HCC) I21.4  
 UTI (urinary tract infection) N39.0  Sepsis ([facility] A41.9  PNA (pneumonia) J18.9  Hypoxia R09.02  
 Cirrhosis of liver not due to alcohol ([facility] K74.60  Esophageal varices (HCC) I85.00  
 CHF (congestive heart failure) (HCC) I50.9  Dyspnea R06.00  
 Pneumonia J18.9  
 HTN (hypertension), benign I10  
 CAP (community acquired pneumonia) J18.9  Acute on chronic diastolic CHF (congestive heart failure) (HCC) I50.33  Leg DVT (deep venous thromboembolism), acute, left (HCC) I82.402  Palpitations R00.2  Acute pulmonary edema (HCC) J81.0  Malaise and fatigue R53.81, R53.83  
 Pancytopenia ([facility] [id number]  Portal hypertension ([facility] K76.6  Chronic anticoagulation Z79.01  
 Hip fracture ([facility] S72.009A  Breast cancer ([facility] C50.919  
 Colon cancer ([facility] C18.9  Pneumonia due to COVID-19 virus U07.1, J12.82 Vital Signs: 
Visit Vitals BP (!) 147/68 Pulse 65 Temp 98.4 °F (36.9 °C) Resp 19 Ht 5' (1.524 m) Wt 76.1 kg (167 lb 12.3 oz) SpO2 99% BMI 32.77 kg/m² O2 Device: Endotracheal tube, Ventilator O2 Flow Rate (L/min): 15 l/min Temp (24hrs), Av.5 °F (36.9 °C), Min:97.7 °F (36.5 °C), Max:99.3 °F (37.4 °C) Intake/Output:  
Last shift:      No intake/output data recorded. Last 3 shifts:  1901 -  0700 In: 5605.8 [I.V.:4985.8] Out: 580 [Urine:580] Intake/Output Summary (Last 24 hours) at 2021 1102 Last data filed at 2021 6180 Gross per 24 hour Intake 2822.42 ml Output 230 ml Net 2592.42 ml Ventilator Settings: 
Ventilator Mode: Assist control Respiratory Rate Back-Up Rate: 20 Insp Time (sec): 0.9 sec I:E Ratio: 1:2.3 Ventilator Volumes Vt Set (ml): 400 ml Vt Exhaled (Machine Breath) (ml): 356 ml Vt Spont (ml): 540 ml Ve Observed (l/min): 8.3 l/min Ventilator Pressures PIP Observed (cm H2O): 25 cm H2O 
 Plateau Pressure (cm H2O): 29 cm H2O 
MAP (cm H2O): 13 PEEP/VENT (cm H2O): 7 cm H20 Current Facility-Administered Medications Medication Dose Route Frequency  levoFLOXacin (LEVAQUIN) 750 mg in D5W IVPB  750 mg IntraVENous Q48H Followed by  
Cydne Rosin ON 1/31/2021] levoFLOXacin (LEVAQUIN) 500 mg in D5W IVPB  500 mg IntraVENous Q48H  
 [START ON 1/30/2021] insulin glargine (LANTUS) injection 20 Units  20 Units SubCUTAneous DAILY  insulin glargine (LANTUS) injection 10 Units  10 Units SubCUTAneous ONCE  
 dexamethasone (DECADRON) 4 mg/mL injection 6 mg  6 mg IntraVENous Q12H  
 sodium bicarbonate (8.4%) 150 mEq in dextrose 5% 1,000 mL infusion   IntraVENous CONTINUOUS  chlorhexidine (PERIDEX) 0.12 % mouthwash 10 mL  10 mL Oral Q12H  
 fentaNYL (PF) 900 mcg/30 ml infusion soln  0-200 mcg/hr IntraVENous TITRATE  dexmedeTOMidine in 0.9 % NaCl (PRECEDEX) 400 mcg/100 mL (4 mcg/mL) infusion soln  0.1-1.5 mcg/kg/hr (Order-Specific) IntraVENous TITRATE  insulin lispro (HUMALOG) injection   SubCUTAneous Q6H  
 NOREPINephrine (LEVOPHED) 8 mg in 5% dextrose 250mL (32 mcg/mL) infusion  0.5-30 mcg/min IntraVENous TITRATE  cefepime (MAXIPIME) 2 g in sterile water (preservative free) 10 mL IV syringe  2 g IntraVENous Q24H  VANCOMYCIN INFORMATION NOTE   Other Rx Dosing/Monitoring  pantoprazole (PROTONIX) 40 mg in 0.9% sodium chloride 10 mL injection  40 mg IntraVENous DAILY Telemetry: Sinus Rhythm Limited Physical Exam:  
 General: Intubated, Sedated HEENT: NC/AT, Septum midline, No visible nasal drainage, Oral Cavity clear w/out copious drainage, Trachea midline Chest: No visible trauma or deformity to chest wall, RRR- via Tele Lungs: Equal & Bilateral chest rise, Airway clear, ET Tube in place and secured Abdomen: Non-Distended, large reducible hernia Extremity: 2+pitting edema BLE Neuro: Sedated Skin: No new skin lesions, rashes, or ulcers DATA: 
 MAR reviewed and pertinent medications noted or modified as needed Labs: 
Recent Labs  
  01/29/21 
0800 01/29/21 0225 01/28/21 2013 WBC 2.2* 2.4* 3.3* HGB 7.0* 7.0* 7.3* HCT 20.5* 20.5* 21.3*  
PLT 34* 35* 46* Recent Labs  
  01/29/21 
0800 01/29/21 
0225 01/28/21 2013 01/28/21 
1400 01/28/21 
0300 01/27/21 
0400 01/27/21 
0400  143 142 140 142   < > 138  
K 4.2 4.1 4.3 4.4 4.3   < > 4.5  108 110 111 112*   < > 112* CO2 24 21 23 20* 19*   < > 14* * 229* 205* 273* 204*   < > 204* BUN 98* 95* 94* 91* 90*   < > 79* CREA 2.93* 2.81* 2.86* 2.68* 2.51*   < > 2.10* CA 7.1* 7.3* 7.4* 7.0* 7.4*   < > 7.6*  
MG  --  1.9  --   --  1.8  --  1.8 PHOS 5.5*  --  5.5* 5.4* 5.3*   < > 6.1* ALB 2.5* 2.6* 2.7* 2.6* 2.8*   < > 2.7* ALT  --  77*  --   --  39  --  22  
 < > = values in this interval not displayed. No results for input(s): PH, PCO2, PO2, HCO3, FIO2 in the last 72 hours. Recent Labs  
  01/29/21 
0400 01/28/21 
0455 01/27/21 
1735 FIO2I 50 55 65 HCO3I 21.6* 16.4* 14.7*  
PCO2I 36.5 25.9* 29.5* PHI 7.38 7.41 7.31* PO2I 92 96 107* Imaging: 
[x]   I have personally reviewed the patients radiographs and reports XR Results (most recent): 
Results from [facility] Encounter encounter on 01/26/21 XR ABD (KUB) Narrative Abdomen AP single view HISTORY: OG tube placement check, respiratory distress COMPARISON: None. FINDINGS: The OG tube is in the proximal stomach along the greater curvature. There is paucity of bowel gas pattern. . 
  
 Impression OG tube in proximal stomach. Thank you for your referral.  
 
 
CT Results (most recent): 
Results from Hospital Encounter encounter on 01/26/21 CT CHEST WO CONT Narrative Description:  CT chest without contrast 
 
TECHNIQUE: Helically acquired axial CT imaging of the chest without IV contrast 
was performed. Coronal and sagittal reformations generated and reviewed. All CT scans at this facility are performed using dose optimization technique as 
appropriate to a performed exam, to include automated exposure control, 
adjustment of the mA and/or kV according to patient size (including appropriate 
matching for site-specific examinations), or use of iterative reconstruction 
technique. Clinical Indication:  Chest pain Comparison: March 24, 2019. Findings:   
 
The visualized base of the neck shows a endotracheal tube within the lower 
thoracic trachea. There is a left IJ central venous line which terminates in the 
mid SVC. Visualized thyroid is unremarkable. Thoracic aorta nonaneurysmal. 
Moderately enlarged heart without pericardial effusion. Coronary artery 
calcifications are seen. There are a few mildly prominent mediastinal lymph nodes which could be 
reactive. There are dense parenchymal infiltrates bilaterally. Small effusions, right 
greater than left. Visualized upper abdomen shows splenomegaly and ascites. Impression Impression:   
 
Dense pulmonary infiltrates bilaterally consistent with pneumonia. Small 
parapneumonic effusions. Visualized abdomen shows ascites and mild splenomegaly which may be indicative 
of portal venous hypertension. Support lines and tubes appear well positioned. IMPRESSION:  
· Hypoxic, Hypercapnic Respiratory Failure/ presumed ARDS-  likely due to COVID-19 Pneumonia w/ Acute on Chronic CHF Exacerbation, now requiring Mechanical Ventilation.  
· Pulmonary HTN- based on Echo (01/27/2021)- LV EF: 55-60%, Pulmonary arterial systolic pressure is 70 mmHg. · COVID-19 Pneumonia- Covid-19 Rapid Test (+) on 01/26/2021. Known (+) prior to admission.  CXR (+) Fluffy B/L mid to LL Opacities, consistent w/ multifocal infection.   
 · Septic shock- tachycardia, tachypnea, elevated procal on admission, 2/2 COVID-19 + UTI, ?underlying aspiration. UA (+) 3+ Bacteria + Large Leuks. Urine Cx pending. Worsening procal, white count. · Metabolic Acidosis- Reasons above. PH: 7.31, pCO2: 29.5, HCO3: 14.7 · NANCY on Stage 3 CKD, Oliguric- likely in setting of Distributive Shock. BUN: 83, Cr: 2.34 
· Encephalopathy- Metabolic, Reasons above · UTI on cefepime, flagyl, levaquin · Hemoptysis- Hx of Esophageal Varices vs Pulmonary Edema · Thrombocytopenia- Baseline Chronically low. · Elevated Troponin- likely CHF Exacerbation + worsening Pulm HTN , Troponin: 1.30- Increasing · Acute on chronic HFpEF - EF 60-65% · Hx Malnutrition albumin today · Hx of Esophageal Varices · Hx Liver Cirrhosis   
· Hx DM- HgbA1c: 5.3 
· HX HTN  
· Hx of CAD  
· Hx Recent Left Hip ORIF (12/07/2020) Patient Active Problem List  
Diagnosis Code  Anemia D64.9  
 CAD (coronary artery disease) I25.10  Thrombocytopenia ([facility] D69.6  NSTEMI (non-ST elevated myocardial infarction) (HCC) I21.4  
 UTI (urinary tract infection) N39.0  Sepsis ([address] A41.9  PNA (pneumonia) J18.9  Hypoxia R09.02  
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  
 CHF (congestive heart failure) (HCC) I50.9  Dyspnea R06.00  
 Pneumonia J18.9  
 HTN (hypertension), benign I10  
 CAP (community acquired pneumonia) J18.9  Acute on chronic diastolic CHF (congestive heart failure) (HCC) I50.33  Leg DVT (deep venous thromboembolism), acute, left (HCC) I82.402  Palpitations R00.2  Acute pulmonary edema (HCC) J81.0  Malaise and fatigue R53.81, R53.83  
 Pancytopenia ([address] [id number]  Portal hypertension ([address] K76.6  Chronic anticoagulation Z79.01  
 Hip fracture ([facility] S72.009A  Breast cancer ([address] C50.919  
 Colon cancer ([facility] C18.9  Pneumonia due to COVID-19 virus U07.1, J12.82 RECOMMENDATIONS:  
 Neuro: Titrate sedation to RASS 0 to -1. Resp: VAP Bundle, Bronchial Hygiene. Aspiration Precautions, maintain HOB>30'. Decadron q12 hrs, Duonebs q6 hrs PRN. I/D: I/D following. Cont Azithromycin. I/D recs Cefepime, Metronidazole, Vancomycin. Remdesivir given on admission, d/c'd due to renal fx and intubation. WBC: 10.9. Procal: 15.92. LD: 608. Ferritin: 3,116. UA (+) 3+ Bacteria + Large Leuks. Urine Cx pending. Blood Cx + C.diff Assay pending. MRSA Cx pending. Trend WBCs + Temp. Hem/Onc: Monitor Thrombocytopenia. Trend H/H, Monitor for s/o active bleeding. Daily CBC w/ diff. CVS: Bumex x1 given. Levophed gtt started- minimal need. Echo (+) LV EF: 55-60%, Severe Pulm HTN. Cardiac Panel: CKMB: 6.8, Troponin: 1.30, ProBNP: 28,724. LE Duplex (-). Monitor HD, MAP goal >65 mmHg. Originally not a candidate for [facility] due to hemoptysis Metabolic: Bicarb gtt started. Trend daily BMP + Mag + Phos Renal: Nephrology following. Albumin today, lasix drip. Jean in place. Trend renal parameters & Monitor UOP. Endocrine: SSI, q6 Glucose checks. Increased lantus for hyperglycemia GI:  
Musc/Skin: No acute issues. Partial Code, INTUBATE ONLY Best Practice: 
Glycemic Control IHI ICU Bundles: 
 Central Line Bundle Followed , Jean Bundle Followed and Vent Bundle Followed, Vent Day 2 [facility] Vent patients- VAP bundle, aim to keep peak plateau pressure 88-47GG H2O Sress Ulcer Prophylaxis. DVT Prophylaxis. Need for Lines, Jean Assessed. Restraints Need. Palliative Care Evaluation. Dani Beyer MD, [address] 1/29/2021, 10:53 AM 
 
 
Pulmonary / Critical Care Physician: 
 
Chart and note reviewed. Data reviewed. Seen on rounds earlier today. I have independently evaluated and examined the patient. I agree with the exam, assessment and plans outlined by MD Delmy Bacon. In brief, my findings, evaluation and recommendations are as stated below: Pt remains stable on the vent. Lightly sedated. Will give a trial of Lasix. Nephrology following. Keep NGT to suction for now. May start TFs tomorrow. Cancel D-diff test given no BMs. Continue bicarb gtt. ID following for Enterobacter UTI. Rest of details and diagnostic/treatment plans per APC note. I have personally reviewed all pertinent data including labs, imaging and recommendations of treatment team providers. Total of 50 min critical care time spent at bedside during the course of care providing evaluation,management and care decisions and ordering appropriate treatment related to critical care problems exclusive of procedures. The reason for providing this level of medical care for this critically ill patient was due a critical illness that impaired one or more vital organ systems such that there was a high probability of imminent or life threatening deterioration in the patients condition. This care involved high complexity decision making to assess, manipulate, and support vital system functions, to treat this degree vital organ system failure and to prevent further life threatening deterioration of the patients condition.  
   
Denise Henedrson MD 
8:53 PM 
 
 
 
 
 
 
 
 
 
 
 
 
 
 
 
 
 
 
 Hemostasis: Electrocautery

## 2021-01-29 NOTE — PROGRESS NOTES
1910: Bedside and Verbal shift change report given to LATRICIA Holly (oncoming nurse) by Massimo Fletcher RN and [employer], RN (offgoing nurse). Report included the following information SBAR, Intake/Output, MAR, Recent Results and Med Rec Status.

## 2021-01-29 NOTE — DIABETES MGMT
GLYCEMIC CONTROL PLAN OF CARE Assessment/Recommendations: 
Lab glucose this am 229 mg/dl Recommend increasing Lantus dose to 20 units daily Receiving steroids, dexamethasone 6 mg every 12 hours. Continue corrective insulin coverage as needed Already receiving very insulin resistant dosing Will continue inpatient monitoring. Most recent blood glucose values: 
 
 
Results for Whitney Torres (MRN [id number]) as of 1/29/2021 10:47 Ref. Range 1/28/2021 07:13 1/28/2021 11:25 1/28/2021 18:38 1/28/2021 23:01 GLUCOSE,FAST - POC Latest Ref Range: 70 - 110 mg/dL 232 (H) 273 (H) 209 (H) 158 (H) Current A1C of 5.3 % is equivalent to average blood glucose of 105 mg/dl over the past 2-3 months. ? Accuracy. Patient anemic. Current hospital diabetes medications:  
Lantus 10 units daily Lispro corrective insulin coverage every 6 hours Previous day's insulin requirements:  
lantus 10 untis Lispro 25 units corrective insulin Home diabetes medications: 
Januvia 50 mg daily Novolog corrective insulin AC breakfast, lunch and dinner. Diet:   
NPO Education:  ____Refer to Diabetes Education Record __x_Education not indicated at this time Covid-19 ruleout. Emir Adhikari RN CDE Ext [id number]

## 2021-01-29 NOTE — PROGRESS NOTES
RENAL PROGRESS NOTE [address]essa Way Assessment/Plan: NANCY on CKD3: nonoliguric. Sharee Rai IVF. IV Lasix. Covid PNA/Resp failure mech vent per PCCM. Met Acidosis: controlled on bicarb gtt. Cirrhosis/Varices: dismal prognosis No indication but sub-optimal RRT candidate. Subjective: 
Patient complaints of: vent Patient Active Problem List  
Diagnosis Code  Anemia D64.9  
 CAD (coronary artery disease) I25.10  Thrombocytopenia ([facility] D69.6  NSTEMI (non-ST elevated myocardial infarction) (HCC) I21.4  
 UTI (urinary tract infection) N39.0  Sepsis ([facility] A41.9  PNA (pneumonia) J18.9  Hypoxia R09.02  
 Cirrhosis of liver not due to alcohol ([facility] K74.60  Esophageal varices (HCC) I85.00  
 CHF (congestive heart failure) (HCC) I50.9  Dyspnea R06.00  
 Pneumonia J18.9  
 HTN (hypertension), benign I10  
 CAP (community acquired pneumonia) J18.9  Acute on chronic diastolic CHF (congestive heart failure) (HCC) I50.33  Leg DVT (deep venous thromboembolism), acute, left (HCC) I82.402  Palpitations R00.2  Acute pulmonary edema (HCC) J81.0  Malaise and fatigue R53.81, R53.83  
 Pancytopenia ([facility] [id number]  Portal hypertension ([facility] K76.6  Chronic anticoagulation Z79.01  
 Hip fracture ([facility] S72.009A  Breast cancer ([facility] C50.919  
 Colon cancer ([facility] C18.9  Pneumonia due to COVID-19 virus U07.1, J12.82 Current Facility-Administered Medications Medication Dose Route Frequency Provider Last Rate Last Admin  calcium gluconate 2 g in 0.9% sodium chloride 50 mL IVPB  2 g IntraVENous Amanda Matthew, NP   2 g at 01/29/21 0829  
 vancomycin (VANCOCIN) 1,000 mg in 0.9% sodium chloride 250 mL (VIAL-MATE)  1,000 mg IntraVENous ONCE Mikie Stiles MD      
  dexamethasone (DECADRON) 4 mg/mL injection 6 mg  6 mg IntraVENous Q12H Meagan Gonzales DO   6 mg at 01/29/21 2077  sodium bicarbonate (8.4%) 150 mEq in dextrose 5% 1,000 mL infusion   IntraVENous CONTINUOUS Kate Partida  mL/hr at 01/29/21 0908 New Bag at 01/29/21 2360  chlorhexidine (PERIDEX) 0.12 % mouthwash 10 mL  10 mL Oral Q12H JALEEL'Magali Belle PA-C   10 mL at 01/29/21 0787  albuterol-ipratropium (DUO-NEB) 2.5 MG-0.5 MG/3 ML  3 mL Nebulization Q6H PRN Magali Mccullough PA-C      
 fentaNYL (PF) 900 mcg/30 ml infusion soln  0-200 mcg/hr IntraVENous TITRATE Magali Mccullough PA-C 1.7 mL/hr at 01/29/21 0400 50 mcg/hr at 01/29/21 0400  
 midazolam (VERSED) injection 1-2 mg  1-2 mg IntraVENous Q10MIN PRN Magali Mccullough PA-C      
 fentaNYL citrate (PF) injection  mcg   mcg IntraVENous Q30MIN PRN Magali Mccullough PA-C   100 mcg at 01/28/21 0621  
 azithromycin (ZITHROMAX) 500 mg in 0.9% sodium chloride 250 mL (VIAL-MATE)  500 mg IntraVENous Q24H Magali Barrera PA-C   500 mg at 01/29/21 0500  
 dexmedeTOMidine in 0.9 % NaCl (PRECEDEX) 400 mcg/100 mL (4 mcg/mL) infusion soln  0.1-1.5 mcg/kg/hr (Order-Specific) IntraVENous TITRATE Magali Mccullough PA-C 19.3 mL/hr at 01/29/21 0630 1.1 mcg/kg/hr at 01/29/21 0630  
 insulin lispro (HUMALOG) injection   SubCUTAneous Q6H Bailey Mckinnon MD   6 Units at 01/29/21 0600  
 glucose chewable tablet 16 g  4 Tab Oral PRN Magali Mccullough PA-C      
 glucagon (GLUCAGEN) injection 1 mg  1 mg IntraMUSCular PRN Magali Barrera PA-C      
 dextrose (D50W) injection syrg 12.5-25 g  25-50 mL IntraVENous PRN Magali Barrera PA-C      
 NOREPINephrine (LEVOPHED) 8 mg in 5% dextrose 250mL (32 mcg/mL) infusion  0.5-30 mcg/min IntraVENous TITRATE Orquidea Solares MD   Stopped at 01/28/21 1000  cefepime (MAXIPIME) 2 g in sterile water (preservative free) 10 mL IV syringe  2 g IntraVENous Q24H Magali Barrera PA-C   2 g at 01/29/21 0846  metroNIDAZOLE (FLAGYL) IVPB premix 500 mg  500 mg IntraVENous Q12H Magali Barrera PA-C 100 mL/hr at 01/29/21 0833 500 mg at 01/29/21 2246  VANCOMYCIN INFORMATION NOTE   Other Rx Dosing/Monitoring Magali Barrera PA-C      
 pantoprazole (PROTONIX) 40 mg in 0.9% sodium chloride 10 mL injection  40 mg IntraVENous DAILY Elvie Mckinnon MD   40 mg at 01/29/21 0832  
 insulin glargine (LANTUS) injection 10 Units  10 Units SubCUTAneous DAILY Magali Barrera PA-C   10 Units at 01/29/21 0900 Objective Vitals:  
 01/29/21 0500 01/29/21 0600 01/29/21 0700 01/29/21 0856 BP: (!) 139/58 (!) 112/52 (!) 104/58 Pulse: 63 67 63 64 Resp: 22 9 20 20 Temp: 98.4 °F (36.9 °C) 98.8 °F (37.1 °C) 98.4 °F (36.9 °C) SpO2: 100% 100% 100% 100% Weight:      
Height:      
 
 
 
Intake/Output Summary (Last 24 hours) at 1/29/2021 2534 Last data filed at 1/29/2021 3689 Gross per 24 hour Intake 5295.82 ml Output 280 ml Net 5015.82 ml Admission weight: Weight: 68 kg (150 lb) (01/27/21 0830) Last Weight Metrics: 
Weight Loss Metrics 1/28/2021 12/10/2020 9/28/2020 12/1/2019 10/16/2019 9/30/2019 8/6/2019 Today's Wt 167 lb 12.3 oz 157 lb 8 oz 149 lb 14.4 oz 140 lb 143 lb 140 lb 145 lb BMI 32.77 kg/m2 30.76 kg/m2 29.28 kg/m2 27.34 kg/m2 27.93 kg/m2 27.34 kg/m2 28.32 kg/m2 Physical Assessment:  
 
General: intubated Neck: No jvd. LUNGS: Clear to Auscultation, No rales, rhonchi or wheezes. CVS EXM: S1, S2  RRR, no murmurs/gallops/rubs. Abdomen: soft, non tender. Lower Extremities: _+\ edema. Lab CBC w/Diff Recent Labs  
  01/29/21 
0800 01/29/21 
0225 01/28/21 2013 WBC 2.2* 2.4* 3.3*  
RBC 2.42* 2.43* 2.52* HGB 7.0* 7.0* 7.3* HCT 20.5* 20.5* 21.3*  
PLT 34* 35* 46* GRANS 85* 86* 81* LYMPH 12* 14* 13* EOS 0 0 0 Chemistry Recent Labs  
  01/29/21 
0800 01/29/21 
0225 01/28/21 2013 01/28/21 
0300 01/28/21 
0300 01/27/21 
0400 01/27/21 
0400 * 229* 205*   < > 204*   < > 204*  143 142   < > 142   < > 138  
K 4.2 4.1 4.3   < > 4.3   < > 4.5  108 110   < > 112*   < > 112* CO2 24 21 23   < > 19*   < > 14* BUN 98* 95* 94*   < > 90*   < > 79* CREA 2.93* 2.81* 2.86*   < > 2.51*   < > 2.10* CA 7.1* 7.3* 7.4*   < > 7.4*   < > 7.6* AGAP 12 14 9   < > 11   < > 12 BUCR 33* 34* 33*   < > 36*   < > 38* AP  --  140*  --   --  171*  --  121* TP  --  5.3*  --   --  5.7*  --  6.2* ALB 2.5* 2.6* 2.7*   < > 2.8*   < > 2.7*  
GLOB  --  2.7  --   --  2.9  --  3.5 AGRAT  --  1.0  --   --  1.0  --  0.8 PHOS 5.5*  --  5.5*   < > 5.3*   < > 6.1*  
 < > = values in this interval not displayed. Lab Results Component Value Date/Time Iron 65 12/08/2020 11:30 AM  
 TIBC 189 (L) 12/08/2020 11:30 AM  
 Iron % saturation 34 12/08/2020 11:30 AM  
 Ferritin 2,706 (H) 01/29/2021 02:25 AM  
  
Lab Results Component Value Date/Time Calcium 7.1 (L) 01/29/2021 08:00 AM  
 Phosphorus 5.5 (H) 01/29/2021 08:00 AM  
  
 
Char Rosales MD 
[facility] Pager: [phone]

## 2021-01-29 NOTE — PROGRESS NOTES
Infectious Disease progress Note Reason: septic shock, acute hypoxic respiratory failure, covid positive Current abx Prior abx Cefepime,metronidazole, azithromycin, vancomycin  since 1/27 Lines:  
 
 
Assessment : 
 
79 y.o.  Female w/ PMH of HFpPEF (EF 60-65%), Stage 3 CKD, Recent L Hip ORIF (12/2020), Esophageal Varices, Liver Cirrhosis, DM, HTN, CAD, who presents from [facility] to [facility] via EMS after developing worsening SOB and Hemoptysis after testing (+) for COVID-19 x1 week prior. Clinical presentation c/w acute hypoxic respiratory failure-present on admission due to severe COVID-19 pneumonia/ARDS Sputum culture 1/28-normal respiratory robert. Nasal MRSA screen positive Status post convalescent plasma 1/28/2021 Elevated [id number] to exclude COVID-19 associated hypercoagulability, pulmonary embolism with full certainty Hypotension-likely septic shock secondary to COVID-19 infection, Enterobacter cystitis. Urine culture 1/27-greater than 100,000 colonies of Enterobacter Enterobacter resistant to ceftriaxone, ceftazidime, cefoxitin. Susceptible to quinolones, cefepime Acute on chronic kidney injury-likely due to sepsis. Monitor for COVID-19 associated nephropathy Elevated  [id number] due to sepsis. No evidence of DVT noted on venous duplex 1/27/2021 Improving hypotension. Off pressors. Improving hypoxia. On 50% FiO2. Recommendations: 1. Continue cefepime, vancomycin. Start levofloxacin to cover for Enterobacter cystitis. Discontinue metronidazole, azithromycin 2. Follow-up blood culture,  sputum culture 3. Continue steroids, anticoagulation per ICU team 
4. Weaning from vent per ICU team 
 
 
 
Above plan was discussed in details with rn, dr Palmer Bowie. Please call me if any further questions or concerns. Will continue to participate in the care of this patient.  
HPI: 
 
 
 Patient is currently intubated. Nonverbal.  No further diarrhea. 
  
 
 
Home medication List  
 Details  
aspirin 81 mg chewable tablet Take 2 Tabs by mouth daily. Qty: 60 Tab, Refills: 0  
  
insulin aspart U-100 (NovoLOG Flexpen U-100 Insulin) 100 unit/mL (3 mL) inpn by SubCUTAneous route Before breakfast, lunch, and dinner. Sliding scale per instructions  
  
acetaminophen (TYLENOL) 325 mg tablet Take 2 Tabs by mouth every four (4) hours as needed for Pain. Qty: 20 Tab, Refills: 0  
  
glucose 4 gram chewable tablet Take 4 Tabs by mouth as needed for Other (hypoglycemia). Qty: 30 Tab, Refills: 0 SITagliptin (JANUVIA) 50 mg tablet Take 50 mg by mouth daily. cholecalciferol, vitamin D3, (VITAMIN D3) 2,000 unit tab Take 2,000 Units by mouth daily. nitroglycerin (NITROLINGUAL) 400 mcg/spray spray 1 Spray by SubLINGual route every five (5) minutes as needed. Qty: 1 Bottle, Refills: 2  
  
ascorbic acid, vitamin C, (VITAMIN C) 250 mg tablet Take 1 Tab by mouth daily. Qty: 60 Tab, Refills: 1  
  
multivitamin (ONE A DAY) tablet Take 1 Tab by mouth daily. simvastatin (ZOCOR) 20 mg tablet Take 20 mg by mouth daily. omega 3-dha-epa-fish oil (FISH OIL) 100-160-1,000 mg cap Take 1,000 mg by mouth two (2) times a day. cyanocobalamin (VITAMIN B12) 100 mcg tablet Take 100 mcg by mouth daily. calcium polycarbophil (FIBER LAXATIVE) 625 mg tablet Take 1,250 mg by mouth daily as needed. Current Facility-Administered Medications Medication Dose Route Frequency  dexamethasone (DECADRON) 4 mg/mL injection 6 mg  6 mg IntraVENous Q12H  
 sodium bicarbonate (8.4%) 150 mEq in dextrose 5% 1,000 mL infusion   IntraVENous CONTINUOUS  chlorhexidine (PERIDEX) 0.12 % mouthwash 10 mL  10 mL Oral Q12H  
 albuterol-ipratropium (DUO-NEB) 2.5 MG-0.5 MG/3 ML  3 mL Nebulization Q6H PRN  
 fentaNYL (PF) 900 mcg/30 ml infusion soln  0-200 mcg/hr IntraVENous TITRATE  midazolam (VERSED) injection 1-2 mg  1-2 mg IntraVENous Q10MIN PRN  
 fentaNYL citrate (PF) injection  mcg   mcg IntraVENous Q30MIN PRN  
 azithromycin (ZITHROMAX) 500 mg in 0.9% sodium chloride 250 mL (VIAL-MATE)  500 mg IntraVENous Q24H  
 dexmedeTOMidine in 0.9 % NaCl (PRECEDEX) 400 mcg/100 mL (4 mcg/mL) infusion soln  0.1-1.5 mcg/kg/hr (Order-Specific) IntraVENous TITRATE  insulin lispro (HUMALOG) injection   SubCUTAneous Q6H  
 glucose chewable tablet 16 g  4 Tab Oral PRN  
 glucagon (GLUCAGEN) injection 1 mg  1 mg IntraMUSCular PRN  
 dextrose (D50W) injection syrg 12.5-25 g  25-50 mL IntraVENous PRN  
 NOREPINephrine (LEVOPHED) 8 mg in 5% dextrose 250mL (32 mcg/mL) infusion  0.5-30 mcg/min IntraVENous TITRATE  cefepime (MAXIPIME) 2 g in sterile water (preservative free) 10 mL IV syringe  2 g IntraVENous Q24H  
 metroNIDAZOLE (FLAGYL) IVPB premix 500 mg  500 mg IntraVENous Q12H  VANCOMYCIN INFORMATION NOTE   Other Rx Dosing/Monitoring  pantoprazole (PROTONIX) 40 mg in 0.9% sodium chloride 10 mL injection  40 mg IntraVENous DAILY  insulin glargine (LANTUS) injection 10 Units  10 Units SubCUTAneous DAILY Allergies: Latex Temp (24hrs), Av.5 °F (36.9 °C), Min:97.7 °F (36.5 °C), Max:99.3 °F (37.4 °C) Visit Vitals BP (!) 104/58 Pulse 64 Temp 98.4 °F (36.9 °C) Resp 20 Ht 5' (1.524 m) Wt 76.1 kg (167 lb 12.3 oz) SpO2 100% BMI 32.77 kg/m² ROS: unable to obtain Physical Exam: 
 
General:  Alert, cooperative, no distress. Head:  Normocephalic, without obvious abnormality, atraumatic. Eyes:  Conjunctivae/corneas clear. PERRL, Nose: Nares normal. Septum midline. Mucosa normal. No drainage or sinus tenderness. Throat: Lips, mucosa, and tongue normal. Teeth and gums normal.  
Neck: Supple, symmetrical, trachea midline, no adenopathy, thyroid: no enlargment/tenderness/nodules, no carotid bruit and no JVD. Back:   Symmetric, no curvature. ROM normal.  
Lungs:   Clear to auscultation bilaterally. Chest wall:  No tenderness or deformity. Heart:  Regular rate and rhythm, S1, S2 normal, no murmur, click, rub or gallop. Abdomen:   Soft, non-tender. Bowel sounds normal. No masses,  No organomegaly. Extremities: Extremities normal, atraumatic, no cyanosis or edema. Pulses: 2+ and symmetric all extremities. Skin: Skin color, texture, turgor normal. No rashes or lesions Lymph Nodes:  Cervical, supraclavicular, and axillary nodes normal.  
Neurologic:  Grossly nonfocal  
 
 
 
Labs: Results:  
Chemistry Recent Labs  
  01/29/21 
0800 01/29/21 0225 01/28/21 2013 01/28/21 
0300 01/28/21 
0300 01/27/21 
0400 01/27/21 
0400 * 229* 205*   < > 204*   < > 204*  143 142   < > 142   < > 138  
K 4.2 4.1 4.3   < > 4.3   < > 4.5  108 110   < > 112*   < > 112* CO2 24 21 23   < > 19*   < > 14* BUN 98* 95* 94*   < > 90*   < > 79* CREA 2.93* 2.81* 2.86*   < > 2.51*   < > 2.10* CA 7.1* 7.3* 7.4*   < > 7.4*   < > 7.6* AGAP 12 14 9   < > 11   < > 12 BUCR 33* 34* 33*   < > 36*   < > 38* AP  --  140*  --   --  171*  --  121* TP  --  5.3*  --   --  5.7*  --  6.2* ALB 2.5* 2.6* 2.7*   < > 2.8*   < > 2.7*  
GLOB  --  2.7  --   --  2.9  --  3.5 AGRAT  --  1.0  --   --  1.0  --  0.8  
 < > = values in this interval not displayed. CBC w/Diff Recent Labs  
  01/29/21 
0800 01/29/21 0225 01/28/21 2013 WBC 2.2* 2.4* 3.3*  
RBC 2.42* 2.43* 2.52* HGB 7.0* 7.0* 7.3* HCT 20.5* 20.5* 21.3*  
PLT 34* 35* 46* GRANS 85* 86* 81* LYMPH 12* 14* 13* EOS 0 0 0 Microbiology Recent Labs  
  01/28/21 
1700 01/27/21 
1250 01/27/21 
1245 01/27/21 
1011 CULT PENDING NO GROWTH 2 DAYS NO GROWTH 2 DAYS GRAM NEGATIVE RODS* RADIOLOGY: 
 
All available imaging studies/reports in [facility] for this admission were reviewed Total time spent >35 minutes. High complexity decision making was performed during the evaluation of this patient at high risk for decompensation with multiple organ involvement Above mentioned total time spent on reviewing the case/medical record/data/notes/EMR/patient examination/documentation/coordinating care with nurse/consultants, exclusive of procedures with complex decision making performed and > 50% time spent in face to face evaluation. Dr. Angel Sousa, Infectious Disease Specialist 
[phone] January 29, 2021 
1:25 PM

## 2021-01-29 NOTE — PROGRESS NOTES
attended the interdisciplinary rounds for Oz Zayas, who is a 79 y.o.,female. Patients Primary Language is: [place]. According to the patients EMR Pentec[employer]alism Affiliation is: [facility].  
 
The reason the Patient came to the hospital is:  
Patient Active Problem List  
 Diagnosis Date Noted  Pneumonia due to COVID-19 virus 01/27/2021  Hip fracture ([address] 12/06/2020  Breast cancer ([address] 12/06/2020  Colon cancer ([address] 12/06/2020  Palpitations 04/25/2019  Acute pulmonary edema ([address] 04/25/2019  Malaise and fatigue 04/25/2019  Pancytopenia ([address] 04/25/2019  Portal hypertension ([address] 04/25/2019  Chronic anticoagulation 04/25/2019  CAP (community acquired pneumonia) 03/24/2019  Acute on chronic diastolic CHF (congestive heart failure) ([address] 03/24/2019  Leg DVT (deep venous thromboembolism), acute, left ([address] 03/24/2019  
 HTN (hypertension), benign 01/28/2018  Pneumonia 01/25/2018  CHF (congestive heart failure) ([address] 10/18/2017  Dyspnea 10/18/2017  Cirrhosis of liver not due to alcohol ([address] 06/29/2017  Esophageal varices ([address] 06/29/2017  UTI (urinary tract infection) 06/21/2017  Sepsis ([address] 06/21/2017  PNA (pneumonia) 06/21/2017  Hypoxia 06/21/2017  
 NSTEMI (non-ST elevated myocardial infarction) ([address] 06/30/2016  Anemia 07/07/2015  CAD (coronary artery disease) 07/07/2015  Thrombocytopenia ([address] 07/07/2015 Plan: 
Chaplains will continue to follow and will provide pastoral care on an as needed/requested basis.  recommends bedside caregivers page  on duty if patient shows signs of acute spiritual or emotional distress. [facility] Board Certified [address] [place] [address] Care  
[phone]

## 2021-01-30 NOTE — PROGRESS NOTES
1920: Bedside and Verbal shift change report given to 1811 Cedric Jeong RN (oncoming nurse) by Mickie Connolly RN (offgoing nurse). Report included the following information SBAR, Intake/Output, MAR, Recent Results and Med Rec Status.

## 2021-01-30 NOTE — ROUTINE PROCESS
Bedside and Verbal shift change report given to taylor matthews rn (oncoming nurse) by Alvina Giles RN (offgoing nurse). Report included the following information SBAR, Kardex, MAR and Recent Results. SITUATION:  
? Code Status: Partial Code 
? Reason for Admission: Pneumonia due to COVID-19 virus [U07.1, J12.82] 
 
? Hospital day: 3 
? Problem List:  
   
Hospital Problems  Date Reviewed: 12/23/2020 Codes Class Noted POA Pneumonia due to COVID-19 virus ICD-10-CM: U07.1, J12.82 ICD-9-CM: 480.8  1/27/2021 Unknown BACKGROUND:  
 Past Medical History:  
Past Medical History:  
Diagnosis Date  Anemia  Aortic stenosis, moderate 03/2019  Breast cancer ([facility]  CAD (coronary artery disease) Coronary Stenting needed, hematology workup needed prior to stent placement, treating medically at this time  Cancer [facility])   
 colon  Cancer ([facility] breast - left  Cardiac catheterization 08/17/2016 LM patent. mLAD 99.9% (2.25 x 28-mm Xience BETHEL, resid 0%). oD1 60%. oD2 95%. pCX 90% (2.5 x 13-mm Xience BETHEL, resid 0%). OMB patent.  Cardiac echocardiogram 03/2019 EF 56 to 60%, moderate concentric LVH, moderate aortic stenosis, mean valve gradient 25 mmHg  Diabetes ([facility]  Hypercholesteremia  Hypertension  Thrombocytopenia ([facility] Patient taking anticoagulants no ASSESSMENT:  
? Changes in Assessment Throughout Shift: pt remained as initial assessment with small amount of more facial and bilateral arm movement ? Patient has Central Line: yes Reasons if yes: access ? Patient has Jean Cath: yes Reasons if yes: I&0  
 
? Last Vitals: 
  
Vitals:  
 01/30/21 0400 01/30/21 0431 01/30/21 0500 01/30/21 0600 BP: (!) 149/60  (!) 153/69 (!) 151/58 Pulse: 72 70 73 72 Resp: 20 20 20 20 Temp: 99.7 °F (37.6 °C)  99.7 °F (37.6 °C) 99.7 °F (37.6 °C) SpO2: 100% 100% 100% 100% Weight:      
Height: ? IV and DRAINS (will only show if present) Airway - Continuous Aspiration of Subglottic Secretions (MAAME) Tube 01/27/21 Oral-Site Assessment: Clean, dry, & intact Peripheral IV 01/27/21 Right Arm-Site Assessment: Clean, dry, & intact Peripheral IV 01/27/21 Left Arm-Site Assessment: Clean, dry, & intact [REMOVED] Peripheral IV 01/27/21 Right Antecubital-Site Assessment: Clean, dry, & intact Triple Lumen left IJ  01/27/21 Left Internal jugular-Site Assessment: Clean, dry, & intact Peripheral IV 01/26/21 Left Antecubital-Site Assessment: Clean, dry, & intact [REMOVED] Nasogastric Tube 01/27/21-Site Assessment: Clean, dry, & intact [REMOVED] Airway - Endotracheal Tube 01/27/21 Oral-Site Assessment: Clean, dry, & intact Orogastric Tube 01/28/21-Site Assessment: Clean, dry, & intact ? WOUND (if present) Wound Type:  Stage 2 Dressing present Dressing Present : Intact, not due to be changed, Yes Wound Concerns/Notes:  Continual rotation ? PAIN Pain Assessment Pain Intensity 1: 0 (01/29/21 1600) Patient Stated Pain Goal: Unable to verbalize/indicate pain 
o Interventions for Pain:  none 
o Intervention effective: . o Time of last intervention: .  
o Reassessment Completed. ? Last 3 Weights: 
Last 3 Recorded Weights in this Encounter 01/27/21 1323 01/28/21 1830 01/29/21 1700 Weight: 71.2 kg (157 lb) 76.1 kg (167 lb 12.3 oz) 78.4 kg (172 lb 13.5 oz) Weight change: 2.3 kg (5 lb 1.1 oz) ? INTAKE/OUPUT Current Shift: No intake/output data recorded. Last three shifts: 01/28 1901 - 01/30 0700 In: 2826.4 [I.V.:2826.4] Out: 440 [XTPLN:670] ? LAB RESULTS Recent Labs  
  01/30/21 
0245 01/29/21 
2130 01/29/21 
1400 WBC 3.1* 2.9* 2.5* HGB 7.0* 7.1* 7.2* HCT 20.9* 20.7* 21.2*  
PLT 34* 34* 32* Recent Labs  
  01/30/21 
0245 01/29/21 
2130 01/29/21 
1400 01/29/21 
0225 01/29/21 
0225 01/28/21 
0300 01/28/21 
0300  139 143   < > 143   < > 142 K 4.1 4.1 3.9   < > 4.1   < > 4.3 * 191* 231*   < > 229*   < > 204* * 103* 98*   < > 95*   < > 90* CREA 3.15* 3.12* 3.02*   < > 2.81*   < > 2.51* CA 7.5* 7.7* 7.3*   < > 7.3*   < > 7.4* MG 1.9  --   --   --  1.9  --  1.8  
 < > = values in this interval not displayed. RECOMMENDATIONS AND DISCHARGE PLANNING 1. Pending tests/procedures/ Plan of Care or Other Needs: wean of vent as possible and monitor labs 2. Discharge plan for patient and Needs/Barriers: return to snf 3. Estimated Discharge Date: pending Posted on Whiteboard in [address] [place] [address] Room: . 4. The patient's care plan was reviewed with the oncoming nurse. \"HEALS\" SAFETY CHECK Fall Risk Total Score: 3 Safety Measures: Safety Measures: Bed/Chair-Wheels locked, Bed in low position(pt unable to use call system) A safety check occurred in the patient's room between off going nurse and oncoming nurse listed above. The safety check included the below items Area Items H High Alert Medications ? Verify all high alert medication drips (heparin, PCA, etc.) E Equipment ? Suction is set up for ALL patients (with sarahker) ? Red plugs utilized for all equipment (IV pumps, etc.) ? WOWs wiped down at end of shift. ? Room stocked with oxygen, suction, and other unit-specific supplies A Alarms ? Bed alarm is set for fall risk patients ? Ensure chair alarm is in place and activated if patient is up in a chair L Lines ? Check IV for any infiltration ? Jean bag is empty if patient has a Jean ? Tubing and IV bags are labeled [facility] [employer] Safety ? Room is clean, patient is clean, and equipment is clean. ? Hallways are clear from equipment besides carts. ? Fall bracelet on for fall risk patients ? Ensure room is clear and free of clutter ? Suction is set up for ALL patients (with yanker) ? Hallways are clear from equipment besides carts. ? Isolation precautions followed, supplies available outside room, sign posted Seamus Ball RN

## 2021-01-30 NOTE — PROGRESS NOTES
RENAL PROGRESS NOTE Desmond Centeno Assessment/Plan: NANCY on CKD3: needs volume removal, Will start on bumex 2 mg iv bid, may need to consider bumex drip if not much urine output. .  
Covid PNA/Resp failure mech vent per PCCM. Met Acidosis: d/c bicarb gtt.  f/u bmp in am.  
Cirrhosis/Varices: dismal prognosis No indication but sub-optimal RRT candidate. Subjective: 
covid isolation. Got lasix today Patient Active Problem List  
Diagnosis Code  Anemia D64.9  
 CAD (coronary artery disease) I25.10  Thrombocytopenia ([facility] D69.6  NSTEMI (non-ST elevated myocardial infarction) (HCC) I21.4  
 UTI (urinary tract infection) N39.0  Sepsis ([facility] A41.9  PNA (pneumonia) J18.9  Hypoxia R09.02  
 Cirrhosis of liver not due to alcohol ([facility] K74.60  Esophageal varices (HCC) I85.00  
 CHF (congestive heart failure) (HCC) I50.9  Dyspnea R06.00  
 Pneumonia J18.9  
 HTN (hypertension), benign I10  
 CAP (community acquired pneumonia) J18.9  Acute on chronic diastolic CHF (congestive heart failure) (HCC) I50.33  Leg DVT (deep venous thromboembolism), acute, left (HCC) I82.402  Palpitations R00.2  Acute pulmonary edema (HCC) J81.0  Malaise and fatigue R53.81, R53.83  
 Pancytopenia ([facility] [id number]  Portal hypertension ([facility] K76.6  Chronic anticoagulation Z79.01  
 Hip fracture ([facility] S72.009A  Breast cancer ([facility] C50.919  
 Colon cancer ([facility] C18.9  Pneumonia due to COVID-19 virus U07.1, J12.82 Current Facility-Administered Medications Medication Dose Route Frequency Provider Last Rate Last Admin  bumetanide (BUMEX) injection 2 mg  2 mg IntraVENous BID Seth Mcdonald MD      
 [START ON 1/31/2021] levoFLOXacin (LEVAQUIN) 500 mg in D5W IVPB  500 mg IntraVENous Q48H Laron Luke MD      
  insulin glargine (LANTUS) injection 20 Units  20 Units SubCUTAneous DAILY Steven Victor PA-C   20 Units at 01/30/21 9041  multivit-folic acid-herbal 369 (WELLESSE PLUS) oral liquid 30 mL  30 mL Per NG tube DAILY Lorenzo Mckinnon MD   30 mL at 01/30/21 0915  dexamethasone (DECADRON) 4 mg/mL injection 6 mg  6 mg IntraVENous Q12H Nataly Gonzales DO   6 mg at 01/30/21 6457  chlorhexidine (PERIDEX) 0.12 % mouthwash 10 mL  10 mL Oral Q12H JALEEL'Magali Belle PA-C   10 mL at 01/30/21 0915  albuterol-ipratropium (DUO-NEB) 2.5 MG-0.5 MG/3 ML  3 mL Nebulization Q6H PRN Magali Barrera PA-C      
 fentaNYL (PF) 900 mcg/30 ml infusion soln  0-200 mcg/hr IntraVENous TITRATE Magali Hays PA-C 3.3 mL/hr at 01/30/21 1205 100 mcg/hr at 01/30/21 1205  midazolam (VERSED) injection 1-2 mg  1-2 mg IntraVENous Q10MIN PRN Magali Hays PA-C   2 mg at 01/30/21 1152  fentaNYL citrate (PF) injection  mcg   mcg IntraVENous Q30MIN PRN Magali Hays PA-C   100 mcg at 01/28/21 2681  
 dexmedeTOMidine in 0.9 % NaCl (PRECEDEX) 400 mcg/100 mL (4 mcg/mL) infusion soln  0.1-1.5 mcg/kg/hr (Order-Specific) IntraVENous TITRATE Magali Hays PA-C 19.3 mL/hr at 01/30/21 1151 1.1 mcg/kg/hr at 01/30/21 1151  
 insulin lispro (HUMALOG) injection   SubCUTAneous Q6H Mariana Chappell MD   6 Units at 01/30/21 1512  
 glucose chewable tablet 16 g  4 Tab Oral PRN Magali Hays PA-C      
 glucagon (GLUCAGEN) injection 1 mg  1 mg IntraMUSCular PRN O'Yoshi, Magali E, PA-C      
 dextrose (D50W) injection syrg 12.5-25 g  25-50 mL IntraVENous PRN Magali Barrera PA-C      
 NOREPINephrine (LEVOPHED) 8 mg in 5% dextrose 250mL (32 mcg/mL) infusion  0.5-30 mcg/min IntraVENous TITRATE Julita Kat MD   Stopped at 01/28/21 1000  cefepime (MAXIPIME) 2 g in sterile water (preservative free) 10 mL IV syringe  2 g IntraVENous Q24H Magali Barrera PA-C   2 g at 01/30/21 0915  pantoprazole (PROTONIX) 40 mg in 0.9% sodium chloride 10 mL injection  40 mg IntraVENous DAILY Frankie Ferrell MD   40 mg at 01/30/21 2658 Objective Vitals:  
 01/30/21 1241 01/30/21 1300 01/30/21 1400 01/30/21 1500 BP:  (!) 146/64 (!) 143/65 (!) 158/69 Pulse: 81 82 79 90 Resp: 21 24 24 19 Temp:  100.2 °F (37.9 °C) (!) 100.8 °F (38.2 °C) (!) 100.9 °F (38.3 °C) SpO2: 100% 100% 100% 100% Weight:      
Height:      
 
 
 
Intake/Output Summary (Last 24 hours) at 1/30/2021 1628 Last data filed at 1/30/2021 1509 Gross per 24 hour Intake 1944.37 ml Output 470 ml Net 1474.37 ml Admission weight: Weight: 68 kg (150 lb) (01/27/21 0830) Last Weight Metrics: 
Weight Loss Metrics 1/29/2021 12/10/2020 9/28/2020 12/1/2019 10/16/2019 9/30/2019 8/6/2019 Today's Wt 172 lb 13.5 oz 157 lb 8 oz 149 lb 14.4 oz 140 lb 143 lb 140 lb 145 lb BMI 33.76 kg/m2 30.76 kg/m2 29.28 kg/m2 27.34 kg/m2 27.93 kg/m2 27.34 kg/m2 28.32 kg/m2 Physical Assessment:  
 
Pt is covid 19+, I did not go into the room to minimize pt/md exposure and preserve PPE. I have viewed pt from the door and reviewed documentation from other providers. I have discussed with ICU MD and nurse Lab CBC w/Diff Recent Labs  
  01/30/21 
0910 01/30/21 
0245 01/29/21 
2130 WBC 3.2* 3.1* 2.9*  
RBC 2.57* 2.42* 2.45* HGB 7.5* 7.0* 7.1*  
HCT 21.7* 20.9* 20.7* PLT 39* 34* 34* GRANS 83* 88* 87* LYMPH 11* 9* 13* EOS 0 0 0 Chemistry Recent Labs  
  01/30/21 
1505 01/30/21 
0910 01/30/21 
0245 01/29/21 
0225 01/29/21 
0225 01/28/21 
0300 01/28/21 
0300 * 161* 167*   < > 229*   < > 204*  142 141   < > 143   < > 142 K 4.1 4.2 4.1   < > 4.1   < > 4.3  106 107   < > 108   < > 112* CO2 26 26 25   < > 21   < > 19* * 105* 104*   < > 95*   < > 90* CREA 3.24* 3.20* 3.15*   < > 2.81*   < > 2.51* CA 7.6* 7.6* 7.5*   < > 7.3*   < > 7.4* AGAP 9 10 9   < > 14   < > 11  
 BUCR 33* 33* 33*   < > 34*   < > 36* AP  --   --  123*  --  140*  --  171* TP  --   --  5.6*  --  5.3*  --  5.7* ALB 2.8* 2.9* 2.7*   < > 2.6*   < > 2.8*  
GLOB  --   --  2.9  --  2.7  --  2.9 AGRAT  --   --  0.9  --  1.0  --  1.0 PHOS 4.9 5.0*  --    < >  --    < > 5.3*  
 < > = values in this interval not displayed. Lab Results Component Value Date/Time Iron 65 12/08/2020 11:30 AM  
 TIBC 189 (L) 12/08/2020 11:30 AM  
 Iron % saturation 34 12/08/2020 11:30 AM  
 Ferritin 1,406 (H) 01/30/2021 02:45 AM  
  
Lab Results Component Value Date/Time Calcium 7.6 (L) 01/30/2021 03:05 PM  
 Phosphorus 4.9 01/30/2021 03:05 PM  
  
 
Abida Tolbert MD 
[facility] Pager: [phone] Phone: [phone]

## 2021-01-30 NOTE — PROGRESS NOTES
[facility]. Pulmonary, Critical Care, and Sleep Medicine Name: Felipe Cavazos MRN: [id number] : 1940 Hospital: [address] [place] [address] Dr Date: 2021  Admission Date: 2021 Chart and notes reviewed. Data reviewed. I have evaluated all findings. [x]I have reviewed the flowsheet and previous days notes. [x]The patient is unable to give any meaningful history or review of systems because the patient is: 
[x]Intubated [x]Sedated  
[]Unresponsive [x]The patient is critically ill on     
[x]Mechanical ventilation []Pressors []BiPAP [] Interval HPI:  
Vanessa Leonardo is a 79 y.o.  Female w/ PMH of HFpPEF (EF 55-60%), Stage 3 CKD, Recent L Hip ORIF (2020), Esophageal Varices, Liver Cirrhosis, DM, HTN, CAD, who presented from [address] [place] [address] to [facility] ED on 2021 via EMS after developing worsening SOB and Hemoptysis after testing (+) for COVID-19 x1 week prior.  
  
ICU consulted for Hypoxia and is noted to be satting at 94+ on 15L/100% HFNC. Shortly after examination, she required Endotracheal Intubation w/ Mechanical Ventilation.  
  
Discussed with patient's sister and nephew. Nephew is CC nurse a [employer]. Updated them on critical nature and high likelihood for mortality due to age and underlying conditions, wished to proceed with intubation but DNR otherwise. Nephrology following for NANCY on CKD3 ID following for COVID pneumonia, Enterobacter cystitis 21 Hospital Day: 5 Vent Day: 4 Overnight Events: none Mentation: Sedated Respiratory/ Secretions: Intubated, Oral Cavity w/out copious secretions Hemodynamics: VSS Diet: NPO Urine/Fecal Output: decreased overnight, no bm. Need for Procedures: none ROS:Review of systems not obtained due to patient factors. Events and notes from last 24 hours reviewed. Care plan discussed on multidisciplinary rounds.  
Patient Active Problem List  
 Diagnosis Code  Anemia D64.9  
 CAD (coronary artery disease) I25.10  Thrombocytopenia ([facility] D69.6  NSTEMI (non-ST elevated myocardial infarction) (HCC) I21.4  
 UTI (urinary tract infection) N39.0  [facility] A41.9  PNA (pneumonia) J18.9  Hypoxia R09.02  
 Cirrhosis of liver not due to alcohol ([facility] K74.60  Esophageal varices (HCC) I85.00  
 CHF (congestive heart failure) (HCC) I50.9  Dyspnea R06.00  
 Pneumonia J18.9  
 HTN (hypertension), benign I10  
 CAP (community acquired pneumonia) J18.9  Acute on chronic diastolic CHF (congestive heart failure) (HCC) I50.33  Leg DVT (deep venous thromboembolism), acute, left (HCC) I82.402  Palpitations R00.2  Acute pulmonary edema (HCC) J81.0  Malaise and fatigue R53.81, R53.83  
 Pancytopenia ([facility] [id number]  Portal hypertension ([facility] K76.6  Chronic anticoagulation Z79.01  
 Hip fracture ([facility] S72.009A  Breast cancer ([facility] C50.919  
 Colon cancer ([facility] C18.9  Pneumonia due to COVID-19 virus U07.1, J12.82 Vital Signs: 
Visit Vitals BP (!) 151/58 Pulse 72 Temp 99.7 °F (37.6 °C) Resp 20 Ht 5' (1.524 m) Wt 78.4 kg (172 lb 13.5 oz) SpO2 100% BMI 33.76 kg/m² O2 Device: Endotracheal tube O2 Flow Rate (L/min): 15 l/min Temp (24hrs), Av.3 °F (37.4 °C), Min:98.4 °F (36.9 °C), Max:100 °F (37.8 °C) Intake/Output:  
Last shift:      No intake/output data recorded. Last 3 shifts:  1901 -  0700 In: 2826.4 [I.V.:2826.4] Out: 938 [NJHGL:[id number]] Intake/Output Summary (Last 24 hours) at 2021 3293 Last data filed at 2021 6366 Gross per 24 hour Intake 860.97 ml Output 290 ml Net 570.97 ml Ventilator Settings: 
Ventilator Mode: Assist control, VC+ Respiratory Rate Back-Up Rate: 20 Insp Time (sec): 0.9 sec I:E Ratio: 1:2.3 Ventilator Volumes Vt Set (ml): 400 ml Vt Exhaled (Machine Breath) (ml): 430 ml Vt Spont (ml): 540 ml 
 Ve Observed (l/min): 7 l/min Ventilator Pressures PIP Observed (cm H2O): 19 cm H2O Plateau Pressure (cm H2O): 29 cm H2O 
MAP (cm H2O): 10 PEEP/VENT (cm H2O): 7 cm H20 Current Facility-Administered Medications Medication Dose Route Frequency  [START ON 1/31/2021] levoFLOXacin (LEVAQUIN) 500 mg in D5W IVPB  500 mg IntraVENous Q48H  
 insulin glargine (LANTUS) injection 20 Units  20 Units SubCUTAneous DAILY  multivit-folic acid-herbal 950 (WELLESSE PLUS) oral liquid 30 mL  30 mL Per NG tube DAILY  dexamethasone (DECADRON) 4 mg/mL injection 6 mg  6 mg IntraVENous Q12H  
 sodium bicarbonate (8.4%) 150 mEq in dextrose 5% 1,000 mL infusion   IntraVENous CONTINUOUS  chlorhexidine (PERIDEX) 0.12 % mouthwash 10 mL  10 mL Oral Q12H  
 fentaNYL (PF) 900 mcg/30 ml infusion soln  0-200 mcg/hr IntraVENous TITRATE  dexmedeTOMidine in 0.9 % NaCl (PRECEDEX) 400 mcg/100 mL (4 mcg/mL) infusion soln  0.1-1.5 mcg/kg/hr (Order-Specific) IntraVENous TITRATE  insulin lispro (HUMALOG) injection   SubCUTAneous Q6H  
 NOREPINephrine (LEVOPHED) 8 mg in 5% dextrose 250mL (32 mcg/mL) infusion  0.5-30 mcg/min IntraVENous TITRATE  cefepime (MAXIPIME) 2 g in sterile water (preservative free) 10 mL IV syringe  2 g IntraVENous Q24H  pantoprazole (PROTONIX) 40 mg in 0.9% sodium chloride 10 mL injection  40 mg IntraVENous DAILY Telemetry: Sinus Rhythm Limited Physical Exam:  
 General: Intubated, Sedated HEENT: NC/AT, Septum midline, No visible nasal drainage, Oral Cavity clear w/out copious drainage, Trachea midline Chest: No visible trauma or deformity to chest wall, RRR- via Tele Lungs: Equal & Bilateral chest rise, Airway clear, ET Tube in place and secured Abdomen: Non-Distended, large reducible hernia Extremity: 2+pitting edema BLE Neuro: Sedated Skin: No new skin lesions, rashes, or ulcers DATA: 
MAR reviewed and pertinent medications noted or modified as needed Labs: Recent Labs  
  01/30/21 
0245 01/29/21 
2130 01/29/21 
1400 WBC 3.1* 2.9* 2.5* HGB 7.0* 7.1* 7.2* HCT 20.9* 20.7* 21.2*  
PLT 34* 34* 32* Recent Labs  
  01/30/21 
0245 01/29/21 
2130 01/29/21 
1400 01/29/21 
0800 01/29/21 
0225 01/28/21 
0300 01/28/21 
0300  139 143 144 143   < > 142 K 4.1 4.1 3.9 4.2 4.1   < > 4.3  107 108 108 108   < > 112* CO2 25 25 25 24 21   < > 19* * 191* 231* 267* 229*   < > 204* * 103* 98* 98* 95*   < > 90* CREA 3.15* 3.12* 3.02* 2.93* 2.81*   < > 2.51* CA 7.5* 7.7* 7.3* 7.1* 7.3*   < > 7.4* MG 1.9  --   --   --  1.9  --  1.8 PHOS  --  5.2* 5.4* 5.5*  --    < > 5.3* ALB 2.7* 2.5* 2.4* 2.5* 2.6*   < > 2.8* ALT 66*  --   --   --  77*  --  39  
 < > = values in this interval not displayed. No results for input(s): PH, PCO2, PO2, HCO3, FIO2 in the last 72 hours. Recent Labs  
  01/29/21 
0400 01/28/21 
0455 01/27/21 
1735 FIO2I 50 55 65 HCO3I 21.6* 16.4* 14.7*  
PCO2I 36.5 25.9* 29.5* PHI 7.38 7.41 7.31* PO2I 92 96 107* Imaging: 
[x]   I have personally reviewed the patients radiographs and reports XR Results (most recent): 
Results from [facility] Encounter encounter on 01/26/21 XR ABD (KUB) Narrative Abdomen AP single view HISTORY: OG tube placement check, respiratory distress COMPARISON: None. FINDINGS: The OG tube is in the proximal stomach along the greater curvature. There is paucity of bowel gas pattern. . 
  
 Impression OG tube in proximal stomach. Thank you for your referral.  
 
 
CT Results (most recent): 
Results from Hospital Encounter encounter on 01/26/21 CT CHEST WO CONT Narrative Description:  CT chest without contrast 
 
TECHNIQUE: Helically acquired axial CT imaging of the chest without IV contrast 
was performed. Coronal and sagittal reformations generated and reviewed.  
 
All CT scans at this facility are performed using dose optimization technique as 
 appropriate to a performed exam, to include automated exposure control, 
adjustment of the mA and/or kV according to patient size (including appropriate 
matching for site-specific examinations), or use of iterative reconstruction 
technique. Clinical Indication:  Chest pain Comparison: March 24, 2019. Findings:   
 
The visualized base of the neck shows a endotracheal tube within the lower 
thoracic trachea. There is a left IJ central venous line which terminates in the 
mid SVC. Visualized thyroid is unremarkable. Thoracic aorta nonaneurysmal. 
Moderately enlarged heart without pericardial effusion. Coronary artery 
calcifications are seen. There are a few mildly prominent mediastinal lymph nodes which could be 
reactive. There are dense parenchymal infiltrates bilaterally. Small effusions, right 
greater than left. Visualized upper abdomen shows splenomegaly and ascites. Impression Impression:   
 
Dense pulmonary infiltrates bilaterally consistent with pneumonia. Small 
parapneumonic effusions. Visualized abdomen shows ascites and mild splenomegaly which may be indicative 
of portal venous hypertension. Support lines and tubes appear well positioned. IMPRESSION:  
· Hypoxic, Hypercapnic Respiratory Failure/ presumed ARDS-  likely due to COVID-19 Pneumonia w/ Acute on Chronic CHF Exacerbation, now requiring Mechanical Ventilation.  
· Pulmonary HTN- based on Echo (01/27/2021)- LV EF: 55-60%, Pulmonary arterial systolic pressure is 70 mmHg. · COVID-19 Pneumonia- Covid-19 Rapid Test (+) on 01/26/2021. Known (+) prior to admission. CXR (+) Fluffy B/L mid to LL Opacities, consistent w/ multifocal infection.   
· Septic shock- tachycardia, tachypnea, elevated procal on admission, 2/2 COVID-19 + UTI, ?underlying aspiration. UA (+) 3+ Bacteria + Large Leuks. Urine Cx pending. Worsening procal, white count. · Metabolic Acidosis- Reasons above. PH: 7.31, pCO2: 29.5, HCO3: 14.7 · NANCY on Stage 3 CKD, Oliguric- likely in setting of Distributive Shock. BUN: 83, Cr: 2.34 
· Encephalopathy- Metabolic, Reasons above · UTI on cefepime, Vanc · Hemoptysis- Hx of Esophageal Varices vs Pulmonary Edema · Thrombocytopenia- Baseline Chronically low. · Elevated Troponin- likely CHF Exacerbation + worsening Pulm HTN , Troponin: 1.30- Increasing · Acute on chronic HFpEF - EF 60-65% · Hx Malnutrition albumin today · Hx of Esophageal Varices · Hx Liver Cirrhosis   
· Hx DM- HgbA1c: 5.3 
· HX HTN  
· Hx of CAD  
· Hx Recent Left Hip ORIF (12/07/2020) Patient Active Problem List  
Diagnosis Code  Anemia D64.9  
 CAD (coronary artery disease) I25.10  Thrombocytopenia ([facility] D69.6  NSTEMI (non-ST elevated myocardial infarction) (HCC) I21.4  
 UTI (urinary tract infection) N39.0  Sepsis ([address] A41.9  PNA (pneumonia) J18.9  Hypoxia R09.02  
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  
 CHF (congestive heart failure) (HCC) I50.9  Dyspnea R06.00  
 Pneumonia J18.9  
 HTN (hypertension), benign I10  
 CAP (community acquired pneumonia) J18.9  Acute on chronic diastolic CHF (congestive heart failure) (HCC) I50.33  Leg DVT (deep venous thromboembolism), acute, left (HCC) I82.402  Palpitations R00.2  Acute pulmonary edema (HCC) J81.0  Malaise and fatigue R53.81, R53.83  
 Pancytopenia ([facility] [id number]  Portal hypertension ([address] K76.6  Chronic anticoagulation Z79.01  
 Hip fracture ([address] S72.009A  Breast cancer ([address] C50.919  
 Colon cancer ([address] C18.9  Pneumonia due to COVID-19 virus U07.1, J12.82 RECOMMENDATIONS:  
Neuro: Titrate sedation to RASS 0 to -1. Resp: VAP Bundle, Bronchial Hygiene. Aspiration Precautions, maintain HOB>30'. Decadron q12 hrs, Duonebs q6 hrs PRN. I/D: I/D following. I/D recs Cefepime, Vancomycin. Remdesivir given on admission, d/c'd due to renal fx and intubation. Urine Cx enterobacter. Blood Cx NG 3 days. MRSA Cx positive. Trend WBCs + Temp. Hem/Onc: Monitor Thrombocytopenia. Trend H/H, Monitor for s/o active bleeding. Daily CBC w/ diff. CVS: Levophed gtt stopped. Echo (+) LV EF: 55-60%, Severe Pulm HTN. Cardiac Panel: CKMB: 6.8, Troponin: 1.30, ProBNP: 28,724. LE Duplex (-). Monitor HD, MAP goal >65 mmHg. Originally not a candidate for [facility] due to hemoptysis Metabolic: Bicarb gtt for met acidosis. Trend daily BMP + Mag + Phos Renal: Nephrology following. IV Lasix. Jean in place. Trend renal parameters & Monitor UOP. Endocrine: SSI, q6 Glucose checks. Lantus 20u. GI: Start TF Nepro at 8, advance as tolerated Musc/Skin: No acute issues. Partial Code, INTUBATE ONLY Best Practice: 
Glycemic Control IHI ICU Bundles: 
 Central Line Bundle Followed , Jean Bundle Followed and Vent Bundle Followed, Vent Day [facility] Vent patients- VAP bundle, aim to keep peak plateau pressure 34-91WR H2O Sress Ulcer Prophylaxis. DVT Prophylaxis. Need for Lines, Jean Assessed. Restraints Need. Palliative Care Evaluation. Art Covarrubias MD PGY-1 
[facility] 1/30/2021, 10:53 AM 
 
Pulmonary / Critical Care Physician: 
 
Chart and note reviewed. Data reviewed. Seen on rounds earlier today. I have independently evaluated and examined the patient. I agree with the exam, assessment and plans outlined by MD [facility] In brief, my findings, evaluation and recommendations are as stated below: 
 
Pt remains stable. Hypoxia improving on ABG, ventilator weaned. Noted Enterobacter on Cx, discussed ABX with ID. Minimal NGT output, will start TFs. Will give lasix 40mg today. Possible sedation hold and SBT tomorrow. Rest of details and diagnostic/treatment plans per APC note. I have personally reviewed all pertinent data including labs, imaging and recommendations of treatment team providers. Total of 45 min critical care time spent at bedside during the course of care providing evaluation,management and care decisions and ordering appropriate treatment related to critical care problems exclusive of procedures. The reason for providing this level of medical care for this critically ill patient was due a critical illness that impaired one or more vital organ systems such that there was a high probability of imminent or life threatening deterioration in the patients condition. This care involved high complexity decision making to assess, manipulate, and support vital system functions, to treat this degree vital organ system failure and to prevent further life threatening deterioration of the patients condition.  
   
Bruno Phillips MD 
9:06 PM

## 2021-01-30 NOTE — PROGRESS NOTES
Problem: Ventilator Management Goal: *Adequate oxygenation and ventilation Outcome: Progressing Towards Goal 
Goal: *Patient maintains clear airway/free of aspiration Outcome: Progressing Towards Goal 
Goal: *Absence of infection signs and symptoms Outcome: Progressing Towards Goal 
Goal: *Normal spontaneous ventilation Outcome: Progressing Towards Goal 
  
Problem: Risk for Spread of Infection Goal: Prevent transmission of infectious organism to others Description: Prevent the transmission of infectious organisms to other patients, staff members, and visitors. Outcome: Progressing Towards Goal 
  
Problem: Non-Violent Restraints Goal: *Removal from restraints as soon as assessed to be safe Outcome: Progressing Towards Goal 
Goal: *No harm/injury to patient while restraints in use Outcome: Progressing Towards Goal 
Goal: *Patient's dignity will be maintained Outcome: Progressing Towards Goal 
Goal: *Patient Specific Goal (EDIT GOAL, INSERT TEXT) Outcome: Progressing Towards Goal 
Goal: Non-violent Restaints:Standard Interventions Outcome: Progressing Towards Goal 
Goal: Non-violent Restraints:Patient Interventions Outcome: Progressing Towards Goal 
Goal: Patient/Family Education Outcome: Progressing Towards Goal 
  
Problem: Falls - Risk of 
Goal: *Absence of Falls Description: Document Kalin Gomez Fall Risk and appropriate interventions in the flowsheet. Outcome: Progressing Towards Goal 
Variance Patient Condition Impact: High 
Note: Fall Risk Interventions: 
  
 
Mentation Interventions: Adequate sleep, hydration, pain control, Door open when patient unattended, Evaluate medications/consider consulting pharmacy Medication Interventions: Assess postural VS orthostatic hypotension, Bed/chair exit alarm, Evaluate medications/consider consulting pharmacy, Patient to call before getting OOB, Teach patient to arise slowly, Utilize gait belt for transfers/ambulation Elimination Interventions: Call light in reach, Patient to call for help with toileting needs, Toileting schedule/hourly rounds Problem: Pressure Injury - Risk of 
Goal: *Prevention of pressure injury Description: Document Kyle Scale and appropriate interventions in the flowsheet. Outcome: Progressing Towards Goal 
Variance Patient Condition Impact: High 
Note: Pressure Injury Interventions: 
Sensory Interventions: Assess changes in LOC, Assess need for specialty bed, Check visual cues for pain, Discuss PT/OT consult with provider, Keep linens dry and wrinkle-free Moisture Interventions: Absorbent underpads, Apply protective barrier, creams and emollients, Check for incontinence Q2 hours and as needed, Internal/External urinary devices Activity Interventions: Assess need for specialty bed, Pressure redistribution bed/mattress(bed type), PT/OT evaluation Mobility Interventions: Assess need for specialty bed, Float heels, Pressure redistribution bed/mattress (bed type), PT/OT evaluation Nutrition Interventions: Discuss nutritional consult with provider Friction and Shear Interventions: Apply protective barrier, creams and emollients, Foam dressings/transparent film/skin sealants, Lift sheet, Lift team/patient mobility team, Minimize layers Problem: Nutrition Deficit Goal: *Optimize nutritional status Outcome: Progressing Towards Goal 
  
Problem: Patient Education: Go to Patient Education Activity Goal: Patient/Family Education Outcome: Progressing Towards Goal

## 2021-01-31 NOTE — PROGRESS NOTES
01/31/21 0501 Patient Observations Pulse (Heart Rate) 93 Resp Rate 20  
O2 Sat (%) 99 % ETCO2 (mmHg) 36 mmHg Airway - Continuous Aspiration of Subglottic Secretions (MAAME) Tube 01/27/21 Oral  
Placement Date/Time: 01/27/21 0439   Number of Attempts: 1  Location: Oral  Airway Types: Endotracheal, cuffed Insertion Depth (cm) 23 cm Line Galileo Lips Side Secured Centered Cuff Pressure  
(mlt) Site Assessment Clean, dry, & intact Suction on Yes Respiratory Respiratory (WDL) X Patient on Vent Yes - If patient is on vent, add Doc Flowsheet Ventilator (). Respiratory Pattern Regular Chest/Tracheal Assessment Chest expansion, symmetrical  
Breath Sounds Bilateral Coarse Airway Clearance Suction ET Tube Suction Device Inline suction catheter Sputum Method Obtained Endotracheal  
Sputum Amount Moderate Sputum Color/Odor Bloody Sputum Consistency Thick; Thin Vent Settings FIO2 (%) 30 % SpO2/FIO2 Ratio 330 CMV Rate Set 18 Back-Up Rate 18 Vt Set (ml) 400 ml PEEP/VENT (cm H2O) 5 cm H20 Insp Time (sec) 1 sec Insp Rise Time % 50 % Flow Trigger 3 Ventilator Measurements Resp Rate Observed 20 Vt Spont (ml) 396 ml Ve Observed (l/min) 8.99 l/min PIP Observed (cm H2O) 18 cm H2O  
MAP (cm H2O) 9.4 I:E Ratio Actual 1:1.7 Safety & Alarms Circuit Temperature (hme changed) Backup Mode Checked/Apnea Yes Pressure Max 40 cm H2O Pressure Min 11 cm H2O Ve Min 2 Ve Max 20 Vt Min 200 ml Vt Max 800 ml  
RR Max 40 Ambu Bag Yes Ambu Mask Yes ETCO2/TCM Min 20 mmHg ETCO2/TCM Max 50 mmHg Vent Method/Mode Ventilation Method Conventional  
Ventilator Mode Assist control;VC+

## 2021-01-31 NOTE — ROUTINE PROCESS
Bedside and Verbal shift change report given to Bryan Rice RN (oncoming nurse) by Ivis Villalobos RN (offgoing nurse). Report included the following information SBAR, Intake/Output, MAR, Recent Results, Cardiac Rhythm SR and Quality Measures.

## 2021-01-31 NOTE — PROGRESS NOTES
Interim events noted. Had fever with temperature 101.3 overnight. Vitals reviewed. Labs reviewed-sputum culture positive for probable Pseudomonas. MRSA screen positive. Urine culture positive for Enterobacter. Fevers could be secondary to beta-lactam resistant Pseudomonas versus SIRS response to acute kidney injury. Lack of worsening hypoxia argues against progressive COVID-19 pneumonia. Recommendation 
--Discontinue cefepime, levofloxacin. Start meropenem 
-Continue vancomycin and continue vancomycin for now 
--Follow-up susceptibilities Pseudomonas in sputum culture and modify antibiotics as needed 
--Obtain blood culture 
-Follow-up pending NANCY Prognosis guarded Discussed with Dr. Raffy Chicas Time spent greater than 25 minutes

## 2021-01-31 NOTE — ROUTINE PROCESS
Received report from Bayron Huang, [address] [facility]. Assumed care of patient.

## 2021-01-31 NOTE — PROGRESS NOTES
SBT stopped- Pt tachypneic and tachycardic with increased respiratory effort and distress. Dr Marge Hidalgo and LATRICIA Acharya informed.

## 2021-01-31 NOTE — PROGRESS NOTES
RENAL PROGRESS NOTE Malcom Jackson Assessment/Plan: NANCY on CKD3:  
- continue on bumex iv 2 mg bid, urine output improved. Covid PNA/Resp failure mech vent per PCCM. Met Acidosis:off of bicarb gtt.  f/u bmp in am.  
Cirrhosis/Varices: dismal prognosis No indication but sub-optimal RRT candidate. 
  
                                                                                                             
Subjective: 
covid isolation. Patient Active Problem List  
Diagnosis Code  Anemia D64.9  
 CAD (coronary artery disease) I25.10  Thromb[facility] D69.6  NSTEMI (non-ST elevated myocardial infarction) (HCC) I21.4  
 UTI (urinary tract infection) N39.0  Sepsis ([facility] A41.9  PNA (pneumonia) J18.9  Hypoxia R09.02  
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  
 CHF (congestive heart failure) (HCC) I50.9  Dyspnea R06.00  
 Pneumonia J18.9  
 HTN (hypertension), benign I10  
 CAP (community acquired pneumonia) J18.9  Acute on chronic diastolic CHF (congestive heart failure) (HCC) I50.33  Leg DVT (deep venous thromboembolism), acute, left (HCC) I82.402  Palpitations R00.2  Acute pulmonary edema (HCC) J81.0  Malaise and fatigue R53.81, R53.83  
 Pancytopenia ([facility] [id number]  Portal hypertension ([facility] K76.6  Chronic anticoagulation Z79.01  
 Hip fracture ([facility] S72.009A  Breast cancer ([facility] C50.919  
 Colon cancer ([facility] C18.9  Pneumonia due to COVID-19 virus U07.1, J12.82 Current Facility-Administered Medications Medication Dose Route Frequency Provider Last Rate Last Admin  acetaminophen (TYLENOL) tablet 650 mg  650 mg Oral Q6H PRN Turner Pierce PA-C   650 mg at 01/31/21 5071  potassium chloride 10 mEq in 100 ml IVPB  10 mEq IntraVENous ONCE Antonette Lowery MD      
  bumetanide (BUMEX) injection 2 mg  2 mg IntraVENous BID Maxwell Rivas MD   2 mg at 01/31/21 1148  levoFLOXacin (LEVAQUIN) 500 mg in D5W IVPB  500 mg IntraVENous Q48H Juana Felty R,  mL/hr at 01/31/21 0958 500 mg at 01/31/21 3117  insulin glargine (LANTUS) injection 20 Units  20 Units SubCUTAneous DAILY Jyotsna Murphy PA-C   20 Units at 01/31/21 1012  multivit-folic acid-herbal 801 (WELLESSE PLUS) oral liquid 30 mL  30 mL Per NG tube DAILY Frankie Ferrell MD   30 mL at 01/31/21 2010  chlorhexidine (PERIDEX) 0.12 % mouthwash 10 mL  10 mL Oral Q12H Magali Barrera PA-C   10 mL at 01/31/21 6757  albuterol-ipratropium (DUO-NEB) 2.5 MG-0.5 MG/3 ML  3 mL Nebulization Q6H PRN Magali Barrera PA-C      
 fentaNYL (PF) 900 mcg/30 ml infusion soln  0-200 mcg/hr IntraVENous TITRATE Magali Milian PA-C 2.5 mL/hr at 01/31/21 1150 75 mcg/hr at 01/31/21 1150  
 midazolam (VERSED) injection 1-2 mg  1-2 mg IntraVENous Q10MIN PRN Magali Milian PA-C   2 mg at 01/31/21 1156  fentaNYL citrate (PF) injection  mcg   mcg IntraVENous Q30MIN PRN Magali Milian PA-C   100 mcg at 01/28/21 5498  
 dexmedeTOMidine in 0.9 % NaCl (PRECEDEX) 400 mcg/100 mL (4 mcg/mL) infusion soln  0.1-1.5 mcg/kg/hr (Order-Specific) IntraVENous TITRATE Magali Milian PA-C 8.8 mL/hr at 01/31/21 0600 0.5 mcg/kg/hr at 01/31/21 0600  
 insulin lispro (HUMALOG) injection   SubCUTAneous Q6H Marlyn Mckinnon MD   9 Units at 01/31/21 1009  
 glucose chewable tablet 16 g  4 Tab Oral PRN Magali Milian PA-C      
 glucagon (GLUCAGEN) injection 1 mg  1 mg IntraMUSCular PRN Magali Barrera PA-C      
 dextrose (D50W) injection syrg 12.5-25 g  25-50 mL IntraVENous PRN Magali Barrera PA-C      
 NOREPINephrine (LEVOPHED) 8 mg in 5% dextrose 250mL (32 mcg/mL) infusion  0.5-30 mcg/min IntraVENous TITRATE Pal Green MD   Stopped at 01/28/21 1000  cefepime (MAXIPIME) 2 g in sterile water (preservative free) 10 mL IV syringe  2 g IntraVENous Q24H JALEEL'Magali Belle PA-C   2 g at 01/31/21 1000  pantoprazole (PROTONIX) 40 mg in 0.9% sodium chloride 10 mL injection  40 mg IntraVENous DAILY Bethany Yanez MD   40 mg at 01/31/21 0191 Objective Vitals:  
 01/31/21 0808 01/31/21 1100 01/31/21 1127 01/31/21 1148 BP:    (!) 179/90 Pulse: 95 (!) 130 (!) 154 (!) 139 Resp: 23 (!) 35 (!) 38 Temp:      
SpO2: 97% 100% Weight:      
Height:      
 
 
 
Intake/Output Summary (Last 24 hours) at 1/31/2021 1238 Last data filed at 1/31/2021 1152 Gross per 24 hour Intake 855.59 ml Output 1450 ml Net -594.41 ml Admission weight: Weight: 68 kg (150 lb) (01/27/21 0830) Last Weight Metrics: 
Weight Loss Metrics 1/31/2021 12/10/2020 9/28/2020 12/1/2019 10/16/2019 9/30/2019 8/6/2019 Today's Wt 178 lb 9.2 oz 157 lb 8 oz 149 lb 14.4 oz 140 lb 143 lb 140 lb 145 lb BMI 34.88 kg/m2 30.76 kg/m2 29.28 kg/m2 27.34 kg/m2 27.93 kg/m2 27.34 kg/m2 28.32 kg/m2 Physical Assessment:  
 
Pt is covid 19+, I did not go into the room to minimize pt/md exposure and preserve PPE. I have viewed pt from the door and reviewed documentation from other providers.  
 
Lab CBC w/Diff Recent Labs  
  01/31/21 
0312 01/30/21 
0910 01/30/21 
0245 WBC 3.1* 3.2* 3.1*  
RBC 2.49* 2.57* 2.42* HGB 7.3* 7.5* 7.0*  
HCT 21.5* 21.7* 20.9*  
PLT 37* 39* 34* GRANS 88* 83* 88* LYMPH 7* 11* 9* EOS 0 0 0 Chemistry Recent Labs  
  01/31/21 
4338 01/30/21 
1505 01/30/21 
0910 01/30/21 
0245 01/29/21 
0225 01/29/21 
0225 * 194* 161* 167*   < > 229*  143 142 141   < > 143 K 3.7 4.1 4.2 4.1   < > 4.1  108 106 107   < > 108 CO2 26 26 26 25   < > 21 * 107* 105* 104*   < > 95* CREA 3.26* 3.24* 3.20* 3.15*   < > 2.81* CA 7.7* 7.6* 7.6* 7.5*   < > 7.3* AGAP 11 9 10 9   < > 14 BUCR 34* 33* 33* 33*   < > 34* *  --   --  123*  --  140* TP 5.5*  --   --  5.6*  --  5.3* ALB 2.7* 2.8* 2.9* 2.7*   < > 2.6*  
GLOB 2.8  --   --  2.9  --  2.7 AGRAT 1.0  --   --  0.9  --  1.0 PHOS  --  4.9 5.0*  --    < >  --   
 < > = values in this interval not displayed. Lab Results Component Value Date/Time Iron 65 12/08/2020 11:30 AM  
 TIBC 189 (L) 12/08/2020 11:30 AM  
 Iron % saturation 34 12/08/2020 11:30 AM  
 Ferritin 982 (H) 01/31/2021 03:12 AM  
  
Lab Results Component Value Date/Time Calcium 7.7 (L) 01/31/2021 03:12 AM  
 Phosphorus 4.9 01/30/2021 03:05 PM  
  
 
Rhiannon Morris MD 
[facility] Pager: [phone] Phone: [phone]

## 2021-01-31 NOTE — PROGRESS NOTES
SBT initiated, PS 7, PEEP +5, FiO2 40%. Pt awake and follows some commands. 01/31/21 1100 Weaning Parameters Spontaneous Breathing Trial Complete Yes Resp Rate Observed 33 Ve 16.9  RSBI 64 Tachypneic and tachycardic.

## 2021-01-31 NOTE — PROGRESS NOTES
Problem: Ventilator Management Goal: *Adequate oxygenation and ventilation Outcome: Progressing Towards Goal 
Goal: *Patient maintains clear airway/free of aspiration Outcome: Progressing Towards Goal 
Goal: *Absence of infection signs and symptoms Outcome: Progressing Towards Goal 
Goal: *Normal spontaneous ventilation Outcome: Progressing Towards Goal 
  
Problem: Patient Education: Go to Patient Education Activity Goal: Patient/Family Education Outcome: Progressing Towards Goal 
  
Problem: Risk for Spread of Infection Goal: Prevent transmission of infectious organism to others Description: Prevent the transmission of infectious organisms to other patients, staff members, and visitors. Outcome: Progressing Towards Goal 
  
Problem: Patient Education:  Go to Education Activity Goal: Patient/Family Education Outcome: Progressing Towards Goal 
  
Problem: Non-Violent Restraints Goal: *Removal from restraints as soon as assessed to be safe Outcome: Progressing Towards Goal 
Goal: *No harm/injury to patient while restraints in use Outcome: Progressing Towards Goal 
Goal: *Patient's dignity will be maintained Outcome: Progressing Towards Goal 
Goal: *Patient Specific Goal (EDIT GOAL, INSERT TEXT) Outcome: Progressing Towards Goal 
Goal: Non-violent Restaints:Standard Interventions Outcome: Progressing Towards Goal 
Goal: Non-violent Restraints:Patient Interventions Outcome: Progressing Towards Goal 
Goal: Patient/Family Education Outcome: Progressing Towards Goal 
  
Problem: Falls - Risk of 
Goal: *Absence of Falls Description: Document Kalie Minium Fall Risk and appropriate interventions in the flowsheet. Outcome: Progressing Towards Goal 
Note: Fall Risk Interventions: 
  
 
Mentation Interventions: Adequate sleep, hydration, pain control, Door open when patient unattended, Evaluate medications/consider consulting pharmacy Medication Interventions: Assess postural VS orthostatic hypotension, Bed/chair exit alarm, Evaluate medications/consider consulting pharmacy, Patient to call before getting OOB, Teach patient to arise slowly, Utilize gait belt for transfers/ambulation Elimination Interventions: Call light in reach, Patient to call for help with toileting needs, Toileting schedule/hourly rounds Problem: Patient Education: Go to Patient Education Activity Goal: Patient/Family Education Outcome: Progressing Towards Goal 
  
Problem: Pressure Injury - Risk of 
Goal: *Prevention of pressure injury Description: Document Kyle Scale and appropriate interventions in the flowsheet. Outcome: Progressing Towards Goal 
Note: Pressure Injury Interventions: 
Sensory Interventions: Assess changes in LOC, Assess need for specialty bed, Check visual cues for pain, Discuss PT/OT consult with provider, Keep linens dry and wrinkle-free Moisture Interventions: Absorbent underpads, Apply protective barrier, creams and emollients, Check for incontinence Q2 hours and as needed, Internal/External urinary devices Activity Interventions: Assess need for specialty bed, Pressure redistribution bed/mattress(bed type), PT/OT evaluation Mobility Interventions: Assess need for specialty bed, Float heels, Pressure redistribution bed/mattress (bed type), PT/OT evaluation Nutrition Interventions: Discuss nutritional consult with provider Friction and Shear Interventions: Apply protective barrier, creams and emollients, Foam dressings/transparent film/skin sealants, Lift sheet, Lift team/patient mobility team, Minimize layers Problem: Patient Education: Go to Patient Education Activity Goal: Patient/Family Education Outcome: Progressing Towards Goal 
  
Problem: Nutrition Deficit Goal: *Optimize nutritional status Outcome: Progressing Towards Goal

## 2021-01-31 NOTE — PROGRESS NOTES
[facility]. Pulmonary, Critical Care, and Sleep Medicine Name: Keyon Rosales MRN: [id number] : 1940 Hospital: [address] [place] [address] Dr Date: 2021  Admission Date: 2021 Chart and notes reviewed. Data reviewed. I have evaluated all findings. [x]I have reviewed the flowsheet and previous days notes. [x]The patient is unable to give any meaningful history or review of systems because the patient is: 
[x]Intubated [x]Sedated  
[]Unresponsive [x]The patient is critically ill on     
[x]Mechanical ventilation []Pressors []BiPAP [] Interval HPI:  
Jovan PeeJesse Schaefer is a 79 y.o.  Female w/ PMH of HFpPEF (EF 55-60%), Stage 3 CKD, Recent L Hip ORIF (2020), Esophageal Varices, Liver Cirrhosis, DM, HTN, CAD, who presented from [address] [place] [address] to [facility] ED on 2021 via EMS after developing worsening SOB and Hemoptysis after testing (+) for COVID-19 x1 week prior.  
  
ICU consulted for Hypoxia and is noted to be satting at 94+ on 15L/100% HFNC. Shortly after examination, she required Endotracheal Intubation w/ Mechanical Ventilation.  
  
Discussed with patient's sister and nephew. Nephew is CC nurse a [employer]. Updated them on critical nature and high likelihood for mortality due to age and underlying conditions, wished to proceed with intubation but DNR otherwise. Nephrology following for NANCY on CKD3 ID following for COVID pneumonia, Enterobacter cystitis 21 Hospital Day: 6 Vent Day: 5 Overnight Events: none Mentation: Sedated Respiratory/ Secretions: Intubated, Oral Cavity w/out copious secretions Hemodynamics: VSS. Febrile. Diet: TF 
Urine/Fecal Output: UO increased s/p diuresis, no bm. Need for Procedures: none ROS:Review of systems not obtained due to patient factors. Events and notes from last 24 hours reviewed. Care plan discussed on multidisciplinary rounds. Patient Active Problem List  
Diagnosis Code  Anemia D64.9  
 CAD (coronary artery disease) I25.10  Thrombocytopenia ([facility] D69.6  NSTEMI (non-ST elevated myocardial infarction) (HCC) I21.4  
 UTI (urinary tract infection) N39.0  Sepsis ([facility] A41.9  PNA (pneumonia) J18.9  Hypoxia R09.02  
 Cirrhosis of liver not due to alcohol ([facility] K74.60  Esophageal varices (HCC) I85.00  
 CHF (congestive heart failure) (HCC) I50.9  Dyspnea R06.00  
 Pneumonia J18.9  
 HTN (hypertension), benign I10  
 CAP (community acquired pneumonia) J18.9  Acute on chronic diastolic CHF (congestive heart failure) (HCC) I50.33  Leg DVT (deep venous thromboembolism), acute, left (HCC) I82.402  Palpitations R00.2  Acute pulmonary edema (HCC) J81.0  Malaise and fatigue R53.81, R53.83  
 Pancytopenia ([facility] [id number]  Portal hypertension ([facility] K76.6  Chronic anticoagulation Z79.01  
 Hip fracture ([facility] S72.009A  Breast cancer ([facility] C50.919  
 Colon cancer ([facility] C18.9  Pneumonia due to COVID-19 virus U07.1, J12.82 Vital Signs: 
Visit Vitals BP (!) 175/91 Pulse 95 Temp (!) 101.1 °F (38.4 °C) Resp 23 Ht 5' (1.524 m) Wt 81 kg (178 lb 9.2 oz) SpO2 97% BMI 34.88 kg/m² O2 Device: Ventilator, Endotracheal tube, Heated, Humidifier O2 Flow Rate (L/min): 15 l/min Temp (24hrs), Av.8 °F (38.2 °C), Min:99.5 °F (37.5 °C), Max:101.5 °F (38.6 °C) Intake/Output:  
Last shift:      No intake/output data recorded. Last 3 shifts:  1901 -  0700 In: 1616.8 [I.V.:1146.8] Out: 1315 [ALIEV:4855] Intake/Output Summary (Last 24 hours) at 2021 7332 Last data filed at 2021 0700 Gross per 24 hour Intake 683.4 ml Output 1185 ml Net -501.6 ml  
  
 
Ventilator Settings: 
Ventilator Mode: Assist control, VC+ Respiratory Rate Back-Up Rate: 18 Insp Time (sec): 1 sec I:E Ratio: 1:2.3 Ventilator Volumes Vt Set (ml): 400 ml Vt Exhaled (Machine Breath) (ml): 415 ml Vt Spont (ml): 396 ml Ve Observed (l/min): 9.99 l/min Ventilator Pressures PIP Observed (cm H2O): 18 cm H2O Plateau Pressure (cm H2O): 14 cm H2O 
MAP (cm H2O): 10 PEEP/VENT (cm H2O): 5 cm H20 Auto PEEP Observed (cm H2O): 0 cm H2O Current Facility-Administered Medications Medication Dose Route Frequency  bumetanide (BUMEX) injection 2 mg  2 mg IntraVENous BID  levoFLOXacin (LEVAQUIN) 500 mg in D5W IVPB  500 mg IntraVENous Q48H  
 insulin glargine (LANTUS) injection 20 Units  20 Units SubCUTAneous DAILY  multivit-folic acid-herbal 473 (WELLESSE PLUS) oral liquid 30 mL  30 mL Per NG tube DAILY  dexamethasone (DECADRON) 4 mg/mL injection 6 mg  6 mg IntraVENous Q12H  chlorhexidine (PERIDEX) 0.12 % mouthwash 10 mL  10 mL Oral Q12H  
 fentaNYL (PF) 900 mcg/30 ml infusion soln  0-200 mcg/hr IntraVENous TITRATE  dexmedeTOMidine in 0.9 % NaCl (PRECEDEX) 400 mcg/100 mL (4 mcg/mL) infusion soln  0.1-1.5 mcg/kg/hr (Order-Specific) IntraVENous TITRATE  insulin lispro (HUMALOG) injection   SubCUTAneous Q6H  
 NOREPINephrine (LEVOPHED) 8 mg in 5% dextrose 250mL (32 mcg/mL) infusion  0.5-30 mcg/min IntraVENous TITRATE  cefepime (MAXIPIME) 2 g in sterile water (preservative free) 10 mL IV syringe  2 g IntraVENous Q24H  pantoprazole (PROTONIX) 40 mg in 0.9% sodium chloride 10 mL injection  40 mg IntraVENous DAILY Telemetry: Sinus Rhythm Limited Physical Exam:  
 General: Intubated, Sedated HEENT: NC/AT, Septum midline, No visible nasal drainage, Oral Cavity clear w/out copious drainage, Trachea midline Chest: No visible trauma or deformity to chest wall, RRR- via Tele Lungs: Equal & Bilateral chest rise, Airway clear, ET Tube in place and secured Abdomen: Non-Distended, large reducible hernia Extremity: 2+pitting edema BLE Neuro: Sedated Skin: No new skin lesions, rashes, or ulcers DATA: 
 MAR reviewed and pertinent medications noted or modified as needed Labs: 
Recent Labs  
  01/31/21 
4361 01/30/21 
0910 01/30/21 
0245 WBC 3.1* 3.2* 3.1* HGB 7.3* 7.5* 7.0*  
HCT 21.5* 21.7* 20.9*  
PLT 37* 39* 34* Recent Labs  
  01/31/21 
7773 01/30/21 
1505 01/30/21 
0910 01/30/21 
0245 01/29/21 
2130 01/29/21 
0225 01/29/21 
0225  143 142 141 139   < > 143 K 3.7 4.1 4.2 4.1 4.1   < > 4.1  108 106 107 107   < > 108 CO2 26 26 26 25 25   < > 21  
* 194* 161* 167* 191*   < > 229* * 107* 105* 104* 103*   < > 95* CREA 3.26* 3.24* 3.20* 3.15* 3.12*   < > 2.81* CA 7.7* 7.6* 7.6* 7.5* 7.7*   < > 7.3*  
MG 1.9  --   --  1.9  --   --  1.9 PHOS  --  4.9 5.0*  --  5.2*   < >  --   
ALB 2.7* 2.8* 2.9* 2.7* 2.5*   < > 2.6* ALT 53  --   --  66*  --   --  77*  
 < > = values in this interval not displayed. No results for input(s): PH, PCO2, PO2, HCO3, FIO2 in the last 72 hours. Recent Labs  
  01/31/21 
0510 01/30/21 
1053 01/29/21 
0400 FIO2I 45 50 50 HCO3I 23.6 24.0 21.6* PCO2I 34.9* 36.6 36.5 PHI 7.44 7.42 7.38  
PO2I 99 124* 92 Imaging: 
[x]   I have personally reviewed the patients radiographs and reports XR Results (most recent): 
Results from [facility] Encounter encounter on 01/26/21 XR ABD (KUB) Narrative Abdomen AP single view HISTORY: OG tube placement check, respiratory distress COMPARISON: None. FINDINGS: The OG tube is in the proximal stomach along the greater curvature. There is paucity of bowel gas pattern. . 
  
 Impression OG tube in proximal stomach. Thank you for your referral.  
 
 
CT Results (most recent): 
Results from Hospital Encounter encounter on 01/26/21 CT CHEST WO CONT Narrative Description:  CT chest without contrast 
 
TECHNIQUE: Helically acquired axial CT imaging of the chest without IV contrast 
was performed. Coronal and sagittal reformations generated and reviewed. All CT scans at this facility are performed using dose optimization technique as 
appropriate to a performed exam, to include automated exposure control, 
adjustment of the mA and/or kV according to patient size (including appropriate 
matching for site-specific examinations), or use of iterative reconstruction 
technique. Clinical Indication:  Chest pain Comparison: March 24, 2019. Findings:   
 
The visualized base of the neck shows a endotracheal tube within the lower 
thoracic trachea. There is a left IJ central venous line which terminates in the 
mid SVC. Visualized thyroid is unremarkable. Thoracic aorta nonaneurysmal. 
Moderately enlarged heart without pericardial effusion. Coronary artery 
calcifications are seen. There are a few mildly prominent mediastinal lymph nodes which could be 
reactive. There are dense parenchymal infiltrates bilaterally. Small effusions, right 
greater than left. Visualized upper abdomen shows splenomegaly and ascites. Impression Impression:   
 
Dense pulmonary infiltrates bilaterally consistent with pneumonia. Small 
parapneumonic effusions. Visualized abdomen shows ascites and mild splenomegaly which may be indicative 
of portal venous hypertension. Support lines and tubes appear well positioned. IMPRESSION:  
· Hypoxic, Hypercapnic Respiratory Failure/ presumed ARDS-  likely due to COVID-19 Pneumonia w/ Acute on Chronic CHF Exacerbation, now requiring Mechanical Ventilation.  
· Pulmonary HTN- based on Echo (01/27/2021)- LV EF: 55-60%, Pulmonary arterial systolic pressure is 70 mmHg. · COVID-19 Pneumonia- Covid-19 Rapid Test (+) on 01/26/2021. Known (+) prior to admission.  CXR (+) Fluffy B/L mid to LL Opacities, consistent w/ multifocal infection.   
 · Septic shock- tachycardia, tachypnea, elevated procal on admission, 2/2 COVID-19 + UTI, ?underlying aspiration. UA (+) 3+ Bacteria + Large Leuks. Urine Cx pending. Worsening procal, white count. · Metabolic Acidosis- Reasons above. PH: 7.31, pCO2: 29.5, HCO3: 14.7 · NANCY on Stage 3 CKD, Oliguric- likely in setting of Distributive Shock. BUN: 83, Cr: 2.34 
· Encephalopathy- Metabolic, Reasons above · UTI on cefepime, Vanc · Hemoptysis- Hx of Esophageal Varices vs Pulmonary Edema · Thrombocytopenia- Baseline Chronically low. · Elevated Troponin- likely CHF Exacerbation + worsening Pulm HTN , Troponin: 1.30- Increasing · Acute on chronic HFpEF - EF 60-65% · Hx Malnutrition albumin today · Hx of Esophageal Varices · Hx Liver Cirrhosis   
· Hx DM- HgbA1c: 5.3 
· HX HTN  
· Hx of CAD  
· Hx Recent Left Hip ORIF (12/07/2020) Patient Active Problem List  
Diagnosis Code  Anemia D64.9  
 CAD (coronary artery disease) I25.10  Thrombocytopenia ([facility] D69.6  NSTEMI (non-ST elevated myocardial infarction) (HCC) I21.4  
 UTI (urinary tract infection) N39.0  Sepsis ([facility] A41.9  PNA (pneumonia) J18.9  Hypoxia R09.02  
 Cirrhosis of liver not due to alcohol ([facility] K74.60  Esophageal varices (HCC) I85.00  
 CHF (congestive heart failure) (HCC) I50.9  Dyspnea R06.00  
 Pneumonia J18.9  
 HTN (hypertension), benign I10  
 CAP (community acquired pneumonia) J18.9  Acute on chronic diastolic CHF (congestive heart failure) (HCC) I50.33  Leg DVT (deep venous thromboembolism), acute, left (HCC) I82.402  Palpitations R00.2  Acute pulmonary edema (HCC) J81.0  Malaise and fatigue R53.81, R53.83  
 Pancytopenia ([address] [id number]  Portal hypertension ([address] K76.6  Chronic anticoagulation Z79.01  
 Hip fracture ([facility] S72.009A  Breast cancer ([facility] C50.919  
 Colon cancer ([facility] C18.9  Pneumonia due to COVID-19 virus U07.1, J12.82 RECOMMENDATIONS:  
 Neuro: Titrate sedation to RASS 0 to -1. Sedation holiday, assess mentation for possible SBT Resp: VAP Bundle, Bronchial Hygiene. Aspiration Precautions, maintain HOB>30'. Decadron q12 hrs, Duonebs q6 hrs PRN. I/D: I/D following. I/D recs Cefepime, Vancomycin. Remdesivir given on admission, d/c'd due to renal fx and intubation. Urine Cx enterobacter. Blood Cx NG 4 days. MRSA Cx positive. Trend WBCs + Temp. Hem/Onc: Monitor Thrombocytopenia. Trend H/H, Monitor for s/o active bleeding. Daily CBC w/ diff. Follow up on Dopplers of upper and lower extremities CVS: Levophed gtt stopped. Echo (+) LV EF: 55-60%, Severe Pulm HTN. LE Duplex (-). Monitor HD, MAP goal >65 mmHg. Originally not a candidate for [facility] due to hemoptysis Metabolic: Trend daily BMP + Mag + Phos, replace electrolytes per protocol. Renal: Nephrology following. IV Bumex. Jean in place. Trend renal parameters & Monitor UOP. Endocrine: SSI, q6 Glucose checks. Lantus 20u. GI: TF Nepro at 10, advance as tolerated Musc/Skin: No acute issues. Partial Code, INTUBATE ONLY Best Practice: 
Glycemic Control IHI ICU Bundles: 
 Central Line Bundle Followed , Jean Bundle Followed and Vent Bundle Followed, Vent Day 5 [facility] Vent patients- VAP bundle, aim to keep peak plateau pressure 97-82EL H2O Sress Ulcer Prophylaxis. DVT Prophylaxis. Need for Lines, Jean Assessed. Restraints Need. Palliative Care Evaluation. Aman Contreras MD PGY-1 
[facility] Emergency Medicine 1/31/2021, 10:53 AM 
 
Pulmonary / Critical Care Physician: 
 
Chart and note reviewed. Data reviewed. Seen on rounds earlier today. I have independently evaluated and examined the patient. I agree with the exam, assessment and plans outlined by [facility] In brief, my findings, evaluation and recommendations are as stated below: Pt remains stable on MV. Pt had sedation holiday and SBT today but failed due to tachycardia and tachypnea. . Fever noted up to 101, discussed with ID. She will change ABX. Will also order LE and UE dopplers. Continue bumex bid per nephro, adequate UOP. Plan to repeat SBT tomorrow. Rest of details and diagnostic/treatment plans per APC note. I have personally reviewed all pertinent data including labs, imaging and recommendations of treatment team providers. Total of 45 min critical care time spent at bedside during the course of care providing evaluation,management and care decisions and ordering appropriate treatment related to critical care problems exclusive of procedures. The reason for providing this level of medical care for this critically ill patient was due a critical illness that impaired one or more vital organ systems such that there was a high probability of imminent or life threatening deterioration in the patients condition. This care involved high complexity decision making to assess, manipulate, and support vital system functions, to treat this degree vital organ system failure and to prevent further life threatening deterioration of the patients condition.  
   
Vivica Cogan, MD 
10:41 PM

## 2021-01-31 NOTE — ROUTINE PROCESS
Bedside and Verbal shift change report given to Nannette Hodgkins, RN (oncoming nurse) by Sara Alvarado RN (offgoing nurse). Report included the following information SBAR, Kardex, Procedure Summary, Intake/Output, MAR, Med Rec Status and Cardiac Rhythm NSR.

## 2021-02-01 NOTE — PROGRESS NOTES
RENAL PROGRESS NOTE Nila Carnes Assessment/Plan:  
1) NANCY on CKD3: continue on bumex iv 2 mg bid,  
urine output improved. No indication for RRT 2)Covid PNA/Resp failure mech vent per PCCM. 3)Met Acidosis:off of bicarb gtt.  f/u bmp in am.  
4)Cirrhosis/Varices: dismal prognosis Please call with questions, 
 
Mil Queen MD FASN Cell [phone] Pager: [phone] Subjective: 
covid isolation. Patient Active Problem List  
Diagnosis Code  Anemia D64.9  
 CAD (coronary artery disease) I25.10  Thrombocytopenia ([facility] D69.6  NSTEMI (non-ST elevated myocardial infarction) (HCC) I21.4  
 UTI (urinary tract infection) N39.0  Sepsis ([facility] A41.9  PNA (pneumonia) J18.9  Hypoxia R09.02  
 Cirrhosis of liver not due to alcohol ([facility] K74.60  Esophageal varices (HCC) I85.00  
 CHF (congestive heart failure) (HCC) I50.9  Dyspnea R06.00  
 Pneumonia J18.9  
 HTN (hypertension), benign I10  
 CAP (community acquired pneumonia) J18.9  Acute on chronic diastolic CHF (congestive heart failure) (HCC) I50.33  Leg DVT (deep venous thromboembolism), acute, left (HCC) I82.402  Palpitations R00.2  Acute pulmonary edema (HCC) J81.0  Malaise and fatigue R53.81, R53.83  
 Pancytopenia ([facility] [id number]  Portal hypertension ([facility] K76.6  Chronic anticoagulation Z79.01  
 Hip fracture ([facility] S72.009A  Breast cancer ([facility] C50.919  
 Colon cancer ([facility] C18.9  Pneumonia due to COVID-19 virus U07.1, J12.82 Current Facility-Administered Medications Medication Dose Route Frequency Provider Last Rate Last Admin  
 0.9% sodium chloride infusion 250 mL  250 mL IntraVENous PRN Magali Barrera PA-C      
 0.9% sodium chloride infusion 250 mL  250 mL IntraVENous PRN Magali Sanchez PA-C      
  acetaminophen (TYLENOL) tablet 650 mg  650 mg Oral Q6H PRN Jessica Gar PA-C   650 mg at 01/31/21 2230  carvediloL (COREG) tablet 12.5 mg  12.5 mg Oral Q12H Niurka Soriano MD   12.5 mg at 02/01/21 2056  meropenem (MERREM) 500 mg in sterile water (preservative free) 10 mL IV syringe  0.5 g IntraVENous Q24H Eda FAJARDO MD   500 mg at 02/01/21 1724  VANCOMYCIN INFORMATION NOTE   Other Rx Dosing/Monitoring Alisha Marks MD      
 bumetanide [place]) injection 2 mg  2 mg IntraVENous BID Delmi Hines MD   2 mg at 02/01/21 0800  
 insulin glargine (LANTUS) injection 20 Units  20 Units SubCUTAneous DAILY Tram Collado PA-C   20 Units at 02/01/21 4522  
 multivit-folic acid-herbal 048 (WELLESSE PLUS) oral liquid 30 mL  30 mL Per NG tube DAILY Fawad Cha MD   Stopped at 02/01/21 0900  chlorhexidine (PERIDEX) 0.12 % mouthwash 10 mL  10 mL Oral Q12H JALEEL'Magali Belle PA-C   10 mL at 02/01/21 1032  albuterol-ipratropium (DUO-NEB) 2.5 MG-0.5 MG/3 ML  3 mL Nebulization Q6H PRN Albin Prader, Abby E, PA-C      
 fentaNYL (PF) 900 mcg/30 ml infusion soln  0-200 mcg/hr IntraVENous TITRATE Albin Prader, Abby E, PA-C 2.5 mL/hr at 02/01/21 1802 75 mcg/hr at 02/01/21 1802  midazolam (VERSED) injection 1-2 mg  1-2 mg IntraVENous Q10MIN PRN Albin Prader, Abby E, PA-C   2 mg at 01/31/21 1433  fentaNYL citrate (PF) injection  mcg   mcg IntraVENous Q30MIN PRN Albin Prader, Abby E, PA-C   100 mcg at 01/31/21 1414  
 dexmedeTOMidine in 0.9 % NaCl (PRECEDEX) 400 mcg/100 mL (4 mcg/mL) infusion soln  0.1-1.5 mcg/kg/hr (Order-Specific) IntraVENous TITRATE Albin Prader, Abby E, PA-C 12.3 mL/hr at 02/01/21 1724 0.7 mcg/kg/hr at 02/01/21 1724  insulin lispro (HUMALOG) injection   SubCUTAneous Q6H Fawad Cha MD   12 Units at 02/01/21 1535  
 glucose chewable tablet 16 g  4 Tab Oral PRN Gypsy Wise PA-C      
  glucagon (GLUCAGEN) injection 1 mg  1 mg IntraMUSCular PRN Magali Barrera PA-C      
 dextrose (D50W) injection syrg 12.5-25 g  25-50 mL IntraVENous PRN Magali Barrera PA-C      
 pantoprazole (PROTONIX) 40 mg in 0.9% sodium chloride 10 mL injection  40 mg IntraVENous DAILY Hung Mckinnon MD   40 mg at 02/01/21 1030 Objective Vitals:  
 02/01/21 1400 02/01/21 1500 02/01/21 1600 02/01/21 1603 BP: 107/73 (!) 107/55 114/64 Pulse: 74 74 74 74 Resp: 17 17 18 18 Temp: 97.2 °F (36.2 °C) 97 °F (36.1 °C) 97 °F (36.1 °C) SpO2: 99% 98% 98% 97% Weight:      
Height:      
 
 
 
Intake/Output Summary (Last 24 hours) at 2/1/2021 7175 Last data filed at 2/1/2021 1800 Gross per 24 hour Intake 388 ml Output 1120 ml Net -732 ml Admission weight: Weight: 68 kg (150 lb) (01/27/21 0830) Last Weight Metrics: 
Weight Loss Metrics 2/1/2021 12/10/2020 9/28/2020 12/1/2019 10/16/2019 9/30/2019 8/6/2019 Today's Wt 168 lb 10.4 oz 157 lb 8 oz 149 lb 14.4 oz 140 lb 143 lb 140 lb 145 lb BMI 32.94 kg/m2 30.76 kg/m2 29.28 kg/m2 27.34 kg/m2 27.93 kg/m2 27.34 kg/m2 28.32 kg/m2 Physical Assessment:  
 
Pt is covid 19+, I did not go into the room to minimize pt/md exposure and preserve PPE. I have viewed pt from the door and reviewed documentation from other providers.  
 
Lab CBC w/Diff Recent Labs 02/01/21 
0530 01/31/21 
4607 01/30/21 
7687 WBC 2.0* 3.1* 3.2*  
RBC 2.17* 2.49* 2.57* HGB 6.3* 7.3* 7.5* HCT 18.6* 21.5* 21.7*  
PLT 30* 37* 39* GRANS 80* 88* 83* LYMPH 12* 7* 11* EOS 0 0 0 Chemistry Recent Labs 02/01/21 
1730 02/01/21 
0530 01/31/21 
1540 01/31/21 
6249 01/30/21 
0245 01/30/21 
0245 * 297* 260* 217*   < > 167*  143 146* 144   < > 141  
K 3.2* 3.1* 3.2* 3.7   < > 4.1  109 110 107   < > 107 CO2 26 25 25 26   < > 25 * 130* 117* 110*   < > 104* CREA 3.61* 3.52* 3.42* 3.26*   < > 3.15* CA 7.7* 7.6* 7.8* 7.7*   < > 7.5* AGAP 9 9 11 11   < > 9  
BUCR 37* 37* 34* 34*   < > 33* AP  --  109  --  130*  --  123* TP  --  5.1*  --  5.5*  --  5.6* ALB 2.3* 2.4* 2.5* 2.7*   < > 2.7*  
GLOB  --  2.7  --  2.8  --  2.9 AGRAT  --  0.9  --  1.0  --  0.9 PHOS 3.8 3.8 3.6  --    < >  --   
 < > = values in this interval not displayed. Lab Results Component Value Date/Time Iron 65 12/08/2020 11:30 AM  
 TIBC 189 (L) 12/08/2020 11:30 AM  
 Iron % saturation 34 12/08/2020 11:30 AM  
 Ferritin 824 (H) 02/01/2021 05:30 AM  
  
Lab Results Component Value Date/Time Calcium 7.7 (L) 02/01/2021 05:30 PM  
 Phosphorus 3.8 02/01/2021 05:30 PM  
  
 
Brock Dumont MD 
[facility] Pager: [phone] Phone: [phone]

## 2021-02-01 NOTE — PROGRESS NOTES
Nutrition Assessment Type and Reason for Visit: Reassess, Positive nutrition screen Nutrition Recommendations/Plan:  
- Continue tube feeding of Nepro at goal rate of 40 mL/hr with 30 mL q 6 hour water flushes. Nutrition Assessment:  Tolerating feeds at goal, loose stool today and FMS in place. Replaced with 2 gm Mg, 40 mEq K. Malnutrition Assessment: 
Malnutrition Status: At risk for malnutrition (specify)(unable to eat due to intubation) Estimated Daily Nutrient Needs: 
Energy (kcal):  3905-8815 Protein (g):         
Fluid (ml/day):  4653-0513 Nutrition Related Findings:  Loose stool via FMS Current Nutrition Therapies: DIET NPO 
DIET NUTRITIONAL SUPPLEMENTS Breakfast; Prosource DIET TUBE FEEDING check with MD prior to starting this afternoon in non-bloody, minimal output from OGT 
DIET TUBE FEEDING Current Tube Feeding (TF) Orders: · Feeding Route: Orogastric · Formula: Nepro · Schedule:Continuous · Regimen: 40 mL/hr · Additives/Modulars: Protein(prosource once daily) · Water Flushes: 30 mL q 6 hours · Current TF Orders Provides: 1788 kcal, 93 gm protein, 696 mL free water, 100% RDIs (goal) Anthropometric Measures: 
· Height:  5' (152.4 cm) · Current Body Wt:  76.5 kg (168 lb 10.4 oz) · BMI: 32.9 Nutrition Diagnosis:  
· Inadequate oral intake related to cognitive or neurological impairment, impaired respiratory function as evidenced by intubation, NPO or clear liquid status due to medical condition Nutrition Intervention: 
Food and/or Nutrient Delivery: Continue tube feeding Nutrition Education and Counseling: Education not indicated Coordination of Nutrition Care: Continue to monitor while inpatient, Interdisciplinary rounds Goals: 
Nutritional needs will be met through adequate oral intake or nutrition support within the next 7 days Nutrition Monitoring and Evaluation:  
Behavioral-Environmental Outcomes: None identified Food/Nutrient Intake Outcomes: Enteral nutrition intake/tolerance Physical Signs/Symptoms Outcomes: Biochemical data, Fluid status or edema, GI status, Nutrition focused physical findings Discharge Planning: Too soon to determine Electronically signed by Nyla Lewis RD, [address] [place]  on 2/1/2021 at 4:10 PM 
 
Contact Number: [phone]

## 2021-02-01 NOTE — PROGRESS NOTES
[facility]. Pulmonary, Critical Care, and Sleep Medicine Name: Felipe Cavazos MRN: [id number] : 1940 Hospital: [address] [place] [address] Dr Date: 2021  Admission Date: 2021 Chart and notes reviewed. Data reviewed. I have evaluated all findings. [x]I have reviewed the flowsheet and previous days notes. [x]The patient is unable to give any meaningful history or review of systems because the patient is: 
[x]Intubated [x]Sedated  
[]Unresponsive [x]The patient is critically ill on     
[x]Mechanical ventilation []Pressors []BiPAP [] Interval HPI:  
Vanessa Leonardo is a 79 y.o.  Female w/ PMH of HFpPEF (EF 55-60%), Stage 3 CKD, Recent L Hip ORIF (2020), Esophageal Varices, Liver Cirrhosis, DM, HTN, CAD, who presented from [address] [place] [address] to [facility] ED on 2021 via EMS after developing worsening SOB and Hemoptysis after testing (+) for COVID-19 x1 week prior.  
  
ICU consulted for Hypoxia and is noted to be satting at 94+ on 15L/100% HFNC. Shortly after examination, she required Endotracheal Intubation w/ Mechanical Ventilation.  
  
Discussed with patient's sister and nephew. Nephew is CC nurse a [facility]. Updated them on critical nature and high likelihood for mortality due to age and underlying conditions, wished to proceed with intubation but DNR otherwise. Nephrology following for NANCY on CKD3 ID following for COVID pneumonia, Enterobacter cystitis 21 Hospital Day: 7 Vent Day: 6 Overnight Events: none Mentation: Sedated Respiratory/ Secretions: Intubated, Oral Cavity w/out copious secretions Hemodynamics: VSS. Febrile. Diet: TF at goal 
Urine/Fecal Output: UO increased s/p diuresis, +BM Need for Procedures: none 
 
-Coag panel  
-Transfuse 1u PRBC 
-Potassium, magnesium replacement  
-Lantus 10u additional today 
-D/c steroids ROS:Review of systems not obtained due to patient factors. Events and notes from last 24 hours reviewed. Care plan discussed on multidisciplinary rounds. Patient Active Problem List  
Diagnosis Code  Anemia D64.9  
 CAD (coronary artery disease) I25.10  Thrombocytopenia ([facility] D69.6  NSTEMI (non-ST elevated myocardial infarction) (HCC) I21.4  
 UTI (urinary tract infection) N39.0  Sepsis ([facility] A41.9  PNA (pneumonia) J18.9  Hypoxia R09.02  
 Cirrhosis of liver not due to alcohol ([facility] K74.60  Esophageal varices (HCC) I85.00  
 CHF (congestive heart failure) (HCC) I50.9  Dyspnea R06.00  
 Pneumonia J18.9  
 HTN (hypertension), benign I10  
 CAP (community acquired pneumonia) J18.9  Acute on chronic diastolic CHF (congestive heart failure) (HCC) I50.33  Leg DVT (deep venous thromboembolism), acute, left (HCC) I82.402  Palpitations R00.2  Acute pulmonary edema (HCC) J81.0  Malaise and fatigue R53.81, R53.83  
 Pancytopenia ([facility] [id number]  Portal hypertension ([facility] K76.6  Chronic anticoagulation Z79.01  
 Hip fracture ([facility] S72.009A  Breast cancer ([facility] C50.919  
 Colon cancer ([facility] C18.9  Pneumonia due to COVID-19 virus U07.1, J12.82 Vital Signs: 
Visit Vitals BP (!) 158/76 Pulse 81 Temp 98.8 °F (37.1 °C) Resp 20 Ht 5' (1.524 m) Wt 76.5 kg (168 lb 10.4 oz) SpO2 97% BMI 32.94 kg/m² O2 Device: Ventilator, Endotracheal tube, Humidifier(HME) O2 Flow Rate (L/min): 15 l/min Temp (24hrs), Av.6 °F (38.1 °C), Min:98.8 °F (37.1 °C), Max:101.5 °F (38.6 °C) Intake/Output:  
Last shift:      No intake/output data recorded. Last 3 shifts:  1901 - 02/01 0700 In: 1773.6 [I.V.:663.6] Out: 2360 [[facility]:2218] Intake/Output Summary (Last 24 hours) at 2021 2801 Last data filed at 2021 9223 Gross per 24 hour Intake 1290.19 ml Output 1560 ml Net -269.81 ml Ventilator Settings: 
Ventilator Mode: Assist control, VC+ Respiratory Rate Resp Rate Observed: 38 
 Back-Up Rate: 18 Insp Time (sec): 1 sec I:E Ratio: 1:2.3 Ventilator Volumes Vt Set (ml): 400 ml Vt Exhaled (Machine Breath) (ml): 470 ml Vt Spont (ml): 396 ml Ve Observed (l/min): 8.22 l/min Ventilator Pressures PIP Observed (cm H2O): 19 cm H2O Plateau Pressure (cm H2O): 14 cm H2O 
MAP (cm H2O): 10 PEEP/VENT (cm H2O): 5 cm H20 Auto PEEP Observed (cm H2O): 0.2 cm H2O Current Facility-Administered Medications Medication Dose Route Frequency  carvediloL (COREG) tablet 12.5 mg  12.5 mg Oral Q12H  
 meropenem (MERREM) 500 mg in sterile water (preservative free) 10 mL IV syringe  0.5 g IntraVENous Q24H  VANCOMYCIN INFORMATION NOTE   Other Rx Dosing/Monitoring  bumetanide (BUMEX) injection 2 mg  2 mg IntraVENous BID  insulin glargine (LANTUS) injection 20 Units  20 Units SubCUTAneous DAILY  multivit-folic acid-herbal 915 (WELLESSE PLUS) oral liquid 30 mL  30 mL Per NG tube DAILY  chlorhexidine (PERIDEX) 0.12 % mouthwash 10 mL  10 mL Oral Q12H  
 fentaNYL (PF) 900 mcg/30 ml infusion soln  0-200 mcg/hr IntraVENous TITRATE  dexmedeTOMidine in 0.9 % NaCl (PRECEDEX) 400 mcg/100 mL (4 mcg/mL) infusion soln  0.1-1.5 mcg/kg/hr (Order-Specific) IntraVENous TITRATE  insulin lispro (HUMALOG) injection   SubCUTAneous Q6H  
 NOREPINephrine (LEVOPHED) 8 mg in 5% dextrose 250mL (32 mcg/mL) infusion  0.5-30 mcg/min IntraVENous TITRATE  pantoprazole (PROTONIX) 40 mg in 0.9% sodium chloride 10 mL injection  40 mg IntraVENous DAILY Telemetry: Sinus Rhythm Limited Physical Exam:  
 General: Intubated, Sedated HEENT: NC/AT, Septum midline, No visible nasal drainage, Oral Cavity clear w/out copious drainage, Trachea midline Chest: No visible trauma or deformity to chest wall, RRR- via Tele Lungs: Equal & Bilateral chest rise, Airway clear, ET Tube in place and secured Abdomen: Non-Distended, large reducible hernia Extremity: 2+pitting edema BLE Neuro: Sedated Skin: No new skin lesions, rashes, or ulcers DATA: 
MAR reviewed and pertinent medications noted or modified as needed Labs: 
Recent Labs 02/01/21 
0530 01/31/21 
0941 01/30/21 
9441 WBC 2.0* 3.1* 3.2* HGB 6.3* 7.3* 7.5* HCT 18.6* 21.5* 21.7*  
PLT 30* 37* 39* Recent Labs 02/01/21 
0530 01/31/21 
1540 01/31/21 
9028 01/30/21 
1505 01/30/21 
0245 01/30/21 
0245  146* 144 143   < > 141  
K 3.1* 3.2* 3.7 4.1   < > 4.1  110 107 108   < > 107 CO2 25 25 26 26   < > 25 * 260* 217* 194*   < > 167* * 117* 110* 107*   < > 104* CREA 3.52* 3.42* 3.26* 3.24*   < > 3.15* CA 7.6* 7.8* 7.7* 7.6*   < > 7.5* MG 1.9  --  1.9  --   --  1.9 PHOS 3.8 3.6  --  4.9   < >  --   
ALB 2.4* 2.5* 2.7* 2.8*   < > 2.7* ALT 35  --  53  --   --  66*  
 < > = values in this interval not displayed. No results for input(s): PH, PCO2, PO2, HCO3, FIO2 in the last 72 hours. Recent Labs 02/01/21 
0451 01/31/21 
0510 01/30/21 
1053 FIO2I 0.40 45 50 HCO3I 24.0 23.6 24.0 PCO2I 36.2 34.9* 36.6 PHI 7.43 7.44 7.42  
PO2I 125* 99 124* Imaging: 
[x]   I have personally reviewed the patients radiographs and reports XR Results (most recent): 
Results from [facility] Encounter encounter on 01/26/21 XR ABD (KUB) Narrative Abdomen AP single view HISTORY: OG tube placement check, respiratory distress COMPARISON: None. FINDINGS: The OG tube is in the proximal stomach along the greater curvature. There is paucity of bowel gas pattern. . 
  
 Impression OG tube in proximal stomach. Thank you for your referral.  
 
 
CT Results (most recent): 
Results from Hospital Encounter encounter on 01/26/21 CT CHEST WO CONT Narrative Description:  CT chest without contrast 
 
TECHNIQUE: Helically acquired axial CT imaging of the chest without IV contrast 
was performed. Coronal and sagittal reformations generated and reviewed. All CT scans at this facility are performed using dose optimization technique as 
appropriate to a performed exam, to include automated exposure control, 
adjustment of the mA and/or kV according to patient size (including appropriate 
matching for site-specific examinations), or use of iterative reconstruction 
technique. Clinical Indication:  Chest pain Comparison: March 24, 2019. Findings:   
 
The visualized base of the neck shows a endotracheal tube within the lower 
thoracic trachea. There is a left IJ central venous line which terminates in the 
mid SVC. Visualized thyroid is unremarkable. Thoracic aorta nonaneurysmal. 
Moderately enlarged heart without pericardial effusion. Coronary artery 
calcifications are seen. There are a few mildly prominent mediastinal lymph nodes which could be 
reactive. There are dense parenchymal infiltrates bilaterally. Small effusions, right 
greater than left. Visualized upper abdomen shows splenomegaly and ascites. Impression Impression:   
 
Dense pulmonary infiltrates bilaterally consistent with pneumonia. Small 
parapneumonic effusions. Visualized abdomen shows ascites and mild splenomegaly which may be indicative 
of portal venous hypertension. Support lines and tubes appear well positioned. IMPRESSION:  
· Hypoxic, Hypercapnic Respiratory Failure/ presumed ARDS-  likely due to COVID-19 Pneumonia w/ Acute on Chronic CHF Exacerbation, now requiring Mechanical Ventilation.  
· Pulmonary HTN- based on Echo (01/27/2021)- LV EF: 55-60%, Pulmonary arterial systolic pressure is 70 mmHg. · COVID-19 Pneumonia- Covid-19 Rapid Test (+) on 01/26/2021. Known (+) prior to admission.  CXR (+) Fluffy B/L mid to LL Opacities, consistent w/ multifocal infection.   
 · Septic shock- tachycardia, tachypnea, elevated procal on admission, 2/2 COVID-19 + UTI, ?underlying aspiration. UA (+) 3+ Bacteria + Large Leuks. Urine Cx pending. Worsening procal, white count. · Metabolic Acidosis- Reasons above. PH: 7.31, pCO2: 29.5, HCO3: 14.7 · NANCY on Stage 3 CKD, Oliguric- likely in setting of Distributive Shock. BUN: 83, Cr: 2.34 
· Encephalopathy- Metabolic, Reasons above · UTI on cefepime, Vanc · Hemoptysis- Hx of Esophageal Varices vs Pulmonary Edema · Thrombocytopenia- Baseline Chronically low. · Elevated Troponin- likely CHF Exacerbation + worsening Pulm HTN , Troponin: 1.30- Increasing · Acute on chronic HFpEF - EF 60-65% · Hx Malnutrition albumin today · Hx of Esophageal Varices · Hx Liver Cirrhosis   
· Hx DM- HgbA1c: 5.3 
· HX HTN  
· Hx of CAD  
· Hx Recent Left Hip ORIF (12/07/2020) Patient Active Problem List  
Diagnosis Code  Anemia D64.9  
 CAD (coronary artery disease) I25.10  Thrombocytopenia ([facility] D69.6  NSTEMI (non-ST elevated myocardial infarction) (HCC) I21.4  
 UTI (urinary tract infection) N39.0  Sepsis ([facility] A41.9  PNA (pneumonia) J18.9  Hypoxia R09.02  
 Cirrhosis of liver not due to alcohol ([facility] K74.60  Esophageal varices (HCC) I85.00  
 CHF (congestive heart failure) (HCC) I50.9  Dyspnea R06.00  
 Pneumonia J18.9  
 HTN (hypertension), benign I10  
 CAP (community acquired pneumonia) J18.9  Acute on chronic diastolic CHF (congestive heart failure) (HCC) I50.33  Leg DVT (deep venous thromboembolism), acute, left (HCC) I82.402  Palpitations R00.2  Acute pulmonary edema (HCC) J81.0  Malaise and fatigue R53.81, R53.83  
 Pancytopenia ([facility] [id number]  Portal hypertension ([facility] K76.6  Chronic anticoagulation Z79.01  
 Hip fracture ([facility] S72.009A  Breast cancer ([facility] C50.919  
 Colon cancer ([facility] C18.9  Pneumonia due to COVID-19 virus U07.1, J12.82 RECOMMENDATIONS:  
 Neuro: Titrate sedation to RASS 0 to -1. Daily sedation holiday. Resp: VAP Bundle, Bronchial Hygiene. Aspiration Precautions, maintain HOB>30'. Duonebs q6 hrs PRN. SBT as tolerated. I/D: I/D following. I/D recs Merrem, Vancomycin. Remdesivir given on admission, d/c'd due to renal fx and intubation. Urine Cx enterobacter. Blood Cx NG 4 days. MRSA Cx positive. Trend WBCs + Temp. Hem/Onc: Transfuse 1u PRBC. Coagulation panel today. Monitor Thrombocytopenia. Trend H/H, Monitor for s/o active bleeding. Daily CBC w/ diff. Follow up on Dopplers of upper and lower extremities CVS: Levophed gtt stopped. Echo (+) LV EF: 55-60%, Severe Pulm HTN. LE Duplex (-). Monitor HD, MAP goal >65 mmHg. Originally not a candidate for [facility] due to hemoptysis Metabolic: Need potassium and magnesium replacement today. Trend daily BMP + Mag + Phos, replace electrolytes per protocol. Renal: Nephrology following. IV Bumex. Jean in place. Trend renal parameters & Monitor UOP. Endocrine: SSI, q6 Glucose checks. Lantus 20u. GI: TF Musc/Skin: No acute issues. Partial Code, INTUBATE ONLY Best Practice: 
Glycemic Control IHI ICU Bundles: 
 Central Line Bundle Followed , Jean Bundle Followed and Vent Bundle Followed, Vent Day 6 [facility] Vent patients- VAP bundle, aim to keep peak plateau pressure 12-38WT H2O Sress Ulcer Prophylaxis. DVT Prophylaxis. Need for Lines, Jean Assessed. Restraints Need. Palliative Care Evaluation. Delmy Alexander MD PGY-1 
[facility] Emergency Medicine 2/1/2021, 10:53 AM 
 
 
 
I reviewed the patient's medical history, the resident's findings on physical examination, the patient's diagnoses, and treatment plan as documented in the resident note. I concur with the treatment plan as documented. Additional suggestions noted below. 80/F acute hypoxic respiratory failure due to COVID 19 pneumonia, intubated on vent support with decreasing FiO2 requirements. Underlying CHF with possible exacerbation. Pulmonary HTN suspect Grp 2 +/- Grp3. Acute on CKD, DM previously well controlled. Baseline thrombocytopenia. Poorly controlled diabetes, likely aggravated by steroids. Continue vent support and sedation/analgesia per protocol. Scheduled diuresis. Monitor renal parameters anu off bicarb gtt but no indication for RRT. Lantus dose increased, continue corrective insulin. Rest of plan per resident note. Discussed in ICU interdisciplinary rounds Critical care time 47 minutes

## 2021-02-01 NOTE — PROGRESS NOTES
Problem: Ventilator Management Goal: *Adequate oxygenation and ventilation Outcome: Progressing Towards Goal 
Goal: *Patient maintains clear airway/free of aspiration Outcome: Progressing Towards Goal 
Goal: *Absence of infection signs and symptoms Outcome: Progressing Towards Goal 
Goal: *Normal spontaneous ventilation Outcome: Progressing Towards Goal 
  
Problem: Risk for Spread of Infection Goal: Prevent transmission of infectious organism to others Description: Prevent the transmission of infectious organisms to other patients, staff members, and visitors. Outcome: Progressing Towards Goal 
  
Problem: Non-Violent Restraints Goal: *Removal from restraints as soon as assessed to be safe Outcome: Progressing Towards Goal 
Goal: *No harm/injury to patient while restraints in use Outcome: Progressing Towards Goal 
Goal: *Patient's dignity will be maintained Outcome: Progressing Towards Goal 
Goal: *Patient Specific Goal (EDIT GOAL, INSERT TEXT) Outcome: Progressing Towards Goal 
Goal: Non-violent Restaints:Standard Interventions Outcome: Progressing Towards Goal 
Goal: Non-violent Restraints:Patient Interventions Outcome: Progressing Towards Goal 
Goal: Patient/Family Education Outcome: Progressing Towards Goal 
  
Problem: Falls - Risk of 
Goal: *Absence of Falls Description: Document Kael David Fall Risk and appropriate interventions in the flowsheet. Outcome: Progressing Towards Goal 
Variance Patient Condition Impact: High 
Note: Fall Risk Interventions: 
  
 
Mentation Interventions: Adequate sleep, hydration, pain control, Evaluate medications/consider consulting pharmacy, Increase mobility, More frequent rounding, Reorient patient, Toileting rounds, Update white board Medication Interventions: Assess postural VS orthostatic hypotension, Bed/chair exit alarm, Evaluate medications/consider consulting pharmacy, Teach patient to arise slowly Elimination Interventions: Call light in reach, Patient to call for help with toileting needs, Toileting schedule/hourly rounds Problem: Pressure Injury - Risk of 
Goal: *Prevention of pressure injury Description: Document Kyle Scale and appropriate interventions in the flowsheet. Outcome: Progressing Towards Goal 
Variance Patient Condition Impact: High 
Note: Pressure Injury Interventions: 
Sensory Interventions: Assess changes in LOC, Assess need for specialty bed, Avoid rigorous massage over bony prominences, Check visual cues for pain, Discuss PT/OT consult with provider, Float heels, Keep linens dry and wrinkle-free, Maintain/enhance activity level, Minimize linen layers, Monitor skin under medical devices, Pad between skin to skin, Pressure redistribution bed/mattress (bed type), Turn and reposition approx. every two hours (pillows and wedges if needed), Use 30-degree side-lying position Moisture Interventions: Absorbent underpads, Apply protective barrier, creams and emollients, Assess need for specialty bed, Check for incontinence Q2 hours and as needed, Internal/External urinary devices Activity Interventions: Assess need for specialty bed, Pressure redistribution bed/mattress(bed type), PT/OT evaluation Mobility Interventions: Assess need for specialty bed, Float heels, Pressure redistribution bed/mattress (bed type), PT/OT evaluation Nutrition Interventions: Document food/fluid/supplement intake Friction and Shear Interventions: Apply protective barrier, creams and emollients, Foam dressings/transparent film/skin sealants, Lift sheet, Lift team/patient mobility team, Minimize layers, Transferring/repositioning devices Problem: Nutrition Deficit Goal: *Optimize nutritional status Outcome: Progressing Towards Goal 
  
Problem: Patient Education: Go to Patient Education Activity Goal: Patient/Family Education Outcome: Progressing Towards Goal

## 2021-02-01 NOTE — PROGRESS NOTES
Infectious Disease progress Note Reason: septic shock, acute hypoxic respiratory failure, covid positive Current abx Prior abx Cefepime,metronidazole, azithromycin, vancomycin  since 1/27 Lines:  
 
 
Assessment : 
 
79 y.o.  Female w/ PMH of HFpPEF (EF 60-65%), Stage 3 CKD, Recent L Hip ORIF (12/2020), Esophageal Varices, Liver Cirrhosis, DM, HTN, CAD, who presents from [address] [facility] to [facility] via EMS after developing worsening SOB and Hemoptysis after testing (+) for COVID-19 x1 week prior. Clinical presentation c/w acute hypoxic respiratory failure-present on admission due to severe COVID-19 pneumonia/ARDS Sputum culture 1/28-normal respiratory robert. Nasal MRSA screen positive Status post convalescent plasma 1/28/2021 Elevated J-dtycd-eprivd to exclude COVID-19 associated hypercoagulability, pulmonary embolism with full certainty Hypotension-likely septic shock secondary to COVID-19 infection, Enterobacter cystitis. Urine culture 1/27-greater than 100,000 colonies of Enterobacter Enterobacter resistant to ceftriaxone, ceftazidime, cefoxitin. Susceptible to quinolones, cefepime Acute on chronic kidney injury-likely due to sepsis. Monitor for COVID-19 associated nephropathy Elevated  C-iynoq-uwbsef due to sepsis. No evidence of DVT noted on venous duplex 1/27/2021 
 
 
 fever with temperature 101.3 on 1/31 Labs reviewed-sputum culture positive for  Pseudomonas. MRSA screen positive. Urine culture positive for Enterobacter. Susceptibilities of Pseudomonas reviewed. High DAMASO against piperacillin/tazobactam, cefepime Fevers could be secondary to evolving beta-lactam resistant Pseudomonas  Lack of worsening hypoxia argues against progressive COVID-19 pneumonia. Resolved fevers after addition of meropenem Improving hypotension. Off pressors. Improving hypoxia. On 30% FiO2. Recommendation 
-- continue meropenem, vancomycin --f/u blood culture 
--Management of acute kidney injury per primary team 
-- Continue steroids, anticoagulation per ICU team 
--  Weaning from vent per ICU team 
 
Nasal 
Above plan was discussed in details with rn, dr Uma Lambert. Please call me if any further questions or concerns. Will continue to participate in the care of this patient. HPI: 
 
 
Patient is currently intubated. Nonverbal.   
  
 
 
Home medication List  
 Details  
aspirin 81 mg chewable tablet Take 2 Tabs by mouth daily. Qty: 60 Tab, Refills: 0  
  
insulin aspart U-100 (NovoLOG Flexpen U-100 Insulin) 100 unit/mL (3 mL) inpn by SubCUTAneous route Before breakfast, lunch, and dinner. Sliding scale per instructions  
  
acetaminophen (TYLENOL) 325 mg tablet Take 2 Tabs by mouth every four (4) hours as needed for Pain. Qty: 20 Tab, Refills: 0  
  
glucose 4 gram chewable tablet Take 4 Tabs by mouth as needed for Other (hypoglycemia). Qty: 30 Tab, Refills: 0 SITagliptin (JANUVIA) 50 mg tablet Take 50 mg by mouth daily. cholecalciferol, vitamin D3, (VITAMIN D3) 2,000 unit tab Take 2,000 Units by mouth daily. nitroglycerin (NITROLINGUAL) 400 mcg/spray spray 1 Spray by SubLINGual route every five (5) minutes as needed. Qty: 1 Bottle, Refills: 2  
  
ascorbic acid, vitamin C, (VITAMIN C) 250 mg tablet Take 1 Tab by mouth daily. Qty: 60 Tab, Refills: 1  
  
multivitamin (ONE A DAY) tablet Take 1 Tab by mouth daily. simvastatin (ZOCOR) 20 mg tablet Take 20 mg by mouth daily. omega 3-dha-epa-fish oil (FISH OIL) 100-160-1,000 mg cap Take 1,000 mg by mouth two (2) times a day. cyanocobalamin (VITAMIN B12) 100 mcg tablet Take 100 mcg by mouth daily. calcium polycarbophil (FIBER LAXATIVE) 625 mg tablet Take 1,250 mg by mouth daily as needed. Current Facility-Administered Medications Medication Dose Route Frequency  acetaminophen (TYLENOL) tablet 650 mg  650 mg Oral Q6H PRN  
  carvediloL (COREG) tablet 12.5 mg  12.5 mg Oral Q12H  
 meropenem (MERREM) 500 mg in sterile water (preservative free) 10 mL IV syringe  0.5 g IntraVENous Q24H  VANCOMYCIN INFORMATION NOTE   Other Rx Dosing/Monitoring  bumetanide (BUMEX) injection 2 mg  2 mg IntraVENous BID  insulin glargine (LANTUS) injection 20 Units  20 Units SubCUTAneous DAILY  multivit-folic acid-herbal 306 (WELLESSE PLUS) oral liquid 30 mL  30 mL Per NG tube DAILY  chlorhexidine (PERIDEX) 0.12 % mouthwash 10 mL  10 mL Oral Q12H  
 albuterol-ipratropium (DUO-NEB) 2.5 MG-0.5 MG/3 ML  3 mL Nebulization Q6H PRN  
 fentaNYL (PF) 900 mcg/30 ml infusion soln  0-200 mcg/hr IntraVENous TITRATE  midazolam (VERSED) injection 1-2 mg  1-2 mg IntraVENous Q10MIN PRN  
 fentaNYL citrate (PF) injection  mcg   mcg IntraVENous Q30MIN PRN  
 dexmedeTOMidine in 0.9 % NaCl (PRECEDEX) 400 mcg/100 mL (4 mcg/mL) infusion soln  0.1-1.5 mcg/kg/hr (Order-Specific) IntraVENous TITRATE  insulin lispro (HUMALOG) injection   SubCUTAneous Q6H  
 glucose chewable tablet 16 g  4 Tab Oral PRN  
 glucagon (GLUCAGEN) injection 1 mg  1 mg IntraMUSCular PRN  
 dextrose (D50W) injection syrg 12.5-25 g  25-50 mL IntraVENous PRN  
 NOREPINephrine (LEVOPHED) 8 mg in 5% dextrose 250mL (32 mcg/mL) infusion  0.5-30 mcg/min IntraVENous TITRATE  pantoprazole (PROTONIX) 40 mg in 0.9% sodium chloride 10 mL injection  40 mg IntraVENous DAILY Allergies: Latex Temp (24hrs), Av.5 °F (38.1 °C), Min:98.2 °F (36.8 °C), Max:101.5 °F (38.6 °C) Visit Vitals BP (!) 158/76 Pulse 86 Temp 98.2 °F (36.8 °C) Resp 19 Ht 5' (1.524 m) Wt 76.5 kg (168 lb 10.4 oz) SpO2 97% BMI 32.94 kg/m² ROS: unable to obtain Physical Exam: 
 
General:  Alert, cooperative, no distress. Head:  Normocephalic, without obvious abnormality, atraumatic. Eyes:  Conjunctivae/corneas clear.  PERRL,  
 Nose: Nares normal. Septum midline. Mucosa normal. No drainage or sinus tenderness. Throat: Lips, mucosa, and tongue normal. Teeth and gums normal.  
Neck: Supple, symmetrical, trachea midline, no adenopathy, thyroid: no enlargment/tenderness/nodules, no carotid bruit and no JVD. Back:   Symmetric, no curvature. ROM normal.  
Lungs:   Clear to auscultation bilaterally. Chest wall:  No tenderness or deformity. Heart:  Regular rate and rhythm, S1, S2 normal, no murmur, click, rub or gallop. Abdomen:   Soft, non-tender. Bowel sounds normal. No masses,  No organomegaly. Extremities: Extremities normal, atraumatic, no cyanosis or edema. Pulses: 2+ and symmetric all extremities. Skin: Skin color, texture, turgor normal. No rashes or lesions Lymph Nodes:  Cervical, supraclavicular, and axillary nodes normal.  
Neurologic:  Grossly nonfocal  
 
 
 
Labs: Results:  
Chemistry Recent Labs 02/01/21 
0530 01/31/21 
1540 01/31/21 
1259 01/30/21 
0245 01/30/21 
0245 * 260* 217*   < > 167*  146* 144   < > 141  
K 3.1* 3.2* 3.7   < > 4.1  110 107   < > 107 CO2 25 25 26   < > 25 * 117* 110*   < > 104* CREA 3.52* 3.42* 3.26*   < > 3.15* CA 7.6* 7.8* 7.7*   < > 7.5* AGAP 9 11 11   < > 9  
BUCR 37* 34* 34*   < > 33*   --  130*  --  123* TP 5.1*  --  5.5*  --  5.6* ALB 2.4* 2.5* 2.7*   < > 2.7*  
GLOB 2.7  --  2.8  --  2.9 AGRAT 0.9  --  1.0  --  0.9  
 < > = values in this interval not displayed. CBC w/Diff Recent Labs 02/01/21 
0530 01/31/21 
2960 01/30/21 
3149 WBC 2.0* 3.1* 3.2*  
RBC 2.17* 2.49* 2.57* HGB 6.3* 7.3* 7.5* HCT 18.6* 21.5* 21.7*  
PLT 30* 37* 39* GRANS 80* 88* 83* LYMPH 12* 7* 11* EOS 0 0 0 Microbiology Recent Labs  
  01/31/21 1924 01/31/21 1910 CULT NO GROWTH AFTER 10 HOURS NO GROWTH AFTER 10 HOURS  
  
 
 
RADIOLOGY: 
 
 All available imaging studies/reports in [facility] for this admission were reviewed Dr. Caroline Mckeon, Infectious Disease Specialist 
[phone] February 1, 2021 
1:25 PM

## 2021-02-01 NOTE — DIABETES MGMT
GLYCEMIC CONTROL PLAN OF CARE Assessment/Recommendations: 
Lab glucose this am 297 mg/dl. POCT 299 mg/dl Noted steroids discontinued Recommend giving an extra dose of Lantus 10 units this am  
Continue corrective insulin coverage as needed Already receiving very insulin resistant dosing Will continue inpatient monitoring. Most recent blood glucose values: 
 
Results for Murray Meter (MRN [id number]) as of 2/1/2021 11:31 Ref. Range 1/31/2021 08:51 1/31/2021 14:37 1/31/2021 22:35 2/1/2021 03:34 2/1/2021 08:14 GLUCOSE,FAST - POC Latest Ref Range: 70 - 110 mg/dL 275 (H) 305 (H) 322 (H) 366 (H) 299 (H) Current A1C of 5.3 % is equivalent to average blood glucose of 105 mg/dl over the past 2-3 months. ? Accuracy. Patient anemic. Current hospital diabetes medications:  
Lantus 20 units daily Lispro corrective insulin coverage every 6 hours Previous day's insulin requirements:  
lantus 20 untis Lispro 36 units corrective insulin Home diabetes medications: 
Januvia 50 mg daily Novolog corrective insulin AC breakfast, lunch and dinner. Diet:   
NPO. TF nepro at 40 ml/hr Education:  ____Refer to Diabetes Education Record __x_Education not indicated at this time Covid-19 ruleout. Benjamin Grier RN CDE Ext [phone]

## 2021-02-01 NOTE — PROGRESS NOTES
Discharge planning Reviewed chart. Patient remains intubated. Plan is to return to [facility] when medically stable. CM will continue to monitor and assist with transitional needs. MIRA Chapman, RN Pager # [phone] Care Manager

## 2021-02-01 NOTE — ROUTINE PROCESS
1120: Bedside shift change report given to Christina Badillo RN (oncoming nurse) by Ferdinand Escalante RN (offgoing nurse). Report included the following information SBAR, Kardex, Intake/Output, MAR and Recent Results.

## 2021-02-02 NOTE — ROUTINE PROCESS
1930 Bedside shift change report given to Jacky Betancourt RN (oncoming nurse) by Sandra Cui RN (offgoing nurse). Report included the following information SBAR, Kardex, Intake/Output, MAR and Recent Results.

## 2021-02-02 NOTE — ROUTINE PROCESS
0705: Verbal/Bedside shift report received from LATRICIA French. Care assumed at this time. 
 
1930: Bedside and Verbal shift change report given to LATRICIA Villagomez (oncoming nurse) by LATRICIA Bolden (offgoing nurse). Report included the following information SBAR, Intake/Output, Recent Results and Quality Measures.

## 2021-02-02 NOTE — PROGRESS NOTES
attended the interdisciplinary rounds for Denisa Cortes, who is a 79 y.o.,female. Patients Primary Language is: [place]. According to the patients EMR Mormon Affiliation is: [facility].  
 
The reason the Patient came to the hospital is:  
Patient Active Problem List  
 Diagnosis Date Noted  Pneumonia due to COVID-19 virus 01/27/2021  Hip fracture ([address] 12/06/2020  Breast cancer ([address] 12/06/2020  Colon cancer ([address] 12/06/2020  Palpitations 04/25/2019  Acute pulmonary edema ([address] 04/25/2019  Malaise and fatigue 04/25/2019  Pancytopenia ([address] 04/25/2019  Portal hypertension ([address] 04/25/2019  Chronic anticoagulation 04/25/2019  CAP (community acquired pneumonia) 03/24/2019  Acute on chronic diastolic CHF (congestive heart failure) ([address] 03/24/2019  Leg DVT (deep venous thromboembolism), acute, left ([address] 03/24/2019  
 HTN (hypertension), benign 01/28/2018  Pneumonia 01/25/2018  CHF (congestive heart failure) ([address] 10/18/2017  Dyspnea 10/18/2017  Cirrhosis of liver not due to alcohol ([address] 06/29/2017  Esophageal varices ([address] 06/29/2017  UTI (urinary tract infection) 06/21/2017  Sepsis ([address] 06/21/2017  PNA (pneumonia) 06/21/2017  Hypoxia 06/21/2017  
 NSTEMI (non-ST elevated myocardial infarction) ([address] 06/30/2016  Anemia 07/07/2015  CAD (coronary artery disease) 07/07/2015  Thrombocytopenia ([address] 07/07/2015 Plan: 
Chaplains will continue to follow and will provide pastoral care on an as needed/requested basis.  recommends bedside caregivers page  on duty if patient shows signs of acute spiritual or emotional distress. Madyson Fairchild Board Certified [address] [place] [address] Care  
[phone]

## 2021-02-02 NOTE — PROGRESS NOTES
cocone Access Code: [email or web address]  Expires: 2/14/2017  1:41 PM    Your email address is not on file at [facility].  Email Addresses are required for you to sign up for cocone, please contact [phone] to verify your personal information and to provide your email address prior to attempting to register for cocone.    Robert Dorantes  [address] [place] [address], [place] [address]    cocone  A secure, online tool to manage your health information     [facility]’s cocone® is a secure, online tool that connects you to your personalized health information from the privacy of your home -- day or night - making it very easy for you to manage your healthcare. Once the activation process is completed, you can even access your medical information using the cocone taj, which is available for free in the Apple Taj store or Google Play store.     To learn more about cocone, visit [email or web address]    There are two levels of access available (as shown below):   My Chart Features  [facility] Primary Care Doctor [facility]  Specialists [facility]  Urgent  Care Non-[facility] Primary Care Doctor   Email your healthcare team securely and privately 24/7 X X X    Manage appointments: schedule your next appointment; view details of past/upcoming appointments X      Request prescription refills. X      View recent personal medical records, including lab and immunizations X X X X   View health record, including health history, allergies, medications X X X X   Read reports about your outpatient visits, procedures, consult and ER notes X X X X   See your discharge summary, which is a recap of your hospital and/or ER visit that includes your diagnosis, lab results, and care plan X X  X     How to register for Progression Labst:  Once your e-mail address has been verified, follow the following steps to sign up for cocone.     1. Go to  [email or web address]  2. Click on the Sign Up Now box, which takes you to the New Member Sign Up page. You will  Infectious Disease progress Note Reason: septic shock, acute hypoxic respiratory failure, covid positive Current abx Prior abx Cefepime,metronidazole, azithromycin, vancomycin  since 1/27 Lines:  
 
 
Assessment : 
 
79 y.o.  Female w/ PMH of HFpPEF (EF 60-65%), Stage 3 CKD, Recent L Hip ORIF (12/2020), Esophageal Varices, Liver Cirrhosis, DM, HTN, CAD, who [facility] via EMS after developing worsening SOB and Hemoptysis after testing (+) for COVID-19 x1 week prior. Clinical presentation c/w acute hypoxic respiratory failure-present on admission due to severe COVID-19 pneumonia/ARDS Sputum culture 1/28-normal respiratory robert. Nasal MRSA screen positive Status post convalescent plasma 1/28/2021 Elevated O-kidid-sgbpxg to exclude COVID-19 associated hypercoagulability, pulmonary embolism with full certainty Hypotension-likely septic shock secondary to COVID-19 infection, Enterobacter cystitis. Urine culture 1/27-greater than 100,000 colonies of Enterobacter Enterobacter resistant to ceftriaxone, ceftazidime, cefoxitin. Susceptible to quinolones, cefepime Acute on chronic kidney injury-likely due to sepsis. Monitor for COVID-19 associated nephropathy Elevated  U-ipqcm-skzuym due to sepsis. No evidence of DVT noted on venous duplex 1/27/2021 
 
 
 fever with temperature 101.3 on 1/31 Labs reviewed-sputum culture positive for  Pseudomonas. MRSA screen positive. Urine culture positive for Enterobacter. Susceptibilities of Pseudomonas reviewed. High DAMASO against piperacillin/tazobactam, cefepime Fevers could be secondary to evolving beta-lactam resistant Pseudomonas  Lack of worsening hypoxia argues against progressive COVID-19 pneumonia. Resolved fevers after addition of meropenem Improving hypotension. Off pressors. Improving hypoxia. On 30% FiO2. Recommendation 
-- continue meropenem, vancomycin --Management of acute kidney injury per primary team 
-- Continue steroids, anticoagulation per ICU team 
--  Weaning from vent per ICU team 
 
 
Above plan was discussed in details with rn, dr Maddie Barrett. Please call me if any further questions or concerns. Will continue to participate in the care of this patient. HPI: 
 
 
Patient is currently intubated. Nonverbal.   
  
 
 
Home medication List  
 Details  
aspirin 81 mg chewable tablet Take 2 Tabs by mouth daily. Qty: 60 Tab, Refills: 0  
  
insulin aspart U-100 (NovoLOG Flexpen U-100 Insulin) 100 unit/mL (3 mL) inpn by SubCUTAneous route Before breakfast, lunch, and dinner. Sliding scale per instructions  
  
acetaminophen (TYLENOL) 325 mg tablet Take 2 Tabs by mouth every four (4) hours as needed for Pain. Qty: 20 Tab, Refills: 0  
  
glucose 4 gram chewable tablet Take 4 Tabs by mouth as needed for Other (hypoglycemia). Qty: 30 Tab, Refills: 0 SITagliptin (JANUVIA) 50 mg tablet Take 50 mg by mouth daily. cholecalciferol, vitamin D3, (VITAMIN D3) 2,000 unit tab Take 2,000 Units by mouth daily. nitroglycerin (NITROLINGUAL) 400 mcg/spray spray 1 Spray by SubLINGual route every five (5) minutes as needed. Qty: 1 Bottle, Refills: 2  
  
ascorbic acid, vitamin C, (VITAMIN C) 250 mg tablet Take 1 Tab by mouth daily. Qty: 60 Tab, Refills: 1  
  
multivitamin (ONE A DAY) tablet Take 1 Tab by mouth daily. simvastatin (ZOCOR) 20 mg tablet Take 20 mg by mouth daily. omega 3-dha-epa-fish oil (FISH OIL) 100-160-1,000 mg cap Take 1,000 mg by mouth two (2) times a day. cyanocobalamin (VITAMIN B12) 100 mcg tablet Take 100 mcg by mouth daily. calcium polycarbophil (FIBER LAXATIVE) 625 mg tablet Take 1,250 mg by mouth daily as needed. Current Facility-Administered Medications Medication Dose Route Frequency  acetaminophen (TYLENOL) tablet 650 mg  650 mg Oral Q6H PRN  
 need to provide the following information:  a. Enter your [employer] Access Code exactly as it appears at the top of this page. (You will not need to use this code after you’ve completed the sign-up process. If you do not sign up before the expiration date, you must request a new code.)   b. Enter your date of birth.   c. Enter your home email address.   d. Click Submit, and follow the next screen’s instructions.  3. Create a bewarkett ID. This will be your [employer] login ID and cannot be changed, so think of one that is secure and easy to remember.  4. Create a [employer] password. You can change your password at any time.  5. Enter your Password Reset Question and Answer. This can be used at a later time if you forget your password.   6. Enter your e-mail address. This allows you to receive e-mail notifications when new information is available in [employer].  7. Click Sign Up. You can now view your health information.    For assistance activating your [employer] account, call [phone]           carvediloL (COREG) tablet 12.5 mg  12.5 mg Oral Q12H  
 meropenem (MERREM) 500 mg in sterile water (preservative free) 10 mL IV syringe  0.5 g IntraVENous Q24H  VANCOMYCIN INFORMATION NOTE   Other Rx Dosing/Monitoring  bumetanide (BUMEX) injection 2 mg  2 mg IntraVENous BID  insulin glargine (LANTUS) injection 20 Units  20 Units SubCUTAneous DAILY  multivit-folic acid-herbal 802 (WELLESSE PLUS) oral liquid 30 mL  30 mL Per NG tube DAILY  chlorhexidine (PERIDEX) 0.12 % mouthwash 10 mL  10 mL Oral Q12H  
 albuterol-ipratropium (DUO-NEB) 2.5 MG-0.5 MG/3 ML  3 mL Nebulization Q6H PRN  
 fentaNYL (PF) 900 mcg/30 ml infusion soln  0-200 mcg/hr IntraVENous TITRATE  midazolam (VERSED) injection 1-2 mg  1-2 mg IntraVENous Q10MIN PRN  
 fentaNYL citrate (PF) injection  mcg   mcg IntraVENous Q30MIN PRN  
 dexmedeTOMidine in 0.9 % NaCl (PRECEDEX) 400 mcg/100 mL (4 mcg/mL) infusion soln  0.1-1.5 mcg/kg/hr (Order-Specific) IntraVENous TITRATE  insulin lispro (HUMALOG) injection   SubCUTAneous Q6H  
 glucose chewable tablet 16 g  4 Tab Oral PRN  
 glucagon (GLUCAGEN) injection 1 mg  1 mg IntraMUSCular PRN  
 dextrose (D50W) injection syrg 12.5-25 g  25-50 mL IntraVENous PRN  pantoprazole (PROTONIX) 40 mg in 0.9% sodium chloride 10 mL injection  40 mg IntraVENous DAILY Allergies: Latex Temp (24hrs), Av.6 °F (36.4 °C), Min:95.7 °F (35.4 °C), Max:98.2 °F (36.8 °C) Visit Vitals /64 Pulse 75 Temp 98.2 °F (36.8 °C) Resp 20 Ht 5' (1.524 m) Wt 76.7 kg (169 lb 1.5 oz) SpO2 100% BMI 33.02 kg/m² ROS: unable to obtain Physical Exam: 
 
General:  Alert, cooperative, no distress. Head:  Normocephalic, without obvious abnormality, atraumatic. Eyes:  Conjunctivae/corneas clear. PERRL, Nose: Nares normal. Septum midline. Mucosa normal. No drainage or sinus tenderness.   
Throat: Lips, mucosa, and tongue normal. Teeth and gums normal.  
 Neck: Supple, symmetrical, trachea midline, no adenopathy, thyroid: no enlargment/tenderness/nodules, no carotid bruit and no JVD. Back:   Symmetric, no curvature. ROM normal.  
Lungs:   Clear to auscultation bilaterally. Chest wall:  No tenderness or deformity. Heart:  Regular rate and rhythm, S1, S2 normal, no murmur, click, rub or gallop. Abdomen:   Soft, non-tender. Bowel sounds normal. No masses,  No organomegaly. Extremities: Extremities normal, atraumatic, no cyanosis or edema. Pulses: 2+ and symmetric all extremities. Skin: Skin color, texture, turgor normal. No rashes or lesions Lymph Nodes:  Cervical, supraclavicular, and axillary nodes normal.  
Neurologic:  Grossly nonfocal  
 
 
 
Labs: Results:  
Chemistry Recent Labs 02/02/21 
6238 02/01/21 
1730 02/01/21 
0530 01/31/21 
6591 01/31/21 
8679 * 296* 297*   < > 217*  144 143   < > 144 K 3.7 3.2* 3.1*   < > 3.7  109 109   < > 107 CO2 25 26 25   < > 26 * 135* 130*   < > 110* CREA 3.50* 3.61* 3.52*   < > 3.26* CA 8.2* 7.7* 7.6*   < > 7.7* AGAP 8 9 9   < > 11 BUCR 39* 37* 37*   < > 34*   --  109  --  130* TP 5.7*  --  5.1*  --  5.5* ALB 2.5* 2.3* 2.4*   < > 2.7*  
GLOB 3.2  --  2.7  --  2.8 AGRAT 0.8  --  0.9  --  1.0  
 < > = values in this interval not displayed. CBC w/Diff Recent Labs 02/02/21 
0440 02/01/21 
0530 01/31/21 
7975 WBC 4.1* 2.0* 3.1*  
RBC 3.25* 2.17* 2.49* HGB 9.4* 6.3* 7.3* HCT 27.6* 18.6* 21.5*  
PLT 33* 30* 37* GRANS 82* 80* 88* LYMPH 8* 12* 7* EOS 0 0 0 Microbiology Recent Labs  
  01/31/21 1924 01/31/21 1910 CULT NO GROWTH 2 DAYS NO GROWTH 2 DAYS  
  
 
 
RADIOLOGY: 
 
All available imaging studies/reports in [facility] for this admission were reviewed Dr. Beau Ocasio, Infectious Disease Specialist 
[phone] February 2, 2021 
1:25 PM

## 2021-02-02 NOTE — DIABETES MGMT
GLYCEMIC CONTROL PLAN OF CARE Assessment/Recommendations: 
Lab glucose this am 170 mg/dl. POCT 140 mg/dl Pt no longer receiving steroids Continue basal and corrective insulin coverage as ordered Already receiving very insulin resistant dosing Will continue inpatient monitoring. Most recent blood glucose values: 
 
 
Results for Nima Miller (MRN [id number]) as of 2/2/2021 10:51 Ref. Range 2/1/2021 14:20 2/1/2021 15:34 2/1/2021 23:30 2/2/2021 04:03 2/2/2021 08:49 GLUCOSE,FAST - POC Latest Ref Range: 70 - 110 mg/dL 329 (H) 335 (H) 325 (H) 222 (H) 140 (H) Current A1C of 5.3 % is equivalent to average blood glucose of 105 mg/dl over the past 2-3 months. ? Accuracy. Patient anemic. Current hospital diabetes medications:  
Lantus 20 units daily Lispro corrective insulin coverage every 6 hours Previous day's insulin requirements:  
lantus 30 untis Lispro 45 units corrective insulin Home diabetes medications: 
Januvia 50 mg daily Novolog corrective insulin AC breakfast, lunch and dinner. Diet:   
NPO. TF nepro at 50 ml/hr Education:  ____Refer to Diabetes Education Record __x_Education not indicated at this time Covid-19 ruleout. Tyrese Richards RN CDE Ext [phone]

## 2021-02-02 NOTE — PROGRESS NOTES
[facility]. Pulmonary, Critical Care, and Sleep Medicine Name: Lois Santa MRN: [id number] : 1940 Hospital: [address] [place] [address] Dr Date: 2021  Admission Date: 2021 Chart and notes reviewed. Data reviewed. I have evaluated all findings. [x]I have reviewed the flowsheet and previous days notes. [x]The patient is unable to give any meaningful history or review of systems because the patient is: 
[x]Intubated [x]Sedated  
[]Unresponsive [x]The patient is critically ill on     
[x]Mechanical ventilation []Pressors []BiPAP [] Interval HPI:  
[place] Hocecelia. Ya Hoyos is a 79 y.o.  Female w/ PMH of HFpPEF (EF 55-60%), Stage 3 CKD, Recent L Hip ORIF (2020), Esophageal Varices, Liver Cirrhosis, DM, HTN, CAD, who presented from [address] [place] [address] to [facility] ED on 2021 via EMS after developing worsening SOB and Hemoptysis after testing (+) for COVID-19 x1 week prior.  
  
ICU consulted for Hypoxia and is noted to be satting at 94+ on 15L/100% HFNC. Shortly after examination, she required Endotracheal Intubation w/ Mechanical Ventilation.  
  
Discussed with patient's sister and nephew. Nephew is CC nurse at [facility] [employer]. Updated them on critical nature and high likelihood for mortality due to age and underlying conditions, wished to proceed with intubation but DNR otherwise. Nephrology following for NANCY on CKD3 ID following for COVID pneumonia, Enterobacter cystitis 21 Hospital Day: 8 Vent Day: 7 Overnight Events: none Mentation: Sedated Respiratory/ Secretions: Intubated, Oral Cavity w/out copious secretions Hemodynamics: VSS. Afebrile. Diet: TF at goal 
Urine/Fecal Output: UO increased s/p diuresis, +BM Need for Procedures: none 
 
-SBT today, hopefully extubation 
-Take out central line ROS:Review of systems not obtained due to patient factors. Events and notes from last 24 hours reviewed. Care plan discussed on multidisciplinary rounds. Patient Active Problem List  
Diagnosis Code  Anemia D64.9  
 CAD (coronary artery disease) I25.10  Thrombocytopenia ([facility] D69.6  NSTEMI (non-ST elevated myocardial infarction) (HCC) I21.4  
 UTI (urinary tract infection) N39.0  Sepsis ([facility] A41.9  PNA (pneumonia) J18.9  Hypoxia R09.02  
 Cirrhosis of liver not due to alcohol ([facility] K74.60  Esophageal varices (HCC) I85.00  
 CHF (congestive heart failure) (HCC) I50.9  Dyspnea R06.00  
 Pneumonia J18.9  
 HTN (hypertension), benign I10  
 CAP (community acquired pneumonia) J18.9  Acute on chronic diastolic CHF (congestive heart failure) (HCC) I50.33  Leg DVT (deep venous thromboembolism), acute, left (HCC) I82.402  Palpitations R00.2  Acute pulmonary edema (HCC) J81.0  Malaise and fatigue R53.81, R53.83  
 Pancytopenia ([facility] [id number]  Portal hypertension ([facility] K76.6  Chronic anticoagulation Z79.01  
 Hip fracture ([facility] S72.009A  Breast cancer ([facility] C50.919  
 Colon cancer ([facility] C18.9  Pneumonia due to COVID-19 virus U07.1, J12.82 Vital Signs: 
Visit Vitals /68 Pulse 85 Temp 98.6 °F (37 °C) Resp 17 Ht 5' (1.524 m) Wt 76.7 kg (169 lb 1.5 oz) SpO2 97% BMI 33.02 kg/m² O2 Device: Endotracheal tube, Ventilator O2 Flow Rate (L/min): 15 l/min Temp (24hrs), Av.6 °F (36.4 °C), Min:95.7 °F (35.4 °C), Max:98.6 °F (37 °C) Intake/Output:  
Last shift:      No intake/output data recorded. Last 3 shifts: 1901 -  0700 In: 2418.6 [I.V.:804.8] Out:  [Urine:1710; Drains:350] Intake/Output Summary (Last 24 hours) at 2021 1025 Last data filed at 2021 0700 Gross per 24 hour Intake 2085.42 ml Output 1125 ml Net 960.42 ml Ventilator Settings: 
Ventilator Mode: Assist control, VC+ Respiratory Rate Resp Rate Observed: 38 Back-Up Rate: 18 
 Insp Time (sec): 1 sec I:E Ratio: 1:2.3 Ventilator Volumes Vt Set (ml): 400 ml Vt Exhaled (Machine Breath) (ml): 410 ml Vt Spont (ml): 396 ml Ve Observed (l/min): 8.03 l/min Ventilator Pressures PIP Observed (cm H2O): 22 cm H2O Plateau Pressure (cm H2O): 14 cm H2O 
MAP (cm H2O): 9.7 PEEP/VENT (cm H2O): 5 cm H20 Auto PEEP Observed (cm H2O): 0 cm H2O Current Facility-Administered Medications Medication Dose Route Frequency  carvediloL (COREG) tablet 12.5 mg  12.5 mg Oral Q12H  
 meropenem (MERREM) 500 mg in sterile water (preservative free) 10 mL IV syringe  0.5 g IntraVENous Q24H  VANCOMYCIN INFORMATION NOTE   Other Rx Dosing/Monitoring  bumetanide (BUMEX) injection 2 mg  2 mg IntraVENous BID  insulin glargine (LANTUS) injection 20 Units  20 Units SubCUTAneous DAILY  multivit-folic acid-herbal 305 (WELLESSE PLUS) oral liquid 30 mL  30 mL Per NG tube DAILY  chlorhexidine (PERIDEX) 0.12 % mouthwash 10 mL  10 mL Oral Q12H  
 fentaNYL (PF) 900 mcg/30 ml infusion soln  0-200 mcg/hr IntraVENous TITRATE  dexmedeTOMidine in 0.9 % NaCl (PRECEDEX) 400 mcg/100 mL (4 mcg/mL) infusion soln  0.1-1.5 mcg/kg/hr (Order-Specific) IntraVENous TITRATE  insulin lispro (HUMALOG) injection   SubCUTAneous Q6H  
 pantoprazole (PROTONIX) 40 mg in 0.9% sodium chloride 10 mL injection  40 mg IntraVENous DAILY Telemetry: Sinus Rhythm Limited Physical Exam:  
 General: Intubated, Sedated HEENT: NC/AT, Septum midline, No visible nasal drainage, Oral Cavity clear w/out copious drainage, Trachea midline Chest: No visible trauma or deformity to chest wall, RRR- via Tele Lungs: Equal & Bilateral chest rise, Airway clear, ET Tube in place and secured Abdomen: Non-Distended, large reducible hernia Extremity: 2+pitting edema BLE Neuro: Sedated Skin: No new skin lesions, rashes, or ulcers DATA: 
MAR reviewed and pertinent medications noted or modified as needed Labs: Recent Labs 02/02/21 
0440 02/01/21 
0530 01/31/21 
5622 WBC 4.1* 2.0* 3.1* HGB 9.4* 6.3* 7.3* HCT 27.6* 18.6* 21.5*  
PLT 33* 30* 37* Recent Labs 02/02/21 
4838 02/01/21 
1730 02/01/21 
1320 02/01/21 
0530 01/31/21 
6883 01/31/21 
3259  144  --  143   < > 144 K 3.7 3.2*  --  3.1*   < > 3.7  109  --  109   < > 107 CO2 25 26  --  25   < > 26 * 296*  --  297*   < > 217* * 135*  --  130*   < > 110* CREA 3.50* 3.61*  --  3.52*   < > 3.26* CA 8.2* 7.7*  --  7.6*   < > 7.7* MG 2.3  --   --  1.9  --  1.9 PHOS 3.6 3.8  --  3.8   < >  --   
ALB 2.5* 2.3*  --  2.4*   < > 2.7* ALT 31  --   --  35  --  53 INR  --   --  1.7*  --   --   --   
 < > = values in this interval not displayed. No results for input(s): PH, PCO2, PO2, HCO3, FIO2 in the last 72 hours. Recent Labs 02/02/21 
5443 02/01/21 
0451 01/31/21 
0510 FIO2I 0.30 0.40 45 HCO3I 24.5 24.0 23.6 PCO2I 37.9 36.2 34.9* PHI 7.42 7.43 7.44 PO2I 82 125* 99 Imaging: 
[x]   I have personally reviewed the patients radiographs and reports XR Results (most recent): 
Results from [facility] Encounter encounter on 01/26/21 XR CHEST PORT Narrative EXAM: XR CHEST PORT INDICATION: intubated COMPARISON: Recent prior FINDINGS: A portable AP radiograph of the chest was obtained at 0 538 hours. The 
patient is on a cardiac monitor. Persistent patchy bilateral airspace opacities 
without gross interval change. Stable cardiac silhouette. .  No acute bone 
findings. Left IJ line grossly unchanged. Temperature probe and enteric tube 
resides within the stomach, unchanged. Endotracheal tube grossly unchanged. Rhina Montemayor Impression No interval change CT Results (most recent): 
Results from [facility] Encounter encounter on 01/26/21 CT CHEST WO CONT  Narrative Description:  CT chest without contrast 
 
TECHNIQUE: Helically acquired axial CT imaging of the chest without IV contrast 
 was performed. Coronal and sagittal reformations generated and reviewed. All CT scans at this facility are performed using dose optimization technique as 
appropriate to a performed exam, to include automated exposure control, 
adjustment of the mA and/or kV according to patient size (including appropriate 
matching for site-specific examinations), or use of iterative reconstruction 
technique. Clinical Indication:  Chest pain Comparison: March 24, 2019. Findings:   
 
The visualized base of the neck shows a endotracheal tube within the lower 
thoracic trachea. There is a left IJ central venous line which terminates in the 
mid SVC. Visualized thyroid is unremarkable. Thoracic aorta nonaneurysmal. 
Moderately enlarged heart without pericardial effusion. Coronary artery 
calcifications are seen. There are a few mildly prominent mediastinal lymph nodes which could be 
reactive. There are dense parenchymal infiltrates bilaterally. Small effusions, right 
greater than left. Visualized upper abdomen shows splenomegaly and ascites. Impression Impression:   
 
Dense pulmonary infiltrates bilaterally consistent with pneumonia. Small 
parapneumonic effusions. Visualized abdomen shows ascites and mild splenomegaly which may be indicative 
of portal venous hypertension. Support lines and tubes appear well positioned. IMPRESSION:  
· Hypoxic, Hypercapnic Respiratory Failure/ presumed ARDS-  likely due to COVID-19 Pneumonia w/ Acute on Chronic CHF Exacerbation, now requiring Mechanical Ventilation.  
· Pulmonary HTN- based on Echo (01/27/2021)- LV EF: 55-60%, Pulmonary arterial systolic pressure is 70 mmHg. · COVID-19 Pneumonia- Covid-19 Rapid Test (+) on 01/26/2021. Known (+) prior to admission.  CXR (+) Fluffy B/L mid to LL Opacities, consistent w/ multifocal infection.   
 · Septic shock- tachycardia, tachypnea, elevated procal on admission, 2/2 COVID-19 + UTI, ?underlying aspiration. UA (+) 3+ Bacteria + Large Leuks. Urine Cx pending. Worsening procal, white count. · Metabolic Acidosis- Reasons above. PH: 7.31, pCO2: 29.5, HCO3: 14.7 · NANCY on Stage 3 CKD, Oliguric- likely in setting of Distributive Shock. BUN: 83, Cr: 2.34 
· Encephalopathy- Metabolic, Reasons above · UTI on cefepime, Vanc · Hemoptysis- Hx of Esophageal Varices vs Pulmonary Edema · Thrombocytopenia- Baseline Chronically low. · Elevated Troponin- likely CHF Exacerbation + worsening Pulm HTN , Troponin: 1.30- Increasing · Acute on chronic HFpEF - EF 60-65% · Hx Malnutrition albumin today · Hx of Esophageal Varices · Hx Liver Cirrhosis   
· Hx DM- HgbA1c: 5.3 
· HX HTN  
· Hx of CAD  
· Hx Recent Left Hip ORIF (12/07/2020) Patient Active Problem List  
Diagnosis Code  Anemia D64.9  
 CAD (coronary artery disease) I25.10  Thrombocytopenia ([facility] D69.6  NSTEMI (non-ST elevated myocardial infarction) (HCC) I21.4  
 UTI (urinary tract infection) N39.0  Sepsis ([facility] A41.9  PNA (pneumonia) J18.9  Hypoxia R09.02  
 Cirrhosis of liver not due to alcohol ([facility] K74.60  Esophageal varices (HCC) I85.00  
 CHF (congestive heart failure) (HCC) I50.9  Dyspnea R06.00  
 Pneumonia J18.9  
 HTN (hypertension), benign I10  
 CAP (community acquired pneumonia) J18.9  Acute on chronic diastolic CHF (congestive heart failure) (HCC) I50.33  Leg DVT (deep venous thromboembolism), acute, left (HCC) I82.402  Palpitations R00.2  Acute pulmonary edema (HCC) J81.0  Malaise and fatigue R53.81, R53.83  
 Pancytopenia ([facility] [facility] [id number]  Portal hypertension ([facility] K76.6  Chronic anticoagulation Z79.01  
 Hip fracture ([address] S72.009A  Breast cancer ([address] C50.919  
 Colon cancer ([facility] C18.9  Pneumonia due to COVID-19 virus U07.1, J12.82 RECOMMENDATIONS:  
 Neuro: Titrate sedation to RASS 0 to -1. Daily sedation holiday. Resp: VAP Bundle, Bronchial Hygiene. Aspiration Precautions, maintain HOB>30'. Duonebs q6 hrs PRN. SBT today I/D: I/D following. I/D recs Merrem, Vancomycin. Remdesivir given on admission, d/c'd due to renal fx and intubation. Urine Cx enterobacter. Blood Cx NG 4 days. MRSA Cx positive. Trend WBCs + Temp. Hem/Onc: Hgb stable s/p transfusion. Monitor Thrombocytopenia. Trend H/H, Monitor for s/o active bleeding. Daily CBC w/ diff. Follow up on Dopplers of upper extremities, no occlusion LEs CVS: Levophed gtt stopped. Echo (+) LV EF: 55-60%, Severe Pulm HTN. LE Duplex (-). Monitor HD, MAP goal >65 mmHg. Originally not a candidate for [facility] due to hemoptysis Metabolic: Trend daily BMP + Mag + Phos, replace electrolytes per protocol. Renal: Nephrology following. IV Bumex. Jean in place. Trend renal parameters & Monitor UOP. Endocrine: SSI, q6 Glucose checks. Lantus 20u. GI: TF Musc/Skin: No acute issues. Partial Code, INTUBATE ONLY Best Practice: 
Glycemic Control IHI ICU Bundles: 
 Central Line Bundle Followed , Jean Bundle Followed and Vent Bundle Followed, Vent Day 6 [facility] Vent patients- VAP bundle, aim to keep peak plateau pressure 28-15DK H2O Sress Ulcer Prophylaxis. DVT Prophylaxis. Need for Lines, Jean Assessed. Restraints Need. Palliative Care Evaluation. Mercy Franks MD PGY-1 
[facility] Emergency Medicine 2/2/2021, 10:53 AM 
 
 
 
I reviewed the patient's medical history, the resident's findings on physical examination, the patient's diagnoses, and treatment plan as documented in the resident note. I concur with the treatment plan as documented. Additional suggestions noted below. 80/F acute hypoxic respiratory failure due to COVID 19 pneumonia, intubated on vent support with decreasing FiO2 requirements. Underlying CHF with possible exacerbation. Pulmonary HTN suspect Grp 2 +/- Grp3. Acute on CKD, DM previously well controlled. Baseline thrombocytopenia. Poorly controlled diabetes, likely aggravated by steroids. Pt extubated after tolerating SBT well however now with respiratory distress and difficulty clearing secretions. Spoke with pt's nephew Uzma Saha who wants pt to be reintubated if needed to \"give her another chance\". Rest of plan per resident note. Discussed in ICU interdisciplinary rounds Critical care time 50 minutes

## 2021-02-02 NOTE — ROUTINE PROCESS
PCCM Update: 
 
Spoke with both Ms. Sunny Franco (patient's sister) and . Ad Avitia (patient's nephew) to update them on plan for today regarding turning sedation off and SBT. Family expressed appreciation for ICU team.  
 
Rufina Pacheco MD PGY-1  
[facility] Emergency Medicine February 2, 2021, 2:53 PM

## 2021-02-02 NOTE — ROUTINE PROCESS
Bedside and Verbal shift change report given to Krys Naranjo (oncoming nurse) by Анна Mcconnell RN (offgoing nurse).  Report included the following information SBAR, ED Summary, Intake/Output, MAR, Recent Results and Cardiac Rhythm SR.

## 2021-02-03 NOTE — PROGRESS NOTES
8/22/2024          Lisa Banerjee        [address] [place] [address]         Dear Lisa,    This letter is to inform you that our office has made several attempts to reach you by phone without success.  We were attempting to contact you by phone regarding prescription request    Please contact our office at the number listed below as soon as you receive this letter to discuss this issue and to make the necessary changes in our system to your contact information.  Thank you for your cooperation.        Sincerely,    Dr. Isha Banerjee  [address] [place] [id number]   [phone]        Document electronically generated by:  Melody ANDREA RN      RENAL PROGRESS NOTE Nila Carnes Assessment/Plan:  
1) NANCY on CKD3: continue bumex iv 2 mg bid,  
Goal to balance I/O, No indication for RRT 2)Covid PNA/Resp failure mech vent per PCCM. 3)Met Acidosis:off of bicarb gtt.  f/u bmp in am.  
4)Cirrhosis/Varices: dismal prognosis. Please call with questions, 
 
Mil Queen MD FASN Cell [phone] Pager: [phone] Subjective: 
covid isolation. Patient Active Problem List  
Diagnosis Code  Anemia D64.9  
 CAD (coronary artery disease) I25.10  Thrombocytopenia ([facility] D69.6  NSTEMI (non-ST elevated myocardial infarction) (HCC) I21.4  
 UTI (urinary tract infection) N39.0  Sepsis ([facility] A41.9  PNA (pneumonia) J18.9  Hypoxia R09.02  
 Cirrhosis of liver not due to alcohol ([facility] K74.60  Esophageal varices (HCC) I85.00  
 CHF (congestive heart failure) (HCC) I50.9  Dyspnea R06.00  
 Pneumonia J18.9  
 HTN (hypertension), benign I10  
 CAP (community acquired pneumonia) J18.9  Acute on chronic diastolic CHF (congestive heart failure) (HCC) I50.33  Leg DVT (deep venous thromboembolism), acute, left (HCC) I82.402  Palpitations R00.2  Acute pulmonary edema (HCC) J81.0  Malaise and fatigue R53.81, R53.83  
 Pancytopenia ([facility] [id number]  [facility] hypertension [facility] K76.6  Chronic anticoagulation Z79.01  
 Hip fracture ([facility] S72.009A  Breast cancer ([facility] C50.919  
 Colon cancer ([facility] C18.9  Pneumonia due to COVID-19 virus U07.1, J12.82 Current Facility-Administered Medications Medication Dose Route Frequency Provider Last Rate Last Admin  sodium chloride 3% hypertonic nebulizer soln  5 mL Nebulization Q6H Magali Barrera PA-C      
 albuterol (ACCUNEB) nebulizer solution 1.25 mg  1.25 mg Nebulization Q6H RT Magali Barrera, PAVAN      
  acetaminophen (TYLENOL) tablet 650 mg  650 mg Oral Q6H PRN Baljinder Orellana PA-C   650 mg at 01/31/21 2230  carvediloL (COREG) tablet 12.5 mg  12.5 mg Oral Q12H Lauren Hooks MD   12.5 mg at 02/02/21 1004  meropenem (MERREM) 500 mg in sterile water (preservative free) 10 mL IV syringe  0.5 g IntraVENous Q24H Fede FAJARDO MD   500 mg at 02/02/21 1803  VANCOMYCIN INFORMATION NOTE   Other Rx Dosing/Monitoring Pinky Orellana MD      
 bumetanide (BUMEX) injection 2 mg  2 mg IntraVENous BID Andres Meraz MD   2 mg at 02/02/21 1004  insulin glargine (LANTUS) injection 20 Units  20 Units SubCUTAneous DAILY Jyotsna Murphy PA-C   20 Units at 02/02/21 1004  multivit-folic acid-herbal 814 (WELLESSE PLUS) oral liquid 30 mL  30 mL Per NG tube DAILY Tory Mckinnon MD   30 mL at 02/02/21 1004  chlorhexidine (PERIDEX) 0.12 % mouthwash 10 mL  10 mL Oral Q12H JALEEL'Magali Belle PA-C   10 mL at 02/02/21 1004  albuterol-ipratropium (DUO-NEB) 2.5 MG-0.5 MG/3 ML  3 mL Nebulization Q6H PRN Magali Barrera PA-C      
 fentaNYL (PF) 900 mcg/30 ml infusion soln  0-200 mcg/hr IntraVENous TITRATE Magali Johnson PA-C   Stopped at 02/02/21 1200  
 midazolam (VERSED) injection 1-2 mg  1-2 mg IntraVENous Q10MIN PRN Magali Johnson PA-C   2 mg at 01/31/21 1433  fentaNYL citrate (PF) injection  mcg   mcg IntraVENous Q30MIN PRN Magali Johnson PA-C   100 mcg at 01/31/21 1414  
 dexmedeTOMidine in 0.9 % NaCl (PRECEDEX) 400 mcg/100 mL (4 mcg/mL) infusion soln  0.1-1.5 mcg/kg/hr (Order-Specific) IntraVENous TITRATE Magali Johnson PA-C 17.5 mL/hr at 02/02/21 1808 1 mcg/kg/hr at 02/02/21 1808  insulin lispro (HUMALOG) injection   SubCUTAneous Q6H Tory Mckinnon MD   Stopped at 02/02/21 0900  
 glucose chewable tablet 16 g  4 Tab Oral PRN Magali Johnson PA-C      
 glucagon (GLUCAGEN) injection 1 mg  1 mg IntraMUSCular PRN Magali Johnson PA-C      
  dextrose (D50W) injection syrg 12.5-25 g  25-50 mL IntraVENous PRN JALEEL'Magali Belle, PAVAN      
 pantoprazole (PROTONIX) 40 mg in 0.9% sodium chloride 10 mL injection  40 mg IntraVENous DAILY Roger Mckinnon MD   40 mg at 02/02/21 1004 Objective Vitals:  
 02/02/21 1600 02/02/21 1700 02/02/21 1800 02/02/21 1900 BP: (!) 159/79 (!) 144/69 (!) 151/128 (!) 138/102 Pulse: 94 94 (!) 110 88 Resp: 16 18 27 16 Temp: 99.3 °F (37.4 °C) SpO2: 100% 100% 100% 100% Weight:      
Height:      
 
 
 
Intake/Output Summary (Last 24 hours) at 2/2/2021 1933 Last data filed at 2/2/2021 1900 Gross per 24 hour Intake 2219.55 ml Output 1435 ml Net 784.55 ml Admission weight: Weight: 68 kg (150 lb) (01/27/21 0830) Last Weight Metrics: 
Weight Loss Metrics 2/1/2021 12/10/2020 9/28/2020 12/1/2019 10/16/2019 9/30/2019 8/6/2019 Today's Wt 169 lb 1.5 oz 157 lb 8 oz 149 lb 14.4 oz 140 lb 143 lb 140 lb 145 lb BMI 33.02 kg/m2 30.76 kg/m2 29.28 kg/m2 27.34 kg/m2 27.93 kg/m2 27.34 kg/m2 28.32 kg/m2 Physical Assessment:  
 
Pt is covid 19+, I did not go into the room to minimize pt/md exposure and preserve PPE. I have viewed pt from the door and reviewed documentation from other providers.  
 
Lab CBC w/Diff Recent Labs 02/02/21 
0440 02/01/21 
0530 01/31/21 
2345 WBC 4.1* 2.0* 3.1*  
RBC 3.25* 2.17* 2.49* HGB 9.4* 6.3* 7.3* HCT 27.6* 18.6* 21.5*  
PLT 33* 30* 37* GRANS 82* 80* 88* LYMPH 8* 12* 7* EOS 0 0 0 Chemistry Recent Labs 02/02/21 
2995 02/01/21 
1730 02/01/21 
0530 01/31/21 
3768 01/31/21 
3616 * 296* 297*   < > 217*  144 143   < > 144 K 3.7 3.2* 3.1*   < > 3.7  109 109   < > 107 CO2 25 26 25   < > 26 * 135* 130*   < > 110* CREA 3.50* 3.61* 3.52*   < > 3.26* CA 8.2* 7.7* 7.6*   < > 7.7* AGAP 8 9 9   < > 11 BUCR 39* 37* 37*   < > 34*   --  109  --  130* TP 5.7*  --  5.1*  --  5.5*  
 ALB 2.5* 2.3* 2.4*   < > 2.7*  
GLOB 3.2  --  2.7  --  2.8  
AGRAT 0.8  --  0.9  --  1.0  
PHOS 3.6 3.8 3.8   < >  --   
 < > = values in this interval not displayed.  
  
  
Lab Results  
Component Value Date/Time  
 Iron 65 12/08/2020 11:30 AM  
 TIBC 189 (L) 12/08/2020 11:30 AM  
 Iron % saturation 34 12/08/2020 11:30 AM  
 Ferritin 811 (H) 02/02/2021 04:40 AM  
  
Lab Results  
Component Value Date/Time  
 Calcium 8.2 (L) 02/02/2021 04:40 AM  
 Phosphorus 3.6 02/02/2021 04:40 AM  
  
 
Kendrick Smith MD 
[facility] 
Pager: [phone] 
Phone: [phone]

## 2021-02-03 NOTE — PROGRESS NOTES
RENAL PROGRESS NOTE Jamie De Leon Assessment/Plan:  
1)NANCY on CKD3:holding diuresis today , appears dry side Renal parameters stable , hypernatremia worse, holding diuresis For today , TF restarted with free water 2)Covid PNA/Resp failure mech vent per PCCM. 3)Met Acidosis:off of bicarb gtt.  f/u bmp in am.  
4)Cirrhosis/Varices: dismal prognosis. Please call with questions, 
 
Neli Gentile MD FASN Cell [phone] Pager: [phone] Subjective: 
covid isolation. re intubated last night Patient Active Problem List  
Diagnosis Code  Anemia D64.9  
 CAD (coronary artery disease) I25.10  Thrombocytopenia ([facility] D69.6  NSTEMI (non-ST elevated myocardial infarction) (HCC) I21.4  
 UTI (urinary tract infection) N39.0  Sepsis ([address] A41.9  PNA (pneumonia) J18.9  Hypoxia R09.02  
 Cirrhosis of liver not due to alcohol ([facility] [address] K74.60  Esophageal varices (HCC) I85.00  
 CHF (congestive heart failure) (HCC) I50.9  Dyspnea R06.00  
 Pneumonia J18.9  
 HTN (hypertension), benign I10  
 CAP (community acquired pneumonia) J18.9  Acute on chronic diastolic CHF (congestive heart failure) (HCC) I50.33  Leg DVT (deep venous thromboembolism), acute, left (HCC) I82.402  Palpitations R00.2  Acute pulmonary edema (HCC) J81.0  Malaise and fatigue R53.81, R53.83  
 Pancytopenia ([facility] [address] [id number]  Portal hypertension ([address] K76.6  Chronic anticoagulation Z79.01  
 Hip fracture ([address] S72.009A  Breast cancer ([address] C50.919  
 Colon cancer ([facility] [address] C18.9  Pneumonia due to COVID-19 virus U07.1, J12.82 Current Facility-Administered Medications Medication Dose Route Frequency Provider Last Rate Last Admin  midodrine (PROAMATINE) tablet 10 mg  10 mg Per NG tube TID WITH MEALS Sony Smith MD   10 mg at 02/03/21 1225  vancomycin (VANCOCIN) 1,000 mg in 0.9% sodium chloride 250 mL (VIAL-MATE)  1,000 mg IntraVENous ONCE Robbie Harrison MD   1,000 mg at 02/03/21 1527  
 sodium chloride 3% hypertonic nebulizer soln  4 mL Nebulization Q6H RT Magali Barrera PA-C   4 mL at 02/03/21 0836  
 albuterol (ACCUNEB) nebulizer solution 1.25 mg  1.25 mg Nebulization Q6H RT Magali Barrera PA-C   1.25 mg at 02/03/21 0836  
 NOREPINephrine (LEVOPHED) 8 mg in 5% dextrose 250mL (32 mcg/mL) infusion  0.5-50 mcg/min IntraVENous TITRATE Hunt Pizza A, PA-C 9.4 mL/hr at 02/03/21 1225 5 mcg/min at 02/03/21 1225  acetaminophen (TYLENOL) tablet 650 mg  650 mg Oral Q6H PRN Viridiana Dunbar PA-C   650 mg at 01/31/21 2230  carvediloL (COREG) tablet 12.5 mg  12.5 mg Oral Q12H Emely Hassan MD   12.5 mg at 02/03/21 1028  meropenem (MERREM) 500 mg in sterile water (preservative free) 10 mL IV syringe  0.5 g IntraVENous Q24H Denilson FAJARDO MD   500 mg at 02/02/21 1803  VANCOMYCIN INFORMATION NOTE   Other Rx Dosing/Monitoring Acosta Dueñas MD      
 [Held by provider] bumetanide (BUMEX) injection 2 mg  2 mg IntraVENous BID Jamie Oneil MD   Stopped at 02/03/21 0800  [Held by provider] insulin glargine (LANTUS) injection 20 Units  20 Units SubCUTAneous DAILY Jyotsna Murphy PA-C   20 Units at 02/02/21 1004  multivit-folic acid-herbal 455 (WELLESSE PLUS) oral liquid 30 mL  30 mL Per NG tube DAILY Daisy Mckinnon MD   30 mL at 02/03/21 1027  chlorhexidine (PERIDEX) 0.12 % mouthwash 10 mL  10 mL Oral Q12H Magali Barrera PA-C   10 mL at 02/03/21 1028  albuterol-ipratropium (DUO-NEB) 2.5 MG-0.5 MG/3 ML  3 mL Nebulization Q6H PRN JALEEL'Magali Belle PA-C      
 fentaNYL (PF) 900 mcg/30 ml infusion soln  0-200 mcg/hr IntraVENous TITRATE Magali Odom PA-C 1.7 mL/hr at 02/03/21 0132 50 mcg/hr at 02/03/21 0132  midazolam (VERSED) injection 1-2 mg  1-2 mg IntraVENous Q10MIN PRN Magali Sanchez PA-C   2 mg at 01/31/21 1433  fentaNYL citrate (PF) injection  mcg   mcg IntraVENous Q30MIN PRN Magali Sanchez PA-C   50 mcg at 02/02/21 2337  
 dexmedeTOMidine in 0.9 % NaCl (PRECEDEX) 400 mcg/100 mL (4 mcg/mL) infusion soln  0.1-1.5 mcg/kg/hr (Order-Specific) IntraVENous TITRATE Magali Sanchez PA-C 10.5 mL/hr at 02/03/21 1539 0.6 mcg/kg/hr at 02/03/21 1539  
 insulin lispro (HUMALOG) injection   SubCUTAneous Q6H Marily Sanford MD   12 Units at 02/03/21 1500  
 glucose chewable tablet 16 g  4 Tab Oral PRN Magali Sanchez PA-C      
 glucagon (GLUCAGEN) injection 1 mg  1 mg IntraMUSCular PRN Magali Barrera PA-C      
 dextrose (D50W) injection syrg 12.5-25 g  25-50 mL IntraVENous PRN Magali Barrera PA-C      
 pantoprazole (PROTONIX) 40 mg in 0.9% sodium chloride 10 mL injection  40 mg IntraVENous DAILY Bety Mckinnon MD   40 mg at 02/03/21 1028 Objective Vitals:  
 02/03/21 1200 02/03/21 1300 02/03/21 1400 02/03/21 1500 BP: (!) 87/47 (!) 100/52 (!) 101/50 (!) 112/50 Pulse: 70 72 71 71 Resp: 14 14 14 14 Temp: 97.7 °F (36.5 °C) 97.7 °F (36.5 °C) 97.9 °F (36.6 °C) 97.3 °F (36.3 °C) SpO2: 100% 100% 100% 100% Weight:      
Height:      
 
 
 
Intake/Output Summary (Last 24 hours) at 2/3/2021 1541 Last data filed at 2/3/2021 1400 Gross per 24 hour Intake 872.14 ml Output 545 ml Net 327.14 ml Admission weight: Weight: 68 kg (150 lb) (01/27/21 0830) Last Weight Metrics: 
Weight Loss Metrics 2/3/2021 12/10/2020 9/28/2020 12/1/2019 10/16/2019 9/30/2019 8/6/2019 Today's Wt 171 lb 15.3 oz 157 lb 8 oz 149 lb 14.4 oz 140 lb 143 lb 140 lb 145 lb BMI 33.58 kg/m2 30.76 kg/m2 29.28 kg/m2 27.34 kg/m2 27.93 kg/m2 27.34 kg/m2 28.32 kg/m2 Physical Assessment: Pt is covid 19+, I did not go into the room to minimize pt/md exposure and preserve PPE. I have viewed pt from the door and reviewed documentation from other providers.  
 
Lab CBC w/Diff Recent Labs 02/03/21 
0128 02/02/21 
0440 02/01/21 
0530 WBC 4.9 4.1* 2.0*  
RBC 2.99* 3.25* 2.17* HGB 8.5* 9.4* 6.3* HCT 25.7* 27.6* 18.6*  
PLT 34* 33* 30* GRANS 88* 82* 80* LYMPH 7* 8* 12* EOS 0 0 0 Chemistry Recent Labs 02/03/21 
0934 02/02/21 
0440 02/01/21 
1730 02/01/21 
0530 *  115* 170* 296* 297* *  147* 142 144 143  
K 4.2  4.2 3.7 3.2* 3.1*  
  112* 109 109 109 CO2 28  28 25 26 25 *  143* 136* 135* 130* CREA 3.65*  3.59* 3.50* 3.61* 3.52* CA 8.1*  8.1* 8.2* 7.7* 7.6* AGAP 9  7 8 9 9 BUCR 39*  40* 39* 37* 37* AP 77 104  --  109  
TP 5.2* 5.7*  --  5.1* ALB 2.5*  2.5* 2.5* 2.3* 2.4*  
GLOB 2.7 3.2  --  2.7 AGRAT 0.9 0.8  --  0.9 PHOS 3.8  3.9 3.6 3.8 3.8 Lab Results Component Value Date/Time Iron 65 12/08/2020 11:30 AM  
 TIBC 189 (L) 12/08/2020 11:30 AM  
 Iron % saturation 34 12/08/2020 11:30 AM  
 Ferritin 627 (H) 02/03/2021 09:34 AM  
  
Lab Results Component Value Date/Time Calcium 8.1 (L) 02/03/2021 09:34 AM  
 Calcium 8.1 (L) 02/03/2021 09:34 AM  
 Phosphorus 3.9 02/03/2021 09:34 AM  
 Phosphorus 3.8 02/03/2021 09:34 AM  
  
 
Bethel Palmer MD 
[facility] Pager: [phone] Phone: [phone]

## 2021-02-03 NOTE — ROUTINE PROCESS
1945 Bedside and Verbal shift change report given to ángel rn (oncoming nurse) by iris rn(offgoing nurse). Report included the following information SBAR, Kardex and Recent Results. [id number]  assessment completed. Oral and velazquez care completed. incontinent stool , cleansed. Fms in place. Oxygen sat 96 -98%. Congested rhonchi. Notified. yosef spencer, in at bedside to assess. 2100 anesthesia being paged to bedside to  prepare for intubation, oxygen  sat remain 98%, patient work of breathing increasing. Devorah spencer remains at bedside. Juan R spencer at bedside, 
[id number] intubated  7.5 , 22 lip. ngt inserted orally and temp probe inserted orally. 2148 juan r spencer placed midline into left brachial vein. 2300 B. Darline spencer aware of low blood pressure orders received. 2335 albumin  hung as ordered. 0000 reassessment completed. Oral and velazquez care provided. 65 berenice spencer notified   After receiving bumex scheduled dose , 150 cc uop since 2000, with 30 cc last 3 hour no new orders received. 0400 reassessment completed. Oral and and velazquez care completed. 0671 Bedside and Verbal shift change report given to juan r bernabe (oncoming nurse) by ángel rn(offgoing nurse). Report included the following information SBAR, Kardex and Recent Results side rails up, hob elevated 30 degrees. Dulce Maria Woods

## 2021-02-03 NOTE — PROGRESS NOTES
Discharge planning Reviewed chart. Patient remains intubated. Plan is to return to [facility] when medically stable at this time. CM will continue to monitor and assist with transitional needs. MIRA Shepherd, RN Pager # [phone] Care Manager

## 2021-02-03 NOTE — DIABETES MGMT
GLYCEMIC CONTROL PLAN OF CARE Assessment/Recommendations: 
Blood glucose results this am 128 and 115 mg/dl Pt no longer receiving steroids. Last dose 1/31/2021. Patient extubated yesterday and reintubated. Lantus dose held for today. TF to be restarted at a trickle rate. Continue corrective insulin coverage as needed. Will continue inpatient monitoring. Most recent blood glucose values: 
 
 
Results for Mihai Chau (MRN [id number]) as of 2/3/2021 09:45 Ref. Range 2/2/2021 08:49 2/2/2021 14:16 2/2/2021 20:32 2/3/2021 04:22 2/3/2021 08:16 GLUCOSE,FAST - POC Latest Ref Range: 70 - 110 mg/dL 140 (H) 144 (H) 150 (H) 128 (H) 115 (H) Current A1C of 5.3 % is equivalent to average blood glucose of 105 mg/dl over the past 2-3 months. ? Accuracy. Patient anemic. Current hospital diabetes medications:  
Lantus 20 units daily Lispro corrective insulin coverage every 6 hours Previous day's insulin requirements:  
lantus 20 untis Lispro 6 units corrective insulin Home diabetes medications: 
Januvia 50 mg daily Novolog corrective insulin AC breakfast, lunch and dinner. Diet:   
NPO. TF nepro at 10 ml/hr Education:  ____Refer to Diabetes Education Record __x_Education not indicated at this time Covid-19 ruleout. Lili Mcdermott RN CDE Ext [phone]

## 2021-02-03 NOTE — PROGRESS NOTES
Infectious Disease progress Note Reason: septic shock, acute hypoxic respiratory failure, covid positive Current abx Prior abx Cefepime,metronidazole, azithromycin 1/27-1/31 
 vancomycin  since 1/27 Meropenem since 1/31 Lines:  
 
 
Assessment : 
 
79 y.o.  Female w/ PMH of HFpPEF (EF 60-65%), Stage 3 CKD, Recent L Hip ORIF (12/2020), Esophageal Varices, Liver Cirrhosis, DM, HTN, CAD, who presents from [address] [place] [address] to [facility] ED via EMS after developing worsening SOB and Hemoptysis after testing (+) for COVID-19 x1 week prior. Clinical presentation c/w acute hypoxic respiratory failure-present on admission due to severe COVID-19 pneumonia/ARDS Sputum culture 1/28-normal respiratory robert. Nasal MRSA screen positive Status post convalescent plasma 1/28/2021 Elevated [id number] to exclude COVID-19 associated hypercoagulability, pulmonary embolism with full certainty Hypotension-likely septic shock secondary to COVID-19 infection, Enterobacter cystitis. Urine culture 1/27-greater than 100,000 colonies of Enterobacter Enterobacter resistant to ceftriaxone, ceftazidime, cefoxitin. Susceptible to quinolones, cefepime Acute on chronic kidney injury-likely due to sepsis. Monitor for COVID-19 associated nephropathy Elevated  W-wifrj-fogqlo due to sepsis. No evidence of DVT noted on venous duplex 1/27/2021 
 
 
 fever with temperature 101.3 on 1/31 Labs reviewed-sputum culture positive for  Pseudomonas. MRSA screen positive. Urine culture positive for Enterobacter. Susceptibilities of Pseudomonas reviewed. High DAMASO against piperacillin/tazobactam, cefepime Fevers could be secondary to evolving beta-lactam resistant Pseudomonas  Lack of worsening hypoxia argues against progressive COVID-19 pneumonia. Resolved fevers after addition of meropenem Improving hypotension. Off pressors. Improving hypoxia. On 35% FiO2. Extubated on 2/2 - reintubated. Recommendation 
-- continue meropenem, vancomycin  
--Management of acute kidney injury per primary team 
-- Continue steroids, anticoagulation per ICU team 
--  Weaning from vent per ICU team 
 
 
Above plan was discussed in details with rn, dr Arbutus Phoenix. Please call me if any further questions or concerns. Will continue to participate in the care of this patient. HPI: 
 
 
Patient is currently intubated. Nonverbal.   
  
 
 
Home medication List  
 Details  
aspirin 81 mg chewable tablet Take 2 Tabs by mouth daily. Qty: 60 Tab, Refills: 0  
  
insulin aspart U-100 (NovoLOG Flexpen U-100 Insulin) 100 unit/mL (3 mL) inpn by SubCUTAneous route Before breakfast, lunch, and dinner. Sliding scale per instructions  
  
acetaminophen (TYLENOL) 325 mg tablet Take 2 Tabs by mouth every four (4) hours as needed for Pain. Qty: 20 Tab, Refills: 0  
  
glucose 4 gram chewable tablet Take 4 Tabs by mouth as needed for Other (hypoglycemia). Qty: 30 Tab, Refills: 0 SITagliptin (JANUVIA) 50 mg tablet Take 50 mg by mouth daily. cholecalciferol, vitamin D3, (VITAMIN D3) 2,000 unit tab Take 2,000 Units by mouth daily. nitroglycerin (NITROLINGUAL) 400 mcg/spray spray 1 Spray by SubLINGual route every five (5) minutes as needed. Qty: 1 Bottle, Refills: 2  
  
ascorbic acid, vitamin C, (VITAMIN C) 250 mg tablet Take 1 Tab by mouth daily. Qty: 60 Tab, Refills: 1  
  
multivitamin (ONE A DAY) tablet Take 1 Tab by mouth daily. simvastatin (ZOCOR) 20 mg tablet Take 20 mg by mouth daily. omega 3-dha-epa-fish oil (FISH OIL) 100-160-1,000 mg cap Take 1,000 mg by mouth two (2) times a day. cyanocobalamin (VITAMIN B12) 100 mcg tablet Take 100 mcg by mouth daily. calcium polycarbophil (FIBER LAXATIVE) 625 mg tablet Take 1,250 mg by mouth daily as needed. Current Facility-Administered Medications Medication Dose Route Frequency  sodium chloride 3% hypertonic nebulizer soln  4 mL Nebulization Q6H RT  
 albuterol (ACCUNEB) nebulizer solution 1.25 mg  1.25 mg Nebulization Q6H RT  
 NOREPINephrine (LEVOPHED) 8 mg in 5% dextrose 250mL (32 mcg/mL) infusion  0.5-50 mcg/min IntraVENous TITRATE  acetaminophen (TYLENOL) tablet 650 mg  650 mg Oral Q6H PRN  
 carvediloL (COREG) tablet 12.5 mg  12.5 mg Oral Q12H  
 meropenem (MERREM) 500 mg in sterile water (preservative free) 10 mL IV syringe  0.5 g IntraVENous Q24H  VANCOMYCIN INFORMATION NOTE   Other Rx Dosing/Monitoring  [Held by provider] bumetanide (BUMEX) injection 2 mg  2 mg IntraVENous BID  [Held by provider] insulin glargine (LANTUS) injection 20 Units  20 Units SubCUTAneous DAILY  multivit-folic acid-herbal 066 (WELLESSE PLUS) oral liquid 30 mL  30 mL Per NG tube DAILY  chlorhexidine (PERIDEX) 0.12 % mouthwash 10 mL  10 mL Oral Q12H  
 albuterol-ipratropium (DUO-NEB) 2.5 MG-0.5 MG/3 ML  3 mL Nebulization Q6H PRN  
 fentaNYL (PF) 900 mcg/30 ml infusion soln  0-200 mcg/hr IntraVENous TITRATE  midazolam (VERSED) injection 1-2 mg  1-2 mg IntraVENous Q10MIN PRN  
 fentaNYL citrate (PF) injection  mcg   mcg IntraVENous Q30MIN PRN  
 dexmedeTOMidine in 0.9 % NaCl (PRECEDEX) 400 mcg/100 mL (4 mcg/mL) infusion soln  0.1-1.5 mcg/kg/hr (Order-Specific) IntraVENous TITRATE  insulin lispro (HUMALOG) injection   SubCUTAneous Q6H  
 glucose chewable tablet 16 g  4 Tab Oral PRN  
 glucagon (GLUCAGEN) injection 1 mg  1 mg IntraMUSCular PRN  
 dextrose (D50W) injection syrg 12.5-25 g  25-50 mL IntraVENous PRN  pantoprazole (PROTONIX) 40 mg in 0.9% sodium chloride 10 mL injection  40 mg IntraVENous DAILY Allergies: Latex Temp (24hrs), Av.8 °F (36.6 °C), Min:97.2 °F (36.2 °C), Max:99.3 °F (37.4 °C) Visit Vitals BP (!) 104/53 Pulse 70 Temp 97.5 °F (36.4 °C) Resp 14 Ht 5' (1.524 m) Wt 78 kg (171 lb 15.3 oz) SpO2 100% BMI 33.58 kg/m² ROS: unable to obtain Physical Exam: 
 
General:  Alert, cooperative, no distress. Head:  Normocephalic, without obvious abnormality, atraumatic. Eyes:  Conjunctivae/corneas clear. PERRL, Nose: Nares normal. Septum midline. Mucosa normal. No drainage or sinus tenderness. Throat: Lips, mucosa, and tongue normal. Teeth and gums normal.  
Neck: Supple, symmetrical, trachea midline, no adenopathy, thyroid: no enlargment/tenderness/nodules, no carotid bruit and no JVD. Back:   Symmetric, no curvature. ROM normal.  
Lungs:   Clear to auscultation bilaterally. Chest wall:  No tenderness or deformity. Heart:  Regular rate and rhythm, S1, S2 normal, no murmur, click, rub or gallop. Abdomen:   Soft, non-tender. Bowel sounds normal. No masses,  No organomegaly. Extremities: Extremities normal, atraumatic, no cyanosis or edema. Pulses: 2+ and symmetric all extremities. Skin: Skin color, texture, turgor normal. No rashes or lesions Lymph Nodes:  Cervical, supraclavicular, and axillary nodes normal.  
Neurologic:  Grossly nonfocal  
 
 
 
Labs: Results:  
Chemistry Recent Labs 02/02/21 
0440 02/01/21 
1730 02/01/21 
0530 * 296* 297*  144 143  
K 3.7 3.2* 3.1*  
 109 109 CO2 25 26 25 * 135* 130* CREA 3.50* 3.61* 3.52* CA 8.2* 7.7* 7.6* AGAP 8 9 9 BUCR 39* 37* 37*   --  109  
TP 5.7*  --  5.1* ALB 2.5* 2.3* 2.4*  
GLOB 3.2  --  2.7 AGRAT 0.8  --  0.9 CBC w/Diff Recent Labs 02/02/21 
0440 02/01/21 
0530 WBC 4.1* 2.0*  
RBC 3.25* 2.17* HGB 9.4* 6.3* HCT 27.6* 18.6*  
PLT 33* 30* GRANS 82* 80* LYMPH 8* 12* EOS 0 0 Microbiology Recent Labs  
  01/31/21 
1924 01/31/21 
1910 CULT NO GROWTH 3 DAYS NO GROWTH 3 DAYS  
  
 
 
RADIOLOGY: 
 
All available imaging studies/reports in [facility] for this admission were reviewed Dr. Christie Harper, Infectious Disease Specialist 
[phone] February 3, 2021 
1:25 PM

## 2021-02-03 NOTE — PROGRESS NOTES
[facility]. Pulmonary, Critical Care, and Sleep Medicine Name: Teresa Machado MRN: [id number] : 1940 Hospital: [facility] Date: 2/3/2021  Admission Date: 2021 Chart and notes reviewed. Data reviewed. I have evaluated all findings. [x]I have reviewed the flowsheet and previous days notes. [x]The patient is unable to give any meaningful history or review of systems because the patient is: 
[x]Intubated [x]Sedated  
[]Unresponsive [x]The patient is critically ill on     
[x]Mechanical ventilation []Pressors []BiPAP [] Interval HPI:  
Teo Burns. Dulce Ashraf is a 79 y.o.  Female w/ PMH of HFpPEF (EF 55-60%), Stage 3 CKD, Recent L Hip ORIF (2020), Esophageal Varices, Liver Cirrhosis, DM, HTN, CAD, who presented from [address] [place] [address] to [facility] on 2021 via EMS after developing worsening SOB and Hemoptysis after testing (+) for COVID-19 x1 week prior.  
  
ICU consulted for Hypoxia and is noted to be satting at 94+ on 15L/100% HFNC. Shortly after examination, she required Endotracheal Intubation w/ Mechanical Ventilation.  
  
Discussed with patient's sister and nephew. Nephew is CC nurse at [facility] [facility] [employer]. Updated them on critical nature and high likelihood for mortality due to age and underlying conditions, wished to proceed with intubation but DNR otherwise. Extubation 2021, unfortunately, she was unable to protect airway adequately soon afterwards, re-intubated with family's consent. Family continues to insist that this patient would not want to have trach. Nephrology following for NANCY on CKD3 ID following for COVID pneumonia, Enterobacter cystitis 21 Hospital Day: 9 Vent Day: 8 Overnight Events: Extubated, re-intubated for airway protection Mentation: Sedated Respiratory/ Secretions: Intubated, Oral Cavity w/out copious secretions Hemodynamics: VSS. Afebrile. Diet: TF held Urine/Fecal Output: UO increased s/p diuresis Need for Procedures: none 
 
-Restart TF at trickle  
-Hold Lantus today, TF held since yesterday for extubation ROS:Review of systems not obtained due to patient factors. Events and notes from last 24 hours reviewed. Care plan discussed on multidisciplinary rounds. Patient Active Problem List  
Diagnosis Code  Anemia D64.9  
 CAD (coronary artery disease) I25.10  Thrombocytopenia ([facility] D69.6  NSTEMI (non-ST elevated myocardial infarction) (HCC) I21.4  
 UTI (urinary tract infection) N39.0  Sepsis ([facility] A41.9  PNA (pneumonia) J18.9  Hypoxia R09.02  
 Cirrhosis of liver not due to alcohol ([facility] K74.60  Esophageal varices (HCC) I85.00  
 CHF (congestive heart failure) (HCC) I50.9  Dyspnea R06.00  
 Pneumonia J18.9  
 HTN (hypertension), benign I10  
 CAP (community acquired pneumonia) J18.9  Acute on chronic diastolic CHF (congestive heart failure) (HCC) I50.33  Leg DVT (deep venous thromboembolism), acute, left (HCC) I82.402  Palpitations R00.2  Acute pulmonary edema (HCC) J81.0  Malaise and fatigue R53.81, R53.83  
 Pancytopenia ([facility] [id number]  Portal hypertension ([facility] K76.6  Chronic anticoagulation Z79.01  
 Hip fracture ([facility] S72.009A  Breast cancer ([facility] C50.919  
 Colon cancer ([facility] C18.9  Pneumonia due to COVID-19 virus U07.1, J12.82 Vital Signs: 
Visit Vitals BP (!) 104/53 Pulse 70 Temp 97.5 °F (36.4 °C) Resp 14 Ht 5' (1.524 m) Wt 78 kg (171 lb 15.3 oz) SpO2 100% BMI 33.58 kg/m² O2 Device: Endotracheal tube, Ventilator O2 Flow Rate (L/min): 4 l/min Temp (24hrs), Av.8 °F (36.6 °C), Min:97.2 °F (36.2 °C), Max:99.3 °F (37.4 °C) Intake/Output:  
Last shift:      No intake/output data recorded. Last 3 shifts:  1901 -  0700 In: 2866.3 [I.V.:1282.5] Out: Antony Lynch [FDM:8851; Drains:500] Intake/Output Summary (Last 24 hours) at 2/3/2021 0243 Last data filed at 2/3/2021 0700 Gross per 24 hour Intake 1176.67 ml Output 745 ml Net 431.67 ml Ventilator Settings: 
Ventilator Mode: Assist control Respiratory Rate Resp Rate Observed: 38 Back-Up Rate: 14 Insp Time (sec): 1 sec I:E Ratio: 1;3 
Ventilator Volumes Vt Set (ml): 450 ml Vt Exhaled (Machine Breath) (ml): 556 ml Vt Spont (ml): 446 ml Ve Observed (l/min): 0.7 l/min Ventilator Pressures Pressure Support (cm H2O): 10 cm H2O 
PIP Observed (cm H2O): 27 cm H2O Plateau Pressure (cm H2O): 15 cm H2O 
MAP (cm H2O): 11 PEEP/VENT (cm H2O): 5 cm H20 Auto PEEP Observed (cm H2O): 0 cm H2O Current Facility-Administered Medications Medication Dose Route Frequency  sodium chloride 3% hypertonic nebulizer soln  4 mL Nebulization Q6H RT  
 albuterol (ACCUNEB) nebulizer solution 1.25 mg  1.25 mg Nebulization Q6H RT  
 NOREPINephrine (LEVOPHED) 8 mg in 5% dextrose 250mL (32 mcg/mL) infusion  0.5-50 mcg/min IntraVENous TITRATE  carvediloL (COREG) tablet 12.5 mg  12.5 mg Oral Q12H  
 meropenem (MERREM) 500 mg in sterile water (preservative free) 10 mL IV syringe  0.5 g IntraVENous Q24H  VANCOMYCIN INFORMATION NOTE   Other Rx Dosing/Monitoring  [Held by provider] bumetanide (BUMEX) injection 2 mg  2 mg IntraVENous BID  [Held by provider] insulin glargine (LANTUS) injection 20 Units  20 Units SubCUTAneous DAILY  multivit-folic acid-herbal 250 (WELLESSE PLUS) oral liquid 30 mL  30 mL Per NG tube DAILY  chlorhexidine (PERIDEX) 0.12 % mouthwash 10 mL  10 mL Oral Q12H  
 fentaNYL (PF) 900 mcg/30 ml infusion soln  0-200 mcg/hr IntraVENous TITRATE  dexmedeTOMidine in 0.9 % NaCl (PRECEDEX) 400 mcg/100 mL (4 mcg/mL) infusion soln  0.1-1.5 mcg/kg/hr (Order-Specific) IntraVENous TITRATE  insulin lispro (HUMALOG) injection   SubCUTAneous Q6H  
  pantoprazole (PROTONIX) 40 mg in 0.9% sodium chloride 10 mL injection  40 mg IntraVENous DAILY Telemetry: Sinus Rhythm Limited Physical Exam:  
 General: Intubated, Sedated HEENT: NC/AT, Septum midline, No visible nasal drainage, Oral Cavity clear w/out copious drainage, Trachea midline Chest: No visible trauma or deformity to chest wall, RRR- via Tele Lungs: Equal & Bilateral chest rise, Airway clear, ET Tube in place and secured Abdomen: Non-Distended, large reducible hernia Extremity: 2+pitting edema BLE Neuro: Sedated Skin: No new skin lesions, rashes, or ulcers DATA: 
MAR reviewed and pertinent medications noted or modified as needed Labs: 
Recent Labs 02/02/21 
0440 02/01/21 
0530 WBC 4.1* 2.0* HGB 9.4* 6.3* HCT 27.6* 18.6*  
PLT 33* 30* Recent Labs 02/02/21 
0440 02/01/21 
1730 02/01/21 
1320 02/01/21 
0530  144  --  143  
K 3.7 3.2*  --  3.1*  
 109  --  109 CO2 25 26  --  25 * 296*  --  297* * 135*  --  130* CREA 3.50* 3.61*  --  3.52* CA 8.2* 7.7*  --  7.6*  
MG 2.3  --   --  1.9 PHOS 3.6 3.8  --  3.8 ALB 2.5* 2.3*  --  2.4* ALT 31  --   --  35 INR  --   --  1.7*  -- No results for input(s): PH, PCO2, PO2, HCO3, FIO2 in the last 72 hours. Recent Labs 02/03/21 
5652 02/02/21 
2157 02/02/21 
1608 FIO2I 40 50 30 HCO3I 28.6* 25.4 24.5 PCO2I 32.4* 45.9* 40.1 PHI 7.55* 7.35 7.39  
PO2I 110* 129* 82 Imaging: 
[x]   I have personally reviewed the patients radiographs and reports XR Results (most recent): 
Results from [facility] [place] encounter on 01/26/21 XR CHEST PORT Narrative Portable CXR: 
 
Comparison 2/2/2021 HISTORY: Intubation ET tube tip 2.5 cm above sonido. NG tube extends into the stomach. Left IJ 
catheter removed. No cardiomegaly or significant vascular congestion. Left lung 
infiltrate is unchanged. Mild clearing of the right lung base infiltrate. Impression As described CT Results (most recent): 
[facility] CT CHEST WO CONT Narrative Description:  CT chest without contrast 
 
TECHNIQUE: Helically acquired axial CT imaging of the chest without IV contrast 
was performed. Coronal and sagittal reformations generated and reviewed. All CT scans at this facility are performed using dose optimization technique as 
appropriate to a performed exam, to include automated exposure control, 
adjustment of the mA and/or kV according to patient size (including appropriate 
matching for site-specific examinations), or use of iterative reconstruction 
technique. Clinical Indication:  Chest pain Comparison: March 24, 2019. Findings:   
 
The visualized base of the neck shows a endotracheal tube within the lower 
thoracic trachea. There is a left IJ central venous line which terminates in the 
mid SVC. Visualized thyroid is unremarkable. Thoracic aorta nonaneurysmal. 
Moderately enlarged heart without pericardial effusion. Coronary artery 
calcifications are seen. There are a few mildly prominent mediastinal lymph nodes which could be 
reactive. There are dense parenchymal infiltrates bilaterally. Small effusions, right 
greater than left. Visualized upper abdomen shows splenomegaly and ascites. Impression Impression:   
 
Dense pulmonary infiltrates bilaterally consistent with pneumonia. Small 
parapneumonic effusions. Visualized abdomen shows ascites and mild splenomegaly which may be indicative 
of portal venous hypertension. Support lines and tubes appear well positioned. IMPRESSION:  
· Hypoxic, Hypercapnic Respiratory Failure/ presumed ARDS-  likely due to COVID-19 Pneumonia w/ Acute on Chronic CHF Exacerbation, now requiring Mechanical Ventilation. Extubated 2/2/2021, failed to protect airway, re-intubated soon after. · Pulmonary HTN- based on Echo (01/27/2021)- LV EF: 55-60%, Pulmonary arterial systolic pressure is 70 mmHg. · COVID-19 Pneumonia- Covid-19 Rapid Test (+) on 01/26/2021. Known (+) prior to admission. CXR (+) Fluffy B/L mid to LL Opacities, consistent w/ multifocal infection.   
· Septic shock- tachycardia, tachypnea, elevated procal on admission, 2/2 COVID-19 + UTI, ?underlying aspiration. UA (+) 3+ Bacteria + Large Leuks. Urine Cx pending. Worsening procal, white count. · Metabolic Acidosis- Reasons above. PH: 7.31, pCO2: 29.5, HCO3: 14.7 · NANCY on Stage 3 CKD, Oliguric- likely in setting of Distributive Shock. BUN: 83, Cr: 2.34 
· Encephalopathy- Metabolic, Reasons above · UTI on cefepime, Vanc · Hemoptysis- Hx of Esophageal Varices vs Pulmonary Edema · Thrombocytopenia- Baseline Chronically low. · Elevated Troponin- likely CHF Exacerbation + worsening Pulm HTN , Troponin: 1.30- Increasing · Acute on chronic HFpEF - EF 60-65% · Hx Malnutrition albumin today · Hx of Esophageal Varices · Hx Liver Cirrhosis   
· Hx DM- HgbA1c: 5.3 
· HX HTN  
· Hx of CAD  
· Hx Recent Left Hip ORIF (12/07/2020) Patient Active Problem List  
Diagnosis Code  Anemia D64.9  
 CAD (coronary artery disease) I25.10  Thrombocytopenia ([address] D69.6  NSTEMI (non-ST elevated myocardial infarction) (HCC) I21.4  
 UTI (urinary tract infection) N39.0  Sepsis ([address] A41.9  PNA (pneumonia) J18.9  Hypoxia R09.02  
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  
 CHF (congestive heart failure) (HCC) I50.9  Dyspnea R06.00  
 Pneumonia J18.9  
 HTN (hypertension), benign I10  
 CAP (community acquired pneumonia) J18.9  Acute on chronic diastolic CHF (congestive heart failure) (HCC) I50.33  Leg DVT (deep venous thromboembolism), acute, left (HCC) I82.402  Palpitations R00.2  Acute pulmonary edema (HCC) J81.0  Malaise and fatigue R53.81, R53.83  
  Pancytopenia ([facility] T79.682  Portal hypertension ([facility] K76.6  Chronic anticoagulation Z79.01  
 Hip fracture ([facility] S72.009A  Breast cancer ([facility] C50.919  
 Colon cancer ([facility] C18.9  Pneumonia due to COVID-19 virus U07.1, J12.82 RECOMMENDATIONS:  
Neuro: Titrate sedation to RASS 0 to -1. Precedex and Fentanyl gtts. Daily sedation holiday. Resp: VAP Bundle, Bronchial Hygiene. Aspiration Precautions, maintain HOB>30'. Duonebs q6 hrs PRN. Wean and SBT as indicated. I/D: I/D following. I/D recs Merrem, Vancomycin. Remdesivir given on admission, d/c'd due to renal fx and intubation. Urine Cx enterobacter. Blood Cx NG 4 days. MRSA Cx positive. Trend WBCs + Temp. Hem/Onc: Hgb stable s/p transfusion. Monitor Thrombocytopenia. Trend H/H, Monitor for s/o active bleeding. Daily CBC w/ diff. Follow up on Dopplers of upper extremities, no occlusion LEs CVS: Levophed gtt stopped. Echo (+) LV EF: 55-60%, Severe Pulm HTN. LE Duplex (-). Monitor HD, MAP goal >65 mmHg. Originally not a candidate for [facility] [place] due to hemoptysis Metabolic: Trend daily BMP + Mag + Phos, replace electrolytes per protocol. Renal: Nephrology following. IV Bumex. Jean in place. Trend renal parameters & Monitor UOP. Endocrine: SSI, q6 Glucose checks. Lantus 20u held today. GI: Restart TF at trickle and advance as tolerated Musc/Skin: No acute issues. Partial Code, INTUBATE ONLY Best Practice: 
Glycemic Control IHI ICU Bundles: 
 Central Line Bundle Followed , Jean Bundle Followed and Vent Bundle Followed, Vent Day 8 [facility] Vent patients- VAP bundle, aim to keep peak plateau pressure 02-04NU H2O Sress Ulcer Prophylaxis. DVT Prophylaxis. Need for Lines, Jean Assessed. Restraints Need. Palliative Care Evaluation. Jude Sanderson MD PGY-1 
[facility] Emergency Medicine 2/3/2021, 9:42 AM

## 2021-02-03 NOTE — PROGRESS NOTES
Nutrition Note Patient extubated yesterday afternoon then required re-intubation overnight and noted to have small volume emesis post intubation per nursing. OGT calmped and loose stool via FMS (150 mL output x last 24 hours) with hypoactive bowel sounds. Previously receiving tube feeding at goal, held for SBT and plan to resume at trickle rate and advance as tolerated per discussion with MD during interdisciplinary rounds. Recommendations/Plan - Resume tube feeding of Nepro at 10 mL/hr and advance as tolerated by 10 mL q 8 hours to goal rate of 40 mL/hr with prosource BID, daily multivitamin and 100 mL q 4 hour water flushes (goal regimen to provide 1848 kcal, 108 gm protein, 696 mL free water, 100% RDIs). Electronically signed by Trudy Vazquez RD, [address]  on 2/3/2021 at 1:24 PM 
 
Contact: [phone]

## 2021-02-03 NOTE — PROCEDURES
[employer] [facility] 
Midline Procedure Note With Ultrasound Guidance Indication: Inadequate venous access Orly Milton Midline Bundle: 
Full sterile barrier precautions used. Using ultrasound guidance, Left brachial vein cannulated x 2 attempt(s) utilizing the modified Seldinger technique. Position of guidewire confirmed in vein using ultrasound prior to dilating. Guidewire was removed. Midline catheter was placed without difficulty. no immediate complications encountered. Poor blood return positional. 
Catheter secured & Biopatch applied. Sterile Tegaderm placed. First assist: None. Eddy Pena PA-C 
02/02/21 Pulmonary, Critical Care Medicine [facility]

## 2021-02-03 NOTE — ANESTHESIA PROCEDURE NOTES
Emergent Intubation Performed by: Hollis Pineda CRNA Authorized by: Hollis Pineda CRNA Emergent Intubation: Location:  ICU Date/Time:  2/2/2021 9:19 PM 
Indications:  Impending respiratory failure Spontaneous Ventilation: present Level of Consciousness: awake Preoxygenated: Yes Airway Documentation: Airway:  ETT - Cuffed Advanced Technique:  [facility] Blade Type:  Kathy Blade Size:  3 ETT size (mm):  7.5 ETT Line Galileo:  Lips ETT Insertion depth (cm):  22 Placement verified by: auscultation, EtCO2, BBS and fiber optic Attempts:  1 Difficult airway:  Yes

## 2021-02-03 NOTE — PROGRESS NOTES
Problem: Ventilator Management 
Goal: *Adequate oxygenation and ventilation 
Outcome: Progressing Towards Goal 
Goal: *Patient maintains clear airway/free of aspiration 
Outcome: Progressing Towards Goal 
Goal: *Absence of infection signs and symptoms 
Outcome: Progressing Towards Goal 
  
Problem: Risk for Spread of Infection 
Goal: Prevent transmission of infectious organism to others 
Description: Prevent the transmission of infectious organisms to other patients, staff members, and visitors. 
Outcome: Progressing Towards Goal 
  
Problem: Non-Violent Restraints 
Goal: *Removal from restraints as soon as assessed to be safe 
Outcome: Progressing Towards Goal 
Goal: *No harm/injury to patient while restraints in use 
Outcome: Progressing Towards Goal 
Goal: Non-violent Restaints:Standard Interventions 
Outcome: Progressing Towards Goal 
  
Problem: Falls - Risk of 
Goal: *Absence of Falls 
Description: Document Candy Fall Risk and appropriate interventions in the flowsheet. 
Outcome: Progressing Towards Goal 
Note: Fall Risk Interventions: 
Mobility Interventions: Bed/chair exit alarm, Strengthening exercises (ROM-active/passive), Patient to call before getting OOB 
 
Mentation Interventions: Adequate sleep, hydration, pain control, Bed/chair exit alarm, Eyeglasses and hearing aids, More frequent rounding, Reorient patient, Room close to nurse's station, Toileting rounds, Update white board 
 
Medication Interventions: Bed/chair exit alarm, Evaluate medications/consider consulting pharmacy, Patient to call before getting OOB, Teach patient to arise slowly, Assess postural VS orthostatic hypotension 
 
Elimination Interventions: Bed/chair exit alarm, Call light in reach, Elevated toilet seat, Stay With Me (per policy), Toilet paper/wipes in reach, Toileting schedule/hourly rounds 
 
  
 
 
  
Problem: Patient Education: Go to Patient Education Activity 
Goal: Patient/Family Education 
 Outcome: Progressing Towards Goal 
  
Problem: Pressure Injury - Risk of 
Goal: *Prevention of pressure injury Description: Document Kyle Scale and appropriate interventions in the flowsheet. Outcome: Progressing Towards Goal 
Note: Pressure Injury Interventions: 
Sensory Interventions: Assess changes in LOC, Assess need for specialty bed, Turn and reposition approx. every two hours (pillows and wedges if needed), Pressure redistribution bed/mattress (bed type), Pad between skin to skin, Minimize linen layers, Keep linens dry and wrinkle-free, Float heels Moisture Interventions: Absorbent underpads, Apply protective barrier, creams and emollients, Assess need for specialty bed, Check for incontinence Q2 hours and as needed, Internal/External urinary devices, Maintain skin hydration (lotion/cream), Minimize layers Activity Interventions: Assess need for specialty bed, Pressure redistribution bed/mattress(bed type) Mobility Interventions: Assess need for specialty bed, HOB 30 degrees or less, Float heels, Pressure redistribution bed/mattress (bed type), Turn and reposition approx. every two hours(pillow and wedges) Nutrition Interventions: Document food/fluid/supplement intake, Discuss nutritional consult with provider Friction and Shear Interventions: Apply protective barrier, creams and emollients, Feet elevated on foot rest, Foam dressings/transparent film/skin sealants, HOB 30 degrees or less, Minimize layers, Transferring/repositioning devices

## 2021-02-04 NOTE — DIABETES MGMT
GLYCEMIC CONTROL PLAN OF CARE Assessment/Recommendations: 
Blood glucose results this am 231 mg/dl Recommend adding back lantus 10 units daily Continue corrective insulin coverage as needed. Will continue inpatient monitoring. Most recent blood glucose values: 
 
 
Results for Trevor Lenz (MRN [id number]) as of 2/4/2021 10:59 Ref. Range 2/3/2021 08:16 2/3/2021 14:13 2/3/2021 20:41 2/4/2021 04:04 2/4/2021 08:46 GLUCOSE,FAST - POC Latest Ref Range: 70 - 110 mg/dL 115 (H) 325 (H) 160 (H) 205 (H) 231 (H) Current A1C of 5.3 % is equivalent to average blood glucose of 105 mg/dl over the past 2-3 months. ? Accuracy. Patient anemic. Current hospital diabetes medications:  
Lantus 20 units daily (on hold) Lispro corrective insulin coverage every 6 hours Previous day's insulin requirements:  
lantus 0 untis Lispro 15 units corrective insulin Home diabetes medications: 
Januvia 50 mg daily Novolog corrective insulin AC breakfast, lunch and dinner. Diet:   
NPO. TF nepro at 20 ml/hr Education:  ____Refer to Diabetes Education Record __x_Education not indicated at this time Covid-19 ruleout. [facility], RN CDE Ext [phone]

## 2021-02-04 NOTE — WOUND CARE
Physical Exam 
Musculoskeletal:  
     Legs: 
 
 
Room 314: Focused wound assess of heels Discussed care with primary nurse Mortimer Lora. Left heel, soft tissue necrosis, [id number], purple discoloration, no drainage, heel boots on, recommend consider unavoidable. Right heel, soft tissue necrosis, [id number], purple discoloration, no drainage, heel boots on, recommend consider unavoidable. Right medial ankle, soft tissue necrosis, 0.3x0.3x0cm, purple discoloration, no drainage, heel boots on, recommend consider unavoidable. Topical treatment orders per nursing unit skin care protocol. Silicone heel dressings are in place & are appropriate. Pt on [place] bed. Heel prevention boots remain in place. Will turn over care to nursing staff at this time.  
Liz Tommy BSN, RN, [facility], [address]

## 2021-02-04 NOTE — PROGRESS NOTES
Infectious Disease progress Note Reason: septic shock, acute hypoxic respiratory failure, covid positive Current abx Prior abx Cefepime,metronidazole, azithromycin 1/27-1/31 
 vancomycin  since 1/27 Meropenem since 1/31 Lines:  
 
 
Assessment : 
 
79 y.o.  Female w/ PMH of HFpPEF (EF 60-65%), Stage 3 CKD, Recent L Hip ORIF (12/2020), Esophageal Varices, Liver Cirrhosis, DM, HTN, CAD, who presents from [address] [place] [address] to [facility] via EMS after developing worsening SOB and Hemoptysis after testing (+) for COVID-19 x1 week prior. Clinical presentation c/w acute hypoxic respiratory failure-present on admission due to severe COVID-19 pneumonia/ARDS Sputum culture 1/28-normal respiratory robert. Nasal MRSA screen positive Status post convalescent plasma 1/28/2021 Elevated C-oazqq-ebqlth to exclude COVID-19 associated hypercoagulability, pulmonary embolism with full certainty Hypotension-likely septic shock secondary to COVID-19 infection, Enterobacter cystitis. Urine culture 1/27-greater than 100,000 colonies of Enterobacter Enterobacter resistant to ceftriaxone, ceftazidime, cefoxitin. Susceptible to quinolones, cefepime Acute on chronic kidney injury-likely due to sepsis. Nephrology follow-up appreciated. Recommendations for temporary hemodialysis since BUN greater than 100, altered mentation Elevated  F-[id number]-ovofdb due to sepsis. No evidence of DVT noted on venous duplex 1/27/2021 
 
 
 fever with temperature 101.3 on 1/31 Labs reviewed-sputum culture positive for  Pseudomonas. MRSA screen positive. Urine culture positive for Enterobacter. Susceptibilities of Pseudomonas reviewed. High DAMASO against piperacillin/tazobactam, cefepime Fevers could be secondary to evolving beta-lactam resistant Pseudomonas  Lack of worsening hypoxia argues against progressive COVID-19 pneumonia. Improving hypotension. Off pressors. Improving hypoxia. On 35% FiO2. Extubated on 2/2 - reintubated. Recommendation 
-- continue meropenem till 2/6, d/c vancomycin  
--Management of acute kidney injury per primary team 
-- Continue steroids, anticoagulation per ICU team 
--  Weaning from vent per ICU team 
- cancel daily crp, ferritin, LD Above plan was discussed in details with rn, dr Berhane Lyman. Please call me if any further questions or concerns. Will continue to participate in the care of this patient. HPI: 
 
 
Patient is currently intubated. Nonverbal.   
  
 
 
Home medication List  
 Details  
aspirin 81 mg chewable tablet Take 2 Tabs by mouth daily. Qty: 60 Tab, Refills: 0  
  
insulin aspart U-100 (NovoLOG Flexpen U-100 Insulin) 100 unit/mL (3 mL) inpn by SubCUTAneous route Before breakfast, lunch, and dinner. Sliding scale per instructions  
  
acetaminophen (TYLENOL) 325 mg tablet Take 2 Tabs by mouth every four (4) hours as needed for Pain. Qty: 20 Tab, Refills: 0  
  
glucose 4 gram chewable tablet Take 4 Tabs by mouth as needed for Other (hypoglycemia). Qty: 30 Tab, Refills: 0 SITagliptin (JANUVIA) 50 mg tablet Take 50 mg by mouth daily. cholecalciferol, vitamin D3, (VITAMIN D3) 2,000 unit tab Take 2,000 Units by mouth daily. nitroglycerin (NITROLINGUAL) 400 mcg/spray spray 1 Spray by SubLINGual route every five (5) minutes as needed. Qty: 1 Bottle, Refills: 2  
  
ascorbic acid, vitamin C, (VITAMIN C) 250 mg tablet Take 1 Tab by mouth daily. Qty: 60 Tab, Refills: 1  
  
multivitamin (ONE A DAY) tablet Take 1 Tab by mouth daily. simvastatin (ZOCOR) 20 mg tablet Take 20 mg by mouth daily. omega 3-dha-epa-fish oil (FISH OIL) 100-160-1,000 mg cap Take 1,000 mg by mouth two (2) times a day. cyanocobalamin (VITAMIN B12) 100 mcg tablet Take 100 mcg by mouth daily. calcium polycarbophil (FIBER LAXATIVE) 625 mg tablet Take 1,250 mg by mouth daily as needed. Current Facility-Administered Medications Medication Dose Route Frequency  midodrine (PROAMATINE) tablet 10 mg  10 mg Per NG tube TID WITH MEALS  sodium chloride 3% hypertonic nebulizer soln  4 mL Nebulization Q6H RT  
 albuterol (ACCUNEB) nebulizer solution 1.25 mg  1.25 mg Nebulization Q6H RT  
 NOREPINephrine (LEVOPHED) 8 mg in 5% dextrose 250mL (32 mcg/mL) infusion  0.5-50 mcg/min IntraVENous TITRATE  acetaminophen (TYLENOL) tablet 650 mg  650 mg Oral Q6H PRN  
 carvediloL (COREG) tablet 12.5 mg  12.5 mg Oral Q12H  
 meropenem (MERREM) 500 mg in sterile water (preservative free) 10 mL IV syringe  0.5 g IntraVENous Q24H  VANCOMYCIN INFORMATION NOTE   Other Rx Dosing/Monitoring  [Held by provider] bumetanide (BUMEX) injection 2 mg  2 mg IntraVENous BID  [Held by provider] insulin glargine (LANTUS) injection 20 Units  20 Units SubCUTAneous DAILY  multivit-folic acid-herbal 251 (WELLESSE PLUS) oral liquid 30 mL  30 mL Per NG tube DAILY  chlorhexidine (PERIDEX) 0.12 % mouthwash 10 mL  10 mL Oral Q12H  
 albuterol-ipratropium (DUO-NEB) 2.5 MG-0.5 MG/3 ML  3 mL Nebulization Q6H PRN  
 fentaNYL (PF) 900 mcg/30 ml infusion soln  0-200 mcg/hr IntraVENous TITRATE  midazolam (VERSED) injection 1-2 mg  1-2 mg IntraVENous Q10MIN PRN  
 fentaNYL citrate (PF) injection  mcg   mcg IntraVENous Q30MIN PRN  
 dexmedeTOMidine in 0.9 % NaCl (PRECEDEX) 400 mcg/100 mL (4 mcg/mL) infusion soln  0.1-1.5 mcg/kg/hr (Order-Specific) IntraVENous TITRATE  insulin lispro (HUMALOG) injection   SubCUTAneous Q6H  
 glucose chewable tablet 16 g  4 Tab Oral PRN  
 glucagon (GLUCAGEN) injection 1 mg  1 mg IntraMUSCular PRN  
 dextrose (D50W) injection syrg 12.5-25 g  25-50 mL IntraVENous PRN  pantoprazole (PROTONIX) 40 mg in 0.9% sodium chloride 10 mL injection  40 mg IntraVENous DAILY Allergies: Latex Temp (24hrs), Av.2 °F (36.2 °C), Min:96.3 °F (35.7 °C), Max:97.9 °F (36.6 °C) Visit Vitals BP (!) 131/59 Pulse 71 Temp (!) 96.4 °F (35.8 °C) Resp 13 Ht 5' (1.524 m) Wt 78 kg (171 lb 15.3 oz) SpO2 100% BMI 33.58 kg/m² ROS: unable to obtain Physical Exam: 
 
General:  Alert, cooperative, no distress. Head:  Normocephalic, without obvious abnormality, atraumatic. Eyes:  Conjunctivae/corneas clear. PERRL, Nose: Nares normal. Septum midline. Mucosa normal. No drainage or sinus tenderness. Throat: Lips, mucosa, and tongue normal. Teeth and gums normal.  
Neck: Supple, symmetrical, trachea midline, no adenopathy, thyroid: no enlargment/tenderness/nodules, no carotid bruit and no JVD. Back:   Symmetric, no curvature. ROM normal.  
Lungs:   Clear to auscultation bilaterally. Chest wall:  No tenderness or deformity. Heart:  Regular rate and rhythm, S1, S2 normal, no murmur, click, rub or gallop. Abdomen:   Soft, non-tender. Bowel sounds normal. No masses,  No organomegaly. Extremities: Extremities normal, atraumatic, no cyanosis or edema. Pulses: 2+ and symmetric all extremities. Skin: Skin color, texture, turgor normal. No rashes or lesions Lymph Nodes:  Cervical, supraclavicular, and axillary nodes normal.  
Neurologic:  Grossly nonfocal  
 
 
 
Labs: Results:  
Chemistry Recent Labs 21 
0510 21 
9961 21 
0440 * 113*  115* 170*  148*  147* 142  
K 3.9 4.2  4.2 3.7  111  112* 109 CO2 24 28  28 25 * 144*  143* 136* CREA 3.47* 3.65*  3.59* 3.50* CA 8.5 8.1*  8.1* 8.2* AGAP 11 9  7 8 BUCR 43* 39*  40* 39* AP 81 77 104  
TP 5.5* 5.2* 5.7* ALB 2.4* 2.5*  2.5* 2.5*  
GLOB 3.1 2.7 3.2 AGRAT 0.8 0.9 0.8 CBC w/Diff Recent Labs 21 
0510 21 
3612 21 
0440 WBC 7.5 4.9 4.1*  
RBC 3.12* 2.99* 3.25* HGB 8.9* 8.5* 9.4* HCT 26.8* 25.7* 27.6*  
 PLT 40* 34* 33* GRANS 83* 88* 82* LYMPH 9* 7* 8* EOS 1 0 0 Microbiology No results for input(s): CULT in the last 72 hours. RADIOLOGY: 
 
All available imaging studies/reports in [facility] for this admission were reviewed Dr. Beau Ocasio, Infectious Disease Specialist 
[phone] February 4, 2021 
1:25 PM

## 2021-02-04 NOTE — ROUTINE PROCESS
[phone]: Spoke with Dee Canavan. Updates given on patient's current status. Mr. Sandrita Winchester expressed gratitude and was appreciative of care and thorough update on patient. Would like an update by day shift nurse regarding patient's plan of care for the day. Informed by Jennifer Cueva that the patient has expressed numerous times that she would not want a trach. Also informed that patient's sister, Cole Joseph [facility]), would not want her to be trached and on the ventilator long-term if she's not able to be extubated soon to which he was in agreeance. Opportunity for questions provided. No further questions noted.

## 2021-02-04 NOTE — ROUTINE PROCESS
Bedside and Verbal shift change report given to Mine Bedoya RN (oncoming nurse) by Kenan Mendieta RN (offgoing nurse). Report included the following information SBAR, Kardex, Intake/Output, MAR and Alarm Parameters .

## 2021-02-04 NOTE — PROGRESS NOTES
Problem: Ventilator Management Goal: *Adequate oxygenation and ventilation Outcome: Progressing Towards Goal 
Goal: *Patient maintains clear airway/free of aspiration Outcome: Progressing Towards Goal 
Goal: *Absence of infection signs and symptoms Outcome: Progressing Towards Goal 
Goal: *Normal spontaneous ventilation Outcome: Progressing Towards Goal 
  
Problem: Patient Education: Go to Patient Education Activity Goal: Patient/Family Education Outcome: Progressing Towards Goal 
  
Problem: Risk for Spread of Infection Goal: Prevent transmission of infectious organism to others Description: Prevent the transmission of infectious organisms to other patients, staff members, and visitors. Outcome: Progressing Towards Goal 
  
Problem: Patient Education:  Go to Education Activity Goal: Patient/Family Education Outcome: Progressing Towards Goal 
  
Problem: Non-Violent Restraints Goal: *Removal from restraints as soon as assessed to be safe Outcome: Progressing Towards Goal 
Goal: *No harm/injury to patient while restraints in use Outcome: Progressing Towards Goal 
Goal: *Patient's dignity will be maintained Outcome: Progressing Towards Goal 
Goal: *Patient Specific Goal (EDIT GOAL, INSERT TEXT) Outcome: Progressing Towards Goal 
Goal: Non-violent Restaints:Standard Interventions Outcome: Progressing Towards Goal 
Goal: Non-violent Restraints:Patient Interventions Outcome: Progressing Towards Goal 
Goal: Patient/Family Education Outcome: Progressing Towards Goal 
  
Problem: Falls - Risk of 
Goal: *Absence of Falls Description: Document Kalin Gomez Fall Risk and appropriate interventions in the flowsheet. Outcome: Progressing Towards Goal 
Note: Fall Risk Interventions: 
Mobility Interventions: Bed/chair exit alarm Mentation Interventions: Adequate sleep, hydration, pain control, Bed/chair exit alarm, Reorient patient, Room close to nurse's station Medication Interventions: Assess postural VS orthostatic hypotension Elimination Interventions: Bed/chair exit alarm, Call light in reach, Toileting schedule/hourly rounds Problem: Patient Education: Go to Patient Education Activity Goal: Patient/Family Education Outcome: Progressing Towards Goal 
  
Problem: Pressure Injury - Risk of 
Goal: *Prevention of pressure injury Description: Document Kyle Scale and appropriate interventions in the flowsheet. Outcome: Progressing Towards Goal 
  
Problem: Patient Education: Go to Patient Education Activity Goal: Patient/Family Education Outcome: Progressing Towards Goal 
  
Problem: Nutrition Deficit Goal: *Optimize nutritional status Outcome: Progressing Towards Goal

## 2021-02-04 NOTE — ROUTINE PROCESS
Bedside and Verbal shift change report given to Jen Valencia RN (oncoming nurse) by Rebecca Carrillo RN (offgoing nurse). Report included the following information SBAR, Intake/Output, Recent Results and Med Rec Status.

## 2021-02-04 NOTE — PROGRESS NOTES
RENAL PROGRESS NOTE [facility] Assessment/Plan:  
1)NANCY on CKD3: continue to hold diuretics , goal to keep Intake and output even , slight negative balance with insensible losses 2)Covid PNA/Resp failure mech vent per PCCM. Minimal settings 3)Met Acidosis:resolved 4)Cirrhosis/Varices: dismal prognosis. 5) altered mental status , metabolic encephalopathy , if persistantly Altered, BUN > 100 , may consider temporary HD for uremia Please call with questions, 
 
Yunier Pacheco MD FASN Cell [phone] Pager: [phone] Subjective: 
covid isolation. Ok urine output Renal parameters stable Patient Active Problem List  
Diagnosis Code  Anemia D64.9  
 CAD (coronary artery disease) I25.10  Thrombocytopenia ([address] D69.6  NSTEMI (non-ST elevated myocardial infarction) (HCC) I21.4  
 UTI (urinary tract infection) N39.0  Sepsis ([address] A41.9  PNA (pneumonia) J18.9  Hypoxia R09.02  
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  
 CHF (congestive heart failure) (HCC) I50.9  Dyspnea R06.00  
 Pneumonia J18.9  
 HTN (hypertension), benign I10  
 CAP (community acquired pneumonia) J18.9  Acute on chronic diastolic CHF (congestive heart failure) (HCC) I50.33  Leg DVT (deep venous thromboembolism), acute, left (HCC) I82.402  Palpitations R00.2  Acute pulmonary edema (HCC) J81.0  Malaise and fatigue R53.81, R53.83  
 Pancytopenia ([address] [id number]  Portal hypertension ([address] K76.6  Chronic anticoagulation Z79.01  
 Hip fracture ([address] S72.009A  Breast cancer ([address] C50.919  
 Colon cancer ([address] C18.9  Pneumonia due to COVID-19 virus U07.1, J12.82 Current Facility-Administered Medications Medication Dose Route Frequency Provider Last Rate Last Admin  insulin glargine (LANTUS) injection 10 Units  10 Units SubCUTAneous DAILY Yuri TIAN MD   10 Units at 02/04/21 1204  [START ON 2/5/2021] famotidine (PF) (PEPCID) 20 mg in 0.9% sodium chloride 10 mL injection  20 mg IntraVENous DAILY Abundio LADD MD      
 menthol-zinc oxide (CALMOSEPTINE) 0.44-20.6 % ointment   Topical TID Tricia Tejeda MD      
 midodrine (PROAMATINE) tablet 10 mg  10 mg Per NG tube TID WITH MEALS Concetta Smith MD   Stopped at 02/04/21 1200  
 sodium chloride 3% hypertonic nebulizer soln  4 mL Nebulization Q6H RT Magali Barrera PA-C   4 mL at 02/04/21 1441  albuterol (ACCUNEB) nebulizer solution 1.25 mg  1.25 mg Nebulization Q6H RT Magali Barrera PA-C   1.25 mg at 02/04/21 1441  
 NOREPINephrine (LEVOPHED) 8 mg in 5% dextrose 250mL (32 mcg/mL) infusion  0.5-50 mcg/min IntraVENous TITRATE Ethan TIAN PA-C   Stopped at 02/03/21 1230  acetaminophen (TYLENOL) tablet 650 mg  650 mg Oral Q6H PRN Baljinder Orellana PA-C   650 mg at 01/31/21 2230  carvediloL (COREG) tablet 12.5 mg  12.5 mg Oral Q12H Lauren Hooks MD   12.5 mg at 02/04/21 1206  meropenem (MERREM) 500 mg in sterile water (preservative free) 10 mL IV syringe  0.5 g IntraVENous Q24H Pinky Orellana MD   500 mg at 02/03/21 1836  VANCOMYCIN INFORMATION NOTE   Other Rx Dosing/Monitoring Pinky Orellana MD      
 [Held by provider] bumetanide (BUMEX) injection 2 mg  2 mg IntraVENous BID Andres Meraz MD   Stopped at 02/03/21 3306  
 multivit-folic acid-herbal 246 (WELLESSE PLUS) oral liquid 30 mL  30 mL Per NG tube DAILY Robbie Vickers MD   30 mL at 02/04/21 1204  chlorhexidine (PERIDEX) 0.12 % mouthwash 10 mL  10 mL Oral Q12H Magali Barrera PA-C   10 mL at 02/04/21 1206  albuterol-ipratropium (DUO-NEB) 2.5 MG-0.5 MG/3 ML  3 mL Nebulization Q6H PRN Magali Johnson, PA-C      
  fentaNYL (PF) 900 mcg/30 ml infusion soln  0-200 mcg/hr IntraVENous TITRATE Magali Lugo PA-C 1.7 mL/hr at 02/03/21 1835 50 mcg/hr at 02/03/21 1835  
 midazolam (VERSED) injection 1-2 mg  1-2 mg IntraVENous Q10MIN PRN Magali Lugo PA-C   2 mg at 01/31/21 1433  fentaNYL citrate (PF) injection  mcg   mcg IntraVENous Q30MIN PRN Magali Lugo PA-C   50 mcg at 02/02/21 2337  
 dexmedeTOMidine in 0.9 % NaCl (PRECEDEX) 400 mcg/100 mL (4 mcg/mL) infusion soln  0.1-1.5 mcg/kg/hr (Order-Specific) IntraVENous TITRATE Magali Lugo PA-C 8.8 mL/hr at 02/04/21 1203 0.5 mcg/kg/hr at 02/04/21 1203  
 insulin lispro (HUMALOG) injection   SubCUTAneous Q6H Kike Palomares MD   6 Units at 02/04/21 0859  
 glucose chewable tablet 16 g  4 Tab Oral PRN Magali Lugo PA-C      
 glucagon (GLUCAGEN) injection 1 mg  1 mg IntraMUSCular PRN JALEEL'Magali Belle PA-C      
 dextrose (D50W) injection syrg 12.5-25 g  25-50 mL IntraVENous PRN Magali Lugo PA-C Objective Vitals:  
 02/04/21 0800 02/04/21 0807 02/04/21 1327 02/04/21 1539 BP: (!) 131/59 Pulse: 71 71 73 81 Resp: 15 13 13 16 Temp:      
SpO2: 100% 100% 100% 100% Weight:      
Height:      
 
 
 
Intake/Output Summary (Last 24 hours) at 2/4/2021 1554 Last data filed at 2/4/2021 1200 Gross per 24 hour Intake 815.2 ml Output 1350 ml Net -534.8 ml Admission weight: Weight: 68 kg (150 lb) (01/27/21 0830) Last Weight Metrics: 
Weight Loss Metrics 2/4/2021 12/10/2020 9/28/2020 12/1/2019 10/16/2019 9/30/2019 8/6/2019 Today's Wt 171 lb 15.3 oz 157 lb 8 oz 149 lb 14.4 oz 140 lb 143 lb 140 lb 145 lb BMI 33.58 kg/m2 30.76 kg/m2 29.28 kg/m2 27.34 kg/m2 27.93 kg/m2 27.34 kg/m2 28.32 kg/m2 Physical Assessment:  
 
Pt is covid 19+, I did not go into the room to minimize pt/md exposure and preserve PPE. I have viewed pt from the door and reviewed documentation from other providers.  
 
Lab CBC w/Diff Recent Labs 02/04/21 
0510 02/03/21 
3754 02/02/21 
0440 WBC 7.5 4.9 4.1*  
RBC 3.12* 2.99* 3.25* HGB 8.9* 8.5* 9.4* HCT 26.8* 25.7* 27.6* PLT 40* 34* 33* GRANS 83* 88* 82* LYMPH 9* 7* 8* EOS 1 0 0 Chemistry Recent Labs 02/04/21 
0510 02/03/21 
2483 02/02/21 
0440 * 113*  115* 170*  148*  147* 142  
K 3.9 4.2  4.2 3.7  111  112* 109 CO2 24 28  28 25 * 144*  143* 136* CREA 3.47* 3.65*  3.59* 3.50* CA 8.5 8.1*  8.1* 8.2* AGAP 11 9  7 8 BUCR 43* 39*  40* 39* AP 81 77 104  
TP 5.5* 5.2* 5.7* ALB 2.4* 2.5*  2.5* 2.5*  
GLOB 3.1 2.7 3.2 AGRAT 0.8 0.9 0.8 PHOS 4.5 3.8  3.9 3.6 Lab Results Component Value Date/Time Iron 65 12/08/2020 11:30 AM  
 TIBC 189 (L) 12/08/2020 11:30 AM  
 Iron % saturation 34 12/08/2020 11:30 AM  
 Ferritin 669 (H) 02/04/2021 05:10 AM  
  
Lab Results Component Value Date/Time Calcium 8.5 02/04/2021 05:10 AM  
 Phosphorus 4.5 02/04/2021 05:10 AM  
  
 
Shahana Stout MD 
[facility] Pager: [phone] Phone: [phone]

## 2021-02-04 NOTE — PROGRESS NOTES
attended the interdisciplinary rounds for Carmen Stanton, who is a 79 y.o.,female. Patients Primary Language is: [place]. According to the patients EMR Taoism Affiliation is: [place].  
 
The reason the Patient came to the hospital is:  
Patient Active Problem List  
 Diagnosis Date Noted  Pneumonia due to COVID-19 virus 01/27/2021  Hip fracture ([address] 12/06/2020  Breast cancer ([address] 12/06/2020  Colon cancer ([address] 12/06/2020  Palpitations 04/25/2019  Acute pulmonary edema ([address] 04/25/2019  Malaise and fatigue 04/25/2019  Pancytopenia ([address] 04/25/2019  Portal hypertension ([address] 04/25/2019  Chronic anticoagulation 04/25/2019  CAP (community acquired pneumonia) 03/24/2019  Acute on chronic diastolic CHF (congestive heart failure) ([address] 03/24/2019  Leg DVT (deep venous thromboembolism), acute, left ([address] 03/24/2019  
 HTN (hypertension), benign 01/28/2018  Pneumonia 01/25/2018  CHF (congestive heart failure) ([address] 10/18/2017  Dyspnea 10/18/2017  Cirrhosis of liver not due to alcohol ([address] 06/29/2017  Esophageal varices ([address] 06/29/2017  UTI (urinary tract infection) 06/21/2017  Sepsis ([address] 06/21/2017  PNA (pneumonia) 06/21/2017  Hypoxia 06/21/2017  
 NSTEMI (non-ST elevated myocardial infarction) ([address] 06/30/2016  Anemia 07/07/2015  CAD (coronary artery disease) 07/07/2015  Thrombocytopenia ([address] 07/07/2015 Plan: 
Chaplains will continue to follow and will provide pastoral care on an as needed/requested basis.  recommends bedside caregivers page  on duty if patient shows signs of acute spiritual or emotional distress. [address] [facility] Board Certified [address] [place] [address] Spiritual Care  
[phone]

## 2021-02-04 NOTE — PROGRESS NOTES
[facility] Pulmonary, Critical Care, and Sleep Medicine Name: Ana Rose MRN: [id number] : 1940 Hospital: [facility] Date: 2021 IMPRESSION:  
· Hypoxic, Hypercapnic Respiratory Failure/ presumed ARDS-  likely due to COVID-19 Pneumonia w/ Acute on Chronic CHF Exacerbation, now requiring Mechanical Ventilation. Extubated 2021, failed to protect airway, re-intubated soon after. · Pulmonary HTN- based on Echo (2021)- LV EF: 55-60%, Pulmonary arterial systolic pressure is 70 mmHg. · COVID-19 Pneumonia- Covid-19 Rapid Test (+) on 2021. Known (+) prior to admission. CXR (+) Fluffy B/L mid to LL Opacities, consistent w/ multifocal infection.   
· Septic shock- tachycardia, tachypnea, elevated procal on admission,  COVID-19 + UTI, ?underlying aspiration. UA (+) 3+ Bacteria + Large Leuks. Urine Cx pending. Worsening procal, white count. · Metabolic [id number]- 6/0 to above, resolving · NANCY on Stage 3 CKD, Oliguric- likely in setting of Distributive Shock. BUN: 148, Cr: 3.47 
· Encephalopathy- Metabolic, secondary to above · UTI on cefepime, Vanc · Hemoptysis- Hx of Esophageal Varices vs Pulmonary Edema · Thrombocytopenia- Baseline chronically low, current platelets 40 · Elevated Troponin- likely CHF Exacerbation + worsening Pulm HTN , Troponin: 1.30 · Acute on chronic HFpEF - EF 60-65% · Hx Malnutrition albumin today · Hx of Esophageal Varices · Hx Liver Cirrhosis   
· Hx DM- HgbA1c: 5.3 
· HX HTN  
· Hx of CAD  
· Hx Recent Left Hip ORIF (2020) Patient Active Problem List  
Diagnosis Code  Anemia D64.9  
 CAD (coronary artery disease) I25.10  Thrombocytopenia ([address] D69.6  NSTEMI (non-ST elevated myocardial infarction) (HCC) I21.4  
 UTI (urinary tract infection) N39.0  Sepsis ([address] A41.9  PNA (pneumonia) J18.9  Hypoxia R09.02  
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  
 CHF (congestive heart failure) (HCC) I50.9  Dyspnea R06.00  
 Pneumonia J18.9  
 HTN (hypertension), benign I10  
 CAP (community acquired pneumonia) J18.9  Acute on chronic diastolic CHF (congestive heart failure) (HCC) I50.33  Leg DVT (deep venous thromboembolism), acute, left (HCC) I82.402  Palpitations R00.2  Acute pulmonary edema (HCC) J81.0  Malaise and fatigue R53.81, R53.83  
 Pancytopenia ([facility] [id number]  Portal hypertension ([facility] K76.6  Chronic anticoagulation Z79.01  
 Hip fracture ([facility] S72.009A  Breast cancer ([facility] C50.919  
 Colon cancer ([facility] C18.9  Pneumonia due to COVID-19 virus U07.1, J12.82 PLAN:  
Neuro: Titrate sedation to RASS 0 to -1. Continue Precedex and Fentanyl gtts. Daily sedation holiday. Resp: VAP Bundle, Bronchial Hygiene. Aspiration Precautions, maintain HOB>30'. Duonebs q6 hrs PRN. Wean and SBT as indicated. I/D:  Afebrile, aleukocytosis. Continue Merrem, completed course of vancomycin. Remdesivir given on admission, d/c'd due to renal fx and intubation. Urine Cx positive for enterobacter. Blood Cx NG 4 days. MRSA Cx positive. Trend WBCs + Temp, ID following Hem/Onc:   Hgb stable, continue to  monitor thrombocytopenia. Trend H/H, Monitor for s/o active bleeding. Daily CBC w/ diff. No DVT's found on BLE doppler CVS: Titrate pressors to aim MAP 65,  Echo (+) LV EF: 55-60%, Severe Pulmonary HTN. Continue to  monitor HD. Originally not a candidate for [facility] due to hemoptysis Metabolic: Trend daily BMP + Mag + Phos, replace electrolytes per protocol. Renal: Trend renal indices, monitor I/O.  nephrology following Endocrine: SSI, q6 Glucose checks. Continue Lantus 10 units SQ, avoid hypoglycemia GI: Restart TF at trickle and advance as tolerated Musc/Skin: No acute issues.  
Partial Code, INTUBATE ONLY  
 Discussed in interdisciplinary rounds. Best Practice: 
Glycemic Control IHI ICU Bundles: Central Line Bundle Followed , Jean Bundle Followed and Vent Bundle Followed, Vent Day 8 [facility] Vent patients- VAP bundle, aim to keep peak plateau pressure 56-78QQ H2O Sress Ulcer Prophylaxis. DVT Prophylaxis. Need for Lines, Jean Assessed. Restraints Need. Palliative Care Evaluation. Subjective/Interval History:  
  
Interval HPI:  
Barron HernandezJesse Fagan is a 79 y.o.  Female w/ PMH of HFpPEF (EF 55-60%), Stage 3 CKD, Recent L Hip ORIF (12/2020), Esophageal Varices, Liver Cirrhosis, DM, HTN, CAD, who presented from [address] [place] [address] to [facility] on 01/26/2021 via EMS after developing worsening SOB and Hemoptysis after testing (+) for COVID-19 x1 week prior.  
  
ICU consulted for Hypoxia and is noted to be satting at 94+ on 15L/100% HFNC. Shortly after examination, she required Endotracheal Intubation w/ Mechanical Ventilation.  
 Patient's family updated on patient's critical condition and confirmed partial code status of intubation only but no CPR. Extubation 2/2/2021, unfortunately, the patient was unable to protect airway adequately soon afterwards and was re-intubated with family's consent. Family continues to insist that this patient would not want to have trach. 
  
Nephrology following for NANCY on CKD3, ID following for COVID pneumonia, Enterobacter cystitis 
  
02/04/21 Hospital Day: 10 Vent Day: 9 Overnight Events: no new events overnight Mentation: Sedated with Fentanyl and precedex Respiratory/ Secretions: stable , FIO2 35% and PEEP 5 Hemodynamics: VSS. Afebrile. Diet: TF  
Urine/Fecal Output: good UOP Need for Procedures: none 
  
  
 
 
 
ROS:Review of systems not obtained due to patient factors. Objective:  
Vital Signs:   
Visit Vitals BP (!) 131/59 Pulse 71 Temp (!) 96.4 °F (35.8 °C) Resp 13 Ht 5' (1.524 m) Wt 78 kg (171 lb 15.3 oz) SpO2 100% BMI 33.58 kg/m² O2 Device: Ventilator, Endotracheal tube O2 Flow Rate (L/min): 4 l/min Temp (24hrs), Av.2 °F (36.2 °C), Min:96.3 °F (35.7 °C), Max:97.9 °F (36.6 °C) Intake/Output:  
Last shift:      No intake/output data recorded. Last 3 shifts: 1901 -  0700 In: 1559.5 [I.V.:979.5] Out: 1500 [XNHPV:6448; Drains:125] Intake/Output Summary (Last 24 hours) at 2021 6255 Last data filed at 2021 0700 Gross per 24 hour Intake 888.4 ml Output 1050 ml Net -161.6 ml  
   
Limited Physical Exam:  
            General: Intubated, Sedated, in NAD HEENT: NC/AT, Septum midline, No visible nasal drainage, Oral Cavity clear w/out copious drainage, Trachea midline Chest: No visible trauma or deformity to chest wall, RRR- via Tele Lungs: Equal & Bilateral chest rise, Airway clear, ET Tube in place and secured Abdomen: Non-Distended, large reducible hernia Extremity: 2+pitting edema BLE Neuro: Sedated Skin: No new skin lesions, rashes, or ulcers  
  
 
   
DATA: 
Labs: 
Recent Labs 21 
0510 21 
0036 21 
0440 WBC 7.5 4.9 4.1* HGB 8.9* 8.5* 9.4* HCT 26.8* 25.7* 27.6* PLT 40* 34* 33* Recent Labs 21 
0510 21 
0934 21 
0440 21 
1320 21 
1320  148*  147* 142   < >  --   
K 3.9 4.2  4.2 3.7   < >  --   
 111  112* 109   < >  --   
CO2 24 28  28 25   < >  --   
* 113*  115* 170*   < >  --   
* 144*  143* 136*   < >  --   
CREA 3.47* 3.65*  3.59* 3.50*   < >  --   
CA 8.5 8.1*  8.1* 8.2*   < >  --   
MG 2.2 2.2 2.3  --   --   
PHOS 4.5 3.8  3.9 3.6   < >  --   
ALB 2.4* 2.5*  2.5* 2.5*   < >  -- ALT 18 21 31  --   --   
INR  --   --   --   --  1.7*  
 < > = values in this interval not displayed. No results for input(s): PH, PCO2, PO2, HCO3, FIO2 in the last 72 hours. PFT:                                                    
Echo: 
 
Imaging: [x]I have personally reviewed the patients radiographs []Radiographs reviewed with radiologist 
 [x]No change from prior, tubes and lines in adequate position []Improved   []Worsening High complexity decision making was performed during the evaluation of this patient at high risk for decompensation with multiple organ involvement 
  
Josh Harrison NP  
 
 
Pulmonary / Critical Care Physician: 
 
Chart and note reviewed. Data reviewed. Seen on rounds earlier today. I have independently evaluated and examined the patient. I agree with the exam, assessment and plan of Ms. Khoa Garcia NP with my findings below. 80/F acute hypoxic respiratory failure due to COVID 19 pneumonia, intubated on vent support with decreasing FiO2 requirements. Underlying CHF with possible exacerbation. Pulmonary HTN suspect Grp 2 +/- Grp3. Acute on CKD, DM previously well controlled. Baseline thrombocytopenia. Poorly controlled diabetes, aggravated by steroids. Reintubated for airway protection with good oxygenation. Remains on vent support with no new developments. Hemodynamically stable and off pressors. On Meropenem for enterobacter UTI. Heparin on hold for thrombocytopenia, now at 40. Rest of plan per NP note. Aggregate critical care time was 34  minutes, which includes only time during which I was engaged in work directly related to the patient's care as described below.  
Services included the following: 
-reviewing nursing notes and old charts 
-vital sign assessments  
-direct patient care 
-medication orders and management 
-interpreting and reviewing diagnostic studies/labs 
-re-evaluations 
-documentation time 
-Spoke with: Dr. Dianna Lamb, ICU IDR  
 
 
 During this entire length of time I was immediately available to the patient. The reason for providing this level of medical care for this critically ill patient was due a critical illness that impaired one or more vital organ systems such that there was a high probability of imminent or life threatening deterioration in the patients condition. This care involved high complexity decision making to assess, manipulate, and support vital system functions, to treat this degreee vital organ system failure and to prevent further life threatening deterioration of the patients condition Complex decision making was made in the evaluation and management plans during this consultation. More than 50% of time was spent in counseling and coordination of care including review of data and discussion with other team members. Dre Holley MD 
 
 
[facility] Pulmonary, Critical Care, and Sleep Medicine

## 2021-02-04 NOTE — ROUTINE PROCESS
Bedside and Verbal shift change report given to Celestina Rhodes RN (oncoming nurse) by Denilson Aguirre RN (offgoing nurse). Report included the following information SBAR, Kardex, ED Summary, Intake/Output, MAR, Recent Results and Cardiac Rhythm NSR.

## 2021-02-05 NOTE — PROGRESS NOTES
Infectious Disease progress Note Reason: septic shock, acute hypoxic respiratory failure, covid positive Current abx Prior abx Meropenem since 1/31 Cefepime,metronidazole, azithromycin 1/27-1/31 
 vancomycin  since 1/27-2/3 Lines:  
 
 
Assessment : 
 
79 y.o.  Female w/ PMH of HFpPEF (EF 60-65%), Stage 3 CKD, Recent L Hip ORIF (12/2020), Esophageal Varices, Liver Cirrhosis, DM, HTN, CAD, who presents from [address] [place] [address] to [facility] via EMS after developing worsening SOB and Hemoptysis after testing (+) for COVID-19 x1 week prior. Clinical presentation c/w acute hypoxic respiratory failure-present on admission due to severe COVID-19 pneumonia/ARDS Sputum culture 1/28-normal respiratory robert. Nasal MRSA screen positive Status post convalescent plasma 1/28/2021 Elevated [id number] to exclude COVID-19 associated hypercoagulability, pulmonary embolism with full certainty Hypotension-likely septic shock secondary to COVID-19 infection, Enterobacter cystitis. Urine culture 1/27-greater than 100,000 colonies of Enterobacter Enterobacter resistant to ceftriaxone, ceftazidime, cefoxitin. Susceptible to quinolones, cefepime Acute on chronic kidney injury-likely due to sepsis. Nephrology follow-up appreciated. Recommendations for temporary hemodialysis since BUN greater than 100, altered mentation Elevated  [id number] due to sepsis. No evidence of DVT noted on venous duplex 1/27/2021 
 
 
 fever with temperature 101.3 on 1/31 Labs reviewed-sputum culture positive for  Pseudomonas. MRSA screen positive. Urine culture positive for Enterobacter. Susceptibilities of Pseudomonas reviewed. High DAMASO against piperacillin/tazobactam, cefepime Fevers could be secondary to evolving beta-lactam resistant Pseudomonas  Lack of worsening hypoxia argues against progressive COVID-19 pneumonia. Improving hypotension. Off pressors. Improving hypoxia. On 35% FiO2. Extubated on 2/2 - reintubated. Plans for SBT trial noted. Recommendation 
-- continue meropenem till 2/6 
--Management of acute kidney injury per primary team 
-- Continue steroids, anticoagulation per ICU team 
--  Weaning from vent per ICU team 
 
Will sign off. Please call me if any new questions or concerns. Thanks Above plan was discussed in details with rn, primary team. Please call me if any further questions or concerns. Will continue to participate in the care of this patient. HPI: 
 
 
Patient is currently intubated. Nonverbal.   
  
 
 
Home medication List  
 Details  
aspirin 81 mg chewable tablet Take 2 Tabs by mouth daily. Qty: 60 Tab, Refills: 0  
  
insulin aspart U-100 (NovoLOG Flexpen U-100 Insulin) 100 unit/mL (3 mL) inpn by SubCUTAneous route Before breakfast, lunch, and dinner. Sliding scale per instructions  
  
acetaminophen (TYLENOL) 325 mg tablet Take 2 Tabs by mouth every four (4) hours as needed for Pain. Qty: 20 Tab, Refills: 0  
  
glucose 4 gram chewable tablet Take 4 Tabs by mouth as needed for Other (hypoglycemia). Qty: 30 Tab, Refills: 0 SITagliptin (JANUVIA) 50 mg tablet Take 50 mg by mouth daily. cholecalciferol, vitamin D3, (VITAMIN D3) 2,000 unit tab Take 2,000 Units by mouth daily. nitroglycerin (NITROLINGUAL) 400 mcg/spray spray 1 Spray by SubLINGual route every five (5) minutes as needed. Qty: 1 Bottle, Refills: 2  
  
ascorbic acid, vitamin C, (VITAMIN C) 250 mg tablet Take 1 Tab by mouth daily. Qty: 60 Tab, Refills: 1  
  
multivitamin (ONE A DAY) tablet Take 1 Tab by mouth daily. simvastatin (ZOCOR) 20 mg tablet Take 20 mg by mouth daily. omega 3-dha-epa-fish oil (FISH OIL) 100-160-1,000 mg cap Take 1,000 mg by mouth two (2) times a day. cyanocobalamin (VITAMIN B12) 100 mcg tablet Take 100 mcg by mouth daily. calcium polycarbophil (FIBER LAXATIVE) 625 mg tablet Take 1,250 mg by mouth daily as needed. Current Facility-Administered Medications Medication Dose Route Frequency  albumin human 25% (BUMINATE) solution 25 g  25 g IntraVENous Q6H  
 insulin glargine (LANTUS) injection 10 Units  10 Units SubCUTAneous DAILY  famotidine (PF) (PEPCID) 20 mg in 0.9% sodium chloride 10 mL injection  20 mg IntraVENous DAILY  menthol-zinc oxide (CALMOSEPTINE) 0.44-20.6 % ointment   Topical TID  midodrine (PROAMATINE) tablet 10 mg  10 mg Per NG tube TID WITH MEALS  sodium chloride 3% hypertonic nebulizer soln  4 mL Nebulization Q6H RT  
 albuterol (ACCUNEB) nebulizer solution 1.25 mg  1.25 mg Nebulization Q6H RT  
 NOREPINephrine (LEVOPHED) 8 mg in 5% dextrose 250mL (32 mcg/mL) infusion  0.5-50 mcg/min IntraVENous TITRATE  acetaminophen (TYLENOL) tablet 650 mg  650 mg Oral Q6H PRN  
 carvediloL (COREG) tablet 12.5 mg  12.5 mg Oral Q12H  
 meropenem (MERREM) 500 mg in sterile water (preservative free) 10 mL IV syringe  0.5 g IntraVENous Q24H  
 [Held by provider] bumetanide (BUMEX) injection 2 mg  2 mg IntraVENous BID  multivit-folic acid-herbal 785 (WELLESSE PLUS) oral liquid 30 mL  30 mL Per NG tube DAILY  chlorhexidine (PERIDEX) 0.12 % mouthwash 10 mL  10 mL Oral Q12H  
 albuterol-ipratropium (DUO-NEB) 2.5 MG-0.5 MG/3 ML  3 mL Nebulization Q6H PRN  
 fentaNYL (PF) 900 mcg/30 ml infusion soln  0-200 mcg/hr IntraVENous TITRATE  midazolam (VERSED) injection 1-2 mg  1-2 mg IntraVENous Q10MIN PRN  
 fentaNYL citrate (PF) injection  mcg   mcg IntraVENous Q30MIN PRN  
 dexmedeTOMidine in 0.9 % NaCl (PRECEDEX) 400 mcg/100 mL (4 mcg/mL) infusion soln  0.1-1.5 mcg/kg/hr (Order-Specific) IntraVENous TITRATE  insulin lispro (HUMALOG) injection   SubCUTAneous Q6H  
 glucose chewable tablet 16 g  4 Tab Oral PRN  
 glucagon (GLUCAGEN) injection 1 mg  1 mg IntraMUSCular PRN  
  dextrose (D50W) injection syrg 12.5-25 g  25-50 mL IntraVENous PRN Allergies: Latex Temp (24hrs), Av.2 °F (36.2 °C), Min:96.6 °F (35.9 °C), Max:97.9 °F (36.6 °C) Visit Vitals BP (!) 145/68 Pulse 92 Temp 97.7 °F (36.5 °C) Resp 16 Ht 5' (1.524 m) Wt 78 kg (171 lb 15.3 oz) SpO2 100% BMI 33.58 kg/m² ROS: unable to obtain Physical Exam: 
 
General:  Alert, cooperative, no distress. Head:  Normocephalic, without obvious abnormality, atraumatic. Eyes:  Conjunctivae/corneas clear. PERRL, Nose: Nares normal. Septum midline. Mucosa normal. No drainage or sinus tenderness. Throat: Lips, mucosa, and tongue normal. Teeth and gums normal.  
Neck: Supple, symmetrical, trachea midline, no adenopathy, thyroid: no enlargment/tenderness/nodules, no carotid bruit and no JVD. Back:   Symmetric, no curvature. ROM normal.  
Lungs:   Clear to auscultation bilaterally. Chest wall:  No tenderness or deformity. Heart:  Regular rate and rhythm, S1, S2 normal, no murmur, click, rub or gallop. Abdomen:   Soft, non-tender. Bowel sounds normal. No masses,  No organomegaly. Extremities: Extremities normal, atraumatic, no cyanosis or edema. Pulses: 2+ and symmetric all extremities. Skin: Skin color, texture, turgor normal. No rashes or lesions Lymph Nodes:  Cervical, supraclavicular, and axillary nodes normal.  
Neurologic:  Grossly nonfocal  
 
 
 
Labs: Results:  
Chemistry Recent Labs 21 
0300 21 
1842 21 
0510 21 
2631 * 314* 186* 113*  115* * 149* 145 148*  147* K 3.6 3.9 3.9 4.2  4.2 * 113* 110 111  112* CO2 26 22 24 28  28 * 148* 148* 144*  143* CREA 3.48* 3.31* 3.47* 3.65*  3.59* CA 7.8* 7.6* 8.5 8.1*  8.1* AGAP 11 14 11 9  7 BUCR 44* 45* 43* 39*  40* AP 91  --  81 77  
TP 4.7*  --  5.5* 5.2* ALB 1.9* 2.0* 2.4* 2.5*  2.5*  
GLOB 2.8  --  3.1 2.7 AGRAT 0.7*  --  0.8 0.9 CBC w/Diff Recent Labs 02/05/21 
0300 02/04/21 
0510 02/03/21 
1719 WBC 8.0 7.5 4.9  
RBC 2.53* 3.12* 2.99* HGB 7.4* 8.9* 8.5* HCT 22.2* 26.8* 25.7*  
PLT 40* 40* 34* GRANS 88* 83* 88* LYMPH 7* 9* 7* EOS 1 1 0 Microbiology No results for input(s): CULT in the last 72 hours. RADIOLOGY: 
 
All available imaging studies/reports in [facility] for this admission were reviewed Dr. Kevin Carpio, Infectious Disease Specialist 
[phone] February 5, 2021 
1:25 PM

## 2021-02-05 NOTE — PROGRESS NOTES
Nutrition Note Patient tolerating tube feeding at goal rate of 40 mL/hr with hyperglycemia and hypernatremia, starting on dextrose infusion (D5 at 75 mL/hr to provide 90 gm dextrose, 306 kcal per day) and water flushes increased per Nephrology, lantus to increase to 20 units per day. Loose stool vis FMS (25 mL output x last 24 hours). Noted plan for MD goals of care discussion with family and weaning trial, possible extubation today. Recommendations/Plan  
- Continue tube feeding of Nepro at goal rate of 40 mL/hr with prosource BID, daily multivitamin and increase to 300 mL q 4 hour water flushes (goal regimen to provide 1848 kcal, 108 gm protein, 696 mL free water, 100% RDIs). - IVF per MD. Electronically signed by Cathryne Boast, RD, [address]  on 2/5/2021 Contact: [phone]

## 2021-02-05 NOTE — PROGRESS NOTES
[facility] Pulmonary, Critical Care, and Sleep Medicine Name: Carlos Barrett MRN: [id number] : 1940 Hospital: [address] [place] [address] Dr Date: 2021 IMPRESSION:  
· Hypoxic, Hypercapnic Respiratory Failure/ presumed ARDS-  likely due to COVID-19 Pneumonia w/ Acute on Chronic CHF Exacerbation, now requiring Mechanical Ventilation. Extubated 2021, failed to protect airway, re-intubated soon after. · Pulmonary HTN- based on Echo (2021)- LV EF: 55-60%, Pulmonary arterial systolic pressure is 70 mmHg. · COVID-19 Pneumonia- Covid-19 Rapid Test (+) on 2021. Known (+) prior to admission. CXR (+) Fluffy B/L mid to LL Opacities, consistent w/ multifocal infection.   
· Septic shock- tachycardia, tachypnea, elevated procal on admission,  COVID-19 + UTI, ?underlying aspiration. UA (+) 3+ Bacteria + Large Leuks. Urine Cx pending. Worsening procal, white count. · Metabolic [id number]- 9/3 to above, resolving · NANCY on Stage 3 CKD, Oliguric- likely in setting of Distributive Shock. BUN: 148, Cr: 3.47 
· Encephalopathy- Metabolic, secondary to above · UTI on cefepime, Vanc · Hemoptysis- Hx of Esophageal Varices vs Pulmonary Edema · Thrombocytopenia- Baseline chronically low, current platelets 40 · Elevated Troponin- likely CHF Exacerbation + worsening Pulm HTN , Troponin: 1.30 · Acute on chronic HFpEF - EF 60-65% · Hypernatremia - NA - 150 · Hx Malnutrition albumin today · Hx of Esophageal Varices · Hx Liver Cirrhosis   
· Hx DM- HgbA1c: 5.3 
· HX HTN  
· Hx of CAD  
· Hx Recent Left Hip ORIF (2020) Patient Active Problem List  
Diagnosis Code  Anemia D64.9  
 CAD (coronary artery disease) I25.10  Thrombocytopenia ([address] D69.6  NSTEMI (non-ST elevated myocardial infarction) (HCC) I21.4  
 UTI (urinary tract infection) N39.0  Sepsis ([address] A41.9  PNA (pneumonia) J18.9  Hypoxia R09.02  
  Cirrhosis of liver not due to alcohol ([facility] K74.60  Esophageal varices (HCC) I85.00  
 CHF (congestive heart failure) (HCC) I50.9  Dyspnea R06.00  
 Pneumonia J18.9  
 HTN (hypertension), benign I10  
 CAP (community acquired pneumonia) J18.9  Acute on chronic diastolic CHF (congestive heart failure) (HCC) I50.33  Leg DVT (deep venous thromboembolism), acute, left (HCC) I82.402  Palpitations R00.2  Acute pulmonary edema (HCC) J81.0  Malaise and fatigue R53.81, R53.83  
 Pancytopenia ([facility] [id number]  Portal hypertension ([facility] K76.6  Chronic anticoagulation Z79.01  
 Hip fracture ([facility] S72.009A  Breast cancer ([facility] C50.919  
 Colon cancer ([facility] C18.9  Pneumonia due to COVID-19 virus U07.1, J12.82 PLAN:  
Neuro: Titrate sedation to RASS 0 to -1. Continue Precedex and Fentanyl gtts. Daily sedation holiday. Resp: VAP Bundle, bronchial hygiene protocol. Strict aspiration precautions, keep HOB>30'. Continue duonebs q6 hrs PRN. Daily CXR and ABG while intubated. SBT when indicated I/D:  Afebrile, aleukocytosis. Continue Merrem, completed course of vancomycin. Remdesivir given on admission, d/c'd due to renal fx and intubation. Urine Cx positive for enterobacter. Blood Cx NG 4 days. MRSA Cx positive. Trend WBCs + Temp, ID following Hem/Onc:   Hgb stable, continue to  monitor thrombocytopenia. Trend H/H, Monitor for s/o active bleeding. Daily CBC w/ diff. No DVT's found on BLE doppler CVS: currently HD stable off pressors, continue to maintain MAP >65. ECHO (+) LV EF: 55-60% and severe Pulmonary HTN. Originally not a candidate for anticoagulation due to hemoptysis Metabolic:  Daily BMP, Mag  and Phos, replace electrolytes per protocol. Free water flushes increased to 300 q 4 hours due to hypernatremia Renal: Trend renal indices, monitor I/O.  nephrology following Endocrine: SSI, q6 Glucose checks. Lantus increased to  20  units SQ due to BGL of 362. Avoid hypoglycemia GI: NPO, SUP ( IV Pepcid). Continue TF as tolerated Musc/Skin: No acute issues.  
Partial Code, INTUBATE ONLY  
 Discussed in interdisciplinary rounds. Best Practice: 
Glycemic Control IHI ICU Bundles: 
            Central Line Bundle Followed , Jean Bundle Followed and Vent Bundle Followed, Vent Day 8   
[facility] Vent patients- VAP bundle, aim to keep peak plateau pressure 32-62YO H2O Sress Ulcer Prophylaxis. DVT Prophylaxis. Need for Lines, Jean Assessed. Restraints Need. Palliative Care Evaluation. Subjective/Interval History:  
 
Interval HPI:  
Josef DhillonJesse Leonardo is a 79 y.o.  Female w/ PMH of HFpPEF (EF 55-60%), Stage 3 CKD, Recent L Hip ORIF (12/2020), Esophageal Varices, Liver Cirrhosis, DM, HTN, CAD, who presented from [address] [place] [address] to [facility] on 01/26/2021 via EMS after developing worsening SOB and Hemoptysis after testing (+) for COVID-19 x1 week prior.  
  
ICU consulted for Hypoxia and is noted to be satting at 94+ on 15L/100% HFNC. Shortly after examination, she required Endotracheal Intubation w/ Mechanical Ventilation.  
 Patient's family updated on patient's critical condition and confirmed partial code status of intubation only but no CPR. Extubation 2/2/2021, unfortunately, the patient was unable to protect airway adequately soon afterwards and was re-intubated with family's consent. Family has additionally stated that  patient would not want to have trach. 
  
Nephrology following for NANCY on CKD3, ID following for COVID pneumonia, Enterobacter cystitis 
  
02/05/21 Hospital Day: 11 Vent Day: 10 Overnight Events: no new events overnight Mentation: Sedated with Fentanyl and precedex Respiratory/ Secretions: stable , FIO2 35% and PEEP 5 Hemodynamics: VSS. Afebrile. Diet: TF . Free water flushes increased to 300 q 4 hours due to hypernatremia. Urine/Fecal Output: good UOP Need for Procedures: none 
  
  
 
ROS:Review of systems not obtained due to patient factors. Objective:  
Vital Signs:   
Visit Vitals BP (!) 145/68 Pulse 92 Temp 97.7 °F (36.5 °C) Resp 16 Ht 5' (1.524 m) Wt 78 kg (171 lb 15.3 oz) SpO2 100% BMI 33.58 kg/m² O2 Device: Ventilator, Endotracheal tube O2 Flow Rate (L/min): 4 l/min Temp (24hrs), Av.2 °F (36.2 °C), Min:96.6 °F (35.9 °C), Max:97.9 °F (36.6 °C) Intake/Output:  
Last shift:      No intake/output data recorded. Last 3 shifts:  190 -  0700 In: 4918 [I.V.:487] Out: 1002 [ZMZXN:7046; Drains:50] Intake/Output Summary (Last 24 hours) at 2021 9461 Last data filed at 2021 0700 Gross per 24 hour Intake 1560.58 ml Output 900 ml Net 660.58 ml  
  
   
Limited Physical Exam:  
            General: Intubated, Sedated, in NAD 
            HEENT: NC/AT, Septum midline, No visible nasal drainage, Oral Cavity clear w/out copious drainage, Trachea midline             SILQD: No visible trauma or deformity to chest wall, RRR- via Tele  
            Lungs: Equal & Bilateral chest rise, Airway clear, ET Tube in place and secured  
            Abdomen: Non-Distended, large reducible hernia 
            Extremity: 2+pitting edema BLE 
            Neuro: Sedated  
            Skin: No new skin lesions, rashes, or ulcers DATA: 
Labs: 
Recent Labs 21 
0300 21 
0510 21 
9047 WBC 8.0 7.5 4.9 HGB 7.4* 8.9* 8.5* HCT 22.2* 26.8* 25.7*  
PLT 40* 40* 34* Recent Labs 21 
0300 21 
1842 21 
0510 21 
4482 * 149* 145 148*  147* K 3.6 3.9 3.9 4.2  4.2 * 113* 110 111  112* CO2 26 22 24 28  28 * 314* 186* 113*  115* * 148* 148* 144*  143* CREA 3.48* 3.31* 3.47* 3.65*  3.59* CA 7.8* 7.6* 8.5 8.1*  8.1*  
MG 2.3  --  2.2 2.2 PHOS 4.5 4.7 4.5 3.8  3.9 ALB 1.9* 2.0* 2.4* 2.5*  2.5* ALT 16  --  18 21 No results for input(s): PH, PCO2, PO2, HCO3, FIO2 in the last 72 hours. PFT:                                                    
Echo: 
 
Imaging: 
[x]I have personally reviewed the patients radiographs []Radiographs reviewed with radiologist 
 [x]No change from prior, tubes and lines in adequate position []Improved   []Worsening High complexity decision making was performed during the evaluation of this patient at high risk for decompensation with multiple organ involvement 
  
Above mentioned total time spent on reviewing the case/medical record/data/notes/EMR/patient examination/documentation/coordinating care with nurse/consultants, exclusive of procedures with complex decision making performed and > 50% time spent in face to face evaluation. Teto Arceo NP  
 
 
Pulmonary / Critical Care Physician: 
 
Chart and note reviewed. Data reviewed. Seen on rounds earlier today. I have independently evaluated and examined the patient. I agree with the exam, assessment and plan of Ms. Brandt Hidalgo NP with my findings below. 80/F acute hypoxic respiratory failure due to COVID 19 pneumonia, intubated on vent support with decreasing FiO2 requirements. Underlying CHF with possible exacerbation. Pulmonary HTN suspect Grp 2 +/- Grp3. Acute on CKD, DM previously well controlled. Baseline thrombocytopenia. Poorly controlled diabetes, aggravated by steroids. Extubated 2/3 and reintubated for airway protection with preserved oxygenation. Spoke with pt's nephew Shayna Haley and updated him on condition and plans. He discussed this with his other aunt Slim Ponce and they have decided on no reintubation in case or respiratory distress after pt is medically extubated. They will proceed with comfort care in that event. Will SBT and possibly extubated in am. Rest of plan per NP note. Aggregate critical care time was 39  minutes, which includes only time during which I was engaged in work directly related to the patient's care as described below. Services included the following: 
-reviewing nursing notes and old charts 
-vital sign assessments  
-direct patient care 
-medication orders and management 
-interpreting and reviewing diagnostic studies/labs 
-re-evaluations 
-documentation time 
-Spoke with: ICU interdisciplinary team  
 
 
During this entire length of time I was immediately available to the patient. The reason for providing this level of medical care for this critically ill patient was due a critical illness that impaired one or more vital organ systems such that there was a high probability of imminent or life threatening deterioration in the patients condition. This care involved high complexity decision making to assess, manipulate, and support vital system functions, to treat this degreee vital organ system failure and to prevent further life threatening deterioration of the patients condition Complex decision making was made in the evaluation and management plans during this consultation. More than 50% of time was spent in counseling and coordination of care including review of data and discussion with other team members. Josué Zheng MD 
 
 
[facility] Pulmonary, Critical Care, and Sleep Medicine

## 2021-02-05 NOTE — PROGRESS NOTES
Discharge planning Reviewed chart. Patient remains intubated. Possible comfort care pending family decision. Prior to admission, patient was at [facility] for rehabilitation. CM will continue to monitor and assist as needed. MIRA Mcnamara, RN Pager # [phone] Care Manager

## 2021-02-05 NOTE — DIABETES MGMT
Glycemic Control Plan of Care Recommend increasing lantus to 20 units daily Lab  mg/dl this am 
TF - Nepro at goal rate of 40 mL/hr -  
Receiving basal,corrective insulin Will continue to monitor Current hospital diabetes medications:  
Lantus 10 units daily Lispro corrective insulin coverage every 6 hours TDD previous day =  49 units 
lantus 10 units Lispro 39 units Home diabetes medications: 
Januvia 50 mg daily Novolog corrective insulin AC breakfast, lunch and dinner. Diet:   
NPO. TF nepro at 40 ml/hr Ivette Oneil MPH RN CDE Pager [phone]

## 2021-02-05 NOTE — ROUTINE PROCESS
Bedside and Verbal shift change report given to Danial Butler RN (oncoming nurse) by Ruthann Esteban RN (offgoing nurse). Report included the following information SBAR, Intake/Output, Recent Results and Med Rec Status.

## 2021-02-05 NOTE — ROUTINE PROCESS
Bedside and Verbal shift change report given to Sylvester Daigle RN (oncoming nurse) by Harsh Nguyen RN (offgoing nurse). Report included the following information SBAR, Kardex, ED Summary, Intake/Output, MAR, Recent Results and Cardiac Rhythm NSR.

## 2021-02-05 NOTE — PROGRESS NOTES
02/05/21 1207 Weaning Parameters Spontaneous Breathing Trial Complete (Start of SBT) Resp Rate Observed 13 Ve 8.8  RSBI 17

## 2021-02-06 NOTE — ROUTINE PROCESS
2000 assessment completed. Oral and chen care completed. 2040 Bedside and Verbal shift change report given to ángel  rn (oncoming nurse) by noe bernabe (offgoing nurse). Report included the following information SBAR, Kardex and Recent Results. 0000 reassessment completed. Oral and chen care completed. 0145Andjuliano AMIN NOTIFIED  CC SINCE 1800, AND HAS HAD IN 00 CC IVFLUIDS, 092 CC VIA TUBE FEEDS/WATER,  CC STOOL OUTPUT, ORDERED TO FLUSH CHEN. 0400 Reassessment completed. Oral and chen care completed. 0730 ,Bedside and Verbal shift change report given to Seven Hamilton (oncoming nurse) by ángel bernabe (offgoing nurse). Report included the following information SBAR, Kardex and Recent Results. Side rails up, hob elevated 30 degrees.

## 2021-02-06 NOTE — PROGRESS NOTES
[facility] Pulmonary, Critical Care, and Sleep Medicine Name: Dale Mack MRN: [id number] : 1940 Hospital: [facility] Date: 2021 IMPRESSION:  
· Hypoxic Respiratory Failure likely due to COVID-19 Pneumonia w/ Acute on Chronic CHF Exacerbation, now requiring Mechanical Ventilation. Extubated 2021, failed to protect airway, re-intubated soon after. · Pulmonary HTN- based on Echo (2021)- LV EF: 55-60%, Pulmonary arterial systolic pressure is 70 mmHg. · COVID-19 Pneumonia- Covid-19 Rapid Test (+) on 2021. Known (+) prior to admission. CXR (+) multifocal Opacities   
· Septic shock- tachycardia, tachypnea, elevated procal on admission,  COVID-19 + UTI, ?underlying aspiration. UA (+) 3+ Bacteria + Large Leuk, Urine Cx resistant Enterobacter. improving procal, normal white count. · Metabolic [id number]-  to above, resolved · NANCY on Stage 3 CKD, Oliguric- likely in setting of Distributive Shock. · Encephalopathy- Metabolic, secondary to above · Enterobacter UTI on cefepime, Vanc · Hemoptysis- Hx of Esophageal Varices vs Pulmonary Edema, resolved · Thrombocytopenia- Baseline chronically low, current platelets 40 · Elevated Troponin- likely CHF Exacerbation + worsening Pulm HTN , Troponin: 1.30 · Acute on chronic HFpEF - EF 60-65% · Hypernatremia - NA - 150 · Hx Malnutrition tolerating tube feeds · Hx of Esophageal Varices · Hx Liver Cirrhosis   
· Hx DM- HgbA1c: 5.3 
· HX HTN  
· Hx of CAD  
· Hx Recent Left Hip ORIF (2020) Patient Active Problem List  
Diagnosis Code  Anemia D64.9  
 CAD (coronary artery disease) I25.10  Thrombocytopenia ([address] D69.6  NSTEMI (non-ST elevated myocardial infarction) (HCC) I21.4  
 UTI (urinary tract infection) N39.0  Sepsis ([address] A41.9  PNA (pneumonia) J18.9  Hypoxia R09.02  
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  
 CHF (congestive heart failure) (HCC) I50.9  Dyspnea R06.00  
 Pneumonia J18.9  
 HTN (hypertension), benign I10  
 CAP (community acquired pneumonia) J18.9  Acute on chronic diastolic CHF (congestive heart failure) (HCC) I50.33  Leg DVT (deep venous thromboembolism), acute, left (HCC) I82.402  Palpitations R00.2  Acute pulmonary edema (HCC) J81.0  Malaise and fatigue R53.81, R53.83  
 Pancytopenia ([facility] [id number]  Portal hypertension ([facility] K76.6  Chronic anticoagulation Z79.01  
 Hip fracture ([facility] S72.009A  Breast cancer ([facility] C50.919  
 Colon cancer ([facility] C18.9  Pneumonia due to COVID-19 virus U07.1, J12.82 PLAN:  
Neuro: Titrate sedation to RASS 0 to -1. Continue Precedex and Fentanyl gtts. Daily sedation holiday. Resp: VAP Bundle, bronchial hygiene protocol. Strict aspiration precautions, keep HOB>30'. Continue duonebs q6 hrs PRN. Daily CXR and ABG while intubated. SBT when indicated. No trache per pt's expressed wishes. Per chuck schwarz for medical extubation then comfort care if this fails. I/D:  Afebrile, aleukocytosis. Completed course of Meropenem and vancomycin. Remdesivir given on admission, d/c'd due to renal fx and intubation. Blood Cx NG 4 days. MRSA Cx positive. Trend WBCs + Temp, ID following Hem/Onc:   Hgb and platelets stable, continue to  monitor thrombocytopenia. Trend H/H, Monitor for s/o active bleeding. No DVT's found on BLE doppler CVS: currently HD stable off pressors, continue to maintain MAP >65. ECHO (+) LV EF: 55-60% and severe Pulmonary HTN. Originally not a candidate for anticoagulation due to hemoptysis and thrombocytopenia Metabolic:  Daily BMP, Mag  and Phos, replace electrolytes per protocol. Free water flushes decreased to 200 q 4 hours due to hyponatremia Renal: Trend renal indices, monitor I/O.  nephrology following Endocrine: SSI, q6 Glucose checks. Lantus increased to  20  units SQ due to BGL of 362. Avoid hypoglycemia GI: NPO, SUP ( IV Pepcid). Continue TF as tolerated Musc/Skin: No acute issues.  
Partial Code, INTUBATE ONLY  
 Critical care liana 41 minutes Milo Dorman Subjective/Interval History:  
 
Interval HPI:  
Barron Matt Leonardo is a 79 y.o.  Female w/ PMH of HFpPEF (EF 55-60%), Stage 3 CKD, Recent L Hip ORIF (12/2020), Esophageal Varices, Liver Cirrhosis, DM, HTN, CAD, who presented from [address] [place] [address] to [facility] on 01/26/2021 via EMS after developing worsening SOB and Hemoptysis after testing (+) for COVID-19 x1 week prior.  
  
ICU consulted for Hypoxia and is noted to be satting at 94+ on 15L/100% HFNC. Shortly after examination, she required Endotracheal Intubation w/ Mechanical Ventilation.  
 Patient's family updated on patient's critical condition and confirmed partial code status of intubation only but no CPR. Extubation 2/2/2021, unfortunately, the patient was unable to protect airway adequately soon afterwards and was re-intubated with family's consent. Family has additionally stated that  patient would not want to have trach. 
  
Nephrology following for NANCY on CKD3, ID following for COVID pneumonia, Enterobacter cystitis 
  
02/05/21 Hospital Day: 12 Vent Day: 11 Overnight Events: no new events overnight Mentation: Sedated with Fentanyl and precedex Respiratory/ Secretions: stable , FIO2 35% and PEEP 5 Hemodynamics: VSS. Afebrile. Diet: TF tolerated. Free water flushes decreases Urine/Fecal Output: good UOP Need for Procedures: none 
  
  
 
ROS:Review of systems not obtained due to patient factors. Objective:  
Vital Signs:   
Visit Vitals BP (!) 105/50 Pulse 84 Temp 97.3 °F (36.3 °C) Resp 24 Ht 5' (1.524 m) Wt 79.7 kg (175 lb 11.3 oz) SpO2 96% BMI 34.32 kg/m² O2 Device: Endotracheal tube O2 Flow Rate (L/min): 4 l/min Temp (24hrs), Av.4 °F (36.3 °C), Min:97 °F (36.1 °C), Max:98.2 °F (36.8 °C) Intake/Output:  
Last shift:       07 - 1900 In: 1416.4 [I.V.:496.4] Out: 250 [Urine:250] Last 3 shifts: 1901 -  0700 In: 5523.8 [I.V.:2163.8] Out: 3917 [Urine:1004; Drains:450] Intake/Output Summary (Last 24 hours) at 2021 1545 Last data filed at 2021 1500 Gross per 24 hour Intake 5283.31 ml Output 1229 ml Net 4054.31 ml  
  
   
Limited Physical Exam:  
            General: Intubated, Sedated, in NAD 
            HEENT: NC/AT, No visible nasal drainage, Trachea midline             UDRIC: No visible trauma or deformity to chest wall, RRR- via Tele  
            Lungs: Equal & Bilateral chest rise, Airway clear, ET Tube in place and secured  
            Abdomen: Non-Distended, large reducible hernia 
            Extremity: 2+pitting edema BLE 
            Neuro: Sedated  
            Skin: No new skin lesions, rashes, or ulcers DATA: 
Labs: 
Recent Labs 21 
0340 21 
0300 21 
0510 WBC 10.0 8.0 7.5 HGB 7.2* 7.4* 8.9* HCT 21.8* 22.2* 26.8*  
PLT 46* 40* 40* Recent Labs 21 
0340 21 
1805 21 
0300 21 
0510 21 
0510  143 150*   < > 145  
K 3.6 3.4* 3.6   < > 3.9  108 113*   < > 110 CO2 25 25 26   < > 24 * 350* 256*   < > 186* * 147* 153*   < > 148* CREA 3.20* 3.17* 3.48*   < > 3.47* CA 8.1* 7.7* 7.8*   < > 8.5 MG 2.2  --  2.3  --  2.2 PHOS 3.9 4.3 4.5   < > 4.5 ALB 2.3* 1.9* 1.9*   < > 2.4* ALT 15  --  16  --  18  
 < > = values in this interval not displayed. No results for input(s): PH, PCO2, PO2, HCO3, FIO2 in the last 72 hours. PFT:                                                    
Echo: 
 
Imaging: 
[x]I have personally reviewed the patients radiographs []Radiographs reviewed with radiologist 
 [x]No change from prior, tubes and lines in adequate position []Improved   []Worsening High complexity decision making was performed during the evaluation of this patient at high risk for decompensation with multiple organ involvement 
  
Above mentioned total time spent on reviewing the case/medical record/data/notes/EMR/patient examination/documentation/coordinating care with nurse/consultants, exclusive of procedures with complex decision making performed and > 50% time spent in face to face evaluation.  
 
Wander Leonard MD

## 2021-02-06 NOTE — PROGRESS NOTES
02/06/21 0848 Weaning Parameters Spontaneous Breathing Trial Complete (Start of SBT) Resp Rate Observed 10 Ve 7.1  RSBI 12

## 2021-02-06 NOTE — PROCEDURES
[facility] Pulmonary Specialist 
HD catheter Procedure Note With Ultrasound Guidance Indication: Inadequate venous access for HD Risks, benefits, alternatives explained and consent obtained. Time out performed. Patient positioned in Trendelenburg. HDl line Bundle: 
Full sterile barrier precautions used. 7-Step Sterility Protocol followed. (cap, mask sterile gown, sterile gloves, large sterile sheet, hand hygiene, 2% chlorhexidine for cutaneous antisepsis) 5 mL 1% Lidocaine placed at insertion site. Using ultrasound guidance, Right internal jugular vein cannulated x 1 attempt(s) utilizing the modified Seldinger technique. Position of guidewire confirmed in vein using ultrasound prior to dilating. Guidewire was removed. HD catheter was placed without difficulty. no immediate complications encountered. Good blood return on all 3 ports. Catheter secured & Biopatch applied. Sterile Tegaderm placed. CXR pending. First assist: Monie Issa was present. Rox Guerra PA-C 
02/06/21 Pulmonary, Critical Care Medicine [facility]

## 2021-02-06 NOTE — PROGRESS NOTES
ACUTE HEMODIALYSIS FLOW SHEET 
 
HEMODIALYSIS ORDERS: Physician: Dr. Luiz Mora 
  
Dialyzer: Revaclear   Duration: 3 hr  BFR: 300   DFR: 500 Dialysate:  Temp 36.0   K+   3    Ca+  3   Na 140   Bicarb 3 Wt Readings from Last 1 Encounters:  
02/05/21 79.7 kg (175 lb 11.3 oz) Patient Chart [x]   Unable to Obtain []  Dry weight/UF Goal: 0 ml Access RIJ Heparin []  Bolus    Units    [] Hourly    Units    [x]None Catheter locking solution Heparin 1:1000 Pre BP:   106/56    Pulse:  71   Respirations: 14    Temperature:  96.8 Tx: NSS   ml/Bolus   [x] N/A Labs: []  Pre  []  Post:   [x] N/A Additional Orders(medications, blood products, hypotension management): [x] Yes   [] No  
 
[x]  [facility] Consent Verified CATHETER ACCESS:  [x]N/A   []Right   []Left   [x]IJ     []Fem   []Chest wall  
[] First use X-ray verified     []Tunnel    [x] Non Tunneled [x]No S/S infection  []Redness  []Drainage []Cultured []Swelling []Pain  
[x]Medical Aseptic Prep Utilized   []Dressing Changed  [] Biopatch  Date: 2/6/21 []Clotted   [x]Patent   Flows: [x]Good  []Poor  []Reversed If access problem,  notified: []Yes    [x]N/A     
 
 
 
GENERAL ASSESSMENT:   
LUNGS:   SaO2%  100    [] Clear  [] Coarse  [x] Crackles  [] Wheezing 
                                              [] Diminished     Location : []RLL   []LLL    [x]RUL  [x]JERED Cough: []Productive  []Dry  [x]N/A   Respirations:  [x]Easy  []Labored Therapy:  []RA  []NC l/min    Mask: []NRB  [] Venti    O2% [x]Ventilator  [x]Intubated  [] Trach  [] BiPaP CARDIAC: [x]Regular      [] Irregular   [] Pericardial Rub  [] JVD [x]  Monitored  [x] Bedside  [] Remotely monitored EDEMA: [x] None  []Generalized  [x] Pitting [] 1    [] 2    [x] 3    [] 4 [] Facial  [] Pedal  []  UE  [x] LE  
SKIN:   [x] Warm  [] Hot     [] Cold   [x] Dry     [] Pale   [] Diaphoretic [] Flushed  [] Jaundiced  [] Cyanotic  [] Rash  [] Weeping LOC:    [x] Alert      [x]Oriented:    [x] Person     [x] Place  []Time 
             [] Confused  [] Lethargic  [] Medicated  [] Non-responsive  [] Non-Verbal  
GI / ABDOMEN:   
                 [] Flat    [] Distended    [x] Soft    [] Firm   []  Obese 
                 [] Diarrhea  [x] Bowel Sounds  [] Nausea  [] Vomiting  / URINE ASSESSMENT:  
                [] Voiding   [] Oliguria  [] Anuria   [x]  Jean [] Incontinent  []  Incontinent Brief []  Fecal Management System PAIN:  [x] 0 []1  []2   []3   []4   []5   []6   []7   []8   []9   []10 Scale 0-10  Action/Follow Up: MOBILITY:  [x] Bed    [] Stretcher All Vitals and Treatment Details on Attached Flowsheet Hospital: [facility] Room # 5841 [id number] [x] 1st Time Acute      [] Stat       [x] Routine      [] Urgent    
[] Acute Room  []  Bedside  [x] ICU/CCU  [] ER Isolation Precautions:  [x] Dialysis Droplet Plus ALLERGIES:    
Allergies Allergen Reactions  Latex Itching Skin breaks out causing itching Code Status:  Partial Code Hepatitis Status Lab Results Component Value Date/Time Hepatitis B surface Ag <0.10 02/06/2021 03:40 AM  
  
 
Current Labs:     
Lab Results Component Value Date/Time WBC 10.0 02/06/2021 03:40 AM  
 Hemoglobin, POC 8.2 (L) 12/01/2019 12:15 PM  
 HGB 7.2 (L) 02/06/2021 03:40 AM  
 Hematocrit, POC 24 (L) 12/01/2019 12:15 PM  
 HCT 21.8 (L) 02/06/2021 03:40 AM  
 PLATELET 46 (L) 42/41/2526 03:40 AM  
 MCV 87.9 02/06/2021 03:40 AM  
 
Lab Results Component Value Date/Time  Sodium 141 02/06/2021 03:40 AM  
 Potassium 3.6 02/06/2021 03:40 AM  
 Chloride 107 02/06/2021 03:40 AM  
 CO2 25 02/06/2021 03:40 AM  
 Anion gap 9 02/06/2021 03:40 AM  
 Glucose 270 (H) 02/06/2021 03:40 AM  
  (H) 02/06/2021 03:40 AM  
 Creatinine 3.20 (H) 02/06/2021 03:40 AM  
 BUN/Creatinine ratio 50 (H) 02/06/2021 03:40 AM  
 GFR est AA 17 (L) 02/06/2021 03:40 AM  
 GFR est non-AA 14 (L) 02/06/2021 03:40 AM  
 Calcium 8.1 (L) 02/06/2021 03:40 AM  
 
  
 
DIET: 
DIET NUTRITIONAL SUPPLEMENTS 
DIET TUBE FEEDING 
 
 
PRIMARY NURSE REPORT:  
Pre Dialysis: CARMEN Muir RN     Time: 1250 EDUCATION:   
[x] Patient [] Other Knowledge Basis: []None [x]Minimal [] Substantial  
Barriers to learning  [x]N/A  
[] Access Care     [] S&S of infection  [] Fluid Management  [] K+   [x] Procedural   
[]Albumin   [] Medications   [] Tx Options   [] Transplant   [] Diet   [] Other Teaching Tools:  [x] Explain  [] Demo  [] Handouts [] Video Patient response: [x] Verbalized understanding  [] Teach back  [] Return demonstration 
 [] Requires follow up [x]Time Out/Safety Check  [x] Extracorporeal Circuit Tested for integrity RO/HEMODIALYSIS MACHINE SAFETY CHECKS  Before each treatment:    
Machine Number:                   1000 [facility]  [x] Portable Machine #1/RO serial # [id number] Alarm Test:  Pass time 3752 [x] RO/Machine Log Complete Machine Temp    36.0 Dialysate: pH  7.4    Conductivity: Meter 14.0     HD Machine  14.0      TCD: 13.9 Dialyzer Lot # [id number]     Blood Tubing Lot # [id number]    Saline Lot # [id number] CHLORINE TESTING-Before each treatment and every 4 hours Total Chlorine: [x] less than 0.1 ppm  Initial Time Check: 1715       4 Hr/2nd Check Time:   
(if greater than 0.1 ppm from Primary then every 30 minutes from Secondary) TREATMENT INITIATION  with Dialysis Precautions:  
[x] All Connections Secured              [x] Saline Line Double Clamped  
[x] Venous Parameters Set               [x] Arterial Parameters Set [x] Prime Given 250ml NSS              [x]Air Foam Detector Engaged Treatment Initiation Note: 
1700  Arrived at pt's room, pt intubated at this time. Pt's eyes open spontaneously and is able to answer questions by shaking her head and mouthing words. Will continue to monitor pt's status. During Treatment Notes: 
[facility] assessed no abnormalities noted, line patent with good flow. CVC accessed without any difficulty, pt tolerated well. Vascular access visible with arterial and venous line connections intact. 1800  Vascular access visible with arterial and venous line connections intact. 1900  Vascular access visible with arterial and venous line connections intact. 2000  Vascular access visible with arterial and venous line connections intact. Medication Dose Volume Route Time Aung Nurse, Title  
albumin 25g 100ml  [facility], RN Albumin 25g 100ml  E [facility], RN Post Assessment Dialyzer Cleared:[] Good [x] Fair  [] Poor Blood processed:  51.3 L 
UF Removed:  0 Ml Post /59  Pulse  80 Resp  16 Temp 96.3 CVC Catheter: [] N/A Locking solution: Heparin 1:1000 U Arterial port 1.1 ml  
Venous port 1.1ml 
  
   
Skin:[x] Warm  [x] Dry [] Diaphoretic     
         [] Flushed  [] Pale [] Cyanotic Pain: 
[x]0  []1 []2  []3 []4  []5  []6 []7 []8  []9  []10 Post Treatment Note: 
 2100  HD completed at this time, pt tolerated well. Dressing clean, dry and intact. POST TREATMENT PRIMARY NURSE HANDOFF REPORT:  
Post Dialysis: RUI Lal RN                Time:  2100 Abbreviations: AVG-arterial venous graft, AVF-arterial venous fistula, IJ-Internal Jugular, Subcl-Subclavian, Fem-Femoral, Tx-treatment, AP/HR-apical heart rate, DFR-dialysate flow rate, BFR-blood flow rate, AP-arterial pressure, -venous pressure, UF-ultrafiltrate, TMP-transmembrane pressure, Logan-Venous, Art-Arterial, RO-Reverse Osmosis

## 2021-02-06 NOTE — PROGRESS NOTES
Problem: Ventilator Management Goal: *Adequate oxygenation and ventilation Outcome: Progressing Towards Goal 
Goal: *Patient maintains clear airway/free of aspiration Outcome: Progressing Towards Goal 
Goal: *Absence of infection signs and symptoms Outcome: Progressing Towards Goal 
  
Problem: Risk for Spread of Infection Goal: Prevent transmission of infectious organism to others Description: Prevent the transmission of infectious organisms to other patients, staff members, and visitors. Outcome: Progressing Towards Goal 
  
Problem: Non-Violent Restraints Goal: *Removal from restraints as soon as assessed to be safe Outcome: Progressing Towards Goal 
Goal: *No harm/injury to patient while restraints in use Outcome: Progressing Towards Goal 
Goal: *Patient's dignity will be maintained Outcome: Progressing Towards Goal 
Goal: *Patient Specific Goal (EDIT GOAL, INSERT TEXT) Outcome: Progressing Towards Goal 
Goal: Non-violent Restaints:Standard Interventions Outcome: Progressing Towards Goal 
Goal: Non-violent Restraints:Patient Interventions Outcome: Progressing Towards Goal 
  
Problem: Falls - Risk of 
Goal: *Absence of Falls Description: Document Kajal Stewart Fall Risk and appropriate interventions in the flowsheet. Outcome: Progressing Towards Goal 
Note: Fall Risk Interventions: 
Mobility Interventions: Bed/chair exit alarm, Patient to call before getting OOB, Strengthening exercises (ROM-active/passive) Mentation Interventions: Adequate sleep, hydration, pain control, Bed/chair exit alarm, Update white board, Toileting rounds, Reorient patient, More frequent rounding, Evaluate medications/consider consulting pharmacy, Eyeglasses and hearing aids Medication Interventions: Assess postural VS orthostatic hypotension, Bed/chair exit alarm, Evaluate medications/consider consulting pharmacy, Teach patient to arise slowly, Patient to call before getting OOB Elimination Interventions: Bed/chair exit alarm, Call light in reach, Stay With Me (per policy), Toileting schedule/hourly rounds Problem: Pressure Injury - Risk of 
Goal: *Prevention of pressure injury Description: Document Kyle Scale and appropriate interventions in the flowsheet. Outcome: Progressing Towards Goal 
Note: Pressure Injury Interventions: 
Sensory Interventions: Assess changes in LOC, Assess need for specialty bed, Turn and reposition approx. every two hours (pillows and wedges if needed), Keep linens dry and wrinkle-free, Float heels, Minimize linen layers, Monitor skin under medical devices, Pressure redistribution bed/mattress (bed type) Moisture Interventions: Absorbent underpads, Apply protective barrier, creams and emollients, Assess need for specialty bed, Check for incontinence Q2 hours and as needed, Internal/External urinary devices, Minimize layers, Moisture barrier Activity Interventions: Assess need for specialty bed, Pressure redistribution bed/mattress(bed type) Mobility Interventions: Assess need for specialty bed, Float heels, HOB 30 degrees or less, Pressure redistribution bed/mattress (bed type), Turn and reposition approx. every two hours(pillow and wedges) Nutrition Interventions: Discuss nutritional consult with provider, Document food/fluid/supplement intake Friction and Shear Interventions: Apply protective barrier, creams and emollients, Foam dressings/transparent film/skin sealants, Transferring/repositioning devices, Minimize layers, Lift team/patient mobility team, HOB 30 degrees or less

## 2021-02-06 NOTE — PROGRESS NOTES
RENAL PROGRESS NOTE Brad Becerril Assessment/Plan:  
 
1)nonoliguric NANCY on CKD3: d/t Ischaemic ATN/sepsis v/s covid nephropathy 2)Covid PNA/Resp failure mech vent per PCCM. Minimal settings  , ABG looks better 3)Altered mental status , combination of metabolic encephalopathy , uremia and sedation PCCM team doing trial of off sedation to see any improvement 4)Cirrhosis/Varices:  
5) hypernatremia , better 6) severe hypoalbuminemia:  
7) severe anemia 8) thrombocytopenia 9) AMS< metabolic encephalopathy Plan: 
 
1) HD for solute and uremia , minimal UF Consent obtained from patients nephew, discussed with HD nurse ICU to place [facility]  
2) discontinue albumin 2) free water with TF , continue 3) renal panel q12hrs 4) strict intake output 5) arnold with HD Please call with questions, 
 
Yolanda Gamboa MD FASN Cell [phone] Pager: [phone] Subjective: 
covid isolation. Decreased urine output Renal parameters worse, BUN trending up Patient Active Problem List  
Diagnosis Code  Anemia D64.9  
 CAD (coronary artery disease) I25.10  Thrombocytopenia ([address] D69.6  NSTEMI (non-ST elevated myocardial infarction) ([facility]) I21.4  
 UTI (urinary tract infection) N39.0  Sepsis ([address] A41.9  PNA (pneumonia) J18.9  Hypoxia R09.02  
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  
 CHF (congestive heart failure) ([facility]) I50.9  Dyspnea R06.00  
 Pneumonia J18.9  
 HTN (hypertension), benign I10  
 CAP (community acquired pneumonia) J18.9  Acute on chronic diastolic CHF (congestive heart failure) ([facility]) I50.33  Leg DVT (deep venous thromboembolism), acute, left ([facility]) I82.402  Palpitations R00.2  Acute pulmonary edema ([facility]) J81.0  Malaise and fatigue R53.81, R53.83  
  Pancytopenia ([facility] B42.005  Portal hypertension ([facility] K76.6  Chronic anticoagulation Z79.01  
 Hip fracture ([facility] S72.009A  Breast cancer ([facility] C50.919  
 Colon cancer ([facility] C18.9  Pneumonia due to COVID-19 virus U07.1, J12.82 Current Facility-Administered Medications Medication Dose Route Frequency Provider Last Rate Last Admin  albumin human 25% (BUMINATE) solution 25 g  25 g IntraVENous Q6H Kendrick Smith MD   25 g at 02/06/21 1219  
 dextrose 5% infusion  75 mL/hr IntraVENous CONTINUOUS SerglinDogabbie Katz PA-C 75 mL/hr at 02/05/21 1051 75 mL/hr at 02/05/21 1051  insulin glargine (LANTUS) injection 20 Units  20 Units SubCUTAneous DAILY Dali Bailey PA-C   20 Units at 02/06/21 1230  famotidine (PF) (PEPCID) 20 mg in 0.9% sodium chloride 10 mL injection  20 mg IntraVENous DAILY Allyson Anthony MD   20 mg at 02/06/21 1228  
 menthol-zinc oxide (CALMOSEPTINE) 0.44-20.6 % ointment   Topical TID Bess Calixto MD   Given at 02/06/21 1228  midodrine (PROAMATINE) tablet 10 mg  10 mg Per NG tube TID WITH MEALS Kendrick Smith MD   10 mg at 02/06/21 0900  
 sodium chloride 3% hypertonic nebulizer soln  4 mL Nebulization Q6H RT Magali Barrera PA-C   4 mL at 02/05/21 2014  albuterol (ACCUNEB) nebulizer solution 1.25 mg  1.25 mg Nebulization Q6H RT Magali Barrera PA-C   1.25 mg at 02/05/21 2014  
 NOREPINephrine (LEVOPHED) 8 mg in 5% dextrose 250mL (32 mcg/mL) infusion  0.5-50 mcg/min IntraVENous TITRATE Tay TIAN PA-C   Stopped at 02/03/21 1230  acetaminophen (TYLENOL) tablet 650 mg  650 mg Oral Q6H PRN Truman Lemus PA-C   650 mg at 01/31/21 2230  carvediloL (COREG) tablet 12.5 mg  12.5 mg Oral Q12H Ivis Grissom MD   12.5 mg at 02/06/21 1228  meropenem (MERREM) 500 mg in sterile water (preservative free) 10 mL IV syringe  0.5 g IntraVENous Q24H Jimmie Carrillo MD   500 mg at 02/05/21 2208  multivit-folic acid-herbal 255 (WELLESSE PLUS) oral liquid 30 mL  30 mL Per NG tube DAILY Bethany Mckinnon MD   30 mL at 02/06/21 1228  chlorhexidine (PERIDEX) 0.12 % mouthwash 10 mL  10 mL Oral Q12H Magali Barrera PA-C   10 mL at 02/06/21 1228  albuterol-ipratropium (DUO-NEB) 2.5 MG-0.5 MG/3 ML  3 mL Nebulization Q6H PRN Magali Barrera PA-C      
 fentaNYL (PF) 900 mcg/30 ml infusion soln  0-200 mcg/hr IntraVENous TITRATE Magali Dunne PA-C 1.7 mL/hr at 02/05/21 0619 50 mcg/hr at 02/05/21 0619  
 midazolam (VERSED) injection 1-2 mg  1-2 mg IntraVENous Q10MIN PRN Magali Dunne PA-C   2 mg at 01/31/21 1433  fentaNYL citrate (PF) injection  mcg   mcg IntraVENous Q30MIN PRN Magali Dunne PA-C   50 mcg at 02/02/21 2337  
 dexmedeTOMidine in 0.9 % NaCl (PRECEDEX) 400 mcg/100 mL (4 mcg/mL) infusion soln  0.1-1.5 mcg/kg/hr (Order-Specific) IntraVENous TITRATE Magali Dunne PA-C 17.5 mL/hr at 02/06/21 1227 1 mcg/kg/hr at 02/06/21 1227  
 insulin lispro (HUMALOG) injection   SubCUTAneous Q6H Bethany Mckinnon MD   9 Units at 02/06/21 1230  
 glucose chewable tablet 16 g  4 Tab Oral PRN Magali Dunne PA-C      
 glucagon (GLUCAGEN) injection 1 mg  1 mg IntraMUSCular PRN Magali Barrera PA-C      
 dextrose (D50W) injection syrg 12.5-25 g  25-50 mL IntraVENous PRN Magali Dunne PA-C Objective Vitals:  
 02/06/21 0900 02/06/21 1000 02/06/21 1100 02/06/21 1226 BP: (!) 146/68 (!) 140/63 134/65 Pulse: 95 94 93 92 Resp: 13 14 15 15 Temp: 97.5 °F (36.4 °C) 97.3 °F (36.3 °C) 97.5 °F (36.4 °C) SpO2: 100% 100% 100% 100% Weight:      
Height:      
 
 
 
Intake/Output Summary (Last 24 hours) at 2/6/2021 1232 Last data filed at 2/6/2021 0700 Gross per 24 hour Intake 3986.93 ml Output 979 ml Net 3007.93 ml Admission weight: Weight: 68 kg (150 lb) (01/27/21 0830) Last Weight Metrics: Weight Loss Metrics 2/5/2021 12/10/2020 9/28/2020 12/1/2019 10/16/2019 9/30/2019 8/6/2019 Today's Wt 175 lb 11.3 oz 157 lb 8 oz 149 lb 14.4 oz 140 lb 143 lb 140 lb 145 lb BMI 34.32 kg/m2 30.76 kg/m2 29.28 kg/m2 27.34 kg/m2 27.93 kg/m2 27.34 kg/m2 28.32 kg/m2 Physical Assessment:  
 
Pt is covid 19+, I did not go into the room to minimize pt/md exposure and preserve PPE. I have viewed pt from the door and reviewed documentation from other providers.  
 
Lab CBC w/Diff Recent Labs 02/06/21 
0340 02/05/21 
0300 02/04/21 
0510 WBC 10.0 8.0 7.5  
RBC 2.48* 2.53* 3.12* HGB 7.2* 7.4* 8.9* HCT 21.8* 22.2* 26.8*  
PLT 46* 40* 40* GRANS 91* 88* 83* LYMPH 3* 7* 9*  
EOS 2 1 1 Chemistry Recent Labs 02/06/21 
0340 02/05/21 
1805 02/05/21 
0300 02/04/21 
0510 02/04/21 
0510 * 350* 256*   < > 186*  143 150*   < > 145  
K 3.6 3.4* 3.6   < > 3.9  108 113*   < > 110 CO2 25 25 26   < > 24 * 147* 153*   < > 148* CREA 3.20* 3.17* 3.48*   < > 3.47* CA 8.1* 7.7* 7.8*   < > 8.5 AGAP 9 10 11   < > 11 BUCR 50* 46* 44*   < > 43* AP 84  --  91  --  81  
TP 4.9*  --  4.7*  --  5.5* ALB 2.3* 1.9* 1.9*   < > 2.4*  
GLOB 2.6  --  2.8  --  3.1 AGRAT 0.9  --  0.7*  --  0.8 PHOS 3.9 4.3 4.5   < > 4.5  
 < > = values in this interval not displayed. Lab Results Component Value Date/Time Iron 65 12/08/2020 11:30 AM  
 TIBC 189 (L) 12/08/2020 11:30 AM  
 Iron % saturation 34 12/08/2020 11:30 AM  
 Ferritin 669 (H) 02/04/2021 05:10 AM  
  
Lab Results Component Value Date/Time Calcium 8.1 (L) 02/06/2021 03:40 AM  
 Phosphorus 3.9 02/06/2021 03:40 AM  
  
 
Lizzy Shepherd MD 
[facility] Pager: [phone] Phone: [phone]

## 2021-02-07 NOTE — ROUTINE PROCESS
Bedside and Verbal shift change report given to Camilo Gentile RN (oncoming nurse) by Hilaria Larson RN (offgoing nurse). Report included the following information SBAR, Kardex, Intake/Output, MAR, Recent Results and Cardiac Rhythm NSR.

## 2021-02-07 NOTE — DIALYSIS
[facility] Double ACUTE HEMODIALYSIS FLOW SHEET 
 
 
HEMODIALYSIS ORDERS: Physician: Sarah 
  
Dialyzer: revaclear   Duration: 3 hr  BFR: 300   DFR: 500 Dialysate:  Temp 36-37*C  K+   3    Ca+  3 Na 140 Bicarb 35 Weight:  
Wt Readings from Last 1 Encounters:  
02/07/21 79.8 kg (175 lb 14.8 oz) UF Goal: 1.5-2 Lit as preeti  Access right IJ TDC  Needle Gauge Heparin []  Bolus      Units    [] Hourly       Units    [x]None Catheter locking solution Heparin Pre BP:   147/69    Pulse:     84       Respirations: 15  Temperature:   99.9 Labs: Pre        Post:        [x] N/A Additional Orders(medications, blood products, hypotension management):       [x] N/A [x] Jordyita Consent Verified CATHETER ACCESS: []N/A   [x]Right   []Left   [x]IJ     []Fem   []transhepatic  
[] First use X-ray verified     []Permanent                [x] Temporary  
[x]No S/S infection  []Redness  []Drainage []Cultured []Swelling []Pain  
[x]Medical Aseptic Prep Utilized   []Dressing Changed  [] Biopatch  Date:      
[]Clotted   [x]Patent   Flows: [x]Good  []Poor  []Reversed If access problem,  notified: []Yes    [x]N/A  Date:        
 
GRAFT/FISTULA ACCESS:  [x]N/A     []Right     []Left     []UE     []LE []AVG   []AVF        []Buttonhole    []Medical Aseptic Prep Utilized []No S/S infection  []Redness  []Drainage []Cultured []Swelling []Pain Bruit:   [] Strong    [] Weak       Thrill :   [] Strong    [] Weak Needle Gauge:    Length: If access problem,  notified: []Yes     [x]N/A  Date:       
Please describe access if present and not used:  
            
            GENERAL ASSESSMENT:  
  
LUNGS:  Rate 15  SaO2%        [] N/A    [x] Clear  [] Coarse  [] Crackles  [] Wheezing 
      [] Diminished     Location : []RLL   []LLL    []RUL  []JERED Cough: []Productive  []Dry  [x]N/A   Respirations:  [x]Easy  []Labored Therapy:   []RA  []NC  l/min    Mask: []NRB []Venti       O2% [x]Ventilator  [x]Intubated  [] Trach  [] BiPaP CARDIAC: [x]Regular      [] Irregular   [] Pericardial Rub  [] JVD [x]  Monitored  [x] Bedside  [] Remotely monitored [] N/A  Rhythm: EDEMA: [] None  [x]Generalized  [] Pitting [] 1    [] 2    [] 3    [] 4 [] Facial  [] Pedal  []  UE  [] LE  
 
SKIN:   [x] Warm  [] Hot     [] Cold   [x] Dry     [] Pale   [] Diaphoretic    
             [] Flushed  [] Jaundiced  [] Cyanotic  [] Rash  [] Weeping LOC:    [x] Alert      []Oriented:    [] Person     [] Place  []Time 
             [] Confused  [] Lethargic  [] Medicated  [] Non-responsive GI / ABDOMEN:  [] Flat    [] Distended    [x] Soft    [] Firm   []  Obese 
                           [] Diarrhea  [] Bowel Sounds  [] Nausea  [] Vomiting  / URINE ASSESSMENT:[] Voiding   [] Oliguria  [] Anuria   [x]  Jean [] Incontinent    []  Incontinent Brief      []  Bathroom Privileges PAIN: [x] 0 []1  []2   []3   []4   []5   []6   []7   []8   []9   []10 Scale 0-10  Action/Follow Up: MOBILITY:  [] Amb    [] Amb/Assist    [x] Bed    [] Wheelchair  [] Stretcher All Vitals and Treatment Details on Attached Flowsheet Hospital: [facility] Room # 0049 [id number] [] 1st Time Acute  [] Stat  [x] Routine  [] Urgent    
[] Acute Room  []  Bedside  [x] ICU/CCU  [] ER Isolation Precautions:  Droplet Plus Special Considerations:         [] Blood Consent Verified [x]N/A ALLERGIES:  
Allergies Allergen Reactions  Latex Itching Skin breaks out causing itching Code Status:Partial Code Hepatitis Status:                   
Lab Results Component Value Date/Time Hepatitis B surface Ag <0.10 02/06/2021 03:40 AM  
   
 
            Current Labs:  
Lab Results Component Value Date/Time  Sodium 141 02/07/2021 03:00 AM  
 Potassium 3.8 02/07/2021 03:00 AM  
 Chloride 105 02/07/2021 03:00 AM  
 CO2 27 02/07/2021 03:00 AM  
 Anion gap 9 02/07/2021 03:00 AM  
 Glucose 230 (H) 02/07/2021 03:00 AM  
 BUN 91 (H) 02/07/2021 03:00 AM  
 Creatinine 2.17 (H) 02/07/2021 03:00 AM  
 BUN/Creatinine ratio 42 (H) 02/07/2021 03:00 AM  
 GFR est AA 26 (L) 02/07/2021 03:00 AM  
 GFR est non-AA 22 (L) 02/07/2021 03:00 AM  
 Calcium 8.3 (L) 02/07/2021 03:00 AM  
  
Lab Results Component Value Date/Time WBC 7.9 02/07/2021 02:08 PM  
 Hemoglobin, POC 5.4 (LL) 02/07/2021 05:30 AM  
 HGB 7.7 (L) 02/07/2021 02:08 PM  
 Hematocrit, POC 16 (LL) 02/07/2021 05:30 AM  
 HCT 23.4 (L) 02/07/2021 02:08 PM  
 PLATELET 43 (L) 67/49/5146 02:08 PM  
 MCV 87.3 02/07/2021 02:08 PM  
  
  
 
                                                                         
DIET: DIET NUTRITIONAL SUPPLEMENTS 
DIET TUBE FEEDING    
 
PRIMARY NURSE REPORT: First initial/Last name/Title Pre Dialysis: LATRICIA El     Time: 7720 EDUCATION:   
[x] Patient [] Other         Knowledge Basis: []None [x]Minimal [] Substantial  
Barriers to learning  [x]N/A [x] Access Care     [] S&S of infection     [] Fluid Management     []K+     [x]Procedural   
[]Albumin     [] Medications     [] Tx Options     [] Transplant     [] Diet     [] Other Teaching Tools:  [x] Explain  [] Demo  [] Handouts [] Video Patient response:   [] Verbalized understanding  [] Teach back  [] Return demonstration [x] Requires follow up Inappropriate due to         
 
[x] Time Out/Safety Check  [x]Extracorporeal Circuit Tested for integrity RO/HEMODIALYSIS MACHINE SAFETY CHECKS  Before each treatment:    
Machine Number:                   [facility]  [x] Portable Machine #4/RO serial # [id number] Alarm Test:  Pass time 1520 [x] RO/Machine Log Complete Temp    36.0 Dialysate: pH  7.4  Conductivity: Meter   13.9     HD Machine   14.1                  TCD: 14.0 Dialyzer Lot # [id number]            Blood Tubing Lot # [id number]          Saline Lot #  [id number] CHLORINE TESTING-Before each treatment and every 4 hours Total Chlorine: [x] less than 0.1 ppm  Time: 1525 4 Hr/2nd Check Time:   
(if greater than 0.1 ppm from Primary then every 30 minutes from Secondary) TREATMENT INITIATION  with Dialysis Precautions:  
[x] All Connections Secured                 [x] Saline Line Double Clamped  
[x] Venous Parameters Set                  [x] Arterial Parameters Set [x] Prime Given 250ml                          [x]Air Foam Detector Engaged Treatment Initiation Note: pt resting with eyes closed when arrived to room. No signs of infection present to right IJ TDC. Treatment initiated without complication. During Treatment Notes: 4748: pt resting with eyes open, no s/s of acute distress present, face and access in view. 1515: Dr. Alfonso Melendez informed of low BP towards end of treatment. Phone order received okay to give Albumin 25% and remove UF as tolerated. Medication Dose Volume Route Time [facility] name Title RN  
      RN  
      RN  
        RN Post Assessment:  
Dialyzer Cleared: [x] Good [] Fair  [] Poor Blood processed:  50 L 
UF Removed  1550 Ml POst BP:   132/55       Pulse: 71 Respirations: 14  Temperature: 97.7 Lungs: 
 
 [x] Clear      [] Course         [] Crackles  
 [] Wheezing         [] Diminished Post Tx Vascular Access: AVF/AVG: Bleeding stopped N/A Cardiac:  
[x] Regular   [] Irregular   [] Monitor  [] N/A Rhythm:      
Catheter:  
Locking solution: Heparin 1:1000 Art. 1.7  Logan. 1.7 Skin:   Pain:   
[x] Warm  [x] Dry [] Diaphoretic    [] Flushed   
[] Pale [] Cyanotic [x]0  []1  []2   []3  []4   []5   []6   []7   []8   []9   []10  
 
 Post Treatment Note: Treatment completed. 1 Liter UF removed as tolerated during HD treatment POST TREATMENT PRIMARY NURSE HANDOFF REPORT:  
 
First initial/Last name/Title Post Dialysis: LATRICIA El  Time:  [id number] Abbreviations: AVG-arterial venous graft, AVF-arterial venous fistula, IJ-Internal Jugular, Subcl-Subclavian, Fem-Femoral, Tx-treatment, AP/HR-apical heart rate, DFR-dialysate flow rate, BFR-blood flow rate, AP-arterial pressure, -venous pressure, UF-ultrafiltrate, TMP-transmembrane pressure, Logan-Venous, Art-Arterial, RO-Reverse Osmosis

## 2021-02-07 NOTE — PROGRESS NOTES
RENAL PROGRESS NOTE [place] Assessment/Plan:  
 
1)nonoliguric NANCY on CKD3: d/t Ischaemic ATN/sepsis v/s covid nephropathy. 2)Covid PNA/Resp failure mech vent per PCCM. Minimal settings  , ABG looks better 3)Altered mental status , combination of metabolic encephalopathy , uremia and sedation PCCM team doing trial of off sedation to see any improvement 4)Cirrhosis/Varices:  
5) hypernatremia , better 6) severe hypoalbuminemia:  
7) severe anemia 8) thrombocytopenia 9) AMS< metabolic encephalopathy Plan: 
 
1) HD for solute and uremia , UF 1.5-2 lit 2) midodrine and albumin with HD 2) free water with TF , continue 3) renal panel daily 4) strict intake output 5) arnold with HD Please call with questions, 
 
Shane Bright MD [facility] [phone] Pager: [phone] Subjective: 
covid isolation. Decreased urine output Renal parameters worse, BUN trending up Patient Active Problem List  
Diagnosis Code  Anemia D64.9  
 CAD (coronary artery disease) I25.10  Thrombocytopenia ([address] D69.6  NSTEMI (non-ST elevated myocardial infarction) (HCC) I21.4  
 UTI (urinary tract infection) N39.0  Sepsis ([address] A41.9  PNA (pneumonia) J18.9  Hypoxia R09.02  
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  
 CHF (congestive heart failure) (HCC) I50.9  Dyspnea R06.00  
 Pneumonia J18.9  
 HTN (hypertension), benign I10  
 CAP (community acquired pneumonia) J18.9  Acute on chronic diastolic CHF (congestive heart failure) (HCC) I50.33  Leg DVT (deep venous thromboembolism), acute, left (HCC) I82.402  Palpitations R00.2  Acute pulmonary edema (HCC) J81.0  Malaise and fatigue R53.81, R53.83  
 Pancytopenia ([address] [id number]  Portal hypertension ([address] K76.6  Chronic anticoagulation Z79.01  
 Hip fracture ([facility] S72.009A  [facility] cancer [facility] C50.919  
 Colon cancer ([facility] C18.9  Pneumonia due to COVID-19 virus U07.1, J12.82 Current Facility-Administered Medications Medication Dose Route Frequency Provider Last Rate Last Admin  
 0.9% sodium chloride infusion 250 mL  250 mL IntraVENous PRN OriMonique Smallwood PA-C      
 insulin glargine (LANTUS) injection 20 Units  20 Units SubCUTAneous DAILY Edda Thakkar PA-C   20 Units at 02/07/21 0900  famotidine (PF) (PEPCID) 20 mg in 0.9% sodium chloride 10 mL injection  20 mg IntraVENous DAILY Khushboo Guzmán MD   20 mg at 02/07/21 0951  
 menthol-zinc oxide (CALMOSEPTINE) 0.44-20.6 % ointment   Topical TID Daysi Pink MD   Given at 02/07/21 0900  
 midodrine (PROAMATINE) tablet 10 mg  10 mg Per NG tube TID WITH MEALS Tesha Smith MD   10 mg at 02/07/21 1404  sodium chloride 3% hypertonic nebulizer soln  4 mL Nebulization Q6H RT Magali Barrera PA-C   4 mL at 02/06/21 1936  
 albuterol (ACCUNEB) nebulizer solution 1.25 mg  1.25 mg Nebulization Q6H RT Magali Barrera PA-C   1.25 mg at 02/06/21 1936  
 NOREPINephrine (LEVOPHED) 8 mg in 5% dextrose 250mL (32 mcg/mL) infusion  0.5-50 mcg/min IntraVENous TITRATE Annita TIAN PA-C   Stopped at 02/03/21 1230  acetaminophen (TYLENOL) tablet 650 mg  650 mg Oral Q6H PRN Blu Powers PA-C   650 mg at 01/31/21 2230  carvediloL (COREG) tablet 12.5 mg  12.5 mg Oral Q12H Stacie Anthony MD   12.5 mg at 02/07/21 7108  multivit-folic acid-herbal 998 (WELLESSE PLUS) oral liquid 30 mL  30 mL Per NG tube DAILY Gatito Mcneil MD   30 mL at 02/07/21 4122  chlorhexidine (PERIDEX) 0.12 % mouthwash 10 mL  10 mL Oral Q12H JALEEL'Magali Belle PA-C   10 mL at 02/07/21 6940  albuterol-ipratropium (DUO-NEB) 2.5 MG-0.5 MG/3 ML  3 mL Nebulization Q6H PRN Magali Thomas PA-C      
  fentaNYL (PF) 900 mcg/30 ml infusion soln  0-200 mcg/hr IntraVENous TITRATE Steffani Meigs, Abby E, PA-C 1.7 mL/hr at 02/07/21 1422 50 mcg/hr at 02/07/21 1422  midazolam (VERSED) injection 1-2 mg  1-2 mg IntraVENous Q10MIN PRN Steffani Meigs, Abby E, PA-C   2 mg at 02/07/21 0320  
 fentaNYL citrate (PF) injection  mcg   mcg IntraVENous Q30MIN PRN Laura SCOTT PA-C   100 mcg at 02/07/21 2527  dexmedeTOMidine in 0.9 % NaCl (PRECEDEX) 400 mcg/100 mL (4 mcg/mL) infusion soln  0.1-1.5 mcg/kg/hr (Order-Specific) IntraVENous TITRATE Steffani Meigs, Abby E, PA-C 7 mL/hr at 02/07/21 0300 0.4 mcg/kg/hr at 02/07/21 0300  
 insulin lispro (HUMALOG) injection   SubCUTAneous Q6H Sergei Mckinnon MD   3 Units at 02/07/21 1500  
 glucose chewable tablet 16 g  4 Tab Oral PRN Steffani Meigs, Abby E, PA-C      
 glucagon (GLUCAGEN) injection 1 mg  1 mg IntraMUSCular PRN Magali Barrera PA-C      
 dextrose (D50W) injection syrg 12.5-25 g  25-50 mL IntraVENous PRN Steffani Meigs, Abby E, PA-C Objective Vitals:  
 02/07/21 1445 02/07/21 1500 02/07/21 1515 02/07/21 1530 BP:  (!) 147/69 (!) 146/64 (!) 136/54 Pulse: 86 84 87 81 Resp: 15 15 14 15 Temp: 99.7 °F (37.6 °C) 99.9 °F (37.7 °C) 99.9 °F (37.7 °C) 99.7 °F (37.6 °C) SpO2: 98% 98% 98% 97% Weight:      
Height:      
 
 
 
Intake/Output Summary (Last 24 hours) at 2/7/2021 1554 Last data filed at 2/7/2021 1400 Gross per 24 hour Intake 3812.1 ml Output 525 ml Net 3287.1 ml Admission weight: Weight: 68 kg (150 lb) (01/27/21 0830) Last Weight Metrics: 
Weight Loss Metrics 2/7/2021 12/10/2020 9/28/2020 12/1/2019 10/16/2019 9/30/2019 8/6/2019 Today's Wt 175 lb 14.8 oz 157 lb 8 oz 149 lb 14.4 oz 140 lb 143 lb 140 lb 145 lb BMI 34.36 kg/m2 30.76 kg/m2 29.28 kg/m2 27.34 kg/m2 27.93 kg/m2 27.34 kg/m2 28.32 kg/m2 Physical Assessment: Pt is covid 19+, I did not go into the room to minimize pt/md exposure and preserve PPE. I have viewed pt from the door and reviewed documentation from other providers.  
 
Lab CBC w/Diff Recent Labs 02/07/21 
1408 02/07/21 
0300 02/06/21 
0340 WBC 7.9 5.5 10.0  
RBC 2.68* 2.09* 2.48* HGB 7.7* 6.0* 7.2* HCT 23.4* 18.4* 21.8*  
PLT 43* 40* 46* GRANS 85* 85* 91* LYMPH 13* 11* 3* EOS 1 1 2 Chemistry Recent Labs 02/07/21 
0300 02/06/21 
2125 02/06/21 
0340 02/05/21 
0300 02/05/21 
0300 * 150* 270*   < > 256*  141 141   < > 150*  
K 3.8 3.6 3.6   < > 3.6  105 107   < > 113* CO2 27 28 25   < > 26 BUN 91* 80* 159*   < > 153* CREA 2.17* 1.89* 3.20*   < > 3.48* CA 8.3* 8.5 8.1*   < > 7.8* AGAP 9 8 9   < > 11 BUCR 42* 42* 50*   < > 44* AP 69  --  84  --  91  
TP 5.6*  --  4.9*  --  4.7* ALB 3.3* 3.7 2.3*   < > 1.9*  
GLOB 2.3  --  2.6  --  2.8 AGRAT 1.4  --  0.9  --  0.7* PHOS 3.0 2.4* 3.9   < > 4.5  
 < > = values in this interval not displayed. Lab Results Component Value Date/Time Iron 65 12/08/2020 11:30 AM  
 TIBC 189 (L) 12/08/2020 11:30 AM  
 Iron % saturation 34 12/08/2020 11:30 AM  
 Ferritin 669 (H) 02/04/2021 05:10 AM  
  
Lab Results Component Value Date/Time Calcium 8.3 (L) 02/07/2021 03:00 AM  
 Phosphorus 3.0 02/07/2021 03:00 AM  
  
 
Brock Dumont MD 
[facility] Pager: [phone] Phone: [phone]

## 2021-02-07 NOTE — PROGRESS NOTES
[facility] Pulmonary, Critical Care, and Sleep Medicine Name: Yamel Sevilla MRN: [id number] : 1940 Hospital: [facility] Date: 2021 IMPRESSION:  
· Hypoxic Respiratory Failure with multifocal pneumonia due to COVID-19 and w/ Acute on Chronic CHF Exacerbation, now requiring Mechanical Ventilation. Extubated 2021, failed to protect airway, re-intubated soon after. · Pulmonary HTN- based on Echo (2021)- LV EF: 55-60%, Pulmonary arterial systolic pressure is 70 mmHg. · COVID-19 Pneumonia- Covid-19 Rapid Test (+) on 2021. Known (+) prior to admission.     
· Septic shock- tachycardia, tachypnea, elevated procal on admission,  COVID-19 + UTI, ?underlying aspiration. UA (+) 3+ Bacteria + Large Leuk, Urine Cx resistant Enterobacter. Procal trending down · Metabolic [id number]-  to above, resolved · NANCY on Stage 3 CKD, Oliguric- likely in setting of Distributive Shock. HD started . · Encephalopathy- Metabolic, secondary to above · Enterobacter UTI on cefepime, Vanc · Hemoptysis- Hx of Esophageal Varices vs Pulmonary Edema, resolved · Thrombocytopenia- Baseline chronically low, current platelets 43 · Elevated Troponin- likely CHF Exacerbation + worsening Pulm HTN , Troponin: 1.30 · Acute on chronic HFpEF - EF 60-65% · Hypernatremia - NA - 150 · Hx Malnutrition tolerating tube feeds · Hx of Esophageal Varices · Hx Liver Cirrhosis   
· Hx DM- HgbA1c: 5.3, control improving · HX HTN  
· Hx of CAD  
· Hx Recent Left Hip ORIF (2020) Patient Active Problem List  
Diagnosis Code  Anemia D64.9  
 CAD (coronary artery disease) I25.10  Thrombocytopenia ([address] D69.6  NSTEMI (non-ST elevated myocardial infarction) (HCC) I21.4  
 UTI (urinary tract infection) N39.0  Sepsis ([address] A41.9  PNA (pneumonia) J18.9  Hypoxia R09.02  
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  
 CHF (congestive heart failure) (HCC) I50.9  Dyspnea R06.00  
 Pneumonia J18.9  
 HTN (hypertension), benign I10  
 CAP (community acquired pneumonia) J18.9  Acute on chronic diastolic CHF (congestive heart failure) (HCC) I50.33  Leg DVT (deep venous thromboembolism), acute, left (HCC) I82.402  Palpitations R00.2  Acute pulmonary edema ([facility]) J81.0  Malaise and fatigue R53.81, R53.83  
 Pancytopenia ([facility] [id number]  Portal hypertension ([facility] K76.6  Chronic anticoagulation Z79.01  
 Hip fracture ([facility] S72.009A  Breast cancer ([facility] C50.919  
 Colon cancer ([facility] C18.9  Pneumonia due to COVID-19 virus U07.1, J12.82 PLAN:  
Neuro: Titrate sedation to RASS 0 to -1. Continue Precedex and Fentanyl gtts. Daily sedation holiday. Resp: VAP Bundle, bronchial hygiene protocol. Strict aspiration precautions, keep HOB>30'. Continue duonebs q6 hrs PRN. Daily CXR and ABG while intubated. SBT when criteria are met. No trache per pt's expressed wishes to her nephew. Per nephew, ok for medical extubation then comfort care if this fails. I/D:  Afebrile, aleukocytosis. Completed course of Meropenem and vancomycin. Remdesivir given on admission, d/c'd due to renal fx and intubation. Blood Cx NG 4 days. MRSA Cx positive. Trend WBCs + Temp, ID following Hem/Onc:   transfused overnight. Continue to  monitor thrombocytopenia. Trend H/H, Monitor for s/o active bleeding. No DVT's found on BLE doppler CVS: currently HD stable off pressors, continue to maintain MAP >65. ECHO (+) LV EF: 55-60% and severe Pulmonary HTN. Originally not a candidate for anticoagulation due to hemoptysis and thrombocytopenia Metabolic:  Daily BMP, Mag  and Phos, replace electrolytes per protocol. Free water flushes decreased due to hyponatremia Renal: Trend renal indices, monitor I/O.  nephrology following Endocrine: SSI, q6 Glucose checks. Lantus @ 20  units SQ. Avoid hypoglycemia GI: NPO, SUP ( IV Pepcid). Continue TF as tolerated Musc/Skin: No acute issues.  
Partial Code, INTUBATE ONLY  
 Critical care liana 31 minutes Rhina Montemayor Subjective/Interval History:  
 
Interval HPI:  
Tanake Amita Leonardo is a 79 y.o.  Female w/ PMH of HFpPEF (EF 55-60%), Stage 3 CKD, Recent L Hip ORIF (12/2020), Esophageal Varices, Liver Cirrhosis, DM, HTN, CAD, who presented from [address] [place] [address] to [facility] ED on 01/26/2021 via EMS after developing worsening SOB and Hemoptysis after testing (+) for COVID-19 x1 week prior.  
  
ICU consulted for Hypoxia and is noted to be satting at 94+ on 15L/100% HFNC. Shortly after examination, she required Endotracheal Intubation w/ Mechanical Ventilation.  
 Patient's family updated on patient's critical condition and confirmed partial code status of intubation only but no CPR. Extubation 2/2/2021, unfortunately, the patient was unable to protect airway adequately soon afterwards and was re-intubated with family's consent. Family has additionally stated that  patient would not want to have trach. 
  
Nephrology following for NANCY on CKD3, ID following for COVID pneumonia, Enterobacter cystitis 
  
02/05/21 Hospital Day: 13 Vent Day: 12 Overnight Events: no new events overnight Mentation: Sedated with Fentanyl and precedex Respiratory/ Secretions: stable , FIO2 35% and PEEP 5 Hemodynamics: VSS. Afebrile. Diet: TF tolerated. Free water flushes decreased Urine/Fecal Output: good UOP Need for Procedures: none 
  
  
 
ROS:Review of systems not obtained due to patient factors. Objective:  
Vital Signs:   
Visit Vitals BP (!) 136/54 Pulse 81 Temp 99.7 °F (37.6 °C) Resp 15 Ht 5' (1.524 m) Wt 79.8 kg (175 lb 14.8 oz) SpO2 97% BMI 34.36 kg/m² O2 Device: Ventilator O2 Flow Rate (L/min): 4 l/min Temp (24hrs), Av.8 °F (36.6 °C), Min:95.9 °F (35.5 °C), Max:100 °F (37.8 °C) Intake/Output:  
Last shift:      701 - 1900 In: 1340.9 [I.V.:60.9] Out: 75 [Urine:75] Last 3 shifts: 1901 - 700 In: 6605.7 [I.V.:.7] Out: 6521 [Urine:679; Drains:475] Intake/Output Summary (Last 24 hours) at 2021 1554 Last data filed at 2021 1400 Gross per 24 hour Intake 3812.1 ml Output 525 ml Net 3287.1 ml  
  
   
Limited Physical Exam:  
            General: Intubated, Sedated, in NAD 
            HEENT: NC/AT, No visible nasal drainage, Trachea midline             OYJVC: No visible trauma or deformity to chest wall, RRR- via Tele  
            Lungs: Equal & Bilateral chest rise, Airway clear, ET Tube in place and secured  
            Abdomen: Non-Distended, large reducible hernia 
            Extremity: 2+pitting edema BLE 
            Neuro: Sedated  
            Skin: No new skin lesions, rashes, or ulcers DATA: 
Labs: 
Recent Labs 21 
1408 21 
0300 21 
0340 WBC 7.9 5.5 10.0 HGB 7.7* 6.0* 7.2* HCT 23.4* 18.4* 21.8*  
PLT 43* 40* 46* Recent Labs 21 
0300 21 
2125 21 
0340 21 
0300 21 
0300  141 141   < > 150*  
K 3.8 3.6 3.6   < > 3.6  105 107   < > 113* CO2 27 28 25   < > 26 * 150* 270*   < > 256* BUN 91* 80* 159*   < > 153* CREA 2.17* 1.89* 3.20*   < > 3.48* CA 8.3* 8.5 8.1*   < > 7.8*  
MG 2.0  --  2.2  --  2.3 PHOS 3.0 2.4* 3.9   < > 4.5 ALB 3.3* 3.7 2.3*   < > 1.9* ALT 14  --  15  --  16  
 < > = values in this interval not displayed. No results for input(s): PH, PCO2, PO2, HCO3, FIO2 in the last 72 hours. PFT:                                                    
Echo: 
 
Imaging: 
[x]I have personally reviewed the patients radiographs []Radiographs reviewed with radiologist 
 [x]No change from prior, tubes and lines in adequate position []Improved   []Worsening High complexity decision making was performed during the evaluation of this patient at high risk for decompensation with multiple organ involvement 
  
Above mentioned total time spent on reviewing the case/medical record/data/notes/EMR/patient examination/documentation/coordinating care with nurse/consultants, exclusive of procedures with complex decision making performed and > 50% time spent in face to face evaluation.  
 
Cruz Baxter MD

## 2021-02-07 NOTE — ROUTINE PROCESS
0700: Verbal shift report received from [facility]. Care assumed at this time. 1910: Bedside and Verbal shift change report given to LATRICIA Aguillon (oncoming nurse) by LATRICIA Landers (offgoing nurse). Report included the following information SBAR, Intake/Output, Recent Results and Quality Measures.

## 2021-02-08 NOTE — FAMILY MEETING
Discussed with nephew, Sirisha Johns RN, would like to have us discuss with her after extubation but believes she does not want to re-intubated. He is certain that she does not want a trach, her  had this and does not want it. Biological aunt, dads sister. Will check [facility] Oscar Canales MD, Nadya Acuna [address] Critical Care Fellow 2/8/2021, 1:50 PM

## 2021-02-08 NOTE — PROGRESS NOTES
Discharge planning Reviewed chart. Patient remains intubated at this time. Prior to admission, patient was at [facility] for rehabilitation. CM will continue to monitor and assist as needed. MIRA Harris, RN Pager # [phone] Care Manager

## 2021-02-08 NOTE — PROGRESS NOTES
[facility] Pulmonary, Critical Care, and Sleep Medicine Name: Carmen Stanton MRN: [id number] : 1940 Hospital: [address] [place] [address] Date: 2021 IMPRESSION:  
· Hypoxic Respiratory Failure with multifocal pneumonia due to COVID-19 and w/ Acute on Chronic CHF Exacerbation, now requiring Mechanical Ventilation. Extubated 2021, failed to protect airway, re-intubated soon after. · Pulmonary HTN- based on Echo (2021)- LV EF: 55-60%, Pulmonary arterial systolic pressure is 70 mmHg. · COVID-19 Pneumonia- Covid-19 Rapid Test (+) on 2021. Known (+) prior to admission.     
· Septic shock- tachycardia, tachypnea, elevated procal on admission,  COVID-19 + UTI, ?underlying aspiration. UA (+) 3+ Bacteria + Large Leuk, Urine Cx resistant Enterobacter. Procal trending down · Metabolic ARHRFBXI-  to above, resolved · NANCY on Stage 3 CKD, Oliguric- likely in setting of Distributive Shock. HD started . · Encephalopathy- Metabolic, secondary to above · Enterobacter UTI completed course of cefepime, Vanc · Hemoptysis- Hx of Esophageal Varices vs Pulmonary Edema, resolved · Thrombocytopenia- Baseline chronically low, current platelets 43 · Elevated Troponin- likely CHF Exacerbation + worsening Pulm HTN , Troponin: 1.30 · Acute on chronic HFpEF - EF 60-65% · Hypernatremia - NA - 150 · Hx Malnutrition tolerating tube feeds · Hx of Esophageal Varices · Hx Liver Cirrhosis   
· Hx DM- HgbA1c: 5.3, control improving · HX HTN  
· Hx of CAD  
· Hx Recent Left Hip ORIF (2020) Patient Active Problem List  
Diagnosis Code  Anemia D64.9  
 CAD (coronary artery disease) I25.10  Thrombocytopenia ([address] D69.6  NSTEMI (non-ST elevated myocardial infarction) (HCC) I21.4  
 UTI (urinary tract infection) N39.0  Sepsis ([address] A41.9  PNA (pneumonia) J18.9  Hypoxia R09.02  
 Cirrhosis of liver not due to alcohol ([facility] K74.60  Esophageal varices (HCC) I85.00  
 CHF (congestive heart failure) (HCC) I50.9  Dyspnea R06.00  
 Pneumonia J18.9  
 HTN (hypertension), benign I10  
 CAP (community acquired pneumonia) J18.9  Acute on chronic diastolic CHF (congestive heart failure) (HCC) I50.33  Leg DVT (deep venous thromboembolism), acute, left (HCC) I82.402  Palpitations R00.2  Acute pulmonary edema (HCC) J81.0  Malaise and fatigue R53.81, R53.83  
 Pancytopenia ([facility] [id number]  Portal hypertension ([facility] K76.6  Chronic anticoagulation Z79.01  
 Hip fracture ([facility] S72.009A  Breast cancer ([facility] C50.919  
 Colon cancer ([facility] C18.9  Pneumonia due to COVID-19 virus U07.1, J12.82 PLAN:  
Neuro: Titrate sedation to RASS 0 to -1. Continue Precedex and Fentanyl gtts. Daily sedation holiday. Resp: VAP Bundle, bronchial hygiene protocol. Strict aspiration precautions, keep HOB>30'. Continue duonebs q6 hrs PRN. Daily CXR and ABG while intubated. SBT when criteria are met. No trache per pt's expressed wishes to her nephew. Per nephew, ok for medical extubation then comfort care if this fails. Will discuss with nephew, extubate today. I/D:  Afebrile, aleukocytosis. Completed course of Meropenem and vancomycin. Remdesivir given on admission, d/c'd due to renal fx and intubation. Blood Cx NG 4 days. MRSA Cx positive. Trend WBCs + Temp, ID following Hem/Onc: Continue to  monitor thrombocytopenia. Trend H/H, Monitor for s/o active bleeding. No DVT's found on BLE doppler CVS: currently HD stable off pressors, continue to maintain MAP >65. ECHO (+) LV EF: 55-60% and severe Pulmonary HTN. Originally not a candidate for anticoagulation due to hemoptysis and thrombocytopenia Metabolic:  Daily BMP, Mag  and Phos, replace electrolytes per protocol. Free water flushes decreased due to hyponatremia Renal: Trend renal indices, monitor I/O.  nephrology following Endocrine: SSI, q6 Glucose checks. Lantus @ 20  units SQ. Avoid hypoglycemia GI: NPO, SUP ( IV Pepcid). Continue TF as tolerated Musc/Skin: No acute issues.  
Partial Code, INTUBATE ONLY Kavitha Rojas Subjective/Interval History:  
 
Interval HPI:  
Teo Mishramirtha Leonardo is a 79 y.o.  Female w/ PMH of HFpPEF (EF 55-60%), Stage 3 CKD, Recent L Hip ORIF (2020), Esophageal Varices, Liver Cirrhosis, DM, HTN, CAD, who presented from [address] [place] [address] to [facility] on 2021 via EMS after developing worsening SOB and Hemoptysis after testing (+) for COVID-19 x1 week prior.  
  
ICU consulted for Hypoxia and is noted to be satting at 94+ on 15L/100% HFNC. Shortly after examination, she required Endotracheal Intubation w/ Mechanical Ventilation.  
 
Extubation 2021, unfortunately, the patient was unable to protect airway adequately soon afterwards and was re-intubated with family's consent. Family has additionally stated that  patient would not want to have trach. 
  
Nephrology following for NANCY on CKD3, ID following for COVID pneumonia, Enterobacter cystitis 
  
21 Hospital Day: 15 Vent Day: 14 Overnight Events: no new events overnight Mentation: Sedated with Fentanyl and precedex Respiratory/ Secretions: stable , FIO2 35% and PEEP 5 Hemodynamics: VSS. Afebrile. Diet: TF tolerated. Free water flushes decreased Urine/Fecal Output: good UOP Need for Procedures: none 
  
ROS:Review of systems not obtained due to patient factors. Objective:  
Vital Signs:   
Visit Vitals BP (!) 104/52 Pulse 60 Temp 97.5 °F (36.4 °C) Resp 12 Ht 5' (1.524 m) Wt 79.8 kg (175 lb 14.8 oz) SpO2 99% BMI 34.36 kg/m² O2 Device: Ventilator, Endotracheal tube O2 Flow Rate (L/min): 4 l/min Temp (24hrs), Av.4 °F (36.9 °C), Min:97.3 °F (36.3 °C), Max:100 °F (37.8 °C) Intake/Output:  
Last shift:      No intake/output data recorded. Last 3 shifts: 1901 -  07 In: 4343.2 [I.V.:363.2] Out: 1430 [Urine:205; Drains:175] Intake/Output Summary (Last 24 hours) at 2/8/2021 2606 Last data filed at 2/8/2021 0700 Gross per 24 hour Intake 2180.1 ml Output 1160 ml Net 1020.1 ml  
  
   
Limited Physical Exam:  
            General: Intubated, Sedated, in NAD, nodding, following commands On SBT this am.  
            BVOMQ: NC/AT, No visible nasal drainage, Trachea midline             QFAFV: No visible trauma or deformity to chest wall, RRR- via Tele  
            Lungs: Equal & Bilateral chest rise, Airway clear, ET Tube in place and secured  
            Abdomen: Non-Distended, large reducible hernia 
            Extremity: 2+pitting edema BLE 
            Neuro: Sedated  
            Skin: No new skin lesions, rashes, or ulcers DATA: 
Labs: 
Recent Labs 02/07/21 
1408 02/07/21 
0300 02/06/21 
0340 WBC 7.9 5.5 10.0 HGB 7.7* 6.0* 7.2* HCT 23.4* 18.4* 21.8*  
PLT 43* 40* 46* Recent Labs 02/08/21 
0300 02/07/21 
1855 02/07/21 
0300 02/06/21 
0340 02/06/21 
0340  140 141   < > 141  
K 3.6 3.4* 3.8   < > 3.6  104 105   < > 107 CO2 30 30 27   < > 25 * 122* 230*   < > 270* BUN 61* 48* 91*   < > 159* CREA 1.83* 1.34* 2.17*   < > 3.20* CA 8.1* 8.6 8.3*   < > 8.1*  
MG 1.9  --  2.0  --  2.2 PHOS 2.6 1.9* 3.0   < > 3.9 ALB 2.8* 3.2* 3.3*   < > 2.3* ALT 13  --  14  --  15  
 < > = values in this interval not displayed. No results for input(s): PH, PCO2, PO2, HCO3, FIO2 in the last 72 hours. PFT:                                                    
Echo: 
 
Imaging: 
[x]I have personally reviewed the patients radiographs []Radiographs reviewed with radiologist 
 [x]No change from prior, tubes and lines in adequate position []Improved   []Worsening High complexity decision making was performed during the evaluation of this patient at high risk for decompensation with multiple organ involvement 
  
 Above mentioned total time spent on reviewing the case/medical record/data/notes/EMR/patient examination/documentation/coordinating care with nurse/consultants, exclusive of procedures with complex decision making performed and > 50% time spent in face to face evaluation. Sanjuana Landrum MD  
[facility] Critical Care Fellow Attending Note: I saw and evaluated the patient, performing the key elements of the service. I discussed the findings, assessment and plan with the fellow and agree with the fellow's findings and plan as documented in the fellow's note above. All edits and changes made above or are mentioned in my attending note which was independently assessed as well as written. Total of 37 min critical care time spent at bedside (personally) during the course of care providing evaluation,management and care decisions and ordering appropriate treatment related to critical care problems exclusive of procedures. The reason for providing this level of medical care for this critically ill patient was due a critical illness that impaired one or more vital organ systems such that there was a high probability of imminent or life threatening deterioration in the patients condition. This care involved high complexity decision making to assess, manipulate, and support vital system functions, to treat this degree vital organ system failure and to prevent further life threatening deterioration of the patients condition. This time was independent of other practitioners. 39-year-old female with a past medical history of CHFpEF, CKD, recent left hip ORIF in 12/2020, liver cirrhosis with esophageal varices, diabetes, hypertension, CAD, presented to [facility] from [facility] on 1/26/2021 with worsening shortness of breath and hemoptysis. Patient had tested positive for COVID-19 the week prior. Patient unfortunately developed worsening hypoxia from 15L to 100% high flow nasal cannula, patient then developed worsening hypoxia with subsequent respiratory failure requiring emergent intubation with mechanical ventilation. Patient surprisingly had rapid improvement in hypoxia, patient was subsequently extubated on 2/2/2021, however patient quickly became obtunded and was unable to protect her airway. Family reported that the patient would not want to have a tracheostomy, and further discussion today notes that they were okay with reintubation in order to decide how long to pursue ventilation. They do report that they would like the patient to decide if she would prefer to be reintubated, however given the patient likely has acute encephalopathy this will likely be not possible. We will consult palliative care for additional assistance. In the meantime today, patient did have improvement in hypoxia and underwent SBT for approximately 2 hours which was well-tolerated with RSBI in the 50s. Discussed with nephrology, in order to improve patient's outcome, dialysis was performed, however dialysis nursing did not prioritize this patient and it was performed in the afternoon, and unfortunately patient had a very prolonged treatment which prevented extubation. Sedation was changed from fentanyl to Precedex with as needed fentanyl. The hope will be to perform sedation holiday again tomorrow and hopefully patient will have good weaning parameters to liberate her from the ventilator.   Patient did have issues with septic shock, weaned off of vasopressors, currently remains on midodrine. Patient also having some issues with thick secretions, started patient on hypertonic saline nebs for airway clearance. Patient completed course of antibiotics for aspiration versus community-acquired pneumonia. .  Of note patient's fluid status difficult to control given CHFpEF along with severe pulmonary hypertension, last echo on 1/27/2020 shows a PASP of 70 mmHg. Of note patient also has thrombocytopenia, this is likely secondary to patient's underlying cirrhosis as well as sepsis, currently stable, no evidence of spontaneous bleeding. Continue supportive care Prognosis appears guarded, however I suspect patient has a high likelihood for decompensation and worsening given elevated burden of chronic disease Rayray Lambert MD/MPH Pulmonary, Critical Care Medicine [facility] Self

## 2021-02-08 NOTE — ROUTINE PROCESS
Bedside and Verbal shift change report given to Trudi Echevarria RN (oncoming nurse) by Milton Fried RN (offgoing nurse). Report included the following information SBAR, Kardex, ED Summary, Intake/Output, MAR, Recent Results and Cardiac Rhythm NSR.

## 2021-02-08 NOTE — DIABETES MGMT
GLYCEMIC CONTROL PLAN OF CARE Assessment/Recommendations: 
Lab glucose results this am 152 mg/dl Continue basal and  corrective insulin coverage as ordered Receiving very insulin resistant corrective dosing. Will continue inpatient monitoring. Most recent blood glucose values: 
 
 
Results for Nikki Man (MRN [id number]) as of 2/8/2021 09:56 Ref. Range 2/7/2021 09:29 2/7/2021 14:02 2/7/2021 20:18 2/8/2021 03:02 2/8/2021 08:36 GLUCOSE,FAST - POC Latest Ref Range: 70 - 110 mg/dL 239 (H) 167 (H) 130 (H) 183 (H) 180 (H) Current A1C of 5.3 % is equivalent to average blood glucose of 105 mg/dl over the past 2-3 months. ? Accuracy. Patient anemic. Current hospital diabetes medications:  
Lantus 20 units daily Lispro corrective insulin coverage every 6 hours Previous day's insulin requirements:  
lantus 20 units Lispro 18 units corrective insulin Home diabetes medications: 
Januvia 50 mg daily Novolog corrective insulin AC breakfast, lunch and dinner. Diet:   
NPO. TF nepro at 40 ml/hr. To stop for now for SBT Education:  ____Refer to Diabetes Education Record __x_Education not indicated at this time Covid-19 isolation [facility], RN CDE Ext [id number]

## 2021-02-08 NOTE — PROGRESS NOTES
0710 - Bedside and Verbal shift change report given to Brendan Larson RN  (oncoming nurse) by Leona Morales RN (offgoing nurse). Report included the following information SBAR, Kardex, Intake/Output, Recent Results and Cardiac Rhythm NSR.

## 2021-02-08 NOTE — DIALYSIS
[facility]bera Gutting ACUTE HEMODIALYSIS FLOW SHEET 
 
 
HEMODIALYSIS ORDERS: Physician: Sarah 
  
Dialyzer: revaclear   Duration: 3 hr  BFR: 300   DFR: 500 Dialysate:  Temp 36-37*C  K+   3    Ca+  2.5 Na 138 Bicarb 35 Weight:  
Wt Readings from Last 1 Encounters:  
02/07/21 79.8 kg (175 lb 14.8 oz) UF Goal: 2000 Access  Needle Gauge Heparin []  Bolus      Units    [] Hourly       Units    []None Catheter locking solution Heparin Pre BP:   148/66    Pulse:     66       Respirations: 24  Temperature:   38.8 Labs: Pre        Post:        [] N/A Additional Orders(medications, blood products, hypotension management): Albumin [] N/A [x] [facility] Consent Verified CATHETER ACCESS: []N/A   [x]Right   []Left   [x]IJ     []Fem   []transhepatic  
[] First use X-ray verified     []Permanent                [x] Temporary  
[x]No S/S infection  []Redness  []Drainage []Cultured []Swelling []Pain  
[x]Medical Aseptic Prep Utilized   []Dressing Changed  [] Biopatch  Date:      
[]Clotted   [x]Patent   Flows: [x]Good  []Poor  []Reversed If access problem,  notified: []Yes    [x]N/A  Date:        
 
GRAFT/FISTULA ACCESS:  []N/A     []Right     []Left     []UE     []LE []AVG   []AVF        []Buttonhole    []Medical Aseptic Prep Utilized []No S/S infection  []Redness  []Drainage []Cultured []Swelling []Pain Bruit:   [] Strong    [] Weak       Thrill :   [] Strong    [] Weak Needle Gauge:    Length: If access problem,  notified: []Yes     []N/A  Date:       
Please describe access if present and not used:  
            
            GENERAL ASSESSMENT:  
  
LUNGS:  Rate  SaO2%        [] N/A    [] Clear  [x] Coarse  [] Crackles  [] Wheezing 
      [] Diminished     Location : []RLL   []LLL    []RUL  []JERED Cough: []Productive  []Dry  [x]N/A   Respirations:  [x]Easy  []Labored Therapy:   []RA  []NC  l/min    Mask: []NRB []Venti       O2% [x]Ventilator  [x]Intubated  [] Trach  [] BiPaP CARDIAC: []Regular      [] Irregular   [] Pericardial Rub  [] JVD [x]  Monitored  [x] Bedside  [] Remotely monitored [] N/A  Rhythm: EDEMA: [] None  [x]Generalized  [] Pitting [] 1    [] 2    [] 3    [] 4 [] Facial  [] Pedal  []  UE  [] LE  
 
SKIN:   [x] Warm  [] Hot     [] Cold   [x] Dry     [] Pale   [] Diaphoretic    
             [] Flushed  [] Jaundiced  [] Cyanotic  [] Rash  [] Weeping LOC:    [x] Alert      []Oriented:    [] Person     [] Place  []Time 
             [x] Confused  [] Lethargic  [] Medicated  [] Non-responsive GI / ABDOMEN:  [] Flat    [] Distended    [x] Soft    [] Firm   []  Obese 
                           [] Diarrhea  [x] Bowel Sounds  [] Nausea  [] Vomiting  / URINE ASSESSMENT:[] Voiding   [] Oliguria  [x] Anuria   []  Jean [] Incontinent    []  Incontinent Brief      []  Bathroom Privileges PAIN: [x] 0 []1  []2   []3   []4   []5   []6   []7   []8   []9   []10 Scale 0-10  Action/Follow Up: MOBILITY:  [] Amb    [] Amb/Assist    [x] Bed    [] Wheelchair  [] Stretcher All Vitals and Treatment Details on Attached Flowsheet Hospital: [facility] Room # 7995 0552 [] 1st Time Acute  [] Stat  [x] Routine  [] Urgent    
[] Acute Room  []  Bedside  [x] ICU/CCU  [] ER Isolation Precautions:  Droplet Plus Special Considerations:         [] Blood Consent Verified []N/A ALLERGIES:  
Allergies Allergen Reactions  Latex Itching Skin breaks out causing itching Code Status:Partial Code Hepatitis Status:    2nd RN check:                    
Lab Results Component Value Date/Time Hepatitis B surface Ag <0.10 02/06/2021 03:40 AM  
 Hepatitis B surface Ab <3.10 (L) 02/06/2021 03:40 AM  
   
 
            Current Labs:  
Lab Results Component Value Date/Time  Sodium 139 02/08/2021 03:00 AM  
 Potassium 3.6 02/08/2021 03:00 AM  
 Chloride 105 02/08/2021 03:00 AM  
 CO2 30 02/08/2021 03:00 AM  
 Anion gap 4 02/08/2021 03:00 AM  
 Glucose 152 (H) 02/08/2021 03:00 AM  
 BUN 61 (H) 02/08/2021 03:00 AM  
 Creatinine 1.83 (H) 02/08/2021 03:00 AM  
 BUN/Creatinine ratio 33 (H) 02/08/2021 03:00 AM  
 GFR est AA 32 (L) 02/08/2021 03:00 AM  
 GFR est non-AA 26 (L) 02/08/2021 03:00 AM  
 Calcium 8.1 (L) 02/08/2021 03:00 AM  
  
Lab Results Component Value Date/Time WBC 5.9 02/08/2021 03:00 AM  
 Hemoglobin, POC 5.4 (LL) 02/07/2021 05:30 AM  
 HGB 7.9 (L) 02/08/2021 03:00 AM  
 Hematocrit, POC 16 (LL) 02/07/2021 05:30 AM  
 HCT 23.9 (L) 02/08/2021 03:00 AM  
 PLATELET 48 (L) 06/62/6618 03:00 AM  
 MCV 86.9 02/08/2021 03:00 AM  
  
  
 
                                                                         
DIET: DIET NUTRITIONAL SUPPLEMENTS 
DIET TUBE FEEDING    
 
PRIMARY NURSE REPORT: First initial/Last name/Title Pre Dialysis: TAYLER Andrew RN     Time: 0012 EDUCATION:   
[x] Patient [] Other         Knowledge Basis: [x]None []Minimal [] Substantial  
Barriers to learning Intubation []N/A  
[] Access Care     [] S&S of infection     [] Fluid Management     []K+     [x]Procedural   
[]Albumin     [] Medications     [] Tx Options     [] Transplant     [] Diet     [] Other Teaching Tools:  [] Explain  [] Demo  [] Handouts [] Video Patient response:   [] Verbalized understanding  [] Teach back  [] Return demonstration [x] Requires follow up Inappropriate due to         
 
[x]1415 Time Out/Safety Check  [x]Extracorporeal Circuit Tested for integrity RO/HEMODIALYSIS MACHINE SAFETY CHECKS  Before each treatment:    
Machine Number:                   [facility]  
                                [] Unit Machine #  with centralized RO 
                                [] Portable Machine #1/RO serial # [id number] [] Portable Machine #2/RO serial # [id number] [x] Portable Machine #13/RO serial # [id number] [facility] 
                                [] Portable Machine #11/RO serial # [id number] [] Portable Machine #12/RO serial # [id number] [] Portable Machine #13/RO serial #  [id number] Alarm Test:  Pass time 7511 [x] RO/Machine Log Complete Temp    36 Dialysate: pH  7.4 Conductivity: Meter   14.4     HD Machine   14.4                  TCD: 14.2 Dialyzer Lot # [id number]            Blood Tubing Lot # [id number]          Saline Lot #  [id number] CHLORINE TESTING-Before each treatment and every 4 hours Total Chlorine: [] less than 0.1 ppm  Time: 1430 4 Hr/2nd Check Time:   
(if greater than 0.1 ppm from Primary then every 30 minutes from Secondary) TREATMENT INITIATION  with Dialysis Precautions:  
[x] All Connections Secured                 [x] Saline Line Double Clamped  
[x] Venous Parameters Set                  [x] Arterial Parameters Set [x] Prime Given 250ml                          [x]Air Foam Detector Engaged Treatment Initiation Note: Received patient in ICU bed intubated and on ventilation. Patient opens eye, responds to verbal stimuli. Leobardo soft wrist restraint intact. Right IJ udal accessed aspirates and flushes well. HD initiated without difficulty. During Treatment Notes:1500-  Patient resting quietly tolerating tx. Face/access visible and connected. Medication Dose Volume Route Time [facility] name Title Albumin 25 gm  100 HD access  Sandieoralia Gibson Post Assessment:  
Dialyzer Cleared: [x] Good [] Fair  [] Poor Blood processed:  51.5 L 
UF Removed  1500 Ml POst BP:   168/76       Pulse: 86 Respirations: 14  Temperature: 98.4 Lungs: [] Clear      [] Course         [] Crackles  
 [] Wheezing         [] Diminished Post Tx Vascular Access: AVF/AVG: Bleeding stopped Art  min. Logan. Min   N/A Cardiac:  
[] Regular   [] Irregular   [] Monitor  [] N/A Rhythm:      
Catheter:  
Locking solution: Heparin 1:1000 Art. 1.1  Logan. 1.1  N/A Skin:   Pain:   
[] Warm  [] Dry [] Diaphoretic    [] Flushed   
[] Pale [] Cyanotic []0  []1  []2   []3  []4   []5   []6   []7   []8   []9   []10 Post Treatment Note: 
  
Patient completed 3 hrs of HD, 1500 ml of fluid removed. Patient remains in ICU in stable condition. POST TREATMENT PRIMARY NURSE HANDOFF REPORT:  
 
First initial/Last name/Title Post Dialysis: TAYLER Andrew RN Time:  1256 Abbreviations: AVG-arterial venous graft, AVF-arterial venous fistula, IJ-Internal Jugular, Subcl-Subclavian, Fem-Femoral, Tx-treatment, AP/HR-apical heart rate, DFR-dialysate flow rate, BFR-blood flow rate, AP-arterial pressure, -venous pressure, UF-ultrafiltrate, TMP-transmembrane pressure, Logan-Venous, Art-Arterial, RO-Reverse Osmosis

## 2021-02-08 NOTE — PROGRESS NOTES
attended the interdisciplinary rounds for Vanessa aWy, who is a 79 y.o.,female. Patients Primary Language is: [place]. According to the patients EMR [facility] Affiliation is: [facility].  
 
The reason the Patient came to the hospital is:  
Patient Active Problem List  
 Diagnosis Date Noted  Pneumonia due to COVID-19 virus 01/27/2021  Hip fracture ([address] 12/06/2020  Breast cancer ([address] 12/06/2020  Colon cancer ([address] 12/06/2020  Palpitations 04/25/2019  Acute pulmonary edema ([address] 04/25/2019  Malaise and fatigue 04/25/2019  Pancytopenia ([address] 04/25/2019  Portal hypertension ([address] 04/25/2019  Chronic anticoagulation 04/25/2019  CAP (community acquired pneumonia) 03/24/2019  Acute on chronic diastolic CHF (congestive heart failure) ([address] 03/24/2019  Leg DVT (deep venous thromboembolism), acute, left ([address] 03/24/2019  
 HTN (hypertension), benign 01/28/2018  Pneumonia 01/25/2018  CHF (congestive heart failure) ([address] 10/18/2017  Dyspnea 10/18/2017  Cirrhosis of liver not due to alcohol ([address] 06/29/2017  Esophageal varices ([address] 06/29/2017  UTI (urinary tract infection) 06/21/2017  Sepsis ([address] 06/21/2017  PNA (pneumonia) 06/21/2017  Hypoxia 06/21/2017  
 NSTEMI (non-ST elevated myocardial infarction) ([address] 06/30/2016  Anemia 07/07/2015  CAD (coronary artery disease) 07/07/2015  Thrombocytopenia ([address] 07/07/2015 Plan: 
Chaplains will continue to follow and will provide pastoral care on an as needed/requested basis.  recommends bedside caregivers page  on duty if patient shows signs of acute spiritual or emotional distress. [address] [facility] [employer] [facility] Certified [employer] [facility]  
[phone]

## 2021-02-08 NOTE — ROUTINE PROCESS
[id number]: Update given to Fransisco Álvarez on patient's current status. Opportunity for questions provided.

## 2021-02-08 NOTE — PROGRESS NOTES
RENAL PROGRESS NOTE Minor Mood Assessment/Plan:  
 
1)NANCY on CKD3: d/t Ischaemic ATN/sepsis v/s covid nephropathy 2)Covid PNA/Resp failure mech vent per PCCM. Minimal settings, ABG looks better ,  
failed extubation last week, noted plans for possible extubation today 3)Altered mental status , combination of metabolic encephalopathy , uremia and sedation Doing daily HD for possible uremic encephalopathy before extubation 4)Cirrhosis/Varices: long term poor prognosis 5) hypernatremia , resolved 6) severe hypoalbuminemia: albumin with HD 7) severe anemia added arnold 8) thrombocytopenia , stable 9) AMS, metabolic v/s uremic  encephalopathy Plan: 
1) HD for solute and uremia , UF 1.5-2 lit 2) midodrine and albumin with HD 2) free water with TF  
3) renal panel daily 4) strict intake output 5) arnold with HD Discussed with PCCM and dialysis nursing Please call with questions, 
 
Maira Taylor MD FASN Cell [phone] Pager: [phone] Subjective: 
covid isolation. Tolerating daily dialyusis Patient Active Problem List  
Diagnosis Code  Anemia D64.9  
 CAD (coronary artery disease) I25.10  Thrombocytopenia ([facility] D69.6  NSTEMI (non-ST elevated myocardial infarction) ([facility]) I21.4  
 UTI (urinary tract infection) N39.0  Sepsis ([facility] A41.9  PNA (pneumonia) J18.9  Hypoxia R09.02  
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  
 CHF (congestive heart failure) (HCC) I50.9  Dyspnea R06.00  
 Pneumonia J18.9  
 HTN (hypertension), benign I10  
 CAP (community acquired pneumonia) J18.9  Acute on chronic diastolic CHF (congestive heart failure) ([facility]) I50.33  Leg DVT (deep venous thromboembolism), acute, left ([facility]) I82.402  Palpitations R00.2  Acute pulmonary edema (HCC) J81.0  Malaise and fatigue R53.81, R53.83  
 Pancytopenia ([facility] [id number]  Portal hypertension ([facility] K76.6  Chronic anticoagulation Z79.01  
 Hip fracture ([facility] S72.009A  Breast cancer ([facility] C50.919  
 Colon cancer ([facility] C18.9  Pneumonia due to COVID-19 virus U07.1, J12.82 Current Facility-Administered Medications Medication Dose Route Frequency Provider Last Rate Last Admin  epoetin rio-epbx (RETACRIT) injection 10,000 Units  10,000 Units IntraVENous Q MON, WED & FRI Cherise Smith MD      
 [COMPLETED] albumin human 25% (BUMINATE) 25 % solution      100 mL/hr at 02/08/21 1600 New Bag at 02/08/21 1600  
 albumin human 25% (BUMINATE) 25 % solution  0.9% sodium chloride infusion 250 mL  250 mL IntraVENous PRN Monique Martínez PA-C      
 insulin glargine (LANTUS) injection 20 Units  20 Units SubCUTAneous DAILY Mala Mart PA-C   20 Units at 02/08/21 4739  famotidine (PF) (PEPCID) 20 mg in 0.9% sodium chloride 10 mL injection  20 mg IntraVENous DAILY Claudia Mckeon MD   20 mg at 02/08/21 0846  
 menthol-zinc oxide (CALMOSEPTINE) 0.44-20.6 % ointment   Topical TID Partha Gillespie MD   Given at 02/08/21 1600  
 midodrine (PROAMATINE) tablet 10 mg  10 mg Per NG tube TID WITH MEALS Cherise Smith MD   10 mg at 02/08/21 1222  sodium chloride 3% hypertonic nebulizer soln  4 mL Nebulization Q6H RT Magali Barrera PA-C   4 mL at 02/08/21 0116  albuterol (ACCUNEB) nebulizer solution 1.25 mg  1.25 mg Nebulization Q6H RT Magali Barrera PA-C   1.25 mg at 02/08/21 0116  acetaminophen (TYLENOL) tablet 650 mg  650 mg Oral Q6H PRN Rinku Daniels PA-C   650 mg at 01/31/21 2230  carvediloL (COREG) tablet 12.5 mg  12.5 mg Oral Q12H Ora Chapman MD   12.5 mg at 02/08/21 2051  
 multivit-folic acid-herbal 343 (WELLESSE PLUS) oral liquid 30 mL  30 mL Per NG tube DAILY Karena Sun MD   30 mL at 02/08/21 6040  chlorhexidine (PERIDEX) 0.12 % mouthwash 10 mL  10 mL Oral Q12H JALEEL'Magali Belle PA-C   10 mL at 02/08/21 0846  
 albuterol-ipratropium (DUO-NEB) 2.5 MG-0.5 MG/3 ML  3 mL Nebulization Q6H PRN Magali Barrera PA-C      
 fentaNYL (PF) 900 mcg/30 ml infusion soln  0-200 mcg/hr IntraVENous TITRATE Magali Hendrix PA-C 1.7 mL/hr at 02/08/21 0545 50 mcg/hr at 02/08/21 0545  
 midazolam (VERSED) injection 1-2 mg  1-2 mg IntraVENous Q10MIN PRN Magali Hendrix PA-C   2 mg at 02/07/21 0320  
 fentaNYL citrate (PF) injection  mcg   mcg IntraVENous Q30MIN PRN Kay SCOTT PA-C   100 mcg at 02/07/21 5901  dexmedeTOMidine in 0.9 % NaCl (PRECEDEX) 400 mcg/100 mL (4 mcg/mL) infusion soln  0.1-1.5 mcg/kg/hr (Order-Specific) IntraVENous TITRATE Magali Hendrix PA-C 7 mL/hr at 02/08/21 0924 0.4 mcg/kg/hr at 02/08/21 7780  insulin lispro (HUMALOG) injection   SubCUTAneous Q6H Catrina Mohamud MD   3 Units at 02/08/21 0849  
 glucose chewable tablet 16 g  4 Tab Oral PRN Magali Hendrix PA-C      
 glucagon (GLUCAGEN) injection 1 mg  1 mg IntraMUSCular PRN Magali Barrera PA-C      
 dextrose (D50W) injection syrg 12.5-25 g  25-50 mL IntraVENous PRN Magali Hendrix PA-C Objective Vitals:  
 02/08/21 1300 02/08/21 1400 02/08/21 1430 02/08/21 1500 BP: (!) 160/72 (!) 146/112 (!) 148/66 102/61 Pulse: 74 71 72 64 Resp: 12 12 15 24 Temp: 98.4 °F (36.9 °C) 98.4 °F (36.9 °C) 98.4 °F (36.9 °C) 98.2 °F (36.8 °C) SpO2: 98% 99% 99% 100% Weight:      
Height:      
 
 
 
Intake/Output Summary (Last 24 hours) at 2/8/2021 1522 Last data filed at 2/8/2021 0800 Gross per 24 hour Intake 1377.9 ml Output 1140 ml Net 237.9 ml  
 
 
 
 
Admission weight: Weight: 68 kg (150 lb) (01/27/21 0830) Last Weight Metrics: 
Weight Loss Metrics 2/7/2021 12/10/2020 9/28/2020 12/1/2019 10/16/2019 9/30/2019 8/6/2019 Today's Wt 175 lb 14.8 oz 157 lb 8 oz 149 lb 14.4 oz 140 lb 143 lb 140 lb 145 lb BMI 34.36 kg/m2 30.76 kg/m2 29.28 kg/m2 27.34 kg/m2 27.93 kg/m2 27.34 kg/m2 28.32 kg/m2 Physical Assessment:  
 
Pt is covid 19+, I did not go into the room to minimize pt/md exposure and preserve PPE. I have viewed pt from the door and reviewed documentation from other providers.  
 
Lab CBC w/Diff Recent Labs 02/08/21 
0300 02/07/21 
1408 02/07/21 
0300 WBC 5.9 7.9 5.5  
RBC 2.75* 2.68* 2.09* HGB 7.9* 7.7* 6.0*  
HCT 23.9* 23.4* 18.4* PLT 48* 43* 40* GRANS 82* 85* 85* LYMPH 6* 13* 11* EOS 1 1 1 Chemistry Recent Labs 02/08/21 
0300 02/07/21 
1855 02/07/21 
0300 02/06/21 
0340 02/06/21 
0340 * 122* 230*   < > 270*  140 141   < > 141  
K 3.6 3.4* 3.8   < > 3.6  104 105   < > 107 CO2 30 30 27   < > 25 BUN 61* 48* 91*   < > 159* CREA 1.83* 1.34* 2.17*   < > 3.20* CA 8.1* 8.6 8.3*   < > 8.1* AGAP 4 6 9   < > 9  
BUCR 33* 36* 42*   < > 50* AP 68  --  69  --  84  
TP 5.1*  --  5.6*  --  4.9* ALB 2.8* 3.2* 3.3*   < > 2.3*  
GLOB 2.3  --  2.3  --  2.6 AGRAT 1.2  --  1.4  --  0.9 PHOS 2.6 1.9* 3.0   < > 3.9  
 < > = values in this interval not displayed. Lab Results Component Value Date/Time Iron 65 12/08/2020 11:30 AM  
 TIBC 189 (L) 12/08/2020 11:30 AM  
 Iron % saturation 34 12/08/2020 11:30 AM  
 Ferritin 669 (H) 02/04/2021 05:10 AM  
  
Lab Results Component Value Date/Time Calcium 8.1 (L) 02/08/2021 03:00 AM  
 Phosphorus 2.6 02/08/2021 03:00 AM  
  
 
Kit MD Shayy 
[facility] Pager: [phone] Phone: [phone]

## 2021-02-09 NOTE — ROUTINE PROCESS
1930 Bedside and Verbal shift change report given to ángel bernabe (oncoming nurse) by Mo Peter (offgoing nurse). Report included the following information SBAR, Kardex and Recent Results. 2000 assessment completed. Oral and velazquez care completed. Side rails up, call light within reach. Hob elevated 30 degrees. 2030 Alpesh spencer notified that nurse report see abdirahman 1800 lab work less than two hours after stop hemodialysis will recollect. And that velazquez left in , will ask DR Garibay in am if he would like the velazquez in or out , also fms left in  patient has stage two decub, will clarify in am on rounds. 0000 reassessment completed. Oral and velazquez care provided. 0400 reassessment completed. Oral and velazquez care provided. 0415 tube feed stopped for possible extubation today. , as discussed with melvin spencer . 
0720 Bedside and Verbal shift change report given to iris bernabe (oncoming nurse) by ángel bernabe (offgoing nurse). Report included the following information SBAR, Intake/Output and Recent Results. Side rails up, hob elevated 30 degrees.

## 2021-02-09 NOTE — PROGRESS NOTES
Patient  doing great on SBT although has no cuff leak RT recommends not extubate without cuff leak. Unable to get ABG. MD aware

## 2021-02-09 NOTE — PROGRESS NOTES
RSBI 150-200 Not currently able to extubate Porsha MD Ghassan, Nadya [phone] Critical Care Fellow 2/9/2021, 10:46 AM

## 2021-02-09 NOTE — DIABETES MGMT
GLYCEMIC CONTROL PLAN OF CARE Assessment/Recommendations: 
Blood glucose this am 216 mg/dl Continue basal and  corrective insulin coverage as ordered Receiving very insulin resistant corrective dosing. Will continue inpatient monitoring. Most recent blood glucose values: 
 
 
 
Results for Mirtha Houston (MRN [id number]) as of 2/9/2021 10:43 Ref. Range 2/8/2021 08:36 2/8/2021 18:16 2/8/2021 20:43 2/9/2021 03:54 2/9/2021 08:29 GLUCOSE,FAST - POC Latest Ref Range: 70 - 110 mg/dL 180 (H) 111 (H) 142 (H) 258 (H) 216 (H) Current A1C of 5.3 % is equivalent to average blood glucose of 105 mg/dl over the past 2-3 months. ? Accuracy. Patient anemic. Current hospital diabetes medications:  
Lantus 20 units daily Lispro corrective insulin coverage every 6 hours Previous day's insulin requirements:  
lantus 20 units Lispro 6 units corrective insulin Home diabetes medications: 
Januvia 50 mg daily Novolog corrective insulin AC breakfast, lunch and dinner. Diet:   
NPO> TF nepro at 40 ml/hr Education:  ____Refer to Diabetes Education Record __x_Education not indicated at this time Covid-19 isolation. Intubated. Pebbles Galdamez RN CDE Ext [id number]

## 2021-02-09 NOTE — PROGRESS NOTES
RENAL PROGRESS NOTE Carlos Barrett Assessment/Plan:  
 
1)NANCY on CKD3: d/t Ischaemic ATN/sepsis v/s covid nephropathy 2)Covid PNA/Resp failure mech vent per PCCM. Minimal settings, ABG looks better ,  
failed extubation last week, noted plans for possible extubation today 3)Altered mental status , combination of metabolic encephalopathy , uremia and sedation Doing daily HD for possible uremic encephalopathy before extubation 4)Cirrhosis/Varices: long term poor prognosis 5) hypernatremia , resolved 6) severe hypoalbuminemia: albumin with HD 7) severe anemia added arnold 8) thrombocytopenia , stable 9) AMS, metabolic v/s uremic  encephalopathy Plan: 
1) IUF : 2-3 lit as tolerated 2) midodrine and albumin with HD 2) free water with TF  
3) renal panel daily 4) strict intake output 5) arnold with HD Discussed with PCCM and dialysis nursing Please call with questions, 
 
Griffin Diop MD FASN Cell [phone] Pager: [phone] Subjective: 
covid isolation. Tolerating daily dialyusis Patient Active Problem List  
Diagnosis Code  Anemia D64.9  
 CAD (coronary artery disease) I25.10  Thrombocytopenia ([facility] D69.6  NSTEMI (non-ST elevated myocardial infarction) ([facility]) I21.4  
 UTI (urinary tract infection) N39.0  Sepsis ([facility] A41.9  PNA (pneumonia) J18.9  Hypoxia R09.02  
 Cirrhosis of liver not due to alcohol ([facility] K74.60  Esophageal varices (HCC) I85.00  
 CHF (congestive heart failure) (HCC) I50.9  Dyspnea R06.00  
 Pneumonia J18.9  
 HTN (hypertension), benign I10  
 CAP (community acquired pneumonia) J18.9  Acute on chronic diastolic CHF (congestive heart failure) ([facility]) I50.33  Leg DVT (deep venous thromboembolism), acute, left ([facility]) I82.402  Palpitations R00.2  Acute pulmonary edema (HCC) J81.0  Malaise and fatigue R53.81, R53.83  
 Pancytopenia ([facility] [id number]  Portal hypertension ([facility] K76.6  Chronic anticoagulation Z79.01  
 Hip fracture ([facility] S72.009A  Breast cancer ([facility] C50.919  
 Colon cancer ([facility] C18.9  Pneumonia due to COVID-19 virus U07.1, J12.82 Current Facility-Administered Medications Medication Dose Route Frequency Provider Last Rate Last Admin  methylPREDNISolone (PF) (SOLU-MEDROL) injection 40 mg  40 mg IntraVENous Q4H Jyotsna Murphy PA-C      
 HYDROmorphone (DILAUDID) injection 1 mg  1 mg IntraVENous Rory Carrington MD      
 epoetin rio-epbx (RETACRIT) injection 10,000 Units  10,000 Units IntraVENous Q MON, WED & Beny Chin MD   10,000 Units at 02/08/21 2202  
 0.9% sodium chloride infusion 250 mL  250 mL IntraVENous PRN Trevor TIAN PA-C      
 insulin glargine (LANTUS) injection 20 Units  20 Units SubCUTAneous DAILY Vernestine PAVAN Guzman   20 Units at 02/09/21 1014  famotidine (PF) (PEPCID) 20 mg in 0.9% sodium chloride 10 mL injection  20 mg IntraVENous DAILY Steffany Mitchell MD   20 mg at 02/09/21 1013  
 menthol-zinc oxide (CALMOSEPTINE) 0.44-20.6 % ointment   Topical TID Lorene Uribe MD   Given at 02/09/21 0900  
 midodrine (PROAMATINE) tablet 10 mg  10 mg Per NG tube TID WITH MEALS Lesli Smith MD   10 mg at 02/09/21 1013  sodium chloride 3% hypertonic nebulizer soln  4 mL Nebulization Q6H RT Magali Barrera PA-C   4 mL at 02/09/21 1523  albuterol (ACCUNEB) nebulizer solution 1.25 mg  1.25 mg Nebulization Q6H RT Magali Barrera PA-C   1.25 mg at 02/09/21 1523  acetaminophen (TYLENOL) tablet 650 mg  650 mg Oral Q6H PRN Shar Cohen PA-C   650 mg at 01/31/21 2230  carvediloL (COREG) tablet 12.5 mg  12.5 mg Oral Q12H Krish Love MD   12.5 mg at 02/09/21 1013  multivit-folic acid-herbal 213 (WELLESSE PLUS) oral liquid 30 mL  30 mL Per NG tube DAILY Bethany Yanez MD   Stopped at 02/09/21 1013  chlorhexidine (PERIDEX) 0.12 % mouthwash 10 mL  10 mL Oral Q12H Magali Barrera PA-C   10 mL at 02/09/21 1013  albuterol-ipratropium (DUO-NEB) 2.5 MG-0.5 MG/3 ML  3 mL Nebulization Q6H PRN Magali Barrear PA-C      
 fentaNYL (PF) 900 mcg/30 ml infusion soln  0-200 mcg/hr IntraVENous TITRATE Magali Scales PA-C 1.7 mL/hr at 02/08/21 0545 50 mcg/hr at 02/08/21 0545  
 midazolam (VERSED) injection 1-2 mg  1-2 mg IntraVENous Q10MIN PRN Magali Scales PA-C   2 mg at 02/07/21 0320  
 fentaNYL citrate (PF) injection  mcg   mcg IntraVENous Q30MIN PRN Magali Scales PA-C   100 mcg at 02/09/21 1013  dexmedeTOMidine in 0.9 % NaCl (PRECEDEX) 400 mcg/100 mL (4 mcg/mL) infusion soln  0.1-1.5 mcg/kg/hr (Order-Specific) IntraVENous TITRATE Magali Scales PA-C 17.5 mL/hr at 02/09/21 1022 1 mcg/kg/hr at 02/09/21 1022  
 insulin lispro (HUMALOG) injection   SubCUTAneous Q6H Jose Antonio Mckinnon MD   6 Units at 02/09/21 1014  
 glucose chewable tablet 16 g  4 Tab Oral PRN Magali Scales PA-C      
 glucagon (GLUCAGEN) injection 1 mg  1 mg IntraMUSCular PRN Magali Barrera PA-C      
 dextrose (D50W) injection syrg 12.5-25 g  25-50 mL IntraVENous PRN Magali Scales PA-C Objective Vitals:  
 02/09/21 1615 02/09/21 1630 02/09/21 1645 02/09/21 1650 BP: (!) 99/56 135/66 (!) 145/59 Pulse: 61 67 67 69 Resp: 24 20 17 19 Temp:   97.6 °F (36.4 °C) TempSrc:   Axillary SpO2: 100% 100% 100% 100% Weight:      
Height:      
 
 
 
Intake/Output Summary (Last 24 hours) at 2/9/2021 1806 Last data filed at 2/9/2021 1630 Gross per 24 hour Intake 1760.95 ml Output 3245 ml Net -1484.05 ml Admission weight: Weight: 68 kg (150 lb) (01/27/21 0830) Last Weight Metrics: Weight Loss Metrics 2/9/2021 12/10/2020 9/28/2020 12/1/2019 10/16/2019 9/30/2019 8/6/2019 Today's Wt 184 lb 8.4 oz 157 lb 8 oz 149 lb 14.4 oz 140 lb 143 lb 140 lb 145 lb BMI 36.04 kg/m2 30.76 kg/m2 29.28 kg/m2 27.34 kg/m2 27.93 kg/m2 27.34 kg/m2 28.32 kg/m2 Physical Assessment:  
 
Pt is covid 19+, I did not go into the room to minimize pt/md exposure and preserve PPE. I have viewed pt from the door and reviewed documentation from other providers.  
 
Lab CBC w/Diff Recent Labs 02/09/21 
0352 02/08/21 
0300 02/07/21 
1408 WBC 8.5 5.9 7.9  
RBC 3.01* 2.75* 2.68* HGB 8.5* 7.9* 7.7* HCT 26.3* 23.9* 23.4*  
PLT 52* 48* 43* GRANS 86* 82* 85* LYMPH 13* 6* 13* EOS 0 1 1 Chemistry Recent Labs 02/09/21 
0352 02/08/21 
2208 02/08/21 
1800 02/08/21 
0300 02/07/21 
0300 02/07/21 
0300 * 165* 102* 152*   < > 230*  140 141 139   < > 141  
K 3.7 3.5 3.3* 3.6   < > 3.8  105 105 105   < > 105 CO2 25 27 29 30   < > 27 BUN 45* 39* 31* 61*   < > 91* CREA 1.79* 1.50* 1.10 1.83*   < > 2.17* CA 8.6 8.3* 8.5 8.1*   < > 8.3* AGAP 9 8 7 4   < > 9  
BUCR 25* 26* 28* 33*   < > 42* AP  --   --   --  68  --  69  
TP  --   --   --  5.1*  --  5.6* ALB 3.2* 3.2* 3.5 2.8*   < > 3.3*  
GLOB  --   --   --  2.3  --  2.3 AGRAT  --   --   --  1.2  --  1.4 PHOS 2.1* 2.2* 1.9* 2.6   < > 3.0  
 < > = values in this interval not displayed. Lab Results Component Value Date/Time Iron 65 12/08/2020 11:30 AM  
 TIBC 189 (L) 12/08/2020 11:30 AM  
 Iron % saturation 34 12/08/2020 11:30 AM  
 Ferritin 669 (H) 02/04/2021 05:10 AM  
  
Lab Results Component Value Date/Time Calcium 8.6 02/09/2021 03:52 AM  
 Phosphorus 2.1 (L) 02/09/2021 03:52 AM  
  
 
Silvana Campa MD 
[facility] Pager: [phone] Phone: [phone]

## 2021-02-09 NOTE — PROGRESS NOTES
Problem: Ventilator Management Goal: *Adequate oxygenation and ventilation Outcome: Progressing Towards Goal 
Goal: *Patient maintains clear airway/free of aspiration Outcome: Progressing Towards Goal 
Goal: *Absence of infection signs and symptoms Outcome: Progressing Towards Goal 
  
Problem: Risk for Spread of Infection Goal: Prevent transmission of infectious organism to others Description: Prevent the transmission of infectious organisms to other patients, staff members, and visitors. Outcome: Progressing Towards Goal 
  
Problem: Non-Violent Restraints Goal: *Removal from restraints as soon as assessed to be safe Outcome: Progressing Towards Goal 
Goal: *No harm/injury to patient while restraints in use Outcome: Progressing Towards Goal 
Goal: *Patient's dignity will be maintained Outcome: Progressing Towards Goal 
Goal: Non-violent Restaints:Standard Interventions Outcome: Progressing Towards Goal 
  
Problem: Falls - Risk of 
Goal: *Absence of Falls Description: Document Stephanie Rowe Fall Risk and appropriate interventions in the flowsheet. Outcome: Progressing Towards Goal 
Note: Fall Risk Interventions: 
Mobility Interventions: Bed/chair exit alarm, Assess mobility with egress test, Strengthening exercises (ROM-active/passive), Patient to call before getting OOB Mentation Interventions: Adequate sleep, hydration, pain control, Bed/chair exit alarm, Door open when patient unattended, Update white board, Toileting rounds, Reorient patient, More frequent rounding, Eyeglasses and hearing aids Medication Interventions: Bed/chair exit alarm, Evaluate medications/consider consulting pharmacy, Patient to call before getting OOB, Teach patient to arise slowly Elimination Interventions: Bed/chair exit alarm, Call light in reach, Toileting schedule/hourly rounds, Stay With Me (per policy), Patient to call for help with toileting needs Problem: Pressure Injury - Risk of 
 Goal: *Prevention of pressure injury Description: Document Kyle Scale and appropriate interventions in the flowsheet. Outcome: Progressing Towards Goal 
Note: Pressure Injury Interventions: 
Sensory Interventions: Assess changes in LOC, Assess need for specialty bed, Avoid rigorous massage over bony prominences, Float heels, Keep linens dry and wrinkle-free, Minimize linen layers, Use 30-degree side-lying position, Pressure redistribution bed/mattress (bed type), Monitor skin under medical devices Moisture Interventions: Absorbent underpads, Apply protective barrier, creams and emollients, Assess need for specialty bed, Check for incontinence Q2 hours and as needed, Internal/External urinary devices, Internal/External fecal devices, Maintain skin hydration (lotion/cream), Moisture barrier, Minimize layers Activity Interventions: Pressure redistribution bed/mattress(bed type), Assess need for specialty bed Mobility Interventions: Assess need for specialty bed, Float heels, HOB 30 degrees or less, Pressure redistribution bed/mattress (bed type), Turn and reposition approx. every two hours(pillow and wedges) Nutrition Interventions: Document food/fluid/supplement intake Friction and Shear Interventions: Apply protective barrier, creams and emollients, Foam dressings/transparent film/skin sealants, HOB 30 degrees or less, Transferring/repositioning devices, Minimize layers Problem: Patient Education: Go to Patient Education Activity Goal: Patient/Family Education Outcome: Progressing Towards Goal

## 2021-02-09 NOTE — PROGRESS NOTES
ACUTE HEMODIALYSIS FLOW SHEET 
 
HEMODIALYSIS ORDERS: Physician: Dr. Radha Ayala 
  
Dialyzer: Revaclear   Duration: 3 hr  BFR: 300   DFR: SEQ Dialysate:  Temp    K+       Ca+     Na    Bicarb Wt Readings from Last 1 Encounters:  
02/09/21 83.7 kg (184 lb 8.4 oz) Patient Chart [x]   Unable to Obtain []  Dry weight/UF Goal: 3000 ml Access RIJ Heparin []  Bolus    Units    [] Hourly    Units    [x]None Catheter locking solution Heparin 1:1000 Pre BP:   141/79    Pulse:  79   Respirations: 22    Temperature:  98.1 Tx: NSS   ml/Bolus   [x] N/A Labs: []  Pre  []  Post:   [x] N/A Additional Orders(medications, blood products, hypotension management): [] Yes   [x] No  
 
[x]  [facility] Consent Verified CATHETER ACCESS:  []N/A   [x]Right   []Left   [x]IJ     []Fem   []Chest wall  
[] First use X-ray verified     []Tunnel    [x] Non Tunneled [x]No S/S infection  []Redness  []Drainage []Cultured []Swelling []Pain  
[x]Medical Aseptic Prep Utilized   []Dressing Changed  [] Biopatch  Date:   
[]Clotted   [x]Patent   Flows: [x]Good  []Poor  []Reversed If access problem,  notified: []Yes    [x]N/A     
 
 
 
GENERAL ASSESSMENT:   
LUNGS:   SaO2%  100    [x] Clear  [] Coarse  [] Crackles  [] Wheezing 
                                              [] Diminished     Location : []RLL   []LLL    []RUL  []JERED Cough: []Productive  []Dry  [x]N/A   Respirations:  [x]Easy  []Labored Therapy:  []RA  []NC l/min    Mask: []NRB  [] Venti    O2% [x]Ventilator  [x]Intubated  [] Trach  [] BiPaP CARDIAC: [x]Regular      [] Irregular   [] Pericardial Rub  [] JVD [x]  Monitored  [x] Bedside  [] Remotely monitored EDEMA: [x] None  []Generalized  [] Pitting [] 1    [] 2    [] 3    [] 4 [] Facial  [] Pedal  []  UE  [] LE  
SKIN:   [x] Warm  [] Hot     [] Cold   [x] Dry     [] Pale   [] Diaphoretic [] Flushed  [] Jaundiced  [] Cyanotic  [] Rash  [] Weeping LOC:    [x] Alert      [x]Oriented:    [x] Person     [x] Place  []Time 
             [] Confused  [] Lethargic  [] Medicated  [] Non-responsive  [] Non-Verbal  
GI / ABDOMEN:   
                 [] Flat    [] Distended    [x] Soft    [] Firm   []  Obese 
                 [] Diarrhea  [x] Bowel Sounds  [] Nausea  [] Vomiting  / URINE ASSESSMENT:  
                [] Voiding   [] Oliguria  [] Anuria   [x]  Jean [] Incontinent  []  Incontinent Brief []  Fecal Management System PAIN:  [x] 0 []1  []2   []3   []4   []5   []6   []7   []8   []9   []10 Scale 0-10  Action/Follow Up: MOBILITY:  [x] Bed    [] Stretcher All Vitals and Treatment Details on Attached Flowsheet Hospital: [facility] Room # 0819 [id number] [] 1st Time Acute      [] Stat       [x] Routine      [] Urgent    
[] Acute Room  []  Bedside  [x] ICU/CCU  [] ER Isolation Precautions:  [x] Dialysis Droplet Plus ALLERGIES:    
Allergies Allergen Reactions  Latex Itching Skin breaks out causing itching Code Status:  Partial Code Hepatitis Status Lab Results Component Value Date/Time Hepatitis B surface Ag <0.10 02/06/2021 03:40 AM  
 Hepatitis B surface Ab <3.10 (L) 02/06/2021 03:40 AM  
  
 
Current Labs:     
Lab Results Component Value Date/Time WBC 8.5 02/09/2021 03:52 AM  
 Hemoglobin, POC 5.4 (LL) 02/07/2021 05:30 AM  
 HGB 8.5 (L) 02/09/2021 03:52 AM  
 Hematocrit, POC 16 (LL) 02/07/2021 05:30 AM  
 HCT 26.3 (L) 02/09/2021 03:52 AM  
 PLATELET 52 (L) 80/08/4870 03:52 AM  
 MCV 87.4 02/09/2021 03:52 AM  
 
Lab Results Component Value Date/Time  Sodium 138 02/09/2021 03:52 AM  
 Potassium 3.7 02/09/2021 03:52 AM  
 Chloride 104 02/09/2021 03:52 AM  
 CO2 25 02/09/2021 03:52 AM  
 Anion gap 9 02/09/2021 03:52 AM  
 Glucose 241 (H) 02/09/2021 03:52 AM  
 BUN 45 (H) 02/09/2021 03:52 AM  
 Creatinine 1.79 (H) 02/09/2021 03:52 AM  
 BUN/Creatinine ratio 25 (H) 02/09/2021 03:52 AM  
 GFR est AA 33 (L) 02/09/2021 03:52 AM  
 GFR est non-AA 27 (L) 02/09/2021 03:52 AM  
 Calcium 8.6 02/09/2021 03:52 AM  
 
  
 
DIET: 
DIET NUTRITIONAL SUPPLEMENTS 
DIET TUBE FEEDING 
 
 
PRIMARY NURSE REPORT:  
Pre Dialysis: CARMEN Valadez RN     Time: 5211 EDUCATION:   
[x] Patient [] Other Knowledge Basis: []None [x]Minimal [] Substantial  
Barriers to learning  [x]N/A  
[] Access Care     [] S&S of infection  [] Fluid Management  [] K+   [x] Procedural   
[]Albumin   [] Medications   [] Tx Options   [] Transplant   [] Diet   [] Other Teaching Tools:  [x] Explain  [] Demo  [] Handouts [] Video Patient response: [x] Verbalized understanding  [] Teach back  [] Return demonstration 
 [] Requires follow up [x]Time Out/Safety Check  [x] Extracorporeal Circuit Tested for integrity RO/HEMODIALYSIS MACHINE SAFETY CHECKS  Before each treatment:    
Machine Number:                   1000 [facility]  [x] Portable Machine #4/RO serial # [id number] Alarm Test:  Pass time 4525 [x] RO/Machine Log Complete Machine Temp    36.0 Dialysate: pH  7.4    Conductivity: Meter 13.8     HD Machine  14.1      TCD: 13.8 Dialyzer Lot # [id number]     Blood Tubing Lot # [id number]    Saline Lot # [id number] CHLORINE TESTING-Before each treatment and every 4 hours Total Chlorine: [x] less than 0.1 ppm  Initial Time Check: 1315       4 Hr/2nd Check Time:   
(if greater than 0.1 ppm from Primary then every 30 minutes from Secondary) TREATMENT INITIATION  with Dialysis Precautions:  
[x] All Connections Secured              [x] Saline Line Double Clamped  
[x] Venous Parameters Set               [x] Arterial Parameters Set [x] Prime Given 250ml NSS              [x]Air Foam Detector Engaged Treatment Initiation Note: 
1300  Arrived at pt's room, pt intubated but A & O X3, follow commands, reaches out and wants me to hold her hand. VSS. Will continue to monitor pt's status. Orders given to do IUF only During Treatment Notes: 
1330  RIJ VC assessed no abnormalities noted, line patent with good flow. CVC accessed without any difficulty, pt tolerated well. Vascular access visible with arterial and venous line connections intact. 1400 Vascular access visible with arterial and venous line connections intact. 1500 Pt laying on bed looking around the room, no distress noted. Vascular access visible with arterial and venous line connections intact. 1600 Vascular access visible with arterial and venous line connections intact. Medication Dose Volume Route Time Aung Nurse, Title  
albumin 25g 100ml  [facility], RN Post Assessment Dialyzer Cleared:[] Good [x] Fair  [] Poor Blood processed:   L 
UF Removed:  3000 Ml Post /59  Pulse  67 Resp  17 Temp 97.6 CVC Catheter: [] N/A Locking solution: Heparin 1:1000 U Arterial port 1.1 ml  
Venous port 1.1ml 
  
   
Skin:[x] Warm  [x] Dry [] Diaphoretic     
         [] Flushed  [] Pale [] Cyanotic Pain: 
[x]0  []1 []2  []3 []4  []5  []6 []7 []8  []9  []10 Post Treatment Note: 
 5762  HD completed at this time, pt tolerated well. Dressing clean, dry and intact. POST TREATMENT PRIMARY NURSE HANDOFF REPORT:  
Post Dialysis: CARMEN MORA [facility] RN                Time:  0008 Abbreviations: AVG-arterial venous graft, AVF-arterial venous fistula, IJ-Internal Jugular, Subcl-Subclavian, Fem-Femoral, Tx-treatment, AP/HR-apical heart rate, DFR-dialysate flow rate, BFR-blood flow rate, AP-arterial pressure, -venous pressure, UF-ultrafiltrate, TMP-transmembrane pressure, Logan-Venous, Art-Arterial, RO-Reverse Osmosis

## 2021-02-09 NOTE — ROUTINE PROCESS
0710: Verbal/Bedside shift report received from [facility]. Care assumed at this time. 1900: Bedside and Verbal shift change report given to LATRICIA Serrano (oncoming nurse) by LATRICIA Singletary (offgoing nurse). Report included the following information SBAR, Intake/Output, Recent Results and Quality Measures.

## 2021-02-09 NOTE — PROGRESS NOTES
[facility] Pulmonary, Critical Care, and Sleep Medicine Name: Freddie Peraza MRN: [id number] : 1940 Hospital: [address] [place] [address] Dr Date: 2021 IMPRESSION:  
· Hypoxic Respiratory Failure with multifocal pneumonia due to COVID-19 and w/ Acute on Chronic CHF Exacerbation, now requiring Mechanical Ventilation. Extubated 2021, failed to protect airway, re-intubated soon after. Weaned back down to minimal settings but pt failing SBT - high RSBI · Pulmonary HTN- based on Echo (2021)- LV EF: 55-60%, Pulmonary arterial systolic pressure is 70 mmHg. · COVID-19 Pneumonia- Covid-19 Rapid Test (+) on 2021. Known (+) prior to admission.     
· Severe sepsis with septic shock- tachycardia, tachypnea, elevated procal on admission,  COVID-19 + UTI, ?underlying aspiration. UA (+) 3+ Bacteria + Large Leuk, Urine Cx resistant Enterobacter. Procal trending down · Metabolic [id number]-  to above, resolved · NANCY on Stage 3 CKD, Oliguric- likely in setting of shock. HD started . 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 · Enterobacter UTI completed course of cefepime, Vanc · Pseudomonas Pneumonia:  Dx'ed from 21 -- pt completed course of Cefepime · Hemoptysis- Hx of Esophageal Varices vs Pulmonary Edema, resolved · Thrombocytopenia- Baseline chronically low secondary to cirrhosis and sepsis · Elevated Troponin- likely CHF Exacerbation + worsening Pulm HTN , Troponin: 1.30 · Acute on chronic HFpEF - EF 60-65% · Anasarca · Anemia:  Secondary to critical illness, kidney failure, and chronic disease · Hypernatremia - resolved · Severe protein calorie malnutrition: Tolerating tube feeds · Hx of Esophageal Varices · Hx Liver Cirrhosis   
· Hx DM- HgbA1c: 5.3, control improving · HX HTN  
· Hx of CAD  
· Hx Recent Left Hip ORIF (2020) Patient Active Problem List  
Diagnosis Code  Anemia D64.9  
 CAD (coronary artery disease) I25.10  Thrombocytopenia ([facility] D69.6  NSTEMI (non-ST elevated myocardial infarction) ([facility]) I21.4  
 UTI (urinary tract infection) N39.0  Sepsis ([facility] A41.9  PNA (pneumonia) J18.9  Hypoxia R09.02  
 Cirrhosis of liver not due to alcohol ([facility] K74.60  Esophageal varices (HCC) I85.00  
 CHF (congestive heart failure) (HCC) I50.9  Dyspnea R06.00  
 Pneumonia J18.9  
 HTN (hypertension), benign I10  
 CAP (community acquired pneumonia) J18.9  Acute on chronic diastolic CHF (congestive heart failure) (HCC) I50.33  Leg DVT (deep venous thromboembolism), acute, left (HCC) I82.402  Palpitations R00.2  Acute pulmonary edema (HCC) J81.0  Malaise and fatigue R53.81, R53.83  
 Pancytopenia ([facility] [id number]  Portal hypertension ([facility] K76.6  Chronic anticoagulation Z79.01  
 Hip fracture ([facility] S72.009A  Breast cancer ([facility] C50.919  
 Colon cancer ([facility] C18.9  Pneumonia due to COVID-19 virus U07.1, J12.82 PLAN:  
· Continue mechanical ventilation. Daily SBT--patient failed this morning. Plan to extubate when possible, if patient is not delirious, will have discussion with regards to reintubation per family's request.  Otherwise patient okay to be reintubated if pt is unable to decide, but does not want tracheostomy · Will attempt to use minimal sedation, use Precedex along with PRN fentanyl/versed · Discussed with nephrology will do serial dialysis to improve fluid status · Completed course of ABx and remdesevir. Will continue to monitor fever curve · MAP maintained without vasopressors, continue to monitor · Wound care and skin care · Monitor daily labs, CBC, lytes · TF 
· SSIP, lantus daily, serial accuchecks · Delirium precautions · Fall precautions · GI ppx with famotidine · DVT ppx Partial Code, INTUBATE ONLY Tabiona Bound Subjective/Interval History: Interval HPI:  
Griselda Hoots. Ya Hoyos is a 79 y.o.  Female w/ PMH of HFpPEF (EF 55-60%), Stage 3 CKD, Recent L Hip ORIF (2020), Esophageal Varices, Liver Cirrhosis, DM, HTN, CAD, who presented from [address] [place] [address] to [facility] ED on 2021 via EMS after developing worsening SOB and Hemoptysis after testing (+) for COVID-19 x1 week prior.  
  
ICU consulted for Hypoxia and is noted to be satting at 94+ on 15L/100% HFNC. Shortly after examination, she required Endotracheal Intubation w/ Mechanical Ventilation.  
 
Extubation 2021, unfortunately, the patient was unable to protect airway adequately soon afterwards and was re-intubated with family's consent. Family has additionally stated that  patient would not want to have trach. 
  
Nephrology following for NANCY on CKD3, ID following for COVID pneumonia, Enterobacter cystitis 
 
  
21 Pt seen and examined at bedside. No acute events overnight. Patient this morning failed SBT with RSBI ranging between 150-180s. Patient alert, able to follow commands, but agitated, requiring PRN sedation. Plan for dialysis for UF today. 
 
  
ROS:Review of systems not obtained due to patient factors. Objective:  
Vital Signs:   
Visit Vitals /69 Pulse 70 Temp 98.1 °F (36.7 °C) (Axillary) Resp 22 Ht 5' (1.524 m) Wt 83.7 kg (184 lb 8.4 oz) SpO2 99% BMI 36.04 kg/m² O2 Device: Endotracheal tube O2 Flow Rate (L/min): 4 l/min Temp (24hrs), Av.2 °F (37.3 °C), Min:97.7 °F (36.5 °C), Max:100.2 °F (37.9 °C) Intake/Output:  
Last shift:       07 - 1900 In: 479.3 [I.V.:99.3] Out: 0 Last 3 shifts: 1901 -  0700 In: 2212.3 [I.V.:530.3] Out: 7499 [Urine:170; Drains:125] Intake/Output Summary (Last 24 hours) at 2021 1521 Last data filed at 2021 1200 Gross per 24 hour Intake 1588.45 ml Output 1745 ml Net -156.55 ml  
  
   
Limited Physical Exam:             General: Intubated, Sedated, in NAD, nodding, following commands On SBT this am.  
            VNHGG: NC/AT, No visible nasal drainage, Trachea midline             DNQXI: No visible trauma or deformity to chest wall, RRR, S1/S2 
            Lungs: Equal & Bilateral chest rise, Airway clear, ET Tube in place and secured, B/L coarse rhonchi with poor air entry in B/L bases             Abdomen: Non-Distended, large reducible hernia 
            Extremity: 2+pitting edema BLE 
            Neuro: awake, able to follow some commands, is intermittently agitated 
            Skin: No new skin lesions, rashes, or ulcers DATA: 
Labs: 
Recent Labs 02/09/21 
0352 02/08/21 
0300 02/07/21 
1408 WBC 8.5 5.9 7.9 HGB 8.5* 7.9* 7.7* HCT 26.3* 23.9* 23.4*  
PLT 52* 48* 43* Recent Labs 02/09/21 
0352 02/08/21 
2208 02/08/21 
1800 02/08/21 
0300 02/07/21 
0300 02/07/21 
0300  140 141 139   < > 141  
K 3.7 3.5 3.3* 3.6   < > 3.8  105 105 105   < > 105 CO2 25 27 29 30   < > 27 * 165* 102* 152*   < > 230* BUN 45* 39* 31* 61*   < > 91* CREA 1.79* 1.50* 1.10 1.83*   < > 2.17* CA 8.6 8.3* 8.5 8.1*   < > 8.3*  
MG 2.4 1.9  --  1.9  --  2.0 PHOS 2.1* 2.2* 1.9* 2.6   < > 3.0 ALB 3.2* 3.2* 3.5 2.8*   < > 3.3* ALT  --   --   --  13  --  14  
 < > = values in this interval not displayed. No results for input(s): PH, PCO2, PO2, HCO3, FIO2 in the last 72 hours. PFT:                                                    
Echo: 
 
Imaging: 
[x]I have personally reviewed the patients radiographs []Radiographs reviewed with radiologist 
 [x]No change from prior, tubes and lines in adequate position []Improved   []Worsening High complexity decision making was performed during the evaluation of this patient at high risk for decompensation with multiple organ involvement 
  
 Total of 34 min critical care time spent at bedside (personally) during the course of care providing evaluation,management and care decisions and ordering appropriate treatment related to critical care problems exclusive of procedures. The reason for providing this level of medical care for this critically ill patient was due a critical illness that impaired one or more vital organ systems such that there was a high probability of imminent or life threatening deterioration in the patients condition. This care involved high complexity decision making to assess, manipulate, and support vital system functions, to treat this degree vital organ system failure and to prevent further life threatening deterioration of the patients condition. This time was independent of other practitioners. Prognosis appears guarded, however I suspect patient has a high likelihood for decompensation and worsening given elevated burden of chronic disease Attending Restraint Reevaluation I have reevaluated the patient one hour after initiation of intervention. The patient is comfortable, uninjured, and continues to pose an imminent risk of injury to themselves and/or serious disruption of medical treatment required to keep patient stable. The patient's current medical and behavioral conditions that warrant the use intervention include danger to self and Interference with medical treatment. Restraint or seclusion will be discontinued at the earliest possible time, regardless of the scheduled expiration of the order. Based on my evaluation, restraints will be continued: YES Jermaine Mckeon MD/MPH Pulmonary, Critical Care Medicine [facility]

## 2021-02-10 NOTE — PROGRESS NOTES
Nutrition Assessment Type and Reason for Visit: Reassess, Positive nutrition screen Nutrition Recommendations/Plan: - Monitor ability to tolerate oral diet versus need for enteral nutrition support. Consider SLP consult for swallow evaluation prior oral diet initiation. Nutrition Assessment:  SBT this morning and extubated to nasal cannula; previously tolerating continuous tube feeding at goal via OGT, held then removed with extubation and remains NPO. Medications: epoetin, multivitamin, steroid stopped today and lantus with corrective coverage sliding scale insulin with hypoglycemia overnight- BG of 45 mg/dL and up to 353 mg/dL this morning. Malnutrition Assessment: 
Malnutrition Status: At risk for malnutrition (specify)(unable to eat due to intubation) Estimated Daily Nutrient Needs: 
Energy (kcal):  8064-7332 Protein (g):   Fluid (ml/day):  6564-1396 Nutrition Related Findings:  Loose stool 2/10, small overnight (FMS removed 2/9 with skin breakdown noted, stage 2 pressure injury and skin tear to sacrum per nursing). Oliguria s/p hemodialysis yesterday (3 L UF removed 2/9). Current Nutrition Therapies: DIET NUTRITIONAL SUPPLEMENTS Breakfast, Lunch; Prosource DIET TUBE FEEDING Current Tube Feeding (TF) Orders: · Feeding Route: Orogastric · Formula: Nepro · Schedule:Continuous · Regimen: 40 mL/hr (held for SBT and stopped this morning, OGT removed) · Additives/Modulars: Protein(prosource once daily) · Water Flushes: 100 mL q 4 hours (600 mL) · Current TF Orders Provides: 1788 kcal, 93 gm protein, 696 mL free water, 100% RDIs (goal) Anthropometric Measures: 
· Height:  5' (152.4 cm) · Current Body Wt:  83.4 kg (183 lb 13.8 oz) · BMI: 35.9 Nutrition Diagnosis:  
· Inadequate oral intake related to cognitive or neurological impairment, impaired respiratory function as evidenced by NPO or clear liquid status due to medical condition(recent extubation) · Increased nutrient needs related to catabolic illness, renal dysfunction as evidenced by dialysis, wounds Nutrition Intervention: 
Food and/or Nutrient Delivery: Continue NPO Nutrition Education and Counseling: Education not indicated Coordination of Nutrition Care: Continue to monitor while inpatient, Interdisciplinary rounds Goals: 
Nutritional needs will be met through adequate oral intake or nutrition support within the next 7 days Nutrition Monitoring and Evaluation:  
Behavioral-Environmental Outcomes: None identified Food/Nutrient Intake Outcomes: Diet advancement/tolerance, Vitamin/mineral intake Physical Signs/Symptoms Outcomes: Biochemical data, Chewing or swallowing, GI status, Diarrhea, Fluid status or edema, Hemodynamic status, Meal time behavior, Nutrition focused physical findings Discharge Planning: Too soon to determine Electronically signed by Georgina Hussein RD, [address]  on 2/10/2021 at 12:44 PM 
 
Contact Number: [phone]

## 2021-02-10 NOTE — PROGRESS NOTES
Problem: Ventilator Management Goal: *Adequate oxygenation and ventilation Outcome: Progressing Towards Goal 
Goal: *Patient maintains clear airway/free of aspiration Outcome: Progressing Towards Goal 
Goal: *Absence of infection signs and symptoms Outcome: Progressing Towards Goal 
  
Problem: Risk for Spread of Infection Goal: Prevent transmission of infectious organism to others Description: Prevent the transmission of infectious organisms to other patients, staff members, and visitors. Outcome: Progressing Towards Goal 
  
Problem: Non-Violent Restraints Goal: *Removal from restraints as soon as assessed to be safe Outcome: Progressing Towards Goal 
Goal: *No harm/injury to patient while restraints in use Outcome: Progressing Towards Goal 
Goal: *Patient's dignity will be maintained Outcome: Progressing Towards Goal 
Goal: Non-violent Restaints:Standard Interventions Outcome: Progressing Towards Goal 
Goal: Non-violent Restraints:Patient Interventions Outcome: Progressing Towards Goal 
  
Problem: Falls - Risk of 
Goal: *Absence of Falls Description: Document Virgilio Shaffer Fall Risk and appropriate interventions in the flowsheet. Outcome: Progressing Towards Goal 
Note: Fall Risk Interventions: 
Mobility Interventions: Bed/chair exit alarm Mentation Interventions: Adequate sleep, hydration, pain control, Bed/chair exit alarm Medication Interventions: Bed/chair exit alarm Elimination Interventions: Bed/chair exit alarm Problem: Pressure Injury - Risk of 
Goal: *Prevention of pressure injury Description: Document Kyle Scale and appropriate interventions in the flowsheet. Outcome: Progressing Towards Goal 
Note: Pressure Injury Interventions: 
Sensory Interventions: Assess changes in LOC Moisture Interventions: Absorbent underpads, Apply protective barrier, creams and emollients Activity Interventions: Pressure redistribution bed/mattress(bed type) Mobility Interventions: Assess need for specialty bed Nutrition Interventions: Document food/fluid/supplement intake Friction and Shear Interventions: Apply protective barrier, creams and emollients, HOB 30 degrees or less

## 2021-02-10 NOTE — PROGRESS NOTES
Discharge planning Reviewed chart. Patient remains intubated at this time. Plan is to extubated today per MD. CM will continue to monitor and assist with transitional needs. MIRA Ghosh, RN Pager # [phone] Care Manager

## 2021-02-10 NOTE — PROGRESS NOTES
PCC Update: 
 
~11:35 - Case discussed with patient's nephew, informed him patient has been extubated. Nephew requesting a phone call if patient requires re-intubation. Rafael Rosales PA-C 
02/10/21 Pulmonary, Critical Care Medicine [facility]

## 2021-02-10 NOTE — DIABETES MGMT
GLYCEMIC CONTROL PLAN OF CARE Assessment/Recommendations: 
Blood glucose this am 295 and 353 mg/dl Noted pt had hypoglycemia yesterday evening 45 mg/dl. Treated per protocol. Methylprednisolone 40 mg given times 4 doses. Steroids now discontinued. Continue basal and  corrective insulin coverage as ordered Receiving very insulin resistant corrective dosing. Will continue inpatient monitoring. Most recent blood glucose values: 
 
 
 
Results for Alison Purvis (MRN [id number]) as of 2/10/2021 09:58 Ref. Range 2/9/2021 14:03 2/9/2021 19:59 2/9/2021 20:14 2/10/2021 03:19 2/10/2021 09:08 GLUCOSE,FAST - POC Latest Ref Range: 70 - 110 mg/dL 80 72 174 (H) 295 (H) 353 (H) Current A1C of 5.3 % is equivalent to average blood glucose of 105 mg/dl over the past 2-3 months. ? Accuracy. Patient anemic. Current hospital diabetes medications:  
Lantus 20 units daily Lispro corrective insulin coverage every 6 hours Previous day's insulin requirements:  
lantus 20 units Lispro 15 units corrective insulin Home diabetes medications: 
Januvia 50 mg daily Novolog corrective insulin AC breakfast, lunch and dinner. Diet:   
NPO> TF nepro at 40 ml/hr Education:  ____Refer to Diabetes Education Record __x_Education not indicated at this time Covid-19 isolation. Intubated. Bj Rowell RN CDE Ext [phone]

## 2021-02-10 NOTE — ROUTINE PROCESS
1910 Bedside and Verbal shift change report given to ángel rn (oncoming nurse) by iris rn (offgoing nurse). Report included the following information SBAR, Intake/Output and Recent Results. resp therapy in with patient. 2000 assessment completed. Oral and velazquez care completed. Shun AMIN notified patient breath sounds more decreased on left side. Potassium level 3.1, creat 2.03 ,phos 2.5, patient had hemodialysis today  3liters off per report.  s PATEINT REPEAT BLOOD SUGAR AFTER TREATED WITH AMP D50 , 174, AND WILL HOLD 2100 INSULIN DOSE, ORDERS TO FOLLOW AFTER ISAIAH AMIN ASSESS SITUATION/PATIENT. 
0000 reassessment completed. Oral and velazquez care completed. 0400 reassessment completed. Oral and velazquez care provided. 0730 Bedside and Verbal shift change report given to IRIS RN (oncoming nurse) by ángel RN(offgoing nurse). Report included the following information SBAR, Procedure Summary and Recent Results. Side rails up, hob elevated 30 degrees, call light within reach. Dual drip verification completed.

## 2021-02-10 NOTE — PROGRESS NOTES
PCCM Update: 
 
Patient is legally blind per nephew. Discussed patient's care. Nephew requesting to have a phone call with patient once she is extubated if possible to discuss Cristopher 63. Myesha Henley PA-C 
02/09/21 Pulmonary, Critical Care Medicine [facility]

## 2021-02-10 NOTE — PROGRESS NOTES
02/09/21 1907 Vent Settings FIO2 (%) 30 % SpO2/FIO2 Ratio 333.33  
CMV Rate Set 12 Back-Up Rate 12 Vt Set (ml) 400 ml PEEP/VENT (cm H2O) 5 cm H20 Insp Time (sec) 1 sec Insp Rise Time % 50 % Flow Trigger 3 Patient placed back on full support due to low VE and VT, will continue to monitor patient's resp status.

## 2021-02-10 NOTE — PROGRESS NOTES
RENAL PROGRESS NOTE Dale Mack Assessment/Plan:  
 
1)NANCY on CKD3: d/t Ischaemic ATN/sepsis v/s covid nephropathy 2)Covid PNA/Resp failure mech vent per PCCM. Minimal settings, ABG looks better ,  
failed extubation last week, noted plans for possible extubation today 3)Altered mental status , combination of metabolic encephalopathy , uremia and sedation Doing daily HD for possible uremic encephalopathy before extubation 4)Cirrhosis/Varices: long term poor prognosis 5) hypernatremia , resolved 6) severe hypoalbuminemia: albumin with HD 7) severe anemia added arnold 8) thrombocytopenia , stable 9) AMS, metabolic v/s uremic  encephalopathy Plan: 
1) HD todday for volume and solute , UF 2.5 lit as tolerated 2) midodrine  with HD 2) noted plans for extubation 4) renal panel daily 5) strict intake output 6) arnold with HD Discussed with PCCM and dialysis nursing Please call with questions, 
 
Kavon Dmuas MD FASN Cell [phone] Pager: [phone] Subjective: 
covid isolation. Tolerating dialysis 
extubatyed Patient Active Problem List  
Diagnosis Code  Anemia D64.9  
 CAD (coronary artery disease) I25.10  Thrombocytopenia ([facility] D69.6  NSTEMI (non-ST elevated myocardial infarction) ([facility]) I21.4  
 UTI (urinary tract infection) N39.0  Sepsis ([address] A41.9  PNA (pneumonia) J18.9  Hypoxia R09.02  
 Cirrhosis of liver not due to alcohol ([facility] K74.60  Esophageal varices (HCC) I85.00  
 CHF (congestive heart failure) (HCC) I50.9  Dyspnea R06.00  
 Pneumonia J18.9  
 HTN (hypertension), benign I10  
 CAP (community acquired pneumonia) J18.9  Acute on chronic diastolic CHF (congestive heart failure) ([facility]) I50.33  Leg DVT (deep venous thromboembolism), acute, left (HCC) I82.402  Palpitations R00.2  Acute pulmonary edema (HCC) J81.0  Malaise and fatigue R53.81, R53.83  
 Pancytopenia [facility] [id number]  Portal hypertension ([facility] K76.6  Chronic anticoagulation Z79.01  
 Hip fracture ([facility] S72.009A  Breast cancer ([facility] C50.919  
 Colon cancer ([facility] C18.9  Pneumonia due to COVID-19 virus U07.1, J12.82 Current Facility-Administered Medications Medication Dose Route Frequency Provider Last Rate Last Admin  midodrine (PROAMATINE) tablet 10 mg  10 mg Oral Q6H PRN Gurmeet Sy MD      
 epoetin rio-epbx (RETACRIT) injection 10,000 Units  10,000 Units IntraVENous Q MON, WED & Beny Chin MD   10,000 Units at 02/08/21 2202  
 0.9% sodium chloride infusion 250 mL  250 mL IntraVENous PRN Trevor TIAN PA-C      
 insulin glargine (LANTUS) injection 20 Units  20 Units SubCUTAneous DAILY Agata Guzman PA-C   20 Units at 02/10/21 1031  famotidine (PF) (PEPCID) 20 mg in 0.9% sodium chloride 10 mL injection  20 mg IntraVENous DAILY Steffany Mitchell MD   20 mg at 02/10/21 1033  
 menthol-zinc oxide (CALMOSEPTINE) 0.44-20.6 % ointment   Topical TID Lorene Uribe MD   Given at 02/10/21 1600  
 sodium chloride 3% hypertonic nebulizer soln  4 mL Nebulization Q6H RT Magali Barrera PA-C   Stopped at 02/10/21 1514  
 albuterol (ACCUNEB) nebulizer solution 1.25 mg  1.25 mg Nebulization Q6H RT Magali Barrera PA-C   Stopped at 02/10/21 1514  acetaminophen (TYLENOL) tablet 650 mg  650 mg Oral Q6H PRN Shar Cohen PA-C   650 mg at 01/31/21 2230  carvediloL (COREG) tablet 12.5 mg  12.5 mg Oral Q12H Krish Love MD   Stopped at 02/09/21 2044  
 multivit-folic acid-herbal 321 (WELLESSE PLUS) oral liquid 30 mL  30 mL Per NG tube DAILY Mauricio Gudino MD   Stopped at 02/09/21 1013  chlorhexidine (PERIDEX) 0.12 % mouthwash 10 mL  10 mL Oral Q12H Magali Barrera PA-C   10 mL at 02/10/21 0900  albuterol-ipratropium (DUO-NEB) 2.5 MG-0.5 MG/3 ML  3 mL Nebulization Q6H PRN Magali Yee PA-C      
 midazolam (VERSED) injection 1-2 mg  1-2 mg IntraVENous Q10MIN PRN Magali Yee PA-C   2 mg at 02/07/21 0320  
 fentaNYL citrate (PF) injection  mcg   mcg IntraVENous Q30MIN PRN Paul SCOTT PA-C   50 mcg at 02/10/21 6034  dexmedeTOMidine in 0.9 % NaCl (PRECEDEX) 400 mcg/100 mL (4 mcg/mL) infusion soln  0.1-1.5 mcg/kg/hr (Order-Specific) IntraVENous TITRATE Magali Yee PA-C 15.8 mL/hr at 02/10/21 1636 0.9 mcg/kg/hr at 02/10/21 1636  
 insulin lispro (HUMALOG) injection   SubCUTAneous Q6H Cristiane Mckinnon MD   Stopped at 02/10/21 1500  
 glucose chewable tablet 16 g  4 Tab Oral PRN Magali Yee PA-C      
 glucagon (GLUCAGEN) injection 1 mg  1 mg IntraMUSCular PRN Magali Yee PA-C      
 dextrose (D50W) injection syrg 12.5-25 g  25-50 mL IntraVENous PRN Magali Yee PA-C   25 g at 02/09/21 2002 Objective Vitals:  
 02/10/21 1400 02/10/21 1415 02/10/21 1500 02/10/21 1600 BP: 121/63 119/84 (!) 148/64 (!) 146/78 Pulse: 70 68 69 85 Resp: 19 20 17 23 Temp:    98.5 °F (36.9 °C) TempSrc:      
SpO2: 100% 100% 100% 100% Weight:      
Height:      
 
 
 
Intake/Output Summary (Last 24 hours) at 2/10/2021 1642 Last data filed at 2/10/2021 1600 Gross per 24 hour Intake 1472.19 ml Output 170 ml Net 1302.19 ml Admission weight: Weight: 68 kg (150 lb) (01/27/21 0830) Last Weight Metrics: 
Weight Loss Metrics 2/10/2021 12/10/2020 9/28/2020 12/1/2019 10/16/2019 9/30/2019 8/6/2019 Today's Wt 183 lb 13.8 oz 157 lb 8 oz 149 lb 14.4 oz 140 lb 143 lb 140 lb 145 lb BMI 35.91 kg/m2 30.76 kg/m2 29.28 kg/m2 27.34 kg/m2 27.93 kg/m2 27.34 kg/m2 28.32 kg/m2 Physical Assessment: Pt is covid 19+, I did not go into the room to minimize pt/md exposure and preserve PPE. I have viewed pt from the door and reviewed documentation from other providers.  
 
Lab CBC w/Diff Recent Labs  
  02/10/21 
0322 02/09/21 
0352 02/08/21 
0300 WBC 5.8 8.5 5.9  
RBC 2.84* 3.01* 2.75* HGB 8.1* 8.5* 7.9*  
HCT 24.8* 26.3* 23.9*  
PLT 40* 52* 48* GRANS 94* 86* 82* LYMPH 6* 13* 6*  
EOS 0 0 1 Chemistry Recent Labs  
  02/10/21 
0322 02/09/21 
1840 02/09/21 0352 02/08/21 
0300 02/08/21 
0300 * 45* 241*   < > 152* * 139 138   < > 139  
K 4.5 3.1* 3.7   < > 3.6  105 104   < > 105 CO2 23 29 25   < > 30 BUN 62* 52* 45*   < > 61* CREA 2.33* 2.03* 1.79*   < > 1.83* CA 8.8 8.6 8.6   < > 8.1* AGAP 9 5 9   < > 4  
BUCR 27* 26* 25*   < > 33* AP 85  --   --   --  68  
TP 6.1*  --   --   --  5.1* ALB 3.4 3.4 3.2*   < > 2.8*  
GLOB 2.7  --   --   --  2.3 AGRAT 1.3  --   --   --  1.2 PHOS 3.6 2.5 2.1*   < > 2.6  
 < > = values in this interval not displayed. Lab Results Component Value Date/Time Iron 65 12/08/2020 11:30 AM  
 TIBC 189 (L) 12/08/2020 11:30 AM  
 Iron % saturation 34 12/08/2020 11:30 AM  
 Ferritin 669 (H) 02/04/2021 05:10 AM  
  
Lab Results Component Value Date/Time Calcium 8.8 02/10/2021 03:22 AM  
 Phosphorus 3.6 02/10/2021 03:22 AM  
  
 
Geoff Neumann MD 
[facility] Pager: [phone] Phone: [phone]

## 2021-02-10 NOTE — PROGRESS NOTES
helped the patient Macey Bonilla, who is a 79 y.o.,female, connect with their family with the Zoom Video Connection. The  provided the following Interventions: 
Conducted zoom call with patient's nephew Stefani Pink of [phone] at [email or web address] from 14:50 - 15:30. The following outcomes were achieved: 
Patient's nephew expressed gratitude for 's assistance. Assessment: 
There are no further spiritual or Yazidism issues which require Spiritual Care Services interventions at this time. Plan: 
Chaplains will continue to follow and will provide pastoral care on an as needed/requested basis.  recommends bedside caregivers page  on duty if patient shows signs of acute spiritual or emotional distress. [phone] [facility] [phone]

## 2021-02-11 NOTE — DIABETES MGMT
GLYCEMIC CONTROL PLAN OF CARE Assessment/Recommendations: 
Blood glucose this am 170 and 169 mg/dl Continue basal and corrective insulin coverage as ordered Receiving very insulin resistant corrective dosing. Will continue inpatient monitoring. Most recent blood glucose values: 
 
 
Results for Jamaal Frederick (MRN [id number]) as of 2/11/2021 10:29 Ref. Range 2/10/2021 09:08 2/10/2021 14:19 2/10/2021 20:50 2/11/2021 03:26 2/11/2021 08:48 GLUCOSE,FAST - POC Latest Ref Range: 70 - 110 mg/dL 353 (H) 150 (H) 173 (H) 170 (H) 169 (H) Current A1C of 5.3 % is equivalent to average blood glucose of 105 mg/dl over the past 2-3 months. ? Accuracy. Patient anemic. Current hospital diabetes medications:  
Lantus 20 units daily Lispro corrective insulin coverage every 6 hours Previous day's insulin requirements:  
lantus 20 units Lispro 27 units corrective insulin Home diabetes medications: 
Januvia 50 mg daily Novolog corrective insulin AC breakfast, lunch and dinner. Diet:   
NPO> TF nepro at 40 ml/hr. Speech consult ordered. Education:  ____Refer to Diabetes Education Record __x_Education not indicated at this time Covid-19 isolation. Susy Foster, RN CDE Ext [phone]

## 2021-02-11 NOTE — PROGRESS NOTES
RENAL PROGRESS NOTE Link Messing Assessment/Plan:  
1)NANCY on CKD3: d/t Ischaemic ATN/sepsis v/s covid nephropathy 2)Covid PNA/Resp failure , extubated yesterday , now on RA 3)Altered mental status , combination of metabolic encephalopathy , uremia and sedation Doing daily HD for possible uremic encephalopathy before extubation 4)Cirrhosis/Varices: long term poor prognosis 5) hypernatremia , resolved 6) severe hypoalbuminemia: albumin with HD 7) severe anemia added arnold 8) thrombocytopenia , stable 9) AMS, metabolic v/s uremic  encephalopathy Plan: 
1) HD tomorrow, no indication today 2) midodrine  with HD 3) renal panel daily 4) strict intake output 5) arnold with HD Discussed with PCCM, Please call with questions, 
 
Kavon Dumas MD FASN Cell [phone] Pager: [phone] Subjective: 
covid isolation. Tolerated HD well yesterday Patient Active Problem List  
Diagnosis Code  Anemia D64.9  
 CAD (coronary artery disease) I25.10  Thrombocytopenia ([address] D69.6  NSTEMI (non-ST elevated myocardial infarction) (HCC) I21.4  
 UTI (urinary tract infection) N39.0  Sepsis ([address] A41.9  PNA (pneumonia) J18.9  Hypoxia R09.02  
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  
 CHF (congestive heart failure) (HCC) I50.9  Dyspnea R06.00  
 Pneumonia J18.9  
 HTN (hypertension), benign I10  
 CAP (community acquired pneumonia) J18.9  Acute on chronic diastolic CHF (congestive heart failure) (HCC) I50.33  Leg DVT (deep venous thromboembolism), acute, left ([facility]) I82.402  Palpitations R00.2  Acute pulmonary edema (HCC) J81.0  Malaise and fatigue R53.81, R53.83  
 Pancytopenia ([address] [id number]  Portal hypertension ([address] K76.6  Chronic anticoagulation Z79.01  
  [facility] S72.009A  Breast cancer ([facility] C50.919  
 Colon cancer ([facility] C18.9  Pneumonia due to COVID-19 virus U07.1, J12.82 Current Facility-Administered Medications Medication Dose Route Frequency Provider Last Rate Last Admin  carvediloL (COREG) tablet 25 mg  25 mg Oral Q12H Paco Cosme MD   Stopped at 02/11/21 1100  hydrALAZINE (APRESOLINE) 20 mg/mL injection 10 mg  10 mg IntraVENous Q6H PRN Raudel Liz NP      
 midodrine (PROAMATINE) tablet 10 mg  10 mg Oral Q6H PRN Paco Cosme MD      
 epoetin rio-epbx (RETACRIT) injection 10,000 Units  10,000 Units IntraVENous Q MON, WED & Sohan Holm MD   10,000 Units at 02/10/21 2046  
 0.9% sodium chloride infusion 250 mL  250 mL IntraVENous PRN All TIAN PA-C      
 insulin glargine (LANTUS) injection 20 Units  20 Units SubCUTAneous DAILY Kenneth Hernandes PA-C   20 Units at 02/11/21 1057  famotidine (PF) (PEPCID) 20 mg in 0.9% sodium chloride 10 mL injection  20 mg IntraVENous DAILY Dayday Garcia MD   20 mg at 02/11/21 1058  menthol-zinc oxide (CALMOSEPTINE) 0.44-20.6 % ointment   Topical TID Pepper Delarosa MD   Given at 02/11/21 1057  sodium chloride 3% hypertonic nebulizer soln  4 mL Nebulization Q6H RT Maagli Barrera PA-C   4 mL at 02/11/21 0544  albuterol (ACCUNEB) nebulizer solution 1.25 mg  1.25 mg Nebulization Q6H RT Magali Barrera PA-C   1.25 mg at 02/11/21 0544  acetaminophen (TYLENOL) tablet 650 mg  650 mg Oral Q6H PRN Daniela Betancourt PA-C   650 mg at 01/31/21 2230  multivit-folic acid-herbal 143 (WELLESSE PLUS) oral liquid 30 mL  30 mL Per NG tube DAILY Zita Hare MD   Stopped at 02/09/21 1013  albuterol-ipratropium (DUO-NEB) 2.5 MG-0.5 MG/3 ML  3 mL Nebulization Q6H PRN Magali Barrera PA-C      
 insulin lispro (HUMALOG) injection   SubCUTAneous Q6H Antwan Mckinnon MD   Stopped at 02/11/21 1500  glucose chewable tablet 16 g  4 Tab Oral PRN Steffani Meigs, Abby E, PA-C      
 glucagon (GLUCAGEN) injection 1 mg  1 mg IntraMUSCular PRN Steffani Meigs, Abby E, PA-C      
 dextrose (D50W) injection syrg 12.5-25 g  25-50 mL IntraVENous PRN Steffani Meigs, Abby E, PA-C   25 g at 02/09/21 2002 Objective Vitals:  
 02/11/21 1200 02/11/21 1300 02/11/21 1400 02/11/21 1500 BP: (!) 174/78 (!) 167/77 (!) 167/81 (!) [id number] Pulse: (!) 105 (!) 108 (!) 108 (!) 105 Resp: (!) 31 24 (!) 33 (!) 35 Temp:      
TempSrc:      
SpO2: 97% 97% 96% 93% Weight:      
Height:      
 
 
 
Intake/Output Summary (Last 24 hours) at 2/11/2021 1635 Last data filed at 2/11/2021 0800 Gross per 24 hour Intake 148.72 ml Output 100 ml Net 48.72 ml Admission weight: Weight: 68 kg (150 lb) (01/27/21 0830) Last Weight Metrics: 
Weight Loss Metrics 2/11/2021 12/10/2020 9/28/2020 12/1/2019 10/16/2019 9/30/2019 8/6/2019 Today's Wt 167 lb 1.7 oz 157 lb 8 oz 149 lb 14.4 oz 140 lb 143 lb 140 lb 145 lb BMI 32.64 kg/m2 30.76 kg/m2 29.28 kg/m2 27.34 kg/m2 27.93 kg/m2 27.34 kg/m2 28.32 kg/m2 Physical Assessment:  
 
Pt is covid 19+, I did not go into the room to minimize pt/md exposure and preserve PPE. I have viewed pt from the door and reviewed documentation from other providers.  
 
Lab CBC w/Diff Recent Labs  
  02/11/21 
0620 02/10/21 
0322 02/09/21 
0352 WBC 8.5 5.8 8.5  
RBC 2.99* 2.84* 3.01* HGB 8.6* 8.1* 8.5* HCT 26.2* 24.8* 26.3*  
PLT 52* 40* 52* GRANS 89* 94* 86* LYMPH 6* 6* 13* EOS 0 0 0 Chemistry Recent Labs  
  02/11/21 
1509 02/10/21 
1750 02/10/21 
0322 * 160* 273*  141 135* K 4.1 3.8 4.5  
 107 103 CO2 26 27 23 BUN 50* 43* 62* CREA 2.07* 1.71* 2.33* CA 8.8 8.3* 8.8 AGAP 6 7 9 BUCR 24* 25* 27* AP  --   --  85  
TP  --   --  6.1* ALB 3.3* 3.3* 3.4 GLOB  --   --  2.7 AGRAT  --   --  1.3 PHOS 3.8 2.9 3.6 Lab Results Component Value Date/Time Iron 65 12/08/2020 11:30 AM  
 TIBC 189 (L) 12/08/2020 11:30 AM  
 Iron % saturation 34 12/08/2020 11:30 AM  
 Ferritin 669 (H) 02/04/2021 05:10 AM  
  
Lab Results Component Value Date/Time Calcium 8.8 02/11/2021 06:20 AM  
 Phosphorus 3.8 02/11/2021 06:20 AM  
  
 
Amaury Blum MD 
[facility] Pager: [phone] Phone: [phone]

## 2021-02-11 NOTE — ROUTINE PROCESS
Bedside and Verbal shift change report given to Angel Cr RN (oncoming nurse) by Adenike Vazquez RN (offgoing nurse). Report included the following information SBAR, Intake/Output, Recent Results and Med Rec Status.  Pending items: swallow eval

## 2021-02-11 NOTE — PROGRESS NOTES
Problem: Risk for Spread of Infection Goal: Prevent transmission of infectious organism to others Description: Prevent the transmission of infectious organisms to other patients, staff members, and visitors. Outcome: Progressing Towards Goal 
  
Problem: Patient Education:  Go to Education Activity Goal: Patient/Family Education Outcome: Progressing Towards Goal 
  
Problem: Non-Violent Restraints Goal: *Removal from restraints as soon as assessed to be safe Outcome: Progressing Towards Goal 
Goal: *No harm/injury to patient while restraints in use Outcome: Progressing Towards Goal 
Goal: *Patient's dignity will be maintained Outcome: Progressing Towards Goal 
Goal: Non-violent Restaints:Standard Interventions Outcome: Progressing Towards Goal 
Goal: Non-violent Restraints:Patient Interventions Outcome: Progressing Towards Goal 
Goal: Patient/Family Education Outcome: Progressing Towards Goal 
  
Problem: Falls - Risk of 
Goal: *Absence of Falls Description: Document Stephanie Bare Fall Risk and appropriate interventions in the flowsheet. Outcome: Progressing Towards Goal 
Note: Fall Risk Interventions: 
Mobility Interventions: Bed/chair exit alarm Mentation Interventions: Bed/chair exit alarm, Adequate sleep, hydration, pain control, Evaluate medications/consider consulting pharmacy, Familiar objects from home, More frequent rounding, Reorient patient, Room close to nurse's station, Toileting rounds, Update white board Medication Interventions: Bed/chair exit alarm Elimination Interventions: Bed/chair exit alarm, Call light in reach, Toilet paper/wipes in reach, Toileting schedule/hourly rounds Problem: Patient Education: Go to Patient Education Activity Goal: Patient/Family Education Outcome: Progressing Towards Goal 
  
Problem: Pressure Injury - Risk of 
Goal: *Prevention of pressure injury Description: Document Kyle Scale and appropriate interventions in the flowsheet. Outcome: Progressing Towards Goal 
Note: Pressure Injury Interventions: 
Sensory Interventions: Assess changes in LOC, Assess need for specialty bed, Avoid rigorous massage over bony prominences, Minimize linen layers, Monitor skin under medical devices, Turn and reposition approx. every two hours (pillows and wedges if needed), Use 30-degree side-lying position Moisture Interventions: Absorbent underpads, Apply protective barrier, creams and emollients, Assess need for specialty bed, Internal/External urinary devices, Minimize layers, Maintain skin hydration (lotion/cream) Activity Interventions: Pressure redistribution bed/mattress(bed type) Mobility Interventions: Assess need for specialty bed, Pressure redistribution bed/mattress (bed type), Turn and reposition approx. every two hours(pillow and wedges) Nutrition Interventions: Document food/fluid/supplement intake Friction and Shear Interventions: Apply protective barrier, creams and emollients, Foam dressings/transparent film/skin sealants, HOB 30 degrees or less, Transferring/repositioning devices Problem: Patient Education: Go to Patient Education Activity Goal: Patient/Family Education Outcome: Progressing Towards Goal 
  
Problem: Nutrition Deficit Goal: *Optimize nutritional status Outcome: Progressing Towards Goal 
  
Problem: Injury - Risk of, Adverse Drug Event Goal: *Absence of adverse drug events Outcome: Progressing Towards Goal 
Goal: *Absence of medication errors Outcome: Progressing Towards Goal 
Goal: *Knowledge of prescribed medications Outcome: Progressing Towards Goal 
  
Problem: Patient Education: Go to Patient Education Activity Goal: Patient/Family Education Outcome: Progressing Towards Goal 
  
Problem: Pain Goal: *Control of Pain Outcome: Progressing Towards Goal 
Goal: *PALLIATIVE CARE:  Alleviation of Pain Outcome: Progressing Towards Goal 
  
 Problem: Patient Education: Go to Patient Education Activity Goal: Patient/Family Education Outcome: Progressing Towards Goal 
  
Problem: Airway Clearance - Ineffective Goal: Achieve or maintain patent airway Outcome: Progressing Towards Goal 
  
Problem: Gas Exchange - Impaired Goal: Absence of hypoxia Outcome: Progressing Towards Goal 
Goal: Promote optimal lung function Outcome: Progressing Towards Goal 
  
Problem: Breathing Pattern - Ineffective Goal: Ability to achieve and maintain a regular respiratory rate Outcome: Progressing Towards Goal 
  
Problem: Body Temperature -  Risk of, Imbalanced Goal: Ability to maintain a body temperature within defined limits Outcome: Progressing Towards Goal 
Goal: Will regain or maintain usual level of consciousness Outcome: Progressing Towards Goal 
Goal: Complications related to the disease process, condition or treatment will be avoided or minimized Outcome: Progressing Towards Goal 
  
Problem: Isolation Precautions - Risk of Spread of Infection Goal: Prevent transmission of infectious organism to others Outcome: Progressing Towards Goal 
  
Problem: Nutrition Deficits Goal: Optimize nutrtional status Outcome: Progressing Towards Goal 
  
Problem: Risk for Fluid Volume Deficit Goal: Maintain normal heart rhythm Outcome: Progressing Towards Goal 
Goal: Maintain absence of muscle cramping Outcome: Progressing Towards Goal 
Goal: Maintain normal serum potassium, sodium, calcium, phosphorus, and pH Outcome: Progressing Towards Goal 
  
Problem: Loneliness or Risk for Loneliness Goal: Demonstrate positive use of time alone when socialization is not possible Outcome: Progressing Towards Goal 
  
Problem: Fatigue Goal: Verbalize increase energy and improved vitality Outcome: Progressing Towards Goal 
  
Problem: Patient Education: Go to Patient Education Activity Goal: Patient/Family Education Outcome: Progressing Towards Goal 
 Karlo Riddle, RN

## 2021-02-11 NOTE — ROUTINE PROCESS
1910-- Received report from Raudel Roldan, RN with preceptor Diedra Fleischer, RN. Report included SBAR, Kardex, Intake/Output, MAR, Recent Results, and Cardiac Rhythm. 9773-- Bedside and Verbal shift change report given to Amanda Coelho, RN (oncoming nurse) by Marv Goldman and Diedra Fleischer, RN (offgoing nurse). Report included the following information SBAR, Kardex, MAR and Recent Results. SITUATION:  
? Code Status: Partial Code 
? Reason for Admission: Pneumonia due to COVID-19 virus [U07.1, J12.82] 
 
? Hospital day: 15 
? Problem List:  
   
Hospital Problems  Date Reviewed: 12/23/2020 Codes Class Noted POA Pneumonia due to COVID-19 virus ICD-10-CM: U07.1, J12.82 ICD-9-CM: 480.8  1/27/2021 Unknown BACKGROUND:  
 Past Medical History:  
Past Medical History:  
Diagnosis Date  Anemia  Aortic stenosis, moderate 03/2019  Breast cancer ([facility]  CAD (coronary artery disease) Coronary Stenting needed, hematology workup needed prior to stent placement, treating medically at this time  Cancer [facility])   
 colon  Cancer ([facility] breast - left  Cardiac catheterization 08/17/2016 LM patent. mLAD 99.9% (2.25 x 28-mm Xience BETHEL, resid 0%). oD1 60%. oD2 95%. pCX 90% (2.5 x 13-mm Xience BETHEL, resid 0%). OMB patent.  Cardiac echocardiogram 03/2019 EF 56 to 60%, moderate concentric LVH, moderate aortic stenosis, mean valve gradient 25 mmHg  Diabetes ([facility]  Hypercholesteremia  Hypertension  Thrombocyt[facility] ([facility] Patient taking anticoagulants no ASSESSMENT:  
? Changes in Assessment Throughout Shift: changes in medication sedation, weaning of oxygen ? Patient has Central Line: yes Reasons if yes: hemodialysis ? Patient has Jean Cath: yes Reasons if yes: accurate measurement ? Last Vitals: 
  
Vitals:  
 02/11/21 0544 02/11/21 0549 02/11/21 0600 02/11/21 0700 BP:   (!) 144/80 (!) 158/74 Pulse:  93 97 97 Resp:  17 16 23 Temp:      
TempSrc:      
SpO2: 93% 95% 96% 97% Weight:   75.8 kg (167 lb 1.7 oz) Height:      
 
 
? IV and DRAINS (will only show if present) [REMOVED] Peripheral IV 02/02/21 Left;Posterior Hand-Site Assessment: Clean, dry, & intact [REMOVED] Airway - Endotracheal Tube 02/02/21-Site Assessment: Clean, dry, & intact Midline Catheter 02/02/21 Left-Site Assessment: Clean, dry, & intact [REMOVED] Orogastric Tube 02/02/21-Site Assessment: Clean, dry, & intact [REMOVED] Airway - Continuous Aspiration of Subglottic Secretions (MAAME) Tube 02/03/21-Site Assessment: Clean, dry, & intact Hemodialysis Access 02/06/21-Site Assessment: Clean, dry, & intact [REMOVED] Airway - Continuous Aspiration of Subglottic Secretions (MAAME) Tube 01/27/21 Oral-Site Assessment: Clean, dry, & intact [REMOVED] Peripheral IV 01/27/21 Right Arm-Site Assessment: Clean, dry, & intact [REMOVED] Peripheral IV 01/27/21 Left Arm-Site Assessment: Clean, dry, & intact [REMOVED] Peripheral IV 01/27/21 Right Antecubital-Site Assessment: Clean, dry, & intact [REMOVED] Triple Lumen left IJ  01/27/21 Left Internal jugular-Site Assessment: Clean, dry, & intact [REMOVED] Peripheral IV 01/26/21 Left Antecubital-Site Assessment: Drainage (comment) [REMOVED] Nasogastric Tube 01/27/21-Site Assessment: Clean, dry, & intact [REMOVED] Airway - Endotracheal Tube 01/27/21 Oral-Site Assessment: Clean, dry, & intact [REMOVED] Orogastric Tube 01/28/21-Site Assessment: Clean, dry, & intact ? WOUND (if present) Wound Type:  Gluteal fold wound, DTIon heels Dressing present Dressing Present : Yes, Intact, not due to be changed Wound Concerns/Notes:  none ? PAIN Pain Assessment Pain Intensity 1: 0 (02/11/21 0400) Pain Location 1: Generalized Pain Intervention(s) 1: Medication (see MAR)(versed 1 mg IVP given for pain /sedation) Patient Stated Pain Goal: Unable to verbalize/indicate pain o Interventions for Pain:  none 
o Intervention effective: yes 
o Time of last intervention: n/a  
o Reassessment Completed: yes  
 
? Last 3 Weights: 
Last 3 Recorded Weights in this Encounter  
 02/09/21 0630 02/10/21 0000 02/11/21 0600  
Weight: 83.7 kg (184 lb 8.4 oz) 83.4 kg (183 lb 13.8 oz) 75.8 kg (167 lb 1.7 oz)  
 
Weight change: -7.6 kg (-16 lb 12.1 oz) 
 
? INTAKE/OUPUT 
  Current Shift: No intake/output data recorded. 
  Last three shifts: 02/09 1901 - 02/11 0700 
In: 1563.4 [I.V.:613.4] 
Out: 2720 [Urine:220] 
 
? LAB RESULTS 
  
Recent Labs  
  02/11/21 
0620 02/10/21 
0322 02/09/21 
0352  
WBC 8.5 5.8 8.5  
HGB 8.6* 8.1* 8.5*  
HCT 26.2* 24.8* 26.3*  
PLT 52* 40* 52*  
 
  
Recent Labs  
  02/11/21 
0620 02/10/21 
1750 02/10/21 
0322 02/09/21 
0352 02/09/21 
0352  
 141 135*   < > 138  
K 4.1 3.8 4.5   < > 3.7  
* 160* 273*   < > 241*  
BUN 50* 43* 62*   < > 45*  
CREA 2.07* 1.71* 2.33*   < > 1.79*  
CA 8.8 8.3* 8.8   < > 8.6  
MG 2.4  --  2.3  --  2.4  
 < > = values in this interval not displayed.  
 
 
RECOMMENDATIONS AND DISCHARGE PLANNING  
 
1. Pending tests/procedures/ Plan of Care or Other Needs: possible downgrade in patient care  
 
2. Discharge plan for patient and Needs/Barriers: family understanding of plan of care 
 
3. Estimated Discharge Date: TBD Posted on Whiteboard in Patient’s Room: yes   
 
4. The patient's care plan was reviewed with the oncoming nurse.  
   
\"HEALS\" SAFETY CHECK  
 
 Fall Risk  
 Total Score: 3  
 Safety Measures: Safety Measures: Bed/Chair alarm on, Bed/Chair-Wheels locked, Bed in low position, Call light within reach, Emergency bedside equipment, Fall prevention (comment), Restraints 
 
A safety check occurred in the patient's room between off going nurse and oncoming nurse listed above. 
 
The safety check included the below items 
Area Items  
H 
High Alert Medications ? Verify all high alert medication drips (heparin, PCA, etc.)  
E 
 Equipment ? Suction is set up for ALL patients (with eden) ? Red plugs utilized for all equipment (IV pumps, etc.) ? WOWs wiped down at end of shift. ? Room stocked with oxygen, suction, and other unit-specific supplies A Alarms ? Bed alarm is set for fall risk patients ? Ensure chair alarm is in place and activated if patient is up in a chair L Lines ? Check IV for any infiltration ? Jean bag is empty if patient has a Jean ? Tubing and IV bags are labeled [employer] Safety ? Room is clean, patient is clean, and equipment is clean. ? Hallways are clear from equipment besides carts. ? Fall bracelet on for fall risk patients ? Ensure room is clear and free of clutter ? Suction is set up for ALL patients (with eden) ? Hallways are clear from equipment besides carts. ? Isolation precautions followed, supplies available outside room, sign posted Wilton Anderson

## 2021-02-11 NOTE — PROGRESS NOTES
[facility] Hospitalist Group Progress Note Patient: Carmen Fraction Age: 79 y.o. : 1940 MR#: [id number] SSN: [id number] Date: 2021 Subjective:  
 
Denies SOB or pain / discomfort, confusion without ability to answer further questions Assessment/Plan:  
 
Consultants: Infectious disease (signed off); Nephrology 1. Hypoxic respiratory failure - initially intubated 21 then extubated 21, failed to protect airway and required reintubation 21 then extubated again on 02/10/21. Now on RA, however high risk for re-intubation. Chest PT per pulmonary. 2. Covid 19 pneumonia - s/p abx; remdesevir and convalescent plasma. 3. Severe sepsis with septic shock in setting of PNA and UTI- tachycardia, tachypnea, hypotension on admission, now improved 4. Enterobacter UTI - completed course of cefepime / vanco in ICU 5. NANCY on stage 3 CKD - HD per nephrology 6. Acute metabolic encephalopathy - off precedex drip; in setting of multiple comorbidities and acute illness improved 7. Severe protein calorie malnutrition - NPO per SLP. Pending respiratory status and further SLP recs, address NG tube reinsertion. 8. Type 2 diabetes with hyperglycemia -continue Lantus with SSI 9. Recent hemoptysis - hold on blood thinners 10. Elevalted troponin - earlier in admission felt to be in setting of  
11. Severe aortic stenosis - per last echo; cardiology consult 12. HTN - off pressors. add IV hydralazine 13. H/o Rj Shivers and chronic pancytopenia - hold on blood thinners 14. H/o recent left hip ORIF - on 20 per Dr. Marifer Powell 15. Goals of care - Patient is partial code; DNR, but proceed with intubation if needed. Nephew requesting update for any change in status. Palliative medicine consulted due to multiple comorbidities 16. Legally blind - per nephew 17. Dispo - pt previously at [address] [place] [address] Discussed with nephpeewee Borrero at phone number # [phone]; plan for transfer out of ICU to stepdown unit and continued medical care through the hospitalist group. Also discussed risk for further decline and risk for reintubation of which nephew is aware. He requests call if change of status however he is aware an emergent situation may proceed with intubation and then follow with him after. Additional Notes:   
 
Case discussed with:  [x]Patient  [x]Family  [x]Nursing  []Case Management DVT Prophylaxis:  []Lovenox  []Hep SQ  [x]SCDs  []Coumadin   []On Heparin gtt Objective:  
VS:  
Visit Vitals BP (!) 167/81 Pulse (!) 108 Temp (P) 98.4 °F (36.9 °C) Resp (!) 33 Ht 5' (1.524 m) Wt 75.8 kg (167 lb 1.7 oz) SpO2 96% BMI 32.64 kg/m² Tmax/24hrs: Temp (24hrs), Av.2 °F (36.8 °C), Min:97.8 °F (36.6 °C), Max:98.5 °F (36.9 °C) Intake/Output Summary (Last 24 hours) at 2021 1445 Last data filed at 2021 0800 Gross per 24 hour Intake 180.32 ml Output 120 ml Net 60.32 ml General:  Alert, NAD; very hard of hearing and soft spoken Cardiovascular: Regular rhythm, mild tachycardia Pulmonary: Coarse rhonchi, mild tachypnea GI:  +BS in all four quadrants, soft, non-tender Extremities: 2+ bilateral lower edema; feet warm bilaterally Neuro: alert and oriented x person only Family contact: Anu Gutierrez [phone] Labs:   
Recent Results (from the past 24 hour(s)) RENAL FUNCTION PANEL Collection Time: 02/10/21  5:50 PM  
Result Value Ref Range Sodium 141 136 - 145 mmol/L Potassium 3.8 3.5 - 5.5 mmol/L Chloride 107 100 - 111 mmol/L  
 CO2 27 21 - 32 mmol/L Anion gap 7 3.0 - 18 mmol/L Glucose 160 (H) 74 - 99 mg/dL BUN 43 (H) 7.0 - 18 MG/DL Creatinine 1.71 (H) 0.6 - 1.3 MG/DL  
 BUN/Creatinine ratio 25 (H) 12 - 20 GFR est AA 35 (L) >60 ml/min/1.73m2 GFR est non-AA 29 (L) >60 ml/min/1.73m2  Calcium 8.3 (L) 8.5 - 10.1 MG/DL  
 Phosphorus 2.9 2.5 - 4.9 MG/DL Albumin 3.3 (L) 3.4 - 5.0 g/dL GLUCOSE, POC Collection Time: 02/10/21  8:50 PM  
Result Value Ref Range Glucose (POC) 173 (H) 70 - 110 mg/dL GLUCOSE, POC Collection Time: 02/11/21  3:26 AM  
Result Value Ref Range Glucose (POC) 170 (H) 70 - 110 mg/dL PROCALCITONIN Collection Time: 02/11/21  6:20 AM  
Result Value Ref Range Procalcitonin 2.06 ng/mL CBC WITH AUTOMATED DIFF Collection Time: 02/11/21  6:20 AM  
Result Value Ref Range WBC 8.5 4.6 - 13.2 K/uL  
 RBC 2.99 (L) 4.20 - 5.30 M/uL HGB 8.6 (L) 12.0 - 16.0 g/dL HCT 26.2 (L) 35.0 - 45.0 % MCV 87.6 74.0 - 97.0 FL  
 MCH 28.8 24.0 - 34.0 PG  
 MCHC 32.8 31.0 - 37.0 g/dL  
 RDW 17.6 (H) 11.6 - 14.5 % PLATELET 52 (L) 869 - 420 K/uL MPV 10.2 9.2 - 11.8 FL  
 NEUTROPHILS 89 (H) 40 - 73 % LYMPHOCYTES 6 (L) 21 - 52 % MONOCYTES 5 3 - 10 % EOSINOPHILS 0 0 - 5 % BASOPHILS 0 0 - 2 %  
 ABS. NEUTROPHILS 7.6 1.8 - 8.0 K/UL  
 ABS. LYMPHOCYTES 0.5 (L) 0.9 - 3.6 K/UL  
 ABS. MONOCYTES 0.4 0.05 - 1.2 K/UL  
 ABS. EOSINOPHILS 0.0 0.0 - 0.4 K/UL  
 ABS. BASOPHILS 0.0 0.0 - 0.1 K/UL  
 DF AUTOMATED RENAL FUNCTION PANEL Collection Time: 02/11/21  6:20 AM  
Result Value Ref Range Sodium 138 136 - 145 mmol/L Potassium 4.1 3.5 - 5.5 mmol/L Chloride 106 100 - 111 mmol/L  
 CO2 26 21 - 32 mmol/L Anion gap 6 3.0 - 18 mmol/L Glucose 154 (H) 74 - 99 mg/dL BUN 50 (H) 7.0 - 18 MG/DL Creatinine 2.07 (H) 0.6 - 1.3 MG/DL  
 BUN/Creatinine ratio 24 (H) 12 - 20 GFR est AA 28 (L) >60 ml/min/1.73m2 GFR est non-AA 23 (L) >60 ml/min/1.73m2 Calcium 8.8 8.5 - 10.1 MG/DL Phosphorus 3.8 2.5 - 4.9 MG/DL Albumin 3.3 (L) 3.4 - 5.0 g/dL MAGNESIUM Collection Time: 02/11/21  6:20 AM  
Result Value Ref Range Magnesium 2.4 1.6 - 2.6 mg/dL GLUCOSE, POC Collection Time: 02/11/21  8:48 AM  
Result Value Ref Range Glucose (POC) 169 (H) 70 - 110 mg/dL GLUCOSE, POC  
 Collection Time: 02/11/21  2:18 PM  
Result Value Ref Range Glucose (POC) 143 (H) 70 - 110 mg/dL Signed By: Maranda Barrera NP February 11, 2021

## 2021-02-11 NOTE — PROGRESS NOTES
[facility] Pulmonary, Critical Care, and Sleep Medicine Name: Jany Torres MRN: [id number] : 1940 Hospital: [address] [place] [address] Dr Date: 2/10/2021 IMPRESSION:  
· Hypoxic Respiratory Failure with multifocal pneumonia due to COVID-19 and w/ Acute on Chronic CHF Exacerbation, now requiring Mechanical Ventilation. Extubated 2021, failed to protect airway, re-intubated soon after. Passed SBT today RSBI of 60 -- extubated · Pulmonary HTN- based on Echo (2021)- LV EF: 55-60%, Pulmonary arterial systolic pressure is 70 mmHg. · COVID-19 Pneumonia- Covid-19 Rapid Test (+) on 2021. Known (+) prior to admission.     
· Severe sepsis with septic shock- tachycardia, tachypnea, elevated procal on admission,  COVID-19 + UTI, ?underlying aspiration. UA (+) 3+ Bacteria + Large Leuk, Urine Cx resistant Enterobacter. 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 HD started . · Acute encephalopathy- Metabolic, secondary to above. Pt delirious post extubation · Enterobacter UTI completed course of cefepime, Vanc · Pseudomonas Pneumonia:  Dx'ed from 21 -- pt completed course of Cefepime · Hemoptysis- Hx of Esophageal Varices vs Pulmonary Edema, resolved · Thrombocytopenia- Baseline chronically low secondary to cirrhosis and sepsis · Elevated Troponin- likely CHF Exacerbation + worsening Pulm HTN , Troponin: 1.30 · Acute on chronic HFpEF - EF 60-65% · Anasarca · Deconditioning/critical illness myopathy · Anemia:  Secondary to critical illness, kidney failure, and chronic disease · Hypernatremia - resolved · Severe protein calorie malnutrition: Tolerating tube feeds · Hx of Esophageal Varices · Hx Liver Cirrhosis   
· Hx DM- HgbA1c: 5.3, control improving · HX HTN  
· Hx of CAD  
 · Hx Recent Left Hip ORIF (12/07/2020) Patient Active Problem List  
Diagnosis Code  Anemia D64.9  
 CAD (coronary artery disease) I25.10  Thrombocytopenia ([facility] D69.6  NSTEMI (non-ST elevated myocardial infarction) (HCC) I21.4  
 UTI (urinary tract infection) N39.0  Sepsis ([facility] A41.9  PNA (pneumonia) J18.9  Hypoxia R09.02  
 Cirrhosis of liver not due to alcohol ([facility] K74.60  Esophageal varices (HCC) I85.00  
 CHF (congestive heart failure) (HCC) I50.9  Dyspnea R06.00  
 Pneumonia J18.9  
 HTN (hypertension), benign I10  
 CAP (community acquired pneumonia) J18.9  Acute on chronic diastolic CHF (congestive heart failure) (HCC) I50.33  Leg DVT (deep venous thromboembolism), acute, left (HCC) I82.402  Palpitations R00.2  Acute pulmonary edema (HCC) J81.0  Malaise and fatigue R53.81, R53.83  
 Pancytopenia ([facility] G205310  Portal hypertension ([facility] K76.6  Chronic anticoagulation Z79.01  
 Hip fracture ([facility] S72.009A  Breast cancer ([facility] C50.919  
 Colon cancer ([facility] C18.9  Pneumonia due to COVID-19 virus U07.1, J12.82 PLAN:  
· Pt passed SBT, RSBI of 60 --- extubated to NC. Pt appears encephalopathic afterwards, able to protect airway but appears weak. High risk for reintubation · Continue precedex gtt for agitated delirium. Will attempt to wean off when possible · Discussed with nephrology will do serial dialysis to improve fluid status -- dialyzed today · Completed course of ABx and remdesevir. Will continue to monitor fever curve · MAP maintained without vasopressors, continue to monitor · Wound care and skin care · Monitor daily labs, CBC, lytes · SLP tomorrow if possible. Aspiration precautions · PT/OT, OOB to chair if possible · SSIP, lantus daily, serial accuchecks · Delirium precautions · Fall precautions · GI ppx with famotidine · DVT ppx Partial Code, INTUBATE ONLY Prognosis appears guarded, however I suspect patient has a high likelihood for decompensation and worsening given significant deconditioning with delirium in the setting of elevated burden of chronic disease [place] Subjective/Interval History:  
 
Interval HPI:  
Balaji Leonardo is a 79 y.o.  Female w/ PMH of HFpPEF (EF 55-60%), Stage 3 CKD, Recent L Hip ORIF (2020), Esophageal Varices, Liver Cirrhosis, DM, HTN, CAD, who presented from [address] [place] [address] to [facility] on 2021 via EMS after developing worsening SOB and Hemoptysis after testing (+) for COVID-19 x1 week prior.  
  
ICU consulted for Hypoxia and is noted to be satting at 94+ on 15L/100% HFNC. Shortly after examination, she required Endotracheal Intubation w/ Mechanical Ventilation.  
 
Extubation 2021, unfortunately, the patient was unable to protect airway adequately soon afterwards and was re-intubated with family's consent. Family has additionally stated that  patient would not want to have trach. 
  
Nephrology following for NANCY on CKD3, ID following for COVID pneumonia, Enterobacter cystitis 
 
  
02/10/21 Pt seen and examined at bedside. No acute events overnight. Pt alert and pulling at tube, following commands. RSBI 60 after 1 hour SBT. 
 
  
ROS:Review of systems not obtained due to patient factors. Objective:  
Vital Signs:   
Visit Vitals /70 Pulse 77 Temp 98.5 °F (36.9 °C) Resp 18 Ht 5' (1.524 m) Wt 83.4 kg (183 lb 13.8 oz) SpO2 100% BMI 35.91 kg/m² O2 Device: Nasal cannula O2 Flow Rate (L/min): 5 l/min Temp (24hrs), Av.3 °F (36.8 °C), Min:97.7 °F (36.5 °C), Max:99.1 °F (37.3 °C) Intake/Output:  
Last shift:      No intake/output data recorded. Last 3 shifts:  0701 - 02/10 1900 In: 2185 [I.V.:695] Out: 3540 [Urine:170] Intake/Output Summary (Last 24 hours) at 2/10/2021 2007 Last data filed at 2/10/2021 1900 Gross per 24 hour Intake 1165.68 ml Output 110 ml Net 1055.68 ml  
  
   
Limited Physical Exam:  
            General: Intubated, Sedated, in NAD, nodding, following commands On SBT this am.  
            KPQBF: NC/AT, No visible nasal drainage, Trachea midline             FHWCG: No visible trauma or deformity to chest wall, RRR, S1/S2 
            Lungs: Equal & Bilateral chest rise, Airway clear, ET Tube in place and secured, B/L coarse rhonchi with poor air entry in B/L bases             Abdomen: Non-Distended, large reducible hernia 
            Extremity: 2+pitting edema BLE 
            Neuro: awake, able to follow some commands, is intermittently agitated 
            Skin: No new skin lesions, rashes, or ulcers DATA: 
Labs: 
Recent Labs  
  02/10/21 
0322 02/09/21 
0352 02/08/21 
0300 WBC 5.8 8.5 5.9 HGB 8.1* 8.5* 7.9*  
HCT 24.8* 26.3* 23.9*  
PLT 40* 52* 48* Recent Labs  
  02/10/21 
1750 02/10/21 
0322 02/09/21 
1840 02/09/21 
0352 02/08/21 
2208 02/08/21 
0300 02/08/21 
0300  135* 139 138 140   < > 139  
K 3.8 4.5 3.1* 3.7 3.5   < > 3.6  103 105 104 105   < > 105 CO2 27 23 29 25 27   < > 30 * 273* 45* 241* 165*   < > 152* BUN 43* 62* 52* 45* 39*   < > 61* CREA 1.71* 2.33* 2.03* 1.79* 1.50*   < > 1.83* CA 8.3* 8.8 8.6 8.6 8.3*   < > 8.1*  
MG  --  2.3  --  2.4 1.9  --  1.9 PHOS 2.9 3.6 2.5 2.1* 2.2*   < > 2.6 ALB 3.3* 3.4 3.4 3.2* 3.2*   < > 2.8* ALT  --  16  --   --   --   --  13  
 < > = values in this interval not displayed. No results for input(s): PH, PCO2, PO2, HCO3, FIO2 in the last 72 hours. PFT:                                                    
Echo: 
 
Imaging: 
[x]I have personally reviewed the patients radiographs []Radiographs reviewed with radiologist 
 [x]No change from prior, tubes and lines in adequate position []Improved   []Worsening High complexity decision making was performed during the evaluation of this patient at high risk for decompensation with multiple organ involvement 
  
Total of 37 min critical care time spent at bedside (personally) during the course of care providing evaluation,management and care decisions and ordering appropriate treatment related to critical care problems exclusive of procedures. The reason for providing this level of medical care for this critically ill patient was due a critical illness that impaired one or more vital organ systems such that there was a high probability of imminent or life threatening deterioration in the patients condition. This care involved high complexity decision making to assess, manipulate, and support vital system functions, to treat this degree vital organ system failure and to prevent further life threatening deterioration of the patients condition. This time was independent of other practitioners. Attending Restraint Reevaluation I have reevaluated the patient one hour after initiation of intervention. The patient is comfortable, uninjured, and continues to pose an imminent risk of injury to themselves and/or serious disruption of medical treatment required to keep patient stable. The patient's current medical and behavioral conditions that warrant the use intervention include danger to self and Interference with medical treatment. Restraint or seclusion will be discontinued at the earliest possible time, regardless of the scheduled expiration of the order. Based on my evaluation, restraints will be continued: YES Deyanira Davila MD/MPH Pulmonary, Critical Care Medicine [facility]

## 2021-02-11 NOTE — PROGRESS NOTES
1930: Assumed care of patient after report with Rizwan Britton, RN on orientation. Drip verified. Orders reviewed. 2130: Patient requesting zoom call with nephew - pastoral care called - she is busy with another patient and would like to perform the zoom call in the morning - patient updated and okay with plan. 0630: Rc AMIN updated on patient status - patient on RA, sats 97%, precedex drip off, restraints off.  
 
0745: Report given to Citlali Birmingham RN by Rizwan Britton RN - see her SBAR for handoff documentation.

## 2021-02-11 NOTE — PROGRESS NOTES
Spoke with Kay, admissions rep. for [facility], who indicated pt would be able to return to facility when medically cleared. Pt had been at [address] [place] [address] Rd. Pt has started dialysis, waiting to see if this will be permanent HD which pt would need outpt HD arranged. JUNIOR Sinclair, [place]- [phone]

## 2021-02-11 NOTE — PROGRESS NOTES
TRANSFER - OUT REPORT: 
 
Verbal report given to Rome Fothergill, RN (name) on Kailee Monahan  being transferred to [employer] (unit) for routine progression of care Report consisted of patients Situation, Background, Assessment and  
Recommendations(SBAR). Information from the following report(s) SBAR, Intake/Output, Recent Results and Med Rec Status was reviewed with the receiving nurse. Lines:   Left upper arm midline, right IJ HD cath Opportunity for questions and clarification was provided.

## 2021-02-11 NOTE — PROGRESS NOTES
Problem: Risk for Spread of Infection Goal: Prevent transmission of infectious organism to others Description: Prevent the transmission of infectious organisms to other patients, staff members, and visitors. Outcome: Progressing Towards Goal 
  
Problem: Falls - Risk of 
Goal: *Absence of Falls Description: Document Damion George Fall Risk and appropriate interventions in the flowsheet. Outcome: Progressing Towards Goal 
Note: Fall Risk Interventions: 
Mobility Interventions: Bed/chair exit alarm, Strengthening exercises (ROM-active/passive) Mentation Interventions: Adequate sleep, hydration, pain control, Bed/chair exit alarm, More frequent rounding, Reorient patient, Room close to nurse's station Medication Interventions: Evaluate medications/consider consulting pharmacy Elimination Interventions: Bed/chair exit alarm, Call light in reach, Toilet paper/wipes in reach, Toileting schedule/hourly rounds

## 2021-02-11 NOTE — ROUTINE PROCESS
1905: Bedside and Verbal shift change report given to RN Liu Gongora (oncoming nurse) by LATRICIA Gómez (offgoing nurse). Report included the following information SBAR, Intake/Output, Recent Results and Quality Measures.

## 2021-02-11 NOTE — PROGRESS NOTES
attended the interdisciplinary rounds for Desmond Centeno, who is a 79 y.o.,female. Patients Primary Language is: [place]. According to the patients EMR Scientology Affiliation is: Goldy Najera.  
 
The reason the Patient came to the hospital is:  
Patient Active Problem List  
 Diagnosis Date Noted  Pneumonia due to COVID-19 virus 01/27/2021  Hip fracture ([address] 12/06/2020  Breast cancer ([address] 12/06/2020  Colon cancer ([address] 12/06/2020  Palpitations 04/25/2019  Acute pulmonary edema ([address] 04/25/2019  Malaise and fatigue 04/25/2019  Pancytopenia ([address] 04/25/2019  Portal hypertension ([address] 04/25/2019  Chronic anticoagulation 04/25/2019  CAP (community acquired pneumonia) 03/24/2019  Acute on chronic diastolic CHF (congestive heart failure) ([address] 03/24/2019  Leg DVT (deep venous thromboembolism), acute, left ([address] 03/24/2019  
 HTN (hypertension), benign 01/28/2018  Pneumonia 01/25/2018  CHF (congestive heart failure) ([address] 10/18/2017  Dyspnea 10/18/2017  Cirrhosis of liver not due to alcohol ([address] 06/29/2017  Esophageal varices ([address] 06/29/2017  UTI (urinary tract infection) 06/21/2017  Sepsis ([address] 06/21/2017  PNA (pneumonia) 06/21/2017  Hypoxia 06/21/2017  
 NSTEMI (non-ST elevated myocardial infarction) ([address] 06/30/2016  Anemia 07/07/2015  CAD (coronary artery disease) 07/07/2015  Thrombocytopenia ([address] 07/07/2015 Plan: 
Chaplains will continue to follow and will provide pastoral care on an as needed/requested basis.  recommends bedside caregivers page  on duty if patient shows signs of acute spiritual or emotional distress. [address] [facility]  
[phone]

## 2021-02-11 NOTE — PROGRESS NOTES
Nutrition Note Patient remains NPO pending swallow evaluation, SLP consulted this morning. Previously tolerating tube feeding at goal then held and OGT removed when extubated yesterday (2/10); minimal urine output s/p hemodialysis 2/10 and 2500 mL UF removed. Loose stool this morning (FMS inserted 2/1 and removed 2/9). Receiving 20 units lantus and corrective coverage SSI with recent -179 mg/dL. Recommendations/Plan - Monitor ability to tolerate oral diet versus need for enteral nutrition support. Recommend NGT placement to provide enteral feeding if patient remains NPO, only if consistent with goals of care. Please consult Nutrition for Tube Feeding Management if appropriate. Recommended Enteral Regimen (if needed): initiate tube feeding of Nepro at 20 mL/hr and advance as tolerated by 10 mL q 4 hours to goal rate of 45 mL/hr with 50 mL q 4 hour water flushes (goal regimen to provide 1944 kcal, 87 gm protein, 783 mL free water, 100% RDIs). Electronically signed by Alvaro Zuluaga RD, [address] [place]  on 2/11/2021 at 12:42 PM 
 
Contact: [phone]

## 2021-02-11 NOTE — PROGRESS NOTES
02/10/21 2024 Oxygen Therapy O2 Sat (%) 100 % Pulse via Oximetry 75 beats per minute O2 Device Nasal cannula O2 Flow Rate (L/min) 5 l/min 
(decreased to 4) Pre-Treatment Breathing Pattern Regular Breath Sounds Bilateral  
(no steth) Pulse 75 SpO2 100 % Respirations 15 Post-Treatment Breathing Pattern Regular Upper Airway Sounds (no) Breath Sounds Bilateral  
(no steth) Pulse 75 SpO2 100 % Respirations 15 Treatment Tolerance Patient tolerated Procedures  
$$ Initial Procedures Aerosol Delivery Source Breath Actuated Nebulizer;Mask Aerosolized Medications Albuterol 
(sodium)

## 2021-02-11 NOTE — PROGRESS NOTES
[facility] Pulmonary, Critical Care, and Sleep Medicine Name: Penelope Meade MRN: [id number] : 1940 Hospital: [address] [place] [address] Dr Date: 2021 IMPRESSION:  
· Acute Hypoxic Respiratory Failure- 2/2 multifocal pneumonia due to COVID-19 and pseudomonas and w/ Acute on Chronic CHF Exacerbation; had required Mechanical Ventilation. Extubated 2021, failed to protect airway, re-intubated soon after. Passed SBT 2/10 RSBI of 60 -- extubated for second time · COVID-19 Pneumonia- Covid-19 Rapid Test (+) on 2021. Known (+) prior to admission.     
· Severe sepsis with septic shock- tachycardia, tachypnea, elevated procal on admission,  COVID-19, beta-lactam resistant peudomonal pna (per sputum cx ),  UTI (UA with 3+ Bacteria, Large Leuk; Urine Cx resistant Enterobacter). Procal trending down · Deconditioning/Debility/Critical illness myopathy/polyneuropathy · Metabolic FOFOZBIM-  to above, resolved · NANCY on Stage 3 CKD, Oliguric- likely in setting of shock and ATN. HD started . Nephro following. · Acute encephalopathy- Metabolic, secondary to above [now weaned off sedation]. Pt delirious post extubation · Pulmonary HTN- based on Echo (2021)- LV EF: 55-60%, Pulmonary arterial systolic pressure 70 mmHg. COVID 19 and pseudomonal PNA likely contributing · Enterobacter UTI -completed course of cefepime, Vanc · Pseudomonas Pneumonia:  Dx'ed from 21 -- pt completed course of Cefepime · Hemoptysis- with Hx of Esophageal Varices vs Pulmonary Edema, resolved · Thrombocytopenia- Baseline chronically low secondary to cirrhosis and sepsis · Elevated Troponin- likely CHF Exacerbation + worsening Pulm HTN , Troponin: 1.30 · Acute on chronic HFpEF - EF 60-65% · Anasarca secondary to cirrhosis, CHF · Anemia-  Secondary to critical illness, kidney failure, and chronic disease · Hypernatremia - resolved · Severe protein calorie malnutrition: Tube feeds d/c'ed yesterday post extubation, pending swallow eval, currently NPO 
· Hx of Esophageal Varices · Hx Liver Cirrhosis   
· Hx DM- HgbA1c: 5.3% calc (1/27/21), BG control improving · Hx HTN  
· Hx of CAD  
· Hx Recent Left Hip ORIF (12/07/2020) Patient Active Problem List  
Diagnosis Code  Anemia D64.9  
 CAD (coronary artery disease) I25.10  Thrombocytopenia ([facility] D69.6  NSTEMI (non-ST elevated myocardial infarction) (HCC) I21.4  
 UTI (urinary tract infection) N39.0  Sepsis ([facility] A41.9  PNA (pneumonia) J18.9  Hypoxia R09.02  
 Cirrhosis of liver not due to alcohol ([facility] K74.60  Esophageal varices (HCC) I85.00  
 CHF (congestive heart failure) (HCC) I50.9  Dyspnea R06.00  
 Pneumonia J18.9  
 HTN (hypertension), benign I10  
 CAP (community acquired pneumonia) J18.9  Acute on chronic diastolic CHF (congestive heart failure) (HCC) I50.33  Leg DVT (deep venous thromboembolism), acute, left (HCC) I82.402  Palpitations R00.2  Acute pulmonary edema (HCC) J81.0  Malaise and fatigue R53.81, R53.83  
 Pancytopenia ([facility] [id number]  Portal hypertension ([facility] K76.6  Chronic anticoagulation Z79.01  
 Hip fracture ([facility] S72.009A  Breast cancer ([facility] C50.919  
 Colon cancer ([facility] C18.9  Pneumonia due to COVID-19 virus U07.1, J12.82 RECOMMENDATIONS:  
Neuro: Delirium and fall precautions. Delirium improving post extubation. Off all sedation, alert, 
Pulm: Pt passed SBT, RSBI of 60 --- extubated to NC 2/10, now on RA. Pt appeared encephalopathic afterwards, able to protect airway but appeared weak. Aspiration precautions. Albuterol neb and 3% NaCl neb Q6hrs. CVS: Monitor HD, MAP goal >65. Currently maintained without vasopressors. Holding home HTN meds as pt NPO- pending SLP swallow eval 
GI: NPO post intubation yesterday, pending SLP eval. GI ppx with famotidine Renal: Nephro following, HD per nephro recommendations. Trend Cr, UOP (almost none currently). Jean. Monitor daily CBC, lytes Hem/Onc: Trend H/H, monitor for s/o active bleeding. Daily CBC. DVT ppx held currently 2/2 minor oral bleeding I/D: Completed courses of ABx and remdesevir, convalescent plasma 1/28/2021. Will continue to monitor temperature curve, WBCs Metabolic: Daily BMP, mag, phos. Trend lytes and replace per protocol. Endocrine: SSI, lantus daily, serial accuchecks, avoid hypoglycemia Musc/Skin:  wound care, PT/OT- OOB to chair if possible Partial Code: INTUBATE ONLY Discussed in interdisciplinary rounds. Dispo: stable for step down Wali Hilt Subjective/Interval History:  
 
Interval HPI:  
Apolonio DuverneyJesse Leonardo is a 79 y.o.  Female w/ PMH of HFpPEF (EF 55-60%), Stage 3 CKD, Recent L Hip ORIF (12/2020), Esophageal Varices, Liver Cirrhosis, DM, HTN, CAD, who presented from [address] [place] [address] to [address] [place] [address] ED on 01/26/2021 via EMS after developing worsening SOB and Hemoptysis after testing (+) for COVID-19 x1 week prior.  
  
ICU consulted for Hypoxia and is noted to be satting at 94+ on 15L/100% HFNC. Shortly after examination, she required Endotracheal Intubation w/ Mechanical Ventilation.  
 
Extubation 2/2/2021, unfortunately, the patient was unable to protect airway adequately soon afterwards and was re-intubated with family's consent. Family has additionally stated that  patient would not want to have trach. 
  
Nephrology following for NANCY on CKD3, ID following for COVID pneumonia, Enterobacter cystitis, pseudomonal pna (have signed off) RSBI 60 after 1 hour SBT yesterday 2/10, extubated off sedation Subjective 02/11/21 Hospital Day: 16 Vent Day: off vent since 2/10 Overnight events: none, delirium improving, alert today Resp: weaned to RA this AM, tolerating Hemodynamics: averaging 899D-126E systolic since yesterday afternoon without CV sxs Urine output, bowel: poor UOP (0.155L over last 24 hours), BM this AM 
Diet: NPO (off tube feeds since yesterday post extubation, pending SLP eval). Required 21U SSI (majority post tube feeds, steroids also stopped yesterday) over last 24 hours. ROS: Endorses cough, transient SOB; oliguric. Denies CP, abdominal pain, abnormal BMs, MSK pain Objective:  
Vital Signs:   
Visit Vitals BP (!) 158/74 Pulse 97 Temp 98.2 °F (36.8 °C) Resp 23 Ht 5' (1.524 m) Wt 75.8 kg (167 lb 1.7 oz) SpO2 97% BMI 32.64 kg/m² O2 Device: Room air O2 Flow Rate (L/min): 1 l/min Temp (24hrs), Av.1 °F (36.7 °C), Min:97.8 °F (36.6 °C), Max:98.5 °F (36.9 °C) Intake/Output:  
Last shift:      No intake/output data recorded. Last 3 shifts:  1901 -  0700 In: 1563.4 [I.V.:613.4] Out: 2720 [Urine:220] Intake/Output Summary (Last 24 hours) at 2021 6129 Last data filed at 2021 4443 Gross per 24 hour Intake 259.32 ml Output 2620 ml Net -2360.68 ml  
  
   
Physical Exam:  
   General: Extubated, alert and able to converse 
    HEENT: NC/AT, No visible nasal drainage, Trachea midline  
    CV:  RRR, S1/S2 
    Lungs/Chest: Equal & Bilateral chest rise, Airway clear, B/L coarse rhonchi with poor air entry in B/L bases 
    Abdomen: Soft, Non-Distended non tender, large reducible hernia 
    Extremity: 1+pitting edema BLE 
    Neuro: awake, able to follow some commands, is intermittently agitated.  
    Skin: No new skin lesions, rashes, or ulcers Lines: R arm midline,. R IJ HD cath, velazquez DATA: 
Labs: 
Recent Labs  
  21 
9007 02/10/21 
3086 21 
7211 WBC 8.5 5.8 8.5 HGB 8.6* 8.1* 8.5* HCT 26.2* 24.8* 26.3*  
PLT 52* 40* 52* Recent Labs  
  21 
5228 02/10/21 
1750 02/10/21 
0322 21 
0352 21 
5175  141 135*   < > 138  
K 4.1 3.8 4.5   < > 3.7  107 103   < > 104 CO2 26 27 23   < > 25 * 160* 273*   < > 241* BUN 50* 43* 62*   < > 45* CREA 2.07* 1.71* 2.33*   < > 1.79* CA 8.8 8.3* 8.8   < > 8.6 MG 2.4  --  2.3  --  2.4 PHOS 3.8 2.9 3.6   < > 2.1* ALB 3.3* 3.3* 3.4   < > 3.2* ALT  --   --  16  --   --   
 < > = values in this interval not displayed. No results for input(s): PH, PCO2, PO2, HCO3, FIO2 in the last 72 hours. Imaging: 
[x]   I have personally reviewed the patients radiographs and reports XR Results (most recent): CXR Results  (Last 48 hours) 02/10/21 0601  XR CHEST PORT Final result Impression:  1. Tubes and lines without evidence of complication. 2.  Bilateral airspace opacities relatively unchanged. Narrative:  EXAM: Chest X-Ray INDICATION:  intubated. TECHNIQUE: AP view of the chest  
   
COMPARISON: 2/6/2021, 2/8/2021, 2/7/2021 FINDINGS:   
Tubes and Lines: ET tube is 2.5 cm both sonido. An NG tube is present with the  
tip below the field-of-view. Unchanged left-sided PICC line. Unchanged right IJ  
hemodialysis catheter. Pleura: No pneumothorax appreciated. No effusions appreciated. Lungs:  Bilateral airspace opacities are noted right greater than left. Cardiac/Mediastinum/Aorta: The cardiac silhouette is mildly prominent. Pulmonary Vascularity: The pulmonary vasculature is unremarkable. Chest Wall: No acute finding Osseous Structures: Unremarkable Upper Abdomen: No acute findings appreciated. Patient Active Problem List  
Diagnosis Code  Anemia D64.9  
 CAD (coronary artery disease) I25.10  Thrombocytopenia ([facility] D69.6  NSTEMI (non-ST elevated myocardial infarction) (HCC) I21.4  
 UTI (urinary tract infection) N39.0  Sepsis ([facility] A41.9  PNA (pneumonia) J18.9  Hypoxia R09.02  
 Cirrhosis of liver not due to alcohol ([facility] K74.60  Esophageal varices (HCC) I85.00  
 CHF (congestive heart failure) (HCC) I50.9  Dyspnea R06.00  
 Pneumonia J18.9  HTN (hypertension), benign I10  
 CAP (community acquired pneumonia) J18.9  Acute on chronic diastolic CHF (congestive heart failure) (HCC) I50.33  Leg DVT (deep venous thromboembolism), acute, left (HCC) I82.402  Palpitations R00.2  Acute pulmonary edema (HCC) J81.0  Malaise and fatigue R53.81, R53.83  
 Pancytopenia ([facility] [id number]  Portal hypertension ([facility] K76.6  Chronic anticoagulation Z79.01  
 Hip fracture ([facility] S72.009A  Breast cancer ([facility] C50.919  
 Colon cancer ([facility] C18.9  Pneumonia due to COVID-19 virus U07.1, J12.82 Best practice : 
 
Glycemic control IHI ICU bundles: Velazquez Bundle Followed Sress ulcer prophylaxis-famotidine DVT prophylaxis - held currently in setting of mild oral bleeding Need for Lines, velazquez assessed. Restraints need assessed Palliative care evaluation completed Anthony Beaulieu DO PGY-1  
[facility] February 11, 2021, 8:33 AM  
Pulmonary, Critical Care Medicine [facility] Attending Note: 
Late entry for date of service 2/11/2021: I saw and evaluated the patient, performing the key elements of the service. I discussed the findings, assessment and plan with the resident and agree with the resident's findings and plan as documented in the resident's note. Total of 35 min critical care time spent at bedside (personally) during the course of care providing evaluation,management and care decisions and ordering appropriate treatment related to critical care problems exclusive of procedures. The reason for providing this level of medical care for this critically ill patient was due a critical illness that impaired one or more vital organ systems such that there was a high probability of imminent or life threatening deterioration in the patients condition. This care involved high complexity decision making to assess, manipulate, and support vital system functions, to treat this degree vital organ system failure and to prevent further life threatening deterioration of the patients condition. This time was independent of other practitioners. 18-year-old female with a past medical history of CHFpEF, CKD, recent left hip ORIF in 12/2020, liver cirrhosis with esophageal varices, diabetes, hypertension, CAD, presented [facility] on 1/26/2021 with worsening shortness of breath and hemoptysis. Patient had tested positive for COVID-19 the week prior. Patient unfortunately developed worsening hypoxia from 15L to 100% high flow nasal cannula, patient then developed worsening hypoxia with subsequent respiratory failure requiring emergent intubation with mechanical ventilation -- completed course of decadron and remdesivir. Patient surprisingly had rapid improvement in hypoxia, patient was subsequently extubated on 2/2/2021, however patient quickly became obtunded and was unable to protect her airway, so patient was reintubated. For the past few days, patient has looked well with low RSBI on SBT, however patient underwent dialysis late in the day on Monday and did not have a cuff leak on Tuesday so patient underwent additional treatment with steroids. Family reports that patient does not want tracheostomy but wanted reintubation if patient failed extubation. Again yesterday patient had low RSBI (50-60s) on SBT, so patient was extubated. Patient tolerated extubation to nasal cannula well, however patient remains very weak today, secondary to advanced age as well as deconditioning and critical illness myopathy/polyneuropathy. Patient having difficulty clearing some secretions with cough, so CPT was ordered in addition to hypertonic saline (with albuterol) nebs. Very high risk for aspiration and decompensation -- likely no good solution for this other than to continue aspiration precautions. SLP evaluation noted the patient is too weak, should remain n.p.o., but they will continue to follow, tomorrow they will decide if patient needs NG tube versus being able to tolerate feeds. Patient did have issues with septic shock, weaned off of vasopressors, currently remains on midodrine. Patient also having some issues with thick secretions. Patient completed course of antibiotics for aspiration versus community-acquired pneumonia. .  Of note patient's fluid status difficult to control given CHFpEF along with severe pulmonary hypertension, last echo on 1/27/2020 shows a PASP of 70 mmHg. Of note patient also has thrombocytopenia, this is likely secondary to patient's underlying cirrhosis as well as sepsis, currently stable, no evidence of spontaneous bleeding. Continue supportive care Prognosis is guarded. Long-term prognosis is poor given elevated burden of chronic disease Deyanira Davila MD/MPH Pulmonary, Critical Care Medicine [facility]

## 2021-02-11 NOTE — PROGRESS NOTES
TRANSFER - IN REPORT: 
 
Verbal report received from Yissel(name) on Darlin Hernandez  being received from ICU(unit) for routine progression of care Report consisted of patients Situation, Background, Assessment and  
Recommendations(SBAR). Information from the following report(s) SBAR, ED Summary, MAR, Recent Results and Cardiac Rhythm NSR was reviewed with the receiving nurse. Opportunity for questions and clarification was provided. Assessment completed upon patients arrival to unit and care assumed.

## 2021-02-11 NOTE — PROGRESS NOTES
Problem: Dysphagia (Adult) Goal: *Acute Goals and Plan of Care (Insert Text) Description: Patient will: 1. Tolerate PO trials with 0 s/s overt distress in 4/5 trials 2. Utilize compensatory swallow strategies/maneuvers (decrease bite/sip, size/rate, alt. liq/sol) with min cues in 4/5 trials 3. Perform oral-motor/laryngeal exercises to increase oropharyngeal swallow function with min cues Recommend:  
NPO; may need to consider to alternative nutrition/hydration source pending progress Strict aspiration precautions (HOB >30 degrees at all times, Oral care TID) Outcome: Progressing Towards Goal 
 
SPEECH LANGUAGE PATHOLOGY BEDSIDE SWALLOW EVALUATION/TREATMENT Patient: Juli Kinney (31 y.o. female) Date: 2/11/2021 Primary Diagnosis: Pneumonia due to COVID-19 virus [U07.1, J12.82] Precautions: Aspiration PLOF: As per H&P 
 
ASSESSMENT : 
Based on the objective data described below, the patient presents with mod oral and suspected severe pharyngeal dysphagia s/p extubation 2/10/21. Pt alert but confused with significantly reduced vocal intensity, breathy vocal quality and pitch breaks. Oral mech exam revealed decreased orolingual strength/control and edentulous status. Pt tolerating x2 tsp presentations of puree with no overt s/sx aspiration; however, demo poor endurance/was easily fatigued with further presentations of puree with immediate wet/weak vocal quality, facial redness and weak/wet cough. Pt at high risk of aspiration with oral intake. Recommend continue NPO status; may need to consider atlernative nutrition/hydration source pending progress. D/w pt and MD.  Will follow for further dysphagia management. TREATMENT : 
Skilled therapy initiated; thorough oral care completed. Pt offered x1 ice chip as she was repeatedly asking for water. Demo delayed a-p transit and swallow delay with immediate cough. Will follow. Patient will benefit from skilled intervention to address the above impairments. Patient's rehabilitation potential is considered to be Fair Factors which may influence rehabilitation potential include:  
[]            None noted [x]            Mental ability/status [x]            Medical condition []            Home/family situation and support systems [x]            Safety awareness 
[]            Pain tolerance/management []            Other: PLAN : 
Recommendations and Planned Interventions: As above Frequency/Duration: Patient will be followed by speech-language pathology 1-2 times per day/4-7 days per week to address goals. Discharge Recommendations: To Be Determined SUBJECTIVE:  
Patient stated I just want water. OBJECTIVE:  
 
Past Medical History:  
Diagnosis Date  Anemia  Aortic stenosis, moderate 03/2019  Breast cancer ([facility]  CAD (coronary artery disease) Coronary Stenting needed, hematology workup needed prior to stent placement, treating medically at this time  Cancer [facility])   
 colon  Cancer ([facility] [address] breast - left  Cardiac catheterization 08/17/2016 LM patent. mLAD 99.9% (2.25 x 28-mm Xience BETHEL, resid 0%). oD1 60%. oD2 95%. pCX 90% (2.5 x 13-mm Xience BETHEL, resid 0%). OMB patent.  Cardiac echocardiogram 03/2019 EF 56 to 60%, moderate concentric LVH, moderate aortic stenosis, mean valve gradient 25 mmHg  Diabetes ([facility]  Hypercholesteremia  Hypertension  Thrombocytopenia ([facility] Past Surgical History:  
Procedure Laterality Date  CARDIAC CATHETERIZATION  7/16/2015  
  no stents at this time r/t anemia & thrombocytopenia (hematology work-up needed prior to stent placement)  CARDIAC CATHETERIZATION  7/11/2016  CARDIAC CATHETERIZATION  8/17/2016  CORONARY ARTERY ANGIOGRAM  7/16/2015  CORONARY ARTERY ANGIOGRAM  7/11/2016  CORONARY ARTERY ANGIOGRAM  8/17/2016  CORONARY STENT EA ADDL VESSEL  8/17/2016  CORONARY STENT SINGLE W/PTCA  8/17/2016  HX GI    
 colon surg secondary to cancer - removed lesion  HX HEENT  2/2013  
 cataract bilaterally  HX MASTECTOMY  march 2011  
 left  HX VASCULAR ACCESS    
 mediport - now removed  IR BX BONE MARROW DIAGNOSTIC  9/30/2019  LV ANGIOGRAPHY  7/16/2015  LV ANGIOGRAPHY  7/11/2016 Prior Level of Function/Home Situation: 
Home Situation Home Environment: Skilled nursing facility One/Two Story Residence: One story Living Alone: No 
Support Systems: Skilled nursing facility, Other (comments), Family member(s) Patient Expects to be Discharged to[de-identified] Skilled nursing facility Current DME Used/Available at Home: None Diet prior to admission: Unknown Current Diet:  NPO Cognitive and Communication Status: 
Neurologic State: Eyes open spontaneously Orientation Level: Oriented to person, Oriented to place, Disoriented to situation, Disoriented to time Cognition: Decreased attention/concentration, Poor safety awareness Oral Assessment: 
Oral Assessment Labial: Impaired coordination Dentition: Edentulous Oral Hygiene: Good Lingual: Decreased rate;Decreased strength Velum: No impairment Mandible: No impairment P.O. Trials: 
Patient Position: 44 at [facility] Vocal quality prior to P.O.: Breathy;Low volume;Phonation breaks Consistency Presented: Puree; Honey thick liquid How Presented: SLP-fed/presented;Spoon Bolus Acceptance: No impairment Bolus Formation/Control: Impaired Type of Impairment: Delayed;Poor;Posterior;Mastication Propulsion: Delayed (# of seconds); Discoordination Oral Residue: Lingual;Less than 10% of bolus Initiation of Swallow: Delayed (# of seconds) Laryngeal Elevation: Decreased;Weak Aspiration Signs/Symptoms: Weak cough; Change vocal quality; Facial redness;Decrease in O2 saturations Pharyngeal Phase Characteristics: Altered vocal quality; Easily fatigued ; Poor endurance; Suspected pharyngeal residue Effective Modifications: None Cues for Modifications: Moderate Oral Phase Severity: Moderate Pharyngeal Phase Severity : Severe PAIN: 
Start of Eval: not reported End of Eval: not reported After treatment:  
[]            Patient left in no apparent distress sitting up in chair 
[x]            Patient left in no apparent distress in bed 
[x]            Call bell left within reach [x]            Nursing notified []            Family present 
[]            Caregiver present 
[]            Bed alarm activated COMMUNICATION/EDUCATION:  
[x]            Aspiration precautions; swallow safety; compensatory techniques. []            Patient/family have participated as able in goal setting and plan of care. []            Patient/family agree to work toward stated goals and plan of care. []            Patient understands intent and goals of therapy; neutral about participation. [x]            Patient unable to participate in goal setting/plan of care; educ ongoing with interdisciplinary staff [x]         Posted safety precautions in patient's room. Thank you for this referral, Monie Cardoza M.S., CCC-SLP Speech-Language Pathologist

## 2021-02-12 NOTE — ROUTINE PROCESS
1910: Verbal shift change report given to LATRICIA Barrios (oncoming nurse) by KWAN Drew RN (offgoing nurse). Report included the following information SBAR, Kardex and Cardiac Rhythm Sinus Tachy. 5224: Patient O2 Sat 83% 12: Placed patient on 2L NC. O2 Sat at 93% 0630: Spoke with Dr. Sejal Pozo. Requested Cathflo due to limited blood return via midline. 0710: Bedside and Verbal shift change report given to [employer], RN (oncoming nurse) by Kaushal Carmen (offgoing nurse). Report included the following information SBAR, Kardex, MAR and Recent Results. SITUATION:  
? Code Status: Partial Code 
? Reason for Admission: Pneumonia due to COVID-19 virus [U07.1, J12.82] 
 
? Hospital day: 16 
? Problem List:  
   
Hospital Problems  Date Reviewed: 12/23/2020 Codes Class Noted POA * (Principal) Pneumonia due to COVID-19 virus ICD-10-CM: U07.1, J12.82 ICD-9-CM: 480.8  1/27/2021 Unknown BACKGROUND:  
 Past Medical History:  
Past Medical History:  
Diagnosis Date  Anemia  Aortic stenosis, moderate 03/2019  Breast cancer ([facility] [address]  CAD (coronary artery disease) Coronary Stenting needed, hematology workup needed prior to stent placement, treating medically at this time  Cancer [facility])   
 colon  Cancer ([facility] [address] breast - left  Cardiac catheterization 08/17/2016 LM patent. mLAD 99.9% (2.25 x 28-mm Xience BETHEL, resid 0%). oD1 60%. oD2 95%. pCX 90% (2.5 x 13-mm Xience BETHEL, resid 0%). OMB patent.  Cardiac echocardiogram 03/2019 EF 56 to 60%, moderate concentric LVH, moderate aortic stenosis, mean valve gradient 25 mmHg  Diabetes ([facility] [address]  Hypercholesteremia  Hypertension  [facility] Patient taking anticoagulants no ASSESSMENT:  
? Changes in Assessment Throughout Shift: Yes; oxygen saturation decreased. Patient placed on 2L NC 
 
? Patient has Central Line: yes Reasons if yes: Limited access ? Patient has Jean Cath: yes Reasons if yes: Accurate measurement ? Last Vitals: 
  
Vitals:  
 02/11/21 1500 02/11/21 2000 02/12/21 0010 02/12/21 0417 BP: (!) 166/74 (!) 168/97 (!) 164/82 (!) 177/92 Pulse: (!) 105 (!) 115 (!) 108 (!) 108 Resp: (!) 35 22 22 22 Temp:  98.9 °F (37.2 °C) 98.4 °F (36.9 °C) 97.7 °F (36.5 °C) TempSrc:      
SpO2: 93% (!) 88% 90% (!) 83% Weight:    75.3 kg (166 lb) Height:      
 
 
? IV and DRAINS (will only show if present) [REMOVED] Peripheral IV 02/02/21 Left;Posterior Hand-Site Assessment: Clean, dry, & intact [REMOVED] Airway - Endotracheal Tube 02/02/21-Site Assessment: Clean, dry, & intact Midline Catheter 02/02/21 Left-Site Assessment: Clean, dry, & intact [REMOVED] Orogastric Tube 02/02/21-Site Assessment: Clean, dry, & intact [REMOVED] Airway - Continuous Aspiration of Subglottic Secretions (MAAME) Tube 02/03/21-Site Assessment: Clean, dry, & intact Hemodialysis Access 02/06/21-Site Assessment: Clean, dry, & intact [REMOVED] Airway - Continuous Aspiration of Subglottic Secretions (MAAME) Tube 01/27/21 Oral-Site Assessment: Clean, dry, & intact [REMOVED] Peripheral IV 01/27/21 Right Arm-Site Assessment: Clean, dry, & intact [REMOVED] Peripheral IV 01/27/21 Left Arm-Site Assessment: Clean, dry, & intact [REMOVED] Peripheral IV 01/27/21 Right Antecubital-Site Assessment: Clean, dry, & intact [REMOVED] Triple Lumen left IJ  01/27/21 Left Internal jugular-Site Assessment: Clean, dry, & intact [REMOVED] Peripheral IV 01/26/21 Left Antecubital-Site Assessment: Drainage (comment) [REMOVED] Nasogastric Tube 01/27/21-Site Assessment: Clean, dry, & intact [REMOVED] Airway - Endotracheal Tube 01/27/21 Oral-Site Assessment: Clean, dry, & intact [REMOVED] Orogastric Tube 01/28/21-Site Assessment: Clean, dry, & intact ? WOUND (if present) Wound Type:  Multiple Dressing present Dressing Present : Yes, Intact, not due to be changed Wound Concerns/Notes:  none ? PAIN Pain Assessment Pain Intensity 1: 0 (02/12/21 0427) Pain Location 1: Generalized Pain Intervention(s) 1: Medication (see MAR)(versed 1 mg IVP given for pain /sedation) Patient Stated Pain Goal: Unable to verbalize/indicate pain 
o Interventions for Pain:  none 
o Intervention effective: n/a 
o Time of last intervention: n/a 
o Reassessment Completed: yes ? Last 3 Weights: 
Last 3 Recorded Weights in this Encounter 02/10/21 0000 02/11/21 0600 02/12/21 0427 Weight: 83.4 kg (183 lb 13.8 oz) 75.8 kg (167 lb 1.7 oz) 75.3 kg (166 lb) Weight change: -0.503 kg (-1 lb 1.7 oz) ? INTAKE/OUPUT Current Shift: No intake/output data recorded. Last three shifts: 02/10 0701 - 02/11 1900 In: 290.9 [I.V.:290.9] Out: 8165 [Urine:155] ? LAB RESULTS Recent Labs  
  02/11/21 
3405 02/10/21 
7990 WBC 8.5 5.8 HGB 8.6* 8.1* HCT 26.2* 24.8*  
PLT 52* 40* Recent Labs  
  02/11/21 
2000 02/11/21 
3464 02/10/21 
1750 02/10/21 
0665  138 141 135* K 3.8 4.1 3.8 4.5 * 154* 160* 273* BUN 61* 50* 43* 62* CREA 2.45* 2.07* 1.71* 2.33* CA 8.7 8.8 8.3* 8.8 MG  --  2.4  --  2.3 RECOMMENDATIONS AND DISCHARGE PLANNING 1. Pending tests/procedures/ Plan of Care or Other Needs: PT   
 
2. Discharge plan for patient and Needs/Barriers: TBD 3. Estimated Discharge Date: TBD Posted on Whiteboard in Patients Room: no   
 
4. The patient's care plan was reviewed with the oncoming nurse. \"HEALS\" SAFETY CHECK Fall Risk Total Score: 3 Safety Measures: Safety Measures: Bed/Chair alarm on, Bed/Chair-Wheels locked, Bed in low position, Call light within reach, Fall prevention (comment), Gripper socks, Side rails X 3 A safety check occurred in the patient's room between off going nurse and oncoming nurse listed above. The safety check included the below items Area Items H 
 High Alert Medications ? Verify all high alert medication drips (heparin, PCA, etc.) E Equipment ? Suction is set up for ALL patients (with eden) ? Red plugs utilized for all equipment (IV pumps, etc.) ? WOWs wiped down at end of shift. ? Room stocked with oxygen, suction, and other unit-specific supplies A Alarms ? Bed alarm is set for fall risk patients ? Ensure chair alarm is in place and activated if patient is up in a chair L Lines ? Check IV for any infiltration ? Ejan bag is empty if patient has a Jean ? Tubing and IV bags are labeled Jillian Havers Safety ? Room is clean, patient is clean, and equipment is clean. ? Hallways are clear from equipment besides carts. ? Fall bracelet on for fall risk patients ? Ensure room is clear and free of clutter ? Suction is set up for ALL patients (with eden) ? Hallways are clear from equipment besides carts. ? Isolation precautions followed, supplies available outside room, sign posted [place]

## 2021-02-12 NOTE — CONSULTS
[facility] - Consult Note Consultation request by Fran Gamboa MD for advice/opinion related to evaluating Pneumonia due to COVID-19 virus [U07.1, J12.82] Date of  Admission: 1/26/2021 11:13 PM  
Primary Care Physician:  Esdras Luo MD 
 
 Assessment:  
 
Patient Active Problem List  
Diagnosis Code  Anemia D64.9  
 CAD (coronary artery disease) I25.10  Thrombocytopenia ([facility] [address] D69.6  NSTEMI (non-ST elevated myocardial infarction) (HCC) I21.4  
 UTI (urinary tract infection) N39.0  Sepsis ([facility] [address] A41.9  PNA (pneumonia) J18.9  Hypoxia R09.02  
 Cirrhosis of liver not due to alcohol ([facility] [address] K74.60  Esophageal varices (HCC) I85.00  
 CHF (congestive heart failure) ([facility] I50.9  Dyspnea R06.00  
 Pneumonia J18.9  
 HTN (hypertension), benign I10  
 CAP (community acquired pneumonia) J18.9  Acute on chronic diastolic CHF (congestive heart failure) ([facility]) I50.33  Leg DVT (deep venous thromboembolism), acute, left ([facility]) I82.402  Palpitations R00.2  Acute pulmonary edema ([facility]) J81.0  Malaise and fatigue R53.81, R53.83  
 Pancytopenia ([facility] [address] [id number]  Portal hypertension ([facility] [address] K76.6  Chronic anticoagulation Z79.01  
 Hip fracture ([facility] [address] S72.009A  Breast cancer ([facility] [address] C50.919  
 Colon cancer ([facility] [address] C18.9  Pneumonia due to COVID-19 virus U07.1, J12.82  
 
 
-Severe aortic stenosis. Echo this admission with aortic valve peak gradient 67 mmHg, aortic valve mean gradient 38.3 mmHg and aortic valve area 0.6 cm2. (Echo 9/2020 revealed moderate AS with aortic valve peak gradient 45 mmHg, aortic valve mean gradient 28 mmHg and aortic valve area is 1.1 cm2). Patient is not currently candidate for further cardiac work up or intervention of AS in setting of significant covid illness with multiorgan system involvement. 
 
 
-Severe septic shock in setting of Covid and UTI, slowly improving and transferred out of ICU. -COVID pneumonia, requiring intubation 1/27 and reintubation 2/2. Now extubated since 2/10/21. 
-Enterobacter UTI s/p abx. 
-Acute on chronic kidney disease requiring HD. -Hypotension requiring pressors earlier this admission and now midodrine with HD. 
-Severe AS as noted above. -Severe pulmonary hypertension. PASP 70 mmHg. 
-Chronic HFpEF. EF 55-60% with severe AS as noted. -Coronary artery disease.  History of drug-eluting stents to both her LAD and left circumflex in August 2016.  Patient also had residual severe distal RCA disease which was well collateralized from the left. Noted elevated troponin earlier this admission presumed demand ischemia in setting of acute illness. Would not pursue further cardiac work up in setting of covid illness. 
-Dysphagia currently NPO. -Recent hemoptysis. H/o esophageal varices. 
-Hypertension. Elevated. -Dyslipidemia. -Diabetes mellitus. -H/o DVT. -Chronic anemia. 
-Chronic pancytopenia. 
-Legally blind. -H/o L Hip ORIF. -Presented from nursing home. 
-Partial code. Previously followed by Dr. Francis Vega. Most recent office visit 2019 with Dr. Leslie Wu. Most recent cardiology evaluation by [facility] 9/2020 during admission. Plan:  
 
Independently seen and evaluated. Agree with below. Would continue treatment for Covid pneumonia. Once she has made reasonable recovery, further consideration can be given for her severe AS. We'll follow along with you. Patient with known AS now worsening and in severe range. unfortunately patient is not currently a candidate for further cardiac work up and treatment in setting of covid illness with multiorgan involvement. Would continue volume management per nephrology. Would continue to follow BP trend, noted hypotension with HD requiring midodrine. Would continue supportive care. Would continue covid treatment. Continue to address goals of care and code status. History of Present Illness: This is a 79 y.o. female admitted for Pneumonia due to COVID-19 virus [U07.1, J12.82]. Patient complains of:  Patient presented with SOB and hypoxia. Patient was admitted with covid PNA and UTI. Patient required intubation. Patient required pressors. Patient has been slowing improving. Cardiology is asked to evaluate given history of AS which has worsened and is now in severe range. Cardiac risk factors: dyslipidemia, diabetes mellitus, hypertension Review of Symptoms:  
 
Limited in setting of acute illness Past Medical History:  
 
Past Medical History:  
Diagnosis Date  Anemia  Aortic stenosis, moderate 03/2019  Breast cancer ([facility] [address]  CAD (coronary artery disease) Coronary Stenting needed, hematology workup needed prior to stent placement, treating medically at this time  Cancer [facility])   
 colon  Cancer ([facility] [address] breast - left  Cardiac catheterization 08/17/2016 LM patent. mLAD 99.9% (2.25 x 28-mm Xience BETHEL, resid 0%). oD1 60%. oD2 95%. pCX 90% (2.5 x 13-mm Xience BETHEL, resid 0%). OMB patent.  Cardiac echocardiogram 03/2019 EF 56 to 60%, moderate concentric LVH, moderate aortic stenosis, mean valve gradient 25 mmHg  Diabetes ([facility] [address]  Hypercholesteremia  Hypertension  Thrombocytopenia ([facility] [address] [address] Social History:  
 
Social History Socioeconomic History  Marital status:  Spouse name: Not on file  Number of children: Not on file  Years of education: Not on file  Highest education level: Not on file Tobacco Use  Smoking status: Never Smoker  Smokeless tobacco: Never Used Substance and Sexual Activity  Alcohol use: No  
 Drug use: No  
 
 
 Family History:  
 
Family History Problem Relation Age of Onset  Cancer Maternal Aunt Medications: Allergies Allergen Reactions  Latex Itching Skin breaks out causing itching Current Facility-Administered Medications Medication Dose Route Frequency  alteplase (CATHFLO) 2 mg in sterile water (preservative free) 2 mL injection  2 mg InterCATHeter ONCE  carvediloL (COREG) tablet 25 mg  25 mg Oral Q12H  hydrALAZINE (APRESOLINE) 20 mg/mL injection 10 mg  10 mg IntraVENous Q6H PRN  
 midodrine (PROAMATINE) tablet 10 mg  10 mg Oral Q6H PRN  
 epoetin rio-epbx (RETACRIT) injection 10,000 Units  10,000 Units IntraVENous Q MON, WED & FRI  
 0.9% sodium chloride infusion 250 mL  250 mL IntraVENous PRN  
 insulin glargine (LANTUS) injection 20 Units  20 Units SubCUTAneous DAILY  famotidine (PF) (PEPCID) 20 mg in 0.9% sodium chloride 10 mL injection  20 mg IntraVENous DAILY  menthol-zinc oxide (CALMOSEPTINE) 0.44-20.6 % ointment   Topical TID  sodium chloride 3% hypertonic nebulizer soln  4 mL Nebulization Q6H RT  
 albuterol (ACCUNEB) nebulizer solution 1.25 mg  1.25 mg Nebulization Q6H RT  
 acetaminophen (TYLENOL) tablet 650 mg  650 mg Oral Q6H PRN  
 multivit-folic acid-herbal 941 (WELLESSE PLUS) oral liquid 30 mL  30 mL Per NG tube DAILY  albuterol-ipratropium (DUO-NEB) 2.5 MG-0.5 MG/3 ML  3 mL Nebulization Q6H PRN  
 insulin lispro (HUMALOG) injection   SubCUTAneous Q6H  
 glucose chewable tablet 16 g  4 Tab Oral PRN  
 glucagon (GLUCAGEN) injection 1 mg  1 mg IntraMUSCular PRN  
 dextrose (D50W) injection syrg 12.5-25 g  25-50 mL IntraVENous PRN Physical Exam:  
 
Visit Vitals BP (!) 193/110 Pulse (!) 106 Temp 97.6 °F (36.4 °C) Resp (!) 44 Ht 5' (1.524 m) Wt 166 lb (75.3 kg) SpO2 96% BMI 32.42 kg/m² BP Readings from Last 3 Encounters:  
02/12/21 (!) 193/110  
12/11/20 124/76  
09/28/20 137/65 Pulse Readings from Last 3 Encounters:  
02/12/21 (!) 106  
12/11/20 85  
09/28/20 65 Wt Readings from Last 3 Encounters:  
02/12/21 166 lb (75.3 kg) 12/10/20 157 lb 8 oz (71.4 kg) 09/28/20 149 lb 14.4 oz (68 kg) General:  fatigued Lungs:  diminished breath sounds with rhonchi Heart:  regular rate and rhythm with systolic murmur Abdomen:  abdomen is soft Extremities:  Edema + Skin: Warm and dry Neuro: alert Psych: non focal 
 
 Data Review:  
 
Recent Labs  
  02/11/21 
0620 02/10/21 
0322 WBC 8.5 5.8 HGB 8.6* 8.1* HCT 26.2* 24.8*  
PLT 52* 40* Recent Labs  
  02/11/21 2000 02/11/21 
8139 02/10/21 
1750 02/10/21 
5398  138 141 135* K 3.8 4.1 3.8 4.5  
 106 107 103 CO2 23 26 27 23 * 154* 160* 273* BUN 61* 50* 43* 62* CREA 2.45* 2.07* 1.71* 2.33* CA 8.7 8.8 8.3* 8.8 MG  --  2.4  --  2.3 PHOS 3.5 3.8 2.9 3.6 ALB 3.6 3.3* 3.3* 3.4 ALT  --   --   --  16 Results for orders placed or performed during the hospital encounter of 12/06/20 EKG, 12 LEAD, INITIAL Result Value Ref Range Ventricular Rate 76 BPM  
 Atrial Rate 76 BPM  
 P-R Interval 170 ms QRS Duration 122 ms  
 Q-T Interval 414 ms QTC Calculation (Bezet) 465 ms Calculated P Axis 47 degrees Calculated R Axis 7 degrees Calculated T Axis 78 degrees Diagnosis Normal sinus rhythm Nonspecific intraventricular conduction delay Borderline ECG When compared with ECG of 26-SEP-2020 17:01, 
Criteria for Septal infarct are no longer present Confirmed by Arlyn Nelson MD, ----- ([phone]) on 12/7/2020 4:29:23 PM 
  
Results for orders placed or performed in visit on 10/16/19 AMB POC EKG ROUTINE W/ 12 LEADS, INTER & REP Impression See progress note. All Cardiac Markers in the last 24 hours:  No results found for: CPK, CK, CKMMB, CKMB, RCK3, CKMBT, CKNDX, CKND1, CLAUDIO, TROPT, TROIQ, ANNAMARIA, TROPT, TNIPOC, BNP, BNPP Last Lipid:   
Lab Results Component Value Date/Time  Cholesterol, total 188 07/01/2016 11:53 PM  
 HDL Cholesterol 46 07/01/2016 11:53 PM  
 LDL, calculated 94.2 07/01/2016 11:53 PM  
 Triglyceride 239 (H) 07/01/2016 11:53 PM  
 CHOL/HDL Ratio 4.1 07/01/2016 11:53 PM  
 
 
 Signed By: ELOISA Fernandez February 12, 2021

## 2021-02-12 NOTE — PROGRESS NOTES
Nutrition Assessment Type and Reason for Visit: Reassess, Positive nutrition screen Nutrition Recommendations/Plan:  
-When medically appropriate (MD to order): Initiate tube feeding of Nepro at 20 mL/hr and advance as tolerated by 10 mL q 4 hours to goal rate of 45 mL/hr with 50 mL q 4 hour water flushes (goal regimen to provide 1944 kcal, 87 gm protein, 783 mL free water, 100% RDIs). Nutrition Assessment:  Pt extubated (2/10). Pt currently NPO d/t risk for aspiration. Pt is COVID-19 positive. SLP saw pt today (2/12) recommending NPO and to consider atlernative nutrition/hydration source pending progress. Per SLP pt at high risk of aspiration with oral intake. During SLP eval pt was alert but confused with significantly reduced vocal intensity, breathy vocal quality and pitch breaks. Dr. Felipe Velazquez aware of pts NPO status d/t high risk for aspiration and has dysphagia. Recommended nutrition support to Dr. Felipe Velazquez and he approved for NG tube placement for pt to meet estimated kcal and protein needs. Malnutrition Assessment: 
Malnutrition Status: At risk for malnutrition (specify)(NPO d/t dysphagia) Nutrition History and Allergies: PMHx: HFpPEF (EF 60-65%), Stage 3 CKD, Recent L Hip ORIF (12/2020), Esophageal Varices, Liver Cirrhosis, DM, HTN, CAD, who presents from [address] [place] [address] to [facility] via EMS after developing worsening SOB and Hemoptysis after testing (+) for COVID-19 x1 week prior. HPI is limited to chart due to Pt on HFNC and altered. NKFA. Estimated Daily Nutrient Needs: 
Energy (kcal):  1700-2,380(25-35kcal/kg) Protein (g):  (1.2-1.5) Fluid (ml/day):  1190-2,888 Nutrition Related Findings:  Loose stool 2/10, small overnight (FMS removed 2/9 with skin breakdown noted, stage 2 pressure injury and skin tear to sacrum per nursing). Oliguria s/p hemodialysis yesterday (3 L UF removed 2/9) Current Nutrition Therapies: DIET NPO Anthropometric Measures: · Height:  5' (152.4 cm) · Current Body Wt:  75.3 kg (166 lb 0.1 oz) · BMI: 32.4 Nutrition Diagnosis:  
· Inadequate oral intake related to swallowing difficulty as evidenced by NPO or clear liquid status due to medical condition Nutrition Intervention: 
Food and/or Nutrient Delivery: Start tube feeding Nutrition Education and Counseling: No recommendations at this time Coordination of Nutrition Care: Continue to monitor while inpatient Goals: 
Patient will tolerate enteral nutrition formula and regimen without difficulty within the next 7 days. Nutrition Monitoring and Evaluation:  
Behavioral-Environmental Outcomes: None identified Food/Nutrient Intake Outcomes: Enteral nutrition intake/tolerance Physical Signs/Symptoms Outcomes: Chewing or swallowing Discharge Planning: Too soon to determine Electronically signed by Masha Flores RD on 2/12/2021 at 3:47 PM 
 
Contact Number: [phone]

## 2021-02-12 NOTE — PROGRESS NOTES
RENAL PROGRESS NOTE Keyon Rosales Assessment/Plan:  
1)NANCY on CKD3: d/t Ischaemic ATN/sepsis v/s covid nephropathy 2)Covid PNA/Resp failure , better 3)Altered mental status , combination of metabolic encephalopathy , uremia and sedation Doing HD every other day for volume and solute 4)Cirrhosis/Varices: presently appears compensated 5) severe hypoalbuminemia: better 6) severe anemia added arnold 7) thrombocytopenia , stable 8) AMS, improving Plan: 
1) HD today for volume and solute 2) renal panel daily 3) strict intake output 4) arnold with HD Discussed with PCCM, Please call with questions, 
 
Tavares Whittaker MD FASN Cell [phone] Pager: [phone] Subjective: 
covid isolation. Patient Active Problem List  
Diagnosis Code  Anemia D64.9  
 CAD (coronary artery disease) I25.10  Thrombocytopenia ([address] D69.6  NSTEMI (non-ST elevated myocardial infarction) (HCC) I21.4  
 UTI (urinary tract infection) N39.0  Sepsis ([address] A41.9  PNA (pneumonia) J18.9  Hypoxia R09.02  
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  
 CHF (congestive heart failure) (HCC) I50.9  Dyspnea R06.00  
 Pneumonia J18.9  
 HTN (hypertension), benign I10  
 CAP (community acquired pneumonia) J18.9  Acute on chronic diastolic CHF (congestive heart failure) (HCC) I50.33  Leg DVT (deep venous thromboembolism), acute, left (HCC) I82.402  Palpitations R00.2  Acute pulmonary edema (HCC) J81.0  Malaise and fatigue R53.81, R53.83  
 Pancytopenia ([address] [id number]  Portal hypertension ([address] K76.6  Chronic anticoagulation Z79.01  
 Hip fracture ([address] S72.009A  Breast cancer ([address] C50.919  
 Colon cancer ([address] C18.9  Pneumonia due to COVID-19 virus U07.1, J12.82  
 
 
 Current Facility-Administered Medications Medication Dose Route Frequency Provider Last Rate Last Admin  alteplase (CATHFLO) 2 mg in sterile water (preservative free) 2 mL injection  2 mg InterCATHeter Emmit Dilia Covington MD      
 carvediloL (COREG) tablet 25 mg  25 mg Oral Q12H Heather Acosta MD   Stopped at 02/11/21 1100  hydrALAZINE (APRESOLINE) 20 mg/mL injection 10 mg  10 mg IntraVENous Q6H PRN Carlie PANDEY, NP   10 mg at 02/11/21 1741  
 midodrine (PROAMATINE) tablet 10 mg  10 mg Oral Q6H PRN Heather Acosta MD      
 epoetin rio-epbx (RETACRIT) injection 10,000 Units  10,000 Units IntraVENous Q MON, WED & Pau Mejias MD   10,000 Units at 02/10/21 2046  
 0.9% sodium chloride infusion 250 mL  250 mL IntraVENous PRN Tay TIAN PA-C      
 insulin glargine (LANTUS) injection 20 Units  20 Units SubCUTAneous DAILY Dali Bailey PA-C   20 Units at 02/12/21 1108  famotidine (PF) (PEPCID) 20 mg in 0.9% sodium chloride 10 mL injection  20 mg IntraVENous DAILY Allyson Anthony MD   20 mg at 02/12/21 1108  
 menthol-zinc oxide (CALMOSEPTINE) 0.44-20.6 % ointment   Topical TID Bess Calixto MD   Given at 02/12/21 0900  
 sodium chloride 3% hypertonic nebulizer soln  4 mL Nebulization Q6H RT Magali Barrera PA-C   4 mL at 02/12/21 0800  
 albuterol (ACCUNEB) nebulizer solution 1.25 mg  1.25 mg Nebulization Q6H RT Magali Barrera PA-C   1.25 mg at 02/12/21 0800  acetaminophen (TYLENOL) tablet 650 mg  650 mg Oral Q6H PRN Truman Lemus PA-C   650 mg at 01/31/21 2230  multivit-folic acid-herbal 966 (WELLESSE PLUS) oral liquid 30 mL  30 mL Per NG tube DAILY Beau Nelson MD   Stopped at 02/09/21 1013  albuterol-ipratropium (DUO-NEB) 2.5 MG-0.5 MG/3 ML  3 mL Nebulization Q6H PRN JALEEL'Magali Belle, PA-C      
 insulin lispro (HUMALOG) injection   SubCUTAneous Q6H Beau Nelson MD   3 Units at 02/12/21 1109  glucose chewable tablet 16 g  4 Tab Oral PRN Magali Lugo PA-C      
 glucagon (GLUCAGEN) injection 1 mg  1 mg IntraMUSCular PRN Magali Lugo PA-C      
 dextrose (D50W) injection syrg 12.5-25 g  25-50 mL IntraVENous PRN Magali Lugo PA-C   25 g at 02/09/21 2002 Objective Vitals:  
 02/12/21 0427 02/12/21 0800 02/12/21 0827 02/12/21 1200 BP: (!) 177/92 (!) 193/110 Pulse: (!) 108 (!) 106  (!) 105 Resp: 22 (!) 44  (!) 44 Temp: 97.7 °F (36.5 °C) 97.6 °F (36.4 °C) TempSrc:      
SpO2: (!) 83% 96% 96% Weight: 75.3 kg (166 lb) Height:      
 
 
No intake or output data in the 24 hours ending 02/12/21 1324 Admission weight: Weight: 68 kg (150 lb) (01/27/21 0830) Last Weight Metrics: 
Weight Loss Metrics 2/12/2021 12/10/2020 9/28/2020 12/1/2019 10/16/2019 9/30/2019 8/6/2019 Today's Wt 166 lb 157 lb 8 oz 149 lb 14.4 oz 140 lb 143 lb 140 lb 145 lb BMI 32.42 kg/m2 30.76 kg/m2 29.28 kg/m2 27.34 kg/m2 27.93 kg/m2 27.34 kg/m2 28.32 kg/m2 Physical Assessment:  
 
GEN NAD HENT dry mucosa CVS s1 s2 wnl , AS harsh murmur appreciated RS AEBE  
GI soft EXT no edema Lab CBC w/Diff Recent Labs  
  02/11/21 
0620 02/10/21 
0322 WBC 8.5 5.8  
RBC 2.99* 2.84* HGB 8.6* 8.1* HCT 26.2* 24.8*  
PLT 52* 40* GRANS 89* 94* LYMPH 6* 6*  
EOS 0 0 Chemistry Recent Labs  
  02/11/21 2000 02/11/21 
6172 02/10/21 
1750 02/10/21 
0933 * 154* 160* 273*  138 141 135* K 3.8 4.1 3.8 4.5  
 106 107 103 CO2 23 26 27 23 BUN 61* 50* 43* 62* CREA 2.45* 2.07* 1.71* 2.33* CA 8.7 8.8 8.3* 8.8 AGAP 11 6 7 9 BUCR 25* 24* 25* 27* AP  --   --   --  85  
TP  --   --   --  6.1* ALB 3.6 3.3* 3.3* 3.4 GLOB  --   --   --  2.7 AGRAT  --   --   --  1.3 PHOS 3.5 3.8 2.9 3.6 Lab Results Component Value Date/Time  Iron 65 12/08/2020 11:30 AM  
 TIBC 189 (L) 12/08/2020 11:30 AM  
 Iron % saturation 34 12/08/2020 11:30 AM  
 Ferritin 669 (H) 02/04/2021 05:10 AM  
  
Lab Results Component Value Date/Time Calcium 8.7 02/11/2021 08:00 PM  
 Phosphorus 3.5 02/11/2021 08:00 PM  
  
 
Diann Lezama MD 
[facility] Pager: [phone] Phone: [phone]

## 2021-02-12 NOTE — PROGRESS NOTES
NGT placement attempted x3. Attempts unsuccessful. Pt became tachy with HR in the 130s and hypoxic with O2 sats in the low 80s.

## 2021-02-12 NOTE — PROGRESS NOTES
Spoke with HD nurse about potential time for HD today. Informed HD nurse that BP meds were being held pending HD treatment per orders. Per HD nurse, Pt will be receiving HD treatment later tonight and to continue to hold BP meds.

## 2021-02-12 NOTE — PROGRESS NOTES
Problem: Dysphagia (Adult) Goal: *Acute Goals and Plan of Care (Insert Text) Description: Patient will: 1. Tolerate PO trials with 0 s/s overt distress in 4/5 trials 2. Utilize compensatory swallow strategies/maneuvers (decrease bite/sip, size/rate, alt. liq/sol) with min cues in 4/5 trials 3. Perform oral-motor/laryngeal exercises to increase oropharyngeal swallow function with min cues Recommend:  
NPO; may need to consider to alternative nutrition/hydration source pending progress Strict aspiration precautions (HOB >30 degrees at all times, Oral care TID) Outcome: Progressing Towards Goal 
  
SPEECH LANGUAGE PATHOLOGY DYSPHAGIA TREATMENT Patient: Pop Ramirez (28 y.o. female) Date: 2/12/2021 Diagnosis: Pneumonia due to COVID-19 virus [U07.1, J12.82] Pneumonia due to COVID-19 virus Precautions:  Fall, Aspiration PLOF: Per H&P  
 
ASSESSMENT: 
Pt AOx4, significantly volume, generally aphonic, at times breathy vocal quality, though intelligible. Pt given PO trials ice chips x2 and puree x2. Immediate strong, wet cough, watery eyes, and decrease in RR with ice chip trials. Immediate weak throat clear and wet vocal quality with puree trials. Pt at severely high risk of aspiration with PO intake. Recommend continue NPO with consideration for alternate means  nutrition/hydration pending progress. D/w pt and RN. Of note, attempting laryngeal elevation exercises; however, pt unable to hear/follow directions for effective participation in exercises. SLP will continue to follow for dysphagia management. Progression toward goals: 
[]         Improving appropriately and progressing toward goals [x]         Improving slowly and progressing toward goals 
[]         Not making progress toward goals and plan of care will be adjusted PLAN: 
Recommendations and Planned Interventions: 
See above. Patient continues to benefit from skilled intervention to address the above impairments. Continue treatment per established plan of care. Discharge Recommendations: To Be Determined SUBJECTIVE:  
Patient stated Ok. OBJECTIVE:  
Cognitive and Communication Status: 
Neurologic State: Alert Orientation Level: Oriented X4 Cognition: Decreased command following Perception: Appears intact Perseveration: No perseveration noted Safety/Judgement: Fall prevention Dysphagia Treatment: 
Oral Assessment: 
Oral Assessment Labial: Decreased rate Dentition: Edentulous Oral Hygiene: [place] Lingual: No impairment Velum: No impairment Mandible: No impairment P.O. Trials: 
 Patient Position: [facility] 75* Vocal quality prior to P.O.: Wet Consistency Presented: Ice chips, puree How Presented: SLP-fed/presented, Spoon Bolus Acceptance: No impairment Bolus Formation/Control: No impairment Type of Impairment: Delayed, Poor, Posterior, Mastication Propulsion: No impairment Oral Residue: None Initiation of Swallow: Delayed (# of seconds) Laryngeal Elevation: Decreased, Weak Aspiration Signs/Symptoms: Weak cough, Watery eyes, Facial redness, Decrease in O2 saturations Pharyngeal Phase Characteristics: Altered vocal quality, Easily fatigued , Poor endurance, Suspected pharyngeal residue Effective Modifications: None Cues for Modifications: Maximal 
   
 
 Oral Phase Severity: Mild Pharyngeal Phase Severity : Severe PAIN: 
Pain level pre-treatment: 0/10 Pain level post-treatment: 0/10 After treatment:  
[]              Patient left in no apparent distress sitting up in chair 
[x]              Patient left in no apparent distress in bed 
[x]              Call bell left within reach [x]              Nursing notified 
[]              Family present 
[]              Caregiver present 
[]              Bed alarm activated COMMUNICATION/EDUCATION:  
 [x] Aspiration precautions; swallow safety; compensatory techniques [x]        Patient unable to participate in education; education ongoing with staff [x]  Posted safety precautions in patient's room. [] Oral-motor/laryngeal strengthening exercises Thank you for this referral. 
 
Leroy Cerrato M.S., CCC-SLP Speech-Language Pathologist 
 
Time Calculation: 9 mins

## 2021-02-12 NOTE — PROGRESS NOTES
Discharge/Transition Planning Plan at this time is return to [facility] for SNF. Following also if continued dialysis. Pt does not have transport benefit through insurance to travel to/frm dialysis Carol Fraga RN BSN Outcomes Manager Pager # [phone]

## 2021-02-12 NOTE — PROGRESS NOTES
Problem: Self Care Deficits Care Plan (Adult) Goal: *Acute Goals and Plan of Care (Insert Text) Description: Occupational Therapy Goals Initiated 2/12/2021 within 7 day(s). 1.  Patient will perform grooming with supervision/set-up. 2.  Patient will perform upper body dressing with supervision/set-up. 3.  Patient will perform bed mobility in preparation for selfcare with minimal assistance/contact guard assist. 
4.  Patient will perform toilet transfers with moderate assistance . 5. Patient will perform all aspects of toileting with moderate assistance . 6. Patient will participate in upper extremity therapeutic exercise/activities with supervision/set-up for 5-8 minutes. 7.  Patient will utilize energy conservation techniques during functional activities with verbal cues. Prior Level of Function: Patient a poor historian. Pt reports she was independent with self-care and functional mobility PTA. Per chart review, patient from [facility] receiving therapy Outcome: Progressing Towards Goal 
  
OCCUPATIONAL THERAPY EVALUATION Patient: Denisa Cortes (67 y.o. female) Date: 2/12/2021 Primary Diagnosis: Pneumonia due to COVID-19 virus [U07.1, J12.82] Precautions: Fall, Aspiration ASSESSMENT : 
 Patient cleared to participate in OT evaluation by RN. Upon entering the room, the patient was supine in bed, alert, and agreeable to participate in OT evaluation. Patient was seen with PT to maximize patient safety, participation, and functional mobility in preparation for self-care tasks. Patient extremely [place], benefits best from commands close to her ear and loud and by writing large on whiteboard. Based on the objective data described below, the patient presents with decreased strength, decreased independence, decreased AROM, decreased functional balance, and decreased functional mobility, which impedes pt performance in basic self-care/ADL tasks. Patient would benefit from skilled OT to restore PLOF and maximize function. Patient will benefit from skilled intervention to address the above impairments. Patient's rehabilitation potential is considered to be Fair Factors which may influence rehabilitation potential include:  
[]             None noted [x]             Mental ability/status [x]             Medical condition [x]             Home/family situation and support systems [x]             Safety awareness [x]             Pain tolerance/management 
[]             Other: PLAN : 
Recommendations and Planned Interventions:  
[x]               Self Care Training                  [x]      Therapeutic Activities [x]               Functional Mobility Training   [x]      Cognitive Retraining 
[x]               Therapeutic Exercises           [x]      Endurance Activities [x]               Balance Training                    [x]      Neuromuscular Re-Education []               Visual/Perceptual Training     [x]      Home Safety Training 
[x]               Patient Education                   [x]      Family Training/Education []               Other (comment): Frequency/Duration: Patient will be followed by occupational therapy 1-2 times per day/4-7 days per week to address goals. Discharge Recommendations: Jamel Locke Further Equipment Recommendations for Discharge: TBD at next level of care SUBJECTIVE:  
Patient stated I can do everything on my own OBJECTIVE DATA SUMMARY:  
 
Past Medical History:  
Diagnosis Date Anemia Aortic stenosis, moderate 03/2019 Breast cancer ([facility] CAD (coronary artery disease) Coronary Stenting needed, hematology workup needed prior to stent placement, treating medically at this time [facility])   
 colon Cancer ([facility] breast - left Cardiac catheterization 08/17/2016 LM patent. mLAD 99.9% (2.25 x 28-mm Xience BETHEL, resid 0%). oD1 60%. oD2 95%. pCX 90% (2.5 x 13-mm Xience BETHEL, resid 0%). OMB patent. Cardiac echocardiogram 03/2019 EF 56 to 60%, moderate concentric LVH, moderate aortic stenosis, mean valve gradient 25 mmHg Diabetes ([facility] Hypercholesteremia Hypertension Thrombocytopenia ([facility] Past Surgical History:  
Procedure Laterality Date CARDIAC CATHETERIZATION  7/16/2015  
  no stents at this time r/t anemia & thrombocytopenia (hematology work-up needed prior to stent placement) CARDIAC CATHETERIZATION  7/11/2016 CARDIAC CATHETERIZATION  8/17/2016 CORONARY ARTERY ANGIOGRAM  7/16/2015 CORONARY ARTERY ANGIOGRAM  7/11/2016 CORONARY ARTERY ANGIOGRAM  8/17/2016 CORONARY STENT EA ADDL VESSEL  8/17/2016 CORONARY STENT SINGLE W/PTCA  8/17/2016 HX GI    
 colon surg secondary to cancer - removed lesion HX HEENT  2/2013  
 cataract bilaterally HX MASTECTOMY  march 2011  
 left HX VASCULAR ACCESS    
 mediport - now removed IR BX BONE MARROW DIAGNOSTIC  9/30/2019 LV ANGIOGRAPHY  7/16/2015 LV ANGIOGRAPHY  7/11/2016 Barriers to Learning/Limitations: yes;  sensory deficits-vision/hearing/speech Compensate with: visual, verbal, tactile, kinesthetic cues/model Home Situation:  
Home Situation Home Environment: Skilled nursing facility One/Two Story Residence: One story Living Alone: No 
Support Systems: Skilled nursing facility, Other (comments), Family member(s) Patient Expects to be Discharged to[de-identified] Skilled nursing facility Current DME Used/Available at Home: None 
[x]  Right hand dominant   []  Left hand dominant Cognitive/Behavioral Status: 
Neurologic State: Alert Orientation Level: Oriented X4 Cognition: Follows commands Safety/Judgement: Fall prevention Skin: Bruising/redness observed on LUE Edema: None noted Vision/Perceptual:  
Acuity: (pt able to read far white board and bnear paper this session) Coordination: BUE Coordination: Generally decreased, functional 
Fine Motor Skills-Upper: Left Intact; Right Intact Gross Motor Skills-Upper: Right Impaired;Left Impaired(B shoulders limited; LUE AROM > RUE) Strength: BUE Strength: Generally decreased, functional 
 
Tone & Sensation: BUE Tone: Normal 
Sensation: Intact Range of Motion: BUE 
AROM: Generally decreased, functional 
 
Functional Mobility and Transfers for ADLs: 
Bed Mobility: 
Rolling: Moderate assistance Scooting: Total assistance;Assist x2 ADL Assessment:  
Feeding: Setup;Stand-by assistance Oral Facial Hygiene/Grooming: Minimum assistance Bathing: Maximum assistance Upper Body Dressing: Moderate assistance Lower Body Dressing: Maximum assistance Toileting: Total assistance ADL Intervention: 
Grooming Grooming Assistance: Stand-by assistance(using suction to mouth) Lower Body Bathing Bathing Assistance: Maximum assistance Perineal  : Maximum assistance Upper Body Dressing Assistance Dressing Assistance: Moderate assistance Hospital Gown: Moderate assistance Toileting Toileting Assistance: Total assistance(dependent) Bowel Hygiene: Total assistance (dependent) Cognitive Retraining Safety/Judgement: Fall prevention Pain: Patient reports pain in BLE/LUE, no numerical value given Pain Intervention(s): Medication (see MAR); Response to intervention: Nurse notified, See doc flow Activity Tolerance:  
Fair Please refer to the flowsheet for vital signs taken during this treatment. After treatment:  
[] Patient left in no apparent distress sitting up in chair 
[x] Patient left in no apparent distress in bed 
[x] Call bell left within reach [x] Nursing notified 
[] Caregiver present [x] Bed alarm activated COMMUNICATION/EDUCATION:  
[x] Role of Occupational Therapy in the acute care setting 
[x] Home safety education was provided and the patient/caregiver indicated understanding. [x] Patient/family have participated as able in goal setting and plan of care. [x] Patient/family agree to work toward stated goals and plan of care. [] Patient understands intent and goals of therapy, but is neutral about his/her participation. [] Patient is unable to participate in goal setting and plan of care. Thank you for this referral. 
Elen Singleton, OTR/L Time Calculation: 38 mins Eval Complexity: History: HIGH Complexity : Extensive review of history including physical, cognitive and psychosocial history ; Examination: HIGH Complexity : 5 or more performance deficits relating to physical, cognitive , or psychosocial skils that result in activity limitations and / or participation restrictions; Decision Making:HIGH Complexity : Patient presents with comorbidities that affect occupational performance. Signifigant modification of tasks or assistance (eg, physical or verbal) with assessment (s) is necessary to enable patient to complete evaluation

## 2021-02-12 NOTE — PROGRESS NOTES
[facility] Hospitalist Group Progress Note Patient: Keyon Rosales Age: 79 y.o. : 1940 MR#: [id number] SSN: [id number] Date: 2021 Subjective:  
 
Pt seen & evaluated - lying in bed , is tachypneic Asked nurse to place NGT Assessment/Plan:  
 
Consultants: Infectious disease (signed off); Nephrology 1. Hypoxic respiratory failure - initially intubated 21 then extubated 21, failed to protect airway and required reintubation 21 then extubated again on 02/10/21. Now on RA, however high risk for re-intubation. Chest PT per pulmonary. 2. Covid 19 pneumonia - s/p abx; remdesevir and convalescent plasma. 3. Severe sepsis with septic shock in setting of PNA and UTI- tachycardia, tachypnea, hypotension on admission, now improved 4. Enterobacter UTI - completed course of cefepime / vanco in ICU 5. NANCY on stage 3 CKD - HD per nephrology 6. Acute metabolic encephalopathy - off precedex drip; in setting of multiple comorbidities and acute illness improved 7. Severe protein calorie malnutrition - NPO per SLP. Pending respiratory status and further SLP recs, Discussed with nutrition - will place NGT if possible. 8. Type 2 diabetes with hyperglycemia -continue Lantus with SSI 9. Recent hemoptysis - hold on blood thinners 10. Elevalted troponin - earlier in admission felt to be in setting of  
11. Severe aortic stenosis - per last echo; cardiology consult 12. HTN - off pressors. add IV hydralazine 13. H/o [place] and chronic pancytopenia - hold on blood thinners 14. H/o recent left hip ORIF - on 20 per Dr. Abe Levy 15. Goals of care - Patient is partial code; DNR, but proceed with intubation if needed. Nephew requesting update for any change in status. Palliative medicine consulted due to multiple comorbidities 16. Legally blind - per nephew 17. Dispo - pt previously at [address] [place] [address] Called karishma Alvarado at phone number # [phone]; left voicemail. Pt transferred to SDU - continue care per hospitalist team .  Also discussed risk for further decline and risk for reintubation of which nephew is aware. He requests call if change of status however he is aware an emergent situation may proceed with intubation and then follow with him after. Additional Notes:   
 
Case discussed with:  [x]Patient  [x]Family  [x]Nursing  []Case Management DVT Prophylaxis:  []Lovenox  []Hep SQ  [x]SCDs  []Coumadin   []On Heparin gtt Objective:  
VS:  
Visit Vitals BP (!) 168/82 Pulse (!) 105 Temp 98.2 °F (36.8 °C) Resp (!) 44 Ht 5' (1.524 m) Wt 75.3 kg (166 lb) SpO2 96% BMI 32.42 kg/m² Tmax/24hrs: Temp (24hrs), Av.2 °F (36.8 °C), Min:97.6 °F (36.4 °C), Max:98.9 °F (37.2 °C) No intake or output data in the 24 hours ending 21 1546 General:  Alert, NAD; very hard of hearing and soft spoken Cardiovascular: Regular rhythm, mild tachycardia Pulmonary: Coarse rhonchi, mild tachypnea GI:  +BS in all four quadrants, soft, non-tender Extremities: 2+ bilateral lower edema; feet warm bilaterally Neuro: alert and oriented x person only Family contact: Angélica Hoff [phone] Labs:   
Recent Results (from the past 24 hour(s)) RENAL FUNCTION PANEL Collection Time: 21  8:00 PM  
Result Value Ref Range Sodium 143 136 - 145 mmol/L Potassium 3.8 3.5 - 5.5 mmol/L Chloride 109 100 - 111 mmol/L  
 CO2 23 21 - 32 mmol/L Anion gap 11 3.0 - 18 mmol/L Glucose 138 (H) 74 - 99 mg/dL BUN 61 (H) 7.0 - 18 MG/DL Creatinine 2.45 (H) 0.6 - 1.3 MG/DL  
 BUN/Creatinine ratio 25 (H) 12 - 20 GFR est AA 23 (L) >60 ml/min/1.73m2 GFR est non-AA 19 (L) >60 ml/min/1.73m2 Calcium 8.7 8.5 - 10.1 MG/DL Phosphorus 3.5 2.5 - 4.9 MG/DL Albumin 3.6 3.4 - 5.0 g/dL GLUCOSE, POC Collection Time: 21  3:04 AM  
Result Value Ref Range Glucose (POC) 137 (H) 70 - 110 mg/dL GLUCOSE, POC Collection Time: 02/12/21  9:41 AM  
Result Value Ref Range Glucose (POC) 168 (H) 70 - 110 mg/dL Signed By: Katty Crooks MD   
 February 12, 2021

## 2021-02-12 NOTE — PROGRESS NOTES
Problem: Mobility Impaired (Adult and Pediatric) Goal: *Acute Goals and Plan of Care (Insert Text) Description: Physical Therapy Goals Initiated 2/12/2021 and to be accomplished within 7 day(s) 1. Patient will move from supine to sit and sit to supine , scoot up and down, and roll side to side in bed with minimal assistance/contact guard assist.   
2.  Patient will transfer from bed to chair and chair to bed with max assistance  using the least restrictive device. 3.  Patient will perform sit to stand with maximal assistance. 4.  Patient will sit on EOB with no more than [place] for >10 minutes in prep for OOB activities. 5. Pt will ambulate x3 feet with LRAD and maxA. PLOF: Pt reporting she lives alone and does not use AD for mobility. Per chart pt from SNF. Outcome: Progressing Towards Goal 
 
PHYSICAL THERAPY EVALUATION Patient: Robin Busby (42 y.o. female) Date: 2/12/2021 Primary Diagnosis: Pneumonia due to COVID-19 virus [U07.1, J12.82] Precautions:  Fall, Aspiration(droplet plus) PLOF: see above ASSESSMENT : 
 Pt cleared to participate in PT session, pt received semi-reclined in bed and agreeable to therapy session. Completing with OT to maximize safety and mobility. Based on the objective data described below, the patient presents with decreased endurance, decreased strength, decreased balance reactions, gait deviations, and decreased independence in functional mobility. Pt on O2 via NC with SPO2 in high 90s throughout mobility. Pt very [place] bilaterally, improves with simple sentences in L ear. Pt able to read large letters on whiteboard. Pt also demonstrating decreased vocalization, improving when asked to speak louder. Pt oriented x4. Pt found to be soiled, rolling with min-modA to each side. Pt reporting increased pain through LLE but also reporting RLE and LUE pain. Pt declining to transition to EOB. Pt scooting toward HOB with totalAx2. Pt positioned for comfort and educated to call for assist before getting up, pt verbalized understanding. Pt left with all needs met and call bell in reach. RN notified of position and participation. Patient will benefit from skilled intervention to address the above impairments. Patient's rehabilitation potential is considered to be Fair Factors which may influence rehabilitation potential include:  
[]         None noted 
[]         Mental ability/status [x]         Medical condition 
[]         Home/family situation and support systems 
[]         Safety awareness 
[]         Pain tolerance/management 
[]         Other: PLAN : 
Recommendations and Planned Interventions:  
[x]           Bed Mobility Training             [x]    Neuromuscular Re-Education 
[x]           Transfer Training                   []    Orthotic/Prosthetic Training 
[x]           Gait Training                          []    Modalities [x]           Therapeutic Exercises           []    Edema Management/Control 
[x]           Therapeutic Activities            [x]    Family Training/Education []           Patient Education 
[]           Other (comment): Frequency/Duration: Patient will be followed by physical therapy 1-2 times per day/4-7 days per week to address goals. Discharge Recommendations: Rehab Further Equipment Recommendations for Discharge: rolling walker and TBD SUBJECTIVE:  
Patient stated I can't do that right now.  OBJECTIVE DATA SUMMARY:  
 
Past Medical History:  
Diagnosis Date  Anemia  Aortic stenosis, moderate 03/2019  Breast cancer ([address]  CAD (coronary artery disease) Coronary Stenting needed, hematology workup needed prior to stent placement, treating medically at this time  Cancer [facility])   
 colon  Cancer ([address] breast - left  Cardiac catheterization 08/17/2016 LM patent. mLAD 99.9% (2.25 x 28-mm Xience BETHEL, resid 0%). oD1 60%. oD2 95%. pCX 90% (2.5 x 13-mm Xience BETHEL, resid 0%). OMB patent.  Cardiac echocardiogram 03/2019 EF 56 to 60%, moderate concentric LVH, moderate aortic stenosis, mean valve gradient 25 mmHg  Diabetes ([facility] [address]  Hypercholesteremia  Hypertension  Thrombocytopenia ([facility] [address] Past Surgical History:  
Procedure Laterality Date  CARDIAC CATHETERIZATION  7/16/2015  
  no stents at this time r/t anemia & thrombocytopenia (hematology work-up needed prior to stent placement)  CARDIAC CATHETERIZATION  7/11/2016  CARDIAC CATHETERIZATION  8/17/2016  CORONARY ARTERY ANGIOGRAM  7/16/2015  CORONARY ARTERY ANGIOGRAM  7/11/2016  CORONARY ARTERY ANGIOGRAM  8/17/2016  CORONARY STENT EA ADDL VESSEL  8/17/2016  CORONARY STENT SINGLE W/PTCA  8/17/2016  HX GI    
 colon surg secondary to cancer - removed lesion  HX HEENT  2/2013  
 cataract bilaterally  HX MASTECTOMY  march 2011  
 left  HX VASCULAR ACCESS    
 mediport - now removed  IR BX BONE MARROW DIAGNOSTIC  9/30/2019  LV ANGIOGRAPHY  7/16/2015  LV ANGIOGRAPHY  7/11/2016 Barriers to Learning/Limitations: yes;  sensory deficits-vision/hearing/speech Compensate with: Visual Cues and Tactile Cues Home Situation: 
Home Situation Home Environment: Skilled nursing facility One/Two Story Residence: One story Living Alone: No 
Support Systems: Skilled nursing facility, Other (comments), Family member(s) Patient Expects to be Discharged to[de-identified] Skilled nursing facility Current DME Used/Available at Home: None Critical Behavior: 
Neurologic State: Alert Orientation Level: Oriented X4 Cognition: Follows commands Safety/Judgement: Awareness of environment; Fall prevention Psychosocial 
Patient Behaviors: Calm; Cooperative Strength:   
Strength: Generally decreased, functional 
Tone & Sensation:  
Tone: Normal 
Sensation: Intact Range Of Motion: 
AROM: Generally decreased, functional 
Functional Mobility: 
Bed Mobility: 
Rolling: Moderate assistance Scooting: Total assistance;Assist x2 Pain: 
Did not rate pain but did report pain in both upper and lower extremities Activity Tolerance:  
Fair activity tolerance, bed level only. Please refer to the flowsheet for vital signs taken during this treatment. After treatment:  
[]         Patient left in no apparent distress sitting up in chair 
[x]         Patient left in no apparent distress in bed 
[x]         Call bell left within reach [x]         Nursing notified 
[]         Caregiver present 
[]         Bed alarm activated 
[]         SCDs applied COMMUNICATION/EDUCATION:  
[x]         Role of Physical Therapy in the acute care setting. [x]         Fall prevention education was provided and the patient/caregiver indicated understanding. [x]         Patient/family have participated as able in goal setting and plan of care. [x]         Patient/family agree to work toward stated goals and plan of care. []         Patient understands intent and goals of therapy, but is neutral about his/her participation. []         Patient is unable to participate in goal setting/plan of care: ongoing with therapy staff. 
[]         Other: Thank you for this referral. 
Elvis Mcpherson, PT Time Calculation: 38 mins Eval Complexity: History: MEDIUM  Complexity : 1-2 comorbidities / personal factors will impact the outcome/ POC Exam:MEDIUM Complexity : 3 Standardized tests and measures addressing body structure, function, activity limitation and / or participation in recreation  Presentation: MEDIUM Complexity : Evolving with changing characteristics  Clinical Decision Making:Medium Complexity mod Overall Complexity:MEDIUM

## 2021-02-13 NOTE — PROGRESS NOTES
[facility] Hospitalist Group Progress Note Patient: Francesco Abiola Age: 79 y.o. : 1940 MR#: [id number] SSN: [id number] Date: 2021 Subjective:  
 
Pt seen & evaluated - lying in bed , is tachypneic Unable to place NG tube secondary to hypoxia. Multiple attempts were made however patient continued to be hypoxic and they were unable to place NG tube. Assessment/Plan:  
 
Consultants: Infectious disease (signed off); Nephrology 1. Hypoxic respiratory failure - initially intubated 21 then extubated 21, failed to protect airway and required reintubation 21 then extubated again on 02/10/21. Now on RA, however high risk for re-intubation. Chest PT per pulmonary. 2. Covid 19 pneumonia - s/p abx; remdesevir and convalescent plasma. 3. Severe sepsis with septic shock in setting of PNA and UTI- tachycardia, tachypnea, hypotension on admission, now improved 4. Enterobacter UTI - completed course of cefepime / vanco in ICU 5. NANCY on stage 3 CKD - HD per nephrology 6. Acute metabolic encephalopathy - off precedex drip; in setting of multiple comorbidities and acute illness improved 7. Severe protein calorie malnutrition - NPO per SLP. Pending respiratory status and further SLP recs, Discussed with nutrition -unable to place NG tube yesterday, will hold for the next 1 to 2 days to see if patient improves and if no improvement then will consider PPN versus PEG tube placement. 8. Type 2 diabetes with hyperglycemia -continue Lantus with SSI 9. Recent hemoptysis - hold on blood thinners 10. Elevalted troponin - earlier in admission felt to be in setting of  
11. Severe aortic stenosis - per last echo; cardiology consult 12. HTN - off pressors. add IV hydralazine 13. H/o [place] and chronic pancytopenia - hold on blood thinners 14. H/o recent left hip ORIF - on 20 per Dr. Nataly Dunn 15. Goals of care - Patient is partial code; DNR, but proceed with intubation if needed. Nephew requesting update for any change in status. Palliative medicine consulted due to multiple comorbidities 16. Legally blind - per nephew 17. Dispo - pt previously at [address] [place] [address] Rd Called [facility] at phone number # [phone]; left voicemail. Pt transferred to SDU - continue care per hospitalist team .  Also discussed risk for further decline and risk for reintubation of which nephew is aware. He requests call if change of status however he is aware an emergent situation may proceed with intubation and then follow with him after. Additional Notes:   
 
Case discussed with:  [x]Patient  [x]Family  [x]Nursing  []Case Management DVT Prophylaxis:  []Lovenox  []Hep SQ  [x]SCDs  []Coumadin   []On Heparin gtt Objective:  
VS:  
Visit Vitals BP (!) 171/98 (BP 1 Location: Right upper arm, BP Patient Position: At rest) Pulse (!) 111 Temp 98.4 °F (36.9 °C) Resp 19 Ht 5' (1.524 m) Wt 73.7 kg (162 lb 8 oz) SpO2 100% BMI 31.74 kg/m² Tmax/24hrs: Temp (24hrs), Av.3 °F (36.8 °C), Min:97.4 °F (36.3 °C), Max:98.7 °F (37.1 °C) Intake/Output Summary (Last 24 hours) at 2021 1411 Last data filed at 2021 1142 Gross per 24 hour Intake  Output 3000 ml Net -3000 ml General:  Alert, NAD; very hard of hearing and soft spoken Cardiovascular: Regular rhythm, mild tachycardia Pulmonary: Coarse rhonchi, mild tachypnea GI:  +BS in all four quadrants, soft, non-tender Extremities: 2+ bilateral lower edema; feet warm bilaterally Neuro: alert and oriented x person only Family contact: Sharee Meghnaheath [phone] Labs:   
Recent Results (from the past 24 hour(s)) RENAL FUNCTION PANEL Collection Time: 21  3:47 PM  
Result Value Ref Range Sodium 145 136 - 145 mmol/L Potassium 4.0 3.5 - 5.5 mmol/L  Chloride 111 100 - 111 mmol/L  
 CO2 20 (L) 21 - 32 mmol/L Anion gap 14 3.0 - 18 mmol/L Glucose 109 (H) 74 - 99 mg/dL BUN 72 (H) 7.0 - 18 MG/DL Creatinine 2.71 (H) 0.6 - 1.3 MG/DL  
 BUN/Creatinine ratio 27 (H) 12 - 20 GFR est AA 20 (L) >60 ml/min/1.73m2 GFR est non-AA 17 (L) >60 ml/min/1.73m2 Calcium 8.5 8.5 - 10.1 MG/DL Phosphorus 4.4 2.5 - 4.9 MG/DL Albumin 3.4 3.4 - 5.0 g/dL PROCALCITONIN Collection Time: 02/12/21  3:47 PM  
Result Value Ref Range Procalcitonin 1.43 ng/mL CBC WITH AUTOMATED DIFF Collection Time: 02/12/21  3:47 PM  
Result Value Ref Range WBC 12.2 4.6 - 13.2 K/uL  
 RBC 3.20 (L) 4.20 - 5.30 M/uL HGB 9.1 (L) 12.0 - 16.0 g/dL HCT 27.4 (L) 35.0 - 45.0 % MCV 85.6 74.0 - 97.0 FL  
 MCH 28.4 24.0 - 34.0 PG  
 MCHC 33.2 31.0 - 37.0 g/dL  
 RDW 17.9 (H) 11.6 - 14.5 % PLATELET 58 (L) 112 - 420 K/uL NEUTROPHILS 89 (H) 42 - 75 % LYMPHOCYTES 6 (L) 20 - 51 % MONOCYTES 4 2 - 9 % EOSINOPHILS 0 0 - 5 % BASOPHILS 1 0 - 3 %  
 ABS. NEUTROPHILS 10.9 (H) 1.8 - 8.0 K/UL  
 ABS. LYMPHOCYTES 0.7 (L) 0.8 - 3.5 K/UL  
 ABS. MONOCYTES 0.5 0 - 1.0 K/UL  
 ABS. EOSINOPHILS 0.0 0.0 - 0.4 K/UL  
 ABS. BASOPHILS 0.1 (H) 0.0 - 0.06 K/UL  
 DF MANUAL PLATELET COMMENTS DECREASED PLATELETS    
 RBC COMMENTS ANISOCYTOSIS 1+ 
    
 RBC COMMENTS POIKILOCYTOSIS 1+ 
    
 RBC COMMENTS OVALOCYTES 1+ RBC COMMENTS TEARDROP CELLS 
1+ MAGNESIUM Collection Time: 02/12/21  3:47 PM  
Result Value Ref Range Magnesium 2.3 1.6 - 2.6 mg/dL GLUCOSE, POC Collection Time: 02/12/21  5:37 PM  
Result Value Ref Range Glucose (POC) 106 70 - 110 mg/dL GLUCOSE, POC Collection Time: 02/12/21  8:33 PM  
Result Value Ref Range Glucose (POC) 92 70 - 110 mg/dL PROCALCITONIN Collection Time: 02/13/21 12:37 AM  
Result Value Ref Range Procalcitonin 1.17 ng/mL MAGNESIUM Collection Time: 02/13/21 12:37 AM  
Result Value Ref Range Magnesium 2.1 1.6 - 2.6 mg/dL CBC WITH AUTOMATED DIFF Collection Time: 02/13/21 12:37 AM  
Result Value Ref Range WBC 13.7 (H) 4.6 - 13.2 K/uL  
 RBC 3.37 (L) 4.20 - 5.30 M/uL HGB 9.6 (L) 12.0 - 16.0 g/dL HCT 29.0 (L) 35.0 - 45.0 % MCV 86.1 74.0 - 97.0 FL  
 MCH 28.5 24.0 - 34.0 PG  
 MCHC 33.1 31.0 - 37.0 g/dL  
 RDW 17.9 (H) 11.6 - 14.5 % PLATELET 76 (L) 802 - 420 K/uL MPV 10.6 9.2 - 11.8 FL  
 NEUTROPHILS 88 (H) 40 - 73 % LYMPHOCYTES 5 (L) 21 - 52 % MONOCYTES 6 3 - 10 % EOSINOPHILS 1 0 - 5 % BASOPHILS 0 0 - 2 %  
 ABS. NEUTROPHILS 12.2 (H) 1.8 - 8.0 K/UL  
 ABS. LYMPHOCYTES 0.7 (L) 0.9 - 3.6 K/UL  
 ABS. MONOCYTES 0.8 0.05 - 1.2 K/UL  
 ABS. EOSINOPHILS 0.1 0.0 - 0.4 K/UL  
 ABS. BASOPHILS 0.0 0.0 - 0.1 K/UL  
 DF AUTOMATED PHOSPHORUS Collection Time: 02/13/21 12:37 AM  
Result Value Ref Range Phosphorus 2.9 2.5 - 4.9 MG/DL RENAL FUNCTION PANEL Collection Time: 02/13/21 12:37 AM  
Result Value Ref Range Sodium 142 136 - 145 mmol/L Potassium 3.4 (L) 3.5 - 5.5 mmol/L Chloride 108 100 - 111 mmol/L  
 CO2 24 21 - 32 mmol/L Anion gap 10 3.0 - 18 mmol/L Glucose 62 (L) 74 - 99 mg/dL BUN 41 (H) 7.0 - 18 MG/DL Creatinine 1.80 (H) 0.6 - 1.3 MG/DL  
 BUN/Creatinine ratio 23 (H) 12 - 20 GFR est AA 33 (L) >60 ml/min/1.73m2 GFR est non-AA 27 (L) >60 ml/min/1.73m2 Calcium 8.5 8.5 - 10.1 MG/DL Phosphorus 2.6 2.5 - 4.9 MG/DL Albumin 3.6 3.4 - 5.0 g/dL GLUCOSE, POC Collection Time: 02/13/21  2:46 AM  
Result Value Ref Range Glucose (POC) 90 70 - 110 mg/dL GLUCOSE, POC Collection Time: 02/13/21  8:16 AM  
Result Value Ref Range Glucose (POC) 97 70 - 110 mg/dL Signed By: Anabelle Fermin MD   
 February 13, 2021

## 2021-02-13 NOTE — PROGRESS NOTES
Nutrition Note NGT insertion attempted x 3, unsuccessful. Pt NPO x 3 days, without means for providing nutrition support at this time. Previously receiving tube feeds at goal via OGT while intubated, discontinued upon extubation 2/10. HD yesterday. Recommendation/Plan: - If able to place NGT, recommend consulting Nutrition for Tube Feeding management. Recommended Enteral Regimen: tube feeding of Nepro at 20 mL/hr and advance as tolerated by 10 mL q 4 hours to goal rate of 45 mL/hr with 50 mL q 4 hour water flushes (goal regimen to provide 1944 kcal, 87 gm protein, 783 mL free water, 100% RDIs). Electronically signed by Madiha Gallegos RD on 2/13/2021 at 1:43 PM 
 
Contact: [phone]

## 2021-02-13 NOTE — PROGRESS NOTES
Problem: Risk for Spread of Infection Goal: Prevent transmission of infectious organism to others Description: Prevent the transmission of infectious organisms to other patients, staff members, and visitors. Outcome: Progressing Towards Goal 
  
Problem: Patient Education:  Go to Education Activity Goal: Patient/Family Education Outcome: Progressing Towards Goal 
  
Problem: Falls - Risk of 
Goal: *Absence of Falls Description: Document [place] Fall Risk and appropriate interventions in the flowsheet. Outcome: Progressing Towards Goal 
Note: Fall Risk Interventions: 
Mobility Interventions: Assess mobility with egress test 
 
Mentation Interventions: Adequate sleep, hydration, pain control Medication Interventions: Assess postural VS orthostatic hypotension Elimination Interventions: Call light in reach, Patient to call for help with toileting needs

## 2021-02-13 NOTE — ROUTINE PROCESS
Bedside and Verbal shift change report given to LATRICIA Hooper (oncoming nurse) by Star Stephens RN (offgoing nurse). Report included the following information SBAR, Kardex, MAR and Recent Results. SITUATION:  
? Code Status: Partial Code 
? Reason for Admission: Pneumonia due to COVID-19 virus [U07.1, J12.82] 
 
? Hospital day: 16 
? Problem List:  
   
Hospital Problems  Date Reviewed: 12/23/2020 Codes Class Noted POA * (Principal) Pneumonia due to COVID-19 virus ICD-10-CM: U07.1, J12.82 ICD-9-CM: 480.8  1/27/2021 Unknown BACKGROUND:  
 Past Medical History:  
Past Medical History:  
Diagnosis Date  Anemia  Aortic stenosis, moderate 03/2019  Breast cancer ([facility] [address]  CAD (coronary artery disease) Coronary Stenting needed, hematology workup needed prior to stent placement, treating medically at this time  Cancer [facility])   
 colon  Cancer ([facility] [address] breast - left  Cardiac catheterization 08/17/2016 LM patent. mLAD 99.9% (2.25 x 28-mm Xience BETHEL, resid 0%). oD1 60%. oD2 95%. pCX 90% (2.5 x 13-mm Xience BETHEL, resid 0%). OMB patent.  Cardiac echocardiogram 03/2019 EF 56 to 60%, moderate concentric LVH, moderate aortic stenosis, mean valve gradient 25 mmHg  Diabetes ([facility] [address]  Hypercholesteremia  Hypertension  Thrombocytopenia ([facility] [address] Patient taking anticoagulants no ASSESSMENT:  
? Changes in Assessment Throughout Shift: none ? Patient has Central Line: yes Reasons if yes: Limited access ? Patient has Jean Cath: yes Reasons if yes: Accurate measurement ? Last Vitals: 
  
Vitals:  
 02/12/21 2330 02/12/21 2333 02/13/21 0014 02/13/21 0302 BP: (!) 165/98   (!) 145/84 Pulse: (!) 107   (!) 110 Resp: 24   23 Temp:    98 °F (36.7 °C) TempSrc:      
SpO2:  100%  97% Weight:   73.7 kg (162 lb 8 oz) Height:      
 
 
? IV and DRAINS (will only show if present)  [REMOVED] Peripheral IV 02/02/21 Left;Posterior Hand-Site Assessment: Clean, dry, & intact 
[REMOVED] Airway - Endotracheal Tube 02/02/21-Site Assessment: Clean, dry, & intact 
Midline Catheter 02/02/21 Left-Site Assessment: Clean, dry, & intact 
[REMOVED] Orogastric Tube 02/02/21-Site Assessment: Clean, dry, & intact 
[REMOVED] Airway - Continuous Aspiration of Subglottic Secretions (MAAME) Tube 02/03/21-Site Assessment: Clean, dry, & intact 
Hemodialysis Access 02/06/21-Site Assessment: Clean, dry, & intact 
[REMOVED] Airway - Continuous Aspiration of Subglottic Secretions (MAAME) Tube 01/27/21 Oral-Site Assessment: Clean, dry, & intact 
[REMOVED] Peripheral IV 01/27/21 Right Arm-Site Assessment: Clean, dry, & intact 
[REMOVED] Peripheral IV 01/27/21 Left Arm-Site Assessment: Clean, dry, & intact 
[REMOVED] Peripheral IV 01/27/21 Right Antecubital-Site Assessment: Clean, dry, & intact 
[REMOVED] Triple Lumen left IJ  01/27/21 Left Internal jugular-Site Assessment: Clean, dry, & intact 
[REMOVED] Peripheral IV 01/26/21 Left Antecubital-Site Assessment: Drainage (comment) 
[REMOVED] Nasogastric Tube 01/27/21-Site Assessment: Clean, dry, & intact 
[REMOVED] Airway - Endotracheal Tube 01/27/21 Oral-Site Assessment: Clean, dry, & intact 
[REMOVED] Orogastric Tube 01/28/21-Site Assessment: Clean, dry, & intact 
 
? WOUND (if present) 
 Wound Type:  Multiple  
 Dressing present Dressing Present : Yes 
 Wound Concerns/Notes:  none 
 
? PAIN 
  Pain Assessment 
  Pain Intensity 1: 0 (02/13/21 0400) 
  Pain Location 1: Generalized 
  Pain Intervention(s) 1: Medication (see MAR)(versed 1 mg IVP given for pain /sedation) 
  Patient Stated Pain Goal: 0 
o Interventions for Pain:  none 
o Intervention effective: n/a 
o Time of last intervention: n/a 
o Reassessment Completed: yes 
 
? Last 3 Weights: 
Last 3 Recorded Weights in this Encounter  
 02/11/21 0600 02/12/21 0427 02/13/21 0014  
 Weight: 75.8 kg (167 lb 1.7 oz) 75.3 kg (166 lb) 73.7 kg (162 lb 8 oz) Weight change: -1.588 kg (-3 lb 8 oz) ? INTAKE/OUPUT Current Shift: No intake/output data recorded. Last three shifts: 02/11 1901 - 02/13 0700 In: -  
Out: 2900 [Urine:400] ? LAB RESULTS Recent Labs  
  02/13/21 
0037 02/12/21 468 0673 02/11/21 
4188 WBC 13.7* 12.2 8.5 HGB 9.6* 9.1* 8.6* HCT 29.0* 27.4* 26.2*  
PLT 76* 58* 52* Recent Labs  
  02/13/21 0037 02/12/21 468 0673 02/11/21 2000 02/11/21 
4621  145 143 138  
K 3.4* 4.0 3.8 4.1 GLU 62* 109* 138* 154* BUN 41* 72* 61* 50* CREA 1.80* 2.71* 2.45* 2.07* CA 8.5 8.5 8.7 8.8 MG 2.1 2.3  --  2.4 RECOMMENDATIONS AND DISCHARGE PLANNING 1. Pending tests/procedures/ Plan of Care or Other Needs: PT   
 
2. Discharge plan for patient and Needs/Barriers: TBD 3. Estimated Discharge Date: TBD Posted on Whiteboard in Patients Room: no   
 
4. The patient's care plan was reviewed with the oncoming nurse. \"HEALS\" SAFETY CHECK Fall Risk Total Score: 2 Safety Measures: Safety Measures: Bed in low position, Call light within reach, Fall prevention (comment) A safety check occurred in the patient's room between off going nurse and oncoming nurse listed above. The safety check included the below items Area Items H High Alert Medications ? Verify all high alert medication drips (heparin, PCA, etc.) E Equipment ? Suction is set up for ALL patients (with yanker) ? Red plugs utilized for all equipment (IV pumps, etc.) ? WOWs wiped down at end of shift. ? Room stocked with oxygen, suction, and other unit-specific supplies A Alarms ? Bed alarm is set for fall risk patients ? Ensure chair alarm is in place and activated if patient is up in a chair L Lines ? Check IV for any infiltration ? Jean bag is empty if patient has a Jean ? [facility] ? Room is clean, patient is clean, and equipment is clean. ? Hallways are clear from equipment besides carts. ? Fall bracelet on for fall risk patients ? Ensure room is clear and free of clutter ? Suction is set up for ALL patients (with eden) ? Hallways are clear from equipment besides carts. ? Isolation precautions followed, supplies available outside room, sign posted Viky Mcmanus RN

## 2021-02-13 NOTE — ROUTINE PROCESS
1905: Bedside and Verbal shift change report given to Sandie RN (oncoming nurse) by Raji Atwood RN (offgoing nurse). Report included the following information SBAR, Kardex, MAR and Recent Results. SITUATION:  
? Code Status: Partial Code 
? Reason for Admission: Pneumonia due to COVID-19 virus [U07.1, J12.82] 
 
? Hospital day: 16 
? Problem List:  
   
Hospital Problems  Date Reviewed: 12/23/2020 Codes Class Noted POA * (Principal) Pneumonia due to COVID-19 virus ICD-10-CM: U07.1, J12.82 ICD-9-CM: 480.8  1/27/2021 Unknown BACKGROUND:  
 Past Medical History:  
Past Medical History:  
Diagnosis Date  Anemia  Aortic stenosis, moderate 03/2019  Breast cancer ([facility] [address]  CAD (coronary artery disease) Coronary Stenting needed, hematology workup needed prior to stent placement, treating medically at this time  Cancer [facility])   
 colon  Cancer ([facility] [address] breast - left  Cardiac catheterization 08/17/2016 LM patent. mLAD 99.9% (2.25 x 28-mm Xience BETHEL, resid 0%). oD1 60%. oD2 95%. pCX 90% (2.5 x 13-mm Xience BETHEL, resid 0%). OMB patent.  Cardiac echocardiogram 03/2019 EF 56 to 60%, moderate concentric LVH, moderate aortic stenosis, mean valve gradient 25 mmHg  Diabetes ([facility] [address]  Hypercholesteremia  Hypertension  Thrombocytopenia ([facility] [address] Patient taking anticoagulants no ASSESSMENT:  
Changes in Assessment Throughout Shift: NONE ? Patient has Central Line: yes Reasons if yes: Limited access ? Patient has Jean Cath: yes Reasons if yes: Accurate measurement ? Last Vitals: 
  
Vitals:  
 02/13/21 3096 02/13/21 0302 02/13/21 0820 02/13/21 1142 BP:  (!) 145/84 (!) 167/95 (!) 171/98 Pulse:  (!) 110 (!) 113 (!) 111 Resp:  23 20 19 Temp:  98 °F (36.7 °C) 98.7 °F (37.1 °C) 98.4 °F (36.9 °C) TempSrc:      
SpO2:  97% 95% 100% Weight: 73.7 kg (162 lb 8 oz) Height:      
 
 
? IV and DRAINS (will only show if present) [REMOVED] Peripheral IV 02/02/21 Left;Posterior Hand-Site Assessment: Clean, dry, & intact [REMOVED] Airway - Endotracheal Tube 02/02/21-Site Assessment: Clean, dry, & intact Midline Catheter 02/02/21 Left-Site Assessment: Clean, dry, & intact [REMOVED] Orogastric Tube 02/02/21-Site Assessment: Clean, dry, & intact [REMOVED] Airway - Continuous Aspiration of Subglottic Secretions (MAAME) Tube 02/03/21-Site Assessment: Clean, dry, & intact Hemodialysis Access 02/06/21-Site Assessment: Clean, dry, & intact [REMOVED] Airway - Continuous Aspiration of Subglottic Secretions (MAAME) Tube 01/27/21 Oral-Site Assessment: Clean, dry, & intact [REMOVED] Peripheral IV 01/27/21 Right Arm-Site Assessment: Clean, dry, & intact [REMOVED] Peripheral IV 01/27/21 Left Arm-Site Assessment: Clean, dry, & intact [REMOVED] Peripheral IV 01/27/21 Right Antecubital-Site Assessment: Clean, dry, & intact [REMOVED] Triple Lumen left IJ  01/27/21 Left Internal jugular-Site Assessment: Clean, dry, & intact [REMOVED] Peripheral IV 01/26/21 Left Antecubital-Site Assessment: Drainage (comment) [REMOVED] Nasogastric Tube 01/27/21-Site Assessment: Clean, dry, & intact [REMOVED] Airway - Endotracheal Tube 01/27/21 Oral-Site Assessment: Clean, dry, & intact [REMOVED] Orogastric Tube 01/28/21-Site Assessment: Clean, dry, & intact ? WOUND (if present) Wound Type:  Multiple Dressing present Dressing Present : Yes Wound Concerns/Notes:  none ? PAIN Pain Assessment Pain Intensity 1: 0 (02/13/21 1142) Pain Location 1: Generalized Pain Intervention(s) 1: Medication (see MAR)(versed 1 mg IVP given for pain /sedation) Patient Stated Pain Goal: 0 
o Interventions for Pain:  none 
o Intervention effective: n/a 
o Time of last intervention: n/a 
o Reassessment Completed: n/a 
 
? Last 3 Weights: 
Last 3 Recorded Weights in this Encounter 02/11/21 0600 02/12/21 0427 02/13/21 0014 Weight: 75.8 kg (167 lb 1.7 oz) 75.3 kg (166 lb) 73.7 kg (162 lb 8 oz) Weight change: -1.588 kg (-3 lb 8 oz) ? INTAKE/OUPUT Current Shift: 02/13 0701 - 02/13 1900 In: -  
Out: 100 [Urine:100] Last three shifts: 02/11 1901 - 02/13 0700 In: -  
Out: 2900 [Urine:400] ? LAB RESULTS Recent Labs  
  02/13/21 0037 02/12/21 468 0673 02/11/21 
5302 WBC 13.7* 12.2 8.5 HGB 9.6* 9.1* 8.6* HCT 29.0* 27.4* 26.2*  
PLT 76* 58* 52* Recent Labs  
  02/13/21 0037 02/12/21 468 0673 02/11/21 2000 02/11/21 
2242  145 143 138  
K 3.4* 4.0 3.8 4.1 GLU 62* 109* 138* 154* BUN 41* 72* 61* 50* CREA 1.80* 2.71* 2.45* 2.07* CA 8.5 8.5 8.7 8.8 MG 2.1 2.3  --  2.4 RECOMMENDATIONS AND DISCHARGE PLANNING 1. Pending tests/procedures/ Plan of Care or Other Needs: N/A 
 
2. Discharge plan for patient and Needs/Barriers: In progress 3. Estimated Discharge Date: TBD Posted on Whiteboard in Patients Room: no   
 
4. The patient's care plan was reviewed with the oncoming nurse. \"HEALS\" SAFETY CHECK Fall Risk Total Score: 2 Safety Measures: Safety Measures: Bed/Chair-Wheels locked, Bed in low position, Call light within reach, Gripper socks, Fall prevention (comment), Nurse at bedside, Side rails X 3 A safety check occurred in the patient's room between off going nurse and oncoming nurse listed above. The safety check included the below items Area Items H High Alert Medications ? Verify all high alert medication drips (heparin, PCA, etc.) E Equipment ? Suction is set up for ALL patients (with yanker) ? Red plugs utilized for all equipment (IV pumps, etc.) ? WOWs wiped down at end of shift. ? Room stocked with oxygen, suction, and other unit-specific supplies A Alarms ? Bed alarm is set for fall risk patients ? Ensure chair alarm is in place and activated if patient is up in a chair L Lines ? Check IV for any infiltration ? Jean bag is empty if patient has a Jean ? Tubing and IV bags are labeled [facility] Safety ? Room is clean, patient is clean, and equipment is clean. ? Hallways are clear from equipment besides carts. ? Fall bracelet on for fall risk patients ? Ensure room is clear and free of clutter ? Suction is set up for ALL patients (with eden) ? Hallways are clear from equipment besides carts. ? Isolation precautions followed, supplies available outside room, sign posted Mirtha Mendes

## 2021-02-13 NOTE — PROGRESS NOTES
No respiratory distress noted, patient is comfortable. Pt instructed to call if help is needed, call bell within reach. Patient confirms understanding 02/1939 Oxygen Therapy O2 Sat (%) 97 % Pulse via Oximetry 94 beats per minute O2 Device Room air Pre-Treatment Breathing Pattern Regular Cough Weak;Congested Breath Sounds Bilateral Coarse Post-Treatment Breathing Pattern Regular Breath Sounds Bilateral Coarse Pulse 94 SpO2 97 % Respirations 20 Treatment Tolerance Poor Procedures  
$$ Subsequent Procedure Aerosol Delivery Source Breath Actuated Nebulizer; Blow-by Aerosolized Medications Albuterol 
(3%)

## 2021-02-13 NOTE — DIALYSIS
ACUTE HEMODIALYSIS FLOW SHEET 
 
HEMODIALYSIS ORDERS: Physician: Dr. Katelyn Adames 
  
Dialyzer: Revaclear   Duration: 3 hr   BFR: 250   DFR: 500 Dialysate:  Temp 36-37*C   K+  3.0    Ca+ 2.5   Na 140   Bicarb 35 Wt Readings from Last 1 Encounters:  
02/12/21 75.3 kg (166 lb) Patient Chart [x]   Unable to Obtain []  Dry weight/UF Goal: 2500 ml Heparin []  Bolus    Units    [] Hourly    Units    [x]None Pre BP:   175/93    Pulse:  108   Respirations: 24    Temperature:  98.7 Tx: NSS    ml/Bolus   [x] N/A Labs: []  Pre  []  Post:   [x] N/A Additional Orders(medications, blood products, hypotension management): [] Yes   [x] No  
 
[x]  [facility] Consent Verified CATHETER ACCESS:  []N/A   [x]Right   []Left   [x]IJ   []Fem  []Chest wall  []TransHepatic  
[] First use X-ray verified     []Tunnel    [x] Non Tunneled [x]No S/S infection  []Redness  []Drainage []Cultured []Swelling []Pain  
[x]Medical Aseptic Prep Utilized   []Dressing Changed  [x] Biopatch  Date: 2/9/21 []Clotted   [x]Patent   Flows: [x]Good  []Poor  []Reversed If access problem,  notified: []Yes    [x]N/A     
 
GRAFT/FISTULA ACCESS:   [x]N/A     []Right     []Left     []UE     []LE []AVG   []AVF       []Medical Aseptic Prep Utilized []No S/S infection  []Redness  []Drainage [] Cultured  [] Swelling  [] Pain Bruit:   [] Strong    [] Weak       Thrill :   [] Strong    [] Weak Needle Gauge: 15   Length: 1 inch If access problem,  notified: []Yes     []N/A     
 
 
GENERAL ASSESSMENT:   
LUNGS:  Rate 24   SaO2%      [] Clear  [x] Coarse  [x] Crackles  [] Wheezing 
                                              [] Diminished     Location : []RLL   []LLL    []RUL  []JERED Cough:      [x]Productive  []Dry  []N/A   Respirations:  [x]Easy  []Labored Therapy:  []RA  O2 Type:  [x]NC  Mask: []   NRB    [] BiPaP  Flow: 4 l/min  
                [] Ventilator  [] Intubated  [] Candance Blanch CARDIAC: [x] Regular      [] Irregular   [] Pericardial Rub [x]  Monitored  [] Bedside  [x] Remotely monitored EDEMA: [] None   [x]Generalized  [] Pitting [] 1+   [] 2 +   [] 3+    [] 4+  [] Pedal   
SKIN:   [x] Warm  [] Hot     [] Cold   [x] Dry     [] Pale   [] Diaphoretic    
             [] Flushed  [] Jaundiced  [] Cyanotic LOC:    [] Alert      []Oriented:    [] Person     [] Place  []Time 
             [] Confused  [] Lethargic  [] Medicated  [] Non-responsive GI / ABDOMEN:   
                 [] Flat    [] Distended    [x] Soft    [] Firm   []  Obese 
                 [] Diarrhea   [] FMS [x] Bowel Sounds  [] Nausea  [] Vomiting  / URINE ASSESSMENT:  
                [] Voiding    [] Oliguria  [] Anuria   [x]  Jean [] Incontinent  []  Incontinent Brief   []  PureWick PAIN:  [x] 0 []1  []2   []3   []4   []5   []6   []7   []8   []9   []10 Scale 0-10  Action/Follow Up: MOBILITY:  [x] Bed    [] Stretcher All Vitals and Treatment Details on Attached Flowsheet Hospital: [facility] Room # 358/01 [x] Routine         [] 1st Time Acute/Chronic   [] Urgent      [] Stat           
[] Acute Room   [x]  Bedside    [] ICU/CCU     [] ER Isolation Precautions:  [x] Dialysis Droplet Plus ALLERGIES:    
Allergies Allergen Reactions  Latex Itching Skin breaks out causing itching Code Status:  Partial Code Hepatitis Status Lab Results Component Value Date/Time Hepatitis B surface Ag <0.10 02/06/2021 03:40 AM  
 Hepatitis B surface Ab <3.10 (L) 02/06/2021 03:40 AM  
  
 
Current Labs:     
Lab Results Component Value Date/Time WBC 12.2 02/12/2021 03:47 PM  
 Hemoglobin, POC 5.4 (LL) 02/07/2021 05:30 AM  
 HGB 9.1 (L) 02/12/2021 03:47 PM  
 Hematocrit, POC 16 (LL) 02/07/2021 05:30 AM  
 HCT 27.4 (L) 02/12/2021 03:47 PM  
 PLATELET 58 (L) 80/52/3187 03:47 PM  
 MCV 85.6 02/12/2021 03:47 PM  
 
Lab Results Component Value Date/Time Sodium 145 02/12/2021 03:47 PM  
 Potassium 4.0 02/12/2021 03:47 PM  
 Chloride 111 02/12/2021 03:47 PM  
 CO2 20 (L) 02/12/2021 03:47 PM  
 Anion gap 14 02/12/2021 03:47 PM  
 Glucose 109 (H) 02/12/2021 03:47 PM  
 BUN 72 (H) 02/12/2021 03:47 PM  
 Creatinine 2.71 (H) 02/12/2021 03:47 PM  
 BUN/Creatinine ratio 27 (H) 02/12/2021 03:47 PM  
 GFR est AA 20 (L) 02/12/2021 03:47 PM  
 GFR est non-AA 17 (L) 02/12/2021 03:47 PM  
 Calcium 8.5 02/12/2021 03:47 PM  
 
  
 
DIET: 
DIET NPO  
 
PRIMARY NURSE REPORT:  
Pre Dialysis: Sandie Fairbansk RN    Time: 6754 EDUCATION:   
[x] Patient Knowledge Basis: []None [x]Minimal [] Substantial  
Barriers to learning  [x]N/A  [] Intubated/Trached/Ventilated  [] Sedated/Paralyzed [] Access Care     [] S&S of infection  [] Fluid Management  [] K+   [x] Procedural   
[]Albumin   [] Medications   [] Tx Options   [] Transplant   [] Diet   [] Other Teaching Tools:  [x] Explain  [] Demo  [] Handouts [] Video Patient response: [x] Verbalized understanding   [] Requires follow up [x] Time Out/Safety Check  [x] Extracorporeal Circuit Tested for integrity RO/HEMODIALYSIS MACHINE SAFETY CHECKS  Before each treatment:    
  
[facility] [x] Portable Machine #1/RO serial # [id number] Alarm Test:  Pass time 1945 [x] RO/Machine Log Complete Machine Temp    36-37*C Dialysate: pH  7.4    Conductivity: Meter 14.0     HD Machine  14      TCD: 14 
Dialyzer Lot # [id number]     Blood Tubing Lot # [id number]     Saline Lot # [id number] CHLORINE TESTING-Before each treatment and every 4 hours Total Chlorine: [x] less than 0.1 ppm  Initial Time Check: 2000       4 Hr/2nd Check Time: n/a  
 (if greater than 0.1 ppm from Primary then every 30 minutes from Secondary) TREATMENT INITIATION  with Dialysis Precautions:  
[x] All Connections Secured              [x] Saline Line Double Clamped  
[x] Venous Parameters Set               [x] Arterial Parameters Set [x] Prime Given 250ml NSS              [x]Air Foam Detector Engaged Treatment Initiation Note: 
1930  Arrived to pt room 358 with pt having a very weak voice and unable to understand what she is asking. Waving hands randomly and  throwing blankets off herself. Very moist cough, Rhonchi and rales heard with RR 24-26 easy and regular with O2 n/c @ 4L. Jean draining cloudy naa urine. 2000  Rt IJ dialysis CVC intact, patent and flushes easily. Dialysis initiated without difficulty. Dr Teresita Lynch aware. During Treatment Notes: 
2100  Vascular access visible with arterial and venous line connections intact. Pt resting comfortably. 2200  Vascular access visible with arterial and venous line connections intact. Pt resting comfortably. 2300  Vascular access visible with arterial and venous line connections intact. Pt resting comfortably. 2315  Dialysis completed. Medication Dose Volume Route Time Aung Nurse, Title  
none     Luanne Barboza RN Post Assessment Dialyzer Cleared:   [x] Good  [] Fair  [] Poor Blood processed:  39 L 
UF Removed:  2500 Ml Post /94   Pulse  102 Resp  24  Temp 98.3 Lungs: [] Clear [x] Course  [x] Crackles   
             []  Wheezing   [] Diminished Post Tx Vascular Access: [x] N/A Cardiac :[x] Regular   [] Irregular Rhythm:  [x] Monitored   [] Not Monitored CVC Catheter: [] N/A Locking solution: Heparin 1:1000 U Arterial port 1.9 ml  
Venous port 1.9 ml 
 Edema:  [] None  [x] Generalized Skin:[x] Warm  [x] Dry [] Diaphoretic     
         [] Flushed  [] Pale [] Cyanotic Pain: 
[x]0  []1 []2  []3 []4  []5  []6  []7 []8  []9  []10  
 
 Post Treatment Note: 
 8862  Pt tolerated dialysis well with 2.5L removed. Dialysis catheter intact, patent and heplocked as noted above. POST TREATMENT PRIMARY NURSE HANDOFF REPORT:  
Post Dialysis: Giselle Ontiveros RN               Time:  7931 Abbreviations: AVG-arterial venous graft, AVF-arterial venous fistula, IJ-Internal Jugular, Subcl-Subclavian, Fem-Femoral, Tx-treatment, AP/HR-apical heart rate, VSS- Vital Signs Stable, CVC- Central Venous Catheter, DFR-dialysate flow rate, BFR-blood flow rate, AP-arterial pressure, -venous pressure, UF-ultrafiltrate, TMP-transmembrane pressure, Logan-Venous, Art-Arterial, RO-Reverse Osmosis

## 2021-02-13 NOTE — PROGRESS NOTES
Problem: Risk for Spread of Infection Goal: Prevent transmission of infectious organism to others Description: Prevent the transmission of infectious organisms to other patients, staff members, and visitors. Outcome: Progressing Towards Goal 
  
Problem: Patient Education:  Go to Education Activity Goal: Patient/Family Education Outcome: Progressing Towards Goal 
  
Problem: Falls - Risk of 
Goal: *Absence of Falls Description: Document Knightciera Mabry Fall Risk and appropriate interventions in the flowsheet. Outcome: Progressing Towards Goal 
Note: Fall Risk Interventions: 
Mobility Interventions: Assess mobility with egress test 
 
Mentation Interventions: Adequate sleep, hydration, pain control Medication Interventions: Assess postural VS orthostatic hypotension Elimination Interventions: Call light in reach, Patient to call for help with toileting needs Problem: Patient Education: Go to Patient Education Activity Goal: Patient/Family Education Outcome: Progressing Towards Goal 
  
Problem: Pressure Injury - Risk of 
Goal: *Prevention of pressure injury Description: Document Kyle Scale and appropriate interventions in the flowsheet. Outcome: Progressing Towards Goal 
Note: Pressure Injury Interventions: 
Sensory Interventions: Assess changes in LOC Moisture Interventions: Absorbent underpads Activity Interventions: Pressure redistribution bed/mattress(bed type), Increase time out of bed, PT/OT evaluation Mobility Interventions: Assess need for specialty bed Nutrition Interventions: Document food/fluid/supplement intake Friction and Shear Interventions: HOB 30 degrees or less Problem: Patient Education: Go to Patient Education Activity Goal: Patient/Family Education Outcome: Progressing Towards Goal 
  
Problem: Injury - Risk of, Adverse Drug Event Goal: *Absence of adverse drug events Outcome: Progressing Towards Goal 
Goal: *Absence of medication errors Outcome: Progressing Towards Goal 
Goal: *Knowledge of prescribed medications Outcome: Progressing Towards Goal 
  
Problem: Patient Education: Go to Patient Education Activity Goal: Patient/Family Education Outcome: Progressing Towards Goal 
  
Problem: Pain Goal: *Control of Pain Outcome: Progressing Towards Goal 
Goal: *PALLIATIVE CARE:  Alleviation of Pain Outcome: Progressing Towards Goal 
  
Problem: Patient Education: Go to Patient Education Activity Goal: Patient/Family Education Outcome: Progressing Towards Goal 
  
Problem: Airway Clearance - Ineffective Goal: Achieve or maintain patent airway Outcome: Progressing Towards Goal 
  
Problem: Gas Exchange - Impaired Goal: Absence of hypoxia Outcome: Progressing Towards Goal 
Goal: Promote optimal lung function Outcome: Progressing Towards Goal 
  
Problem: Breathing Pattern - Ineffective Goal: Ability to achieve and maintain a regular respiratory rate Outcome: Progressing Towards Goal 
  
Problem: Body Temperature -  Risk of, Imbalanced Goal: Ability to maintain a body temperature within defined limits Outcome: Progressing Towards Goal 
Goal: Will regain or maintain usual level of consciousness Outcome: Progressing Towards Goal 
Goal: Complications related to the disease process, condition or treatment will be avoided or minimized Outcome: Progressing Towards Goal 
  
Problem: Isolation Precautions - Risk of Spread of Infection Goal: Prevent transmission of infectious organism to others Outcome: Progressing Towards Goal 
  
Problem: Nutrition Deficits Goal: Optimize nutrtional status Outcome: Progressing Towards Goal 
  
Problem: Risk for Fluid Volume Deficit Goal: Maintain normal heart rhythm Outcome: Progressing Towards Goal 
Goal: Maintain absence of muscle cramping Outcome: Progressing Towards Goal 
Goal: Maintain normal serum potassium, sodium, calcium, phosphorus, and pH Outcome: Progressing Towards Goal 
  
 Problem: Loneliness or Risk for Loneliness Goal: Demonstrate positive use of time alone when socialization is not possible Outcome: Progressing Towards Goal 
  
Problem: Fatigue Goal: Verbalize increase energy and improved vitality Outcome: Progressing Towards Goal 
  
Problem: Patient Education: Go to Patient Education Activity Goal: Patient/Family Education Outcome: Progressing Towards Goal 
  
Problem: Patient Education: Go to Patient Education Activity Goal: Patient/Family Education Outcome: Progressing Towards Goal 
  
Problem: Patient Education: Go to Patient Education Activity Goal: Patient/Family Education Outcome: Progressing Towards Goal 
  
Problem: Patient Education: Go to Patient Education Activity Goal: Patient/Family Education Outcome: Progressing Towards Goal 
  
Problem: Nutrition Deficit Goal: *Optimize nutritional status Outcome: Progressing Towards Goal

## 2021-02-14 NOTE — PROGRESS NOTES
Problem: Risk for Spread of Infection Goal: Prevent transmission of infectious organism to others Description: Prevent the transmission of infectious organisms to other patients, staff members, and visitors. Outcome: Progressing Towards Goal 
  
Problem: Patient Education:  Go to Education Activity Goal: Patient/Family Education Outcome: Progressing Towards Goal 
  
Problem: Falls - Risk of 
Goal: *Absence of Falls Description: Document Luiz Stein Fall Risk and appropriate interventions in the flowsheet. Outcome: Progressing Towards Goal 
Note: Fall Risk Interventions: 
Mobility Interventions: Assess mobility with egress test, Patient to call before getting OOB Mentation Interventions: Adequate sleep, hydration, pain control Medication Interventions: Patient to call before getting OOB Elimination Interventions: Call light in reach, Patient to call for help with toileting needs

## 2021-02-14 NOTE — ROUTINE PROCESS
Bedside and Verbal shift change report given to Carson Bernal RN (oncoming nurse) by Francia Morris RN (offgoing nurse). Report included the following information SBAR, Kardex, MAR and Recent Results. SITUATION:  
? Code Status: Partial Code 
? Reason for Admission: Pneumonia due to COVID-19 virus [U07.1, J12.82] 
 
? Hospital day: 25 
? Problem List:  
   
Hospital Problems  Date Reviewed: 12/23/2020 Codes Class Noted POA * (Principal) Pneumonia due to COVID-19 virus ICD-10-CM: U07.1, J12.82 ICD-9-CM: 480.8  1/27/2021 Unknown BACKGROUND:  
 Past Medical History:  
Past Medical History:  
Diagnosis Date  Anemia  Aortic stenosis, moderate 03/2019  Breast cancer ([facility] [address]  CAD (coronary artery disease) Coronary Stenting needed, hematology workup needed prior to stent placement, treating medically at this time  Cancer [facility])   
 colon  Cancer ([facility] [address] breast - left  Cardiac catheterization 08/17/2016 LM patent. mLAD 99.9% (2.25 x 28-mm Xience BETHEL, resid 0%). oD1 60%. oD2 95%. pCX 90% (2.5 x 13-mm Xience BETHEL, resid 0%). OMB patent.  Cardiac echocardiogram 03/2019 EF 56 to 60%, moderate concentric LVH, moderate aortic stenosis, mean valve gradient 25 mmHg  Diabetes ([facility] [address]  Hypercholesteremia  Hypertension  Thrombocytopenia ([facility] [address] Patient taking anticoagulants no ASSESSMENT:  
? Changes in Assessment Throughout Shift: none ? Patient has Central Line: yes Reasons if yes: Limited access ? Patient has Jean Cath: yes Reasons if yes: Accurate measurement ? Last Vitals: 
  
Vitals:  
 02/1939 02/13/21 2341 02/14/21 0340 02/14/21 5506 BP: (!) 160/93 (!) 142/83 (!) 155/89 Pulse: 97 (!) 111 (!) 106 Resp: 18 23 19 Temp: 97.7 °F (36.5 °C) 97 °F (36.1 °C) 98 °F (36.7 °C) TempSrc:      
SpO2: 99% 99% 100% Weight:    68.9 kg (151 lb 12.8 oz) Height:      
 
 
? IV and DRAINS (will only show if present) [REMOVED] Peripheral IV 02/02/21 Left;Posterior Hand-Site Assessment: Clean, dry, & intact [REMOVED] Airway - Endotracheal Tube 02/02/21-Site Assessment: Clean, dry, & intact Midline Catheter 02/02/21 Left-Site Assessment: Clean, dry, & intact [REMOVED] Orogastric Tube 02/02/21-Site Assessment: Clean, dry, & intact [REMOVED] Airway - Continuous Aspiration of Subglottic Secretions (MAAME) Tube 02/03/21-Site Assessment: Clean, dry, & intact Hemodialysis Access 02/06/21-Site Assessment: Clean, dry, & intact [REMOVED] Airway - Continuous Aspiration of Subglottic Secretions (MAAME) Tube 01/27/21 Oral-Site Assessment: Clean, dry, & intact [REMOVED] Peripheral IV 01/27/21 Right Arm-Site Assessment: Clean, dry, & intact [REMOVED] Peripheral IV 01/27/21 Left Arm-Site Assessment: Clean, dry, & intact [REMOVED] Peripheral IV 01/27/21 Right Antecubital-Site Assessment: Clean, dry, & intact [REMOVED] Triple Lumen left IJ  01/27/21 Left Internal jugular-Site Assessment: Clean, dry, & intact [REMOVED] Peripheral IV 01/26/21 Left Antecubital-Site Assessment: Drainage (comment) [REMOVED] Nasogastric Tube 01/27/21-Site Assessment: Clean, dry, & intact [REMOVED] Airway - Endotracheal Tube 01/27/21 Oral-Site Assessment: Clean, dry, & intact [REMOVED] Orogastric Tube 01/28/21-Site Assessment: Clean, dry, & intact ? WOUND (if present) Wound Type:  Multiple Dressing present Dressing Present : Yes Wound Concerns/Notes:  none ? PAIN Pain Assessment Pain Intensity 1: 0 (02/14/21 0400) Pain Location 1: Generalized Pain Intervention(s) 1: Medication (see MAR)(versed 1 mg IVP given for pain /sedation) Patient Stated Pain Goal: 0 
o Interventions for Pain:  none 
o Intervention effective: n/a 
o Time of last intervention: n/a 
o Reassessment Completed: yes ? Last 3 Weights: 
Last 3 Recorded Weights in this Encounter 02/12/21 [id number] 02/13/21 0014 02/14/21 7336 Weight: 75.3 kg (166 lb) 73.7 kg (162 lb 8 oz) 68.9 kg (151 lb 12.8 oz) Weight change: -4.853 kg (-10 lb 11.2 oz) ? INTAKE/OUPUT Current Shift: No intake/output data recorded. Last three shifts: 02/12 1901 - 02/14 0700 In: -  
Out: 4744 [NXDCZ:060] ? LAB RESULTS Recent Labs  
  02/14/21 0106 02/13/21 0037 02/12/21 [address] [facility] WBC 6.2 13.7* 12.2 HGB 8.2* 9.6* 9.1*  
HCT 24.7* 29.0* 27.4*  
PLT 49* 76* 58* Recent Labs  
  02/14/21 0106 02/13/21 1856 02/13/21 0037 02/12/21 [address] [facility] [address]  [phone]  
K 3.4* 3.4* 3.4* 4.0  
* 62* 62* 109* BUN 51* 51* 41* 72* CREA 2.19* 2.14* 1.80* 2.71* CA 8.5 8.3* 8.5 8.5 MG 2.0  --  2.1 2.3 RECOMMENDATIONS AND DISCHARGE PLANNING 1. Pending tests/procedures/ Plan of Care or Other Needs: PT   
 
2. Discharge plan for patient and Needs/Barriers: TBD 3. Estimated Discharge Date: TBD Posted on Whiteboard in Patients Room: no   
 
4. The patient's care plan was reviewed with the oncoming nurse. \"HEALS\" SAFETY CHECK Fall Risk Total Score: 2 Safety Measures: Safety Measures: Bed in low position, Call light within reach, Fall prevention (comment) A safety check occurred in the patient's room between off going nurse and oncoming nurse listed above. The safety check included the below items Area Items H High Alert Medications ? Verify all high alert medication drips (heparin, PCA, etc.) E Equipment ? Suction is set up for ALL patients (with yanker) ? Red plugs utilized for all equipment (IV pumps, etc.) ? WOWs wiped down at end of shift. ? Room stocked with oxygen, suction, and other unit-specific supplies A Alarms ? Bed alarm is set for fall risk patients ? Ensure chair alarm is in place and activated if patient is up in a chair L Lines ? Check IV for any infiltration ? Jean bag is empty if patient has a Jean ? Tubing and IV bags are labeled [facility] Safety ? Room is clean, patient is clean, and equipment is clean. ? Hallways are clear from equipment besides carts. ? Fall bracelet on for fall risk patients ? Ensure room is clear and free of clutter ? Suction is set up for ALL patients (with eden) ? Hallways are clear from equipment besides carts. ? Isolation precautions followed, supplies available outside room, sign posted Star Stephens RN

## 2021-02-14 NOTE — PROGRESS NOTES
Nurse paged to inform that pt's Oxygen sat is 88% on NRBM & hi lisseth - RT saw pt , cannot place on BiPAP per policy since she cannot remove her mask. Pt just finished HD & per RT her Respirations are hard & shallow. I called Nephew & informed him - he mentions that he will talk with pt's sister & to call him back in 10 mins. Update - spoke with Nephew , will proceed with comfort care at this time.

## 2021-02-14 NOTE — ROUTINE PROCESS
1905: Bedside and Verbal shift change report given to LATRICIA Hooper (oncoming nurse) by Lynda Montez (offgoing nurse). Report included the following information SBAR, Kardex, MAR and Recent Results. SITUATION:  
? Code Status: Partial Code 
? Reason for Admission: Pneumonia due to COVID-19 virus [U07.1, J12.82] 
 
? Hospital day: 25 
? Problem List:  
   
Hospital Problems  Date Reviewed: 12/23/2020 Codes Class Noted POA * (Principal) Pneumonia due to COVID-19 virus ICD-10-CM: U07.1, J12.82 ICD-9-CM: 480.8  1/27/2021 Unknown BACKGROUND:  
 Past Medical History:  
Past Medical History:  
Diagnosis Date  Anemia  Aortic stenosis, moderate 03/2019  Breast cancer ([facility] [address]  CAD (coronary artery disease) Coronary Stenting needed, hematology workup needed prior to stent placement, treating medically at this time  Cancer [facility])   
 colon  Cancer ([facility] [address] breast - left  Cardiac catheterization 08/17/2016 LM patent. mLAD 99.9% (2.25 x 28-mm Xience BETHEL, resid 0%). oD1 60%. oD2 95%. pCX 90% (2.5 x 13-mm Xience BETHEL, resid 0%). OMB patent.  Cardiac echocardiogram 03/2019 EF 56 to 60%, moderate concentric LVH, moderate aortic stenosis, mean valve gradient 25 mmHg  Diabetes ([facility] [address]  Hypercholesteremia  Hypertension  Thrombocytopenia ([facility] [address] Patient taking anticoagulants no ASSESSMENT:  
? Changes in Assessment Throughout Shift: Pt lung sound very coarse requiring dialysis per nephrologist. Oxygen saturation in mid to high 80s while on highflow and non re-breather. MD updated. Pt made comfort care. ? Patient has Central Line: yes Reasons if yes: Limited access ? Patient has Jean Cath: yes Reasons if yes: Accurate measurement ? Last Vitals: 
  
Vitals:  
 02/14/21 1515 02/14/21 1530 02/14/21 1545 02/14/21 1608 BP: 134/81 124/76 136/80 (!) 155/86 Pulse: (!) 109 (!) 107 (!) 106 99 Resp:    (!) 36 Temp:    97.4 °F (36.3 °C) TempSrc:    Axillary SpO2: 94% (!) 86% 92% (!) 86% Weight:      
Height:      
 
 
? IV and DRAINS (will only show if present) [REMOVED] Peripheral IV 02/02/21 Left;Posterior Hand-Site Assessment: Clean, dry, & intact [REMOVED] Airway - Endotracheal Tube 02/02/21-Site Assessment: Clean, dry, & intact Midline Catheter 02/02/21 Left-Site Assessment: Clean, dry, & intact [REMOVED] Orogastric Tube 02/02/21-Site Assessment: Clean, dry, & intact [REMOVED] Airway - Continuous Aspiration of Subglottic Secretions (MAAME) Tube 02/03/21-Site Assessment: Clean, dry, & intact Hemodialysis Access 02/06/21-Site Assessment: Clean, dry, & intact [REMOVED] Airway - Continuous Aspiration of Subglottic Secretions (MAAME) Tube 01/27/21 Oral-Site Assessment: Clean, dry, & intact [REMOVED] Peripheral IV 01/27/21 Right Arm-Site Assessment: Clean, dry, & intact [REMOVED] Peripheral IV 01/27/21 Left Arm-Site Assessment: Clean, dry, & intact [REMOVED] Peripheral IV 01/27/21 Right Antecubital-Site Assessment: Clean, dry, & intact [REMOVED] Triple Lumen left IJ  01/27/21 Left Internal jugular-Site Assessment: Clean, dry, & intact [REMOVED] Peripheral IV 01/26/21 Left Antecubital-Site Assessment: Drainage (comment) [REMOVED] Nasogastric Tube 01/27/21-Site Assessment: Clean, dry, & intact [REMOVED] Airway - Endotracheal Tube 01/27/21 Oral-Site Assessment: Clean, dry, & intact [REMOVED] Orogastric Tube 01/28/21-Site Assessment: Clean, dry, & intact ? WOUND (if present) Wound Type:  Multiple Dressing present Dressing Present : Yes Wound Concerns/Notes:  none ? PAIN Pain Assessment Pain Intensity 1: 0 (02/14/21 1202) Pain Location 1: Generalized Pain Intervention(s) 1: Medication (see MAR)(versed 1 mg IVP given for pain /sedation) Patient Stated Pain Goal: 0 
o Interventions for Pain:  none 
o Intervention effective: n/a 
o Time of last intervention: n/a 
o Reassessment Completed: yes ? Last 3 Weights: 
Last 3 Recorded Weights in this Encounter 02/12/21 6843 02/13/21 0014 02/14/21 7288 Weight: 75.3 kg (166 lb) 73.7 kg (162 lb 8 oz) 68.9 kg (151 lb 12.8 oz) Weight change: -4.853 kg (-10 lb 11.2 oz) ? INTAKE/OUPUT Current Shift: 02/14 0701 - 02/14 1900 In: 0 Out: 2600 [Urine:100] Last three shifts: 02/12 1901 - 02/14 0700 In: -  
Out: 7512 [WPRNF:569] ? LAB RESULTS Recent Labs  
  02/14/21 
0106 02/13/21 
0037 [facility] [address] [facility] WBC 6.2 13.7* 12.2 HGB 8.2* 9.6* 9.1*  
HCT 24.7* 29.0* 27.4*  
PLT 49* 76* 58* Recent Labs  
  02/14/21 0106 02/13/21 
1856 02/13/21 
0037 02/12/21 [address]  [phone]  
K 3.4* 3.4* 3.4* 4.0  
* 62* 62* 109* BUN 51* 51* 41* 72* CREA 2.19* 2.14* 1.80* 2.71* CA 8.5 8.3* 8.5 8.5 MG 2.0  --  2.1 2.3 RECOMMENDATIONS AND DISCHARGE PLANNING 1. Pending tests/procedures/ Plan of Care or Other Needs:Continue to monitor oxygenation and update MD accordingly. Pt  on comfort care. 2. Discharge plan for patient and Needs/Barriers: TBD 3. Estimated Discharge Date: TBD Posted on Whiteboard in Patients Room: no   
 
4. The patient's care plan was reviewed with the oncoming nurse. \"HEALS\" SAFETY CHECK Fall Risk Total Score: 2 Safety Measures: Safety Measures: Bed/Chair-Wheels locked, Bed in low position, Call light within reach, Fall prevention (comment), Gripper socks, Nurse at bedside, Side rails X 3 A safety check occurred in the patient's room between off going nurse and oncoming nurse listed above. The safety check included the below items Area Items H High Alert Medications ? Verify all high alert medication drips (heparin, PCA, etc.) E Equipment ? Suction is set up for ALL patients (with sarahker) ? Red plugs utilized for all equipment (IV pumps, etc.) ? WOWs wiped down at end of shift. ? Room stocked with oxygen, suction, and other unit-specific supplies A 
 Alarms ? Bed alarm is set for fall risk patients ? Ensure chair alarm is in place and activated if patient is up in a chair L Lines ? Check IV for any infiltration ? Jean bag is empty if patient has a Jean ? Tubing and IV bags are labeled [facility] Safety ? Room is clean, patient is clean, and equipment is clean. ? Hallways are clear from equipment besides carts. ? Fall bracelet on for fall risk patients ? Ensure room is clear and free of clutter ? Suction is set up for ALL patients (with eden) ? Hallways are clear from equipment besides carts. ? Isolation precautions followed, supplies available outside room, sign posted Chencho Santoro

## 2021-02-14 NOTE — DIALYSIS
[facility] ACUTE HEMODIALYSIS FLOW SHEET 
 
 
HEMODIALYSIS ORDERS: Physician: Sarah 
  
Dialyzer: paul   Duration: 2 hr  BFR: 250   DFR: sequential (IUF only) Weight:  68.9 kg    Patient Chart [x]     Unable to Obtain []   Dry weight/UF Goal: 2-2.5 liters Access: right IJ CVC Heparin []  Bolus      Units    [] Hourly       Units    [x]None Catheter locking solution: heparin sodium citrate Pre BP:   175/94    Pulse:     105       Respirations: 36  Temperature:   97.9 ax Labs: Pre        Post:         [x] N/A Additional Orders(medications, blood products, hypotension management):       [x] N/A [x] Aung Consent Verified CATHETER ACCESS: []N/A   [x]Right   []Left   [x]IJ     []Fem   []transhepatic  
[] First use X-ray verified     []Permanent                [x] Temporary  
[x]No S/S infection  []Redness  []Drainage []Cultured []Swelling []Pain  
[x]Medical Aseptic Prep Utilized   []Dressing Changed  [x] Biopatch  Date: 2/9/21 []Clotted   [x]Patent   Flows: [x]Good  []Poor  []Reversed If access problem,  notified: []Yes    [x]N/A      
 
GRAFT/FISTULA ACCESS:  [x]N/A     
            
            GENERAL ASSESSMENT:  
  
LUNGS:  Rate 26 SaO2 95%     [] N/A    [] Clear  [x] Coarse  [x] Crackles  [] Wheezing 
      [] Diminished     Location : [x]RLL   [x]LLL    [x]RUL  [x]JERED Cough: [x]Productive  []Dry  []N/A   Respirations:  []Easy  [x]Labored Therapy:   []RA  [x]NC 35 l/min    Mask: []NRB []Venti       O2% []Ventilator  []Intubated  [] Trach  [] BiPaP CARDIAC: [x]Regular      [] Irregular   [] Pericardial Rub  [] JVD []  Monitored  [] Bedside  [] Remotely monitored [x] N/A    
 
EDEMA: [] None   [x]Generalized  [] Pitting [] 1    [] 2    [] 3    [] 4 [] Facial  [] Pedal  []  UE  [] LE  
 
SKIN:   [x] Warm   [] Hot     [] Cold   [x] Dry     [] Pale   [] Diaphoretic [] Flushed  [] Jaundiced  [] Cyanotic  [] Rash  [] Weeping LOC:    [x] Alert      [x]Oriented:    [x] Person     [x] Place  [x]Time 
             [] Confused  [] Lethargic  [] Medicated  [] Non-responsive GI / ABDOMEN:  [] Flat    [] Distended    [x] Soft    [] Firm   []  Obese 
                           [] Diarrhea  [x] Bowel Sounds  [] Nausea  [] Vomiting  / URINE ASSESSMENT:[] Voiding   [] Oliguria  [] Anuria   [x]  Jean [] Incontinent    []  Incontinent Brief      []  Bathroom Privileges PAIN: [x] 0 []1  []2   []3   []4   []5   []6   []7   []8   []9   []10 Scale 0-10  Action/Follow Up: MOBILITY:  [] Amb    [] Amb/Assist    [x] Bed    [] Wheelchair  [] Stretcher All Vitals and Treatment Details on Attached Flowsheet Hospital: [facility] Room # 358/01 [] 1st Time Acute  [x] Stat  [] Routine  [] Urgent    
[] Acute Room  [x]  Bedside  [] ICU/CCU  [] ER Isolation Precautions:  Droplet Plus Special Considerations:         [] Blood Consent Verified [x]N/A ALLERGIES:  
Allergies Allergen Reactions  Latex Itching Skin breaks out causing itching Code Status:Partial Code Hepatitis Status:    2nd RN check: Chaz Santoyo                   
Lab Results Component Value Date/Time Hepatitis B surface Ag <0.10 02/06/2021 03:40 AM  
 Hepatitis B surface Ab <3.10 (L) 02/06/2021 03:40 AM  
   
 
            Current Labs:  
Lab Results Component Value Date/Time  Sodium 144 02/14/2021 01:06 AM  
 Potassium 3.4 (L) 02/14/2021 01:06 AM  
 Chloride 111 02/14/2021 01:06 AM  
 CO2 23 02/14/2021 01:06 AM  
 Anion gap 10 02/14/2021 01:06 AM  
 Glucose 100 (H) 02/14/2021 01:06 AM  
 BUN 51 (H) 02/14/2021 01:06 AM  
 Creatinine 2.19 (H) 02/14/2021 01:06 AM  
 BUN/Creatinine ratio 23 (H) 02/14/2021 01:06 AM  
 GFR est AA 26 (L) 02/14/2021 01:06 AM  
 GFR est non-AA 22 (L) 02/14/2021 01:06 AM  
 Calcium 8.5 02/14/2021 01:06 AM  
 Lab Results Component Value Date/Time WBC 6.2 02/14/2021 01:06 AM  
 Hemoglobin, POC 5.4 (LL) 02/07/2021 05:30 AM  
 HGB 8.2 (L) 02/14/2021 01:06 AM  
 Hematocrit, POC 16 (LL) 02/07/2021 05:30 AM  
 HCT 24.7 (L) 02/14/2021 01:06 AM  
 PLATELET 49 (L) 10/68/1783 01:06 AM  
 MCV 87.3 02/14/2021 01:06 AM  
  
  
 
                                                                         
DIET:  
DIET NPO    
 
PRIMARY NURSE REPORT: First initial/Last name/Title Pre Dialysis: Uzair Oliveira RN     Time: 2254 EDUCATION:   
[x] Patient [] Other         Knowledge Basis: []None [x]Minimal [] Substantial  
Barriers to learning  [x]N/A  
[] Access Care     [] S&S of infection     [] Fluid Management     []K+     [x]Procedural   
[]Albumin     [] Medications     [] Tx Options     [] Transplant     [] Diet     [] Other Teaching Tools:  [x] Explain  [x] Demo  [] Handouts [] Video Patient response:  [x] Verbalized understanding  [] Teach back  [] Return demonstration [] Requires follow up Inappropriate due to:         
 
[x]Time Out/Safety Check  [x]Extracorporeal Circuit Tested for integrity RO/HEMODIALYSIS MACHINE SAFETY CHECKS  Before each treatment:    
Machine Number:                   [facility] [x] Portable Machine #1/RO serial # [id number] Alarm Test:  Pass time 4522 [x] RO/Machine Log Complete Dialysate: pH  7.4  Conductivity: Meter   14.4     HD Machine   14.4                  TCD: 14.2 Dialyzer Lot # [id number]            Blood Tubing Lot # [id number]          Saline Lot #  [id number] CHLORINE TESTING-Before each treatment and every 4 hours Total Chlorine: [x] less than 0.1 ppm  Time: 1330  
(if greater than 0.1 ppm from Primary then every 30 minutes from Secondary) TREATMENT INITIATION  with Dialysis Precautions: [x] All Connections Secured                 [x] Saline Line Double Clamped  
[x] Venous Parameters Set                  [x] Arterial Parameters Set [x] Prime Given 250ml                          [x]Air Foam Detector Engaged Treatment Initiation Note:    RN arrived to pt bedside due to isolation precautions. Pt is on high flow NC @ 35 LPM, exhibiting agonal breathing. Crackles noted all lobes. O2 sat 95%. Pt is A&Ox4 but having difficulty speaking due to respiratory distress. Right IJ assessed with no s/s complications. HD initiated without difficulty. During Treatment Notes: 
1400 Pt awake and alert. VSS. Face and access in view with connections secure. 1430 Pt awake and alert. VSS. Face and access in view with connections secure. 1500 Pt awake and alert. VSS. Face and access in view with connections secure. 1525 O2 sat dropped to 88%. Gavino flow O2 increased to 45 LPM.  O2 sat dropped to 86%. Nurse made aware - paging MD at this time. Placed on NRB @ 15 LPM.  O2 sat remains 86%. 1530 Primary nurse at bedside. Pt awake and alert. Face and access in view with connections secure. 1535 O2 sat 93% with high flow NC @ 45 LPM and NRB @ 15 LPM. Medication Dose Volume Route Time [facility] name Title  
none     Geoff Griffith RN Post Assessment:  
Dialyzer Cleared: [] Good [x] Fair  [] Poor Blood processed:  0 L (IUF only) UF Removed  3000 Ml POst BP:   155/86       Pulse: 99 Respirations: 36  Temperature: 97.4 ax Lungs: 
 
 [] Clear      [x] Course         [x] Crackles  
 [] Wheezing         [] Diminished Post Tx Vascular Access: N/A Cardiac:  
[x] Regular   [] Irregular   [] Monitor  [x] N/A Catheter:  
Locking solution: sodium citrate Art. 1.1  Logan. 1.1 Skin:   Pain:  
[x] Warm  [x] Dry [] Diaphoretic    [] Flushed   
[] Pale [] Cyanotic [x]0  []1  []2   []3  []4   []5   []6   []7   []8   []9   []10 Post Treatment Note: Pt tolerated 2 hours of treatment. Net 2.5 L UF removed. Catheter locked with sodium citrate. Lungs still with crackles, but much better than at treatment initiation. Primary nurse at bedside. POST TREATMENT PRIMARY NURSE HANDOFF REPORT:  
 
First initial/Last name/Title Post Dialysis: Ruth Case RN   Time:  3655 Abbreviations: AVG-arterial venous graft, AVF-arterial venous fistula, IJ-Internal Jugular, Subcl-Subclavian, Fem-Femoral, Tx-treatment, AP/HR-apical heart rate, DFR-dialysate flow rate, BFR-blood flow rate, AP-arterial pressure, -venous pressure, UF-ultrafiltrate, TMP-transmembrane pressure, Logan-Venous, Art-Arterial, RO-Reverse Osmosis

## 2021-02-14 NOTE — PROGRESS NOTES
Problem: Risk for Spread of Infection Goal: Prevent transmission of infectious organism to others Description: Prevent the transmission of infectious organisms to other patients, staff members, and visitors. Outcome: Progressing Towards Goal 
  
Problem: Patient Education:  Go to Education Activity Goal: Patient/Family Education Outcome: Progressing Towards Goal 
  
Problem: Falls - Risk of 
Goal: *Absence of Falls Description: Document Dru Montalvo Fall Risk and appropriate interventions in the flowsheet. Outcome: Progressing Towards Goal 
Note: Fall Risk Interventions: 
Mobility Interventions: Assess mobility with egress test, Patient to call before getting OOB Mentation Interventions: Adequate sleep, hydration, pain control Medication Interventions: Patient to call before getting OOB Elimination Interventions: Call light in reach, Patient to call for help with toileting needs Problem: Patient Education: Go to Patient Education Activity Goal: Patient/Family Education Outcome: Progressing Towards Goal 
  
Problem: Pressure Injury - Risk of 
Goal: *Prevention of pressure injury Description: Document Kyle Scale and appropriate interventions in the flowsheet. Outcome: Progressing Towards Goal 
Note: Pressure Injury Interventions: 
Sensory Interventions: Assess changes in LOC Moisture Interventions: Absorbent underpads, Maintain skin hydration (lotion/cream) Activity Interventions: Increase time out of bed, Pressure redistribution bed/mattress(bed type) Mobility Interventions: Assess need for specialty bed, Pressure redistribution bed/mattress (bed type) Nutrition Interventions: Document food/fluid/supplement intake Friction and Shear Interventions: HOB 30 degrees or less Problem: Patient Education: Go to Patient Education Activity Goal: Patient/Family Education Outcome: Progressing Towards Goal 
  
Problem: Injury - Risk of, Adverse Drug Event Goal: *Absence of adverse drug events Outcome: Progressing Towards Goal 
Goal: *Absence of medication errors Outcome: Progressing Towards Goal 
Goal: *Knowledge of prescribed medications Outcome: Progressing Towards Goal 
  
Problem: Patient Education: Go to Patient Education Activity Goal: Patient/Family Education Outcome: Progressing Towards Goal 
  
Problem: Pain Goal: *Control of Pain Outcome: Progressing Towards Goal 
Goal: *PALLIATIVE CARE:  Alleviation of Pain Outcome: Progressing Towards Goal 
  
Problem: Patient Education: Go to Patient Education Activity Goal: Patient/Family Education Outcome: Progressing Towards Goal 
  
Problem: Airway Clearance - Ineffective Goal: Achieve or maintain patent airway Outcome: Progressing Towards Goal 
  
Problem: Gas Exchange - Impaired Goal: Absence of hypoxia Outcome: Progressing Towards Goal 
Goal: Promote optimal lung function Outcome: Progressing Towards Goal 
  
Problem: Breathing Pattern - Ineffective Goal: Ability to achieve and maintain a regular respiratory rate Outcome: Progressing Towards Goal 
  
Problem: Body Temperature -  Risk of, Imbalanced Goal: Ability to maintain a body temperature within defined limits Outcome: Progressing Towards Goal 
Goal: Will regain or maintain usual level of consciousness Outcome: Progressing Towards Goal 
Goal: Complications related to the disease process, condition or treatment will be avoided or minimized Outcome: Progressing Towards Goal 
  
Problem: Isolation Precautions - Risk of Spread of Infection Goal: Prevent transmission of infectious organism to others Outcome: Progressing Towards Goal 
  
Problem: Nutrition Deficits Goal: Optimize nutrtional status Outcome: Progressing Towards Goal 
  
Problem: Risk for Fluid Volume Deficit Goal: Maintain normal heart rhythm Outcome: Progressing Towards Goal 
Goal: Maintain absence of muscle cramping Outcome: Progressing Towards Goal 
 Goal: Maintain normal serum potassium, sodium, calcium, phosphorus, and pH Outcome: Progressing Towards Goal 
  
Problem: Loneliness or Risk for Loneliness Goal: Demonstrate positive use of time alone when socialization is not possible Outcome: Progressing Towards Goal 
  
Problem: Fatigue Goal: Verbalize increase energy and improved vitality Outcome: Progressing Towards Goal 
  
Problem: Patient Education: Go to Patient Education Activity Goal: Patient/Family Education Outcome: Progressing Towards Goal 
  
Problem: Patient Education: Go to Patient Education Activity Goal: Patient/Family Education Outcome: Progressing Towards Goal 
  
Problem: Patient Education: Go to Patient Education Activity Goal: Patient/Family Education Outcome: Progressing Towards Goal 
  
Problem: Patient Education: Go to Patient Education Activity Goal: Patient/Family Education Outcome: Progressing Towards Goal 
  
Problem: Nutrition Deficit Goal: *Optimize nutritional status Outcome: Progressing Towards Goal

## 2021-02-14 NOTE — PROGRESS NOTES
[facility] Hospitalist Group Progress Note Patient: Papa Close Age: 79 y.o. : 1940 MR#: [id number] SSN: [id number] Date: 2021 Subjective:  
 
Pt seen & evaluated - lying in bed , is tachypneic Unable to place NG tube secondary to hypoxia. Multiple attempts were made however patient continued to be hypoxic and they were unable to place NG tube. Continues to remain very gurgly. Assessment/Plan:  
 
Consultants: Infectious disease (signed off); Nephrology 1. Hypoxic respiratory failure - initially intubated 21 then extubated 21, failed to protect airway and required reintubation 21 then extubated again on 02/10/21. Now on RA, however high risk for re-intubation. Chest PT per pulmonary. 2. Covid 19 pneumonia - s/p abx; remdesevir and convalescent plasma. 3. Severe sepsis with septic shock in setting of PNA and UTI- tachycardia, tachypnea, hypotension on admission, now improved 4. Enterobacter UTI - completed course of cefepime / vanco in ICU 5. NANCY on stage 3 CKD - HD per nephrology 6. Acute metabolic encephalopathy - off precedex drip; in setting of multiple comorbidities and acute illness improved 7. Severe protein calorie malnutrition - NPO per SLP. Pending respiratory status and further SLP recs, Discussed with nutrition -unable to place NG tube yesterday, will hold for the next 1 to 2 days to see if patient improves and if no improvement then will consider PPN versus PEG tube placement. 8. Type 2 diabetes with hyperglycemia -continue Lantus with SSI 9. Recent hemoptysis - hold on blood thinners 10. Elevalted troponin - earlier in admission felt to be in setting of  
11. Severe aortic stenosis - per last echo; cardiology consult 12. HTN - off pressors. add IV hydralazine 13. H/o Illa Neigh and chronic pancytopenia - hold on blood thinners 14. H/o recent left hip ORIF - on 20 per Dr. Jaleesa Alcantara 15. Goals of care - Patient is partial code; DNR, but proceed with intubation if needed. Nephew requesting update for any change in status. Palliative medicine consulted due to multiple comorbidities 16. Legally blind - per nephew 17. Dispo - pt previously at [address] [place] [address] Rd Called [facility] at phone number # [phone] -spoke with him regarding patient's current condition. Also explained to him that she has failed her swallow eval and we would rather proceed with a PEG tube. He verbalized understanding and mentions that he would like to talk to the patient's sister this evening regarding proceeding with comfort measures. He has asked us to reach out to him tomorrow. We will continue to follow. He requests call if change of status however he is aware an emergent situation may proceed with intubation and then follow with him after. Additional Notes:   
 
Case discussed with:  [x]Patient  [x]Family  [x]Nursing  []Case Management DVT Prophylaxis:  []Lovenox  []Hep SQ  [x]SCDs  []Coumadin   []On Heparin gtt Objective:  
VS:  
Visit Vitals BP (!) 144/88 Pulse (!) 108 Temp 97.9 °F (36.6 °C) (Axillary) Resp 26 Ht 5' (1.524 m) Wt 68.9 kg (151 lb 12.8 oz) SpO2 96% BMI 29.65 kg/m² Tmax/24hrs: Temp (24hrs), Av.8 °F (36.6 °C), Min:97 °F (36.1 °C), Max:98.5 °F (36.9 °C) Intake/Output Summary (Last 24 hours) at 2021 1435 Last data filed at 2021 9666 Gross per 24 hour Intake 0 ml Output 300 ml Net -300 ml General:  Alert, NAD; very hard of hearing and soft spoken Cardiovascular: Regular rhythm, mild tachycardia Pulmonary: Coarse rhonchi, mild tachypnea GI:  +BS in all four quadrants, soft, non-tender Extremities: 2+ bilateral lower edema; feet warm bilaterally Neuro: alert and oriented x person only Family contact: Bernardo Baca [phone] Labs:   
Recent Results (from the past 24 hour(s)) RENAL FUNCTION PANEL  
 Collection Time: 02/13/21  6:56 PM  
Result Value Ref Range Sodium 143 136 - 145 mmol/L Potassium 3.4 (L) 3.5 - 5.5 mmol/L Chloride 111 100 - 111 mmol/L  
 CO2 26 21 - 32 mmol/L Anion gap 6 3.0 - 18 mmol/L Glucose 62 (L) 74 - 99 mg/dL BUN 51 (H) 7.0 - 18 MG/DL Creatinine 2.14 (H) 0.6 - 1.3 MG/DL  
 BUN/Creatinine ratio 24 (H) 12 - 20 GFR est AA 27 (L) >60 ml/min/1.73m2 GFR est non-AA 22 (L) >60 ml/min/1.73m2 Calcium 8.3 (L) 8.5 - 10.1 MG/DL Phosphorus 4.0 2.5 - 4.9 MG/DL Albumin 3.1 (L) 3.4 - 5.0 g/dL GLUCOSE, POC Collection Time: 02/13/21  8:41 PM  
Result Value Ref Range Glucose (POC) 65 (L) 70 - 110 mg/dL GLUCOSE, POC Collection Time: 02/13/21  8:43 PM  
Result Value Ref Range Glucose (POC) 66 (L) 70 - 110 mg/dL GLUCOSE, POC Collection Time: 02/13/21 10:28 PM  
Result Value Ref Range Glucose (POC) 182 (H) 70 - 110 mg/dL PROCALCITONIN Collection Time: 02/14/21  1:06 AM  
Result Value Ref Range Procalcitonin 0.98 ng/mL MAGNESIUM Collection Time: 02/14/21  1:06 AM  
Result Value Ref Range Magnesium 2.0 1.6 - 2.6 mg/dL CBC WITH AUTOMATED DIFF Collection Time: 02/14/21  1:06 AM  
Result Value Ref Range WBC 6.2 4.6 - 13.2 K/uL  
 RBC 2.83 (L) 4.20 - 5.30 M/uL HGB 8.2 (L) 12.0 - 16.0 g/dL HCT 24.7 (L) 35.0 - 45.0 % MCV 87.3 74.0 - 97.0 FL  
 MCH 29.0 24.0 - 34.0 PG  
 MCHC 33.2 31.0 - 37.0 g/dL  
 RDW 17.9 (H) 11.6 - 14.5 % PLATELET 49 (L) 827 - 420 K/uL MPV 10.7 9.2 - 11.8 FL  
 NEUTROPHILS 86 (H) 40 - 73 % LYMPHOCYTES 7 (L) 21 - 52 % MONOCYTES 5 3 - 10 % EOSINOPHILS 2 0 - 5 % BASOPHILS 0 0 - 2 %  
 ABS. NEUTROPHILS 5.3 1.8 - 8.0 K/UL  
 ABS. LYMPHOCYTES 0.4 (L) 0.9 - 3.6 K/UL  
 ABS. MONOCYTES 0.3 0.05 - 1.2 K/UL  
 ABS. EOSINOPHILS 0.1 0.0 - 0.4 K/UL  
 ABS. BASOPHILS 0.0 0.0 - 0.1 K/UL  
 DF AUTOMATED PHOSPHORUS Collection Time: 02/14/21  1:06 AM  
Result Value Ref Range Phosphorus 3.5 2.5 - 4.9 MG/DL ALBUMIN Collection Time: 02/14/21  1:06 AM  
Result Value Ref Range Albumin 3.1 (L) 3.4 - 5.0 g/dL METABOLIC PANEL, BASIC Collection Time: 02/14/21  1:06 AM  
Result Value Ref Range Sodium 144 136 - 145 mmol/L Potassium 3.4 (L) 3.5 - 5.5 mmol/L Chloride 111 100 - 111 mmol/L  
 CO2 23 21 - 32 mmol/L Anion gap 10 3.0 - 18 mmol/L Glucose 100 (H) 74 - 99 mg/dL BUN 51 (H) 7.0 - 18 MG/DL Creatinine 2.19 (H) 0.6 - 1.3 MG/DL  
 BUN/Creatinine ratio 23 (H) 12 - 20 GFR est AA 26 (L) >60 ml/min/1.73m2 GFR est non-AA 22 (L) >60 ml/min/1.73m2 Calcium 8.5 8.5 - 10.1 MG/DL  
GLUCOSE, POC Collection Time: 02/14/21  3:24 AM  
Result Value Ref Range Glucose (POC) 88 70 - 110 mg/dL GLUCOSE, POC Collection Time: 02/14/21  3:55 AM  
Result Value Ref Range Glucose (POC) 94 70 - 110 mg/dL GLUCOSE, POC Collection Time: 02/14/21  8:34 AM  
Result Value Ref Range Glucose (POC) 110 70 - 110 mg/dL Signed By: Anabelle Fermin MD   
 February 14, 2021

## 2021-02-14 NOTE — PROGRESS NOTES
Patient appears to have more tachypnea , hypoxia Ultrafiltration ordered 2 lit, discussed with dialysis nurse CXR ordered Please call with questions Jazlyn Monte MD FASN Cell [phone] Pager: [phone]

## 2021-02-14 NOTE — PROGRESS NOTES
1558: Pt desating while on dialysis, non rebreather added to highflow @ 45L. Pt still sating in the high 80s to low 90s. Dialysis completed. Pt oxygenation in the high 70s. RT paged, MD updated. wating for RT to arrive on floor to transfer pt on bipap as MD ordered. RT paged the second time, stated she is coming. 1649: RT in pt's room. Wanting to talk to MD. MD paged.

## 2021-02-15 NOTE — PROGRESS NOTES
SLP Note: Pt currently transitioned to comfort care. Will sign off as no further skilled SLP needs identified. David Rg M.S., CCC-SLP Speech-Language Pathologist

## 2021-02-15 NOTE — PROGRESS NOTES
Pt is now comfort care, switched to regular nasal cannula 
 
 02/14/21 1900 Oxygen Therapy O2 Device Nasal cannula O2 Flow Rate (L/min) 3 l/min FIO2 (%) 100 %

## 2021-02-15 NOTE — PROGRESS NOTES
[facility] Hospitalist Group Progress Note Patient: Faye Jones Age: 79 y.o. : 1940 MR#: [id number] SSN: [id number] Date: 2/15/2021 Subjective:  
 
Pt seen & evaluated - lying in bed , is comfort measures only Assessment/Plan:  
 
Consultants: Infectious disease (signed off); Nephrology 1. Hypoxic respiratory failure - initially intubated 21 then extubated 21, failed to protect airway and required reintubation 21 then extubated again on 02/10/21. Now on RA, however high risk for re-intubation. Chest PT per pulmonary. 2. Covid 19 pneumonia - s/p abx; remdesevir and convalescent plasma. 3. Severe sepsis with septic shock in setting of PNA and UTI- tachycardia, tachypnea, hypotension on admission, now improved 4. Enterobacter UTI - completed course of cefepime / vanco in ICU 5. NANCY on stage 3 CKD - HD per nephrology 6. Acute metabolic encephalopathy - off precedex drip; in setting of multiple comorbidities and acute illness improved 7. Severe protein calorie malnutrition - NPO per SLP. Pending respiratory status and further SLP recs, Discussed with nutrition -unable to place NG tube yesterday, will hold for the next 1 to 2 days to see if patient improves and if no improvement then will consider PPN versus PEG tube placement. 8. Type 2 diabetes with hyperglycemia -continue Lantus with SSI 9. Recent hemoptysis - hold on blood thinners 10. Elevalted troponin - earlier in admission felt to be in setting of  
11. Severe aortic stenosis - per last echo; cardiology consult 12. HTN - off pressors. add IV hydralazine 13. H/o [place] and chronic pancytopenia - hold on blood thinners 14. H/o recent left hip ORIF - on 20 per Dr. Penny Boast 15. Goals of care - Patient is partial code; DNR, but proceed with intubation if needed. Nephew requesting update for any change in status. Palliative medicine consulted due to multiple comorbidities 16. Legally blind - per nephew 17. Dispo - pt previously at [address] [place] [address] Rd Continue comfort measures only Additional Notes:   
 
Case discussed with:  [x]Patient  [x]Family  [x]Nursing  []Case Management DVT Prophylaxis:  []Lovenox  []Hep SQ  [x]SCDs  []Coumadin   []On Heparin gtt Objective:  
VS:  
Visit Vitals BP (!) 154/89 (BP 1 Location: Right upper arm, BP Patient Position: At rest) Pulse (!) 104 Temp 98.1 °F (36.7 °C) Resp 26 Ht 5' (1.524 m) Wt 68.9 kg (151 lb 12.8 oz) SpO2 97% BMI 29.65 kg/m² Tmax/24hrs: Temp (24hrs), Av.9 °F (36.6 °C), Min:97.4 °F (36.3 °C), Max:98.3 °F (36.8 °C) Intake/Output Summary (Last 24 hours) at 2/15/2021 1329 Last data filed at 2/15/2021 1579 Gross per 24 hour Intake  Output 2750 ml Net -2750 ml General:  Sleepy Cardiovascular: Tachycardic Pulmonary: Coarse rhonchi, mild tachypnea GI:  +BS in all four quadrants, soft, non-tender Extremities: 2+ bilateral lower edema; feet warm bilaterally Neuro: alert and oriented x person only Family contact: Brittney Rojas [phone] Labs:   
Recent Results (from the past 24 hour(s)) GLUCOSE, POC Collection Time: 21  2:48 PM  
Result Value Ref Range Glucose (POC) 123 (H) 70 - 110 mg/dL Signed By: Jany Morel MD   
 February 15, 2021

## 2021-02-15 NOTE — PROGRESS NOTES
Nutrition Assessment Type and Reason for Visit: Reassess, Positive nutrition screen, Wound Nutrition Recommendations/Plan:  
- Continue to provide nutrition interventions consistent with goals of care: bites & sips as tolerated for comfort as able. Nutrition Assessment:  COVID positive & transitioned to comfort measures only per MD note 2/14/21. Remains NPO, not appropriate for po comfort diet at this time. Malnutrition Assessment: 
Malnutrition Status: At risk for malnutrition (specify)(NPO d/t dysphagia) Estimated Daily Nutrient Needs: 
Energy (kcal):  1700-2,380(25-35kcal/kg) Protein (g):  (1.2-1.5) Fluid (ml/day):  4566-1,494 Nutrition Related Findings:  Loose BM 2/14. Unable to verbalize. +edema. HD 2/14 Current Nutrition Therapies: DIET NPO Anthropometric Measures: 
· Height:  5' (152.4 cm) · Current Body Wt:  68.9 kg (151 lb 14.4 oz) · BMI: 29.7 Nutrition Diagnosis:  
· Inadequate oral intake related to cognitive or neurological impairment, swallowing difficulty as evidenced by NPO or clear liquid status due to medical condition Nutrition Intervention: 
Food and/or Nutrient Delivery: Continue NPO(bites & sips for comfort as tolerated) Nutrition Education and Counseling: Education not indicated Coordination of Nutrition Care: Continue to monitor while inpatient Goals: 
Provide nutrition intervention as appropriate with goals of care for the next 7 days. Nutrition Monitoring and Evaluation:  
Behavioral-Environmental Outcomes: None identified Food/Nutrient Intake Outcomes: Diet advancement/tolerance, Food and nutrient intake Physical Signs/Symptoms Outcomes: Meal time behavior, Nutrition focused physical findings Discharge Planning:   
(comfort feeds as tolerated & able) Electronically signed by Hafsa Salazar RD on 2/15/2021 at 11:42 AM 
 
Contact Number: [phone]

## 2021-02-15 NOTE — PROGRESS NOTES
OT orders d/c. Pt now comfort care. Thank you for allowing us to participate in the care of this pt.

## 2021-02-15 NOTE — PROGRESS NOTES
Discharge/Transition Planning Pt under comfort care. Dr Anita Alonzo to be Hospice consult in 
Does not appear to have paper DNR? Chico Blank RN BSN Outcomes Manager Pager # [phone]

## 2021-02-15 NOTE — PROGRESS NOTES
Problem: Risk for Spread of Infection Goal: Prevent transmission of infectious organism to others Description: Prevent the transmission of infectious organisms to other patients, staff members, and visitors. Outcome: Progressing Towards Goal 
  
Problem: Patient Education:  Go to Education Activity Goal: Patient/Family Education Outcome: Progressing Towards Goal 
  
Problem: Falls - Risk of 
Goal: *Absence of Falls Description: Document Deanna Flow Fall Risk and appropriate interventions in the flowsheet. Outcome: Progressing Towards Goal 
Note: Fall Risk Interventions: 
Mobility Interventions: Assess mobility with egress test 
 
Mentation Interventions: Adequate sleep, hydration, pain control Medication Interventions: Bed/chair exit alarm Elimination Interventions: Bed/chair exit alarm, Toileting schedule/hourly rounds Problem: Pressure Injury - Risk of 
Goal: *Prevention of pressure injury Description: Document Kyle Scale and appropriate interventions in the flowsheet. Outcome: Progressing Towards Goal 
Note: Pressure Injury Interventions: 
Sensory Interventions: Assess changes in LOC Moisture Interventions: Absorbent underpads Activity Interventions: Increase time out of bed Mobility Interventions: Assess need for specialty bed Nutrition Interventions: Document food/fluid/supplement intake Friction and Shear Interventions: HOB 30 degrees or less

## 2021-02-15 NOTE — ROUTINE PROCESS
Bedside and Verbal shift change report given to Izabela Chin RN (oncoming nurse) by Amalia Wells RN (offgoing nurse). Report included the following information SBAR, Kardex, MAR and Recent Results. SITUATION:  
? Code Status: DNR 
? Reason for Admission: Pneumonia due to COVID-19 virus [U07.1, J12.82] 
 
? Hospital day: 23 
? Problem List:  
   
Hospital Problems  Date Reviewed: 12/23/2020 Codes Class Noted POA * (Principal) Pneumonia due to COVID-19 virus ICD-10-CM: U07.1, J12.82 ICD-9-CM: 480.8  1/27/2021 Unknown BACKGROUND:  
 Past Medical History:  
Past Medical History:  
Diagnosis Date  Anemia  Aortic stenosis, moderate 03/2019  Breast cancer ([facility] [address]  CAD (coronary artery disease) Coronary Stenting needed, hematology workup needed prior to stent placement, treating medically at this time  Cancer [facility])   
 colon  Cancer ([facility] [address] breast - left  Cardiac catheterization 08/17/2016 LM patent. mLAD 99.9% (2.25 x 28-mm Xience BETHEL, resid 0%). oD1 60%. oD2 95%. pCX 90% (2.5 x 13-mm Xience BETHEL, resid 0%). OMB patent.  Cardiac echocardiogram 03/2019 EF 56 to 60%, moderate concentric LVH, moderate aortic stenosis, mean valve gradient 25 mmHg  Diabetes ([facility] [address]  Hypercholesteremia  Hypertension  Thrombocytopenia ([facility] [address] Patient taking anticoagulants no ASSESSMENT:  
? Changes in Assessment Throughout Shift:  comfort care. ? Patient has Central Line: yes Reasons if yes: Limited access ? Patient has Jean Cath: yes Reasons if yes: Accurate measurement ? Last Vitals: 
  
Vitals:  
 02/14/21 1608 02/14/21 1719 02/14/21 1944 02/14/21 2328 BP: (!) 155/86 (!) 161/91 131/71 Pulse: 99 (!) 113 (!) 117 (!) 105 Resp: (!) 36 (!) 35 28 Temp: 97.4 °F (36.3 °C) 97.7 °F (36.5 °C) 98.3 °F (36.8 °C) TempSrc: Axillary SpO2: (!) 86% 91% (!) 89% Weight:      
Height: ? IV and DRAINS (will only show if present) [REMOVED] Peripheral IV 02/02/21 Left;Posterior Hand-Site Assessment: Clean, dry, & intact [REMOVED] Airway - Endotracheal Tube 02/02/21-Site Assessment: Clean, dry, & intact Midline Catheter 02/02/21 Left-Site Assessment: Clean, dry, & intact [REMOVED] Orogastric Tube 02/02/21-Site Assessment: Clean, dry, & intact [REMOVED] Airway - Continuous Aspiration of Subglottic Secretions (MAAME) Tube 02/03/21-Site Assessment: Clean, dry, & intact Hemodialysis Access 02/06/21-Site Assessment: Clean, dry, & intact [REMOVED] Airway - Continuous Aspiration of Subglottic Secretions (MAAME) Tube 01/27/21 Oral-Site Assessment: Clean, dry, & intact [REMOVED] Peripheral IV 01/27/21 Right Arm-Site Assessment: Clean, dry, & intact [REMOVED] Peripheral IV 01/27/21 Left Arm-Site Assessment: Clean, dry, & intact [REMOVED] Peripheral IV 01/27/21 Right Antecubital-Site Assessment: Clean, dry, & intact [REMOVED] Triple Lumen left IJ  01/27/21 Left Internal jugular-Site Assessment: Clean, dry, & intact [REMOVED] Peripheral IV 01/26/21 Left Antecubital-Site Assessment: Drainage (comment) [REMOVED] Nasogastric Tube 01/27/21-Site Assessment: Clean, dry, & intact [REMOVED] Airway - Endotracheal Tube 01/27/21 Oral-Site Assessment: Clean, dry, & intact [REMOVED] Orogastric Tube 01/28/21-Site Assessment: Clean, dry, & intact ? WOUND (if present) Wound Type:  Multiple Dressing present Dressing Present : Yes Wound Concerns/Notes:  none ? PAIN Pain Assessment Pain Intensity 1: 0 (02/15/21 0400) Pain Location 1: Other (comment) Pain Intervention(s) 1: Medication (see MAR) Patient Stated Pain Goal: Unable to verbalize/indicate pain 
o Interventions for Pain:  none 
o Intervention effective: n/a 
o Time of last intervention: n/a 
o Reassessment Completed: yes ? Last 3 Weights: 
Last 3 Recorded Weights in this Encounter 02/12/21 2565 02/13/21 0014 02/14/21 9545 Weight: 75.3 kg (166 lb) 73.7 kg (162 lb 8 oz) 68.9 kg (151 lb 12.8 oz) Weight change: ? INTAKE/OUPUT Current Shift: No intake/output data recorded. Last three shifts: 02/13 1901 - 02/15 0700 In: 0 Out: 2950 [Urine:450] ? LAB RESULTS Recent Labs  
  02/14/21 
0106 02/13/21 
0037 02/12/21 [address] [facility] [address] WBC 6.2 13.7* 12.2 HGB 8.2* 9.6* 9.1*  
HCT 24.7* 29.0* 27.4*  
PLT 49* 76* 58* Recent Labs  
  02/14/21 
0106 02/13/21 
1856 02/13/21 0037 02/12/21 [address]  [phone]  
K 3.4* 3.4* 3.4* 4.0  
* 62* 62* 109* BUN 51* 51* 41* 72* CREA 2.19* 2.14* 1.80* 2.71* CA 8.5 8.3* 8.5 8.5 MG 2.0  --  2.1 2.3 RECOMMENDATIONS AND DISCHARGE PLANNING 1. Pending tests/procedures/ Plan of Care or Other Needs:  
 
2. Discharge plan for patient and Needs/Barriers: TBD 3. Estimated Discharge Date: TBD Posted on Whiteboard in Patients Room: no   
 
4. The patient's care plan was reviewed with the oncoming nurse. \"HEALS\" SAFETY CHECK Fall Risk Total Score: 2 Safety Measures: Safety Measures: Bed in low position, Fall prevention (comment) A safety check occurred in the patient's room between off going nurse and oncoming nurse listed above. The safety check included the below items Area Items H High Alert Medications ? Verify all high alert medication drips (heparin, PCA, etc.) E Equipment ? Suction is set up for ALL patients (with yanker) ? Red plugs utilized for all equipment (IV pumps, etc.) ? WOWs wiped down at end of shift. ? Room stocked with oxygen, suction, and other unit-specific supplies A Alarms ? Bed alarm is set for fall risk patients ? Ensure chair alarm is in place and activated if patient is up in a chair L Lines ? Check IV for any infiltration ? Jean bag is empty if patient has a Jean ? Tubing and IV bags are labeled Jillian Havers Safety ? Room is clean, patient is clean, and equipment is clean. ? Hallways are clear from equipment besides carts. ? Fall bracelet on for fall risk patients ? Ensure room is clear and free of clutter ? Suction is set up for ALL patients (with eden) ? Hallways are clear from equipment besides carts. ? Isolation precautions followed, supplies available outside room, sign posted Ismael Lauren RN

## 2021-02-15 NOTE — PROGRESS NOTES
Problem: Risk for Spread of Infection Goal: Prevent transmission of infectious organism to others Description: Prevent the transmission of infectious organisms to other patients, staff members, and visitors. Outcome: Progressing Towards Goal 
  
Problem: Patient Education:  Go to Education Activity Goal: Patient/Family Education Outcome: Progressing Towards Goal 
  
Problem: Falls - Risk of 
Goal: *Absence of Falls Description: Document Starleen Freeze Fall Risk and appropriate interventions in the flowsheet. Outcome: Progressing Towards Goal 
Note: Fall Risk Interventions: 
Mobility Interventions: Assess mobility with egress test 
 
Mentation Interventions: Adequate sleep, hydration, pain control Medication Interventions: Bed/chair exit alarm Elimination Interventions: Bed/chair exit alarm Problem: Patient Education: Go to Patient Education Activity Goal: Patient/Family Education Outcome: Progressing Towards Goal 
  
Problem: Pressure Injury - Risk of 
Goal: *Prevention of pressure injury Description: Document Kyle Scale and appropriate interventions in the flowsheet. Outcome: Progressing Towards Goal 
Note: Pressure Injury Interventions: 
Sensory Interventions: Assess changes in LOC Moisture Interventions: Absorbent underpads Activity Interventions: Pressure redistribution bed/mattress(bed type) Mobility Interventions: Assess need for specialty bed, Pressure redistribution bed/mattress (bed type) Nutrition Interventions: Document food/fluid/supplement intake Friction and Shear Interventions: HOB 30 degrees or less Problem: Patient Education: Go to Patient Education Activity Goal: Patient/Family Education Outcome: Progressing Towards Goal 
  
Problem: Nutrition Deficit Goal: *Optimize nutritional status Outcome: Progressing Towards Goal 
  
Problem: Injury - Risk of, Adverse Drug Event Goal: *Absence of adverse drug events Outcome: Progressing Towards Goal 
Goal: *Absence of medication errors Outcome: Progressing Towards Goal 
Goal: *Knowledge of prescribed medications Outcome: Progressing Towards Goal 
  
Problem: Patient Education: Go to Patient Education Activity Goal: Patient/Family Education Outcome: Progressing Towards Goal 
  
Problem: Pain Goal: *Control of Pain Outcome: Progressing Towards Goal 
Goal: *PALLIATIVE CARE:  Alleviation of Pain Outcome: Progressing Towards Goal 
  
Problem: Patient Education: Go to Patient Education Activity Goal: Patient/Family Education Outcome: Progressing Towards Goal 
  
Problem: Airway Clearance - Ineffective Goal: Achieve or maintain patent airway Outcome: Progressing Towards Goal 
  
Problem: Gas Exchange - Impaired Goal: Absence of hypoxia Outcome: Progressing Towards Goal 
Goal: Promote optimal lung function Outcome: Progressing Towards Goal 
  
Problem: Breathing Pattern - Ineffective Goal: Ability to achieve and maintain a regular respiratory rate Outcome: Progressing Towards Goal 
  
Problem: Body Temperature -  Risk of, Imbalanced Goal: Ability to maintain a body temperature within defined limits Outcome: Progressing Towards Goal 
Goal: Will regain or maintain usual level of consciousness Outcome: Progressing Towards Goal 
Goal: Complications related to the disease process, condition or treatment will be avoided or minimized Outcome: Progressing Towards Goal 
  
Problem: Nutrition Deficits Goal: Optimize nutrtional status Outcome: Progressing Towards Goal 
  
Problem: Risk for Fluid Volume Deficit Goal: Maintain normal heart rhythm Outcome: Progressing Towards Goal 
Goal: Maintain absence of muscle cramping Outcome: Progressing Towards Goal 
Goal: Maintain normal serum potassium, sodium, calcium, phosphorus, and pH Outcome: Progressing Towards Goal 
  
Problem: Loneliness or Risk for Loneliness Goal: Demonstrate positive use of time alone when socialization is not possible Outcome: Progressing Towards Goal 
  
Problem: Fatigue Goal: Verbalize increase energy and improved vitality Outcome: Progressing Towards Goal 
  
Problem: Patient Education: Go to Patient Education Activity Goal: Patient/Family Education Outcome: Progressing Towards Goal 
  
Problem: Patient Education: Go to Patient Education Activity Goal: Patient/Family Education Outcome: Progressing Towards Goal 
  
Problem: Patient Education: Go to Patient Education Activity Goal: Patient/Family Education Outcome: Progressing Towards Goal 
  
Problem: Patient Education: Go to Patient Education Activity Goal: Patient/Family Education Outcome: Progressing Towards Goal

## 2021-02-15 NOTE — PROGRESS NOTES
[facility] - Progress Note Consultation request by Rebecca Santizo MD for advice/opinion related to evaluating Pneumonia due to COVID-19 virus [U07.1, J12.82] Date of  Admission: 1/26/2021 11:13 PM  
Primary Care Physician:  Chuck Dominguez MD 
 
 
I saw, evaluated, interviewed and examined the patient personally. I agree with the findings and plan of care as documented below with PA-C note Patient with severe aortic stenosis and also septic shock with Covid pneumonia Discussed with hospitalist team.  Patient is now in a comfort care because of multiple medical problem with multiorgan failure Prognosis is poor We will be available as needed. Please call us back with any question. Blaise Adam MD  
 
 
 Assessment:  
 
-Severe aortic stenosis. Echo this admission with aortic valve peak gradient 67 mmHg, aortic valve mean gradient 38.3 mmHg and aortic valve area 0.6 cm2. (Echo 9/2020 revealed moderate AS with aortic valve peak gradient 45 mmHg, aortic valve mean gradient 28 mmHg and aortic valve area is 1.1 cm2). Patient is not currently candidate for further cardiac work up or intervention of AS in setting of significant covid illness with multiorgan system involvement. 
 
-Severe septic shock in setting of Covid and UTI, slowly improving and transferred out of ICU. 
-COVID pneumonia, requiring intubation 1/27 and reintubation 2/2. Now extubated since 2/10/21. 
-Enterobacter UTI s/p abx. 
-Acute on chronic kidney disease requiring HD. -Hypotension requiring pressors earlier this admission and now midodrine with HD. 
-Severe AS as noted above. -Severe pulmonary hypertension. PASP 70 mmHg. 
-Chronic HFpEF. EF 55-60% with severe AS as noted. -Coronary artery disease.  History of drug-eluting stents to both her LAD and left circumflex in August 2016.  Patient also had residual severe distal RCA disease which was well collateralized from the left. Noted elevated troponin earlier this admission presumed demand ischemia in setting of acute illness. Would not pursue further cardiac work up in setting of covid illness. 
-Dysphagia currently NPO. -Recent hemoptysis. H/o esophageal varices. 
-Hypertension. Elevated. -Dyslipidemia. -Diabetes mellitus. -H/o DVT. -Chronic anemia. 
-Chronic pancytopenia. 
-Legally blind. -H/o L Hip ORIF. -Presented from [place] [id number] Previously followed by Dr. Papa Dawkins. Most recent office visit 2019 with Dr. Jensen Doyle. Most recent cardiology evaluation by [facility] 9/2020 during admission. Plan:  
 
Preceding with Comfort care measures per patient's nephew,  noted in Dr. Fiordaliza Leon progress note. Will be available as needed. Subjective:  
 
Overnight events noted. Past Medical History:  
 
Past Medical History:  
Diagnosis Date  Anemia  Aortic stenosis, moderate 03/2019  Breast cancer ([facility] [address]  CAD (coronary artery disease) Coronary Stenting needed, hematology workup needed prior to stent placement, treating medically at this time  Cancer [facility])   
 colon  Cancer ([facility] [address] breast - left  Cardiac catheterization 08/17/2016 LM patent. mLAD 99.9% (2.25 x 28-mm Xience BETHEL, resid 0%). oD1 60%. oD2 95%. pCX 90% (2.5 x 13-mm Xience BETHEL, resid 0%). OMB patent.  Cardiac echocardiogram 03/2019 EF 56 to 60%, moderate concentric LVH, moderate aortic stenosis, mean valve gradient 25 mmHg  Diabetes ([address]  Hypercholesteremia  Hypertension  Thrombocytopenia ([facility] [address] Social History:  
 
Social History Socioeconomic History  Marital status:  Spouse name: Not on file  Number of children: Not on file  Years of education: Not on file  Highest education level: Not on file Tobacco Use  Smoking status: Never Smoker  Smokeless tobacco: Never Used Substance and Sexual Activity  Alcohol use: No  
 Drug use: No  
 
 
 Family History:  
 
Family History Problem Relation Age of Onset  Cancer Maternal Aunt Medications: Allergies Allergen Reactions  Latex Itching Skin breaks out causing itching Current Facility-Administered Medications Medication Dose Route Frequency  sodium citrate 4 gram /100 mL (4 %) 0.08 g  2 mL InterCATHeter DIALYSIS PRN  
 sodium citrate 4 gram /100 mL (4 %) 0.08 g  2 mL InterCATHeter DIALYSIS PRN  
 LORazepam (ATIVAN) injection 1 mg  1 mg IntraVENous Q4H PRN  
 scopolamine (TRANSDERM-SCOP) 1 mg over 3 days 1 Patch  1 Patch TransDERmal Q72H PRN  
 morphine injection 2 mg  2 mg IntraVENous Q4H PRN  
 midodrine (PROAMATINE) tablet 10 mg  10 mg Oral Q6H PRN  
 menthol-zinc oxide (CALMOSEPTINE) 0.44-20.6 % ointment   Topical TID  acetaminophen (TYLENOL) tablet 650 mg  650 mg Oral Q6H PRN  
 albuterol-ipratropium (DUO-NEB) 2.5 MG-0.5 MG/3 ML  3 mL Nebulization Q6H PRN  
 glucose chewable tablet 16 g  4 Tab Oral PRN  
 glucagon (GLUCAGEN) injection 1 mg  1 mg IntraMUSCular PRN  
 dextrose (D50W) injection syrg 12.5-25 g  25-50 mL IntraVENous PRN Physical Exam:  
 
Visit Vitals BP (!) 154/89 (BP 1 Location: Right upper arm, BP Patient Position: At rest) Pulse (!) 104 Temp 98.1 °F (36.7 °C) Resp 26 Ht 5' (1.524 m) Wt 151 lb 12.8 oz (68.9 kg) SpO2 97% BMI 29.65 kg/m² BP Readings from Last 3 Encounters:  
02/15/21 (!) 154/89  
12/11/20 124/76  
09/28/20 137/65 Pulse Readings from Last 3 Encounters:  
02/15/21 (!) 104  
12/11/20 85  
09/28/20 65 Wt Readings from Last 3 Encounters:  
02/15/21 151 lb 12.8 oz (68.9 kg) 12/10/20 157 lb 8 oz (71.4 kg) 09/28/20 149 lb 14.4 oz (68 kg) Physical Exam: Limited in setting of COVID-19 Data Review:  
 
Recent Labs  
  02/14/21 
0106 02/13/21 
0037 02/12/21 [address] WBC 6.2 13.7* 12.2 HGB 8.2* 9.6* 9.1*  
HCT 24.7* 29.0* 27.4*  
PLT 49* 76* 58* Recent Labs  
  02/14/21 
0106 02/13/21 
1856 02/13/21 
0037 02/12/21 [address] [facility] [address]  [phone]  
K 3.4* 3.4* 3.4* 4.0  
 111 108 111 CO2 23 26 24 20* * 62* 62* 109* BUN 51* 51* 41* 72* CREA 2.19* 2.14* 1.80* 2.71* CA 8.5 8.3* 8.5 8.5 MG 2.0  --  2.1 2.3 PHOS 3.5 4.0 2.6  2.9 4.4 ALB 3.1* 3.1* 3.6 3.4 Results for orders placed or performed during the hospital encounter of 12/06/20 EKG, 12 LEAD, INITIAL Result Value Ref Range Ventricular Rate 76 BPM  
 Atrial Rate 76 BPM  
 P-R Interval 170 ms QRS Duration 122 ms  
 Q-T Interval 414 ms QTC Calculation (Bezet) 465 ms Calculated P Axis 47 degrees Calculated R Axis 7 degrees Calculated T Axis 78 degrees Diagnosis Normal sinus rhythm Nonspecific intraventricular conduction delay Borderline ECG When compared with ECG of 26-SEP-2020 17:01, 
Criteria for Septal infarct are no longer present Confirmed by Lucero Grayson MD, ----- ([phone]) on 12/7/2020 4:29:23 PM 
  
Results for orders placed or performed in visit on 10/16/19 AMB POC EKG ROUTINE W/ 12 LEADS, INTER & REP Impression See progress note. All Cardiac Markers in the last 24 hours:  No results found for: CPK, CK, CKMMB, CKMB, RCK3, CKMBT, CKNDX, CKND1, CLAUDIO, TROPT, TROIQ, ANNAMARIA, TROPT, TNIPOC, BNP, BNPP Last Lipid:   
Lab Results Component Value Date/Time Cholesterol, total 188 07/01/2016 11:53 PM  
 HDL Cholesterol 46 07/01/2016 11:53 PM  
 LDL, calculated 94.2 07/01/2016 11:53 PM  
 Triglyceride 239 (H) 07/01/2016 11:53 PM  
 CHOL/HDL Ratio 4.1 07/01/2016 11:53 PM  
 
 
Signed By: Justo Phillips PA-C February 15, 2021

## 2021-02-16 NOTE — PROGRESS NOTES
[facility] Hospitalist Group Progress Note Patient: Toña Mullins Age: 79 y.o. : 1940 MR#: [id number] SSN: [id number] Date: 2021 Subjective:  
 
Pt seen & evaluated - lying in bed , is comfort measures only Assessment/Plan:  
 
Consultants: Infectious disease (signed off); Nephrology 1. Hypoxic respiratory failure - initially intubated 21 then extubated 21, failed to protect airway and required reintubation 21 then extubated again on 02/10/21. Now on RA, however high risk for re-intubation. Chest PT per pulmonary. 2. Covid 19 pneumonia - s/p abx; remdesevir and convalescent plasma. 3. Severe sepsis with septic shock in setting of PNA and UTI- tachycardia, tachypnea, hypotension on admission, now improved 4. Enterobacter UTI - completed course of cefepime / vanco in ICU 5. NANCY on stage 3 CKD - HD per nephrology 6. Acute metabolic encephalopathy - off precedex drip; in setting of multiple comorbidities and acute illness improved 7. Severe protein calorie malnutrition - NPO per SLP. Pending respiratory status and further SLP recs, Discussed with nutrition -unable to place NG tube yesterday, will hold for the next 1 to 2 days to see if patient improves and if no improvement then will consider PPN versus PEG tube placement. 8. Type 2 diabetes with hyperglycemia -continue Lantus with SSI 9. Recent hemoptysis - hold on blood thinners 10. Elevalted troponin - earlier in admission felt to be in setting of  
11. Severe aortic stenosis - per last echo; cardiology consult 12. HTN - off pressors. add IV hydralazine 13. H/o Wang Ser and chronic pancytopenia - hold on blood thinners 14. H/o recent left hip ORIF - on 20 per Dr. Rigo Chance 15. Goals of care - Patient is partial code; DNR, but proceed with intubation if needed. Nephew requesting update for any change in status. Palliative medicine consulted due to multiple comorbidities 16. Legally blind - per nephew 17. Dispo - Comfort measures Only Continue comfort measures only Additional Notes:   
 
Case discussed with:  [x]Patient  [x]Family  [x]Nursing  []Case Management DVT Prophylaxis:  []Lovenox  []Hep SQ  [x]SCDs  []Coumadin   []On Heparin gtt Objective:  
VS:  
Visit Vitals BP (!) 147/85 (BP 1 Location: Left lower arm, BP Patient Position: At rest) Pulse (!) 101 Temp 98.5 °F (36.9 °C) Resp 22 Ht 5' (1.524 m) Wt 68.9 kg (151 lb 12.8 oz) SpO2 98% BMI 29.65 kg/m² Tmax/24hrs: Temp (24hrs), Av.4 °F (36.9 °C), Min:98.3 °F (36.8 °C), Max:98.5 °F (36.9 °C) Intake/Output Summary (Last 24 hours) at 2021 1225 Last data filed at 2021 0667 Gross per 24 hour Intake 0 ml Output 300 ml Net -300 ml General:  Sleepy Cardiovascular: Tachycardic Pulmonary: Coarse rhonchi, mild tachypnea GI:  +BS in all four quadrants, soft, non-tender Extremities: 2+ bilateral lower edema; feet warm bilaterally Neuro: alert and oriented x person only Family contact: Brittney Rojas [phone] Labs:   
No results found for this or any previous visit (from the past 24 hour(s)). Signed By: Jany Morel MD   
 2021

## 2021-02-16 NOTE — ROUTINE PROCESS
Bedside and Verbal shift change report given to Joshua Brennan RN (oncoming nurse) by Praveen Rangel (offgoing nurse). Report included the following information SBAR, Kardex, MAR and Recent Results. SITUATION:  
? Code Status: DNR 
? Reason for Admission: Pneumonia due to COVID-19 virus [U07.1, J12.82] 
 
? Hospital day: 23 
? Problem List:  
   
Hospital Problems  Date Reviewed: 12/23/2020 Codes Class Noted POA * (Principal) Pneumonia due to COVID-19 virus ICD-10-CM: U07.1, J12.82 ICD-9-CM: 480.8  1/27/2021 Unknown BACKGROUND:  
 Past Medical History:  
Past Medical History:  
Diagnosis Date  Anemia  Aortic stenosis, moderate 03/2019  Breast cancer ([facility] [address]  CAD (coronary artery disease) Coronary Stenting needed, hematology workup needed prior to stent placement, treating medically at this time  Cancer [facility])   
 colon  Cancer ([facility] [address] breast - left  Cardiac catheterization 08/17/2016 LM patent. mLAD 99.9% (2.25 x 28-mm Xience BETHEL, resid 0%). oD1 60%. oD2 95%. pCX 90% (2.5 x 13-mm Xience BETHEL, resid 0%). OMB patent.  Cardiac echocardiogram 03/2019 EF 56 to 60%, moderate concentric LVH, moderate aortic stenosis, mean valve gradient 25 mmHg  Diabetes ([facility] [address]  Hypercholesteremia  Hypertension  Thrombocytopenia ([facility] [address] Patient taking anticoagulants no ASSESSMENT:  
? Changes in Assessment Throughout Shift: NO 
 
? Patient has Central Line: yes Reasons if yes: Limited access availability ? Patient has Jean Cath: yes Reasons if yes: End of life care ? Last Vitals: 
  
Vitals:  
 02/14/21 1944 02/14/21 2328 02/15/21 0800 02/15/21 0123 BP: 131/71   (!) 154/89 Pulse: (!) 117 (!) 105  (!) 104 Resp: 28   26 Temp: 98.3 °F (36.8 °C)   98.1 °F (36.7 °C) TempSrc:      
SpO2: (!) 89%   97% Weight:   68.9 kg (151 lb 12.8 oz) Height:   5' (1.524 m) ? IV and DRAINS (will only show if present) [REMOVED] Peripheral IV 02/02/21 Left;Posterior Hand-Site Assessment: Clean, dry, & intact [REMOVED] Airway - Endotracheal Tube 02/02/21-Site Assessment: Clean, dry, & intact Midline Catheter 02/02/21 Left-Site Assessment: Clean, dry, & intact [REMOVED] Orogastric Tube 02/02/21-Site Assessment: Clean, dry, & intact [REMOVED] Airway - Continuous Aspiration of Subglottic Secretions (MAAME) Tube 02/03/21-Site Assessment: Clean, dry, & intact Hemodialysis Access 02/06/21-Site Assessment: Clean, dry, & intact [REMOVED] Airway - Continuous Aspiration of Subglottic Secretions (MAAME) Tube 01/27/21 Oral-Site Assessment: Clean, dry, & intact [REMOVED] Peripheral IV 01/27/21 Right Arm-Site Assessment: Clean, dry, & intact [REMOVED] Peripheral IV 01/27/21 Left Arm-Site Assessment: Clean, dry, & intact [REMOVED] Peripheral IV 01/27/21 Right Antecubital-Site Assessment: Clean, dry, & intact [REMOVED] Triple Lumen left IJ  01/27/21 Left Internal jugular-Site Assessment: Clean, dry, & intact [REMOVED] Peripheral IV 01/26/21 Left Antecubital-Site Assessment: Drainage (comment) [REMOVED] Nasogastric Tube 01/27/21-Site Assessment: Clean, dry, & intact [REMOVED] Airway - Endotracheal Tube 01/27/21 Oral-Site Assessment: Clean, dry, & intact [REMOVED] Orogastric Tube 01/28/21-Site Assessment: Clean, dry, & intact ? WOUND (if present) Wound Type:  none Dressing present Dressing Present : Yes Wound Concerns/Notes:  none ? PAIN Pain Assessment Pain Intensity 1: 0 (02/15/21 0800) Pain Location 1: Other (comment) Pain Intervention(s) 1: Medication (see MAR) Patient Stated Pain Goal: Unable to verbalize/indicate pain 
o Interventions for Pain:  none 
o Intervention effective: yes 
o Time of last intervention: see chart  
o Reassessment Completed: yes ? Last 3 Weights: 
Last 3 Recorded Weights in this Encounter 02/13/21 0014 02/14/21 0546 02/15/21 0800 Weight: 73.7 kg (162 lb 8 oz) 68.9 kg (151 lb 12.8 oz) 68.9 kg (151 lb 12.8 oz) Weight change: ? INTAKE/OUPUT Current Shift: No intake/output data recorded. Last three shifts: 02/14 0701 - 02/15 1900 In: 0 Out: 2850 [Urine:350] ? LAB RESULTS Recent Labs  
  02/14/21 
0106 02/13/21 
0037 WBC 6.2 13.7* HGB 8.2* 9.6* HCT 24.7* 29.0*  
PLT 49* 76* Recent Labs  
  02/14/21 
0106 02/13/21 
1856 02/13/21 
0037  143 142  
K 3.4* 3.4* 3.4*  
* 62* 62* BUN 51* 51* 41* CREA 2.19* 2.14* 1.80* CA 8.5 8.3* 8.5 MG 2.0  --  2.1 RECOMMENDATIONS AND DISCHARGE PLANNING 1. Pending tests/procedures/ Plan of Care or Other Needs: No  
 
2. Discharge plan for patient and Needs/Barriers: NO 
 
3. Estimated Discharge Date: NO Posted on Whiteboard in Patients Room: yes 4. The patient's care plan was reviewed with the oncoming nurse. \"HEALS\" SAFETY CHECK Fall Risk Total Score: 2 Safety Measures: Safety Measures: Bed in low position, Bed/Chair-Wheels locked, Bed/Chair alarm on A safety check occurred in the patient's room between off going nurse and oncoming nurse listed above. The safety check included the below items Area Items H High Alert Medications ? Verify all high alert medication drips (heparin, PCA, etc.) E Equipment ? Suction is set up for ALL patients (with yanker) ? Red plugs utilized for all equipment (IV pumps, etc.) ? WOWs wiped down at end of shift. ? Room stocked with oxygen, suction, and other unit-specific supplies A Alarms ? Bed alarm is set for fall risk patients ? Ensure chair alarm is in place and activated if patient is up in a chair L Lines ? Check IV for any infiltration ? Jean bag is empty if patient has a Jean ? Tubing and IV bags are labeled Jillian Havers Safety ? Room is clean, patient is clean, and equipment is clean. ? Hallways are clear from equipment besides carts. ? Fall bracelet on for fall risk patients ? Ensure room is clear and free of clutter ? Suction is set up for ALL patients (with eden) ? Hallways are clear from equipment besides carts. ? Isolation precautions followed, supplies available outside room, sign posted Joshua Osorio

## 2021-02-16 NOTE — PROGRESS NOTES
Problem: Risk for Spread of Infection Goal: Prevent transmission of infectious organism to others Description: Prevent the transmission of infectious organisms to other patients, staff members, and visitors. Outcome: Progressing Towards Goal 
  
Problem: Falls - Risk of 
Goal: *Absence of Falls Description: Document Rony Santos Fall Risk and appropriate interventions in the flowsheet. Outcome: Progressing Towards Goal 
Note: Fall Risk Interventions: 
Mobility Interventions: Assess mobility with egress test 
 
Mentation Interventions: Adequate sleep, hydration, pain control Medication Interventions: Bed/chair exit alarm Elimination Interventions: Bed/chair exit alarm Problem: Pressure Injury - Risk of 
Goal: *Prevention of pressure injury Description: Document Kyle Scale and appropriate interventions in the flowsheet. Outcome: Progressing Towards Goal 
Note: Pressure Injury Interventions: 
Sensory Interventions: Assess changes in LOC Moisture Interventions: Absorbent underpads Activity Interventions: Pressure redistribution bed/mattress(bed type) Mobility Interventions: Assess need for specialty bed, Pressure redistribution bed/mattress (bed type) Nutrition Interventions: Document food/fluid/supplement intake Friction and Shear Interventions: HOB 30 degrees or less Problem: Injury - Risk of, Adverse Drug Event Goal: *Absence of adverse drug events Outcome: Progressing Towards Goal 
Goal: *Absence of medication errors Outcome: Progressing Towards Goal 
  
Problem: Pain Goal: *Control of Pain Outcome: Progressing Towards Goal 
Goal: *PALLIATIVE CARE:  Alleviation of Pain Outcome: Progressing Towards Goal 
  
Problem: Isolation Precautions - Risk of Spread of Infection Goal: Prevent transmission of infectious organism to others Outcome: Progressing Towards Goal

## 2021-02-16 NOTE — ROUTINE PROCESS
Patient's son called for an updated. Staff nurse gave an update on the patients Neuro condition and VS's. Patients son is an RN at [employer]. Son would like to Face Time his mother tomorrow and have the patient's sister Sarika Lomax join them. At 0615 assessment the patient had a improvement in the [facility] Coma Scale. Patient opened eyes Spontaneously, but did not track staff nurse (Patient is legally blind). No other changes were noted. Bedside and Verbal shift change report given to Sampson Alvarado RN (oncoming nurse) by Kong Griffiths RN (offgoing nurse). Report included the following information SBAR, Kardex, MAR and Recent Results. SITUATION:  
? Code Status: DNR 
? Reason for Admission: Pneumonia due to COVID-19 virus [U07.1, J12.82] 
 
? Hospital day: 20 
? Problem List:  
   
Hospital Problems  Date Reviewed: 12/23/2020 Codes Class Noted POA * (Principal) Pneumonia due to COVID-19 virus ICD-10-CM: U07.1, J12.82 ICD-9-CM: 480.8  1/27/2021 Unknown BACKGROUND:  
 Past Medical History:  
Past Medical History:  
Diagnosis Date  Anemia  Aortic stenosis, moderate 03/2019  [facility]  CAD (coronary artery disease) Coronary Stenting needed, hematology workup needed prior to stent placement, treating medically at this time  Cancer [facility])   
 colon  [facility] ([facility] breast - left  Cardiac catheterization 08/17/2016 LM patent. mLAD 99.9% (2.25 x 28-mm Xience BETHEL, resid 0%). oD1 60%. oD2 95%. pCX 90% (2.5 x 13-mm Xience BETHEL, resid 0%). OMB patent.  Cardiac echocardiogram 03/2019 EF 56 to 60%, moderate concentric LVH, moderate aortic stenosis, mean valve gradient 25 mmHg  Diabetes ([facility] [address]  Hypercholesteremia  Hypertension  Thrombocyt[facility] ([facility] Patient taking anticoagulants no ASSESSMENT:  
? Changes in Assessment Throughout Shift: Yes 
 
? Patient has Central Line: YES Reasons if yes: Access ? Patient has Jean Cath: Yes Reasons if yes: End of Life ? Last Vitals: 
  
Vitals:  
 02/14/21 2328 02/15/21 0800 02/15/21 0834 02/15/21 1950 BP:   (!) 154/89 132/69 Pulse: (!) 105  (!) 104 98 Resp:   26 24 Temp:   98.1 °F (36.7 °C) 98.3 °F (36.8 °C) TempSrc:      
SpO2:   97% 95% Weight:  68.9 kg (151 lb 12.8 oz) Height:  5' (1.524 m) ? IV and DRAINS (will only show if present) [REMOVED] Peripheral IV 02/02/21 Left;Posterior Hand-Site Assessment: Clean, dry, & intact [REMOVED] Airway - Endotracheal Tube 02/02/21-Site Assessment: Clean, dry, & intact Midline Catheter 02/02/21 Left-Site Assessment: Clean, dry, & intact [REMOVED] Orogastric Tube 02/02/21-Site Assessment: Clean, dry, & intact [REMOVED] Airway - Continuous Aspiration of Subglottic Secretions (MAAME) Tube 02/03/21-Site Assessment: Clean, dry, & intact Hemodialysis Access 02/06/21-Site Assessment: Clean, dry, & intact [REMOVED] Airway - Continuous Aspiration of Subglottic Secretions (MAAME) Tube 01/27/21 Oral-Site Assessment: Clean, dry, & intact [REMOVED] Peripheral IV 01/27/21 Right Arm-Site Assessment: Clean, dry, & intact [REMOVED] Peripheral IV 01/27/21 Left Arm-Site Assessment: Clean, dry, & intact [REMOVED] Peripheral IV 01/27/21 Right Antecubital-Site Assessment: Clean, dry, & intact [REMOVED] Triple Lumen left IJ  01/27/21 Left Internal jugular-Site Assessment: Clean, dry, & intact [REMOVED] Peripheral IV 01/26/21 Left Antecubital-Site Assessment: Drainage (comment) [REMOVED] Nasogastric Tube 01/27/21-Site Assessment: Clean, dry, & intact [REMOVED] Airway - Endotracheal Tube 01/27/21 Oral-Site Assessment: Clean, dry, & intact [REMOVED] Orogastric Tube 01/28/21-Site Assessment: Clean, dry, & intact ? WOUND (if present) Wound Type:  none Dressing present Dressing Present : Yes, Intact, not due to be changed Wound Concerns/Notes:  none ? PAIN Pain Assessment Pain Intensity 1: 0 (02/15/21 1950) Pain Location 1: Other (comment) Pain Intervention(s) 1: Medication (see MAR) Patient Stated Pain Goal: Unable to verbalize/indicate pain 
o Interventions for Pain:  none 
o Intervention effective: no 
o Time of last intervention: N/A  
o Reassessment Completed: no  
 
? Last 3 Weights: 
Last 3 Recorded Weights in this Encounter 02/13/21 0014 02/14/21 0546 02/15/21 0800 Weight: 73.7 kg (162 lb 8 oz) 68.9 kg (151 lb 12.8 oz) 68.9 kg (151 lb 12.8 oz) Weight change: ? INTAKE/OUPUT Current Shift: No intake/output data recorded. Last three shifts: 02/14 1901 - 02/16 0700 In: 0 Out: 400 [Urine:400] ? LAB RESULTS Recent Labs  
  02/14/21 
0106 WBC 6.2 HGB 8.2* HCT 24.7* PLT 49* Recent Labs  
  02/14/21 
0106 02/13/21 
1856  143  
K 3.4* 3.4*  
* 62* BUN 51* 51* CREA 2.19* 2.14* CA 8.5 8.3*  
MG 2.0  --   
 
 
RECOMMENDATIONS AND DISCHARGE PLANNING 1. Pending tests/procedures/ Plan of Care or Other Needs:N/A   
 
2. Discharge plan for patient and Needs/Barriers:N/A  
 
3. Estimated Discharge Date: N/A Posted on Whiteboard in Patients Room: no   
 
4. The patient's care plan was reviewed with the oncoming nurse. \"HEALS\" SAFETY CHECK Fall Risk Total Score: 2 Safety Measures: Safety Measures: Bed/Chair alarm on, Bed/Chair-Wheels locked, Bed in low position, Side rails X 3 A safety check occurred in the patient's room between off going nurse and oncoming nurse listed above. The safety check included the below items Area Items H High Alert Medications ? Verify all high alert medication drips (heparin, PCA, etc.) E Equipment ? Suction is set up for ALL patients (with yanker) ? Red plugs utilized for all equipment (IV pumps, etc.) ? WOWs wiped down at end of shift. ? Room stocked with oxygen, suction, and other unit-specific supplies A 
 Alarms ? Bed alarm is set for fall risk patients ? Ensure chair alarm is in place and activated if patient is up in a chair L Lines ? Check IV for any infiltration ? Jean bag is empty if patient has a Jean ? Tubing and IV bags are labeled Lurene Reasoner Safety ? Room is clean, patient is clean, and equipment is clean. ? Hallways are clear from equipment besides carts. ? Fall bracelet on for fall risk patients ? Ensure room is clear and free of clutter ? Suction is set up for ALL patients (with sarahker) ? Hallways are clear from equipment besides carts. ? Isolation precautions followed, supplies available outside room, sign posted Isaias Joseph, RN

## 2021-02-16 NOTE — PROGRESS NOTES
Spoke with Dr Chio Benitez about disposition plans for patient. He will place a hospice order for eval.  
Sharon Nunes RN - Outcomes Manager  [phone]

## 2021-02-16 NOTE — PROGRESS NOTES
Palliative Medicine Palliative Medicine team members rounded on comfort care patient. Ms. Dulce Ashraf is resting comfortable in bed. Assisted with Oral care. Breathing slightly labored with rate of 28 with use of some accessory muscles. Pt is nonverbal but made attempts to open eyes to verbal stimuli. Appreciate the support of Pastoral Care in Zoom for family. Plan to complete POST form with family. DNR/DNI, comfort measures, NO feeding tubes. Lisa ARNETTN, RN, [facility] Palliative Medicine Inpatient RN Palliative [facility] Line: [phone]

## 2021-02-16 NOTE — PROGRESS NOTES
helped the patient Oz Zayas, who is a 79 y.o.,female, connect with the family with the Zoom Video Connection. The  provided the following Interventions: The following outcomes were achieved: 
 
Assessment: 
There are no further spiritual or Protestant issues which require Spiritual Care Services interventions at this time. Plan: 
Chaplains will continue to follow and will provide pastoral care on an as needed/requested basis.  recommends bedside caregivers page  on duty if patient shows signs of acute spiritual or emotional distress. [address] Spiritual Care Dept 
[phone]

## 2021-02-16 NOTE — ROUTINE PROCESS
Mouth care and deep suctioning done as patient has a hacking cough that is productive of thick tan colored sputum that the patient is unable to clear by herself. 1330- checked on patient her condition remains unchanged. 1513- Gave patient morphine 2mg I.V as oredered, mouth care was done, she remains unchanged. Bedside and Verbal shift change report given to Luis Fernández and [address] [place] [address] (oncoming nurse) by Nicole Cadena (offgoing nurse). Report included the following information SBAR, Kardex, MAR and Recent Results. SITUATION:  
? Code Status: DNR 
? Reason for Admission: Pneumonia due to COVID-19 virus [U07.1, J12.82] 
 
? Hospital day: 20 
? Problem List:  
   
Hospital Problems  Date Reviewed: 12/23/2020 Codes Class Noted POA * (Principal) Pneumonia due to COVID-19 virus ICD-10-CM: U07.1, J12.82 ICD-9-CM: 480.8  1/27/2021 Unknown BACKGROUND:  
 Past Medical History:  
Past Medical History:  
Diagnosis Date  Anemia  Aortic stenosis, moderate 03/2019  Breast cancer ([facility]  CAD (coronary artery disease) Coronary Stenting needed, hematology workup needed prior to stent placement, treating medically at this time  Cancer [facility])   
 colon  Cancer ([facility] breast - left  Cardiac catheterization 08/17/2016 LM patent. mLAD 99.9% (2.25 x 28-mm Xience BETHEL, resid 0%). oD1 60%. oD2 95%. pCX 90% (2.5 x 13-mm Xience BETHEL, resid 0%). OMB patent.  Cardiac echocardiogram 03/2019 EF 56 to 60%, moderate concentric LVH, moderate aortic stenosis, mean valve gradient 25 mmHg  Diabetes ([facility]  Hypercholesteremia  Hypertension  Thrombocyt[facility] ([facility] Patient taking anticoagulants no ASSESSMENT:  
? Changes in Assessment Throughout Shift: None ? Patient has Central Line: yes Reasons if yes: Hard stick ? Patient has Jean Cath: no Reasons if yes: 1  
 
? Last Vitals: 
  
Vitals: 02/15/21 6102 02/15/21 1950 02/16/21 0800 02/16/21 7180 BP: (!) 154/89 132/69  (!) 147/85 Pulse: (!) 104 98  (!) 101 Resp: 26 24 22 Temp: 98.1 °F (36.7 °C) 98.3 °F (36.8 °C)  98.5 °F (36.9 °C) TempSrc:      
SpO2: 97% 95%  98% Weight:   68.9 kg (151 lb 12.8 oz) Height:   5' (1.524 m) ? IV and DRAINS (will only show if present) [REMOVED] Peripheral IV 02/02/21 Left;Posterior Hand-Site Assessment: Clean, dry, & intact [REMOVED] Airway - Endotracheal Tube 02/02/21-Site Assessment: Clean, dry, & intact Midline Catheter 02/02/21 Left-Site Assessment: Clean, dry, & intact [REMOVED] Orogastric Tube 02/02/21-Site Assessment: Clean, dry, & intact [REMOVED] Airway - Continuous Aspiration of Subglottic Secretions (MAAME) Tube 02/03/21-Site Assessment: Clean, dry, & intact Hemodialysis Access 02/06/21-Site Assessment: Clean, dry, & intact [REMOVED] Airway - Continuous Aspiration of Subglottic Secretions (MAAME) Tube 01/27/21 Oral-Site Assessment: Clean, dry, & intact [REMOVED] Peripheral IV 01/27/21 Right Arm-Site Assessment: Clean, dry, & intact [REMOVED] Peripheral IV 01/27/21 Left Arm-Site Assessment: Clean, dry, & intact [REMOVED] Peripheral IV 01/27/21 Right Antecubital-Site Assessment: Clean, dry, & intact [REMOVED] Triple Lumen left IJ  01/27/21 Left Internal jugular-Site Assessment: Clean, dry, & intact [REMOVED] Peripheral IV 01/26/21 Left Antecubital-Site Assessment: Drainage (comment) [REMOVED] Nasogastric Tube 01/27/21-Site Assessment: Clean, dry, & intact [REMOVED] Airway - Endotracheal Tube 01/27/21 Oral-Site Assessment: Clean, dry, & intact [REMOVED] Orogastric Tube 01/28/21-Site Assessment: Clean, dry, & intact ? WOUND (if present) Wound Type:  none Dressing present Dressing Present : Yes, Intact, not due to be changed Wound Concerns/Notes:  none ? PAIN Pain Assessment Pain Intensity 1: 0 (02/16/21 1200) Pain Location 1: Other (comment) Pain Intervention(s) 1: Medication (see MAR) Patient Stated Pain Goal: 0 
o Interventions for Pain:  none 
o Intervention effective: no 
o Time of last intervention:NA 
o Reassessment Completed: yes ? Last 3 Weights: 
Last 3 Recorded Weights in this Encounter 02/14/21 0546 02/15/21 0800 02/16/21 0800 Weight: 68.9 kg (151 lb 12.8 oz) 68.9 kg (151 lb 12.8 oz) 68.9 kg (151 lb 12.8 oz) Weight change: ? INTAKE/OUPUT Current Shift: No intake/output data recorded. Last three shifts: 02/14 1901 - 02/16 0700 In: 0 Out: 400 [Urine:400] ? LAB RESULTS Recent Labs  
  02/14/21 0106 WBC 6.2 HGB 8.2* HCT 24.7* PLT 49* Recent Labs  
  02/14/21 0106 02/13/21 
1856  143  
K 3.4* 3.4*  
* 62* BUN 51* 51* CREA 2.19* 2.14* CA 8.5 8.3*  
MG 2.0  --   
 
 
RECOMMENDATIONS AND DISCHARGE PLANNING 1. Pending tests/procedures/ Plan of Care or Other Needs: NO  
 
2. Discharge plan for patient and Needs/Barriers: na 
 
3. Estimated Discharge Date: na Posted on Whiteboard in Patients Room: yes 4. The patient's care plan was reviewed with the oncoming nurse. \"HEALS\" SAFETY CHECK Fall Risk Total Score: 2 Safety Measures: Safety Measures: Bed/Chair-Wheels locked A safety check occurred in the patient's room between off going nurse and oncoming nurse listed above. The safety check included the below items Area Items H High Alert Medications ? Verify all high alert medication drips (heparin, PCA, etc.) E Equipment ? Suction is set up for ALL patients (with yanker) ? Red plugs utilized for all equipment (IV pumps, etc.) ? WOWs wiped down at end of shift. ? Room stocked with oxygen, suction, and other unit-specific supplies A Alarms ? Bed alarm is set for fall risk patients ? Ensure chair alarm is in place and activated if patient is up in a chair L Lines ? Check IV for any infiltration ? Jean bag is empty if patient has a Jean ? Tubing and IV bags are labeled Olga Tanner Safety ? Room is clean, patient is clean, and equipment is clean. ? Hallways are clear from equipment besides carts. ? Fall bracelet on for fall risk patients ? Ensure room is clear and free of clutter ? Suction is set up for ALL patients (with eden) ? Hallways are clear from equipment besides carts. ? Isolation precautions followed, supplies available outside room, sign posted Rivas Boogie

## 2021-02-16 NOTE — PROGRESS NOTES
Pt's chart reviewed. Pt now with COMFORT MEASURES only. Skilled PT not indicated. Will sign off at this time.   
 
Thank you for this referral.  
  Malu Olson PT DPT

## 2021-02-17 NOTE — DISCHARGE SUMMARY
Discharge Summary Patient: Macey Bonilla MRN: [id number]  CSN: [id number] YOB: 1940  Age: 79 y.o. Sex: female DOA: 1/26/2021 LOS:  LOS: 21 days   Discharge Date:   
 
Admission Diagnoses: Pneumonia due to COVID-19 virus [U07.1, J12.82] Discharge Diagnoses:   
Problem List as of 2/17/2021 Date Reviewed: 12/23/2020 Codes Class Noted - Resolved * (Principal) Pneumonia due to COVID-19 virus ICD-10-CM: U07.1, J12.82 ICD-9-CM: 480.8  1/27/2021 - Present Hip fracture ([facility] ICD-10-CM: L91.379W ICD-9-CM: 820.8  12/6/2020 - Present Breast cancer ([facility] ICD-10-CM: C50.919 ICD-9-CM: 174.9  12/6/2020 - Present Colon cancer [facility]) ICD-10-CM: C18.9 ICD-9-CM: 153.9  12/6/2020 - Present Palpitations ICD-10-CM: R00.2 ICD-9-CM: 785.1  4/25/2019 - Present Acute pulmonary edema (HCC) ICD-10-CM: J81.0 ICD-9-CM: 518.4  4/25/2019 - Present Malaise and fatigue ICD-10-CM: R53.81, R53.83 ICD-9-CM: 780.79  4/25/2019 - Present Pancytopenia ([facility] ICD-10-CM: U38.429 ICD-9-CM: 284.19  4/25/2019 - Present Portal hypertension (HCC) ICD-10-CM: K76.6 ICD-9-CM: 572.3  4/25/2019 - Present Chronic anticoagulation ICD-10-CM: Z79.01 
ICD-9-CM: V58.61  4/25/2019 - Present CAP (community acquired pneumonia) ICD-10-CM: J18.9 ICD-9-CM: 698  3/24/2019 - Present Acute on chronic diastolic CHF (congestive heart failure) (HCC) ICD-10-CM: I50.33 ICD-9-CM: 428.33, 428.0  3/24/2019 - Present Leg DVT (deep venous thromboembolism), acute, left (HCC) ICD-10-CM: B14.410 ICD-9-CM: 453.40  3/24/2019 - Present HTN (hypertension), benign ICD-10-CM: I10 
ICD-9-CM: 401.1  1/28/2018 - Present Pneumonia ICD-10-CM: J18.9 ICD-9-CM: 219  1/25/2018 - Present CHF (congestive heart failure) (HCC) ICD-10-CM: I50.9 ICD-9-CM: 428.0  10/18/2017 - Present Dyspnea ICD-10-CM: R06.00 
ICD-9-CM: 786.09  10/18/2017 - Present Cirrhosis of liver not due to alcohol [facility]) ICD-10-CM: K74.60 ICD-9-CM: 571.5  6/29/2017 - Present Esophageal varices (HCC) ICD-10-CM: I85.00 ICD-9-CM: 456.1  6/29/2017 - Present UTI (urinary tract infection) ICD-10-CM: N39.0 ICD-9-CM: 599.0  6/21/2017 - Present Sepsis ([facility] ICD-10-CM: A41.9 ICD-9-CM: 038.9, 995.91  6/21/2017 - Present PNA (pneumonia) ICD-10-CM: J18.9 ICD-9-CM: 244  6/21/2017 - Present Hypoxia ICD-10-CM: R09.02 
ICD-9-CM: 799.02  6/21/2017 - Present NSTEMI (non-ST elevated myocardial infarction) ([facility] ICD-10-CM: I21.4 ICD-9-CM: 410.70  6/30/2016 - Present Anemia ICD-10-CM: D64.9 ICD-9-CM: 285.9  7/7/2015 - Present CAD (coronary artery disease) ICD-10-CM: I25.10 ICD-9-CM: 414.00  7/7/2015 - Present Thrombocytopenia ([facility] ICD-10-CM: D69.6 ICD-9-CM: 287.5  7/7/2015 - Present RESOLVED: Sepsis ([facility] ICD-10-CM: A41.9 ICD-9-CM: 038.9, 995.91  6/15/2015 - 6/19/2015 Discharge Condition: Stable PHYSICAL EXAM 
Visit Vitals BP (!) 149/85 (BP 1 Location: Right lower arm, BP Patient Position: At rest) Pulse (!) 104 Temp 99.2 °F (37.3 °C) Resp 22 Ht 5' (1.524 m) Wt 68.9 kg (151 lb 12.8 oz) SpO2 97% BMI 29.65 kg/m² General: Alert, cooperative, no acute distress   
HEENT: NC, Atraumatic. PERRLA, EOMI. Anicteric sclerae. Lungs:  CTA Bilaterally. No Wheezing/Rhonchi/Rales. Heart:  Regular  rhythm,  No murmur, No Rubs, No Gallops Abdomen: Soft, Non distended, Non tender.  +Bowel sounds, no HSM Extremities: No c/c/e Psych:   Good insight. Not anxious or agitated. Neurologic:  CN 2-12 grossly intact, oriented X 3. No acute neurological       
                         Deficits, Hospital Course: As per HPI: Patient is a 79 y.o.  Female w/ PMH of HFpPEF (EF 60-65%), Stage 3 CKD, Recent L Hip ORIF (12/2020), Esophageal Varices, Liver Cirrhosis, DM, HTN, CAD, who [facility] [address] [facility] to [facility] via EMS after developing worsening SOB and Hemoptysis after testing (+) for COVID-19 x1 week prior. HPI is limited to chart due to Pt on HFNC and altered. Noted to be satting at 94+ on 15L/100% HFNC. Pt required Endotracheal Intubation w/ Mechanical Ventilation. 1. Hypoxic respiratory failure - initially intubated 01/27/21 then extubated 02/02/21, failed to protect airway and required reintubation 02/02/21 then extubated again on 02/10/21. Now on RA, however high risk for re-intubation. Chest PT per pulmonary. 2. Covid 19 pneumonia - s/p abx; remdesevir and convalescent plasma. 3. Severe sepsis with septic shock in setting of PNA and UTI- tachycardia, tachypnea, hypotension on admission, now improved 4. Enterobacter UTI - completed course of cefepime / vanco in ICU 5. NANCY on stage 3 CKD - HD per nephrology 6. Acute metabolic encephalopathy - off precedex drip; in setting of multiple comorbidities and acute illness improved 7. Severe protein calorie malnutrition - NPO per SLP. Pending respiratory status and further SLP recs, Discussed with nutrition -unable to place NG tube yesterday, will hold for the next 1 to 2 days to see if patient improves and if no improvement then will consider PPN versus PEG tube placement. 8. Type 2 diabetes with hyperglycemia -continue Lantus with SSI 9. Recent hemoptysis - hold on blood thinners 10. Elevalted troponin - earlier in admission 11. Severe aortic stenosis - per last echo; cardiology consulted 12. HTN - off pressors. add IV hydralazine 13. H/o Dale Saliva and chronic pancytopenia - hold on blood thinners 14. H/o recent left hip ORIF - on 12/07/20 per Dr. Anabel Fairbanks Patient has been transitioned to comfort measures following discussions with palliative care and family. Post form is on the chart. Patient will be returning to [facility] with hospice. Consults:  
Nephrology: Dr. Yannick Al 
Cardiology:  Dr. Dulce Carrillo ID:  Dr. Vanessa Dietrich. Lamonte Postin Significant Diagnostic Studies: Xr Abd (kub) Result Date: 2/7/2021 Tip of NG tube in distal stomach. Xr Abd (kub) Result Date: 1/29/2021 OG tube in proximal stomach. Thank you for your referral. 
 
Ct Chest Wo Cont Result Date: 1/28/2021 Impression:  Dense pulmonary infiltrates bilaterally consistent with pneumonia. Small parapneumonic effusions. Visualized abdomen shows ascites and mild splenomegaly which may be indicative of portal venous hypertension. Support lines and tubes appear well positioned. Xr [facility] Result Date: 2/14/2021 Lines and tubes as above. Interval improvement in aeration left upper lobe. Similar opacity on the right. Xr [facility] Result Date: 2/10/2021 1. Tubes and lines without evidence of complication. 2.  Bilateral airspace opacities relatively unchanged. Xr [facility] Result Date: 2/9/2021 Lines/tubes as above including low-lying ETT for which roughly 2 cm retraction recommended. Streaky infiltrates/edema and suspected layering pleural effusions similar to prior. Xr [facility] Result Date: 2/8/2021 1. Low-lying endotracheal tube. Consider retracting 2 to 4 cm proximally. 2.  Slight interval increased small left pleural effusion. Unchanged small to moderate right pleural effusion. 3.  Unchanged bilateral interstitial and airspace opacities. 4.  Unchanged pulmonary vascular congestion. Xr [facility] Result Date: 2/7/2021 As described Xr [facility] Result Date: 2/7/2021 Slightly worse edema/infiltrate. [facility] Result Date: 2/6/2021 1. Support lines and tubes are as described. 2. Similar bilateral pulmonary opacities. Suspect small underlying pleural effusions. Xr [facility] Result Date: 2/5/2021 1. Tubes and lines as described above. 2. Decreasing lung volumes with increasing bibasilar atelectasis. Persistent patchy infiltrates mid and lower lung fields. Xr [facility] Result Date: 2/4/2021 Bilateral airspace opacities persist. Question improved aeration of the left lung. Support devices as discussed. Xr [facility] Result Date: 2/3/2021 As described Xr [facility] Result Date: 2/3/2021 As described Xr [facility] Result Date: 2/2/2021 No interval change Xr [facility] Result Date: 2/1/2021 1. Tubes and lines as described above. Endotracheal tube low in position. 2. Cardiomegaly. Patchy diffuse bilateral infiltrates unchanged. Xr [facility] Result Date: 1/31/2021 Patient rotation limits evaluation. Endotracheal tube tip again appears low, approximately 1 cm from the sonido. Recommend retraction. Bibasilar pulmonary opacities appears similar to prior. Xr [facility] Result Date: 1/30/2021 1. Endotracheal tube remains somewhat low, advise repositioning. Xr [facility] Result Date: 1/29/2021 Interval progression of extensive patchy airspace disease, suggesting ARDS. Interval change of supporting lines as below, no pneumothorax: -Interval placed NG tube. -Interval advancement of ET tube from 3 cm to 0.5 cm above the sonido. 3 cm withdrawal of the tube indicated. -The left subclavian central line tip again seen in right suprahilar region, concerning in right brachiocephalic vein rather than proximal SVC. Repositioning advised. Thank you for your referral. 
 
Xr [facility] Result Date: 1/27/2021 Interval placement of left IJ central line with tip in the upper SVC. No pneumothorax. Xr [facility] Result Date: 1/27/2021 Endotracheal tube tip is approximately 2 cm above the sonido. Extensive bilateral mid to lower lung opacities consistent with multifocal infection, not significantly changed from very recent prior study. Xr [facility] Result Date: 1/27/2021 Fluffy bilateral mid to lower lung opacities are consistent with multifocal infection. Results Procedure Component Value Units Date/Time CULTURE, RESPIRATORY/SPUTUM/BRONCH Ro[place]velt Avril STAIN [[id number]] Order Status: Canceled Specimen: Sputum from Endotracheal aspirate Discharge Medications:    
Current Discharge Medication List  
  
START taking these medications Details  
menthol-zinc oxide (CALMOSEPTINE) 0.44-20.6 % oint Apply 1 Each to affected area three (3) times daily. Indications: preventative skin care for incontinence, fecal management system. Qty: 113 Tube, Refills: 0  
  
scopolamine (TRANSDERM-SCOP) 1 mg over 3 days pt3d 1 Patch by TransDERmal route every seventy-two (72) hours as needed for Other (SECRETIONS). Qty: 3 Patch, Refills: 0 LORazepam (LORazepam IntensoL) 2 mg/mL concentrated solution Take 0.5 mL by mouth every eight (8) hours as needed for Agitation, Anxiety, Restlessness or Shortness of Breath. Max Daily Amount: 3 mg. Qty: 30 mL, Refills: 0 Associated Diagnoses: Pneumonia due to COVID-19 virus; NSTEMI (non-ST elevated myocardial infarction) ([facility] morphine 20 mg/5 mL (4 mg/mL) solution Take 1.25 mL by mouth every two (2) hours as needed for Pain for up to 3 days. Max Daily Amount: 60 mg. Indications: pain, excessive pain 
Qty: 100 mL, Refills: 0 Associated Diagnoses: Pneumonia due to COVID-19 virus; NSTEMI (non-ST elevated myocardial infarction) ([facility] CONTINUE these medications which have NOT CHANGED Details  
acetaminophen (TYLENOL) 325 mg tablet Take 2 Tabs by mouth every four (4) hours as needed for Pain. Qty: 20 Tab, Refills: 0 glucose 4 gram chewable tablet Take 4 Tabs by mouth as needed for Other (hypoglycemia). Qty: 30 Tab, Refills: 0 STOP taking these medications  
  
 aspirin 81 mg chewable tablet Comments:  
Reason for Stopping:   
   
 insulin aspart U-100 (NovoLOG Flexpen U-100 Insulin) 100 unit/mL (3 mL) inpn Comments:  
Reason for Stopping: SITagliptin (JANUVIA) 50 mg tablet Comments:  
Reason for Stopping:   
   
 cholecalciferol, vitamin D3, (VITAMIN D3) 2,000 unit tab Comments:  
Reason for Stopping:   
   
 nitroglycerin (NITROLINGUAL) 400 mcg/spray spray Comments:  
Reason for Stopping:   
   
 ascorbic acid, vitamin C, (VITAMIN C) 250 mg tablet Comments:  
Reason for Stopping:   
   
 multivitamin (ONE A DAY) tablet Comments:  
Reason for Stopping:   
   
 simvastatin (ZOCOR) 20 mg tablet Comments:  
Reason for Stopping:   
   
 omega 3-dha-epa-fish oil (FISH OIL) 100-160-1,000 mg cap Comments:  
Reason for Stopping:   
   
 cyanocobalamin (VITAMIN B12) 100 mcg tablet Comments:  
Reason for Stopping:   
   
 calcium polycarbophil (FIBER LAXATIVE) 625 mg tablet Comments:  
Reason for Stopping:   
   
  
 
 
Activity: activity as tolerated, fall precautions Diet: Comfort feeding Wound Care:Wound Care orders for perianal area: Unit nursing staff to cleanse skin with Remedy No-Rinse foam cleanser & blue packaged Phytoplex barrier cream cloths from unit par room/warmer, then apply small amount & thin layer of Calmoseptine oint to buttocks TID & prn soilage. Do NOT scrub skin Follow-up: with PCP, Hung Brewer MD in 7-10days Minutes spent on discharge: >30 minutes spent coordinating this discharge (review instructions/follow-up, prescriptions, preparing report for sign off) Dispo:  [facility]un to [facility] with Hospice services

## 2021-02-17 NOTE — PROGRESS NOTES
[facility] Hospitalist Group Progress Note Patient: Keyon Rosales Age: 79 y.o. : 1940 MR#: [id number] SSN: [id number] Date: 2021 Subjective:  
 
Pt seen & evaluated. Awake looking around the room. Pleasantly confused. No new events noted per nursing staff. Assessment/Plan:  
 
Consultants: Infectious disease (signed off); Nephrology 1. Hypoxic respiratory failure - initially intubated 21 then extubated 21, failed to protect airway and required reintubation 21 then extubated again on 02/10/21. Now on RA, however high risk for re-intubation. Chest PT per pulmonary. 2. Covid 19 pneumonia - s/p abx; remdesevir and convalescent plasma. 3. Severe sepsis with septic shock in setting of PNA and UTI- tachycardia, tachypnea, hypotension on admission, now improved 4. Enterobacter UTI - completed course of cefepime / vanco in ICU 5. NANCY on stage 3 CKD - HD per nephrology 6. Acute metabolic encephalopathy - off precedex drip; in setting of multiple comorbidities and acute illness improved 7. Severe protein calorie malnutrition - NPO per SLP. Pending respiratory status and further SLP recs, Discussed with nutrition -unable to place NG tube yesterday, will hold for the next 1 to 2 days to see if patient improves and if no improvement then will consider PPN versus PEG tube placement. 8. Type 2 diabetes with hyperglycemia -continue Lantus with SSI 9. Recent hemoptysis - hold on blood thinners 10. Elevalted troponin - earlier in admission felt to be in setting of  
11. Severe aortic stenosis - per last echo; cardiology consult 12. HTN - off pressors. add IV hydralazine 13. H/o Carlos Hero and chronic pancytopenia - hold on blood thinners 14. H/o recent left hip ORIF - on 20 per Dr. Abe Levy Continue comfort measures only/hospice; no dialysis Ongoing discussions between case management and palliative care as well as hospice agencies regarding placement. Apparently the nephew who is decision-maker does not want Ms. Leonardo to return to [facility]. [facility] has however accepted the patient to return and patient is cleared for discharge from medical standpoint with hospice Additional Notes:   
 
Case discussed with:  [x]Patient  [x]Family  [x]Nursing  []Case Management DVT Prophylaxis:  []Lovenox  []Hep SQ  [x]SCDs  []Coumadin   []On Heparin gtt Objective:  
VS:  
Visit Vitals BP (!) 149/85 (BP 1 Location: Right lower arm, BP Patient Position: At rest) Pulse (!) 104 Temp 99.2 °F (37.3 °C) Resp 22 Ht 5' (1.524 m) Wt 68.9 kg (151 lb 12.8 oz) SpO2 97% BMI 29.65 kg/m² Tmax/24hrs: Temp (24hrs), Av.8 °F (37.1 °C), Min:98.3 °F (36.8 °C), Max:99.2 °F (37.3 °C) Intake/Output Summary (Last 24 hours) at 2021 1644 Last data filed at 2021 9818 Gross per 24 hour Intake 0 ml Output 400 ml Net -400 ml General:  Sleepy Cardiovascular: Tachycardic Pulmonary: Coarse rhonchi, mild tachypnea GI:  +BS in all four quadrants, soft, non-tender Extremities: 2+ bilateral lower edema; feet warm bilaterally Neuro: alert and oriented x person only Family contact: Bernardo Baca [phone] Labs:   
No results found for this or any previous visit (from the past 24 hour(s)). Signed By: Susie Vasquez MD   
 2021

## 2021-02-17 NOTE — PROGRESS NOTES
02/17/21 1123 Oxygen Therapy O2 Sat (%) 97 % Pulse via Oximetry 98 beats per minute O2 Device Nasal cannula O2 Flow Rate (L/min) 2 l/min FIO2 (%) 28 %

## 2021-02-17 NOTE — ROUTINE PROCESS
Contacted patient's nephew Sandrita Winchester and updated him on the patient's status and the measures taken to keep the patient comfortable. Information passed on included VS, I & O, Oral care, Linen changes. Patient's nephew was very pleased with this information and wishes to be informed if and changes develop during the staff nurses shift. Will continue to  Provide comfort and monitor. Bedside and Verbal shift change report given to Tanya Flores RN (oncoming nurse) by Raudel Ambriz RN (offgoing nurse). Report included the following information SBAR, Kardex, MAR and Recent Results. SITUATION:  
? Code Status: DNR 
? Reason for Admission: Pneumonia due to COVID-19 virus [U07.1, J12.82] 
 
? Hospital day: 21 
? Problem List:  
   
Hospital Problems  Date Reviewed: 12/23/2020 Codes Class Noted POA * (Principal) Pneumonia due to COVID-19 virus ICD-10-CM: U07.1, J12.82 ICD-9-CM: 480.8  1/27/2021 Unknown BACKGROUND:  
 Past Medical History:  
Past Medical History:  
Diagnosis Date  Anemia  Aortic stenosis, moderate 03/2019  Breast cancer ([facility] [address]  CAD (coronary artery disease) Coronary Stenting needed, hematology workup needed prior to stent placement, treating medically at this time  Cancer [facility])   
 colon  Cancer ([facility] [address] breast - left  Cardiac catheterization 08/17/2016 LM patent. mLAD 99.9% (2.25 x 28-mm Xience BETHEL, resid 0%). oD1 60%. oD2 95%. pCX 90% (2.5 x 13-mm Xience BETHEL, resid 0%). OMB patent.  Cardiac echocardiogram 03/2019 EF 56 to 60%, moderate concentric LVH, moderate aortic stenosis, mean valve gradient 25 mmHg  Diabetes ([facility] [address]  Hypercholesteremia  Hypertension  Thrombocytopenia ([facility] [address] Patient taking anticoagulants no ASSESSMENT:  
? Changes in Assessment Throughout Shift: N/A 
 
? Patient has Central Line: yes Reasons if yes: Access ? Patient has Jean Cath: yes Reasons if yes: End of Life ? Last Vitals: Vitals:  
 02/15/21 1950 02/16/21 0800 02/16/21 0823 02/16/21 1940 BP: 132/69  (!) 147/85 (!) 144/77 Pulse: 98  (!) 101 (!) 102 Resp: 24 22 23 Temp: 98.3 °F (36.8 °C)  98.5 °F (36.9 °C) 98.3 °F (36.8 °C) TempSrc:      
SpO2: 95%  98% 98% Weight:  68.9 kg (151 lb 12.8 oz) Height:  5' (1.524 m) ? IV and DRAINS (will only show if present) [REMOVED] Peripheral IV 02/02/21 Left;Posterior Hand-Site Assessment: Clean, dry, & intact [REMOVED] Airway - Endotracheal Tube 02/02/21-Site Assessment: Clean, dry, & intact Midline Catheter 02/02/21 Left-Site Assessment: Clean, dry, & intact [REMOVED] Orogastric Tube 02/02/21-Site Assessment: Clean, dry, & intact [REMOVED] Airway - Continuous Aspiration of Subglottic Secretions (MAAME) Tube 02/03/21-Site Assessment: Clean, dry, & intact Hemodialysis Access 02/06/21-Site Assessment: Clean, dry, & intact [REMOVED] Airway - Continuous Aspiration of Subglottic Secretions (MAAME) Tube 01/27/21 Oral-Site Assessment: Clean, dry, & intact [REMOVED] Peripheral IV 01/27/21 Right Arm-Site Assessment: Clean, dry, & intact [REMOVED] Peripheral IV 01/27/21 Left Arm-Site Assessment: Clean, dry, & intact [REMOVED] Peripheral IV 01/27/21 Right Antecubital-Site Assessment: Clean, dry, & intact [REMOVED] Triple Lumen left IJ  01/27/21 Left Internal jugular-Site Assessment: Clean, dry, & intact [REMOVED] Peripheral IV 01/26/21 Left Antecubital-Site Assessment: Drainage (comment) [REMOVED] Nasogastric Tube 01/27/21-Site Assessment: Clean, dry, & intact [REMOVED] Airway - Endotracheal Tube 01/27/21 Oral-Site Assessment: Clean, dry, & intact [REMOVED] Orogastric Tube 01/28/21-Site Assessment: Clean, dry, & intact ? WOUND (if present) Wound Type:  none Dressing present Dressing Present : Yes, Intact, not due to be changed Wound Concerns/Notes:  none ? PAIN Pain Assessment Pain Intensity 1: 0 (02/16/21 1940) Pain Location 1: Other (comment) Pain Intervention(s) 1: Medication (see MAR) Patient Stated Pain Goal: 0 
o Interventions for Pain:  none 
o Intervention effective: no 
o Time of last intervention: N/A  
o Reassessment Completed: no  
 
? Last 3 Weights: 
Last 3 Recorded Weights in this Encounter 02/14/21 0546 02/15/21 0800 02/16/21 0800 Weight: 68.9 kg (151 lb 12.8 oz) 68.9 kg (151 lb 12.8 oz) 68.9 kg (151 lb 12.8 oz) Weight change: 0 kg (0 lb) ? INTAKE/OUPUT Current Shift: No intake/output data recorded. Last three shifts: 02/15 1901 - 02/17 0700 In: 0 Out: 700 [Urine:700] ? LAB RESULTS No results for input(s): WBC, HGB, HCT, PLT, HGBEXT, HCTEXT, PLTEXT in the last 72 hours. No results for input(s): NA, K, GLU, BUN, CREA, CA, MG, INR, INREXT in the last 72 hours. No lab exists for component: PT, PTT 
 
RECOMMENDATIONS AND DISCHARGE PLANNING 1. Pending tests/procedures/ Plan of Care or Other Needs:N/A   
 
2. Discharge plan for patient and Needs/Barriers:N/A  
 
3. Estimated Discharge Date: N/A Posted on Whiteboard in Patients Room: no   
 
4. The patient's care plan was reviewed with the oncoming nurse. \"HEALS\" SAFETY CHECK Fall Risk Total Score: 1 Safety Measures: Safety Measures: Bed/Chair alarm on, Bed/Chair-Wheels locked, Bed in low position A safety check occurred in the patient's room between off going nurse and oncoming nurse listed above. The safety check included the below items Area Items H High Alert Medications ? Verify all high alert medication drips (heparin, PCA, etc.) E Equipment ? Suction is set up for ALL patients (with yanker) ? Red plugs utilized for all equipment (IV pumps, etc.) ? WOWs wiped down at end of shift. ? Room stocked with oxygen, suction, and other unit-specific supplies A Alarms ? Bed alarm is set for fall risk patients ? Ensure chair alarm is in place and activated if patient is up in a chair L 
 Lines ? Check IV for any infiltration ? Jean bag is empty if patient has a Jean ? Tubing and IV bags are labeled Carter Lose Safety ? Room is clean, patient is clean, and equipment is clean. ? Hallways are clear from equipment besides carts. ? Fall bracelet on for fall risk patients ? Ensure room is clear and free of clutter ? Suction is set up for ALL patients (with eden) ? Hallways are clear from equipment besides carts. ? Isolation precautions followed, supplies available outside room, sign posted Rivera Murillo RN

## 2021-02-17 NOTE — ACP (ADVANCE CARE PLANNING)
Advanced Steps Advance Care Planning Conversation [facility] (Physician Orders for Scope of Treatment) Date of conversation: 2/17/2021   [address] [place] [address] Participants: 
 [x] Patient unable to participate  
 [x] Healthcare agent (next of kin) Name:  Inocente Mendoza Relationship to Patient: sister Phone number: [phone] [x] Other Next of Kin Name: Lulú Clements Relationship to Patient:nephew Phone Number: [phone] Advanced Steps® ACP Facilitator: Porsha Santos RN, Goldy Alvarez, NP Conversation Topics Patient is a 79 y.o.  Female w/ PMH of HFpPEF (EF 60-65%), Stage 3 CKD, Recent L Hip ORIF (12/2020), Esophageal Varices, Liver Cirrhosis, DM, HTN, CAD, who presents from [address] [place] [address] to [facility]. Pt required intubation and vent support and was able to be medically extubated. During hospital stay pt required hemodialysis. Family had discussions with family who decided to move to comfort measures. Hemodialysis was discontinued and comfort measures were put in place. Palliative team contacted family today for completion of Physician Order for Scope of Treatment. Spoke with point person for family, Lulú Clements. Review of current care interventions. Explained the POST form outlines medical care decisions for DNR/DNI, No feeding tube and comfort focused care already decided earlier this week with attending. Mr Roberta Diaz stated he agrees. He will review the form with his Karen Sable, Ms Inocente Mendoza. Form sent via [place] to [email or web address] Needs to discuss with spiritual/Buddhist advisor: [] Yes  [x] No 
 
Needs more information about illness and complications:  [] Yes  [x] No 
 
 
Cardiopulmonary Resuscitation Order Elected for CPR:  []  Attempt Resuscitation [x]  Do Not Attempt Resuscitation When NOT in Cardiopulmonary Arrest, Order Elected:   
 
[x] Comfort Measures 
[] Limited Additional Interventions [] Full Interventions Artificially Administered Nutrition, Order Elected: 
 
[x] No Feeding Tube  
[] Feeding Tube for a defined trial period 
[] Feeding Tube long-term if indicated The following was provided (check all that apply): 
   
[] Review of existing Advance Directive 
[] Assistance with Completion of New Advance Directive [x] Review of [place] POST Form Meeting Outcomes: 
 [x] ACP discussion completed 
 [] [place] form completed 
[x] [place] prepared for Provider review and signature 
[] Original placed on Chart, if in facility  (form to be sent with patient at discharge)-  awaiting signed form [x] Copy given to healthcare agent  
[] Copy scanned to electronic medical record-  awaiting signed form If ACP discussion not completed, last interview topic discussed:  Mr Reji Hidns stated that family was opposed to pt's discharge back to [facility] and that family was not prepared. Referred him to contact Care Management to review family rights. Also informed him that hospice would be contacting him. Explored options for SNF with hospice verses home with hospice & extra support, since stopping hemodialysis, Ms. Salter's  time would be short. Mr. Reji Hinds stated he appreciated the information and will consult with Ms. [facility] Follow-up plan:   
 [] Schedule follow-up conversation to continue planning 
 [] Referred individual to Provider for additional questions/concerns  
[] Advised patient/agent/surrogate to review completed POST form and update if needed with changes in condition, patient preferences or care setting  
 
[] This note routed to one or more involved healthcare providers Galileo Moeller, RN, BSN, [facility] Palliative Medicine Inpatient RN [facility] Palliative [place] Line: [phone] ([phone])

## 2021-02-17 NOTE — PROGRESS NOTES
Discharge orders entered. Plan was for patient to return to [address] [place] [address] with hospice. Spoke with nephew, Asuncion Andujar to let him know about the discharge plan for today. He would prefer she stay here rather than go to nursing home. Told him that was not possible but that he could appeal her discharge. Gave him number for [facility]. Medicare pt has received, reviewed, and signed 2nd IM letter informing them of their right to appeal the discharge. Signed copy has been placed on pt bedside chart. Cancelled transport, informed unit that discharge has been cancelled. [place] knows to call [facility] asap to file appeal. MD notified. Marvin Thomas RN - Outcomes Manager  [phone]

## 2021-02-17 NOTE — HOSPICE
Voicemail left for pt's nephew Shivani Moment to return call to discuss hospice services. Spoke with Paz Sellers [facility] admission's coordinator and she advised that [address] is not allowing any agencies in the building on the covid/quarantine units at this time but would let us know when she could be admitted. Hospice referral received. Chart review in process. Thank you for the referral to [employer]. If we can be of further assistance please contact [phone] Sowmya Mendes RN [place] [address] [place], [address] 
[phone] Email: [email or web address]

## 2021-02-17 NOTE — PROGRESS NOTES
Problem: Risk for Spread of Infection Goal: Prevent transmission of infectious organism to others Description: Prevent the transmission of infectious organisms to other patients, staff members, and visitors. Outcome: Progressing Towards Goal 
  
Problem: Falls - Risk of 
Goal: *Absence of Falls Description: Document Emily Blood Fall Risk and appropriate interventions in the flowsheet. Outcome: Progressing Towards Goal 
Note: Fall Risk Interventions: 
Mobility Interventions: Bed/chair exit alarm Mentation Interventions: Bed/chair exit alarm Medication Interventions: Bed/chair exit alarm Elimination Interventions: Bed/chair exit alarm Problem: Pressure Injury - Risk of 
Goal: *Prevention of pressure injury Description: Document Kyle Scale and appropriate interventions in the flowsheet. Outcome: Progressing Towards Goal 
Note: Pressure Injury Interventions: 
Sensory Interventions: Assess changes in LOC Moisture Interventions: Absorbent underpads, Internal/External urinary devices Activity Interventions: Assess need for specialty bed Mobility Interventions: Assess need for specialty bed, HOB 30 degrees or less, Float heels Nutrition Interventions: Document food/fluid/supplement intake Friction and Shear Interventions: Apply protective barrier, creams and emollients, Lift sheet, Lift team/patient mobility team 
 
  
 
 
 
  
Problem: Injury - Risk of, Adverse Drug Event Goal: *Absence of adverse drug events Outcome: Progressing Towards Goal 
Goal: *Absence of medication errors Outcome: Progressing Towards Goal 
  
Problem: Pain Goal: *Control of Pain Outcome: Progressing Towards Goal 
Goal: *PALLIATIVE CARE:  Alleviation of Pain Outcome: Progressing Towards Goal 
  
Problem: Gas Exchange - Impaired Goal: Absence of hypoxia Outcome: Progressing Towards Goal 
  
Problem: Isolation Precautions - Risk of Spread of Infection Goal: Prevent transmission of infectious organism to others Outcome: Progressing Towards Goal

## 2021-02-17 NOTE — CONSULTS
Palliative Medicine [facility]: [phone]) [facility]: [phone] [facility]: [phone] Patient Name: Gregoria Christina YOB: 1940 Date of Initial Consult: 2/17/2021 Reason for Consult: care decisions Requesting Provider: Dr Rayshawn Cohen Primary Care Physician: Marquez Cotton MD 
  
 SUMMARY:  
Gregoria Christina is a 79y.o. year old with a past history of HFpPEF ( EF 61 to 72), stage 3 CKD, cirrhosis of liver, DM, hypertension, who was admitted on 1/26/2021 from [facility] with increasing SOB, had previously tested positive for COVID 19. She had a long protracted hospital stay which included intubation, she was able to be liberated from the ventilator. While on the medical floor after medical extubation while trying to place NGT she became hypoxic. Attending at that time spoke with nephew who is contact for patient's sister, family decided on comfort measures. Palliative medicine is consulted for support and care decisions. PALLIATIVE DIAGNOSES:  
1. Goals of care / care decisions 2. Acute resp failure 3. COVID 19 infection 4. Debility PLAN:  
1. Goals of care / care decisions appreciate attending team previous discussions with family patient comfort measures only. We spoke with her Nephew Mr Ivy Russell who is contact for family. He is an ICU RN. Her sister Mary Belle is legal next of kin. He affirmed goals of care as DNR/DNI comfort measures, no feeding tubes, no HD. POST form discussed and sent via HIPAA compliant email for docusign. Patient is alert but confused appears comfortable. Hospice has been consulted. Appreciate CM help with d/c planning. Shared with family to discuss possible home with hospice if they are able to care for Omid Lakhani as her time on earth is likely to be short. Goals of care DNR/DNI comfort measures no feeding tubes. 2. Acute resp failure s/p liberation from vent. Breathing comfortable ly today. On comfort measures 3. COVID 19 infection s/p abx remdesivir and convalescent plasma 4. Debility confused PPS 40 indicating a poor overall prognosis 5. Initial consult note routed to primary continuity provider 6. Communicated plan of care with: Palliative IDT, nephew GOALS OF CARE: comfort measures Patient/Health Care Proxy Stated Goals: Comfort TREATMENT PREFERENCES:  
Code Status: DNR/ DNI Advance Care Planning: 
[] [facility] Interdisciplinary Team has updated the ACP Navigator with [address] and Patient Capacity Primary Decision Maker ([address]): sister Walter Gould is legal next of kin Nephew Mr Luan Fernández is point of contact Medical Interventions: Comfort measures Artificially Administered Nutrition: No feeding tube Other: As far as possible, the palliative care team has discussed with patient / health care proxy about goals of care / treatment preferences for patient. HISTORY:  
 
History obtained from: chart and nephew CHIEF COMPLAINT: resp failure HPI/SUBJECTIVE: The patient is:  
[] Verbal and participatory [x] Non-participatory due to: confusion Please see summary Clinical Pain Assessment (nonverbal scale for nonverbal patients): Clinical Pain Assessment Severity: 0 Activity (Movement): Lying quietly, normal position Duration: for how long has pt been experiencing pain (e.g., 2 days, 1 month, years) Frequency: how often pain is an issue (e.g., several times per day, once every few days, constant) FUNCTIONAL ASSESSMENT:  
 
Palliative Performance Scale (PPS): PPS: 40 ECOG 
ECOG Status : Completely disabled PSYCHOSOCIAL/SPIRITUAL SCREENING:  
  
Any spiritual / Jainism concerns: unable to assess for patient  
[] Yes /  [] No 
 
Caregiver Burnout: 
[] Yes /  [] No /  [x] No Caregiver Present Anticipatory grief assessment: unable to assess for patient  
[] Normal  / [] Maladaptive REVIEW OF SYSTEMS:  
 
Positive and pertinent negative findings in ROS are noted above in HPI. The following systems were [] reviewed / [x] unable to be reviewed as noted in HPI Other findings are noted below. Systems: constitutional, ears/nose/mouth/throat, respiratory, gastrointestinal, genitourinary, musculoskeletal, integumentary, neurologic, psychiatric, endocrine. Positive findings noted below. Modified ESAS Completed by: provider Pain: 0 Dyspnea: 0 Stool Occurrence(s): 0 PHYSICAL EXAM:  
 
Wt Readings from Last 3 Encounters:  
02/17/21 68.9 kg (151 lb 12.8 oz) 12/10/20 71.4 kg (157 lb 8 oz) 09/28/20 68 kg (149 lb 14.4 oz) Blood pressure (!) 149/85, pulse (!) 104, temperature 99.2 °F (37.3 °C), resp. rate 22, height 5' (1.524 m), weight 68.9 kg (151 lb 12.8 oz), SpO2 97 %. Pain: 
Pain Scale 1: Adult Nonverbal Pain Scale Pain Intensity 1: 0 Pain Location 1: Other (comment) Pain Intervention(s) 1: Medication (see MAR) Last bowel movement: none recorded Constitutional: lying in bed, alert in NAD Cardiovascular: regular rhythm Respiratory: breathing not labored on room air Skin: warm, dry Neurologic: alert confused HISTORY:  
 
Principal Problem: 
  Pneumonia due to COVID-19 virus (1/27/2021) Past Medical History:  
Diagnosis Date  Anemia  Aortic stenosis, moderate 03/2019  Breast cancer ([facility] [address]  CAD (coronary artery disease) Coronary Stenting needed, hematology workup needed prior to stent placement, treating medically at this time  Cancer [facility])   
 colon  Cancer ([facility] [address] breast - left  Cardiac catheterization 08/17/2016 LM patent. mLAD 99.9% (2.25 x 28-mm Xience BETHEL, resid 0%). oD1 60%. oD2 95%. pCX 90% (2.5 x 13-mm Xience BETHEL, resid 0%). OMB patent.  Cardiac echocardiogram 03/2019 EF 56 to 60%, moderate concentric LVH, moderate aortic stenosis, mean valve gradient 25 mmHg  Diabetes ([address] [address]  Hypercholesteremia  Hypertension  Thrombocytopenia ([facility] [address] Past Surgical History:  
Procedure Laterality Date  CARDIAC CATHETERIZATION  7/16/2015  
  no stents at this time r/t anemia & thrombocytopenia (hematology work-up needed prior to stent placement)  CARDIAC CATHETERIZATION  7/11/2016  CARDIAC CATHETERIZATION  8/17/2016  CORONARY ARTERY ANGIOGRAM  7/16/2015  CORONARY ARTERY ANGIOGRAM  7/11/2016  CORONARY ARTERY ANGIOGRAM  8/17/2016  CORONARY STENT EA ADDL VESSEL  8/17/2016  CORONARY STENT SINGLE W/PTCA  8/17/2016  HX GI    
 colon surg secondary to cancer - removed lesion  HX HEENT  2/2013  
 cataract bilaterally  HX MASTECTOMY  march 2011  
 left  HX VASCULAR ACCESS    
 mediport - now removed  IR BX BONE MARROW DIAGNOSTIC  9/30/2019  LV ANGIOGRAPHY  7/16/2015  LV ANGIOGRAPHY  7/11/2016 Family History Problem Relation Age of Onset  Cancer Maternal Aunt History reviewed, no pertinent family history. Social History Tobacco Use  Smoking status: Never Smoker  Smokeless tobacco: Never Used Substance Use Topics  Alcohol use: No  
 
Allergies Allergen Reactions  Latex Itching Skin breaks out causing itching Current Facility-Administered Medications Medication Dose Route Frequency  sodium citrate 4 gram /100 mL (4 %) 0.08 g  2 mL InterCATHeter DIALYSIS PRN  
 sodium citrate 4 gram /100 mL (4 %) 0.08 g  2 mL InterCATHeter DIALYSIS PRN  
 LORazepam (ATIVAN) injection 1 mg  1 mg IntraVENous Q4H PRN  
 scopolamine (TRANSDERM-SCOP) 1 mg over 3 days 1 Patch  1 Patch TransDERmal Q72H PRN  
 morphine injection 2 mg  2 mg IntraVENous Q4H PRN  
 midodrine (PROAMATINE) tablet 10 mg  10 mg Oral Q6H PRN  
  menthol-zinc oxide (CALMOSEPTINE) 0.44-20.6 % ointment   Topical TID  acetaminophen (TYLENOL) tablet 650 mg  650 mg Oral Q6H PRN  
 albuterol-ipratropium (DUO-NEB) 2.5 MG-0.5 MG/3 ML  3 mL Nebulization Q6H PRN  
 glucose chewable tablet 16 g  4 Tab Oral PRN  
 glucagon (GLUCAGEN) injection 1 mg  1 mg IntraMUSCular PRN  
 dextrose (D50W) injection syrg 12.5-25 g  25-50 mL IntraVENous PRN  
 
 
 LAB AND IMAGING FINDINGS:  
 
Lab Results Component Value Date/Time WBC 6.2 02/14/2021 01:06 AM  
 HGB 8.2 (L) 02/14/2021 01:06 AM  
 PLATELET 49 (L) 28/85/1360 01:06 AM  
 
Lab Results Component Value Date/Time Sodium 144 02/14/2021 01:06 AM  
 Potassium 3.4 (L) 02/14/2021 01:06 AM  
 Chloride 111 02/14/2021 01:06 AM  
 CO2 23 02/14/2021 01:06 AM  
 BUN 51 (H) 02/14/2021 01:06 AM  
 Creatinine 2.19 (H) 02/14/2021 01:06 AM  
 Calcium 8.5 02/14/2021 01:06 AM  
 Magnesium 2.0 02/14/2021 01:06 AM  
 Phosphorus 3.5 02/14/2021 01:06 AM  
  
Lab Results Component Value Date/Time Alk. phosphatase 85 02/10/2021 03:22 AM  
 Protein, total 6.1 (L) 02/10/2021 03:22 AM  
 Albumin 3.1 (L) 02/14/2021 01:06 AM  
 Globulin 2.7 02/10/2021 03:22 AM  
 
Lab Results Component Value Date/Time INR 1.7 (H) 02/01/2021 01:20 PM  
 Prothrombin time 20.0 (H) 02/01/2021 01:20 PM  
 aPTT 36.4 12/06/2020 05:03 PM  
  
Lab Results Component Value Date/Time Iron 65 12/08/2020 11:30 AM  
 TIBC 189 (L) 12/08/2020 11:30 AM  
 Iron % saturation 34 12/08/2020 11:30 AM  
 Ferritin 669 (H) 02/04/2021 05:10 AM  
  
No results found for: PH, PCO2, PO2 No components found for: [place] Lab Results Component Value Date/Time  01/27/2021 02:45 PM  
 CK - MB 6.8 (H) 01/27/2021 02:45 PM  
  
 
   
 
Total time: 30 minutes Counseling / coordination time, spent as noted above:  
> 50% counseling / coordination: yes with nephew Prolonged service was provided for  []30 min   []75 min in face to face time in the presence of the patient, spent as noted above. Time Start:  
Time End:

## 2021-02-18 NOTE — PROGRESS NOTES
Problem: Risk for Spread of Infection Goal: Prevent transmission of infectious organism to others Description: Prevent the transmission of infectious organisms to other patients, staff members, and visitors. Outcome: Progressing Towards Goal 
  
Problem: Patient Education:  Go to Education Activity Goal: Patient/Family Education Outcome: Progressing Towards Goal 
  
Problem: Falls - Risk of 
Goal: *Absence of Falls Description: Document [place] Flow Fall Risk and appropriate interventions in the flowsheet. Outcome: Progressing Towards Goal 
Note: Fall Risk Interventions: 
Mobility Interventions: Bed/chair exit alarm Mentation Interventions: Bed/chair exit alarm Medication Interventions: Bed/chair exit alarm Elimination Interventions: Bed/chair exit alarm Problem: Patient Education: Go to Patient Education Activity Goal: Patient/Family Education Outcome: Progressing Towards Goal 
  
Problem: Pressure Injury - Risk of 
Goal: *Prevention of pressure injury Description: Document Kyle Scale and appropriate interventions in the flowsheet. Outcome: Progressing Towards Goal 
Note: Pressure Injury Interventions: 
Sensory Interventions: Assess changes in LOC Moisture Interventions: Absorbent underpads Activity Interventions: Assess need for specialty bed Mobility Interventions: Assess need for specialty bed Nutrition Interventions: Document food/fluid/supplement intake Friction and Shear Interventions: Apply protective barrier, creams and emollients Problem: Patient Education: Go to Patient Education Activity Goal: Patient/Family Education Outcome: Progressing Towards Goal 
  
Problem: Nutrition Deficit Goal: *Optimize nutritional status Outcome: Progressing Towards Goal 
  
Problem: Injury - Risk of, Adverse Drug Event Goal: *Absence of adverse drug events Outcome: Progressing Towards Goal 
Goal: *Absence of medication errors Outcome: Progressing Towards Goal 
Goal: *Knowledge of prescribed medications 
Outcome: Progressing Towards Goal 
  
Problem: Patient Education: Go to Patient Education Activity 
Goal: Patient/Family Education 
Outcome: Progressing Towards Goal 
  
Problem: Pain 
Goal: *Control of Pain 
Outcome: Progressing Towards Goal 
Goal: *PALLIATIVE CARE:  Alleviation of Pain 
Outcome: Progressing Towards Goal 
  
Problem: Patient Education: Go to Patient Education Activity 
Goal: Patient/Family Education 
Outcome: Progressing Towards Goal 
  
Problem: Airway Clearance - Ineffective 
Goal: Achieve or maintain patent airway 
Outcome: Progressing Towards Goal 
  
Problem: Gas Exchange - Impaired 
Goal: Absence of hypoxia 
Outcome: Progressing Towards Goal 
Goal: Promote optimal lung function 
Outcome: Progressing Towards Goal 
  
Problem: Breathing Pattern - Ineffective 
Goal: Ability to achieve and maintain a regular respiratory rate 
Outcome: Progressing Towards Goal 
  
Problem: Body Temperature -  Risk of, Imbalanced 
Goal: Ability to maintain a body temperature within defined limits 
Outcome: Progressing Towards Goal 
Goal: Will regain or maintain usual level of consciousness 
Outcome: Progressing Towards Goal 
Goal: Complications related to the disease process, condition or treatment will be avoided or minimized 
Outcome: Progressing Towards Goal 
  
Problem: Isolation Precautions - Risk of Spread of Infection 
Goal: Prevent transmission of infectious organism to others 
Outcome: Progressing Towards Goal 
  
Problem: Nutrition Deficits 
Goal: Optimize nutrtional status 
Outcome: Progressing Towards Goal 
  
Problem: Risk for Fluid Volume Deficit 
Goal: Maintain normal heart rhythm 
Outcome: Progressing Towards Goal 
Goal: Maintain absence of muscle cramping 
Outcome: Progressing Towards Goal 
Goal: Maintain normal serum potassium, sodium, calcium, phosphorus, and pH 
Outcome: Progressing Towards Goal 
  
 Problem: Loneliness or Risk for Loneliness Goal: Demonstrate positive use of time alone when socialization is not possible Outcome: Progressing Towards Goal 
  
Problem: Patient Education: Go to Patient Education Activity Goal: Patient/Family Education Outcome: Progressing Towards Goal 
  
Problem: Patient Education: Go to Patient Education Activity Goal: Patient/Family Education Outcome: Progressing Towards Goal 
  
Problem: Patient Education: Go to Patient Education Activity Goal: Patient/Family Education Outcome: Progressing Towards Goal 
  
Problem: Patient Education: Go to Patient Education Activity Goal: Patient/Family Education Outcome: Progressing Towards Goal

## 2021-02-18 NOTE — PROGRESS NOTES
Palliative Medicine Palliative Medicine RN Yesica Tariq rounded on comfort care patient. Pt was unresponsive to verbal stimuli. She had shallow breathing. Did not exhibit any s/s of pain, SOB or distress. Pt remains DNR/DNI on comfort measures. POST form was received with electronic signature and placed on the chart. Potential dispo plan is unknown. Thank you for the Palliative Medicine consult and allowing us to participate in the care of Mrs. Leonardo. Will continue to monitor and provide support. MIRA Brown Palliative Medicine Inpatient RN [facility] Line: [phone] ([phone])

## 2021-02-18 NOTE — PROGRESS NOTES
Bedside and Verbal shift change report given to Pat Payne (oncoming nurse) by Lavonne Cage RN (offgoing nurse). Report included the following information SBAR, Kardex, MAR and Recent Results. SITUATION:  
? Code Status: DNR 
? Reason for Admission: Pneumonia due to COVID-19 virus [U07.1, J12.82] 
 
? Hospital day: 22 
? Problem List:  
   
Hospital Problems  Date Reviewed: 12/23/2020 Codes Class Noted POA * (Principal) Pneumonia due to COVID-19 virus ICD-10-CM: U07.1, J12.82 ICD-9-CM: 480.8  1/27/2021 Unknown BACKGROUND:  
 Past Medical History:  
Past Medical History:  
Diagnosis Date  Anemia  Aortic stenosis, moderate 03/2019  Breast cancer ([facility] [address]  CAD (coronary artery disease) Coronary Stenting needed, hematology workup needed prior to stent placement, treating medically at this time  Cancer [facility])   
 colon  Cancer ([facility] [address] breast - left  Cardiac catheterization 08/17/2016 LM patent. mLAD 99.9% (2.25 x 28-mm Xience BETHEL, resid 0%). oD1 60%. oD2 95%. pCX 90% (2.5 x 13-mm Xience BETHEL, resid 0%). OMB patent.  Cardiac echocardiogram 03/2019 EF 56 to 60%, moderate concentric LVH, moderate aortic stenosis, mean valve gradient 25 mmHg  Diabetes ([facility] [address]  Hypercholesteremia  Hypertension  Thrombocytopenia ([facility] [address] Patient taking anticoagulants no ASSESSMENT:  
? Changes in Assessment Throughout Shift: patient pulled off ECG leads, oxygen, and tried to pull out her IJ-administered ativan. ? Patient has Central Line: yes Reasons if yes: limited vessels adequate for IV sticks ? Patient has Jean Cath: yes Reasons if yes: n/a  
 
? Last Vitals: 
  
Vitals:  
 02/17/21 1123 02/17/21 2000 02/18/21 0000 02/18/21 0400 BP:  (!) 157/82 (!) 162/92 (!) [id number] Pulse:  100 (!) 104 (!) 101 Resp:  24 20 24 Temp:  98.4 °F (36.9 °C) 98.4 °F (36.9 °C) 97.5 °F (36.4 °C) TempSrc:      
SpO2: 97% 99% 95% 95% Weight: Height:      
 
 
? IV and DRAINS (will only show if present) [REMOVED] Peripheral IV 02/02/21 Left;Posterior Hand-Site Assessment: Clean, dry, & intact [REMOVED] Airway - Endotracheal Tube 02/02/21-Site Assessment: Clean, dry, & intact Midline Catheter 02/02/21 Left-Site Assessment: Clean, dry, & intact [REMOVED] Orogastric Tube 02/02/21-Site Assessment: Clean, dry, & intact [REMOVED] Airway - Continuous Aspiration of Subglottic Secretions (MAAME) Tube 02/03/21-Site Assessment: Clean, dry, & intact Hemodialysis Access 02/06/21-Site Assessment: Clean, dry, & intact [REMOVED] Airway - Continuous Aspiration of Subglottic Secretions (MAAME) Tube 01/27/21 Oral-Site Assessment: Clean, dry, & intact [REMOVED] Peripheral IV 01/27/21 Right Arm-Site Assessment: Clean, dry, & intact [REMOVED] Peripheral IV 01/27/21 Left Arm-Site Assessment: Clean, dry, & intact [REMOVED] Peripheral IV 01/27/21 Right Antecubital-Site Assessment: Clean, dry, & intact [REMOVED] Triple Lumen left IJ  01/27/21 Left Internal jugular-Site Assessment: Clean, dry, & intact [REMOVED] Peripheral IV 01/26/21 Left Antecubital-Site Assessment: Drainage (comment) [REMOVED] Nasogastric Tube 01/27/21-Site Assessment: Clean, dry, & intact [REMOVED] Airway - Endotracheal Tube 01/27/21 Oral-Site Assessment: Clean, dry, & intact [REMOVED] Orogastric Tube 01/28/21-Site Assessment: Clean, dry, & intact ? WOUND (if present) Wound Type:  Pressure injury stage 2 to sacrum and DTI's to heels Dressing present Dressing Present : Yes, Intact, not due to be changed Wound Concerns/Notes:  none ? PAIN Pain Assessment Pain Intensity 1: 0 (02/18/21 0400) Pain Location 1: Other (comment) Pain Intervention(s) 1: Medication (see MAR) Patient Stated Pain Goal: 0 
o Interventions for Pain:  none 
o Intervention effective: n/a 
o Time of last intervention: n/a  
o Reassessment Completed: yes ? Last 3 Weights: Last 3 Recorded Weights in this Encounter 02/15/21 0800 02/16/21 0800 02/17/21 0800 Weight: 68.9 kg (151 lb 12.8 oz) 68.9 kg (151 lb 12.8 oz) 68.9 kg (151 lb 12.8 oz) Weight change: 0 kg (0 lb) ? INTAKE/OUPUT Current Shift: No intake/output data recorded. Last three shifts: 02/16 1901 - 02/18 0700 In: 0 Out: 1100 [Urine:1100] ? LAB RESULTS No results for input(s): WBC, HGB, HCT, PLT, HGBEXT, HCTEXT, PLTEXT in the last 72 hours. No results for input(s): NA, K, GLU, BUN, CREA, CA, MG, INR, INREXT in the last 72 hours. No lab exists for component: PT, PTT 
 
RECOMMENDATIONS AND DISCHARGE PLANNING 1. Pending tests/procedures/ Plan of Care or Other Needs: none 2. Discharge plan for patient and Needs/Barriers: n/a 3. Estimated Discharge Date: TBD Posted on Whiteboard in Patients Room: no   
 
4. The patient's care plan was reviewed with the oncoming nurse. \"HEALS\" SAFETY CHECK Fall Risk Total Score: 1 Safety Measures: Safety Measures: Bed/Chair-Wheels locked A safety check occurred in the patient's room between off going nurse and oncoming nurse listed above. The safety check included the below items Area Items H High Alert Medications ? Verify all high alert medication drips (heparin, PCA, etc.) E Equipment ? Suction is set up for ALL patients (with yanker) ? Red plugs utilized for all equipment (IV pumps, etc.) ? WOWs wiped down at end of shift. ? Room stocked with oxygen, suction, and other unit-specific supplies A Alarms ? Bed alarm is set for fall risk patients ? Ensure chair alarm is in place and activated if patient is up in a chair L Lines ? Check IV for any infiltration ? Jean bag is empty if patient has a Jean ? Tubing and IV bags are labeled [place] Safety ? Room is clean, patient is clean, and equipment is clean. ? Hallways are clear from equipment besides carts. ? Fall bracelet on for fall risk patients ? Ensure room is clear and free of clutter ? Suction is set up for ALL patients (with eden) ? Hallways are clear from equipment besides carts. ? Isolation precautions followed, supplies available outside room, sign posted Shauna Bolivar RN

## 2021-02-18 NOTE — PROGRESS NOTES
helped the patient Heydi Rodriguez, who is a 79 y.o.,female, connect with the family with Zoom Video Connection. The  provided the following Interventions: 
Family care with zoom session The following outcomes were achieved: 
Family and patient met via zoom. Covid + Assessment: 
There are no further spiritual or Hindu issues which require Spiritual Care Services interventions at this time. Plan: 
Chaplains will continue to follow and will provide pastoral care on an as needed/requested basis.  recommends bedside caregivers page  on duty if patient shows signs of acute spiritual or emotional distress. [address] [facility] Clinical Certified  Spiritual Care  
[phone]

## 2021-02-18 NOTE — ROUTINE PROCESS
1800- Patient had zoom call with her nephew. He was also updated on her status. Bedside and Verbal shift change report given to Nadir Garcia (oncoming nurse) by Isaias Hitchcock (offgoing nurse). Report included the following information SBAR, Kardex, MAR and Recent Results. SITUATION:  
? Code Status: DNR 
? Reason for Admission: Pneumonia due to COVID-19 virus [U07.1, J12.82] 
 
? Hospital day: 21 
? Problem List:  
   
Hospital Problems  Date Reviewed: 12/23/2020 Codes Class Noted POA * (Principal) Pneumonia due to COVID-19 virus ICD-10-CM: U07.1, J12.82 ICD-9-CM: 480.8  1/27/2021 Unknown BACKGROUND:  
 Past Medical History:  
Past Medical History:  
Diagnosis Date  Anemia  Aortic stenosis, moderate 03/2019  Breast cancer ([facility] [address]  CAD (coronary artery disease) Coronary Stenting needed, hematology workup needed prior to stent placement, treating medically at this time  Cancer [facility])   
 colon  Cancer ([facility] [address] breast - left  Cardiac catheterization 08/17/2016 LM patent. mLAD 99.9% (2.25 x 28-mm Xience BETHEL, resid 0%). oD1 60%. oD2 95%. pCX 90% (2.5 x 13-mm Xience BETHEL, resid 0%). OMB patent.  Cardiac echocardiogram 03/2019 EF 56 to 60%, moderate concentric LVH, moderate aortic stenosis, mean valve gradient 25 mmHg  Diabetes ([facility] [address]  Hypercholesteremia  Hypertension  Thrombocytopenia ([facility] [address] Patient taking anticoagulants no ASSESSMENT:  
? Changes in Assessment Throughout Shift: NO 
 
? Patient has Central Line: yes Reasons if yes: Limited access ? Patient has Jean Cath: yes Reasons if yes: end of life care ? Last Vitals: 
  
Vitals:  
 02/16/21 1940 02/17/21 0748 02/17/21 0800 02/17/21 1123 BP: (!) 144/77 (!) 149/85 Pulse: (!) 102 (!) 104 Resp: 23 22 Temp: 98.3 °F (36.8 °C) 99.2 °F (37.3 °C) TempSrc:      
SpO2: 98% 97%  97% Weight:   68.9 kg (151 lb 12.8 oz) Height:   5' (1.524 m) ? IV and DRAINS (will only show if present) [REMOVED] Peripheral IV 02/02/21 Left;Posterior Hand-Site Assessment: Clean, dry, & intact [REMOVED] Airway - Endotracheal Tube 02/02/21-Site Assessment: Clean, dry, & intact Midline Catheter 02/02/21 Left-Site Assessment: Clean, dry, & intact [REMOVED] Orogastric Tube 02/02/21-Site Assessment: Clean, dry, & intact [REMOVED] Airway - Continuous Aspiration of Subglottic Secretions (MAAME) Tube 02/03/21-Site Assessment: Clean, dry, & intact Hemodialysis Access 02/06/21-Site Assessment: Clean, dry, & intact [REMOVED] Airway - Continuous Aspiration of Subglottic Secretions (MAAME) Tube 01/27/21 Oral-Site Assessment: Clean, dry, & intact [REMOVED] Peripheral IV 01/27/21 Right Arm-Site Assessment: Clean, dry, & intact [REMOVED] Peripheral IV 01/27/21 Left Arm-Site Assessment: Clean, dry, & intact [REMOVED] Peripheral IV 01/27/21 Right Antecubital-Site Assessment: Clean, dry, & intact [REMOVED] Triple Lumen left IJ  01/27/21 Left Internal jugular-Site Assessment: Clean, dry, & intact [REMOVED] Peripheral IV 01/26/21 Left Antecubital-Site Assessment: Drainage (comment) [REMOVED] Nasogastric Tube 01/27/21-Site Assessment: Clean, dry, & intact [REMOVED] Airway - Endotracheal Tube 01/27/21 Oral-Site Assessment: Clean, dry, & intact [REMOVED] Orogastric Tube 01/28/21-Site Assessment: Clean, dry, & intact ? WOUND (if present) Wound Type:  Bilateral DTI to heels, and Sacral stg 2 Dressing present Dressing Present : Yes, Intact, not due to be changed Wound Concerns/Notes:  none ? PAIN Pain Assessment Pain Intensity 1: 0 (02/17/21 1200) Pain Location 1: Other (comment) Pain Intervention(s) 1: Medication (see MAR) Patient Stated Pain Goal: 0 
o Interventions for Pain:  none 
o Intervention effective: NA 
o Time of last intervention:NA 
o Reassessment Completed: yes ? Last 3 Weights: 
Last 3 Recorded Weights in this Encounter 02/15/21 0800 02/16/21 0800 02/17/21 0800 Weight: 68.9 kg (151 lb 12.8 oz) 68.9 kg (151 lb 12.8 oz) 68.9 kg (151 lb 12.8 oz) Weight change: 0 kg (0 lb) ? INTAKE/OUPUT Current Shift: No intake/output data recorded. Last three shifts: 02/16 0701 - 02/17 1900 In: 0 Out: 400 [Urine:400] ? LAB RESULTS No results for input(s): WBC, HGB, HCT, PLT, HGBEXT, HCTEXT, PLTEXT in the last 72 hours. No results for input(s): NA, K, GLU, BUN, CREA, CA, MG, INR, INREXT in the last 72 hours. No lab exists for component: PT, PTT 
 
RECOMMENDATIONS AND DISCHARGE PLANNING 1. Pending tests/procedures/ Plan of Care or Other Needs: NO  
 
2. Discharge plan for patient and Needs/Barriers: None 3. Estimated Discharge Date: TBD Posted on Whiteboard in Patients Room: yes 4. The patient's care plan was reviewed with the oncoming nurse. \"HEALS\" SAFETY CHECK Fall Risk Total Score: 1 Safety Measures: Safety Measures: Bed/Chair-Wheels locked A safety check occurred in the patient's room between off going nurse and oncoming nurse listed above. The safety check included the below items Area Items H High Alert Medications ? Verify all high alert medication drips (heparin, PCA, etc.) E Equipment ? Suction is set up for ALL patients (with yanker) ? Red plugs utilized for all equipment (IV pumps, etc.) ? WOWs wiped down at end of shift. ? Room stocked with oxygen, suction, and other unit-specific supplies A Alarms ? Bed alarm is set for fall risk patients ? Ensure chair alarm is in place and activated if patient is up in a chair L Lines ? Check IV for any infiltration ? Jean bag is empty if patient has a Jean ? Tubing and IV bags are labeled Hettie Marus Safety ? Room is clean, patient is clean, and equipment is clean. ? Hallways are clear from equipment besides carts. ? Fall bracelet on for fall risk patients ? Ensure room is clear and free of clutter ? Suction is set up for ALL patients (with eden) ? Hallways are clear from equipment besides carts. ? Isolation precautions followed, supplies available outside room, sign posted Favian Matthew

## 2021-02-18 NOTE — PROGRESS NOTES
[facility] Hospitalist Group Progress Note Patient: Taylor Little Age: 79 y.o. : 1940 MR#: [id number] SSN: [id number] Date: 2021 Subjective:  
 
Pt seen & evaluated. Non-verbal. More somnolent. Family refused to have patient discharged to [facility] yesterday. Discharge appealed. Assessment/Plan:  
 
Consultants: Infectious disease (signed off); Nephrology 1. Hypoxic respiratory failure - initially intubated 21 then extubated 21, failed to protect airway and required reintubation 21 then extubated again on 02/10/21. Now on RA. 2. Covid 19 pneumonia - s/p abx; remdesevir and convalescent plasma. 3. Severe sepsis with septic shock in setting of PNA and UTI- tachycardia, tachypnea, hypotension on admission, now improved 4. Enterobacter UTI - completed course of cefepime / vanco in ICU 5. NANCY on stage 3 CKD - HD per nephrology 6. Acute metabolic encephalopathy - off precedex drip; in setting of multiple comorbidities and acute illness improved 7. Severe protein calorie malnutrition - NPO per SLP. Pending respiratory status and further SLP recs, Discussed with nutrition -unable to place NG tube yesterday, will hold for the next 1 to 2 days to see if patient improves and if no improvement then will consider PPN versus PEG tube placement. 8. Type 2 diabetes with hyperglycemia -continue Lantus with SSI 9. Recent hemoptysis - hold on blood thinners 10. Elevalted troponin - earlier in admission felt to be in setting of  
11. Severe aortic stenosis - per last echo; cardiology consult 12. HTN - off pressors. 13. H/o Adam and chronic pancytopenia - hold on blood thinners 14. H/o recent left hip ORIF - on 20 per Dr. Nona Valenzuela Continue comfort measures only/hospice; no dialysis [facility] has accepted the patient to return and patient is cleared for discharge from medical standpoint with hospice. Case discussed with:  [x]Patient  [x]Family  [x]Nursing  []Case Management DVT Prophylaxis:  []Lovenox  []Hep SQ  [x]SCDs  []Coumadin   []On Heparin gtt Objective:  
VS:  
Visit Vitals BP (!) 166/85 (BP 1 Location: Right lower arm, BP Patient Position: At rest) Pulse 87 Temp 96.8 °F (36 °C) Resp 22 Ht 5' (1.524 m) Wt 68.9 kg (151 lb 12.8 oz) SpO2 100% BMI 29.65 kg/m² Tmax/24hrs: Temp (24hrs), Av.8 °F (36.6 °C), Min:96.8 °F (36 °C), Max:98.4 °F (36.9 °C) Intake/Output Summary (Last 24 hours) at 2021 1509 Last data filed at 2021 0400 Gross per 24 hour Intake  Output 700 ml Net -700 ml General:  lethargic Cardiovascular: Tachycardic Pulmonary: Coarse rhonchi, mild tachypnea GI:  +BS in all four quadrants, soft, non-tender Extremities: 2+ bilateral lower edema; feet warm bilaterally Neuro: non-verbal 
 
Family contact: Ranjit Gautam [phone] Labs:   
Recent Results (from the past 24 hour(s)) GLUCOSE, POC Collection Time: 21 12:30 AM  
Result Value Ref Range Glucose (POC) 228 (H) 70 - 110 mg/dL GLUCOSE, POC Collection Time: 21  6:53 AM  
Result Value Ref Range Glucose (POC) 220 (H) 70 - 110 mg/dL Signed By: Surinder Abel MD   
 2021

## 2021-02-18 NOTE — PROGRESS NOTES
Rajinder Radford Appeal Letter, Medicare Important Message Letter and Detailed Notice of Discharge into Epic. Call HIM spoke with Ladan Victoria asking to please send the above information along with the required/requested information to [facility] today before 12:00 noon. Appealed information was sent after business hours yesterday. Ladan Victoria stated that she will send the information before noon today.

## 2021-02-19 NOTE — DIABETES MGMT
GLYCEMIC CONTROL: 
 
Noted patient is no longer on glycemic monitoring and intervention. Noted plan of care is continue comfort measures and transition to nursing facility with hospice. Tamar Plunkett RN [facility] Pager: [phone]

## 2021-02-19 NOTE — ROUTINE PROCESS
Bedside and Verbal shift change report given to [address] [place] [address] (oncoming nurse) by Vivian Girard (offgoing nurse). Report included the following information SBAR, Kardex, MAR and Recent Results. SITUATION:  
? Code Status: DNR 
? Reason for Admission: Pneumonia due to COVID-19 virus [U07.1, J12.82] 
 
? Hospital day: 22 
? Problem List:  
   
Hospital Problems  Date Reviewed: 12/23/2020 Codes Class Noted POA * (Principal) Pneumonia due to COVID-19 virus ICD-10-CM: U07.1, J12.82 ICD-9-CM: 480.8  1/27/2021 Unknown BACKGROUND:  
 Past Medical History:  
Past Medical History:  
Diagnosis Date  Anemia  Aortic stenosis, moderate 03/2019  Breast cancer ([facility] [address]  CAD (coronary artery disease) Coronary Stenting needed, hematology workup needed prior to stent placement, treating medically at this time  Cancer [facility])   
 colon  Cancer ([facility] [address] breast - left  Cardiac catheterization 08/17/2016 LM patent. mLAD 99.9% (2.25 x 28-mm Xience BETHEL, resid 0%). oD1 60%. oD2 95%. pCX 90% (2.5 x 13-mm Xience BETHEL, resid 0%). OMB patent.  Cardiac echocardiogram 03/2019 EF 56 to 60%, moderate concentric LVH, moderate aortic stenosis, mean valve gradient 25 mmHg  Diabetes ([facility] [address]  Hypercholesteremia  Hypertension  Thrombocytopenia ([facility] [address] Patient taking anticoagulants no ASSESSMENT:  
? Changes in Assessment Throughout Shift: NO 
 
? Patient has Central Line: no Reasons if yes:NA ? Patient has Jean Cath: yes Reasons if yes:End of life care ? Last Vitals: 
  
Vitals:  
 02/18/21 0000 02/18/21 0400 02/18/21 0800 02/18/21 0830 BP: (!) 162/92 (!) 156/73  (!) 166/85 Pulse: (!) 104 (!) 101  87 Resp: 20 24 22 Temp: 98.4 °F (36.9 °C) 97.5 °F (36.4 °C)  96.8 °F (36 °C) TempSrc:      
SpO2: 95% 95%  100% Weight:   68.9 kg (151 lb 12.8 oz) Height:   5' (1.524 m) ? IV and DRAINS (will only show if present) [REMOVED] Peripheral IV 02/02/21 Left;Posterior Hand-Site Assessment: Clean, dry, & intact [REMOVED] Airway - Endotracheal Tube 02/02/21-Site Assessment: Clean, dry, & intact Midline Catheter 02/02/21 Left-Site Assessment: Clean, dry, & intact [REMOVED] Orogastric Tube 02/02/21-Site Assessment: Clean, dry, & intact [REMOVED] Airway - Continuous Aspiration of Subglottic Secretions (MAAME) Tube 02/03/21-Site Assessment: Clean, dry, & intact Hemodialysis Access 02/06/21-Site Assessment: Clean, dry, & intact [REMOVED] Airway - Continuous Aspiration of Subglottic Secretions (MAAME) Tube 01/27/21 Oral-Site Assessment: Clean, dry, & intact [REMOVED] Peripheral IV 01/27/21 Right Arm-Site Assessment: Clean, dry, & intact [REMOVED] Peripheral IV 01/27/21 Left Arm-Site Assessment: Clean, dry, & intact [REMOVED] Peripheral IV 01/27/21 Right Antecubital-Site Assessment: Clean, dry, & intact [REMOVED] Triple Lumen left IJ  01/27/21 Left Internal jugular-Site Assessment: Clean, dry, & intact [REMOVED] Peripheral IV 01/26/21 Left Antecubital-Site Assessment: Drainage (comment) [REMOVED] Nasogastric Tube 01/27/21-Site Assessment: Clean, dry, & intact [REMOVED] Airway - Endotracheal Tube 01/27/21 Oral-Site Assessment: Clean, dry, & intact [REMOVED] Orogastric Tube 01/28/21-Site Assessment: Clean, dry, & intact ? WOUND (if present) Wound Type:  none Dressing present Dressing Present : Yes, Intact, not due to be changed Wound Concerns/Notes:  none ? PAIN Pain Assessment Pain Intensity 1: 0 (02/18/21 0800) Pain Location 1: Other (comment) Pain Intervention(s) 1: Medication (see MAR) Patient Stated Pain Goal: Unable to verbalize/indicate pain 
o Interventions for Pain:  none 
o Intervention effective: naTime of last intervention: NA  
o Reassessment Completed: yes ? Last 3 Weights: 
Last 3 Recorded Weights in this Encounter 02/16/21 0800 02/17/21 0800 02/18/21 0800 Weight: 68.9 kg (151 lb 12.8 oz) 68.9 kg (151 lb 12.8 oz) 68.9 kg (151 lb 12.8 oz) Weight change: 0 kg (0 lb) ? INTAKE/OUPUT Current Shift: No intake/output data recorded. Last three shifts: 02/17 0701 - 02/18 1900 In: -  
Out: 700 [Urine:700] ? LAB RESULTS No results for input(s): WBC, HGB, HCT, PLT, HGBEXT, HCTEXT, PLTEXT in the last 72 hours. No results for input(s): NA, K, GLU, BUN, CREA, CA, MG, INR, INREXT in the last 72 hours. No lab exists for component: PT, PTT 
 
RECOMMENDATIONS AND DISCHARGE PLANNING 1. Pending tests/procedures/ Plan of Care or Other Needs:NO 2. Discharge plan for patient and Needs/Barriers: NO 
 
3. Estimated Discharge Date: TBD Posted on Whiteboard in Patients Room: yes 4. The patient's care plan was reviewed with the oncoming nurse. \"HEALS\" SAFETY CHECK Fall Risk Total Score: 1 Safety Measures: Safety Measures: Bed/Chair-Wheels locked A safety check occurred in the patient's room between off going nurse and oncoming nurse listed above. The safety check included the below items Area Items H High Alert Medications ? Verify all high alert medication drips (heparin, PCA, etc.) E Equipment ? Suction is set up for ALL patients (with eden) ? Red plugs utilized for all equipment (IV pumps, etc.) ? WOWs wiped down at end of shift. ? Room stocked with oxygen, suction, and other unit-specific supplies A Alarms ? Bed alarm is set for fall risk patients ? Ensure chair alarm is in place and activated if patient is up in a chair L Lines ? Check IV for any infiltration ? Jean bag is empty if patient has a Jean ? Tubing and IV bags are labeled Viola Cleaning Safety ? Room is clean, patient is clean, and equipment is clean. ? Hallways are clear from equipment besides carts. ? Fall bracelet on for fall risk patients ? Ensure room is clear and free of clutter ? Suction is set up for ALL patients (with eden) ? Hallways are clear from equipment besides carts. ? Isolation precautions followed, supplies available outside room, sign posted Bill Arevalo

## 2021-02-19 NOTE — PROGRESS NOTES
Bedside and Verbal shift change report given to Luis Armando Sandra (oncoming nurse) by Angus Henley RN (offgoing nurse). Report included the following information SBAR, Kardex, MAR and Recent Results. SITUATION:  
? Code Status: DNR 
? Reason for Admission: Pneumonia due to COVID-19 virus [U07.1, J12.82] 
 
? Hospital day: 21 
? Problem List:  
   
Hospital Problems  Date Reviewed: 12/23/2020 Codes Class Noted POA * (Principal) Pneumonia due to COVID-19 virus ICD-10-CM: U07.1, J12.82 ICD-9-CM: 480.8  1/27/2021 Unknown BACKGROUND:  
 Past Medical History:  
Past Medical History:  
Diagnosis Date  Anemia  Aortic stenosis, moderate 03/2019  Breast cancer ([facility] [address]  CAD (coronary artery disease) Coronary Stenting needed, hematology workup needed prior to stent placement, treating medically at this time  Cancer [facility])   
 colon  Cancer ([facility] [address] breast - left  Cardiac catheterization 08/17/2016 LM patent. mLAD 99.9% (2.25 x 28-mm Xience BETHEL, resid 0%). oD1 60%. oD2 95%. pCX 90% (2.5 x 13-mm Xience BETHEL, resid 0%). OMB patent.  Cardiac echocardiogram 03/2019 EF 56 to 60%, moderate concentric LVH, moderate aortic stenosis, mean valve gradient 25 mmHg  Diabetes ([facility] [address]  Hypercholesteremia  Hypertension  Thrombocytopenia ([facility] [address] Patient taking anticoagulants no ASSESSMENT:  
? Changes in Assessment Throughout Shift: patient periodically agitated and pulling off ECG leads-administered ativan for agitation ? Patient has Central Line: yes Reasons if yes: limited vessels suitable for peripheral access ? Patient has Jean Cath: yes Reasons if yes: end of life ? Last Vitals: 
  
Vitals:  
 02/18/21 0800 02/18/21 0830 02/18/21 2000 02/19/21 0000 BP:  (!) 166/85 (!) 159/88 (!) 174/93 Pulse:  87 (!) 109 (!) 106 Resp:  22 22 28 Temp:  96.8 °F (36 °C) 97.9 °F (36.6 °C) 98.4 °F (36.9 °C) TempSrc:      
SpO2:  100% 97% 98% Weight: 68.9 kg (151 lb 12.8 oz) Height: 5' (1.524 m) ? IV and DRAINS (will only show if present) [REMOVED] Peripheral IV 02/02/21 Left;Posterior Hand-Site Assessment: Clean, dry, & intact [REMOVED] Airway - Endotracheal Tube 02/02/21-Site Assessment: Clean, dry, & intact Midline Catheter 02/02/21 Left-Site Assessment: Clean, dry, & intact [REMOVED] Orogastric Tube 02/02/21-Site Assessment: Clean, dry, & intact [REMOVED] Airway - Continuous Aspiration of Subglottic Secretions (MAAME) Tube 02/03/21-Site Assessment: Clean, dry, & intact Hemodialysis Access 02/06/21-Site Assessment: Clean, dry, & intact [REMOVED] Airway - Continuous Aspiration of Subglottic Secretions (MAAME) Tube 01/27/21 Oral-Site Assessment: Clean, dry, & intact [REMOVED] Peripheral IV 01/27/21 Right Arm-Site Assessment: Clean, dry, & intact [REMOVED] Peripheral IV 01/27/21 Left Arm-Site Assessment: Clean, dry, & intact [REMOVED] Peripheral IV 01/27/21 Right Antecubital-Site Assessment: Clean, dry, & intact [REMOVED] Triple Lumen left IJ  01/27/21 Left Internal jugular-Site Assessment: Clean, dry, & intact [REMOVED] Peripheral IV 01/26/21 Left Antecubital-Site Assessment: Drainage (comment) [REMOVED] Nasogastric Tube 01/27/21-Site Assessment: Clean, dry, & intact [REMOVED] Airway - Endotracheal Tube 01/27/21 Oral-Site Assessment: Clean, dry, & intact [REMOVED] Orogastric Tube 01/28/21-Site Assessment: Clean, dry, & intact ? WOUND (if present) Wound Type:  Stage 1 to sacrum, DTI to bilateral heels, DTI to right ankle Dressing present Dressing Present : Yes, Intact, not due to be changed Wound Concerns/Notes:  none ? PAIN Pain Assessment Pain Intensity 1: 0 (02/19/21 0000) Pain Location 1: Other (comment) Pain Intervention(s) 1: Medication (see MAR)   Patient Stated Pain Goal: Unable to verbalize/indicate pain 
o Interventions for Pain:  none 
o Intervention effective: n/a 
 o Time of last intervention: n/a  
o Reassessment Completed: yes ? Last 3 Weights: 
Last 3 Recorded Weights in this Encounter 02/16/21 0800 02/17/21 0800 02/18/21 0800 Weight: 68.9 kg (151 lb 12.8 oz) 68.9 kg (151 lb 12.8 oz) 68.9 kg (151 lb 12.8 oz) Weight change: 0 kg (0 lb) ? INTAKE/OUPUT Current Shift: 02/18 1901 - 02/19 0700 In: -  
Out: 425 [Urine:425] Last three shifts: 02/17 0701 - 02/18 1900 In: -  
Out: 700 [Urine:700] ? LAB RESULTS No results for input(s): WBC, HGB, HCT, PLT, HGBEXT, HCTEXT, PLTEXT in the last 72 hours. No results for input(s): NA, K, GLU, BUN, CREA, CA, MG, INR, INREXT in the last 72 hours. No lab exists for component: PT, PTT 
 
RECOMMENDATIONS AND DISCHARGE PLANNING 1. Pending tests/procedures/ Plan of Care or Other Needs: n/a 
 
2. Discharge plan for patient and Needs/Barriers: discharge plan Jud Lou has appealed discharge 3. Estimated Discharge Date: TBD Posted on Whiteboard in Patients Room: no   
 
4. The patient's care plan was reviewed with the oncoming nurse. \"HEALS\" SAFETY CHECK Fall Risk Total Score: 3 Safety Measures: Safety Measures: Bed/Chair-Wheels locked, Bed in low position, Call light within reach, Gripper socks, Side rails X 3 A safety check occurred in the patient's room between off going nurse and oncoming nurse listed above. The safety check included the below items Area Items H High Alert Medications ? Verify all high alert medication drips (heparin, PCA, etc.) E Equipment ? Suction is set up for ALL patients (with yanker) ? Red plugs utilized for all equipment (IV pumps, etc.) ? WOWs wiped down at end of shift. ? Room stocked with oxygen, suction, and other unit-specific supplies A Alarms ? Bed alarm is set for fall risk patients ? Ensure chair alarm is in place and activated if patient is up in a chair L Lines ? Check IV for any infiltration ? Jean bag is empty if patient has a Jean ? Tubing and IV bags are labeled Ginger Means Safety ? Room is clean, patient is clean, and equipment is clean. ? Hallways are clear from equipment besides carts. ? Fall bracelet on for fall risk patients ? Ensure room is clear and free of clutter ? Suction is set up for ALL patients (with eden) ? Hallways are clear from equipment besides carts. ? Isolation precautions followed, supplies available outside room, sign posted Power Mckeon RN

## 2021-02-19 NOTE — PROGRESS NOTES
[facility] Hospitalist Group Progress Note Patient: Carlos Barrett Age: 79 y.o. : 1940 MR#: [id number] SSN: [id number] Date: 2021 Subjective:  
 
Pt seen & evaluated. Non-verbal. More somnolent. Family refused to have patient discharged to [facility] . Discharge appealed. Patient lying in bed, pulling at HD cath. Non-verbal. Occasional apnea with agonal breathing noted. Assessment/Plan:  
 
Consultants: Infectious disease (signed off); Nephrology 1. Hypoxic respiratory failure - initially intubated 21 then extubated 21, failed to protect airway and required reintubation 21 then extubated again on 02/10/21. Now on RA. 2. Covid 19 pneumonia - s/p abx; remdesevir and convalescent plasma. 3. Severe sepsis with septic shock in setting of PNA and UTI- tachycardia, tachypnea, hypotension on admission, now improved 4. Enterobacter UTI - completed course of cefepime / vanco in ICU 5. NANCY on stage 3 CKD - HD per nephrology 6. Acute metabolic encephalopathy - off precedex drip; in setting of multiple comorbidities and acute illness improved 7. Severe protein calorie malnutrition - NPO per SLP. Pending respiratory status and further SLP recs, Discussed with nutrition -unable to place NG tube yesterday, will hold for the next 1 to 2 days to see if patient improves and if no improvement then will consider PPN versus PEG tube placement. 8. Type 2 diabetes with hyperglycemia -continue Lantus with SSI 9. Recent hemoptysis - hold on blood thinners 10. Elevalted troponin - earlier in admission felt to be in setting of  
11. Severe aortic stenosis - per last echo; cardiology consult 12. HTN - off pressors. 13. H/o Adam and chronic pancytopenia - hold on blood thinners 14. H/o recent left hip ORIF - on 20 per Dr. Tre Morales Continue comfort measures only/hospice; no dialysis [facility] has accepted the patient to return and patient is cleared for discharge from medical standpoint with hospice. - discussed with  and Palliative Care team 
 
Case discussed with:  [x]Patient  [x]Family  [x]Nursing  []Case Management DVT Prophylaxis:  []Lovenox  []Hep SQ  [x]SCDs  []Coumadin   []On Heparin gtt Objective:  
VS:  
Visit Vitals BP (!) 170/88 (BP 1 Location: Right upper arm, BP Patient Position: At rest) Pulse 92 Temp 97 °F (36.1 °C) Resp 30 Ht 5' (1.524 m) Wt 68.9 kg (151 lb 12.8 oz) SpO2 98% BMI 29.65 kg/m² Tmax/24hrs: Temp (24hrs), Av.8 °F (36.6 °C), Min:97 °F (36.1 °C), Max:98.4 °F (36.9 °C) Intake/Output Summary (Last 24 hours) at 2021 1409 Last data filed at 2021 0000 Gross per 24 hour Intake  Output 425 ml Net -425 ml General:  lethargic Cardiovascular: Tachycardic Pulmonary: Coarse rhonchi, mild tachypnea, agonal breathing GI:  +BS in all four quadrants, soft, non-tender Extremities: 2+ bilateral lower edema; feet warm bilaterally Neuro: non-verbal 
 
Family contact: Minor Gelineau [phone] Labs:   
No results found for this or any previous visit (from the past 24 hour(s)). Signed By: Chad Reyes MD   
 2021

## 2021-02-19 NOTE — PROGRESS NOTES
Katie Adler (GEORGINA) received a call from [facility] stating the information didn't cross over correctly, must send information to [facility] again today ASA. Called Erma Kelley in HIM to send the required information to [facility]. Erma Kelley stated she will take care of sending the requested information to [facility]. Informed Katie Adler information was being resent to [facility]  today.

## 2021-02-19 NOTE — PROGRESS NOTES
[facility] vd: [phone] [facility]: [phone] [facility]: [phone] Patient Name: Heydi Rodriguez YOB: 1940 Date of Progress note:  2/19/21 Reason for Consult: Goals of care Requesting Provider: Dr Yasemin Powers Primary Care Physician: Quynh Lake MD 
  
 SUMMARY:  
Heydi Rodriguez is a 79y.o. year old with a past history of HFpPEF ( EF 61 to 72), stage 3 CKD, cirrhosis of liver, DM, hypertension, who was admitted on 1/26/2021 from [facility] with increasing SOB, had previously tested positive for COVID 19. She had a long protracted hospital stay which included intubation, she was able to be liberated from the ventilator. While on the medical floor after medical extubation while trying to place NGT she became hypoxic. Attending at that time spoke with nephew who is contact for patient's sister, family decided on comfort measures. Palliative medicine is consulted for support and care decisions. PALLIATIVE DIAGNOSES:  
1. Symptom management/Comfort care 2. Goals of care / care decisions 3. Acute resp failure 4. COVID 19 infection 5. Debility PLAN:  
2/19/21: Symptom Management/Comfort care Pt on comfort care in the Stepdown unit for COVID-19 and remains on strict isolation. She is non responsive with agonal breathing pattern mixed with periods of apnea up to 15 seconds long. Does not appear in any distress. Have schedule her Morphine IV in order to keep in her system and ease her breathing. Pt does not exhibit any symptoms of pain or distress at this time.   
Pt remains a DNR/DNI on COMFORT CARE 
 
 1. Goals of care / care decisions appreciate attending team previous discussions with family patient comfort measures only. We spoke with her Nephew Mr Melchor Talley who is contact for family. He is an ICU RN. Her sister Dayana Sarabia is legal next of kin. He affirmed goals of care as DNR/DNI comfort measures, no feeding tubes, no HD. POST form discussed and sent via HIPAA compliant email for docusign. Patient is alert but confused appears comfortable. Hospice has been consulted. Appreciate CM help with d/c planning. Shared with family to discuss possible home with hospice if they are able to care for Carolina Watts as her time on earth is likely to be short. Goals of care DNR/DNI comfort measures no feeding tubes. 1. Acute resp failure s/p liberation from vent. Breathing comfortable ly today. On comfort measures 2. COVID 19 infection s/p abx remdesivir and convalescent plasma 3. Debility confused PPS 40 indicating a poor overall prognosis 4. Initial consult note routed to primary continuity provider 5. Communicated plan of care with: Palliative IDT, nephew GOALS OF CARE: comfort measures Patient/Health Care Proxy Stated Goals: Comfort TREATMENT PREFERENCES:  
Code Status: DNR/ DNI Advance Care Planning: 
[] [facility] Interdisciplinary Team has updated the ACP Navigator with [address] and Patient Capacity Primary Decision Maker ([address]): sister Dayana Sarabia is legal next of kin Nephew Mr Melchor Talley is point of contact Medical Interventions: Comfort measures Artificially Administered Nutrition: No feeding tube Other: As far as possible, the palliative care team has discussed with patient / health care proxy about goals of care / treatment preferences for patient. HISTORY:  
 
History obtained from: chart and nephew CHIEF COMPLAINT: resp failure HPI/SUBJECTIVE: The patient is:  
[] Verbal and participatory [x] Non-participatory due to: confusion Please see summary Clinical Pain Assessment (nonverbal scale for nonverbal patients): Clinical Pain Assessment Severity: 0 Activity (Movement): Lying quietly, normal position Duration: for how long has pt been experiencing pain (e.g., 2 days, 1 month, years) Frequency: how often pain is an issue (e.g., several times per day, once every few days, constant) FUNCTIONAL ASSESSMENT:  
 
Palliative Performance Scale (PPS): PPS: 10 
 
ECOG 
ECOG Status : Completely disabled PSYCHOSOCIAL/SPIRITUAL SCREENING:  
  
Any spiritual / Anabaptist concerns: unable to assess for patient  
[] Yes /  [] No 
 
Caregiver Burnout: 
[] Yes /  [] No /  [x] No Caregiver Present Anticipatory grief assessment: unable to assess for patient  
[] Normal  / [] Maladaptive REVIEW OF SYSTEMS:  
 
Positive and pertinent negative findings in ROS are noted above in HPI. The following systems were [] reviewed / [x] unable to be reviewed as noted in HPI Other findings are noted below. Systems: constitutional, ears/nose/mouth/throat, respiratory, gastrointestinal, genitourinary, musculoskeletal, integumentary, neurologic, psychiatric, endocrine. Positive findings noted below. Modified ESAS Completed by: provider Pain: 0 Dyspnea: 3 Stool Occurrence(s): 0 PHYSICAL EXAM:  
 
Wt Readings from Last 3 Encounters:  
02/18/21 68.9 kg (151 lb 12.8 oz) 12/10/20 71.4 kg (157 lb 8 oz) 09/28/20 68 kg (149 lb 14.4 oz) Blood pressure (!) 170/88, pulse 92, temperature 97 °F (36.1 °C), resp. rate 30, height 5' (1.524 m), weight 68.9 kg (151 lb 12.8 oz), SpO2 98 %. Pain: 
Pain Scale 1: Adult Nonverbal Pain Scale Pain Intensity 1: 0 Pain Location 1: Other (comment) Pain Intervention(s) 1: Medication (see MAR) Last bowel movement: none recorded Constitutional: lying in bed, alert in NAD Cardiovascular: regular rhythm Respiratory: breathing not labored on room air Skin: warm, dry Neurologic: alert confused HISTORY:  
 
Principal Problem: 
  Pneumonia due to COVID-19 virus (1/27/2021) Past Medical History:  
Diagnosis Date  Anemia  Aortic stenosis, moderate 03/2019  Breast cancer ([facility]  CAD (coronary artery disease) Coronary Stenting needed, hematology workup needed prior to stent placement, treating medically at this time  Cancer [facility])   
 colon  Cancer ([facility] breast - left  Cardiac catheterization 08/17/2016 LM patent. mLAD 99.9% (2.25 x 28-mm Xience BETHEL, resid 0%). oD1 60%. oD2 95%. pCX 90% (2.5 x 13-mm Xience BETHEL, resid 0%). OMB patent.  Cardiac echocardiogram 03/2019 EF 56 to 60%, moderate concentric LVH, moderate aortic stenosis, mean valve gradient 25 mmHg  Diabetes ([facility]  Hypercholesteremia  Hypertension  Thrombocytopenia ([facility] Past Surgical History:  
Procedure Laterality Date  CARDIAC CATHETERIZATION  7/16/2015  
  no stents at this time r/t anemia & thrombocytopenia (hematology work-up needed prior to stent placement)  CARDIAC CATHETERIZATION  7/11/2016  CARDIAC CATHETERIZATION  8/17/2016  CORONARY ARTERY ANGIOGRAM  7/16/2015  CORONARY ARTERY ANGIOGRAM  7/11/2016  CORONARY ARTERY ANGIOGRAM  8/17/2016  CORONARY STENT EA ADDL VESSEL  8/17/2016  CORONARY STENT SINGLE W/PTCA  8/17/2016  HX GI    
 colon surg secondary to cancer - removed lesion  HX HEENT  2/2013  
 cataract bilaterally  HX MASTECTOMY  march 2011  
 left  HX VASCULAR ACCESS    
 mediport - now removed  IR BX BONE MARROW DIAGNOSTIC  9/30/2019  LV ANGIOGRAPHY  7/16/2015  LV ANGIOGRAPHY  7/11/2016 Family History Problem Relation Age of Onset  Cancer Maternal Aunt History reviewed, no pertinent family history. Social History Tobacco Use  Smoking status: Never Smoker  Smokeless tobacco: Never Used Substance Use Topics  Alcohol use: No  
 
Allergies Allergen Reactions  Latex Itching Skin breaks out causing itching Current Facility-Administered Medications Medication Dose Route Frequency  morphine injection 1 mg  1 mg IntraVENous Q4H  
 sodium citrate 4 gram /100 mL (4 %) 0.08 g  2 mL InterCATHeter DIALYSIS PRN  
 sodium citrate 4 gram /100 mL (4 %) 0.08 g  2 mL InterCATHeter DIALYSIS PRN  
 LORazepam (ATIVAN) injection 1 mg  1 mg IntraVENous Q4H PRN  
 scopolamine (TRANSDERM-SCOP) 1 mg over 3 days 1 Patch  1 Patch TransDERmal Q72H PRN  
 morphine injection 2 mg  2 mg IntraVENous Q4H PRN  
 midodrine (PROAMATINE) tablet 10 mg  10 mg Oral Q6H PRN  
 menthol-zinc oxide (CALMOSEPTINE) 0.44-20.6 % ointment   Topical TID  acetaminophen (TYLENOL) tablet 650 mg  650 mg Oral Q6H PRN  
 albuterol-ipratropium (DUO-NEB) 2.5 MG-0.5 MG/3 ML  3 mL Nebulization Q6H PRN  
 glucose chewable tablet 16 g  4 Tab Oral PRN  
 glucagon (GLUCAGEN) injection 1 mg  1 mg IntraMUSCular PRN  
 dextrose (D50W) injection syrg 12.5-25 g  25-50 mL IntraVENous PRN  
 
 
 LAB AND IMAGING FINDINGS:  
 
Lab Results Component Value Date/Time WBC 6.2 02/14/2021 01:06 AM  
 HGB 8.2 (L) 02/14/2021 01:06 AM  
 PLATELET 49 (L) 00/42/7808 01:06 AM  
 
Lab Results Component Value Date/Time Sodium 144 02/14/2021 01:06 AM  
 Potassium 3.4 (L) 02/14/2021 01:06 AM  
 Chloride 111 02/14/2021 01:06 AM  
 CO2 23 02/14/2021 01:06 AM  
 BUN 51 (H) 02/14/2021 01:06 AM  
 Creatinine 2.19 (H) 02/14/2021 01:06 AM  
 Calcium 8.5 02/14/2021 01:06 AM  
 Magnesium 2.0 02/14/2021 01:06 AM  
 Phosphorus 3.5 02/14/2021 01:06 AM  
  
Lab Results Component Value Date/Time Alk. phosphatase 85 02/10/2021 03:22 AM  
 Protein, total 6.1 (L) 02/10/2021 03:22 AM  
 Albumin 3.1 (L) 02/14/2021 01:06 AM  
 Globulin 2.7 02/10/2021 03:22 AM  
 
Lab Results Component Value Date/Time  INR 1.7 (H) 02/01/2021 01:20 PM  
 Prothrombin time 20.0 (H) 02/01/2021 01:20 PM  
 aPTT 36.4 12/06/2020 05:03 PM  
  
Lab Results Component Value Date/Time Iron 65 12/08/2020 11:30 AM  
 TIBC 189 (L) 12/08/2020 11:30 AM  
 Iron % saturation 34 12/08/2020 11:30 AM  
 Ferritin 669 (H) 02/04/2021 05:10 AM  
  
No results found for: PH, PCO2, PO2 No components found for: [place] Lab Results Component Value Date/Time  01/27/2021 02:45 PM  
 CK - MB 6.8 (H) 01/27/2021 02:45 PM  
  
 
   
 
Total time: 25 minutes Counseling / coordination time, spent as noted above:  
> 50% counseling / coordination: yes with nephew Prolonged service was provided for  []30 min   []75 min in face to face time in the presence of the patient, spent as noted above. Time Start:  
Time End:

## 2021-02-20 NOTE — PROGRESS NOTES
Death pronouncement note: 
 
Was informed that this patient had . Upon my evaluation patient noted to be without carotid or radial pulse, no respiratory movements, pupils are fixed and dilated.

## 2021-02-20 NOTE — PROGRESS NOTES
Discharge/Transition Planning Pt just qualified for Medicaid and why she can return to facility under Hospice. Waiting for decision on Appeal 
 
 
Devi Aguilera RN BSN Outcomes Manager Pager # [phone]

## 2021-02-20 NOTE — ROUTINE PROCESS
Dr Mayra Woodward pronounced pt dead @ [id number]. Pt family- Griselda Pak [phone] called who affirmed MD Sony Augustin conversation with him concerning pt's death. Per Mr Светлана Bolton at [address] [place] [address] is the  home of choice. Information documented on Report of Death notice. Nursing Supervisor notified of pt's death. Life net called [phone] and spoke with Juancarlos Mehta who mentioned body can be released to [facility]. Anatomical Office [phone] notified. Damián's office paged and message left. Last offices performed with all lines and velazquez catheter removed and body bagged with identifies  per protocol. Personal item noted was a black cell phone- bagged and kept in soiled utility room for family . Mr Griselda Pak notified of pt's cell phone and he would pick phone up sometime today or tomorrow. Death report form faxed to Nursing Supervisor's office-Per Nohemi Johnson RN-Supervisor, he has a the faxed copy of the report of death document. Transport picked up body [id number].

## 2021-02-20 NOTE — PROGRESS NOTES
[facility] Hospitalist Group Progress Note Patient: Macey Bonilla Age: 79 y.o. : 1940 MR#: [id number] SSN: [id number] Date: 2021 Subjective:  
 
Pt seen & evaluated. Non-verbal. More somnolent. Family refused to have patient discharged to [facility] . Discharge appealed. Patient lying in bed. Non-verbal. Occasional apnea with agonal breathing noted. Assessment/Plan:  
 
Consultants: Infectious disease (signed off); Nephrology 1. Hypoxic respiratory failure - initially intubated 21 then extubated 21, failed to protect airway and required reintubation 21 then extubated again on 02/10/21. Now on RA. 2. Covid 19 pneumonia - s/p abx; remdesevir and convalescent plasma. 3. Severe sepsis with septic shock in setting of PNA and UTI- tachycardia, tachypnea, hypotension on admission, now improved 4. Enterobacter UTI - completed course of cefepime / vanco in ICU 5. NANCY on stage 3 CKD - HD per nephrology 6. Acute metabolic encephalopathy - off precedex drip; in setting of multiple comorbidities and acute illness improved 7. Severe protein calorie malnutrition - NPO per SLP. Pending respiratory status and further SLP recs, Discussed with nutrition -unable to place NG tube yesterday, will hold for the next 1 to 2 days to see if patient improves and if no improvement then will consider PPN versus PEG tube placement. 8. Type 2 diabetes with hyperglycemia -continue Lantus with SSI 9. Recent hemoptysis - hold on blood thinners 10. Elevalted troponin - earlier in admission felt to be in setting of  
11. Severe aortic stenosis - per last echo; cardiology consult 12. HTN - off pressors. 13. H/o Adam and chronic pancytopenia - hold on blood thinners 14. H/o recent left hip ORIF - on 20 per Dr. Masha Rivera Continue comfort measures only/hospice; no dialysis [facility] has accepted the patient to return and patient is cleared for discharge from medical standpoint with hospice. - discussed with  and Palliative Care team 
 
Case discussed with:  [x]Patient  [x]Family  [x]Nursing  []Case Management DVT Prophylaxis:  []Lovenox  []Hep SQ  [x]SCDs  []Coumadin   []On Heparin gtt Objective:  
VS:  
Visit Vitals /72 (BP 1 Location: Right upper arm, BP Patient Position: At rest) Pulse (!) 105 Temp 99.2 °F (37.3 °C) Resp 30 Ht 5' (1.524 m) Wt 68.9 kg (151 lb 12.8 oz) SpO2 90% BMI 29.65 kg/m² Tmax/24hrs: Temp (24hrs), Av °F (37.2 °C), Min:98.8 °F (37.1 °C), Max:99.2 °F (37.3 °C) Intake/Output Summary (Last 24 hours) at 2021 1312 Last data filed at 2021 7860 Gross per 24 hour Intake  Output 650 ml Net -650 ml General:  lethargic Cardiovascular: Tachycardic Pulmonary: Coarse rhonchi, mild tachypnea, agonal breathing GI:  +BS in all four quadrants, soft, non-tender Extremities: 2+ bilateral lower edema; feet warm bilaterally Neuro: non-verbal 
 
Family contact: Jagjit Ocampo [phone] Labs:   
No results found for this or any previous visit (from the past 24 hour(s)). Signed By: Glenroy Ingram MD   
 2021

## 2021-02-20 NOTE — PROGRESS NOTES
Elo Osorio called requesting an update on his [de-identified]. He was very appreciative of the information and is also requesting a Zoom call today.

## 2021-02-20 NOTE — PROGRESS NOTES
Bedside and Verbal shift change report given to Casandra (oncoming nurse) by Jaswinder Ramirez RN (offgoing nurse). Report included the following information SBAR, Kardex, MAR and Recent Results. SITUATION:  
? Code Status: DNR 
? Reason for Admission: Pneumonia due to COVID-19 virus [U07.1, J12.82] 
 
? Hospital day: 24 
? Problem List:  
   
Hospital Problems  Date Reviewed: 12/23/2020 Codes Class Noted POA Palliative care encounter ICD-10-CM: Z51.5 ICD-9-CM: V66.7  Unknown Unknown Goals of care, counseling/discussion ICD-10-CM: Z71.89 ICD-9-CM: V65.49  Unknown Unknown Acute respiratory failure with hypoxia (HCC) ICD-10-CM: J96.01 
ICD-9-CM: 518.81  Unknown Unknown Debility ICD-10-CM: R53.81 ICD-9-CM: 799.3  Unknown Unknown * (Principal) Pneumonia due to COVID-19 virus ICD-10-CM: U07.1, J12.82 ICD-9-CM: 480.8  1/27/2021 Unknown BACKGROUND:  
 Past Medical History:  
Past Medical History:  
Diagnosis Date  Anemia  Aortic stenosis, moderate 03/2019  [facility]  CAD (coronary artery disease) Coronary Stenting needed, hematology workup needed prior to stent placement, treating medically at this time  Cancer [facility])   
 colon  Cancer ([facility] [address] breast - left  Cardiac catheterization 08/17/2016 LM patent. mLAD 99.9% (2.25 x 28-mm Xience BETHEL, resid 0%). oD1 60%. oD2 95%. pCX 90% (2.5 x 13-mm Xience BETHEL, resid 0%). OMB patent.  Cardiac echocardiogram 03/2019 EF 56 to 60%, moderate concentric LVH, moderate aortic stenosis, mean valve gradient 25 mmHg  Diabetes ([facility]  Hypercholesteremia  Hypertension  Thrombocytopenia ([facility] Patient taking anticoagulants no ASSESSMENT:  
? Changes in Assessment Throughout Shift: none ? Patient has Central Line: yes Reasons if yes: limited vessels suitable for peripheral access ? Patient has Jean Cath: yes Reasons if yes: end of life ? Last Vitals: Vitals:  
 02/19/21 0933 02/19/21 1926 02/20/21 0003 02/20/21 0402 BP: (!) 170/88 (!) 178/96 Pulse: 92 (!) 118 (!) 117 (!) 107 Resp: 30 (!) 31 Temp: 97 °F (36.1 °C) 98.8 °F (37.1 °C) TempSrc:      
SpO2: 98% 90% Weight:      
Height:      
 
 
? IV and DRAINS (will only show if present) [REMOVED] Peripheral IV 02/02/21 Left;Posterior Hand-Site Assessment: Clean, dry, & intact [REMOVED] Airway - Endotracheal Tube 02/02/21-Site Assessment: Clean, dry, & intact Midline Catheter 02/02/21 Left-Site Assessment: Clean, dry, & intact [REMOVED] Orogastric Tube 02/02/21-Site Assessment: Clean, dry, & intact [REMOVED] Airway - Continuous Aspiration of Subglottic Secretions (MAAME) Tube 02/03/21-Site Assessment: Clean, dry, & intact Hemodialysis Access 02/06/21-Site Assessment: Clean, dry, & intact [REMOVED] Airway - Continuous Aspiration of Subglottic Secretions (MAAME) Tube 01/27/21 Oral-Site Assessment: Clean, dry, & intact [REMOVED] Peripheral IV 01/27/21 Right Arm-Site Assessment: Clean, dry, & intact [REMOVED] Peripheral IV 01/27/21 Left Arm-Site Assessment: Clean, dry, & intact [REMOVED] Peripheral IV 01/27/21 Right Antecubital-Site Assessment: Clean, dry, & intact [REMOVED] Triple Lumen left IJ  01/27/21 Left Internal jugular-Site Assessment: Clean, dry, & intact [REMOVED] Peripheral IV 01/26/21 Left Antecubital-Site Assessment: Drainage (comment) [REMOVED] Nasogastric Tube 01/27/21-Site Assessment: Clean, dry, & intact [REMOVED] Airway - Endotracheal Tube 01/27/21 Oral-Site Assessment: Clean, dry, & intact [REMOVED] Orogastric Tube 01/28/21-Site Assessment: Clean, dry, & intact ? WOUND (if present) Wound Type:  Excoriation to buttocks and mojgan 
 Dressing present Dressing Present : Yes Wound Concerns/Notes:  none ? PAIN Pain Assessment Pain Intensity 1: 0 (02/20/21 0402) Pain Location 1: Other (comment) Pain Intervention(s) 1: Medication (see MAR) Patient Stated Pain Goal: Unable to verbalize/indicate pain 
o Interventions for Pain:  none 
o Intervention effective: n/a 
o Time of last intervention: n/a  
o Reassessment Completed: yes ? Last 3 Weights: 
Last 3 Recorded Weights in this Encounter 02/16/21 0800 02/17/21 0800 02/18/21 0800 Weight: 68.9 kg (151 lb 12.8 oz) 68.9 kg (151 lb 12.8 oz) 68.9 kg (151 lb 12.8 oz) Weight change: ? INTAKE/OUPUT Current Shift: 02/19 1901 - 02/20 0700 In: -  
Out: [place] Last three shifts: 02/18 0701 - 02/19 1900 In: -  
Out: 425 [Urine:425] ? LAB RESULTS No results for input(s): WBC, HGB, HCT, PLT, HGBEXT, HCTEXT, PLTEXT in the last 72 hours. No results for input(s): NA, K, GLU, BUN, CREA, CA, MG, INR, INREXT in the last 72 hours. No lab exists for component: PT, PTT 
 
RECOMMENDATIONS AND DISCHARGE PLANNING 1. Pending tests/procedures/ Plan of Care or Other Needs: none 2. Discharge plan for patient and Needs/Barriers: none 3. Estimated Discharge Date: n/a Posted on Whiteboard in Patients Room: no   
 
4. The patient's care plan was reviewed with the oncoming nurse. \"HEALS\" SAFETY CHECK Fall Risk Total Score: 2 Safety Measures: Safety Measures: Bed/Chair-Wheels locked, Bed in low position, Call light within reach, Side rails X 3 A safety check occurred in the patient's room between off going nurse and oncoming nurse listed above. The safety check included the below items Area Items H High Alert Medications ? Verify all high alert medication drips (heparin, PCA, etc.) E Equipment ? Suction is set up for ALL patients (with yanker) ? Red plugs utilized for all equipment (IV pumps, etc.) ? WOWs wiped down at end of shift. ? Room stocked with oxygen, suction, and other unit-specific supplies A Alarms ? Bed alarm is set for fall risk patients ? Ensure chair alarm is in place and activated if patient is up in a chair L 
 Lines ? Check IV for any infiltration ? Jean bag is empty if patient has a Jean ? Tubing and IV bags are labeled Sarita Watts Safety ? Room is clean, patient is clean, and equipment is clean. ? Hallways are clear from equipment besides carts. ? Fall bracelet on for fall risk patients ? Ensure room is clear and free of clutter ? Suction is set up for ALL patients (with yanker) ? Hallways are clear from equipment besides carts. ? Isolation precautions followed, supplies available outside room, sign posted Drury Ormond, RN

## 2021-02-20 NOTE — PROGRESS NOTES
Called by nursing to notify of patients passing. Contacted nephew, Prisca Byrd, and notified him of patients passing. He states that he will notify his Aunt.

## 2021-02-20 NOTE — ROUTINE PROCESS
1450-Tele called unit concerning pt's stopped heart rate. Noted pt laying in bed without movement, heart rate or respirations. Pt is DNR and on comfort care with palliative actively involved. MD Danish Cardenas made aware, awaiting confirmation of death.

## 2021-02-22 NOTE — DISCHARGE SUMMARY
[facility] Hospitalist Group Discharge Summary Patient: Macey Bonilla Age: 79 y.o. : 1940 MR#: [id number] SSN: [id number] PCP on record: Dianna Beth MD 
Admit date: 2021 Discharge date: 2021 Consults:   
- Nephrology: Dr. Marine Shell 
- ID: Dr. Kiana Thomas. [place] - Cardiology: Dr. Papa Dawkins - Palliative Care and Hospice teams Procedures: - Left IJ TLC 21 
- left brachial PICC line 21 
- right IJ TPC 21 Significant Diagnostic Studies: Xr Abd (kub) Result Date: 2021 Tip of NG tube in distal stomach. Xr Abd (kub) Result Date: 2021 OG tube in proximal stomach. Thank you for your referral. 
 
Ct Chest Wo Cont Result Date: 2021 Impression:  Dense pulmonary infiltrates bilaterally consistent with pneumonia. Small parapneumonic effusions. Visualized abdomen shows ascites and mild splenomegaly which may be indicative of portal venous hypertension. Support lines and tubes appear well positioned. Xr Chest [place] Result Date: 2021 Lines and tubes as above. Interval improvement in aeration left upper lobe. Similar opacity on the right. Xr Chest [place] Result Date: 2/10/2021 1. Tubes and lines without evidence of complication. 2.  Bilateral airspace opacities relatively unchanged. Xr Chest [place] Result Date: 2021 Lines/tubes as above including low-lying ETT for which roughly 2 cm retraction recommended. Streaky infiltrates/edema and suspected layering pleural effusions similar to prior. Xr Chest [place] Result Date: 2021 1. Low-lying endotracheal tube. Consider retracting 2 to 4 cm proximally. 2.  Slight interval increased small left pleural effusion. Unchanged small to moderate right pleural effusion. 3.  Unchanged bilateral interstitial and airspace opacities. 4.  Unchanged pulmonary vascular congestion. Xr Chest [place] Result Date: 2021 As described Xr Chest [place] Result Date: 2021 Slightly worse edema/infiltrate. Xr Chest [place] Result Date: 2/6/2021 1. Support lines and tubes are as described. 2. Similar bilateral pulmonary opacities. Suspect small underlying pleural effusions. Xr Chest [place] Result Date: 2/5/2021 1. Tubes and lines as described above. 2. Decreasing lung volumes with increasing bibasilar atelectasis. Persistent patchy infiltrates mid and lower lung fields. Xr Chest [place] Result Date: 2/4/2021 Bilateral airspace opacities persist. Question improved aeration of the left lung. Support devices as discussed. Xr Chest [place] Result Date: 2/3/2021 As described Xr Chest [place] Result Date: 2/3/2021 As described Xr Chest [place] Result Date: 2/2/2021 No interval change Xr Chest [place] Result Date: 2/1/2021 1. Tubes and lines as described above. Endotracheal tube low in position. 2. Cardiomegaly. Patchy diffuse bilateral infiltrates unchanged. Xr Chest [place] Result Date: 1/31/2021 Patient rotation limits evaluation. Endotracheal tube tip again appears low, approximately 1 cm from the sonido. Recommend retraction. Bibasilar pulmonary opacities appears similar to prior. Xr Chest [place] Result Date: 1/30/2021 1. Endotracheal tube remains somewhat low, advise repositioning. Xr Chest [place] Result Date: 1/29/2021 Interval progression of extensive patchy airspace disease, suggesting ARDS. Interval change of supporting lines as below, no pneumothorax: -Interval placed NG tube. -Interval advancement of ET tube from 3 cm to 0.5 cm above the sonido. 3 cm withdrawal of the tube indicated. -The left subclavian central line tip again seen in right suprahilar region, concerning in right brachiocephalic vein rather than proximal SVC. Repositioning advised. Thank you for your referral. 
 
Xr Chest [place] Result Date: 1/27/2021 Interval placement of left IJ central line with tip in the upper SVC. No pneumothorax. Xr Chest [place] Result Date: 1/27/2021 Endotracheal tube tip is approximately 2 cm above the sonido. Extensive bilateral mid to lower lung opacities consistent with multifocal infection, not significantly changed from very recent prior study. Xr Chest Justin Heal Result Date: 1/27/2021 Fluffy bilateral mid to lower lung opacities are consistent with multifocal infection. Results Procedure Component Value Units Date/Time CULTURE, RESPIRATORY/SPUTUM/BRONCH Jeane Chyle STAIN [[id number]] Order Status: Canceled Specimen: Sputum from Endotracheal aspirate Diagnoses at time of death:                                          
Patient Active Problem List  
Diagnosis Code  Anemia D64.9  
 CAD (coronary artery disease) I25.10  Thrombocytopenia ([facility] [facility] D69.6  NSTEMI (non-ST elevated myocardial infarction) ([facility]) I21.4  
 UTI (urinary tract infection) N39.0  Sepsis ([facility] [address] A41.9  PNA (pneumonia) J18.9  Hypoxia R09.02  
 Cirrhosis of liver not due to alcohol ([facility] [address] K74.60  Esophageal varices (HCC) I85.00  
 CHF (congestive heart failure) (HCC) I50.9  Dyspnea R06.00  
 Pneumonia J18.9  
 HTN (hypertension), benign I10  
 CAP (community acquired pneumonia) J18.9  Acute on chronic diastolic CHF (congestive heart failure) ([facility]) I50.33  Leg DVT (deep venous thromboembolism), acute, left ([facility]) I82.402  Palpitations R00.2  Acute pulmonary edema ([facility]) J81.0  Malaise and fatigue R53.81, R53.83  
 Pancytopenia ([facility] [address] [id number]  Portal hypertension ([facility] [address] [facility] K76.6  Chronic anticoagulation Z79.01  
 Hip fracture ([facility] [address] S72.009A  Breast cancer ([facility] [address] C50.919  
 Colon cancer ([facility] [address] C18.9  Pneumonia due to COVID-19 virus U07.1, J12.82  
 Palliative care encounter Z51.5  Goals of care, counseling/discussion Z71.89  
 Acute respiratory failure with hypoxia (HCC) J96.01  
 Debility R53.81 Hospital Course by Problem 1.  Hypoxic respiratory failure - initially intubated 01/27/21 then extubated 21, failed to protect airway and required reintubation 21 then extubated again on 02/10/21. Now on RA. 2. Covid 19 pneumonia - s/p abx; remdesevir and convalescent plasma. 3. Severe sepsis with septic shock in setting of PNA and UTI- tachycardia, tachypnea, hypotension on admission, now improved 4. Enterobacter UTI - completed course of cefepime / vanco in ICU 5. NANCY on stage 3 CKD - HD per nephrology 6. Acute metabolic encephalopathy - off precedex drip; in setting of multiple comorbidities and acute illness improved 7. Severe protein calorie malnutrition - NPO per SLP. Pending respiratory status and further SLP recs, Discussed with nutrition -unable to place NG tube yesterday, will hold for the next 1 to 2 days to see if patient improves and if no improvement then will consider PPN versus PEG tube placement. 8. Type 2 diabetes with hyperglycemia -continue Lantus with SSI 9. Recent hemoptysis - hold on blood thinners 10. Elevalted troponin - earlier in admission felt to be in setting of  
11. Severe aortic stenosis - per last echo; cardiology consult 12. HTN - off pressors. 13. H/o Adam and chronic pancytopenia - hold on blood thinners 14. H/o recent left hip ORIF - on 20 per Dr. Penny Boast 
  
Continue comfort measures only/hospice; no dialysis Disposition:  
 
Patient was on hospice following admission for respiratory failure from COVID 19 pneumonia. Patient  at approximately 14:50 on 21. Family notified of patient's passing.  services offered.    
 
 
Signed: 
Dillan Ortiz MD 
2021 
7:19 PM

## 2021-12-14 NOTE — ROUTINE PROCESS
1915: Bedside and Verbal shift change report given to RN Cecelia Mcgee (oncoming nurse) by Lindsey Beaver (offgoing nurse). Report included the following information SBAR, Intake/Output, Recent Results and Quality Measures. Patient is a 89y old  Female who presents with a chief complaint of s/p fall (13 Dec 2021 13:44)       INTERVAL HPI/OVERNIGHT EVENTS:  Patient seen and evaluated at bedside.  Pt is resting comfortable in NAD. Denies N/V/F/C.      Allergies    Bactrim (Unknown)  Flagyl (Hives; Pruritus)  Macrobid (Other)  sulfa drugs (Hives)  Zosyn (Other)    Intolerances        Vital Signs Last 24 Hrs  T(C): 36.6 (14 Dec 2021 04:26), Max: 36.9 (13 Dec 2021 20:30)  T(F): 97.9 (14 Dec 2021 04:26), Max: 98.5 (13 Dec 2021 20:30)  HR: 74 (14 Dec 2021 04:26) (65 - 74)  BP: 144/60 (14 Dec 2021 04:26) (125/73 - 162/61)  BP(mean): --  RR: 17 (14 Dec 2021 04:26) (17 - 17)  SpO2: 92% (14 Dec 2021 04:26) (92% - 94%)    LABS:                        8.1    10.56 )-----------( 286      ( 14 Dec 2021 03:26 )             26.8     12-13    137  |  99  |  23  ----------------------------<  285<H>  4.0   |  25  |  1.31<H>    Ca    9.4      13 Dec 2021 07:19          CAPILLARY BLOOD GLUCOSE      POCT Blood Glucose.: 313 mg/dL (14 Dec 2021 08:58)  POCT Blood Glucose.: 323 mg/dL (13 Dec 2021 23:31)  POCT Blood Glucose.: 298 mg/dL (13 Dec 2021 22:22)  POCT Blood Glucose.: 306 mg/dL (13 Dec 2021 17:38)  POCT Blood Glucose.: 271 mg/dL (13 Dec 2021 13:10)      Lower Extremity Physical Exam:      Vasular: DP/PT 1/4, B/L, CFT <3 seconds B/L, Temperature gradient WNL, B/L.   Neuro: Epicritic sensation diminished to the level of _, B/L.  Musculoskeletal/Ortho: unremarkable    Skin: Right lateral mal wound to periosteum, w/ scant purulence noted, wound tracks 1.5cm proximally, no malodor, mild periwound erythema.    s/p R lateral mal incision and drainage to the level of subQ and not beyond using a 15 blade along the lines of tracking, expressing no purulence, no malodor    bilateral posterior heel deep tissue injury, no fluctuance, no local signs of infection  RADIOLOGY & ADDITIONAL TESTS:

## 2024-10-30 NOTE — ED TRIAGE NOTES
Patient arrived via [employer] stating \"I had a blood clot in my leg previously. I stopped taking the blood thinner in July. My left leg as been swelling and feels tight. This has been going on for a couple of days. \" 

Biochemical
Follow up with your surgeon in two weeks. Call for appointment.  If you need more pain medication, call your surgeon's office. For medication refills or authorizations, please call [phone]  We recommend that you call and schedule a follow up appointment within 2-4 weeks with your primary care physician for repeat blood work (CBC and BMP) for post hospital discharge follow-up care.  Call your surgeon if you have increased redness/pain/drainage or fever. Return to ER for shortness of breath/calf tenderness.

## 2025-04-24 NOTE — PROGRESS NOTES
Heart Failure Follow Up Call NN contacted the patient by telephone to perform CHF Follow Up. Verified  and address as identifiers. Derrell Villalobos reports feeling very well. Denies any kind of sx at this time. Daily Weight (document daily weights in flowsheets):   baseline Amount:  141 lbs Zone:(Pt Reported)  green Goals  Maintains daily weight. 19 Pt will weigh her self daily and log the results to be evaluated on each outreach through 19  Prepare patients and caregivers for end of life decisions (ie. need for hospice, pain management, symptom relief, advance directives etc.)   
  19 Pt will complete an ACP and have scanned into the EMR by 19  Understands and adheres to diet. 19 Pt will maintain a strict low sodium diet to be evaluated on each outreach through 19 Needs addressed from pathway:   
24-48 Hours- or initial contact Review/Verification: 
? Identified care team 
? Disposition (Home) ? Reviewed DC Instructions, Education, and HF Zones ? Evaluate DME needs; arrange home equipment: no needs at this time ? Reviewed Advanced care planning (e.g.: Palliative Care; Hospice ? Labs/Diagnostics to follow per MD office ? Follow-up apts are arranged ? Identify transportation to be provided by friends and [facility] ? Diuretic: lasix 20 mg bid ? Beta Blocker: coreg 12.5 mg bid ? Anticoagulant: eliquis 2.5 mg bid Baseline Labs available in EMR ? BMP 
? BUN/Creat. ? Hgb/Hct ? WBC 
 
ANDRES Documentation:? Goals ? Challenges/Plan ? Weight   
? Edema ? Symptoms Education Disease Management: 
? Reviewed Cardiac Low-sodium Diet (No added sodium; 1500mg as indicated) including carb-controlled diet ? Reviewed Fluid restriction of 1500 ml/day ? Comorbidity Management ? Daily Weights ? Advance medical directive status: pt educated PCP/Specialist follow up:  
Future Appointments Date Time Provider Abdiaziz Johnson 7/15/2019  3:40 PM Bess Ward  [facility] Cardiologist consult while IP: yes Palliative consult while IP:    No  
 
EF: 56-60% on 3/25/19 Type of HF:   HFpEF Cardiac Device present: none Heart Failure Medications: Betablocker, Diuretic, Anticoagulat Disposition of patient:  Home HH Services/Tele Monitoring:  none Social support: family Do you have a Scale:    yes How often do you weigh:  daily Does patient have an Advance Directive:  not on file , pt educated Advance Directive scanned into patients chart:  No  
 
Patient reminded that there are physicians on call 24 hours a day / 7 days a week (M-F 5pm to 8am and from Friday 5pm until Monday 8a for the weekend) should the patient have questions or concerns. Patient reminded to call 911 if situation is emergent or patient feels the situation is emergent. Pt verbalizes understanding. Yes

## (undated) DEVICE — REM POLYHESIVE ADULT PATIENT RETURN ELECTRODE: Brand: VALLEYLAB

## (undated) DEVICE — FLUFF AND POLYMER UNDERPAD,EXTRA HEAVY: Brand: WINGS

## (undated) DEVICE — SYRINGE MED 25GA 3ML L5/8IN SUBQ PLAS W/ DETACH NDL SFTY

## (undated) DEVICE — INTENDED FOR TISSUE SEPARATION, AND OTHER PROCEDURES THAT REQUIRE A SHARP SURGICAL BLADE TO PUNCTURE OR CUT.: Brand: BARD-PARKER SAFETY BLADES SIZE 10, STERILE

## (undated) DEVICE — (D)GLOVE EXAM LG NITRL NS -- DISC BY MFR NO SUB

## (undated) DEVICE — TAPE,CLOTH/SILK,CURAD,3"X10YD,LF,40/CS: Brand: CURAD

## (undated) DEVICE — LIMB HOLDER, WRIST/ANKLE: Brand: DEROYAL

## (undated) DEVICE — BASIN EMESIS 500CC ROSE 250/CS 60/PLT: Brand: MEDEGEN MEDICAL PRODUCTS, LLC

## (undated) DEVICE — SOLUTION IRRIG 1000ML H2O STRL BLT

## (undated) DEVICE — MEDI-VAC SUCTION HIGH CAPACITY: Brand: CARDINAL HEALTH

## (undated) DEVICE — FCPS RAD JAW 4LC 240CM W/NDL -- BX/20 RADIAL JAW 4

## (undated) DEVICE — SUTURE VCRL SZ 2-0 L36IN ABSRB VLT CTX L48MM 1/2 CIR SGL J369H

## (undated) DEVICE — MEDI-VAC NON-CONDUCTIVE SUCTION TUBING: Brand: CARDINAL HEALTH

## (undated) DEVICE — SPONGE LAP 18X18IN STRL -- 5/PK

## (undated) DEVICE — BITE BLOCK ENDOSCP UNIV AD 6 TO 9.4 MM

## (undated) DEVICE — DRESSING,GAUZE,XEROFORM,CURAD,1"X8",ST: Brand: CURAD

## (undated) DEVICE — PAD,ABDOMINAL,8"X10",ST,LF: Brand: MEDLINE

## (undated) DEVICE — AIRLIFE™ NASAL OXYGEN CANNULA CURVED, FLARED TIP WITH 14 FOOT (4.3 M) CRUSH-RESISTANT TUBING, OVER-THE-EAR STYLE: Brand: AIRLIFE™

## (undated) DEVICE — SOLUTION IV 1000ML 0.9% SOD CHL

## (undated) DEVICE — GAUZE,SPONGE,4"X4",16PLY,STRL,LF,10/TRAY: Brand: MEDLINE

## (undated) DEVICE — 4-PORT MANIFOLD: Brand: NEPTUNE 2

## (undated) DEVICE — BIT DRL L330MM DIA4.2MM CALIB 100MM 3 FLUT QUIK CPL

## (undated) DEVICE — STERILE POLYISOPRENE POWDER-FREE SURGICAL GLOVES: Brand: PROTEXIS

## (undated) DEVICE — DRESSING TRNSPAR FLM 4X5IN OPSITE

## (undated) DEVICE — SYR 50ML SLIP TIP NSAF LF STRL --

## (undated) DEVICE — CATHETER SUCT TR FL TIP 14FR W/ O CTRL

## (undated) DEVICE — DRAPE PT ISOLATN 130 IN X 96 IN

## (undated) DEVICE — KIT CLN UP BON SECOURS MARYV

## (undated) DEVICE — SUTURE VCRL SZ 2 L27IN ABSRB VLT L65MM TP-1 1/2 CIR J649G

## (undated) DEVICE — ENDOSCOPY PUMP TUBING/ CAP SET: Brand: ERBE

## (undated) DEVICE — PREP SKN CHLRAPRP 26ML TNT -- CONVERT TO ITEM 373320

## (undated) DEVICE — FLEX ADVANTAGE 3000CC: Brand: FLEX ADVANTAGE

## (undated) DEVICE — GAUZE SPONGES,16 PLY: Brand: CURITY

## (undated) DEVICE — Device

## (undated) DEVICE — PACK PROCEDURE SURG MAJ W/ BASIN LF